# Patient Record
Sex: FEMALE | Race: BLACK OR AFRICAN AMERICAN | Employment: OTHER | URBAN - METROPOLITAN AREA
[De-identification: names, ages, dates, MRNs, and addresses within clinical notes are randomized per-mention and may not be internally consistent; named-entity substitution may affect disease eponyms.]

---

## 2018-05-01 ENCOUNTER — OFFICE VISIT (OUTPATIENT)
Dept: ENT CLINIC | Age: 63
End: 2018-05-01
Payer: MEDICARE

## 2018-05-01 ENCOUNTER — OFFICE VISIT (OUTPATIENT)
Dept: FAMILY MEDICINE CLINIC | Age: 63
End: 2018-05-01
Payer: MEDICARE

## 2018-05-01 VITALS
BODY MASS INDEX: 34.39 KG/M2 | DIASTOLIC BLOOD PRESSURE: 80 MMHG | SYSTOLIC BLOOD PRESSURE: 136 MMHG | TEMPERATURE: 99 F | WEIGHT: 214 LBS | HEIGHT: 66 IN | RESPIRATION RATE: 16 BRPM | HEART RATE: 108 BPM | OXYGEN SATURATION: 96 %

## 2018-05-01 VITALS
HEART RATE: 106 BPM | WEIGHT: 216 LBS | HEIGHT: 66 IN | DIASTOLIC BLOOD PRESSURE: 75 MMHG | SYSTOLIC BLOOD PRESSURE: 124 MMHG | BODY MASS INDEX: 34.72 KG/M2

## 2018-05-01 DIAGNOSIS — J40 SINOBRONCHITIS: Primary | ICD-10-CM

## 2018-05-01 DIAGNOSIS — E04.1 THYROID NODULE: Primary | ICD-10-CM

## 2018-05-01 DIAGNOSIS — J32.9 SINOBRONCHITIS: Primary | ICD-10-CM

## 2018-05-01 PROCEDURE — 4004F PT TOBACCO SCREEN RCVD TLK: CPT | Performed by: OTOLARYNGOLOGY

## 2018-05-01 PROCEDURE — 96372 THER/PROPH/DIAG INJ SC/IM: CPT | Performed by: NURSE PRACTITIONER

## 2018-05-01 PROCEDURE — 99204 OFFICE O/P NEW MOD 45 MIN: CPT | Performed by: OTOLARYNGOLOGY

## 2018-05-01 PROCEDURE — 3017F COLORECTAL CA SCREEN DOC REV: CPT | Performed by: NURSE PRACTITIONER

## 2018-05-01 PROCEDURE — G8427 DOCREV CUR MEDS BY ELIG CLIN: HCPCS | Performed by: NURSE PRACTITIONER

## 2018-05-01 PROCEDURE — 99213 OFFICE O/P EST LOW 20 MIN: CPT | Performed by: NURSE PRACTITIONER

## 2018-05-01 PROCEDURE — 4004F PT TOBACCO SCREEN RCVD TLK: CPT | Performed by: NURSE PRACTITIONER

## 2018-05-01 PROCEDURE — G8417 CALC BMI ABV UP PARAM F/U: HCPCS | Performed by: OTOLARYNGOLOGY

## 2018-05-01 PROCEDURE — G8427 DOCREV CUR MEDS BY ELIG CLIN: HCPCS | Performed by: OTOLARYNGOLOGY

## 2018-05-01 PROCEDURE — 3017F COLORECTAL CA SCREEN DOC REV: CPT | Performed by: OTOLARYNGOLOGY

## 2018-05-01 PROCEDURE — G8417 CALC BMI ABV UP PARAM F/U: HCPCS | Performed by: NURSE PRACTITIONER

## 2018-05-01 RX ORDER — AMOXICILLIN AND CLAVULANATE POTASSIUM 875; 125 MG/1; MG/1
1 TABLET, FILM COATED ORAL 2 TIMES DAILY
Qty: 28 TABLET | Refills: 0 | Status: SHIPPED | OUTPATIENT
Start: 2018-05-01 | End: 2018-05-01 | Stop reason: SDUPTHER

## 2018-05-01 RX ORDER — LEVALBUTEROL INHALATION SOLUTION 0.63 MG/3ML
0.63 SOLUTION RESPIRATORY (INHALATION) ONCE
Status: COMPLETED | OUTPATIENT
Start: 2018-05-01 | End: 2018-05-01

## 2018-05-01 RX ORDER — LEVALBUTEROL INHALATION SOLUTION 1.25 MG/3ML
1.25 SOLUTION RESPIRATORY (INHALATION) ONCE
Status: DISCONTINUED | OUTPATIENT
Start: 2018-05-01 | End: 2018-05-01

## 2018-05-01 RX ORDER — PREDNISONE 20 MG/1
40 TABLET ORAL DAILY
Qty: 14 TABLET | Refills: 0 | Status: SHIPPED | OUTPATIENT
Start: 2018-05-01 | End: 2018-05-08

## 2018-05-01 RX ORDER — AMOXICILLIN AND CLAVULANATE POTASSIUM 875; 125 MG/1; MG/1
1 TABLET, FILM COATED ORAL 2 TIMES DAILY
Qty: 28 TABLET | Refills: 0 | Status: SHIPPED | OUTPATIENT
Start: 2018-05-01 | End: 2018-05-15

## 2018-05-01 RX ADMIN — LEVALBUTEROL INHALATION SOLUTION 0.63 MG: 0.63 SOLUTION RESPIRATORY (INHALATION) at 16:09

## 2018-05-01 ASSESSMENT — ENCOUNTER SYMPTOMS
GASTROINTESTINAL NEGATIVE: 1
ABDOMINAL PAIN: 0
TROUBLE SWALLOWING: 1
EYES NEGATIVE: 1
SORE THROAT: 1
RESPIRATORY NEGATIVE: 1
SHORTNESS OF BREATH: 0
COLOR CHANGE: 0

## 2018-05-03 ENCOUNTER — TELEPHONE (OUTPATIENT)
Dept: ENT CLINIC | Age: 63
End: 2018-05-03

## 2018-05-03 DIAGNOSIS — E04.1 THYROID NODULE: Primary | ICD-10-CM

## 2018-05-21 ENCOUNTER — TELEPHONE (OUTPATIENT)
Dept: ENT CLINIC | Age: 63
End: 2018-05-21

## 2018-05-21 NOTE — TELEPHONE ENCOUNTER
Patient cancelled her FNA due to her  being in ICU. Patient told Jr Marley she would callback to reschedule when she could.     Electronically signed by Toro Amos MA on 5/21/18 at 1:50 PM

## 2018-05-23 NOTE — TELEPHONE ENCOUNTER
How long would you like me to wait before I reach out to the patient if she has not yet rescheduled?

## 2018-06-05 ENCOUNTER — TELEPHONE (OUTPATIENT)
Dept: ENT CLINIC | Age: 63
End: 2018-06-05

## 2018-06-14 ENCOUNTER — OFFICE VISIT (OUTPATIENT)
Dept: INTERNAL MEDICINE | Age: 63
End: 2018-06-14
Payer: MEDICARE

## 2018-06-14 VITALS
HEART RATE: 97 BPM | SYSTOLIC BLOOD PRESSURE: 103 MMHG | HEIGHT: 66 IN | BODY MASS INDEX: 33.73 KG/M2 | TEMPERATURE: 98.5 F | WEIGHT: 209.9 LBS | RESPIRATION RATE: 16 BRPM | DIASTOLIC BLOOD PRESSURE: 67 MMHG

## 2018-06-14 DIAGNOSIS — F32.A DEPRESSION, UNSPECIFIED DEPRESSION TYPE: ICD-10-CM

## 2018-06-14 DIAGNOSIS — E04.1 THYROID NODULE: ICD-10-CM

## 2018-06-14 DIAGNOSIS — K21.9 GASTROESOPHAGEAL REFLUX DISEASE WITHOUT ESOPHAGITIS: ICD-10-CM

## 2018-06-14 DIAGNOSIS — I10 ESSENTIAL HYPERTENSION: Primary | ICD-10-CM

## 2018-06-14 DIAGNOSIS — M47.816 OSTEOARTHRITIS OF LUMBAR SPINE, UNSPECIFIED SPINAL OSTEOARTHRITIS COMPLICATION STATUS: ICD-10-CM

## 2018-06-14 DIAGNOSIS — Z23 NEED FOR PROPHYLACTIC VACCINATION AGAINST STREPTOCOCCUS PNEUMONIAE (PNEUMOCOCCUS): ICD-10-CM

## 2018-06-14 DIAGNOSIS — Z79.4 TYPE 2 DIABETES MELLITUS WITHOUT COMPLICATION, WITH LONG-TERM CURRENT USE OF INSULIN (HCC): ICD-10-CM

## 2018-06-14 DIAGNOSIS — E78.5 HYPERLIPIDEMIA, UNSPECIFIED HYPERLIPIDEMIA TYPE: ICD-10-CM

## 2018-06-14 DIAGNOSIS — E11.9 TYPE 2 DIABETES MELLITUS WITHOUT COMPLICATION, WITH LONG-TERM CURRENT USE OF INSULIN (HCC): ICD-10-CM

## 2018-06-14 DIAGNOSIS — E27.8 ADRENAL INCIDENTALOMA (HCC): ICD-10-CM

## 2018-06-14 DIAGNOSIS — M79.7 FIBROMYALGIA: ICD-10-CM

## 2018-06-14 DIAGNOSIS — J44.9 CHRONIC OBSTRUCTIVE PULMONARY DISEASE, UNSPECIFIED COPD TYPE (HCC): ICD-10-CM

## 2018-06-14 DIAGNOSIS — E55.9 VITAMIN D DEFICIENCY: ICD-10-CM

## 2018-06-14 DIAGNOSIS — Z23 NEED FOR PROPHYLACTIC VACCINATION AGAINST DIPHTHERIA-TETANUS-PERTUSSIS (DTP): ICD-10-CM

## 2018-06-14 DIAGNOSIS — Z23 NEED FOR PROPHYLACTIC VACCINATION AND INOCULATION AGAINST VARICELLA: ICD-10-CM

## 2018-06-14 DIAGNOSIS — E03.9 HYPOTHYROIDISM, UNSPECIFIED TYPE: ICD-10-CM

## 2018-06-14 LAB — GLUCOSE BLD-MCNC: 143 MG/DL

## 2018-06-14 PROCEDURE — G8926 SPIRO NO PERF OR DOC: HCPCS | Performed by: INTERNAL MEDICINE

## 2018-06-14 PROCEDURE — G8427 DOCREV CUR MEDS BY ELIG CLIN: HCPCS | Performed by: INTERNAL MEDICINE

## 2018-06-14 PROCEDURE — 90732 PPSV23 VACC 2 YRS+ SUBQ/IM: CPT

## 2018-06-14 PROCEDURE — 2022F DILAT RTA XM EVC RTNOPTHY: CPT | Performed by: INTERNAL MEDICINE

## 2018-06-14 PROCEDURE — G8417 CALC BMI ABV UP PARAM F/U: HCPCS | Performed by: INTERNAL MEDICINE

## 2018-06-14 PROCEDURE — 3017F COLORECTAL CA SCREEN DOC REV: CPT | Performed by: INTERNAL MEDICINE

## 2018-06-14 PROCEDURE — 3023F SPIROM DOC REV: CPT | Performed by: INTERNAL MEDICINE

## 2018-06-14 PROCEDURE — G0009 ADMIN PNEUMOCOCCAL VACCINE: HCPCS

## 2018-06-14 PROCEDURE — 4004F PT TOBACCO SCREEN RCVD TLK: CPT | Performed by: INTERNAL MEDICINE

## 2018-06-14 PROCEDURE — 82962 GLUCOSE BLOOD TEST: CPT | Performed by: INTERNAL MEDICINE

## 2018-06-14 PROCEDURE — 3044F HG A1C LEVEL LT 7.0%: CPT | Performed by: INTERNAL MEDICINE

## 2018-06-14 PROCEDURE — 99213 OFFICE O/P EST LOW 20 MIN: CPT | Performed by: INTERNAL MEDICINE

## 2018-06-14 PROCEDURE — 6360000002 HC RX W HCPCS

## 2018-06-14 RX ORDER — ESOMEPRAZOLE MAGNESIUM 40 MG/1
CAPSULE, DELAYED RELEASE ORAL
Qty: 30 CAPSULE | Refills: 3 | Status: SHIPPED | OUTPATIENT
Start: 2018-06-14 | End: 2019-02-11 | Stop reason: SDUPTHER

## 2018-06-14 RX ORDER — TRIAMTERENE AND HYDROCHLOROTHIAZIDE 37.5; 25 MG/1; MG/1
TABLET ORAL
Qty: 30 TABLET | Refills: 3 | Status: SHIPPED | OUTPATIENT
Start: 2018-06-14 | End: 2018-09-19 | Stop reason: SDUPTHER

## 2018-06-14 RX ORDER — BUDESONIDE AND FORMOTEROL FUMARATE DIHYDRATE 160; 4.5 UG/1; UG/1
2 AEROSOL RESPIRATORY (INHALATION) 2 TIMES DAILY
Qty: 1 INHALER | Refills: 3 | Status: SHIPPED | OUTPATIENT
Start: 2018-06-14 | End: 2018-10-25 | Stop reason: SDUPTHER

## 2018-06-14 RX ORDER — OYSTER SHELL CALCIUM WITH VITAMIN D 500; 200 MG/1; [IU]/1
TABLET, FILM COATED ORAL
Qty: 30 TABLET | Refills: 3 | Status: SHIPPED | OUTPATIENT
Start: 2018-06-14 | End: 2019-02-11 | Stop reason: SDUPTHER

## 2018-06-14 RX ORDER — GABAPENTIN 400 MG/1
300 CAPSULE ORAL 2 TIMES DAILY
Qty: 60 CAPSULE | Refills: 0 | Status: SHIPPED | OUTPATIENT
Start: 2018-06-14 | End: 2018-09-19 | Stop reason: SDUPTHER

## 2018-06-14 RX ORDER — CITALOPRAM 40 MG/1
40 TABLET ORAL DAILY
Qty: 30 TABLET | Refills: 3 | Status: SHIPPED | OUTPATIENT
Start: 2018-06-14 | End: 2018-10-25 | Stop reason: SDUPTHER

## 2018-06-14 RX ORDER — FLUTICASONE PROPIONATE 50 MCG
1 SPRAY, SUSPENSION (ML) NASAL DAILY
Qty: 1 BOTTLE | Refills: 3 | Status: SHIPPED | OUTPATIENT
Start: 2018-06-14 | End: 2018-10-25 | Stop reason: SDUPTHER

## 2018-06-14 RX ORDER — ATENOLOL 25 MG/1
25 TABLET ORAL DAILY
Qty: 30 TABLET | Refills: 3 | Status: SHIPPED | OUTPATIENT
Start: 2018-06-14 | End: 2019-02-11 | Stop reason: SDUPTHER

## 2018-06-14 RX ORDER — LEVOTHYROXINE SODIUM 75 MCG
75 TABLET ORAL DAILY
Qty: 30 TABLET | Refills: 3 | Status: SHIPPED | OUTPATIENT
Start: 2018-06-14 | End: 2018-09-19 | Stop reason: SDUPTHER

## 2018-06-14 RX ORDER — ALBUTEROL SULFATE 90 UG/1
2 AEROSOL, METERED RESPIRATORY (INHALATION) EVERY 4 HOURS PRN
Qty: 1 INHALER | Refills: 3 | Status: SHIPPED | OUTPATIENT
Start: 2018-06-14 | End: 2018-10-25 | Stop reason: SDUPTHER

## 2018-06-14 RX ORDER — GLUCOSAMINE HCL/CHONDROITIN SU 500-400 MG
CAPSULE ORAL
Qty: 100 STRIP | Refills: 3 | Status: SHIPPED | OUTPATIENT
Start: 2018-06-14 | End: 2018-10-25 | Stop reason: SDUPTHER

## 2018-06-14 RX ORDER — LANCETS 30 GAUGE
EACH MISCELLANEOUS
Qty: 100 EACH | Refills: 3 | Status: SHIPPED | OUTPATIENT
Start: 2018-06-14 | End: 2018-10-25 | Stop reason: SDUPTHER

## 2018-06-14 RX ORDER — AMITRIPTYLINE HYDROCHLORIDE 50 MG/1
TABLET, FILM COATED ORAL
Qty: 30 TABLET | Refills: 3 | Status: SHIPPED | OUTPATIENT
Start: 2018-06-14 | End: 2018-09-19 | Stop reason: SDUPTHER

## 2018-06-14 RX ORDER — BACLOFEN 10 MG/1
TABLET ORAL
Qty: 90 TABLET | Refills: 0 | Status: SHIPPED | OUTPATIENT
Start: 2018-06-14 | End: 2018-09-19 | Stop reason: SDUPTHER

## 2018-06-14 ASSESSMENT — ENCOUNTER SYMPTOMS
NAUSEA: 0
ROS SKIN COMMENTS: DRYNESS
ABDOMINAL PAIN: 0
RHINORRHEA: 1
SHORTNESS OF BREATH: 1
DIARRHEA: 0
COUGH: 1
VOMITING: 0
SORE THROAT: 0
BACK PAIN: 1

## 2018-06-19 ENCOUNTER — TELEPHONE (OUTPATIENT)
Dept: INTERNAL MEDICINE | Age: 63
End: 2018-06-19

## 2018-06-19 PROBLEM — Z79.4 TYPE 2 DIABETES MELLITUS WITHOUT COMPLICATION, WITH LONG-TERM CURRENT USE OF INSULIN (HCC): Status: ACTIVE | Noted: 2018-06-19

## 2018-06-19 PROBLEM — E11.9 TYPE 2 DIABETES MELLITUS WITHOUT COMPLICATION, WITH LONG-TERM CURRENT USE OF INSULIN (HCC): Status: ACTIVE | Noted: 2018-06-19

## 2018-06-25 ENCOUNTER — HOSPITAL ENCOUNTER (OUTPATIENT)
Dept: ULTRASOUND IMAGING | Age: 63
Discharge: HOME | End: 2018-06-27
Payer: MEDICARE

## 2018-06-25 ENCOUNTER — HOSPITAL ENCOUNTER (OUTPATIENT)
Age: 63
Discharge: HOME | End: 2018-06-25
Payer: MEDICARE

## 2018-06-25 DIAGNOSIS — E78.5 HYPERLIPIDEMIA, UNSPECIFIED HYPERLIPIDEMIA TYPE: ICD-10-CM

## 2018-06-25 DIAGNOSIS — E11.9 TYPE 2 DIABETES MELLITUS WITHOUT COMPLICATION, WITH LONG-TERM CURRENT USE OF INSULIN (HCC): ICD-10-CM

## 2018-06-25 DIAGNOSIS — E03.9 HYPOTHYROIDISM, UNSPECIFIED TYPE: ICD-10-CM

## 2018-06-25 DIAGNOSIS — Z79.4 TYPE 2 DIABETES MELLITUS WITHOUT COMPLICATION, WITH LONG-TERM CURRENT USE OF INSULIN (HCC): ICD-10-CM

## 2018-06-25 DIAGNOSIS — E04.1 THYROID NODULE: ICD-10-CM

## 2018-06-25 LAB
CHOLESTEROL, FASTING: 178 MG/DL (ref 0–199)
CREATININE URINE: 218 MG/DL (ref 29–226)
HDLC SERPL-MCNC: 54 MG/DL
LDL CHOLESTEROL CALCULATED: 105 MG/DL (ref 0–99)
MICROALBUMIN UR-MCNC: 29.5 MG/L
MICROALBUMIN/CREAT UR-RTO: 13.5 (ref 0–30)
TRIGLYCERIDE, FASTING: 96 MG/DL (ref 0–149)
TSH SERPL DL<=0.05 MIU/L-ACNC: 14.51 UIU/ML (ref 0.27–4.2)
VLDLC SERPL CALC-MCNC: 19 MG/DL

## 2018-06-25 PROCEDURE — 88173 CYTOPATH EVAL FNA REPORT: CPT

## 2018-06-25 PROCEDURE — 76942 ECHO GUIDE FOR BIOPSY: CPT

## 2018-06-25 PROCEDURE — 88305 TISSUE EXAM BY PATHOLOGIST: CPT

## 2018-06-25 PROCEDURE — 36415 COLL VENOUS BLD VENIPUNCTURE: CPT

## 2018-06-25 PROCEDURE — 82044 UR ALBUMIN SEMIQUANTITATIVE: CPT

## 2018-06-25 PROCEDURE — 80061 LIPID PANEL: CPT

## 2018-06-25 PROCEDURE — 82570 ASSAY OF URINE CREATININE: CPT

## 2018-06-25 PROCEDURE — 10022 US FINE NEEDLE ASPIRATION: CPT

## 2018-06-25 PROCEDURE — 84443 ASSAY THYROID STIM HORMONE: CPT

## 2018-06-26 ENCOUNTER — TELEPHONE (OUTPATIENT)
Dept: INTERNAL MEDICINE | Age: 63
End: 2018-06-26

## 2018-06-27 ENCOUNTER — HOSPITAL ENCOUNTER (OUTPATIENT)
Dept: PHYSICAL THERAPY | Age: 63
Setting detail: THERAPIES SERIES
Discharge: HOME | End: 2018-06-27
Payer: MEDICARE

## 2018-06-27 PROCEDURE — G8982 BODY POS GOAL STATUS: HCPCS

## 2018-06-27 PROCEDURE — 97162 PT EVAL MOD COMPLEX 30 MIN: CPT

## 2018-06-27 PROCEDURE — G8981 BODY POS CURRENT STATUS: HCPCS

## 2018-07-25 ENCOUNTER — TELEPHONE (OUTPATIENT)
Dept: ADMINISTRATIVE | Age: 63
End: 2018-07-25

## 2018-08-01 ENCOUNTER — TELEPHONE (OUTPATIENT)
Dept: ENT CLINIC | Age: 63
End: 2018-08-01

## 2018-08-01 NOTE — TELEPHONE ENCOUNTER
Patient cancelled apt 7/26 to go over FNA Results and No showed to the reschedule on 7/31. Spoke with patient and rescheduled to 8/8 and informed patient it is very important to her health she show up to this apt.

## 2018-08-09 ENCOUNTER — TELEPHONE (OUTPATIENT)
Dept: ENT CLINIC | Age: 63
End: 2018-08-09

## 2018-08-09 ENCOUNTER — OFFICE VISIT (OUTPATIENT)
Dept: ENT CLINIC | Age: 63
End: 2018-08-09
Payer: MEDICARE

## 2018-08-09 VITALS
OXYGEN SATURATION: 90 % | WEIGHT: 200 LBS | HEIGHT: 66 IN | SYSTOLIC BLOOD PRESSURE: 141 MMHG | BODY MASS INDEX: 32.14 KG/M2 | HEART RATE: 79 BPM | DIASTOLIC BLOOD PRESSURE: 89 MMHG

## 2018-08-09 DIAGNOSIS — E04.1 THYROID NODULE: Primary | ICD-10-CM

## 2018-08-09 PROCEDURE — G8427 DOCREV CUR MEDS BY ELIG CLIN: HCPCS | Performed by: OTOLARYNGOLOGY

## 2018-08-09 PROCEDURE — 99213 OFFICE O/P EST LOW 20 MIN: CPT | Performed by: OTOLARYNGOLOGY

## 2018-08-09 PROCEDURE — 4004F PT TOBACCO SCREEN RCVD TLK: CPT | Performed by: OTOLARYNGOLOGY

## 2018-08-09 PROCEDURE — 3017F COLORECTAL CA SCREEN DOC REV: CPT | Performed by: OTOLARYNGOLOGY

## 2018-08-09 PROCEDURE — G8417 CALC BMI ABV UP PARAM F/U: HCPCS | Performed by: OTOLARYNGOLOGY

## 2018-08-09 ASSESSMENT — ENCOUNTER SYMPTOMS
COLOR CHANGE: 0
RESPIRATORY NEGATIVE: 1
SORE THROAT: 1
SHORTNESS OF BREATH: 0
TROUBLE SWALLOWING: 1
ABDOMINAL PAIN: 0
GASTROINTESTINAL NEGATIVE: 1
EYES NEGATIVE: 1

## 2018-08-09 NOTE — PROGRESS NOTES
Subjective:      Patient ID:  Emerita Cruz is a 61 y.o. female. HPI:    Pt returns for review of US/FNA thyroid. There are changes since last visit. Pt having difficulty swallowing since the FNA. Pt is also complaining of increased swelling of the right side since the FNA. Patient's medications, allergies, past medical, surgical, social and family histories were reviewed and updated as appropriate. Review of Systems   Constitutional: Negative. HENT: Positive for sore throat and trouble swallowing. Eyes: Negative. Negative for visual disturbance. Respiratory: Negative. Negative for shortness of breath. Cardiovascular: Negative. Negative for chest pain. Gastrointestinal: Negative. Negative for abdominal pain. Genitourinary: Negative. Musculoskeletal: Positive for neck pain and neck stiffness. Skin: Negative. Negative for color change. Neurological: Negative. Psychiatric/Behavioral: Negative. Negative for behavioral problems and hallucinations. All other systems reviewed and are negative. Objective:   Physical Exam   Constitutional: She is oriented to person, place, and time. She appears well-developed and well-nourished. HENT:   Head: Normocephalic and atraumatic. Right Ear: Hearing, tympanic membrane, external ear and ear canal normal.   Left Ear: Hearing, tympanic membrane, external ear and ear canal normal.   Nose: Nose normal.   Mouth/Throat: Uvula is midline and oropharynx is clear and moist. Mucous membranes are dry. Lower lip has two lumps in the lip on palpation, left mass produces purulence with squeezing. Mild irritation     Pt also producing green mucus   Eyes: Conjunctivae and EOM are normal. Pupils are equal, round, and reactive to light. Neck: Thyroid mass and thyromegaly present. Palpable nodule in the midline of the neck. Elevates with swallowing. Right side of neck has increased swelling and tender to touch. Cardiovascular: Normal rate, regular rhythm and normal heart sounds. Pulmonary/Chest: Effort normal and breath sounds normal.   Abdominal: Soft. Bowel sounds are normal.   Neurological: She is alert and oriented to person, place, and time. Nursing note and vitals reviewed. Pathology:    Specimen Source:  FNA THYROID, ISTHMUS    Clinical Data:  Hypothyroidism; sore throat; trouble swallowing; US  done-nodules found;smoker            ON-SITE RAPID STAIN ASSESSMENT:  Evaluation episode 1 (3 passes): Adequate (KLS 6/25/18)    ADEQUACY STATEMENT:  Specimen is satisfactory for interpretation    DIAGNOSIS  INCONCLUSIVE FOR MALIGNANT CELLS    Follicular lesion of undetermined significance (FLUS)  Cellblock is non-contributory. COMMENT:  Hypercellular specimen with benign-appearing follicular cells, occasional  microfollicles, giant cells and scant colloid present in a background of  mature lymphocytes. Recommend correlation with clinical and radiographic findings. Intradepartmental consultation obtained. Assessment:       Diagnosis Orders   1.  Thyroid nodule  US FINE NEEDLE ASPIRATION              Plan:      I will reorder the FNA with afirma and see if we can come to a conclusion   Follow up in 2 week(s)

## 2018-08-09 NOTE — LETTER
[address] [address]  Phone: [phone]  Fax: Demi Lugo, DO        August 9, 2018    Demi Corok 55      Dear Elroy Diaz:    Our office has rescheduled your appointment to go over your Ultrasound Biopsy results multiple times. You No Showed to the appointments on 7/5/18, 7/31/18, and 8/8/18. You also canceled an appointment scheduled for 7/26/18. Our office policy is to discharge patients after 3 No Shows. Due to the importance of this appointment for your health, I have decided to allow you another chance to reschedule your appointment. Please contact our office as soon as possible to reschedule. Be sure it is a date and time that will best suit your schedule and you will be able to keep. If you have any questions or concerns, please don't hesitate to call.     Sincerely,        Sarah Vieira DO

## 2018-08-10 NOTE — TELEPHONE ENCOUNTER
Letter taken out of mail and shredded due to patient showing up yesterday thinking her appointment was 8/9 vs 8/8

## 2018-09-05 ENCOUNTER — HOSPITAL ENCOUNTER (OUTPATIENT)
Dept: ULTRASOUND IMAGING | Age: 63
Discharge: HOME | End: 2018-09-07
Payer: MEDICARE

## 2018-09-05 DIAGNOSIS — E04.1 THYROID NODULE: ICD-10-CM

## 2018-09-05 PROCEDURE — 10022 US FINE NEEDLE ASPIRATION: CPT

## 2018-09-05 PROCEDURE — 76942 ECHO GUIDE FOR BIOPSY: CPT

## 2018-09-05 PROCEDURE — 88173 CYTOPATH EVAL FNA REPORT: CPT

## 2018-09-05 PROCEDURE — 88305 TISSUE EXAM BY PATHOLOGIST: CPT

## 2018-09-05 NOTE — H&P
UPPER GASTROINTESTINAL ENDOSCOPY      2008    UPPER GASTROINTESTINAL ENDOSCOPY  07/20/2016    egd and colonoscopy       Family History   Problem Relation Age of Onset    Heart Disease Mother 79    Diabetes Mother     Cancer Mother         Breast    Hypertension Father     Cancer Father         Pancreas    Diabetes Father     High Blood Pressure Father     Arthritis Brother     Diabetes Brother     Cancer Maternal Uncle     Cancer Paternal Aunt         breast    High Blood Pressure Paternal Aunt     High Blood Pressure Paternal Uncle     Arthritis Maternal Grandmother     Diabetes Maternal Grandmother     High Blood Pressure Maternal Grandmother     Arthritis Maternal Grandfather     Stroke Maternal Grandfather     High Cholesterol Paternal Grandmother     Kidney Disease Paternal Grandmother     Heart Disease Paternal Grandfather        Social History     Social History    Marital status: Single     Spouse name: N/A    Number of children: N/A    Years of education: N/A     Occupational History    Not on file. Social History Main Topics    Smoking status: Current Every Day Smoker     Packs/day: 1.00     Years: 45.00     Types: Cigarettes     Start date: 7/15/1971    Smokeless tobacco: Never Used      Comment: PAMPHLET PLACED IN CHART    Alcohol use No    Drug use: No    Sexual activity: No     Other Topics Concern    Not on file     Social History Narrative    No narrative on file       ROS: Non-contributory other than as noted above    PHYSICAL EXAM:      Heent: Alert and orientated.     Heart:  Rapid regular rhythm    Lungs:  demonstrate no contraindications to proceed      Abdomen:  normal      DATA:  CBC:   Lab Results   Component Value Date    WBC 7.8 04/04/2017    RBC 4.22 04/04/2017    HGB 12.0 04/04/2017    HCT 38.1 04/04/2017    MCV 90.3 04/04/2017    MCH 28.4 04/04/2017    MCHC 31.5 04/04/2017    RDW 13.7 04/04/2017     04/04/2017    MPV 10.6 04/04/2017     CBC with

## 2018-09-17 ENCOUNTER — HOSPITAL ENCOUNTER (OUTPATIENT)
Dept: PHYSICAL THERAPY | Age: 63
Setting detail: THERAPIES SERIES
Discharge: HOME | End: 2018-09-17
Payer: MEDICARE

## 2018-09-19 ENCOUNTER — OFFICE VISIT (OUTPATIENT)
Dept: INTERNAL MEDICINE | Age: 63
End: 2018-09-19
Payer: MEDICARE

## 2018-09-19 VITALS
TEMPERATURE: 99.1 F | HEIGHT: 66 IN | HEART RATE: 95 BPM | DIASTOLIC BLOOD PRESSURE: 84 MMHG | BODY MASS INDEX: 33.3 KG/M2 | WEIGHT: 207.2 LBS | SYSTOLIC BLOOD PRESSURE: 125 MMHG | RESPIRATION RATE: 18 BRPM

## 2018-09-19 DIAGNOSIS — Z79.4 TYPE 2 DIABETES MELLITUS WITHOUT COMPLICATION, WITH LONG-TERM CURRENT USE OF INSULIN (HCC): ICD-10-CM

## 2018-09-19 DIAGNOSIS — I10 ESSENTIAL HYPERTENSION: ICD-10-CM

## 2018-09-19 DIAGNOSIS — M79.7 FIBROMYALGIA: ICD-10-CM

## 2018-09-19 DIAGNOSIS — E11.9 TYPE 2 DIABETES MELLITUS WITHOUT COMPLICATION, WITH LONG-TERM CURRENT USE OF INSULIN (HCC): ICD-10-CM

## 2018-09-19 DIAGNOSIS — E03.9 HYPOTHYROIDISM, UNSPECIFIED TYPE: ICD-10-CM

## 2018-09-19 DIAGNOSIS — D47.2 SMOLDERING MULTIPLE MYELOMA: ICD-10-CM

## 2018-09-19 DIAGNOSIS — J44.9 CHRONIC OBSTRUCTIVE PULMONARY DISEASE, UNSPECIFIED COPD TYPE (HCC): ICD-10-CM

## 2018-09-19 DIAGNOSIS — M47.816 OSTEOARTHRITIS OF LUMBAR SPINE, UNSPECIFIED SPINAL OSTEOARTHRITIS COMPLICATION STATUS: ICD-10-CM

## 2018-09-19 DIAGNOSIS — E11.9 TYPE 2 DIABETES MELLITUS WITHOUT COMPLICATION, WITH LONG-TERM CURRENT USE OF INSULIN (HCC): Primary | ICD-10-CM

## 2018-09-19 DIAGNOSIS — Z79.4 TYPE 2 DIABETES MELLITUS WITHOUT COMPLICATION, WITH LONG-TERM CURRENT USE OF INSULIN (HCC): Primary | ICD-10-CM

## 2018-09-19 DIAGNOSIS — F32.A DEPRESSION, UNSPECIFIED DEPRESSION TYPE: ICD-10-CM

## 2018-09-19 LAB — HBA1C MFR BLD: 6.4 %

## 2018-09-19 PROCEDURE — 99212 OFFICE O/P EST SF 10 MIN: CPT | Performed by: INTERNAL MEDICINE

## 2018-09-19 PROCEDURE — 99215 OFFICE O/P EST HI 40 MIN: CPT | Performed by: INTERNAL MEDICINE

## 2018-09-19 PROCEDURE — G8417 CALC BMI ABV UP PARAM F/U: HCPCS | Performed by: INTERNAL MEDICINE

## 2018-09-19 PROCEDURE — 2022F DILAT RTA XM EVC RTNOPTHY: CPT | Performed by: INTERNAL MEDICINE

## 2018-09-19 PROCEDURE — 3023F SPIROM DOC REV: CPT | Performed by: INTERNAL MEDICINE

## 2018-09-19 PROCEDURE — 3017F COLORECTAL CA SCREEN DOC REV: CPT | Performed by: INTERNAL MEDICINE

## 2018-09-19 PROCEDURE — 4004F PT TOBACCO SCREEN RCVD TLK: CPT | Performed by: INTERNAL MEDICINE

## 2018-09-19 PROCEDURE — G8926 SPIRO NO PERF OR DOC: HCPCS | Performed by: INTERNAL MEDICINE

## 2018-09-19 PROCEDURE — G8427 DOCREV CUR MEDS BY ELIG CLIN: HCPCS | Performed by: INTERNAL MEDICINE

## 2018-09-19 PROCEDURE — 3044F HG A1C LEVEL LT 7.0%: CPT | Performed by: INTERNAL MEDICINE

## 2018-09-19 PROCEDURE — 83036 HEMOGLOBIN GLYCOSYLATED A1C: CPT | Performed by: INTERNAL MEDICINE

## 2018-09-19 RX ORDER — LEVOFLOXACIN 750 MG/1
750 TABLET ORAL DAILY
Qty: 10 TABLET | Refills: 0 | Status: CANCELLED | OUTPATIENT
Start: 2018-09-19 | End: 2018-09-29

## 2018-09-19 RX ORDER — BACLOFEN 10 MG/1
TABLET ORAL
Qty: 90 TABLET | Refills: 0 | Status: SHIPPED | OUTPATIENT
Start: 2018-09-19 | End: 2019-02-11 | Stop reason: SDUPTHER

## 2018-09-19 RX ORDER — GABAPENTIN 400 MG/1
400 CAPSULE ORAL 2 TIMES DAILY
Qty: 60 CAPSULE | Refills: 0 | Status: SHIPPED | OUTPATIENT
Start: 2018-09-19 | End: 2018-10-25 | Stop reason: SDUPTHER

## 2018-09-19 RX ORDER — PREDNISONE 20 MG/1
20 TABLET ORAL DAILY
Qty: 10 TABLET | Refills: 0 | Status: SHIPPED | OUTPATIENT
Start: 2018-09-19 | End: 2018-09-29

## 2018-09-19 RX ORDER — AMITRIPTYLINE HYDROCHLORIDE 50 MG/1
TABLET, FILM COATED ORAL
Qty: 30 TABLET | Refills: 3 | Status: SHIPPED | OUTPATIENT
Start: 2018-09-19 | End: 2019-02-11 | Stop reason: SDUPTHER

## 2018-09-19 RX ORDER — LEVOFLOXACIN 500 MG/1
750 TABLET, FILM COATED ORAL DAILY
Qty: 10 TABLET | Refills: 0 | Status: SHIPPED | OUTPATIENT
Start: 2018-09-19 | End: 2018-09-19

## 2018-09-19 RX ORDER — TRIAMTERENE AND HYDROCHLOROTHIAZIDE 37.5; 25 MG/1; MG/1
TABLET ORAL
Qty: 30 TABLET | Refills: 3 | Status: SHIPPED | OUTPATIENT
Start: 2018-09-19 | End: 2019-02-11 | Stop reason: SDUPTHER

## 2018-09-19 RX ORDER — AZITHROMYCIN 250 MG/1
TABLET, FILM COATED ORAL
Qty: 1 PACKET | Refills: 0 | Status: SHIPPED | OUTPATIENT
Start: 2018-09-19 | End: 2018-09-23

## 2018-09-19 RX ORDER — LEVOTHYROXINE SODIUM 75 MCG
75 TABLET ORAL DAILY
Qty: 30 TABLET | Refills: 3 | Status: SHIPPED | OUTPATIENT
Start: 2018-09-19 | End: 2018-10-25 | Stop reason: SDUPTHER

## 2018-09-19 RX ORDER — BUDESONIDE AND FORMOTEROL FUMARATE DIHYDRATE 160; 4.5 UG/1; UG/1
2 AEROSOL RESPIRATORY (INHALATION) 2 TIMES DAILY
Qty: 1 INHALER | Refills: 3 | Status: CANCELLED | OUTPATIENT
Start: 2018-09-19

## 2018-09-19 RX ORDER — CITALOPRAM 40 MG/1
40 TABLET ORAL DAILY
Qty: 30 TABLET | Refills: 3 | Status: CANCELLED | OUTPATIENT
Start: 2018-09-19

## 2018-09-19 NOTE — PROGRESS NOTES
[address]  Internal Medicine Residency Program  [facility] Note      SUBJECTIVE:  CC: had concerns including Diabetes Mellitus; Hypertension; Medication Refill; and Breathing Problem (increased sob with movement and coughing up yellow sputum). Sudeep Argueta presented to the [facility] for a routine visit    Patient present to clinic with 3 days of eye irritation, runny nose, chest pain, \"feels like something is pulling\"  SOB, cough with increased production of greenish sputum, no fever. Has increased her use of her inhalers. Pt was unaware they were the same medication(albuterol) and was taking them both. She uses O2 during the night has not increased her use. O2 Sat RA was 95%    Refers she doesn't take all her medications because her insurance doesn't cover everything, will speak with SW to find available options for her. Last TSH measurement on June was 14, prices of medication reviewed with her and will start regime. Will follow TSH in 6-8 weeks. 6.4 A1C today increased from 5.9 on her last visit in which her Metformin was modified, refers she has tingling and numbness in her feet. Does not measure her BG at home, but tries to eat healthy. Even though her appettite is not he best.    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 Bone marrow biopsy showed 15% plasma cell, no karyotype, flow cytometry lambda feature,  IgG lambda monoclonal protein present, kappa/lambda ratio +. IgG 2368, will reevaluate with Bone survey in the future. ENT has been following for thyroid nodule which showed no malignancy on FNA    HTN well controlled on medication. COPD GOLD III with evidence of air trapping, hyperinflation was evident on PFT. (2015)  Only on albuterol inhaler, patient was unaware she was prescribed combined inhaler in the past.    Denies headache, nausea, vomit, chest pressure, abdominal pain, diarrhea, constipation, bloody stool, dysuria, fever.     Review Of Systems:  General: no fevers, chills, weight loss or gain. Ears/Nose/Throat: no hearing loss, tinnitus, vertigo, nosebleed, nasal congestion, + rhinorrhea, no sore throat  Respiratory: + cough, no pleuritic chest pain, dyspnea, and  +wheezing  Cardiovascular: no chest pain, angina, dyspnea on exertion, orthopnea, PND, palpitations, or claudication  Gastrointestinal: no nausea, vomiting, heartburn, diarrhea, constipation, abdominal pain, hematochezia or melena  Genitourinary: no urinary urgency, frequency, dysuria, nocturia, hesitancy, or incontinence  Musculoskeletal: no arthritis, arthralgia, myalgia, weakness, or morning stiffness,no flares. Skin: no abnormal pigmentation, rash, itching, masses, hair or nail changes    Outpatient Prescriptions Marked as Taking for the 9/19/18 encounter (Office Visit) with Jacqueline Nielson MD   Medication Sig Dispense Refill    gabapentin (NEURONTIN) 400 MG capsule Take 1 capsule by mouth 2 times daily for 30 days. . 60 capsule 0    triamterene-hydrochlorothiazide (MAXZIDE-25) 37.5-25 MG per tablet TAKE 1 TABLET BY MOUTH EVERY DAY 30 tablet 3    metFORMIN (GLUCOPHAGE) 1000 MG tablet TAKE 1 TABLET BY MOUTH TWICE DAILY WITH MEALS 60 tablet 3    baclofen (LIORESAL) 10 MG tablet TAKE 1/2 TABLET THREE TIMES DAILY AS NEEDED(PAIN, SPASMS) 90 tablet 0    amitriptyline (ELAVIL) 50 MG tablet TAKE 1 TABLET BY MOUTH EVERY EVENING 30 tablet 3    predniSONE (DELTASONE) 20 MG tablet Take 1 tablet by mouth daily for 10 days 10 tablet 0    SYNTHROID 75 MCG tablet Take 1 tablet by mouth daily Take with water on an empty stomach- wait 30 minutes before eating or taking other meds.  30 tablet 3    azithromycin (ZITHROMAX Z-TOYIN) 250 MG tablet Take 2 tablets (500 mg) on Day 1, and then take 1 tablet (250 mg) on days 2 through 5. 1 packet 0    fluticasone (FLONASE) 50 MCG/ACT nasal spray 1 spray by Nasal route daily 1 Bottle 3    citalopram (CELEXA) 40 MG tablet Take 1 tablet by mouth

## 2018-09-19 NOTE — PROGRESS NOTES
Verbal instructions given to pt by dr Dominique Graham   Pt left prior to scheduling her fu appt and receiving her printed avs   avs and follow-up appointment mailed to patient

## 2018-09-19 NOTE — PROGRESS NOTES
[address]  Internal Medicine Clinic    Attending Physician Statement:  Efrain Gutiérrez M.D., F.A.C.P. I have discussed the case, including pertinent history and exam findings with the resident. I have seen and examined the patient and the key elements of the encounter have been performed by me. I agree with the assessment, plan and orders as documented by the resident. Patient is seen for fu visit today. Last office notes reviewed, relative labs and imaging. Seen ENT then radiology-- lots of confusion and telephone calls-- +confusion of status  Fu thyroid nodule-- sp FNA  . Nodular abnormality measuring   approximately 1.7 cm with peripheral vascularity. DIAGNOSIS  INCONCLUSIVE FOR MALIGNANT CELLS    Follicular lesion of undetermined significance (FLUS)  Cellblock is similar. COMMENT:  The smears are sparsely cellular but show a predominance of Hurthle cells  with colloid and many multinucleated giant cell macrophages    Reassured that NO malignancy thryoid seen    We did want her to take her thyroid med discussed $4 cost    July 2016 Colonoscopy:  No HPylori +adenomas biopsies. Repeat colonoscopy in 3 years. Now off  Nexium. + Carafate. 3 years ago:  Severe airflow obstruction (GOLD III COPD) with a significant   bronchodilator component. Evidence of air-trapping, hyperinflation is   evident. No evidence of restrictive pathology. Diffusing capacity is   severely reduced indicating loss in gas transfer and discrepancy at the   alveolar capillary bed. Copd 3 days --productive cough-- greenish-so abx for  copd exac, +cough, +itchy eyes-- allergies> URI -- runny nose   Steroids,  Not taking controller-- dulera  Only taking albuterol.   Dicussed costs of inhalers, -- expensive  -- stop dual agent inhalders  Consider single agent steroid >gold 3   Or single agent long acting anticholinergic      NOT following up CC-- for MM  bx-- 15% plasma cells  2016  MGUS--

## 2018-09-20 RX ORDER — AMITRIPTYLINE HYDROCHLORIDE 50 MG/1
TABLET, FILM COATED ORAL
Qty: 90 TABLET | Refills: 3 | OUTPATIENT
Start: 2018-09-20

## 2018-09-20 RX ORDER — BACLOFEN 10 MG/1
TABLET ORAL
Qty: 135 TABLET | Refills: 0 | OUTPATIENT
Start: 2018-09-20

## 2018-09-20 RX ORDER — TRIAMTERENE AND HYDROCHLOROTHIAZIDE 37.5; 25 MG/1; MG/1
TABLET ORAL
Qty: 90 TABLET | Refills: 3 | OUTPATIENT
Start: 2018-09-20

## 2018-09-21 ENCOUNTER — HOSPITAL ENCOUNTER (OUTPATIENT)
Dept: PHYSICAL THERAPY | Age: 63
Setting detail: THERAPIES SERIES
Discharge: HOME | End: 2018-09-21
Payer: MEDICARE

## 2018-09-21 NOTE — PROGRESS NOTES
[facility]  Phone: [phone] Fax: [phone]     Physical Therapy  Cancellation/No-show Note  Patient Name:  Lamar Stevens  :  1955   Date:  2018 -  DOS-2018  For today's appointment patient:  [x]  Cancelled  [x]  Rescheduled appointment  []  No-show     Reason given by patient:  []  Patient ill  []  Conflicting appointment  []  No transportation    []  Conflict with work  []  No reason given  [x]  Other:  POOL water too cold 83*   Comments:      Electronically signed by:  Zander Rojas  PTA [id number]

## 2018-09-28 ENCOUNTER — OFFICE VISIT (OUTPATIENT)
Dept: ENT CLINIC | Age: 63
End: 2018-09-28
Payer: MEDICARE

## 2018-09-28 VITALS
BODY MASS INDEX: 33.27 KG/M2 | HEIGHT: 66 IN | SYSTOLIC BLOOD PRESSURE: 138 MMHG | OXYGEN SATURATION: 95 % | WEIGHT: 207 LBS | HEART RATE: 84 BPM | DIASTOLIC BLOOD PRESSURE: 84 MMHG

## 2018-09-28 DIAGNOSIS — E04.1 THYROID NODULE: Primary | ICD-10-CM

## 2018-09-28 PROCEDURE — G8417 CALC BMI ABV UP PARAM F/U: HCPCS | Performed by: OTOLARYNGOLOGY

## 2018-09-28 PROCEDURE — G8427 DOCREV CUR MEDS BY ELIG CLIN: HCPCS | Performed by: OTOLARYNGOLOGY

## 2018-09-28 PROCEDURE — 4004F PT TOBACCO SCREEN RCVD TLK: CPT | Performed by: OTOLARYNGOLOGY

## 2018-09-28 PROCEDURE — 3017F COLORECTAL CA SCREEN DOC REV: CPT | Performed by: OTOLARYNGOLOGY

## 2018-09-28 PROCEDURE — 99213 OFFICE O/P EST LOW 20 MIN: CPT | Performed by: OTOLARYNGOLOGY

## 2018-09-28 ASSESSMENT — ENCOUNTER SYMPTOMS
EYES NEGATIVE: 1
SORE THROAT: 1
ABDOMINAL PAIN: 0
TROUBLE SWALLOWING: 1
SHORTNESS OF BREATH: 0
RESPIRATORY NEGATIVE: 1
GASTROINTESTINAL NEGATIVE: 1
COLOR CHANGE: 0

## 2018-09-28 NOTE — PATIENT INSTRUCTIONS
Thank you for choosing our [facility] or [facility] practice. We are committed to your medical treatment and  care. To prepare you for surgery, the surgery scheduler will be  calling you to confirm a date for both your surgery and follow up  appointment. Please allow at least 72 hours after your office visit to  receive your appointment dates and times. If you need to reschedule or cancel your surgery or follow up  appointment, please call the surgery scheduler at [phone]. INSTRUCTIONS FOR SURGERY    Nothing to eat or drink after midnight the night before surgery unless surgery is at [facility] or otherwise instructed by the hospital.    DO NOT TAKE ANY ASPIRIN PRODUCTS 7 days prior to surgery. Tylenol only. No Advil, Motrin, Aleve, or Ibuprofen    Any illegal drugs in your system (including Marijuana even if legally prescribed) will result in your surgery being cancelled. Please be sure to check with our office or the hospital on time frame for the drugs to be out of your system. Should your insurance change at any time you must contact our office. Failure to do so may result in your surgery being rescheduled. [address] [facility], Jayy Yanna will call you a couple of days prior to the surgery and will give you further instructions, if any questions call them at [phone] extension # [phone]. [facility] will call you a couple of days prior to the surgery and will give you further instructions, if any questions call them [facility], [facility] will call you a couple of days prior to surgery and give you further instructions, if any questions call them at [address] [facility], [address] [facility] will call you a couple days prior to your surgery and give you further

## 2018-09-28 NOTE — PROGRESS NOTES
Subjective:      Patient ID:  Eric Aguilar is a 61 y.o. female. HPI:    Pt returns for review of US/FNA thyroid. There are not changes since last visit. Patient's medications, allergies, past medical, surgical, social and family histories were reviewed and updated as appropriate. Review of Systems   Constitutional: Negative. HENT: Positive for sore throat and trouble swallowing. Eyes: Negative. Negative for visual disturbance. Respiratory: Negative. Negative for shortness of breath. Cardiovascular: Negative. Negative for chest pain. Gastrointestinal: Negative. Negative for abdominal pain. Genitourinary: Negative. Musculoskeletal: Positive for neck pain and neck stiffness. Skin: Negative. Negative for color change. Neurological: Negative. Psychiatric/Behavioral: Negative. Negative for behavioral problems and hallucinations. All other systems reviewed and are negative. Objective:   Physical Exam   Constitutional: She is oriented to person, place, and time. She appears well-developed and well-nourished. HENT:   Head: Normocephalic and atraumatic. Right Ear: Hearing, tympanic membrane, external ear and ear canal normal.   Left Ear: Hearing, tympanic membrane, external ear and ear canal normal.   Nose: Nose normal.   Mouth/Throat: Uvula is midline and oropharynx is clear and moist. Mucous membranes are dry. Pt has palpable nodule on the right side of her neck. Eyes: Pupils are equal, round, and reactive to light. Conjunctivae and EOM are normal.   Neck: Thyroid mass and thyromegaly present. Palpable nodule in the midline of the neck. Elevates with swallowing. Right side of neck has increased swelling and tender to touch. Cardiovascular: Normal rate, regular rhythm and normal heart sounds. Pulmonary/Chest: Effort normal and breath sounds normal.   Abdominal: Soft.  Bowel sounds are normal.   Neurological: She is alert and oriented to person, place, and time. Nursing note and vitals reviewed. Pathology:            Assessment:       Diagnosis Orders   1. Thyroid nodule                Plan:      I recommend:    total Thyroidectomy with nerve integrity monitor. I will keep the patient overnight for observation after surgery  The procedure risks and benefits were discussed with the patient and family including:    -- Injury to the recurrent laryngeal nerve can occur in 5% of cases. A temporary paralysis of the nerve also may occur if the nerve is stretched during surgery. This may happen if a superior approach is used to remove the gland and the nerve is stretched between where it enters the larynx and below the thyroid next to where it may be bound to Berry's ligament. -- Postoperative bleeding may occur around the trachea. If severe airway obstruction may occur. -- Asymmetry of the skin flaps which may cause a cosmetic deformity. If a total thyroidectomy is performed both recurrent laryngeal nerves are at risk. If both are injured, the patient will have a poor airway and placement of a tracheotomy may be necessary. -- There are four small calcium glands, called parathyroids. The location of these glands is variable and they mimic fat and lymph nodes. If these glands are all removed, calcium metabolism will be disturbed and there will be a rapid (over hours) fall in the serum calcium. If left untreated, this will cause cramps, tetany and cardiac arrest.           Pt and family understood and decided to proceed with the surgery.     Follow up in 1 week(s)

## 2018-10-11 ENCOUNTER — TELEPHONE (OUTPATIENT)
Dept: INTERNAL MEDICINE | Age: 63
End: 2018-10-11

## 2018-10-25 ENCOUNTER — OFFICE VISIT (OUTPATIENT)
Dept: INTERNAL MEDICINE | Age: 63
End: 2018-10-25
Payer: MEDICARE

## 2018-10-25 VITALS
DIASTOLIC BLOOD PRESSURE: 70 MMHG | HEIGHT: 66 IN | TEMPERATURE: 98.4 F | RESPIRATION RATE: 20 BRPM | HEART RATE: 90 BPM | OXYGEN SATURATION: 94 % | SYSTOLIC BLOOD PRESSURE: 116 MMHG | BODY MASS INDEX: 32.47 KG/M2 | WEIGHT: 202 LBS

## 2018-10-25 DIAGNOSIS — E11.9 TYPE 2 DIABETES MELLITUS WITHOUT COMPLICATION, WITH LONG-TERM CURRENT USE OF INSULIN (HCC): ICD-10-CM

## 2018-10-25 DIAGNOSIS — J44.9 CHRONIC OBSTRUCTIVE PULMONARY DISEASE, UNSPECIFIED COPD TYPE (HCC): ICD-10-CM

## 2018-10-25 DIAGNOSIS — F32.A DEPRESSION, UNSPECIFIED DEPRESSION TYPE: ICD-10-CM

## 2018-10-25 DIAGNOSIS — D47.2 SMOLDERING MULTIPLE MYELOMA: ICD-10-CM

## 2018-10-25 DIAGNOSIS — Z23 NEED FOR INFLUENZA VACCINATION: Primary | ICD-10-CM

## 2018-10-25 DIAGNOSIS — Z79.4 TYPE 2 DIABETES MELLITUS WITHOUT COMPLICATION, WITH LONG-TERM CURRENT USE OF INSULIN (HCC): ICD-10-CM

## 2018-10-25 DIAGNOSIS — I10 ESSENTIAL HYPERTENSION: ICD-10-CM

## 2018-10-25 DIAGNOSIS — M79.7 FIBROMYALGIA: ICD-10-CM

## 2018-10-25 DIAGNOSIS — E03.9 HYPOTHYROIDISM, UNSPECIFIED TYPE: ICD-10-CM

## 2018-10-25 PROCEDURE — 99214 OFFICE O/P EST MOD 30 MIN: CPT | Performed by: INTERNAL MEDICINE

## 2018-10-25 PROCEDURE — G0008 ADMIN INFLUENZA VIRUS VAC: HCPCS

## 2018-10-25 PROCEDURE — G8427 DOCREV CUR MEDS BY ELIG CLIN: HCPCS | Performed by: INTERNAL MEDICINE

## 2018-10-25 PROCEDURE — G8417 CALC BMI ABV UP PARAM F/U: HCPCS | Performed by: INTERNAL MEDICINE

## 2018-10-25 PROCEDURE — G8482 FLU IMMUNIZE ORDER/ADMIN: HCPCS | Performed by: INTERNAL MEDICINE

## 2018-10-25 PROCEDURE — 90686 IIV4 VACC NO PRSV 0.5 ML IM: CPT

## 2018-10-25 PROCEDURE — 3017F COLORECTAL CA SCREEN DOC REV: CPT | Performed by: INTERNAL MEDICINE

## 2018-10-25 PROCEDURE — 2022F DILAT RTA XM EVC RTNOPTHY: CPT | Performed by: INTERNAL MEDICINE

## 2018-10-25 PROCEDURE — 3023F SPIROM DOC REV: CPT | Performed by: INTERNAL MEDICINE

## 2018-10-25 PROCEDURE — 3044F HG A1C LEVEL LT 7.0%: CPT | Performed by: INTERNAL MEDICINE

## 2018-10-25 PROCEDURE — 6360000002 HC RX W HCPCS

## 2018-10-25 PROCEDURE — 99203 OFFICE O/P NEW LOW 30 MIN: CPT | Performed by: INTERNAL MEDICINE

## 2018-10-25 PROCEDURE — 4004F PT TOBACCO SCREEN RCVD TLK: CPT | Performed by: INTERNAL MEDICINE

## 2018-10-25 PROCEDURE — G8926 SPIRO NO PERF OR DOC: HCPCS | Performed by: INTERNAL MEDICINE

## 2018-10-25 RX ORDER — LANCETS 30 GAUGE
EACH MISCELLANEOUS
Qty: 100 EACH | Refills: 1 | Status: SHIPPED | OUTPATIENT
Start: 2018-10-25 | End: 2019-02-11 | Stop reason: SDUPTHER

## 2018-10-25 RX ORDER — LEVOTHYROXINE SODIUM 75 MCG
75 TABLET ORAL DAILY
Qty: 30 TABLET | Refills: 3 | Status: SHIPPED | OUTPATIENT
Start: 2018-10-25 | End: 2019-02-11 | Stop reason: SDUPTHER

## 2018-10-25 RX ORDER — CITALOPRAM 40 MG/1
40 TABLET ORAL DAILY
Qty: 30 TABLET | Refills: 3 | Status: SHIPPED | OUTPATIENT
Start: 2018-10-25 | End: 2018-12-18 | Stop reason: SDUPTHER

## 2018-10-25 RX ORDER — FLUTICASONE PROPIONATE 50 MCG
1 SPRAY, SUSPENSION (ML) NASAL DAILY
Qty: 1 BOTTLE | Refills: 3 | Status: SHIPPED | OUTPATIENT
Start: 2018-10-25 | End: 2019-02-11 | Stop reason: SDUPTHER

## 2018-10-25 RX ORDER — BUDESONIDE AND FORMOTEROL FUMARATE DIHYDRATE 160; 4.5 UG/1; UG/1
2 AEROSOL RESPIRATORY (INHALATION) 2 TIMES DAILY
Qty: 1 INHALER | Refills: 3 | Status: SHIPPED | OUTPATIENT
Start: 2018-10-25 | End: 2019-02-11 | Stop reason: SDUPTHER

## 2018-10-25 RX ORDER — ALBUTEROL SULFATE 90 UG/1
2 AEROSOL, METERED RESPIRATORY (INHALATION) EVERY 4 HOURS PRN
Qty: 1 INHALER | Refills: 3 | Status: SHIPPED | OUTPATIENT
Start: 2018-10-25 | End: 2019-02-11 | Stop reason: SDUPTHER

## 2018-10-25 RX ORDER — GLUCOSAMINE HCL/CHONDROITIN SU 500-400 MG
CAPSULE ORAL
Qty: 100 STRIP | Refills: 1 | Status: SHIPPED | OUTPATIENT
Start: 2018-10-25 | End: 2019-02-11 | Stop reason: SDUPTHER

## 2018-10-25 RX ORDER — GABAPENTIN 400 MG/1
400 CAPSULE ORAL 2 TIMES DAILY
Qty: 60 CAPSULE | Refills: 3 | Status: SHIPPED | OUTPATIENT
Start: 2018-10-25 | End: 2019-06-11 | Stop reason: SDUPTHER

## 2018-10-25 NOTE — PROGRESS NOTES
Attending Physician Statement  I have discussed the case, including pertinent history and exam findings with the resident. I have seen and examined the patient and the key elements of the encounter have been performed by me. I reviewed medical, surgical, social histories, meds and any pertinent labs. I agree with the assessment, plan and orders as documented by the resident. Patient has extensive history of COPD, wears O2 at night, has smoldering myeloma, MGUS (has M spike IgG 2.3 g without evident lytic lesions, anemia, or hypercalcemia but with 15% marrow plasma cells, indeterminate thyroid nodule (follicular with Hurthle cells, recommended thyroidiectomy per ENT, hypothyroidism, HTH. Patient needs refills on meds and O2 script. Patient going to [place] to visit mother. She needs follow up appointment with oncology. Needs CBC (last checked 4/17) and CMP.

## 2018-10-25 NOTE — PROGRESS NOTES
Vaccine Information Sheet, \"Influenza - Inactivated\"  given to Kayla Lew, or parent/legal guardian of  Kayla Lew and verbalized understanding. Patient responses:    Have you ever had a reaction to a flu vaccine? No  Are you able to eat eggs without adverse effects? Yes  Do you have any current illness? No  Have you ever had Guillian [place] Syndrome? No    Flu vaccine given per order. Please see immunization tab. Patient signed release for medical records  Biopsy results given to patient.       Spoke with Normal regarding oxygen renewal  [facility] states they will fax orders for to sign for oxygen  They will also need todays office notes    Patient requests oxygen concentrator   Oxygen concentrator /Fabiola PINEDA

## 2018-10-26 ENCOUNTER — TELEPHONE (OUTPATIENT)
Dept: INTERNAL MEDICINE | Age: 63
End: 2018-10-26

## 2018-11-02 ENCOUNTER — OFFICE VISIT (OUTPATIENT)
Dept: ONCOLOGY | Age: 63
End: 2018-11-02
Payer: MEDICARE

## 2018-11-02 ENCOUNTER — HOSPITAL ENCOUNTER (OUTPATIENT)
Dept: INFUSION THERAPY | Age: 63
Discharge: HOME | End: 2018-11-02
Payer: MEDICARE

## 2018-11-02 VITALS
SYSTOLIC BLOOD PRESSURE: 132 MMHG | HEIGHT: 66 IN | HEART RATE: 96 BPM | TEMPERATURE: 97.3 F | BODY MASS INDEX: 32.3 KG/M2 | WEIGHT: 201 LBS | DIASTOLIC BLOOD PRESSURE: 72 MMHG | RESPIRATION RATE: 20 BRPM

## 2018-11-02 DIAGNOSIS — D47.2 SMOLDERING MULTIPLE MYELOMA: ICD-10-CM

## 2018-11-02 DIAGNOSIS — D47.2 SMOLDERING MULTIPLE MYELOMA: Primary | ICD-10-CM

## 2018-11-02 LAB
ALBUMIN SERPL-MCNC: 4 G/DL (ref 3.5–5.2)
ALP BLD-CCNC: 75 U/L (ref 35–104)
ALT SERPL-CCNC: 15 U/L (ref 0–32)
ANION GAP SERPL CALCULATED.3IONS-SCNC: 9 MMOL/L (ref 7–16)
AST SERPL-CCNC: 18 U/L (ref 0–31)
BASOPHILS ABSOLUTE: 0.04 E9/L (ref 0–0.2)
BASOPHILS RELATIVE PERCENT: 0.6 % (ref 0–2)
BILIRUB SERPL-MCNC: 0.5 MG/DL (ref 0–1.2)
BUN BLDV-MCNC: 15 MG/DL (ref 8–23)
CALCIUM SERPL-MCNC: 9.7 MG/DL (ref 8.6–10.2)
CHLORIDE BLD-SCNC: 97 MMOL/L (ref 98–107)
CO2: 36 MMOL/L (ref 22–29)
CREAT SERPL-MCNC: 0.7 MG/DL (ref 0.5–1)
EOSINOPHILS ABSOLUTE: 0.24 E9/L (ref 0.05–0.5)
EOSINOPHILS RELATIVE PERCENT: 3.6 % (ref 0–6)
GFR AFRICAN AMERICAN: >60
GFR NON-AFRICAN AMERICAN: >60 ML/MIN/1.73
GLUCOSE BLD-MCNC: 110 MG/DL (ref 74–109)
HCT VFR BLD CALC: 41.3 % (ref 34–48)
HEMOGLOBIN: 12.5 G/DL (ref 11.5–15.5)
IMMATURE GRANULOCYTES #: 0.01 E9/L
IMMATURE GRANULOCYTES %: 0.1 % (ref 0–5)
LYMPHOCYTES ABSOLUTE: 3.71 E9/L (ref 1.5–4)
LYMPHOCYTES RELATIVE PERCENT: 55.2 % (ref 20–42)
MCH RBC QN AUTO: 28.2 PG (ref 26–35)
MCHC RBC AUTO-ENTMCNC: 30.3 % (ref 32–34.5)
MCV RBC AUTO: 93 FL (ref 80–99.9)
MONOCYTES ABSOLUTE: 0.44 E9/L (ref 0.1–0.95)
MONOCYTES RELATIVE PERCENT: 6.5 % (ref 2–12)
NEUTROPHILS ABSOLUTE: 2.28 E9/L (ref 1.8–7.3)
NEUTROPHILS RELATIVE PERCENT: 34 % (ref 43–80)
PDW BLD-RTO: 13.2 FL (ref 11.5–15)
PLATELET # BLD: 345 E9/L (ref 130–450)
PMV BLD AUTO: 10 FL (ref 7–12)
POTASSIUM SERPL-SCNC: 3.8 MMOL/L (ref 3.5–5)
RBC # BLD: 4.44 E12/L (ref 3.5–5.5)
SODIUM BLD-SCNC: 142 MMOL/L (ref 132–146)
TOTAL PROTEIN: 8.5 G/DL (ref 6.4–8.3)
WBC # BLD: 6.7 E9/L (ref 4.5–11.5)

## 2018-11-02 PROCEDURE — G8417 CALC BMI ABV UP PARAM F/U: HCPCS | Performed by: INTERNAL MEDICINE

## 2018-11-02 PROCEDURE — 99213 OFFICE O/P EST LOW 20 MIN: CPT | Performed by: INTERNAL MEDICINE

## 2018-11-02 PROCEDURE — 85025 COMPLETE CBC W/AUTO DIFF WBC: CPT

## 2018-11-02 PROCEDURE — 82784 ASSAY IGA/IGD/IGG/IGM EACH: CPT

## 2018-11-02 PROCEDURE — 84165 PROTEIN E-PHORESIS SERUM: CPT

## 2018-11-02 PROCEDURE — 82232 ASSAY OF BETA-2 PROTEIN: CPT

## 2018-11-02 PROCEDURE — 36415 COLL VENOUS BLD VENIPUNCTURE: CPT

## 2018-11-02 PROCEDURE — 99214 OFFICE O/P EST MOD 30 MIN: CPT | Performed by: INTERNAL MEDICINE

## 2018-11-02 PROCEDURE — 80053 COMPREHEN METABOLIC PANEL: CPT

## 2018-11-02 PROCEDURE — 3017F COLORECTAL CA SCREEN DOC REV: CPT | Performed by: INTERNAL MEDICINE

## 2018-11-02 PROCEDURE — 83883 ASSAY NEPHELOMETRY NOT SPEC: CPT

## 2018-11-02 PROCEDURE — 4004F PT TOBACCO SCREEN RCVD TLK: CPT | Performed by: INTERNAL MEDICINE

## 2018-11-02 PROCEDURE — G8427 DOCREV CUR MEDS BY ELIG CLIN: HCPCS | Performed by: INTERNAL MEDICINE

## 2018-11-02 PROCEDURE — G8482 FLU IMMUNIZE ORDER/ADMIN: HCPCS | Performed by: INTERNAL MEDICINE

## 2018-11-02 PROCEDURE — 86334 IMMUNOFIX E-PHORESIS SERUM: CPT

## 2018-11-02 NOTE — PROGRESS NOTES
diarrhea/constipation. GENITOURINARY: No dysuria, urinary frequency, hematuria. MUSCULOSKELETAL: she has chronic back and joints pain. NEURO: No syncope, presyncope, headache.   Remainder:  ROS NEGATIVE    Past Medical History:      Diagnosis Date    Adrenal incidentaloma ([address]     Anesthesia complication     states possible bronchospasm post procedure, unsure of when    Anxiety     Arthritis     Asthma     Bladder incontinence     Cancer ([address]     Myeloma, follows up at [address] [place] [address]    Chronic back pain     COPD (chronic obstructive pulmonary disease) ([address]     (+) PFT    Depression     Diabetes mellitus ([address]     Encounter for screening colonoscopy 07/2016    Fibromyalgia     GERD (gastroesophageal reflux disease)     Hyperlipidemia     Hypertension     Hypothyroidism     MGUS (monoclonal gammopathy of unknown significance)     Multiple myeloma ([address] 2009    Neuropathy     Obesity     Pelvic pain     Thyroid disease     Hyperthyroidism    Tobacco abuse     Type II or unspecified type diabetes mellitus without mention of complication, not stated as uncontrolled      Patient Active Problem List   Diagnosis    Adrenal incidentaloma ([address]    Diabetes mellitus ([address]    Hyperlipidemia    Hypothyroidism    Fibromyalgia    Obesity    Essential hypertension    Chronic right-sided low back pain with right-sided sciatica    Tobacco abuse    Myofascial pain    Lumbar radiculitis    Smoldering multiple myeloma ([address]    COPD (chronic obstructive pulmonary disease) (HCC)    Type 2 diabetes mellitus without complication, with long-term current use of insulin ([address]        Past Surgical History:      Procedure Laterality Date    COLONOSCOPY  07/20/2016 2008    KNEE SURGERY      left knee, arthroscopic    NERVE BLOCK Bilateral 09/22/2016    lumbar transforaminal nerve block #1    UPPER GASTROINTESTINAL ENDOSCOPY      2008    UPPER GASTROINTESTINAL ENDOSCOPY  07/20/2016    egd and colonoscopy       Family History:  Family History   Problem Relation Age of Onset    Heart Disease Mother 79    Diabetes Mother     Cancer Mother         Breast    Hypertension Father     Cancer Father         Pancreas    Diabetes Father     High Blood Pressure Father     Arthritis Brother     Diabetes Brother     Cancer Maternal Uncle     Cancer Paternal Aunt         breast    High Blood Pressure Paternal Aunt     High Blood Pressure Paternal Uncle     Arthritis Maternal Grandmother     Diabetes Maternal Grandmother     High Blood Pressure Maternal Grandmother     Arthritis Maternal Grandfather     Stroke Maternal Grandfather     High Cholesterol Paternal Grandmother     Kidney Disease Paternal Grandmother     Heart Disease Paternal Grandfather        Medications:  Reviewed and reconciled. Social History:  Social History     Social History    Marital status: Single     Spouse name: N/A    Number of children: N/A    Years of education: N/A     Occupational History    Not on file. Social History Main Topics    Smoking status: Current Every Day Smoker     Packs/day: 1.50     Years: 45.00     Types: Cigarettes     Start date: 7/15/1971    Smokeless tobacco: Never Used      Comment: PAMPHLET PLACED IN CHART    Alcohol use No    Drug use: No    Sexual activity: No     Other Topics Concern    Not on file     Social History Narrative    No narrative on file       Allergies: Allergies   Allergen Reactions    Aceon [Perindopril Erbumine] Anaphylaxis     Tongue swells up    Nsaids Shortness Of Breath and Swelling     tongue       Physical Exam:  /72 (Site: Right Upper Arm, Position: Sitting, Cuff Size: Medium Adult)   Pulse 96   Temp 97.3 °F (36.3 °C) (Temporal)   Resp 20   Ht 5' 6\" (1.676 m)   Wt 201 lb (91.2 kg)   BMI 32.44 kg/m²   GENERAL: Alert, oriented x 3, not in acute distress. HEENT: PERRLA; EOMI. Oropharynx clear. NECK: Supple.  No palpable cervical or

## 2018-11-05 LAB
ALBUMIN SERPL-MCNC: 3.3 G/DL (ref 3.5–4.7)
ALPHA-1-GLOBULIN: 0.2 G/DL (ref 0.2–0.4)
ALPHA-2-GLOBULIN: 0.8 G/FL (ref 0.5–1)
BETA GLOBULIN: 1 G/DL (ref 0.8–1.3)
ELECTROPHORESIS: ABNORMAL
GAMMA GLOBULIN: 2.5 G/DL (ref 0.7–1.6)
IGA: 111 MG/DL (ref 70–400)
IGG: 2459 MG/DL (ref 700–1600)
IGM: 29 MG/DL (ref 40–230)
IMMUNOFIXATION RESULT, SERUM: NORMAL

## 2018-11-06 LAB
BETA-2 MICROGLOBULIN: 2.2 MG/L (ref 0.6–2.4)
KAPPA FREE LIGHT CHAINS QNT: 4.84 MG/DL (ref 0.33–1.94)
KAPPA/LAMBDA FREE LIGHT CHAIN RATIO: 2.37 (ref 0.26–1.65)
LAMBDA FREE LIGHT CHAINS QNT: 2.04 MG/DL (ref 0.57–2.63)

## 2018-11-11 ENCOUNTER — HOSPITAL ENCOUNTER (OUTPATIENT)
Dept: CT IMAGING | Age: 63
Discharge: HOME | End: 2018-11-13
Payer: MEDICARE

## 2018-11-11 ENCOUNTER — HOSPITAL ENCOUNTER (OUTPATIENT)
Age: 63
Discharge: HOME | End: 2018-11-11
Payer: MEDICARE

## 2018-11-11 DIAGNOSIS — R04.2 HEMOPTYSIS: ICD-10-CM

## 2018-11-11 PROCEDURE — 80061 LIPID PANEL: CPT

## 2018-11-11 PROCEDURE — 80053 COMPREHEN METABOLIC PANEL: CPT

## 2018-11-11 PROCEDURE — 2580000003 HC RX 258: Performed by: RADIOLOGY

## 2018-11-11 PROCEDURE — 82248 BILIRUBIN DIRECT: CPT

## 2018-11-11 PROCEDURE — 71270 CT THORAX DX C-/C+: CPT

## 2018-11-11 PROCEDURE — 36415 COLL VENOUS BLD VENIPUNCTURE: CPT

## 2018-11-11 PROCEDURE — 6360000004 HC RX CONTRAST MEDICATION: Performed by: RADIOLOGY

## 2018-11-11 PROCEDURE — 83036 HEMOGLOBIN GLYCOSYLATED A1C: CPT

## 2018-11-11 PROCEDURE — 85025 COMPLETE CBC W/AUTO DIFF WBC: CPT

## 2018-11-11 PROCEDURE — 84443 ASSAY THYROID STIM HORMONE: CPT

## 2018-11-11 RX ORDER — SODIUM CHLORIDE 0.9 % (FLUSH) 0.9 %
10 SYRINGE (ML) INJECTION
Status: COMPLETED | OUTPATIENT
Start: 2018-11-11 | End: 2018-11-11

## 2018-11-11 RX ADMIN — IOPAMIDOL 90 ML: 755 INJECTION, SOLUTION INTRAVENOUS at 10:01

## 2018-11-11 RX ADMIN — Medication 10 ML: at 10:01

## 2018-11-12 DIAGNOSIS — Z79.4 TYPE 2 DIABETES MELLITUS WITHOUT COMPLICATION, WITH LONG-TERM CURRENT USE OF INSULIN (HCC): ICD-10-CM

## 2018-11-12 DIAGNOSIS — E03.9 HYPOTHYROIDISM, UNSPECIFIED TYPE: ICD-10-CM

## 2018-11-12 DIAGNOSIS — D47.2 SMOLDERING MULTIPLE MYELOMA: ICD-10-CM

## 2018-11-12 DIAGNOSIS — E11.9 TYPE 2 DIABETES MELLITUS WITHOUT COMPLICATION, WITH LONG-TERM CURRENT USE OF INSULIN (HCC): ICD-10-CM

## 2018-11-13 LAB
ALBUMIN SERPL-MCNC: 3.6 G/DL (ref 3.5–5.2)
ALP BLD-CCNC: 75 U/L (ref 35–104)
ALT SERPL-CCNC: 14 U/L (ref 0–32)
ANION GAP SERPL CALCULATED.3IONS-SCNC: 14 MMOL/L (ref 7–16)
AST SERPL-CCNC: 20 U/L (ref 0–31)
BASOPHILS ABSOLUTE: 0.04 E9/L (ref 0–0.2)
BASOPHILS RELATIVE PERCENT: 0.6 % (ref 0–2)
BILIRUB SERPL-MCNC: 0.4 MG/DL (ref 0–1.2)
BILIRUBIN DIRECT: <0.2 MG/DL (ref 0–0.3)
BILIRUBIN, INDIRECT: NORMAL MG/DL (ref 0–1)
BUN BLDV-MCNC: 18 MG/DL (ref 8–23)
CALCIUM SERPL-MCNC: 9.6 MG/DL (ref 8.6–10.2)
CHLORIDE BLD-SCNC: 98 MMOL/L (ref 98–107)
CHOLESTEROL, TOTAL: 166 MG/DL (ref 0–199)
CO2: 25 MMOL/L (ref 22–29)
CREAT SERPL-MCNC: 0.8 MG/DL (ref 0.5–1)
EOSINOPHILS ABSOLUTE: 0.24 E9/L (ref 0.05–0.5)
EOSINOPHILS RELATIVE PERCENT: 3.6 % (ref 0–6)
GFR AFRICAN AMERICAN: >60
GFR NON-AFRICAN AMERICAN: >60 ML/MIN/1.73
GLUCOSE BLD-MCNC: 101 MG/DL (ref 74–99)
HBA1C MFR BLD: 5.6 % (ref 4–5.6)
HCT VFR BLD CALC: 40.2 % (ref 34–48)
HDLC SERPL-MCNC: 52 MG/DL
HEMOGLOBIN: 12.4 G/DL (ref 11.5–15.5)
IMMATURE GRANULOCYTES #: 0.01 E9/L
IMMATURE GRANULOCYTES %: 0.1 % (ref 0–5)
LDL CHOLESTEROL CALCULATED: 101 MG/DL (ref 0–99)
LYMPHOCYTES ABSOLUTE: 3.48 E9/L (ref 1.5–4)
LYMPHOCYTES RELATIVE PERCENT: 52.2 % (ref 20–42)
MCH RBC QN AUTO: 28.8 PG (ref 26–35)
MCHC RBC AUTO-ENTMCNC: 30.8 % (ref 32–34.5)
MCV RBC AUTO: 93.3 FL (ref 80–99.9)
MONOCYTES ABSOLUTE: 0.43 E9/L (ref 0.1–0.95)
MONOCYTES RELATIVE PERCENT: 6.4 % (ref 2–12)
NEUTROPHILS ABSOLUTE: 2.47 E9/L (ref 1.8–7.3)
NEUTROPHILS RELATIVE PERCENT: 37.1 % (ref 43–80)
PDW BLD-RTO: 13.2 FL (ref 11.5–15)
PLATELET # BLD: 356 E9/L (ref 130–450)
PMV BLD AUTO: 10.9 FL (ref 7–12)
POTASSIUM SERPL-SCNC: 3.9 MMOL/L (ref 3.5–5)
RBC # BLD: 4.31 E12/L (ref 3.5–5.5)
SODIUM BLD-SCNC: 137 MMOL/L (ref 132–146)
TOTAL PROTEIN: 8.1 G/DL (ref 6.4–8.3)
TRIGL SERPL-MCNC: 64 MG/DL (ref 0–149)
TSH SERPL DL<=0.05 MIU/L-ACNC: 5.7 UIU/ML (ref 0.27–4.2)
VLDLC SERPL CALC-MCNC: 13 MG/DL
WBC # BLD: 6.7 E9/L (ref 4.5–11.5)

## 2018-12-18 DIAGNOSIS — F32.A DEPRESSION, UNSPECIFIED DEPRESSION TYPE: ICD-10-CM

## 2018-12-18 RX ORDER — CITALOPRAM 40 MG/1
40 TABLET ORAL DAILY
Qty: 30 TABLET | Refills: 2 | Status: SHIPPED | OUTPATIENT
Start: 2018-12-18 | End: 2019-04-10 | Stop reason: SDUPTHER

## 2019-01-07 ENCOUNTER — TELEPHONE (OUTPATIENT)
Dept: ENT CLINIC | Age: 64
End: 2019-01-07

## 2019-01-08 ENCOUNTER — TELEPHONE (OUTPATIENT)
Dept: ENT CLINIC | Age: 64
End: 2019-01-08

## 2019-01-10 ENCOUNTER — HOSPITAL ENCOUNTER (OUTPATIENT)
Dept: PREADMISSION TESTING | Age: 64
Discharge: HOME | End: 2019-01-10

## 2019-02-04 ENCOUNTER — TELEPHONE (OUTPATIENT)
Dept: INTERNAL MEDICINE | Age: 64
End: 2019-02-04

## 2019-02-11 ENCOUNTER — OFFICE VISIT (OUTPATIENT)
Dept: INTERNAL MEDICINE | Age: 64
End: 2019-02-11
Payer: MEDICARE

## 2019-02-11 VITALS
TEMPERATURE: 98.6 F | HEART RATE: 107 BPM | DIASTOLIC BLOOD PRESSURE: 68 MMHG | BODY MASS INDEX: 31.39 KG/M2 | SYSTOLIC BLOOD PRESSURE: 125 MMHG | WEIGHT: 195.3 LBS | HEIGHT: 66 IN | RESPIRATION RATE: 18 BRPM

## 2019-02-11 DIAGNOSIS — M79.7 FIBROMYALGIA: ICD-10-CM

## 2019-02-11 DIAGNOSIS — E27.8 ADRENAL INCIDENTALOMA (HCC): ICD-10-CM

## 2019-02-11 DIAGNOSIS — K21.9 GASTROESOPHAGEAL REFLUX DISEASE WITHOUT ESOPHAGITIS: ICD-10-CM

## 2019-02-11 DIAGNOSIS — M47.816 OSTEOARTHRITIS OF LUMBAR SPINE, UNSPECIFIED SPINAL OSTEOARTHRITIS COMPLICATION STATUS: ICD-10-CM

## 2019-02-11 DIAGNOSIS — E03.9 HYPOTHYROIDISM, UNSPECIFIED TYPE: ICD-10-CM

## 2019-02-11 DIAGNOSIS — Z23 NEED FOR PROPHYLACTIC VACCINATION AGAINST DIPHTHERIA-TETANUS-PERTUSSIS (DTP): ICD-10-CM

## 2019-02-11 DIAGNOSIS — Z79.4 TYPE 2 DIABETES MELLITUS WITHOUT COMPLICATION, WITH LONG-TERM CURRENT USE OF INSULIN (HCC): ICD-10-CM

## 2019-02-11 DIAGNOSIS — I10 ESSENTIAL HYPERTENSION: ICD-10-CM

## 2019-02-11 DIAGNOSIS — J44.9 CHRONIC OBSTRUCTIVE PULMONARY DISEASE, UNSPECIFIED COPD TYPE (HCC): ICD-10-CM

## 2019-02-11 DIAGNOSIS — E55.9 VITAMIN D DEFICIENCY: ICD-10-CM

## 2019-02-11 DIAGNOSIS — Z23 NEED FOR PROPHYLACTIC VACCINATION AND INOCULATION AGAINST VARICELLA: ICD-10-CM

## 2019-02-11 DIAGNOSIS — Z12.31 ENCOUNTER FOR SCREENING MAMMOGRAM FOR BREAST CANCER: ICD-10-CM

## 2019-02-11 DIAGNOSIS — E11.9 TYPE 2 DIABETES MELLITUS WITHOUT COMPLICATION, WITH LONG-TERM CURRENT USE OF INSULIN (HCC): ICD-10-CM

## 2019-02-11 DIAGNOSIS — F34.1 DYSTHYMIA: Primary | ICD-10-CM

## 2019-02-11 PROCEDURE — 4004F PT TOBACCO SCREEN RCVD TLK: CPT | Performed by: INTERNAL MEDICINE

## 2019-02-11 PROCEDURE — 2022F DILAT RTA XM EVC RTNOPTHY: CPT | Performed by: INTERNAL MEDICINE

## 2019-02-11 PROCEDURE — 99213 OFFICE O/P EST LOW 20 MIN: CPT | Performed by: INTERNAL MEDICINE

## 2019-02-11 PROCEDURE — G8417 CALC BMI ABV UP PARAM F/U: HCPCS | Performed by: INTERNAL MEDICINE

## 2019-02-11 PROCEDURE — G8427 DOCREV CUR MEDS BY ELIG CLIN: HCPCS | Performed by: INTERNAL MEDICINE

## 2019-02-11 PROCEDURE — 3046F HEMOGLOBIN A1C LEVEL >9.0%: CPT | Performed by: INTERNAL MEDICINE

## 2019-02-11 PROCEDURE — 3017F COLORECTAL CA SCREEN DOC REV: CPT | Performed by: INTERNAL MEDICINE

## 2019-02-11 PROCEDURE — G8482 FLU IMMUNIZE ORDER/ADMIN: HCPCS | Performed by: INTERNAL MEDICINE

## 2019-02-11 PROCEDURE — 3023F SPIROM DOC REV: CPT | Performed by: INTERNAL MEDICINE

## 2019-02-11 PROCEDURE — G8926 SPIRO NO PERF OR DOC: HCPCS | Performed by: INTERNAL MEDICINE

## 2019-02-11 RX ORDER — LANCETS 30 GAUGE
EACH MISCELLANEOUS
Qty: 100 EACH | Refills: 3 | Status: SHIPPED | OUTPATIENT
Start: 2019-02-11 | End: 2022-06-13

## 2019-02-11 RX ORDER — ALBUTEROL SULFATE 90 UG/1
2 AEROSOL, METERED RESPIRATORY (INHALATION) EVERY 4 HOURS PRN
Qty: 1 INHALER | Refills: 3 | Status: SHIPPED | OUTPATIENT
Start: 2019-02-11 | End: 2019-06-11 | Stop reason: SDUPTHER

## 2019-02-11 RX ORDER — BUDESONIDE AND FORMOTEROL FUMARATE DIHYDRATE 160; 4.5 UG/1; UG/1
2 AEROSOL RESPIRATORY (INHALATION) 2 TIMES DAILY
Qty: 1 INHALER | Refills: 3 | Status: SHIPPED | OUTPATIENT
Start: 2019-02-11 | End: 2019-06-11 | Stop reason: SDUPTHER

## 2019-02-11 RX ORDER — ATENOLOL 25 MG/1
25 TABLET ORAL DAILY
Qty: 30 TABLET | Refills: 3 | Status: SHIPPED | OUTPATIENT
Start: 2019-02-11 | End: 2019-02-15 | Stop reason: SDUPTHER

## 2019-02-11 RX ORDER — GLUCOSAMINE HCL/CHONDROITIN SU 500-400 MG
CAPSULE ORAL
Qty: 100 STRIP | Refills: 3 | Status: SHIPPED | OUTPATIENT
Start: 2019-02-11 | End: 2022-06-13

## 2019-02-11 RX ORDER — OYSTER SHELL CALCIUM WITH VITAMIN D 500; 200 MG/1; [IU]/1
TABLET, FILM COATED ORAL
Qty: 30 TABLET | Refills: 3 | Status: SHIPPED | OUTPATIENT
Start: 2019-02-11 | End: 2019-06-11 | Stop reason: SDUPTHER

## 2019-02-11 RX ORDER — TRIAMTERENE AND HYDROCHLOROTHIAZIDE 37.5; 25 MG/1; MG/1
TABLET ORAL
Qty: 30 TABLET | Refills: 3 | Status: SHIPPED | OUTPATIENT
Start: 2019-02-11 | End: 2019-06-11 | Stop reason: SDUPTHER

## 2019-02-11 RX ORDER — FLUTICASONE PROPIONATE 50 MCG
1 SPRAY, SUSPENSION (ML) NASAL DAILY
Qty: 1 BOTTLE | Refills: 3 | Status: SHIPPED | OUTPATIENT
Start: 2019-02-11 | End: 2019-12-06 | Stop reason: SDUPTHER

## 2019-02-11 RX ORDER — AMITRIPTYLINE HYDROCHLORIDE 50 MG/1
TABLET, FILM COATED ORAL
Qty: 30 TABLET | Refills: 3 | Status: SHIPPED | OUTPATIENT
Start: 2019-02-11 | End: 2019-02-15 | Stop reason: SDUPTHER

## 2019-02-11 RX ORDER — ESOMEPRAZOLE MAGNESIUM 40 MG/1
CAPSULE, DELAYED RELEASE ORAL
Qty: 30 CAPSULE | Refills: 3 | Status: SHIPPED | OUTPATIENT
Start: 2019-02-11 | End: 2019-02-15 | Stop reason: SDUPTHER

## 2019-02-11 RX ORDER — BACLOFEN 10 MG/1
TABLET ORAL
Qty: 90 TABLET | Refills: 3 | Status: SHIPPED | OUTPATIENT
Start: 2019-02-11 | End: 2019-06-11 | Stop reason: SDUPTHER

## 2019-02-11 RX ORDER — LEVOTHYROXINE SODIUM 75 MCG
75 TABLET ORAL DAILY
Qty: 30 TABLET | Refills: 3 | Status: SHIPPED | OUTPATIENT
Start: 2019-02-11 | End: 2019-06-11 | Stop reason: SDUPTHER

## 2019-02-11 ASSESSMENT — PATIENT HEALTH QUESTIONNAIRE - PHQ9
SUM OF ALL RESPONSES TO PHQ9 QUESTIONS 1 & 2: 1
1. LITTLE INTEREST OR PLEASURE IN DOING THINGS: 0
SUM OF ALL RESPONSES TO PHQ QUESTIONS 1-9: 1
2. FEELING DOWN, DEPRESSED OR HOPELESS: 1
SUM OF ALL RESPONSES TO PHQ QUESTIONS 1-9: 1

## 2019-02-15 DIAGNOSIS — I10 ESSENTIAL HYPERTENSION: ICD-10-CM

## 2019-02-15 DIAGNOSIS — K21.9 GASTROESOPHAGEAL REFLUX DISEASE WITHOUT ESOPHAGITIS: ICD-10-CM

## 2019-02-15 DIAGNOSIS — M79.7 FIBROMYALGIA: ICD-10-CM

## 2019-02-15 RX ORDER — ESOMEPRAZOLE MAGNESIUM 40 MG/1
CAPSULE, DELAYED RELEASE ORAL
Qty: 90 CAPSULE | Refills: 1 | Status: SHIPPED | OUTPATIENT
Start: 2019-02-15 | End: 2019-08-08 | Stop reason: SDUPTHER

## 2019-02-15 RX ORDER — ATENOLOL 25 MG/1
25 TABLET ORAL DAILY
Qty: 90 TABLET | Refills: 1 | Status: SHIPPED | OUTPATIENT
Start: 2019-02-15 | End: 2019-08-08 | Stop reason: ALTCHOICE

## 2019-02-15 RX ORDER — AMITRIPTYLINE HYDROCHLORIDE 50 MG/1
TABLET, FILM COATED ORAL
Qty: 90 TABLET | Refills: 1 | Status: SHIPPED | OUTPATIENT
Start: 2019-02-15 | End: 2019-08-08 | Stop reason: SDUPTHER

## 2019-02-18 ENCOUNTER — HOSPITAL ENCOUNTER (EMERGENCY)
Age: 64
Discharge: HOME | End: 2019-02-18
Attending: EMERGENCY MEDICINE
Payer: MEDICARE

## 2019-02-18 ENCOUNTER — APPOINTMENT (OUTPATIENT)
Dept: GENERAL RADIOLOGY | Age: 64
End: 2019-02-18
Payer: MEDICARE

## 2019-02-18 VITALS
SYSTOLIC BLOOD PRESSURE: 138 MMHG | BODY MASS INDEX: 31.34 KG/M2 | TEMPERATURE: 99.3 F | HEART RATE: 80 BPM | DIASTOLIC BLOOD PRESSURE: 82 MMHG | OXYGEN SATURATION: 94 % | WEIGHT: 195 LBS | HEIGHT: 66 IN | RESPIRATION RATE: 16 BRPM

## 2019-02-18 DIAGNOSIS — M79.18 MUSCULOSKELETAL PAIN: ICD-10-CM
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
## 2019-02-18 PROCEDURE — 99283 EMERGENCY DEPT VISIT LOW MDM: CPT

## 2019-02-18 PROCEDURE — 73502 X-RAY EXAM HIP UNI 2-3 VIEWS: CPT

## 2019-02-18 PROCEDURE — 73562 X-RAY EXAM OF KNEE 3: CPT

## 2019-02-18 PROCEDURE — 73030 X-RAY EXAM OF SHOULDER: CPT

## 2019-02-18 PROCEDURE — 6370000000 HC RX 637 (ALT 250 FOR IP): Performed by: EMERGENCY MEDICINE

## 2019-02-18 RX ORDER — ACETAMINOPHEN AND CODEINE PHOSPHATE 300; 30 MG/1; MG/1
1 TABLET ORAL EVERY 6 HOURS PRN
Qty: 18 TABLET | Refills: 0 | Status: SHIPPED | OUTPATIENT
Start: 2019-02-18 | End: 2019-02-21

## 2019-02-18 RX ORDER — HYDROCODONE BITARTRATE AND ACETAMINOPHEN 5; 325 MG/1; MG/1
1 TABLET ORAL ONCE
Status: COMPLETED | OUTPATIENT
Start: 2019-02-18 | End: 2019-02-18

## 2019-02-18 RX ADMIN — HYDROCODONE BITARTRATE AND ACETAMINOPHEN 1 TABLET: 5; 325 TABLET ORAL at 14:02

## 2019-02-18 ASSESSMENT — PAIN DESCRIPTION - PAIN TYPE
TYPE: ACUTE PAIN
TYPE: ACUTE PAIN

## 2019-02-18 ASSESSMENT — PAIN DESCRIPTION - LOCATION
LOCATION: HIP;LEG
LOCATION: KNEE;SHOULDER;HIP

## 2019-02-18 ASSESSMENT — PAIN DESCRIPTION - DESCRIPTORS
DESCRIPTORS: ACHING;CONSTANT;SHARP
DESCRIPTORS: ACHING;CONSTANT

## 2019-02-18 ASSESSMENT — PAIN SCALES - GENERAL
PAINLEVEL_OUTOF10: 10
PAINLEVEL_OUTOF10: 8
PAINLEVEL_OUTOF10: 10

## 2019-02-18 ASSESSMENT — PAIN DESCRIPTION - ORIENTATION
ORIENTATION: RIGHT
ORIENTATION: RIGHT

## 2019-02-25 ENCOUNTER — HOSPITAL ENCOUNTER (OUTPATIENT)
Age: 64
Discharge: HOME | End: 2019-02-27
Payer: MEDICARE

## 2019-02-25 ENCOUNTER — OFFICE VISIT (OUTPATIENT)
Dept: OBGYN | Age: 64
End: 2019-02-25
Payer: MEDICARE

## 2019-02-25 VITALS
HEART RATE: 103 BPM | SYSTOLIC BLOOD PRESSURE: 128 MMHG | WEIGHT: 198 LBS | DIASTOLIC BLOOD PRESSURE: 83 MMHG | RESPIRATION RATE: 18 BRPM | BODY MASS INDEX: 31.96 KG/M2 | TEMPERATURE: 97.8 F

## 2019-02-25 DIAGNOSIS — Z12.4 PAP SMEAR FOR CERVICAL CANCER SCREENING: ICD-10-CM

## 2019-02-25 DIAGNOSIS — Z01.419 ENCNTR FOR GYN EXAM (GENERAL) (ROUTINE) W/O ABN FINDINGS: Primary | ICD-10-CM

## 2019-02-25 DIAGNOSIS — N89.8 LEUKORRHEA: ICD-10-CM

## 2019-02-25 LAB
CLUE CELLS: ABNORMAL
SOURCE WET PREP: ABNORMAL
TRICHOMONAS PREP: ABNORMAL
YEAST WET PREP: ABNORMAL

## 2019-02-25 PROCEDURE — 87210 SMEAR WET MOUNT SALINE/INK: CPT

## 2019-02-25 PROCEDURE — 88175 CYTOPATH C/V AUTO FLUID REDO: CPT

## 2019-02-25 PROCEDURE — 99213 OFFICE O/P EST LOW 20 MIN: CPT | Performed by: OBSTETRICS & GYNECOLOGY

## 2019-02-25 PROCEDURE — G8482 FLU IMMUNIZE ORDER/ADMIN: HCPCS | Performed by: OBSTETRICS & GYNECOLOGY

## 2019-02-25 PROCEDURE — 99396 PREV VISIT EST AGE 40-64: CPT | Performed by: OBSTETRICS & GYNECOLOGY

## 2019-02-26 ENCOUNTER — TELEPHONE (OUTPATIENT)
Dept: OBGYN | Age: 64
End: 2019-02-26

## 2019-02-27 DIAGNOSIS — N76.0 BV (BACTERIAL VAGINOSIS): Primary | ICD-10-CM

## 2019-02-27 DIAGNOSIS — B96.89 BV (BACTERIAL VAGINOSIS): Primary | ICD-10-CM

## 2019-02-27 RX ORDER — METRONIDAZOLE 500 MG/1
500 TABLET ORAL 2 TIMES DAILY
Qty: 14 TABLET | Refills: 0 | Status: SHIPPED | OUTPATIENT
Start: 2019-02-27 | End: 2019-03-05

## 2019-03-05 ENCOUNTER — HOSPITAL ENCOUNTER (OUTPATIENT)
Age: 64
Discharge: HOME | End: 2019-03-07
Payer: MEDICARE

## 2019-03-05 ENCOUNTER — OFFICE VISIT (OUTPATIENT)
Dept: ONCOLOGY | Age: 64
End: 2019-03-05
Payer: MEDICARE

## 2019-03-05 ENCOUNTER — HOSPITAL ENCOUNTER (OUTPATIENT)
Dept: GENERAL RADIOLOGY | Age: 64
Discharge: HOME | End: 2019-03-07
Payer: MEDICARE

## 2019-03-05 ENCOUNTER — HOSPITAL ENCOUNTER (OUTPATIENT)
Age: 64
Discharge: HOME | End: 2019-03-05
Payer: MEDICARE

## 2019-03-05 ENCOUNTER — HOSPITAL ENCOUNTER (OUTPATIENT)
Dept: INFUSION THERAPY | Age: 64
Discharge: HOME | End: 2019-03-05
Payer: MEDICARE

## 2019-03-05 VITALS
DIASTOLIC BLOOD PRESSURE: 74 MMHG | HEART RATE: 98 BPM | RESPIRATION RATE: 20 BRPM | TEMPERATURE: 98 F | BODY MASS INDEX: 32.09 KG/M2 | SYSTOLIC BLOOD PRESSURE: 122 MMHG | WEIGHT: 199.7 LBS | HEIGHT: 66 IN

## 2019-03-05 DIAGNOSIS — D47.2 SMOLDERING MULTIPLE MYELOMA: ICD-10-CM

## 2019-03-05 DIAGNOSIS — Z12.31 ENCOUNTER FOR SCREENING MAMMOGRAM FOR BREAST CANCER: ICD-10-CM

## 2019-03-05 LAB
ALBUMIN SERPL-MCNC: 4 G/DL (ref 3.5–5.2)
ALP BLD-CCNC: 81 U/L (ref 35–104)
ALT SERPL-CCNC: 14 U/L (ref 0–32)
ANION GAP SERPL CALCULATED.3IONS-SCNC: 11 MMOL/L (ref 7–16)
ANISOCYTOSIS: ABNORMAL
AST SERPL-CCNC: 17 U/L (ref 0–31)
BASOPHILS ABSOLUTE: 0 E9/L (ref 0–0.2)
BASOPHILS RELATIVE PERCENT: 0.6 % (ref 0–2)
BILIRUB SERPL-MCNC: 0.3 MG/DL (ref 0–1.2)
BUN BLDV-MCNC: 16 MG/DL (ref 8–23)
CALCIUM SERPL-MCNC: 10 MG/DL (ref 8.6–10.2)
CHLORIDE BLD-SCNC: 98 MMOL/L (ref 98–107)
CO2: 32 MMOL/L (ref 22–29)
CREAT SERPL-MCNC: 0.7 MG/DL (ref 0.5–1)
EOSINOPHILS ABSOLUTE: 0.18 E9/L (ref 0.05–0.5)
EOSINOPHILS RELATIVE PERCENT: 2.6 % (ref 0–6)
GFR AFRICAN AMERICAN: >60
GFR NON-AFRICAN AMERICAN: >60 ML/MIN/1.73
GLUCOSE BLD-MCNC: 116 MG/DL (ref 74–99)
HCT VFR BLD CALC: 39.9 % (ref 34–48)
HEMOGLOBIN: 12.3 G/DL (ref 11.5–15.5)
LYMPHOCYTES ABSOLUTE: 3.94 E9/L (ref 1.5–4)
LYMPHOCYTES RELATIVE PERCENT: 57.9 % (ref 20–42)
MCH RBC QN AUTO: 28.6 PG (ref 26–35)
MCHC RBC AUTO-ENTMCNC: 30.8 % (ref 32–34.5)
MCV RBC AUTO: 92.8 FL (ref 80–99.9)
MONOCYTES ABSOLUTE: 0.61 E9/L (ref 0.1–0.95)
MONOCYTES RELATIVE PERCENT: 8.8 % (ref 2–12)
NEUTROPHILS ABSOLUTE: 2.11 E9/L (ref 1.8–7.3)
NEUTROPHILS RELATIVE PERCENT: 30.7 % (ref 43–80)
PDW BLD-RTO: 13.2 FL (ref 11.5–15)
PLATELET # BLD: 317 E9/L (ref 130–450)
PMV BLD AUTO: 10.8 FL (ref 7–12)
POTASSIUM SERPL-SCNC: 4.3 MMOL/L (ref 3.5–5)
RBC # BLD: 4.3 E12/L (ref 3.5–5.5)
SODIUM BLD-SCNC: 141 MMOL/L (ref 132–146)
WBC # BLD: 6.8 E9/L (ref 4.5–11.5)

## 2019-03-05 PROCEDURE — G8482 FLU IMMUNIZE ORDER/ADMIN: HCPCS | Performed by: INTERNAL MEDICINE

## 2019-03-05 PROCEDURE — 84165 PROTEIN E-PHORESIS SERUM: CPT

## 2019-03-05 PROCEDURE — 82784 ASSAY IGA/IGD/IGG/IGM EACH: CPT

## 2019-03-05 PROCEDURE — 82232 ASSAY OF BETA-2 PROTEIN: CPT

## 2019-03-05 PROCEDURE — 83883 ASSAY NEPHELOMETRY NOT SPEC: CPT

## 2019-03-05 PROCEDURE — 86334 IMMUNOFIX E-PHORESIS SERUM: CPT

## 2019-03-05 PROCEDURE — 77075 RADEX OSSEOUS SURVEY COMPL: CPT

## 2019-03-05 PROCEDURE — 99212 OFFICE O/P EST SF 10 MIN: CPT | Performed by: INTERNAL MEDICINE

## 2019-03-05 PROCEDURE — 4004F PT TOBACCO SCREEN RCVD TLK: CPT | Performed by: INTERNAL MEDICINE

## 2019-03-05 PROCEDURE — 99214 OFFICE O/P EST MOD 30 MIN: CPT | Performed by: INTERNAL MEDICINE

## 2019-03-05 PROCEDURE — 77063 BREAST TOMOSYNTHESIS BI: CPT

## 2019-03-05 PROCEDURE — 80053 COMPREHEN METABOLIC PANEL: CPT

## 2019-03-05 PROCEDURE — 36415 COLL VENOUS BLD VENIPUNCTURE: CPT

## 2019-03-05 PROCEDURE — G8427 DOCREV CUR MEDS BY ELIG CLIN: HCPCS | Performed by: INTERNAL MEDICINE

## 2019-03-05 PROCEDURE — G8417 CALC BMI ABV UP PARAM F/U: HCPCS | Performed by: INTERNAL MEDICINE

## 2019-03-05 PROCEDURE — 85025 COMPLETE CBC W/AUTO DIFF WBC: CPT

## 2019-03-05 PROCEDURE — 3017F COLORECTAL CA SCREEN DOC REV: CPT | Performed by: INTERNAL MEDICINE

## 2019-03-06 LAB
ALBUMIN SERPL-MCNC: 3.3 G/DL (ref 3.5–4.7)
ALPHA-1-GLOBULIN: 0.3 G/DL (ref 0.2–0.4)
ALPHA-2-GLOBULIN: 1 G/FL (ref 0.5–1)
BETA GLOBULIN: 1 G/DL (ref 0.8–1.3)
ELECTROPHORESIS: ABNORMAL
GAMMA GLOBULIN: 2.2 G/DL (ref 0.7–1.6)
IGA: 79 MG/DL (ref 70–400)
IGG: 2039 MG/DL (ref 700–1600)
IGM: <25 MG/DL (ref 40–230)
IMMUNOFIXATION RESULT, SERUM: NORMAL
TOTAL PROTEIN: 8 G/DL (ref 6.4–8.3)

## 2019-03-07 LAB — BETA-2 MICROGLOBULIN: 2.1 MG/L (ref 0.6–2.4)

## 2019-03-09 LAB
KAPPA FREE LIGHT CHAINS QNT: 3.63 MG/DL (ref 0.33–1.94)
KAPPA/LAMBDA FREE LIGHT CHAIN RATIO: 2.21 (ref 0.26–1.65)
LAMBDA FREE LIGHT CHAINS QNT: 1.64 MG/DL (ref 0.57–2.63)

## 2019-04-05 ENCOUNTER — HOSPITAL ENCOUNTER (OUTPATIENT)
Dept: INFUSION THERAPY | Age: 64
End: 2019-04-05

## 2019-04-10 DIAGNOSIS — F32.A DEPRESSION, UNSPECIFIED DEPRESSION TYPE: ICD-10-CM

## 2019-04-10 RX ORDER — CITALOPRAM 40 MG/1
40 TABLET ORAL DAILY
Qty: 30 TABLET | Refills: 2 | Status: SHIPPED | OUTPATIENT
Start: 2019-04-10 | End: 2019-06-11 | Stop reason: SDUPTHER

## 2019-06-07 ENCOUNTER — TELEPHONE (OUTPATIENT)
Dept: ADMINISTRATIVE | Age: 64
End: 2019-06-07

## 2019-06-07 NOTE — TELEPHONE ENCOUNTER
Patient called stating that she is ready to schedule her thyroid surgery. She is scheduled next available on 10/18 at 10:30 in the office. If she should be moved up, please call her. If she doesn't need an appointment and just needs to have her surgery scheduled, please call her back. Thank you.

## 2019-06-10 NOTE — TELEPHONE ENCOUNTER
Pt was advised previous when canceling her surgery that she is to have completed clearance before being rescheduled. Please advised pt that once she has clearance to call to be rescheduled.

## 2019-06-11 ENCOUNTER — OFFICE VISIT (OUTPATIENT)
Dept: INTERNAL MEDICINE | Age: 64
End: 2019-06-11
Payer: MEDICARE

## 2019-06-11 ENCOUNTER — OFFICE VISIT (OUTPATIENT)
Dept: OBGYN | Age: 64
End: 2019-06-11
Payer: MEDICARE

## 2019-06-11 ENCOUNTER — HOSPITAL ENCOUNTER (OUTPATIENT)
Age: 64
Discharge: HOME | End: 2019-06-13
Payer: MEDICARE

## 2019-06-11 ENCOUNTER — HOSPITAL ENCOUNTER (OUTPATIENT)
Age: 64
Discharge: HOME | End: 2019-06-11
Payer: MEDICARE

## 2019-06-11 VITALS
BODY MASS INDEX: 30.22 KG/M2 | RESPIRATION RATE: 16 BRPM | DIASTOLIC BLOOD PRESSURE: 78 MMHG | TEMPERATURE: 98.9 F | HEART RATE: 75 BPM | SYSTOLIC BLOOD PRESSURE: 125 MMHG | WEIGHT: 188 LBS | HEIGHT: 66 IN

## 2019-06-11 VITALS
HEIGHT: 66 IN | SYSTOLIC BLOOD PRESSURE: 136 MMHG | BODY MASS INDEX: 30.05 KG/M2 | WEIGHT: 187 LBS | RESPIRATION RATE: 20 BRPM | DIASTOLIC BLOOD PRESSURE: 80 MMHG | TEMPERATURE: 98.1 F | HEART RATE: 70 BPM

## 2019-06-11 DIAGNOSIS — N89.8 VAGINAL DISCHARGE: ICD-10-CM

## 2019-06-11 DIAGNOSIS — E03.9 HYPOTHYROIDISM, UNSPECIFIED TYPE: ICD-10-CM

## 2019-06-11 DIAGNOSIS — E55.9 VITAMIN D DEFICIENCY: ICD-10-CM

## 2019-06-11 DIAGNOSIS — E11.9 TYPE 2 DIABETES MELLITUS WITHOUT COMPLICATION, WITH LONG-TERM CURRENT USE OF INSULIN (HCC): ICD-10-CM

## 2019-06-11 DIAGNOSIS — I10 ESSENTIAL HYPERTENSION: ICD-10-CM

## 2019-06-11 DIAGNOSIS — Z79.4 TYPE 2 DIABETES MELLITUS WITHOUT COMPLICATION, WITH LONG-TERM CURRENT USE OF INSULIN (HCC): ICD-10-CM

## 2019-06-11 DIAGNOSIS — N89.8 VAGINAL DRYNESS: ICD-10-CM

## 2019-06-11 DIAGNOSIS — L72.9 CYST OF BUTTOCKS: Primary | ICD-10-CM

## 2019-06-11 DIAGNOSIS — N89.8 VAGINAL ITCHING: Primary | ICD-10-CM

## 2019-06-11 DIAGNOSIS — F32.A DEPRESSION, UNSPECIFIED DEPRESSION TYPE: ICD-10-CM

## 2019-06-11 DIAGNOSIS — M47.816 OSTEOARTHRITIS OF LUMBAR SPINE, UNSPECIFIED SPINAL OSTEOARTHRITIS COMPLICATION STATUS: ICD-10-CM

## 2019-06-11 DIAGNOSIS — M79.7 FIBROMYALGIA: ICD-10-CM

## 2019-06-11 DIAGNOSIS — J44.9 CHRONIC OBSTRUCTIVE PULMONARY DISEASE, UNSPECIFIED COPD TYPE (HCC): ICD-10-CM

## 2019-06-11 LAB
ALBUMIN SERPL-MCNC: 4 G/DL (ref 3.5–5.2)
ALP BLD-CCNC: 88 U/L (ref 35–104)
ALT SERPL-CCNC: 13 U/L (ref 0–32)
ANION GAP SERPL CALCULATED.3IONS-SCNC: 14 MMOL/L (ref 7–16)
AST SERPL-CCNC: 18 U/L (ref 0–31)
BACTERIA WET PREP: NORMAL
BILIRUB SERPL-MCNC: 0.4 MG/DL (ref 0–1.2)
BUN BLDV-MCNC: 13 MG/DL (ref 8–23)
CALCIUM SERPL-MCNC: 11.1 MG/DL (ref 8.6–10.2)
CHLORIDE BLD-SCNC: 101 MMOL/L (ref 98–107)
CLUE CELLS: NORMAL
CO2: 29 MMOL/L (ref 22–29)
CREAT SERPL-MCNC: 0.7 MG/DL (ref 0.5–1)
EPITHELIAL CELLS WET PREP: NORMAL
GFR AFRICAN AMERICAN: >60
GFR NON-AFRICAN AMERICAN: >60 ML/MIN/1.73
GLUCOSE BLD-MCNC: 104 MG/DL (ref 74–99)
HCT VFR BLD CALC: 40.1 % (ref 34–48)
HEMOGLOBIN: 12.6 G/DL (ref 11.5–15.5)
MCH RBC QN AUTO: 29.5 PG (ref 26–35)
MCHC RBC AUTO-ENTMCNC: 31.4 % (ref 32–34.5)
MCV RBC AUTO: 93.9 FL (ref 80–99.9)
PDW BLD-RTO: 12.8 FL (ref 11.5–15)
PLATELET # BLD: 351 E9/L (ref 130–450)
PMV BLD AUTO: 10.1 FL (ref 7–12)
POTASSIUM SERPL-SCNC: 3.9 MMOL/L (ref 3.5–5)
RBC # BLD: 4.27 E12/L (ref 3.5–5.5)
SODIUM BLD-SCNC: 144 MMOL/L (ref 132–146)
SOURCE WET PREP: NORMAL
TOTAL PROTEIN: 8.4 G/DL (ref 6.4–8.3)
TRICHOMONAS PREP: NORMAL
TSH SERPL DL<=0.05 MIU/L-ACNC: 1.12 UIU/ML (ref 0.27–4.2)
WBC # BLD: 6 E9/L (ref 4.5–11.5)
YEAST WET PREP: NORMAL

## 2019-06-11 PROCEDURE — G8417 CALC BMI ABV UP PARAM F/U: HCPCS | Performed by: INTERNAL MEDICINE

## 2019-06-11 PROCEDURE — 4004F PT TOBACCO SCREEN RCVD TLK: CPT | Performed by: INTERNAL MEDICINE

## 2019-06-11 PROCEDURE — 4004F PT TOBACCO SCREEN RCVD TLK: CPT | Performed by: NURSE PRACTITIONER

## 2019-06-11 PROCEDURE — G8427 DOCREV CUR MEDS BY ELIG CLIN: HCPCS | Performed by: NURSE PRACTITIONER

## 2019-06-11 PROCEDURE — 3023F SPIROM DOC REV: CPT | Performed by: INTERNAL MEDICINE

## 2019-06-11 PROCEDURE — 85027 COMPLETE CBC AUTOMATED: CPT

## 2019-06-11 PROCEDURE — G8417 CALC BMI ABV UP PARAM F/U: HCPCS | Performed by: NURSE PRACTITIONER

## 2019-06-11 PROCEDURE — 99214 OFFICE O/P EST MOD 30 MIN: CPT | Performed by: INTERNAL MEDICINE

## 2019-06-11 PROCEDURE — 99213 OFFICE O/P EST LOW 20 MIN: CPT | Performed by: NURSE PRACTITIONER

## 2019-06-11 PROCEDURE — G8427 DOCREV CUR MEDS BY ELIG CLIN: HCPCS | Performed by: INTERNAL MEDICINE

## 2019-06-11 PROCEDURE — 3046F HEMOGLOBIN A1C LEVEL >9.0%: CPT | Performed by: INTERNAL MEDICINE

## 2019-06-11 PROCEDURE — G8926 SPIRO NO PERF OR DOC: HCPCS | Performed by: INTERNAL MEDICINE

## 2019-06-11 PROCEDURE — 3017F COLORECTAL CA SCREEN DOC REV: CPT | Performed by: INTERNAL MEDICINE

## 2019-06-11 PROCEDURE — 99213 OFFICE O/P EST LOW 20 MIN: CPT | Performed by: INTERNAL MEDICINE

## 2019-06-11 PROCEDURE — 87210 SMEAR WET MOUNT SALINE/INK: CPT

## 2019-06-11 PROCEDURE — 2022F DILAT RTA XM EVC RTNOPTHY: CPT | Performed by: INTERNAL MEDICINE

## 2019-06-11 PROCEDURE — 36415 COLL VENOUS BLD VENIPUNCTURE: CPT

## 2019-06-11 PROCEDURE — 3017F COLORECTAL CA SCREEN DOC REV: CPT | Performed by: NURSE PRACTITIONER

## 2019-06-11 PROCEDURE — 84443 ASSAY THYROID STIM HORMONE: CPT

## 2019-06-11 PROCEDURE — 80053 COMPREHEN METABOLIC PANEL: CPT

## 2019-06-11 RX ORDER — GABAPENTIN 400 MG/1
400 CAPSULE ORAL 2 TIMES DAILY
Qty: 60 CAPSULE | Refills: 3 | Status: SHIPPED | OUTPATIENT
Start: 2019-06-11 | End: 2019-08-08 | Stop reason: SDUPTHER

## 2019-06-11 RX ORDER — BACLOFEN 10 MG/1
TABLET ORAL
Qty: 90 TABLET | Refills: 3 | Status: SHIPPED | OUTPATIENT
Start: 2019-06-11 | End: 2019-10-25 | Stop reason: SDUPTHER

## 2019-06-11 RX ORDER — CITALOPRAM 40 MG/1
40 TABLET ORAL DAILY
Qty: 30 TABLET | Refills: 3 | Status: SHIPPED | OUTPATIENT
Start: 2019-06-11 | End: 2019-08-08 | Stop reason: SDUPTHER

## 2019-06-11 RX ORDER — LEVOTHYROXINE SODIUM 0.07 MG/1
75 TABLET ORAL DAILY
Qty: 30 TABLET | Refills: 3 | Status: SHIPPED | OUTPATIENT
Start: 2019-06-11 | End: 2019-06-11 | Stop reason: SDUPTHER

## 2019-06-11 RX ORDER — OYSTER SHELL CALCIUM WITH VITAMIN D 500; 200 MG/1; [IU]/1
TABLET, FILM COATED ORAL
Qty: 30 TABLET | Refills: 3 | Status: SHIPPED | OUTPATIENT
Start: 2019-06-11 | End: 2019-08-08 | Stop reason: SDUPTHER

## 2019-06-11 RX ORDER — TRIAMTERENE AND HYDROCHLOROTHIAZIDE 37.5; 25 MG/1; MG/1
TABLET ORAL
Qty: 30 TABLET | Refills: 3 | Status: SHIPPED | OUTPATIENT
Start: 2019-06-11 | End: 2019-08-08 | Stop reason: SDUPTHER

## 2019-06-11 RX ORDER — MONTELUKAST SODIUM 10 MG/1
TABLET ORAL
Refills: 0 | COMMUNITY
Start: 2019-05-09 | End: 2019-06-11 | Stop reason: SDUPTHER

## 2019-06-11 RX ORDER — MONTELUKAST SODIUM 10 MG/1
TABLET ORAL
Qty: 30 TABLET | Refills: 3 | Status: SHIPPED | OUTPATIENT
Start: 2019-06-11 | End: 2019-06-11 | Stop reason: SDUPTHER

## 2019-06-11 RX ORDER — ALBUTEROL SULFATE 90 UG/1
2 AEROSOL, METERED RESPIRATORY (INHALATION) EVERY 4 HOURS PRN
Qty: 1 INHALER | Refills: 3 | Status: SHIPPED | OUTPATIENT
Start: 2019-06-11 | End: 2019-08-08 | Stop reason: SDUPTHER

## 2019-06-11 RX ORDER — BUDESONIDE AND FORMOTEROL FUMARATE DIHYDRATE 160; 4.5 UG/1; UG/1
2 AEROSOL RESPIRATORY (INHALATION) 2 TIMES DAILY
Qty: 1 INHALER | Refills: 3 | Status: SHIPPED | OUTPATIENT
Start: 2019-06-11 | End: 2019-08-08 | Stop reason: SDUPTHER

## 2019-06-11 ASSESSMENT — ENCOUNTER SYMPTOMS
EYE DISCHARGE: 0
COUGH: 0
DIARRHEA: 0
EYE REDNESS: 0
GASTROINTESTINAL NEGATIVE: 1
SHORTNESS OF BREATH: 0
EYE PAIN: 0
WHEEZING: 0
APNEA: 0
SORE THROAT: 0
RESPIRATORY NEGATIVE: 1
CHOKING: 0
EYE ITCHING: 0
CHEST TIGHTNESS: 0
NAUSEA: 0
PHOTOPHOBIA: 0
STRIDOR: 0
RECTAL PAIN: 0

## 2019-06-11 NOTE — PROGRESS NOTES
Pelvic exam by adriano abad cnp. One specimen for wet prep obtained and sent to lab. US appointment given to patient. Discharge instructions given by adriano abad cnp.

## 2019-06-11 NOTE — PATIENT INSTRUCTIONS
Return in 4 weeks for test results and follow up. Discharged by Shaylee Arnold cnp. [address] appointment.

## 2019-06-11 NOTE — PROGRESS NOTES
Patient verbalized understanding of office instructions. She will call with questions or concerns. She was given discharge instructions, and scripts for lab work to be done. All questions were fully answered.  Instructed to stop at  for printed AVS

## 2019-06-11 NOTE — PATIENT INSTRUCTIONS
Patient Education        Preventing Falls: Care Instructions  Your Care Instructions    Getting around your home safely can be a challenge if you have injuries or health problems that make it easy for you to fall. Loose rugs and furniture in walkways are among the dangers for many older people who have problems walking or who have poor eyesight. People who have conditions such as arthritis, osteoporosis, or dementia also have to be careful not to fall. You can make your home safer with a few simple measures. Follow-up care is a key part of your treatment and safety. Be sure to make and go to all appointments, and call your doctor if you are having problems. It's also a good idea to know your test results and keep a list of the medicines you take. How can you care for yourself at home? Taking care of yourself  · You may get dizzy if you do not drink enough water. To prevent dehydration, drink plenty of fluids, enough so that your urine is light yellow or clear like water. Choose water and other caffeine-free clear liquids. If you have kidney, heart, or liver disease and have to limit fluids, talk with your doctor before you increase the amount of fluids you drink. · Exercise regularly to improve your strength, muscle tone, and balance. Walk if you can. Swimming may be a good choice if you cannot walk easily. · Have your vision and hearing checked each year or any time you notice a change. If you have trouble seeing and hearing, you might not be able to avoid objects and could lose your balance. · Know the side effects of the medicines you take. Ask your doctor or pharmacist whether the medicines you take can affect your balance. Sleeping pills or sedatives can affect your balance. · Limit the amount of alcohol you drink. Alcohol can impair your balance and other senses. · Ask your doctor whether calluses or corns on your feet need to be removed.  If you wear loose-fitting shoes because of calluses or corns, you can lose your balance and fall. · Talk to your doctor if you have numbness in your feet. Preventing falls at home  · Remove raised doorway thresholds, throw rugs, and clutter. Repair loose carpet or raised areas in the floor. · Move furniture and electrical cords to keep them out of walking paths. · Use nonskid floor wax, and wipe up spills right away, especially on ceramic tile floors. · If you use a walker or cane, put rubber tips on it. If you use crutches, clean the bottoms of them regularly with an abrasive pad, such as steel wool. · Keep your house well lit, especially Maira Hernandes, and outside walkways. Use night-lights in areas such as hallways and bathrooms. Add extra light switches or use remote switches (such as switches that go on or off when you clap your hands) to make it easier to turn lights on if you have to get up during the night. · Install sturdy handrails on stairways. · Move items in your cabinets so that the things you use a lot are on the lower shelves (about waist level). · Keep a cordless phone and a flashlight with new batteries by your bed. If possible, put a phone in each of the main rooms of your house, or carry a cell phone in case you fall and cannot reach a phone. Or, you can wear a device around your neck or wrist. You push a button that sends a signal for help. · Wear low-heeled shoes that fit well and give your feet good support. Use footwear with nonskid soles. Check the heels and soles of your shoes for wear. Repair or replace worn heels or soles. · Do not wear socks without shoes on wood floors. · Walk on the grass when the sidewalks are slippery. If you live in an area that gets snow and ice in the winter, sprinkle salt on slippery steps and sidewalks. Preventing falls in the bath  · Install grab bars and nonskid mats inside and outside your shower or tub and near the toilet and sinks. · Use shower chairs and bath benches.   · Use a hand-held shower head that will allow you to sit while showering. · Get into a tub or shower by putting the weaker leg in first. Get out of a tub or shower with your strong side first.  · Repair loose toilet seats and consider installing a raised toilet seat to make getting on and off the toilet easier. · Keep your bathroom door unlocked while you are in the shower. Where can you learn more? Go to https://SidelinespepicewSmartGrains.Lamppost. org and sign in to your charity: water account. Enter [phone] in the [employer] box to learn more about \"Preventing Falls: Care Instructions. \"     If you do not have an account, please click on the \"Sign Up Now\" link. Current as of: November 7, 2018  Content Version: 12.0  © 8108-9651 Healthwise, Inc[employer]. Care instructions adapted under license by [facility] ([facility]). If you have questions about a medical condition or this instruction, always ask your healthcare professional. [address] any warranty or liability for your use of this information. Please complete all blood tests 1-2 weeks prior to next visit. Regular clinic follow up 2 months, needs medical clearance for surgery. Please bring any form for medical clearance from surgeon's office. Please make appointment at [facility].

## 2019-06-11 NOTE — PROGRESS NOTES
Subjective:      Patient ID: Arleth Davis is a 59 y.o. female. HPI   Pt presented with CO  1. Right buttock pain for few days, painful sensation with sitting position. 2. Weight loss 10 Lbs since Feb with depressive symptoms due to illness in family  3. Difficulty in swallowing with increased thryoid mass, pending surgical evaluation. 4. Fatigue with fear of public exposure    Review of Systems   Constitutional: Positive for fatigue and unexpected weight change. Negative for activity change, appetite change, chills and fever. HENT: Negative for congestion, hearing loss, mouth sores, postnasal drip, sneezing and sore throat. Eyes: Negative for photophobia, pain, discharge, redness and itching. Respiratory: Negative for apnea, cough, choking, chest tightness, shortness of breath, wheezing and stridor. Cardiovascular: Negative for chest pain, palpitations and leg swelling. Gastrointestinal: Negative for diarrhea, nausea and rectal pain. Endocrine: Negative for cold intolerance and heat intolerance. Thryoid mass right worse than left   Genitourinary: Negative. Negative for enuresis, flank pain and frequency. Musculoskeletal: Negative. Negative for gait problem, joint swelling and myalgias. Skin: Negative. Psychiatric/Behavioral: Negative for hallucinations, self-injury, sleep disturbance and suicidal ideas. The patient is not nervous/anxious and is not hyperactive. Reported depressive mood and agoraphobia       Objective:   Physical Exam   Constitutional: She is oriented to person, place, and time. She appears well-developed and well-nourished. Appears to be mildly depressed   HENT:   Head: Normocephalic and atraumatic. Right Ear: External ear normal.   Left Ear: External ear normal.   Bilateral thryoid goiter right larger than left, non tender   Eyes: Pupils are equal, round, and reactive to light. Conjunctivae and EOM are normal. Right eye exhibits no discharge. Neck: Normal range of motion. Neck supple. No JVD present. No tracheal deviation present. Thyromegaly present. Cardiovascular: Normal rate and regular rhythm. Exam reveals no friction rub. No murmur heard. Pulmonary/Chest: Effort normal and breath sounds normal. No stridor. No respiratory distress. She has no wheezes. Abdominal: Soft. Bowel sounds are normal.   Tender deep cystic mass on palpation on innter buttock area on right. Lymphadenopathy:     She has no cervical adenopathy. Neurological: She is alert and oriented to person, place, and time. She displays normal reflexes. She exhibits normal muscle tone. Coordination normal.       Assessment:       Diagnosis Orders   1. Cyst of buttocks  Radha Cantor MD, General Surgery, [place]   2. Chronic obstructive pulmonary disease, unspecified COPD type ([facility])  montelukast (SINGULAIR) 10 MG tablet    budesonide-formoterol (SYMBICORT) 160-4.5 MCG/ACT AERO    albuterol sulfate HFA (PROVENTIL HFA) 108 (90 Base) MCG/ACT inhaler   3. Fibromyalgia  gabapentin (NEURONTIN) 400 MG capsule   4. Type 2 diabetes mellitus without complication, with long-term current use of insulin ([facility])  metFORMIN (GLUCOPHAGE) 1000 MG tablet    CBC    COMPREHENSIVE METABOLIC PANEL   5. Essential hypertension  triamterene-hydrochlorothiazide (MAXZIDE-25) 37.5-25 MG per tablet    COMPREHENSIVE METABOLIC PANEL   6. Depression, unspecified depression type  citalopram (CELEXA) 40 MG tablet   7. Osteoarthritis of lumbar spine, unspecified spinal osteoarthritis complication status  baclofen (LIORESAL) 10 MG tablet   8. Vitamin D deficiency  calcium-vitamin D (CALCIUM 500/D) 500-200 MG-UNIT per tablet   9. Hypothyroidism, unspecified type  levothyroxine (SYNTHROID) 75 MCG tablet    TSH           Plan:      1. Consult surgery for buttock pain. 2. Recommended to make arrangement for appointment with [facility]  3. Renew all medications  4. See above labs.   5. Follow up

## 2019-06-11 NOTE — PROGRESS NOTES
Subjective:      Patient ID: Swetha Singh is a 59 y.o. female. HPI: Patient postmenopausal patient presents to the clinic with vaginal irritation, itching, and a mild discharge. She reached menopause spontaneously at age 36 and has experienced no menopause-related symptoms, including hot flashes. She does not have a personal or family history of cardiovascular disease, breast cancer, or uterine cancer  Last pap smear. negative  2/25/2019    Review of Systems   Constitutional: Positive for fatigue and unexpected weight change. Respiratory: Negative. Cardiovascular: Negative. Gastrointestinal: Negative. Endocrine:        Thyroid mass on right   Genitourinary: Negative. Psychiatric/Behavioral: Negative. Objective:   Physical Exam   Constitutional: She is oriented to person, place, and time. Abdominal: Soft. Bowel sounds are normal.   Genitourinary:   Genitourinary Comments: Pelvic examination is unremarkable except for fragile vaginal mucosa characterized by pallor, decreased elasticity, disappearance of rugae, and petechiae. Vaginal secretions are scant and odorless   Neurological: She is alert and oriented to person, place, and time. Skin: Skin is warm and dry. Nursing note and vitals reviewed. Assessment:       Diagnosis Orders   1. Vaginal itching  WET PREP, GENITAL    US NON OB TRANSVAGINAL    US Pelvis Complete   2. Vaginal discharge  WET PREP, GENITAL    US NON OB TRANSVAGINAL    US Pelvis Complete   3. Vaginal dryness  WET PREP, GENITAL    US NON OB TRANSVAGINAL    US Pelvis Complete           Plan:     Further evaluation and management will be dependent on her clinical presentation and the results of her testing  Wet prep,u/s pending   Will call patient if any screening results come back positive.     Instructed patient to follow up with our office if anything changes  In symptoms  I requested the patient return for a follow-up assessment in 4 weeks unless there is a clinical reason for her to return prior to that time. All questions and concerns addressed. Patient voices understanding of plan of care.           Wilmer Lomax, APRN - CNP

## 2019-06-12 RX ORDER — LEVOTHYROXINE SODIUM 0.07 MG/1
TABLET ORAL
Qty: 90 TABLET | Refills: 0 | Status: SHIPPED | OUTPATIENT
Start: 2019-06-12 | End: 2019-08-08 | Stop reason: SDUPTHER

## 2019-06-12 RX ORDER — MONTELUKAST SODIUM 10 MG/1
TABLET ORAL
Qty: 90 TABLET | Refills: 0 | Status: SHIPPED | OUTPATIENT
Start: 2019-06-12 | End: 2019-08-08 | Stop reason: SDUPTHER

## 2019-07-11 DIAGNOSIS — J44.9 CHRONIC OBSTRUCTIVE PULMONARY DISEASE, UNSPECIFIED COPD TYPE (HCC): ICD-10-CM

## 2019-07-11 RX ORDER — MONTELUKAST SODIUM 10 MG/1
TABLET ORAL
Qty: 90 TABLET | Refills: 3 | OUTPATIENT
Start: 2019-07-11

## 2019-07-16 ENCOUNTER — HOSPITAL ENCOUNTER (OUTPATIENT)
Dept: ULTRASOUND IMAGING | Age: 64
Discharge: HOME | End: 2019-07-18
Payer: MEDICARE

## 2019-07-16 DIAGNOSIS — N89.8 VAGINAL ITCHING: ICD-10-CM

## 2019-07-16 DIAGNOSIS — N89.8 VAGINAL DISCHARGE: ICD-10-CM

## 2019-07-16 DIAGNOSIS — N89.8 VAGINAL DRYNESS: ICD-10-CM

## 2019-07-16 PROCEDURE — 76856 US EXAM PELVIC COMPLETE: CPT

## 2019-07-16 PROCEDURE — 76830 TRANSVAGINAL US NON-OB: CPT

## 2019-07-18 ENCOUNTER — OFFICE VISIT (OUTPATIENT)
Dept: OBGYN | Age: 64
End: 2019-07-18
Payer: MEDICARE

## 2019-07-18 VITALS
DIASTOLIC BLOOD PRESSURE: 84 MMHG | BODY MASS INDEX: 30.51 KG/M2 | HEART RATE: 71 BPM | SYSTOLIC BLOOD PRESSURE: 120 MMHG | WEIGHT: 189 LBS

## 2019-07-18 DIAGNOSIS — Z09 FOLLOW UP: Primary | ICD-10-CM

## 2019-07-18 DIAGNOSIS — Z87.42 HISTORY OF VAGINAL DISCHARGE: ICD-10-CM

## 2019-07-18 PROCEDURE — G8427 DOCREV CUR MEDS BY ELIG CLIN: HCPCS | Performed by: NURSE PRACTITIONER

## 2019-07-18 PROCEDURE — G8417 CALC BMI ABV UP PARAM F/U: HCPCS | Performed by: NURSE PRACTITIONER

## 2019-07-18 PROCEDURE — 4004F PT TOBACCO SCREEN RCVD TLK: CPT | Performed by: NURSE PRACTITIONER

## 2019-07-18 PROCEDURE — 99212 OFFICE O/P EST SF 10 MIN: CPT | Performed by: NURSE PRACTITIONER

## 2019-07-18 PROCEDURE — 3017F COLORECTAL CA SCREEN DOC REV: CPT | Performed by: NURSE PRACTITIONER

## 2019-07-18 ASSESSMENT — ENCOUNTER SYMPTOMS: RESPIRATORY NEGATIVE: 1

## 2019-07-23 ENCOUNTER — OFFICE VISIT (OUTPATIENT)
Dept: SURGERY | Age: 64
End: 2019-07-23
Payer: MEDICARE

## 2019-07-23 ENCOUNTER — OFFICE VISIT (OUTPATIENT)
Dept: INTERNAL MEDICINE | Age: 64
End: 2019-07-23
Payer: MEDICARE

## 2019-07-23 VITALS
HEIGHT: 66 IN | OXYGEN SATURATION: 96 % | SYSTOLIC BLOOD PRESSURE: 142 MMHG | DIASTOLIC BLOOD PRESSURE: 86 MMHG | BODY MASS INDEX: 32.14 KG/M2 | WEIGHT: 200 LBS | HEART RATE: 103 BPM

## 2019-07-23 VITALS
BODY MASS INDEX: 32 KG/M2 | RESPIRATION RATE: 16 BRPM | HEART RATE: 101 BPM | WEIGHT: 199.1 LBS | SYSTOLIC BLOOD PRESSURE: 130 MMHG | TEMPERATURE: 98.8 F | DIASTOLIC BLOOD PRESSURE: 75 MMHG | HEIGHT: 66 IN

## 2019-07-23 DIAGNOSIS — J44.1 COPD EXACERBATION (HCC): Primary | ICD-10-CM

## 2019-07-23 DIAGNOSIS — R19.00 MASS OF PELVIS: Primary | ICD-10-CM

## 2019-07-23 PROCEDURE — G8427 DOCREV CUR MEDS BY ELIG CLIN: HCPCS | Performed by: SURGERY

## 2019-07-23 PROCEDURE — G8926 SPIRO NO PERF OR DOC: HCPCS | Performed by: INTERNAL MEDICINE

## 2019-07-23 PROCEDURE — 3017F COLORECTAL CA SCREEN DOC REV: CPT | Performed by: INTERNAL MEDICINE

## 2019-07-23 PROCEDURE — G8417 CALC BMI ABV UP PARAM F/U: HCPCS | Performed by: INTERNAL MEDICINE

## 2019-07-23 PROCEDURE — G8427 DOCREV CUR MEDS BY ELIG CLIN: HCPCS | Performed by: INTERNAL MEDICINE

## 2019-07-23 PROCEDURE — 3023F SPIROM DOC REV: CPT | Performed by: INTERNAL MEDICINE

## 2019-07-23 PROCEDURE — 94640 AIRWAY INHALATION TREATMENT: CPT | Performed by: INTERNAL MEDICINE

## 2019-07-23 PROCEDURE — 99213 OFFICE O/P EST LOW 20 MIN: CPT | Performed by: INTERNAL MEDICINE

## 2019-07-23 PROCEDURE — 99202 OFFICE O/P NEW SF 15 MIN: CPT | Performed by: SURGERY

## 2019-07-23 PROCEDURE — 99212 OFFICE O/P EST SF 10 MIN: CPT | Performed by: INTERNAL MEDICINE

## 2019-07-23 PROCEDURE — 99203 OFFICE O/P NEW LOW 30 MIN: CPT | Performed by: SURGERY

## 2019-07-23 PROCEDURE — G8417 CALC BMI ABV UP PARAM F/U: HCPCS | Performed by: SURGERY

## 2019-07-23 PROCEDURE — 4004F PT TOBACCO SCREEN RCVD TLK: CPT | Performed by: INTERNAL MEDICINE

## 2019-07-23 RX ORDER — PREDNISONE 20 MG/1
20 TABLET ORAL DAILY
Qty: 5 TABLET | Refills: 0 | Status: SHIPPED | OUTPATIENT
Start: 2019-07-23 | End: 2019-07-23 | Stop reason: DRUGHIGH

## 2019-07-23 RX ORDER — IPRATROPIUM BROMIDE AND ALBUTEROL SULFATE 2.5; .5 MG/3ML; MG/3ML
1 SOLUTION RESPIRATORY (INHALATION) ONCE
Status: COMPLETED | OUTPATIENT
Start: 2019-07-23 | End: 2019-07-23

## 2019-07-23 RX ORDER — PREDNISONE 20 MG/1
40 TABLET ORAL DAILY
Qty: 10 TABLET | Refills: 0 | Status: SHIPPED | OUTPATIENT
Start: 2019-07-23 | End: 2019-07-28

## 2019-07-23 RX ORDER — DOXYCYCLINE HYCLATE 100 MG
100 TABLET ORAL 2 TIMES DAILY WITH MEALS
Qty: 10 TABLET | Refills: 0 | Status: SHIPPED | OUTPATIENT
Start: 2019-07-23 | End: 2019-07-28

## 2019-07-23 RX ADMIN — IPRATROPIUM BROMIDE AND ALBUTEROL SULFATE 1 AMPULE: .5; 3 SOLUTION RESPIRATORY (INHALATION) at 16:14

## 2019-07-23 ASSESSMENT — ENCOUNTER SYMPTOMS
CONSTIPATION: 0
COUGH: 1
WHEEZING: 1
TROUBLE SWALLOWING: 0
ABDOMINAL PAIN: 1
ABDOMINAL PAIN: 0
VOMITING: 0
DIARRHEA: 0
STRIDOR: 0
EYES NEGATIVE: 1
SHORTNESS OF BREATH: 1
VOMITING: 0
SORE THROAT: 0
CHOKING: 0
DIARRHEA: 0
NAUSEA: 0
RESPIRATORY NEGATIVE: 1
CHEST TIGHTNESS: 1
BACK PAIN: 1
EYE DISCHARGE: 0
ALLERGIC/IMMUNOLOGIC NEGATIVE: 1
NAUSEA: 0
VOICE CHANGE: 0
EYE ITCHING: 0

## 2019-07-23 NOTE — PROGRESS NOTES
Ambulated pt in asher on ra  Starting pulse ox 99% dropped to 96% walking in asher   All instructions reviewed with pt by dr Elma Marroquin to stop at the  to schedule her fu appt and  her printed avs
are equal, round, and reactive to light. Conjunctivae are normal.   Cardiovascular: Normal rate, regular rhythm and normal heart sounds. Pulmonary/Chest: Effort normal. No respiratory distress. She has wheezes. She exhibits no tenderness. Abdominal: Soft. Bowel sounds are normal.   Musculoskeletal: Normal range of motion. Neurological: She is alert and oriented to person, place, and time. Skin: Skin is warm. Capillary refill takes less than 2 seconds. Psychiatric: She has a normal mood and affect. ASSESSMENT/PLAN:  Jacob Vázquez was seen today for chest congestion. Diagnoses and all orders for this visit:    COPD exacerbation ([facility]    Other orders  -     doxycycline hyclate (VIBRA-TABS) 100 MG tablet; Take 1 tablet by mouth 2 times daily (with meals) for 5 days  -     Discontinue: predniSONE (DELTASONE) 20 MG tablet; Take 1 tablet by mouth daily for 5 days  -     ipratropium-albuterol (DUONEB) nebulizer solution 1 ampule  -     predniSONE (DELTASONE) 20 MG tablet;  Take 2 tablets by mouth daily for 5 days          - lowest ambulatory oxygen saturation is 95%       RTC: Follow up with PCP on 8/8/2019  I have reviewed my findings and recommendations with Mello Martines and Dr Tatiana Wasserman MD PGY-2  7/23/2019 4:00 PM

## 2019-07-23 NOTE — PATIENT INSTRUCTIONS
Continue all medications as prescribed  Follow up with your appointments  Go to the ER is symptoms do not improve after a few days

## 2019-07-27 ENCOUNTER — HOSPITAL ENCOUNTER (OUTPATIENT)
Dept: MRI IMAGING | Age: 64
Discharge: HOME | End: 2019-07-29
Payer: MEDICARE

## 2019-07-27 DIAGNOSIS — R19.00 MASS OF PELVIS: ICD-10-CM

## 2019-07-27 PROCEDURE — 72197 MRI PELVIS W/O & W/DYE: CPT

## 2019-07-27 PROCEDURE — A9579 GAD-BASE MR CONTRAST NOS,1ML: HCPCS | Performed by: RADIOLOGY

## 2019-07-27 PROCEDURE — 6360000004 HC RX CONTRAST MEDICATION: Performed by: RADIOLOGY

## 2019-07-27 RX ADMIN — GADOTERIDOL 18 ML: 279.3 INJECTION, SOLUTION INTRAVENOUS at 08:45

## 2019-07-31 ENCOUNTER — TELEPHONE (OUTPATIENT)
Dept: SURGERY | Age: 64
End: 2019-07-31

## 2019-07-31 DIAGNOSIS — M54.31 SCIATICA OF RIGHT SIDE: Primary | ICD-10-CM

## 2019-08-08 ENCOUNTER — OFFICE VISIT (OUTPATIENT)
Dept: INTERNAL MEDICINE | Age: 64
End: 2019-08-08
Payer: MEDICARE

## 2019-08-08 ENCOUNTER — TELEPHONE (OUTPATIENT)
Dept: INTERNAL MEDICINE | Age: 64
End: 2019-08-08

## 2019-08-08 VITALS
RESPIRATION RATE: 16 BRPM | BODY MASS INDEX: 30.53 KG/M2 | TEMPERATURE: 98.6 F | WEIGHT: 190 LBS | SYSTOLIC BLOOD PRESSURE: 133 MMHG | HEIGHT: 66 IN | DIASTOLIC BLOOD PRESSURE: 82 MMHG | HEART RATE: 87 BPM

## 2019-08-08 DIAGNOSIS — E11.9 TYPE 2 DIABETES MELLITUS WITHOUT COMPLICATION, WITH LONG-TERM CURRENT USE OF INSULIN (HCC): ICD-10-CM

## 2019-08-08 DIAGNOSIS — I10 ESSENTIAL HYPERTENSION: ICD-10-CM

## 2019-08-08 DIAGNOSIS — E03.9 HYPOTHYROIDISM, UNSPECIFIED TYPE: ICD-10-CM

## 2019-08-08 DIAGNOSIS — M79.7 FIBROMYALGIA: ICD-10-CM

## 2019-08-08 DIAGNOSIS — Z72.0 TOBACCO ABUSE: ICD-10-CM

## 2019-08-08 DIAGNOSIS — Z00.00 HEALTH CARE MAINTENANCE: ICD-10-CM

## 2019-08-08 DIAGNOSIS — J44.9 CHRONIC OBSTRUCTIVE PULMONARY DISEASE, UNSPECIFIED COPD TYPE (HCC): ICD-10-CM

## 2019-08-08 DIAGNOSIS — F32.A DEPRESSION, UNSPECIFIED DEPRESSION TYPE: ICD-10-CM

## 2019-08-08 DIAGNOSIS — K21.9 GASTROESOPHAGEAL REFLUX DISEASE WITHOUT ESOPHAGITIS: ICD-10-CM

## 2019-08-08 DIAGNOSIS — Z79.4 TYPE 2 DIABETES MELLITUS WITHOUT COMPLICATION, WITH LONG-TERM CURRENT USE OF INSULIN (HCC): ICD-10-CM

## 2019-08-08 DIAGNOSIS — Z23 NEED FOR PROPHYLACTIC VACCINATION AGAINST DIPHTHERIA-TETANUS-PERTUSSIS (DTP): ICD-10-CM

## 2019-08-08 DIAGNOSIS — E55.9 VITAMIN D DEFICIENCY: ICD-10-CM

## 2019-08-08 LAB
CHP ED QC CHECK: NORMAL
GLUCOSE BLD-MCNC: 102 MG/DL
HBA1C MFR BLD: 5.4 %

## 2019-08-08 PROCEDURE — 83036 HEMOGLOBIN GLYCOSYLATED A1C: CPT | Performed by: INTERNAL MEDICINE

## 2019-08-08 PROCEDURE — 3044F HG A1C LEVEL LT 7.0%: CPT | Performed by: INTERNAL MEDICINE

## 2019-08-08 PROCEDURE — 2022F DILAT RTA XM EVC RTNOPTHY: CPT | Performed by: INTERNAL MEDICINE

## 2019-08-08 PROCEDURE — 4004F PT TOBACCO SCREEN RCVD TLK: CPT | Performed by: INTERNAL MEDICINE

## 2019-08-08 PROCEDURE — G8417 CALC BMI ABV UP PARAM F/U: HCPCS | Performed by: INTERNAL MEDICINE

## 2019-08-08 PROCEDURE — 82962 GLUCOSE BLOOD TEST: CPT | Performed by: INTERNAL MEDICINE

## 2019-08-08 PROCEDURE — 3017F COLORECTAL CA SCREEN DOC REV: CPT | Performed by: INTERNAL MEDICINE

## 2019-08-08 PROCEDURE — 3023F SPIROM DOC REV: CPT | Performed by: INTERNAL MEDICINE

## 2019-08-08 PROCEDURE — G8926 SPIRO NO PERF OR DOC: HCPCS | Performed by: INTERNAL MEDICINE

## 2019-08-08 PROCEDURE — G8427 DOCREV CUR MEDS BY ELIG CLIN: HCPCS | Performed by: INTERNAL MEDICINE

## 2019-08-08 PROCEDURE — 99212 OFFICE O/P EST SF 10 MIN: CPT | Performed by: INTERNAL MEDICINE

## 2019-08-08 PROCEDURE — 99213 OFFICE O/P EST LOW 20 MIN: CPT | Performed by: INTERNAL MEDICINE

## 2019-08-08 RX ORDER — BUDESONIDE AND FORMOTEROL FUMARATE DIHYDRATE 160; 4.5 UG/1; UG/1
2 AEROSOL RESPIRATORY (INHALATION) 2 TIMES DAILY
Qty: 1 INHALER | Refills: 3 | Status: SHIPPED | OUTPATIENT
Start: 2019-08-08 | End: 2019-10-25 | Stop reason: SDUPTHER

## 2019-08-08 RX ORDER — AMITRIPTYLINE HYDROCHLORIDE 50 MG/1
TABLET, FILM COATED ORAL
Qty: 90 TABLET | Refills: 1 | Status: SHIPPED | OUTPATIENT
Start: 2019-08-08 | End: 2019-10-25 | Stop reason: SDUPTHER

## 2019-08-08 RX ORDER — CITALOPRAM 40 MG/1
40 TABLET ORAL DAILY
Qty: 30 TABLET | Refills: 3 | Status: SHIPPED | OUTPATIENT
Start: 2019-08-08 | End: 2019-10-25 | Stop reason: SDUPTHER

## 2019-08-08 RX ORDER — GABAPENTIN 400 MG/1
400 CAPSULE ORAL 2 TIMES DAILY
Qty: 60 CAPSULE | Refills: 3 | Status: SHIPPED | OUTPATIENT
Start: 2019-08-08 | End: 2019-12-06 | Stop reason: SDUPTHER

## 2019-08-08 RX ORDER — ALBUTEROL SULFATE 90 UG/1
2 AEROSOL, METERED RESPIRATORY (INHALATION) EVERY 4 HOURS PRN
Qty: 1 INHALER | Refills: 3 | Status: SHIPPED | OUTPATIENT
Start: 2019-08-08 | End: 2019-12-06 | Stop reason: SDUPTHER

## 2019-08-08 RX ORDER — LEVOTHYROXINE SODIUM 0.07 MG/1
75 TABLET ORAL DAILY
Qty: 90 TABLET | Refills: 0 | Status: SHIPPED | OUTPATIENT
Start: 2019-08-08 | End: 2019-12-06 | Stop reason: SDUPTHER

## 2019-08-08 RX ORDER — MONTELUKAST SODIUM 10 MG/1
TABLET ORAL
Qty: 90 TABLET | Refills: 0 | Status: SHIPPED | OUTPATIENT
Start: 2019-08-08 | End: 2019-11-12 | Stop reason: SDUPTHER

## 2019-08-08 RX ORDER — TRIAMTERENE AND HYDROCHLOROTHIAZIDE 37.5; 25 MG/1; MG/1
TABLET ORAL
Qty: 30 TABLET | Refills: 3 | Status: SHIPPED | OUTPATIENT
Start: 2019-08-08 | End: 2019-12-06 | Stop reason: SDUPTHER

## 2019-08-08 RX ORDER — NICOTINE 21 MG/24HR
1 PATCH, TRANSDERMAL 24 HOURS TRANSDERMAL DAILY
Qty: 14 PATCH | Refills: 0 | Status: SHIPPED | OUTPATIENT
Start: 2019-08-08 | End: 2020-01-19

## 2019-08-08 RX ORDER — OYSTER SHELL CALCIUM WITH VITAMIN D 500; 200 MG/1; [IU]/1
TABLET, FILM COATED ORAL
Qty: 30 TABLET | Refills: 3 | Status: SHIPPED | OUTPATIENT
Start: 2019-08-08 | End: 2019-12-06 | Stop reason: SDUPTHER

## 2019-08-08 RX ORDER — ESOMEPRAZOLE MAGNESIUM 40 MG/1
CAPSULE, DELAYED RELEASE ORAL
Qty: 90 CAPSULE | Refills: 1 | Status: SHIPPED | OUTPATIENT
Start: 2019-08-08 | End: 2019-12-06 | Stop reason: ALTCHOICE

## 2019-08-08 NOTE — PROGRESS NOTES
unspecified COPD type [facility])  -make appointment w Dr Hector Alan  - budesonide-formoterol [facility]) 160-4.5 MCG/ACT AERO; Inhale 2 puffs into the lungs 2 times daily    - montelukast (SINGULAIR) 10 MG tablet; TAKE 1 TABLET BY MOUTH EVERY NIGHT AT BEDTIME - albuterol sulfate HFA (PROVENTIL HFA) 108 (90 Base) MCG/ACT inhaler; Inhale 2 puffs into the lungs every 4 hours as needed for Wheezing      2. Essential hypertension  - triamterene-hydrochlorothiazide (MAXZIDE-25) 37.5-25 MG per tablet; daily    - aspirin 81 MG tablet; Take 1 tablet by mouth daily      3. Type 2 diabetes mellitus without complication, with long-term current use of insulin (HCC)  - POCT glycosylated hemoglobin (Hb A1C) 5.4%  - POCT Glucose 101mg  - metFORMIN (GLUCOPHAGE) 1000 MG tablet; BID      4. Hypothyroidism, with thyroid nodule  - levothyroxine (SYNTHROID) 75 MCG tablet; Take 1 tablet by mouth Daily    - follow up w Dr Arturo Jackson     5. Gastroesophageal reflux disease without esophagitis  - esomeprazole (NEXIUM) 40 MG delayed release capsule; TAKE 1 CAPSULE BY MOUTH EVERY DAY      6. Tobacco abuse  Tobacco cessation counseling  - nicotine (NICODERM CQ) 14 MG/24HR; Place 1 patch onto the skin daily for 14 days then start  - nicotine (NICODERM CQ) 7 MG/24HR; Place 1 patch onto the skin daily for 14 days     7. Fibromyalgia  - amitriptyline (ELAVIL) 50 MG tablet; TAKE 1 TABLET BY MOUTH EVERY EVENING   - gabapentin (NEURONTIN) 400 MG capsule; Take 1 capsule by mouth 2 times daily    8. Depression, unspecified depression type  - citalopram (CELEXA) 40 MG tablet; Take 1 tablet by mouth daily     9. Vitamin D deficiency  - calcium-vitamin D (CALCIUM 500/D) 500-200 MG-UNIT per tablet;    10. Health care maintenance  - COLONOSCOPY   - ANTIBODY TITER MMR    11. Need for prophylactic vaccination against diphtheria-tetanus-pertussis (DTP)  - Tdap (ADACEL) 5-2-15.5 LF-MCG/0.5 injection;  Inject 0.5 mLs into the muscle once for 1 dose     I have reviewed my findings and recommendations with Bruce Laura and Dr Bharati Hummel MD PGY-2  8/8/2019 2:46 PM

## 2019-08-08 NOTE — PATIENT INSTRUCTIONS
Patient Education        Learning About Benefits From Quitting Smoking  How does quitting smoking make you healthier? If you're thinking about quitting smoking, you may have a few reasons to be smoke-free. Your health may be one of them. · When you quit smoking, you lower your risks for cancer, lung disease, heart attack, stroke, blood vessel disease, and blindness from macular degeneration. · When you're smoke-free, you get sick less often, and you heal faster. You are less likely to get colds, flu, bronchitis, and pneumonia. · As a nonsmoker, you may find that your mood is better and you are less stressed. When and how will you feel healthier? Quitting has real health benefits that start from day 1 of being smoke-free. And the longer you stay smoke-free, the healthier you get and the better you feel. The first hours  · After just 20 minutes, your blood pressure and heart rate go down. That means there's less stress on your heart and blood vessels. · Within 12 hours, the level of carbon monoxide in your blood drops back to normal. That makes room for more oxygen. With more oxygen in your body, you may notice that you have more energy than when you smoked. After 2 weeks  · Your lungs start to work better. · Your risk of heart attack starts to drop. After 1 month  · When your lungs are clear, you cough less and breathe deeper, so it's easier to be active. · Your sense of taste and smell return. That means you can enjoy food more than you have since you started smoking. Over the years  · After 1 year, your risk of heart disease is half what it would be if you kept smoking. · After 5 years, your risk of stroke starts to shrink. Within a few years after that, it's about the same as if you'd never smoked. · After 10 years, your risk of dying from lung cancer is cut by about half. And your risk for many other types of cancer is lower too. How would quitting help others in your life?   When you quit smoking, you improve the health of everyone who now breathes in your smoke. · Their heart, lung, and cancer risks drop, much like yours. · They are sick less. For babies and small children, living smoke-free means they're less likely to have ear infections, pneumonia, and bronchitis. · If you're a woman who is or will be pregnant someday, quitting smoking means a healthier . · Children who are close to you are less likely to become adult smokers. Where can you learn more? Go to https://[employer]. org and sign in to your [employer] account. Enter [id number] in the [employer] box to learn more about \"Learning About Benefits From Quitting Smoking. \"     If you do not have an account, please click on the \"Sign Up Now\" link. Current as of: 2018  Content Version: 12.1  © 6869-4116 [employer]. Care instructions adapted under license by [facility] ([facility]). If you have questions about a medical condition or this instruction, always ask your healthcare professional. [address] any warranty or liability for your use of this information. Thank you for your visit today. What to do:   Tdap today.  MMR titer.  Follow up in 3 mo.  Schedule appt with Pulmonology, Hem/Onc, ENT   Continue all medications as prescribed. Yaz Olmedo MD.    Patient Education        Tdap (Tetanus, Diphtheria, Pertussis) Vaccine: What You Need to Know  Why get vaccinated? Tetanus, diphtheria, and pertussis are very serious diseases. Tdap vaccine can protect us from these diseases. And Tdap vaccine given to pregnant women can protect  babies against pertussis. Tetanus (lockjaw) is rare in the [facility] today. It causes painful muscle tightening and stiffness, usually all over the body. · It can lead to tightening of muscles in the head and neck so you can't open your mouth, swallow, or sometimes even breathe.  Tetanus kills about 1 out of 10

## 2019-09-03 ENCOUNTER — HOSPITAL ENCOUNTER (OUTPATIENT)
Age: 64
Discharge: HOME | End: 2019-09-03
Payer: MEDICARE

## 2019-09-03 PROCEDURE — 86762 RUBELLA ANTIBODY: CPT

## 2019-09-03 PROCEDURE — 36415 COLL VENOUS BLD VENIPUNCTURE: CPT

## 2019-09-03 PROCEDURE — 86765 RUBEOLA ANTIBODY: CPT

## 2019-09-03 PROCEDURE — 86735 MUMPS ANTIBODY: CPT

## 2019-09-05 LAB
MEASLES IMMUNE (IGG): NORMAL
MUMPS AB IGG: NORMAL
RUBELLA ANTIBODY IGG: NORMAL

## 2019-09-20 ENCOUNTER — TELEPHONE (OUTPATIENT)
Dept: INTERNAL MEDICINE | Age: 64
End: 2019-09-20

## 2019-09-20 NOTE — TELEPHONE ENCOUNTER
Patient here for tdap and signed statement for employee medical form  Patients titers completed and medical statement signed per [address], but patient has medicare and unable to give tdap in office. Printed prescription for TDAP given to patient, once injection given patient will return to  paperwork. Patient instructed to bring proof from pharmacy that injection given.     Paperwork placed in Joe Addy folder

## 2019-10-18 ENCOUNTER — OFFICE VISIT (OUTPATIENT)
Dept: ENT CLINIC | Age: 64
End: 2019-10-18
Payer: MEDICARE

## 2019-10-18 ENCOUNTER — TELEPHONE (OUTPATIENT)
Dept: ENT CLINIC | Age: 64
End: 2019-10-18

## 2019-10-18 VITALS
HEIGHT: 66 IN | SYSTOLIC BLOOD PRESSURE: 123 MMHG | DIASTOLIC BLOOD PRESSURE: 82 MMHG | HEART RATE: 93 BPM | BODY MASS INDEX: 31.34 KG/M2 | WEIGHT: 195 LBS

## 2019-10-18 DIAGNOSIS — R59.1 LYMPHADENOPATHY: ICD-10-CM

## 2019-10-18 DIAGNOSIS — E04.1 THYROID NODULE: Primary | ICD-10-CM

## 2019-10-18 PROCEDURE — G8427 DOCREV CUR MEDS BY ELIG CLIN: HCPCS | Performed by: OTOLARYNGOLOGY

## 2019-10-18 PROCEDURE — 3017F COLORECTAL CA SCREEN DOC REV: CPT | Performed by: OTOLARYNGOLOGY

## 2019-10-18 PROCEDURE — 99213 OFFICE O/P EST LOW 20 MIN: CPT | Performed by: OTOLARYNGOLOGY

## 2019-10-18 PROCEDURE — G8417 CALC BMI ABV UP PARAM F/U: HCPCS | Performed by: OTOLARYNGOLOGY

## 2019-10-18 PROCEDURE — 4004F PT TOBACCO SCREEN RCVD TLK: CPT | Performed by: OTOLARYNGOLOGY

## 2019-10-18 PROCEDURE — G8484 FLU IMMUNIZE NO ADMIN: HCPCS | Performed by: OTOLARYNGOLOGY

## 2019-10-18 ASSESSMENT — ENCOUNTER SYMPTOMS
TROUBLE SWALLOWING: 1
COLOR CHANGE: 0
SHORTNESS OF BREATH: 0
SORE THROAT: 1
GASTROINTESTINAL NEGATIVE: 1
EYES NEGATIVE: 1
RESPIRATORY NEGATIVE: 1
ABDOMINAL PAIN: 0

## 2019-10-25 ENCOUNTER — OFFICE VISIT (OUTPATIENT)
Dept: INTERNAL MEDICINE | Age: 64
End: 2019-10-25
Payer: MEDICARE

## 2019-10-25 ENCOUNTER — HOSPITAL ENCOUNTER (OUTPATIENT)
Age: 64
Discharge: HOME | End: 2019-10-25
Payer: MEDICARE

## 2019-10-25 ENCOUNTER — HOSPITAL ENCOUNTER (OUTPATIENT)
Dept: CT IMAGING | Age: 64
Discharge: HOME | End: 2019-10-27
Payer: MEDICARE

## 2019-10-25 VITALS
TEMPERATURE: 98.6 F | HEIGHT: 66 IN | SYSTOLIC BLOOD PRESSURE: 154 MMHG | HEART RATE: 90 BPM | DIASTOLIC BLOOD PRESSURE: 89 MMHG | RESPIRATION RATE: 16 BRPM | WEIGHT: 184.2 LBS | BODY MASS INDEX: 29.6 KG/M2

## 2019-10-25 DIAGNOSIS — Z79.4 TYPE 2 DIABETES MELLITUS WITHOUT COMPLICATION, WITH LONG-TERM CURRENT USE OF INSULIN (HCC): ICD-10-CM

## 2019-10-25 DIAGNOSIS — J44.9 CHRONIC OBSTRUCTIVE PULMONARY DISEASE, UNSPECIFIED COPD TYPE (HCC): ICD-10-CM

## 2019-10-25 DIAGNOSIS — R59.1 LYMPHADENOPATHY: ICD-10-CM

## 2019-10-25 DIAGNOSIS — E11.9 TYPE 2 DIABETES MELLITUS WITHOUT COMPLICATION, WITH LONG-TERM CURRENT USE OF INSULIN (HCC): ICD-10-CM

## 2019-10-25 DIAGNOSIS — E04.1 THYROID NODULE: ICD-10-CM

## 2019-10-25 DIAGNOSIS — Z00.00 HEALTH CARE MAINTENANCE: Primary | ICD-10-CM

## 2019-10-25 DIAGNOSIS — M79.7 FIBROMYALGIA: ICD-10-CM

## 2019-10-25 DIAGNOSIS — F32.A DEPRESSION, UNSPECIFIED DEPRESSION TYPE: ICD-10-CM

## 2019-10-25 DIAGNOSIS — M47.816 OSTEOARTHRITIS OF LUMBAR SPINE, UNSPECIFIED SPINAL OSTEOARTHRITIS COMPLICATION STATUS: ICD-10-CM

## 2019-10-25 LAB
BUN BLDV-MCNC: 12 MG/DL (ref 8–23)
CREAT SERPL-MCNC: 0.8 MG/DL (ref 0.5–1)
GFR AFRICAN AMERICAN: >60
GFR NON-AFRICAN AMERICAN: >60 ML/MIN/1.73

## 2019-10-25 PROCEDURE — G8484 FLU IMMUNIZE NO ADMIN: HCPCS | Performed by: INTERNAL MEDICINE

## 2019-10-25 PROCEDURE — 6360000004 HC RX CONTRAST MEDICATION: Performed by: RADIOLOGY

## 2019-10-25 PROCEDURE — 3017F COLORECTAL CA SCREEN DOC REV: CPT | Performed by: INTERNAL MEDICINE

## 2019-10-25 PROCEDURE — 99212 OFFICE O/P EST SF 10 MIN: CPT | Performed by: INTERNAL MEDICINE

## 2019-10-25 PROCEDURE — 3044F HG A1C LEVEL LT 7.0%: CPT | Performed by: INTERNAL MEDICINE

## 2019-10-25 PROCEDURE — 36415 COLL VENOUS BLD VENIPUNCTURE: CPT

## 2019-10-25 PROCEDURE — 70492 CT SFT TSUE NCK W/O & W/DYE: CPT

## 2019-10-25 PROCEDURE — 84520 ASSAY OF UREA NITROGEN: CPT

## 2019-10-25 PROCEDURE — 82565 ASSAY OF CREATININE: CPT

## 2019-10-25 PROCEDURE — G0438 PPPS, INITIAL VISIT: HCPCS | Performed by: INTERNAL MEDICINE

## 2019-10-25 RX ORDER — AMITRIPTYLINE HYDROCHLORIDE 50 MG/1
TABLET, FILM COATED ORAL
Qty: 90 TABLET | Refills: 1 | Status: SHIPPED | OUTPATIENT
Start: 2019-10-25 | End: 2019-12-06 | Stop reason: SDUPTHER

## 2019-10-25 RX ORDER — BACLOFEN 10 MG/1
TABLET ORAL
Qty: 90 TABLET | Refills: 3 | Status: SHIPPED | OUTPATIENT
Start: 2019-10-25 | End: 2019-12-06 | Stop reason: SDUPTHER

## 2019-10-25 RX ORDER — AZITHROMYCIN 250 MG/1
250 TABLET, FILM COATED ORAL DAILY
Qty: 6 TABLET | Refills: 0 | Status: SHIPPED | OUTPATIENT
Start: 2019-10-25 | End: 2019-10-30

## 2019-10-25 RX ORDER — CITALOPRAM 40 MG/1
40 TABLET ORAL DAILY
Qty: 30 TABLET | Refills: 3 | Status: ON HOLD | OUTPATIENT
Start: 2019-10-25 | End: 2020-01-26 | Stop reason: SDUPTHER

## 2019-10-25 RX ORDER — BUDESONIDE AND FORMOTEROL FUMARATE DIHYDRATE 160; 4.5 UG/1; UG/1
2 AEROSOL RESPIRATORY (INHALATION) 2 TIMES DAILY
Qty: 1 INHALER | Refills: 3 | Status: SHIPPED | OUTPATIENT
Start: 2019-10-25 | End: 2019-12-06 | Stop reason: SDUPTHER

## 2019-10-25 RX ORDER — SODIUM CHLORIDE 0.9 % (FLUSH) 0.9 %
10 SYRINGE (ML) INJECTION PRN
Status: DISCONTINUED | OUTPATIENT
Start: 2019-10-25 | End: 2019-10-28 | Stop reason: HOSPADM

## 2019-10-25 RX ADMIN — IOPAMIDOL 90 ML: 755 INJECTION, SOLUTION INTRAVENOUS at 19:06

## 2019-10-25 ASSESSMENT — PATIENT HEALTH QUESTIONNAIRE - PHQ9
SUM OF ALL RESPONSES TO PHQ QUESTIONS 1-9: 0
SUM OF ALL RESPONSES TO PHQ QUESTIONS 1-9: 0

## 2019-10-25 ASSESSMENT — LIFESTYLE VARIABLES: HOW OFTEN DO YOU HAVE A DRINK CONTAINING ALCOHOL: 0

## 2019-11-12 DIAGNOSIS — J44.9 CHRONIC OBSTRUCTIVE PULMONARY DISEASE, UNSPECIFIED COPD TYPE (HCC): ICD-10-CM

## 2019-11-12 RX ORDER — MONTELUKAST SODIUM 10 MG/1
TABLET ORAL
Qty: 90 TABLET | Refills: 0 | Status: SHIPPED | OUTPATIENT
Start: 2019-11-12 | End: 2019-12-06 | Stop reason: SDUPTHER

## 2019-11-18 ENCOUNTER — TELEPHONE (OUTPATIENT)
Dept: ENT CLINIC | Age: 64
End: 2019-11-18

## 2019-11-18 ENCOUNTER — TELEPHONE (OUTPATIENT)
Dept: INTERNAL MEDICINE | Age: 64
End: 2019-11-18

## 2019-12-02 ENCOUNTER — TELEPHONE (OUTPATIENT)
Dept: ENT CLINIC | Age: 64
End: 2019-12-02

## 2019-12-06 ENCOUNTER — OFFICE VISIT (OUTPATIENT)
Dept: INTERNAL MEDICINE | Age: 64
End: 2019-12-06
Payer: MEDICARE

## 2019-12-06 VITALS
BODY MASS INDEX: 29.89 KG/M2 | SYSTOLIC BLOOD PRESSURE: 136 MMHG | DIASTOLIC BLOOD PRESSURE: 68 MMHG | WEIGHT: 186 LBS | TEMPERATURE: 98.7 F | HEIGHT: 66 IN | HEART RATE: 93 BPM | RESPIRATION RATE: 16 BRPM

## 2019-12-06 DIAGNOSIS — E55.9 VITAMIN D DEFICIENCY: ICD-10-CM

## 2019-12-06 DIAGNOSIS — Z79.4 TYPE 2 DIABETES MELLITUS WITHOUT COMPLICATION, WITH LONG-TERM CURRENT USE OF INSULIN (HCC): ICD-10-CM

## 2019-12-06 DIAGNOSIS — F32.A DEPRESSION, UNSPECIFIED DEPRESSION TYPE: ICD-10-CM

## 2019-12-06 DIAGNOSIS — M47.816 OSTEOARTHRITIS OF LUMBAR SPINE, UNSPECIFIED SPINAL OSTEOARTHRITIS COMPLICATION STATUS: ICD-10-CM

## 2019-12-06 DIAGNOSIS — E11.9 TYPE 2 DIABETES MELLITUS WITHOUT COMPLICATION, WITH LONG-TERM CURRENT USE OF INSULIN (HCC): ICD-10-CM

## 2019-12-06 DIAGNOSIS — M79.7 FIBROMYALGIA: ICD-10-CM

## 2019-12-06 DIAGNOSIS — J44.9 CHRONIC OBSTRUCTIVE PULMONARY DISEASE, UNSPECIFIED COPD TYPE (HCC): ICD-10-CM

## 2019-12-06 DIAGNOSIS — K21.9 GASTROESOPHAGEAL REFLUX DISEASE WITHOUT ESOPHAGITIS: ICD-10-CM

## 2019-12-06 DIAGNOSIS — E03.9 HYPOTHYROIDISM, UNSPECIFIED TYPE: ICD-10-CM

## 2019-12-06 DIAGNOSIS — I10 ESSENTIAL HYPERTENSION: ICD-10-CM

## 2019-12-06 PROCEDURE — 3017F COLORECTAL CA SCREEN DOC REV: CPT | Performed by: INTERNAL MEDICINE

## 2019-12-06 PROCEDURE — 2022F DILAT RTA XM EVC RTNOPTHY: CPT | Performed by: INTERNAL MEDICINE

## 2019-12-06 PROCEDURE — G8427 DOCREV CUR MEDS BY ELIG CLIN: HCPCS | Performed by: INTERNAL MEDICINE

## 2019-12-06 PROCEDURE — G8926 SPIRO NO PERF OR DOC: HCPCS | Performed by: INTERNAL MEDICINE

## 2019-12-06 PROCEDURE — 3023F SPIROM DOC REV: CPT | Performed by: INTERNAL MEDICINE

## 2019-12-06 PROCEDURE — 3044F HG A1C LEVEL LT 7.0%: CPT | Performed by: INTERNAL MEDICINE

## 2019-12-06 PROCEDURE — 99212 OFFICE O/P EST SF 10 MIN: CPT | Performed by: INTERNAL MEDICINE

## 2019-12-06 PROCEDURE — G8417 CALC BMI ABV UP PARAM F/U: HCPCS | Performed by: INTERNAL MEDICINE

## 2019-12-06 PROCEDURE — 99214 OFFICE O/P EST MOD 30 MIN: CPT | Performed by: INTERNAL MEDICINE

## 2019-12-06 PROCEDURE — G8484 FLU IMMUNIZE NO ADMIN: HCPCS | Performed by: INTERNAL MEDICINE

## 2019-12-06 PROCEDURE — 4004F PT TOBACCO SCREEN RCVD TLK: CPT | Performed by: INTERNAL MEDICINE

## 2019-12-06 RX ORDER — MONTELUKAST SODIUM 10 MG/1
TABLET ORAL
Qty: 90 TABLET | Refills: 1 | Status: ON HOLD | OUTPATIENT
Start: 2019-12-06 | End: 2020-01-26 | Stop reason: SDUPTHER

## 2019-12-06 RX ORDER — AMITRIPTYLINE HYDROCHLORIDE 50 MG/1
TABLET, FILM COATED ORAL
Qty: 90 TABLET | Refills: 2 | Status: ON HOLD | OUTPATIENT
Start: 2019-12-06 | End: 2020-01-26 | Stop reason: SDUPTHER

## 2019-12-06 RX ORDER — OYSTER SHELL CALCIUM WITH VITAMIN D 500; 200 MG/1; [IU]/1
TABLET, FILM COATED ORAL
Qty: 30 TABLET | Refills: 2 | Status: SHIPPED | OUTPATIENT
Start: 2019-12-06 | End: 2020-01-31 | Stop reason: SDUPTHER

## 2019-12-06 RX ORDER — CITALOPRAM 40 MG/1
40 TABLET ORAL DAILY
Qty: 30 TABLET | Status: CANCELLED | OUTPATIENT
Start: 2019-12-06

## 2019-12-06 RX ORDER — ALBUTEROL SULFATE 90 UG/1
2 AEROSOL, METERED RESPIRATORY (INHALATION) EVERY 4 HOURS PRN
Qty: 1 INHALER | Refills: 3 | Status: SHIPPED | OUTPATIENT
Start: 2019-12-06 | End: 2020-01-19

## 2019-12-06 RX ORDER — BUDESONIDE AND FORMOTEROL FUMARATE DIHYDRATE 160; 4.5 UG/1; UG/1
2 AEROSOL RESPIRATORY (INHALATION) 2 TIMES DAILY
Qty: 1 INHALER | Refills: 2 | Status: SHIPPED | OUTPATIENT
Start: 2019-12-06 | End: 2020-01-19

## 2019-12-06 RX ORDER — ESOMEPRAZOLE MAGNESIUM 40 MG/1
CAPSULE, DELAYED RELEASE ORAL
Qty: 90 CAPSULE | Refills: 2 | Status: ON HOLD | OUTPATIENT
Start: 2019-12-06 | End: 2020-01-26 | Stop reason: SDUPTHER

## 2019-12-06 RX ORDER — LEVOTHYROXINE SODIUM 0.07 MG/1
75 TABLET ORAL DAILY
Qty: 90 TABLET | Refills: 1 | Status: ON HOLD | OUTPATIENT
Start: 2019-12-06 | End: 2020-01-26 | Stop reason: SDUPTHER

## 2019-12-06 RX ORDER — TRIAMTERENE AND HYDROCHLOROTHIAZIDE 37.5; 25 MG/1; MG/1
TABLET ORAL
Qty: 30 TABLET | Refills: 2 | Status: ON HOLD | OUTPATIENT
Start: 2019-12-06 | End: 2020-01-24 | Stop reason: SDUPTHER

## 2019-12-06 RX ORDER — FLUTICASONE PROPIONATE 50 MCG
1 SPRAY, SUSPENSION (ML) NASAL DAILY
Qty: 1 BOTTLE | Refills: 2 | Status: SHIPPED | OUTPATIENT
Start: 2019-12-06 | End: 2020-01-19

## 2019-12-06 RX ORDER — GABAPENTIN 400 MG/1
400 CAPSULE ORAL 2 TIMES DAILY
Qty: 60 CAPSULE | Refills: 1 | Status: ON HOLD | OUTPATIENT
Start: 2019-12-06 | End: 2020-01-26 | Stop reason: SDUPTHER

## 2019-12-06 RX ORDER — BACLOFEN 10 MG/1
TABLET ORAL
Qty: 90 TABLET | Refills: 2 | Status: SHIPPED | OUTPATIENT
Start: 2019-12-06 | End: 2020-01-31 | Stop reason: SDUPTHER

## 2019-12-19 ENCOUNTER — TELEPHONE (OUTPATIENT)
Dept: ENT CLINIC | Age: 64
End: 2019-12-19

## 2019-12-19 NOTE — TELEPHONE ENCOUNTER
Yee PIZANO said patient keeps calling to be rescheduled. Patient was discharged per dr Perla Lujan due to non compliance. LM for patient advocate to speak to patient.

## 2020-01-03 ENCOUNTER — TELEPHONE (OUTPATIENT)
Dept: ENT CLINIC | Age: 65
End: 2020-01-03

## 2020-01-13 ENCOUNTER — TELEPHONE (OUTPATIENT)
Dept: INTERNAL MEDICINE | Age: 65
End: 2020-01-13

## 2020-01-15 NOTE — TELEPHONE ENCOUNTER
Left another message on voice mail will discuss new referral needed for ENT at appointment 01/31/2020

## 2020-01-19 ENCOUNTER — HOSPITAL ENCOUNTER (INPATIENT)
Age: 65
LOS: 7 days | Discharge: HOME HEALTH | DRG: 871 | End: 2020-01-26
Attending: EMERGENCY MEDICINE | Admitting: INTERNAL MEDICINE
Payer: MEDICARE

## 2020-01-19 ENCOUNTER — APPOINTMENT (OUTPATIENT)
Dept: GENERAL RADIOLOGY | Age: 65
DRG: 871 | End: 2020-01-19
Payer: MEDICARE

## 2020-01-19 ENCOUNTER — APPOINTMENT (OUTPATIENT)
Dept: CT IMAGING | Age: 65
DRG: 871 | End: 2020-01-19
Payer: MEDICARE

## 2020-01-19 ENCOUNTER — APPOINTMENT (OUTPATIENT)
Dept: ULTRASOUND IMAGING | Age: 65
DRG: 871 | End: 2020-01-19
Payer: MEDICARE

## 2020-01-19 PROBLEM — R65.20 SEVERE SEPSIS (HCC): Status: ACTIVE | Noted: 2020-01-19

## 2020-01-19 PROBLEM — A41.9 SEVERE SEPSIS (HCC): Status: ACTIVE | Noted: 2020-01-19

## 2020-01-19 LAB
ADENOVIRUS BY PCR: NOT DETECTED
ALBUMIN SERPL-MCNC: 3.3 G/DL (ref 3.5–5.2)
ALP BLD-CCNC: 58 U/L (ref 35–104)
ALT SERPL-CCNC: 12 U/L (ref 0–32)
ANION GAP SERPL CALCULATED.3IONS-SCNC: 13 MMOL/L (ref 7–16)
ANION GAP SERPL CALCULATED.3IONS-SCNC: 14 MMOL/L (ref 7–16)
ANISOCYTOSIS: ABNORMAL
AST SERPL-CCNC: 22 U/L (ref 0–31)
BACTERIA: ABNORMAL /HPF
BASOPHILS ABSOLUTE: 0 E9/L (ref 0–0.2)
BASOPHILS RELATIVE PERCENT: 0.1 % (ref 0–2)
BILIRUB SERPL-MCNC: 0.6 MG/DL (ref 0–1.2)
BILIRUBIN DIRECT: <0.2 MG/DL (ref 0–0.3)
BILIRUBIN URINE: NEGATIVE
BILIRUBIN, INDIRECT: ABNORMAL MG/DL (ref 0–1)
BLOOD, URINE: ABNORMAL
BORDETELLA PARAPERTUSSIS BY PCR: NOT DETECTED
BORDETELLA PERTUSSIS BY PCR: NOT DETECTED
BUN BLDV-MCNC: 16 MG/DL (ref 8–23)
BUN BLDV-MCNC: 17 MG/DL (ref 8–23)
CALCIUM SERPL-MCNC: 7.9 MG/DL (ref 8.6–10.2)
CALCIUM SERPL-MCNC: 9.3 MG/DL (ref 8.6–10.2)
CHLAMYDOPHILIA PNEUMONIAE BY PCR: NOT DETECTED
CHLORIDE BLD-SCNC: 96 MMOL/L (ref 98–107)
CHLORIDE BLD-SCNC: 97 MMOL/L (ref 98–107)
CHLORIDE URINE RANDOM: <20 MMOL/L
CLARITY: CLEAR
CO2: 23 MMOL/L (ref 22–29)
CO2: 28 MMOL/L (ref 22–29)
COLOR: YELLOW
CORONAVIRUS 229E BY PCR: NOT DETECTED
CORONAVIRUS HKU1 BY PCR: NOT DETECTED
CORONAVIRUS NL63 BY PCR: NOT DETECTED
CORONAVIRUS OC43 BY PCR: NOT DETECTED
CORTISOL TOTAL: 31.45 MCG/DL (ref 2.68–18.4)
CREAT SERPL-MCNC: 1 MG/DL (ref 0.5–1)
CREAT SERPL-MCNC: 1.1 MG/DL (ref 0.5–1)
CREATININE URINE: 70 MG/DL (ref 29–226)
EKG ATRIAL RATE: 106 BPM
EKG P AXIS: 58 DEGREES
EKG P-R INTERVAL: 188 MS
EKG Q-T INTERVAL: 366 MS
EKG QRS DURATION: 94 MS
EKG QTC CALCULATION (BAZETT): 486 MS
EKG R AXIS: 18 DEGREES
EKG T AXIS: 39 DEGREES
EKG VENTRICULAR RATE: 106 BPM
EOSINOPHILS ABSOLUTE: 0 E9/L (ref 0.05–0.5)
EOSINOPHILS RELATIVE PERCENT: 0 % (ref 0–6)
GFR AFRICAN AMERICAN: >60
GFR NON-AFRICAN AMERICAN: 50 ML/MIN/1.73
GFR NON-AFRICAN AMERICAN: >60 ML/MIN/1.73
GFR NON-AFRICAN AMERICAN: >60 ML/MIN/1.73
GLUCOSE BLD-MCNC: 120 MG/DL (ref 74–99)
GLUCOSE BLD-MCNC: 128 MG/DL (ref 74–99)
GLUCOSE BLD-MCNC: 171 MG/DL (ref 74–99)
GLUCOSE URINE: NEGATIVE MG/DL
HCT VFR BLD CALC: 34.5 % (ref 34–48)
HEMOGLOBIN: 10.5 G/DL (ref 11.5–15.5)
HUMAN METAPNEUMOVIRUS BY PCR: NOT DETECTED
HUMAN RHINOVIRUS/ENTEROVIRUS BY PCR: NOT DETECTED
INFLUENZA A BY PCR: NOT DETECTED
INFLUENZA A BY PCR: NOT DETECTED
INFLUENZA B BY PCR: NOT DETECTED
INFLUENZA B BY PCR: NOT DETECTED
KETONES, URINE: NEGATIVE MG/DL
LACTIC ACID: 2.7 MMOL/L (ref 0.5–2.2)
LACTIC ACID: 4.1 MMOL/L (ref 0.5–2.2)
LACTIC ACID: 4.5 MMOL/L (ref 0.5–2.2)
LEUKOCYTE ESTERASE, URINE: NEGATIVE
LIPASE: 6 U/L (ref 13–60)
LYMPHOCYTES ABSOLUTE: 1.24 E9/L (ref 1.5–4)
LYMPHOCYTES RELATIVE PERCENT: 7.8 % (ref 20–42)
MAGNESIUM: 1 MG/DL (ref 1.6–2.6)
MAGNESIUM: 1.5 MG/DL (ref 1.6–2.6)
MCH RBC QN AUTO: 28.7 PG (ref 26–35)
MCHC RBC AUTO-ENTMCNC: 30.4 % (ref 32–34.5)
MCV RBC AUTO: 94.3 FL (ref 80–99.9)
METAMYELOCYTES RELATIVE PERCENT: 4.3 % (ref 0–1)
METER GLUCOSE: 181 MG/DL (ref 74–99)
MONOCYTES ABSOLUTE: 0.93 E9/L (ref 0.1–0.95)
MONOCYTES RELATIVE PERCENT: 6.1 % (ref 2–12)
MYCOPLASMA PNEUMONIAE BY PCR: NOT DETECTED
NEUTROPHILS ABSOLUTE: 13.18 E9/L (ref 1.8–7.3)
NEUTROPHILS RELATIVE PERCENT: 80.9 % (ref 43–80)
NITRITE, URINE: NEGATIVE
PARAINFLUENZA VIRUS 1 BY PCR: NOT DETECTED
PARAINFLUENZA VIRUS 2 BY PCR: NOT DETECTED
PARAINFLUENZA VIRUS 3 BY PCR: NOT DETECTED
PARAINFLUENZA VIRUS 4 BY PCR: NOT DETECTED
PDW BLD-RTO: 12.6 FL (ref 11.5–15)
PERFORMED ON: ABNORMAL
PH UA: 6 (ref 5–9)
PLATELET # BLD: 234 E9/L (ref 130–450)
PMV BLD AUTO: 11.2 FL (ref 7–12)
POC CHLORIDE: 94 MMOL/L (ref 100–108)
POC CREATININE: 1.1 MG/DL (ref 0.5–1)
POC POTASSIUM: 6.5 MMOL/L (ref 3.5–5)
POC SODIUM: 132 MMOL/L (ref 132–146)
POTASSIUM SERPL-SCNC: 3.3 MMOL/L (ref 3.5–5)
POTASSIUM SERPL-SCNC: 3.5 MMOL/L (ref 3.5–5)
POTASSIUM, UR: 36.6 MMOL/L
PRO-BNP: 1782 PG/ML (ref 0–125)
PROCALCITONIN: 23.26 NG/ML (ref 0–0.08)
PROTEIN UA: NEGATIVE MG/DL
RBC # BLD: 3.66 E12/L (ref 3.5–5.5)
RBC UA: ABNORMAL /HPF (ref 0–2)
RESPIRATORY SYNCYTIAL VIRUS BY PCR: NOT DETECTED
SODIUM BLD-SCNC: 133 MMOL/L (ref 132–146)
SODIUM BLD-SCNC: 138 MMOL/L (ref 132–146)
SODIUM URINE: <20 MMOL/L
SPECIFIC GRAVITY UA: <=1.005 (ref 1–1.03)
T4 FREE: 0.85 NG/DL (ref 0.93–1.7)
TOTAL PROTEIN: 7.6 G/DL (ref 6.4–8.3)
TROPONIN: <0.01 NG/ML (ref 0–0.03)
TSH SERPL DL<=0.05 MIU/L-ACNC: 2.55 UIU/ML (ref 0.27–4.2)
UREA NITROGEN, UR: 407 MG/DL (ref 800–1666)
UROBILINOGEN, URINE: 0.2 E.U./DL
VACUOLATED NEUTROPHILS: ABNORMAL
WBC # BLD: 15.5 E9/L (ref 4.5–11.5)
WBC UA: ABNORMAL /HPF (ref 0–5)

## 2020-01-19 PROCEDURE — 51702 INSERT TEMP BLADDER CATH: CPT

## 2020-01-19 PROCEDURE — 6360000002 HC RX W HCPCS: Performed by: INTERNAL MEDICINE

## 2020-01-19 PROCEDURE — 82436 ASSAY OF URINE CHLORIDE: CPT

## 2020-01-19 PROCEDURE — 81001 URINALYSIS AUTO W/SCOPE: CPT

## 2020-01-19 PROCEDURE — 6370000000 HC RX 637 (ALT 250 FOR IP): Performed by: INTERNAL MEDICINE

## 2020-01-19 PROCEDURE — 84132 ASSAY OF SERUM POTASSIUM: CPT

## 2020-01-19 PROCEDURE — 96366 THER/PROPH/DIAG IV INF ADDON: CPT

## 2020-01-19 PROCEDURE — 2700000000 HC OXYGEN THERAPY PER DAY

## 2020-01-19 PROCEDURE — 82565 ASSAY OF CREATININE: CPT

## 2020-01-19 PROCEDURE — 6370000000 HC RX 637 (ALT 250 FOR IP): Performed by: EMERGENCY MEDICINE

## 2020-01-19 PROCEDURE — 93010 ELECTROCARDIOGRAM REPORT: CPT | Performed by: INTERNAL MEDICINE

## 2020-01-19 PROCEDURE — 83690 ASSAY OF LIPASE: CPT

## 2020-01-19 PROCEDURE — 71045 X-RAY EXAM CHEST 1 VIEW: CPT

## 2020-01-19 PROCEDURE — 93005 ELECTROCARDIOGRAM TRACING: CPT | Performed by: EMERGENCY MEDICINE

## 2020-01-19 PROCEDURE — 6360000002 HC RX W HCPCS: Performed by: EMERGENCY MEDICINE

## 2020-01-19 PROCEDURE — C9113 INJ PANTOPRAZOLE SODIUM, VIA: HCPCS | Performed by: INTERNAL MEDICINE

## 2020-01-19 PROCEDURE — 83605 ASSAY OF LACTIC ACID: CPT

## 2020-01-19 PROCEDURE — 84133 ASSAY OF URINE POTASSIUM: CPT

## 2020-01-19 PROCEDURE — 94640 AIRWAY INHALATION TREATMENT: CPT

## 2020-01-19 PROCEDURE — 82533 TOTAL CORTISOL: CPT

## 2020-01-19 PROCEDURE — 87070 CULTURE OTHR SPECIMN AEROBIC: CPT

## 2020-01-19 PROCEDURE — 84300 ASSAY OF URINE SODIUM: CPT

## 2020-01-19 PROCEDURE — 96376 TX/PRO/DX INJ SAME DRUG ADON: CPT

## 2020-01-19 PROCEDURE — 84295 ASSAY OF SERUM SODIUM: CPT

## 2020-01-19 PROCEDURE — 2580000003 HC RX 258: Performed by: EMERGENCY MEDICINE

## 2020-01-19 PROCEDURE — 87088 URINE BACTERIA CULTURE: CPT

## 2020-01-19 PROCEDURE — 36415 COLL VENOUS BLD VENIPUNCTURE: CPT

## 2020-01-19 PROCEDURE — 2580000003 HC RX 258: Performed by: INTERNAL MEDICINE

## 2020-01-19 PROCEDURE — 0100U HC RESPIRPTHGN MULT REV TRANS & AMP PRB TECH 21 TRGT: CPT

## 2020-01-19 PROCEDURE — 82570 ASSAY OF URINE CREATININE: CPT

## 2020-01-19 PROCEDURE — 87186 SC STD MICRODIL/AGAR DIL: CPT

## 2020-01-19 PROCEDURE — 84443 ASSAY THYROID STIM HORMONE: CPT

## 2020-01-19 PROCEDURE — 87040 BLOOD CULTURE FOR BACTERIA: CPT

## 2020-01-19 PROCEDURE — 84540 ASSAY OF URINE/UREA-N: CPT

## 2020-01-19 PROCEDURE — 87502 INFLUENZA DNA AMP PROBE: CPT

## 2020-01-19 PROCEDURE — 84439 ASSAY OF FREE THYROXINE: CPT

## 2020-01-19 PROCEDURE — 6360000004 HC RX CONTRAST MEDICATION: Performed by: RADIOLOGY

## 2020-01-19 PROCEDURE — 80076 HEPATIC FUNCTION PANEL: CPT

## 2020-01-19 PROCEDURE — 82435 ASSAY OF BLOOD CHLORIDE: CPT

## 2020-01-19 PROCEDURE — 84145 PROCALCITONIN (PCT): CPT

## 2020-01-19 PROCEDURE — 96367 TX/PROPH/DG ADDL SEQ IV INF: CPT

## 2020-01-19 PROCEDURE — 82962 GLUCOSE BLOOD TEST: CPT

## 2020-01-19 PROCEDURE — 84484 ASSAY OF TROPONIN QUANT: CPT

## 2020-01-19 PROCEDURE — 94664 DEMO&/EVAL PT USE INHALER: CPT

## 2020-01-19 PROCEDURE — 80048 BASIC METABOLIC PNL TOTAL CA: CPT

## 2020-01-19 PROCEDURE — 36556 INSERT NON-TUNNEL CV CATH: CPT

## 2020-01-19 PROCEDURE — 87077 CULTURE AEROBIC IDENTIFY: CPT

## 2020-01-19 PROCEDURE — 76705 ECHO EXAM OF ABDOMEN: CPT

## 2020-01-19 PROCEDURE — 2000000000 HC ICU R&B

## 2020-01-19 PROCEDURE — 96375 TX/PRO/DX INJ NEW DRUG ADDON: CPT

## 2020-01-19 PROCEDURE — 83880 ASSAY OF NATRIURETIC PEPTIDE: CPT

## 2020-01-19 PROCEDURE — 99291 CRITICAL CARE FIRST HOUR: CPT

## 2020-01-19 PROCEDURE — 83735 ASSAY OF MAGNESIUM: CPT

## 2020-01-19 PROCEDURE — 87206 SMEAR FLUORESCENT/ACID STAI: CPT

## 2020-01-19 PROCEDURE — 85025 COMPLETE CBC W/AUTO DIFF WBC: CPT

## 2020-01-19 PROCEDURE — 02HV33Z INSERTION OF INFUSION DEVICE INTO SUPERIOR VENA CAVA, PERCUTANEOUS APPROACH: ICD-10-PCS | Performed by: INTERNAL MEDICINE

## 2020-01-19 PROCEDURE — 82947 ASSAY GLUCOSE BLOOD QUANT: CPT

## 2020-01-19 PROCEDURE — 74177 CT ABD & PELVIS W/CONTRAST: CPT

## 2020-01-19 PROCEDURE — 96365 THER/PROPH/DIAG IV INF INIT: CPT

## 2020-01-19 RX ORDER — DEXTROSE MONOHYDRATE 50 MG/ML
100 INJECTION, SOLUTION INTRAVENOUS PRN
Status: DISCONTINUED | OUTPATIENT
Start: 2020-01-19 | End: 2020-01-26 | Stop reason: HOSPADM

## 2020-01-19 RX ORDER — ACETAMINOPHEN 325 MG/1
650 TABLET ORAL EVERY 4 HOURS PRN
Status: DISCONTINUED | OUTPATIENT
Start: 2020-01-19 | End: 2020-01-26 | Stop reason: HOSPADM

## 2020-01-19 RX ORDER — FENTANYL CITRATE 50 UG/ML
50 INJECTION, SOLUTION INTRAMUSCULAR; INTRAVENOUS ONCE
Status: COMPLETED | OUTPATIENT
Start: 2020-01-19 | End: 2020-01-19

## 2020-01-19 RX ORDER — LEVOTHYROXINE SODIUM 0.07 MG/1
75 TABLET ORAL DAILY
Status: DISCONTINUED | OUTPATIENT
Start: 2020-01-20 | End: 2020-01-26 | Stop reason: HOSPADM

## 2020-01-19 RX ORDER — 0.9 % SODIUM CHLORIDE 0.9 %
1000 INTRAVENOUS SOLUTION INTRAVENOUS ONCE
Status: COMPLETED | OUTPATIENT
Start: 2020-01-19 | End: 2020-01-19

## 2020-01-19 RX ORDER — IPRATROPIUM BROMIDE AND ALBUTEROL SULFATE 2.5; .5 MG/3ML; MG/3ML
1 SOLUTION RESPIRATORY (INHALATION)
Status: COMPLETED | OUTPATIENT
Start: 2020-01-19 | End: 2020-01-19

## 2020-01-19 RX ORDER — MAGNESIUM SULFATE IN WATER 40 MG/ML
2 INJECTION, SOLUTION INTRAVENOUS ONCE
Status: COMPLETED | OUTPATIENT
Start: 2020-01-19 | End: 2020-01-19

## 2020-01-19 RX ORDER — NICOTINE POLACRILEX 4 MG
15 LOZENGE BUCCAL PRN
Status: DISCONTINUED | OUTPATIENT
Start: 2020-01-19 | End: 2020-01-26 | Stop reason: HOSPADM

## 2020-01-19 RX ORDER — HEPARIN SODIUM 10000 [USP'U]/ML
5000 INJECTION, SOLUTION INTRAVENOUS; SUBCUTANEOUS EVERY 8 HOURS SCHEDULED
Status: DISCONTINUED | OUTPATIENT
Start: 2020-01-19 | End: 2020-01-19

## 2020-01-19 RX ORDER — LEVOFLOXACIN 5 MG/ML
750 INJECTION, SOLUTION INTRAVENOUS EVERY 24 HOURS
Status: DISCONTINUED | OUTPATIENT
Start: 2020-01-19 | End: 2020-01-22

## 2020-01-19 RX ORDER — PANTOPRAZOLE SODIUM 40 MG/10ML
40 INJECTION, POWDER, LYOPHILIZED, FOR SOLUTION INTRAVENOUS DAILY
Status: DISCONTINUED | OUTPATIENT
Start: 2020-01-19 | End: 2020-01-23

## 2020-01-19 RX ORDER — MAGNESIUM SULFATE HEPTAHYDRATE 500 MG/ML
2 INJECTION, SOLUTION INTRAMUSCULAR; INTRAVENOUS ONCE
Status: DISCONTINUED | OUTPATIENT
Start: 2020-01-19 | End: 2020-01-19 | Stop reason: SDUPTHER

## 2020-01-19 RX ORDER — SODIUM CHLORIDE 0.9 % (FLUSH) 0.9 %
10 SYRINGE (ML) INJECTION PRN
Status: DISCONTINUED | OUTPATIENT
Start: 2020-01-19 | End: 2020-01-26 | Stop reason: HOSPADM

## 2020-01-19 RX ORDER — SODIUM CHLORIDE 9 MG/ML
INJECTION, SOLUTION INTRAVENOUS CONTINUOUS
Status: DISCONTINUED | OUTPATIENT
Start: 2020-01-19 | End: 2020-01-21

## 2020-01-19 RX ORDER — SODIUM CHLORIDE 9 MG/ML
10 INJECTION INTRAVENOUS DAILY
Status: DISCONTINUED | OUTPATIENT
Start: 2020-01-19 | End: 2020-01-23

## 2020-01-19 RX ORDER — IPRATROPIUM BROMIDE AND ALBUTEROL SULFATE 2.5; .5 MG/3ML; MG/3ML
1 SOLUTION RESPIRATORY (INHALATION)
Status: DISCONTINUED | OUTPATIENT
Start: 2020-01-19 | End: 2020-01-22

## 2020-01-19 RX ORDER — DEXTROSE MONOHYDRATE 25 G/50ML
12.5 INJECTION, SOLUTION INTRAVENOUS PRN
Status: DISCONTINUED | OUTPATIENT
Start: 2020-01-19 | End: 2020-01-26 | Stop reason: HOSPADM

## 2020-01-19 RX ORDER — ASPIRIN 81 MG/1
81 TABLET, CHEWABLE ORAL DAILY
Status: DISCONTINUED | OUTPATIENT
Start: 2020-01-19 | End: 2020-01-26 | Stop reason: HOSPADM

## 2020-01-19 RX ORDER — ACETAMINOPHEN 325 MG/1
650 TABLET ORAL ONCE
Status: COMPLETED | OUTPATIENT
Start: 2020-01-19 | End: 2020-01-19

## 2020-01-19 RX ORDER — CITALOPRAM 20 MG/1
40 TABLET ORAL DAILY
Status: DISCONTINUED | OUTPATIENT
Start: 2020-01-19 | End: 2020-01-26 | Stop reason: HOSPADM

## 2020-01-19 RX ORDER — SODIUM CHLORIDE 0.9 % (FLUSH) 0.9 %
10 SYRINGE (ML) INJECTION EVERY 12 HOURS SCHEDULED
Status: DISCONTINUED | OUTPATIENT
Start: 2020-01-19 | End: 2020-01-26 | Stop reason: HOSPADM

## 2020-01-19 RX ORDER — ONDANSETRON 2 MG/ML
4 INJECTION INTRAMUSCULAR; INTRAVENOUS EVERY 6 HOURS PRN
Status: DISCONTINUED | OUTPATIENT
Start: 2020-01-19 | End: 2020-01-26 | Stop reason: HOSPADM

## 2020-01-19 RX ADMIN — PANTOPRAZOLE SODIUM 40 MG: 40 INJECTION, POWDER, FOR SOLUTION INTRAVENOUS at 19:50

## 2020-01-19 RX ADMIN — ACETAMINOPHEN 650 MG: 325 TABLET, FILM COATED ORAL at 23:36

## 2020-01-19 RX ADMIN — ENOXAPARIN SODIUM 40 MG: 40 INJECTION SUBCUTANEOUS at 19:50

## 2020-01-19 RX ADMIN — IPRATROPIUM BROMIDE AND ALBUTEROL SULFATE 1 AMPULE: 2.5; .5 SOLUTION RESPIRATORY (INHALATION) at 12:36

## 2020-01-19 RX ADMIN — ACETAMINOPHEN 650 MG: 325 TABLET, FILM COATED ORAL at 12:36

## 2020-01-19 RX ADMIN — Medication 10 ML: at 19:51

## 2020-01-19 RX ADMIN — ASPIRIN 81 MG CHEWABLE TABLET 81 MG: 81 TABLET CHEWABLE at 19:49

## 2020-01-19 RX ADMIN — FENTANYL CITRATE 50 MCG: 50 INJECTION, SOLUTION INTRAMUSCULAR; INTRAVENOUS at 14:38

## 2020-01-19 RX ADMIN — SODIUM CHLORIDE 1000 ML: 9 INJECTION, SOLUTION INTRAVENOUS at 12:35

## 2020-01-19 RX ADMIN — IPRATROPIUM BROMIDE AND ALBUTEROL SULFATE 1 AMPULE: 2.5; .5 SOLUTION RESPIRATORY (INHALATION) at 12:39

## 2020-01-19 RX ADMIN — IOPAMIDOL 110 ML: 755 INJECTION, SOLUTION INTRAVENOUS at 15:09

## 2020-01-19 RX ADMIN — SODIUM CHLORIDE 1000 ML: 9 INJECTION, SOLUTION INTRAVENOUS at 14:04

## 2020-01-19 RX ADMIN — IPRATROPIUM BROMIDE AND ALBUTEROL SULFATE 1 AMPULE: 2.5; .5 SOLUTION RESPIRATORY (INHALATION) at 12:37

## 2020-01-19 RX ADMIN — IPRATROPIUM BROMIDE AND ALBUTEROL SULFATE 1 AMPULE: 2.5; .5 SOLUTION RESPIRATORY (INHALATION) at 23:44

## 2020-01-19 RX ADMIN — LEVOFLOXACIN 750 MG: 5 INJECTION, SOLUTION INTRAVENOUS at 22:25

## 2020-01-19 RX ADMIN — Medication 10 ML: at 20:05

## 2020-01-19 RX ADMIN — SODIUM CHLORIDE: 9 INJECTION, SOLUTION INTRAVENOUS at 17:19

## 2020-01-19 RX ADMIN — FENTANYL CITRATE 50 MCG: 50 INJECTION, SOLUTION INTRAMUSCULAR; INTRAVENOUS at 17:33

## 2020-01-19 RX ADMIN — VANCOMYCIN HYDROCHLORIDE 1750 MG: 1 INJECTION, POWDER, LYOPHILIZED, FOR SOLUTION INTRAVENOUS at 17:33

## 2020-01-19 RX ADMIN — CITALOPRAM 40 MG: 20 TABLET, FILM COATED ORAL at 20:38

## 2020-01-19 RX ADMIN — MAGNESIUM SULFATE HEPTAHYDRATE 2 G: 40 INJECTION, SOLUTION INTRAVENOUS at 14:01

## 2020-01-19 RX ADMIN — SODIUM CHLORIDE 1000 ML: 9 INJECTION, SOLUTION INTRAVENOUS at 16:14

## 2020-01-19 RX ADMIN — PIPERACILLIN AND TAZOBACTAM 4.5 G: 4; .5 INJECTION, POWDER, FOR SOLUTION INTRAVENOUS at 16:14

## 2020-01-19 ASSESSMENT — PAIN DESCRIPTION - LOCATION
LOCATION: ABDOMEN
LOCATION: CHEST

## 2020-01-19 ASSESSMENT — PAIN DESCRIPTION - DESCRIPTORS
DESCRIPTORS: ACHING;CONSTANT
DESCRIPTORS: SHARP
DESCRIPTORS: ACHING;SHARP
DESCRIPTORS: ACHING;SORE;SQUEEZING

## 2020-01-19 ASSESSMENT — PAIN DESCRIPTION - PROGRESSION
CLINICAL_PROGRESSION: GRADUALLY WORSENING
CLINICAL_PROGRESSION: GRADUALLY WORSENING

## 2020-01-19 ASSESSMENT — PAIN DESCRIPTION - FREQUENCY
FREQUENCY: CONTINUOUS

## 2020-01-19 ASSESSMENT — PAIN SCALES - GENERAL
PAINLEVEL_OUTOF10: 5
PAINLEVEL_OUTOF10: 9
PAINLEVEL_OUTOF10: 0
PAINLEVEL_OUTOF10: 7
PAINLEVEL_OUTOF10: 6
PAINLEVEL_OUTOF10: 4
PAINLEVEL_OUTOF10: 10
PAINLEVEL_OUTOF10: 8

## 2020-01-19 ASSESSMENT — PAIN DESCRIPTION - PAIN TYPE
TYPE: ACUTE PAIN

## 2020-01-19 ASSESSMENT — PAIN DESCRIPTION - ORIENTATION
ORIENTATION: RIGHT;UPPER
ORIENTATION: RIGHT;UPPER
ORIENTATION: RIGHT
ORIENTATION: RIGHT

## 2020-01-19 NOTE — ED PROVIDER NOTES
HPI:  1/19/20, Time: 12:34 PM         Ifeanyi Patterson is a 59 y.o. female presenting to the ED for nausea and vomiting. Originally seen at urgent care, and she was sent to the ED after being found to have a potassium of 1.8 and hypotension. Patient states that her symptoms began on Friday. She reports several episodes of nonbloody, nonbilious emesis. She has also had diarrhea. She also states that she has discomfort in her abdomen as well as a headache. She states that she felt hot at home, but she did not take her temperature. Patient is also reporting shortness of breath and productive cough. She has a history of COPD on 2 L of oxygen at baseline. She also states that she has pain under her right breast.    She received 1 L of IV fluids, 2 albuterol treatments, and 4 of Zofran at the urgent care prior to arrival.    Review of Systems:   Pertinent positives and negatives are stated within HPI, all other systems reviewed and are negative.          --------------------------------------------- PAST HISTORY ---------------------------------------------  Past Medical History:  has a past medical history of Adrenal incidentaloma ([facility] Anesthesia complication, Anxiety, Arthritis, Asthma, Bladder incontinence, Cancer ([facility] Chronic back pain, COPD (chronic obstructive pulmonary disease) ([facility] Depression, Diabetes mellitus ([facility] Encounter for screening colonoscopy, Fibromyalgia, GERD (gastroesophageal reflux disease), Hyperlipidemia, Hypertension, Hypothyroidism, MGUS (monoclonal gammopathy of unknown significance), Multiple myeloma ([facility] Neuropathy, Obesity, Pelvic pain, Thyroid disease, Tobacco abuse, and Type II or unspecified type diabetes mellitus without mention of complication, not stated as uncontrolled. Past Surgical History:  has a past surgical history that includes knee surgery; Upper gastrointestinal endoscopy; Colonoscopy (07/20/2016);  Upper gastrointestinal endoscopy (07/20/2016); and Nerve Block (Bilateral, 09/22/2016). Social History:  reports that she has been smoking cigarettes. She started smoking about 48 years ago. She has a 67.50 pack-year smoking history. She has never used smokeless tobacco. She reports that she does not drink alcohol or use drugs. Family History: family history includes Arthritis in her brother, maternal grandfather, and maternal grandmother; Cancer in her father, maternal uncle, mother, and paternal aunt; Diabetes in her brother, father, maternal grandmother, and mother; Heart Disease in her paternal grandfather; Heart Disease (age of onset: 79) in her mother; High Blood Pressure in her father, maternal grandmother, paternal aunt, and paternal uncle; High Cholesterol in her paternal grandmother; Hypertension in her father; Kidney Disease in her paternal grandmother; Stroke in her maternal grandfather. The patients home medications have been reviewed.     Allergies: Aceon [perindopril erbumine] and Nsaids    -------------------------------------------------- RESULTS -------------------------------------------------  All laboratory and radiology results have been personally reviewed by myself   LABS:  Results for orders placed or performed during the hospital encounter of 01/19/20   Rapid influenza A/B antigens   Result Value Ref Range    Influenza A by PCR Not Detected Not Detected    Influenza B by PCR Not Detected Not Detected   Respiratory Panel, Molecular   Result Value Ref Range    Adenovirus by PCR Not Detected Not Detected    Bordetella parapertussis by PCR Not Detected Not Detected    Bordetella pertussis by PCR Not Detected Not Detected    Chlamydophilia pneumoniae by PCR Not Detected Not Detected    Coronavirus 229E by PCR Not Detected Not Detected    Coronavirus HKU1 by PCR Not Detected Not Detected    Coronavirus NL63 by PCR Not Detected Not Detected    Coronavirus OC43 by PCR Not Detected Not Detected    Human Metapneumovirus by PCR Not Detected Not Detected    Human Rhinovirus/Enterovirus by PCR Not Detected Not Detected    Influenza A by PCR Not Detected Not Detected    Influenza B by PCR Not Detected Not Detected    Mycoplasma pneumoniae by PCR Not Detected Not Detected    Parainfluenza Virus 1 by PCR Not Detected Not Detected    Parainfluenza Virus 2 by PCR Not Detected Not Detected    Parainfluenza Virus 3 by PCR Not Detected Not Detected    Parainfluenza Virus 4 by PCR Not Detected Not Detected    Respiratory Syncytial Virus by PCR Not Detected Not Detected   CBC Auto Differential   Result Value Ref Range    WBC 15.5 (H) 4.5 - 11.5 E9/L    RBC 3.66 3.50 - 5.50 E12/L    Hemoglobin 10.5 (L) 11.5 - 15.5 g/dL    Hematocrit 34.5 34.0 - 48.0 %    MCV 94.3 80.0 - 99.9 fL    MCH 28.7 26.0 - 35.0 pg    MCHC 30.4 (L) 32.0 - 34.5 %    RDW 12.6 11.5 - 15.0 fL    Platelets 056 521 - 667 E9/L    MPV 11.2 7.0 - 12.0 fL    Neutrophils % 80.9 (H) 43.0 - 80.0 %    Lymphocytes % 7.8 (L) 20.0 - 42.0 %    Monocytes % 6.1 2.0 - 12.0 %    Eosinophils % 0.0 0.0 - 6.0 %    Basophils % 0.1 0.0 - 2.0 %    Neutrophils Absolute 13.18 (H) 1.80 - 7.30 E9/L    Lymphocytes Absolute 1.24 (L) 1.50 - 4.00 E9/L    Monocytes Absolute 0.93 0.10 - 0.95 E9/L    Eosinophils Absolute 0.00 (L) 0.05 - 0.50 E9/L    Basophils Absolute 0.00 0.00 - 0.20 E9/L    Metamyelocytes Relative 4.3 (H) 0.0 - 1.0 %    Vacuolated Neutrophils 2+     Anisocytosis 1+    Basic metabolic panel   Result Value Ref Range    Sodium 138 132 - 146 mmol/L    Potassium 3.5 3.5 - 5.0 mmol/L    Chloride 97 (L) 98 - 107 mmol/L    CO2 28 22 - 29 mmol/L    Anion Gap 13 7 - 16 mmol/L    Glucose 128 (H) 74 - 99 mg/dL    BUN 16 8 - 23 mg/dL    CREATININE 1.1 (H) 0.5 - 1.0 mg/dL    GFR Non-African American >60 >=60 mL/min/1.73    GFR African American >60     Calcium 9.3 8.6 - 10.2 mg/dL   Magnesium   Result Value Ref Range    Magnesium 1.0 (LL) 1.6 - 2.6 mg/dL   Troponin   Result Value Ref Range    Troponin <0.01 0.00 - 0.03 ng/mL Brain Natriuretic Peptide   Result Value Ref Range    Pro-BNP 1,782 (H) 0 - 125 pg/mL   Hepatic function panel   Result Value Ref Range    Total Protein 7.6 6.4 - 8.3 g/dL    Alb 3.3 (L) 3.5 - 5.2 g/dL    Alkaline Phosphatase 58 35 - 104 U/L    ALT 12 0 - 32 U/L    AST 22 0 - 31 U/L    Total Bilirubin 0.6 0.0 - 1.2 mg/dL    Bilirubin, Direct <0.2 0.0 - 0.3 mg/dL    Bilirubin, Indirect see below 0.0 - 1.0 mg/dL   Lipase   Result Value Ref Range    Lipase 6 (L) 13 - 60 U/L   Lactic Acid, Plasma   Result Value Ref Range    Lactic Acid 4.5 (HH) 0.5 - 2.2 mmol/L   Lactic Acid, Plasma   Result Value Ref Range    Lactic Acid 4.1 (HH) 0.5 - 2.2 mmol/L   Lactic acid, plasma   Result Value Ref Range    Lactic Acid 2.7 (H) 0.5 - 2.2 mmol/L   Procalcitonin   Result Value Ref Range    Procalcitonin 23.26 (H) 0.00 - 0.08 ng/mL   Cortisol   Result Value Ref Range    Cortisol 31.45 (H) 2.68 - 18.40 mcg/dL   URINE ELECTROLYTES   Result Value Ref Range    Sodium, Ur <20 Not Established mmol/L    Potassium, Ur 36.6 Not Established mmol/L    Chloride <20 Not Established mmol/L   Creatinine, urine, random   Result Value Ref Range    Creatinine, Ur 70 29 - 226 mg/dL   UREA NITROGEN, URINE   Result Value Ref Range    Urea Nitrogen, Ur 407 (L) 800 - 1666 mg/dL   Urinalysis with Microscopic   Result Value Ref Range    Color, UA Yellow Straw/Yellow    Clarity, UA Clear Clear    Glucose, Ur Negative Negative mg/dL    Bilirubin Urine Negative Negative    Ketones, Urine Negative Negative mg/dL    Specific Gravity, UA <=1.005 1.005 - 1.030    Blood, Urine TRACE-INTACT Negative    pH, UA 6.0 5.0 - 9.0    Protein, UA Negative Negative mg/dL    Urobilinogen, Urine 0.2 <2.0 E.U./dL    Nitrite, Urine Negative Negative    Leukocyte Esterase, Urine Negative Negative    WBC, UA 0-1 0 - 5 /HPF    RBC, UA 0-1 0 - 2 /HPF    Bacteria, UA RARE (A) /HPF   TSH WITHOUT REFLEX   Result Value Ref Range    TSH 2.550 0.270 - 4.200 uIU/mL   T4, FREE   Result Value

## 2020-01-19 NOTE — ED NOTES
Blood cultures obtained from left AC, per policy. Set (one of two drawn at this time.                Carlene Amin RN  01/19/20 1369

## 2020-01-20 ENCOUNTER — APPOINTMENT (OUTPATIENT)
Dept: GENERAL RADIOLOGY | Age: 65
DRG: 871 | End: 2020-01-20
Payer: MEDICARE

## 2020-01-20 LAB
ANION GAP SERPL CALCULATED.3IONS-SCNC: 11 MMOL/L (ref 7–16)
BASOPHILS ABSOLUTE: 0 E9/L (ref 0–0.2)
BASOPHILS RELATIVE PERCENT: 0.3 % (ref 0–2)
BUN BLDV-MCNC: 17 MG/DL (ref 8–23)
CALCIUM SERPL-MCNC: 8 MG/DL (ref 8.6–10.2)
CHLORIDE BLD-SCNC: 100 MMOL/L (ref 98–107)
CO2: 23 MMOL/L (ref 22–29)
CREAT SERPL-MCNC: 0.9 MG/DL (ref 0.5–1)
EOSINOPHILS ABSOLUTE: 0 E9/L (ref 0.05–0.5)
EOSINOPHILS RELATIVE PERCENT: 0 % (ref 0–6)
GFR AFRICAN AMERICAN: >60
GFR NON-AFRICAN AMERICAN: >60 ML/MIN/1.73
GLUCOSE BLD-MCNC: 126 MG/DL (ref 74–99)
HCT VFR BLD CALC: 28.6 % (ref 34–48)
HCT VFR BLD CALC: 29.1 % (ref 34–48)
HEMOGLOBIN: 8.6 G/DL (ref 11.5–15.5)
HEMOGLOBIN: 9 G/DL (ref 11.5–15.5)
HYPOCHROMIA: ABNORMAL
LACTIC ACID: 2.4 MMOL/L (ref 0.5–2.2)
LACTIC ACID: 2.4 MMOL/L (ref 0.5–2.2)
LACTIC ACID: 2.6 MMOL/L (ref 0.5–2.2)
LYMPHOCYTES ABSOLUTE: 0.94 E9/L (ref 1.5–4)
LYMPHOCYTES RELATIVE PERCENT: 7.8 % (ref 20–42)
MAGNESIUM: 2.6 MG/DL (ref 1.6–2.6)
MCH RBC QN AUTO: 28.2 PG (ref 26–35)
MCHC RBC AUTO-ENTMCNC: 30.1 % (ref 32–34.5)
MCV RBC AUTO: 93.8 FL (ref 80–99.9)
METAMYELOCYTES RELATIVE PERCENT: 0.9 % (ref 0–1)
METER GLUCOSE: 125 MG/DL (ref 74–99)
METER GLUCOSE: 136 MG/DL (ref 74–99)
METER GLUCOSE: 139 MG/DL (ref 74–99)
METER GLUCOSE: 149 MG/DL (ref 74–99)
METER GLUCOSE: 160 MG/DL (ref 74–99)
MONOCYTES ABSOLUTE: 0.47 E9/L (ref 0.1–0.95)
MONOCYTES RELATIVE PERCENT: 4.3 % (ref 2–12)
NEUTROPHILS ABSOLUTE: 10.3 E9/L (ref 1.8–7.3)
NEUTROPHILS RELATIVE PERCENT: 87 % (ref 43–80)
PDW BLD-RTO: 13 FL (ref 11.5–15)
PHOSPHORUS: 2.5 MG/DL (ref 2.5–4.5)
PLATELET # BLD: 197 E9/L (ref 130–450)
PMV BLD AUTO: 10.5 FL (ref 7–12)
POTASSIUM SERPL-SCNC: 3.7 MMOL/L (ref 3.5–5)
RBC # BLD: 3.05 E12/L (ref 3.5–5.5)
SODIUM BLD-SCNC: 134 MMOL/L (ref 132–146)
WBC # BLD: 11.7 E9/L (ref 4.5–11.5)

## 2020-01-20 PROCEDURE — 97530 THERAPEUTIC ACTIVITIES: CPT

## 2020-01-20 PROCEDURE — 6370000000 HC RX 637 (ALT 250 FOR IP): Performed by: INTERNAL MEDICINE

## 2020-01-20 PROCEDURE — 2580000003 HC RX 258: Performed by: INTERNAL MEDICINE

## 2020-01-20 PROCEDURE — 2580000003 HC RX 258: Performed by: EMERGENCY MEDICINE

## 2020-01-20 PROCEDURE — 6360000002 HC RX W HCPCS: Performed by: INTERNAL MEDICINE

## 2020-01-20 PROCEDURE — 94640 AIRWAY INHALATION TREATMENT: CPT

## 2020-01-20 PROCEDURE — 87081 CULTURE SCREEN ONLY: CPT

## 2020-01-20 PROCEDURE — 6360000002 HC RX W HCPCS: Performed by: EMERGENCY MEDICINE

## 2020-01-20 PROCEDURE — 97166 OT EVAL MOD COMPLEX 45 MIN: CPT

## 2020-01-20 PROCEDURE — 99233 SBSQ HOSP IP/OBS HIGH 50: CPT | Performed by: INTERNAL MEDICINE

## 2020-01-20 PROCEDURE — 80048 BASIC METABOLIC PNL TOTAL CA: CPT

## 2020-01-20 PROCEDURE — 71045 X-RAY EXAM CHEST 1 VIEW: CPT

## 2020-01-20 PROCEDURE — 97535 SELF CARE MNGMENT TRAINING: CPT

## 2020-01-20 PROCEDURE — 2700000000 HC OXYGEN THERAPY PER DAY

## 2020-01-20 PROCEDURE — 2000000000 HC ICU R&B

## 2020-01-20 PROCEDURE — 99223 1ST HOSP IP/OBS HIGH 75: CPT | Performed by: INTERNAL MEDICINE

## 2020-01-20 PROCEDURE — 85025 COMPLETE CBC W/AUTO DIFF WBC: CPT

## 2020-01-20 PROCEDURE — 83605 ASSAY OF LACTIC ACID: CPT

## 2020-01-20 PROCEDURE — 82962 GLUCOSE BLOOD TEST: CPT

## 2020-01-20 PROCEDURE — 85018 HEMOGLOBIN: CPT

## 2020-01-20 PROCEDURE — 84100 ASSAY OF PHOSPHORUS: CPT

## 2020-01-20 PROCEDURE — 83735 ASSAY OF MAGNESIUM: CPT

## 2020-01-20 PROCEDURE — 85014 HEMATOCRIT: CPT

## 2020-01-20 PROCEDURE — 97162 PT EVAL MOD COMPLEX 30 MIN: CPT

## 2020-01-20 PROCEDURE — C9113 INJ PANTOPRAZOLE SODIUM, VIA: HCPCS | Performed by: INTERNAL MEDICINE

## 2020-01-20 PROCEDURE — 36415 COLL VENOUS BLD VENIPUNCTURE: CPT

## 2020-01-20 RX ORDER — BUDESONIDE 0.5 MG/2ML
0.5 INHALANT ORAL 2 TIMES DAILY
Status: DISCONTINUED | OUTPATIENT
Start: 2020-01-20 | End: 2020-01-26 | Stop reason: HOSPADM

## 2020-01-20 RX ORDER — MORPHINE SULFATE 2 MG/ML
1 INJECTION, SOLUTION INTRAMUSCULAR; INTRAVENOUS EVERY 6 HOURS PRN
Status: DISCONTINUED | OUTPATIENT
Start: 2020-01-20 | End: 2020-01-21

## 2020-01-20 RX ORDER — HYDROCODONE BITARTRATE AND ACETAMINOPHEN 5; 325 MG/1; MG/1
1 TABLET ORAL EVERY 6 HOURS PRN
Status: DISCONTINUED | OUTPATIENT
Start: 2020-01-20 | End: 2020-01-20

## 2020-01-20 RX ORDER — 0.9 % SODIUM CHLORIDE 0.9 %
500 INTRAVENOUS SOLUTION INTRAVENOUS ONCE
Status: COMPLETED | OUTPATIENT
Start: 2020-01-20 | End: 2020-01-20

## 2020-01-20 RX ORDER — FORMOTEROL FUMARATE 20 UG/2ML
20 SOLUTION RESPIRATORY (INHALATION) EVERY 12 HOURS
Status: DISCONTINUED | OUTPATIENT
Start: 2020-01-20 | End: 2020-01-26 | Stop reason: HOSPADM

## 2020-01-20 RX ORDER — POTASSIUM CHLORIDE 29.8 MG/ML
20 INJECTION INTRAVENOUS
Status: COMPLETED | OUTPATIENT
Start: 2020-01-20 | End: 2020-01-20

## 2020-01-20 RX ORDER — POTASSIUM CHLORIDE 7.45 MG/ML
10 INJECTION INTRAVENOUS ONCE
Status: COMPLETED | OUTPATIENT
Start: 2020-01-20 | End: 2020-01-20

## 2020-01-20 RX ORDER — MAGNESIUM SULFATE IN WATER 40 MG/ML
4 INJECTION, SOLUTION INTRAVENOUS ONCE
Status: COMPLETED | OUTPATIENT
Start: 2020-01-20 | End: 2020-01-20

## 2020-01-20 RX ADMIN — Medication 10 ML: at 08:05

## 2020-01-20 RX ADMIN — SODIUM CHLORIDE: 9 INJECTION, SOLUTION INTRAVENOUS at 20:51

## 2020-01-20 RX ADMIN — IPRATROPIUM BROMIDE AND ALBUTEROL SULFATE 1 AMPULE: 2.5; .5 SOLUTION RESPIRATORY (INHALATION) at 16:53

## 2020-01-20 RX ADMIN — LEVOTHYROXINE SODIUM 75 MCG: 0.07 TABLET ORAL at 05:35

## 2020-01-20 RX ADMIN — FORMOTEROL FUMARATE DIHYDRATE 20 MCG: 20 SOLUTION RESPIRATORY (INHALATION) at 21:39

## 2020-01-20 RX ADMIN — POTASSIUM CHLORIDE 20 MEQ: 29.8 INJECTION, SOLUTION INTRAVENOUS at 03:56

## 2020-01-20 RX ADMIN — HYDROCODONE BITARTRATE AND ACETAMINOPHEN 1 TABLET: 5; 325 TABLET ORAL at 09:16

## 2020-01-20 RX ADMIN — PANTOPRAZOLE SODIUM 40 MG: 40 INJECTION, POWDER, FOR SOLUTION INTRAVENOUS at 08:05

## 2020-01-20 RX ADMIN — SODIUM CHLORIDE 500 ML: 9 INJECTION, SOLUTION INTRAVENOUS at 04:08

## 2020-01-20 RX ADMIN — MAGNESIUM SULFATE HEPTAHYDRATE 4 G: 40 INJECTION, SOLUTION INTRAVENOUS at 03:51

## 2020-01-20 RX ADMIN — CITALOPRAM 40 MG: 20 TABLET, FILM COATED ORAL at 08:04

## 2020-01-20 RX ADMIN — DEXTROSE MONOHYDRATE 1.5 G: 50 INJECTION, SOLUTION INTRAVENOUS at 23:14

## 2020-01-20 RX ADMIN — IPRATROPIUM BROMIDE AND ALBUTEROL SULFATE 1 AMPULE: 2.5; .5 SOLUTION RESPIRATORY (INHALATION) at 13:56

## 2020-01-20 RX ADMIN — DEXTROSE MONOHYDRATE 1.5 G: 50 INJECTION, SOLUTION INTRAVENOUS at 05:09

## 2020-01-20 RX ADMIN — ACETAMINOPHEN 650 MG: 325 TABLET, FILM COATED ORAL at 23:16

## 2020-01-20 RX ADMIN — PIPERACILLIN AND TAZOBACTAM 3.38 G: 3; .375 INJECTION, POWDER, LYOPHILIZED, FOR SOLUTION INTRAVENOUS at 08:06

## 2020-01-20 RX ADMIN — ASPIRIN 81 MG CHEWABLE TABLET 81 MG: 81 TABLET CHEWABLE at 08:04

## 2020-01-20 RX ADMIN — IPRATROPIUM BROMIDE AND ALBUTEROL SULFATE 1 AMPULE: 2.5; .5 SOLUTION RESPIRATORY (INHALATION) at 21:39

## 2020-01-20 RX ADMIN — POTASSIUM CHLORIDE 20 MEQ: 29.8 INJECTION, SOLUTION INTRAVENOUS at 05:05

## 2020-01-20 RX ADMIN — BUDESONIDE 500 MCG: 0.5 SUSPENSION RESPIRATORY (INHALATION) at 21:39

## 2020-01-20 RX ADMIN — MORPHINE SULFATE 1 MG: 2 INJECTION, SOLUTION INTRAMUSCULAR; INTRAVENOUS at 16:06

## 2020-01-20 RX ADMIN — Medication 10 ML: at 20:51

## 2020-01-20 RX ADMIN — SODIUM CHLORIDE: 9 INJECTION, SOLUTION INTRAVENOUS at 04:11

## 2020-01-20 RX ADMIN — PIPERACILLIN AND TAZOBACTAM 3.38 G: 3; .375 INJECTION, POWDER, LYOPHILIZED, FOR SOLUTION INTRAVENOUS at 00:22

## 2020-01-20 RX ADMIN — BUDESONIDE 500 MCG: 0.5 SUSPENSION RESPIRATORY (INHALATION) at 13:55

## 2020-01-20 RX ADMIN — ENOXAPARIN SODIUM 40 MG: 40 INJECTION SUBCUTANEOUS at 08:04

## 2020-01-20 RX ADMIN — LEVOFLOXACIN 750 MG: 5 INJECTION, SOLUTION INTRAVENOUS at 22:18

## 2020-01-20 RX ADMIN — SODIUM CHLORIDE: 9 INJECTION, SOLUTION INTRAVENOUS at 00:27

## 2020-01-20 RX ADMIN — ACETAMINOPHEN 650 MG: 325 TABLET, FILM COATED ORAL at 08:08

## 2020-01-20 RX ADMIN — PIPERACILLIN AND TAZOBACTAM 3.38 G: 3; .375 INJECTION, POWDER, LYOPHILIZED, FOR SOLUTION INTRAVENOUS at 16:30

## 2020-01-20 RX ADMIN — FORMOTEROL FUMARATE DIHYDRATE 20 MCG: 20 SOLUTION RESPIRATORY (INHALATION) at 13:55

## 2020-01-20 RX ADMIN — POTASSIUM CHLORIDE 10 MEQ: 7.46 INJECTION, SOLUTION INTRAVENOUS at 09:55

## 2020-01-20 RX ADMIN — IPRATROPIUM BROMIDE AND ALBUTEROL SULFATE 1 AMPULE: 2.5; .5 SOLUTION RESPIRATORY (INHALATION) at 08:02

## 2020-01-20 ASSESSMENT — PAIN SCALES - GENERAL
PAINLEVEL_OUTOF10: 7
PAINLEVEL_OUTOF10: 0
PAINLEVEL_OUTOF10: 5
PAINLEVEL_OUTOF10: 3
PAINLEVEL_OUTOF10: 10
PAINLEVEL_OUTOF10: 0

## 2020-01-20 ASSESSMENT — PAIN DESCRIPTION - LOCATION: LOCATION: ABDOMEN;CHEST

## 2020-01-20 ASSESSMENT — PAIN DESCRIPTION - DESCRIPTORS: DESCRIPTORS: DISCOMFORT

## 2020-01-20 NOTE — PROGRESS NOTES
Pharmacy Consultation Note  (Antibiotic Dosing and Monitoring)      Pharmacy is following for Vancomycin dosing. Allergies:  Aceon [perindopril erbumine] and Nsaids    59 y.o. female    Ht Readings from Last 1 Encounters:   01/19/20 5' 6\" (1.676 m)     Wt Readings from Last 1 Encounters:   01/19/20 184 lb (83.5 kg)       Recent Labs     01/19/20  1210   WBC 15.5*       Recent Labs     01/19/20  1210 01/19/20  1215   BUN 16  --    CREATININE 1.1* 1.1*       Estimated Creatinine Clearance: 56 mL/min (A) (based on SCr of 1.1 mg/dL (H)). Intake/Output Summary (Last 24 hours) at 1/19/2020 1923  Last data filed at 1/19/2020 1849  Gross per 24 hour   Intake --   Output 500 ml   Net -500 ml       Cultures:  available culture and sensitivity results were reviewed in EPIC      Assessment:  Consulted by Dr. Kimmy Lim to dose/monitor vancomycin  Estimated CrCl = 75 mL/min  Goal trough level = 15-20 mcg/mL    Plan:   Will initiate vancomycin at a dose of 1.g Gm Q12H  Monitor renal function   Clinical pharmacy will follow up and complete full consult note      ROCKY Jackson [facility] [place] 1/19/2020 7:23 PM

## 2020-01-20 NOTE — H&P
Diagnosis Date    Adrenal incidentaloma [facility])     Anesthesia complication     states possible bronchospasm post procedure, unsure of when    Anxiety     Arthritis     Asthma     Bladder incontinence     [facility]     Myeloma, follows up at [address] [place] [address]    Chronic back pain     COPD (chronic obstructive pulmonary disease) ([facility]     (+) PFT    Depression     Diabetes mellitus ([facility]     Encounter for screening colonoscopy 07/2016    Fibromyalgia     GERD (gastroesophageal reflux disease)     Hyperlipidemia     Hypertension     Hypothyroidism     MGUS (monoclonal gammopathy of unknown significance)     Multiple myeloma ([facility] 2009    Neuropathy     Obesity     Pelvic pain     Thyroid disease     Hyperthyroidism    Tobacco abuse     Type II or unspecified type diabetes mellitus without mention of complication, not stated as uncontrolled        Past Surgical History:        Procedure Laterality Date    COLONOSCOPY  07/20/2016 2008    KNEE SURGERY      left knee, arthroscopic    NERVE BLOCK Bilateral 09/22/2016    lumbar transforaminal nerve block #1    UPPER GASTROINTESTINAL ENDOSCOPY      2008    UPPER GASTROINTESTINAL ENDOSCOPY  07/20/2016    egd and colonoscopy       Medications Prior to Admission:    Prior to Admission medications    Medication Sig Start Date End Date Taking?  Authorizing Provider   montelukast (SINGULAIR) 10 MG tablet TAKE 1 TABLET BY MOUTH EVERY NIGHT AT BEDTIME 12/6/19   Tobi [facility], DO   amitriptyline (ELAVIL) 50 MG tablet TAKE 1 TABLET BY MOUTH EVERY EVENING 12/6/19   Tobi [facility], DO   aspirin 81 MG tablet Take 1 tablet by mouth daily 12/6/19   Tobi [facility], DO   baclofen (LIORESAL) 10 MG tablet TAKE 1/2 TABLET THREE TIMES DAILY AS NEEDED(PAIN, SPASMS) 12/6/19   Tobi Huh, DO   calcium-vitamin D (CALCIUM 500/D) 500-200 MG-UNIT per tablet TAKE 1 TABLET BY MOUTH DAILY 12/6/19   Tobi [facility], DO   esomeprazole ([address]) 40 MG delayed release rate, regular rhythm, normal S1 and S2, no murmurs, no gallops, intact distal pulses, no carotid bruits and no JVD  · Abdomen: non-distended, bowel sounds normal, no masses, no organomegaly, no abdominal bruits and tenderness present- RUQ,  without rebound and guarding, Moran sign negative  · Extremities: no cyanosis, no clubbing and no edema  · Musculoskeletal: normal range of motion, no joint swelling, deformity or tenderness  · Neurologic: no cranial nerve deficit, speech normal and no tremor   Labs and Imaging Studies   Basic Labs  Recent Labs     20  1210 20  1215     --    K 3.5  --    CL 97*  --    CO2 28  --    BUN 16  --    CREATININE 1.1* 1.1*   GLUCOSE 128*  --    CALCIUM 9.3  --        Recent Labs     20  1210   WBC 15.5*   RBC 3.66   HGB 10.5*   HCT 34.5   MCV 94.3   MCH 28.7   MCHC 30.4*   RDW 12.6      MPV 11.2       Imaging Studies:     Ct Abdomen Pelvis W Iv Contrast Additional Contrast? None    Addendum Date: 2020    Patchy consolidation in the right lower lobe concerning for pneumonia. Surveillance recommended. The ED physician was notified ALERT:  THIS IS AN ABNORMAL REPORT    Result Date: 2020  Patient MRN:  [id number] : 1955 Age: 59 years Gender: Female Order Date:  2020 2:15 PM EXAM: CT ABDOMEN PELVIS W IV CONTRAST number of images 347 Contrast. Isovue-370, 110 mL intravenously. Technique: Low-dose CT  acquisition technique included one of following options; 1 . Automated exposure control, 2. Adjustment of MA and or KV according to patient's size or 3. Use of iterative reconstruction. INDICATION:  vomiting, abdominal pain vomiting, abdominal pain COMPARISON: None FINDINGS: The lung bases demonstrate patchy consolidation in the right lower lobe concerning for pneumonia. Liver is of normal architecture. Gallbladder is distended without acute inflammation. Consider hepatobiliary scan.  Spleen, pancreas, and the kidneys are normal. Bilateral

## 2020-01-20 NOTE — CONSULTS
[address] NOTE    Patient: Sasha Hsieh  MRN: [id number]  : 1955    Encounter Date: 2020  Encounter Time: 12:20 PM     Date of Admission: .2020 11:47 AM    Consulting Physician:  Primary Care Physician:      Juan David Burroughs MD     [id number]    PROBLEM LIST:  Patient Active Problem List   Diagnosis    [facility] [address] [facility]    Diabetes mellitus ([facility] [facility]    Hyperlipidemia    Hypothyroidism    Fibromyalgia    Obesity    Essential hypertension    Chronic right-sided low back pain with right-sided sciatica    Tobacco abuse    Myofascial pain    Lumbar radiculitis    Smoldering multiple myeloma (HCC)    Chronic obstructive pulmonary disease with (acute) exacerbation (HCC)    Type 2 diabetes mellitus without complication, with long-term current use of insulin (HCC)    Severe sepsis (HCC)    Hypovolemic shock ([facility] [address] [facility]    Community acquired pneumonia    Acute diverticulitis    Hypomagnesemia    Hypokalemia    Lactic acidosis     Reason for Consultation: Pneumonia, Patient Known to [facility]    HPI:   Ms. Zain Martines is a 60 y/o female with past medical noted that presented to [facility] with hypotension and hypokalemia from urgent care facility. According to the patient, she has been having nausea, vomiting and diarrhea for last 3 days  She denies noticing blood in her stools or vomitus. Patient had been having cough and blood-tinged sputum for about 1 week prior to admission. Cultures negative. At this time patient on levaquin and zosyn with single dose of vancomycin previously given. CT scan of the abdomen demonstrated right lower lobe infiltrate along with inflammatory changes of large bowel concerning for diverticulitis.      PAST MEDICAL HISTORY:   Past Medical History:   Diagnosis Date    Adrenal incidentaloma [facility])     Anesthesia complication     states possible bronchospasm post procedure, unsure of when    Anxiety     Arthritis     Asthma     Bladder incontinence     Cancer (HCC)     Myeloma, follows up at [address] [place] [address]    Chronic back pain     COPD (chronic obstructive pulmonary disease) ([facility] [address]     (+) PFT    Depression     Diabetes mellitus ([facility] [address] [facility]     Encounter for screening colonoscopy 07/2016    Fibromyalgia     GERD (gastroesophageal reflux disease)     Hyperlipidemia     Hypertension     Hypothyroidism     MGUS (monoclonal gammopathy of unknown significance)     Multiple myeloma ([facility] [address] 2009    Neuropathy     Obesity     Pelvic pain     Thyroid disease     Hyperthyroidism    Tobacco abuse     Type II or unspecified type diabetes mellitus without mention of complication, not stated as uncontrolled        PAST SURGICAL HISTORY:   Past Surgical History:   Procedure Laterality Date    COLONOSCOPY  07/20/2016 2008    KNEE SURGERY      left knee, arthroscopic    NERVE BLOCK Bilateral 09/22/2016    lumbar transforaminal nerve block #1    UPPER GASTROINTESTINAL ENDOSCOPY      2008    UPPER GASTROINTESTINAL ENDOSCOPY  07/20/2016    egd and colonoscopy       FAMILY HISTORY:   Family History   Problem Relation Age of Onset    Heart Disease Mother 79    Diabetes Mother     Cancer Mother         Breast    Hypertension Father     Cancer Father         Pancreas    Diabetes Father     High Blood Pressure Father     Arthritis Brother     Diabetes Brother     Cancer Maternal Uncle     Cancer Paternal Aunt         breast    High Blood Pressure Paternal Aunt     High Blood Pressure Paternal Uncle     Arthritis Maternal Grandmother     Diabetes Maternal Grandmother     High Blood Pressure Maternal Grandmother     Arthritis Maternal Grandfather     Stroke Maternal Grandfather     High Cholesterol Paternal Grandmother     Kidney Disease Paternal Grandmother     Heart Disease Paternal Grandfather        SOCIAL HISTORY:   Social History     Socioeconomic History    Marital Drug Use No     HOME MEDICATIONS:  Prior to Admission medications    Medication Sig Start Date End Date Taking? Authorizing Provider   montelukast (SINGULAIR) 10 MG tablet TAKE 1 TABLET BY MOUTH EVERY NIGHT AT BEDTIME 12/6/19   Gutierrez Rahman DO   amitriptyline (ELAVIL) 50 MG tablet TAKE 1 TABLET BY MOUTH EVERY EVENING 12/6/19   Gutierrez Rahman DO   aspirin 81 MG tablet Take 1 tablet by mouth daily 12/6/19   Gutierrez Rahman DO   baclofen (LIORESAL) 10 MG tablet TAKE 1/2 TABLET THREE TIMES DAILY AS NEEDED(PAIN, SPASMS) 12/6/19   Gutierrez Rahman DO   calcium-vitamin D (CALCIUM 500/D) 500-200 MG-UNIT per tablet TAKE 1 TABLET BY MOUTH DAILY 12/6/19   Gutierrez Rahman DO   esomeprazole (NEXIUM) 40 MG delayed release capsule TAKE 1 CAPSULE BY MOUTH EVERY DAY 12/6/19   Gutierrez Rahman DO   gabapentin (NEURONTIN) 400 MG capsule Take 1 capsule by mouth 2 times daily for 60 days. 12/6/19 2/4/20  Gutierrez Rahman DO   levothyroxine (SYNTHROID) 75 MCG tablet Take 1 tablet by mouth Daily 12/6/19   Gutierrez Rahman DO   metFORMIN (GLUCOPHAGE) 1000 MG tablet TAKE 1 TABLET BY MOUTH TWICE DAILY WITH MEALS 12/6/19   Gutierrez Rahman DO   triamterene-hydrochlorothiazide (MAXZIDE-25) 37.5-25 MG per tablet TAKE 1 TABLET BY MOUTH EVERY DAY 12/6/19   Gutierrez Rahman DO   citalopram (CELEXA) 40 MG tablet Take 1 tablet by mouth daily 10/25/19   Alyce Keyes MD   blood glucose monitor kit and supplies Test 1 times a day & as needed for symptoms of irregular blood glucose. Accuc[employer]k [employer] 2/18/19   Tami Kuhn MD   blood glucose monitor strips Testing daily 2/11/19   Geraldine Friedman MD   Lancets MISC Testing daily 2/11/19   Geraldine Friedman MD   Misc.  Devices MISC Oxygen concentrator  j44.9 10/26/18   Héctor Lee DO       CURRENT MEDICATIONS:  Current Facility-Administered Medications: vancomycin 1.5 g in dextrose 5% 300 mL IVPB, 1,500 mg, Intravenous, Q18H  morphine (PF) injection 1 mg, 1 mg, Intravenous, Q6H tongue       REVIEW OF SYSTEMS:  General ROS:     - Positive For:     - Negative For: chills, fatigue, fever, malaise, night sweats or sleep disturbance   ENT ROS:      - Positive For:     - Negative For: epistaxis, headaches, sinus pain, sneezing or sore throat   Allergy and Immunology ROS:     - Negative For: nasal congestion, postnasal drip or seasonal allergies   Hematological and Lymphatic ROS:      - Negative For: bleeding problems, bruising, fatigue, night sweats or pallor   Respiratory ROS:      - Positive For:       - Negative For: hemoptysis, pleuritic type chest pains  Cardiovascular ROS:      - Positive For:      - Negative For: chest pain, dyspnea on exertion, irregular heartbeat, loss of consciousness, murmur, orthopnea or palpitations   Gastrointestinal ROS:      - Positive For:     - Negative For: abdominal pain, appetite loss, blood in stools, change in bowel habits, change in stools, constipation, diarrhea, hematemesis, melena, nausea/vomiting or stool incontinence   Genito-Urinary ROS:      - Negative For: change in urinary stream, dysuria, hematuria or incontinence   Musculoskeletal ROS:      - Negative For: joint pain, joint stiffness, joint swelling or muscle pain   Neurological ROS:     - Negative For: behavioral changes, confusion, dizziness, headaches, impaired coordination/balance or memory loss   Dermatological ROS:      - Negative For: hair changes, lumps, mole changes, nail changes or pruritus    PHYSICAL EXAMINATION:     VITAL SIGNS:  BP (!) 91/56   Pulse 82   Temp 97.8 °F (36.6 °C)   Resp 18   Ht 5' 6\" (1.676 m)   Wt 184 lb (83.5 kg)   SpO2 100%   BMI 29.70 kg/m²   Wt Readings from Last 3 Encounters:   01/19/20 184 lb (83.5 kg)   12/06/19 186 lb (84.4 kg)   10/25/19 184 lb 3.2 oz (83.6 kg)     Temp Readings from Last 3 Encounters:   01/20/20 97.8 °F (36.6 °C)   12/06/19 98.7 °F (37.1 °C) (Oral)   10/25/19 98.6 °F (37 °C) (Oral)     TMAX:  BP Readings from Last 3 Encounters: 20 (!) 91/56   19 136/68   10/25/19 (!) 154/89     Pulse Readings from Last 3 Encounters:   20 82   19 93   10/25/19 90       CURRENT PULSE OXIMETRY: SpO2: 100 %  24HR PULSE OXIMETRY RANGE: SpO2  Av.4 %  Min: 89 %  Max: 100 %  CVP:      ________________________________________________________________________    VENTILATOR SETTINGS (if applicable): Additional Respiratory  Assessments  Pulse: 82  Resp: 18  SpO2: 100 %  ETCO2:  Peak Inspiratory Pressure:  End-Inspiratory Plateau Pressure:    ABG:  No results for input(s): PH, PO2, PCO2, HCO3, BE, O2SAT, METHB, O2HB, COHB, O2CON, HHB, THB in the last 72 hours. ________________________________________________________________________    IV ACCESS:    NUTRITION: DIET CLEAR LIQUID;    INTAKE/OUTPUTS:  I/O last 3 completed shifts: In: 2842 [P.O.:120;  I.V.:2722]  Out: 1969 [Urine:1785]    Intake/Output Summary (Last 24 hours) at 2020 1220  Last data filed at 2020 0955  Gross per 24 hour   Intake 2842 ml   Output 2355 ml   Net 487 ml     General Appearance: alert and oriented to person, place and time, well-developed and   well-nourished, in no acute distress   Eyes: pupils equal, round, and reactive to light, extraocular eye movements intact, conjunctivae normal and sclera anicteric   Neck: neck supple and non tender without mass, no thyromegaly, no thyroid nodules and no cervical adenopathy   Pulmonary/Chest: decreased breath sounds, no accessory muscles of inspiration, no focal wheezes  Cardiovascular: normal rate, regular rhythm, normal S1 and S2, no murmurs, rubs, clicks or gallops, distal pulses intact, no carotid bruits, no murmurs, no gallops, no carotid bruits and no JVD   Abdomen: soft, non-tender, non-distended, normal bowel sounds, no masses or organomegaly   Extremities: no cyanosis, no clubbing, no edema  Musculoskeletal: normal range of motion, no joint swelling, deformity or tenderness   Neurologic: CRP 7.0 (H) 10/03/2012     D Dimer: No results found for: DDIMER  Folate and B12:   Lab Results   Component Value Date    [id number] 384 09/19/2014   ,   Lab Results   Component Value Date    FOLATE 9.6 09/19/2014       Lactic Acid:   Lab Results   Component Value Date    LACTA 2.4 (H) 01/20/2020     Ammonia:   Cortisol:  Thyroid Studies:  Lab Results   Component Value Date    TSH 2.550 01/19/2020     XR CHEST PORTABLE   Final Result   Progression of right lower lobe infiltrate               XR CHEST PORTABLE   Final Result      1. Right IJ central venous catheter is present with distal tip at   location of SVC. 2. No pneumothorax. 3. Opacities are present in right lung base related to atelectasis,   pneumonia, or effusion. CT ABDOMEN PELVIS W IV CONTRAST Additional Contrast? None   Final Result   Addendum 1 of 1   Patchy consolidation in the right lower lobe concerning for pneumonia. Surveillance recommended. The ED physician was notified      ALERT:  THIS IS AN ABNORMAL REPORT      Final      US GALLBLADDER RUQ   Final Result   Normal gallbladder ultrasound. XR CHEST PORTABLE    (Results Pending)       ASSESSMENT:  1.) Severe Sepsis with Septic Shock - RLL Pneumonia   2.) RLL Pneumonia    3.) COPD  4.) Diverticulitis   5.) Acute Kidney Injury   6.) Lactic Acidosis   7.) H/O Multiple Myeloma     PLAN:  1.) duoneb, perforomist, zosyn, levaquin, 1 dose vancomycin   2.) supportive care  3.) right IJ TLC  4.) DVT Prophylaxis     - supportive care   - BP still borderline     Thank you Trevin Portillo MD very much for allowing me to see this patient in consultation and follow up. Care reviewed with nursing staff, medical and surgical specialty care, primary care and the patient's family as available. Restraints are ordered when the patient can do harm to him/herself by pulling out devices.     Arabella Bower M.D.

## 2020-01-20 NOTE — PROGRESS NOTES
[address] [facility]   Department of Internal Medicine   Internal Medicine Residency  MICU Progress Note    Patient:  Arnoldo Gist 59 y.o. female   MRN: [id number]       Date of Service: 2020    Allergy: Aceon [perindopril erbumine] and Nsaids    Subjective     Patient was seen and examined this am, awake alert and oriented x3. Still complaining of diffuse abdominal pain and nausea with worsening over the RUQ. Otherwise, no new complaints or acute overnight events. 24 hours:  -currently on 4 L NC with Spo2 in the 90s  -Remains on Zosyn, Levaquin and Vancomycin, cultures pending. -HGB dropped to 8.6, will check FOBT    Objective     TEMPERATURE:  Current - Temp: 97.8 °F (36.6 °C); Max - Temp  Av.2 °F (37.3 °C)  Min: 97.8 °F (36.6 °C)  Max: 101.1 °F (38.4 °C)    RESPIRATIONS RANGE: Resp  Av.9  Min: 18  Max: 34    PULSE RANGE: Pulse  Av.7  Min: 82  Max: 109    BLOOD PRESSURE RANGE:  Systolic (66LFH), MGI:89 , Min:83 , NXE:166   ; Diastolic (86IEK), EDF:61, Min:46, Max:65      PULSE OXIMETRY RANGE: SpO2  Av.3 %  Min: 89 %  Max: 100 %    Physical Exam:  · I & O - 24hr: I/O this shift:  · In: -   · Out: 570 [Urine:570]   · General Appearance: alert, appears stated age and cooperative  · HEENT:  Head: Normocephalic, no lesions, without obvious abnormality. · Neck: no adenopathy, no carotid bruit, no JVD, supple, symmetrical, trachea midline and thyroid not enlarged, symmetric, no tenderness/mass/nodules  · Lung: clear to auscultation bilaterally  · Heart: regular rate and rhythm, S1, S2 normal, no murmur, click, rub or gallop  · Abdomen: diffuse abdominal tendermess with worsening over the RUQ  · Extremities:  extremities normal, atraumatic, no cyanosis or edema  · Musculokeletal: No joint swelling, no muscle tenderness. ROM normal in all joints of extremities.    · Neurologic: Mental status: Alert, oriented, thought content appropriate      Medications     Continuous Infusions:   sodium chloride 100 mL/hr at 01/20/20 0027    dextrose       Scheduled Meds:   vancomycin  1,500 mg Intravenous Q18H    budesonide  0.5 mg Nebulization BID    formoterol  20 mcg Nebulization Q12H    sodium chloride flush  10 mL Intravenous 2 times per day    ipratropium-albuterol  1 ampule Inhalation Q4H WA    enoxaparin  40 mg Subcutaneous Daily    pantoprazole  40 mg Intravenous Daily    And    sodium chloride (PF)  10 mL Intravenous Daily    levothyroxine  75 mcg Oral Daily    citalopram  40 mg Oral Daily    aspirin  81 mg Oral Daily    insulin lispro  0-6 Units Subcutaneous Q4H    nicotine  1 patch Transdermal Daily    levofloxacin  750 mg Intravenous Q24H    piperacillin-tazobactam  3.375 g Intravenous Q8H    sodium chloride   Intravenous Q8H     PRN Meds: morphine, sodium chloride flush, magnesium hydroxide, ondansetron, acetaminophen, glucose, dextrose, glucagon (rDNA), dextrose    Labs and Imaging Studies     CBC:   Recent Labs     01/19/20  1210 01/20/20  0507   WBC 15.5* 11.7*   RBC 3.66 3.05*   HGB 10.5* 8.6*   HCT 34.5 28.6*   MCV 94.3 93.8   MCH 28.7 28.2   MCHC 30.4* 30.1*   RDW 12.6 13.0    197   MPV 11.2 10.5       BMP:    Recent Labs     01/19/20  1210 01/19/20  1215 01/19/20  1230 01/19/20  2043 01/20/20  0825     --   --  133 134   K 3.5  --   --  3.3* 3.7   CL 97*  --   --  96* 100   CO2 28  --   --  23 23   BUN 16  --   --  17 17   CREATININE 1.1* 1.1*  --  1.0 0.9   GLUCOSE 128*  --   --  171* 126*   CALCIUM 9.3  --   --  7.9* 8.0*   PROT  --   --  7.6  --   --    LABALBU  --   --  3.3*  --   --    BILITOT  --   --  0.6  --   --    ALKPHOS  --   --  58  --   --    AST  --   --  22  --   --    ALT  --   --  12  --   --        LIVER PROFILE:   Recent Labs     01/19/20  1230   AST 22   ALT 12   LIPASE 6*   BILIDIR <0.2   BILITOT 0.6   ALKPHOS 58       PT/INR:   No results for input(s): PROTIME, INR in the last 72 hours. APTT:   No results for input(s):  APTT in the Us Gallbladder Ruq    Result Date: 2020  Patient MRN:  [id number] : 1955 Age: 59 years Gender: Female Order Date:  2020 12:30 PM EXAM: US GALLBLADDER RUQ NUMBER OF IMAGES:  30 INDICATION:  RUQ pain RUQ pain COMPARISON: None The common duct is within normal limits. The gallbladder wall appears unremarkable. Calculi are not identified. Sludge is not identified. Normal gallbladder ultrasound. Xr Chest Portable    Result Date: 2020  Patient MRN:  [id number] : 1955 Age: 59 years Gender: Female Order Date:  2020 7:45 AM EXAM: XR CHEST PORTABLE INDICATION:  pneumonia pneumonia COMPARISON: None FINDINGS:  There is significant right lower lobe infiltrate and small right effusion. It has progressed since the prior study. Left lung is clear. Right central line is appropriate. Progression of right lower lobe infiltrate     Xr Chest Portable    Result Date: 2020  Patient MRN:  [id number] : 1955 Age: 59 years Gender: Female Order Date:  2020 8:45 PM EXAM: XR CHEST PORTABLE COMPARISON: 2015 INDICATION:  RIJ TLC placement RIJ TLC placement FINDINGS: Right IJ central venous catheter present with distal tip at location of SVC. No pneumothorax. There are opacities at right lung base. The heart is normal in size. No free air beneath the hemidiaphragms. 1. Right IJ central venous catheter is present with distal tip at location of SVC. 2. No pneumothorax. 3. Opacities are present in right lung base related to atelectasis, pneumonia, or effusion. Resident's Assessment and PLan     Assessment:  1. Shock, likely hypovolemic vs. Septic, likely GI source vs. RLL pneumonia  2. RLL pneumonia, likely 2/2 aspiration in the setting of excessive vomiting  3. Acute on chronic hypoxic respiratory failure, on 2L of oxygen at home  4. Diverticulosis with concerns for diverticulitis per CT A/P findings   5. Lactic acidosis, 2/2 #1  6.  Acute microcytic anemia - Baseline 11-12, likely 2/2 hemodilution. Follow FOBT  7. COPD  8. NIDDM  9. Hypothyroidism with thyroid nodule  10. Smoldering multiple myeloma    Plan:  -Continue Vanc, Zosyn and Levaquin for now. Will dc Levaquin and Vanc once legionella and MRSA negative   -Follow pancultures  -Remains on 4 L with Spo2 in the 90s, wean oxygen as tolerated   -Continue Abx, clear liquid diet and advance as tolerated. Morphine for pain management   -Follow FOBT   -Breathing treatment and BiPAP as needed   -Continue home synthroid      Possible transfer later today if BP improves     Jian Jacobo M.D. Internal Medicine Resident - PGY2    Attending Physician: Dr. Rodolfo Sauceda        I personally saw, examined and provided care for the patient. Radiographs, labs and medication list were reviewed by me independently. I spoke with bedside nursing, therapists and consultants. Critical care services and times documented are independent of procedures and multidisciplinary rounds with Residents. Additionally comprehensive, multidisciplinary rounds were conducted with the MICU team. The case was discussed in detail and plans for care were established. Review of Residents documentation was conducted and revisions were made as appropriate. I agree with the above documented exam, problem list and plan of care.   Lilly North MD   CCT excluding procedures:38'

## 2020-01-20 NOTE — PROGRESS NOTES
Pharmacy Consultation Note  (Antibiotic Dosing and Monitoring)    Initial consult date: 1/20/19  Consulting physician: Dr. Mart Espinoza  Drug(s): Vancomycin  Indication: Pneumonia      Age/  Gender Height Weight IBW Dosing weight  Allergy Information   64 y.o./female 5' 6\" (167.6 cm) 200 lb (90.7 kg)     Ideal body weight: 59.3 kg (130 lb 11.7 oz)  Adjusted ideal body weight: 69 kg (152 lb 0.6 oz)  83.5 kg  Aceon [perindopril erbumine] and Nsaids          Other anti-infectives Start date Stop date   Zosyn 1/19    Levaquin 1/19                Date  Tmax WBC BUN/CR CrCL  (mL/min) Drug/Dose Time   Given Level(s)   (Time) Comments   1/19 - - - -  Vancomycin 1750 mg IV x1 1733     1/20 101.1 11.7 17/0.9 69 Vancomycin 1500 mg IV q18h 0509                                 Intake/Output Summary (Last 24 hours) at 1/20/2020 1650  Last data filed at 1/20/2020 1600  Gross per 24 hour   Intake 4618 ml   Output 2995 ml   Net 1623 ml       Cultures:    Site Date Result                    No results for input(s): BLOODCULT2 in the last 72 hours. Historical Cultures:  No results found for: ORG  No results for input(s): BC in the last 72 hours. Assessment:  · 59 yoF being evaluated for septic shock 2/2 GI source vs RLL pneumonia. Pharmacy consulted to dose/monitor vancomycin.   · Goal trough = 15 - 20 mcg/ml  · sCr 0.9, eCrCl 65 - 70 ml/min    Plan:  · Start vancomycin 1500 mg IV q18h  · Trough at steady state  · Pharmacist will follow and monitor/adjust dosing as necessary    Francisco Roman, PharmD, BCPS 1/20/2020 4:50 PM

## 2020-01-20 NOTE — PROGRESS NOTES
[facility]  Internal Medicine Residency Program  Progress Note - House Team 1    Patient:  Jovanna Fuentes 59 y.o. female MRN: [id number]     Date of Service: 1/20/2020     CC: SOB  Overnight events: None     Subjective     Patient was seen and examined this morning at bedside in no acute distress. BP stable after 4L NS boluses. Kept on maintenance IV NS 100cc/hr overnight. Central line placed RIJ. LA resolved. Persistent RUQ pain on exam this AM. No further diarrhea since admission, no vomiting. No other acute complaints at this time.      Objective     Physical Exam:  · Vitals: BP (!) 97/54   Pulse 85   Temp 97.8 °F (36.6 °C)   Resp 24   Ht 5' 6\" (1.676 m)   Wt 184 lb (83.5 kg)   SpO2 100%   BMI 29.70 kg/m²     · I & O - 24hr: I/O this shift:  · In: -   · Out: 570 [Urine:570]   § General Appearance: alert and oriented to person, place and time, well-developed and well-nourished, in no acute distress  § Skin: warm and dry, no rash or erythema  § Head: normocephalic and atraumatic  § Eyes: pupils equal, round, and reactive to light, extraocular eye movements intact, conjunctivae normal, conjunctivae normal and sclera anicteric  § ENT: hearing grossly normal bilaterally and oropharynx clear and moist with normal mucous membranes  § Neck: tender cervical adenopathy present- R LAD   § Pulmonary/Chest: wheezing present- bilaterally and rales present- bilaterally  § Cardiovascular: normal rate, regular rhythm, normal S1 and S2, no murmurs, no gallops, intact distal pulses, no carotid bruits and no JVD  § Abdomen: non-distended, bowel sounds normal, no masses, no organomegaly, no abdominal bruits and tenderness present- RUQ,  without rebound and guarding, Moran sign negative  § Extremities: no cyanosis, no clubbing and no edema  § Musculoskeletal: normal range of motion, no joint swelling, deformity or tenderness  § Neurologic: no cranial nerve deficit, speech normal and no cultures  · No adrenal insufficiency      Acute Hypoxic Respiratory Failure  · 2/2 PNA  · Breathing treatments: DuoNebs, Performist  · Continue Abx  · Pulmonology on board, recs appreciated     Aspiration PNA  · Continue vancomycin, Zosyn  · If Legionella negative, discontinue Levaquin  · F/u procal, viral panel, Legionella, Strep     COPD  · Not in acute exacerbation  · DuoNebs q4h wa, Performist BID wa     Diverticulitis  · CT abdomen with diverticulitis  · Will get C diff in context of immunosuppression in setting of MM  · Continue Abx  · F/u stool culture, fecal leukocytes     AMADA, resolved  · Likely pre-renal in context of shock  · Cr baseline 0.8  · FEUrea 34%, pre-renal disease  · BMP qd     Lactic Acidosis  · 2/2 sepsis  · Trend LA q6h  · Continue IV NS 100cc/hr, discontinue once LA normalized     Hypothyroidism  · W/ thyroid nodules  · FNA 2018 inconclusive  · Continue Synthroid 75mcg qd     NIDDM2  · Hold home dose metformin  · LDSS  · POC BG checks BID while NPO  · Hypoglycemia protocol in place     Normocytic Hypochromic Anemia  · 2/2 anemia of chronic disease  · CBC qd  · Transfuse if Hb < 7     H/o Multiple Myeloma        PT/OT evaluation: not ordered  DVT prophylaxis/ GI prophylaxis: Heparin/Diet  Disposition: admit to MICU     Meryl Foster MD, PGY-1   Attending physician: Dr. Priscilla Lara

## 2020-01-20 NOTE — PROGRESS NOTES
session 97/54   Heart Rate at rest 83 bpm Heart Rate post session 85 bpm   SPO2 at rest 100% on 4 L SPO2 post session 100% on 4 L     Functional Status Score-Intensive Care Unit (FSS-ICU)   Rolling 5/7   Supine to sit transfer 4/7   Unsupported sitting  4/7   Sit to stand transfers 4/7   Ambulation 1/7   Total  18/35     Patient education  Pt educated on PT role, safety during functional mobility, [employer] approximation/negotiatin    Patient response to education:   Pt verbalized understanding Pt demonstrated skill Pt requires further education in this area   Yes  Yes  Reinforce      Comments:  Discussed pt case at interdisciplinary rounds in AM.  Pt received supine and agreeable to PT evaluation with OT collaboration. Pt cleared for participation by RN prior to session. Vitals monitored during session. Pt limited by BP. BP monitored with all positional changes. After performing supine>sit BP 78/48. Pt performed B hip marching, B LAQs, and B ankle pumps x20 reps each. EOB BP re-assessed 83/54. Pt given [employer] and transfer to bedside chair. BP in chair initially 88/53. Pt reported some dizziness which resolved. Second and thrid BP in chair 87/53 and 97/54. Rn notified of pt up in chair. Pt states she feels better up in chair and is in less back pain. States she wants to try sitting up for a while. Notified to call RN if needing to get up from chair. Pt left in chair with call button in reach, lines attached, and needs met. Pts/ family goals   1. Home     Patient and or family understand(s) diagnosis, prognosis, and plan of care. Yes     PLAN  PT care will be provided in accordance with the objectives noted above. Whenever appropriate, clear delegation orders will be provided for nursing staff. Exercises and functional mobility practice will be used as well as appropriate assistive devices or modalities to obtain goals. Patient and family education will also be administered as needed.     Frequency of treatments: 2-5x/week x 7-10 days.     Time in  1030  Time out  [facility], [address] [place] [address]  [id number]

## 2020-01-20 NOTE — PROGRESS NOTES
rhythm, normal S1 and S2, no murmurs, no gallops, intact distal pulses, no carotid bruits and no JVD  § Abdomen: non-distended, bowel sounds normal, no masses, no organomegaly, no abdominal bruits and tenderness present- RUQ,  without rebound and guarding, Moran sign negative  § Extremities: no cyanosis, no clubbing and no edema  § Musculoskeletal: normal range of motion, no joint swelling, deformity or tenderness  § Neurologic: no cranial nerve deficit, speech normal and no tremor           Last 3 Blood Glucose:   Recent Labs     01/19/20  1210 01/19/20 2043 01/20/20  0825   GLUCOSE 128* 171* 126*        PT/INR:    Lab Results   Component Value Date    PROTIME 11.3 11/28/2016    PROTIME 11.6 10/26/2011    INR 1.0 11/28/2016     PTT:    Lab Results   Component Value Date    APTT 28.8 10/07/2015       Comprehensive Metabolic Profile:   Recent Labs     01/19/20  1210 01/19/20  1215 01/19/20  1230 01/19/20 2043 01/20/20  0825     --   --  133 134   K 3.5  --   --  3.3* 3.7   CL 97*  --   --  96* 100   CO2 28  --   --  23 23   BUN 16  --   --  17 17   CREATININE 1.1* 1.1*  --  1.0 0.9   GLUCOSE 128*  --   --  171* 126*   CALCIUM 9.3  --   --  7.9* 8.0*   PROT  --   --  7.6  --   --    LABALBU  --   --  3.3*  --   --    BILITOT  --   --  0.6  --   --    ALKPHOS  --   --  58  --   --    AST  --   --  22  --   --    ALT  --   --  12  --   --       Magnesium:   Lab Results   Component Value Date    MG 2.6 01/20/2020     Phosphorus:   Lab Results   Component Value Date    PHOS 2.5 01/20/2020     Ionized Calcium:   Lab Results   Component Value Date    CAION 1.31 10/21/2016      Troponin:   Recent Labs     01/19/20  1210   TROPONINI <0.01           ASSESSMENT  And PLAN:        Septic + Hypovolemic Shock  · 2/2 PNA and GI source   · IVF as per IICU team   · On abx   · Complete work up for pneumonia     Acute Hypoxic Respiratory Failure  · 2/2 PNA  · Breathing treatments: DuoNebs  · Continue Abx     Aspiration PNA  · On  vancomycin and Levaquin  · F/u procal, viral panel, Legionella, Strep     COPD  · DuoNebs q4h wa  · Hold on steroids      Diverticulitis  · CT abdomen with diverticulitis  · Will get C diff in context of immunosuppression in setting of MM  · Continue Abx  · F/u stool culture, fecal leukocytes  ·      H/o Multiple Myeloma  Not on treatment     DVt px as per ICU

## 2020-01-20 NOTE — PLAN OF CARE
Problem: Falls - Risk of:  Goal: Will remain free from falls  Description  Will remain free from falls  Outcome: Met This Shift     Problem: Falls - Risk of:  Goal: Absence of physical injury  Description  Absence of physical injury  Outcome: Met This Shift     Problem: Pain:  Goal: Pain level will decrease  Description  Pain level will decrease  Outcome: Met This Shift     Problem: Pain:  Goal: Control of acute pain  Description  Control of acute pain  Outcome: Met This Shift     Problem: Pain:  Goal: Control of chronic pain  Description  Control of chronic pain  Outcome: Met This Shift   Plan of care discussed with patient / family.

## 2020-01-20 NOTE — PROGRESS NOTES
[facility]  Internal Medicine Residency Program  Progress Note - House Team 1    Patient:  Hadley Torres 59 y.o. female MRN: [id number]     Date of Service: 1/20/2020     CC: Nausea, vomiting  Overnight events: none     Subjective     60 yo female is seen this AM. She is breathing on 4L NC and is saturating in the upper 90s. Pt had a temp of 101.1 last night, this morning temp is down to 97.8. R IJ central line in place. There are b/l crackles present. The patient also reports continued RUQ pain and there is tenderness to palpation in the RUQ and epigastric regions. She reports that she has not had a BM since admission. Currently receiving IVF, zosyn, and levaquin. Legionella, stool and blood cultures are pending. CXR this AM shows progression of RLL infiltrate from yesterday as well as a small right pleural effusion. 1/19 - presented to the ED from urgent care where she was found to be hypokalemic 1.8 and hypotensive. Pt reported that she experienced nausea and vomiting and RUQ pain beginning on Friday, with several episodes of nonbloody/nonbilious emesis, had one episode of diarrhea. Initial BP was 83/49, afebrile, WBC of 15.5, LA of 4.5, procal of 23.46. CXR showed large consolidation of the R lung, CT abd showed L hemicolon diverticulosis, diverticulitis of the sigmoid colon, and small bowel ileus, and distended GB. Ultrasound of the RUQ was negative. She received a total of 4L IVF in the ED and was started on vancomycin and zosyn, Mg was also replaced. She was admitted to MICU for shock 2/2 hypovolemia and sepsis.      Objective     Physical Exam:  · Vitals: BP (!) 94/59   Pulse 85   Temp 97.8 °F (36.6 °C)   Resp 24   Ht 5' 6\" (1.676 m)   Wt 184 lb (83.5 kg)   SpO2 100%   BMI 29.70 kg/m²     · I & O - 24hr: I/O this shift:  · In: -   · Out: 570 [Urine:570]   · General Appearance: alert, appears stated age and cooperative  · HEENT:  Head: Normal, normocephalic, Resident's Assessment and Plan     Arnoldo Cruz is a 59 y.o. female    1. Shock - hypovolemic and septic  -2/2 PNA of the RLL and GI losses (episodes of vomiting, episode of diarrhea)  -pt received 4L IV NS in the ED, and is currently receiving continuous IVF at 100cc/hr in the MICU. Has not required pressors. -monitor BP, MAP goal >65   -  2. CAP  -CXR shows progressing RLL consolidation  -procal 23.46  -WBC on arrival was 15.5. WBC is 11.7 this AM  -respiratory panel and rapid influenza negative; pending resp cx, legionella and strep pneumo results  -receiving abx: zosyn and levaquin   -  3. Lactic Acidosis   -4.5 on arrival, 2.4 this AM  -s/p 4L IV NS in the ED. Pt receiving continuous IVF. -trending LA q6h   -  4. Diverticulitis  -CT abdomen shows L hemicolon diverticulosis with sigmoid thickening and diverticulitis. Pt reported an episode of diarrhea prior to admission. Has not had any episodes of diarrhea since admission. -pending c. Diff, stool culture, and fecal leukocytes   -coverage with abx: zosyn  -replacing electrolytes as needed   -  5. AMADA - resolved  -Cr on arrival was 1.1, 0.9 this AM  -FENa of 0.2% suggests prerenal cause 2/2 hypovolemia    6. DM  -pt home meds includes metformin - holding  -low dose sliding scale insulin  -hypoglycemia protocol in place     7. Hypothyroid  -pt has a history of thyroid nodules. Underwent FNA in 2018, which was inconclusive. Was scheduled to undergo thyroidectomy in 2019 which was never completed. -home meds includes synthroid 75 mcg daily - continued  -TSH WNL 2.55; free T4 was low at 0.85    8. Anemia  -Hb on arrival was 10.5, is down to 8.6 this AM  -transfuse if Hb <7  -normocytic anemia - 2/2 anemia of chronic disease vs blood loss  -H&H q8h     9. Hx of Multiple Myeloma  -pt reports that she was diagnosed in 2009 and has never received treatment   -consider OP heme/onc follow up    10.  Adrenal incidentalomas  -b/l nodules, with larger one on the

## 2020-01-20 NOTE — PROGRESS NOTES
Memory: Long term- G  Short term-F+  Initiation/termination: G   Sequencing: F+              Comprehension: G   Problem solving: F   Judgement/safety: F     RASS: 0  CAM-ICU: NT     Functional Assessment:   Initial Eval Status  Date: 1/20/20 Treatment Status  Date: Short Term Goals  Treatment frequency: 1-3x/week on ICU; PRN on stepdown unit  -pt will improve. .. Feeding S; setup  simulation      Indep  Sitting upright in chair for majority of meals; pending cleared diet restriction   Grooming SBA  Seated EOB for oral hygiene and comb hair     Mod I   while seated;  / standing with device prn; G BUE functional use     UB Dressing SBA  Seated in chair to change gown with mod VC for pacing/proper breathing techniques      Mod I  while seated; including clothing retrieval;    G BUE functional use   LB Dressing Min A  Min A sitting EOB with lateral LOB when crossing leg to chanelle sock; Min A dyn standing balance during gown mgmt    Mod I   with AE/device PRN   Bathing UB-  SBA  LB-  Min A  simulation    UB-  Indep  LB-  Mod I with AE/DME prn   Toileting Min A  For dyn balance; BSC in room      Mod I  including clothing mgmt and toileting hygiene using DME/device prn   Bed Mobility  Supine to sit: Min A  Sit to supine: NT  Rolling: NT   Indep   in prep for EOB ADL tasks   Functional/  Bathroom  Transfers Sit to stand: Min A  Stand to sit: Min A  Stand pivot: Min A ww   Mod I  from varying surfaces using device/DME prn with G safety   Functional Mobility Min A  Few steps from EOB>arm chair using ww   Mod I   Household distance using device prn   Balance Sitting:     Static:  SBA    Dynamic: Min/CGA  Standing:     Static:  CGA ww    Dynamic:Min A ww  demo Indep dynamic sitting balance EOB during ADL tasks;  Mod I dynamic standing balance during ADL tasks using device prn   Endurance/  Activity Tolerance F activity tolerance/endurance during light ADL task     Sitting EOB tolerance 10 min    Standing tolerance 1 min; precautions/call light use. Therapist provided skilled monitoring of HR, O2 saturation, blood pressure and patients response during treatment session. Prior to and at the end of session, environmental modifications/line management completed for patients safety and efficiency of treatment session. Eval Complexity:   · Mod Complexity  · History: Expanded review of medical records and additional review of physical, cognitive, or psychosocial history related to current functional performance  · Exam: 3+ performance deficits  · Assistance/Modification: Min/mod assistance or modifications required to perform tasks. May have comorbidities that affect occupational performance. Assessment of current deficits:   Functional mobility [x]  ADLs [x] Strength [x]  Cognition []  Functional transfers  [x] IADLs [x] Safety Awareness [x]  Endurance [x]  Fine Motor Coordination [] Balance [x] Vision/perception [] Sensation []   Gross Motor Coordination [] ROM [] Delirium []                  Motor Control []    Plan of Care:  ADL retraining [x]   Equipment needs [x]   Neuromuscular re-education [x] Energy Conservation Techniques [x]  Functional Transfer training [x] Patient and/or Family Education [x]  Functional Mobility training [x]  Environmental Modifications [x]  Cognitive re-training [x]   Compensatory techniques for ADLs [x]  Splinting Needs []   Positioning to improve overall function [x]   Therapeutic Activity [x]                       Therapeutic Exercise  [x]  Visual/Perceptual: []    Delirium prevention/treatment  [x]   Other:  []    Rehab Potential: Good for established goals    Patient / Family Goal: None stated     Patient and/or family were instructed/educated on diagnosis, prognosis/goals and plan of care. Demonstrated G understanding, further information not needed. [] Malnutrition indicators have been identified and nursing has been notified to ensure a dietitian consult is ordered.       Evaluation time includes thorough review of current medical information, gathering information on past medical & social history & PLOF, completion of standardized testing, informal observation of tasks, consultation with other medical professions/disciplines, assessment of data & development of POC/goals.      Mod evaluation + 30 treatment minutes  Time in: 0919  Time out: [address] [place] [address], [address]

## 2020-01-20 NOTE — CONSULTS
Medical Intensive Care Unit     [facility]  Resident History and Physical    Date and time: 1/19/2020 9:14 PM  Patient's name:  Demi Landaverde Record Number: [id number]  Patient's account/billing number: [de-identified]  Patient's YOB: 1955  Age: 59 y.o. Date of Admission: 1/19/2020 11:47 AM  Length of stay during current admission: 0    Primary Care Physician: Lily Durant MD  ICU Attending Physician:  The patient is a 59 y.o. female w/ PMH of COPD on 2L at baseline, NIDDM2, HTN, hypothyroidism w/ thyroid nodules, HLD, multiple myeloma who presented to ED with c/o nausea, vomiting, diarrhea and RUQ abdominal pain for the past week. Patient denies any recent travel, consuming new/uncooked foods. Vomitus has been non-bilious, non-bloody, stool has been loose, mucoid with 3 or more episodes per day without blood. In addition, patient states that she has had productive cough over the past week with blood-tinged sputum. Also endorses generalized subjective fevers, chills and decreased appetite d/t nausea/vomiting.      On chart review, patient has history of smoldering MM and has been following Hem/onc, no present treatment at this time. Also history of thyroid nodule w/ LAD s/p FNA in 2018 inconclusive for malignancy. Was scheduled for thyroidectomy with ENT in Dec 2019, but this did not occur.      ED Course: In ED, patient was hypotensive 83/49, afebrile. Labs significant for LA 4.5, AMADA Cr 1.1, leukocytosis WBCs 15.5. RUQ ultrasound performed demonstrating no abnormalities. CT abdomen demonstrating RLL infiltrates/consolidation, distended gallbladder, air-fluid levels throughout the large bowel and diverticulosis with inflammatory changes concerning for diverticulitis. Given 4L NS, one dose each vancomycin and Zosyn. Code Status: Full Code    Reason for ICU admission: Septic/Hypovolemic Shock    History of Present Illness: The patient is a 59 y.o. female w/ PMH of COPD on 2L at baseline, NIDDM2, HTN, hypothyroidism w/ thyroid nodules, HLD, multiple myeloma who presented to ED with c/o nausea, vomiting, diarrhea and RUQ abdominal pain for the past week. Patient denies any recent travel, consuming new/uncooked foods. Vomitus has been non-bilious, non-bloody, stool has been loose, mucoid with 3 or more episodes per day without blood. In addition, patient states that she has had productive cough over the past week with blood-tinged sputum. Also endorses generalized subjective fevers, chills and decreased appetite d/t nausea/vomiting.      On chart review, patient has history of smoldering MM and has been following Hem/onc, no present treatment at this time. Also history of thyroid nodule w/ LAD s/p FNA in 2018 inconclusive for malignancy. Was scheduled for thyroidectomy with ENT in Dec 2019, but this did not occur.      ED Course: In ED, patient was hypotensive 83/49, afebrile. Labs significant for LA 4.5, AMADA Cr 1.1, leukocytosis WBCs 15.5. RUQ ultrasound performed demonstrating no abnormalities. CT abdomen demonstrating RLL infiltrates/consolidation, distended gallbladder, air-fluid levels throughout the large bowel and diverticulosis with inflammatory changes concerning for diverticulitis. Given 4L NS, one dose each vancomycin and Zosyn.       CURRENT VENTILATION STATUS:   [] Ventilator  [] BIPAP  [x] Nasal Cannula [] Room Air      IF INTUBATED, ET TUBE MARKING AT LOWER LIP:       cms    SECRETIONS   Amount:  [] Small [x] Moderate  [] Large [] None    Color:     [] White [x] Colored  [x] Bloody    SEDATION:  RAAS Score:  [] Propofol gtt  [] Versed gtt  [] Ativan gtt   [x] No Sedation    PARALYZED:  [x] No    [] Yes    VASOPRESSORS:  [x] No    [] Yes    If yes -   [] Levophed       [] Dopamine     [] Vasopressin       [] Dobutamine  [] Phenylephrine         [] Epinephrine    CENTRAL LINES:     [] No   [x] Yes   (Date of Insertion:1/19   )           If yes - 95 24 100 % -- --   01/19/20 1823 (!) 93/59 98.2 °F (36.8 °C) -- 92 24 100 % -- --   01/19/20 1732 (!) 105/59 98.6 °F (37 °C) -- 94 29 100 % -- --   01/19/20 1717 (!) 98/56 98.6 °F (37 °C) -- 93 27 -- -- --   01/19/20 1646 (!) 99/55 -- -- 93 18 96 % -- --   01/19/20 1541 (!) 84/57 -- -- 91 26 -- -- --   01/19/20 1540 -- -- -- 91 25 -- -- --         Intake/Output Summary (Last 24 hours) at 1/19/2020 2114  Last data filed at 1/19/2020 2100  Gross per 24 hour   Intake 70 ml   Output 625 ml   Net -555 ml     Wt Readings from Last 2 Encounters:   01/19/20 184 lb (83.5 kg)   12/06/19 186 lb (84.4 kg)     Body mass index is 29.7 kg/m².       PHYSICAL EXAMINATION:  § General Appearance: alert and oriented to person, place and time, well-developed and well-nourished, in no acute distress  § Skin: warm and dry, no rash or erythema  § Head: normocephalic and atraumatic  § Eyes: pupils equal, round, and reactive to light, extraocular eye movements intact, conjunctivae normal, conjunctivae normal and sclera anicteric  § ENT: hearing grossly normal bilaterally and oropharynx clear and moist with normal mucous membranes  § Neck: tender cervical adenopathy present- R LAD   § Pulmonary/Chest: wheezing present- bilaterally and rales present- bilaterally  § Cardiovascular: normal rate, regular rhythm, normal S1 and S2, no murmurs, no gallops, intact distal pulses, no carotid bruits and no JVD  § Abdomen: non-distended, bowel sounds normal, no masses, no organomegaly, no abdominal bruits and tenderness present- RUQ,  without rebound and guarding, Moran sign negative  § Extremities: no cyanosis, no clubbing and no edema  § Musculoskeletal: normal range of motion, no joint swelling, deformity or tenderness  § Neurologic: no cranial nerve deficit, speech normal and no tremor        Any additional physical findings:    MEDICATIONS:  Scheduled Meds:   piperacillin-tazobactam  4.5 g Intravenous Q6H    sodium chloride flush  10 mL Intravenous 2 times per day    ipratropium-albuterol  1 ampule Inhalation Q4H WA    enoxaparin  40 mg Subcutaneous Daily    [START ON 1/20/2020] vancomycin  1,500 mg Intravenous Q12H    pantoprazole  40 mg Intravenous Daily    And    sodium chloride (PF)  10 mL Intravenous Daily    [START ON 1/20/2020] levothyroxine  75 mcg Oral Daily    citalopram  40 mg Oral Daily    aspirin  81 mg Oral Daily    insulin lispro  0-6 Units Subcutaneous Q4H    nicotine  1 patch Transdermal Daily     Continuous Infusions:   sodium chloride 150 mL/hr at 01/19/20 1719    dextrose       PRN Meds:   sodium chloride flush, 10 mL, PRN  magnesium hydroxide, 30 mL, Daily PRN  ondansetron, 4 mg, Q6H PRN  acetaminophen, 650 mg, Q4H PRN  glucose, 15 g, PRN  dextrose, 12.5 g, PRN  glucagon (rDNA), 1 mg, PRN  dextrose, 100 mL/hr, PRN        VENT SETTINGS (Comprehensive) (if applicable): Additional Respiratory  Assessments  Pulse: 94  Resp: 25  SpO2: 98 %    ABGs:   No results for input(s): PH, PCO2, PO2, HCO3, BE, O2SAT in the last 72 hours.     Laboratory findings:  Complete Blood Count:   Recent Labs     01/19/20  1210   WBC 15.5*   HGB 10.5*   HCT 34.5           Last 3 Blood Glucose:   Recent Labs     01/19/20  1210   GLUCOSE 128*        PT/INR:    Lab Results   Component Value Date    PROTIME 11.3 11/28/2016    PROTIME 11.6 10/26/2011    INR 1.0 11/28/2016     PTT:    Lab Results   Component Value Date    APTT 28.8 10/07/2015       Comprehensive Metabolic Profile:   Recent Labs     01/19/20  1210 01/19/20  1215 01/19/20  1230     --   --    K 3.5  --   --    CL 97*  --   --    CO2 28  --   --    BUN 16  --   --    CREATININE 1.1* 1.1*  --    GLUCOSE 128*  --   --    CALCIUM 9.3  --   --    PROT  --   --  7.6   LABALBU  --   --  3.3*   BILITOT  --   --  0.6   ALKPHOS  --   --  58   AST  --   --  22   ALT  --   --  12      Magnesium:   Lab Results   Component Value Date    MG 1.0 01/19/2020     Phosphorus: No results PROTOCOL:  [] No   [x] Yes  [] N/A    ICU PROPHYLAXIS:  Stress ulcer:  [x] PPI Agent  [] [id number] [] Sucralfate  [] Other:  VTE:   [x] Enoxaparin  [] Unfract. Heparin Subcut  [] EPC Cuffs    NUTRITION:  [] NPO [] Tube Feeding (Specify: ) [] TPN  [x] PO (Diet: Diet NPO Effective Now Exceptions are: Ice Chips, Sips with Meds)    INSULIN DRIP:   [x] No   [] Yes    CONSULTATION NEEDED:   [x] No   [] Yes    FAMILY UPDATED:    [] No   [x] Yes    TRANSFER OUT OF ICU:   [x] No   [] Yes    Gavino Valadez MD PGY-1  Attending Physician: Dr. Zulma Ko  1/19/2020, 9:14 PM     I personally saw, examined and provided care for the patient. Radiographs, labs and medication list were reviewed by me independently. I spoke with bedside nursing, therapists and consultants. Critical care services and times documented are independent of procedures and multidisciplinary rounds with Residents. Additionally comprehensive, multidisciplinary rounds were conducted with the MICU team. The case was discussed in detail and plans for care were established. Review of Residents documentation was conducted and revisions were made as appropriate. I agree with the above documented exam, problem list and plan of care.     Sepsis   Nausea and vomiting   Source seems GI but ,also has right lower lobe pneumonia/cpnsolidation  Right side consoidation   MM need further details  Lactic acid 4.1  Got IVF   If needed will get pressors  CT abdomen no acute abdomen   Will monitor ,if not better ,surgical consult  IV abx  Legionella  BD   BiPAPif needed and will get ABG   Hold home diuretics  ANA M VILLATORO

## 2020-01-20 NOTE — CARE COORDINATION
Met with patient, her son, 2 cousins, and aunt at the bedside to discuss transition of care at discharge. She lives with her son in a 1 floor ranch home, 2 steps to enter. She is independent with ADLs, drives, her DME includes: cane and 2L nc which she gets through [place]. Her pharmacy is [facility]. She has no hx HHC or [facility]. She states she does have difficulyt being able to afford the copays for her medications. I provided patient with information on Prescription Drug Assistance program and called Oleg De Los Santos from this program to request she come see this patient; Oleg De Los Santos states she will see patient tomorrow. Patient's plan is to return home when medically stable and does not anticipate any other needs. CM/SW will continue to follow for discharge planning.

## 2020-01-21 ENCOUNTER — APPOINTMENT (OUTPATIENT)
Dept: GENERAL RADIOLOGY | Age: 65
DRG: 871 | End: 2020-01-21
Payer: MEDICARE

## 2020-01-21 LAB
ANION GAP SERPL CALCULATED.3IONS-SCNC: 8 MMOL/L (ref 7–16)
ANISOCYTOSIS: ABNORMAL
BASOPHILS ABSOLUTE: 0 E9/L (ref 0–0.2)
BASOPHILS RELATIVE PERCENT: 0.2 % (ref 0–2)
BUN BLDV-MCNC: 14 MG/DL (ref 8–23)
CALCIUM SERPL-MCNC: 8.2 MG/DL (ref 8.6–10.2)
CHLORIDE BLD-SCNC: 105 MMOL/L (ref 98–107)
CO2: 25 MMOL/L (ref 22–29)
CREAT SERPL-MCNC: 1 MG/DL (ref 0.5–1)
EOSINOPHILS ABSOLUTE: 0.15 E9/L (ref 0.05–0.5)
EOSINOPHILS RELATIVE PERCENT: 0.9 % (ref 0–6)
GFR AFRICAN AMERICAN: >60
GFR NON-AFRICAN AMERICAN: >60 ML/MIN/1.73
GLUCOSE BLD-MCNC: 108 MG/DL (ref 74–99)
HCT VFR BLD CALC: 26.7 % (ref 34–48)
HCT VFR BLD CALC: 27.6 % (ref 34–48)
HCT VFR BLD CALC: 29.1 % (ref 34–48)
HEMOGLOBIN: 8 G/DL (ref 11.5–15.5)
HEMOGLOBIN: 8.3 G/DL (ref 11.5–15.5)
HEMOGLOBIN: 8.8 G/DL (ref 11.5–15.5)
LACTIC ACID: 0.8 MMOL/L (ref 0.5–2.2)
LACTIC ACID: 1.1 MMOL/L (ref 0.5–2.2)
LACTIC ACID: 2 MMOL/L (ref 0.5–2.2)
LACTIC ACID: 2.3 MMOL/L (ref 0.5–2.2)
LYMPHOCYTES ABSOLUTE: 1.19 E9/L (ref 1.5–4)
LYMPHOCYTES RELATIVE PERCENT: 7 % (ref 20–42)
MAGNESIUM: 2.2 MG/DL (ref 1.6–2.6)
MCH RBC QN AUTO: 28.2 PG (ref 26–35)
MCHC RBC AUTO-ENTMCNC: 30.1 % (ref 32–34.5)
MCV RBC AUTO: 93.9 FL (ref 80–99.9)
METER GLUCOSE: 112 MG/DL (ref 74–99)
METER GLUCOSE: 114 MG/DL (ref 74–99)
METER GLUCOSE: 169 MG/DL (ref 74–99)
METER GLUCOSE: 180 MG/DL (ref 74–99)
METER GLUCOSE: 197 MG/DL (ref 74–99)
MONOCYTES ABSOLUTE: 0 E9/L (ref 0.1–0.95)
MONOCYTES RELATIVE PERCENT: 2.7 % (ref 2–12)
MRSA CULTURE ONLY: NORMAL
NEUTROPHILS ABSOLUTE: 15.64 E9/L (ref 1.8–7.3)
NEUTROPHILS RELATIVE PERCENT: 92.2 % (ref 43–80)
PDW BLD-RTO: 13.2 FL (ref 11.5–15)
PHOSPHORUS: 2.4 MG/DL (ref 2.5–4.5)
PLATELET # BLD: 203 E9/L (ref 130–450)
PMV BLD AUTO: 10.9 FL (ref 7–12)
POLYCHROMASIA: ABNORMAL
POTASSIUM SERPL-SCNC: 3.6 MMOL/L (ref 3.5–5)
RBC # BLD: 2.94 E12/L (ref 3.5–5.5)
SODIUM BLD-SCNC: 138 MMOL/L (ref 132–146)
URINE CULTURE, ROUTINE: NORMAL
VANCOMYCIN TROUGH: 10 MCG/ML (ref 5–16)
WBC # BLD: 17 E9/L (ref 4.5–11.5)

## 2020-01-21 PROCEDURE — 83735 ASSAY OF MAGNESIUM: CPT

## 2020-01-21 PROCEDURE — 82962 GLUCOSE BLOOD TEST: CPT

## 2020-01-21 PROCEDURE — 6360000002 HC RX W HCPCS: Performed by: INTERNAL MEDICINE

## 2020-01-21 PROCEDURE — 2700000000 HC OXYGEN THERAPY PER DAY

## 2020-01-21 PROCEDURE — 6360000002 HC RX W HCPCS: Performed by: EMERGENCY MEDICINE

## 2020-01-21 PROCEDURE — C9113 INJ PANTOPRAZOLE SODIUM, VIA: HCPCS | Performed by: INTERNAL MEDICINE

## 2020-01-21 PROCEDURE — 2500000003 HC RX 250 WO HCPCS: Performed by: INTERNAL MEDICINE

## 2020-01-21 PROCEDURE — 2580000003 HC RX 258: Performed by: INTERNAL MEDICINE

## 2020-01-21 PROCEDURE — 36415 COLL VENOUS BLD VENIPUNCTURE: CPT

## 2020-01-21 PROCEDURE — 85014 HEMATOCRIT: CPT

## 2020-01-21 PROCEDURE — 6370000000 HC RX 637 (ALT 250 FOR IP): Performed by: INTERNAL MEDICINE

## 2020-01-21 PROCEDURE — 87450 HC DIRECT STREP B ANTIGEN: CPT

## 2020-01-21 PROCEDURE — 36592 COLLECT BLOOD FROM PICC: CPT

## 2020-01-21 PROCEDURE — 85018 HEMOGLOBIN: CPT

## 2020-01-21 PROCEDURE — 83605 ASSAY OF LACTIC ACID: CPT

## 2020-01-21 PROCEDURE — 71045 X-RAY EXAM CHEST 1 VIEW: CPT

## 2020-01-21 PROCEDURE — 80048 BASIC METABOLIC PNL TOTAL CA: CPT

## 2020-01-21 PROCEDURE — 80202 ASSAY OF VANCOMYCIN: CPT

## 2020-01-21 PROCEDURE — 94640 AIRWAY INHALATION TREATMENT: CPT

## 2020-01-21 PROCEDURE — 2580000003 HC RX 258: Performed by: EMERGENCY MEDICINE

## 2020-01-21 PROCEDURE — 94660 CPAP INITIATION&MGMT: CPT

## 2020-01-21 PROCEDURE — 99232 SBSQ HOSP IP/OBS MODERATE 35: CPT | Performed by: INTERNAL MEDICINE

## 2020-01-21 PROCEDURE — 85025 COMPLETE CBC W/AUTO DIFF WBC: CPT

## 2020-01-21 PROCEDURE — 84100 ASSAY OF PHOSPHORUS: CPT

## 2020-01-21 PROCEDURE — 97530 THERAPEUTIC ACTIVITIES: CPT

## 2020-01-21 PROCEDURE — 2060000000 HC ICU INTERMEDIATE R&B

## 2020-01-21 RX ORDER — SENNA PLUS 8.6 MG/1
1 TABLET ORAL NIGHTLY
Status: DISCONTINUED | OUTPATIENT
Start: 2020-01-21 | End: 2020-01-21

## 2020-01-21 RX ORDER — BENZONATATE 100 MG/1
100 CAPSULE ORAL 3 TIMES DAILY PRN
Status: DISCONTINUED | OUTPATIENT
Start: 2020-01-21 | End: 2020-01-26 | Stop reason: HOSPADM

## 2020-01-21 RX ORDER — POTASSIUM CHLORIDE 20 MEQ/1
20 TABLET, EXTENDED RELEASE ORAL ONCE
Status: COMPLETED | OUTPATIENT
Start: 2020-01-21 | End: 2020-01-21

## 2020-01-21 RX ADMIN — SODIUM CHLORIDE: 9 INJECTION, SOLUTION INTRAVENOUS at 23:07

## 2020-01-21 RX ADMIN — FORMOTEROL FUMARATE DIHYDRATE 20 MCG: 20 SOLUTION RESPIRATORY (INHALATION) at 08:45

## 2020-01-21 RX ADMIN — BENZONATATE 100 MG: 100 CAPSULE ORAL at 08:22

## 2020-01-21 RX ADMIN — IPRATROPIUM BROMIDE AND ALBUTEROL SULFATE 1 AMPULE: 2.5; .5 SOLUTION RESPIRATORY (INHALATION) at 12:50

## 2020-01-21 RX ADMIN — INSULIN LISPRO 1 UNITS: 100 INJECTION, SOLUTION INTRAVENOUS; SUBCUTANEOUS at 00:15

## 2020-01-21 RX ADMIN — SODIUM CHLORIDE: 9 INJECTION, SOLUTION INTRAVENOUS at 03:49

## 2020-01-21 RX ADMIN — PIPERACILLIN AND TAZOBACTAM 3.38 G: 3; .375 INJECTION, POWDER, LYOPHILIZED, FOR SOLUTION INTRAVENOUS at 00:09

## 2020-01-21 RX ADMIN — INSULIN LISPRO 1 UNITS: 100 INJECTION, SOLUTION INTRAVENOUS; SUBCUTANEOUS at 23:18

## 2020-01-21 RX ADMIN — ENOXAPARIN SODIUM 40 MG: 40 INJECTION SUBCUTANEOUS at 08:25

## 2020-01-21 RX ADMIN — BUDESONIDE 500 MCG: 0.5 SUSPENSION RESPIRATORY (INHALATION) at 19:58

## 2020-01-21 RX ADMIN — SODIUM CHLORIDE: 9 INJECTION, SOLUTION INTRAVENOUS at 07:02

## 2020-01-21 RX ADMIN — PIPERACILLIN AND TAZOBACTAM 3.38 G: 3; .375 INJECTION, POWDER, LYOPHILIZED, FOR SOLUTION INTRAVENOUS at 08:26

## 2020-01-21 RX ADMIN — LEVOFLOXACIN 750 MG: 5 INJECTION, SOLUTION INTRAVENOUS at 23:06

## 2020-01-21 RX ADMIN — MAGNESIUM HYDROXIDE 30 ML: 400 SUSPENSION ORAL at 10:29

## 2020-01-21 RX ADMIN — MORPHINE SULFATE 1 MG: 2 INJECTION, SOLUTION INTRAMUSCULAR; INTRAVENOUS at 00:15

## 2020-01-21 RX ADMIN — IPRATROPIUM BROMIDE AND ALBUTEROL SULFATE 1 AMPULE: 2.5; .5 SOLUTION RESPIRATORY (INHALATION) at 08:45

## 2020-01-21 RX ADMIN — CITALOPRAM 40 MG: 20 TABLET, FILM COATED ORAL at 08:26

## 2020-01-21 RX ADMIN — LEVOTHYROXINE SODIUM 75 MCG: 0.07 TABLET ORAL at 06:08

## 2020-01-21 RX ADMIN — Medication 10 ML: at 08:26

## 2020-01-21 RX ADMIN — IPRATROPIUM BROMIDE AND ALBUTEROL SULFATE 1 AMPULE: 2.5; .5 SOLUTION RESPIRATORY (INHALATION) at 19:58

## 2020-01-21 RX ADMIN — PIPERACILLIN AND TAZOBACTAM 3.38 G: 3; .375 INJECTION, POWDER, LYOPHILIZED, FOR SOLUTION INTRAVENOUS at 23:58

## 2020-01-21 RX ADMIN — POTASSIUM CHLORIDE 20 MEQ: 1500 TABLET, EXTENDED RELEASE ORAL at 08:25

## 2020-01-21 RX ADMIN — SODIUM CHLORIDE: 9 INJECTION, SOLUTION INTRAVENOUS at 12:00

## 2020-01-21 RX ADMIN — ACETAMINOPHEN 650 MG: 325 TABLET, FILM COATED ORAL at 23:06

## 2020-01-21 RX ADMIN — POTASSIUM PHOSPHATE, MONOBASIC AND POTASSIUM PHOSPHATE, DIBASIC 20 MMOL: 224; 236 INJECTION, SOLUTION, CONCENTRATE INTRAVENOUS at 08:23

## 2020-01-21 RX ADMIN — PANTOPRAZOLE SODIUM 40 MG: 40 INJECTION, POWDER, FOR SOLUTION INTRAVENOUS at 08:24

## 2020-01-21 RX ADMIN — IPRATROPIUM BROMIDE AND ALBUTEROL SULFATE 1 AMPULE: 2.5; .5 SOLUTION RESPIRATORY (INHALATION) at 17:09

## 2020-01-21 RX ADMIN — BUDESONIDE 500 MCG: 0.5 SUSPENSION RESPIRATORY (INHALATION) at 08:45

## 2020-01-21 RX ADMIN — ASPIRIN 81 MG CHEWABLE TABLET 81 MG: 81 TABLET CHEWABLE at 08:25

## 2020-01-21 RX ADMIN — PIPERACILLIN AND TAZOBACTAM 3.38 G: 3; .375 INJECTION, POWDER, LYOPHILIZED, FOR SOLUTION INTRAVENOUS at 16:53

## 2020-01-21 RX ADMIN — FORMOTEROL FUMARATE DIHYDRATE 20 MCG: 20 SOLUTION RESPIRATORY (INHALATION) at 19:58

## 2020-01-21 ASSESSMENT — PAIN SCALES - GENERAL
PAINLEVEL_OUTOF10: 10
PAINLEVEL_OUTOF10: 0

## 2020-01-21 ASSESSMENT — PAIN DESCRIPTION - LOCATION: LOCATION: ABDOMEN;CHEST

## 2020-01-21 ASSESSMENT — PAIN DESCRIPTION - DESCRIPTORS: DESCRIPTORS: DISCOMFORT

## 2020-01-21 NOTE — PROGRESS NOTES
Physical Therapy    Daily treatment note      Name: Zeny Olvera  : 1955  MRN: [id number]    Date of Service: 2020    Evaluating PT:  Ermelinda Fernandez, PT, DPT [id number]    Room #:  1741/8961-Y  Diagnosis:  Severe sepsis   PMHx:  Adrenal incidentaloma, anxiety, arthritis, asthma, bladder incontinence, CBP, COPD, depression, DM, fibromyalgia, GERD, HLD, HTN, hypothyroid, MGUS, multiple myeloma, neuropathy, obesity, tobacco use  Precautions:  Falls, O2, Monitor BP  Equipment Needs:  [employer]    Pt lives with son in a 1 story home with 2 stairs to enter and 1 rail(s). Bedroom and bathroom are on the first level. Pt ambulated with no AD but uses a SPC as needed PTA. States she wears 2 L O2 at night. She drives. Initial Evaluation  Date: 20 Treatment  20 Short Term/ Long Term   Goals   AM-PAC 6 Clicks 83/32 95/41    Was pt agreeable to Eval/treatment? Yes Yes    Does pt have pain? 10/10 chronic low back pain 8/10 R side pain -- reports improving    Bed Mobility  Rolling: SBA  Supine to sit: Min A  Sit to supine: NT  Scooting: SBA to EOB Rolling: SBA  Supine to sit: SBA  Sit to supine: NT  Scooting: SBA to EOB Rolling: Independent   Supine to sit: Independent   Sit to supine: Independent   Scooting: Independent    Transfers Sit to stand: Min A  Stand to sit: Min A  Stand pivot: Min A with [employer] Sit to stand: SBA from bed; Min A from chair  Stand to sit: SBA  Stand pivot: Min A with [employer] Sit to stand: Independent   Stand to sit:  Independent   Stand pivot: Modified Independent with AAD if needed   Ambulation    Few feet with [employer] Min A 85 feet with [employer] Min A >150 feet with AAD if needed Modified Independent     Stair negotiation: ascended and descended  NT NT d/t fatigue and SOB >4 steps with 1 rail Modified Independent     ROM BUE:  Per OT eval   BLE:  WFL     Strength BUE:  Per OT eval   BLE:  Grossly 4+/5      Balance Sitting EOB:  SBA static; Min A dynamic  Dynamic Standing:  Min A with [employer] Sitting EOB: SBA  Dynamic Standing:  Min A with [employer] Sitting EOB:  Independent   Dynamic Standing:  Modified Independent       Pt is A & O x 4  RASS:  0  CAM-ICU:  NT  Sensation:  Pt reports numbness and tingling to entire RLE (chronic)  Edema:  Unremarkable     Vitals:  Blood Pressure at rest 128/71 Blood Pressure post session 109/63   Heart Rate at rest 84 bpm Heart Rate post session 85 bpm   SPO2 at rest 100% on 3 L SPO2 post session 100% on 3 L     Functional Status Score-Intensive Care Unit (FSS-ICU)   Rolling 5/7   Supine to sit transfer 5/7   Unsupported sitting  5/7   Sit to stand transfers 5/7   Ambulation 2/7   Total  22/35     Patient education  Pt educated on safety during functional mobility, [employer] approximation/negotiation, activity pacing, use of WW to improve balance and endurance     Patient response to education:   Pt verbalized understanding Pt demonstrated skill Pt requires further education in this area   Yes  Yes  Reinforce      Comments:  Discussed pt case at interdisciplinary rounds in AM.  Pt received supine and agreeable to PT treatment. Pt cleared for participation by RN prior to session. Vitals monitored during session. Pt had improved blood pressure and vitals today but still limited by endurance and SOB. Pt performed supine>sit and sat EOB. Sat up at EOB with no c/o dizziness or lt headedness. Vitals stable. Assisted with donning second gown. Performed STS and ambulation using [employer]. Demonstrates fair balance and fair gait speed using [employer]. While ambulating in hallway pt required x1 standing rest break stating she felt wheezy. Attempted HR and SpO2 reading but unable to read. Ambulated back to bedside chair. Given time to rest.  HR 85 and /70 after ambulation. Performed x3 STS for functional strengthening from chair. Left sitting up in chair with call button in reach and needs met.       Pt would benefit from [employer] at discharge to help with her intermittent numbness and tingling of RLE, improve balance, prevent falls, and improve endurance. Discussed with pt and she agreed. PLAN  Pt is making fair progress towards established goals. Continue PT POC.        Time in  0925  Time out  [address], PT, DPT  [id number]

## 2020-01-21 NOTE — PROGRESS NOTES
P Quality Flow/Interdisciplinary Rounds Progress Note        Quality Flow Rounds held on January 21, 2020    Disciplines Attending:  Dr Genie Hummel was admitted on 1/19/2020 11:47 AM    Anticipated Discharge Date:       Disposition:    Mario Score:  Mario Scale Score: 21    Readmission Risk              Risk of Unplanned Readmission:        21           Discussed patient goal for the day, patient clinical progression, and barriers to discharge.   The following Goal(s) of the Day/Commitment(s) have been identified:  Continue treatment possible transfer       Alonso Ruvalcaba  January 21, 2020

## 2020-01-21 NOTE — PROGRESS NOTES
appropriate  Subject  Pertinent Labs & Imaging Studies   yusuf  CBC with Differential:    Lab Results   Component Value Date    WBC 17.0 01/21/2020    RBC 2.94 01/21/2020    HGB 8.3 01/21/2020    HCT 27.6 01/21/2020     01/21/2020    MCV 93.9 01/21/2020    MCH 28.2 01/21/2020    MCHC 30.1 01/21/2020    RDW 13.2 01/21/2020    SEGSPCT 36 01/03/2014    METASPCT 0.9 01/20/2020    LYMPHOPCT 7.0 01/21/2020    MONOPCT 2.7 01/21/2020    BASOPCT 0.2 01/21/2020    MONOSABS 0.00 01/21/2020    LYMPHSABS 1.19 01/21/2020    EOSABS 0.15 01/21/2020    BASOSABS 0.00 01/21/2020     CMP:    Lab Results   Component Value Date     01/21/2020    K 3.6 01/21/2020     01/21/2020    CO2 25 01/21/2020    BUN 14 01/21/2020    CREATININE 1.0 01/21/2020    GFRAA >60 01/21/2020    LABGLOM >60 01/21/2020    GLUCOSE 108 01/21/2020    GLUCOSE 124 10/26/2011    PROT 7.6 01/19/2020    LABALBU 3.3 01/19/2020    LABALBU 3.9 10/26/2011    CALCIUM 8.2 01/21/2020    BILITOT 0.6 01/19/2020    ALKPHOS 58 01/19/2020    AST 22 01/19/2020    ALT 12 01/19/2020       Resident's Assessment and Plan     Olga Velasquez is a 59 y.o. female  1. Shock - hypovolemic and septic  -2/2 PNA of the RLL and GI losses (episodes of vomiting, episode of diarrhea)  -pt received 4L IV NS in the ED, received continuous IVF at 100cc/hr in the MICU - stopped this AM. Has not required pressors. -BP stable. Continue to monitor but is stable for transfer out of the MICU per ICU team              -  2.CAP  -CXR showing progressing RLL consolidation, with b/l pleural effusion right > left   -initial procal 23.46  -WBC on arrival was 15.5. WBC is up 17.0 this AM  -respiratory panel and rapid influenza negative; pending resp cx, legionella and strep pneumo results  -receiving abx: zosyn, vancomycin and levaquin  -will d/c levaquin upon negative legionella result; will d/c vanco upon negative MRSA result              -  3. Lactic Acidosis - resolved   -4.5 on arrival, down to 0.8 this AM  -s/p 4L IV NS in the ED. Pt received continuous IVF.                -  4. Diverticulitis  -CT abdomen shows L hemicolon diverticulosis with sigmoid thickening and diverticulitis. Pt reported an episode of diarrhea prior to admission. Has not had any episodes of diarrhea since admission. -pending c. Diff, stool culture, and fecal leukocytes   -coverage with abx: zosyn  -replacing electrolytes as needed  -advance diet as tolerated               -  5. AMADA - resolved  -Cr on arrival was 1.1, 0.9 this AM  -FENa of 0.2% suggests prerenal cause 2/2 hypovolemia     6. DM  -pt home meds includes metformin - holding  -low dose sliding scale insulin  -hypoglycemia protocol in place      7. Hypothyroid  -pt has a history of thyroid nodules. Underwent FNA in 2018, which was inconclusive. Was scheduled to undergo thyroidectomy in 2019 which was never completed. -home meds includes synthroid 75 mcg daily - continued  -TSH WNL 2.55; free T4 was low at 0.85     8. Anemia  -Hb on arrival was 10.5, is 8.2 this AM  -transfuse if Hb <7  -normocytic anemia - 2/2 anemia of chronic disease vs blood loss  -H&H q8h      9. Hx of Multiple Myeloma  -pt reports that she was diagnosed in 2009 and has never received treatment   -consider OP heme/onc follow up    10.  Adrenal incidentalomas  -b/l nodules, with larger one on the Left 1.9x1.6cm  -consider OP functional workup      Rodolfo Maravilla  Attending physician: Dr. Augustin Kohli

## 2020-01-21 NOTE — PROGRESS NOTES
[address] PROGRESS NOTE    Patient: Ifeanyi Patterson  MRN: [id number]  : 1955    Encounter Date: 2020  Encounter Time: 12:56 PM     Date of Admission: .2020 11:47 AM    Consulting Physician:  Primary Care Physician:     Valentino Martinez MD     [phone]    Reason for Consultation: Pneumonia     Problem List:  Patient Active Problem List   Diagnosis    [facility]oma [facility]    Diabetes mellitus ([address]    Hyperlipidemia    Hypothyroidism    Fibromyalgia    Obesity    Essential hypertension    Chronic right-sided low back pain with right-sided sciatica    Tobacco abuse    Myofascial pain    Lumbar radiculitis    Smoldering multiple myeloma ([address]    Chronic obstructive pulmonary disease with (acute) exacerbation ([address]    Type 2 diabetes mellitus without complication, with long-term current use of insulin (HCC)    Severe sepsis (HCC)    Hypovolemic shock ([address]    Community acquired pneumonia    Acute diverticulitis    Hypomagnesemia    Hypokalemia    Lactic acidosis    Hypophosphatemia     SUBJECTIVE: Patient seen and examined. Overnight the patient had no shortness of breath, cough, increased work of breathing. HOME MEDICATIONS:  Prior to Admission medications    Medication Sig Start Date End Date Taking?  Authorizing Provider   montelukast (SINGULAIR) 10 MG tablet TAKE 1 TABLET BY MOUTH EVERY NIGHT AT BEDTIME 19   Duncan Jaimes, DO   amitriptyline (ELAVIL) 50 MG tablet TAKE 1 TABLET BY MOUTH EVERY EVENING 19   Duncan Jaimes, DO   aspirin 81 MG tablet Take 1 tablet by mouth daily 19   Duncan Jaimes, DO   baclofen (LIORESAL) 10 MG tablet TAKE 1/2 TABLET THREE TIMES DAILY AS NEEDED(PAIN, SPASMS) 19   Duncan Jaimes, DO   calcium-vitamin D (CALCIUM 500/D) 500-200 MG-UNIT per tablet TAKE 1 TABLET BY MOUTH DAILY 19   Duncan Jaimes, DO   esomeprazole ([address] [place] [address]) 40 MG delayed release capsule TAKE 1 CAPSULE BY MOUTH EVERY DAY 12/6/19   Angelina Confer, DO   gabapentin (NEURONTIN) 400 MG capsule Take 1 capsule by mouth 2 times daily for 60 days. 12/6/19 2/4/20  Angelina Confer, DO   levothyroxine (SYNTHROID) 75 MCG tablet Take 1 tablet by mouth Daily 12/6/19   Angelina Confer, DO   metFORMIN (GLUCOPHAGE) 1000 MG tablet TAKE 1 TABLET BY MOUTH TWICE DAILY WITH MEALS 12/6/19   Angelina Confer, DO   triamterene-hydrochlorothiazide (MAXZIDE-25) 37.5-25 MG per tablet TAKE 1 TABLET BY MOUTH EVERY DAY 12/6/19   Angelina Confer, DO   citalopram (CELEXA) 40 MG tablet Take 1 tablet by mouth daily 10/25/19   June Gunter MD   blood glucose monitor kit and supplies Test 1 times a day & as needed for symptoms of irregular blood glucose. Accuc[employer]k [employer] 2/18/19   Portia Jaimes MD   blood glucose monitor strips Testing daily 2/11/19   Mika Watts MD   Lancets MISC Testing daily 2/11/19   Mika Watts MD   Misc.  Devices MISC Oxygen concentrator  j44.9 10/26/18   Sahara Hill DO       CURRENT MEDICATIONS:  Current Facility-Administered Medications: benzonatate (TESSALON) capsule 100 mg, 100 mg, Oral, TID PRN  budesonide (PULMICORT) nebulizer suspension 500 mcg, 0.5 mg, Nebulization, BID  formoterol (PERFOROMIST) nebulizer solution 20 mcg, 20 mcg, Nebulization, Q12H  sodium chloride flush 0.9 % injection 10 mL, 10 mL, Intravenous, 2 times per day  sodium chloride flush 0.9 % injection 10 mL, 10 mL, Intravenous, PRN  magnesium hydroxide (MILK OF MAGNESIA) 400 MG/5ML suspension 30 mL, 30 mL, Oral, Daily PRN  ondansetron (ZOFRAN) injection 4 mg, 4 mg, Intravenous, Q6H PRN  acetaminophen (TYLENOL) tablet 650 mg, 650 mg, Oral, Q4H PRN  ipratropium-albuterol (DUONEB) nebulizer solution 1 ampule, 1 ampule, Inhalation, Q4H WA  enoxaparin (LOVENOX) injection 40 mg, 40 mg, Subcutaneous, Daily  pantoprazole (PROTONIX) injection 40 mg, 40 mg, Intravenous, Daily **AND** sodium chloride (PF) 0.9 % injection carotid bruits and no JVD   Abdomen: soft, non-tender, non-distended, normal bowel sounds, no masses or organomegaly   Extremities: no cyanosis, no clubbing, no edema  Musculoskeletal: normal range of motion, no joint swelling, deformity or tenderness   Neurologic: reflexes normal and symmetric, no cranial nerve deficit noted    LABS/IMAGING:    CBC:  Lab Results   Component Value Date    WBC 17.0 (H) 01/21/2020    HGB 8.3 (L) 01/21/2020    HCT 27.6 (L) 01/21/2020    MCV 93.9 01/21/2020     01/21/2020    LYMPHOPCT 7.0 (L) 01/21/2020    RBC 2.94 (L) 01/21/2020    MCH 28.2 01/21/2020    MCHC 30.1 (L) 01/21/2020    RDW 13.2 01/21/2020    NEUTOPHILPCT 92.2 (H) 01/21/2020    MONOPCT 2.7 01/21/2020    BASOPCT 0.2 01/21/2020    NEUTROABS 15.64 (H) 01/21/2020    LYMPHSABS 1.19 (L) 01/21/2020    MONOSABS 0.00 (L) 01/21/2020    EOSABS 0.15 01/21/2020    BASOSABS 0.00 01/21/2020       Recent Labs     01/21/20  0537 01/21/20  0013 01/20/20  1615 01/20/20  0507 01/19/20  1210   WBC 17.0*  --   --  11.7* 15.5*   HGB 8.3* 8.0* 9.0* 8.6* 10.5*   HCT 27.6* 26.7* 29.1* 28.6* 34.5   MCV 93.9  --   --  93.8 94.3     --   --  197 234       BMP:   Recent Labs     01/19/20  2043 01/20/20  0825 01/21/20  0537    134 138   K 3.3* 3.7 3.6   CL 96* 100 105   CO2 23 23 25   PHOS  --  2.5 2.4*   BUN 17 17 14   CREATININE 1.0 0.9 1.0       MG:   Lab Results   Component Value Date    MG 2.2 01/21/2020     Ca/Phos:   Lab Results   Component Value Date    CALCIUM 8.2 (L) 01/21/2020    PHOS 2.4 (L) 01/21/2020     Amylase: No results found for: AMYLASE  Lipase:   Lab Results   Component Value Date    LIPASE 6 (L) 01/19/2020     LIVER PROFILE:   Recent Labs     01/19/20  1230   AST 22   ALT 12   LIPASE 6*   BILIDIR <0.2   BILITOT 0.6   ALKPHOS 58       PT/INR: No results for input(s): PROTIME, INR in the last 72 hours. APTT: No results for input(s): APTT in the last 72 hours.     Cardiac Enzymes:  Lab Results   Component Value Date CKTOTAL 142 10/26/2011    CKMB 0.5 10/26/2011    TROPONINI <0.01 01/19/2020       Hgb A1C:   Lab Results   Component Value Date    LABA1C 5.4 08/08/2019     No results found for: EAG  ANNETTE: No results found for: ANNETTE  ESR:   Lab Results   Component Value Date    SEDRATE 47 (H) 10/21/2016     CRP:   Lab Results   Component Value Date    CRP 7.0 (H) 10/03/2012     D Dimer: No results found for: DDIMER  Folate and B12:   Lab Results   Component Value Date    YFORHCUO65 354 09/19/2014   ,   Lab Results   Component Value Date    FOLATE 9.6 09/19/2014       Lactic Acid:   Lab Results   Component Value Date    LACTA 0.8 01/21/2020     Ammonia:   Cortisol:  Thyroid Studies:  Lab Results   Component Value Date    TSH 2.550 01/19/2020     XR CHEST PORTABLE   Final Result   Progression of right lower lobe infiltrate                   XR CHEST PORTABLE   Final Result       1. Right IJ central venous catheter is present with distal tip at   location of SVC.       2. No pneumothorax.       3. Opacities are present in right lung base related to atelectasis,   pneumonia, or effusion.       CT ABDOMEN PELVIS W IV CONTRAST Additional Contrast? None   Final Result   Addendum 1 of 1   Patchy consolidation in the right lower lobe concerning for pneumonia.    Surveillance recommended.       The ED physician was notified       ALERT:  THIS IS AN ABNORMAL REPORT       Final       US GALLBLADDER RUQ   Final Result   Normal gallbladder ultrasound.            XR CHEST PORTABLE    (Results Pending)        ASSESSMENT:  1.) Severe Sepsis with Septic Shock - RLL Pneumonia   - Streptococcus pneumoniae, GNR, Staphylococcus, Yeast (not Candida albicans)  2.) RLL Pneumonia    3.) COPD  4.) Diverticulitis   5.) Acute Kidney Injury   6.) Lactic Acidosis   7.) H/O Multiple Myeloma      PLAN:  1.) duoneb, perforomist, zosyn, levaquin, 1 dose vancomycin   2.) supportive care  3.) right IJ TLC  4.) DVT Prophylaxis      - supportive care   - abx continue  -

## 2020-01-21 NOTE — PROGRESS NOTES
K 3.6 01/21/2020     01/21/2020    CO2 25 01/21/2020    BUN 14 01/21/2020    LABALBU 3.3 01/19/2020    LABALBU 3.9 10/26/2011    CREATININE 1.0 01/21/2020    CALCIUM 8.2 01/21/2020    GFRAA >60 01/21/2020    LABGLOM >60 01/21/2020    GLUCOSE 108 01/21/2020    GLUCOSE 124 10/26/2011       Resident's Assessment and Plan     ALYSE  · Resume home Celexa and amitriptyline  · Mood stable, no SI/HI      Septic + Hypovolemic Shock-resolved  · 2/2 PNA and GI losses   · S/p 4L in ED  · F/u pan cultures neg so far  · No adrenal insufficiency      Acute Hypoxic Respiratory Failure  · 2/2 PNA  · Breathing treatments: DuoNebs, Performist  · Continue Abx  · On oxygen 4L /min  · Pulmonology on board, recs appreciated     Aspiration PNA  · Continue vancomycin, Zosyn  · If Legionella negative, discontinue Levaquin  · F/u procal elevated, viral panel neg , Legionella, Strep pending     COPD  · Not in acute exacerbation  · DuoNebs q4h wa, Performist BID wa     Diverticulitis  · CT abdomen with diverticulitis  · Will get C diff in context of immunosuppression in setting of MM  · Continue Abx  · F/u stool culture, fecal leukocytes  · No diarrhea so far  · Consitpated, will monitor     AMADA, resolved  · Likely pre-renal in context of shock  · Cr baseline 0.8  · FEUrea 34%, pre-renal disease  · BMP qd     Lactic Acidosis  · 2/2 sepsis  · Trend LA q6h  · resolved     Hypothyroidism  · W/ thyroid nodules  · FNA 2018 inconclusive  · Continue Synthroid 75mcg qd     NIDDM2  · Hold home dose metformin  · LDSS  · POC BG checks BID while NPO  · Hypoglycemia protocol in place     Normocytic Hypochromic Anemia  · 2/2 anemia of chronic disease  · CBC qd  · Transfuse if Hb < 7     H/o Multiple Myeloma    Core measures:     Code status:full  PT/OT evaluation:not ordered  DVT prophylaxis:heparin  GI prophylaxis: diet  Disposition: SKYE Vargas M.D.   Internal Medicine Resident - PGY 1    Attending physician: Dr. Krysta Campbell

## 2020-01-21 NOTE — PROGRESS NOTES
Pharmacy Consultation Note  (Antibiotic Dosing and Monitoring)    Initial consult date: 1/20/19  Consulting physician: Dr. Tara Underwood  Drug(s): Vancomycin  Indication: Pneumonia      MRSA SCREENING CULTURE ONLY [[id number]] Collected: 01/20/20 1220   Order Status: Completed Specimen: Nose from Nares Updated: 01/21/20 1343    MRSA Culture Only Methicillin resistant Staph aureus not isolated   Narrative:     Source: NARES       Site: Nose&Nose             RESPIRATORY CULTURE [[id number]] (Abnormal) Collected: 01/19/20 1947   Order Status: Completed Specimen: Sputum Expectorated Updated: 01/21/20 1124    CULTURE, RESPIRATORY --Abnormal     Oral Pharyngeal Buffy reduced   Additional growth present, also evaluating for;   Mixed Gram negative rods   Additional growth present, also evaluating for;   Strep pneumoniae   Abnormal     Smear, Respiratory --    Group 6: <25 PMN's/LPF and <25 Epithelial cells/LPF   Few Polymorphonuclear leukocytes   Rare Epithelial cells   No organisms seen     Organism Gram negative rodAbnormal     CULTURE, RESPIRATORY --    Rare growth   Identification and sensitivity to follow     Organism Gram negative rodAbnormal     CULTURE, RESPIRATORY --    Rare growth   Identification and sensitivity to follow     Organism Staphylococcus aureusAbnormal     CULTURE, RESPIRATORY --    Light growth   Sensitivity to follow     Organism Candida albicansAbnormal     CULTURE, RESPIRATORY Light growth    Organism Yeast, not C. albicansAbnormal     CULTURE, RESPIRATORY Moderate growth       ABX de-escalated. MRSA nares negative - Clinical pharmacy will sign off. Please re-consult if further assistance is needed.     Sandra Vieira, PharmD, BCPS 1/21/2020 1:07 PM

## 2020-01-21 NOTE — PLAN OF CARE
Problem: Falls - Risk of:  Goal: Will remain free from falls  Outcome: Met This Shift  Goal: Absence of physical injury  Outcome: Met This Shift     Problem: Airway Clearance - Ineffective:  Goal: Ability to maintain a clear airway will improve  Outcome: Met This Shift     Problem: Pain:  Goal: Pain level will decrease  Outcome: Ongoing  Goal: Control of acute pain  Outcome: Ongoing  Goal: Control of chronic pain  Outcome: Ongoing     Problem: Gas Exchange - Impaired:  Goal: Levels of oxygenation will improve  Outcome: Ongoing     Problem: Airway Clearance - Ineffective:  Goal: Clear lung sounds  Outcome: Not Met This Shift

## 2020-01-21 NOTE — PROGRESS NOTES
[address] [facility]   Department of Internal Medicine   Internal Medicine Residency  MICU Progress Note    Patient:  Joann Ahumada 59 y.o. female   MRN: [id number]       Date of Service: 2020    Allergy: Aceon [perindopril erbumine] and Nsaids    Subjective     Patient was seen and examined this am, awake alert and oriented x3. Complaining of worsening cough overnight. Otherwise, no new complaints or acute overnight events. 24 hours:  -currently on 3 L NC with Spo2 in the 90s  -Tolerating carb control diet with no difficulty  -Remains on Zosyn, Levaquin, resp cultures growing multi organisms. -MRSA nares not isolated, will dc Vanc  -HGB stable    Objective     TEMPERATURE:  Current - Temp: 97.8 °F (36.6 °C); Max - Temp  Av.4 °F (36.9 °C)  Min: 97.8 °F (36.6 °C)  Max: 99.3 °F (37.4 °C)    RESPIRATIONS RANGE: Resp  Av.4  Min: 14  Max: 35    PULSE RANGE: Pulse  Av  Min: 81  Max: 100    BLOOD PRESSURE RANGE:  Systolic (58IVD), ZEX:379 , Min:84 , YTI:552   ; Diastolic (27FEH), GKE:68, Min:52, Max:76      PULSE OXIMETRY RANGE: SpO2  Av %  Min: 92 %  Max: 100 %    Physical Exam:  I & O - 24hr: I/O this shift:  In: -   · Out: 325 [Urine:325]   · General Appearance: alert, appears stated age and cooperative  · HEENT:  Head: Normocephalic, no lesions, without obvious abnormality. · Neck: no adenopathy, no carotid bruit, no JVD, supple, symmetrical, trachea midline and thyroid not enlarged, symmetric, no tenderness/mass/nodules  · Lung: clear to auscultation bilaterally  · Heart: regular rate and rhythm, S1, S2 normal, no murmur, click, rub or gallop  · Abdomen: Diffuse tenderness  · Extremities:  extremities normal, atraumatic, no cyanosis or edema  · Musculokeletal: No joint swelling, no muscle tenderness. ROM normal in all joints of extremities.    · Neurologic: Mental status: Alert, oriented, thought content appropriate      Medications     Continuous Infusions:   dextrose Scheduled Meds:   potassium phosphate IVPB  20 mmol Intravenous Once    vancomycin  1,500 mg Intravenous Q18H    budesonide  0.5 mg Nebulization BID    formoterol  20 mcg Nebulization Q12H    sodium chloride flush  10 mL Intravenous 2 times per day    ipratropium-albuterol  1 ampule Inhalation Q4H WA    enoxaparin  40 mg Subcutaneous Daily    pantoprazole  40 mg Intravenous Daily    And    sodium chloride (PF)  10 mL Intravenous Daily    levothyroxine  75 mcg Oral Daily    citalopram  40 mg Oral Daily    aspirin  81 mg Oral Daily    insulin lispro  0-6 Units Subcutaneous Q4H    nicotine  1 patch Transdermal Daily    levofloxacin  750 mg Intravenous Q24H    piperacillin-tazobactam  3.375 g Intravenous Q8H    sodium chloride   Intravenous Q8H     PRN Meds: benzonatate, sodium chloride flush, magnesium hydroxide, ondansetron, acetaminophen, glucose, dextrose, glucagon (rDNA), dextrose    Labs and Imaging Studies     CBC:   Recent Labs     01/19/20  1210 01/20/20  0507 01/20/20  1615 01/21/20  0013 01/21/20  0537   WBC 15.5* 11.7*  --   --  17.0*   RBC 3.66 3.05*  --   --  2.94*   HGB 10.5* 8.6* 9.0* 8.0* 8.3*   HCT 34.5 28.6* 29.1* 26.7* 27.6*   MCV 94.3 93.8  --   --  93.9   MCH 28.7 28.2  --   --  28.2   MCHC 30.4* 30.1*  --   --  30.1*   RDW 12.6 13.0  --   --  13.2    197  --   --  203   MPV 11.2 10.5  --   --  10.9       BMP:    Recent Labs     01/19/20  1230 01/19/20  2043 01/20/20  0825 01/21/20  0537   NA  --  133 134 138   K  --  3.3* 3.7 3.6   CL  --  96* 100 105   CO2  --  23 23 25   BUN  --  17 17 14   CREATININE  --  1.0 0.9 1.0   GLUCOSE  --  171* 126* 108*   CALCIUM  --  7.9* 8.0* 8.2*   PROT 7.6  --   --   --    LABALBU 3.3*  --   --   --    BILITOT 0.6  --   --   --    ALKPHOS 58  --   --   --    AST 22  --   --   --    ALT 12  --   --   --        LIVER PROFILE:   Recent Labs     01/19/20  1230   AST 22   ALT 12   LIPASE 6*   BILIDIR <0.2   BILITOT 0.6   ALKPHOS 58 PT/INR:   No results for input(s): PROTIME, INR in the last 72 hours. APTT:   No results for input(s): APTT in the last 72 hours. Fasting Lipid Panel:    Lab Results   Component Value Date    CHOL 166 2018    TRIG 64 2018    HDL 52 2018       Cardiac Enzymes:    Lab Results   Component Value Date    CKTOTAL 142 10/26/2011    CKTOTAL 153 10/25/2011    CKTOTAL 229 (H) 10/25/2011    CKMB 0.5 10/26/2011    CKMB 0.5 10/25/2011    CKMB 0.9 10/25/2011    TROPONINI <0.01 2020    TROPONINI <0.01 10/07/2015    TROPONINI <0.01 10/07/2015       Notable Cultures:      Blood cultures   Blood Culture, Routine   Date Value Ref Range Status   2020 24 Hours- no growth  Preliminary     Respiratory cultures No results found for: RESPCULTURE No results found for: LABGRAM  Urine   Urine Culture, Routine   Date Value Ref Range Status   2020 Growth not present, incubation continues  Preliminary     Legionella No results found for: LABLEGI  C Diff PCR No results found for: CDIFPCR  Wound culture/abscess: No results for input(s): WNDABS in the last 72 hours. Tip culture:No results for input(s): CXCATHTIP in the last 72 hours. Additional Respiratory  Assessments  Pulse: 81  Resp: 27  SpO2: 100 %     Imaging Studies:    Ct Abdomen Pelvis W Iv Contrast Additional Contrast? None    Addendum Date: 2020    Patchy consolidation in the right lower lobe concerning for pneumonia. Surveillance recommended. The ED physician was notified ALERT:  THIS IS AN ABNORMAL REPORT    Result Date: 2020  Patient MRN:  [id number] : 1955 Age: 59 years Gender: Female Order Date:  2020 2:15 PM EXAM: CT ABDOMEN PELVIS W IV CONTRAST number of images 347 Contrast. Isovue-370, 110 mL intravenously. Technique: Low-dose CT  acquisition technique included one of following options; 1 . Automated exposure control, 2. Adjustment of MA and or KV according to patient's size or 3.  Use of iterative reconstruction. INDICATION:  vomiting, abdominal pain vomiting, abdominal pain COMPARISON: None FINDINGS: The lung bases demonstrate patchy consolidation in the right lower lobe concerning for pneumonia. Liver is of normal architecture. Gallbladder is distended without acute inflammation. Consider hepatobiliary scan. Spleen, pancreas, and the kidneys are normal. Bilateral adrenal nodules are identified with larger one on  the left measuring 1.9 x 1.6 cm. Degenerative changes are identified in the lumbar spine with a grade 1 spondylolisthesis at L4-L5. Pelvis. There is dilated fluid-filled small bowel loops likely ileus. The bladder is distended. There is diverticulosis of colon with a mild thickening of the sigmoid colon. Appendix is normal.     Diverticulosis left hemicolon with a mild thickening of the sigmoid colon concerning for uncomplicated diverticulitis versus spasm. There is small bowel ileus. Distended gallbladder. See above. Us Gallbladder Ruq    Result Date: 2020  Patient MRN:  [id number] : 1955 Age: 59 years Gender: Female Order Date:  2020 12:30 PM EXAM: US GALLBLADDER RUQ NUMBER OF IMAGES:  30 INDICATION:  RUQ pain RUQ pain COMPARISON: None The common duct is within normal limits. The gallbladder wall appears unremarkable. Calculi are not identified. Sludge is not identified. Normal gallbladder ultrasound. Xr Chest Portable    Result Date: 2020  Patient MRN:  [id number] : 1955 Age: 59 years Gender: Female Order Date:  2020 7:45 AM EXAM: XR CHEST PORTABLE INDICATION:  pneumonia pneumonia COMPARISON: None FINDINGS:  There is significant right lower lobe infiltrate and small right effusion. It has progressed since the prior study. Left lung is clear. Right central line is appropriate.      Progression of right lower lobe infiltrate     Xr Chest Portable    Result Date: 2020  Patient MRN:  [id number] : 1955 Age: 59 years Gender: Female Order Date: 2020 8:45 PM EXAM: XR CHEST PORTABLE COMPARISON: 2015 INDICATION:  RIJ TLC placement RIJ TLC placement FINDINGS: Right IJ central venous catheter present with distal tip at location of SVC. No pneumothorax. There are opacities at right lung base. The heart is normal in size. No free air beneath the hemidiaphragms. 1. Right IJ central venous catheter is present with distal tip at location of SVC. 2. No pneumothorax. 3. Opacities are present in right lung base related to atelectasis, pneumonia, or effusion. Ct Abdomen Pelvis W Iv Contrast Additional Contrast? None    Addendum Date: 2020    Patchy consolidation in the right lower lobe concerning for pneumonia. Surveillance recommended. The ED physician was notified ALERT:  THIS IS AN ABNORMAL REPORT    Result Date: 2020  Patient MRN:  [id number] : 1955 Age: 59 years Gender: Female Order Date:  2020 2:15 PM EXAM: CT ABDOMEN PELVIS W IV CONTRAST number of images 347 Contrast. Isovue-370, 110 mL intravenously. Technique: Low-dose CT  acquisition technique included one of following options; 1 . Automated exposure control, 2. Adjustment of MA and or KV according to patient's size or 3. Use of iterative reconstruction. INDICATION:  vomiting, abdominal pain vomiting, abdominal pain COMPARISON: None FINDINGS: The lung bases demonstrate patchy consolidation in the right lower lobe concerning for pneumonia. Liver is of normal architecture. Gallbladder is distended without acute inflammation. Consider hepatobiliary scan. Spleen, pancreas, and the kidneys are normal. Bilateral adrenal nodules are identified with larger one on  the left measuring 1.9 x 1.6 cm. Degenerative changes are identified in the lumbar spine with a grade 1 spondylolisthesis at L4-L5. Pelvis. There is dilated fluid-filled small bowel loops likely ileus. The bladder is distended. There is diverticulosis of colon with a mild thickening of the sigmoid colon.  Appendix is normal.     Diverticulosis left hemicolon with a mild thickening of the sigmoid colon concerning for uncomplicated diverticulitis versus spasm. There is small bowel ileus. Distended gallbladder. See above. Us Gallbladder Ruq    Result Date: 2020  Patient MRN:  [id number] : 1955 Age: 59 years Gender: Female Order Date:  2020 12:30 PM EXAM: US GALLBLADDER RUQ NUMBER OF IMAGES:  30 INDICATION:  RUQ pain RUQ pain COMPARISON: None The common duct is within normal limits. The gallbladder wall appears unremarkable. Calculi are not identified. Sludge is not identified. Normal gallbladder ultrasound. Xr Chest Portable    Result Date: 2020  Patient MRN:  [id number] : 1955 Age: 59 years Gender: Female Order Date:  2020 7:45 AM EXAM: XR CHEST PORTABLE INDICATION:  pneumonia pneumonia COMPARISON: None FINDINGS:  There is significant right lower lobe infiltrate and small right effusion. It has progressed since the prior study. Left lung is clear. Right central line is appropriate. Progression of right lower lobe infiltrate     Xr Chest Portable    Result Date: 2020  Patient MRN:  [id number] : 1955 Age: 59 years Gender: Female Order Date:  2020 8:45 PM EXAM: XR CHEST PORTABLE COMPARISON: 2015 INDICATION:  RIJ TLC placement RIJ TLC placement FINDINGS: Right IJ central venous catheter present with distal tip at location of SVC. No pneumothorax. There are opacities at right lung base. The heart is normal in size. No free air beneath the hemidiaphragms. 1. Right IJ central venous catheter is present with distal tip at location of SVC. 2. No pneumothorax. 3. Opacities are present in right lung base related to atelectasis, pneumonia, or effusion. Resident's Assessment and PLan     Assessment:  1. Shock, likely hypovolemic vs. Septic, likely GI source vs. RLL pneumonia - Resolved  2.  RLL pneumonia, likely 2/2 aspiration in the setting of excessive vomiting  3. Acute on chronic hypoxic respiratory failure, on 2L of oxygen at home - Improving  4. Diverticulosis with concerns for diverticulitis per CT A/P findings   5. Lactic acidosis, 2/2 #1   6. Acute microcytic anemia - Baseline 11-12, likely 2/2 hemodilution. Follow FOBT  7. COPD  8. NIDDM  9. 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 -Will dc Vanc as MRSA nares negative  -Resp cultures growing mult-organism including S. Aureus, Strept, yeast and GNR   -Remains on 3 L with Spo2 in the 90s, wean oxygen as tolerated   -Continue Abx, tolerating carb control diet with no difficulty  -Follow FOBT   -Breathing treatment and BiPAP as needed   -Continue home synthroid    Alyssa Trinidad M.D. Internal Medicine Resident - PGY2    Attending Physician: Dr. Dewayne Hernandez      Addendum: We will follow final respiratory cultures. MRSA cultures was negative. Will stop vancomycin. Continue Levaquin and Zosyn for now awaiting Legionella urine antigen results. Lactic acid slight increase, patient hemodynamically stable. We will continue to monitor lactic acid. Patient will be followed by bolus from the [facility]. I personally saw, examined and provided care for the patient. Radiographs, labs and medication list were reviewed by me independently. I spoke with bedside nursing, therapists and consultants. Critical care services and times documented are independent of procedures and multidisciplinary rounds with Residents. Additionally comprehensive, multidisciplinary rounds were conducted with the MICU team. The case was discussed in detail and plans for care were established. Review of Residents documentation was conducted and revisions were made as appropriate. I agree with the above documented exam, problem list and plan of care.   Kerwin Lyn   CCT excluding procedures:35

## 2020-01-22 ENCOUNTER — APPOINTMENT (OUTPATIENT)
Dept: ULTRASOUND IMAGING | Age: 65
DRG: 871 | End: 2020-01-22
Payer: MEDICARE

## 2020-01-22 ENCOUNTER — APPOINTMENT (OUTPATIENT)
Dept: CT IMAGING | Age: 65
DRG: 871 | End: 2020-01-22
Payer: MEDICARE

## 2020-01-22 ENCOUNTER — APPOINTMENT (OUTPATIENT)
Dept: GENERAL RADIOLOGY | Age: 65
DRG: 871 | End: 2020-01-22
Payer: MEDICARE

## 2020-01-22 LAB
ANION GAP SERPL CALCULATED.3IONS-SCNC: 6 MMOL/L (ref 7–16)
ANISOCYTOSIS: ABNORMAL
BASOPHILS ABSOLUTE: 0 E9/L (ref 0–0.2)
BASOPHILS RELATIVE PERCENT: 0.3 % (ref 0–2)
BUN BLDV-MCNC: 9 MG/DL (ref 8–23)
CALCIUM SERPL-MCNC: 8.9 MG/DL (ref 8.6–10.2)
CHLORIDE BLD-SCNC: 108 MMOL/L (ref 98–107)
CO2: 28 MMOL/L (ref 22–29)
CREAT SERPL-MCNC: 0.8 MG/DL (ref 0.5–1)
EOSINOPHILS ABSOLUTE: 0 E9/L (ref 0.05–0.5)
EOSINOPHILS RELATIVE PERCENT: 1.5 % (ref 0–6)
GFR AFRICAN AMERICAN: >60
GFR NON-AFRICAN AMERICAN: >60 ML/MIN/1.73
GLUCOSE BLD-MCNC: 113 MG/DL (ref 74–99)
HCT VFR BLD CALC: 31.6 % (ref 34–48)
HEMOGLOBIN: 9.6 G/DL (ref 11.5–15.5)
L. PNEUMOPHILA SEROGP 1 UR AG: NORMAL
LACTIC ACID: 1 MMOL/L (ref 0.5–2.2)
LACTIC ACID: 1.5 MMOL/L (ref 0.5–2.2)
LYMPHOCYTES ABSOLUTE: 2.12 E9/L (ref 1.5–4)
LYMPHOCYTES RELATIVE PERCENT: 12.2 % (ref 20–42)
MAGNESIUM: 2 MG/DL (ref 1.6–2.6)
MCH RBC QN AUTO: 28.5 PG (ref 26–35)
MCHC RBC AUTO-ENTMCNC: 30.4 % (ref 32–34.5)
MCV RBC AUTO: 93.8 FL (ref 80–99.9)
METER GLUCOSE: 134 MG/DL (ref 74–99)
METER GLUCOSE: 137 MG/DL (ref 74–99)
METER GLUCOSE: 166 MG/DL (ref 74–99)
METER GLUCOSE: 195 MG/DL (ref 74–99)
MONOCYTES ABSOLUTE: 1.77 E9/L (ref 0.1–0.95)
MONOCYTES RELATIVE PERCENT: 9.6 % (ref 2–12)
MYELOCYTE PERCENT: 2.6 % (ref 0–0)
NEUTROPHILS ABSOLUTE: 13.81 E9/L (ref 1.8–7.3)
NEUTROPHILS RELATIVE PERCENT: 75.7 % (ref 43–80)
PDW BLD-RTO: 13.4 FL (ref 11.5–15)
PHOSPHORUS: 1.7 MG/DL (ref 2.5–4.5)
PLATELET # BLD: 273 E9/L (ref 130–450)
PMV BLD AUTO: 11.2 FL (ref 7–12)
POTASSIUM SERPL-SCNC: 4.1 MMOL/L (ref 3.5–5)
PROCALCITONIN: 11.39 NG/ML (ref 0–0.08)
RBC # BLD: 3.37 E12/L (ref 3.5–5.5)
SODIUM BLD-SCNC: 142 MMOL/L (ref 132–146)
STREP PNEUMONIAE ANTIGEN, URINE: NORMAL
WBC # BLD: 17.7 E9/L (ref 4.5–11.5)

## 2020-01-22 PROCEDURE — 2580000003 HC RX 258: Performed by: INTERNAL MEDICINE

## 2020-01-22 PROCEDURE — 83735 ASSAY OF MAGNESIUM: CPT

## 2020-01-22 PROCEDURE — 99232 SBSQ HOSP IP/OBS MODERATE 35: CPT | Performed by: INTERNAL MEDICINE

## 2020-01-22 PROCEDURE — 93970 EXTREMITY STUDY: CPT

## 2020-01-22 PROCEDURE — 82962 GLUCOSE BLOOD TEST: CPT

## 2020-01-22 PROCEDURE — 84145 PROCALCITONIN (PCT): CPT

## 2020-01-22 PROCEDURE — 2700000000 HC OXYGEN THERAPY PER DAY

## 2020-01-22 PROCEDURE — 94640 AIRWAY INHALATION TREATMENT: CPT

## 2020-01-22 PROCEDURE — 6360000002 HC RX W HCPCS: Performed by: INTERNAL MEDICINE

## 2020-01-22 PROCEDURE — 84100 ASSAY OF PHOSPHORUS: CPT

## 2020-01-22 PROCEDURE — 94660 CPAP INITIATION&MGMT: CPT

## 2020-01-22 PROCEDURE — 2500000003 HC RX 250 WO HCPCS: Performed by: INTERNAL MEDICINE

## 2020-01-22 PROCEDURE — 71045 X-RAY EXAM CHEST 1 VIEW: CPT

## 2020-01-22 PROCEDURE — 85025 COMPLETE CBC W/AUTO DIFF WBC: CPT

## 2020-01-22 PROCEDURE — 71250 CT THORAX DX C-: CPT

## 2020-01-22 PROCEDURE — 6370000000 HC RX 637 (ALT 250 FOR IP): Performed by: INTERNAL MEDICINE

## 2020-01-22 PROCEDURE — 2060000000 HC ICU INTERMEDIATE R&B

## 2020-01-22 PROCEDURE — 83605 ASSAY OF LACTIC ACID: CPT

## 2020-01-22 PROCEDURE — 36415 COLL VENOUS BLD VENIPUNCTURE: CPT

## 2020-01-22 PROCEDURE — C9113 INJ PANTOPRAZOLE SODIUM, VIA: HCPCS | Performed by: INTERNAL MEDICINE

## 2020-01-22 PROCEDURE — 80048 BASIC METABOLIC PNL TOTAL CA: CPT

## 2020-01-22 PROCEDURE — 36592 COLLECT BLOOD FROM PICC: CPT

## 2020-01-22 RX ORDER — FUROSEMIDE 10 MG/ML
20 INJECTION INTRAMUSCULAR; INTRAVENOUS ONCE
Status: COMPLETED | OUTPATIENT
Start: 2020-01-22 | End: 2020-01-22

## 2020-01-22 RX ORDER — IPRATROPIUM BROMIDE AND ALBUTEROL SULFATE 2.5; .5 MG/3ML; MG/3ML
1 SOLUTION RESPIRATORY (INHALATION) ONCE
Status: COMPLETED | OUTPATIENT
Start: 2020-01-22 | End: 2020-01-22

## 2020-01-22 RX ORDER — IPRATROPIUM BROMIDE AND ALBUTEROL SULFATE 2.5; .5 MG/3ML; MG/3ML
1 SOLUTION RESPIRATORY (INHALATION) EVERY 4 HOURS
Status: DISCONTINUED | OUTPATIENT
Start: 2020-01-22 | End: 2020-01-26 | Stop reason: HOSPADM

## 2020-01-22 RX ORDER — DOXYCYCLINE HYCLATE 100 MG/1
100 CAPSULE ORAL EVERY 12 HOURS SCHEDULED
Status: DISCONTINUED | OUTPATIENT
Start: 2020-01-22 | End: 2020-01-26 | Stop reason: HOSPADM

## 2020-01-22 RX ORDER — METHYLPREDNISOLONE SODIUM SUCCINATE 40 MG/ML
40 INJECTION, POWDER, LYOPHILIZED, FOR SOLUTION INTRAMUSCULAR; INTRAVENOUS EVERY 12 HOURS
Status: DISCONTINUED | OUTPATIENT
Start: 2020-01-22 | End: 2020-01-26 | Stop reason: HOSPADM

## 2020-01-22 RX ADMIN — ASPIRIN 81 MG CHEWABLE TABLET 81 MG: 81 TABLET CHEWABLE at 10:32

## 2020-01-22 RX ADMIN — BUDESONIDE 500 MCG: 0.5 SUSPENSION RESPIRATORY (INHALATION) at 09:43

## 2020-01-22 RX ADMIN — BENZONATATE 100 MG: 100 CAPSULE ORAL at 00:18

## 2020-01-22 RX ADMIN — FORMOTEROL FUMARATE DIHYDRATE 20 MCG: 20 SOLUTION RESPIRATORY (INHALATION) at 20:22

## 2020-01-22 RX ADMIN — INSULIN LISPRO 1 UNITS: 100 INJECTION, SOLUTION INTRAVENOUS; SUBCUTANEOUS at 18:19

## 2020-01-22 RX ADMIN — DOXYCYCLINE HYCLATE 100 MG: 100 CAPSULE ORAL at 22:44

## 2020-01-22 RX ADMIN — METHYLPREDNISOLONE SODIUM SUCCINATE 40 MG: 40 INJECTION, POWDER, FOR SOLUTION INTRAMUSCULAR; INTRAVENOUS at 11:40

## 2020-01-22 RX ADMIN — CITALOPRAM 40 MG: 20 TABLET, FILM COATED ORAL at 10:32

## 2020-01-22 RX ADMIN — IPRATROPIUM BROMIDE AND ALBUTEROL SULFATE 1 AMPULE: 2.5; .5 SOLUTION RESPIRATORY (INHALATION) at 20:22

## 2020-01-22 RX ADMIN — FUROSEMIDE 20 MG: 10 INJECTION, SOLUTION INTRAMUSCULAR; INTRAVENOUS at 10:34

## 2020-01-22 RX ADMIN — BUDESONIDE 500 MCG: 0.5 SUSPENSION RESPIRATORY (INHALATION) at 20:22

## 2020-01-22 RX ADMIN — PIPERACILLIN AND TAZOBACTAM 3.38 G: 3; .375 INJECTION, POWDER, LYOPHILIZED, FOR SOLUTION INTRAVENOUS at 10:33

## 2020-01-22 RX ADMIN — FORMOTEROL FUMARATE DIHYDRATE 20 MCG: 20 SOLUTION RESPIRATORY (INHALATION) at 09:42

## 2020-01-22 RX ADMIN — Medication 10 ML: at 10:35

## 2020-01-22 RX ADMIN — ACETAMINOPHEN 650 MG: 325 TABLET, FILM COATED ORAL at 22:44

## 2020-01-22 RX ADMIN — Medication 10 ML: at 10:33

## 2020-01-22 RX ADMIN — IPRATROPIUM BROMIDE AND ALBUTEROL SULFATE 1 AMPULE: 2.5; .5 SOLUTION RESPIRATORY (INHALATION) at 12:54

## 2020-01-22 RX ADMIN — SODIUM CHLORIDE: 9 INJECTION, SOLUTION INTRAVENOUS at 04:00

## 2020-01-22 RX ADMIN — ENOXAPARIN SODIUM 40 MG: 40 INJECTION SUBCUTANEOUS at 10:34

## 2020-01-22 RX ADMIN — Medication 10 ML: at 21:00

## 2020-01-22 RX ADMIN — PANTOPRAZOLE SODIUM 40 MG: 40 INJECTION, POWDER, FOR SOLUTION INTRAVENOUS at 10:36

## 2020-01-22 RX ADMIN — DOXYCYCLINE HYCLATE 100 MG: 100 CAPSULE ORAL at 12:55

## 2020-01-22 RX ADMIN — SODIUM CHLORIDE: 9 INJECTION, SOLUTION INTRAVENOUS at 23:03

## 2020-01-22 RX ADMIN — PIPERACILLIN AND TAZOBACTAM 3.38 G: 3; .375 INJECTION, POWDER, LYOPHILIZED, FOR SOLUTION INTRAVENOUS at 16:26

## 2020-01-22 RX ADMIN — POTASSIUM PHOSPHATE, MONOBASIC AND POTASSIUM PHOSPHATE, DIBASIC 20 MMOL: 224; 236 INJECTION, SOLUTION, CONCENTRATE INTRAVENOUS at 10:33

## 2020-01-22 RX ADMIN — IPRATROPIUM BROMIDE AND ALBUTEROL SULFATE 1 AMPULE: 2.5; .5 SOLUTION RESPIRATORY (INHALATION) at 09:41

## 2020-01-22 RX ADMIN — LEVOTHYROXINE SODIUM 75 MCG: 0.07 TABLET ORAL at 06:56

## 2020-01-22 RX ADMIN — IPRATROPIUM BROMIDE AND ALBUTEROL SULFATE 1 AMPULE: 2.5; .5 SOLUTION RESPIRATORY (INHALATION) at 16:33

## 2020-01-22 ASSESSMENT — PAIN DESCRIPTION - DESCRIPTORS: DESCRIPTORS: DISCOMFORT

## 2020-01-22 ASSESSMENT — PAIN SCALES - GENERAL
PAINLEVEL_OUTOF10: 0
PAINLEVEL_OUTOF10: 6
PAINLEVEL_OUTOF10: 0

## 2020-01-22 ASSESSMENT — PAIN DESCRIPTION - FREQUENCY: FREQUENCY: INTERMITTENT

## 2020-01-22 ASSESSMENT — PAIN DESCRIPTION - ORIENTATION: ORIENTATION: UPPER

## 2020-01-22 ASSESSMENT — PAIN DESCRIPTION - PAIN TYPE: TYPE: ACUTE PAIN

## 2020-01-22 ASSESSMENT — PAIN DESCRIPTION - LOCATION: LOCATION: ABDOMEN

## 2020-01-22 NOTE — PROGRESS NOTES
Nutrition Assessment    Type and Reason for Visit: Initial, Positive Nutrition Screen    Nutrition Recommendations: Recommend and start Glucerna supplement BID (per discussion with pt) d/t decreased po intake of meals. Nutrition Assessment: Pt in bed stating poor appetite x 3 days 2/2 to N/V ; Pt at further nutritional risk d/t increased needs from COPD; Will start nutritional supplementation     Malnutrition Assessment:  · Malnutrition Status: At risk for malnutrition  · Context: Acute illness or injury  · Findings of the 6 clinical characteristics of malnutrition (Minimum of 2 out of 6 clinical characteristics is required to make the diagnosis of moderate or severe Protein Calorie Malnutrition based on AND/ASPEN Guidelines):  1. Energy Intake-Less than or equal to 75% of estimated energy requirement, (x 3 days )    2. Weight Loss-No significant weight loss,    3. Fat Loss-No significant subcutaneous fat loss,    4. Muscle Loss-No significant muscle mass loss,    5. Fluid Accumulation-No significant fluid accumulation,    6.   Strength-Not measured    Nutrition Risk Level: Low    Nutrient Needs:  · Estimated Daily Total Kcal: 3853-2458 (REE 1467 x 1.1 SF)  · Estimated Daily Protein (g): 75-90 (1.3-1.5g/kg IBW)  · Estimated Daily Total Fluid (ml/day): 9322-1718    Nutrition Diagnosis:   · Problem: Inadequate oral intake  · Etiology: related to Insufficient energy/nutrient consumption     Signs and symptoms:  as evidenced by Patient report of, Diet history of poor intake, Intake 50-75%    Objective Information:  · Nutrition-Focused Physical Findings: I&Os WNL , no edema, active BS, rounded/tenderness to abd, A&O x 4, loose stools PTA, N/V PTA, no wasting      · Wound Type: None     · Current Nutrition Therapies:  · Oral Diet Orders: Carb Control 4 Carbs/Meal   · Oral Diet intake: 51-75%(per patient ; limited meals recorded in flowsheets at this time ; poor appetite x 3 days per pt)  · Oral Nutrition

## 2020-01-22 NOTE — PROGRESS NOTES
S1, S2 normal, no murmur, click, rub or gallop  · Abdomen: soft, non-tender; bowel sounds normal; no masses,  no organomegaly and tenderness in the RUQ and epigastric regions  · Extremities:  extremities normal, atraumatic, no cyanosis or edema  · Musculokeletal: No joint swelling, no muscle tenderness. ROM normal in all joints of extremities. · Neurologic: Mental status: Alert, oriented, thought content appropriate  Subject  Pertinent Labs & Imaging Studies   yusuf  CBC with Differential:    Lab Results   Component Value Date    WBC 17.7 01/22/2020    RBC 3.37 01/22/2020    HGB 9.6 01/22/2020    HCT 31.6 01/22/2020     01/22/2020    MCV 93.8 01/22/2020    MCH 28.5 01/22/2020    MCHC 30.4 01/22/2020    RDW 13.4 01/22/2020    SEGSPCT 36 01/03/2014    METASPCT 0.9 01/20/2020    LYMPHOPCT 12.2 01/22/2020    MONOPCT 9.6 01/22/2020    MYELOPCT 2.6 01/22/2020    BASOPCT 0.3 01/22/2020    MONOSABS 1.77 01/22/2020    LYMPHSABS 2.12 01/22/2020    EOSABS 0.00 01/22/2020    BASOSABS 0.00 01/22/2020     CMP:    Lab Results   Component Value Date     01/22/2020    K 4.1 01/22/2020     01/22/2020    CO2 28 01/22/2020    BUN 9 01/22/2020    CREATININE 0.8 01/22/2020    GFRAA >60 01/22/2020    LABGLOM >60 01/22/2020    GLUCOSE 113 01/22/2020    GLUCOSE 124 10/26/2011    PROT 7.6 01/19/2020    LABALBU 3.3 01/19/2020    LABALBU 3.9 10/26/2011    CALCIUM 8.9 01/22/2020    BILITOT 0.6 01/19/2020    ALKPHOS 58 01/19/2020    AST 22 01/19/2020    ALT 12 01/19/2020       Resident's Assessment and Plan     Olga Velasquez is a 59 y.o. female    1. Shock - hypovolemic and septic  -2/2 PNA of the RLL and GI losses (episodes of vomiting, episode of diarrhea)  -pt received 4L IV NS in the ED, received continuous IVF at 100cc/hr in the MICU - stopped 1/21 AM. Has not required pressors. -BP has been stable               -  2. CAP  -CXR showing progressing RLL consolidation, with b/l pleural effusion right > left   -initial procal 23.46  -WBC on arrival was 15.5. WBC is up 17.7 this AM  -respiratory panel and rapid influenza negative  -pending resp cx  - legionella and strep pneumo negative  -d/c levaquin and vancomycin  -continued on zosyn              -  3. Lactic Acidosis - resolved   -4.5 on arrival  -s/p 4L IV NS in the ED. Pt received continuous IVF.                -  4. Diverticulitis  -CT abdomen shows L hemicolon diverticulosis with sigmoid thickening and diverticulitis. Pt reported an episode of diarrhea prior to admission. Has not had any episodes of diarrhea since admission. -pending c. Diff, stool culture, and fecal leukocytes   -coverage with abx: zosyn  -replacing electrolytes as needed  -advance diet as tolerated               -  5. AMADA - resolved  -Cr on arrival was 1.1, 0.8 this AM  -FENa of 0.2% suggests prerenal cause 2/2 hypovolemia     6. DM  -pt home meds includes metformin - holding  -low dose sliding scale insulin  -hypoglycemia protocol in place      7. Hypothyroid  -pt has a history of thyroid nodules. Underwent FNA in 2018, which was inconclusive. Was scheduled to undergo thyroidectomy in 2019 which was never completed. -home meds includes synthroid 75 mcg daily - continued  -TSH WNL 2.55; free T4 was low at 0.85     8. Anemia  -Hb on arrival was 10.5, is 8.2 this AM  -transfuse if Hb <7  -normocytic anemia - 2/2 anemia of chronic disease vs blood loss  -H&H q8h      9. Hx of Multiple Myeloma  -pt reports that she was diagnosed in 2009 and has never received treatment   -consider OP heme/onc follow up     10.  Adrenal incidentalomas  -b/l nodules, with larger one on the Left 1.9x1.6cm  -consider OP functional workup    PT/OT evaluation - completed 1/21 with [facility] of [address] [facility] [address]  Attending physician: Dr. Albert Zarate

## 2020-01-22 NOTE — PROGRESS NOTES
Pati,    esomeprazole (NEXIUM) 40 MG delayed release capsule TAKE 1 CAPSULE BY MOUTH EVERY DAY 12/6/19   Tobi Iglesias DO   gabapentin (NEURONTIN) 400 MG capsule Take 1 capsule by mouth 2 times daily for 60 days. 12/6/19 2/4/20  Tobi Iglesias DO   levothyroxine (SYNTHROID) 75 MCG tablet Take 1 tablet by mouth Daily 12/6/19   Tobi Iglesias DO   metFORMIN (GLUCOPHAGE) 1000 MG tablet TAKE 1 TABLET BY MOUTH TWICE DAILY WITH MEALS 12/6/19   Tobi Iglesias, DO   triamterene-hydrochlorothiazide (MAXZIDE-25) 37.5-25 MG per tablet TAKE 1 TABLET BY MOUTH EVERY DAY 12/6/19   Tobi Iglesias, DO   citalopram (CELEXA) 40 MG tablet Take 1 tablet by mouth daily 10/25/19   Sonia Rubin MD   blood glucose monitor kit and supplies Test 1 times a day & as needed for symptoms of irregular blood glucose. Accuchek Avivia 2/18/19   Sharath Johnson MD   blood glucose monitor strips Testing daily 2/11/19   Isai Mclaughlin MD   Lancets MISC Testing daily 2/11/19   Isai Mclaughlin MD   Misc.  Devices MISC Oxygen concentrator  j44.9 10/26/18   Loco Casas DO       CURRENT MEDICATIONS:  Current Facility-Administered Medications: potassium phosphate 20 mmol in dextrose 5 % 250 mL IVPB, 20 mmol, Intravenous, Once  doxycycline hyclate (VIBRAMYCIN) capsule 100 mg, 100 mg, Oral, 2 times per day  methylPREDNISolone sodium (SOLU-MEDROL) injection 40 mg, 40 mg, Intravenous, Q12H  ipratropium-albuterol (DUONEB) nebulizer solution 1 ampule, 1 ampule, Inhalation, Q4H  benzonatate (TESSALON) capsule 100 mg, 100 mg, Oral, TID PRN  budesonide (PULMICORT) nebulizer suspension 500 mcg, 0.5 mg, Nebulization, BID  formoterol (PERFOROMIST) nebulizer solution 20 mcg, 20 mcg, Nebulization, Q12H  sodium chloride flush 0.9 % injection 10 mL, 10 mL, Intravenous, 2 times per day  sodium chloride flush 0.9 % injection 10 mL, 10 mL, Intravenous, PRN  magnesium hydroxide (MILK OF MAGNESIA) 400 MG/5ML suspension 30 mL, 30 mL, Oral, Daily Temp 97 °F (36.1 °C) (Temporal)   Resp 18   Ht 5' 6\" (1.676 m)   Wt 198 lb 8 oz (90 kg)   SpO2 96%   BMI 32.04 kg/m²   Wt Readings from Last 3 Encounters:   20 198 lb 8 oz (90 kg)   19 186 lb (84.4 kg)   10/25/19 184 lb 3.2 oz (83.6 kg)     Temp Readings from Last 3 Encounters:   20 97 °F (36.1 °C) (Temporal)   19 98.7 °F (37.1 °C) (Oral)   10/25/19 98.6 °F (37 °C) (Oral)     TMAX:  BP Readings from Last 3 Encounters:   20 138/78   19 136/68   10/25/19 (!) 154/89     Pulse Readings from Last 3 Encounters:   20 89   19 93   10/25/19 90       CURRENT PULSE OXIMETRY: SpO2: 96 %  24HR PULSE OXIMETRY RANGE: SpO2  Av.7 %  Min: 96 %  Max: 100 %  CVP:      ________________________________________________________________________    VENTILATOR SETTINGS (if applicable): Additional Respiratory  Assessments  Pulse: 89  Resp: 18  SpO2: 96 %  ETCO2:  Peak Inspiratory Pressure:  End-Inspiratory Plateau Pressure:    ABG:  No results for input(s): PH, PO2, PCO2, HCO3, BE, O2SAT, METHB, O2HB, COHB, O2CON, HHB, THB in the last 72 hours. ________________________________________________________________________    IV ACCESS:    NUTRITION: DIET CARB CONTROL; Carb Control: 4 carb choices (60 gms)/meal  Dietary Nutrition Supplements: Diabetic Oral Supplement    INTAKE/OUTPUTS:  I/O last 3 completed shifts: In:  [P.O.:1790;  I.V.:10; IV Piggyback:250]  Out:  [Urine:3025]    Intake/Output Summary (Last 24 hours) at 2020 1253  Last data filed at 2020 0549  Gross per 24 hour   Intake 1260 ml   Output 2250 ml   Net -990 ml     General Appearance: alert and oriented to person, place and time, well-developed and   well-nourished, in no acute distress   Eyes: pupils equal, round, and reactive to light, extraocular eye movements intact, conjunctivae normal and sclera anicteric   Neck: neck supple and non tender without mass, no thyromegaly, no thyroid 6 (L) 01/19/2020     LIVER PROFILE:   No results for input(s): AST, ALT, LIPASE, BILIDIR, BILITOT, ALKPHOS in the last 72 hours. Invalid input(s): AMYLASE,  ALB    PT/INR: No results for input(s): PROTIME, INR in the last 72 hours. APTT: No results for input(s): APTT in the last 72 hours. Cardiac Enzymes:  Lab Results   Component Value Date    CKTOTAL 142 10/26/2011    CKMB 0.5 10/26/2011    TROPONINI <0.01 01/19/2020       Hgb A1C:   Lab Results   Component Value Date    LABA1C 5.4 08/08/2019     No results found for: EAG  ANNETTE: No results found for: ANNETTE  ESR:   Lab Results   Component Value Date    SEDRATE 47 (H) 10/21/2016     CRP:   Lab Results   Component Value Date    CRP 7.0 (H) 10/03/2012     D Dimer: No results found for: DDIMER  Folate and B12:   Lab Results   Component Value Date    [id number] 338 09/19/2014   ,   Lab Results   Component Value Date    FOLATE 9.6 09/19/2014       Lactic Acid:   Lab Results   Component Value Date    LACTA 1.0 01/22/2020     Ammonia:   Cortisol:  Thyroid Studies:  Lab Results   Component Value Date    TSH 2.550 01/19/2020     XR CHEST PORTABLE   Final Result   Progression of right lower lobe infiltrate                   XR CHEST PORTABLE   Final Result       1. Right IJ central venous catheter is present with distal tip at   location of SVC.       2. No pneumothorax.       3. Opacities are present in right lung base related to atelectasis,   pneumonia, or effusion.       CT ABDOMEN PELVIS W IV CONTRAST Additional Contrast? None   Final Result   Addendum 1 of 1   Patchy consolidation in the right lower lobe concerning for pneumonia.    Surveillance recommended.       The ED physician was notified       ALERT:  THIS IS AN ABNORMAL REPORT       Final       US GALLBLADDER RUQ   Final Result   Normal gallbladder ultrasound.            XR CHEST PORTABLE    (Results Pending)        CXR 1/22/2020: no interval change     ASSESSMENT:  1.) Severe Sepsis with Septic Shock - RLL

## 2020-01-22 NOTE — PROGRESS NOTES
[facility]  [facility]  Progress Note - House Team 1    Patient:  Hadley Torres 59 y.o. female MRN: [id number]     Date of Service: 1/22/2020     CC: SOB  Overnight events: None     Subjective     Patient was seen and examined this morning at bedside in no acute distress. Now at baseline O2 requirements. Saturating > 95% on 2L NC. Very wheezy on exam this morning. Improved, but persistent abdominal pain. Had bowel movement this AM and tolerating PO diet without difficulty. Patient reports decreased sputum production. No other acute complaints at this time.      Objective     Physical Exam:  · Vitals: /78   Pulse 89   Temp 97 °F (36.1 °C) (Temporal)   Resp 18   Ht 5' 6\" (1.676 m)   Wt 198 lb 8 oz (90 kg)   SpO2 96%   BMI 32.04 kg/m²     · I & O - 24hr: I/O this shift:  · In: 350 [IV Piggyback:350]  · Out: 2400 [Urine:2400]   § General Appearance: alert and oriented to person, place and time, well-developed and well-nourished, in no acute distress  § Skin: warm and dry, no rash or erythema  § Head: normocephalic and atraumatic  § Eyes: pupils equal, round, and reactive to light, extraocular eye movements intact, conjunctivae normal, conjunctivae normal and sclera anicteric  § ENT: hearing grossly normal bilaterally and oropharynx clear and moist with normal mucous membranes  § Neck: tender cervical adenopathy present- R LAD   § Pulmonary/Chest: wheezing present- bilaterally and rales present- bilaterally  § Cardiovascular: normal rate, regular rhythm, normal S1 and S2, no murmurs, no gallops, intact distal pulses, no carotid bruits and no JVD  § Abdomen: non-distended, bowel sounds normal, no masses, no organomegaly, no abdominal bruits and tenderness present- RUQ,  without rebound and guarding, Moran sign negative  § Extremities: no cyanosis, no clubbing and no edema  § Musculoskeletal: normal range of motion, no joint swelling, deformity or tenderness  § Neurologic: no cranial nerve deficit, speech normal and no tremor   Subject  Pertinent Labs & Imaging Studies   yusuf  CBC with Differential:    Lab Results   Component Value Date    WBC 17.7 01/22/2020    RBC 3.37 01/22/2020    HGB 9.6 01/22/2020    HCT 31.6 01/22/2020     01/22/2020    MCV 93.8 01/22/2020    MCH 28.5 01/22/2020    MCHC 30.4 01/22/2020    RDW 13.4 01/22/2020    SEGSPCT 36 01/03/2014    METASPCT 0.9 01/20/2020    LYMPHOPCT 12.2 01/22/2020    MONOPCT 9.6 01/22/2020    MYELOPCT 2.6 01/22/2020    BASOPCT 0.3 01/22/2020    MONOSABS 1.77 01/22/2020    LYMPHSABS 2.12 01/22/2020    EOSABS 0.00 01/22/2020    BASOSABS 0.00 01/22/2020     BMP:    Lab Results   Component Value Date     01/22/2020    K 4.1 01/22/2020     01/22/2020    CO2 28 01/22/2020    BUN 9 01/22/2020    LABALBU 3.3 01/19/2020    LABALBU 3.9 10/26/2011    CREATININE 0.8 01/22/2020    CALCIUM 8.9 01/22/2020    GFRAA >60 01/22/2020    LABGLOM >60 01/22/2020    GLUCOSE 113 01/22/2020    GLUCOSE 124 10/26/2011       Resident's Assessment and Plan     ALYSE  · Resume home Celexa and amitriptyline  · Mood stable, no SI/HI      Septic + Hypovolemic Shock  · 2/2 PNA and GI losses   · S/p 4L in ED  · Can get NICOM if hypotensive  · Central line in place if pressors necessary  · F/u pan cultures  · No adrenal insufficiency      Acute Hypoxic Respiratory Failure  · 2/2 PNA  · Breathing treatments: Silver Performist  · Continue Abx  · Start Solumedrol 40mg IV BID   · Lasix 20mg IV x 1, monitor UOP and electrolytes, replace as indicated  · Bronchopulmonary hygiene PEP/flutter valve, guaifenesin   · Pulmonology on board, recs appreciated     Aspiration PNA  · Continue Zosyn  · discontinue Levaquin  · Start doxycycline  · Repeat procalcitonin 11     COPD  · Not in acute exacerbation  · DuoNebs q4h wa, Performist BID wa     Diverticulitis  · CT abdomen with diverticulitis  · Will get C diff in context of immunosuppression in setting of MM  · Continue Abx  · F/u stool culture, fecal leukocytes     AMADA, resolved  · Likely pre-renal in context of shock  · Cr baseline 0.8  · FEUrea 34%, pre-renal disease  · BMP qd     Lactic Acidosis  · 2/2 sepsis  · Trend LA q6h  · Continue IV NS 100cc/hr, discontinue once LA normalized     Hypothyroidism  · W/ thyroid nodules  · FNA 2018 inconclusive  · Continue Synthroid 75mcg qd     NIDDM2  · Hold home dose metformin  · LDSS  · POC BG checks BID while NPO  · Hypoglycemia protocol in place     Normocytic Hypochromic Anemia  · 2/2 anemia of chronic disease  · CBC qd  · Transfuse if Hb < 7     H/o Multiple Myeloma      PT/OT evaluation: not ordered  DVT prophylaxis/ GI prophylaxis: Lovenox/Protonix  Disposition: continue current care    Tsering Jarrell MD, PGY-1  Attending physician: Dr. Matias Bateman

## 2020-01-22 NOTE — PLAN OF CARE
Problem: Falls - Risk of:  Goal: Will remain free from falls  Description  Will remain free from falls  1/22/2020 1207 by Darian Moscoso RN  Outcome: Met This Shift     Problem: Falls - Risk of:  Goal: Absence of physical injury  Description  Absence of physical injury  1/22/2020 1207 by Darian Moscoso RN  Outcome: Met This Shift     Problem: Pain:  Goal: Pain level will decrease  Description  Pain level will decrease  Outcome: Met This Shift     Problem: Pain:  Goal: Control of acute pain  Description  Control of acute pain  Outcome: Met This Shift     Problem: Pain:  Goal: Control of chronic pain  Description  Control of chronic pain  Outcome: Met This Shift     Problem: Airway Clearance - Ineffective:  Goal: Clear lung sounds  Description  Clear lung sounds  Outcome: Met This Shift     Problem: Gas Exchange - Impaired:  Goal: Levels of oxygenation will improve  Description  Levels of oxygenation will improve  Outcome: Met This Shift

## 2020-01-23 LAB
ANION GAP SERPL CALCULATED.3IONS-SCNC: 12 MMOL/L (ref 7–16)
ANISOCYTOSIS: ABNORMAL
BASOPHILS ABSOLUTE: 0 E9/L (ref 0–0.2)
BASOPHILS RELATIVE PERCENT: 0.7 % (ref 0–2)
BUN BLDV-MCNC: 9 MG/DL (ref 8–23)
CALCIUM SERPL-MCNC: 9.3 MG/DL (ref 8.6–10.2)
CHLORIDE BLD-SCNC: 103 MMOL/L (ref 98–107)
CO2: 27 MMOL/L (ref 22–29)
CREAT SERPL-MCNC: 0.7 MG/DL (ref 0.5–1)
EOSINOPHILS ABSOLUTE: 0 E9/L (ref 0.05–0.5)
EOSINOPHILS RELATIVE PERCENT: 0.1 % (ref 0–6)
GFR AFRICAN AMERICAN: >60
GFR NON-AFRICAN AMERICAN: >60 ML/MIN/1.73
GLUCOSE BLD-MCNC: 158 MG/DL (ref 74–99)
HCT VFR BLD CALC: 31.2 % (ref 34–48)
HEMOGLOBIN: 9.6 G/DL (ref 11.5–15.5)
LYMPHOCYTES ABSOLUTE: 1.36 E9/L (ref 1.5–4)
LYMPHOCYTES RELATIVE PERCENT: 9.6 % (ref 20–42)
MAGNESIUM: 1.6 MG/DL (ref 1.6–2.6)
MCH RBC QN AUTO: 28.2 PG (ref 26–35)
MCHC RBC AUTO-ENTMCNC: 30.8 % (ref 32–34.5)
MCV RBC AUTO: 91.5 FL (ref 80–99.9)
METAMYELOCYTES RELATIVE PERCENT: 5.2 % (ref 0–1)
METER GLUCOSE: 176 MG/DL (ref 74–99)
METER GLUCOSE: 176 MG/DL (ref 74–99)
METER GLUCOSE: 177 MG/DL (ref 74–99)
METER GLUCOSE: 209 MG/DL (ref 74–99)
METER GLUCOSE: 99 MG/DL (ref 74–99)
MONOCYTES ABSOLUTE: 0.54 E9/L (ref 0.1–0.95)
MONOCYTES RELATIVE PERCENT: 3.5 % (ref 2–12)
MYELOCYTE PERCENT: 4.3 % (ref 0–0)
NEUTROPHILS ABSOLUTE: 11.29 E9/L (ref 1.8–7.3)
NEUTROPHILS RELATIVE PERCENT: 73 % (ref 43–80)
PDW BLD-RTO: 13.3 FL (ref 11.5–15)
PHOSPHORUS: 2.6 MG/DL (ref 2.5–4.5)
PLASMA CELLS PERCENT: 0.9 % (ref 0–0)
PLATELET # BLD: 321 E9/L (ref 130–450)
PMV BLD AUTO: 10.3 FL (ref 7–12)
POTASSIUM SERPL-SCNC: 3.9 MMOL/L (ref 3.5–5)
PROMYELOCYTES PERCENT: 3.5 % (ref 0–0)
RBC # BLD: 3.41 E12/L (ref 3.5–5.5)
SODIUM BLD-SCNC: 142 MMOL/L (ref 132–146)
WBC # BLD: 13.6 E9/L (ref 4.5–11.5)

## 2020-01-23 PROCEDURE — 2060000000 HC ICU INTERMEDIATE R&B

## 2020-01-23 PROCEDURE — 6370000000 HC RX 637 (ALT 250 FOR IP): Performed by: INTERNAL MEDICINE

## 2020-01-23 PROCEDURE — 94660 CPAP INITIATION&MGMT: CPT

## 2020-01-23 PROCEDURE — C9113 INJ PANTOPRAZOLE SODIUM, VIA: HCPCS | Performed by: INTERNAL MEDICINE

## 2020-01-23 PROCEDURE — 2700000000 HC OXYGEN THERAPY PER DAY

## 2020-01-23 PROCEDURE — 83735 ASSAY OF MAGNESIUM: CPT

## 2020-01-23 PROCEDURE — 85025 COMPLETE CBC W/AUTO DIFF WBC: CPT

## 2020-01-23 PROCEDURE — 6360000002 HC RX W HCPCS: Performed by: INTERNAL MEDICINE

## 2020-01-23 PROCEDURE — 97530 THERAPEUTIC ACTIVITIES: CPT

## 2020-01-23 PROCEDURE — 99232 SBSQ HOSP IP/OBS MODERATE 35: CPT | Performed by: INTERNAL MEDICINE

## 2020-01-23 PROCEDURE — 2580000003 HC RX 258: Performed by: INTERNAL MEDICINE

## 2020-01-23 PROCEDURE — 97535 SELF CARE MNGMENT TRAINING: CPT

## 2020-01-23 PROCEDURE — 84100 ASSAY OF PHOSPHORUS: CPT

## 2020-01-23 PROCEDURE — 82962 GLUCOSE BLOOD TEST: CPT

## 2020-01-23 PROCEDURE — 94640 AIRWAY INHALATION TREATMENT: CPT

## 2020-01-23 PROCEDURE — 97110 THERAPEUTIC EXERCISES: CPT

## 2020-01-23 PROCEDURE — 36415 COLL VENOUS BLD VENIPUNCTURE: CPT

## 2020-01-23 PROCEDURE — 80048 BASIC METABOLIC PNL TOTAL CA: CPT

## 2020-01-23 RX ORDER — TRIAMTERENE AND HYDROCHLOROTHIAZIDE 37.5; 25 MG/1; MG/1
1 TABLET ORAL DAILY
Status: DISCONTINUED | OUTPATIENT
Start: 2020-01-23 | End: 2020-01-23

## 2020-01-23 RX ORDER — AMLODIPINE BESYLATE 5 MG/1
5 TABLET ORAL DAILY
Status: DISCONTINUED | OUTPATIENT
Start: 2020-01-23 | End: 2020-01-26 | Stop reason: HOSPADM

## 2020-01-23 RX ORDER — PANTOPRAZOLE SODIUM 40 MG/1
40 TABLET, DELAYED RELEASE ORAL
Status: DISCONTINUED | OUTPATIENT
Start: 2020-01-24 | End: 2020-01-26 | Stop reason: HOSPADM

## 2020-01-23 RX ORDER — HYDROCHLOROTHIAZIDE 12.5 MG/1
12.5 TABLET ORAL DAILY
Status: DISCONTINUED | OUTPATIENT
Start: 2020-01-23 | End: 2020-01-26 | Stop reason: HOSPADM

## 2020-01-23 RX ADMIN — PANTOPRAZOLE SODIUM 40 MG: 40 INJECTION, POWDER, FOR SOLUTION INTRAVENOUS at 08:28

## 2020-01-23 RX ADMIN — FORMOTEROL FUMARATE DIHYDRATE 20 MCG: 20 SOLUTION RESPIRATORY (INHALATION) at 20:18

## 2020-01-23 RX ADMIN — IPRATROPIUM BROMIDE AND ALBUTEROL SULFATE 1 AMPULE: 2.5; .5 SOLUTION RESPIRATORY (INHALATION) at 13:46

## 2020-01-23 RX ADMIN — BUDESONIDE 500 MCG: 0.5 SUSPENSION RESPIRATORY (INHALATION) at 05:23

## 2020-01-23 RX ADMIN — ACETAMINOPHEN 650 MG: 325 TABLET, FILM COATED ORAL at 08:35

## 2020-01-23 RX ADMIN — PIPERACILLIN AND TAZOBACTAM 3.38 G: 3; .375 INJECTION, POWDER, LYOPHILIZED, FOR SOLUTION INTRAVENOUS at 01:22

## 2020-01-23 RX ADMIN — INSULIN LISPRO 1 UNITS: 100 INJECTION, SOLUTION INTRAVENOUS; SUBCUTANEOUS at 04:46

## 2020-01-23 RX ADMIN — IPRATROPIUM BROMIDE AND ALBUTEROL SULFATE 1 AMPULE: 2.5; .5 SOLUTION RESPIRATORY (INHALATION) at 10:00

## 2020-01-23 RX ADMIN — FORMOTEROL FUMARATE DIHYDRATE 20 MCG: 20 SOLUTION RESPIRATORY (INHALATION) at 05:24

## 2020-01-23 RX ADMIN — PIPERACILLIN AND TAZOBACTAM 3.38 G: 3; .375 INJECTION, POWDER, LYOPHILIZED, FOR SOLUTION INTRAVENOUS at 15:48

## 2020-01-23 RX ADMIN — METHYLPREDNISOLONE SODIUM SUCCINATE 40 MG: 40 INJECTION, POWDER, FOR SOLUTION INTRAMUSCULAR; INTRAVENOUS at 23:44

## 2020-01-23 RX ADMIN — Medication 10 ML: at 08:28

## 2020-01-23 RX ADMIN — AMLODIPINE BESYLATE 5 MG: 5 TABLET ORAL at 10:38

## 2020-01-23 RX ADMIN — INSULIN LISPRO 1 UNITS: 100 INJECTION, SOLUTION INTRAVENOUS; SUBCUTANEOUS at 21:43

## 2020-01-23 RX ADMIN — HYDROCHLOROTHIAZIDE 12.5 MG: 12.5 TABLET ORAL at 10:38

## 2020-01-23 RX ADMIN — INSULIN LISPRO 1 UNITS: 100 INJECTION, SOLUTION INTRAVENOUS; SUBCUTANEOUS at 15:54

## 2020-01-23 RX ADMIN — IPRATROPIUM BROMIDE AND ALBUTEROL SULFATE 1 AMPULE: 2.5; .5 SOLUTION RESPIRATORY (INHALATION) at 05:21

## 2020-01-23 RX ADMIN — CITALOPRAM 40 MG: 20 TABLET, FILM COATED ORAL at 08:23

## 2020-01-23 RX ADMIN — SODIUM CHLORIDE: 9 INJECTION, SOLUTION INTRAVENOUS at 20:13

## 2020-01-23 RX ADMIN — DOXYCYCLINE HYCLATE 100 MG: 100 CAPSULE ORAL at 08:24

## 2020-01-23 RX ADMIN — LEVOTHYROXINE SODIUM 75 MCG: 0.07 TABLET ORAL at 07:14

## 2020-01-23 RX ADMIN — DOXYCYCLINE HYCLATE 100 MG: 100 CAPSULE ORAL at 21:39

## 2020-01-23 RX ADMIN — SODIUM CHLORIDE: 9 INJECTION, SOLUTION INTRAVENOUS at 12:28

## 2020-01-23 RX ADMIN — IPRATROPIUM BROMIDE AND ALBUTEROL SULFATE 1 AMPULE: 2.5; .5 SOLUTION RESPIRATORY (INHALATION) at 20:17

## 2020-01-23 RX ADMIN — INSULIN LISPRO 1 UNITS: 100 INJECTION, SOLUTION INTRAVENOUS; SUBCUTANEOUS at 00:59

## 2020-01-23 RX ADMIN — ASPIRIN 81 MG CHEWABLE TABLET 81 MG: 81 TABLET CHEWABLE at 08:23

## 2020-01-23 RX ADMIN — ENOXAPARIN SODIUM 40 MG: 40 INJECTION SUBCUTANEOUS at 08:23

## 2020-01-23 RX ADMIN — PIPERACILLIN AND TAZOBACTAM 3.38 G: 3; .375 INJECTION, POWDER, LYOPHILIZED, FOR SOLUTION INTRAVENOUS at 23:44

## 2020-01-23 RX ADMIN — PIPERACILLIN AND TAZOBACTAM 3.38 G: 3; .375 INJECTION, POWDER, LYOPHILIZED, FOR SOLUTION INTRAVENOUS at 08:28

## 2020-01-23 RX ADMIN — METHYLPREDNISOLONE SODIUM SUCCINATE 40 MG: 40 INJECTION, POWDER, FOR SOLUTION INTRAMUSCULAR; INTRAVENOUS at 01:22

## 2020-01-23 RX ADMIN — IPRATROPIUM BROMIDE AND ALBUTEROL SULFATE 1 AMPULE: 2.5; .5 SOLUTION RESPIRATORY (INHALATION) at 01:05

## 2020-01-23 RX ADMIN — METHYLPREDNISOLONE SODIUM SUCCINATE 40 MG: 40 INJECTION, POWDER, FOR SOLUTION INTRAMUSCULAR; INTRAVENOUS at 10:38

## 2020-01-23 RX ADMIN — SODIUM CHLORIDE: 9 INJECTION, SOLUTION INTRAVENOUS at 07:14

## 2020-01-23 RX ADMIN — Medication 10 ML: at 08:23

## 2020-01-23 RX ADMIN — INSULIN LISPRO 2 UNITS: 100 INJECTION, SOLUTION INTRAVENOUS; SUBCUTANEOUS at 08:22

## 2020-01-23 RX ADMIN — BUDESONIDE 500 MCG: 0.5 SUSPENSION RESPIRATORY (INHALATION) at 20:19

## 2020-01-23 ASSESSMENT — PAIN DESCRIPTION - FREQUENCY: FREQUENCY: INTERMITTENT

## 2020-01-23 ASSESSMENT — PAIN SCALES - GENERAL
PAINLEVEL_OUTOF10: 3
PAINLEVEL_OUTOF10: 0

## 2020-01-23 ASSESSMENT — PAIN DESCRIPTION - DESCRIPTORS: DESCRIPTORS: ACHING;DISCOMFORT

## 2020-01-23 ASSESSMENT — PAIN DESCRIPTION - ONSET: ONSET: ON-GOING

## 2020-01-23 ASSESSMENT — PAIN DESCRIPTION - PROGRESSION: CLINICAL_PROGRESSION: NOT CHANGED

## 2020-01-23 ASSESSMENT — PAIN DESCRIPTION - PAIN TYPE: TYPE: ACUTE PAIN

## 2020-01-23 ASSESSMENT — PAIN - FUNCTIONAL ASSESSMENT: PAIN_FUNCTIONAL_ASSESSMENT: ACTIVITIES ARE NOT PREVENTED

## 2020-01-23 ASSESSMENT — PAIN DESCRIPTION - ORIENTATION: ORIENTATION: UPPER

## 2020-01-23 ASSESSMENT — PAIN DESCRIPTION - LOCATION: LOCATION: ABDOMEN

## 2020-01-23 NOTE — PROGRESS NOTES
[address] PROGRESS NOTE    Patient: Cassie Remy  MRN: [id number]  : 1955    Encounter Date: 2020  Encounter Time: 1:01 PM     Date of Admission: .2020 11:47 AM    Consulting Physician:  Primary Care Physician:     Alka Batres MD     [phone]    Reason for Consultation: Pneumonia     Problem List:  Patient Active Problem List   Diagnosis    Adrenal incidentaloma ([address]    Diabetes mellitus ([address]    Hyperlipidemia    Hypothyroidism    Fibromyalgia    Obesity    Hypertension, uncontrolled    Chronic right-sided low back pain with right-sided sciatica    Tobacco abuse    Myofascial pain    Lumbar radiculitis    Smoldering multiple myeloma ([address]    Chronic obstructive pulmonary disease ([address]    Type 2 diabetes mellitus without complication, with long-term current use of insulin (HCC)    Severe sepsis (HCC)    Hypovolemic shock ([address]    Community acquired pneumonia    Acute diverticulitis    Hypomagnesemia    Hypokalemia    Lactic acidosis    Hypophosphatemia     SUBJECTIVE: Patient seen and examined. Overnight the patient had no shortness of breath, cough, increased work of breathing.  -patient on zosyn (day 4)and now doxycycline (day 2) for abx coverage     HOME MEDICATIONS:  Prior to Admission medications    Medication Sig Start Date End Date Taking?  Authorizing Provider   montelukast (SINGULAIR) 10 MG tablet TAKE 1 TABLET BY MOUTH EVERY NIGHT AT BEDTIME 19   Margarita Pagan, DO   amitriptyline (ELAVIL) 50 MG tablet TAKE 1 TABLET BY MOUTH EVERY EVENING 19   Margarita Pagan, DO   aspirin 81 MG tablet Take 1 tablet by mouth daily 19   Margarita Pagan, DO   baclofen (LIORESAL) 10 MG tablet TAKE 1/2 TABLET THREE TIMES DAILY AS NEEDED(PAIN, SPASMS) 19   Margarita Pagan, DO   calcium-vitamin D (CALCIUM 500/D) 500-200 MG-UNIT per tablet TAKE 1 TABLET BY MOUTH DAILY 19   Margarita Pagan, DO   esomeprazole (NEXIUM) 40 MG delayed release capsule TAKE 1 CAPSULE BY MOUTH EVERY DAY 12/6/19   Saul Poole, DO   gabapentin (NEURONTIN) 400 MG capsule Take 1 capsule by mouth 2 times daily for 60 days. 12/6/19 2/4/20  Saul Poole, DO   levothyroxine (SYNTHROID) 75 MCG tablet Take 1 tablet by mouth Daily 12/6/19   Saul Poole, DO   metFORMIN (GLUCOPHAGE) 1000 MG tablet TAKE 1 TABLET BY MOUTH TWICE DAILY WITH MEALS 12/6/19   Saul Poole, DO   triamterene-hydrochlorothiazide (MAXZIDE-25) 37.5-25 MG per tablet TAKE 1 TABLET BY MOUTH EVERY DAY 12/6/19   Saul Poole, DO   citalopram (CELEXA) 40 MG tablet Take 1 tablet by mouth daily 10/25/19   Dereje Pace MD   blood glucose monitor kit and supplies Test 1 times a day & as needed for symptoms of irregular blood glucose. Accuchek Avivia 2/18/19   Rhea Chatterjee MD   blood glucose monitor strips Testing daily 2/11/19   Norma Pantoja MD   Lancets MISC Testing daily 2/11/19   Norma Pantoja MD   Misc.  Devices MISC Oxygen concentrator  j44.9 10/26/18   Rosette Pearce DO       CURRENT MEDICATIONS:  Current Facility-Administered Medications: hydrochlorothiazide (HYDRODIURIL) tablet 12.5 mg, 12.5 mg, Oral, Daily  amLODIPine (NORVASC) tablet 5 mg, 5 mg, Oral, Daily  [START ON 1/24/2020] pantoprazole (PROTONIX) tablet 40 mg, 40 mg, Oral, QAM AC  doxycycline hyclate (VIBRAMYCIN) capsule 100 mg, 100 mg, Oral, 2 times per day  methylPREDNISolone sodium (SOLU-MEDROL) injection 40 mg, 40 mg, Intravenous, Q12H  ipratropium-albuterol (DUONEB) nebulizer solution 1 ampule, 1 ampule, Inhalation, Q4H  benzonatate (TESSALON) capsule 100 mg, 100 mg, Oral, TID PRN  budesonide (PULMICORT) nebulizer suspension 500 mcg, 0.5 mg, Nebulization, BID  formoterol (PERFOROMIST) nebulizer solution 20 mcg, 20 mcg, Nebulization, Q12H  sodium chloride flush 0.9 % injection 10 mL, 10 mL, Intravenous, 2 times per day  sodium chloride flush 0.9 % injection 10 mL, 10 mL, 18   Ht 5' 6\" (1.676 m)   Wt 199 lb 3.2 oz (90.4 kg)   SpO2 100%   BMI 32.15 kg/m²   Wt Readings from Last 3 Encounters:   20 199 lb 3.2 oz (90.4 kg)   19 186 lb (84.4 kg)   10/25/19 184 lb 3.2 oz (83.6 kg)     Temp Readings from Last 3 Encounters:   20 97.5 °F (36.4 °C) (Temporal)   19 98.7 °F (37.1 °C) (Oral)   10/25/19 98.6 °F (37 °C) (Oral)     TMAX:  BP Readings from Last 3 Encounters:   20 (!) 188/91   19 136/68   10/25/19 (!) 154/89     Pulse Readings from Last 3 Encounters:   20 92   19 93   10/25/19 90       CURRENT PULSE OXIMETRY: SpO2: 100 %  24HR PULSE OXIMETRY RANGE: SpO2  Av.3 %  Min: 93 %  Max: 100 %  CVP:      ________________________________________________________________________    VENTILATOR SETTINGS (if applicable):         Vent Information  Skin Assessment: Clean, dry, & intact  I Time/ I Time %: 0.9 s  Additional Respiratory  Assessments  Pulse: 92  Resp: 18  SpO2: 100 %  ETCO2:  Peak Inspiratory Pressure:  End-Inspiratory Plateau Pressure:    ABG:  No results for input(s): PH, PO2, PCO2, HCO3, BE, O2SAT, METHB, O2HB, COHB, O2CON, HHB, THB in the last 72 hours. ________________________________________________________________________    IV ACCESS:    NUTRITION: DIET CARB CONTROL; Carb Control: 4 carb choices (60 gms)/meal  Dietary Nutrition Supplements: Diabetic Oral Supplement    INTAKE/OUTPUTS:  I/O last 3 completed shifts:   In: 450 [IV Piggyback:450]  Out: 3100 [Urine:3100]    Intake/Output Summary (Last 24 hours) at 2020 1301  Last data filed at 2020 1043  Gross per 24 hour   Intake 810 ml   Output 4650 ml   Net -3840 ml     General Appearance: alert and oriented to person, place and time, well-developed and   well-nourished, in no acute distress   Eyes: pupils equal, round, and reactive to light, extraocular eye movements intact, conjunctivae normal and sclera anicteric   Neck: neck supple and non tender without pneumonia. 2. Scattered small consolidative opacities in the right upper lobe,   likely infectious/inflammatory as well. 3. Collapse of the right middle lobe. No centrally obstructing mass   lesion seen. 4. Small pericardial effusion. Small bilateral pleural effusions. 5. Mediastinal lymphadenopathy, likely reactive. ASSESSMENT:  1.) Severe Sepsis with Septic Shock - RML Pneumonia   - Streptococcus pneumoniae, GNR, Staphylococcus, Yeast (not Candida albicans)  2.) RML Pneumonia    3.) Mediastinal Lymphadenopathy   4.) COPD  5.) Diverticulitis   6.) Acute Kidney Injury   7.) Lactic Acidosis   8.) H/O Multiple Myeloma   9.) Small Pericardial Effusion      PLAN:  1.) duoneb, perforomist, zosyn (day 4), doxycycline (day 2)  2.) supportive care  3.) right IJ TLC  4.) DVT Prophylaxis      - supportive care   - abx continue  - case reviewed with primary team   - no role for bronchoscopy or thoracentesis at this time  - CT chest repeat in 3 months time  - increase activity, PT/OT     Thank you Kevan Johns MD very much for allowing me to see this patient in consultation and follow up.     Care reviewed with nursing staff, medical and surgical specialty care, primary care and the patient's family as available. Restraints are ordered when the patient can do harm to him/herself by pulling out devices.     Nathan Anand M.D.

## 2020-01-23 NOTE — PROGRESS NOTES
[facility]  [facility] Residency Program  Progress Note - House Team 1    Patient:  Davis Naqvi 59 y.o. female MRN: [id number]     Date of Service: 1/23/2020     CC: SOB  Overnight events: None     Subjective     Patient was seen and examined this morning at bedside in no acute distress. At baseline O2 requirements, saturating well on 2L NC. Continues to have purulent sputum production, however continues to improve. Improved wheezing on exam today. Leukocytosis improved on labs this AM from 17 to 13k. Remains afebrile. Started on doxycycline yesterday for MRSA coverage with no adverse reactions noted. Given Lasix x 1 yesterday as well with 3L in UOP. No other acute complaints at this time.      Objective     Physical Exam:  · Vitals: BP (!) 188/91   Pulse 92   Temp 97.5 °F (36.4 °C) (Temporal)   Resp 18   Ht 5' 6\" (1.676 m)   Wt 199 lb 3.2 oz (90.4 kg)   SpO2 100%   BMI 32.15 kg/m²     · I & O - 24hr: I/O this shift:  · In: 360 [P.O.:360]  · Out: 1300 [Urine:1300]   § General Appearance: alert and oriented to person, place and time, well-developed and well-nourished, in no acute distress  § Skin: warm and dry, no rash or erythema  § Head: normocephalic and atraumatic  § Eyes: pupils equal, round, and reactive to light, extraocular eye movements intact, conjunctivae normal, conjunctivae normal and sclera anicteric  § ENT: hearing grossly normal bilaterally and oropharynx clear and moist with normal mucous membranes  § Neck: tender cervical adenopathy present- R LAD   § Pulmonary/Chest: wheezing present- bilaterally and rales present- bilaterally  § Cardiovascular: normal rate, regular rhythm, normal S1 and S2, no murmurs, no gallops, intact distal pulses, no carotid bruits and no JVD  § Abdomen: non-distended, bowel sounds normal, no masses, no organomegaly, no abdominal bruits and tenderness present- RUQ,  without rebound and guarding, Moran sign negative  § Extremities: no cyanosis, no clubbing and no edema  § Musculoskeletal: normal range of motion, no joint swelling, deformity or tenderness  § Neurologic: no cranial nerve deficit, speech normal and no tremor   Subject  Pertinent Labs & Imaging Studies   yusuf  CBC with Differential:    Lab Results   Component Value Date    WBC 13.6 01/23/2020    RBC 3.41 01/23/2020    HGB 9.6 01/23/2020    HCT 31.2 01/23/2020     01/23/2020    MCV 91.5 01/23/2020    MCH 28.2 01/23/2020    MCHC 30.8 01/23/2020    RDW 13.3 01/23/2020    SEGSPCT 36 01/03/2014    METASPCT 5.2 01/23/2020    LYMPHOPCT 9.6 01/23/2020    PROMYELOPCT 3.5 01/23/2020    MONOPCT 3.5 01/23/2020    MYELOPCT 4.3 01/23/2020    BASOPCT 0.7 01/23/2020    MONOSABS 0.54 01/23/2020    LYMPHSABS 1.36 01/23/2020    EOSABS 0.00 01/23/2020    BASOSABS 0.00 01/23/2020     BMP:    Lab Results   Component Value Date     01/23/2020    K 3.9 01/23/2020     01/23/2020    CO2 27 01/23/2020    BUN 9 01/23/2020    LABALBU 3.3 01/19/2020    LABALBU 3.9 10/26/2011    CREATININE 0.7 01/23/2020    CALCIUM 9.3 01/23/2020    GFRAA >60 01/23/2020    LABGLOM >60 01/23/2020    GLUCOSE 158 01/23/2020    GLUCOSE 124 10/26/2011       Resident's Assessment and Plan     ALYSE  · Resume home Celexa and amitriptyline  · Mood stable, no SI/HI      Septic + Hypovolemic Shock, resolved  · 2/2 PNA and GI losses      Acute Hypoxic Respiratory Failure  · 2/2 PNA  · Breathing treatments: Vanesa Sheppardist  · Continue Abx  · Continue Solumedrol 40mg IV BID   · S/p Lasix 20mg IV x 1, with appropriate UOP of 3L, overall fluid balance -2L  · Bronchopulmonary hygiene PEP/flutter valve, guaifenesin   · Pulmonology on board, recs appreciated     Aspiration PNA  · Continue Zosyn  · discontinue Levaquin, Legionella negative  · Continue doxycycline for MRSA coverage  · Repeat procalcitonin 11     COPD  · Not in acute exacerbation  · DuoNebs q4h wa, Performist BID wa     Diverticulitis  · CT abdomen with diverticulitis  · Will get C diff in context of immunosuppression in setting of MM  · Continue Abx  · F/u stool culture, fecal leukocytes     AMADA, resolved  · Likely pre-renal in context of shock  · Cr baseline 0.8  · FEUrea 34%, pre-renal disease  · BMP qd     Lactic Acidosis, resolved  · 2/2 sepsis     Hypothyroidism  · W/ thyroid nodules  · FNA 2018 inconclusive  · Continue Synthroid 75mcg qd     NIDDM2  · Hold home dose metformin  · LDSS  · POC BG checks BID while NPO  · Hypoglycemia protocol in place     Normocytic Hypochromic Anemia  · 2/2 anemia of chronic disease  · CBC qd  · Transfuse if Hb < 7     H/o Multiple Myeloma        PT/OT evaluation: not ordered  DVT prophylaxis/ GI prophylaxis: Lovenox/Protonix  Disposition: continue current care  Eh Hernandez MD, PGY-1  Attending physician: Dr. Iza Hernández

## 2020-01-23 NOTE — PROGRESS NOTES
Date: 1/22/2020    Time: 10:06 PM    Patient Placed On BIPAP/CPAP/ Non-Invasive Ventilation? Yes    If no must comment. Facial area red/color change? No           If YES are Blister/Lesion present? No   If yes must notify nursing staff  BIPAP/CPAP skin barrier? Yes    Skin barrier type:mepilex       Comments: Patient placed on bipap 12/6 with 2 liters.         Verla Eng

## 2020-01-23 NOTE — PROGRESS NOTES
Indep  Sitting upright in chair for majority of meals; pending cleared diet restriction   Grooming SBA  Seated EOB for oral hygiene and comb hair Set up  Seated upright in bed      Mod I   while seated;  / standing with device prn; G BUE functional use      UB Dressing SBA  Seated in chair to change gown with mod VC for pacing/proper breathing techniques   SBA  To don/doff gown while seated     Mod I  while seated; including clothing retrieval;     G BUE functional use   LB Dressing Min A  Min A sitting EOB with lateral LOB when crossing leg to chanelle sock; Min A dyn standing balance during gown mgmt SBA  To don/doff socks while seated EOB     Mod I   with AE/device PRN   Bathing UB-  SBA  LB-  Min A  simulation SBA  simulated     UB-  Indep  LB-  Mod I with AE/DME prn   Toileting Min A  For dyn balance; BSC in room  Min A  Per eval      Mod I  including clothing mgmt and toileting hygiene using DME/device prn   Bed Mobility  Supine to sit: Min A  Sit to supine: NT  Rolling: NT SBA- supine to sit  Educated pt on technique to increase independence.     Indep   in prep for EOB ADL tasks   Functional/  Bathroom  Transfers Sit to stand: Min A  Stand to sit: Min A  Stand pivot: Min A ww SBA- sit<->stand  Cuing for hand placement     Mod I  from varying surfaces using device/DME prn with G safety   Functional Mobility Min A  Few steps from EOB>arm chair using ww SBA  Short home distance using w/w   Mod I   Household distance using device prn   Balance Sitting:     Static:  SBA    Dynamic: Min/CGA  Standing:     Static:  CGA ww    Dynamic:Min A ww Sitting:     Static:  Independent    Dynamic: SBA  Standing:     Static:  SBA ww  demo Indep dynamic sitting balance EOB during ADL tasks;  Mod I dynamic standing balance during ADL tasks using device prn   Endurance/  Activity Tolerance F activity tolerance/endurance during light ADL task      Sitting EOB tolerance 10 min     Standing tolerance 1 min; limited by dizziness and low

## 2020-01-23 NOTE — PLAN OF CARE
Problem: Falls - Risk of:  Goal: Will remain free from falls  Description  Will remain free from falls  1/23/2020 1855 by Sameer Morrow RN  Outcome: Met This Shift     Problem: Falls - Risk of:  Goal: Absence of physical injury  Description  Absence of physical injury  1/23/2020 1855 by Sameer Morrow RN  Outcome: Met This Shift     Problem: Pain:  Goal: Pain level will decrease  Description  Pain level will decrease  1/23/2020 1855 by Sameer Morrow RN  Outcome: Met This Shift     Problem: Pain:  Goal: Control of acute pain  Description  Control of acute pain  1/23/2020 1855 by Sameer Morrow RN  Outcome: Met This Shift     Problem: Airway Clearance - Ineffective:  Goal: Ability to maintain a clear airway will improve  Description  Ability to maintain a clear airway will improve  1/23/2020 1855 by Smaeer Morrow RN  Outcome: Met This Shift     Problem: Gas Exchange - Impaired:  Goal: Levels of oxygenation will improve  Description  Levels of oxygenation will improve  1/23/2020 1855 by Sameer Morrow RN  Outcome: Met This Shift

## 2020-01-24 LAB
BLOOD CULTURE, ROUTINE: NORMAL
CULTURE, BLOOD 2: NORMAL
METER GLUCOSE: 140 MG/DL (ref 74–99)
METER GLUCOSE: 178 MG/DL (ref 74–99)
METER GLUCOSE: 188 MG/DL (ref 74–99)
METER GLUCOSE: 317 MG/DL (ref 74–99)

## 2020-01-24 PROCEDURE — 6370000000 HC RX 637 (ALT 250 FOR IP): Performed by: INTERNAL MEDICINE

## 2020-01-24 PROCEDURE — 2700000000 HC OXYGEN THERAPY PER DAY

## 2020-01-24 PROCEDURE — 2580000003 HC RX 258: Performed by: INTERNAL MEDICINE

## 2020-01-24 PROCEDURE — 6360000002 HC RX W HCPCS: Performed by: INTERNAL MEDICINE

## 2020-01-24 PROCEDURE — 82962 GLUCOSE BLOOD TEST: CPT

## 2020-01-24 PROCEDURE — 94640 AIRWAY INHALATION TREATMENT: CPT

## 2020-01-24 PROCEDURE — 2060000000 HC ICU INTERMEDIATE R&B

## 2020-01-24 PROCEDURE — 99231 SBSQ HOSP IP/OBS SF/LOW 25: CPT | Performed by: INTERNAL MEDICINE

## 2020-01-24 PROCEDURE — 94660 CPAP INITIATION&MGMT: CPT

## 2020-01-24 RX ORDER — AMOXICILLIN AND CLAVULANATE POTASSIUM 875; 125 MG/1; MG/1
1 TABLET, FILM COATED ORAL 2 TIMES DAILY
Qty: 10 TABLET | Refills: 0 | Status: SHIPPED | OUTPATIENT
Start: 2020-01-24 | End: 2020-01-26 | Stop reason: HOSPADM

## 2020-01-24 RX ORDER — TRIAMTERENE AND HYDROCHLOROTHIAZIDE 37.5; 25 MG/1; MG/1
TABLET ORAL
Qty: 30 TABLET | Refills: 1 | Status: SHIPPED | OUTPATIENT
Start: 2020-01-24 | End: 2020-01-26 | Stop reason: SDUPTHER

## 2020-01-24 RX ORDER — PREDNISONE 20 MG/1
20 TABLET ORAL DAILY
Qty: 2 TABLET | Refills: 0 | Status: SHIPPED | OUTPATIENT
Start: 2020-01-27 | End: 2020-01-26 | Stop reason: SDUPTHER

## 2020-01-24 RX ORDER — PREDNISONE 20 MG/1
40 TABLET ORAL DAILY
Qty: 4 TABLET | Refills: 0 | Status: SHIPPED | OUTPATIENT
Start: 2020-01-25 | End: 2020-01-26 | Stop reason: SDUPTHER

## 2020-01-24 RX ORDER — DOXYCYCLINE HYCLATE 100 MG/1
100 CAPSULE ORAL EVERY 12 HOURS SCHEDULED
Qty: 14 CAPSULE | Refills: 0 | Status: SHIPPED | OUTPATIENT
Start: 2020-01-24 | End: 2020-01-26

## 2020-01-24 RX ORDER — ALBUTEROL SULFATE 90 UG/1
2 AEROSOL, METERED RESPIRATORY (INHALATION) EVERY 4 HOURS PRN
Qty: 1 INHALER | Refills: 3 | Status: SHIPPED | OUTPATIENT
Start: 2020-01-24 | End: 2020-01-26

## 2020-01-24 RX ORDER — BUDESONIDE AND FORMOTEROL FUMARATE DIHYDRATE 160; 4.5 UG/1; UG/1
2 AEROSOL RESPIRATORY (INHALATION) 2 TIMES DAILY
Qty: 1 INHALER | Refills: 2 | Status: SHIPPED | OUTPATIENT
Start: 2020-01-24 | End: 2020-01-26

## 2020-01-24 RX ADMIN — SODIUM CHLORIDE, PRESERVATIVE FREE 10 ML: 5 INJECTION INTRAVENOUS at 11:13

## 2020-01-24 RX ADMIN — HYDROCHLOROTHIAZIDE 12.5 MG: 12.5 TABLET ORAL at 08:54

## 2020-01-24 RX ADMIN — ENOXAPARIN SODIUM 40 MG: 40 INJECTION SUBCUTANEOUS at 08:54

## 2020-01-24 RX ADMIN — BUDESONIDE 500 MCG: 0.5 SUSPENSION RESPIRATORY (INHALATION) at 06:00

## 2020-01-24 RX ADMIN — INSULIN LISPRO 4 UNITS: 100 INJECTION, SOLUTION INTRAVENOUS; SUBCUTANEOUS at 11:13

## 2020-01-24 RX ADMIN — DOXYCYCLINE HYCLATE 100 MG: 100 CAPSULE ORAL at 08:54

## 2020-01-24 RX ADMIN — IPRATROPIUM BROMIDE AND ALBUTEROL SULFATE 1 AMPULE: 2.5; .5 SOLUTION RESPIRATORY (INHALATION) at 01:35

## 2020-01-24 RX ADMIN — IPRATROPIUM BROMIDE AND ALBUTEROL SULFATE 1 AMPULE: 2.5; .5 SOLUTION RESPIRATORY (INHALATION) at 06:00

## 2020-01-24 RX ADMIN — PANTOPRAZOLE SODIUM 40 MG: 40 TABLET, DELAYED RELEASE ORAL at 06:41

## 2020-01-24 RX ADMIN — INSULIN LISPRO 1 UNITS: 100 INJECTION, SOLUTION INTRAVENOUS; SUBCUTANEOUS at 06:41

## 2020-01-24 RX ADMIN — IPRATROPIUM BROMIDE AND ALBUTEROL SULFATE 1 AMPULE: 2.5; .5 SOLUTION RESPIRATORY (INHALATION) at 17:20

## 2020-01-24 RX ADMIN — SODIUM CHLORIDE: 9 INJECTION, SOLUTION INTRAVENOUS at 21:33

## 2020-01-24 RX ADMIN — BUDESONIDE 500 MCG: 0.5 SUSPENSION RESPIRATORY (INHALATION) at 20:20

## 2020-01-24 RX ADMIN — ACETAMINOPHEN 650 MG: 325 TABLET, FILM COATED ORAL at 08:54

## 2020-01-24 RX ADMIN — IPRATROPIUM BROMIDE AND ALBUTEROL SULFATE 1 AMPULE: 2.5; .5 SOLUTION RESPIRATORY (INHALATION) at 20:20

## 2020-01-24 RX ADMIN — PIPERACILLIN AND TAZOBACTAM 3.38 G: 3; .375 INJECTION, POWDER, LYOPHILIZED, FOR SOLUTION INTRAVENOUS at 16:37

## 2020-01-24 RX ADMIN — METHYLPREDNISOLONE SODIUM SUCCINATE 40 MG: 40 INJECTION, POWDER, FOR SOLUTION INTRAMUSCULAR; INTRAVENOUS at 11:13

## 2020-01-24 RX ADMIN — AMLODIPINE BESYLATE 5 MG: 5 TABLET ORAL at 08:54

## 2020-01-24 RX ADMIN — ASPIRIN 81 MG CHEWABLE TABLET 81 MG: 81 TABLET CHEWABLE at 08:54

## 2020-01-24 RX ADMIN — INSULIN LISPRO 1 UNITS: 100 INJECTION, SOLUTION INTRAVENOUS; SUBCUTANEOUS at 20:51

## 2020-01-24 RX ADMIN — Medication 10 ML: at 20:51

## 2020-01-24 RX ADMIN — FORMOTEROL FUMARATE DIHYDRATE 20 MCG: 20 SOLUTION RESPIRATORY (INHALATION) at 20:20

## 2020-01-24 RX ADMIN — INSULIN LISPRO 1 UNITS: 100 INJECTION, SOLUTION INTRAVENOUS; SUBCUTANEOUS at 16:46

## 2020-01-24 RX ADMIN — Medication 10 ML: at 08:54

## 2020-01-24 RX ADMIN — IPRATROPIUM BROMIDE AND ALBUTEROL SULFATE 1 AMPULE: 2.5; .5 SOLUTION RESPIRATORY (INHALATION) at 13:02

## 2020-01-24 RX ADMIN — PIPERACILLIN AND TAZOBACTAM 3.38 G: 3; .375 INJECTION, POWDER, LYOPHILIZED, FOR SOLUTION INTRAVENOUS at 23:43

## 2020-01-24 RX ADMIN — PIPERACILLIN AND TAZOBACTAM 3.38 G: 3; .375 INJECTION, POWDER, LYOPHILIZED, FOR SOLUTION INTRAVENOUS at 08:53

## 2020-01-24 RX ADMIN — LEVOTHYROXINE SODIUM 75 MCG: 0.07 TABLET ORAL at 06:41

## 2020-01-24 RX ADMIN — ACETAMINOPHEN 650 MG: 325 TABLET, FILM COATED ORAL at 17:15

## 2020-01-24 RX ADMIN — SODIUM CHLORIDE: 9 INJECTION, SOLUTION INTRAVENOUS at 12:10

## 2020-01-24 RX ADMIN — SODIUM CHLORIDE: 9 INJECTION, SOLUTION INTRAVENOUS at 04:31

## 2020-01-24 RX ADMIN — METHYLPREDNISOLONE SODIUM SUCCINATE 40 MG: 40 INJECTION, POWDER, FOR SOLUTION INTRAMUSCULAR; INTRAVENOUS at 23:43

## 2020-01-24 RX ADMIN — CITALOPRAM 40 MG: 20 TABLET, FILM COATED ORAL at 08:54

## 2020-01-24 RX ADMIN — BENZONATATE 100 MG: 100 CAPSULE ORAL at 09:05

## 2020-01-24 RX ADMIN — DOXYCYCLINE HYCLATE 100 MG: 100 CAPSULE ORAL at 20:51

## 2020-01-24 RX ADMIN — FORMOTEROL FUMARATE DIHYDRATE 20 MCG: 20 SOLUTION RESPIRATORY (INHALATION) at 06:02

## 2020-01-24 ASSESSMENT — PAIN DESCRIPTION - PAIN TYPE: TYPE: CHRONIC PAIN

## 2020-01-24 ASSESSMENT — PAIN DESCRIPTION - ONSET: ONSET: ON-GOING

## 2020-01-24 ASSESSMENT — PAIN DESCRIPTION - ORIENTATION: ORIENTATION: RIGHT;LEFT

## 2020-01-24 ASSESSMENT — PAIN - FUNCTIONAL ASSESSMENT: PAIN_FUNCTIONAL_ASSESSMENT: PREVENTS OR INTERFERES SOME ACTIVE ACTIVITIES AND ADLS

## 2020-01-24 ASSESSMENT — PAIN DESCRIPTION - FREQUENCY: FREQUENCY: CONTINUOUS

## 2020-01-24 ASSESSMENT — PAIN SCALES - GENERAL
PAINLEVEL_OUTOF10: 6
PAINLEVEL_OUTOF10: 0
PAINLEVEL_OUTOF10: 10
PAINLEVEL_OUTOF10: 0
PAINLEVEL_OUTOF10: 10

## 2020-01-24 ASSESSMENT — PAIN DESCRIPTION - PROGRESSION: CLINICAL_PROGRESSION: NOT CHANGED

## 2020-01-24 ASSESSMENT — PAIN DESCRIPTION - LOCATION: LOCATION: LEG

## 2020-01-24 ASSESSMENT — PAIN DESCRIPTION - DESCRIPTORS: DESCRIPTORS: THROBBING

## 2020-01-24 NOTE — CARE COORDINATION
Met with pt to discuss prescriptions and HHC. Pt decline list and choiced [facility], referral made to [facility], pt accepted. Spoke with pt who stated she is having problems with copays, called Drug Assistance program.  According to [employer], pt had agreed to work with them in the past for assistance but not followed through, even after several calls to pt, pt never turned in application or proof of income. Pt noncompliant with assistance program. Per pt she is not sure she signed up for medicare part D program, called [address] and per pharmacist pt has coverage, one medication is $29.20, the rest are $2-$5 co-pays. Will remind pt to follow up with Drug assistance program. Received call from Lena Chaparro (Drug assistance) she will follow up with pt after discharge to verify medications to see what she can do to help. Purnima PADILLA

## 2020-01-24 NOTE — PROGRESS NOTES
CURRENT MEDICATIONS:  Current Facility-Administered Medications: hydrochlorothiazide (HYDRODIURIL) tablet 12.5 mg, 12.5 mg, Oral, Daily  amLODIPine (NORVASC) tablet 5 mg, 5 mg, Oral, Daily  pantoprazole (PROTONIX) tablet 40 mg, 40 mg, Oral, QAM AC  insulin lispro (HUMALOG) injection vial 0-6 Units, 0-6 Units, Subcutaneous, 4x Daily AC & HS  doxycycline hyclate (VIBRAMYCIN) capsule 100 mg, 100 mg, Oral, 2 times per day  methylPREDNISolone sodium (SOLU-MEDROL) injection 40 mg, 40 mg, Intravenous, Q12H  ipratropium-albuterol (DUONEB) nebulizer solution 1 ampule, 1 ampule, Inhalation, Q4H  benzonatate (TESSALON) capsule 100 mg, 100 mg, Oral, TID PRN  budesonide (PULMICORT) nebulizer suspension 500 mcg, 0.5 mg, Nebulization, BID  formoterol (PERFOROMIST) nebulizer solution 20 mcg, 20 mcg, Nebulization, Q12H  sodium chloride flush 0.9 % injection 10 mL, 10 mL, Intravenous, 2 times per day  sodium chloride flush 0.9 % injection 10 mL, 10 mL, Intravenous, PRN  magnesium hydroxide (MILK OF MAGNESIA) 400 MG/5ML suspension 30 mL, 30 mL, Oral, Daily PRN  ondansetron (ZOFRAN) injection 4 mg, 4 mg, Intravenous, Q6H PRN  acetaminophen (TYLENOL) tablet 650 mg, 650 mg, Oral, Q4H PRN  enoxaparin (LOVENOX) injection 40 mg, 40 mg, Subcutaneous, Daily  levothyroxine (SYNTHROID) tablet 75 mcg, 75 mcg, Oral, Daily  citalopram (CELEXA) tablet 40 mg, 40 mg, Oral, Daily  aspirin chewable tablet 81 mg, 81 mg, Oral, Daily  glucose (GLUTOSE) 40 % oral gel 15 g, 15 g, Oral, PRN  dextrose 50 % IV solution, 12.5 g, Intravenous, PRN  glucagon (rDNA) injection 1 mg, 1 mg, Intramuscular, PRN  dextrose 5 % solution, 100 mL/hr, Intravenous, PRN  nicotine (NICODERM CQ) 7 MG/24HR 1 patch, 1 patch, Transdermal, Daily  piperacillin-tazobactam (ZOSYN) 3.375 g in dextrose 5 % 100 mL IVPB extended infusion (mini-bag), 3.375 g, Intravenous, Q8H  0.9 % sodium chloride infusion admixture, , Intravenous, Q8H    ALLERGIES:  Allergies   Allergen Reactions                    XR CHEST PORTABLE   Final Result       1. Right IJ central venous catheter is present with distal tip at   location of SVC.       2. No pneumothorax.       3. Opacities are present in right lung base related to atelectasis,   pneumonia, or effusion.       CT ABDOMEN PELVIS W IV CONTRAST Additional Contrast? None   Final Result   Addendum 1 of 1   Patchy consolidation in the right lower lobe concerning for pneumonia. Surveillance recommended.       The ED physician was notified       ALERT:  THIS IS AN ABNORMAL REPORT       Final       US GALLBLADDER RUQ   Final Result   Normal gallbladder ultrasound.            XR CHEST PORTABLE    (Results Pending)        CXR 1/22/2020: no interval change     CT Chest 1/22/2020:  1. Large pulmonary consolidation in the right lower lobe, likely   representing pneumonia. 2. Scattered small consolidative opacities in the right upper lobe,   likely infectious/inflammatory as well. 3. Collapse of the right middle lobe. No centrally obstructing mass   lesion seen. 4. Small pericardial effusion. Small bilateral pleural effusions. 5. Mediastinal lymphadenopathy, likely reactive.        ASSESSMENT:  1.) Severe Sepsis with Septic Shock - RML Pneumonia   - Streptococcus pneumoniae, GNR, Staphylococcus, Yeast (not Candida albicans)  2.) RML Pneumonia    3.) Mediastinal Lymphadenopathy   4.) COPD  5.) Diverticulitis   6.) Acute Kidney Injury   7.) Lactic Acidosis   8.) H/O Multiple Myeloma   9.) Small Pericardial Effusion      PLAN:  1.) duoneb, perforomist, zosyn (day 5), doxycycline (day 3)  2.) supportive care  3.) right IJ TLC  4.) DVT Prophylaxis      - supportive care   - abx continue  - case reviewed with primary team   - no role for bronchoscopy or thoracentesis at this time  - CT chest repeat in 3 months time  - increase activity, PT/OT  - patient remains to have mild cough and intermittent bronchospasms   - IV steroids BID to continue      Thank you Alicia Malik Obrien MD very much for allowing me to see this patient in consultation and follow up.     Care reviewed with nursing staff, medical and surgical specialty care, primary care and the patient's family as available. Restraints are ordered when the patient can do harm to him/herself by pulling out devices.     Rosita Mcdaniel M.D.

## 2020-01-25 LAB
ANION GAP SERPL CALCULATED.3IONS-SCNC: 21 MMOL/L (ref 7–16)
BASOPHILS ABSOLUTE: 0 E9/L (ref 0–0.2)
BASOPHILS RELATIVE PERCENT: 0.3 % (ref 0–2)
BUN BLDV-MCNC: 16 MG/DL (ref 8–23)
CALCIUM SERPL-MCNC: 9.2 MG/DL (ref 8.6–10.2)
CHLORIDE BLD-SCNC: 97 MMOL/L (ref 98–107)
CO2: 25 MMOL/L (ref 22–29)
CREAT SERPL-MCNC: 0.7 MG/DL (ref 0.5–1)
CULTURE, RESPIRATORY: ABNORMAL
EOSINOPHILS ABSOLUTE: 0 E9/L (ref 0.05–0.5)
EOSINOPHILS RELATIVE PERCENT: 0.1 % (ref 0–6)
GFR AFRICAN AMERICAN: >60
GFR NON-AFRICAN AMERICAN: >60 ML/MIN/1.73
GLUCOSE BLD-MCNC: 163 MG/DL (ref 74–99)
HCT VFR BLD CALC: 32.8 % (ref 34–48)
HEMOGLOBIN: 10 G/DL (ref 11.5–15.5)
LYMPHOCYTES ABSOLUTE: 3.03 E9/L (ref 1.5–4)
LYMPHOCYTES RELATIVE PERCENT: 17.4 % (ref 20–42)
MAGNESIUM: 1.6 MG/DL (ref 1.6–2.6)
MCH RBC QN AUTO: 28.2 PG (ref 26–35)
MCHC RBC AUTO-ENTMCNC: 30.5 % (ref 32–34.5)
MCV RBC AUTO: 92.4 FL (ref 80–99.9)
METAMYELOCYTES RELATIVE PERCENT: 5.2 % (ref 0–1)
METER GLUCOSE: 140 MG/DL (ref 74–99)
METER GLUCOSE: 145 MG/DL (ref 74–99)
METER GLUCOSE: 316 MG/DL (ref 74–99)
METER GLUCOSE: 320 MG/DL (ref 74–99)
MONOCYTES ABSOLUTE: 2.14 E9/L (ref 0.1–0.95)
MONOCYTES RELATIVE PERCENT: 12.2 % (ref 2–12)
NEUTROPHILS ABSOLUTE: 12.46 E9/L (ref 1.8–7.3)
NEUTROPHILS RELATIVE PERCENT: 65.2 % (ref 43–80)
ORGANISM: ABNORMAL
PDW BLD-RTO: 13.2 FL (ref 11.5–15)
PHOSPHORUS: 4.4 MG/DL (ref 2.5–4.5)
PLATELET # BLD: 353 E9/L (ref 130–450)
PMV BLD AUTO: 10.8 FL (ref 7–12)
POLYCHROMASIA: ABNORMAL
POTASSIUM SERPL-SCNC: 4.9 MMOL/L (ref 3.5–5)
RBC # BLD: 3.55 E12/L (ref 3.5–5.5)
SMEAR, RESPIRATORY: ABNORMAL
SODIUM BLD-SCNC: 143 MMOL/L (ref 132–146)
VACUOLATED NEUTROPHILS: ABNORMAL
WBC # BLD: 17.8 E9/L (ref 4.5–11.5)

## 2020-01-25 PROCEDURE — 6370000000 HC RX 637 (ALT 250 FOR IP): Performed by: INTERNAL MEDICINE

## 2020-01-25 PROCEDURE — 6360000002 HC RX W HCPCS: Performed by: INTERNAL MEDICINE

## 2020-01-25 PROCEDURE — 2700000000 HC OXYGEN THERAPY PER DAY

## 2020-01-25 PROCEDURE — 94760 N-INVAS EAR/PLS OXIMETRY 1: CPT

## 2020-01-25 PROCEDURE — 99231 SBSQ HOSP IP/OBS SF/LOW 25: CPT | Performed by: INTERNAL MEDICINE

## 2020-01-25 PROCEDURE — 36415 COLL VENOUS BLD VENIPUNCTURE: CPT

## 2020-01-25 PROCEDURE — 84100 ASSAY OF PHOSPHORUS: CPT

## 2020-01-25 PROCEDURE — 83735 ASSAY OF MAGNESIUM: CPT

## 2020-01-25 PROCEDURE — 2580000003 HC RX 258: Performed by: INTERNAL MEDICINE

## 2020-01-25 PROCEDURE — 94660 CPAP INITIATION&MGMT: CPT

## 2020-01-25 PROCEDURE — 82962 GLUCOSE BLOOD TEST: CPT

## 2020-01-25 PROCEDURE — 80048 BASIC METABOLIC PNL TOTAL CA: CPT

## 2020-01-25 PROCEDURE — 94640 AIRWAY INHALATION TREATMENT: CPT

## 2020-01-25 PROCEDURE — 85025 COMPLETE CBC W/AUTO DIFF WBC: CPT

## 2020-01-25 PROCEDURE — 2060000000 HC ICU INTERMEDIATE R&B

## 2020-01-25 RX ORDER — MAGNESIUM SULFATE 1 G/100ML
1 INJECTION INTRAVENOUS ONCE
Status: COMPLETED | OUTPATIENT
Start: 2020-01-25 | End: 2020-01-25

## 2020-01-25 RX ADMIN — PIPERACILLIN AND TAZOBACTAM 3.38 G: 3; .375 INJECTION, POWDER, LYOPHILIZED, FOR SOLUTION INTRAVENOUS at 18:12

## 2020-01-25 RX ADMIN — AMLODIPINE BESYLATE 5 MG: 5 TABLET ORAL at 09:08

## 2020-01-25 RX ADMIN — IPRATROPIUM BROMIDE AND ALBUTEROL SULFATE 1 AMPULE: 2.5; .5 SOLUTION RESPIRATORY (INHALATION) at 13:08

## 2020-01-25 RX ADMIN — DOXYCYCLINE HYCLATE 100 MG: 100 CAPSULE ORAL at 20:34

## 2020-01-25 RX ADMIN — IPRATROPIUM BROMIDE AND ALBUTEROL SULFATE 1 AMPULE: 2.5; .5 SOLUTION RESPIRATORY (INHALATION) at 19:37

## 2020-01-25 RX ADMIN — Medication 10 ML: at 20:34

## 2020-01-25 RX ADMIN — NYSTATIN 500000 UNITS: 100000 SUSPENSION ORAL at 09:08

## 2020-01-25 RX ADMIN — SODIUM CHLORIDE: 9 INJECTION, SOLUTION INTRAVENOUS at 22:24

## 2020-01-25 RX ADMIN — ACETAMINOPHEN 650 MG: 325 TABLET, FILM COATED ORAL at 20:36

## 2020-01-25 RX ADMIN — METHYLPREDNISOLONE SODIUM SUCCINATE 40 MG: 40 INJECTION, POWDER, FOR SOLUTION INTRAMUSCULAR; INTRAVENOUS at 23:41

## 2020-01-25 RX ADMIN — INSULIN LISPRO 1 UNITS: 100 INJECTION, SOLUTION INTRAVENOUS; SUBCUTANEOUS at 06:34

## 2020-01-25 RX ADMIN — PIPERACILLIN AND TAZOBACTAM 3.38 G: 3; .375 INJECTION, POWDER, LYOPHILIZED, FOR SOLUTION INTRAVENOUS at 09:09

## 2020-01-25 RX ADMIN — ACETAMINOPHEN 650 MG: 325 TABLET, FILM COATED ORAL at 00:27

## 2020-01-25 RX ADMIN — IPRATROPIUM BROMIDE AND ALBUTEROL SULFATE 1 AMPULE: 2.5; .5 SOLUTION RESPIRATORY (INHALATION) at 09:56

## 2020-01-25 RX ADMIN — DOXYCYCLINE HYCLATE 100 MG: 100 CAPSULE ORAL at 09:09

## 2020-01-25 RX ADMIN — BUDESONIDE 500 MCG: 0.5 SUSPENSION RESPIRATORY (INHALATION) at 09:56

## 2020-01-25 RX ADMIN — BENZONATATE 100 MG: 100 CAPSULE ORAL at 09:08

## 2020-01-25 RX ADMIN — IPRATROPIUM BROMIDE AND ALBUTEROL SULFATE 1 AMPULE: 2.5; .5 SOLUTION RESPIRATORY (INHALATION) at 06:11

## 2020-01-25 RX ADMIN — IPRATROPIUM BROMIDE AND ALBUTEROL SULFATE 1 AMPULE: 2.5; .5 SOLUTION RESPIRATORY (INHALATION) at 17:04

## 2020-01-25 RX ADMIN — CITALOPRAM 40 MG: 20 TABLET, FILM COATED ORAL at 09:08

## 2020-01-25 RX ADMIN — LEVOTHYROXINE SODIUM 75 MCG: 0.07 TABLET ORAL at 06:32

## 2020-01-25 RX ADMIN — INSULIN LISPRO 4 UNITS: 100 INJECTION, SOLUTION INTRAVENOUS; SUBCUTANEOUS at 20:40

## 2020-01-25 RX ADMIN — PANTOPRAZOLE SODIUM 40 MG: 40 TABLET, DELAYED RELEASE ORAL at 06:32

## 2020-01-25 RX ADMIN — SODIUM CHLORIDE: 9 INJECTION, SOLUTION INTRAVENOUS at 03:57

## 2020-01-25 RX ADMIN — INSULIN LISPRO 4 UNITS: 100 INJECTION, SOLUTION INTRAVENOUS; SUBCUTANEOUS at 18:17

## 2020-01-25 RX ADMIN — NYSTATIN 500000 UNITS: 100000 SUSPENSION ORAL at 13:44

## 2020-01-25 RX ADMIN — HYDROCHLOROTHIAZIDE 12.5 MG: 12.5 TABLET ORAL at 09:08

## 2020-01-25 RX ADMIN — NYSTATIN 500000 UNITS: 100000 SUSPENSION ORAL at 20:34

## 2020-01-25 RX ADMIN — FORMOTEROL FUMARATE DIHYDRATE 20 MCG: 20 SOLUTION RESPIRATORY (INHALATION) at 19:37

## 2020-01-25 RX ADMIN — BUDESONIDE 500 MCG: 0.5 SUSPENSION RESPIRATORY (INHALATION) at 19:38

## 2020-01-25 RX ADMIN — MAGNESIUM SULFATE 1 G: 1 INJECTION INTRAVENOUS at 13:44

## 2020-01-25 RX ADMIN — ACETAMINOPHEN 650 MG: 325 TABLET, FILM COATED ORAL at 14:29

## 2020-01-25 RX ADMIN — PIPERACILLIN AND TAZOBACTAM 3.38 G: 3; .375 INJECTION, POWDER, LYOPHILIZED, FOR SOLUTION INTRAVENOUS at 23:41

## 2020-01-25 RX ADMIN — ASPIRIN 81 MG CHEWABLE TABLET 81 MG: 81 TABLET CHEWABLE at 09:09

## 2020-01-25 RX ADMIN — IPRATROPIUM BROMIDE AND ALBUTEROL SULFATE 1 AMPULE: 2.5; .5 SOLUTION RESPIRATORY (INHALATION) at 00:59

## 2020-01-25 RX ADMIN — METHYLPREDNISOLONE SODIUM SUCCINATE 40 MG: 40 INJECTION, POWDER, FOR SOLUTION INTRAMUSCULAR; INTRAVENOUS at 11:58

## 2020-01-25 RX ADMIN — FORMOTEROL FUMARATE DIHYDRATE 20 MCG: 20 SOLUTION RESPIRATORY (INHALATION) at 09:56

## 2020-01-25 ASSESSMENT — PAIN SCALES - GENERAL
PAINLEVEL_OUTOF10: 4
PAINLEVEL_OUTOF10: 7
PAINLEVEL_OUTOF10: 0
PAINLEVEL_OUTOF10: 4
PAINLEVEL_OUTOF10: 0

## 2020-01-25 NOTE — PLAN OF CARE
Problem: Falls - Risk of:  Goal: Will remain free from falls  Description  Will remain free from falls  Outcome: Met This Shift  Goal: Absence of physical injury  Description  Absence of physical injury  Outcome: Met This Shift     Problem: Pain:  Goal: Control of acute pain  Description  Control of acute pain  Outcome: Met This Shift     Problem: Gas Exchange - Impaired:  Goal: Levels of oxygenation will improve  Description  Levels of oxygenation will improve  Outcome: Met This Shift

## 2020-01-25 NOTE — PROGRESS NOTES
Patient was seen on 1/24/2020 for follow up of septic and hypovolemic shock, sepsis due to pneumonia, acute diverticulitis.     I have discussed the case, including pertinent history and exam findings with the medical resident and the team. I reviewed patient's overnight and today's issues, patient's medications, vital signs, pertinent lab results and imaging studies. I have seen and examined the patient and the key elements of the encounter have been performed by me.     Vital signs have been reviewed. Patient is currently on 2 L supplemental O2 (baseline home O2) via nasal cannula, pulse O2 saturation above 92%. The patient has been afebrile for the last 24 hours. Patient states that her breathing is better, but she feels tired. somewhat better today. Her cough and wheezing have improved significantly. She is tolerating regular diet well. Abdominal pain, nausea and vomiting have improved. Patient is alert, awake oriented x3. She seems to be in no acute distress at this point. Oral cavity mucosa is moist. Few rhonchi heard bilaterally on lung auscultation, more on the right side, no wheezing. Cardiac exam reveals regular S1-S2, no murmur. Abdomen is soft, nondistended, bowel sounds are positive in all quadrants, no epigastric tenderness. No lower extremity edema. No focal neurologic deficit on exam.     Pertinent lab results and imaging studies have been reviewed. CT of the chest done on 1/22/2020 showed large consolidation in right lower lobe and scattered areas of consolidation in right upper lobe along with small bilateral pleural effusions. .     Assessment/Plan:     1.  Shock, septic and hypovolemic: Improved. Patient's underlying pneumonia as well as hypovolemia secondary to persistent diarrhea prior to admission contributed to her shock. Hypovolemia improved with IV fluids. Patient not requiring IV fluids anymore. Blood cultures and urine cultures have been negative so far.  Respiratory culture showing

## 2020-01-26 VITALS
OXYGEN SATURATION: 97 % | BODY MASS INDEX: 29.99 KG/M2 | HEIGHT: 66 IN | SYSTOLIC BLOOD PRESSURE: 138 MMHG | DIASTOLIC BLOOD PRESSURE: 74 MMHG | WEIGHT: 186.6 LBS | HEART RATE: 73 BPM | TEMPERATURE: 97.8 F | RESPIRATION RATE: 18 BRPM

## 2020-01-26 LAB
ANION GAP SERPL CALCULATED.3IONS-SCNC: 7 MMOL/L (ref 7–16)
ANISOCYTOSIS: ABNORMAL
BASOPHILS ABSOLUTE: 0 E9/L (ref 0–0.2)
BASOPHILS RELATIVE PERCENT: 0.3 % (ref 0–2)
BUN BLDV-MCNC: 18 MG/DL (ref 8–23)
CALCIUM SERPL-MCNC: 9.6 MG/DL (ref 8.6–10.2)
CHLORIDE BLD-SCNC: 100 MMOL/L (ref 98–107)
CO2: 37 MMOL/L (ref 22–29)
CREAT SERPL-MCNC: 0.8 MG/DL (ref 0.5–1)
EOSINOPHILS ABSOLUTE: 0 E9/L (ref 0.05–0.5)
EOSINOPHILS RELATIVE PERCENT: 0 % (ref 0–6)
GFR AFRICAN AMERICAN: >60
GFR NON-AFRICAN AMERICAN: >60 ML/MIN/1.73
GLUCOSE BLD-MCNC: 174 MG/DL (ref 74–99)
HCT VFR BLD CALC: 33.1 % (ref 34–48)
HEMOGLOBIN: 9.9 G/DL (ref 11.5–15.5)
HYPOCHROMIA: ABNORMAL
LYMPHOCYTES ABSOLUTE: 3.21 E9/L (ref 1.5–4)
LYMPHOCYTES RELATIVE PERCENT: 17.4 % (ref 20–42)
MAGNESIUM: 1.9 MG/DL (ref 1.6–2.6)
MCH RBC QN AUTO: 27.8 PG (ref 26–35)
MCHC RBC AUTO-ENTMCNC: 29.9 % (ref 32–34.5)
MCV RBC AUTO: 93 FL (ref 80–99.9)
METAMYELOCYTES RELATIVE PERCENT: 0.9 % (ref 0–1)
METER GLUCOSE: 173 MG/DL (ref 74–99)
MONOCYTES ABSOLUTE: 0.94 E9/L (ref 0.1–0.95)
MONOCYTES RELATIVE PERCENT: 5.2 % (ref 2–12)
MYELOCYTE PERCENT: 0.9 % (ref 0–0)
NEUTROPHILS ABSOLUTE: 14.74 E9/L (ref 1.8–7.3)
NEUTROPHILS RELATIVE PERCENT: 75.7 % (ref 43–80)
PDW BLD-RTO: 13.2 FL (ref 11.5–15)
PHOSPHORUS: 3.7 MG/DL (ref 2.5–4.5)
PLATELET # BLD: 457 E9/L (ref 130–450)
PMV BLD AUTO: 10 FL (ref 7–12)
POLYCHROMASIA: ABNORMAL
POTASSIUM SERPL-SCNC: 5.1 MMOL/L (ref 3.5–5)
RBC # BLD: 3.56 E12/L (ref 3.5–5.5)
SODIUM BLD-SCNC: 144 MMOL/L (ref 132–146)
WBC # BLD: 18.9 E9/L (ref 4.5–11.5)

## 2020-01-26 PROCEDURE — 99231 SBSQ HOSP IP/OBS SF/LOW 25: CPT | Performed by: INTERNAL MEDICINE

## 2020-01-26 PROCEDURE — 2580000003 HC RX 258: Performed by: INTERNAL MEDICINE

## 2020-01-26 PROCEDURE — 84100 ASSAY OF PHOSPHORUS: CPT

## 2020-01-26 PROCEDURE — 94640 AIRWAY INHALATION TREATMENT: CPT

## 2020-01-26 PROCEDURE — 6370000000 HC RX 637 (ALT 250 FOR IP): Performed by: INTERNAL MEDICINE

## 2020-01-26 PROCEDURE — 83735 ASSAY OF MAGNESIUM: CPT

## 2020-01-26 PROCEDURE — 80048 BASIC METABOLIC PNL TOTAL CA: CPT

## 2020-01-26 PROCEDURE — 2700000000 HC OXYGEN THERAPY PER DAY

## 2020-01-26 PROCEDURE — 6360000002 HC RX W HCPCS: Performed by: INTERNAL MEDICINE

## 2020-01-26 PROCEDURE — 94660 CPAP INITIATION&MGMT: CPT

## 2020-01-26 PROCEDURE — 85025 COMPLETE CBC W/AUTO DIFF WBC: CPT

## 2020-01-26 PROCEDURE — 36415 COLL VENOUS BLD VENIPUNCTURE: CPT

## 2020-01-26 PROCEDURE — 82962 GLUCOSE BLOOD TEST: CPT

## 2020-01-26 RX ORDER — BUDESONIDE AND FORMOTEROL FUMARATE DIHYDRATE 160; 4.5 UG/1; UG/1
2 AEROSOL RESPIRATORY (INHALATION) 2 TIMES DAILY
Qty: 1 INHALER | Refills: 2 | Status: SHIPPED | OUTPATIENT
Start: 2020-01-26 | End: 2020-02-07 | Stop reason: SDUPTHER

## 2020-01-26 RX ORDER — GABAPENTIN 400 MG/1
400 CAPSULE ORAL 2 TIMES DAILY
Qty: 60 CAPSULE | Refills: 1 | Status: SHIPPED | OUTPATIENT
Start: 2020-01-26 | End: 2020-01-31 | Stop reason: SDUPTHER

## 2020-01-26 RX ORDER — DOXYCYCLINE HYCLATE 100 MG/1
100 CAPSULE ORAL EVERY 12 HOURS SCHEDULED
Qty: 10 CAPSULE | Refills: 0 | Status: SHIPPED | OUTPATIENT
Start: 2020-01-26 | End: 2020-01-31

## 2020-01-26 RX ORDER — LIDOCAINE 50 MG/G
1 PATCH TOPICAL DAILY
Qty: 10 PATCH | Refills: 0 | Status: SHIPPED | OUTPATIENT
Start: 2020-01-26 | End: 2020-02-05

## 2020-01-26 RX ORDER — PREDNISONE 20 MG/1
40 TABLET ORAL DAILY
Qty: 6 TABLET | Refills: 0 | Status: SHIPPED | OUTPATIENT
Start: 2020-01-27 | End: 2020-01-26 | Stop reason: SDUPTHER

## 2020-01-26 RX ORDER — ALBUTEROL SULFATE 90 UG/1
2 AEROSOL, METERED RESPIRATORY (INHALATION) EVERY 4 HOURS PRN
Qty: 1 INHALER | Refills: 1 | Status: SHIPPED | OUTPATIENT
Start: 2020-01-26 | End: 2020-01-27 | Stop reason: SDUPTHER

## 2020-01-26 RX ORDER — CITALOPRAM 40 MG/1
40 TABLET ORAL DAILY
Qty: 30 TABLET | Refills: 1 | Status: SHIPPED | OUTPATIENT
Start: 2020-01-26 | End: 2020-01-31 | Stop reason: SDUPTHER

## 2020-01-26 RX ORDER — ESOMEPRAZOLE MAGNESIUM 40 MG/1
CAPSULE, DELAYED RELEASE ORAL
Qty: 15 CAPSULE | Refills: 1 | Status: SHIPPED | OUTPATIENT
Start: 2020-01-26 | End: 2020-01-31 | Stop reason: SDUPTHER

## 2020-01-26 RX ORDER — NICOTINE 21 MG/24HR
1 PATCH, TRANSDERMAL 24 HOURS TRANSDERMAL EVERY 24 HOURS
Qty: 30 PATCH | Refills: 1 | Status: SHIPPED | OUTPATIENT
Start: 2020-01-26 | End: 2020-06-26 | Stop reason: ALTCHOICE

## 2020-01-26 RX ORDER — TRIAMTERENE AND HYDROCHLOROTHIAZIDE 37.5; 25 MG/1; MG/1
TABLET ORAL
Qty: 30 TABLET | Refills: 1 | Status: SHIPPED | OUTPATIENT
Start: 2020-01-26 | End: 2020-01-31 | Stop reason: SDUPTHER

## 2020-01-26 RX ORDER — BENZONATATE 100 MG/1
100 CAPSULE ORAL 3 TIMES DAILY PRN
Qty: 30 CAPSULE | Refills: 0 | Status: SHIPPED | OUTPATIENT
Start: 2020-01-26 | End: 2020-01-27 | Stop reason: SDUPTHER

## 2020-01-26 RX ORDER — LEVOTHYROXINE SODIUM 0.07 MG/1
75 TABLET ORAL DAILY
Qty: 30 TABLET | Refills: 1 | Status: SHIPPED | OUTPATIENT
Start: 2020-01-26 | End: 2020-01-27

## 2020-01-26 RX ORDER — AMITRIPTYLINE HYDROCHLORIDE 50 MG/1
TABLET, FILM COATED ORAL
Qty: 30 TABLET | Refills: 1 | Status: SHIPPED | OUTPATIENT
Start: 2020-01-26 | End: 2020-01-31 | Stop reason: SDUPTHER

## 2020-01-26 RX ORDER — PREDNISONE 20 MG/1
40 TABLET ORAL DAILY
Qty: 6 TABLET | Refills: 0 | Status: SHIPPED | OUTPATIENT
Start: 2020-01-27 | End: 2020-01-30

## 2020-01-26 RX ORDER — BENZONATATE 100 MG/1
100 CAPSULE ORAL 3 TIMES DAILY PRN
Qty: 30 CAPSULE | Refills: 0 | Status: SHIPPED | OUTPATIENT
Start: 2020-01-26 | End: 2020-01-26

## 2020-01-26 RX ORDER — PREDNISONE 20 MG/1
20 TABLET ORAL DAILY
Qty: 3 TABLET | Refills: 0 | Status: SHIPPED | OUTPATIENT
Start: 2020-01-30 | End: 2020-02-02

## 2020-01-26 RX ORDER — DOXYCYCLINE HYCLATE 100 MG/1
100 CAPSULE ORAL EVERY 12 HOURS SCHEDULED
Qty: 10 CAPSULE | Refills: 0 | Status: SHIPPED | OUTPATIENT
Start: 2020-01-26 | End: 2020-01-26

## 2020-01-26 RX ORDER — PREDNISONE 20 MG/1
20 TABLET ORAL DAILY
Qty: 4 TABLET | Refills: 0 | Status: SHIPPED | OUTPATIENT
Start: 2020-01-30 | End: 2020-01-26 | Stop reason: SDUPTHER

## 2020-01-26 RX ORDER — MONTELUKAST SODIUM 10 MG/1
TABLET ORAL
Qty: 90 TABLET | Refills: 1 | Status: SHIPPED | OUTPATIENT
Start: 2020-01-26 | End: 2020-09-14 | Stop reason: SDUPTHER

## 2020-01-26 RX ORDER — PREDNISONE 1 MG/1
10 TABLET ORAL DAILY
Qty: 5 TABLET | Refills: 0 | Status: SHIPPED | OUTPATIENT
Start: 2020-01-26 | End: 2020-01-26 | Stop reason: HOSPADM

## 2020-01-26 RX ORDER — PREDNISONE 20 MG/1
20 TABLET ORAL DAILY
Qty: 3 TABLET | Refills: 0 | Status: SHIPPED | OUTPATIENT
Start: 2020-01-30 | End: 2020-01-26 | Stop reason: SDUPTHER

## 2020-01-26 RX ADMIN — AMLODIPINE BESYLATE 5 MG: 5 TABLET ORAL at 10:49

## 2020-01-26 RX ADMIN — FORMOTEROL FUMARATE DIHYDRATE 20 MCG: 20 SOLUTION RESPIRATORY (INHALATION) at 10:56

## 2020-01-26 RX ADMIN — IPRATROPIUM BROMIDE AND ALBUTEROL SULFATE 1 AMPULE: 2.5; .5 SOLUTION RESPIRATORY (INHALATION) at 10:57

## 2020-01-26 RX ADMIN — BENZONATATE 100 MG: 100 CAPSULE ORAL at 10:49

## 2020-01-26 RX ADMIN — ASPIRIN 81 MG CHEWABLE TABLET 81 MG: 81 TABLET CHEWABLE at 10:49

## 2020-01-26 RX ADMIN — PIPERACILLIN AND TAZOBACTAM 3.38 G: 3; .375 INJECTION, POWDER, LYOPHILIZED, FOR SOLUTION INTRAVENOUS at 08:00

## 2020-01-26 RX ADMIN — LEVOTHYROXINE SODIUM 75 MCG: 0.07 TABLET ORAL at 06:48

## 2020-01-26 RX ADMIN — CITALOPRAM 40 MG: 20 TABLET, FILM COATED ORAL at 10:49

## 2020-01-26 RX ADMIN — HYDROCHLOROTHIAZIDE 12.5 MG: 12.5 TABLET ORAL at 10:49

## 2020-01-26 RX ADMIN — PANTOPRAZOLE SODIUM 40 MG: 40 TABLET, DELAYED RELEASE ORAL at 06:48

## 2020-01-26 RX ADMIN — DOXYCYCLINE HYCLATE 100 MG: 100 CAPSULE ORAL at 10:48

## 2020-01-26 RX ADMIN — INSULIN LISPRO 2 UNITS: 100 INJECTION, SOLUTION INTRAVENOUS; SUBCUTANEOUS at 06:35

## 2020-01-26 RX ADMIN — BUDESONIDE 500 MCG: 0.5 SUSPENSION RESPIRATORY (INHALATION) at 10:56

## 2020-01-26 RX ADMIN — IPRATROPIUM BROMIDE AND ALBUTEROL SULFATE 1 AMPULE: 2.5; .5 SOLUTION RESPIRATORY (INHALATION) at 06:42

## 2020-01-26 RX ADMIN — SODIUM CHLORIDE: 9 INJECTION, SOLUTION INTRAVENOUS at 04:04

## 2020-01-26 ASSESSMENT — PAIN SCALES - GENERAL: PAINLEVEL_OUTOF10: 0

## 2020-01-26 NOTE — PROGRESS NOTES
[address]   Internal Medicine Residency / [address]    Attending Physician Statement  I have discussed the case, including pertinent history and exam findings with the resident and the team.  I have seen and examined the patient and the key elements of the encounter have been performed by me. I agree with the assessment, plan and orders as documented by the resident. Chest infection impproving  H&L clearing  Hx of smoldering MM IgG  VS stable  On O2  Plan; Discharge today on Doxycycline  Remainder of medical problems as per resident note.       June Born  Internal Medicine Residency Faculty

## 2020-01-26 NOTE — DISCHARGE SUMMARY
[address]  Discharge Summary    PCP: Mary Tom MD    Admit Date:1/19/2020  Discharge Date: 1/26/2020    Admission Diagnosis:   1. ALYSE  2. Septic Shock   3. Hypovolemic Shock  4. Acute Hypoxic Respiratory Failure  5. HCAP  6. COPD  7. Diverticulitis  8. AMADA  9. Lactic Acidosis  10. Hypothyroidism  11. Thyroid Nodules  12. NIDDM2  13. 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 ALYSE  16. Acute Hypoxic Respiratory Failure  17. HCAP due to MRSA  18. COPD  23. Hypothyroidism  20. Thyroid Nodules  21. NIDDM2  22. Normocytic Hypochromic Anemia of Chronic Disease  23.  Smoldering Multiple Myeloma    Hospital Course:     Significant findings (history and exam, laboratory, radiological, pathology, other tests):   § General Appearance: alert and oriented to person, place and time, well-developed and well-nourished, in no acute distress  § Skin: warm and dry, no rash or erythema  § Head: normocephalic and atraumatic  § Eyes: pupils equal, round, and reactive to light, extraocular eye movements intact, conjunctivae normal, conjunctivae normal and sclera anicteric  § ENT: hearing grossly normal bilaterally and oropharynx clear and moist with normal mucous membranes  § Neck: tender cervical adenopathy present- R LAD   § Pulmonary/Chest: wheezing present- bilaterally and rales present- bilaterally  § Cardiovascular: normal rate, regular rhythm, normal S1 and S2, no murmurs, no gallops, intact distal pulses, no carotid bruits and no JVD  § Abdomen: non-distended, bowel sounds normal, no masses, no organomegaly, no abdominal bruits and tenderness present- RUQ,  without rebound and guarding, Moran sign negative  § Extremities: no cyanosis, no clubbing and no edema  § Musculoskeletal: normal range of motion, no joint swelling, deformity or tenderness  § Neurologic: no cranial nerve deficit, speech normal and no tremor     Pending test results: None    Consults:  1. Pulmonology    Procedures:  1. Central Line Insertion    Condition at discharge: Stable    Disposition: home w/ [facility]        CLINIC FOLLOW UP: Patient voiced interest in pursuing options for varicose veins as outpatient. Patient will also need to be re-evaluated by ENT for possible thyroidectomy as she was supposed to have procedure in December 2019, but this never occurred. Discharge Medications:  Current Discharge Medication List      START taking these medications    Details   ! ! predniSONE (DELTASONE) 20 MG tablet Take 2 tablets by mouth daily for 3 days  Qty: 6 tablet, Refills: 0      !! predniSONE (DELTASONE) 20 MG tablet Take 1 tablet by mouth daily for 4 days  Qty: 4 tablet, Refills: 0      doxycycline hyclate (VIBRAMYCIN) 100 MG capsule Take 1 capsule by mouth every 12 hours for 5 days  Qty: 10 capsule, Refills: 0       !! - Potential duplicate medications found. Please discuss with provider.       CONTINUE these medications which have CHANGED    Details   metFORMIN (GLUCOPHAGE) 1000 MG tablet TAKE 1 TABLET BY MOUTH TWICE DAILY WITH MEALS  Qty: 60 tablet, Refills: 1    Associated Diagnoses: Type 2 diabetes mellitus without complication, with long-term current use of insulin ([facility])      triamterene-hydrochlorothiazide (MAXZIDE-25) 37.5-25 MG per tablet TAKE 1 TABLET BY MOUTH EVERY DAY  Qty: 30 tablet, Refills: 1    Associated Diagnoses: Essential hypertension      budesonide-formoterol (SYMBICORT) 160-4.5 MCG/ACT AERO Inhale 2 puffs into the lungs 2 times daily  Qty: 1 Inhaler, Refills: 2    Associated Diagnoses: Chronic obstructive pulmonary disease, unspecified COPD type ([facility])      albuterol sulfate HFA (PROVENTIL HFA) 108 (90 Base) MCG/ACT inhaler Inhale 2 puffs into the lungs every 4 hours as needed for Wheezing  Qty: 1 Inhaler, Refills: 3    Associated Diagnoses: Chronic obstructive pulmonary disease, unspecified COPD type ([address]

## 2020-01-26 NOTE — CARE COORDINATION
Message left at [facility] re: orders and plan for discharge today. Awaiting return call      10:34  Received return call from [place] at [facility]. Orders faxed to [phone]. They will call pt with date and time of visit.

## 2020-01-27 ENCOUNTER — CARE COORDINATION (OUTPATIENT)
Dept: CASE MANAGEMENT | Age: 65
End: 2020-01-27

## 2020-01-27 PROCEDURE — 1111F DSCHRG MED/CURRENT MED MERGE: CPT | Performed by: INTERNAL MEDICINE

## 2020-01-27 RX ORDER — ALBUTEROL SULFATE 90 UG/1
2 AEROSOL, METERED RESPIRATORY (INHALATION) EVERY 4 HOURS PRN
Qty: 1 INHALER | Refills: 2 | Status: SHIPPED | OUTPATIENT
Start: 2020-01-27 | End: 2020-06-26 | Stop reason: ALTCHOICE

## 2020-01-27 RX ORDER — LEVOTHYROXINE SODIUM 0.07 MG/1
TABLET ORAL
Qty: 90 TABLET | Refills: 0 | Status: SHIPPED | OUTPATIENT
Start: 2020-01-27 | End: 2020-09-10

## 2020-01-27 NOTE — TELEPHONE ENCOUNTER
[facility] transitions Patient released and was to have Tessalon pearls. No script was given to the patient.   This needed to go to [facility] [phone]

## 2020-01-27 NOTE — CARE COORDINATION
[facility] Transitions Initial Follow Up Call    Call within 2 business days of discharge: Yes    Patient: Kaur Chi Patient : 1955   MRN: [id number]  Reason for Admission: Severe sepsis  Discharge Date: 20 RARS: Readmission Risk Score: 24      Last Discharge [facility]       Complaint Diagnosis Description Type Department Provider    20 Emesis; Chest Pain Severe sepsis ([facility] . .. ED to Hosp-Admission (Discharged) (ADMITTED) SEY[facility] 7WE [address] Pham MD; Oralia De Souza. .. Spoke with: Kaur Chi, patient    Facility: [facility]    Non-face-to-face services provided:  Scheduled appointment with PCP-Verfiied with patient appt with Yair Kumar. Pk. Patient's son to transport patient to appt. Obtained and reviewed discharge summary and/or continuity of care documents  Communication with home health agencies or other community services the patient is currently using-Per Ravi Israel at [facility], start of care scheduled for 20. Notified Ravi Israel, many med issues. and phone number for this CTN provided. Care Transitions 24 Hour Call    Do you have any ongoing symptoms?:  Yes  Do you have a copy of your discharge instructions?:  Yes  Do you have all of your prescriptions and are they filled?:  No  Have you been contacted by a [address]?:  No  Have you scheduled your follow up appointment?:  Yes  How are you going to get to your appointment?:  Car - family or friend to transport  Were you discharged with any Home Care or Post Acute Services:  Yes  Post Acute Services:  Home Health (Comment: [facility])  Do you feel like you have everything you need to keep you well at home?:  Yes  Care Transitions Interventions         Medication Assistance Program:  Completed                      Spoke with patient for initial care transition call post hospital discharge. Med review completed; [id number] entered. Patient missing multiple medications.   Patient states she cannot afford all of her medications. Per EMR, patient sent up with PAP again. Previously, patient did not return all needed information for PAP. This CTN instructed patient on importance of finishing process for PAP. Patient verbalized understanding. This CTN spoke to [facility] [place] [facility] regarding missing medication. Per [place], Lidoderm and Nicoderm patches as well as Calcium and Vitamin D must be purchased otc. Patient notified. Copay for albuterol inhaler is $38.28. Per patient, she is unable to afford. Symbicort filled on 1/24/20 at another pharmacy per [place]. Per patient, her family did not  rx from any pharmacy while she was hospitalized. Per [place] Nexium filled on 1/10/20 for $15.20. Per patient, no Nexium noted in home. Patient read bottles to this CTN. Patient was taking Prednisone incorrectly. Patient instructed to take 2 tablets daily x 3 days then 1 tablet daily x 3 days. Patient verbalized understanding. Patient instructed to take Doxyclycline as directed until completely finished and not to stop unless directed by physician. Patient verbalized understanding. Encouragement provided to patient regarding smoking cessation. Patient reports continues to smoke, not near oxygen or while in use. Patient reports she is not using Nicoderm patches. Patient instructed to refrain from smoking while utilizing nicoderm patch. Patient verbalized understanding. Patient instructed on oxygen safety, avoiding open flames. Patient instructed to rinse mouth after using inhalers. Patient verbalized understanding. Patient presented to the emergency department on 1/19/20 for nausea/vomiting/diarrhea, productive cough, abdominal discomfort and headache. Patient denies n/v and reports bm are more formed; last bm today 1/27/20. Patient c/o nonproductive cough. Per patient, she does not have a paper rx for Tessalon.   This CTN contacted [facility] regarding need for rx to be

## 2020-01-28 RX ORDER — BENZONATATE 100 MG/1
100 CAPSULE ORAL 3 TIMES DAILY PRN
Qty: 30 CAPSULE | Refills: 1 | Status: SHIPPED | OUTPATIENT
Start: 2020-01-28 | End: 2020-02-04

## 2020-01-30 ENCOUNTER — CARE COORDINATION (OUTPATIENT)
Dept: CASE MANAGEMENT | Age: 65
End: 2020-01-30

## 2020-01-30 NOTE — CARE COORDINATION
Noted in EMR, Tessalon escribed to [facility]. Patient notified by this CTN. Patient to call [facility] regarding copay.
PM Mary Tom MD [facility]   7/21/2020  1:30 PM BRYAN Mederos - CNP [facility]       [place]ten Sargent, [place]

## 2020-01-31 ENCOUNTER — OFFICE VISIT (OUTPATIENT)
Dept: INTERNAL MEDICINE | Age: 65
End: 2020-01-31
Payer: MEDICARE

## 2020-01-31 ENCOUNTER — TELEPHONE (OUTPATIENT)
Dept: ONCOLOGY | Age: 65
End: 2020-01-31

## 2020-01-31 VITALS
WEIGHT: 176 LBS | BODY MASS INDEX: 28.28 KG/M2 | SYSTOLIC BLOOD PRESSURE: 120 MMHG | TEMPERATURE: 99 F | RESPIRATION RATE: 18 BRPM | HEART RATE: 90 BPM | DIASTOLIC BLOOD PRESSURE: 60 MMHG | HEIGHT: 66 IN

## 2020-01-31 LAB — HBA1C MFR BLD: 5.8 %

## 2020-01-31 PROCEDURE — 1111F DSCHRG MED/CURRENT MED MERGE: CPT | Performed by: INTERNAL MEDICINE

## 2020-01-31 PROCEDURE — 83036 HEMOGLOBIN GLYCOSYLATED A1C: CPT | Performed by: INTERNAL MEDICINE

## 2020-01-31 PROCEDURE — 99215 OFFICE O/P EST HI 40 MIN: CPT | Performed by: INTERNAL MEDICINE

## 2020-01-31 RX ORDER — DOXYCYCLINE HYCLATE 100 MG
100 TABLET ORAL 2 TIMES DAILY
Qty: 10 TABLET | Refills: 0 | Status: SHIPPED | OUTPATIENT
Start: 2020-01-31 | End: 2020-02-05

## 2020-01-31 RX ORDER — OYSTER SHELL CALCIUM WITH VITAMIN D 500; 200 MG/1; [IU]/1
TABLET, FILM COATED ORAL
Qty: 30 TABLET | Refills: 2 | Status: SHIPPED | OUTPATIENT
Start: 2020-01-31 | End: 2020-09-14 | Stop reason: SDUPTHER

## 2020-01-31 RX ORDER — AMITRIPTYLINE HYDROCHLORIDE 50 MG/1
TABLET, FILM COATED ORAL
Qty: 30 TABLET | Refills: 2 | Status: SHIPPED | OUTPATIENT
Start: 2020-01-31 | End: 2020-09-14 | Stop reason: SDUPTHER

## 2020-01-31 RX ORDER — BACLOFEN 10 MG/1
TABLET ORAL
Qty: 90 TABLET | Refills: 2 | Status: SHIPPED
Start: 2020-01-31 | End: 2020-10-27 | Stop reason: SDUPTHER

## 2020-01-31 RX ORDER — LEVOFLOXACIN 500 MG/1
500 TABLET, FILM COATED ORAL DAILY
Qty: 5 TABLET | Refills: 0 | Status: SHIPPED | OUTPATIENT
Start: 2020-01-31 | End: 2020-02-05

## 2020-01-31 RX ORDER — ESOMEPRAZOLE MAGNESIUM 40 MG/1
CAPSULE, DELAYED RELEASE ORAL
Qty: 90 CAPSULE | Refills: 0 | Status: SHIPPED
Start: 2020-01-31 | End: 2020-06-26 | Stop reason: ALTCHOICE

## 2020-01-31 RX ORDER — TRIAMTERENE AND HYDROCHLOROTHIAZIDE 37.5; 25 MG/1; MG/1
TABLET ORAL
Qty: 30 TABLET | Refills: 2 | Status: SHIPPED
Start: 2020-01-31 | End: 2020-09-09 | Stop reason: SDUPTHER

## 2020-01-31 RX ORDER — CITALOPRAM 40 MG/1
40 TABLET ORAL DAILY
Qty: 30 TABLET | Refills: 2 | Status: SHIPPED
Start: 2020-01-31 | End: 2020-09-09 | Stop reason: SDUPTHER

## 2020-01-31 RX ORDER — GABAPENTIN 400 MG/1
400 CAPSULE ORAL 2 TIMES DAILY
Qty: 60 CAPSULE | Refills: 2 | Status: SHIPPED
Start: 2020-01-31 | End: 2020-03-05

## 2020-01-31 RX ORDER — PREDNISONE 20 MG/1
20 TABLET ORAL 2 TIMES DAILY
Qty: 10 TABLET | Refills: 0 | Status: SHIPPED | OUTPATIENT
Start: 2020-01-31 | End: 2020-02-05

## 2020-01-31 NOTE — PROGRESS NOTES
Post-Discharge Transitional Care Management Services or Hospital Follow Up      Madina Kearns   YOB: 1955    Date of Office Visit:  1/31/2020  Date of Hospital Admission: 1/19/20  Date of Hospital Discharge: 1/26/20  Readmission Risk Score(high >=14%.  Medium >=10%):Readmission Risk Score: 24      Care management risk score Rising risk (score 2-5) and Complex Care (Scores >=6): 10     Non face to face  following discharge, date last encounter closed (first attempt may have been earlier): 1/27/2020 12:44 PM 1/27/2020 12:44 PM    Call initiated 2 business days of discharge: Yes     Patient Active Problem List   Diagnosis    Adrenal incidentaloma ([address]    Diabetes mellitus ([address]    Hyperlipidemia    Hypothyroidism    Fibromyalgia    Obesity    Hypertension, uncontrolled    Chronic right-sided low back pain with right-sided sciatica    Tobacco abuse    Myofascial pain    Lumbar radiculitis    Smoldering multiple myeloma ([address]    Chronic obstructive pulmonary disease ([address]    Type 2 diabetes mellitus without complication, with long-term current use of insulin (HCC)    Severe sepsis (HCC)    Hypovolemic shock ([address]    Community acquired pneumonia    Acute diverticulitis    Hypomagnesemia    Hypokalemia    Lactic acidosis    Hypophosphatemia       Allergies   Allergen Reactions    Aceon [Perindopril Erbumine] Anaphylaxis     Tongue swells up    Nsaids Shortness Of Breath and Swelling     tongue       Medications listed as ordered at the time of discharge from [facility] Medication Instructions NHQ:[id number]    Printed on:02/02/20 9392   Medication Information                      albuterol sulfate HFA (PROVENTIL HFA) 108 (90 Base) MCG/ACT inhaler  Inhale 2 puffs into the lungs every 4 hours as needed for Wheezing             amitriptyline (ELAVIL) 50 MG tablet  TAKE 1 TABLET BY MOUTH EVERY EVENING             aspirin 81 MG tablet  Take 1 tablet by mouth daily             baclofen (LIORESAL) 10 MG tablet  TAKE 1/2 TABLET THREE TIMES DAILY AS NEEDED(PAIN, SPASMS)             benzonatate (TESSALON) 100 MG capsule  Take 1 capsule by mouth 3 times daily as needed for Cough             blood glucose monitor kit and supplies  Test 1 times a day & as needed for symptoms of irregular blood glucose. Accuchek Avivia             blood glucose monitor strips  Testing daily             budesonide-formoterol (SYMBICORT) 160-4.5 MCG/ACT AERO  Inhale 2 puffs into the lungs 2 times daily             calcium-vitamin D (CALCIUM 500/D) 500-200 MG-UNIT per tablet  TAKE 1 TABLET BY MOUTH DAILY             citalopram (CELEXA) 40 MG tablet  Take 1 tablet by mouth daily             doxycycline hyclate (VIBRA-TABS) 100 MG tablet  Take 1 tablet by mouth 2 times daily for 5 days             esomeprazole (NEXIUM) 40 MG delayed release capsule  TAKE 1 CAPSULE BY MOUTH EVERY DAY             gabapentin (NEURONTIN) 400 MG capsule  Take 1 capsule by mouth 2 times daily for 90 days. Lancets MISC  Testing daily             levofloxacin (LEVAQUIN) 500 MG tablet  Take 1 tablet by mouth daily for 5 days             levothyroxine (SYNTHROID) 75 MCG tablet  TAKE 1 TABLET BY MOUTH EVERY DAY             lidocaine (LIDODERM) 5 %  Place 1 patch onto the skin daily for 10 days 12 hours on, 12 hours off.             metFORMIN (GLUCOPHAGE) 1000 MG tablet  TAKE 1 TABLET BY MOUTH TWICE DAILY WITH MEALS             Misc.  Devices MISC  Oxygen concentrator  j44.9             montelukast (SINGULAIR) 10 MG tablet  TAKE 1 TABLET BY MOUTH EVERY NIGHT AT BEDTIME             nicotine (NICODERM CQ) 21 MG/24HR  Place 1 patch onto the skin every 24 hours For 6 weeks then change to the 14 mcg patch             predniSONE (DELTASONE) 20 MG tablet  Take 1 tablet by mouth daily for 3 days             predniSONE (DELTASONE) 20 MG tablet  Take 1 tablet by mouth 2 times daily for 5 days triamterene-hydrochlorothiazide (MAXZIDE-25) 37.5-25 MG per tablet  TAKE 1 TABLET BY MOUTH EVERY DAY                   Medications marked \"taking\" at this time  Outpatient Medications Marked as Taking for the 1/31/20 encounter (Office Visit) with Belem Oliver MD   Medication Sig Dispense Refill    aspirin 81 MG tablet Take 1 tablet by mouth daily 90 tablet 0    gabapentin (NEURONTIN) 400 MG capsule Take 1 capsule by mouth 2 times daily for 90 days.  60 capsule 2    citalopram (CELEXA) 40 MG tablet Take 1 tablet by mouth daily 30 tablet 2    amitriptyline (ELAVIL) 50 MG tablet TAKE 1 TABLET BY MOUTH EVERY EVENING 30 tablet 2    metFORMIN (GLUCOPHAGE) 1000 MG tablet TAKE 1 TABLET BY MOUTH TWICE DAILY WITH MEALS 60 tablet 2    triamterene-hydrochlorothiazide (MAXZIDE-25) 37.5-25 MG per tablet TAKE 1 TABLET BY MOUTH EVERY DAY 30 tablet 2    esomeprazole (NEXIUM) 40 MG delayed release capsule TAKE 1 CAPSULE BY MOUTH EVERY DAY 90 capsule 0    calcium-vitamin D (CALCIUM 500/D) 500-200 MG-UNIT per tablet TAKE 1 TABLET BY MOUTH DAILY 30 tablet 2    baclofen (LIORESAL) 10 MG tablet TAKE 1/2 TABLET THREE TIMES DAILY AS NEEDED(PAIN, SPASMS) 90 tablet 2    doxycycline hyclate (VIBRA-TABS) 100 MG tablet Take 1 tablet by mouth 2 times daily for 5 days 10 tablet 0    levofloxacin (LEVAQUIN) 500 MG tablet Take 1 tablet by mouth daily for 5 days 5 tablet 0    predniSONE (DELTASONE) 20 MG tablet Take 1 tablet by mouth 2 times daily for 5 days 10 tablet 0    levothyroxine (SYNTHROID) 75 MCG tablet TAKE 1 TABLET BY MOUTH EVERY DAY 90 tablet 0    albuterol sulfate HFA (PROVENTIL HFA) 108 (90 Base) MCG/ACT inhaler Inhale 2 puffs into the lungs every 4 hours as needed for Wheezing 1 Inhaler 2    nicotine (NICODERM CQ) 21 MG/24HR Place 1 patch onto the skin every 24 hours For 6 weeks then change to the 14 mcg patch 30 patch 1    budesonide-formoterol (SYMBICORT) 160-4.5 MCG/ACT AERO Inhale 2 puffs into the lungs 2 mellitus without complication, with long-term current use of insulin (HCC)  - metFORMIN (GLUCOPHAGE) 1000 MG tablet;   - POCT glycosylated hemoglobin   - LIPID PANEL;    6. Gastroesophageal reflux disease without esophagitis  - esomeprazole (NEXIUM) 40 MG qd    7. Vitamin D deficiency  - calcium-vitamin D (CALCIUM 500/D) 500-200 MG-UNITqd    8. Osteoarthritis of lumbar spine, unspecified spinal osteoarthritis complication status  - baclofen (LIORESAL) 10 MG tablet; fro muscle spasms. 9. Chronic obstructive pulmonary disease with acute lower respiratory infection (HCC)  - VT DISCHARGE MEDS RECONCILED W/ CURRENT OUTPATIENT MED LIST  - doxycycline hyclate (VIBRA-TABS) 100 MG tablet; Take 1 tablet by mouth 2 times daily for 5 days    - levofloxacin (LEVAQUIN) 500 MG tablet; Take 1 tablet by mouth daily for 5 days - predniSONE (DELTASONE) 20 MG tablet; Take 1 tablet by mouth 2 times daily for 5 days     10. Smoldering multiple myeloma ([facility]  Needs to make new appointment with Hematology. Dr Ted Neves  - Elma Counts include [facility] beds at the [facility]; Future    11. Thyroid nodule  - Yanci Byrne, DO, Ear, Nose, Throat, [place]        Medical Decision Making: moderate complexity   RTC 1 mo  Cased discussed with Ms Neil Patricio.   Electronically signed by Lopez Low MD on 2/2/2020 at 5:27 PM

## 2020-01-31 NOTE — TELEPHONE ENCOUNTER
Patient called and left message to be rescheduled with Dr. Sharma Hidden. Tried to call patient at number given and message comes on saying number dialed has been changed, disconnected, or is no longer in service. Will try to look for alternate phone number to call patient to reschedule with Dr. Sharma Hidden. Patient last seen 03/05/19.

## 2020-01-31 NOTE — PATIENT INSTRUCTIONS
Patient Education        Preventing Falls: Care Instructions  Your Care Instructions    Getting around your home safely can be a challenge if you have injuries or health problems that make it easy for you to fall. Loose rugs and furniture in walkways are among the dangers for many older people who have problems walking or who have poor eyesight. People who have conditions such as arthritis, osteoporosis, or dementia also have to be careful not to fall. You can make your home safer with a few simple measures. Follow-up care is a key part of your treatment and safety. Be sure to make and go to all appointments, and call your doctor if you are having problems. It's also a good idea to know your test results and keep a list of the medicines you take. How can you care for yourself at home? Taking care of yourself  · You may get dizzy if you do not drink enough water. To prevent dehydration, drink plenty of fluids, enough so that your urine is light yellow or clear like water. Choose water and other caffeine-free clear liquids. If you have kidney, heart, or liver disease and have to limit fluids, talk with your doctor before you increase the amount of fluids you drink. · Exercise regularly to improve your strength, muscle tone, and balance. Walk if you can. Swimming may be a good choice if you cannot walk easily. · Have your vision and hearing checked each year or any time you notice a change. If you have trouble seeing and hearing, you might not be able to avoid objects and could lose your balance. · Know the side effects of the medicines you take. Ask your doctor or pharmacist whether the medicines you take can affect your balance. Sleeping pills or sedatives can affect your balance. · Limit the amount of alcohol you drink. Alcohol can impair your balance and other senses. · Ask your doctor whether calluses or corns on your feet need to be removed.  If you wear loose-fitting shoes because of calluses or corns, you can lose your balance and fall. · Talk to your doctor if you have numbness in your feet. Preventing falls at home  · Remove raised doorway thresholds, throw rugs, and clutter. Repair loose carpet or raised areas in the floor. · Move furniture and electrical cords to keep them out of walking paths. · Use nonskid floor wax, and wipe up spills right away, especially on ceramic tile floors. · If you use a walker or cane, put rubber tips on it. If you use crutches, clean the bottoms of them regularly with an abrasive pad, such as steel wool. · Keep your house well lit, especially [place], and outside walkways. Use night-lights in areas such as hallways and bathrooms. Add extra light switches or use remote switches (such as switches that go on or off when you clap your hands) to make it easier to turn lights on if you have to get up during the night. · Install sturdy handrails on stairways. · Move items in your cabinets so that the things you use a lot are on the lower shelves (about waist level). · Keep a cordless phone and a flashlight with new batteries by your bed. If possible, put a phone in each of the main rooms of your house, or carry a cell phone in case you fall and cannot reach a phone. Or, you can wear a device around your neck or wrist. You push a button that sends a signal for help. · Wear low-heeled shoes that fit well and give your feet good support. Use footwear with nonskid soles. Check the heels and soles of your shoes for wear. Repair or replace worn heels or soles. · Do not wear socks without shoes on wood floors. · Walk on the grass when the sidewalks are slippery. If you live in an area that gets snow and ice in the winter, sprinkle salt on slippery steps and sidewalks. Preventing falls in the bath  · Install grab bars and nonskid mats inside and outside your shower or tub and near the toilet and sinks. · Use shower chairs and bath benches.   · Use a hand-held shower head

## 2020-02-02 ASSESSMENT — ENCOUNTER SYMPTOMS
ALLERGIC/IMMUNOLOGIC NEGATIVE: 1
EYES NEGATIVE: 1
SHORTNESS OF BREATH: 1
WHEEZING: 1
COUGH: 1
GASTROINTESTINAL NEGATIVE: 1

## 2020-02-04 ENCOUNTER — CARE COORDINATION (OUTPATIENT)
Dept: CASE MANAGEMENT | Age: 65
End: 2020-02-04

## 2020-02-07 ENCOUNTER — CARE COORDINATION (OUTPATIENT)
Dept: CASE MANAGEMENT | Age: 65
End: 2020-02-07

## 2020-02-07 ENCOUNTER — TELEPHONE (OUTPATIENT)
Dept: INTERNAL MEDICINE | Age: 65
End: 2020-02-07

## 2020-02-07 RX ORDER — BUDESONIDE AND FORMOTEROL FUMARATE DIHYDRATE 160; 4.5 UG/1; UG/1
2 AEROSOL RESPIRATORY (INHALATION) 2 TIMES DAILY
Qty: 1 INHALER | Refills: 2 | Status: SHIPPED
Start: 2020-02-07 | End: 2021-04-21 | Stop reason: SDUPTHER

## 2020-02-07 NOTE — CARE COORDINATION
[facility] Transitions Follow Up Call    2020    Patient: Jovanna Fuentes  Patient : 1955   MRN: [id number]  Reason for Admission: Severe Sepsis  Discharge Date: 20 RARS: Readmission Risk Score: 24         Spoke with: Jovanna Fuentes (Patient)    Care Transitions Subsequent and Final Call    Subsequent and Final Calls  Do you have any ongoing symptoms?:  Yes  Onset of Patient-reported symptoms: In the past 7 days  Patient-reported symptoms:  Cough, Other  Have your medications changed?:  Yes  Patient Reports:  Pt states she did not  Tessalon Perles for cough d/t no money  Do you have any questions related to your medications?:  No  Do you currently have any active services?:  Yes  Are you currently active with any services?:  Home Health  Do you have any needs or concerns that I can assist you with?:  Yes  Patient-reported Needs or Concerns:  CTn advised if chest pain persist or worsens she needs to call 9--1 and go to ED  Identified Barriers: Other, Stress, Financial, Lack of Motivation  Care Transitions Interventions         Medication Assistance Program:  Completed                 Other Interventions:          Spoke with patient today 20 for TCM/hospital discharge follow up sub call. Patient complains of having chest pain that radiates across \"both shoulder blades\" that comes and goes. States chest pain started two days ago but admits being under a lot of stress. Rates pain 8/10 in severity on pain scale. Denies any arm, neck or back pain. Denies any shortness of breath but admits she wears home oxygen at 2 lpm \"most of the time\". Denies any nausea, vomiting, chills or fever. CTN advised if chest pain persist or worsens she is to call --1 and go to nearest ED which she verbalizes understanding. States she continues having a productive cough but admits never picking up [employer] d/t not having money.  States she is taking antibiotic as directed that will be

## 2020-02-08 ENCOUNTER — CARE COORDINATION (OUTPATIENT)
Dept: CASE MANAGEMENT | Age: 65
End: 2020-02-08

## 2020-02-10 ENCOUNTER — HOSPITAL ENCOUNTER (OUTPATIENT)
Age: 65
Discharge: HOME | End: 2020-02-10
Payer: MEDICARE

## 2020-02-10 ENCOUNTER — OFFICE VISIT (OUTPATIENT)
Dept: INTERNAL MEDICINE | Age: 65
End: 2020-02-10
Payer: MEDICARE

## 2020-02-10 VITALS
TEMPERATURE: 98.6 F | OXYGEN SATURATION: 95 % | BODY MASS INDEX: 28.28 KG/M2 | HEART RATE: 86 BPM | SYSTOLIC BLOOD PRESSURE: 130 MMHG | DIASTOLIC BLOOD PRESSURE: 70 MMHG | WEIGHT: 176 LBS | RESPIRATION RATE: 20 BRPM | HEIGHT: 66 IN

## 2020-02-10 LAB
ALBUMIN SERPL-MCNC: 3.5 G/DL (ref 3.5–5.2)
ALP BLD-CCNC: 80 U/L (ref 35–104)
ALT SERPL-CCNC: 14 U/L (ref 0–32)
ANION GAP SERPL CALCULATED.3IONS-SCNC: 13 MMOL/L (ref 7–16)
AST SERPL-CCNC: 15 U/L (ref 0–31)
BASOPHILS ABSOLUTE: 0.03 E9/L (ref 0–0.2)
BASOPHILS RELATIVE PERCENT: 0.5 % (ref 0–2)
BILIRUB SERPL-MCNC: 0.4 MG/DL (ref 0–1.2)
BUN BLDV-MCNC: 10 MG/DL (ref 8–23)
CALCIUM SERPL-MCNC: 9.6 MG/DL (ref 8.6–10.2)
CHLORIDE BLD-SCNC: 100 MMOL/L (ref 98–107)
CHOLESTEROL, TOTAL: 171 MG/DL (ref 0–199)
CO2: 30 MMOL/L (ref 22–29)
CREAT SERPL-MCNC: 0.6 MG/DL (ref 0.5–1)
EOSINOPHILS ABSOLUTE: 0.23 E9/L (ref 0.05–0.5)
EOSINOPHILS RELATIVE PERCENT: 3.5 % (ref 0–6)
GFR AFRICAN AMERICAN: >60
GFR NON-AFRICAN AMERICAN: >60 ML/MIN/1.73
GLUCOSE BLD-MCNC: 128 MG/DL (ref 74–99)
HCT VFR BLD CALC: 36.9 % (ref 34–48)
HDLC SERPL-MCNC: 57 MG/DL
HEMOGLOBIN: 11 G/DL (ref 11.5–15.5)
IMMATURE GRANULOCYTES #: 0.01 E9/L
IMMATURE GRANULOCYTES %: 0.2 % (ref 0–5)
INFLUENZA A ANTIBODY: NEGATIVE
INFLUENZA B ANTIBODY: NEGATIVE
LDL CHOLESTEROL CALCULATED: 97 MG/DL (ref 0–99)
LYMPHOCYTES ABSOLUTE: 2.82 E9/L (ref 1.5–4)
LYMPHOCYTES RELATIVE PERCENT: 42.9 % (ref 20–42)
MCH RBC QN AUTO: 28.7 PG (ref 26–35)
MCHC RBC AUTO-ENTMCNC: 29.8 % (ref 32–34.5)
MCV RBC AUTO: 96.3 FL (ref 80–99.9)
MONOCYTES ABSOLUTE: 0.58 E9/L (ref 0.1–0.95)
MONOCYTES RELATIVE PERCENT: 8.8 % (ref 2–12)
NEUTROPHILS ABSOLUTE: 2.91 E9/L (ref 1.8–7.3)
NEUTROPHILS RELATIVE PERCENT: 44.1 % (ref 43–80)
PDW BLD-RTO: 14.7 FL (ref 11.5–15)
PLATELET # BLD: 417 E9/L (ref 130–450)
PMV BLD AUTO: 9.9 FL (ref 7–12)
POTASSIUM SERPL-SCNC: 4 MMOL/L (ref 3.5–5)
RBC # BLD: 3.83 E12/L (ref 3.5–5.5)
SODIUM BLD-SCNC: 143 MMOL/L (ref 132–146)
TOTAL PROTEIN: 7.3 G/DL (ref 6.4–8.3)
TRIGL SERPL-MCNC: 83 MG/DL (ref 0–149)
VLDLC SERPL CALC-MCNC: 17 MG/DL
WBC # BLD: 6.6 E9/L (ref 4.5–11.5)

## 2020-02-10 PROCEDURE — 94640 AIRWAY INHALATION TREATMENT: CPT | Performed by: INTERNAL MEDICINE

## 2020-02-10 PROCEDURE — 36415 COLL VENOUS BLD VENIPUNCTURE: CPT

## 2020-02-10 PROCEDURE — 87804 INFLUENZA ASSAY W/OPTIC: CPT | Performed by: INTERNAL MEDICINE

## 2020-02-10 PROCEDURE — 1111F DSCHRG MED/CURRENT MED MERGE: CPT | Performed by: INTERNAL MEDICINE

## 2020-02-10 PROCEDURE — 4004F PT TOBACCO SCREEN RCVD TLK: CPT | Performed by: INTERNAL MEDICINE

## 2020-02-10 PROCEDURE — 3023F SPIROM DOC REV: CPT | Performed by: INTERNAL MEDICINE

## 2020-02-10 PROCEDURE — 3017F COLORECTAL CA SCREEN DOC REV: CPT | Performed by: INTERNAL MEDICINE

## 2020-02-10 PROCEDURE — 80053 COMPREHEN METABOLIC PANEL: CPT

## 2020-02-10 PROCEDURE — 80061 LIPID PANEL: CPT

## 2020-02-10 PROCEDURE — G8926 SPIRO NO PERF OR DOC: HCPCS | Performed by: INTERNAL MEDICINE

## 2020-02-10 PROCEDURE — 99212 OFFICE O/P EST SF 10 MIN: CPT | Performed by: INTERNAL MEDICINE

## 2020-02-10 PROCEDURE — G8417 CALC BMI ABV UP PARAM F/U: HCPCS | Performed by: INTERNAL MEDICINE

## 2020-02-10 PROCEDURE — G8427 DOCREV CUR MEDS BY ELIG CLIN: HCPCS | Performed by: INTERNAL MEDICINE

## 2020-02-10 PROCEDURE — 99213 OFFICE O/P EST LOW 20 MIN: CPT | Performed by: INTERNAL MEDICINE

## 2020-02-10 PROCEDURE — 85025 COMPLETE CBC W/AUTO DIFF WBC: CPT

## 2020-02-10 PROCEDURE — G8482 FLU IMMUNIZE ORDER/ADMIN: HCPCS | Performed by: INTERNAL MEDICINE

## 2020-02-10 RX ORDER — IPRATROPIUM BROMIDE AND ALBUTEROL SULFATE 2.5; .5 MG/3ML; MG/3ML
1 SOLUTION RESPIRATORY (INHALATION) ONCE
Status: COMPLETED | OUTPATIENT
Start: 2020-02-10 | End: 2020-02-10

## 2020-02-10 RX ORDER — ALBUTEROL SULFATE 90 UG/1
2 AEROSOL, METERED RESPIRATORY (INHALATION) 4 TIMES DAILY PRN
Qty: 3 INHALER | Refills: 1 | Status: SHIPPED
Start: 2020-02-10 | End: 2021-04-21 | Stop reason: SDUPTHER

## 2020-02-10 RX ORDER — IPRATROPIUM BROMIDE AND ALBUTEROL SULFATE 2.5; .5 MG/3ML; MG/3ML
1 SOLUTION RESPIRATORY (INHALATION) EVERY 4 HOURS
Qty: 360 ML | Refills: 1 | Status: SHIPPED
Start: 2020-02-10 | End: 2021-04-21 | Stop reason: SDUPTHER

## 2020-02-10 RX ADMIN — IPRATROPIUM BROMIDE AND ALBUTEROL SULFATE 1 AMPULE: 2.5; .5 SOLUTION RESPIRATORY (INHALATION) at 15:39

## 2020-02-10 ASSESSMENT — ENCOUNTER SYMPTOMS
SINUS PAIN: 0
WHEEZING: 1
SINUS PRESSURE: 0
VOMITING: 0
CONSTIPATION: 0
SHORTNESS OF BREATH: 1
DIARRHEA: 0
COUGH: 1
BACK PAIN: 0
NAUSEA: 0

## 2020-02-10 NOTE — PROGRESS NOTES
[facility]    Attending Physician Statement  I have discussed the case, including pertinent history and exam findings with the resident. I have seen and examined the patient and the key elements of the encounter have been performed by me. I agree with the assessment, plan and orders as documented by the resident. I have reviewed all pertinent PMHx, PSHx, FamHx, SocialHx, medications, and allergies and updated history as appropriate. Patient here for routine follow up of medical problems. COPD, with recent exacerbation secondary to MRSA pneumonia  -Since last visit she reports decreased fever and chills as well as decreased productive cough. She still has wheezing bilaterally and some dyspnea on exertion. She has been taking Spiriva, does not have a nebulizer.  -At this point she is completed 10 days of doxycycline after hospital discharge and 5 days of Levaquin.   -Start nebulizer with duo nebs every 6 hours as needed   -Continue prednisone taper as previously prescribed  - We discussed the importance of follow-up with her pulmonologist and our office call to confirm she has a scheduled appointment    Remainder of medical problems as per resident note.   Lillian Nieves D.O.  2/10/2020 2:09 PM

## 2020-02-10 NOTE — PROGRESS NOTES
Pulse ox  94 %  Pulse 88  prior to duoneb treatment   Aerosol with douneb treatment given patient tolerated well    Pulse ox after treatment 96%  Pulse  90    Rapid flu A negative B negative    Patient requests prescription for nebulizer be sent to [address] office called to schedule appointment [phone], had to leave message for personal to call    [place] [facility] called to schedule appointment [phone], left message to schedule patient as soon as possible and to notify me of date. Prescription for nebulizer faxed to [facility] [place] per patient request [phone]  Discharge instructions given. Patient verbalizes understanding.

## 2020-02-10 NOTE — PROGRESS NOTES
[facility]  InternalMedicine Residency Program  ACC Note      SUBJECTIVE:  CC: had concerns including Chest Congestion (productive cough with cloudy white sputum x 1 week); Wheezing; Neck Pain (right side); and Chills. HPI:Angelita Mondragon presented to the [address] [facility] for a routine visit. PMHx of  has a past medical history of Adrenal incidentaloma ([address] Anesthesia complication, Anxiety, Arthritis, Asthma, Bladder incontinence, Cancer ([address] Chronic back pain, COPD (chronic obstructive pulmonary disease) ([address] Depression, Diabetes mellitus ([address] Encounter for screening colonoscopy, Fibromyalgia, GERD (gastroesophageal reflux disease), Hyperlipidemia, Hypertension, Hypothyroidism, MGUS (monoclonal gammopathy of unknown significance), Multiple myeloma ([address] Neuropathy, Obesity, Pelvic pain, Thyroid disease, Tobacco abuse, and Type II or unspecified type diabetes mellitus without mention of complication, not stated as uncontrolled. Patient present with CC of wheezing, coughing, pain in the right neck to shoulder, pain in marina. Rib cage. Symptoms began with coughing a week ago and then the pain around her sides. Producing cloudy sputum. Reports pink sputum x2 since yesterday. Some congestion. Denies sneezing. Denies fever. Reports chills, headache, body ache and wheezing. no sick contacts. Lives at house along with her son. Reduced appetite since last week. She has fibromyalgia, so she not sure if her aches are due to that or this sickness. Took tylenol - helped some. She is on prednisone from her last hospitalization; will finish this week. Current smoker 1/2 pack; 50+ pack year history. No alcohol. No recreational drugs. Review of Systems   Constitutional: Positive for appetite change and chills. Negative for activity change and fever. HENT: Negative for sinus pressure, sinus pain and sneezing. Respiratory: Positive for cough, shortness of breath and wheezing. 160-4.5 MCG/ACT AERO Inhale 2 puffs into the lungs 2 times daily (Patient not taking: Reported on 2/10/2020) 1 Inhaler 2    nicotine (NICODERM CQ) 21 MG/24HR Place 1 patch onto the skin every 24 hours For 6 weeks then change to the 14 mcg patch (Patient not taking: Reported on 2/10/2020) 30 patch 1    blood glucose monitor kit and supplies Test 1 times a day & as needed for symptoms of irregular blood glucose. Accuchek Avivia 1 kit 0    blood glucose monitor strips Testing daily 100 strip 3    Lancets MISC Testing daily 100 each 3    Misc. Devices MISC Oxygen concentrator  j44.9 1 Device 0     No current facility-administered medications on file prior to visit. OBJECTIVE:    VS: /70 (Site: Right Upper Arm, Position: Sitting, Cuff Size: Large Adult)   Pulse 86   Temp 98.6 °F (37 °C)   Resp 20   Ht 5' 6\" (1.676 m)   Wt 176 lb (79.8 kg)   SpO2 95%   BMI 28.41 kg/m²   Physical Exam  Constitutional:       Appearance: Normal appearance. She is normal weight. HENT:      Head: Normocephalic and atraumatic. Mouth/Throat:      Mouth: Mucous membranes are moist.   Eyes:      Extraocular Movements: Extraocular movements intact. Pupils: Pupils are equal, round, and reactive to light. Neck:      Musculoskeletal: Normal range of motion. No neck rigidity or muscular tenderness. Cardiovascular:      Rate and Rhythm: Normal rate and regular rhythm. Heart sounds: No murmur. Pulmonary:      Effort: Pulmonary effort is normal.      Breath sounds: Wheezing present. Comments: Diffuse marina. wheezes  Abdominal:      General: Bowel sounds are normal.      Palpations: Abdomen is soft. There is no mass. Tenderness: There is no abdominal tenderness. Musculoskeletal: Normal range of motion. Right lower leg: No edema. Left lower leg: No edema. Neurological:      General: No focal deficit present. Mental Status: She is alert and oriented to person, place, and time. Cranial Nerves: No cranial nerve deficit. Psychiatric:         Mood and Affect: Mood normal.         Behavior: Behavior normal.         Thought Content: Thought content normal.         ASSESSMENT/PLAN:    I have reviewed all pertient PMHx, PSHx, FamHx, Social Hx, medications, and allergies andupdated history as appropriate. Angelita was seen today for chest congestion, wheezing, neck pain and chills. Diagnoses and all orders for this visit:    Viral bronchitis  -     albuterol sulfate  (90 Base) MCG/ACT inhaler; Inhale 2 puffs into the lungs 4 times daily as needed for Wheezing  -     ipratropium-albuterol (DUONEB) 0.5-2.5 (3) MG/3ML SOLN nebulizer solution; Inhale 3 mLs into the lungs every 4 hours  -     DME Order for Nebulizer as OP    COPD with acute exacerbation (HCC)  -     albuterol sulfate  (90 Base) MCG/ACT inhaler; Inhale 2 puffs into the lungs 4 times daily as needed for Wheezing  -     ipratropium-albuterol (DUONEB) 0.5-2.5 (3) MG/3ML SOLN nebulizer solution; Inhale 3 mLs into the lungs every 4 hours  -     DME Order for Nebulizer as OP    Has recently been on prednisone, doxycycline and levoquin. She is still wheezing. Viral bronchitis and AECOPD. Hold steroids and ABx for now. Dc on albuterol, duoneb. To follow to pulmonologist Dr. Phill Dixon group. RTC:     I have reviewed my findings andrecommendations with Matthew Ko and attending physician.     Pramod Freeman DO PGY1  2/10/2020 3:05 PM

## 2020-02-12 ENCOUNTER — TELEPHONE (OUTPATIENT)
Dept: INTERNAL MEDICINE | Age: 65
End: 2020-02-12

## 2020-02-12 ENCOUNTER — CARE COORDINATION (OUTPATIENT)
Dept: CASE MANAGEMENT | Age: 65
End: 2020-02-12

## 2020-02-12 NOTE — TELEPHONE ENCOUNTER
Vianey Gaviria HC LM at 12:00. She wanted to know if the patient spoke to doctor about portable O2? Also she would like an order for a  to go into the home and see patient. Patient is requesting things like Meals on Wheels.   Aleida Chavez can be reached at [phone]

## 2020-02-13 NOTE — TELEPHONE ENCOUNTER
Jake Winters at [facility] [place], [facility].. Patient is having trouble affording inhalers.  is coming into home to make an assessment.     Sadia Martínez DO

## 2020-02-14 ENCOUNTER — CARE COORDINATION (OUTPATIENT)
Dept: CASE MANAGEMENT | Age: 65
End: 2020-02-14

## 2020-02-17 ENCOUNTER — TELEPHONE (OUTPATIENT)
Dept: INTERNAL MEDICINE | Age: 65
End: 2020-02-17

## 2020-02-17 NOTE — CARE COORDINATION
Per Lizeth Velasco at [address] [place] [address] will available for  by 1100. Copay is $10.50. Per Tammi, patient picked up Duoneb last week. Per Galina Packer at [facility] was unable to read rx faxed to them for nebulizer. Order was faxed to [phone] requiring signature on 2/11/20. Eusebia Solorzano has not received return fax and is unable to process rx for nebulizer until paperwork is received. This CTN left message on nurse's line at Internal Medicine. Call back number provided. Per Galina Packer, unable to fill rx for Duoneb. Patient updated on above and Meals on Wheels. Patient appreciative.

## 2020-02-17 NOTE — TELEPHONE ENCOUNTER
Yas Transition Care Nurse LM at 10:49. She stated that William Brito from [facility] cannot read the script for nebulizer. They need a new script.   Jesus Gallagher can be reached at [phone]

## 2020-02-21 ENCOUNTER — CARE COORDINATION (OUTPATIENT)
Dept: CASE MANAGEMENT | Age: 65
End: 2020-02-21

## 2020-02-21 NOTE — CARE COORDINATION
[address] [facility] Transitions Follow Up Call    2020    Patient: Rm Hernández  Patient : 1955   MRN: [id number]  Reason for Admission: Severe sepsis  Discharge Date: 20 RARS: Readmission Risk Score: 24         Spoke with: Rm Hernández, patient    Care Transitions Subsequent and Final Call    Subsequent and Final Calls  Do you have any ongoing symptoms?:  Yes  Onset of Patient-reported symptoms:  Other  Patient-reported symptoms:  Pain, Cough  Have your medications changed?:  No  Do you have any questions related to your medications?:  No  Do you currently have any active services?:  Yes  Are you currently active with any services?:  Home Health  Do you have any needs or concerns that I can assist you with?:  Yes  Patient-reported Needs or Concerns:  Patient states she needs assistance with Meals on Wheels. Patient states she has not spoken to SW from [employer] as of yet. Identified Barriers:  None  Care Transitions Interventions   Meals on Wheels:  Completed  DME Assistance:  Completed                           Other Interventions:          Spoke with patient for follow up care transition call. Patient denies any medications questions or changes. Patient reports she did not  Tessalon from pharmacy. Patient instructed to do so before medication is reshelved again. Patient verbalized understanding. Patient denies nausea, vomiting, diarrhea. Patient reports coughing a \"little,\" clear sputum per patient. Patient reports smoking cessation continues. Patient reports she is short of breath \"now and then. \"  Patient states she wears her oxygen continuously at home; however, she does not have any portable tanks and was told she does not qualify for them. Patient aware this CTN to follow up with [facility] regarding portable tanks. Patient states she is also tired frequently. Patient c/o sciatica pain after therapy. Patient reports [facility] and OT continue in home therapy. Patient states she continues to follow a low carb diet. Patient reports fasting blood sugar today 2/21/20 153 mg/dl. Patient reports her appetite \"comes and goes. \"  Patient is agreeable to diabetic education. PCP to be notified to make referral.    Patient reports nebulizer was delivered today. Per Arthur Wallace at [facility], will deliver portable tanks on 2/24/20; patient had declined previously. Updated patient on delivery of portable tanks, confirmed no issue on 2/24/20 for delivery, and requested patient contact Arthur Wallace at [phone], option 1. Patient states 2/24/20 is okay and she will contact Arthur Wallace. Patient denies any further needs, questions, or concerns at this time. Patient is agreeable to future follow up calls.     Follow Up  Future Appointments   Date Time Provider Jennifer Arzate   3/5/2020  1:00 PM Yisel Armenta MD [facility]   3/6/2020  2:30 PM SEYZ [address] [facility]  N [facility]   3/6/2020  3:00 PM Sabas Morales MD MED ONC [facility]   7/21/2020  1:30 PM Lizzeth Crowley APRN -  [facility] MIRIAM Brand

## 2020-02-21 NOTE — CARE COORDINATION
Per Josefina Magaña, nebulizer being delivered today 2/21/20 per patient request.  Patient reported to [facility] she was not available for delivery on 2/20/20.

## 2020-02-25 ENCOUNTER — CARE COORDINATION (OUTPATIENT)
Dept: CASE MANAGEMENT | Age: 65
End: 2020-02-25

## 2020-02-27 ENCOUNTER — CARE COORDINATION (OUTPATIENT)
Dept: CASE MANAGEMENT | Age: 65
End: 2020-02-27

## 2020-02-27 NOTE — CARE COORDINATION
education noted. Will contact PCP again regarding diabetic education. Patient notified she was contacted regarding Meals on Wheels, to listen to her voicemail. Patient verbalized understanding. Patient denies any further needs, questions, or concerns at this time. Patient is agreeable to future follow up calls.     Follow Up  Future Appointments   Date Time Provider Jennifer Arzate   3/5/2020  1:00 PM Filipe Jara MD [facility]   3/6/2020  2:30 PM SEYZ [address] [facility]  N [facility]   3/6/2020  3:00 PM Sharmila Espinal MD MED ONC [facility]   7/21/2020  1:30 PM Parul Mathur, APRN -  [facility] MIRIAM Brand

## 2020-03-04 ENCOUNTER — CARE COORDINATION (OUTPATIENT)
Dept: CASE MANAGEMENT | Age: 65
End: 2020-03-04

## 2020-03-04 NOTE — CARE COORDINATION
agreeable to future follow up calls. This CTN reviewed and discussed care coordination services. Patient is receptive and agreeable to future care coordination.       Follow Up  Future Appointments   Date Time Provider Jennifer Carito   3/5/2020  1:00 PM Paty Zarate MD [facility]   3/6/2020  2:30 PM SEYZ [address] [facility]  N [facility]   3/6/2020  3:00 PM Bishop Christiansen MD MED ONC [facility]   7/21/2020  1:30 PM Dave Negron APRN -  [facility] MIRIAM Brand

## 2020-03-05 ENCOUNTER — TELEPHONE (OUTPATIENT)
Dept: VASCULAR SURGERY | Age: 65
End: 2020-03-05

## 2020-03-05 ENCOUNTER — CARE COORDINATION (OUTPATIENT)
Dept: CARE COORDINATION | Age: 65
End: 2020-03-05

## 2020-03-05 ENCOUNTER — OFFICE VISIT (OUTPATIENT)
Dept: INTERNAL MEDICINE | Age: 65
End: 2020-03-05
Payer: MEDICARE

## 2020-03-05 VITALS
BODY MASS INDEX: 29.73 KG/M2 | TEMPERATURE: 97.6 F | WEIGHT: 185 LBS | HEIGHT: 66 IN | DIASTOLIC BLOOD PRESSURE: 71 MMHG | SYSTOLIC BLOOD PRESSURE: 119 MMHG | HEART RATE: 87 BPM | RESPIRATION RATE: 16 BRPM

## 2020-03-05 PROCEDURE — 99213 OFFICE O/P EST LOW 20 MIN: CPT | Performed by: INTERNAL MEDICINE

## 2020-03-05 PROCEDURE — G8427 DOCREV CUR MEDS BY ELIG CLIN: HCPCS | Performed by: INTERNAL MEDICINE

## 2020-03-05 PROCEDURE — 3023F SPIROM DOC REV: CPT | Performed by: INTERNAL MEDICINE

## 2020-03-05 PROCEDURE — G8417 CALC BMI ABV UP PARAM F/U: HCPCS | Performed by: INTERNAL MEDICINE

## 2020-03-05 PROCEDURE — G8482 FLU IMMUNIZE ORDER/ADMIN: HCPCS | Performed by: INTERNAL MEDICINE

## 2020-03-05 PROCEDURE — 3017F COLORECTAL CA SCREEN DOC REV: CPT | Performed by: INTERNAL MEDICINE

## 2020-03-05 PROCEDURE — 4004F PT TOBACCO SCREEN RCVD TLK: CPT | Performed by: INTERNAL MEDICINE

## 2020-03-05 PROCEDURE — G8926 SPIRO NO PERF OR DOC: HCPCS | Performed by: INTERNAL MEDICINE

## 2020-03-05 RX ORDER — GABAPENTIN 400 MG/1
800 CAPSULE ORAL 2 TIMES DAILY
Qty: 120 CAPSULE | Refills: 2 | Status: SHIPPED
Start: 2020-03-05 | End: 2020-06-26 | Stop reason: ALTCHOICE

## 2020-03-05 RX ORDER — PETROLATUM 42 G/100G
OINTMENT TOPICAL
Qty: 1 TUBE | Refills: 1 | Status: SHIPPED
Start: 2020-03-05 | End: 2021-04-21 | Stop reason: SDUPTHER

## 2020-03-05 SDOH — ECONOMIC STABILITY: FOOD INSECURITY: WITHIN THE PAST 12 MONTHS, THE FOOD YOU BOUGHT JUST DIDN'T LAST AND YOU DIDN'T HAVE MONEY TO GET MORE.: SOMETIMES TRUE

## 2020-03-05 SDOH — ECONOMIC STABILITY: INCOME INSECURITY: HOW HARD IS IT FOR YOU TO PAY FOR THE VERY BASICS LIKE FOOD, HOUSING, MEDICAL CARE, AND HEATING?: VERY HARD

## 2020-03-05 SDOH — ECONOMIC STABILITY: FOOD INSECURITY: WITHIN THE PAST 12 MONTHS, YOU WORRIED THAT YOUR FOOD WOULD RUN OUT BEFORE YOU GOT MONEY TO BUY MORE.: OFTEN TRUE

## 2020-03-05 NOTE — PROGRESS NOTES
Sig Dispense Refill    mineral oil-hydrophilic petrolatum (HYDROPHOR) ointment Apply topically as needed. 1 Tube 1    Ketoconazole-Hydrocortisone 2 & 1 % KIT Apply 1 Tube topically 2 times daily 1 kit 0    gabapentin (NEURONTIN) 400 MG capsule Take 2 capsules by mouth 2 times daily for 90 days. 120 capsule 2    albuterol sulfate  (90 Base) MCG/ACT inhaler Inhale 2 puffs into the lungs 4 times daily as needed for Wheezing 3 Inhaler 1    ipratropium-albuterol (DUONEB) 0.5-2.5 (3) MG/3ML SOLN nebulizer solution Inhale 3 mLs into the lungs every 4 hours 360 mL 1    budesonide-formoterol (SYMBICORT) 160-4.5 MCG/ACT AERO Inhale 2 puffs into the lungs 2 times daily 1 Inhaler 2    aspirin 81 MG tablet Take 1 tablet by mouth daily 90 tablet 0    citalopram (CELEXA) 40 MG tablet Take 1 tablet by mouth daily 30 tablet 2    amitriptyline (ELAVIL) 50 MG tablet TAKE 1 TABLET BY MOUTH EVERY EVENING 30 tablet 2    metFORMIN (GLUCOPHAGE) 1000 MG tablet TAKE 1 TABLET BY MOUTH TWICE DAILY WITH MEALS 60 tablet 2    triamterene-hydrochlorothiazide (MAXZIDE-25) 37.5-25 MG per tablet TAKE 1 TABLET BY MOUTH EVERY DAY 30 tablet 2    esomeprazole (NEXIUM) 40 MG delayed release capsule TAKE 1 CAPSULE BY MOUTH EVERY DAY 90 capsule 0    calcium-vitamin D (CALCIUM 500/D) 500-200 MG-UNIT per tablet TAKE 1 TABLET BY MOUTH DAILY 30 tablet 2    baclofen (LIORESAL) 10 MG tablet TAKE 1/2 TABLET THREE TIMES DAILY AS NEEDED(PAIN, SPASMS) 90 tablet 2    levothyroxine (SYNTHROID) 75 MCG tablet TAKE 1 TABLET BY MOUTH EVERY DAY 90 tablet 0    albuterol sulfate HFA (PROVENTIL HFA) 108 (90 Base) MCG/ACT inhaler Inhale 2 puffs into the lungs every 4 hours as needed for Wheezing 1 Inhaler 2    montelukast (SINGULAIR) 10 MG tablet TAKE 1 TABLET BY MOUTH EVERY NIGHT AT BEDTIME 90 tablet 1    blood glucose monitor kit and supplies Test 1 times a day & as needed for symptoms of irregular blood glucose.   Accuchek Avivia 1 kit 0    blood glucose monitor strips Testing daily 100 strip 3    Lancets MISC Testing daily 100 each 3    Misc. Devices MISC Oxygen concentrator  j44.9 1 Device 0       I have reviewed all pertinent PMHx, PSHx, FamHx, SocialHx, medications, and allergies and updated history as appropriate. OBJECTIVE:    VS: /71 (Site: Left Upper Arm, Position: Sitting, Cuff Size: Medium Adult)   Pulse 87   Temp 97.6 °F (36.4 °C) (Oral)   Resp 16   Ht 5' 6\" (1.676 m)   Wt 185 lb (83.9 kg)   BMI 29.86 kg/m²   General appearance: Alert, Awake, Oriented times 3, no distress  Lungs: Lungs clear to auscultation bilaterally. No rhonchi, crackles or wheezes  Heart: S1 S2  Regular rate and rhythm. No rub, murmur or gallop  Abdomen: Abdomen soft , non-tender. non-distended BS normal. No masses, organomegaly, no guarding rebound or rigidity. Extremities: No edema, Peripheral pulses palpable 2/4    ASSESSMENT/PLAN:  1. Chronic obstructive pulmonary disease, unspecified COPD type ([address]  Continue with inhalers/nebulizer  Follow up with Pulmonology    2. Smoldering multiple myeloma ([facility]  F/u with Dr Melida Hardy 3/5/20    3. Varicose veins of right lower extremity with inflammation  - Aracely Vee MD, Vascular Surgery, [place]    4. Lumbar radiculitis  neurontin 800 mg BID    5. Vitamin D deficiency  - Vitamin D 25 Hydroxy; Future    6. Tinea cruris  - Ketoconazole-Hydrocortisone 2 & 1 % KIT; Apply 1 Tube topically 2 times daily  Dispense: 1 kit; Refill: 0    7. Dry skin  - mineral oil-hydrophilic petrolatum    RTC in 2 mo.   I have reviewed my findings and recommendations with Davis Naqvi and Dr Jhon Verdugo MD PGY-2  3/5/2020 4:11 PM

## 2020-03-05 NOTE — PROGRESS NOTES
Pt screened positive for SDOH related to financial strain and or food insecurity and declined further contact for assessment/resources. Discharge instructions reviewed with patient per Dr. Alisia Keith.  Pt directed to  to schedule follow up appointment and  AVS.

## 2020-03-05 NOTE — PATIENT INSTRUCTIONS
Thank you for your visit today. What to do:   Follow up in 2 mo   Follow up with vascular surgery   Take gabapentin 800 mg twice a day   Use ketoconazole cream twice a day, keep area clean and dry. Steven Mendoza MD.    Patient Education        Ringworm: Care Instructions  Your Care Instructions  Ringworm is a fungus infection of the skin. It is not caused by a worm. Ringworm causes a round, scaly rash that may crack and itch. The rash can spread over a wide area. One type of fungus that causes ringworm is often found in locker rooms and swimming pools. It grows well in warm, moist areas of the skin, such as in skin folds. You can get ringworm by sharing towels, clothing, and sports equipment. You can also get it by touching someone who has ringworm. Ringworm is treated with cream that kills the fungus. If the rash is widespread, you may need pills to get rid of it. Ringworm often comes back after treatment. If the rash becomes infected with bacteria, you may need antibiotics. Follow-up care is a key part of your treatment and safety. Be sure to make and go to all appointments, and call your doctor if you are having problems. It's also a good idea to know your test results and keep a list of the medicines you take. How can you care for yourself at home? · Take your medicines exactly as prescribed. Call your doctor if you have any problems with your medicine. · Wash the rash with soap and water, remove flaky skin, and dry thoroughly. · Try an over-the-counter cream with clotrimazole or miconazole in it. Brand names include Lotrimin, Micatin, and Tinactin. Terbinafine cream (Lamisil) is also available without a prescription. Spread the cream beyond the edge or border of the rash. Follow the directions on the package. Do not stop using the medicine just because your skin clears up. You will probably need to continue treatment for 2 to 4 weeks. · To keep from getting another infection:  ?  Do not go

## 2020-03-06 ENCOUNTER — HOSPITAL ENCOUNTER (OUTPATIENT)
Dept: INFUSION THERAPY | Age: 65
Discharge: HOME | End: 2020-03-06
Payer: MEDICARE

## 2020-03-06 ENCOUNTER — HOSPITAL ENCOUNTER (OUTPATIENT)
Age: 65
Discharge: HOME | End: 2020-03-08
Payer: MEDICARE

## 2020-03-06 ENCOUNTER — HOSPITAL ENCOUNTER (OUTPATIENT)
Dept: GENERAL RADIOLOGY | Age: 65
Discharge: HOME | End: 2020-03-08
Payer: MEDICARE

## 2020-03-06 ENCOUNTER — OFFICE VISIT (OUTPATIENT)
Dept: ONCOLOGY | Age: 65
End: 2020-03-06
Payer: MEDICARE

## 2020-03-06 ENCOUNTER — CARE COORDINATION (OUTPATIENT)
Dept: CASE MANAGEMENT | Age: 65
End: 2020-03-06

## 2020-03-06 VITALS
HEIGHT: 66 IN | HEART RATE: 87 BPM | DIASTOLIC BLOOD PRESSURE: 63 MMHG | TEMPERATURE: 97.7 F | SYSTOLIC BLOOD PRESSURE: 136 MMHG | BODY MASS INDEX: 30.46 KG/M2 | OXYGEN SATURATION: 96 % | WEIGHT: 189.5 LBS

## 2020-03-06 DIAGNOSIS — D47.2 SMOLDERING MULTIPLE MYELOMA: ICD-10-CM

## 2020-03-06 LAB — LACTATE DEHYDROGENASE: 174 U/L (ref 135–214)

## 2020-03-06 PROCEDURE — 77075 RADEX OSSEOUS SURVEY COMPL: CPT

## 2020-03-06 PROCEDURE — 83883 ASSAY NEPHELOMETRY NOT SPEC: CPT

## 2020-03-06 PROCEDURE — 82232 ASSAY OF BETA-2 PROTEIN: CPT

## 2020-03-06 PROCEDURE — G8482 FLU IMMUNIZE ORDER/ADMIN: HCPCS | Performed by: INTERNAL MEDICINE

## 2020-03-06 PROCEDURE — 1036F TOBACCO NON-USER: CPT | Performed by: INTERNAL MEDICINE

## 2020-03-06 PROCEDURE — 3017F COLORECTAL CA SCREEN DOC REV: CPT | Performed by: INTERNAL MEDICINE

## 2020-03-06 PROCEDURE — G8417 CALC BMI ABV UP PARAM F/U: HCPCS | Performed by: INTERNAL MEDICINE

## 2020-03-06 PROCEDURE — 99213 OFFICE O/P EST LOW 20 MIN: CPT

## 2020-03-06 PROCEDURE — 84165 PROTEIN E-PHORESIS SERUM: CPT

## 2020-03-06 PROCEDURE — 86334 IMMUNOFIX E-PHORESIS SERUM: CPT

## 2020-03-06 PROCEDURE — 99214 OFFICE O/P EST MOD 30 MIN: CPT | Performed by: INTERNAL MEDICINE

## 2020-03-06 PROCEDURE — 82784 ASSAY IGA/IGD/IGG/IGM EACH: CPT

## 2020-03-06 PROCEDURE — G8427 DOCREV CUR MEDS BY ELIG CLIN: HCPCS | Performed by: INTERNAL MEDICINE

## 2020-03-06 PROCEDURE — 83615 LACTATE (LD) (LDH) ENZYME: CPT

## 2020-03-06 NOTE — CARE COORDINATION
Attempted to contact patient as f/u call from . Left VM with number and name for Jessica.       Alex Taylor, [address] [facility] Coordination Transition

## 2020-03-06 NOTE — PROGRESS NOTES
[address] MED ONCOLOGY  [address] [place] [id number]  Dept: [phone]  Attending Progress Note      Reason for The Referral:  Smoldering Multiple Myeloma. Referring Physician:  Sandra Pennington MD    PCP:  Gregg Walters MD    History of Present Illness: The patient is a 70-year-old lady with a PMH significant for HTN, hyperlipidemia, DM, COPD, OA, depression, and fibromyalgia, who was diagnosed with IgG lambda MGUS in 2008, used to follow up with Dr. Aba Badillo at [facility], last f/up with him was in 2012. Her most recent SPEP with immunofixation ha revealed monoclonal IgG lambda, M-spike 0.7 gm/dl  from 9/9/2016. The patient has been having pain in the low back radiating to the right lower extremity, she had an MRI of the L-spine done on 8/24/2016, revealed disc bulging L4-5, L5-S1, with mild narrowing of the neural foramina. She had a bone marrow Biopsy and aspirate done by IR on 11/28/2016. Bone marrow, left iliac, aspirate and core biopsy  Suboptimal specimen showing 15% plasma cells by immunohistochemistry,  consistent with plasma cell neoplasm, see comment. Comment:    The aspirate smear and clot section specimens are hemodilute  and aspicular.  Plasmacytosis is seen by immunohistochemistry for   on the core biopsy specimen only.  Flow cytometric analysis performed by  [facility] confirmed a lambda-restricted plasma cell neoplasm. See separate report for complete details ([id number]). Intradepartmental consultation is obtained. The patient was last seen in the office in March 2019, was lost for follow-up after that. She has chronic joints pain, and low back pain radiating to the right lower extremity. No new bony pain. She was recently admitted to the hospital with MRSA pneumonia. She will need follow-up with pulmonary. Review of Systems;  CONSTITUTIONAL: No fever, chills. Good appetite feels tired.    ENMT: Eyes: No diplopia; Nose: No epistaxis. Mouth: No sore throat. RESPIRATORY: No hemoptysis, chronic shortness of breath, cough. CARDIOVASCULAR: No chest pain, palpitations. GASTROINTESTINAL: No nausea/vomiting, abdominal pain, diarrhea/constipation. GENITOURINARY: No dysuria, urinary frequency, hematuria. MUSCULOSKELETAL: she has chronic back and joints pain. NEURO: No syncope, presyncope, headache.   Remainder:  ROS NEGATIVE    Past Medical History:      Diagnosis Date    Adrenal incidentaloma ([address]     Anesthesia complication     states possible bronchospasm post procedure, unsure of when    Anxiety     Arthritis     Asthma     Bladder incontinence     Cancer ([address]     Myeloma, follows up at [address] [place] [address]    Chronic back pain     COPD (chronic obstructive pulmonary disease) ([address]     (+) PFT    Depression     Diabetes mellitus ([address]     Encounter for screening colonoscopy 07/2016    Fibromyalgia     GERD (gastroesophageal reflux disease)     Hyperlipidemia     Hypertension     Hypothyroidism     MGUS (monoclonal gammopathy of unknown significance)     Multiple myeloma ([address] 2009    Neuropathy     Obesity     Pelvic pain     Thyroid disease     Hyperthyroidism    Tobacco abuse     Type II or unspecified type diabetes mellitus without mention of complication, not stated as uncontrolled      Patient Active Problem List   Diagnosis    Adrenal incidentaloma ([address]    Diabetes mellitus ([address]    Hyperlipidemia    Hypothyroidism    Fibromyalgia    Obesity    Hypertension, uncontrolled    Chronic right-sided low back pain with right-sided sciatica    Tobacco abuse    Myofascial pain    Lumbar radiculitis    Smoldering multiple myeloma (HCC)    Chronic obstructive pulmonary disease (HCC)    Type 2 diabetes mellitus without complication, with long-term current use of insulin (HCC)    Severe sepsis (HCC)    Hypovolemic shock ([address]    Community acquired pneumonia    Acute diverticulitis Never Used    Tobacco comment: PAMPHLET PLACED IN CHART  smokes 1 pack per day   Substance and Sexual Activity    Alcohol use: No     Alcohol/week: 0.0 standard drinks    Drug use: No    Sexual activity: Never   Lifestyle    Physical activity:     Days per week: Not on file     Minutes per session: Not on file    Stress: Not on file   Relationships    Social connections:     Talks on phone: Not on file     Gets together: Not on file     Attends Latter day service: Not on file     Active member of club or organization: Not on file     Attends meetings of clubs or organizations: Not on file     Relationship status: Not on file    Intimate partner violence:     Fear of current or ex partner: Not on file     Emotionally abused: Not on file     Physically abused: Not on file     Forced sexual activity: Not on file   Other Topics Concern    Not on file   Social History Narrative    Not on file       Allergies: Allergies   Allergen Reactions    Aceon [Perindopril Erbumine] Anaphylaxis     Tongue swells up    Nsaids Shortness Of Breath and Swelling     tongue       Physical Exam:  /63   Pulse 87   Temp 97.7 °F (36.5 °C) (Temporal)   Ht 5' 6\" (1.676 m)   Wt 189 lb 8 oz (86 kg)   SpO2 96%   BMI 30.59 kg/m²   GENERAL: Alert, oriented x 3, not in acute distress. HEENT: PERRLA; EOMI. Oropharynx clear. NECK: Supple. No palpable cervical or supraclavicular lymphadenopathy. LUNGS: Good air entry bilaterally. No wheezing, crackles or rhonchi. CARDIOVASCULAR: Regular rate. No murmurs, rubs or gallops. ABDOMEN: Soft. Non-tender, non-distended. Positive bowel sounds. EXTREMITIES: Mild lower leg edema. Bone Marrow biopsy and aspirate:  Bone marrow, left iliac, aspirate and core biopsy (Parts A and B):  Suboptimal specimen showing 15% plasma cells by immunohistochemistry,  consistent with plasma cell neoplasm, see comment.   Comment:    The aspirate smear and clot section specimens are hemodilute  and inflammation. She does not have a renal failure or hypercalcemia. A myeloma blood work was ordered today to evaluate if she has progression to multiple myeloma. We will also have a skeletal survey done. Chronic low back pain, referral was placed to pain management. RTC in 3 months. Thank you for allowing us to participate in the care of Mrs Juan Chowdary.     Barney Griggs MD   HEMATOLOGY/MEDICAL ONCOLOGY  [address] [place] [address] MED ONCOLOGY  [facility] [address] [facility] [id number]  Dept: [phone]

## 2020-03-09 LAB
ALBUMIN SERPL-MCNC: 3.2 G/DL (ref 3.5–4.7)
ALPHA-1-GLOBULIN: 0.2 G/DL (ref 0.2–0.4)
ALPHA-2-GLOBULIN: 0.8 G/FL (ref 0.5–1)
BETA GLOBULIN: 1 G/DL (ref 0.8–1.3)
ELECTROPHORESIS: ABNORMAL
GAMMA GLOBULIN: 2.2 G/DL (ref 0.7–1.6)
IGA: 105 MG/DL (ref 70–400)
IGG: 2100 MG/DL (ref 700–1600)
IGM: 27 MG/DL (ref 40–230)
IMMUNOFIXATION RESULT, SERUM: NORMAL
KAPPA FREE LIGHT CHAINS QNT: 62.9 MG/L (ref 3.3–19.4)
KAPPA/LAMBDA FREE LIGHT CHAIN RATIO: 2.89 (ref 0.26–1.65)
LAMBDA FREE LIGHT CHAINS QNT: 21.8 MG/L (ref 5.71–26.3)
TOTAL PROTEIN: 7.5 G/DL (ref 6.4–8.3)

## 2020-03-10 LAB — BETA-2 MICROGLOBULIN: 2.5 MG/L (ref 0.6–2.4)

## 2020-03-11 ENCOUNTER — CARE COORDINATION (OUTPATIENT)
Dept: CARE COORDINATION | Age: 65
End: 2020-03-11

## 2020-03-11 ENCOUNTER — CARE COORDINATION (OUTPATIENT)
Dept: CASE MANAGEMENT | Age: 65
End: 2020-03-11

## 2020-03-11 NOTE — CARE COORDINATION
[address] [facility] Transitions Follow Up Call    3/11/2020    Patient: Yarely Jain  Patient : 1955   MRN: [id number]  Reason for Admission: Severe sepsis  Discharge Date: 20 RARS: Readmission Risk Score: 24         Spoke with: No one    First attempt to reach the patient for final Care Transition call post hospital discharge. Message left with CTN's contact information requesting return phone call. Updated patient regarding vm requesting diabetic education as well as instructing patient to return call to  regarding MOW.       Follow Up  Future Appointments   Date Time Provider Department Center   2020  9:15 AM Carlo Pepe MD [facility]/MED [facility]   2020  2:30 PM Gladis Skaggs MD MED ONC [facility]   2020  2:30 PM SEYZ [address] [facility]  [address]   2020  1:30 PM Jyoti Huston APRN - CNP [facility]   2020  3:45 PM Meryle Meres, MD [facility] Pulm Martita Brand RN

## 2020-03-11 NOTE — CARE COORDINATION
F/u call to inquire about MOW, LVM.   Vaibhav Meyers MSW, [facility]   [phone]

## 2020-03-11 NOTE — CARE COORDINATION
This CTN lm on voicemail (nurse's line) at PCP's office regarding referral to diabetic education. Patient is agreeable. Contact information provided on vm.

## 2020-03-12 ENCOUNTER — TELEPHONE (OUTPATIENT)
Dept: INTERNAL MEDICINE | Age: 65
End: 2020-03-12

## 2020-03-13 ENCOUNTER — CARE COORDINATION (OUTPATIENT)
Dept: CASE MANAGEMENT | Age: 65
End: 2020-03-13

## 2020-03-13 NOTE — CARE COORDINATION
[facility] Transitions Follow Up Call    3/13/2020    Patient: Sang Espinal  Patient : 1955   MRN: [id number]  Reason for Admission: Severe sepsis  Discharge Date: 20 RARS: Readmission Risk Score: 24         Spoke with: Sang Espinal, patient    Second attempt to reach the patient for final Care Transition call post hospital discharge. Patient reports she will call this CTN at a more convenient time.     Follow Up  Future Appointments   Date Time Provider Department Center   2020  9:15 AM Rashaad Damon MD [facility]   2020  6:00  [place] [facility] CL[facility]OM [address] [facility] [facility]   2020  6:00 PM [facility] ED CLASSROOM 01 [facility] [facility]   2020  6:00  [place] [facility] CLASSROOM 01 [facility] [address] [facility]   2020  2:30 PM Irma Breen MD [facility]   2020  2:30 PM SEYZ [address]   2020  1:30 PM Lawson Eye, APRN - CNP [facility]   2020  3:45 PM Thomas Schwartz MD [facility] Brand RN

## 2020-03-16 ENCOUNTER — CARE COORDINATION (OUTPATIENT)
Dept: CASE MANAGEMENT | Age: 65
End: 2020-03-16

## 2020-03-16 NOTE — CARE COORDINATION
[facility] Transitions Follow Up Call    3/16/2020    Patient: Case Mayo  Patient : 1955   MRN: [id number]  Reason for Admission: severe sepsis  Discharge Date: 20 RARS: Readmission Risk Score: 24         Spoke with: Case Mayo, patient    Care Transitions Subsequent and Final Call    Subsequent and Final Calls  Do you have any ongoing symptoms?:  No  Do you have any questions related to your medications?:  No  Do you have any needs or concerns that I can assist you with?:  No  Identified Barriers:  None  Care Transitions Interventions     Other Services:  Completed (Comment: referral to care coordination)    Meals on Wheels:  Completed     Diabetes Education:  Completed      Other Interventions:            -Spoke with patient Angelita  for final care transition call.   -Patient is doing well, states she is receiving meals on wheels.  -Patient was unaware of Diabetic classes being set up for her(noted in epic for end of 2020). CTN phoned Diabetic Education and spoke with Jose Ramon Fortune who states she will phone patient. CTN provided Jose Ramon Fortune with patient's phone number. CTN phoned patient back to inform Diabetic Education will be contacting her by phone.  -Patient remains receptive to care coordination service provided through the physician practice. CTN  will  make a referral to Eamon Gutierrez, Manager of Care Coordination, at this time.   -Patient denies any further  needs, questions, or concerns at this time and is agreeable to CTN signing off.     Follow Up  Future Appointments   Date Time Provider Department Center   2020  9:15 AM Licha Jules MD [facility]   2020  6:00  [facility] [place] [facility] CLASSROOM 01 [facility] [facility]   2020  6:00  [facility] CLASSROOM 01 [facility] [facility]   2020  6:00 PM [facility] ED CLASSROOM 01 [address] [facility]   2020  2:30 PM Lenka Toro MD MED ONC [facility]   2020  2:30 PM SEYZ [address] LAB [facility] Med Onc [facility]   7/21/2020  1:30 PM BRYAN Douglas - K[facility]   9/28/2020  3:45 PM [facility]

## 2020-04-08 ENCOUNTER — VIRTUAL VISIT (OUTPATIENT)
Dept: PAIN MANAGEMENT | Age: 65
End: 2020-04-08
Payer: MEDICARE

## 2020-04-08 PROBLEM — M47.812 CERVICAL SPONDYLOSIS: Status: ACTIVE | Noted: 2020-04-08

## 2020-04-08 PROBLEM — M25.551 PAIN IN RIGHT HIP: Status: ACTIVE | Noted: 2020-04-08

## 2020-04-08 PROBLEM — M47.812 CERVICAL FACET JOINT SYNDROME: Status: ACTIVE | Noted: 2020-04-08

## 2020-04-08 PROBLEM — M54.16 LUMBAR RADICULOPATHY: Status: ACTIVE | Noted: 2020-04-08

## 2020-04-08 PROBLEM — M51.9 LUMBAR DISC DISORDER: Status: ACTIVE | Noted: 2020-04-08

## 2020-04-08 PROBLEM — M47.816 LUMBAR FACET ARTHROPATHY: Status: ACTIVE | Noted: 2020-04-08

## 2020-04-08 PROCEDURE — 4040F PNEUMOC VAC/ADMIN/RCVD: CPT | Performed by: PAIN MEDICINE

## 2020-04-08 PROCEDURE — 1090F PRES/ABSN URINE INCON ASSESS: CPT | Performed by: PAIN MEDICINE

## 2020-04-08 PROCEDURE — 1123F ACP DISCUSS/DSCN MKR DOCD: CPT | Performed by: PAIN MEDICINE

## 2020-04-08 PROCEDURE — 99204 OFFICE O/P NEW MOD 45 MIN: CPT | Performed by: PAIN MEDICINE

## 2020-04-08 PROCEDURE — G8427 DOCREV CUR MEDS BY ELIG CLIN: HCPCS | Performed by: PAIN MEDICINE

## 2020-04-08 PROCEDURE — 3017F COLORECTAL CA SCREEN DOC REV: CPT | Performed by: PAIN MEDICINE

## 2020-04-08 PROCEDURE — G8400 PT W/DXA NO RESULTS DOC: HCPCS | Performed by: PAIN MEDICINE

## 2020-04-08 NOTE — PROGRESS NOTES
Divine Hennessy was read the following message We want to confirm that, for purposes of billing, this is a virtual visit with your provider for which we will submit a claim for reimbursement with your insurance company. You will be responsible for any copays, coinsurance amounts or other amounts not covered by your insurance company. If you do not accept this, unfortunately we will not be able to schedule a virtual visit with the provider. Do you accept? Angelita responded Yes .

## 2020-04-08 NOTE — PROGRESS NOTES
[address]        [address], [facility]      [phone]      Telehealth Consult Note      Patient:  MICHELLE Spears 1955    Date of Service:  20    Consent:  Telehealth visit due to [place] 19 pandemic  The patient and/or health care decision maker is aware that he/she may receive a bill for this tele-health service Doxy Me, depending on his insurance coverage, and has provided verbal consent to proceed: Yes  I have considered the risks of abuse, dependence, addiction and diversion. My patient is aware that they will need a follow-up visit (in-person or virtually) at the appropriate time indicated for continued medications. Further, my patient is aware that when this acute crisis has lifted, they will be expected to return for an in-person visit and all elements of standard local and hospital guidelines in order to continue this medication. Patient location: Home  Physician location:Home. Requesting Physician:  Ana Tripathi MD    Reason for Tele health Consult:      Patient presents with complaints of neck and low back pain that started a long time and has been progressively getting worse. HISTORY OF PRESENT ILLNESS:      Pain does radiate to right lower extremity down to her toes. She  has numbness, tingling of hands and feet and does not have bladder or bowel dysfunction. Imaging:   Lumbar spine MRI 2016  1. Circumferential disc bulging is present at L4-5 causing mild   narrowing of the L4-5 neural foramina and mild bilateral lateral   recess stenosis.       2. Facet joint arthropathy and disc bulging is present at L5-S1   causing mild narrowing of the right L5-S1 neural foramen     Previous treatments: Physical Therapy, Epidural Steroid Injection and medications. .    Patient had seen Melanie Gaona in the past     Past Medical History: Reviewed    Past Surgical History: Reviewed     Home Medications: Reviewed    Allergies: Reviewed     Social History: Reviewed     REVIEW OF SYSTEMS:     Patient specifically denies fever/chills, chest pain, shortness of breath, new bowel or bladder complaints. All other review of systems was negative. PHYSICAL EXAMINATION:      General:       A & O x3    HEENT:    Head:normocephalic and atraumatic  Sclera: icterus absent,     Lungs:    Breathing:Breathing Pattern: regular, no distress    Cervical spine:    Inspection:normal  Range of motion:abnormal moderately flexion, extension rotation bilateral and is  painful. Lumbar spine:    Range of motion:abnormal moderately Lateral bending, flexion, extension rotation bilateral and is  painful.     Musculoskeletal:    SLR:?? positive right, negative left, sitting     Extremities:    Tremors:None bilaterally upper and lower  Range of motion:Generally normal shoulders, pain with internal rotation of hip positive right   Intact:Yes    Neurological:     Motor:          No apparent weakness per patient       [address] [place] [address]    Dermatology:    Skin:no unusual rashes and no skin lesions    Impression:  Chronic neck and low back pain   Lumbar spine MRI 2016 L4-5 disc bulge with lateral stenosis and facet arthropathy at L5-S1  Patient had seen Tracy Joe in the past and had interventional procedures with her but it didn't last  Plan:  Telehealth evaluation due to COVID 19 pandemic   Cervical spondylosis with lumbar radiculopathy/facet arthropathy  Discussed treatment options with the patient including medications management and interventional procedures  Will schedule patient for cervical spine Xray and right hip Xray   Will consider interventional procedures when COVID 19 pandemic is over  Continue with OTC pain medications   Continue with Gabapentin 400 mg   Urine screen deferred  OARRS report reviewed 04/2020   Patient encouraged to stay active and to lose weight  Treatment plan discussed with the patient including medications and procedure

## 2020-04-14 ENCOUNTER — TELEPHONE (OUTPATIENT)
Dept: INTERNAL MEDICINE | Age: 65
End: 2020-04-14

## 2020-04-14 NOTE — TELEPHONE ENCOUNTER
KIM contacted patient in f/u from a positive [facility] screen during internal medicine encounter dated 01/31/2020 in which patient states she struggles with both financial and food insecurities. During the phone call, patient mobile phone not receiving consistent service and patient conversation continued to cut out. Patient states that she does receive disability and melas on wheels which was arraigned through a \"nurse\". Patient did state she has SW contact information and will attempt to return call when mobile phone receives better service.

## 2020-05-20 ENCOUNTER — TELEPHONE (OUTPATIENT)
Dept: ADMINISTRATIVE | Age: 65
End: 2020-05-20

## 2020-05-20 ENCOUNTER — NURSE TRIAGE (OUTPATIENT)
Dept: OTHER | Facility: CLINIC | Age: 65
End: 2020-05-20

## 2020-05-22 ENCOUNTER — PREP FOR PROCEDURE (OUTPATIENT)
Dept: PAIN MANAGEMENT | Age: 65
End: 2020-05-22

## 2020-05-22 ENCOUNTER — OFFICE VISIT (OUTPATIENT)
Dept: PAIN MANAGEMENT | Age: 65
End: 2020-05-22
Payer: MEDICARE

## 2020-05-22 VITALS
WEIGHT: 190 LBS | HEIGHT: 66 IN | RESPIRATION RATE: 16 BRPM | BODY MASS INDEX: 30.53 KG/M2 | DIASTOLIC BLOOD PRESSURE: 78 MMHG | HEART RATE: 89 BPM | TEMPERATURE: 98.3 F | SYSTOLIC BLOOD PRESSURE: 128 MMHG | OXYGEN SATURATION: 97 %

## 2020-05-22 PROCEDURE — 99214 OFFICE O/P EST MOD 30 MIN: CPT | Performed by: PAIN MEDICINE

## 2020-05-22 PROCEDURE — 99214 OFFICE O/P EST MOD 30 MIN: CPT

## 2020-05-22 NOTE — PROGRESS NOTES
[address] [facility], [address] [place] [address] [place]  [phone]    Follow up Note      Pamelaa Babs     Date of Visit:  5/22/2020    CC:  Patient presents for follow up   Chief Complaint   Patient presents with    Follow-up    Back Pain     low back     Leg Pain     right leg pain       HPI:  Increased low back pain radiating down to the right lower extremity. Appropriate analgesia with current medications regimen: Not applicable. Change in quality of symptoms:no. Medication side effects:not applicable . Recent diagnostic testing:none. Recent interventional procedures:none. .    Imaging:   Lumbar spine MRI 2016  1. Circumferential disc bulging is present at L4-5 causing mild   narrowing of the L4-5 neural foramina and mild bilateral lateral   recess stenosis.       2. Facet joint arthropathy and disc bulging is present at L5-S1   causing mild narrowing of the right L5-S1 neural foramen      Previous treatments: Physical Therapy, Epidural Steroid Injection and medications. .    Patient had seen Jennifer Dean in the past       Potential Aberrant Drug-Related Behavior:    None    Urine Drug Screening:  None    OARRS report:  05/2020 consistent     Past Medical History:   Diagnosis Date    Adrenal incidentaloma ([address]     Anesthesia complication     states possible bronchospasm post procedure, unsure of when    Anxiety     Arthritis     Asthma     Bladder incontinence     Cancer ([address]     Myeloma, follows up at [address] [place] [address]    Chronic back pain     COPD (chronic obstructive pulmonary disease) ([address]     (+) PFT    Depression     Diabetes mellitus ([address]     Encounter for screening colonoscopy 07/2016    Fibromyalgia     GERD (gastroesophageal reflux disease)     Hyperlipidemia     Hypertension     Hypothyroidism     MGUS (monoclonal gammopathy of unknown significance)     Multiple myeloma ([address] 2009    Neuropathy     Obesity     Pelvic pain     MOUTH EVERY DAY 1/31/20  Yes Mirtha Holliday MD   esomeprazole (NEXIUM) 40 MG delayed release capsule TAKE 1 CAPSULE BY MOUTH EVERY DAY 1/31/20  Yes Mirtha Holliday MD   calcium-vitamin D (CALCIUM 500/D) 500-200 MG-UNIT per tablet TAKE 1 TABLET BY MOUTH DAILY 1/31/20  Yes Mirtha Holliday MD   baclofen (LIORESAL) 10 MG tablet TAKE 1/2 TABLET THREE TIMES DAILY AS NEEDED(PAIN, SPASMS) 1/31/20  Yes Mirtha Holliday MD   levothyroxine (SYNTHROID) 75 MCG tablet TAKE 1 TABLET BY MOUTH EVERY DAY 1/27/20  Yes Dustin Krueger,    albuterol sulfate HFA (PROVENTIL HFA) 108 (90 Base) MCG/ACT inhaler Inhale 2 puffs into the lungs every 4 hours as needed for Wheezing 1/27/20 1/26/21 Yes Dustin Krueger DO   montelukast (SINGULAIR) 10 MG tablet TAKE 1 TABLET BY MOUTH EVERY NIGHT AT BEDTIME 1/26/20  Yes Lizbeth Tong MD   blood glucose monitor kit and supplies Test 1 times a day & as needed for symptoms of irregular blood glucose. Accuchek Avivia 2/18/19  Yes Carlos Luther MD   blood glucose monitor strips Testing daily 2/11/19  Yes Mirtha Holliday MD   Lancets MISC Testing daily 2/11/19  Yes Mirtha Holliday MD   Misc.  Devices MISC Oxygen concentrator  j44.9 10/26/18  Yes Hair Lopez,    nicotine (NICODERM CQ) 21 MG/24HR Place 1 patch onto the skin every 24 hours For 6 weeks then change to the 14 mcg patch  Patient not taking: Reported on 5/22/2020 1/26/20   Aaliyah Chowdary MD     Allergies   Allergen Reactions   [place] Dust [Perindopril Erbumine] Anaphylaxis     Tongue swells up    Nsaids Shortness Of Breath and Swelling     tongue     Social History     Socioeconomic History    Marital status: Single     Spouse name: Not on file    Number of children: Not on file    Years of education: Not on file    Highest education level: Not on file   Occupational History    Not on file   Social Needs    Financial resource strain: Very hard    Food insecurity     Worry: Often true Right Deltoid5/5     Left Deltoid5/5                  Right Quadriceps5/5          Left Quadriceps5/5           Right Gastrocnemius5/5    Left Gastrocnemius5/5  Right Ant Tibialis5/5  Left Ant Tibialis5/5  Reflexes:    Right Brachioradialis reflex2+  Left Brachioradialis reflex2+  Right Biceps reflex2+  Left Biceps reflex2+  Right Triceps reflex2+  Left Triceps reflex2+  Right Quadriceps reflex2+  Left Quadriceps reflex2+  Right Achilles reflex2+  Left Achilles reflex2+  Gait:normal    Dermatology:    Skin:no unusual rashes and no skin lesions    Impression:    Chronic neck and low back pain   Lumbar spine MRI 2016 L4-5 disc bulge with lateral stenosis and facet arthropathy at L5-S1  Patient had seen Beverley Albarran in the past and had interventional procedures with her but it didn't last  Plan:  Initial evaluation follow up on her neck and low back pain. Patient will reschedule cervical spine Xray and right hip Xray. Patient is asking for opioids for her increased low back pain. Discussed long term side effects of opioids and advised patient against using opioids. Discussed interventional procedures LESI(patient had it before with good outcome). Patient agrees. Continue with OTC pain medications   Continue with Gabapentin 400 mg QID  OARRS report reviewed 05/2020   Patient encouraged to stay active and to lose weight. Treatment plan discussed with the patient including medications and procedure side effects. We discussed with the patient that combining opioids, benzodiazepines, alcohol, illicit drugs or sleep aids increases the risk of respiratory depression including death. We discussed that these medications may cause drowsiness, sedation or dizziness and have counseled the patient not to drive or operate machinery. We have discussed that these medications will be prescribed only by one provider.  We have discussed with the patient about age related risk factors and have thoroughly discussed the

## 2020-06-05 ENCOUNTER — OFFICE VISIT (OUTPATIENT)
Dept: ONCOLOGY | Age: 65
End: 2020-06-05
Payer: MEDICARE

## 2020-06-05 ENCOUNTER — HOSPITAL ENCOUNTER (OUTPATIENT)
Dept: INFUSION THERAPY | Age: 65
Discharge: HOME | End: 2020-06-05
Payer: MEDICARE

## 2020-06-05 VITALS
SYSTOLIC BLOOD PRESSURE: 144 MMHG | OXYGEN SATURATION: 93 % | DIASTOLIC BLOOD PRESSURE: 73 MMHG | TEMPERATURE: 98.9 F | HEART RATE: 91 BPM

## 2020-06-05 DIAGNOSIS — C90.00 MULTIPLE MYELOMA, REMISSION STATUS UNSPECIFIED (HCC): ICD-10-CM

## 2020-06-05 LAB
ALBUMIN SERPL-MCNC: 4.1 G/DL (ref 3.5–5.2)
ALP BLD-CCNC: 76 U/L (ref 35–104)
ALT SERPL-CCNC: 8 U/L (ref 0–32)
ANION GAP SERPL CALCULATED.3IONS-SCNC: 11 MMOL/L (ref 7–16)
AST SERPL-CCNC: 18 U/L (ref 0–31)
BASOPHILS ABSOLUTE: 0.05 E9/L (ref 0–0.2)
BASOPHILS RELATIVE PERCENT: 0.7 % (ref 0–2)
BILIRUB SERPL-MCNC: 0.4 MG/DL (ref 0–1.2)
BUN BLDV-MCNC: 14 MG/DL (ref 8–23)
CALCIUM SERPL-MCNC: 9.2 MG/DL (ref 8.6–10.2)
CHLORIDE BLD-SCNC: 99 MMOL/L (ref 98–107)
CO2: 28 MMOL/L (ref 22–29)
CREAT SERPL-MCNC: 0.8 MG/DL (ref 0.5–1)
EOSINOPHILS ABSOLUTE: 0.33 E9/L (ref 0.05–0.5)
EOSINOPHILS RELATIVE PERCENT: 4.7 % (ref 0–6)
GFR AFRICAN AMERICAN: >60
GFR NON-AFRICAN AMERICAN: >60 ML/MIN/1.73
GLUCOSE BLD-MCNC: 74 MG/DL (ref 74–99)
HCT VFR BLD CALC: 36 % (ref 34–48)
HEMOGLOBIN: 10.7 G/DL (ref 11.5–15.5)
IMMATURE GRANULOCYTES #: 0.03 E9/L
IMMATURE GRANULOCYTES %: 0.4 % (ref 0–5)
LYMPHOCYTES ABSOLUTE: 3.88 E9/L (ref 1.5–4)
LYMPHOCYTES RELATIVE PERCENT: 54.8 % (ref 20–42)
MCH RBC QN AUTO: 27.2 PG (ref 26–35)
MCHC RBC AUTO-ENTMCNC: 29.7 % (ref 32–34.5)
MCV RBC AUTO: 91.6 FL (ref 80–99.9)
MONOCYTES ABSOLUTE: 0.46 E9/L (ref 0.1–0.95)
MONOCYTES RELATIVE PERCENT: 6.5 % (ref 2–12)
NEUTROPHILS ABSOLUTE: 2.33 E9/L (ref 1.8–7.3)
NEUTROPHILS RELATIVE PERCENT: 32.9 % (ref 43–80)
PDW BLD-RTO: 13.3 FL (ref 11.5–15)
PLATELET # BLD: 346 E9/L (ref 130–450)
PMV BLD AUTO: 10.1 FL (ref 7–12)
POTASSIUM SERPL-SCNC: 4 MMOL/L (ref 3.5–5)
RBC # BLD: 3.93 E12/L (ref 3.5–5.5)
SODIUM BLD-SCNC: 138 MMOL/L (ref 132–146)
WBC # BLD: 7.1 E9/L (ref 4.5–11.5)

## 2020-06-05 PROCEDURE — 36415 COLL VENOUS BLD VENIPUNCTURE: CPT

## 2020-06-05 PROCEDURE — 84165 PROTEIN E-PHORESIS SERUM: CPT

## 2020-06-05 PROCEDURE — 86334 IMMUNOFIX E-PHORESIS SERUM: CPT

## 2020-06-05 PROCEDURE — 85025 COMPLETE CBC W/AUTO DIFF WBC: CPT

## 2020-06-05 PROCEDURE — 99213 OFFICE O/P EST LOW 20 MIN: CPT

## 2020-06-05 PROCEDURE — 99212 OFFICE O/P EST SF 10 MIN: CPT | Performed by: INTERNAL MEDICINE

## 2020-06-05 PROCEDURE — 80053 COMPREHEN METABOLIC PANEL: CPT

## 2020-06-05 NOTE — PROGRESS NOTES
Diagnosis    Smoldering multiple myeloma    Interim history    Pt c/o back pain.     PE    HEAD NC AT  CHEST Clear BS BL  HEART S1S2 no m/r/g  ABD Soft ND NT  EXT no edema  NEURO A+Ox3    A/P    This is a 73 y/o lady with smoldering multiple myeloma    CBC  CMP  SPEP/DARYA    MRI spine    F/u in 3 in months

## 2020-06-08 LAB
ALBUMIN SERPL-MCNC: 3.4 G/DL (ref 3.5–4.7)
ALPHA-1-GLOBULIN: 0.2 G/DL (ref 0.2–0.4)
ALPHA-2-GLOBULIN: 0.9 G/FL (ref 0.5–1)
BETA GLOBULIN: 1.1 G/DL (ref 0.8–1.3)
ELECTROPHORESIS: ABNORMAL
GAMMA GLOBULIN: 3.1 G/DL (ref 0.7–1.6)
IMMUNOFIXATION RESULT, SERUM: NORMAL
TOTAL PROTEIN: 9.1 G/DL (ref 6.4–8.3)

## 2020-06-14 ENCOUNTER — HOSPITAL ENCOUNTER (OUTPATIENT)
Dept: CT IMAGING | Age: 65
Discharge: HOME | End: 2020-06-16
Payer: MEDICARE

## 2020-06-14 ENCOUNTER — HOSPITAL ENCOUNTER (OUTPATIENT)
Dept: MRI IMAGING | Age: 65
Discharge: HOME | End: 2020-06-16
Payer: MEDICARE

## 2020-06-14 PROCEDURE — 72131 CT LUMBAR SPINE W/O DYE: CPT

## 2020-06-14 PROCEDURE — 72156 MRI NECK SPINE W/O & W/DYE: CPT

## 2020-06-14 PROCEDURE — 6360000004 HC RX CONTRAST MEDICATION: Performed by: RADIOLOGY

## 2020-06-14 PROCEDURE — 72128 CT CHEST SPINE W/O DYE: CPT

## 2020-06-14 PROCEDURE — A9579 GAD-BASE MR CONTRAST NOS,1ML: HCPCS | Performed by: RADIOLOGY

## 2020-06-14 RX ADMIN — GADOTERIDOL 18 ML: 279.3 INJECTION, SOLUTION INTRAVENOUS at 11:12

## 2020-06-16 ENCOUNTER — TELEPHONE (OUTPATIENT)
Dept: ONCOLOGY | Age: 65
End: 2020-06-16

## 2020-06-23 ENCOUNTER — OFFICE VISIT (OUTPATIENT)
Dept: PAIN MANAGEMENT | Age: 65
End: 2020-06-23
Payer: MEDICARE

## 2020-06-23 VITALS
WEIGHT: 200 LBS | DIASTOLIC BLOOD PRESSURE: 80 MMHG | TEMPERATURE: 98.5 F | BODY MASS INDEX: 32.14 KG/M2 | HEIGHT: 66 IN | SYSTOLIC BLOOD PRESSURE: 142 MMHG | RESPIRATION RATE: 16 BRPM | HEART RATE: 76 BPM

## 2020-06-23 PROCEDURE — 99214 OFFICE O/P EST MOD 30 MIN: CPT | Performed by: PAIN MEDICINE

## 2020-06-23 PROCEDURE — 99214 OFFICE O/P EST MOD 30 MIN: CPT

## 2020-06-23 NOTE — PROGRESS NOTES
[address] [facility], [address] [place] [address] [place]  [phone]    Follow up Note      Lili Raymundo     Date of Visit:  6/23/2020    CC:  Patient presents for follow up   Chief Complaint   Patient presents with    Follow-up    Hip Pain     right hip pian    Back Pain    Finger Pain     left thumb       HPI:  Follow up on her neck and low back pain. Appropriate analgesia with current medications regimen: Not applicable. Change in quality of symptoms:no. Medication side effects:not applicable . Recent diagnostic testing:Cervical MRI and lumbar spine CT   Recent interventional procedures:none. .  Imaging:   Cervical spine MRI 2020  Central spinal canal stenosis is present at C3-4. Posterior disc and   osteophyte complex with mass effect on the cord.       Foraminal stenosis is present bilaterally at C3-4, C4-5 and C5-6. Lumbar spine MRI 2016  1. Circumferential disc bulging is present at L4-5 causing mild   narrowing of the L4-5 neural foramina and mild bilateral lateral   recess stenosis.       2. Facet joint arthropathy and disc bulging is present at L5-S1   causing mild narrowing of the right L5-S1 neural foramen      Lumbar spine CT 2020  No evidence of myeloma.       Degenerative changes in the thoracic spine resulting in mild to   moderate canal and mild to moderate bilateral foraminal stenosis at   T9-T10. There       Grade 1 degenerative anterolisthesis of L4 on L5 and moderate   degenerative changes lower lumbar spine resulting in moderate canal   stenosis at L4-L5 and moderate to severe bilateral foraminal stenosis   L4-L5 and L5-S1 as described.       Stable bilateral adrenal nodules. Previous treatments: Physical Therapy, Epidural Steroid Injection and medications. .    Patient had seen Devang Javier in the past       Potential Aberrant Drug-Related Behavior:    None    Urine Drug Screening:  None    OARRS report:  06/2020 consistent     Past Medical History:   Diagnosis Date    Adrenal incidentaloma [facility])     Anesthesia complication     states possible bronchospasm post procedure, unsure of when    Anxiety     Arthritis     Asthma     Bladder incontinence     Cancer ([facility]     Myeloma, follows up at [address] [place] [address]    Chronic back pain     COPD (chronic obstructive pulmonary disease) ([facility]     (+) PFT    Depression     Diabetes mellitus ([facility]     Encounter for screening colonoscopy 07/2016    Fibromyalgia     GERD (gastroesophageal reflux disease)     Hyperlipidemia     Hypertension     Hypothyroidism     MGUS (monoclonal gammopathy of unknown significance)     Multiple myeloma ([facility] 2009    Neuropathy     Obesity     Pelvic pain     Thyroid disease     Hyperthyroidism    Tobacco abuse     Type II or unspecified type diabetes mellitus without mention of complication, not stated as uncontrolled      Past Surgical History:   Procedure Laterality Date    COLONOSCOPY  07/20/2016 2008    KNEE SURGERY      left knee, arthroscopic    NERVE BLOCK Bilateral 09/22/2016    lumbar transforaminal nerve block #1    UPPER GASTROINTESTINAL ENDOSCOPY      2008    UPPER GASTROINTESTINAL ENDOSCOPY  07/20/2016    egd and colonoscopy     Prior to Admission medications    Medication Sig Start Date End Date Taking? Authorizing Provider   mineral oil-hydrophilic petrolatum (HYDROPHOR) ointment Apply topically as needed.  3/5/20  Yes Paco Guevara MD   Ketoconazole-Hydrocortisone 2 & 1 % KIT Apply 1 Tube topically 2 times daily 3/5/20  Yes Paco Guevara MD   albuterol sulfate  (90 Base) MCG/ACT inhaler Inhale 2 puffs into the lungs 4 times daily as needed for Wheezing 2/10/20  Yes Jesús Lam, DO   ipratropium-albuterol (DUONEB) 0.5-2.5 (3) MG/3ML SOLN nebulizer solution Inhale 3 mLs into the lungs every 4 hours 2/10/20  Yes Jesús Lam, DO   budesonide-formoterol (SYMBICORT) 160-4.5 MCG/ACT is  painful. Musculoskeletal:    Trigger points in Paraveteral:absent bilaterally  SI joint tenderness:negative right, negative left              ZORAIDA test:not done right, not done  left  Piriformis tenderness:negative right, negative left  Trochanteric bursa tenderness:negative right, negative left  SLR:negative right, negative left, sitting     Extremities:    Tremors:None bilaterally upper and lower  Range of motion: pain with internal rotation of hips positive right   Intact:Yes  Edema:Normal    Neurological:    Sensory:normal to light touch bilateral upper and lower extremities  Motor:   Right Grip5/5              Left Grip5/5               Right Bicep5/5           Left Bicep5/5              Right Triceps5/5       Left Triceps5/5          Right Deltoid5/5     Left Deltoid5/5                  Right Quadriceps5/5          Left Quadriceps5/5           Right Gastrocnemius5/5    Left Gastrocnemius5/5  Right Ant Tibialis5/5  Left Ant Tibialis5/5  Reflexes:    Right Brachioradialis reflex2+  Left Brachioradialis reflex2+  Right Biceps reflex2+  Left Biceps reflex2+  Right Triceps reflex2+  Left Triceps reflex2+  Right Quadriceps reflex2+  Left Quadriceps reflex2+  Right Achilles reflex2+  Left Achilles reflex2+  Gait:normal    Dermatology:    Skin:no unusual rashes and no skin lesions    Impression:    Chronic neck and low back pain   Lumbar spine MRI 2016 L4-5 disc bulge with lateral stenosis and facet arthropathy at L5-S1  Patient had seen Sadia De León in the past and had interventional procedures with her but it didn't last  Plan:  Follow up on her neck and low back pain. Results of cervical and lumbar spine CT were discussed with the patient. Patient encouraged to get right hip Xray. Patient is scheduled for LESI, she would also benefit from lumbar facet MBB. Will refer patient to [address] [place] [address] for evaluation of her neck pain. Continue with OTC pain medications. Continue with Gabapentin 400 mg QID.   OARRS report

## 2020-06-26 RX ORDER — GABAPENTIN 800 MG/1
800 TABLET ORAL 2 TIMES DAILY
COMMUNITY
End: 2020-09-14 | Stop reason: DRUGHIGH

## 2020-06-30 ENCOUNTER — HOSPITAL ENCOUNTER (OUTPATIENT)
Age: 65
Discharge: HOME | End: 2020-07-02
Payer: MEDICARE

## 2020-06-30 ENCOUNTER — OFFICE VISIT (OUTPATIENT)
Dept: VASCULAR SURGERY | Age: 65
End: 2020-06-30
Payer: MEDICARE

## 2020-06-30 VITALS
SYSTOLIC BLOOD PRESSURE: 140 MMHG | RESPIRATION RATE: 16 BRPM | WEIGHT: 200 LBS | DIASTOLIC BLOOD PRESSURE: 80 MMHG | HEIGHT: 66 IN | BODY MASS INDEX: 32.14 KG/M2 | HEART RATE: 78 BPM

## 2020-06-30 PROCEDURE — U0003 INFECTIOUS AGENT DETECTION BY NUCLEIC ACID (DNA OR RNA); SEVERE ACUTE RESPIRATORY SYNDROME CORONAVIRUS 2 (SARS-COV-2) (CORONAVIRUS DISEASE [COVID-19]), AMPLIFIED PROBE TECHNIQUE, MAKING USE OF HIGH THROUGHPUT TECHNOLOGIES AS DESCRIBED BY CMS-2020-01-R: HCPCS

## 2020-06-30 PROCEDURE — 99202 OFFICE O/P NEW SF 15 MIN: CPT | Performed by: PHYSICIAN ASSISTANT

## 2020-07-01 ENCOUNTER — ANESTHESIA EVENT (OUTPATIENT)
Dept: OPERATING ROOM | Age: 65
End: 2020-07-01
Payer: MEDICARE

## 2020-07-02 LAB
SARS-COV-2: NOT DETECTED
SOURCE: NORMAL

## 2020-07-02 ASSESSMENT — LIFESTYLE VARIABLES: SMOKING_STATUS: 0

## 2020-07-02 NOTE — ANESTHESIA PRE PROCEDURE
Department of Anesthesiology  Preprocedure Note       Name:  Kim Gentile   Age:  72 y.o.  :  1955                                          MRN:  [id number]         Date:  2020      Surgeon: Wesley No):  Leland Ghotra MD    Procedure: Procedure(s):  LUMBAR EPIDURAL STEROID INJECTION L4-5    Medications prior to admission:   Prior to Admission medications    Medication Sig Start Date End Date Taking? Authorizing Provider   gabapentin (NEURONTIN) 800 MG tablet Take 800 mg by mouth 2 times daily. Yes Historical Provider, MD   OXYGEN Inhale into the lungs Uses 2 liters   Yes Historical Provider, MD   mineral oil-hydrophilic petrolatum (HYDROPHOR) ointment Apply topically as needed.  3/5/20   Lena Foley MD   Ketoconazole-Hydrocortisone 2 & 1 % KIT Apply 1 Tube topically 2 times daily 3/5/20   Lena Foley MD   albuterol sulfate  (90 Base) MCG/ACT inhaler Inhale 2 puffs into the lungs 4 times daily as needed for Wheezing 2/10/20   Jesús Sanchez, DO   ipratropium-albuterol (DUONEB) 0.5-2.5 (3) MG/3ML SOLN nebulizer solution Inhale 3 mLs into the lungs every 4 hours 2/10/20   Jesús Sibley,    budesonide-formoterol (SYMBICORT) 160-4.5 MCG/ACT AERO Inhale 2 puffs into the lungs 2 times daily 20   Britney Henry DO   aspirin 81 MG tablet Take 1 tablet by mouth daily 20   Lena Foley MD   citalopram (CELEXA) 40 MG tablet Take 1 tablet by mouth daily 20   Lena Foley MD   amitriptyline (ELAVIL) 50 MG tablet TAKE 1 TABLET BY MOUTH EVERY EVENING 20   Lena Foley MD   metFORMIN (GLUCOPHAGE) 1000 MG tablet TAKE 1 TABLET BY MOUTH TWICE DAILY WITH MEALS 20   Lena Foley MD   triamterene-hydrochlorothiazide (MAXZIDE-25) 37.5-25 MG per tablet TAKE 1 TABLET BY MOUTH EVERY DAY 20   Lena Foley MD   calcium-vitamin D (CALCIUM 500/D) 500-200 MG-UNIT per tablet TAKE 1 TABLET BY MOUTH DAILY 1/31/20   Jami Simpson MD   baclofen (LIORESAL) 10 MG tablet TAKE 1/2 TABLET THREE TIMES DAILY AS NEEDED(PAIN, SPASMS) 1/31/20   Jami Simpson MD   levothyroxine (SYNTHROID) 75 MCG tablet TAKE 1 TABLET BY MOUTH EVERY DAY 1/27/20   Dustin Krueger DO   montelukast (SINGULAIR) 10 MG tablet TAKE 1 TABLET BY MOUTH EVERY NIGHT AT BEDTIME 1/26/20   Aaliyah Clara Michelle Sky MD   blood glucose monitor kit and supplies Test 1 times a day & as needed for symptoms of irregular blood glucose. Accuc[employer]k [employer] 2/18/19   Jeanne Bustillos MD   blood glucose monitor strips Testing daily 2/11/19   Jami Simpson MD   Lancets MISC Testing daily 2/11/19   Jami Simpson MD   Misc. Devices MISC Oxygen concentrator  j44.9 10/26/18   Dev Hassan DO       Current medications:    No current facility-administered medications for this encounter. Current Outpatient Medications   Medication Sig Dispense Refill    gabapentin (NEURONTIN) 800 MG tablet Take 800 mg by mouth 2 times daily.  OXYGEN Inhale into the lungs Uses 2 liters      mineral oil-hydrophilic petrolatum (HYDROPHOR) ointment Apply topically as needed.  1 Tube 1    Ketoconazole-Hydrocortisone 2 & 1 % KIT Apply 1 Tube topically 2 times daily 1 kit 0    albuterol sulfate  (90 Base) MCG/ACT inhaler Inhale 2 puffs into the lungs 4 times daily as needed for Wheezing 3 Inhaler 1    ipratropium-albuterol (DUONEB) 0.5-2.5 (3) MG/3ML SOLN nebulizer solution Inhale 3 mLs into the lungs every 4 hours 360 mL 1    budesonide-formoterol (SYMBICORT) 160-4.5 MCG/ACT AERO Inhale 2 puffs into the lungs 2 times daily 1 Inhaler 2    aspirin 81 MG tablet Take 1 tablet by mouth daily 90 tablet 0    citalopram (CELEXA) 40 MG tablet Take 1 tablet by mouth daily 30 tablet 2    amitriptyline (ELAVIL) 50 MG tablet TAKE 1 TABLET BY MOUTH EVERY EVENING 30 tablet 2    metFORMIN (GLUCOPHAGE) 1000 MG tablet TAKE 1 TABLET BY MOUTH TWICE DAILY WITH MEALS 60 tablet 2    triamterene-hydrochlorothiazide (MAXZIDE-25) 37.5-25 MG per tablet TAKE 1 TABLET BY MOUTH EVERY DAY 30 tablet 2    calcium-vitamin D (CALCIUM 500/D) 500-200 MG-UNIT per tablet TAKE 1 TABLET BY MOUTH DAILY 30 tablet 2    baclofen (LIORESAL) 10 MG tablet TAKE 1/2 TABLET THREE TIMES DAILY AS NEEDED(PAIN, SPASMS) 90 tablet 2    levothyroxine (SYNTHROID) 75 MCG tablet TAKE 1 TABLET BY MOUTH EVERY DAY 90 tablet 0    montelukast (SINGULAIR) 10 MG tablet TAKE 1 TABLET BY MOUTH EVERY NIGHT AT BEDTIME 90 tablet 1    blood glucose monitor kit and supplies Test 1 times a day & as needed for symptoms of irregular blood glucose. Accuchek Avivia 1 kit 0    blood glucose monitor strips Testing daily 100 strip 3    Lancets MISC Testing daily 100 each 3    Misc. Devices MISC Oxygen concentrator  j44.9 1 Device 0       Allergies:     Allergies   Allergen Reactions    Aceon [Perindopril Erbumine] Anaphylaxis     Tongue swells up    Nsaids Shortness Of Breath and Swelling     tongue       Problem List:    Patient Active Problem List   Diagnosis Code    Adrenal incidentaloma ([facility] E27.8    Diabetes mellitus ([facility] E11.9    Hyperlipidemia E78.5    Hypothyroidism E03.9    Fibromyalgia M79.7    Obesity E66.9    Hypertension, uncontrolled I10    Chronic right-sided low back pain with right-sided sciatica M54.41, G89.29    Tobacco abuse Z72.0    Myofascial pain M79.18    Lumbar radiculitis M54.16    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    Chronic obstructive pulmonary disease (HCC) J44.9    Type 2 diabetes mellitus without complication, with long-term current use of insulin ([facility]) E11.9, Z79.4    Severe sepsis ([facility]) A41.9, R65.20    Hypovolemic shock ([facility] [facility] R57.1    Community acquired pneumonia J18.9    Acute diverticulitis K57.92    Hypomagnesemia E83.42    Hypokalemia E87.6    Lactic acidosis E87.2    Hypophosphatemia E83.39    Cervical facet joint syndrome M47.812    Cervical spondylosis H34.290    Lumbar facet arthropathy M47.816    Lumbar radiculopathy M54.16    Pain in right hip M25.551    Lumbar disc disorder M51.9       Past Medical History:        Diagnosis Date    Adrenal incidentaloma ([address]     Anesthesia complication     states possible bronchospasm post procedure, unsure of when    Anxiety     Arthritis     Asthma     Bladder incontinence     Cancer [facility]) Dx 2009    multiple Myeloma, in remission currently     Chronic back pain     COPD (chronic obstructive pulmonary disease) ([address]     on O2 2 liters  (uses with activity and at night to sleep)    Depression     Diabetes mellitus ([address]     Encounter for screening colonoscopy 2016    Fibromyalgia     GERD (gastroesophageal reflux disease)     Hyperlipidemia     Hypertension     Hypothyroidism     MGUS (monoclonal gammopathy of unknown significance)     Neuropathy     Obesity     Pelvic pain     Pneumonia 2020    Prolonged emergence from general anesthesia     Sleep apnea     doesnt use her cpap    Thyroid disease     Hyperthyroidism  also has nodules    Tobacco abuse     Type II or unspecified type diabetes mellitus without mention of complication, not stated as uncontrolled        Past Surgical History:        Procedure Laterality Date    COLONOSCOPY  2016    KNEE SURGERY      left knee, arthroscopic    NERVE BLOCK Bilateral 2016    lumbar transforaminal nerve block #1    UPPER GASTROINTESTINAL ENDOSCOPY          UPPER GASTROINTESTINAL ENDOSCOPY  2016    egd and colonoscopy       Social History:    Social History     Tobacco Use    Smoking status: Former Smoker     Years: 50.00     Types: Cigarettes     Start date: 7/15/1971     Last attempt to quit: 2/10/2020     Years since quittin.3    Smokeless tobacco: Never Used   Substance Use Topics    Alcohol use: No     Alcohol/week: 0.0 standard drinks                                Counseling given: Not ROM: full  Mouth opening: > = 3 FB Dental:    (+) upper dentures, lower dentures and edentulous      Pulmonary: breath sounds clear to auscultation  (+) pneumonia:  COPD:  sleep apnea:  asthma:     (-) not a current smoker (ex 50 yr smoker)                          ROS comment: O2 prn and h.s.   Cardiovascular:    (+) hypertension:, hyperlipidemia      ECG reviewed  Rhythm: regular  Rate: normal                 ROS comment: EKG=Sinus tachycardia 106,  Nonspecific T wave abnormality  Abnormal ECG  No previous ECGs available  Confirmed by Soheila Lang ([id number]) on 1/19/2020 1:59:35 PM     Neuro/Psych:   (+) neuromuscular disease:, psychiatric history:             ROS comment: Multiple myeloma GI/Hepatic/Renal:   (+) GERD:,           Endo/Other:    (+) DiabetesType II DM, , hyperthyroidism::., .                 Abdominal:   (+) obese,         Vascular:                                      Anesthesia Plan      MAC     ASA 4     (O2 dependent   Covid (-))  Induction: intravenous. Plan discussed with CRNA.                   Ke Harris MD   7/2/2020

## 2020-07-06 ENCOUNTER — HOSPITAL ENCOUNTER (OUTPATIENT)
Dept: OPERATING ROOM | Age: 65
Setting detail: OUTPATIENT SURGERY
Discharge: HOME | End: 2020-07-06
Attending: PAIN MEDICINE
Payer: MEDICARE

## 2020-07-06 ENCOUNTER — HOSPITAL ENCOUNTER (OUTPATIENT)
Age: 65
Setting detail: OUTPATIENT SURGERY
Discharge: HOME | End: 2020-07-06
Attending: PAIN MEDICINE | Admitting: PAIN MEDICINE
Payer: MEDICARE

## 2020-07-06 ENCOUNTER — ANESTHESIA (OUTPATIENT)
Dept: OPERATING ROOM | Age: 65
End: 2020-07-06
Payer: MEDICARE

## 2020-07-06 VITALS
WEIGHT: 210 LBS | OXYGEN SATURATION: 96 % | DIASTOLIC BLOOD PRESSURE: 89 MMHG | RESPIRATION RATE: 16 BRPM | HEART RATE: 81 BPM | SYSTOLIC BLOOD PRESSURE: 167 MMHG | BODY MASS INDEX: 33.75 KG/M2 | HEIGHT: 66 IN | TEMPERATURE: 98.7 F

## 2020-07-06 LAB — METER GLUCOSE: 88 MG/DL (ref 74–99)

## 2020-07-06 PROCEDURE — 2709999900 HC NON-CHARGEABLE SUPPLY: Performed by: PAIN MEDICINE

## 2020-07-06 PROCEDURE — 7100000011 HC PHASE II RECOVERY - ADDTL 15 MIN: Performed by: PAIN MEDICINE

## 2020-07-06 PROCEDURE — 2500000003 HC RX 250 WO HCPCS: Performed by: PAIN MEDICINE

## 2020-07-06 PROCEDURE — 6360000004 HC RX CONTRAST MEDICATION: Performed by: PAIN MEDICINE

## 2020-07-06 PROCEDURE — 2580000003 HC RX 258: Performed by: ANESTHESIOLOGY

## 2020-07-06 PROCEDURE — 62323 NJX INTERLAMINAR LMBR/SAC: CPT | Performed by: PAIN MEDICINE

## 2020-07-06 PROCEDURE — 6360000002 HC RX W HCPCS: Performed by: NURSE ANESTHETIST, CERTIFIED REGISTERED

## 2020-07-06 PROCEDURE — 6360000002 HC RX W HCPCS: Performed by: PAIN MEDICINE

## 2020-07-06 PROCEDURE — 3600000005 HC SURGERY LEVEL 5 BASE: Performed by: PAIN MEDICINE

## 2020-07-06 PROCEDURE — 82962 GLUCOSE BLOOD TEST: CPT

## 2020-07-06 PROCEDURE — 82962 GLUCOSE BLOOD TEST: CPT | Performed by: PAIN MEDICINE

## 2020-07-06 PROCEDURE — 7100000010 HC PHASE II RECOVERY - FIRST 15 MIN: Performed by: PAIN MEDICINE

## 2020-07-06 PROCEDURE — 3209999900 FLUORO FOR SURGICAL PROCEDURES

## 2020-07-06 PROCEDURE — 3700000000 HC ANESTHESIA ATTENDED CARE: Performed by: PAIN MEDICINE

## 2020-07-06 RX ORDER — MIDAZOLAM HYDROCHLORIDE 1 MG/ML
INJECTION INTRAMUSCULAR; INTRAVENOUS PRN
Status: DISCONTINUED | OUTPATIENT
Start: 2020-07-06 | End: 2020-07-06 | Stop reason: SDUPTHER

## 2020-07-06 RX ORDER — SODIUM CHLORIDE, SODIUM LACTATE, POTASSIUM CHLORIDE, CALCIUM CHLORIDE 600; 310; 30; 20 MG/100ML; MG/100ML; MG/100ML; MG/100ML
INJECTION, SOLUTION INTRAVENOUS CONTINUOUS
Status: DISCONTINUED | OUTPATIENT
Start: 2020-07-06 | End: 2020-07-06 | Stop reason: HOSPADM

## 2020-07-06 RX ORDER — LIDOCAINE HYDROCHLORIDE 5 MG/ML
INJECTION, SOLUTION INFILTRATION; INTRAVENOUS PRN
Status: DISCONTINUED | OUTPATIENT
Start: 2020-07-06 | End: 2020-07-06 | Stop reason: ALTCHOICE

## 2020-07-06 RX ORDER — FENTANYL CITRATE 50 UG/ML
INJECTION, SOLUTION INTRAMUSCULAR; INTRAVENOUS PRN
Status: DISCONTINUED | OUTPATIENT
Start: 2020-07-06 | End: 2020-07-06 | Stop reason: SDUPTHER

## 2020-07-06 RX ADMIN — FENTANYL CITRATE 50 MCG: 50 INJECTION, SOLUTION INTRAMUSCULAR; INTRAVENOUS at 13:19

## 2020-07-06 RX ADMIN — MIDAZOLAM 1 MG: 1 INJECTION INTRAMUSCULAR; INTRAVENOUS at 13:19

## 2020-07-06 RX ADMIN — SODIUM CHLORIDE, POTASSIUM CHLORIDE, SODIUM LACTATE AND CALCIUM CHLORIDE: 600; 310; 30; 20 INJECTION, SOLUTION INTRAVENOUS at 12:43

## 2020-07-06 ASSESSMENT — PULMONARY FUNCTION TESTS
PIF_VALUE: 0
PIF_VALUE: 1
PIF_VALUE: 0

## 2020-07-06 ASSESSMENT — PAIN DESCRIPTION - DESCRIPTORS: DESCRIPTORS: ACHING

## 2020-07-06 ASSESSMENT — PAIN SCALES - GENERAL
PAINLEVEL_OUTOF10: 0

## 2020-07-06 ASSESSMENT — PAIN - FUNCTIONAL ASSESSMENT: PAIN_FUNCTIONAL_ASSESSMENT: PREVENTS OR INTERFERES SOME ACTIVE ACTIVITIES AND ADLS

## 2020-07-06 NOTE — PROGRESS NOTES
Discharge instructions given, communicates understanding. VSS. Bandaid to back dry. Stood with RN assist, tolerated well. Discharged home with family.

## 2020-07-06 NOTE — ANESTHESIA POSTPROCEDURE EVALUATION
Department of Anesthesiology  Postprocedure Note    Patient: Adam Gardner  MRN: [id number]  [place]: 1955  Date of evaluation: 7/6/2020  Time:  2:10 PM     Procedure Summary     Date:  07/06/20 Room / Location:  [address] [place] [address] 04 / [address] [facility]    Anesthesia Start:  1569 Anesthesia Stop:  5831    Procedure:  LUMBAR EPIDURAL STEROID INJECTION L4-5 (N/A ) Diagnosis:  (LUMBAR RADICULOPATHY)    Surgeon:  Jose Abdi MD Responsible Provider:  Lorrie Glass MD    Anesthesia Type:  MAC ASA Status:  4          Anesthesia Type: MAC    Vandana Phase I: Vandana Score: 10    Vandana Phase II: Vandana Score: 10    Last vitals: Reviewed and per EMR flowsheets.        Anesthesia Post Evaluation    Patient location during evaluation: PACU  Patient participation: complete - patient participated  Level of consciousness: awake  Pain score: 0  Airway patency: patent  Nausea & Vomiting: no nausea  Complications: no  Cardiovascular status: blood pressure returned to baseline  Respiratory status: acceptable  Hydration status: euvolemic

## 2020-07-06 NOTE — OP NOTE
lateral fluoroscopic imaging is performed to verify that the epidural needle is properly placed. Negative aspiration of blood and CSF was confirmed. 0.5 ml of omnipaque 240 was used for confirmation of even epidural spread under both live and AP fluoroscopy. After negative aspiration, a solution of 0.5 % Lidocaine 3 ml and 40 mg DepoMedrol was easily injected. The needle was gently removed intact . The patient back was cleaned and a Band-Aid was placed over the needle insertion point. Disposition the patient tolerated the procedure well and there were no complications . Vital signs remained stable throughout the procedure. The patient was escorted to the recovery area where they remained until discharge and written discharge instructions for the procedure were given. Plan: Allegra Carballo will return to our pain management center as scheduled.      Tereza Rahman MD

## 2020-07-06 NOTE — H&P
[address]  [address] [facility], [address] [place] [address] [place]  [phone]    Procedure History & Physical      Tad Messing     HPI:    Patient  is here for low back and leg pain for LESI L4-5  Labs/imaging studies reviewed   All question and concerns addressed including R/B/A associated with the procedure    Past Medical History:   Diagnosis Date    [facility]al incidentaloma [facility])     Anesthesia complication     states possible bronchospasm post procedure, unsure of when    Anxiety     Arthritis     Asthma     Bladder incontinence     Cancer [facility]) Dx 2009    multiple Myeloma, in remission currently     Chronic back pain     COPD (chronic obstructive pulmonary disease) ([facility] [facility]     on O2 2 liters  (uses with activity and at night to sleep)    Depression     Diabetes mellitus ([facility] [facility]     Encounter for screening colonoscopy 07/2016    Fibromyalgia     GERD (gastroesophageal reflux disease)     Hyperlipidemia     Hypertension     Hypothyroidism     MGUS (monoclonal gammopathy of unknown significance)     Neuropathy     Obesity     Pelvic pain     Pneumonia 02/2020    Prolonged emergence from general anesthesia     Sleep apnea     doesnt use her cpap    Thyroid disease     Hyperthyroidism  also has nodules    Tobacco abuse     Type II or unspecified type diabetes mellitus without mention of complication, not stated as uncontrolled        Past Surgical History:   Procedure Laterality Date    COLONOSCOPY  07/20/2016 2008    KNEE SURGERY      left knee, arthroscopic    NERVE BLOCK Bilateral 09/22/2016    lumbar transforaminal nerve block #1    UPPER GASTROINTESTINAL ENDOSCOPY      2008    UPPER GASTROINTESTINAL ENDOSCOPY  07/20/2016    egd and colonoscopy       Prior to Admission medications    Medication Sig Start Date End Date Taking? Authorizing Provider   gabapentin (NEURONTIN) 800 MG tablet Take 800 mg by mouth 2 times daily.    Yes Historical Provider, MD OXYGEN Inhale into the lungs Uses 2 liters   Yes Historical Provider, MD   mineral oil-hydrophilic petrolatum (HYDROPHOR) ointment Apply topically as needed. 3/5/20   Eligio Oliveira MD   Ketoconazole-Hydrocortisone 2 & 1 % KIT Apply 1 Tube topically 2 times daily 3/5/20   Eligio Oliveira MD   albuterol sulfate  (90 Base) MCG/ACT inhaler Inhale 2 puffs into the lungs 4 times daily as needed for Wheezing 2/10/20   Jesús Sanchez, DO   ipratropium-albuterol (DUONEB) 0.5-2.5 (3) MG/3ML SOLN nebulizer solution Inhale 3 mLs into the lungs every 4 hours 2/10/20   Jesús Alan, DO   budesonide-formoterol (SYMBICORT) 160-4.5 MCG/ACT AERO Inhale 2 puffs into the lungs 2 times daily 2/7/20   Viet Jang DO   aspirin 81 MG tablet Take 1 tablet by mouth daily 1/31/20   Eligio Oliveira MD   citalopram (CELEXA) 40 MG tablet Take 1 tablet by mouth daily 1/31/20   Eligio Oliveira MD   amitriptyline (ELAVIL) 50 MG tablet TAKE 1 TABLET BY MOUTH EVERY EVENING 1/31/20   Eligio Oliveira MD   metFORMIN (GLUCOPHAGE) 1000 MG tablet TAKE 1 TABLET BY MOUTH TWICE DAILY WITH MEALS 1/31/20   Eligio Oliveira MD   triamterene-hydrochlorothiazide (MAXZIDE-25) 37.5-25 MG per tablet TAKE 1 TABLET BY MOUTH EVERY DAY 1/31/20   Eligio Oliveira MD   calcium-vitamin D (CALCIUM 500/D) 500-200 MG-UNIT per tablet TAKE 1 TABLET BY MOUTH DAILY 1/31/20   Eligio Oliveira MD   baclofen (LIORESAL) 10 MG tablet TAKE 1/2 TABLET THREE TIMES DAILY AS NEEDED(PAIN, SPASMS) 1/31/20   Eligio Oliveira MD   levothyroxine (SYNTHROID) 75 MCG tablet TAKE 1 TABLET BY MOUTH EVERY DAY 1/27/20   Dustin Krueger DO   montelukast (SINGULAIR) 10 MG tablet TAKE 1 TABLET BY MOUTH EVERY NIGHT AT BEDTIME 1/26/20   Aaliyah Medrano MD   blood glucose monitor kit and supplies Test 1 times a day & as needed for symptoms of irregular blood glucose.   Accuchek Avivia 2/18/19   Barron Castaneda MD blood glucose monitor strips Testing daily 19   Vinay Iniguez MD   Lancets MISC Testing daily 19   Vinay Iniguez MD   Misc.  Devices MISC Oxygen concentrator  j44.9 10/26/18   Abby Avila DO       Allergies   Allergen Reactions    Aceon [Perindopril Erbumine] Anaphylaxis     Tongue swells up    Nsaids Shortness Of Breath and Swelling     tongue       Social History     Socioeconomic History    Marital status: Single     Spouse name: Not on file    Number of children: Not on file    Years of education: Not on file    Highest education level: Not on file   Occupational History    Not on file   Social Needs    Financial resource strain: Very hard    Food insecurity     Worry: Often true     Inability: Sometimes true   [employer] needs     Medical: Not on file     Non-medical: Not on file   Tobacco Use    Smoking status: Former Smoker     Years: 50.00     Types: Cigarettes     Start date: 7/15/1971     Last attempt to quit: 2/10/2020     Years since quittin.4    Smokeless tobacco: Never Used   Substance and Sexual Activity    Alcohol use: No     Alcohol/week: 0.0 standard drinks    Drug use: No    Sexual activity: Never   Lifestyle    Physical activity     Days per week: Not on file     Minutes per session: Not on file    Stress: Not on file   Relationships    Social connections     Talks on phone: Not on file     Gets together: Not on file     Attends Hindu service: Not on file     Active member of club or organization: Not on file     Attends meetings of clubs or organizations: Not on file     Relationship status: Not on file    Intimate partner violence     Fear of current or ex partner: Not on file     Emotionally abused: Not on file     Physically abused: Not on file     Forced sexual activity: Not on file   Other Topics Concern    Not on file   Social History Narrative    Not on file       Family History   Problem Relation Age of Onset    Heart Disease Mother 79    Diabetes Mother     Cancer Mother         Breast    Hypertension Father     Cancer Father         Pancreas    Diabetes Father     High Blood Pressure Father     Arthritis Brother     Diabetes Brother     Cancer Maternal Uncle     Cancer Paternal Aunt         breast    High Blood Pressure Paternal Aunt     High Blood Pressure Paternal Uncle     Arthritis Maternal Grandmother     Diabetes Maternal Grandmother     High Blood Pressure Maternal Grandmother     Arthritis Maternal Grandfather     Stroke Maternal Grandfather     High Cholesterol Paternal Grandmother     Kidney Disease Paternal Grandmother     Heart Disease Paternal Grandfather          REVIEW OF SYSTEMS:    CONSTITUTIONAL:  negative for  fevers, chills, sweats and fatigue    RESPIRATORY:  negative for  dry cough, cough with sputum, dyspnea, wheezing and chest pain    CARDIOVASCULAR:  negative for chest pain, dyspnea, palpitations, syncope    GASTROINTESTINAL:  negative for nausea, vomiting, change in bowel habits, diarrhea, constipation and abdominal pain    MUSCULOSKELETAL: negative for muscle weakness    SKIN: negative for itching or rashes. BEHAVIOR/PSYCH:  negative for poor appetite, increased appetite, decreased sleep and poor concentration    All other systems negative      PHYSICAL EXAM:    VITALS:  BP (!) 157/84   Pulse 86   Temp 98.7 °F (37.1 °C) (Temporal)   Resp 20   Ht 5' 6\" (1.676 m)   Wt 210 lb (95.3 kg)   SpO2 95%   BMI 33.89 kg/m²     CONSTITUTIONAL:  awake, alert, cooperative, no apparent distress, and appears stated age    EYES: PERRLA, EOMI    LUNGS:  No increased work of breathing, no audible wheezing    CARDIOVASCULAR:  regular rate and rhythm    ABDOMEN:  Soft non tender non distended     EXTREMITIES: no signs of clubbing or cyanosis. MUSCULOSKELETAL: negative for flaccid muscle tone or spastic movements.     SKIN: gross examination reveals no signs of rashes, or diaphoresis. NEURO: Cranial nerves II-XII grossly intact. No signs of agitated mood.        Assessment/Plan:    Low back and leg pain for LESI L4-5

## 2020-07-17 ENCOUNTER — PREP FOR PROCEDURE (OUTPATIENT)
Dept: PAIN MANAGEMENT | Age: 65
End: 2020-07-17

## 2020-07-17 ENCOUNTER — OFFICE VISIT (OUTPATIENT)
Dept: PAIN MANAGEMENT | Age: 65
End: 2020-07-17
Payer: MEDICARE

## 2020-07-17 VITALS
BODY MASS INDEX: 32.14 KG/M2 | HEART RATE: 98 BPM | RESPIRATION RATE: 16 BRPM | TEMPERATURE: 97.4 F | DIASTOLIC BLOOD PRESSURE: 82 MMHG | OXYGEN SATURATION: 97 % | SYSTOLIC BLOOD PRESSURE: 138 MMHG | HEIGHT: 66 IN | WEIGHT: 200 LBS

## 2020-07-17 PROCEDURE — 99214 OFFICE O/P EST MOD 30 MIN: CPT | Performed by: PAIN MEDICINE

## 2020-07-17 PROCEDURE — 99213 OFFICE O/P EST LOW 20 MIN: CPT | Performed by: PAIN MEDICINE

## 2020-07-17 NOTE — PROGRESS NOTES
[address] [facility], [address] [place] [address] [place]  [phone]    Follow up Note      Trevin Desai     Date of Visit:  7/17/2020    CC:  Patient presents for follow up   Chief Complaint   Patient presents with    Follow Up After Procedure      Fluoroscopic guided therapeutic lumbar epidural steroid injection at the L4-5 level (# 1). HPI:  Follow up on her neck and low back pain. Leg pain had improved pain, low back pain is same. Appropriate analgesia with current medications regimen: Not applicable. Change in quality of symptoms:no. Medication side effects:not applicable . Recent diagnostic testing:none  Recent interventional procedures:LESI L4-5 good outcome in her radicular symptoms. Imaging:   Cervical spine MRI 2020  Central spinal canal stenosis is present at C3-4. Posterior disc and   osteophyte complex with mass effect on the cord.       Foraminal stenosis is present bilaterally at C3-4, C4-5 and C5-6. Lumbar spine MRI 2016  1. Circumferential disc bulging is present at L4-5 causing mild   narrowing of the L4-5 neural foramina and mild bilateral lateral   recess stenosis.       2. Facet joint arthropathy and disc bulging is present at L5-S1   causing mild narrowing of the right L5-S1 neural foramen      Lumbar spine CT 2020  No evidence of myeloma.       Degenerative changes in the thoracic spine resulting in mild to   moderate canal and mild to moderate bilateral foraminal stenosis at   T9-T10. There       Grade 1 degenerative anterolisthesis of L4 on L5 and moderate   degenerative changes lower lumbar spine resulting in moderate canal   stenosis at L4-L5 and moderate to severe bilateral foraminal stenosis   L4-L5 and L5-S1 as described.       Stable bilateral adrenal nodules. Previous treatments: Physical Therapy, Epidural Steroid Injection and medications. .    Patient had seen [address] [facility] in the past       Potential Aberrant Drug-Related Behavior:    None    Urine Drug Screening:  None    OARRS report:  07/2020 consistent     Past Medical History:   Diagnosis Date    Adrenal incidentaloma [facility])     Anesthesia complication     states possible bronchospasm post procedure, unsure of when    Anxiety     Arthritis     Asthma     Bladder incontinence     Cancer [facility]) Dx 2009    multiple Myeloma, in remission currently     Chronic back pain     COPD (chronic obstructive pulmonary disease) ([facility]     on O2 2 liters  (uses with activity and at night to sleep)    Depression     Diabetes mellitus ([facility]     Encounter for screening colonoscopy 07/2016    Fibromyalgia     GERD (gastroesophageal reflux disease)     Hyperlipidemia     Hypertension     Hypothyroidism     MGUS (monoclonal gammopathy of unknown significance)     Neuropathy     Obesity     Pelvic pain     Pneumonia 02/2020    Prolonged emergence from general anesthesia     Sleep apnea     doesnt use her cpap    Thyroid disease     Hyperthyroidism  also has nodules    Tobacco abuse     Type II or unspecified type diabetes mellitus without mention of complication, not stated as uncontrolled      Past Surgical History:   Procedure Laterality Date    COLONOSCOPY  07/20/2016 2008    KNEE SURGERY      left knee, arthroscopic    NERVE BLOCK Bilateral 09/22/2016    lumbar transforaminal nerve block #1    NERVE BLOCK  07/06/2020    lumbar epidural steroid injectio L4-5    PAIN MANAGEMENT PROCEDURE N/A 7/6/2020    LUMBAR EPIDURAL STEROID INJECTION L4-5 performed by Kenton Rudolph MD at [address]      2008    UPPER GASTROINTESTINAL ENDOSCOPY  07/20/2016    egd and colonoscopy     Prior to Admission medications    Medication Sig Start Date End Date Taking? Authorizing Provider   gabapentin (NEURONTIN) 800 MG tablet Take 800 mg by mouth 2 times daily.    Yes Historical Provider, MD   OXYGEN Inhale into the lungs Uses 2 liters Yes Historical Provider, MD   mineral oil-hydrophilic petrolatum (HYDROPHOR) ointment Apply topically as needed. 3/5/20  Yes Agusto Cloud MD   Ketoconazole-Hydrocortisone 2 & 1 % KIT Apply 1 Tube topically 2 times daily 3/5/20  Yes Agusto Cloud MD   albuterol sulfate  (90 Base) MCG/ACT inhaler Inhale 2 puffs into the lungs 4 times daily as needed for Wheezing 2/10/20  Yes Jesús Sterling DO   ipratropium-albuterol (DUONEB) 0.5-2.5 (3) MG/3ML SOLN nebulizer solution Inhale 3 mLs into the lungs every 4 hours 2/10/20  Yes Jesús Sterling DO   budesonide-formoterol (SYMBICORT) 160-4.5 MCG/ACT AERO Inhale 2 puffs into the lungs 2 times daily 2/7/20  Yes Beni Koroma DO   aspirin 81 MG tablet Take 1 tablet by mouth daily 1/31/20  Yes Agusto Cloud MD   citalopram (CELEXA) 40 MG tablet Take 1 tablet by mouth daily 1/31/20  Yes Agusto Cloud MD   amitriptyline (ELAVIL) 50 MG tablet TAKE 1 TABLET BY MOUTH EVERY EVENING 1/31/20  Yes Agusto Cloud MD   metFORMIN (GLUCOPHAGE) 1000 MG tablet TAKE 1 TABLET BY MOUTH TWICE DAILY WITH MEALS 1/31/20  Yes Agusto Cloud MD   triamterene-hydrochlorothiazide (MAXZIDE-25) 37.5-25 MG per tablet TAKE 1 TABLET BY MOUTH EVERY DAY 1/31/20  Yes Agusto Cloud MD   calcium-vitamin D (CALCIUM 500/D) 500-200 MG-UNIT per tablet TAKE 1 TABLET BY MOUTH DAILY 1/31/20  Yes Agusto Cloud MD   baclofen (LIORESAL) 10 MG tablet TAKE 1/2 TABLET THREE TIMES DAILY AS NEEDED(PAIN, SPASMS) 1/31/20  Yes Agusto Cloud MD   levothyroxine (SYNTHROID) 75 MCG tablet TAKE 1 TABLET BY MOUTH EVERY DAY 1/27/20  Yes Dustin Krueger DO   montelukast (SINGULAIR) 10 MG tablet TAKE 1 TABLET BY MOUTH EVERY NIGHT AT BEDTIME 1/26/20  Yes Daphnie Mendoza MD   blood glucose monitor kit and supplies Test 1 times a day & as needed for symptoms of irregular blood glucose.   Accuchek Avivia 2/18/19  Yes Gayathri Blanca MD Disease Mother 79    Diabetes Mother     Cancer Mother         Breast    Hypertension Father     Cancer Father         Pancreas    Diabetes Father     High Blood Pressure Father     Arthritis Brother     Diabetes Brother     Cancer Maternal Uncle     Cancer Paternal Aunt         breast    High Blood Pressure Paternal Aunt     High Blood Pressure Paternal Uncle     Arthritis Maternal Grandmother     Diabetes Maternal Grandmother     High Blood Pressure Maternal Grandmother     Arthritis Maternal Grandfather     Stroke Maternal Grandfather     High Cholesterol Paternal Grandmother     Kidney Disease Paternal Grandmother     Heart Disease Paternal Grandfather      REVIEW OF SYSTEMS:     Angelita denies fever/chills, chest pain, shortness of breath, new bowel or bladder complaints. All other review of systems was negative. PHYSICAL EXAMINATION:      /82   Pulse 98   Temp 97.4 °F (36.3 °C) (Infrared)   Resp 16   Ht 5' 6\" (1.676 m)   Wt 200 lb (90.7 kg)   SpO2 97%   BMI 32.28 kg/m²     General:      General appearance:   pleasant and well-hydrated. , in moderate discomfort and A & O x3  Build:Overweight    HEENT:    Head:normocephalic and atraumatic  Sclera: icterus absent,    Lungs:    Breathing:Breathing Pattern: regular, no distress    Abdomen:    Shape:non-distended and normal  Tenderness:none    Cervical spine:    Inspection:normal  Palpation:tenderness paravertebral muscles, facet loading, left, right and positive. Range of motion:abnormal moderately flexion, extension rotation bilateral and is  painful. Lumbar spine:    Spine inspection:normal   CVA tenderness:No   Palpation:tenderness paravertebral muscles, facet loading, left, right, positive and tenderness. Range of motion:abnormal moderately Lateral bending, flexion, extension rotation bilateral and is  painful.     Musculoskeletal:    Trigger points in [place]avet[place]:absent bilaterally  SI joint tenderness:negative right, weight. Treatment plan discussed with the patient including medications and procedure side effects. Time spend with the patient 25 minutes. We discussed with the patient that combining opioids, benzodiazepines, alcohol, illicit drugs or sleep aids increases the risk of respiratory depression including death. We discussed that these medications may cause drowsiness, sedation or dizziness and have counseled the patient not to drive or operate machinery. We have discussed that these medications will be prescribed only by one provider. We have discussed with the patient about age related risk factors and have thoroughly discussed the importance of taking these medications as prescribed. The patient verbalizes understanding. soham Urena M.D. On the basis of positive falls risk screening, assessment and plan is as follows: home safety tips provided.

## 2020-07-21 ENCOUNTER — HOSPITAL ENCOUNTER (OUTPATIENT)
Dept: GENERAL RADIOLOGY | Age: 65
Discharge: HOME | End: 2020-07-23
Payer: MEDICARE

## 2020-07-21 ENCOUNTER — HOSPITAL ENCOUNTER (OUTPATIENT)
Age: 65
Discharge: HOME | End: 2020-07-23
Payer: MEDICARE

## 2020-07-21 PROCEDURE — 73502 X-RAY EXAM HIP UNI 2-3 VIEWS: CPT

## 2020-07-21 PROCEDURE — 72040 X-RAY EXAM NECK SPINE 2-3 VW: CPT

## 2020-07-22 ENCOUNTER — INITIAL CONSULT (OUTPATIENT)
Dept: NEUROSURGERY | Age: 65
End: 2020-07-22
Payer: MEDICARE

## 2020-07-22 VITALS
SYSTOLIC BLOOD PRESSURE: 144 MMHG | TEMPERATURE: 97.9 F | HEART RATE: 89 BPM | WEIGHT: 200 LBS | BODY MASS INDEX: 32.14 KG/M2 | HEIGHT: 66 IN | DIASTOLIC BLOOD PRESSURE: 85 MMHG

## 2020-07-22 PROCEDURE — 99203 OFFICE O/P NEW LOW 30 MIN: CPT | Performed by: NEUROLOGICAL SURGERY

## 2020-07-22 ASSESSMENT — ENCOUNTER SYMPTOMS
VISUAL CHANGE: 0
PHOTOPHOBIA: 0
ALLERGIC/IMMUNOLOGIC NEGATIVE: 1
TROUBLE SWALLOWING: 0
SHORTNESS OF BREATH: 1
CONSTIPATION: 1

## 2020-07-22 NOTE — PROGRESS NOTES
Subjective:      Patient ID: Ian Pagan is a 72 y.o. female. Neck Pain    This is a chronic problem. The current episode started more than 1 year ago. The problem occurs constantly. The problem has been gradually worsening. The pain is associated with nothing. The pain is present in the left side, midline and right side. The quality of the pain is described as aching and stabbing. The pain is at a severity of 9/10. The pain is moderate. The symptoms are aggravated by position. The pain is worse during the night. Stiffness is present in the morning. Associated symptoms include numbness, tingling and weakness. Pertinent negatives include no chest pain, fever, headaches, leg pain, pain with swallowing, paresis, photophobia, syncope, trouble swallowing, visual change or weight loss. She has tried ice, NSAIDs, oral narcotics and muscle relaxants for the symptoms. The treatment provided mild relief. Review of Systems   Constitutional: Negative. Negative for fever and weight loss. HENT: Negative for trouble swallowing. Eyes: Positive for visual disturbance. Negative for photophobia. Respiratory: Positive for shortness of breath. Cardiovascular: Negative for chest pain and syncope. Gastrointestinal: Positive for constipation. Endocrine: Negative. Genitourinary: Negative. Musculoskeletal: Positive for neck pain. Skin: Negative. Allergic/Immunologic: Negative. Neurological: Positive for tingling, weakness and numbness. Negative for headaches. Hematological: Bruises/bleeds easily. Psychiatric/Behavioral: Negative. Objective:   Physical Exam  Vitals signs reviewed. Constitutional:       General: She is not in acute distress. Appearance: Normal appearance. She is normal weight. She is not ill-appearing, toxic-appearing or diaphoretic. HENT:      Head: Normocephalic and atraumatic.       Nose: Nose normal.   Eyes:      General: No visual field deficit or scleral icterus. Right eye: No discharge. Left eye: No discharge. Extraocular Movements: Extraocular movements intact. Conjunctiva/sclera: Conjunctivae normal.      Pupils: Pupils are equal, round, and reactive to light. Pulmonary:      Effort: Pulmonary effort is normal. No respiratory distress. Abdominal:      General: Abdomen is flat. There is no distension. Musculoskeletal:         General: No swelling, tenderness, deformity or signs of injury. Right lower leg: No edema. Left lower leg: No edema. Skin:     General: Skin is warm and dry. Capillary Refill: Capillary refill takes less than 2 seconds. Coloration: Skin is not jaundiced or pale. Findings: No bruising, erythema, lesion or rash. Neurological:      General: No focal deficit present. Mental Status: She is alert and oriented to person, place, and time. Mental status is at baseline. GCS: GCS eye subscore is 4. GCS verbal subscore is 5. GCS motor subscore is 6. Cranial Nerves: Cranial nerves are intact. No cranial nerve deficit, dysarthria or facial asymmetry. Sensory: Sensation is intact. No sensory deficit. Motor: Motor function is intact. No weakness, tremor, atrophy, abnormal muscle tone, seizure activity or pronator drift. Coordination: Coordination is intact. Romberg sign negative. Coordination normal. Finger-Nose-Finger Test and Heel to [facility] Test normal. Rapid alternating movements normal.      Gait: Gait normal.      Deep Tendon Reflexes: Reflexes normal. Babinski sign absent on the right side. Babinski sign absent on the left side. Reflex Scores:       Tricep reflexes are 2+ on the right side and 2+ on the left side. Bicep reflexes are 2+ on the right side and 2+ on the left side. Brachioradialis reflexes are 2+ on the right side and 2+ on the left side. Patellar reflexes are 2+ on the right side and 2+ on the left side.        Achilles reflexes are 2+ on the right side and 2+ on the left side. Psychiatric:         Mood and Affect: Mood normal.         Behavior: Behavior normal.         Thought Content: Thought content normal.         Judgment: Judgment normal.         Assessment:      72year old lady who presents with neck pain and bilateral arm pain. Her MRI shows stenosis at C3-C4, C4-C5 and C5-C6.       Plan:      I am recomending epidurals and PT and if she fails this, she will need a C3-C4, C4-C5 and C5-C6 anterior cervical diskectomy and fusion        Seun Gold MD

## 2020-07-29 ENCOUNTER — HOSPITAL ENCOUNTER (OUTPATIENT)
Age: 65
Discharge: HOME | End: 2020-07-31
Payer: MEDICARE

## 2020-07-29 ENCOUNTER — OFFICE VISIT (OUTPATIENT)
Dept: OBGYN | Age: 65
End: 2020-07-29
Payer: MEDICARE

## 2020-07-29 VITALS
DIASTOLIC BLOOD PRESSURE: 84 MMHG | HEART RATE: 88 BPM | TEMPERATURE: 98 F | SYSTOLIC BLOOD PRESSURE: 128 MMHG | BODY MASS INDEX: 34.06 KG/M2 | WEIGHT: 211 LBS

## 2020-07-29 PROCEDURE — 88175 CYTOPATH C/V AUTO FLUID REDO: CPT

## 2020-07-29 PROCEDURE — G0101 CA SCREEN;PELVIC/BREAST EXAM: HCPCS | Performed by: OBSTETRICS & GYNECOLOGY

## 2020-07-29 PROCEDURE — 99213 OFFICE O/P EST LOW 20 MIN: CPT | Performed by: OBSTETRICS & GYNECOLOGY

## 2020-07-29 PROCEDURE — G0123 SCREEN CERV/VAG THIN LAYER: HCPCS

## 2020-07-29 NOTE — PROGRESS NOTES
Mother     Cancer Mother         Breast    Hypertension Father     Cancer Father         Pancreas    Diabetes Father     High Blood Pressure Father     Arthritis Brother     Diabetes Brother     Cancer Maternal Uncle     Cancer Paternal Aunt         breast    High Blood Pressure Paternal Aunt     High Blood Pressure Paternal Uncle     Arthritis Maternal Grandmother     Diabetes Maternal Grandmother     High Blood Pressure Maternal Grandmother     Arthritis Maternal Grandfather     Stroke Maternal Grandfather     High Cholesterol Paternal Grandmother     Kidney Disease Paternal Grandmother     Heart Disease Paternal Grandfather           Current Outpatient Medications:     gabapentin (NEURONTIN) 800 MG tablet, Take 800 mg by mouth 2 times daily. , Disp: , Rfl:     mineral oil-hydrophilic petrolatum (HYDROPHOR) ointment, Apply topically as needed. , Disp: 1 Tube, Rfl: 1    Ketoconazole-Hydrocortisone 2 & 1 % KIT, Apply 1 Tube topically 2 times daily, Disp: 1 kit, Rfl: 0    albuterol sulfate  (90 Base) MCG/ACT inhaler, Inhale 2 puffs into the lungs 4 times daily as needed for Wheezing, Disp: 3 Inhaler, Rfl: 1    ipratropium-albuterol (DUONEB) 0.5-2.5 (3) MG/3ML SOLN nebulizer solution, Inhale 3 mLs into the lungs every 4 hours, Disp: 360 mL, Rfl: 1    budesonide-formoterol (SYMBICORT) 160-4.5 MCG/ACT AERO, Inhale 2 puffs into the lungs 2 times daily, Disp: 1 Inhaler, Rfl: 2    aspirin 81 MG tablet, Take 1 tablet by mouth daily, Disp: 90 tablet, Rfl: 0    citalopram (CELEXA) 40 MG tablet, Take 1 tablet by mouth daily, Disp: 30 tablet, Rfl: 2    amitriptyline (ELAVIL) 50 MG tablet, TAKE 1 TABLET BY MOUTH EVERY EVENING, Disp: 30 tablet, Rfl: 2    metFORMIN (GLUCOPHAGE) 1000 MG tablet, TAKE 1 TABLET BY MOUTH TWICE DAILY WITH MEALS, Disp: 60 tablet, Rfl: 2    triamterene-hydrochlorothiazide (MAXZIDE-25) 37.5-25 MG per tablet, TAKE 1 TABLET BY MOUTH EVERY DAY, Disp: 30 tablet, Rfl: 2  

## 2020-08-05 ENCOUNTER — HOSPITAL ENCOUNTER (OUTPATIENT)
Age: 65
Discharge: HOME | End: 2020-08-07
Payer: MEDICARE

## 2020-08-05 ENCOUNTER — ANESTHESIA EVENT (OUTPATIENT)
Dept: OPERATING ROOM | Age: 65
End: 2020-08-05
Payer: MEDICARE

## 2020-08-05 PROCEDURE — U0003 INFECTIOUS AGENT DETECTION BY NUCLEIC ACID (DNA OR RNA); SEVERE ACUTE RESPIRATORY SYNDROME CORONAVIRUS 2 (SARS-COV-2) (CORONAVIRUS DISEASE [COVID-19]), AMPLIFIED PROBE TECHNIQUE, MAKING USE OF HIGH THROUGHPUT TECHNOLOGIES AS DESCRIBED BY CMS-2020-01-R: HCPCS

## 2020-08-05 ASSESSMENT — LIFESTYLE VARIABLES: SMOKING_STATUS: 0

## 2020-08-05 NOTE — ANESTHESIA PRE PROCEDURE
Department of Anesthesiology  Preprocedure Note       Name:  Katelyn Ventura   Age:  72 y.o.  :  1955                                          MRN:  [id number]         Date:  2020      Surgeon: Rafael Burnett):  Loli Burrows MD    Procedure: BILATERAL L3 L4 MEDIAL BRANCH L5 DORSSAL RAMUS NERVE BLOCK (CPT 27020) SEDATION (Bilateral )     Medications prior to admission:   Prior to Admission medications    Medication Sig Start Date End Date Taking? Authorizing Provider   gabapentin (NEURONTIN) 800 MG tablet Take 800 mg by mouth 2 times daily. Historical Provider, MD   OXYGEN Inhale into the lungs Uses 2 liters at night    Historical Provider, MD   mineral oil-hydrophilic petrolatum (HYDROPHOR) ointment Apply topically as needed.  3/5/20   Joie Parikh MD   Ketoconazole-Hydrocortisone 2 & 1 % KIT Apply 1 Tube topically 2 times daily 3/5/20   Joie Parikh MD   albuterol sulfate  (90 Base) MCG/ACT inhaler Inhale 2 puffs into the lungs 4 times daily as needed for Wheezing 2/10/20   Jesús Sanchez, DO   ipratropium-albuterol (DUONEB) 0.5-2.5 (3) MG/3ML SOLN nebulizer solution Inhale 3 mLs into the lungs every 4 hours 2/10/20   Jesús Dupont, DO   budesonide-formoterol (SYMBICORT) 160-4.5 MCG/ACT AERO Inhale 2 puffs into the lungs 2 times daily 20   Kaveh Carcamo DO   aspirin 81 MG tablet Take 1 tablet by mouth daily 20   Joie Parikh MD   citalopram (CELEXA) 40 MG tablet Take 1 tablet by mouth daily 20   Joie Parikh MD   amitriptyline (ELAVIL) 50 MG tablet TAKE 1 TABLET BY MOUTH EVERY EVENING 20   Joie Parikh MD   metFORMIN (GLUCOPHAGE) 1000 MG tablet TAKE 1 TABLET BY MOUTH TWICE DAILY WITH MEALS 20   Joie Parikh MD   triamterene-hydrochlorothiazide (MAXZIDE-25) 37.5-25 MG per tablet TAKE 1 TABLET BY MOUTH EVERY DAY 20   Joie Parikh MD   calcium-vitamin D (CALCIUM 500/D) 500-200 MG-UNIT per tablet TAKE 1 TABLET BY MOUTH DAILY 1/31/20   Damon Bridges MD   baclofen (LIORESAL) 10 MG tablet TAKE 1/2 TABLET THREE TIMES DAILY AS NEEDED(PAIN, SPASMS) 1/31/20   Damon Bridges MD   levothyroxine (SYNTHROID) 75 MCG tablet TAKE 1 TABLET BY MOUTH EVERY DAY 1/27/20   Dustin Krueger DO   montelukast (SINGULAIR) 10 MG tablet TAKE 1 TABLET BY MOUTH EVERY NIGHT AT BEDTIME 1/26/20   Aaliyah Thy Kenny Dakin, MD   blood glucose monitor kit and supplies Test 1 times a day & as needed for symptoms of irregular blood glucose. Accuchek Avivia 2/18/19   Arturo Haney MD   blood glucose monitor strips Testing daily 2/11/19   Damon Bridges MD   Lancets MISC Testing daily 2/11/19   Damon Bridges MD   Misc. Devices MISC Oxygen concentrator  j44.9 10/26/18   Alberta Meeks DO       Current medications:    Current Outpatient Medications   Medication Sig Dispense Refill    gabapentin (NEURONTIN) 800 MG tablet Take 800 mg by mouth 2 times daily.  OXYGEN Inhale into the lungs Uses 2 liters at night      mineral oil-hydrophilic petrolatum (HYDROPHOR) ointment Apply topically as needed.  1 Tube 1    Ketoconazole-Hydrocortisone 2 & 1 % KIT Apply 1 Tube topically 2 times daily 1 kit 0    albuterol sulfate  (90 Base) MCG/ACT inhaler Inhale 2 puffs into the lungs 4 times daily as needed for Wheezing 3 Inhaler 1    ipratropium-albuterol (DUONEB) 0.5-2.5 (3) MG/3ML SOLN nebulizer solution Inhale 3 mLs into the lungs every 4 hours 360 mL 1    budesonide-formoterol (SYMBICORT) 160-4.5 MCG/ACT AERO Inhale 2 puffs into the lungs 2 times daily 1 Inhaler 2    aspirin 81 MG tablet Take 1 tablet by mouth daily 90 tablet 0    citalopram (CELEXA) 40 MG tablet Take 1 tablet by mouth daily 30 tablet 2    amitriptyline (ELAVIL) 50 MG tablet TAKE 1 TABLET BY MOUTH EVERY EVENING 30 tablet 2    metFORMIN (GLUCOPHAGE) 1000 MG tablet TAKE 1 TABLET BY MOUTH TWICE DAILY WITH MEALS 60 tablet 2    triamterene-hydrochlorothiazide (MAXZIDE-25) 37.5-25 MG per tablet TAKE 1 TABLET BY MOUTH EVERY DAY 30 tablet 2    calcium-vitamin D (CALCIUM 500/D) 500-200 MG-UNIT per tablet TAKE 1 TABLET BY MOUTH DAILY 30 tablet 2    baclofen (LIORESAL) 10 MG tablet TAKE 1/2 TABLET THREE TIMES DAILY AS NEEDED(PAIN, SPASMS) 90 tablet 2    levothyroxine (SYNTHROID) 75 MCG tablet TAKE 1 TABLET BY MOUTH EVERY DAY 90 tablet 0    montelukast (SINGULAIR) 10 MG tablet TAKE 1 TABLET BY MOUTH EVERY NIGHT AT BEDTIME 90 tablet 1    blood glucose monitor kit and supplies Test 1 times a day & as needed for symptoms of irregular blood glucose. Accuchek Avivia 1 kit 0    blood glucose monitor strips Testing daily 100 strip 3    Lancets MISC Testing daily 100 each 3    Misc. Devices MISC Oxygen concentrator  j44.9 1 Device 0     No current facility-administered medications for this visit. Allergies:     Allergies   Allergen Reactions    Aceon [Perindopril Erbumine] Anaphylaxis     Tongue swells up    Nsaids Shortness Of Breath and Swelling     tongue       Problem List:    Patient Active Problem List   Diagnosis Code    Adrenal incidentaloma ([facility] E27.8    Diabetes mellitus ([facility] E11.9    Hyperlipidemia E78.5    Hypothyroidism E03.9    Fibromyalgia M79.7    Obesity E66.9    Hypertension, uncontrolled I10    Chronic right-sided low back pain with right-sided sciatica M54.41, G89.29    Tobacco abuse Z72.0    Myofascial pain M79.18    Lumbar radiculitis M54.16    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    Chronic obstructive pulmonary disease (HCC) J44.9    Type 2 diabetes mellitus without complication, with long-term current use of insulin ([facility]) E11.9, Z79.4    Severe sepsis ([facility]) A41.9, R65.20    Hypovolemic shock ([facility] R57.1    Community acquired pneumonia J18.9    Acute diverticulitis K57.92    Hypomagnesemia E83.42    Hypokalemia E87.6    Lactic acidosis E87.2    Hypophosphatemia E83.39    Cervical facet Start date: 7/15/1971     Last attempt to quit: 2/10/2020     Years since quittin.4    Smokeless tobacco: Never Used   Substance Use Topics    Alcohol use: No     Alcohol/week: 0.0 standard drinks                                Counseling given: Not Answered      Vital Signs (Current): There were no vitals filed for this visit. BP Readings from Last 3 Encounters:   20 128/84   20 (!) 144/85   20 138/82       NPO Status:                                                                                 BMI:   Wt Readings from Last 3 Encounters:   20 211 lb (95.7 kg)   20 200 lb (90.7 kg)   20 200 lb (90.7 kg)     There is no height or weight on file to calculate BMI.    CBC:   Lab Results   Component Value Date    WBC 7.1 2020    RBC 3.93 2020    HGB 10.7 2020    HCT 36.0 2020    MCV 91.6 2020    RDW 13.3 2020     2020       CMP:   Lab Results   Component Value Date     2020    K 4.0 2020    CL 99 2020    CO2 28 2020    BUN 14 2020    CREATININE 0.8 2020    GFRAA >60 2020    LABGLOM >60 2020    GLUCOSE 74 2020    GLUCOSE 124 10/26/2011    PROT 9.1 2020    CALCIUM 9.2 2020    BILITOT 0.4 2020    ALKPHOS 76 2020    AST 18 2020    ALT 8 2020       POC Tests: No results for input(s): POCGLU, POCNA, POCK, POCCL, POCBUN, POCHEMO, POCHCT in the last 72 hours.     Coags:   Lab Results   Component Value Date    PROTIME 11.3 2016    PROTIME 11.6 10/26/2011    INR 1.0 2016    APTT 28.8 10/07/2015       HCG (If Applicable): No results found for: PREGTESTUR, PREGSERUM, HCG, HCGQUANT     ABGs:   Lab Results   Component Value Date    E4WJIZVD 91.4 10/25/2011        Type & Screen (If Applicable):  No results found for: LABABO, LABRH    Drug/Infectious Status (If Applicable):  No results found for:

## 2020-08-07 LAB
SARS-COV-2: NOT DETECTED
SOURCE: NORMAL

## 2020-08-10 ENCOUNTER — HOSPITAL ENCOUNTER (OUTPATIENT)
Age: 65
Setting detail: OUTPATIENT SURGERY
Discharge: HOME | End: 2020-08-10
Attending: PAIN MEDICINE | Admitting: PAIN MEDICINE
Payer: MEDICARE

## 2020-08-10 ENCOUNTER — HOSPITAL ENCOUNTER (OUTPATIENT)
Dept: OPERATING ROOM | Age: 65
Setting detail: OUTPATIENT SURGERY
Discharge: HOME | End: 2020-08-10
Attending: PAIN MEDICINE
Payer: MEDICARE

## 2020-08-10 ENCOUNTER — ANESTHESIA (OUTPATIENT)
Dept: OPERATING ROOM | Age: 65
End: 2020-08-10
Payer: MEDICARE

## 2020-08-10 VITALS
SYSTOLIC BLOOD PRESSURE: 180 MMHG | TEMPERATURE: 98 F | WEIGHT: 200 LBS | HEIGHT: 66 IN | RESPIRATION RATE: 14 BRPM | BODY MASS INDEX: 32.14 KG/M2 | HEART RATE: 80 BPM | OXYGEN SATURATION: 98 % | DIASTOLIC BLOOD PRESSURE: 100 MMHG

## 2020-08-10 VITALS
RESPIRATION RATE: 8 BRPM | DIASTOLIC BLOOD PRESSURE: 86 MMHG | TEMPERATURE: 98.6 F | OXYGEN SATURATION: 96 % | SYSTOLIC BLOOD PRESSURE: 139 MMHG

## 2020-08-10 LAB — METER GLUCOSE: 111 MG/DL (ref 74–99)

## 2020-08-10 PROCEDURE — 2500000003 HC RX 250 WO HCPCS: Performed by: PAIN MEDICINE

## 2020-08-10 PROCEDURE — 3700000000 HC ANESTHESIA ATTENDED CARE: Performed by: PAIN MEDICINE

## 2020-08-10 PROCEDURE — 2709999900 HC NON-CHARGEABLE SUPPLY: Performed by: PAIN MEDICINE

## 2020-08-10 PROCEDURE — 3600000005 HC SURGERY LEVEL 5 BASE: Performed by: PAIN MEDICINE

## 2020-08-10 PROCEDURE — 64493 INJ PARAVERT F JNT L/S 1 LEV: CPT | Performed by: PAIN MEDICINE

## 2020-08-10 PROCEDURE — 6360000002 HC RX W HCPCS: Performed by: PAIN MEDICINE

## 2020-08-10 PROCEDURE — 82962 GLUCOSE BLOOD TEST: CPT

## 2020-08-10 PROCEDURE — 6360000002 HC RX W HCPCS: Performed by: NURSE ANESTHETIST, CERTIFIED REGISTERED

## 2020-08-10 PROCEDURE — 2580000003 HC RX 258: Performed by: ANESTHESIOLOGY

## 2020-08-10 PROCEDURE — 82962 GLUCOSE BLOOD TEST: CPT | Performed by: PAIN MEDICINE

## 2020-08-10 PROCEDURE — 64494 INJ PARAVERT F JNT L/S 2 LEV: CPT | Performed by: PAIN MEDICINE

## 2020-08-10 PROCEDURE — 7100000011 HC PHASE II RECOVERY - ADDTL 15 MIN: Performed by: PAIN MEDICINE

## 2020-08-10 PROCEDURE — 7100000010 HC PHASE II RECOVERY - FIRST 15 MIN: Performed by: PAIN MEDICINE

## 2020-08-10 PROCEDURE — 3209999900 FLUORO FOR SURGICAL PROCEDURES

## 2020-08-10 RX ORDER — FENTANYL CITRATE 50 UG/ML
INJECTION, SOLUTION INTRAMUSCULAR; INTRAVENOUS PRN
Status: DISCONTINUED | OUTPATIENT
Start: 2020-08-10 | End: 2020-08-10 | Stop reason: SDUPTHER

## 2020-08-10 RX ORDER — MIDAZOLAM HYDROCHLORIDE 1 MG/ML
INJECTION INTRAMUSCULAR; INTRAVENOUS PRN
Status: DISCONTINUED | OUTPATIENT
Start: 2020-08-10 | End: 2020-08-10 | Stop reason: SDUPTHER

## 2020-08-10 RX ORDER — SODIUM CHLORIDE, SODIUM LACTATE, POTASSIUM CHLORIDE, CALCIUM CHLORIDE 600; 310; 30; 20 MG/100ML; MG/100ML; MG/100ML; MG/100ML
INJECTION, SOLUTION INTRAVENOUS CONTINUOUS
Status: DISCONTINUED | OUTPATIENT
Start: 2020-08-10 | End: 2020-08-10 | Stop reason: HOSPADM

## 2020-08-10 RX ORDER — LIDOCAINE HYDROCHLORIDE 5 MG/ML
INJECTION, SOLUTION INFILTRATION; INTRAVENOUS PRN
Status: DISCONTINUED | OUTPATIENT
Start: 2020-08-10 | End: 2020-08-10 | Stop reason: ALTCHOICE

## 2020-08-10 RX ADMIN — FENTANYL CITRATE 50 MCG: 50 INJECTION, SOLUTION INTRAMUSCULAR; INTRAVENOUS at 13:42

## 2020-08-10 RX ADMIN — SODIUM CHLORIDE, POTASSIUM CHLORIDE, SODIUM LACTATE AND CALCIUM CHLORIDE: 600; 310; 30; 20 INJECTION, SOLUTION INTRAVENOUS at 12:41

## 2020-08-10 RX ADMIN — MIDAZOLAM 2 MG: 1 INJECTION INTRAMUSCULAR; INTRAVENOUS at 13:39

## 2020-08-10 RX ADMIN — FENTANYL CITRATE 50 MCG: 50 INJECTION, SOLUTION INTRAMUSCULAR; INTRAVENOUS at 13:40

## 2020-08-10 ASSESSMENT — PAIN DESCRIPTION - DESCRIPTORS: DESCRIPTORS: ACHING

## 2020-08-10 ASSESSMENT — PAIN SCALES - GENERAL
PAINLEVEL_OUTOF10: 0

## 2020-08-10 ASSESSMENT — PULMONARY FUNCTION TESTS
PIF_VALUE: 0

## 2020-08-10 ASSESSMENT — PAIN - FUNCTIONAL ASSESSMENT: PAIN_FUNCTIONAL_ASSESSMENT: 0-10

## 2020-08-10 NOTE — H&P
[address]  [address] [facility], [address] [place] [address] [place]  [phone]    Procedure History & Physical      Moe Dim     HPI:    Patient  is here for axial low back pain for Bilateral L3,4 MB and L5 DR block  Labs/imaging studies reviewed   All question and concerns addressed including R/B/A associated with the procedure    Past Medical History:   Diagnosis Date    Adrenal incidentaloma [facility])     Anesthesia complication     states possible bronchospasm post procedure, unsure of when    Anxiety     Arthritis     Asthma     Bladder incontinence     Cancer [facility]) Dx 2009    multiple Myeloma, in remission currently     Chronic back pain     COPD (chronic obstructive pulmonary disease) ([address]     on O2 2 liters  (uses with activity and at night to sleep)    Depression     Diabetes mellitus ([address]     PO meds only    Encounter for screening colonoscopy 07/2016    Fibromyalgia     GERD (gastroesophageal reflux disease)     Hyperlipidemia     Hypertension     Hypothyroidism     MGUS (monoclonal gammopathy of unknown significance)     Neuropathy     Obesity     Pelvic pain     Pneumonia 02/2020    Prolonged emergence from general anesthesia     Sleep apnea     doesnt use her cpap    Thyroid disease     Hyperthyroidism  also has nodules    Tobacco abuse     Type II or unspecified type diabetes mellitus without mention of complication, not stated as uncontrolled        Past Surgical History:   Procedure Laterality Date    COLONOSCOPY  07/20/2016 2008    KNEE SURGERY      left knee, arthroscopic    NERVE BLOCK Bilateral 09/22/2016    lumbar transforaminal nerve block #1    NERVE BLOCK  07/06/2020    lumbar epidural steroid injectio L4-5    PAIN MANAGEMENT PROCEDURE N/A 7/6/2020    LUMBAR EPIDURAL STEROID INJECTION L4-5 performed by Leesa Hunter MD at [address]      2008    UPPER GASTROINTESTINAL ENDOSCOPY 07/20/2016    egd and colonoscopy       Prior to Admission medications    Medication Sig Start Date End Date Taking? Authorizing Provider   gabapentin (NEURONTIN) 800 MG tablet Take 800 mg by mouth 2 times daily. Yes Historical Provider, MD   albuterol sulfate  (90 Base) MCG/ACT inhaler Inhale 2 puffs into the lungs 4 times daily as needed for Wheezing 2/10/20  Yes Jesús De Los Santos,    ipratropium-albuterol (DUONEB) 0.5-2.5 (3) MG/3ML SOLN nebulizer solution Inhale 3 mLs into the lungs every 4 hours 2/10/20  Yes Jesús De Los Santos DO   budesonide-formoterol (SYMBICORT) 160-4.5 MCG/ACT AERO Inhale 2 puffs into the lungs 2 times daily 2/7/20  Yes Saturnino Croft,    aspirin 81 MG tablet Take 1 tablet by mouth daily 1/31/20  Yes Andrew Del Real MD   citalopram (CELEXA) 40 MG tablet Take 1 tablet by mouth daily 1/31/20  Yes Andrew Del Real MD   amitriptyline (ELAVIL) 50 MG tablet TAKE 1 TABLET BY MOUTH EVERY EVENING 1/31/20  Yes Andrew Del Real MD   metFORMIN (GLUCOPHAGE) 1000 MG tablet TAKE 1 TABLET BY MOUTH TWICE DAILY WITH MEALS 1/31/20  Yes Andrew Del Real MD   triamterene-hydrochlorothiazide (MAXZIDE-25) 37.5-25 MG per tablet TAKE 1 TABLET BY MOUTH EVERY DAY 1/31/20  Yes Andrew Del Real MD   calcium-vitamin D (CALCIUM 500/D) 500-200 MG-UNIT per tablet TAKE 1 TABLET BY MOUTH DAILY 1/31/20  Yes Andrew Del Real MD   baclofen (LIORESAL) 10 MG tablet TAKE 1/2 TABLET THREE TIMES DAILY AS NEEDED(PAIN, SPASMS) 1/31/20  Yes Andrew Del Real MD   levothyroxine (SYNTHROID) 75 MCG tablet TAKE 1 TABLET BY MOUTH EVERY DAY 1/27/20  Yes Dustin Krueger,    montelukast (SINGULAIR) 10 MG tablet TAKE 1 TABLET BY MOUTH EVERY NIGHT AT BEDTIME 1/26/20  Yes Aaliyah Hernandes MD   OXYGEN Inhale into the lungs Uses 2 liters at night    Historical Provider, MD   mineral oil-hydrophilic petrolatum (HYDROPHOR) ointment Apply topically as needed.  3/5/20   Hitesh Shoemaker MD Pk   Ketoconazole-Hydrocortisone 2 & 1 % KIT Apply 1 Tube topically 2 times daily 3/5/20   Ariana Farley MD   blood glucose monitor kit and supplies Test 1 times a day & as needed for symptoms of irregular blood glucose. [employer] [address]   Florecita Rowan MD   blood glucose monitor strips Testing daily 19   Ariana Farley MD   Lancets MISC Testing daily 19   Ariana Farley MD   Misc.  Devices MISC Oxygen concentrator  j44.9 10/26/18   Navin Jain DO       Allergies   Allergen Reactions    Aceon [Perindopril Erbumine] Anaphylaxis     Tongue swells up    Nsaids Shortness Of Breath and Swelling     tongue       Social History     Socioeconomic History    Marital status: Single     Spouse name: Not on file    Number of children: Not on file    Years of education: Not on file    Highest education level: Not on file   Occupational History    Not on file   Social Needs    Financial resource strain: Very hard    Food insecurity     Worry: Often true     Inability: Sometimes true   [employer] needs     Medical: Not on file     Non-medical: Not on file   Tobacco Use    Smoking status: Former Smoker     Years: 50.00     Types: Cigarettes     Start date: 7/15/1971     Last attempt to quit: 2/10/2020     Years since quittin.4    Smokeless tobacco: Never Used   Substance and Sexual Activity    Alcohol use: No     Alcohol/week: 0.0 standard drinks    Drug use: No    Sexual activity: Never   Lifestyle    Physical activity     Days per week: Not on file     Minutes per session: Not on file    Stress: Not on file   Relationships    Social connections     Talks on phone: Not on file     Gets together: Not on file     Attends Baptism service: Not on file     Active member of club or organization: Not on file     Attends meetings of clubs or organizations: Not on file     Relationship status: Not on file    Intimate partner violence     Fear of current or ex partner: Not on file     Emotionally abused: Not on file     Physically abused: Not on file     Forced sexual activity: Not on file   Other Topics Concern    Not on file   Social History Narrative    Not on file       Family History   Problem Relation Age of Onset    Heart Disease Mother 79    Diabetes Mother     Cancer Mother         Breast    Hypertension Father     Cancer Father         Pancreas    Diabetes Father     High Blood Pressure Father     Arthritis Brother     Diabetes Brother     Cancer Maternal Uncle     Cancer Paternal Aunt         breast    High Blood Pressure Paternal Aunt     High Blood Pressure Paternal Uncle     Arthritis Maternal Grandmother     Diabetes Maternal Grandmother     High Blood Pressure Maternal Grandmother     Arthritis Maternal Grandfather     Stroke Maternal Grandfather     High Cholesterol Paternal Grandmother     Kidney Disease Paternal Grandmother     Heart Disease Paternal Grandfather          REVIEW OF SYSTEMS:    CONSTITUTIONAL:  negative for  fevers, chills, sweats and fatigue    RESPIRATORY:  negative for  dry cough, cough with sputum, dyspnea, wheezing and chest pain    CARDIOVASCULAR:  negative for chest pain, dyspnea, palpitations, syncope    GASTROINTESTINAL:  negative for nausea, vomiting, change in bowel habits, diarrhea, constipation and abdominal pain    MUSCULOSKELETAL: negative for muscle weakness    SKIN: negative for itching or rashes.     BEHAVIOR/PSYCH:  negative for poor appetite, increased appetite, decreased sleep and poor concentration    All other systems negative      PHYSICAL EXAM:    VITALS:  BP (!) 156/83   Pulse 83   Temp 98 °F (36.7 °C) (Skin)   Resp 16   Ht 5' 6\" (1.676 m)   Wt 200 lb (90.7 kg)   SpO2 95%   BMI 32.28 kg/m²     CONSTITUTIONAL:  awake, alert, cooperative, no apparent distress, and appears stated age    EYES: PERRLA, EOMI    LUNGS:  No increased work of breathing, no audible wheezing    CARDIOVASCULAR:  regular rate and rhythm    ABDOMEN:  Soft non tender non distended     EXTREMITIES: no signs of clubbing or cyanosis. MUSCULOSKELETAL: negative for flaccid muscle tone or spastic movements. SKIN: gross examination reveals no signs of rashes, or diaphoresis. NEURO: Cranial nerves II-XII grossly intact. No signs of agitated mood. Assessment/Plan:    Axial low back pain for bilateral L3,4 MB and L5 DR block.

## 2020-08-10 NOTE — ANESTHESIA POSTPROCEDURE EVALUATION
Department of Anesthesiology  Postprocedure Note    Patient: Sarina Eaton  MRN: [id number]  [place]: 1955  Date of evaluation: 8/10/2020  Time:  2:29 PM     Procedure Summary     Date:  08/10/20 Room / Location:  [address] [place] [address] 04 / 4199 [facility]    Anesthesia Start:  6070 Anesthesia Stop:  [place]    Procedure:  BILATERAL L3 L4 MEDIAL BRANCH L5 DORSSAL RAMUS NERVE BLOCK (CPT 16857) SEDATION (Bilateral ) Diagnosis:  (LUMBAR SPONDYLISIS)    Surgeon:  Elton Uribe MD Responsible Provider:  Cielo Yarbrough MD    Anesthesia Type:  MAC ASA Status:  4          Anesthesia Type: MAC    Vandana Phase I: Vandana Score: 10    Vandana Phase II: Vandana Score: 10    Last vitals: Reviewed and per EMR flowsheets.        Anesthesia Post Evaluation    Patient location during evaluation: PACU  Patient participation: complete - patient participated  Level of consciousness: awake  Pain score: 0  Airway patency: patent  Nausea & Vomiting: no nausea  Complications: no  Cardiovascular status: blood pressure returned to baseline  Respiratory status: acceptable  Hydration status: euvolemic

## 2020-08-11 ENCOUNTER — TELEPHONE (OUTPATIENT)
Dept: PAIN MANAGEMENT | Age: 65
End: 2020-08-11

## 2020-08-11 RX ORDER — IBUPROFEN 600 MG/1
600 TABLET ORAL 3 TIMES DAILY PRN
Qty: 90 TABLET | Refills: 0 | Status: ON HOLD
Start: 2020-08-11 | End: 2020-11-09 | Stop reason: HOSPADM

## 2020-08-11 NOTE — TELEPHONE ENCOUNTER
8-11-20-Angelita called in stating she is having pain after her procedure yesterday, call to her, left voice mail message for her to call back.     Sharon Cates RN  Pain Management

## 2020-08-12 ENCOUNTER — TELEPHONE (OUTPATIENT)
Dept: PAIN MANAGEMENT | Age: 65
End: 2020-08-12

## 2020-08-12 NOTE — TELEPHONE ENCOUNTER
8-11-20-received a second call from [place], message sent to Dr Shari Avelar. Dr Shari Avelar called [place], questions answered, he will call in a prescription for Motrin. She shows an understanding.     Godfrey Perea RN

## 2020-08-21 ENCOUNTER — HOSPITAL ENCOUNTER (OUTPATIENT)
Age: 65
Discharge: HOME | End: 2020-08-23
Payer: MEDICARE

## 2020-08-21 ENCOUNTER — OFFICE VISIT (OUTPATIENT)
Dept: PAIN MANAGEMENT | Age: 65
End: 2020-08-21
Payer: MEDICARE

## 2020-08-21 ENCOUNTER — PREP FOR PROCEDURE (OUTPATIENT)
Dept: PAIN MANAGEMENT | Age: 65
End: 2020-08-21

## 2020-08-21 VITALS
RESPIRATION RATE: 16 BRPM | DIASTOLIC BLOOD PRESSURE: 70 MMHG | TEMPERATURE: 96.6 F | HEART RATE: 96 BPM | SYSTOLIC BLOOD PRESSURE: 124 MMHG

## 2020-08-21 LAB — SPECIFIC GRAVITY UA: 1.02 (ref 1–1.03)

## 2020-08-21 PROCEDURE — G0480 DRUG TEST DEF 1-7 CLASSES: HCPCS

## 2020-08-21 PROCEDURE — 99213 OFFICE O/P EST LOW 20 MIN: CPT

## 2020-08-21 PROCEDURE — 99214 OFFICE O/P EST MOD 30 MIN: CPT | Performed by: PAIN MEDICINE

## 2020-08-21 PROCEDURE — 80307 DRUG TEST PRSMV CHEM ANLYZR: CPT

## 2020-08-21 PROCEDURE — 80361 OPIATES 1 OR MORE: CPT

## 2020-08-21 RX ORDER — ACETAMINOPHEN AND CODEINE PHOSPHATE 300; 30 MG/1; MG/1
1 TABLET ORAL 2 TIMES DAILY PRN
Qty: 45 TABLET | Refills: 0 | Status: SHIPPED
Start: 2020-08-21 | End: 2020-09-18 | Stop reason: SDUPTHER

## 2020-08-21 NOTE — PROGRESS NOTES
[address] [facility], [address] [place] [address] [place]  [phone]    Follow up Note      Linda Branch     Date of Visit:  8/21/2020    CC:  Patient presents for follow up   Chief Complaint   Patient presents with    Follow-up    Lower Back Pain    Neck Pain       HPI:  Follow up on her neck and low back pain. Appropriate analgesia with current medications regimen: Not applicable. Change in quality of symptoms:no. Medication side effects:not applicable . Recent diagnostic testing:right hip and cervical spine Xray  Recent interventional procedures:bilateral L3,4 MB and L5 DR block with 50% improvement in her pain for 4 days. Imaging:   Cervical spine Xray 2020:  Findings consistent with degenerative disc disease and    degenerative facets disease      Cervical spine MRI 2020  Central spinal canal stenosis is present at C3-4. Posterior disc and   osteophyte complex with mass effect on the cord.       Foraminal stenosis is present bilaterally at C3-4, C4-5 and C5-6. Lumbar spine MRI 2016  1. Circumferential disc bulging is present at L4-5 causing mild   narrowing of the L4-5 neural foramina and mild bilateral lateral   recess stenosis.       2. Facet joint arthropathy and disc bulging is present at L5-S1   causing mild narrowing of the right L5-S1 neural foramen      Lumbar spine CT 2020  No evidence of myeloma.       Degenerative changes in the thoracic spine resulting in mild to   moderate canal and mild to moderate bilateral foraminal stenosis at   T9-T10. There       Grade 1 degenerative anterolisthesis of L4 on L5 and moderate   degenerative changes lower lumbar spine resulting in moderate canal   stenosis at L4-L5 and moderate to severe bilateral foraminal stenosis   L4-L5 and L5-S1 as described.       Stable bilateral adrenal nodules.      Right hip Xray 2020  Moderately severe degenerative changes of the right hip    with severe superolateral joint space loss. Previous treatments: Physical Therapy, Epidural Steroid Injection and medications. .    Patient had seen Delroy Coronel in the past       Potential Aberrant Drug-Related Behavior:    None    Urine Drug Screening:  None    OARRS report:  08/2020 consistent     Past Medical History:   Diagnosis Date    Adrenal incidentaloma [facility])     Anesthesia complication     states possible bronchospasm post procedure, unsure of when    Anxiety     Arthritis     Asthma     Bladder incontinence     Cancer [facility]) Dx 2009    multiple Myeloma, in remission currently     Chronic back pain     COPD (chronic obstructive pulmonary disease) ([facility]     on O2 2 liters  (uses with activity and at night to sleep)    Depression     Diabetes mellitus ([address]     PO meds only    Encounter for screening colonoscopy 07/2016    Fibromyalgia     GERD (gastroesophageal reflux disease)     Hyperlipidemia     Hypertension     Hypothyroidism     MGUS (monoclonal gammopathy of unknown significance)     Neuropathy     Obesity     Pelvic pain     Pneumonia 02/2020    Prolonged emergence from general anesthesia     Sleep apnea     doesnt use her cpap    Thyroid disease     Hyperthyroidism  also has nodules    Tobacco abuse     Type II or unspecified type diabetes mellitus without mention of complication, not stated as uncontrolled      Past Surgical History:   Procedure Laterality Date    ANESTHESIA NERVE BLOCK Bilateral 8/10/2020    BILATERAL L3 L4 MEDIAL BRANCH L5 DORSSAL RAMUS NERVE BLOCK (CPT 62015) SEDATION performed by Jose Abdi MD at [place] [id number]  07/20/2016 2008    KNEE SURGERY      left knee, arthroscopic    NERVE BLOCK Bilateral 09/22/2016    lumbar transforaminal nerve block #1    NERVE BLOCK  07/06/2020    lumbar epidural steroid injectio L4-5    NERVE BLOCK Bilateral 08/10/2020    L3, L4, L5     PAIN MANAGEMENT PROCEDURE N/A 7/6/2020    LUMBAR EPIDURAL STEROID INJECTION L4-5 performed by Russell Ryan MD at [address] [facility]      2008    UPPER GASTROINTESTINAL ENDOSCOPY  07/20/2016    egd and colonoscopy     Prior to Admission medications    Medication Sig Start Date End Date Taking? Authorizing Provider   ibuprofen (ADVIL;MOTRIN) 600 MG tablet Take 1 tablet by mouth 3 times daily as needed for Pain (with food) 8/11/20 9/10/20 Yes Russell Ryan MD   gabapentin (NEURONTIN) 800 MG tablet Take 800 mg by mouth 2 times daily. Yes Historical Provider, MD   OXYGEN Inhale into the lungs Uses 2 liters at night   Yes Historical Provider, MD   mineral oil-hydrophilic petrolatum (HYDROPHOR) ointment Apply topically as needed.  3/5/20  Yes Andrew Del Real MD   albuterol sulfate  (90 Base) MCG/ACT inhaler Inhale 2 puffs into the lungs 4 times daily as needed for Wheezing 2/10/20  Yes Jesús De Los Santos DO   ipratropium-albuterol (DUONEB) 0.5-2.5 (3) MG/3ML SOLN nebulizer solution Inhale 3 mLs into the lungs every 4 hours 2/10/20  Yes Jesús De Los Santos DO   budesonide-formoterol (SYMBICORT) 160-4.5 MCG/ACT AERO Inhale 2 puffs into the lungs 2 times daily 2/7/20  Yes Saturnino Croft,    aspirin 81 MG tablet Take 1 tablet by mouth daily 1/31/20  Yes Andrew Del Real MD   citalopram (CELEXA) 40 MG tablet Take 1 tablet by mouth daily 1/31/20  Yes Andrew Del Real MD   amitriptyline (ELAVIL) 50 MG tablet TAKE 1 TABLET BY MOUTH EVERY EVENING 1/31/20  Yes Andrew Del Real MD   metFORMIN (GLUCOPHAGE) 1000 MG tablet TAKE 1 TABLET BY MOUTH TWICE DAILY WITH MEALS 1/31/20  Yes Andrew Del Real MD   triamterene-hydrochlorothiazide (MAXZIDE-25) 37.5-25 MG per tablet TAKE 1 TABLET BY MOUTH EVERY DAY 1/31/20  Yes Andrew Del Real MD   calcium-vitamin D (CALCIUM 500/D) 500-200 MG-UNIT per tablet TAKE 1 TABLET BY MOUTH DAILY 1/31/20  Yes Andrew Del Real MD   baclofen (LIORESAL) 10 MG tablet TAKE 1/2 TABLET THREE TIMES DAILY AS NEEDED(PAIN, SPASMS) 20  Yes Ariana Farley MD   levothyroxine (SYNTHROID) 75 MCG tablet TAKE 1 TABLET BY MOUTH EVERY DAY 20  Yes Dustin Krueger DO   montelukast (SINGULAIR) 10 MG tablet TAKE 1 TABLET BY MOUTH EVERY NIGHT AT BEDTIME 20  Yes Suraj Vee MD   blood glucose monitor kit and supplies Test 1 times a day & as needed for symptoms of irregular blood glucose. [employer] [address]  Yes Florecita Rowan MD   blood glucose monitor strips Testing daily 19  Yes Ariana Farley MD   Lancets MISC Testing daily 19  Yes Ariana Farley MD   Misc.  Devices MISC Oxygen concentrator  j44.9 10/26/18  Yes Hair Lopez DO     Allergies   Allergen Reactions    Aceon [Perindopril Erbumine] Anaphylaxis     Tongue swells up    Nsaids Shortness Of Breath and Swelling     tongue     Social History     Socioeconomic History    Marital status: Single     Spouse name: Not on file    Number of children: Not on file    Years of education: Not on file    Highest education level: Not on file   Occupational History    Not on file   Social Needs    Financial resource strain: Very hard    Food insecurity     Worry: Often true     Inability: Sometimes true   [employer] needs     Medical: Not on file     Non-medical: Not on file   Tobacco Use    Smoking status: Former Smoker     Years: 50.00     Types: Cigarettes     Start date: 7/15/1971     Last attempt to quit: 2/10/2020     Years since quittin.5    Smokeless tobacco: Never Used   Substance and Sexual Activity    Alcohol use: No     Alcohol/week: 0.0 standard drinks    Drug use: No    Sexual activity: Never   Lifestyle    Physical activity     Days per week: Not on file     Minutes per session: Not on file    Stress: Not on file   Relationships    Social connections     Talks on phone: Not on file     Gets together: Not on file     Attends Orthodoxy service: Not on file     Active member of club or organization: Not on file     Attends meetings of clubs or organizations: Not on file     Relationship status: Not on file    Intimate partner violence     Fear of current or ex partner: Not on file     Emotionally abused: Not on file     Physically abused: Not on file     Forced sexual activity: Not on file   Other Topics Concern    Not on file   Social History Narrative    Not on file     Family History   Problem Relation Age of Onset    Heart Disease Mother 79    Diabetes Mother     Cancer Mother         Breast    Hypertension Father     Cancer Father         Pancreas    Diabetes Father     High Blood Pressure Father     Arthritis Brother     Diabetes Brother     Cancer Maternal Uncle     Cancer Paternal Aunt         breast    High Blood Pressure Paternal Aunt     High Blood Pressure Paternal Uncle     Arthritis Maternal Grandmother     Diabetes Maternal Grandmother     High Blood Pressure Maternal Grandmother     Arthritis Maternal Grandfather     Stroke Maternal Grandfather     High Cholesterol Paternal Grandmother     Kidney Disease Paternal Grandmother     Heart Disease Paternal Grandfather      REVIEW OF SYSTEMS:     Angelita denies fever/chills, chest pain, shortness of breath, new bowel or bladder complaints. All other review of systems was negative. PHYSICAL EXAMINATION:      /70   Pulse 96   Temp 96.6 °F (35.9 °C)   Resp 16     General:      General appearance:   pleasant and well-hydrated. , in mild  moderate discomfort and A & O x3  Build:Overweight    HEENT:    Head:normocephalic and atraumatic  Sclera: icterus absent,    Lungs:    Breathing:Breathing Pattern: regular, no distress    Abdomen:    Shape:non-distended and normal  Tenderness:none    Cervical spine:    Inspection:normal  Palpation:tenderness paravertebral muscles, facet loading, left, right and positive.   Range of motion:abnormal moderately flexion, extension rotation bilateral and is painful. Lumbar spine:    Spine inspection:normal   CVA tenderness:No   Palpation:tenderness paravertebral muscles, facet loading, left, right, positive and tenderness. Range of motion:abnormal moderately Lateral bending, flexion, extension rotation bilateral and is  painful. Musculoskeletal:    Trigger points in Paraveteral:absent bilaterally  SI joint tenderness:negative right, negative left              ZORAIDA test:not done right, not done  left  Piriformis tenderness:negative right, negative left  Trochanteric bursa tenderness:negative right, negative left  SLR:negative right, negative left, sitting     Extremities:    Tremors:None bilaterally upper and lower  Range of motion: pain with internal rotation of hips positive right   Intact:Yes  Edema:Normal    Neurological:    Sensory:normal to light touch bilateral upper and lower extremities  Motor:   Right Grip5/5              Left Grip5/5               Right Bicep5/5           Left Bicep5/5              Right Triceps5/5       Left Triceps5/5          Right Deltoid5/5     Left Deltoid5/5                  Right Quadriceps5/5          Left Quadriceps5/5           Right Gastrocnemius5/5    Left Gastrocnemius5/5  Right Ant Tibialis5/5  Left Ant Tibialis5/5  Reflexes:    Right Brachioradialis reflex2+  Left Brachioradialis reflex2+  Right Biceps reflex2+  Left Biceps reflex2+  Right Triceps reflex2+  Left Triceps reflex2+  Right Quadriceps reflex2+  Left Quadriceps reflex2+  Right Achilles reflex2+  Left Achilles reflex2+  Gait:normal    Dermatology:    Skin:no unusual rashes and no skin lesions    Impression:    Chronic neck and low back pain   Lumbar spine MRI 2016 L4-5 disc bulge with lateral stenosis and facet arthropathy at L5-S1  Patient had seen Morris Castillo in the past and had interventional procedures with her but it didn't last  Plan:  Follow up on her neck and low back pain. Results of cervical and right hip Xray were discussed with the patient.   Patient is s/p bilateral L3,4 MB and L5 DR block with 50% improvement in her pain for 4 days. Will consider repeating when her pain worsens. Will schedule patient for right hip injection. Patient had seen Beata Mehta for her neck pain and he recommended surgery if conservative measures fails(cervical epidurals). Reviewed his notes. Will start patient on Tylenol #3 QD/BID PRN. Continue with Gabapentin 400 mg QID. OARRS report reviewed 08/2020. Urine screen today. Patient encouraged to stay active and to lose weight. Treatment plan discussed with the patient including medications and procedure side effects. We discussed with the patient that combining opioids, benzodiazepines, alcohol, illicit drugs or sleep aids increases the risk of respiratory depression including death. We discussed that these medications may cause drowsiness, sedation or dizziness and have counseled the patient not to drive or operate machinery. We have discussed that these medications will be prescribed only by one provider. We have discussed with the patient about age related risk factors and have thoroughly discussed the importance of taking these medications as prescribed. The patient verbalizes understanding. ccreferring sylvester Crook M.D. On the basis of positive falls risk screening, assessment and plan is as follows: home safety tips provided.

## 2020-08-26 LAB
6AM URINE: <10 NG/ML
CODEINE, URINE: <20 NG/ML
HYDROCODONE, URINE: <20 NG/ML
HYDROMORPHONE, URINE: <20 NG/ML
Lab: NORMAL
MORPHINE URINE: <20 NG/ML
NORHYDROCODONE, URINE: <20 NG/ML
NOROXYCODONE, URINE: <20 NG/ML
NOROXYMORPHONE, URINE: <20 NG/ML
OXYCODONE, URINE CONFIRMATION: <20 NG/ML
OXYMORPHONE, URINE: <20 NG/ML
REPORT: NORMAL
THIS TEST SENT TO: NORMAL

## 2020-08-27 ENCOUNTER — HOSPITAL ENCOUNTER (OUTPATIENT)
Age: 65
Discharge: HOME | End: 2020-08-27
Payer: MEDICARE

## 2020-08-27 LAB
7-AMINOCLONAZEPAM, URINE: <5 NG/ML
ALPHA-HYDROXYALPRAZOLAM, URINE: <5 NG/ML
ALPHA-HYDROXYMIDAZOLAM, URINE: <20 NG/ML
ALPRAZOLAM, URINE: <5 NG/ML
CHLORDIAZEPOXIDE, URINE: <20 NG/ML
CLONAZEPAM, URINE: <5 NG/ML
DIAZEPAM, URINE: <20 NG/ML
LORAZEPAM, URINE: <20 NG/ML
MIDAZOLAM, URINE: <20 NG/ML
NORDIAZEPAM, URINE: <20 NG/ML
OXAZEPAM, URINE: <20 NG/ML
T4 TOTAL: 4.8 MCG/DL (ref 4.5–11.7)
TEMAZEPAM, URINE: <20 NG/ML
TSH SERPL DL<=0.05 MIU/L-ACNC: 8.15 UIU/ML (ref 0.27–4.2)

## 2020-08-27 PROCEDURE — 84436 ASSAY OF TOTAL THYROXINE: CPT

## 2020-08-27 PROCEDURE — 36415 COLL VENOUS BLD VENIPUNCTURE: CPT

## 2020-08-27 PROCEDURE — 84482 T3 REVERSE: CPT

## 2020-08-27 PROCEDURE — 84443 ASSAY THYROID STIM HORMONE: CPT

## 2020-09-01 LAB — T3 REVERSE: 8.3 NG/DL (ref 9–27)

## 2020-09-04 ENCOUNTER — HOSPITAL ENCOUNTER (OUTPATIENT)
Dept: INFUSION THERAPY | Age: 65
Discharge: HOME | End: 2020-09-04
Payer: MEDICARE

## 2020-09-04 ENCOUNTER — OFFICE VISIT (OUTPATIENT)
Dept: ONCOLOGY | Age: 65
End: 2020-09-04
Payer: MEDICARE

## 2020-09-04 VITALS
WEIGHT: 216 LBS | OXYGEN SATURATION: 92 % | TEMPERATURE: 98.2 F | HEART RATE: 93 BPM | SYSTOLIC BLOOD PRESSURE: 123 MMHG | BODY MASS INDEX: 34.72 KG/M2 | DIASTOLIC BLOOD PRESSURE: 72 MMHG | HEIGHT: 66 IN

## 2020-09-04 DIAGNOSIS — C90.00 MULTIPLE MYELOMA, REMISSION STATUS UNSPECIFIED (HCC): ICD-10-CM

## 2020-09-04 LAB
ALBUMIN SERPL-MCNC: 3.9 G/DL (ref 3.5–5.2)
ALP BLD-CCNC: 85 U/L (ref 35–104)
ALT SERPL-CCNC: 12 U/L (ref 0–32)
ANION GAP SERPL CALCULATED.3IONS-SCNC: 10 MMOL/L (ref 7–16)
AST SERPL-CCNC: 18 U/L (ref 0–31)
BASOPHILS ABSOLUTE: 0.03 E9/L (ref 0–0.2)
BASOPHILS RELATIVE PERCENT: 0.5 % (ref 0–2)
BILIRUB SERPL-MCNC: 0.2 MG/DL (ref 0–1.2)
BUN BLDV-MCNC: 12 MG/DL (ref 8–23)
CALCIUM SERPL-MCNC: 9.6 MG/DL (ref 8.6–10.2)
CHLORIDE BLD-SCNC: 96 MMOL/L (ref 98–107)
CO2: 32 MMOL/L (ref 22–29)
CREAT SERPL-MCNC: 0.7 MG/DL (ref 0.5–1)
EOSINOPHILS ABSOLUTE: 0.27 E9/L (ref 0.05–0.5)
EOSINOPHILS RELATIVE PERCENT: 4.2 % (ref 0–6)
GFR AFRICAN AMERICAN: >60
GFR NON-AFRICAN AMERICAN: >60 ML/MIN/1.73
GLUCOSE BLD-MCNC: 91 MG/DL (ref 74–99)
HCT VFR BLD CALC: 36.4 % (ref 34–48)
HEMOGLOBIN: 11.1 G/DL (ref 11.5–15.5)
IMMATURE GRANULOCYTES #: 0.01 E9/L
IMMATURE GRANULOCYTES %: 0.2 % (ref 0–5)
LACTATE DEHYDROGENASE: 193 U/L (ref 135–214)
LYMPHOCYTES ABSOLUTE: 3.12 E9/L (ref 1.5–4)
LYMPHOCYTES RELATIVE PERCENT: 48.1 % (ref 20–42)
MCH RBC QN AUTO: 28.7 PG (ref 26–35)
MCHC RBC AUTO-ENTMCNC: 30.5 % (ref 32–34.5)
MCV RBC AUTO: 94.1 FL (ref 80–99.9)
MONOCYTES ABSOLUTE: 0.47 E9/L (ref 0.1–0.95)
MONOCYTES RELATIVE PERCENT: 7.2 % (ref 2–12)
NEUTROPHILS ABSOLUTE: 2.59 E9/L (ref 1.8–7.3)
NEUTROPHILS RELATIVE PERCENT: 39.8 % (ref 43–80)
PDW BLD-RTO: 13.2 FL (ref 11.5–15)
PLATELET # BLD: 357 E9/L (ref 130–450)
PMV BLD AUTO: 9.6 FL (ref 7–12)
POTASSIUM SERPL-SCNC: 4.1 MMOL/L (ref 3.5–5)
RBC # BLD: 3.87 E12/L (ref 3.5–5.5)
SODIUM BLD-SCNC: 138 MMOL/L (ref 132–146)
WBC # BLD: 6.5 E9/L (ref 4.5–11.5)

## 2020-09-04 PROCEDURE — 84165 PROTEIN E-PHORESIS SERUM: CPT

## 2020-09-04 PROCEDURE — 86334 IMMUNOFIX E-PHORESIS SERUM: CPT

## 2020-09-04 PROCEDURE — 83883 ASSAY NEPHELOMETRY NOT SPEC: CPT

## 2020-09-04 PROCEDURE — 82784 ASSAY IGA/IGD/IGG/IGM EACH: CPT

## 2020-09-04 PROCEDURE — 80053 COMPREHEN METABOLIC PANEL: CPT

## 2020-09-04 PROCEDURE — 99214 OFFICE O/P EST MOD 30 MIN: CPT | Performed by: INTERNAL MEDICINE

## 2020-09-04 PROCEDURE — 82232 ASSAY OF BETA-2 PROTEIN: CPT

## 2020-09-04 PROCEDURE — 83615 LACTATE (LD) (LDH) ENZYME: CPT

## 2020-09-04 PROCEDURE — 99213 OFFICE O/P EST LOW 20 MIN: CPT

## 2020-09-04 PROCEDURE — 85025 COMPLETE CBC W/AUTO DIFF WBC: CPT

## 2020-09-04 NOTE — PROGRESS NOTES
[address] MED ONCOLOGY  [address] [place] [id number]  Dept: [phone]  Attending Progress Note      Reason for The Referral:  Smoldering Multiple Myeloma. Referring Physician:  Julio Merritt MD    PCP:  Maribell Wang MD    History of Present Illness: The patient is a 80-year-old lady with a PMH significant for HTN, hyperlipidemia, DM, COPD, OA, depression, and fibromyalgia, who was diagnosed with IgG lambda MGUS in 2008, used to follow up with Dr. Jose Angel Godwin at [facility], last f/up with him was in 2012. Her most recent SPEP with immunofixation ha revealed monoclonal IgG lambda, M-spike 0.7 gm/dl  from 9/9/2016. The patient has been having pain in the low back radiating to the right lower extremity, she had an MRI of the L-spine done on 8/24/2016, revealed disc bulging L4-5, L5-S1, with mild narrowing of the neural foramina. She had a bone marrow Biopsy and aspirate done by IR on 11/28/2016. Bone marrow, left iliac, aspirate and core biopsy  Suboptimal specimen showing 15% plasma cells by immunohistochemistry,  consistent with plasma cell neoplasm, see comment. Comment:    The aspirate smear and clot section specimens are hemodilute  and aspicular.  Plasmacytosis is seen by immunohistochemistry for   on the core biopsy specimen only.  Flow cytometric analysis performed by  [facility] confirmed a lambda-restricted plasma cell neoplasm. See separate report for complete details ([id number]). Intradepartmental consultation is obtained. The patient returns for a follow-up visit, she has chronic joint pain, she has neck pain, was seen by Dr. Virgilio Montoya, he recommended epidurals and physical therapy. She had noticed hyperpigmentation of the skin. Review of Systems;  CONSTITUTIONAL: No fever, chills. Good appetite feels tired. ENMT: Eyes: No diplopia; Nose: No epistaxis. Mouth: No sore throat.    RESPIRATORY: No hemoptysis, chronic shortness of breath, cough. CARDIOVASCULAR: No chest pain, palpitations. GASTROINTESTINAL: No nausea/vomiting, abdominal pain, diarrhea/constipation. GENITOURINARY: No dysuria, urinary frequency, hematuria. MUSCULOSKELETAL: she has chronic back and joints pain. NEURO: No syncope, presyncope, headache.   Remainder:  ROS NEGATIVE    Past Medical History:      Diagnosis Date    Adrenal incidentaloma ([address]     Anesthesia complication     states possible bronchospasm post procedure, unsure of when    Anxiety     Arthritis     Asthma     Bladder incontinence     Cancer [facility]) Dx 2009    multiple Myeloma, in remission currently     Chronic back pain     COPD (chronic obstructive pulmonary disease) ([address]     on O2 2 liters  (uses with activity and at night to sleep)    Depression     Diabetes mellitus ([address]     PO meds only    Encounter for screening colonoscopy 07/2016    Fibromyalgia     GERD (gastroesophageal reflux disease)     Hyperlipidemia     Hypertension     Hypothyroidism     MGUS (monoclonal gammopathy of unknown significance)     Neuropathy     Obesity     Pelvic pain     Pneumonia 02/2020    Prolonged emergence from general anesthesia     Sleep apnea     doesnt use her cpap    Thyroid disease     Hyperthyroidism  also has nodules    Tobacco abuse     Type II or unspecified type diabetes mellitus without mention of complication, not stated as uncontrolled      Patient Active Problem List   Diagnosis    Adrenal incidentaloma ([address]    Diabetes mellitus ([address]    Hyperlipidemia    Hypothyroidism    Fibromyalgia    Obesity    Hypertension, uncontrolled    Chronic right-sided low back pain with right-sided sciatica    Tobacco abuse    Myofascial pain    Lumbar radiculitis    Smoldering multiple myeloma ([address]    Chronic obstructive pulmonary disease (HCC)    Type 2 diabetes mellitus without complication, with long-term current use of insulin (HCC)    Severe sepsis ([address]    Hypovolemic [facility]    Community acquired pneumonia    Acute diverticulitis    Hypomagnesemia    Hypokalemia    Lactic acidosis    Hypophosphatemia    Cervical facet joint syndrome    Cervical spondylosis    Lumbar facet arthropathy    Lumbar radiculopathy    Pain in right hip    Lumbar disc disorder    Lumbar spondylosis        Past Surgical History:      Procedure Laterality Date    ANESTHESIA NERVE BLOCK Bilateral 8/10/2020    BILATERAL L3 L4 MEDIAL BRANCH L5 DORSSAL RAMUS NERVE BLOCK (CPT 49074) SEDATION performed by Gricelda Bosch MD at [place] [address]  07/20/2016 2008    KNEE SURGERY      left knee, arthroscopic    NERVE BLOCK Bilateral 09/22/2016    lumbar transforaminal nerve block #1    NERVE BLOCK  07/06/2020    lumbar epidural steroid injectio L4-5    NERVE BLOCK Bilateral 08/10/2020    L3, L4, L5     PAIN MANAGEMENT PROCEDURE N/A 7/6/2020    LUMBAR EPIDURAL STEROID INJECTION L4-5 performed by Gricelda Bosch MD at [facility]      2008    UPPER GASTROINTESTINAL ENDOSCOPY  07/20/2016    egd and colonoscopy       Family History:  Family History   Problem Relation Age of Onset    Heart Disease Mother 79    Diabetes Mother     Cancer Mother         Breast    Hypertension Father     Cancer Father         Pancreas    Diabetes Father     High Blood Pressure Father     Arthritis Brother     Diabetes Brother     Cancer Maternal Uncle     Cancer Paternal Aunt         breast    High Blood Pressure Paternal Aunt     High Blood Pressure Paternal Uncle     Arthritis Maternal Grandmother     Diabetes Maternal Grandmother     High Blood Pressure Maternal Grandmother     Arthritis Maternal Grandfather     Stroke Maternal Grandfather     High Cholesterol Paternal Grandmother     Kidney Disease Paternal Grandmother     Heart Disease Paternal Grandfather        Medications:  Reviewed and reconciled.     Social History:  Social History     Socioeconomic History    Marital status: Single     Spouse name: Not on file    Number of children: Not on file    Years of education: Not on file    Highest education level: Not on file   Occupational History    Not on file   Social Needs    Financial resource strain: Very hard    Food insecurity     Worry: Often true     Inability: Sometimes true   [employer] needs     Medical: Not on file     Non-medical: Not on file   Tobacco Use    Smoking status: Former Smoker     Years: 50.00     Types: Cigarettes     Start date: 7/15/1971     Last attempt to quit: 2/10/2020     Years since quittin.5    Smokeless tobacco: Never Used   Substance and Sexual Activity    Alcohol use: No     Alcohol/week: 0.0 standard drinks    Drug use: No    Sexual activity: Never   Lifestyle    Physical activity     Days per week: Not on file     Minutes per session: Not on file    Stress: Not on file   Relationships    Social connections     Talks on phone: Not on file     Gets together: Not on file     Attends [employer] service: Not on file     Active member of club or organization: Not on file     Attends meetings of clubs or organizations: Not on file     Relationship status: Not on file    Intimate partner violence     Fear of current or ex partner: Not on file     Emotionally abused: Not on file     Physically abused: Not on file     Forced sexual activity: Not on file   Other Topics Concern    Not on file   Social History Narrative    Not on file       Allergies: Allergies   Allergen Reactions    Aceon [Perindopril Erbumine] Anaphylaxis     Tongue swells up    Nsaids Shortness Of Breath and Swelling     tongue       Physical Exam:  /72   Pulse 93   Temp 98.2 °F (36.8 °C)   Ht 5' 6\" (1.676 m)   Wt 216 lb (98 kg)   SpO2 92%   BMI 34.86 kg/m²   GENERAL: Alert, oriented x 3, not in acute distress. HEENT: PERRLA; EOMI. Oropharynx clear. NECK: Supple.  No palpable cervical or supraclavicular lymphadenopathy. LUNGS: Good air entry bilaterally. No wheezing, crackles or rhonchi. CARDIOVASCULAR: Regular rate. No murmurs, rubs or gallops. ABDOMEN: Soft. Non-tender, non-distended. Positive bowel sounds. SKIN: Hyper pigmentation of the skin of the face, several moles. EXTREMITIES: Mild lower leg edema. Bone Marrow biopsy and aspirate:  Bone marrow, left iliac, aspirate and core biopsy (Parts A and B):  Suboptimal specimen showing 15% plasma cells by immunohistochemistry,  consistent with plasma cell neoplasm, see comment. Comment:    The aspirate smear and clot section specimens are hemodilute  and aspicular.  Plasmacytosis is seen by immunohistochemistry for   on the core biopsy specimen only.  Flow cytometric analysis performed by  [facility] confirmed a lambda-restricted plasma cell neoplasm. See separate report for complete details ([id number]). Intradepartmental consultation is obtained. Impression/Plan:      The patient is a 66-year-old lady with a PMH significant for HTN, hyperlipidemia, DM, COPD, OA, depression, and fibromyalgia, who was diagnosed with IgG lambda MGUS in 2008, used to follow up with Dr. Jaime Maier at [facility], last f/up with him was in 2012. She did not have a bone marrow biopsy and aspirate done when she was diagnosed with MGUS. Her most recent SPEP with immunofixation ha revealed monoclonal IgG lambda, M-spike 0.7 gm/dl  from 9/9/2016, stable compared with the previous M-spike level. The patient does not have anemia, renal failure, hypercalcemia or lytic lesions on the lumbar spine MRI.  IgG had increased sine 2014, skeletal survey was ordered, and is negative for lytic lesions, she had a BM bx and aspirate done by IR on 11/28/2016, Clot and aspirate suboptimal, biopsy showing 15% plasma cells, Flow cytometry positive for a  lambda restricted plasma cell neoplasm, Cytogenetics revealing a normal female karyotype, MM FISH negative. The patient has 15% plasma cells in the bone marrow, no evidence of end organ damage, she has a smoldering multiple myeloma,risk of progression to MM, at a rate of 10 percent per year for the first five years, 3 percent per year for the next five years, and 1 to 2 percent per year for the following 10 years. The patient returns for a follow-up visit, she continues to follow with pain management. Blood work from 6/5/2020 was negative for progression to multiple myeloma, M spike was 1.2G/DL, overall stable, IgG was 2100, kappa 62.9, ratio is 2.89. Repeat labs were ordered today. Await results. She will be due for a repeat skeletal survey in March 2021. The spine MRI was negative for lytic lesions. Skin hyperpigmentation and moles, referral was placed to dermatology. RTC in 3 months. Thank you for allowing us to participate in the care of Mrs Rachelle Nieves.     Reinier Freitas MD   HEMATOLOGY/MEDICAL ONCOLOGY  [address] [place] [address] MED ONCOLOGY  [facility] [address]  [facility] [id number]  Dept: [phone]

## 2020-09-07 LAB
KAPPA FREE LIGHT CHAINS QNT: 66.13 MG/L (ref 3.3–19.4)
KAPPA/LAMBDA FREE LIGHT CHAIN RATIO: 2.37 (ref 0.26–1.65)
LAMBDA FREE LIGHT CHAINS QNT: 27.93 MG/L (ref 5.71–26.3)

## 2020-09-09 LAB
ALBUMIN SERPL-MCNC: 3.5 G/DL (ref 3.5–4.7)
ALPHA-1-GLOBULIN: 0.2 G/DL (ref 0.2–0.4)
ALPHA-2-GLOBULIN: 0.9 G/FL (ref 0.5–1)
BETA GLOBULIN: 1 G/DL (ref 0.8–1.3)
BETA-2 MICROGLOBULIN: 2 MG/L (ref 0.6–2.4)
ELECTROPHORESIS: ABNORMAL
GAMMA GLOBULIN: 2.7 G/DL (ref 0.7–1.6)
IGA: 120 MG/DL (ref 70–400)
IGG: 3100 MG/DL (ref 700–1600)
IGM: 32 MG/DL (ref 40–230)
IMMUNOFIXATION RESULT, SERUM: NORMAL
TOTAL PROTEIN: 8.3 G/DL (ref 6.4–8.3)

## 2020-09-09 RX ORDER — ESOMEPRAZOLE MAGNESIUM 40 MG/1
CAPSULE, DELAYED RELEASE ORAL
Qty: 90 CAPSULE | Refills: 0 | Status: SHIPPED
Start: 2020-09-09 | End: 2020-09-16

## 2020-09-09 RX ORDER — CITALOPRAM 40 MG/1
40 TABLET ORAL DAILY
Qty: 90 TABLET | Refills: 0 | Status: SHIPPED
Start: 2020-09-09 | End: 2020-12-01

## 2020-09-09 RX ORDER — TRIAMTERENE AND HYDROCHLOROTHIAZIDE 37.5; 25 MG/1; MG/1
TABLET ORAL
Qty: 90 TABLET | Refills: 0 | Status: SHIPPED
Start: 2020-09-09 | End: 2020-10-27 | Stop reason: SDUPTHER

## 2020-09-09 NOTE — PROGRESS NOTES
tinnitus, vertigo, nosebleed, nasal congestion, rhinorrhea, sore throat+, right neck mass  Respiratory: no cough, pleuritic chest pain, dyspnea, or wheezing  Cardiovascular: no chest pain, angina, dyspnea on exertion, orthopnea, PND, palpitations, or claudication  Gastrointestinal: no nausea, vomiting, heartburn, diarrhea, constipation, abdominal pain, hematochezia or melena  Genitourinary: no urinary urgency, frequency, dysuria, nocturia, hesitancy, or incontinence  Musculoskeletal: no arthritis, arthralgia, myalgia, weakness, or morning stiffness  Skin: no abnormal pigmentation, rash, itching, masses, hair or nail changes    No outpatient medications have been marked as taking for the 9/14/20 encounter (Appointment) with Kevin Braga MD.       I have reviewed all pertinent PMHx, PSHx, FamHx, SocialHx, medications, and allergies and updated history as appropriate. OBJECTIVE:    VS: There were no vitals taken for this visit. General appearance: Alert, Awake, Oriented times 3, no distress  ENT: no erythema noted, no palpable LN, right lateral >left neck mass noted non tender, no bruit noted  Lungs: Lungs clear to auscultation bilaterally. No rhonchi, crackles or wheezes  Heart: S1 S2  Regular rate and rhythm. No rub, murmur or gallop  Abdomen: Abdomen soft but obese, non-tender. non-distended BS normal. No masses, organomegaly, no guarding rebound or rigidity. Extremities: No edema, Peripheral pulses palpable 2/4    ASSESSMENT/PLAN:  There are no diagnoses linked to this encounter. Angelita was seen today for medication refill, diabetes, hypertension and referral - general.    Diagnoses and all orders for this visit:    Screening mammogram, encounter for  -     [facility] CAD SCREENING; Future    Vitamin D deficiency  -     calcium-vitamin D (CALCIUM 500/D) 500-200 MG-UNIT per tablet; TAKE 1 TABLET BY MOUTH DAILY  -     Vitamin D 25 Hydroxy;  Future    Fibromyalgia  -     amitriptyline (ELAVIL) 50 MG tablet; TAKE 1 TABLET BY MOUTH EVERY EVENING    Chronic obstructive pulmonary disease, unspecified COPD type ([facility]  -     montelukast (SINGULAIR) 10 MG tablet; TAKE 1 TABLET BY MOUTH EVERY NIGHT AT BEDTIME    Screening for osteoporosis  -     DEXA BONE DENSITY AXIAL SKELETON; Future    Encounter for screening colonoscopy       -last colonoscopy in 2016 showed tubular adenoma recommended repeat in 3 years  -     Larry Davidson MD, General Surgery, [facility]    Current mild episode of major depressive disorder, unspecified whether recurrent [facility])  -     [place] [facility] KIM Valdez, Counseling Services, [place] [facility] ([facility]) Clinics ONLY    Osteoarthritis of lumbar spine, unspecified spinal osteoarthritis complication status  -     [facility] - Physical Therapy, [facility]    Positive depression screening  -     Positive Screen for Clinical Depression with a Documented Follow-up Plan     Other orders  -     gabapentin (NEURONTIN) 400 MG capsule; Take 1 capsule by mouth 4 times daily for 30 days.      RTC in 7 week for lab and depression screening    I have reviewed my findings and recommendations with Giovanni Madera and Nigel Grady MD PGY-3  9/9/2020 12:17 AM

## 2020-09-11 ENCOUNTER — TELEPHONE (OUTPATIENT)
Dept: INTERNAL MEDICINE | Age: 65
End: 2020-09-11

## 2020-09-11 NOTE — TELEPHONE ENCOUNTER
Called pt and advised her that her levothyroxine dose has been changed to 88mcgs daily  And also reminded on her appt on Monday 09/14/2020

## 2020-09-14 ENCOUNTER — OFFICE VISIT (OUTPATIENT)
Dept: INTERNAL MEDICINE | Age: 65
End: 2020-09-14
Payer: MEDICARE

## 2020-09-14 ENCOUNTER — TELEPHONE (OUTPATIENT)
Dept: FAMILY MEDICINE CLINIC | Age: 65
End: 2020-09-14

## 2020-09-14 VITALS
RESPIRATION RATE: 16 BRPM | HEIGHT: 66 IN | DIASTOLIC BLOOD PRESSURE: 89 MMHG | SYSTOLIC BLOOD PRESSURE: 128 MMHG | WEIGHT: 216 LBS | TEMPERATURE: 98.4 F | BODY MASS INDEX: 34.72 KG/M2 | HEART RATE: 95 BPM

## 2020-09-14 PROCEDURE — G8431 POS CLIN DEPRES SCRN F/U DOC: HCPCS | Performed by: INTERNAL MEDICINE

## 2020-09-14 PROCEDURE — G0444 DEPRESSION SCREEN ANNUAL: HCPCS | Performed by: INTERNAL MEDICINE

## 2020-09-14 PROCEDURE — 99213 OFFICE O/P EST LOW 20 MIN: CPT | Performed by: INTERNAL MEDICINE

## 2020-09-14 PROCEDURE — 99203 OFFICE O/P NEW LOW 30 MIN: CPT | Performed by: INTERNAL MEDICINE

## 2020-09-14 RX ORDER — AMITRIPTYLINE HYDROCHLORIDE 50 MG/1
TABLET, FILM COATED ORAL
Qty: 30 TABLET | Refills: 2 | Status: SHIPPED
Start: 2020-09-14 | End: 2021-04-02 | Stop reason: SDUPTHER

## 2020-09-14 RX ORDER — GABAPENTIN 400 MG/1
400 CAPSULE ORAL 4 TIMES DAILY
Qty: 120 CAPSULE | Refills: 0 | Status: SHIPPED | OUTPATIENT
Start: 2020-09-14 | End: 2020-10-27 | Stop reason: SDUPTHER

## 2020-09-14 RX ORDER — MONTELUKAST SODIUM 10 MG/1
TABLET ORAL
Qty: 90 TABLET | Refills: 1 | Status: SHIPPED
Start: 2020-09-14 | End: 2020-10-27 | Stop reason: SDUPTHER

## 2020-09-14 RX ORDER — IBUPROFEN 200 MG
CAPSULE ORAL
Qty: 30 TABLET | Refills: 2 | Status: SHIPPED
Start: 2020-09-14 | End: 2020-10-27 | Stop reason: SDUPTHER

## 2020-09-14 SDOH — ECONOMIC STABILITY: TRANSPORTATION INSECURITY
IN THE PAST 12 MONTHS, HAS THE LACK OF TRANSPORTATION KEPT YOU FROM MEDICAL APPOINTMENTS OR FROM GETTING MEDICATIONS?: NO

## 2020-09-14 SDOH — ECONOMIC STABILITY: TRANSPORTATION INSECURITY
IN THE PAST 12 MONTHS, HAS LACK OF TRANSPORTATION KEPT YOU FROM MEETINGS, WORK, OR FROM GETTING THINGS NEEDED FOR DAILY LIVING?: NO

## 2020-09-14 ASSESSMENT — PATIENT HEALTH QUESTIONNAIRE - PHQ9
SUM OF ALL RESPONSES TO PHQ QUESTIONS 1-9: 13
5. POOR APPETITE OR OVEREATING: 1
8. MOVING OR SPEAKING SO SLOWLY THAT OTHER PEOPLE COULD HAVE NOTICED. OR THE OPPOSITE, BEING SO FIGETY OR RESTLESS THAT YOU HAVE BEEN MOVING AROUND A LOT MORE THAN USUAL: 0
SUM OF ALL RESPONSES TO PHQ9 QUESTIONS 1 & 2: 4
4. FEELING TIRED OR HAVING LITTLE ENERGY: 2
1. LITTLE INTEREST OR PLEASURE IN DOING THINGS: 2
3. TROUBLE FALLING OR STAYING ASLEEP: 1
2. FEELING DOWN, DEPRESSED OR HOPELESS: 2
7. TROUBLE CONCENTRATING ON THINGS, SUCH AS READING THE NEWSPAPER OR WATCHING TELEVISION: 3
10. IF YOU CHECKED OFF ANY PROBLEMS, HOW DIFFICULT HAVE THESE PROBLEMS MADE IT FOR YOU TO DO YOUR WORK, TAKE CARE OF THINGS AT HOME, OR GET ALONG WITH OTHER PEOPLE: 1
6. FEELING BAD ABOUT YOURSELF - OR THAT YOU ARE A FAILURE OR HAVE LET YOURSELF OR YOUR FAMILY DOWN: 2
9. THOUGHTS THAT YOU WOULD BE BETTER OFF DEAD, OR OF HURTING YOURSELF: 0

## 2020-09-14 NOTE — PROGRESS NOTES
[facility]  Internal Medicine [facility]     Attending Physician Statement  I have discussed the case, including pertinent history and exam findings with the resident. I have seen and examined the patient and the key elements of the encounter have been performed by me. I agree with the assessment, plan and orders as documented by the resident. I have reviewed all pertinent PMHx, PSHx, FamHx, SocialHx, medications, and allergies and updated history as appropriate. Patient here for routine follow up of medical problems. Last seen in clinic Feb 2020.  1. Lumbar radiculopathy with sciatica and cervical stenosis. Gabapentin for sciatica. Following with pain management and neurosurgery. For PT. 2. Hypothyroidism, synthroid recently increase. Follow up TSH. For US thyroid to evaluate possible thyroid nodule (ordered by ENT). 3. Essential HTN, controlled  4. COPD, following with Pulm  5. Elevated PHQ 9 - LISW follow up (last seen 4/14/20)  6. Vit D deficiency, repeat levels  7. Smoldering MM, following with HemOnc  8. Breast cancer screening  9. Colon cancer screening - multiple tubular adenoma in 2016, advised to repeat in 3 years. 10. Osteoporosis screening    Remainder of medical problems per resident's note. Mayra Frost MD  9/14/2020 11:23 AM

## 2020-09-14 NOTE — PROGRESS NOTES
Discharge instructions reviewed with patient per Rocio Rosas. Follow up appt scheduled and AVS given to patient. Pt screened positive for SDOH related to financial strain and or food insecurity and declined further contact for assessment/resources.     Mammogram , dexa ordered  Referral for PT and colonoscopy

## 2020-09-14 NOTE — PATIENT INSTRUCTIONS
Will check TSH ,T4 in 6 week, do it prior to neck visit  Will refer to DEXA, colonoscopy,mammogram  Vitamin d in next visit  Bring ultrasound neck from ENT  Follow up in 7 week for virtual visit

## 2020-09-15 ENCOUNTER — TELEPHONE (OUTPATIENT)
Dept: INTERNAL MEDICINE | Age: 65
End: 2020-09-15

## 2020-09-16 ENCOUNTER — HOSPITAL ENCOUNTER (OUTPATIENT)
Age: 65
Discharge: HOME | End: 2020-09-18
Payer: MEDICARE

## 2020-09-16 ENCOUNTER — TELEPHONE (OUTPATIENT)
Dept: SURGERY | Age: 65
End: 2020-09-16

## 2020-09-16 PROCEDURE — U0003 INFECTIOUS AGENT DETECTION BY NUCLEIC ACID (DNA OR RNA); SEVERE ACUTE RESPIRATORY SYNDROME CORONAVIRUS 2 (SARS-COV-2) (CORONAVIRUS DISEASE [COVID-19]), AMPLIFIED PROBE TECHNIQUE, MAKING USE OF HIGH THROUGHPUT TECHNOLOGIES AS DESCRIBED BY CMS-2020-01-R: HCPCS

## 2020-09-16 NOTE — TELEPHONE ENCOUNTER
First attempt, Left message to schedule pt with first avoailable at any location for a colonoscopy consult.   Electronically signed by Colette Valente on 9/16/20 at 10:18 AM EDT

## 2020-09-18 ENCOUNTER — HOSPITAL ENCOUNTER (OUTPATIENT)
Dept: PHYSICAL THERAPY | Age: 65
Setting detail: THERAPIES SERIES
Discharge: HOME | End: 2020-09-18
Payer: MEDICARE

## 2020-09-18 ENCOUNTER — OFFICE VISIT (OUTPATIENT)
Dept: PAIN MANAGEMENT | Age: 65
End: 2020-09-18
Payer: MEDICARE

## 2020-09-18 ENCOUNTER — HOSPITAL ENCOUNTER (OUTPATIENT)
Age: 65
Discharge: HOME | End: 2020-09-20
Payer: MEDICARE

## 2020-09-18 ENCOUNTER — TELEPHONE (OUTPATIENT)
Dept: SURGERY | Age: 65
End: 2020-09-18

## 2020-09-18 VITALS
TEMPERATURE: 97.8 F | RESPIRATION RATE: 16 BRPM | HEART RATE: 93 BPM | HEIGHT: 66 IN | BODY MASS INDEX: 32.14 KG/M2 | OXYGEN SATURATION: 94 % | SYSTOLIC BLOOD PRESSURE: 150 MMHG | DIASTOLIC BLOOD PRESSURE: 80 MMHG | WEIGHT: 200 LBS

## 2020-09-18 LAB
SARS-COV-2: NOT DETECTED
SOURCE: NORMAL
T4 FREE: 0.89 NG/DL (ref 0.93–1.7)
TSH SERPL DL<=0.05 MIU/L-ACNC: 14.1 UIU/ML (ref 0.27–4.2)

## 2020-09-18 PROCEDURE — 99213 OFFICE O/P EST LOW 20 MIN: CPT | Performed by: PAIN MEDICINE

## 2020-09-18 PROCEDURE — 84443 ASSAY THYROID STIM HORMONE: CPT

## 2020-09-18 PROCEDURE — 97162 PT EVAL MOD COMPLEX 30 MIN: CPT

## 2020-09-18 PROCEDURE — 36415 COLL VENOUS BLD VENIPUNCTURE: CPT

## 2020-09-18 PROCEDURE — 84439 ASSAY OF FREE THYROXINE: CPT

## 2020-09-18 RX ORDER — ACETAMINOPHEN AND CODEINE PHOSPHATE 300; 30 MG/1; MG/1
1 TABLET ORAL 2 TIMES DAILY PRN
Qty: 60 TABLET | Refills: 0 | Status: SHIPPED | OUTPATIENT
Start: 2020-09-18 | End: 2020-10-18

## 2020-09-18 NOTE — PROGRESS NOTES
[facility]                Phone: [phone]   Fax: [phone]    Physical Therapy Initial Evaluation  Date:  2020    Patient Name:  Adam Gardner    :  1955  MRN: [id number]    Referring Physician:  Stephy Patton MD  Insurance Information:  BCBS Medicare     Evaluation date:  2020  Diagnosis:  OA of lumbar spine; chronic LBP  Cert Dates:  383 - 2020  ICD-10 Codes:  M54.9, R29.3  Evaluating Physical Therapist:  Duncan Adair, PT, DPT      [facility] [place] [address] Lumbar Spine Assessment    Work:  Mechanical stresses: Pt has not worked since . Pt worked as a unit clerk in the hospital.  VAS Score (0-10): 10/10; goes down to 7/10    HISTORY  Present symptoms: sharp pain across LB and down R LE to her calf  Present since: 3-5 months      [] improving  [] unchanging  [] worsening  Commenced as a result of: lifting groceries   [] no apparent reason  Symptoms at onset: [x] back  [x] thigh [x] leg   Constant symptoms: [] back  [x] thigh [x] leg   Intermittent symptoms: [x] back  [] thigh [] leg     Worse: [x] bending  [] sitting/rising [x] standing  [x] walking  [] lying  [] am  [x] as the day progresses [] pm  [] when still  [] on the move  [] other:      Better: [] bending  [x] sitting/ [] standing  [] walking  [x] lying  [x] am  [] as the day progresses [] pm  [] when still  [x] on the move  [] other:      Disturbed sleep: yes    Sleeping posture: side lying R/L      Previous episodes: 11+  Year of first episode:   Previous treatments: Pt stated she has had 2 or 3 lumbar spine injections, most recent one was in October. Pt is scheduled for R hip injection 2020. SPECIFIC QUESTIONS  cough/sneeze/strain/+ve/-ve  Bladder: normal  Gait: Pt ambulated throughout PT clinic without AD independently. Pt demonstrated slow gait speed, antalgic gait pattern, and flexed posture.   Medications: NSAIDS  Imagin2020 lumbar spine CT  No evidence of myeloma.         Degenerative changes in the thoracic spine resulting in mild to    moderate canal and mild to moderate bilateral foraminal stenosis at    T9-T10. There         Grade 1 degenerative anterolisthesis of L4 on L5 and moderate    degenerative changes lower lumbar spine resulting in moderate canal    stenosis at L4-L5 and moderate to severe bilateral foraminal stenosis    L4-L5 and L5-S1 as described.         Stable bilateral adrenal nodules. Recent or major surgery: no   Night pain: no  Accidents: no     Unexplained weight loss: no  Other: NA      EXAMINATION    POSTURE  Sitting: fair-poor  Standing: fair   Lordosis: reduced      Lateral shift: nil  Relevant: NA  Correction of posture: no effect  Other observations: NA    NEUROLOGICAL  Motor deficit: B hips 3/5. R knee 3/5, ankle 4/5. L knee and ankle 4/5. Sensory deficit: Pt reported intermittent numbness/tingling in B feet. Per pt, she is diabetic. Reflexes: NT  Dural signs: NT    MOVEMENT LOSS   Chang Mod Min Nil Pain   Flexion    x No effect   Extension x    Increases    Side gliding R   x  Increases    Side gliding L    x No effect       TEST MOVEMENTS  (describe effects on present pain; During - produces, abolishes, increases, decreases, no effect, centralizing, peripheralizing;  After - better, worse, no better, no worse, no effect, centralized, peripheralized)      Symptoms during testing Symptoms after testing Increased ROM Decreased ROM No effect   Pretest symptoms in standing         FIS         Rep FIS         EIS         Rep EIS         Pretest symptoms in lying 5/10 LB, tingling in R LE        CARMEN         Rep CARMEN         EIL         Rep EIL ANNA 1x10 Increases  No worse      If required pretest symptoms         SGIS - R         Rep SGIS - R         SGIS - L         Rep SGIS - L           STATIC TESTS    Sitting slouched - NT    Sitting erect - NT    Standing slouched - NT  Standing erect - NT    Lying prone in

## 2020-09-18 NOTE — PROGRESS NOTES
[address] [facility], [address] [place] [address]  [phone]    Follow up Note      Taylor Donohue     Date of Visit:  9/18/2020    CC:  Patient presents for follow up   Chief Complaint   Patient presents with    Lower Back Pain       HPI:  Follow up on her neck/low back and right hip pain. Appropriate analgesia with current medications regimen:fair   Change in quality of symptoms:no. Medication side effects:none  Recent diagnostic testing:none  Recent interventional procedures:none    Imaging:   Cervical spine Xray 2020:  Findings consistent with degenerative disc disease and    degenerative facets disease      Cervical spine MRI 2020  Central spinal canal stenosis is present at C3-4. Posterior disc and   osteophyte complex with mass effect on the cord.       Foraminal stenosis is present bilaterally at C3-4, C4-5 and C5-6. Lumbar spine MRI 2016  1. Circumferential disc bulging is present at L4-5 causing mild   narrowing of the L4-5 neural foramina and mild bilateral lateral   recess stenosis.       2. Facet joint arthropathy and disc bulging is present at L5-S1   causing mild narrowing of the right L5-S1 neural foramen      Lumbar spine CT 2020  No evidence of myeloma.       Degenerative changes in the thoracic spine resulting in mild to   moderate canal and mild to moderate bilateral foraminal stenosis at   T9-T10. There       Grade 1 degenerative anterolisthesis of L4 on L5 and moderate   degenerative changes lower lumbar spine resulting in moderate canal   stenosis at L4-L5 and moderate to severe bilateral foraminal stenosis   L4-L5 and L5-S1 as described.       Stable bilateral adrenal nodules. Right hip Xray 2020  Moderately severe degenerative changes of the right hip    with severe superolateral joint space loss. Previous treatments: Physical Therapy, Epidural Steroid Injection and medications. .    Patient had seen Robina Perera in the past Potential Aberrant Drug-Related Behavior:    None    Urine Drug Screenin2020 showed no narcotics which is consistent.     OARRS report:  2020 consistent     Past Medical History:   Diagnosis Date    Adrenal incidentaloma [facility])     Anesthesia complication     states possible bronchospasm post procedure, unsure of when    Anxiety     Arthritis     Asthma     Bladder incontinence     Cancer [facility]) Dx 2009    multiple Myeloma, in remission currently     Chronic back pain     COPD (chronic obstructive pulmonary disease) ([address]     on O2 2 liters  (uses with activity and at night to sleep)    Depression     Diabetes mellitus ([address]     PO meds only    Encounter for screening colonoscopy 2016    Fibromyalgia     GERD (gastroesophageal reflux disease)     Hyperlipidemia     Hypertension     Hypothyroidism     MGUS (monoclonal gammopathy of unknown significance)     Neuropathy     Obesity     Pelvic pain     Pneumonia 2020    Prolonged emergence from general anesthesia     Sleep apnea     doesnt use her cpap    Thyroid disease     Hyperthyroidism  also has nodules    Tobacco abuse     Type II or unspecified type diabetes mellitus without mention of complication, not stated as uncontrolled      Past Surgical History:   Procedure Laterality Date    ANESTHESIA NERVE BLOCK Bilateral 8/10/2020    BILATERAL L3 L4 MEDIAL BRANCH L5 DORSSAL RAMUS NERVE BLOCK (CPT 63271) SEDATION performed by Leland Ghotra MD at [place] [address]  2016    KNEE SURGERY      left knee, arthroscopic    NERVE BLOCK Bilateral 2016    lumbar transforaminal nerve block #1    NERVE BLOCK  2020    lumbar epidural steroid injectio L4-5    NERVE BLOCK Bilateral 08/10/2020    L3, L4, L5     PAIN MANAGEMENT PROCEDURE N/A 2020    LUMBAR EPIDURAL STEROID INJECTION L4-5 performed by Leland Ghotra MD at [address] [place] [address]          UPPER GASTROINTESTINAL ENDOSCOPY  07/20/2016    egd and colonoscopy     Prior to Admission medications    Medication Sig Start Date End Date Taking? Authorizing Provider   calcium-vitamin D (CALCIUM 500/D) 500-200 MG-UNIT per tablet TAKE 1 TABLET BY MOUTH DAILY 9/14/20  Yes Troy Harvey MD   amitriptyline (ELAVIL) 50 MG tablet TAKE 1 TABLET BY MOUTH EVERY EVENING 9/14/20  Yes Troy Harvey MD   montelukast (SINGULAIR) 10 MG tablet TAKE 1 TABLET BY MOUTH EVERY NIGHT AT BEDTIME 9/14/20  Yes Troy Harvey MD   gabapentin (NEURONTIN) 400 MG capsule Take 1 capsule by mouth 4 times daily for 30 days. 9/14/20 10/14/20 Yes Troy Harvey MD   levothyroxine (SYNTHROID) 88 MCG tablet Take 1 tablet by mouth Daily 9/10/20  Yes Elaine Hdez, DO   metFORMIN (GLUCOPHAGE) 1000 MG tablet TAKE 1 TABLET BY MOUTH TWICE DAILY WITH MEALS 9/10/20  Yes Elaine Hdez, DO   citalopram (CELEXA) 40 MG tablet Take 1 tablet by mouth daily 9/9/20  Yes Elaine Hdez, DO   triamterene-hydroCHLOROthiazide (MAXZIDE-25) 37.5-25 MG per tablet TAKE 1 TABLET BY MOUTH EVERY DAY 9/9/20  Yes Dustin Krueger, DO   acetaminophen-codeine (TYLENOL/CODEINE #3) 300-30 MG per tablet Take 1 tablet by mouth 2 times daily as needed for Pain for up to 30 days. 8/21/20 9/20/20 Yes Reji Bowen MD   OXYGEN Inhale into the lungs Uses 2 liters at night   Yes Historical Provider, MD   mineral oil-hydrophilic petrolatum (HYDROPHOR) ointment Apply topically as needed.  3/5/20  Yes So Webb MD   albuterol sulfate  (90 Base) MCG/ACT inhaler Inhale 2 puffs into the lungs 4 times daily as needed for Wheezing 2/10/20  Yes Jesús Hinojosa Ear, DO   ipratropium-albuterol (DUONEB) 0.5-2.5 (3) MG/3ML SOLN nebulizer solution Inhale 3 mLs into the lungs every 4 hours 2/10/20  Yes Jesús Gonzalez, DO   budesonide-formoterol (SYMBICORT) 160-4.5 MCG/ACT AERO Inhale 2 puffs into the lungs 2 times daily 2/7/20  Yes Carey Najera Pati,    aspirin 81 MG tablet Take 1 tablet by mouth daily 20  Yes Licha Gilbert MD   baclofen (LIORESAL) 10 MG tablet TAKE 1/2 TABLET THREE TIMES DAILY AS NEEDED(PAIN, SPASMS) 20  Yes Licha Gilbert MD   blood glucose monitor kit and supplies Test 1 times a day & as needed for symptoms of irregular blood glucose. Accuc[address]k [address]  Yes Annette Aden MD   blood glucose monitor strips Testing daily 19  Yes Licha Gilbert MD   Lancets MISC Testing daily 19  Yes Licha Gilbert MD   Misc.  Devices MISC Oxygen concentrator  j44.9 10/26/18  Yes Hair Lopez DO   ibuprofen (ADVIL;MOTRIN) 600 MG tablet Take 1 tablet by mouth 3 times daily as needed for Pain (with food) 20  Sandor Charles MD     Allergies   Allergen Reactions   David Peppers [Perindopril Erbumine] Anaphylaxis     Tongue swells up    Nsaids Shortness Of Breath and Swelling     tongue     Social History     Socioeconomic History    Marital status: Single     Spouse name: Not on file    Number of children: Not on file    Years of education: Not on file    Highest education level: Not on file   Occupational History    Not on file   Social Needs    Financial resource strain: Very hard    Food insecurity     Worry: Often true     Inability: Sometimes true    Transportation needs     Medical: No     Non-medical: No   Tobacco Use    Smoking status: Former Smoker     Years: 50.00     Types: Cigarettes     Start date: 7/15/1971     Last attempt to quit: 2/10/2020     Years since quittin.6    Smokeless tobacco: Never Used   Substance and Sexual Activity    Alcohol use: No     Alcohol/week: 0.0 standard drinks    Drug use: No    Sexual activity: Never   Lifestyle    Physical activity     Days per week: Not on file     Minutes per session: Not on file    Stress: Not on file   Relationships    Social connections     Talks on phone: Not on file     Gets together: Not on file Attends Rastafarian service: Not on file     Active member of club or organization: Not on file     Attends meetings of clubs or organizations: Not on file     Relationship status: Not on file    Intimate partner violence     Fear of current or ex partner: Not on file     Emotionally abused: Not on file     Physically abused: Not on file     Forced sexual activity: Not on file   Other Topics Concern    Not on file   Social History Narrative    Not on file     Family History   Problem Relation Age of Onset    Heart Disease Mother 79    Diabetes Mother     Cancer Mother         Breast    Hypertension Father     Cancer Father         Pancreas    Diabetes Father     High Blood Pressure Father     Arthritis Brother     Diabetes Brother     Cancer Maternal Uncle     Cancer Paternal Aunt         breast    High Blood Pressure Paternal Aunt     High Blood Pressure Paternal Uncle     Arthritis Maternal Grandmother     Diabetes Maternal Grandmother     High Blood Pressure Maternal Grandmother     Arthritis Maternal Grandfather     Stroke Maternal Grandfather     High Cholesterol Paternal Grandmother     Kidney Disease Paternal Grandmother     Heart Disease Paternal Grandfather      REVIEW OF SYSTEMS:     Angelita denies fever/chills, chest pain, shortness of breath, new bowel or bladder complaints. All other review of systems was negative. PHYSICAL EXAMINATION:      BP (!) 150/80   Pulse 93   Temp 97.8 °F (36.6 °C) (Infrared)   Resp 16   Ht 5' 6\" (1.676 m)   Wt 200 lb (90.7 kg)   SpO2 94%   BMI 32.28 kg/m²     General:      General appearance:   pleasant and well-hydrated.    , in mild  moderate discomfort and A & O x3  Build:Overweight    HEENT:    Head:normocephalic and atraumatic  Sclera: icterus absent,    Lungs:    Breathing:Breathing Pattern: regular, no distress    Abdomen:    Shape:non-distended and normal  Tenderness:none    Lumbar spine:    Spine inspection:normal   CVA tenderness:No Palpation:tenderness paravertebral muscles, facet loading, left, right, positive and tenderness. Range of motion:abnormal moderately Lateral bending, flexion, extension rotation bilateral and is  painful. Musculoskeletal:    Trigger points in Paraveteral:absent bilaterally  SI joint tenderness:negative right, negative left  SLR:negative right, negative left, sitting     Extremities:    Tremors:None bilaterally upper and lower  Range of motion: pain with internal rotation of hips positive right   Intact:Yes  Edema:Normal    Neurological:    Sensory:normal to light touch bilateral upper and lower extremities  Motor:                 Right Quadriceps5/5          Left Quadriceps5/5           Right Gastrocnemius5/5    Left Gastrocnemius5/5  Right Ant Tibialis5/5  Left Ant Tibialis5/5  Reflexes:    Right Quadriceps reflex2+  Left Quadriceps reflex2+  Right Achilles reflex2+  Left Achilles reflex2+  Gait:normal    Dermatology:    Skin:no unusual rashes and no skin lesions    Impression:    Chronic neck and low back pain   Lumbar spine MRI 2016 L4-5 disc bulge with lateral stenosis and facet arthropathy at L5-S1  Patient had seen Rebekah Hunter in the past and had interventional procedures with her but it didn't last  Patient had good outcome with diagnostic bilateral lumbar facet MBB x 1. Will repeat when her pain worsens. Patient had seen Beata Mehta for her neck pain and he recommended surgery if conservative measures fails(cervical epidurals). Plan:  Follow up on her neck/low back and right hip pain. Patient is scheduled for right hip injection. Will consider referral to  if her pain is not improving. Continue with Tylenol #3 BID PRN. Continue with Gabapentin 400 mg QID. OARRS report reviewed 09/2020. Reviewed last office visit urine screen. Patient encouraged to stay active and to lose weight. Treatment plan discussed with the patient including medications and procedure side effects.     We discussed with the patient that combining opioids, benzodiazepines, alcohol, illicit drugs or sleep aids increases the risk of respiratory depression including death. We discussed that these medications may cause drowsiness, sedation or dizziness and have counseled the patient not to drive or operate machinery. We have discussed that these medications will be prescribed only by one provider. We have discussed with the patient about age related risk factors and have thoroughly discussed the importance of taking these medications as prescribed. The patient verbalizes understanding. ccreferring sylvester Urena M.D. On the basis of positive falls risk screening, assessment and plan is as follows: home safety tips provided.

## 2020-09-18 NOTE — TELEPHONE ENCOUNTER
Second attempt, Left message to schedule pt with first available at any location for a colonoscopy consult.   Electronically signed by Kathy Russell on 9/18/20 at 11:05 AM EDT

## 2020-09-18 NOTE — PROGRESS NOTES
Do you currently have any of the following:    Fever: No  Headache:  No  Cough: No  Shortness of breath: No  Exposed to anyone with these symptoms: Mima                                                                                                                Robert Camara presents to the [address] [place] [address] on 9/18/2020. Angelita is complaining of pain in her lower back. . The pain is constant. The pain is described as aching, throbbing, stabbing and sharp. Pain is rated on her best day at a 8, on her worst day at a 10, and on average at a 9 on the VAS scale. She took her last dose of Tylenol with codeine this AM. Angelita does not have issues with constipation. Any procedures since your last visit: No,      She is  on NSAIDS and  is  on anticoagulation medications to include ASA and is managed by Anne Marie Newsome MD  .     Pacemaker or defibrilator: No Physician managing device is NA.       BP (!) 150/80   Pulse 93   Temp 97.8 °F (36.6 °C) (Infrared)   Resp 16   Ht 5' 6\" (1.676 m)   Wt 200 lb (90.7 kg)   SpO2 94%   BMI 32.28 kg/m²      No LMP recorded.  Patient is postmenopausal.

## 2020-09-18 NOTE — PROGRESS NOTES
[address] [facility]                Phone: [phone]   Fax: [phone]    Physical Therapy Daily Treatment Note  Date:  2020    Patient Name:  Ian Pagan    :  1955  MRN: [id number]    Referring Physician:  Ren Kidd MD  Insurance Information:  [facility] Medicare      Evaluation date:  2020  Diagnosis:  OA of lumbar spine; chronic LBP  Cert Dates:  3/25/4808 - 2020  ICD-10 Codes:  M54.9, R29.3  Evaluating Physical Therapist:  Susannah Bloes PT, DPT        Visit:       [facility] RE-ASSESSMENT FORM      Check of Management Strategies:     Compliance / Commitment  [] Excellent   [] Good   [] Fair   [] Poor    Posture Correction: []Yes   [] No    Performing Exercises:  []Yes   [] No    Frequency: [] Appropriate [] Not appropriate                        Symptom Response during  exercises:  [] Increase   [] Decrease   [] No  effect     Technique: [] Good  [] Needs correcting    Comments:        Symptomatic Presentation:    Pain Location: [] Centralised     [] Same    [] Peripheralized               Description:      Frequency: []Better    []Same   []Worse    Severity: /10         Functional Status:  % improvement since initial assessment:  %    Exercises:     Exercise  During After  Comments    ANNA              Press-ups                ELADIO              Ambulation                                                          Mechanical Presentation:    Sitting Posture: []Good   []Fair  []Poor                  Standing Posture:  []Good   []Fair  []Poor      Deformity: [] Yes    [] No   [] Not applicable                  Neurological Testing:  [] Better    [] Same   [] Worse    [] NA     Movement Loss: [] Better    [] Same   [] Worse      Repeated Movements:   [] Better    [] Same   [] Worse          SUMMARY: [] Better    [] Same   [] Worse                    Classification Confirmed   []  Yes     [] No    Comments:      Revised Classification (if

## 2020-09-21 ENCOUNTER — TELEPHONE (OUTPATIENT)
Dept: SURGERY | Age: 65
End: 2020-09-21

## 2020-09-21 ENCOUNTER — TELEPHONE (OUTPATIENT)
Dept: FAMILY MEDICINE CLINIC | Age: 65
End: 2020-09-21

## 2020-09-21 NOTE — TELEPHONE ENCOUNTER
SW again called patient to initiate scheduling for counseling per workque. Patient answered and said she was driving. Requested SW call her back in 20 minutes. SW called patient in twenty minutes but no answer.  LVM again

## 2020-09-21 NOTE — TELEPHONE ENCOUNTER
Patient was referred by Dr. Ang Lan for colonoscopy consult. Patient was scheduled on 10/15/20 in [place] office @ 12:45 pm with Dr. Serina Tavera. Patient was instructed to bring a photo ID, insurance card (if applicable), and list of any current medications. Patient verbalized understanding of appointment instructions.           Electronically signed by Ayana Recinos on 9/21/20 at 10:29 AM EDT

## 2020-09-22 ENCOUNTER — TELEPHONE (OUTPATIENT)
Dept: INTERNAL MEDICINE | Age: 65
End: 2020-09-22

## 2020-09-25 ENCOUNTER — HOSPITAL ENCOUNTER (OUTPATIENT)
Dept: PHYSICAL THERAPY | Age: 65
Setting detail: THERAPIES SERIES
Discharge: HOME | End: 2020-09-25
Payer: MEDICARE

## 2020-09-25 ENCOUNTER — TELEPHONE (OUTPATIENT)
Dept: FAMILY MEDICINE CLINIC | Age: 65
End: 2020-09-25

## 2020-09-25 PROCEDURE — 97530 THERAPEUTIC ACTIVITIES: CPT

## 2020-09-25 PROCEDURE — 97110 THERAPEUTIC EXERCISES: CPT

## 2020-09-25 NOTE — PROGRESS NOTES
[address] [facility]                Phone: [phone]   Fax: [phone]    Physical Therapy Daily Treatment Note  Date:  2020    Patient Name:  Raulito Abarca    :  1955  MRN: [id number]    Referring Physician:  Isabel Vasquez MD  Insurance Information:  [employer] Medicare      Evaluation date:  2020  Diagnosis:  OA of lumbar spine; chronic LBP  Cert Dates:  2262 - 2020  ICD-10 Codes:  M54.9, R29.3  Evaluating Physical Therapist:  Anneliese Miles, PT, DPT        Visit: - (6 visits approved thru 2020)      [address] RE-ASSESSMENT FORM      Check of Management Strategies:     Compliance / Commitment  [] Excellent   [] Good   [x] Fair   [] Poor    Posture Correction: [x]Yes   [] No    Performing Exercises:  [x]Yes   [] No    Frequency: [] Appropriate [x] Not appropriate - 3x/day                        Symptom Response during  exercises:  [] Increase   [] Decrease   [] No  effect     Technique: [] Good  [x] Needs correcting    Comments:  When asked to perform ANNA, pt was lifting up her entire core off the table (question pt's compliance with HEP).           Symptomatic Presentation:    Pain Location: [] Centralised     [x] Same    [] Peripheralized               Description:  LB tightness and soreness, pain down to R knee    Frequency: []Better    [x]Same   []Worse    Severity: 8/10         Functional Status:  % improvement since initial assessment:  %    Exercises:     Exercise  During After  Comments    ANNA 2x10              Press-ups (4 pillows under chest) 1x10    With exhale 4x10    R LE figure 4 1x10       P    P       NW    W             ELADIO              Ambulation                                                          Mechanical Presentation:    Sitting Posture: []Good   [x]Fair  []Poor                  Standing Posture:  []Good   [x]Fair  []Poor      Deformity: [] Yes    [] No   [x] Not applicable                  Neurological Testing:  [] Better    [] Same   [] Worse    [x] NA     Movement Loss: [] Better    [x] Same   [] Worse      Repeated Movements:   [x] Better    [] Same   [] Worse          SUMMARY: [x] Better    [] Same   [] Worse                    Classification Confirmed   []  Yes     [] No    Comments:  Pt given multiple verbal and tactile cues for proper form with press-ups and to increase lumbar spine ROM. Pt reported production of R LE pain down to her knee and groin pain with press-ups initially. Pt did report pain dissipated quickly upon stopping exercises. Tried 1 set with R LE in figure 4 position and pt reported increased pain in R LE that took longer to go away. Following additional sets of press-ups with LE's straight, pt reported slight decrease in LBP to 7/10 and stated she only had intermittent cramping in R groin area. Pt educated on importance of compliance and consistency with HEP; pt verbalized understanding. Revised Classification (if appropriate):    [] Derangement   [] Dysfunction   [] Posture           [] OTHER (subgroup)    Management Today:   [x] Posture      [x] HEP instruction     [x] Exercise  9/18/2020 - posture/use of lumbar roll, avoid flexion, ANNA 3x10 every 3 hours (exercise instruction sheet administered)   9/25/2020 - press-ups 3x10 every 3 hours (exercise handout and instruction sheet administered)       Plan for next session:  Reassess pt's response to HEP and progress as indicated.         Barriers to Recovery:  Chronic LBP, R LE radicular symptoms (constant per pt)        CPT codes 9/25/2020 Units  Minutes   Low Complexity PT evaluation  34638     Moderate Complexity PT evaluation  02658     High Complexity PT evaluation 29358     PT Re-evaluation  77029     Gait training 41249     Manual therapy  05027     Therapeutic activities  38168 1    Therapeutic exercises  00821 2    Neuromuscular reeducation  07408 Time In:  1100  Time Out:  [facility] [place] [address] [facility]   [id number]

## 2020-09-25 NOTE — TELEPHONE ENCOUNTER
KIM again called patient to initiate scheduling for counseling. Patient is coming to [facility] on Tuesday for PT, scheduled counseling for immediately following around 12:00PM. Told patient to present to station G.

## 2020-09-28 ENCOUNTER — OFFICE VISIT (OUTPATIENT)
Dept: PULMONOLOGY | Age: 65
End: 2020-09-28
Payer: MEDICARE

## 2020-09-28 VITALS
BODY MASS INDEX: 32.14 KG/M2 | OXYGEN SATURATION: 95 % | WEIGHT: 200 LBS | DIASTOLIC BLOOD PRESSURE: 80 MMHG | SYSTOLIC BLOOD PRESSURE: 123 MMHG | TEMPERATURE: 98.4 F | HEIGHT: 66 IN | HEART RATE: 96 BPM | RESPIRATION RATE: 16 BRPM

## 2020-09-28 LAB
EXPIRATORY TIME: 7.53 SEC
FEF 25-75% %PRED-PRE: 48 L/SEC
FEF 25-75% PRED: 5.06 L/SEC
FEF 25-75%-PRE: 2.46 L/SEC
FEV1 %PRED-PRE: 60 %
FEV1 PRED: 2.15 L
FEV1/FVC %PRED-PRE: 87 %
FEV1/FVC PRED: 79 %
FEV1/FVC: 69 %
FEV1: 1.31 L
FVC %PRED-PRE: 68 %
FVC PRED: 2.75 L
FVC: 1.89 L
PEF %PRED-PRE: 66 L/SEC
PEF PRED: 5.54 L/SEC
PEF-PRE: 3.69 L/SEC

## 2020-09-28 PROCEDURE — 99214 OFFICE O/P EST MOD 30 MIN: CPT | Performed by: INTERNAL MEDICINE

## 2020-09-28 PROCEDURE — 94010 BREATHING CAPACITY TEST: CPT | Performed by: INTERNAL MEDICINE

## 2020-09-28 PROCEDURE — 99213 OFFICE O/P EST LOW 20 MIN: CPT | Performed by: INTERNAL MEDICINE

## 2020-09-28 ASSESSMENT — PULMONARY FUNCTION TESTS
FVC_PREDICTED: 2.75
FEV1_PREDICTED: 2.15
FEV1_PERCENT_PREDICTED_PRE: 60
FVC: 1.89
FEV1/FVC_PREDICTED: 79
FEV1/FVC_PERCENT_PREDICTED_PRE: 87
FEV1/FVC: 69
FVC_PERCENT_PREDICTED_PRE: 68
FEV1: 1.31

## 2020-09-28 NOTE — PROGRESS NOTES
Pulmonary [facility]                             Pulmonary Consult/Progress Note :  CC follow up SOB     History of Present Illness: The patient is a 59 y.o. female w/ PMH of COPD on 2L at baseline,   Also history , multiple myeloma was in ICU in  with abdominal pain and she has also SOB and COPD exacerbation     She smoke over 50 years about 2 pack a day  and give  her about 80 PPY smoking history       She use Albuterol and Symbicort and they helps but not much and she has ROBLES and she can walk about 200 feet and then she get shortness of breath    She has no cough and no phlegm and has some tickling in her throat                REVIEW OF SYSTEMS:    · Constitutional: +fever, +chills, +decreased appetite  · HEENT: No blurred vision, no ear problems, no sore throat, no rhinorrhea. · Respiratory: +cough, +sputum production, no pleuritic chest pain, no shortness of breath  · Cardiology: No angina, no dyspnea on exertion, no paroxysmal nocturnal dyspnea, no orthopnea, no palpitation, no leg swelling.    · Gastroenterology: No dysphagia, no reflux;+abdominal pain, +nausea, +vomiting; no constipation or +diarrhea, No hematochezia   · Genitourinary: No dysuria, no frequency, hesitancy; no hematuria  · Musculoskeletal: no joint pain, no myalgia, no change in range of movement  · Neurology: no focal weakness in extremities, no slurred speech, no double vision, no tingling or numbness sensation  · Endocrinology: no temperature intolerance, no polyphagia, polydipsia or polyuria  · Hematology: no increased bleeding, no bruising, no lymphadenopathy  · Skin: no skin changes noticed by patient  · Psychology: no depressed mood, no suicidal ideation    OBJECTIVE:     VITAL SIGNS:  /80   Pulse 96   Temp 98.4 °F (36.9 °C)   Resp 16   Ht 5' 6\" (1.676 m)   Wt 200 lb (90.7 kg)   SpO2 95%   BMI 32.28 kg/m²   Tmax over 24 hours:  Temp (24hrs), Av.4 °F (36.9 °C), Min:98.4 she is known sleep apnea    Sheila Caceres MD,FCCP  Pulmonary&Critical Care Medicine   Director of [address] [place] [address] Director of [address] [place] [address]    Stephanie Ricketts

## 2020-09-29 ENCOUNTER — OFFICE VISIT (OUTPATIENT)
Dept: INTERNAL MEDICINE | Age: 65
End: 2020-09-29
Payer: MEDICARE

## 2020-09-29 ENCOUNTER — HOSPITAL ENCOUNTER (OUTPATIENT)
Dept: PHYSICAL THERAPY | Age: 65
Setting detail: THERAPIES SERIES
Discharge: HOME | End: 2020-09-29
Payer: MEDICARE

## 2020-09-29 PROCEDURE — 90791 PSYCH DIAGNOSTIC EVALUATION: CPT | Performed by: SOCIAL WORKER

## 2020-09-29 PROCEDURE — 97530 THERAPEUTIC ACTIVITIES: CPT

## 2020-09-29 PROCEDURE — 97110 THERAPEUTIC EXERCISES: CPT

## 2020-09-29 ASSESSMENT — PATIENT HEALTH QUESTIONNAIRE - PHQ9
10. IF YOU CHECKED OFF ANY PROBLEMS, HOW DIFFICULT HAVE THESE PROBLEMS MADE IT FOR YOU TO DO YOUR WORK, TAKE CARE OF THINGS AT HOME, OR GET ALONG WITH OTHER PEOPLE: 2
SUM OF ALL RESPONSES TO PHQ QUESTIONS 1-9: 23
SUM OF ALL RESPONSES TO PHQ QUESTIONS 1-9: 23
3. TROUBLE FALLING OR STAYING ASLEEP: 2
7. TROUBLE CONCENTRATING ON THINGS, SUCH AS READING THE NEWSPAPER OR WATCHING TELEVISION: 3
1. LITTLE INTEREST OR PLEASURE IN DOING THINGS: 2
9. THOUGHTS THAT YOU WOULD BE BETTER OFF DEAD, OR OF HURTING YOURSELF: 1
2. FEELING DOWN, DEPRESSED OR HOPELESS: 3
8. MOVING OR SPEAKING SO SLOWLY THAT OTHER PEOPLE COULD HAVE NOTICED. OR THE OPPOSITE, BEING SO FIGETY OR RESTLESS THAT YOU HAVE BEEN MOVING AROUND A LOT MORE THAN USUAL: 3
6. FEELING BAD ABOUT YOURSELF - OR THAT YOU ARE A FAILURE OR HAVE LET YOURSELF OR YOUR FAMILY DOWN: 3
SUM OF ALL RESPONSES TO PHQ9 QUESTIONS 1 & 2: 5
5. POOR APPETITE OR OVEREATING: 3
4. FEELING TIRED OR HAVING LITTLE ENERGY: 3

## 2020-09-29 ASSESSMENT — COLUMBIA-SUICIDE SEVERITY RATING SCALE - C-SSRS
2. HAVE YOU ACTUALLY HAD ANY THOUGHTS OF KILLING YOURSELF?: NO
6. HAVE YOU EVER DONE ANYTHING, STARTED TO DO ANYTHING, OR PREPARED TO DO ANYTHING TO END YOUR LIFE?: NO
1. WITHIN THE PAST MONTH, HAVE YOU WISHED YOU WERE DEAD OR WISHED YOU COULD GO TO SLEEP AND NOT WAKE UP?: NO

## 2020-09-29 ASSESSMENT — ANXIETY QUESTIONNAIRES
2. NOT BEING ABLE TO STOP OR CONTROL WORRYING: 3-NEARLY EVERY DAY
4. TROUBLE RELAXING: 3-NEARLY EVERY DAY
GAD7 TOTAL SCORE: 16
5. BEING SO RESTLESS THAT IT IS HARD TO SIT STILL: 1-SEVERAL DAYS
6. BECOMING EASILY ANNOYED OR IRRITABLE: 1-SEVERAL DAYS
7. FEELING AFRAID AS IF SOMETHING AWFUL MIGHT HAPPEN: 3-NEARLY EVERY DAY
1. FEELING NERVOUS, ANXIOUS, OR ON EDGE: 2-OVER HALF THE DAYS
3. WORRYING TOO MUCH ABOUT DIFFERENT THINGS: 3-NEARLY EVERY DAY

## 2020-09-29 NOTE — PROGRESS NOTES
[address] [facility]                Phone: [phone]   Fax: [phone]    Physical Therapy Daily Treatment Note  Date:  2020    Patient Name:  Kim Gentile    :  1955  MRN: [id number]    Referring Physician:  Srini Bae MD  Insurance Information:  [facility] Medicare      Evaluation date:  2020  Diagnosis:  OA of lumbar spine; chronic LBP  Cert Dates:   - 2020  ICD-10 Codes:  M54.9, R29.3  Evaluating Physical Therapist:  Lebron Blackmon PT, DPT        Visit: 3/6- (6 visits approved thru 2020)      [address] RE-ASSESSMENT FORM      Check of Management Strategies:     Compliance / Commitment  [] Excellent   [] Good   [] Fair   [x] Poor    Posture Correction: [x]Yes - pt stated she has been working on posture but noted poor posture when sitting before and during session this morning. [] No    Performing Exercises:  [x]Yes   [] No    Frequency: [] Appropriate [x] Not appropriate - 2x/day                        Symptom Response during  exercises:  [] Increase   [] Decrease   [] No  effect     Technique: [] Good  [x] Needs correcting    Comments:  Pt only able to complete partial ROM with press-ups.           Symptomatic Presentation:    Pain Location: [] Centralised     [x] Same    [] Peripheralized               Description:  LB tightness and soreness, pain into R groin area and down to R knee    Frequency: []Better    [x]Same   []Worse    Severity: 9/10         Functional Status:  % improvement since initial assessment:  %    Exercises:     Exercise  During After  Comments   NT ANNA 2x10              Press-ups (2 pillows under chest) with exhale 2x10                   ELADIO              Ambulation                                                          Mechanical Presentation:    Sitting Posture: []Good   [x]Fair  []Poor                  Standing Posture:  []Good   [x]Fair  []Poor      Deformity: [] Yes    [] No   [x] Not applicable                  Neurological Testing:  [] Better    [] Same   [] Worse    [x] NA     Movement Loss: [] Better    [x] Same   [] Worse      Repeated Movements:   [x] Better    [] Same   [] Worse          SUMMARY: [x] Better    [] Same   [] Worse                    Classification Confirmed   []  Yes     [] No    Comments:  Pt given multiple verbal cues for exhale and to increase lumbar spine extension with press-ups. Spent majority of session educating pt on mechanical LBP and importance of compliance and consistency with HEP. Pt educated extensively on importance of completing HEP every 2-3 hours. PT answered all pt's questions as able. Pt's progress thus far in PT has been limited due to pt's non-compliance with HEP. By end of session, pt reported pain was 6/10 on R side of LB only. Pt instructed to continue with current HEP. Revised Classification (if appropriate):    [] Derangement   [] Dysfunction   [] Posture           [] OTHER (subgroup)    Management Today:   [x] Posture      [x] HEP instruction     [x] Exercise  9/18/2020 - posture/use of lumbar roll, avoid flexion, ANNA 3x10 every 3 hours (exercise instruction sheet administered)   9/25/2020 - press-ups 3x10 every 3 hours (exercise handout and instruction sheet administered)       Plan for next session:  Reassess pt's response to HEP and progress as indicated.         Barriers to Recovery:  Chronic LBP, R LE radicular symptoms (constant per pt), poor compliance with HEP        CPT codes 9/29/2020 Units  Minutes   Low Complexity PT evaluation  11625     Moderate Complexity PT evaluation  26183     High Complexity PT evaluation 04688     PT Re-evaluation  75925     Gait training 56829     Manual therapy  77553     Therapeutic activities  53373 1    Therapeutic exercises  84765 2    Neuromuscular reeducation  87222 Time In:  1100  Time Out:  [place]cia , [place], [place]   [id number]

## 2020-09-29 NOTE — PROGRESS NOTES
ADULT BEHAVIORAL HEALTH ASSESSMENT  [facility]     Visit Date: 9/29/2020   Time of appointment: 12:00PM  Time spent with Patient: 40 minutes. This is patient's first appointment. Reason for Consult:  Depression     Referring Provider/PCP:    Harmony Miller MD      Pt provided informed consent for the behavioral health program. Discussed with patient model of service to include the limits of confidentiality (i.e. abuse reporting, suicide intervention, etc.) and short-term intervention focused approach. PRESENTING PROBLEM AND HISTORY  Homer Jeff is a 72 y.o. female who presents for new evaluation and treatment of  depression. She has the following symptoms: depressed mood. , low self-esteem, tearfulness, decreased appetite. Onset of symptoms was approximately several years ago. States first noticed in 2009 when stopped working. 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 Symptoms have been gradually worsening since that time. She denies current suicidal and homicidal ideation. Family history significant for sister, aunt, and son have schizophrenia per patient. .  Risk factors: positive family history in  aunt, mother, sister(s) and son and previous episode of depression. Previous treatment includes Celexa. She complains of the following medication side effects: none. MENTAL STATUS EXAM  Mood was sad with congruent affect. Suicidal ideation was reported, passive SI no plans/intent (C-SSRS complet)   Homicidal ideation was denied. Hygiene was good . Dress was appropriate. Behavior was Within Normal Limits with Yes observation or self-reportof difficulties ambulating. Attitude was Cooperative and Friendly. Eye-contact was good. Speech: rate - WNL, rhythm -  WNL, volume - WNL  Verbalizations were goal directed and coherent.   Thought processes were intact and goal-oriented without evidence of delusions, hallucinations, obsessions, or joyce; without significant cognitive distortions. Associations were characterized by intact and tangential cognitive processes. Pt was oriented to person, place, time, and general circumstances;  recent:  good. Insight and judgment were estimated to be good, AEB, a good  understanding of cyclical maladaptive patterns, and the ability to use insight to inform behavior change. CURRENT MEDICATIONS    Current Outpatient Medications:     acetaminophen-codeine (TYLENOL/CODEINE #3) 300-30 MG per tablet, Take 1 tablet by mouth 2 times daily as needed for Pain for up to 30 days. , Disp: 60 tablet, Rfl: 0    calcium-vitamin D (CALCIUM 500/D) 500-200 MG-UNIT per tablet, TAKE 1 TABLET BY MOUTH DAILY, Disp: 30 tablet, Rfl: 2    amitriptyline (ELAVIL) 50 MG tablet, TAKE 1 TABLET BY MOUTH EVERY EVENING, Disp: 30 tablet, Rfl: 2    montelukast (SINGULAIR) 10 MG tablet, TAKE 1 TABLET BY MOUTH EVERY NIGHT AT BEDTIME, Disp: 90 tablet, Rfl: 1    gabapentin (NEURONTIN) 400 MG capsule, Take 1 capsule by mouth 4 times daily for 30 days. , Disp: 120 capsule, Rfl: 0    levothyroxine (SYNTHROID) 88 MCG tablet, Take 1 tablet by mouth Daily, Disp: 90 tablet, Rfl: 0    metFORMIN (GLUCOPHAGE) 1000 MG tablet, TAKE 1 TABLET BY MOUTH TWICE DAILY WITH MEALS, Disp: 60 tablet, Rfl: 0    citalopram (CELEXA) 40 MG tablet, Take 1 tablet by mouth daily, Disp: 90 tablet, Rfl: 0    triamterene-hydroCHLOROthiazide (MAXZIDE-25) 37.5-25 MG per tablet, TAKE 1 TABLET BY MOUTH EVERY DAY, Disp: 90 tablet, Rfl: 0    ibuprofen (ADVIL;MOTRIN) 600 MG tablet, Take 1 tablet by mouth 3 times daily as needed for Pain (with food), Disp: 90 tablet, Rfl: 0    OXYGEN, Inhale into the lungs Uses 2 liters at night, Disp: , Rfl:     mineral oil-hydrophilic petrolatum (HYDROPHOR) ointment, Apply topically as needed. , Disp: 1 Tube, Rfl: 1    albuterol sulfate  (90 Base) MCG/ACT inhaler, Inhale 2 puffs into the lungs 4 times daily as needed from [address] USE/HISTORY  TOBACCO:  She reports that she quit smoking about 7 months ago. Her smoking use included cigarettes. She started smoking about 49 years ago. She quit after 50.00 years of use. She has never used smokeless tobacco.  ALCOHOL:  She reports no history of alcohol use. OTHER SUBSTANCES: She reports no history of drug use. ASSESSMENT  Modesto Edwards presented to the appointment today for evaluation and treatment of symptoms of depression. She is currently deemed no risk to herself or others and meets criteria for outpatient tx. She will benefit from a medication evaluation to assess if depresion medications could be altered in order to assist with improving symptoms. She will also benefit from brief and solution-focused consultation to address cognitive and behavioral interventions for depressive symptoms. Angelita was in agreement with recommendations. PHQ Scores 9/29/2020 9/14/2020 10/25/2019 2/11/2019 2/8/2018 10/18/2016 9/3/2015   PHQ2 Score 5 4 0 1 2 2 1   PHQ9 Score 23 13 0 1 2 2 1     Interpretation of Total Score Depression Severity: 1-4 = Minimal depression, 5-9 = Mild depression, 10-14 = Moderate depression, 15-19 = Moderately severe depression, 20-27 = Severe depression    ALYSE 7 SCORE 9/29/2020   ALYSE-7 Total Score 16     Interpretation of ALYSE-7 score: 5-9 = mild anxiety, 10-14 = moderate anxiety, 15+ = severe anxiety. Recommend referral to behavioral health for scores 10 or greater. DIAGNOSIS  Angelita was seen today for depression.     Diagnoses and all orders for this visit:    Current mild episode of major depressive disorder, unspecified whether recurrent ([facility]      INTERVENTION  Discussed prevalence of  depression for general population, Discussed and set plan for behavioral activation, Discussed potential treatments for  depression, Discussed and problem-solved barriers in adhering to behavioral change plan, Motivational Interviewing to determine importance and readiness for change, Established rapport and [place]-setting to identify pt's primary goals for [facility] visit / overall health. Assisted patient in further identifying goals of counseling. PLAN  Patient reports a desire to work towards improving boundaries with children and increased self-worth. -Patient will f/u with [facility] in two weeks       INTERACTIVE COMPLEXITY  Is interactive complexity present?   No  Reason:  N/A  Additional Supporting Information:  N/A       Electronically signed by KIM Woods on 9/29/20

## 2020-10-02 ENCOUNTER — HOSPITAL ENCOUNTER (OUTPATIENT)
Dept: PHYSICAL THERAPY | Age: 65
Setting detail: THERAPIES SERIES
Discharge: HOME | End: 2020-10-02
Payer: MEDICARE

## 2020-10-02 PROCEDURE — 97110 THERAPEUTIC EXERCISES: CPT

## 2020-10-02 PROCEDURE — 97530 THERAPEUTIC ACTIVITIES: CPT

## 2020-10-02 NOTE — PROGRESS NOTES
[address]                Phone: [phone]   Fax: [phone]    Physical Therapy Daily Treatment Note  Date:  10/2/2020    Patient Name:  Uziel Ely    :  1955  MRN: [id number]    Referring Physician:  Neville Soria MD  Insurance Information:  [facility] Medicare      Evaluation date:  2020  Diagnosis:  OA of lumbar spine; chronic LBP  Cert Dates:   - 2020  ICD-10 Codes:  M54.9, R29.3  Evaluating Physical Therapist:  Boni Yu PT, DPT        Visit: - (6 visits approved thru 2020)      [address] RE-ASSESSMENT FORM      Check of Management Strategies:     Compliance / Commitment  [] Excellent   [] Good   [] Fair   [x] Poor    Posture Correction: [x]Yes - pt stated she has been working on posture but noted poor posture when sitting before and during session this morning as well as when standing and walking. [] No    Performing Exercises:  [x]Yes   [] No    Frequency: [] Appropriate [x] Not appropriate - 3x/day                        Symptom Response during  exercises:  [] Increase   [] Decrease   [] No  effect     Technique: [] Good  [x] Needs correcting    Comments:  Pt only able to complete partial ROM with press-ups. Pt stated she was feeling \"pretty good\" this morning. Pt stated her pain was better yesterday and today. Pt did report a fall at home yesterday, landing on her R side, but denied any injuries.             Symptomatic Presentation:    Pain Location: [x] Centralised     [] Same    [] Peripheralized               Description:  R LBP    Frequency: [x]Better    []Same   []Worse    Severity: 3/10         Functional Status:  % improvement since initial assessment:  %    Exercises:     Exercise  During After  Comments   NT ANNA 2x10              Press-ups (2 pillows under chest) with exhale 5x10                  Prone PA mobilizations 2x10               ELADIO              Ambulation               Posture correction and fall recovery - please see comments below. Mechanical Presentation:    Sitting Posture: []Good   [x]Fair  []Poor                  Standing Posture:  []Good   [x]Fair  []Poor      Deformity: [] Yes    [] No   [x] Not applicable                  Neurological Testing:  [] Better    [] Same   [] Worse    [x] NA     Movement Loss: [] Better    [x] Same   [] Worse      Repeated Movements:   [x] Better    [] Same   [] Worse          SUMMARY: [x] Better    [] Same   [] Worse                    Classification Confirmed   [x]  Yes     [] No    Comments:  Reviewed upright posture when sitting, standing, and walking. Also used mirror for visual cue for pt and pt educated on using mirror at home. Pt also educated in fall recovery at home. PT recommended pt look into Life Alert system (or something similar) for use at home should another fall occur. Pt reported slight increase in LBP with press-ups and PA mobilizations but by end of session, pt reported pain had decreased back down to 3/10 in R LB. Pt instructed to continue with current HEP and to increase frequency of exercises at home. Revised Classification (if appropriate):    [x] Derangement   [] Dysfunction   [] Posture           [] OTHER (subgroup)    Management Today:   [x] Posture      [x] HEP instruction     [x] Exercise  9/18/2020 - posture/use of lumbar roll, avoid flexion, ANNA 3x10 every 3 hours (exercise instruction sheet administered)   9/25/2020 - press-ups 3x10 every 3 hours (exercise handout and instruction sheet administered)       Plan for next session:  Reassess pt's response to HEP and progress as indicated.         Barriers to Recovery:  Chronic LBP, R LE radicular symptoms (constant per pt), poor compliance with HEP        CPT codes 10/2/2020 Units  Minutes   Low Complexity PT evaluation  91219     Moderate Complexity PT evaluation  26561     High Complexity PT evaluation 54836     PT Re-evaluation  75653     Gait training

## 2020-10-07 ENCOUNTER — HOSPITAL ENCOUNTER (OUTPATIENT)
Dept: PHYSICAL THERAPY | Age: 65
Setting detail: THERAPIES SERIES
Discharge: HOME | End: 2020-10-07
Payer: MEDICARE

## 2020-10-07 PROCEDURE — 97110 THERAPEUTIC EXERCISES: CPT

## 2020-10-07 PROCEDURE — 97140 MANUAL THERAPY 1/> REGIONS: CPT

## 2020-10-07 PROCEDURE — 97530 THERAPEUTIC ACTIVITIES: CPT

## 2020-10-07 NOTE — PROGRESS NOTES
comments below. Mechanical Presentation:    Sitting Posture: []Good   [x]Fair  []Poor                  Standing Posture:  []Good   [x]Fair  []Poor      Deformity: [] Yes    [] No   [x] Not applicable                  Neurological Testing:  [] Better    [] Same   [] Worse    [x] NA     Movement Loss: [] Better    [x] Same   [] Worse      Repeated Movements:   [x] Better    [] Same   [] Worse          SUMMARY: [x] Better    [] Same   [] Worse                    Classification Confirmed   [x]  Yes     [] No    Comments:  Again reviewed importance of upright posture when sitting and standing/walking. Also again extensively educated pt on importance of compliance and consistency with HEP as well as achieving end-range of motion with press-ups. Pt initially only able to tolerate gentle pressure with PA mobilizations this morning but by end of session, was better able to tolerate increased pressure. By end of session, pt also reported abolishment of LBP and R groin pain with PA mobilizations. Reiterated to pt multiple times during session the importance of end-range with press-ups and also on increasing frequency of HEP at home; pt verbalized understanding. By end of session, pt reported 2/10 pain in R groin with sitting/standing. Revised Classification (if appropriate):    [x] Derangement   [] Dysfunction   [] Posture           [] OTHER (subgroup)    Management Today:   [x] Posture      [x] HEP instruction     [x] Exercise  9/18/2020 - posture/use of lumbar roll, avoid flexion, ANNA 3x10 every 3 hours (exercise instruction sheet administered)   9/25/2020 - press-ups 3x10 every 3 hours (exercise handout and instruction sheet administered)       Plan for next session:  Reassess pt's response to HEP and progress as indicated.         Barriers to Recovery:  Chronic LBP, R LE radicular symptoms (constant per pt), poor compliance with HEP        CPT codes 10/7/2020 Units  Minutes   Low Complexity PT evaluation 44811     Moderate Complexity PT evaluation  88763     High Complexity PT evaluation [id number]     PT Re-evaluation  [id number]     Gait training [id number]     Manual therapy  52907 1    Therapeutic activities  75400 1    Therapeutic exercises  18347 2    Neuromuscular reeducation  57736                                                                                                                                                                       Time In:  0900  Time Out:  [address], PT, DPT   [id number]

## 2020-10-14 ENCOUNTER — TELEPHONE (OUTPATIENT)
Dept: INTERNAL MEDICINE | Age: 65
End: 2020-10-14

## 2020-10-14 NOTE — TELEPHONE ENCOUNTER
SW called patient to change appointment to virtual counseling for tomorrow. Patient initially thought she had missed her appointment and apologized but explained she did not. Patient preferred in office visit. Scheduled for 10/27 at 1:00PM. Patient to call if needed in the interim.

## 2020-10-15 ENCOUNTER — PREP FOR PROCEDURE (OUTPATIENT)
Dept: SURGERY | Age: 65
End: 2020-10-15

## 2020-10-15 ENCOUNTER — TELEPHONE (OUTPATIENT)
Dept: PAIN MANAGEMENT | Age: 65
End: 2020-10-15

## 2020-10-15 ENCOUNTER — OFFICE VISIT (OUTPATIENT)
Dept: SURGERY | Age: 65
End: 2020-10-15
Payer: MEDICARE

## 2020-10-15 VITALS
HEIGHT: 66 IN | OXYGEN SATURATION: 95 % | SYSTOLIC BLOOD PRESSURE: 136 MMHG | WEIGHT: 220 LBS | TEMPERATURE: 96.2 F | HEART RATE: 90 BPM | DIASTOLIC BLOOD PRESSURE: 80 MMHG | BODY MASS INDEX: 35.36 KG/M2 | RESPIRATION RATE: 16 BRPM

## 2020-10-15 PROBLEM — A41.9 SEVERE SEPSIS (HCC): Status: RESOLVED | Noted: 2020-01-19 | Resolved: 2020-10-15

## 2020-10-15 PROBLEM — K57.30 DIVERTICULOSIS OF COLON WITHOUT DIVERTICULITIS: Status: ACTIVE | Noted: 2020-10-15

## 2020-10-15 PROBLEM — R65.20 SEVERE SEPSIS (HCC): Status: RESOLVED | Noted: 2020-01-19 | Resolved: 2020-10-15

## 2020-10-15 PROCEDURE — 99201 HC NEW PT, E/M LEVEL 1: CPT | Performed by: SURGERY

## 2020-10-15 PROCEDURE — 99204 OFFICE O/P NEW MOD 45 MIN: CPT | Performed by: SURGERY

## 2020-10-15 RX ORDER — SODIUM CHLORIDE 0.9 % (FLUSH) 0.9 %
10 SYRINGE (ML) INJECTION EVERY 12 HOURS SCHEDULED
Status: CANCELLED | OUTPATIENT
Start: 2020-10-15

## 2020-10-15 RX ORDER — BISACODYL 5 MG
TABLET, DELAYED RELEASE (ENTERIC COATED) ORAL
Qty: 8 TABLET | Refills: 0 | Status: ON HOLD
Start: 2020-10-15 | End: 2020-11-09 | Stop reason: HOSPADM

## 2020-10-15 RX ORDER — SODIUM CHLORIDE 9 MG/ML
INJECTION, SOLUTION INTRAVENOUS CONTINUOUS
Status: CANCELLED | OUTPATIENT
Start: 2020-10-15

## 2020-10-15 RX ORDER — SODIUM CHLORIDE 0.9 % (FLUSH) 0.9 %
10 SYRINGE (ML) INJECTION PRN
Status: CANCELLED | OUTPATIENT
Start: 2020-10-15

## 2020-10-15 ASSESSMENT — ENCOUNTER SYMPTOMS
COUGH: 0
SHORTNESS OF BREATH: 0
WHEEZING: 0
EYE REDNESS: 0
SORE THROAT: 1
BACK PAIN: 1
CONSTIPATION: 1
EYE PAIN: 0
BLOOD IN STOOL: 0
SINUS PAIN: 0
RECTAL PAIN: 1
DIARRHEA: 1
ABDOMINAL PAIN: 1
EYE DISCHARGE: 0
ANAL BLEEDING: 1
ABDOMINAL DISTENTION: 1

## 2020-10-15 NOTE — Clinical Note
See my office note from today on your patient. Thanks!!     Electronically signed by Jair Johnson MD on 10/15/2020 at 2:30 PM

## 2020-10-15 NOTE — PROGRESS NOTES
Scheduled pt for EGD/Colonoscopy on 11/9/20 at 7:30 AM. Pt needs to arrive at [address] [place] [address] [place] at 6:30 AM. Confirmed in office. Sent instruction sheet.   Electronically signed by Merissa Vieira on 10/15/20 at 1:42 PM EDT

## 2020-10-15 NOTE — H&P (VIEW-ONLY)
History and Physical    Patient's Name/Date of Birth: Gabbie Portillo /1955, (72 y.o.), female      Date: October 15, 2020     Chief Complaint:   Chief Complaint   Patient presents with   [employer] Consultation     pt is here for a repeat colonoscopy consult. pt states that she believes she had a colonoscopy around 8 years ago. pt states that she has been having some abdominal pain. denies any family history of colon cancer       HPI:   Patient was seen in the office today for the above complaints. She had her first colonoscopy approximately 2008 at [facility] by Dr. Quique Colby. There is no report available but per the patient some polyps were removed but again no pathology is available to review. Then on 7/20/2016 she had a colonoscopy by Dr. Mustapha Escobedo at [facility] with removal of 3 polyps in the descending and sigmoid colon there were all tubular adenomas. She cannot remember when she was told to have another follow-up colonoscopy. She did well until proxy 2 3 months ago when she began having alternating diarrhea and constipation. Constipation is characterized by having bowel movements every 2 to 3 days and the stool being hard. Also the stool is \"difficult to come out\" and causes her rectal pain when she defecates. She has had rectal bleeding in the past about 8 years ago and then a few months ago she had one episode of bright red blood when she wiped. She denied have any history of blood in the commode or blood mixed with her stool. Diarrhea is characterized by 3 loose stools per day when she has the diarrhea. She also have severe crampy lower abdominal pain rated 10 out of 10 that is only relieved after having the diarrhea. She also has abdominal bloating with this. She denied any family history of colon polyps or colon cancer although her father did die of pancreatic cancer. Patient complains of frequent heartburn, indigestion, and epigastric abdominal pain.   She rates abdominal pain maximum 9 out of 10. She denied any nausea or vomiting. This is been going on for many years. She had an EGD in approximately 2008 at [facility] and she is not sure what that showed. She another EGD by Dr. Mallorie Mercer on 7/20/2016 that showed GERD and gastric ulcers. Biopsies stomach were negative for Helicobacter pylori. She takes Tums and acid as needed with some relief of her pain. She denied take any other medications for her heartburn indigestion.       Past Medical History:   Diagnosis Date    Adrenal incidentaloma ([address]     Anxiety     Arthritis     Asthma     Bladder incontinence     Cancer [facility]) Dx 2009    multiple Myeloma, in remission currently     Chronic back pain     COPD (chronic obstructive pulmonary disease) (HCC)     on O2 2 liters  (uses with activity and at night to sleep)    Depression     Fibromyalgia     GERD (gastroesophageal reflux disease)     Hyperlipidemia     Hypertension     Hypothyroidism     MGUS (monoclonal gammopathy of unknown significance)     Neuropathy     Pneumonia 02/2020    Sleep apnea     doesnt use her cpap    Tobacco abuse     Type II or unspecified type diabetes mellitus without mention of complication, not stated as uncontrolled          Past Surgical History:   Procedure Laterality Date    ANESTHESIA NERVE BLOCK Bilateral 8/10/2020    BILATERAL L3 L4 MEDIAL BRANCH L5 DORSSAL RAMUS NERVE BLOCK (CPT 19033) SEDATION performed by Karsten Menon MD at [facility] [id number]  07/20/2016    removed 3 polyps (tubular adenomas), diverticulosis, Dr. Coretta Liz COLONOSCOPY  2008    approximately 2008, no report available, per patient some polyps removed, Dr Rupali Conway, [address] [place], left knee, arthroscopic    NERVE BLOCK Bilateral 09/22/2016    lumbar transforaminal nerve block #1    NERVE BLOCK  07/06/2020    lumbar epidural steroid injectio L4-5    NERVE BLOCK Bilateral 08/10/2020    L3, L4, L5     OTHER SURGICAL HISTORY  12/13/2016    Excision cyst right face    PAIN MANAGEMENT PROCEDURE N/A 7/6/2020    LUMBAR EPIDURAL STEROID INJECTION L4-5 performed by Grady Muñoz MD at [address]  2008 2008    UPPER GASTROINTESTINAL ENDOSCOPY  07/20/2016    GERD and gastric ulcers, Bx H pylori neg, Dr. Suzan Barrett  2008    approximately 2008, no report, patient does not know the findings, Dr Jailyn Walden, [facility]       Current Outpatient Medications   Medication Sig Dispense Refill    metFORMIN (GLUCOPHAGE) 1000 MG tablet TAKE 1 TABLET BY MOUTH TWICE DAILY WITH MEALS 60 tablet 0    acetaminophen-codeine (TYLENOL/CODEINE #3) 300-30 MG per tablet Take 1 tablet by mouth 2 times daily as needed for Pain for up to 30 days. 60 tablet 0    calcium-vitamin D (CALCIUM 500/D) 500-200 MG-UNIT per tablet TAKE 1 TABLET BY MOUTH DAILY 30 tablet 2    amitriptyline (ELAVIL) 50 MG tablet TAKE 1 TABLET BY MOUTH EVERY EVENING 30 tablet 2    montelukast (SINGULAIR) 10 MG tablet TAKE 1 TABLET BY MOUTH EVERY NIGHT AT BEDTIME 90 tablet 1    levothyroxine (SYNTHROID) 88 MCG tablet Take 1 tablet by mouth Daily 90 tablet 0    citalopram (CELEXA) 40 MG tablet Take 1 tablet by mouth daily 90 tablet 0    triamterene-hydroCHLOROthiazide (MAXZIDE-25) 37.5-25 MG per tablet TAKE 1 TABLET BY MOUTH EVERY DAY 90 tablet 0    OXYGEN Inhale into the lungs Uses 2 liters at night      mineral oil-hydrophilic petrolatum (HYDROPHOR) ointment Apply topically as needed.  1 Tube 1    albuterol sulfate  (90 Base) MCG/ACT inhaler Inhale 2 puffs into the lungs 4 times daily as needed for Wheezing 3 Inhaler 1    ipratropium-albuterol (DUONEB) 0.5-2.5 (3) MG/3ML SOLN nebulizer solution Inhale 3 mLs into the lungs every 4 hours 360 mL 1    budesonide-formoterol (SYMBICORT) 160-4.5 MCG/ACT AERO Inhale 2 puffs into the lungs 2 times daily 1 Inhaler 2  aspirin 81 MG tablet Take 1 tablet by mouth daily 90 tablet 0    baclofen (LIORESAL) 10 MG tablet TAKE 1/2 TABLET THREE TIMES DAILY AS NEEDED(PAIN, SPASMS) 90 tablet 2    blood glucose monitor kit and supplies Test 1 times a day & as needed for symptoms of irregular blood glucose. Accuchek Avivia 1 kit 0    blood glucose monitor strips Testing daily 100 strip 3    Lancets MISC Testing daily 100 each 3    Misc. Devices MISC Oxygen concentrator  j44.9 1 Device 0    gabapentin (NEURONTIN) 400 MG capsule Take 1 capsule by mouth 4 times daily for 30 days. 120 capsule 0    ibuprofen (ADVIL;MOTRIN) 600 MG tablet Take 1 tablet by mouth 3 times daily as needed for Pain (with food) 90 tablet 0     No current facility-administered medications for this visit.         Allergies   Allergen Reactions    Aceon [Perindopril Erbumine] Anaphylaxis     Tongue swells up    Nsaids Shortness Of Breath and Swelling     tongue       Family History   Problem Relation Age of Onset    Heart Disease Mother 79    Diabetes Mother     Cancer Mother         Breast    Hypertension Father     Cancer Father         Pancreas    Diabetes Father     High Blood Pressure Father     Arthritis Brother     Diabetes Brother     Cancer Maternal Uncle     Cancer Paternal Aunt         breast    High Blood Pressure Paternal Aunt     High Blood Pressure Paternal Uncle     Arthritis Maternal Grandmother     Diabetes Maternal Grandmother     High Blood Pressure Maternal Grandmother     Arthritis Maternal Grandfather     Stroke Maternal Grandfather     High Cholesterol Paternal Grandmother     Kidney Disease Paternal Grandmother     Heart Disease Paternal Grandfather        Social History     Socioeconomic History    Marital status: Single     Spouse name: Not on file    Number of children: Not on file    Years of education: Not on file    Highest education level: Not on file   Occupational History    Not on file   Social Needs  Financial resource strain: Very hard    Food insecurity     Worry: Often true     Inability: Sometimes true    Transportation needs     Medical: No     Non-medical: No   Tobacco Use    Smoking status: Former Smoker     Years: 50.00     Types: Cigarettes     Start date: 7/15/1971     Last attempt to quit: 2/10/2020     Years since quittin.6    Smokeless tobacco: Never Used   Substance and Sexual Activity    Alcohol use: No     Alcohol/week: 0.0 standard drinks    Drug use: No    Sexual activity: Never   Lifestyle    Physical activity     Days per week: Not on file     Minutes per session: Not on file    Stress: Not on file   Relationships    Social connections     Talks on phone: Not on file     Gets together: Not on file     Attends [employer] service: Not on file     Active member of club or organization: Not on file     Attends meetings of clubs or organizations: Not on file     Relationship status: Not on file    Intimate partner violence     Fear of current or ex partner: Not on file     Emotionally abused: Not on file     Physically abused: Not on file     Forced sexual activity: Not on file   Other Topics Concern    Not on file   Social History Narrative    Not on file       Review of Systems   Constitutional: Negative for chills, fever and unexpected weight change. HENT: Positive for sore throat (voice raspy). Negative for congestion, hearing loss, nosebleeds and sinus pain. Eyes: Negative for pain, discharge and redness. Dry eyes   Respiratory: Negative for cough, shortness of breath and wheezing. Cardiovascular: Negative for chest pain, palpitations and leg swelling. Gastrointestinal: Positive for abdominal distention, abdominal pain, anal bleeding, constipation, diarrhea and rectal pain. Negative for blood in stool. Endocrine: Negative for cold intolerance and heat intolerance. Hot flashes   Genitourinary: Negative for dysuria, frequency, hematuria and urgency. Musculoskeletal: Positive for back pain and neck pain. Multiple nerve blocks. Takes Tylenol #3 for pain. Skin: Negative for rash. Allergic/Immunologic: Negative for environmental allergies. Neurological: Positive for weakness (Right sided weakness but no history of stroke). Negative for dizziness, tremors, seizures and headaches. Hematological: Bruises/bleeds easily (Bruise easily but not on blood thinners). Psychiatric/Behavioral: Positive for dysphoric mood (On anti-depressant). The patient is nervous/anxious. Physical Exam:  Vitals:    10/15/20 1301   BP: 136/80   Site: Left Upper Arm   Position: Sitting   Cuff Size: Large Adult   Pulse: 90   Resp: 16   Temp: 96.2 °F (35.7 °C)   TempSrc: Infrared   SpO2: 95%   Weight: 220 lb (99.8 kg)   Height: 5' 6\" (1.676 m)       Body mass index is 35.51 kg/m². Physical Exam  Constitutional:       General: She is not in acute distress. Appearance: She is well-developed. She is not diaphoretic. HENT:      Head: Normocephalic and atraumatic. Eyes:      General:         Right eye: No discharge. Left eye: No discharge. Neck:      Musculoskeletal: Normal range of motion. Cardiovascular:      Rate and Rhythm: Normal rate and regular rhythm. Heart sounds: Normal heart sounds. No murmur. No friction rub. No gallop. Pulmonary:      Effort: Pulmonary effort is normal. No respiratory distress. Breath sounds: Normal breath sounds. No wheezing or rales. Abdominal:      General: Bowel sounds are normal. There is no distension. Palpations: Abdomen is soft. There is no mass. Tenderness: There is abdominal tenderness (9/10 RUQ & LUQ, 8/10 LLQ). There is no guarding or rebound. Hernia: There is no hernia in the ventral area, left inguinal area or right inguinal area. Comments: Infraumbilical surgical scar from laparoscopy noted   Genitourinary:     Rectum: Guaiac stool: no stool available for hemoccult testing. No mass, tenderness, anal fissure, external hemorrhoid or internal hemorrhoid. Normal anal tone. Musculoskeletal: Normal range of motion. Skin:     General: Skin is warm and dry. Coloration: Skin is not pale. Findings: No erythema or rash. Neurological:      Mental Status: She is alert and oriented to person, place, and time. Psychiatric:         Behavior: Behavior normal.         Judgment: Judgment normal.       Assessment/Plan:  1. Rectal bleeding, alternating diarrhea and constipation, rectal pain, and lower abdominal pain, history of adenomatous colon polyps, history of diverticulosis -  I recommended diagnostic colonoscopy with possible biopsy or polypectomy and explained the risk, benefits, expected outcome, and alternatives to the procedure. Risks included but are not limited to bleeding, infection, respiratory distress, hypotension, and perforation of the colon. The patient understands and is in agreement. 2. Heartburn and indigestion with history of gastric ulcers - I recommended esophagogastroduodenoscopy with possible biopsy and explained the risk, benefits, expected outcome, and alternatives to the procedure. Risks included but are not limited to bleeding, infection, respiratory distress, hypotension, and perforation of the esophagus, stomach, or duodenum. Patient understands and is in agreement. 3. Type II, non-insulin-dependent, diabetes mellitus without complication  4. Essential hypertension  5. Hyperlipidemia  6. Hypothyroidism  7. COPD  8. History of multiple myeloma - patient is being observed with no specific treatment  9. Incidental bilateral small adrenal adenomas - being observed by PCP  10. Chronic back and neck pain on long-term opioids  11. Chronic right hip pain  12. Fibromyalgia  13. Tobacco abuse  14.  Obesity    Electronically signed by Prisca Houser MD on 10/15/20 at 1:16 PM EDT

## 2020-10-15 NOTE — PROGRESS NOTES
History and Physical    Patient's Name/Date of Birth: Vinayak Dubose /1955, (72 y.o.), female      Date: October 15, 2020     Chief Complaint:   Chief Complaint   Patient presents with   Jonathan Whyte Consultation     pt is here for a repeat colonoscopy consult. pt states that she believes she had a colonoscopy around 8 years ago. pt states that she has been having some abdominal pain. denies any family history of colon cancer       HPI:   Patient was seen in the office today for the above complaints. She had her first colonoscopy approximately 2008 at [facility] by Dr. Fidel Mullins. There is no report available but per the patient some polyps were removed but again no pathology is available to review. Then on 7/20/2016 she had a colonoscopy by Dr. Daniel Johnson at [facility] with removal of 3 polyps in the descending and sigmoid colon there were all tubular adenomas. She cannot remember when she was told to have another follow-up colonoscopy. She did well until proxy 2 3 months ago when she began having alternating diarrhea and constipation. Constipation is characterized by having bowel movements every 2 to 3 days and the stool being hard. Also the stool is \"difficult to come out\" and causes her rectal pain when she defecates. She has had rectal bleeding in the past about 8 years ago and then a few months ago she had one episode of bright red blood when she wiped. She denied have any history of blood in the commode or blood mixed with her stool. Diarrhea is characterized by 3 loose stools per day when she has the diarrhea. She also have severe crampy lower abdominal pain rated 10 out of 10 that is only relieved after having the diarrhea. She also has abdominal bloating with this. She denied any family history of colon polyps or colon cancer although her father did die of pancreatic cancer. Patient complains of frequent heartburn, indigestion, and epigastric abdominal pain.   She rates abdominal pain maximum 9 out of 10. She denied any nausea or vomiting. This is been going on for many years. She had an EGD in approximately 2008 at [facility] and she is not sure what that showed. She another EGD by Dr. Pierce Hicks on 7/20/2016 that showed GERD and gastric ulcers. Biopsies stomach were negative for Helicobacter pylori. She takes Tums and acid as needed with some relief of her pain. She denied take any other medications for her heartburn indigestion.       Past Medical History:   Diagnosis Date    Adrenal incidentaloma ([address]     Anxiety     Arthritis     Asthma     Bladder incontinence     Cancer [facility]) Dx 2009    multiple Myeloma, in remission currently     Chronic back pain     COPD (chronic obstructive pulmonary disease) (HCC)     on O2 2 liters  (uses with activity and at night to sleep)    Depression     Fibromyalgia     GERD (gastroesophageal reflux disease)     Hyperlipidemia     Hypertension     Hypothyroidism     MGUS (monoclonal gammopathy of unknown significance)     Neuropathy     Pneumonia 02/2020    Sleep apnea     doesnt use her cpap    Tobacco abuse     Type II or unspecified type diabetes mellitus without mention of complication, not stated as uncontrolled          Past Surgical History:   Procedure Laterality Date    ANESTHESIA NERVE BLOCK Bilateral 8/10/2020    BILATERAL L3 L4 MEDIAL BRANCH L5 DORSSAL RAMUS NERVE BLOCK (CPT 21762) SEDATION performed by Stephanie Russell MD at [facility]  07/20/2016    removed 3 polyps (tubular adenomas), diverticulosis, Dr. Baird Ill COLONOSCOPY  2008    approximately 2008, no report available, per patient some polyps removed, Dr Angelita Marroquin, [address], left knee, arthroscopic    NERVE BLOCK Bilateral 09/22/2016    lumbar transforaminal nerve block #1    NERVE BLOCK  07/06/2020    lumbar epidural steroid injectio L4-5    NERVE BLOCK Bilateral 08/10/2020    L3, L4, L5     OTHER SURGICAL HISTORY  12/13/2016    Excision cyst right face    PAIN MANAGEMENT PROCEDURE N/A 7/6/2020    LUMBAR EPIDURAL STEROID INJECTION L4-5 performed by Tigist Pablo MD at [address]  2008 2008    UPPER GASTROINTESTINAL ENDOSCOPY  07/20/2016    GERD and gastric ulcers, Bx H pylori neg, Dr. Rosalina Maher  2008    approximately 2008, no report, patient does not know the findings, Dr [facility]       Current Outpatient Medications   Medication Sig Dispense Refill    metFORMIN (GLUCOPHAGE) 1000 MG tablet TAKE 1 TABLET BY MOUTH TWICE DAILY WITH MEALS 60 tablet 0    acetaminophen-codeine (TYLENOL/CODEINE #3) 300-30 MG per tablet Take 1 tablet by mouth 2 times daily as needed for Pain for up to 30 days. 60 tablet 0    calcium-vitamin D (CALCIUM 500/D) 500-200 MG-UNIT per tablet TAKE 1 TABLET BY MOUTH DAILY 30 tablet 2    amitriptyline (ELAVIL) 50 MG tablet TAKE 1 TABLET BY MOUTH EVERY EVENING 30 tablet 2    montelukast (SINGULAIR) 10 MG tablet TAKE 1 TABLET BY MOUTH EVERY NIGHT AT BEDTIME 90 tablet 1    levothyroxine (SYNTHROID) 88 MCG tablet Take 1 tablet by mouth Daily 90 tablet 0    citalopram (CELEXA) 40 MG tablet Take 1 tablet by mouth daily 90 tablet 0    triamterene-hydroCHLOROthiazide (MAXZIDE-25) 37.5-25 MG per tablet TAKE 1 TABLET BY MOUTH EVERY DAY 90 tablet 0    OXYGEN Inhale into the lungs Uses 2 liters at night      mineral oil-hydrophilic petrolatum (HYDROPHOR) ointment Apply topically as needed.  1 Tube 1    albuterol sulfate  (90 Base) MCG/ACT inhaler Inhale 2 puffs into the lungs 4 times daily as needed for Wheezing 3 Inhaler 1    ipratropium-albuterol (DUONEB) 0.5-2.5 (3) MG/3ML SOLN nebulizer solution Inhale 3 mLs into the lungs every 4 hours 360 mL 1    budesonide-formoterol (SYMBICORT) 160-4.5 MCG/ACT AERO Inhale 2 puffs into the lungs 2 times daily 1 Inhaler 2  aspirin 81 MG tablet Take 1 tablet by mouth daily 90 tablet 0    baclofen (LIORESAL) 10 MG tablet TAKE 1/2 TABLET THREE TIMES DAILY AS NEEDED(PAIN, SPASMS) 90 tablet 2    blood glucose monitor kit and supplies Test 1 times a day & as needed for symptoms of irregular blood glucose. Accuchek Avivia 1 kit 0    blood glucose monitor strips Testing daily 100 strip 3    Lancets MISC Testing daily 100 each 3    Misc. Devices MISC Oxygen concentrator  j44.9 1 Device 0    gabapentin (NEURONTIN) 400 MG capsule Take 1 capsule by mouth 4 times daily for 30 days. 120 capsule 0    ibuprofen (ADVIL;MOTRIN) 600 MG tablet Take 1 tablet by mouth 3 times daily as needed for Pain (with food) 90 tablet 0     No current facility-administered medications for this visit.         Allergies   Allergen Reactions    Aceon [Perindopril Erbumine] Anaphylaxis     Tongue swells up    Nsaids Shortness Of Breath and Swelling     tongue       Family History   Problem Relation Age of Onset    Heart Disease Mother 79    Diabetes Mother     Cancer Mother         Breast    Hypertension Father     Cancer Father         Pancreas    Diabetes Father     High Blood Pressure Father     Arthritis Brother     Diabetes Brother     Cancer Maternal Uncle     Cancer Paternal Aunt         breast    High Blood Pressure Paternal Aunt     High Blood Pressure Paternal Uncle     Arthritis Maternal Grandmother     Diabetes Maternal Grandmother     High Blood Pressure Maternal Grandmother     Arthritis Maternal Grandfather     Stroke Maternal Grandfather     High Cholesterol Paternal Grandmother     Kidney Disease Paternal Grandmother     Heart Disease Paternal Grandfather        Social History     Socioeconomic History    Marital status: Single     Spouse name: Not on file    Number of children: Not on file    Years of education: Not on file    Highest education level: Not on file   Occupational History    Not on file   Social Needs  Financial resource strain: Very hard    Food insecurity     Worry: Often true     Inability: Sometimes true    Transportation needs     Medical: No     Non-medical: No   Tobacco Use    Smoking status: Former Smoker     Years: 50.00     Types: Cigarettes     Start date: 7/15/1971     Last attempt to quit: 2/10/2020     Years since quittin.6    Smokeless tobacco: Never Used   Substance and Sexual Activity    Alcohol use: No     Alcohol/week: 0.0 standard drinks    Drug use: No    Sexual activity: Never   Lifestyle    Physical activity     Days per week: Not on file     Minutes per session: Not on file    Stress: Not on file   Relationships    Social connections     Talks on phone: Not on file     Gets together: Not on file     Attends [employer] service: Not on file     Active member of club or organization: Not on file     Attends meetings of clubs or organizations: Not on file     Relationship status: Not on file    Intimate partner violence     Fear of current or ex partner: Not on file     Emotionally abused: Not on file     Physically abused: Not on file     Forced sexual activity: Not on file   Other Topics Concern    Not on file   Social History Narrative    Not on file       Review of Systems   Constitutional: Negative for chills, fever and unexpected weight change. HENT: Positive for sore throat (voice raspy). Negative for congestion, hearing loss, nosebleeds and sinus pain. Eyes: Negative for pain, discharge and redness. Dry eyes   Respiratory: Negative for cough, shortness of breath and wheezing. Cardiovascular: Negative for chest pain, palpitations and leg swelling. Gastrointestinal: Positive for abdominal distention, abdominal pain, anal bleeding, constipation, diarrhea and rectal pain. Negative for blood in stool. Endocrine: Negative for cold intolerance and heat intolerance. Hot flashes   Genitourinary: Negative for dysuria, frequency, hematuria and urgency. Musculoskeletal: Positive for back pain and neck pain. Multiple nerve blocks. Takes Tylenol #3 for pain. Skin: Negative for rash. Allergic/Immunologic: Negative for environmental allergies. Neurological: Positive for weakness (Right sided weakness but no history of stroke). Negative for dizziness, tremors, seizures and headaches. Hematological: Bruises/bleeds easily (Bruise easily but not on blood thinners). Psychiatric/Behavioral: Positive for dysphoric mood (On anti-depressant). The patient is nervous/anxious. Physical Exam:  Vitals:    10/15/20 1301   BP: 136/80   Site: Left Upper Arm   Position: Sitting   Cuff Size: Large Adult   Pulse: 90   Resp: 16   Temp: 96.2 °F (35.7 °C)   TempSrc: Infrared   SpO2: 95%   Weight: 220 lb (99.8 kg)   Height: 5' 6\" (1.676 m)       Body mass index is 35.51 kg/m². Physical Exam  Constitutional:       General: She is not in acute distress. Appearance: She is well-developed. She is not diaphoretic. HENT:      Head: Normocephalic and atraumatic. Eyes:      General:         Right eye: No discharge. Left eye: No discharge. Neck:      Musculoskeletal: Normal range of motion. Cardiovascular:      Rate and Rhythm: Normal rate and regular rhythm. Heart sounds: Normal heart sounds. No murmur. No friction rub. No gallop. Pulmonary:      Effort: Pulmonary effort is normal. No respiratory distress. Breath sounds: Normal breath sounds. No wheezing or rales. Abdominal:      General: Bowel sounds are normal. There is no distension. Palpations: Abdomen is soft. There is no mass. Tenderness: There is abdominal tenderness (9/10 RUQ & LUQ, 8/10 LLQ). There is no guarding or rebound. Hernia: There is no hernia in the ventral area, left inguinal area or right inguinal area. Comments: Infraumbilical surgical scar from laparoscopy noted   Genitourinary:     Rectum: Guaiac stool: no stool available for hemoccult testing. No mass, tenderness, anal fissure, external hemorrhoid or internal hemorrhoid. Normal anal tone. Musculoskeletal: Normal range of motion. Skin:     General: Skin is warm and dry. Coloration: Skin is not pale. Findings: No erythema or rash. Neurological:      Mental Status: She is alert and oriented to person, place, and time. Psychiatric:         Behavior: Behavior normal.         Judgment: Judgment normal.       Assessment/Plan:  1. Rectal bleeding, alternating diarrhea and constipation, rectal pain, and lower abdominal pain, history of adenomatous colon polyps, history of diverticulosis -  I recommended diagnostic colonoscopy with possible biopsy or polypectomy and explained the risk, benefits, expected outcome, and alternatives to the procedure. Risks included but are not limited to bleeding, infection, respiratory distress, hypotension, and perforation of the colon. The patient understands and is in agreement. 2. Heartburn and indigestion with history of gastric ulcers - I recommended esophagogastroduodenoscopy with possible biopsy and explained the risk, benefits, expected outcome, and alternatives to the procedure. Risks included but are not limited to bleeding, infection, respiratory distress, hypotension, and perforation of the esophagus, stomach, or duodenum. Patient understands and is in agreement. 3. Type II, non-insulin-dependent, diabetes mellitus without complication  4. Essential hypertension  5. Hyperlipidemia  6. Hypothyroidism  7. COPD  8. History of multiple myeloma - patient is being observed with no specific treatment  9. Incidental bilateral small adrenal adenomas - being observed by PCP  10. Chronic back and neck pain on long-term opioids  11. Chronic right hip pain  12. Fibromyalgia  13. Tobacco abuse  14.  Obesity    Electronically signed by Todd Anand MD on 10/15/20 at 1:16 PM EDT

## 2020-10-15 NOTE — H&P
History and Physical    Patient's Name/Date of Birth: Oxnaa Bloom /1955, (72 y.o.), female      Date: October 15, 2020     Chief Complaint:   Chief Complaint   Patient presents with   Hayley Gonzalez Consultation     pt is here for a repeat colonoscopy consult. pt states that she believes she had a colonoscopy around 8 years ago. pt states that she has been having some abdominal pain. denies any family history of colon cancer       HPI:   Patient was seen in the office today for the above complaints. She had her first colonoscopy approximately 2008 at [facility] by Dr. Rayna Cintron. There is no report available but per the patient some polyps were removed but again no pathology is available to review. Then on 7/20/2016 she had a colonoscopy by Dr. Larey Collet at [facility] with removal of 3 polyps in the descending and sigmoid colon there were all tubular adenomas. She cannot remember when she was told to have another follow-up colonoscopy. She did well until proxy 2 3 months ago when she began having alternating diarrhea and constipation. Constipation is characterized by having bowel movements every 2 to 3 days and the stool being hard. Also the stool is \"difficult to come out\" and causes her rectal pain when she defecates. She has had rectal bleeding in the past about 8 years ago and then a few months ago she had one episode of bright red blood when she wiped. She denied have any history of blood in the commode or blood mixed with her stool. Diarrhea is characterized by 3 loose stools per day when she has the diarrhea. She also have severe crampy lower abdominal pain rated 10 out of 10 that is only relieved after having the diarrhea. She also has abdominal bloating with this. She denied any family history of colon polyps or colon cancer although her father did die of pancreatic cancer. Patient complains of frequent heartburn, indigestion, and epigastric abdominal pain.   She rates abdominal pain maximum 9 out of 10. She denied any nausea or vomiting. This is been going on for many years. She had an EGD in approximately 2008 at [facility] and she is not sure what that showed. She another EGD by Dr. Hardy Chen on 7/20/2016 that showed GERD and gastric ulcers. Biopsies stomach were negative for Helicobacter pylori. She takes Tums and acid as needed with some relief of her pain. She denied take any other medications for her heartburn indigestion.       Past Medical History:   Diagnosis Date    Adrenal incidentaloma ([address]     Anxiety     Arthritis     Asthma     Bladder incontinence     Cancer [facility]) Dx 2009    multiple Myeloma, in remission currently     Chronic back pain     COPD (chronic obstructive pulmonary disease) (HCC)     on O2 2 liters  (uses with activity and at night to sleep)    Depression     Fibromyalgia     GERD (gastroesophageal reflux disease)     Hyperlipidemia     Hypertension     Hypothyroidism     MGUS (monoclonal gammopathy of unknown significance)     Neuropathy     Pneumonia 02/2020    Sleep apnea     doesnt use her cpap    Tobacco abuse     Type II or unspecified type diabetes mellitus without mention of complication, not stated as uncontrolled          Past Surgical History:   Procedure Laterality Date    ANESTHESIA NERVE BLOCK Bilateral 8/10/2020    BILATERAL L3 L4 MEDIAL BRANCH L5 DORSSAL RAMUS NERVE BLOCK (CPT 37630) SEDATION performed by Swathi Stephen MD at [facility] [address]  07/20/2016    removed 3 polyps (tubular adenomas), diverticulosis, Dr. Law Puls COLONOSCOPY  2008    approximately 2008, no report available, per patient some polyps removed, Dr Juancho Costa, [address], left knee, arthroscopic    NERVE BLOCK Bilateral 09/22/2016    lumbar transforaminal nerve block #1    NERVE BLOCK  07/06/2020    lumbar epidural steroid injectio L4-5    NERVE BLOCK Bilateral 08/10/2020    L3, L4, L5     OTHER SURGICAL HISTORY  12/13/2016    Excision cyst right face    PAIN MANAGEMENT PROCEDURE N/A 7/6/2020    LUMBAR EPIDURAL STEROID INJECTION L4-5 performed by Juju Fiore MD [address] [facility]  2008 2008    UPPER GASTROINTESTINAL ENDOSCOPY  07/20/2016    GERD and gastric ulcers, Bx H pylori neg, Dr. Melissa Madera  2008    approximately 2008, no report, patient does not know the findings, Dr Yomi Walden, [facility]       Current Outpatient Medications   Medication Sig Dispense Refill    metFORMIN (GLUCOPHAGE) 1000 MG tablet TAKE 1 TABLET BY MOUTH TWICE DAILY WITH MEALS 60 tablet 0    acetaminophen-codeine (TYLENOL/CODEINE #3) 300-30 MG per tablet Take 1 tablet by mouth 2 times daily as needed for Pain for up to 30 days. 60 tablet 0    calcium-vitamin D (CALCIUM 500/D) 500-200 MG-UNIT per tablet TAKE 1 TABLET BY MOUTH DAILY 30 tablet 2    amitriptyline (ELAVIL) 50 MG tablet TAKE 1 TABLET BY MOUTH EVERY EVENING 30 tablet 2    montelukast (SINGULAIR) 10 MG tablet TAKE 1 TABLET BY MOUTH EVERY NIGHT AT BEDTIME 90 tablet 1    levothyroxine (SYNTHROID) 88 MCG tablet Take 1 tablet by mouth Daily 90 tablet 0    citalopram (CELEXA) 40 MG tablet Take 1 tablet by mouth daily 90 tablet 0    triamterene-hydroCHLOROthiazide (MAXZIDE-25) 37.5-25 MG per tablet TAKE 1 TABLET BY MOUTH EVERY DAY 90 tablet 0    OXYGEN Inhale into the lungs Uses 2 liters at night      mineral oil-hydrophilic petrolatum (HYDROPHOR) ointment Apply topically as needed.  1 Tube 1    albuterol sulfate  (90 Base) MCG/ACT inhaler Inhale 2 puffs into the lungs 4 times daily as needed for Wheezing 3 Inhaler 1    ipratropium-albuterol (DUONEB) 0.5-2.5 (3) MG/3ML SOLN nebulizer solution Inhale 3 mLs into the lungs every 4 hours 360 mL 1    budesonide-formoterol (SYMBICORT) 160-4.5 MCG/ACT AERO Inhale 2 puffs into the lungs 2 times daily 1 Inhaler 2  aspirin 81 MG tablet Take 1 tablet by mouth daily 90 tablet 0    baclofen (LIORESAL) 10 MG tablet TAKE 1/2 TABLET THREE TIMES DAILY AS NEEDED(PAIN, SPASMS) 90 tablet 2    blood glucose monitor kit and supplies Test 1 times a day & as needed for symptoms of irregular blood glucose. Accuchek Avivia 1 kit 0    blood glucose monitor strips Testing daily 100 strip 3    Lancets MISC Testing daily 100 each 3    Misc. Devices MISC Oxygen concentrator  j44.9 1 Device 0    gabapentin (NEURONTIN) 400 MG capsule Take 1 capsule by mouth 4 times daily for 30 days. 120 capsule 0    ibuprofen (ADVIL;MOTRIN) 600 MG tablet Take 1 tablet by mouth 3 times daily as needed for Pain (with food) 90 tablet 0     No current facility-administered medications for this visit.         Allergies   Allergen Reactions    Aceon [Perindopril Erbumine] Anaphylaxis     Tongue swells up    Nsaids Shortness Of Breath and Swelling     tongue       Family History   Problem Relation Age of Onset    Heart Disease Mother 79    Diabetes Mother     Cancer Mother         Breast    Hypertension Father     Cancer Father         Pancreas    Diabetes Father     High Blood Pressure Father     Arthritis Brother     Diabetes Brother     Cancer Maternal Uncle     Cancer Paternal Aunt         breast    High Blood Pressure Paternal Aunt     High Blood Pressure Paternal Uncle     Arthritis Maternal Grandmother     Diabetes Maternal Grandmother     High Blood Pressure Maternal Grandmother     Arthritis Maternal Grandfather     Stroke Maternal Grandfather     High Cholesterol Paternal Grandmother     Kidney Disease Paternal Grandmother     Heart Disease Paternal Grandfather        Social History     Socioeconomic History    Marital status: Single     Spouse name: Not on file    Number of children: Not on file    Years of education: Not on file    Highest education level: Not on file   Occupational History    Not on file   Social Needs Musculoskeletal: Positive for back pain and neck pain. Multiple nerve blocks. Takes Tylenol #3 for pain. Skin: Negative for rash. Allergic/Immunologic: Negative for environmental allergies. Neurological: Positive for weakness (Right sided weakness but no history of stroke). Negative for dizziness, tremors, seizures and headaches. Hematological: Bruises/bleeds easily (Bruise easily but not on blood thinners). Psychiatric/Behavioral: Positive for dysphoric mood (On anti-depressant). The patient is nervous/anxious. Physical Exam:  Vitals:    10/15/20 1301   BP: 136/80   Site: Left Upper Arm   Position: Sitting   Cuff Size: Large Adult   Pulse: 90   Resp: 16   Temp: 96.2 °F (35.7 °C)   TempSrc: Infrared   SpO2: 95%   Weight: 220 lb (99.8 kg)   Height: 5' 6\" (1.676 m)       Body mass index is 35.51 kg/m². Physical Exam  Constitutional:       General: She is not in acute distress. Appearance: She is well-developed. She is not diaphoretic. HENT:      Head: Normocephalic and atraumatic. Eyes:      General:         Right eye: No discharge. Left eye: No discharge. Neck:      Musculoskeletal: Normal range of motion. Cardiovascular:      Rate and Rhythm: Normal rate and regular rhythm. Heart sounds: Normal heart sounds. No murmur. No friction rub. No gallop. Pulmonary:      Effort: Pulmonary effort is normal. No respiratory distress. Breath sounds: Normal breath sounds. No wheezing or rales. Abdominal:      General: Bowel sounds are normal. There is no distension. Palpations: Abdomen is soft. There is no mass. Tenderness: There is abdominal tenderness (9/10 RUQ & LUQ, 8/10 LLQ). There is no guarding or rebound. Hernia: There is no hernia in the ventral area, left inguinal area or right inguinal area. Comments: Infraumbilical surgical scar from laparoscopy noted   Genitourinary:     Rectum: Guaiac stool: no stool available for hemoccult testing. No mass, tenderness, anal fissure, external hemorrhoid or internal hemorrhoid. Normal anal tone. Musculoskeletal: Normal range of motion. Skin:     General: Skin is warm and dry. Coloration: Skin is not pale. Findings: No erythema or rash. Neurological:      Mental Status: She is alert and oriented to person, place, and time. Psychiatric:         Behavior: Behavior normal.         Judgment: Judgment normal.       Assessment/Plan:  1. Rectal bleeding, alternating diarrhea and constipation, rectal pain, and lower abdominal pain, history of adenomatous colon polyps, history of diverticulosis -  I recommended diagnostic colonoscopy with possible biopsy or polypectomy and explained the risk, benefits, expected outcome, and alternatives to the procedure. Risks included but are not limited to bleeding, infection, respiratory distress, hypotension, and perforation of the colon. The patient understands and is in agreement. 2. Heartburn and indigestion with history of gastric ulcers - I recommended esophagogastroduodenoscopy with possible biopsy and explained the risk, benefits, expected outcome, and alternatives to the procedure. Risks included but are not limited to bleeding, infection, respiratory distress, hypotension, and perforation of the esophagus, stomach, or duodenum. Patient understands and is in agreement. 3. Type II, non-insulin-dependent, diabetes mellitus without complication  4. Essential hypertension  5. Hyperlipidemia  6. Hypothyroidism  7. COPD  8. History of multiple myeloma - patient is being observed with no specific treatment  9. Incidental bilateral small adrenal adenomas - being observed by PCP  10. Chronic back and neck pain on long-term opioids  11. Chronic right hip pain  12. Fibromyalgia  13. Tobacco abuse  14.  Obesity    Electronically signed by Prisca Houser MD on 10/15/20 at 1:16 PM EDT

## 2020-10-15 NOTE — PATIENT INSTRUCTIONS
Dr. Dali Meek recommended upper GI endoscopy and colonoscopy with possible biopsy or polypectomy and he explained the risk, benefits, expected outcome, and alternatives to the procedure. Risks included but are not limited to bleeding, infection, respiratory distress, hypotension, and perforation of the esophagus, stomach, small intestine, or colon. You understood and were in agreement. You will need to have someone bring you to the hospital and take you home because you will not be able to drive or work the rest of that day. Also, you need to have someone stay with you the rest of the day to make sure you do not develop any complications. [facility] General Surgery  MAGNESIUM CITRATE/DULCOLAX TABLETS  COLON PREP FOR COLONOSCOPY OR COLON SURGERY    It is very important that you follow all of the instructions listed on this sheet carefully (they may be slightly different than the directions on the product that you purchase at the pharmacy) to ensure that your colon is adequately cleaned out or your risk of complications could be increased. 2 Days or More Before Endoscopy:   Obtain three 10-ounce bottle of Magnesium Citrate and 1 bottle of Dulcolax tablets from the pharmacy.  Do not eat corn, tomatoes, peas or watermelon 5 days before procedure.  If you are on INSULIN or OTHER DIABETIC MEDICATIONS, then check with your primary care physician as to how to adjust your medication while on clear liquid diet and when nothing by mouth. 1 Day Before the Endoscopy:   No solid food - only clear liquids (soup, jello, or juice that you can see through with no solid food) for breakfast, lunch and supper. DO NOT drink or eat anything that is red as it will turn the inside of the colon red and look like blood.  Have at least 8 oz or more of clear liquids for breakfast (7 am to 8 am) and lunch (11:30 am to 12:30 pm).    12 Noon Drink a 10 oz bottle of Magnesium Citrate and 4 Dulcolax tablets followed immediately by at least 8 oz of clear liquids.  1:00 pm Drink at least 8 oz of clear liquids.  2:00 pm Take 4 Dulcolax tablets and drink at least 8 oz of clear liquids.  3:00 pm Drink at least 8 oz of clear liquids.  4:00 pm Drink a 10 oz bottle of Magnesium Citrate followed immediately by at least 8 oz of clear liquids.  5:00 pm Drink at least 8 oz of clear liquids.  Can continue to take liquids until 12 midnight then nothing to eat or drink except as instructed below    Day of Endoscopy:   4 hours prior to scheduled time for colonoscopy, drink a 10 oz bottle of Magnesium Citrate followed immediately by at least 8 oz of clear liquids. Then nothing to drink after that.  If any blood pressure medications or heart medications are due in the morning, you should take them with a sip of water. Patient Information and Instructions for  Upper GI Endoscopy or Esophagogastroduodenoscopy [EGD])         Definition Upper GI Endoscopy or Esophagogastroduodenoscopy [EGD])  This is a test that uses a fiberoptic scope to examine the esophagus (throat), stomach, and upper part of the small intestines. Upper GI endoscopy may be recommended if you have:   Abdominal pain   Severe heartburn   Persistent nausea and vomiting   Difficulty swallowing   Blood in stool or vomit   Abnormal x-ray or other examinations of the gastrointestinal tract     Conditions that can be diagnosed with upper GI endoscopy include:   Ulcers   Tumors   Polyps   Abnormal narrowing   Inflammation     Possible Complications   Complications are rare, but no procedure is completely free of risk.  If you are planning to have upper GI endoscopy, your doctor will review a list of possible complications, which may include:   Bleeding   Damage to the esophagus, stomach, or intestine   Infection   Respiratory depression (reduced breathing rate and/or depth)   Reaction to sedatives or anesthesia causing your blood pressure to drop    Some factors that may increase the risk of complications include:   Age: 61 or older   Pregnancy   Obesity   Smoking , alcoholism , or drug use   Malnutrition   Recent illness   Diabetes   Heart or lung problems   Bleeding disorders   Use of certain medicines     Be sure to discuss these risks with your doctor before the test.     What to Expect   Prior to test   Leading up to the test:   Arrange for a ride home after the test. Also, arrange for help at home. The night before, eat a light meal.  Do not eat or drink anything after midnight the night before the test.   Talk to your doctor about your medicines. You may be asked to stop taking some medicines up to one week before the procedure, like:   Anti-inflammatory drugs (e.g., aspirin )   Blood thinners, like clopidogrel (Plavix) or warfarin (Coumadin)     Description of the Test   You will be asked to lie on your left side. You will have monitors tracking your breathing, heart rate, and blood oxygen levels. You will be given supplemental oxygen to breathe through your nose. A mouthpiece will be positioned to help keep your mouth open. Your throat may be sprayed with a numbing medicine. You will be given a sedative through an IV to help you relax during the test.  During the test, a small suction tube will be used to clear saliva and fluids from your mouth. The endoscope will be lubricated and placed in your mouth. You will be asked to try to swallow it. Then, it will be carefully and slowly advanced down your throat. It will be passed through your esophagus and into your stomach and intestine. While the endoscope is being advanced, your doctor will view the images on the screen. Air will be passed through the endoscope into your digestive tract. This will be done to smooth the normal folds in the tissues, allowing your doctor to view the tissue more easily. Tiny tools may be passed through the endoscope in order to take biopsies or do other tests.      After Test After the test, you will be observed for an hour. Then, you will be allowed to go home. When you return home after the test, do the following to help ensure a smooth recovery:   Rest when you get home. Ask your doctor if you can resume your normal diet. In most cases, you will be able to. Sedatives can slow your reaction time. Do not drive or use machinery for the rest of the day. Avoid alcohol for the rest of the day. Be sure to follow your doctor's instructions . How Long Will It Take? Usually about 10-15 minutes     Will It Hurt? Most people do not feel anything during the test and will not remember the test.  After the test, your throat may be sore and you may feel bloated. Results   This test gives your doctor information about the health of your digestive system. The results can help to explain your symptoms. You and your doctor will talk about the results and your treatment plan. Call Your Doctor   After the test, call your doctor if any of the following occurs:   Signs of infection, including fever and chills   Severe abdominal pain   Hard, swollen abdomen   Difficulty swallowing or breathing   Any change or increase in your original symptoms   Bloody or black tarry colored stools   Nausea and/or vomiting   Cough, shortness of breath, or chest pain   Bleeding     In case of emergency, call 911. Patient Information and Instructions for Colonoscopy         Definition of Colonoscopy   A colonoscopy is the visual exam of the rectum and colon (large intestine). The exam is done with a tool called a colonoscope. The colonoscope is a flexible tube with a tiny camera on the end. This instrument allows the doctor to view the inside of your rectum and colon. Sigmoidoscopy is a shorter scope that views only the last one third of the colon. Reasons for Colonoscopy   It is used to examine, diagnose, and treat problems in your large intestine.  The procedure is most often done for the following reasons: To determine the cause of abdominal pain, rectal bleeding, or a change in bowel habits   To detect and treat colon cancer or colon polyps   To obtain tissue samples for testing   To stop intestinal bleeding   Monitor response to treatment if you have inflammatory bowel disease     Possible Complications   Complications are rare, but no procedure is completely free of risk. If you are planning to have a colonoscopy, your doctor will review a list of possible complications, which may include:   Bleeding   Reaction to the sedation causing drop in your blood pressure or problems breathing  Perforation or puncture of the bowel     Factors that may increase the risk of complications include:   Pre-existing heart or kidney condition   Treatment with certain medicines, including aspirin and other drugs with anticoagulant or blood-thinning properties   Prior abdominal surgery or radiation treatments   Active colitis , diverticulitis , or other acute bowel disease   Previous treatment with radiation therapy     Be sure to discuss these risks with your doctor before the procedure. What to Expect   Prior to Procedure   Your doctor will likely do the following:   Physical exam   Health history   Review of medicines   Test your stool for hidden blood (called \"occult blood\")     Your colon must be completely clean before the procedure. Any stool left in the intestine will block the view. This preparation may start several days before the procedure. Follow your doctor's instructions. Leading up to your procedure:   Talk to your doctor about your medicines.  You may be asked to stop taking some medicines up to one week before the procedure, like:   Anti-inflammatory drugs (e.g., aspirin )   Blood thinners like clopidogrel (Plavix) or warfarin (Coumadin)   Iron supplements or vitamins containing iron   The day or days before your procedure, go on a clear liquid diet (clear broth, clear juice, clear jello) with no red coloring  Do not eat or drink anything after midnight. Wear comfortable clothing. If you have diabetes, ask your doctor if you need to adjust your diabetes medicine on the day prior to your procedure and the day of your procedure. Arrange for a ride home after the procedure. Anesthesia   You will receive intravenous sedation medicine for the procedure so you will not feel anything during the procedure. Description of the Procedure   You will lie on your left side with knees bent and drawn up toward your chest. The colonoscope will be slowly inserted through the rectum and into the bowel. The colonoscope will inject air into the colon. A small attached video camera will allow the doctor to view the colon's lining on a screen. The doctor will continue guiding the tool through the bowel and assess the lining. A tissue sample or polyps may be removed during the procedure. How Long Will It Take? Usually it takes about 30 to 45 minutes     Will It Hurt? Most people do not feel anything during the procedure and will not remember the procedure. After the procedure, gas pains and cramping are common. These pains should go away with the passing of gas. Post-procedure Care   If any tissue was removed: It will be sent to a lab to be examined. It may take 1-2 weeks for results. The doctor will usually give an initial report after the scope is removed. Other tests may be recommended. A small amount of bleeding may occur during the first few days after the procedure. When you return home after the procedure, be sure to follow your doctor's instructions, which may include:   Resume medicines as instructed by your doctor. Resume normal diet, unless directed otherwise by your doctor. The sedative will make you drowsy. Avoid driving, operating machinery, or making important decisions for the rest of the day. Rest for the remainder of the day.      After arriving home, contact your doctor if any of the following occurs:   Bleeding from your rectum, notify your doctor if you pass a teaspoonful of blood or more. Black, tarry stools   Severe abdominal pain   Hard, swollen abdomen   Signs of infection, including fever or chills   Inability to pass gas or stool   Coughing, shortness of breath, chest pain, severe nausea or vomiting     In case of an emergency, CALL 911 .

## 2020-10-20 ENCOUNTER — TELEPHONE (OUTPATIENT)
Dept: SURGERY | Age: 65
End: 2020-10-20

## 2020-10-21 ENCOUNTER — HOSPITAL ENCOUNTER (OUTPATIENT)
Age: 65
Discharge: HOME | End: 2020-10-21
Payer: MEDICARE

## 2020-10-21 LAB
T4 FREE: 0.95 NG/DL (ref 0.93–1.7)
TSH SERPL DL<=0.05 MIU/L-ACNC: 6.66 UIU/ML (ref 0.27–4.2)
VITAMIN D 25-HYDROXY: 17 NG/ML (ref 30–100)

## 2020-10-21 PROCEDURE — 82306 VITAMIN D 25 HYDROXY: CPT

## 2020-10-21 PROCEDURE — 84443 ASSAY THYROID STIM HORMONE: CPT

## 2020-10-21 PROCEDURE — 84439 ASSAY OF FREE THYROXINE: CPT

## 2020-10-21 PROCEDURE — 36415 COLL VENOUS BLD VENIPUNCTURE: CPT

## 2020-10-27 ENCOUNTER — TELEPHONE (OUTPATIENT)
Dept: INTERNAL MEDICINE | Age: 65
End: 2020-10-27

## 2020-10-27 ENCOUNTER — OFFICE VISIT (OUTPATIENT)
Dept: PRIMARY CARE CLINIC | Age: 65
End: 2020-10-27
Payer: MEDICARE

## 2020-10-27 ENCOUNTER — VIRTUAL VISIT (OUTPATIENT)
Dept: INTERNAL MEDICINE | Age: 65
End: 2020-10-27
Payer: MEDICARE

## 2020-10-27 VITALS
WEIGHT: 228 LBS | DIASTOLIC BLOOD PRESSURE: 88 MMHG | HEIGHT: 66 IN | BODY MASS INDEX: 36.64 KG/M2 | SYSTOLIC BLOOD PRESSURE: 149 MMHG | OXYGEN SATURATION: 96 % | HEART RATE: 88 BPM

## 2020-10-27 PROCEDURE — 99443 PR PHYS/QHP TELEPHONE EVALUATION 21-30 MIN: CPT | Performed by: INTERNAL MEDICINE

## 2020-10-27 PROCEDURE — 99213 OFFICE O/P EST LOW 20 MIN: CPT | Performed by: NURSE PRACTITIONER

## 2020-10-27 RX ORDER — GABAPENTIN 400 MG/1
400 CAPSULE ORAL 4 TIMES DAILY
Qty: 120 CAPSULE | Refills: 0 | Status: SHIPPED
Start: 2020-10-27 | End: 2021-04-21 | Stop reason: SDUPTHER

## 2020-10-27 RX ORDER — IBUPROFEN 200 MG
CAPSULE ORAL
Qty: 30 TABLET | Refills: 2 | Status: SHIPPED
Start: 2020-10-27 | End: 2021-02-25 | Stop reason: SDUPTHER

## 2020-10-27 RX ORDER — POLYMYXIN B SULFATE AND TRIMETHOPRIM 1; 10000 MG/ML; [USP'U]/ML
1 SOLUTION OPHTHALMIC EVERY 4 HOURS
Qty: 1 BOTTLE | Refills: 0 | Status: SHIPPED | OUTPATIENT
Start: 2020-10-27 | End: 2020-11-06

## 2020-10-27 RX ORDER — TRIAMTERENE AND HYDROCHLOROTHIAZIDE 37.5; 25 MG/1; MG/1
TABLET ORAL
Qty: 90 TABLET | Refills: 0 | Status: SHIPPED
Start: 2020-10-27 | End: 2021-03-29 | Stop reason: SDUPTHER

## 2020-10-27 RX ORDER — AMOXICILLIN AND CLAVULANATE POTASSIUM 875; 125 MG/1; MG/1
1 TABLET, FILM COATED ORAL 2 TIMES DAILY
Qty: 20 TABLET | Refills: 0 | Status: SHIPPED | OUTPATIENT
Start: 2020-10-27 | End: 2020-11-06

## 2020-10-27 RX ORDER — LEVOTHYROXINE SODIUM 0.1 MG/1
100 TABLET ORAL DAILY
Qty: 90 TABLET | Refills: 0 | Status: SHIPPED
Start: 2020-10-27 | End: 2021-02-25 | Stop reason: SDUPTHER

## 2020-10-27 RX ORDER — MONTELUKAST SODIUM 10 MG/1
TABLET ORAL
Qty: 90 TABLET | Refills: 1 | Status: SHIPPED
Start: 2020-10-27 | End: 2021-04-21 | Stop reason: SDUPTHER

## 2020-10-27 RX ORDER — BACLOFEN 10 MG/1
TABLET ORAL
Qty: 90 TABLET | Refills: 2 | Status: SHIPPED | OUTPATIENT
Start: 2020-10-27 | End: 2021-08-09 | Stop reason: SDUPTHER

## 2020-10-27 ASSESSMENT — ENCOUNTER SYMPTOMS
SHORTNESS OF BREATH: 0
FACIAL SWELLING: 1
EYE REDNESS: 0
DIARRHEA: 0
NAUSEA: 0
EYE DISCHARGE: 0
COUGH: 0
VOMITING: 0
EYE ITCHING: 0
EYE PAIN: 1
WHEEZING: 0
PHOTOPHOBIA: 0
CONSTIPATION: 0

## 2020-10-27 NOTE — PROGRESS NOTES
Special Virtual Visit done per Dr. Kaylyn Guerrero   Patients questions were addressed and answered Printed AVS along with script for lab work was mailed to pt .

## 2020-10-27 NOTE — PROGRESS NOTES
Pao Ascencio is a 72 y.o. female evaluated via telephone on 10/27/2020. Consent:  She and/or health care decision maker is aware that that she may receive a bill for this telephone service, depending on her insurance coverage, and has provided verbal consent to proceed: Yes      Documentation:  I communicated with the patient and/or health care decision maker about DM, Hypothyroidism, MM, HTN, COPD. Details of this discussion including any medical advice provided:     Refers 1 week ago started having congestion and 1 day of tederness on inner corner, watery secretion, went to flu clinic and was evaluated today, was given mupirocin ointment and tmp-polymixin eye drops. Counseled if worsening of swelling in eye, pain, or visual disturbance to visit ER or urgent care right away. Smoldering MM- stable per Hem/onc records last seen in June 2020, M spike 1.2 G/dL, IgG 2100, kappa 62.9, ratio 2.89, will get skeletal survey on 2021. MRI negative for lytic lesions. Will f/u in December    Chronic back pain and fibromyalgia. Refers continues to have muscle spasms. And ran ot of her baclofen. Stable on Elavil, Neurontin, and Celexa. DMT2- metformin 1000 mg BID A1c Last A1c in Jan 2020. Will check on necxt office visit. In the meantime continue medications as prescribed, no hypoglycemic symptoms. Pt not on statin, will discuss the need in next visit since high risk and ASCVD score is 21.7%    COPD- recently started on Trelegy. Trelegy 1 puff qd, , Abuterol prn, CT scan chest yearly    HTN- 149/88. Had not taken her medication, counseling on home measurements to have more accurate readings. Will continue with Maxizide 37.5-25 daily. Will f/u in clinic. Hypothyroidism- on synthroid 88 mcg TSH 6,660. Will increase her synthroid to 100 mcg daily and repeat TSH in 6-8 weeks. Diverticulosis, and hx of tubular adenoma in 2016.  GS evaluated and scheduled for colonoscopy in Nov 9, with possible bx and polypectomy. GERD- GS recommends EGD, pending. Will follow up w results. Incidental bilateral adrenal adenoma. Non smoker (quit 6 mo ago), no alcohol. I affirm this is a Patient Initiated Episode with a Patient who has not had a related appointment within my department in the past 7 days or scheduled within the next 24 hours. Patient identification was verified at the start of the visit: Yes    Total Time: minutes: 21-30 minutes    Note: not billable if this call serves to triage the patient into an appointment for the relevant concern      Armani Virgen   Discussed w Dr Galen Serrato . RTC in 2 mo.

## 2020-10-27 NOTE — PROGRESS NOTES
TeleMedicine Video Visit    This visit was performed as a virtual audio telehealth technology platform. Patient identification was verified at the start of the visit, including the patient's telephone number and physical location. I discussed with the patient the nature of our telehealth visits, that:     1. Due to the nature of an audio modality, the only components of a physical exam that could be done are the elements supported by direct observation. 2. I would evaluate the patient and recommend diagnostics and treatments based on my assessment. 3. If it was felt that the patient should be evaluated in clinic or an emergency room setting, then they would be directed there. 4. Our sessions are not being recorded and that personal health information is protected. 5. Our team would provide follow up care in person if/when the patient needs it. Patient agree to proceed with telemedicine consultation. Pt presented in Televisit today, controlled DM, smoldering MM, hypertension. Also has mild neuropathy and has been doing well. Chronic back pain with MM, has been seen oncology and it was reported and pending skeletal surgey and no recent lytic lesion noted in MRI screening. Continue screening lung CT scan, went to flu clinic today. TSH is still elevated to 6.6 and need adjustment for T4. Colonoscopy needs repeated for polyps.     Eli Raya MD.

## 2020-10-27 NOTE — TELEPHONE ENCOUNTER
Patient rescheduled counseling appointment due to not feeling well. Rescheduled for next Tuesday 11/3 following PT appointment.

## 2020-10-27 NOTE — PATIENT INSTRUCTIONS
Thank you for your visit today. What to do:   Increase Synthroid to 100 mcg daily   Continue the rest of the medications as prescribed.  Have TSH and A1c done before next appointment.  Follow up in 2 mo.      Huseyin Sanchez MD.

## 2020-10-27 NOTE — PROGRESS NOTES
Chief Complaint   Patient presents with    Eye Problem     swelling and pain, right eye       HPI:  Patient presents today for  Complaints of right eye pain and swelling. Reports that this has been going on for the past 3-4 days. . Reports that her eye is tender around her eye. Her eye is also watering. Denies any vision changes/photophobia. Denies injury or trauma to her eye. She does not wear glasses or contacts. She tells me that she has associated nasal congestion and headache. She is also concerned because she has a crack to her lips on the right side of her mouth. She has been using A and D ointment without relief of symptoms. Prior to Visit Medications    Medication Sig Taking? Authorizing Provider   metFORMIN (GLUCOPHAGE) 1000 MG tablet TAKE 1 TABLET BY MOUTH TWICE DAILY WITH MEALS Yes Latha Alcantara MD   calcium-vitamin D (CALCIUM 500/D) 500-200 MG-UNIT per tablet TAKE 1 TABLET BY MOUTH DAILY Yes David Vasquez MD   amitriptyline (ELAVIL) 50 MG tablet TAKE 1 TABLET BY MOUTH EVERY EVENING Yes David Vasquez MD   montelukast (SINGULAIR) 10 MG tablet TAKE 1 TABLET BY MOUTH EVERY NIGHT AT BEDTIME Yes David Vasquez MD   gabapentin (NEURONTIN) 400 MG capsule Take 1 capsule by mouth 4 times daily for 30 days. Yes David Vasquez MD   levothyroxine (SYNTHROID) 88 MCG tablet Take 1 tablet by mouth Daily Yes Dustin Krueger DO   citalopram (CELEXA) 40 MG tablet Take 1 tablet by mouth daily Yes Dustin Krueger DO   triamterene-hydroCHLOROthiazide (MAXZIDE-25) 37.5-25 MG per tablet TAKE 1 TABLET BY MOUTH EVERY DAY Yes Nikunj Polanco, DO   OXYGEN Inhale into the lungs Uses 2 liters at night Yes Historical Provider, MD   mineral oil-hydrophilic petrolatum (HYDROPHOR) ointment Apply topically as needed.  Yes Virgilio Banks MD   albuterol sulfate  (90 Base) MCG/ACT inhaler Inhale 2 puffs into the lungs 4 times daily as needed for Wheezing Yes [address], DO Negative for headaches. VS:  BP (!) 149/88   Pulse 88   Ht 5' 6\" (1.676 m)   Wt 228 lb (103.4 kg)   SpO2 96%   BMI 36.80 kg/m²     Patient's medical, social, and family history reviewed      Physical Exam  Physical Exam  Constitutional:       Appearance: She is well-developed. HENT:      Head: Normocephalic. Eyes:      General:         Right eye: Hordeolum present. Conjunctiva/sclera:      Right eye: Right conjunctiva is not injected. No chemosis, exudate or hemorrhage. Pupils: Pupils are equal, round, and reactive to light. Comments: hordeolum to right eye   Neck:      Musculoskeletal: Normal range of motion and neck supple. Thyroid: No thyromegaly. Cardiovascular:      Rate and Rhythm: Normal rate and regular rhythm. Pulmonary:      Effort: Pulmonary effort is normal.      Breath sounds: Normal breath sounds. Abdominal:      General: Bowel sounds are normal.      Palpations: Abdomen is soft. Musculoskeletal: Normal range of motion. Lymphadenopathy:      Cervical: No cervical adenopathy. Skin:     General: Skin is warm and dry. Neurological:      Mental Status: She is alert and oriented to person, place, and time. Psychiatric:         Behavior: Behavior normal.           Assessment/Plan:    1. Acute non-recurrent maxillary sinusitis    - amoxicillin-clavulanate (AUGMENTIN) 875-125 MG per tablet; Take 1 tablet by mouth 2 times daily for 10 days  Dispense: 20 tablet; Refill: 0    2. Hordeolum externum of right upper eyelid    - trimethoprim-polymyxin b (POLYTRIM) 50814-2.1 UNIT/ML-% ophthalmic solution; Place 1 drop into the right eye every 4 hours for 10 days  Dispense: 1 Bottle; Refill: 0    3. Angular cheilitis    - mupirocin (BACTROBAN) 2 % ointment; Apply 3 times daily. Dispense: 1 g; Refill: 0      Return if symptoms worsen or fail to improve.       BRYAN Hunter - CNP

## 2020-10-29 RX ORDER — ERGOCALCIFEROL 1.25 MG/1
50000 CAPSULE ORAL WEEKLY
Qty: 12 CAPSULE | Refills: 0 | Status: SHIPPED
Start: 2020-10-29 | End: 2021-04-21 | Stop reason: ALTCHOICE

## 2020-10-29 NOTE — PROGRESS NOTES
Vit D level 17. Start weekly 09112 units dosing, rx sent to pharmacy.     Electronically signed by Tammie Chen DO on 10/29/2020 at 2:35 PM

## 2020-11-03 ENCOUNTER — HOSPITAL ENCOUNTER (OUTPATIENT)
Dept: PHYSICAL THERAPY | Age: 65
Setting detail: THERAPIES SERIES
Discharge: HOME | End: 2020-11-03
Payer: MEDICARE

## 2020-11-03 PROBLEM — M47.816 LUMBAR FACET ARTHROPATHY: Status: RESOLVED | Noted: 2020-04-08 | Resolved: 2020-11-03

## 2020-11-03 NOTE — PROGRESS NOTES
[address]                Phone: [phone]  Fax: [phone]    Physical Therapy  Cancellation/No-show Note  Patient Name:  Sid Pelletier  :  1955   Date:  11/3/2020    For today's appointment patient:  []  Cancelled  [x]  Rescheduled appointment  []  No-show     Reason given by patient:  []  Patient ill  []  Conflicting appointment  []  No transportation    []  Conflict with work  [x]  No reason given  []  Other:     Comments:  Pt rescheduled for tomorrow 2020 at 0800.     Electronically signed by:  Ethan Frazier, PT, DPT

## 2020-11-05 ENCOUNTER — TELEPHONE (OUTPATIENT)
Dept: INTERNAL MEDICINE | Age: 65
End: 2020-11-05

## 2020-11-05 DIAGNOSIS — U07.1 COVID-19: ICD-10-CM

## 2020-11-05 NOTE — PROGRESS NOTES
[address] PRE-ADMISSION TESTING ENDOSCOPY INSTRUCTIONS- [facility]-phone number:[phone]    ENDOSCOPY INSTRUCTIONS:   [x] Bowel prep instructions reviewed. [x] Nothing by mouth after midnight, including gum, candy, mints, or water. Please follow your surgeons instructions if you are required to complete a bowel prep. Colonoscopy- no solid food-only clear liquids the day prior). [x] You may brush your teeth, gargle, but do NOT swallow water. [x] Do not wear makeup, lotions, powders, deodorant. Nail polish as directed by the nurse. [x] Arrange transportation with a responsible adult  to and from the hospital. If you do not have a responsible adult  to transport you, you will need to make arrangements with a medical transportation company (i.e. [employer]. A Uber/taxi/bus is not appropriate unless you are accompanied by a responsible adult ). Arrange for someone to be with you for the remainder of the day and for 24 hours after your procedure due to having had anesthesia. Who will be your  for transportation?____Earnestine, Aunt______________   Who will be staying with you for 24 hrs after your procedure?______Aunt____________    PARKING INSTRUCTIONS:   [x] Arrival Time:___06[address]_____________________  · [] Parking lot  \"I\" OR 1 is located on [facility] (the corner of [facility]). To enter, press the button and the gate will lift. A free token will be provided to exit the lot. One car per patient is allowed to park in this lot. All other cars are to park on [address] [place] [address] Street either in the parking garage or the handicap lot. [x] To reach the [facility] lobby from [address] [place] [address], upon entering the hospital, take elevator B to the 3rd floor. EDUCATION INSTRUCTIONS:  [x] Bring a complete list of your medications, please write the last time you took the medicine, give this list to the nurse.   [x] Take the following medications the morning of surgery with 1-2 ounces of water:   [x] Stop herbal supplements and vitamins 5 days before your surgery. [x] DO NOT take any diabetic medicine the morning of surgery. Follow instructions for insulin the day before surgery. [] If you are diabetic and your blood sugar is low or you feel symptomatic, you may drink 1-2 ounces of apple juice or take a glucose tablet. The morning of your procedure, you may call the pre-op area if you have concerns about your blood sugar [phone]. [x] Use your inhalers the morning of surgery. Bring your emergency inhaler with you day of surgery. [x] Follow physician instructions regarding any blood thinners you may be taking. WHAT TO EXPECT:  [x] The day of your procedure you will be greeted and checked in by the [employer].  In addition, you will be registered in the [facility] by a Patient Access Representative. Please bring your photo ID and insurance card. A nurse will greet you in accordance to the time you are needed in the pre-op area to prepare you for surgery. Please do not be discouraged if you are not greeted in the order you arrive as there are many variables that are involved in patient preparation. Your patience is greatly appreciated as you wait for your nurse. Please bring in items such as: books, magazines, newspapers, electronics, or any other items  to occupy your time in the waiting area. []  Delays may occur. Staff will make a sincere effort to keep you informed of delays. If any delays occur with your procedure, we apologize ahead of time for your inconvenience as we recognize the value of your time.

## 2020-11-05 NOTE — TELEPHONE ENCOUNTER
SW called patient after she DNS for counseling. She reports getting confused with all ofher appointments. Reports upcoming procedure 11/9 and requested reschedule. Offered virtual for convenience and patient agreed. Scheduled for Thursday 11/12 at 10:00AM for doxy session.

## 2020-11-07 LAB
SARS-COV-2: NOT DETECTED
SOURCE: NORMAL

## 2020-11-09 ENCOUNTER — ANESTHESIA (OUTPATIENT)
Dept: ENDOSCOPY | Age: 65
End: 2020-11-09
Payer: MEDICARE

## 2020-11-09 ENCOUNTER — HOSPITAL ENCOUNTER (OUTPATIENT)
Age: 65
Setting detail: OUTPATIENT SURGERY
Discharge: HOME | End: 2020-11-09
Attending: SURGERY | Admitting: SURGERY
Payer: MEDICARE

## 2020-11-09 ENCOUNTER — ANESTHESIA EVENT (OUTPATIENT)
Dept: ENDOSCOPY | Age: 65
End: 2020-11-09
Payer: MEDICARE

## 2020-11-09 VITALS
SYSTOLIC BLOOD PRESSURE: 150 MMHG | HEIGHT: 66 IN | TEMPERATURE: 97.9 F | DIASTOLIC BLOOD PRESSURE: 72 MMHG | HEART RATE: 80 BPM | BODY MASS INDEX: 32.14 KG/M2 | OXYGEN SATURATION: 98 % | RESPIRATION RATE: 17 BRPM | WEIGHT: 200 LBS

## 2020-11-09 VITALS
DIASTOLIC BLOOD PRESSURE: 89 MMHG | OXYGEN SATURATION: 100 % | RESPIRATION RATE: 19 BRPM | SYSTOLIC BLOOD PRESSURE: 156 MMHG

## 2020-11-09 LAB
CHP ED QC CHECK: NORMAL
GLUCOSE BLD-MCNC: 142 MG/DL
METER GLUCOSE: 142 MG/DL (ref 74–99)

## 2020-11-09 PROCEDURE — 7100000010 HC PHASE II RECOVERY - FIRST 15 MIN: Performed by: SURGERY

## 2020-11-09 PROCEDURE — 45384 COLONOSCOPY W/LESION REMOVAL: CPT | Performed by: SURGERY

## 2020-11-09 PROCEDURE — 3700000001 HC ADD 15 MINUTES (ANESTHESIA): Performed by: SURGERY

## 2020-11-09 PROCEDURE — 7100000011 HC PHASE II RECOVERY - ADDTL 15 MIN: Performed by: SURGERY

## 2020-11-09 PROCEDURE — 3609012400 HC EGD TRANSORAL BIOPSY SINGLE/MULTIPLE: Performed by: SURGERY

## 2020-11-09 PROCEDURE — 2500000003 HC RX 250 WO HCPCS

## 2020-11-09 PROCEDURE — 82962 GLUCOSE BLOOD TEST: CPT

## 2020-11-09 PROCEDURE — 2580000003 HC RX 258: Performed by: SURGERY

## 2020-11-09 PROCEDURE — 88305 TISSUE EXAM BY PATHOLOGIST: CPT

## 2020-11-09 PROCEDURE — 3609010600 HC COLONOSCOPY POLYPECTOMY SNARE/COLD BIOPSY: Performed by: SURGERY

## 2020-11-09 PROCEDURE — 2709999900 HC NON-CHARGEABLE SUPPLY: Performed by: SURGERY

## 2020-11-09 PROCEDURE — 3700000000 HC ANESTHESIA ATTENDED CARE: Performed by: SURGERY

## 2020-11-09 PROCEDURE — 3609009900 HC COLONOSCOPY W/CONTROL BLEEDING ANY METHOD: Performed by: SURGERY

## 2020-11-09 PROCEDURE — 88342 IMHCHEM/IMCYTCHM 1ST ANTB: CPT

## 2020-11-09 PROCEDURE — 6360000002 HC RX W HCPCS

## 2020-11-09 PROCEDURE — 43239 EGD BIOPSY SINGLE/MULTIPLE: CPT | Performed by: SURGERY

## 2020-11-09 RX ORDER — SODIUM CHLORIDE 0.9 % (FLUSH) 0.9 %
10 SYRINGE (ML) INJECTION PRN
Status: DISCONTINUED | OUTPATIENT
Start: 2020-11-09 | End: 2020-11-09 | Stop reason: HOSPADM

## 2020-11-09 RX ORDER — PROPOFOL 10 MG/ML
INJECTION, EMULSION INTRAVENOUS PRN
Status: DISCONTINUED | OUTPATIENT
Start: 2020-11-09 | End: 2020-11-09 | Stop reason: SDUPTHER

## 2020-11-09 RX ORDER — SODIUM CHLORIDE 0.9 % (FLUSH) 0.9 %
10 SYRINGE (ML) INJECTION EVERY 12 HOURS SCHEDULED
Status: DISCONTINUED | OUTPATIENT
Start: 2020-11-09 | End: 2020-11-09 | Stop reason: HOSPADM

## 2020-11-09 RX ORDER — CALCIUM POLYCARBOPHIL 625 MG
625 TABLET ORAL 2 TIMES DAILY
Qty: 60 TABLET | Refills: 6 | Status: SHIPPED
Start: 2020-11-09 | End: 2021-04-21 | Stop reason: SDUPTHER

## 2020-11-09 RX ORDER — OMEPRAZOLE 20 MG/1
20 CAPSULE, DELAYED RELEASE ORAL 2 TIMES DAILY
Qty: 60 CAPSULE | Refills: 2 | Status: SHIPPED | OUTPATIENT
Start: 2020-11-09

## 2020-11-09 RX ORDER — SODIUM CHLORIDE 9 MG/ML
INJECTION, SOLUTION INTRAVENOUS CONTINUOUS
Status: DISCONTINUED | OUTPATIENT
Start: 2020-11-09 | End: 2020-11-09 | Stop reason: HOSPADM

## 2020-11-09 RX ORDER — GLYCOPYRROLATE 1 MG/5 ML
SYRINGE (ML) INTRAVENOUS PRN
Status: DISCONTINUED | OUTPATIENT
Start: 2020-11-09 | End: 2020-11-09 | Stop reason: SDUPTHER

## 2020-11-09 RX ORDER — ALUMINA, MAGNESIA, AND SIMETHICONE 2400; 2400; 240 MG/30ML; MG/30ML; MG/30ML
15 SUSPENSION ORAL 4 TIMES DAILY
Qty: 1000 ML | Refills: 3 | Status: SHIPPED | OUTPATIENT
Start: 2020-11-09 | End: 2021-04-02

## 2020-11-09 RX ORDER — DOCUSATE SODIUM 100 MG/1
100 CAPSULE, LIQUID FILLED ORAL 2 TIMES DAILY
Qty: 60 CAPSULE | Refills: 6 | Status: SHIPPED
Start: 2020-11-09 | End: 2021-04-21 | Stop reason: SDUPTHER

## 2020-11-09 RX ADMIN — SODIUM CHLORIDE: 9 INJECTION, SOLUTION INTRAVENOUS at 08:18

## 2020-11-09 RX ADMIN — PROPOFOL 50 MG: 10 INJECTION, EMULSION INTRAVENOUS at 08:30

## 2020-11-09 RX ADMIN — PROPOFOL 50 MG: 10 INJECTION, EMULSION INTRAVENOUS at 08:29

## 2020-11-09 RX ADMIN — PROPOFOL 120 MG: 10 INJECTION, EMULSION INTRAVENOUS at 08:19

## 2020-11-09 RX ADMIN — PROPOFOL 50 MG: 10 INJECTION, EMULSION INTRAVENOUS at 08:27

## 2020-11-09 RX ADMIN — GLUCAGON HYDROCHLORIDE 1 MG: 1 INJECTION, POWDER, FOR SOLUTION INTRAMUSCULAR; INTRAVENOUS; SUBCUTANEOUS at 08:31

## 2020-11-09 RX ADMIN — PROPOFOL 30 MG: 10 INJECTION, EMULSION INTRAVENOUS at 08:42

## 2020-11-09 RX ADMIN — PROPOFOL 50 MG: 10 INJECTION, EMULSION INTRAVENOUS at 08:33

## 2020-11-09 RX ADMIN — PROPOFOL 50 MG: 10 INJECTION, EMULSION INTRAVENOUS at 08:21

## 2020-11-09 RX ADMIN — SODIUM CHLORIDE: 9 INJECTION, SOLUTION INTRAVENOUS at 07:27

## 2020-11-09 RX ADMIN — PROPOFOL 30 MG: 10 INJECTION, EMULSION INTRAVENOUS at 08:46

## 2020-11-09 RX ADMIN — Medication 0.2 MG: at 08:19

## 2020-11-09 RX ADMIN — PROPOFOL 50 MG: 10 INJECTION, EMULSION INTRAVENOUS at 08:37

## 2020-11-09 RX ADMIN — PROPOFOL 50 MG: 10 INJECTION, EMULSION INTRAVENOUS at 08:24

## 2020-11-09 ASSESSMENT — COPD QUESTIONNAIRES: CAT_SEVERITY: MODERATE

## 2020-11-09 ASSESSMENT — PAIN - FUNCTIONAL ASSESSMENT: PAIN_FUNCTIONAL_ASSESSMENT: 0-10

## 2020-11-09 ASSESSMENT — PAIN SCALES - GENERAL
PAINLEVEL_OUTOF10: 0

## 2020-11-09 NOTE — OP NOTE
PROCEDURE NOTE    DATE OF PROCEDURE: 11/9/2020     SURGEON: Hortencia Chapman M.D.    ASSISTANT: None    PREOPERATIVE DIAGNOSIS: Heartburn, indigestion, and history of gastric ulcers    POSTOPERATIVE DIAGNOSIS: Same with severe gastritis with superficial ulcerations    OPERATION: Upper GI endoscopy with antral gastric biopsy for histology to rule out Helicobacter pylori     ANESTHESIA: Local monitored anesthesia. ESTIMATED BLOOD LOSS: Less than 50 ml    COMPLICATIONS: None. SPECIMENS:  Was Obtained: Gastric biopsy rule out Helicobacter pylori    HISTORY: The patient is a 72y.o. year old female with history of above preop diagnosis. I recommended esophagogastroduodenoscopy with possible biopsy and I explained the risk, benefits, expected outcome, and alternatives to the procedure. Risks included but are not limited to bleeding, infection, respiratory distress, hypotension, and perforation of the esophagus, stomach, or duodenum. Patient understands and is in agreement. PROCEDURE: The patient was given IV conscious sedation per anesthesia. The patient was given supplemental oxygen by nasal cannula. The gastroscope was inserted orally and advanced under direct vision through the esophagus, through the stomach, through the pylorus, and into the descending duodenum. Findings:  Duodenum:     Descending: normal    Bulb: normal    Stomach:    Antrum: abnormal: Severe gastritis with superficial ulcerations, with antral gastric biopsy for histology to rule out Helicobacter pylori     Body: abnormal: Moderate to severe gastritis    Fundus: abnormal: Mild to moderate gastritis with no hiatal hernia    Esophagus: normal    Larynx: normal    The scope was removed and the patient tolerated the procedure well. IMPRESSION/PLAN:   1. Heartburn, indigestion, and history of gastric ulcers - due to gastritis with superficial ulcerations, see below.   2. Gastritis with superficial ulcerations - Omeprazole 20 mg by

## 2020-11-09 NOTE — OP NOTE
PROCEDURE NOTE    DATE OF PROCEDURE: 11/9/2020    SURGEON: Dafne Griffith M.D.    ASSISTANT: None    PREOPERATIVE DIAGNOSIS: Diagnostic colonoscopy for rectal bleeding, alternating diarrhea and constipation, lower abdominal pain, history of adenomatous colon polyps    POSTOPERATIVE DIAGNOSIS: Same with small (2 mm diameter) sessile polyp distal ascending colon and right and left-sided diverticulosis without diverticulitis    OPERATION: Total colonoscopy with biopsy removal and cauterization of ascending colon polyp    ANESTHESIA: Local monitored anesthesia. ESTIMATED BLOOD LOSS: less than 50     COMPLICATIONS: None. SPECIMENS:  Was Obtained: Biopsy of distal ascending colon polyp    HISTORY: The patient is a 72y.o. year old female with history of above preop diagnosis. I recommended colonoscopy with possible biopsy or polypectomy and I explained the risk, benefits, expected outcome, and alternatives to the procedure. Risks included but are not limited to bleeding, infection, respiratory distress, hypotension, and perforation of the colon. The patient understands and is in agreement. PROCEDURE: The patient was given IV conscious sedation per anesthesia. The patient was given supplemental oxygen by nasal cannula. The colonoscope was inserted per rectum and advanced under direct vision to the cecum with difficulty due to spasms left colon and elongated tortuous left colon. Patient given glucagon 1 mg IV and was changed position with pressure on the abdomen to pass the scope to the cecum. .  The prep was fair so exam was adequate.     FINDINGS:  Cecum/Ascending colon: abnormal: 1 small uncomplicated diverticula, small (2 mm diameter) sessile polyp distal ascending colon removed with biopsy and cauterized    Transverse colon: normal    Descending/Sigmoid colon: abnormal: Multiple small to large diverticula without diverticulitis special in the sigmoid colon    Rectum/Anus: examined in normal and retroflexed positions and was normal    The colon was decompressed and the scope was removed. The withdraw time was approximately 11 minutes. The patient tolerated the procedure well. ASSESSMENT/PLAN:   1. Rectal bleeding - there were no abnormality seen on colonoscopy to account for this. Most likely secondary to intermittent irritation of the anal area, proctitis. Recommend treat symptomatically with anal cream.  2. Abdominal pain and alternating diarrhea and constipation - this is most likely due to irritable bowel syndrome (see below)  3. History of adenomatous colon polyps with removal of recurrent polyp - polyp was removed with biopsy and cauterized. We will check pathology and contact patient with results and further recommendations for follow-up colonoscopy. 4. Uncomplicated Diverticulosis - maintain regular bowel habits and avoid constipation, hard stools, and excessive straining with stools  5. Irritable bowel syndrome - will start on bowel routine with high-fiber diet, fiber supplement twice a day, and stool softener twice a day.     Electronically signed by Prisca Houser MD on 11/9/20 at 9:04 AM EST

## 2020-11-09 NOTE — ANESTHESIA POSTPROCEDURE EVALUATION
Department of Anesthesiology  Postprocedure Note    Patient: Vinayak Dubose  MRN: [id number]  YOB: 1955  Date of evaluation: 11/9/2020  Time:  9:15 AM     Procedure Summary     Date:  11/09/20 Room / Location:  [address] [place], [place] [address] / [facility]    Anesthesia Start:  4165 Anesthesia Stop:  5692    Procedures:       EGD BIOPSY (N/A )      COLONOSCOPY CONTROL HEMORRHAGE (N/A )      COLONOSCOPY POLYPECTOMY SNARE/COLD BIOPSY Diagnosis:  (HX OF COLON POLYPS, CHRONIC ABDOMINAL PAIN)    Surgeon:  Sabrina Aguilar MD Responsible Provider:  Raudel Chaparro MD    Anesthesia Type:  MAC ASA Status:  3          Anesthesia Type: MAC    Vandana Phase I: Vandana Score: 10    Vandana Phase II: Vandana Score: 10    Last vitals: Reviewed and per EMR flowsheets.        Anesthesia Post Evaluation    Patient location during evaluation: bedside  Patient participation: complete - patient participated  Level of consciousness: awake and alert  Airway patency: patent  Nausea & Vomiting: no nausea and no vomiting  Complications: no  Cardiovascular status: blood pressure returned to baseline and hemodynamically stable  Respiratory status: acceptable and spontaneous ventilation  Hydration status: euvolemic

## 2020-11-09 NOTE — ANESTHESIA PRE PROCEDURE
Department of Anesthesiology  Preprocedure Note       Name:  Sid Pelletier   Age:  72 y.o.  :  1955                                          MRN:  [id number]         Date:  2020      Surgeon: Ponce Hanson):  Nancy Lacy MD    Procedure: Procedure(s):  EGD BIOPSY  COLONOSCOPY DIAGNOSTIC    Medications prior to admission:   Prior to Admission medications    Medication Sig Start Date End Date Taking? Authorizing Provider   vitamin D (ERGOCALCIFEROL) 1.25 MG (71189 UT) CAPS capsule Take 1 capsule by mouth once a week 10/29/20  Yes Magalie Gonzalez DO   baclofen (LIORESAL) 10 MG tablet TAKE 1/2 TABLET THREE TIMES DAILY AS NEEDED(PAIN, SPASMS) 10/27/20  Yes Fili Mcgrath MD   gabapentin (NEURONTIN) 400 MG capsule Take 1 capsule by mouth 4 times daily for 30 days.  10/27/20 11/26/20 Yes Fili Mcgrath MD   levothyroxine (SYNTHROID) 100 MCG tablet Take 1 tablet by mouth Daily 10/27/20 1/25/21 Yes Fili Mcgrath MD   calcium-vitamin D (CALCIUM 500/D) 500-200 MG-UNIT per tablet TAKE 1 TABLET BY MOUTH DAILY 10/27/20  Yes Fili Mcgrath MD   montelukast (SINGULAIR) 10 MG tablet TAKE 1 TABLET BY MOUTH EVERY NIGHT AT BEDTIME 10/27/20  Yes Fili Mcgrath MD   triamterene-hydroCHLOROthiazide (MAXZIDE-25) 37.5-25 MG per tablet TAKE 1 TABLET BY MOUTH EVERY DAY 10/27/20  Yes Fili Mcgrath MD   bisacodyl (DULCOLAX) 5 MG EC tablet Take 4 tablets orally twice the day before colonoscopy as directed 10/15/20  Yes Nancy Lacy MD   metFORMIN (GLUCOPHAGE) 1000 MG tablet TAKE 1 TABLET BY MOUTH TWICE DAILY WITH MEALS 10/13/20  Yes Ankit Guzmán MD   amitriptyline (ELAVIL) 50 MG tablet TAKE 1 TABLET BY MOUTH EVERY EVENING 20  Yes Dany Ramesh MD   citalopram (CELEXA) 40 MG tablet Take 1 tablet by mouth daily 20  Yes Magalie Gonzalez DO   ibuprofen (ADVIL;MOTRIN) 600 MG tablet Take 1 tablet by mouth 3 times daily as needed for Pain (with food) 20 Yes Marine Gallo MD   OXYGEN Inhale into the lungs Uses 2 liters at night   Yes Historical Provider, MD   mineral oil-hydrophilic petrolatum (HYDROPHOR) ointment Apply topically as needed. 3/5/20  Yes Warden Gloria MD   albuterol sulfate  (90 Base) MCG/ACT inhaler Inhale 2 puffs into the lungs 4 times daily as needed for Wheezing 2/10/20  Yes Jesús Carranza, DO   ipratropium-albuterol (DUONEB) 0.5-2.5 (3) MG/3ML SOLN nebulizer solution Inhale 3 mLs into the lungs every 4 hours  Patient taking differently: Inhale 1 vial into the lungs 2 times daily as needed  2/10/20  Yes Jesús Carranza, DO   budesonide-formoterol (SYMBICORT) 160-4.5 MCG/ACT AERO Inhale 2 puffs into the lungs 2 times daily 2/7/20  Yes Manuel Odell DO   aspirin 81 MG tablet Take 1 tablet by mouth daily 1/31/20  Yes Warden Gloria MD   blood glucose monitor kit and supplies Test 1 times a day & as needed for symptoms of irregular blood glucose. Accuchek Avivia 2/18/19  Yes Angelique Elmore MD   blood glucose monitor strips Testing daily 2/11/19  Yes Warden Gloria MD   Lancets MISC Testing daily 2/11/19  Yes Warden Gloria MD   Misc. Devices MISC Oxygen concentrator  j44.9 10/26/18  Yes Bindu Mac DO       Current medications:    Current Facility-Administered Medications   Medication Dose Route Frequency Provider Last Rate Last Dose    0.9 % sodium chloride infusion   Intravenous Continuous Christine Kelley MD 75 mL/hr at 11/09/20 0727      sodium chloride flush 0.9 % injection 10 mL  10 mL Intravenous 2 times per day Christine Kelley MD        sodium chloride flush 0.9 % injection 10 mL  10 mL Intravenous PRN Christine Kelley MD           Allergies:     Allergies   Allergen Reactions    Aceon [Perindopril Erbumine] Anaphylaxis     Tongue swells up    Nsaids Shortness Of Breath and Swelling     tongue       Problem List:    Patient Active Problem List   Diagnosis Code    Adrenal incidentaloma ([facility] [address] E27.8    Hyperlipidemia E78.5    Hypothyroidism E03.9    Fibromyalgia M79.7    Obesity E66.9    Hypertension, uncontrolled I10    Chronic right-sided low back pain with right-sided sciatica M54.41, G89.29    Tobacco abuse Z72.0    Myofascial pain M79.18    Lumbar radiculitis M54.16    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) J44.9    Type 2 diabetes mellitus without complication, with long-term current use of insulin ([facility]) E11.9, Z79.4    Cervical facet joint syndrome M47.812    Cervical spondylosis M47.812    Lumbar radiculopathy M54.16    Pain in right hip M25.551    Lumbar disc disorder M51.9    Lumbar spondylosis M47.816    Diverticulosis of colon without diverticulitis K57.30       Past Medical History:        Diagnosis Date    Adrenal incidentaloma ([facility] [address]     Anxiety     Arthritis     Asthma     Bladder incontinence     Cancer [facility]) Dx 2009    multiple Myeloma, in remission currently     Chronic back pain     COPD (chronic obstructive pulmonary disease) ([facility])     on O2 2 liters  (uses with activity and at night to sleep)    Depression     Fibromyalgia     GERD (gastroesophageal reflux disease)     Hyperlipidemia     Hypertension     Hypothyroidism     MGUS (monoclonal gammopathy of unknown significance)     Neuropathy     Pneumonia 02/2020    Sleep apnea     doesnt use her cpap    Tobacco abuse     Type II or unspecified type diabetes mellitus without mention of complication, not stated as uncontrolled        Past Surgical History:        Procedure Laterality Date    ANESTHESIA NERVE BLOCK Bilateral 8/10/2020    BILATERAL L3 L4 MEDIAL BRANCH L5 DORSSAL RAMUS NERVE BLOCK (CPT [id number]) SEDATION performed by Latoya Mandujano MD at [address] [place] [address] COLONOSCOPY  07/20/2016    removed 3 polyps (tubular adenomas), diverticulosis, Dr. Fatoumata Perez COLONOSCOPY  2008    approximately 2008, no report available, 10/27/20 228 lb (103.4 kg)   10/15/20 220 lb (99.8 kg)     Body mass index is 32.28 kg/m². CBC:   Lab Results   Component Value Date    WBC 6.5 09/04/2020    RBC 3.87 09/04/2020    HGB 11.1 09/04/2020    HCT 36.4 09/04/2020    MCV 94.1 09/04/2020    RDW 13.2 09/04/2020     09/04/2020       CMP:   Lab Results   Component Value Date     09/04/2020    K 4.1 09/04/2020    CL 96 09/04/2020    CO2 32 09/04/2020    BUN 12 09/04/2020    CREATININE 0.7 09/04/2020    GFRAA >60 09/04/2020    LABGLOM >60 09/04/2020    GLUCOSE 142 11/09/2020    GLUCOSE 124 10/26/2011    PROT 8.3 09/04/2020    CALCIUM 9.6 09/04/2020    BILITOT 0.2 09/04/2020    ALKPHOS 85 09/04/2020    AST 18 09/04/2020    ALT 12 09/04/2020       POC Tests: No results for input(s): POCGLU, POCNA, POCK, POCCL, POCBUN, POCHEMO, POCHCT in the last 72 hours. Coags:   Lab Results   Component Value Date    PROTIME 11.3 11/28/2016    PROTIME 11.6 10/26/2011    INR 1.0 11/28/2016    APTT 28.8 10/07/2015       HCG (If Applicable): No results found for: PREGTESTUR, PREGSERUM, HCG, HCGQUANT     ABGs:   Lab Results   Component Value Date    A2VXGUNP 91.4 10/25/2011        Type & Screen (If Applicable):  No results found for: LABABO, LABRH    Drug/Infectious Status (If Applicable):  No results found for: HIV, HEPCAB    COVID-19 Screening (If Applicable):   Lab Results   Component Value Date    COVID19 Not Detected 11/05/2020         Anesthesia Evaluation  Patient summary reviewed and Nursing notes reviewed no history of anesthetic complications:   Airway: Mallampati: II        Dental:    (+) upper dentures and lower dentures      Pulmonary: breath sounds clear to auscultation  (+) COPD: moderate,  sleep apnea: on noncompliant,  asthma:                           ROS comment: Quit smoking 6 months ago    On [place] oxygen at hs.   Noncompliant with CPAP    Inhaler prn   Cardiovascular:  Exercise tolerance: poor (<4 METS),   (+) hypertension:,         Rhythm: regular  Rate: normal                    Neuro/Psych:   (+) psychiatric history:            GI/Hepatic/Renal:   (+) GERD: poorly controlled,          ROS comment: Evaluation for reflux and recheck of polyps. Endo/Other:    (+) DiabetesType II DM, well controlled, , hypothyroidism::., malignancy/cancer. ROS comment: Multiple myeloma no medication/chemo. Watching numbers Abdominal:           Vascular:                                        Anesthesia Plan      MAC     ASA 3       Induction: intravenous. Anesthetic plan and risks discussed with patient. Plan discussed with CRNA. Duarte Menendez MD   11/9/2020      DOS STAFF ADDENDUM:    Patient seen and chart reviewed. Physical exam and history updated as indicated. NPO status confirmed. Anesthesia options and plan discussed including risks benefits with patient/legal guardian and family as available. Concerns and questions addressed. Consent verbalized to proceed.   Anesthesia plan, options and intraoperative/postoperative concerns discussed with care team.    Duarte Menendez MD  11/9/2020  7:32 AM

## 2020-11-09 NOTE — INTERVAL H&P NOTE
Update History & Physical    The patient's History and Physical of October 15, 2020 was reviewed with the patient and I examined the patient. There was no change since I saw her but she has not had any further rectal bleeding since I saw her either. The surgical site was confirmed by the patient and me. Plan: The risks, benefits, expected outcome, and alternative to the recommended procedure have been discussed with the patient. Patient understands and wants to proceed with the procedure.      Electronically signed by Altagracia Young MD on 11/9/2020 at 7:18 AM

## 2020-11-12 ENCOUNTER — VIRTUAL VISIT (OUTPATIENT)
Dept: INTERNAL MEDICINE | Age: 65
End: 2020-11-12
Payer: MEDICARE

## 2020-11-12 PROCEDURE — 98968 PH1 ASSMT&MGMT NQHP 21-30: CPT | Performed by: SOCIAL WORKER

## 2020-11-12 NOTE — PROGRESS NOTES
Jennifer Stephenson is a 72 y.o. female evaluated via telephone on 2020. Consent:  She and/or health care decision maker is aware that that she may receive a bill for this telephone service, depending on her insurance coverage, and has provided verbal consent to proceed: Yes    ADULT BEHAVIORAL HEALTH FOLLOW UP  Elif Hu     Visit Date: 2020   Time of appointment:  10:00AM   Time spent with Patient:30 minutes. This is patient's second appointment. Reason for Consult:  Anxiety and Stress     Referring Provider/PCP:    Pilo Lamb MD      Pt provided informed consent for the behavioral health program. Discussed with patient model of service to include the limits of confidentiality (i.e. abuse reporting, suicide intervention, etc.) and short-term intervention focused approach. Pt indicated understanding. Otoniel Castaneda is a 72 y.o. female who presents for follow up of depression and stress. Reports continuing issues with her relationship with her sons as causing issues with mood instability. Patient reports she struggled with previous therapeutic recommendations as she is fearful of her children's responses. Previous Recommendations:   Boundaries with children     MENTAL STATUS EXAM  Mood was sad with congruent affect. Hygiene was good . Dress was appropriate. Behavior was Within Normal Limits with Yes observation or self-reportof difficulties ambulating. Attitude was Cooperative and Friendly. Eye-contact was good. Speech: rate - WNL, rhythm -  WNL, volume - WNL  Verbalizations were goal directed and coherent. Thought processes were intact and goal-oriented without evidence of delusions, hallucinations, obsessions, or joyce; without significant cognitive distortions. Associations were characterized by intact and tangential cognitive processes. Pt was oriented to person, place, time, and general circumstances;  recent:  good.   Insight and judgment were estimated to be good, AEB, a good  understanding of cyclical maladaptive patterns, and the ability to use insight to inform behavior change.      ASSESSMENT  Gabbie Portillo presented to the appointment today for evaluation and treatment of symptoms of depression and stress. She is currently deemed no risk to herself or others. Patient reports overall mood has remained the same. She does report recent stressors with children causing her to feel upset at times and endorses that she struggles to set boundaries with them. She was very engaged in session today and appeared well-motivated during session. Patient reports continuing to struggle     [facility] Scores 9/29/2020 9/14/2020 10/25/2019 2/11/2019 2/8/2018 10/18/2016 9/3/2015   PHQ2 Score 5 4 0 1 2 2 1   PHQ9 Score 23 13 0 1 2 2 1     Interpretation of Total Score Depression Severity: 1-4 = Minimal depression, 5-9 = Mild depression, 10-14 = Moderate depression, 15-19 = Moderately severe depression, 20-27 = Severe depression         ALYSE 7 SCORE 9/29/2020   ALYSE-7 Total Score 16     Interpretation of ALYSE-7 score: 5-9 = mild anxiety, 10-14 = moderate anxiety, 15+ = severe anxiety. Recommend referral to behavioral health for scores 10 or greater. DIAGNOSIS  Angelita was seen today for stress and depression. Diagnoses and all orders for this visit:    Current mild episode of major depressive disorder, unspecified whether recurrent ([facility] [address]      INTERVENTION  CBT utilzied to assist Angelita with better identification of thoughts, emotions, and behaviors that result in increased mood instability. Reviewed healthy vs. Unhealthy communication patterns and further explored implementation of boundary setting with her children. Modeled use of \"I\" statements for effective communication and problem solving. PLAN  -use of communication skills discussed   -f/u in one month       INTERACTIVE COMPLEXITY  Is interactive complexity present?   No  Reason:

## 2020-11-13 ENCOUNTER — HOSPITAL ENCOUNTER (OUTPATIENT)
Dept: PHYSICAL THERAPY | Age: 65
Setting detail: THERAPIES SERIES
Discharge: HOME | End: 2020-11-13
Payer: MEDICARE

## 2020-11-13 NOTE — DISCHARGE SUMMARY
[facility]   Phone: [phone] Fax: [phone]      Outpatient Physical Therapy  Non compliance/Attendance Issue          Date:  11/13/2020    REFERRING PHYSICIAN:  Мария Ott MD  DIAGNOSIS:  OA of lumbar spine; chronic LBP  PHYSICAL THERAPIST:  Randie Dubin, PT, DPT     Total visits to date:  5  Cancels/No Shows to date:  1 cancellation/2 no shows      Dear Dr. Jackson Counts,      This is to inform you that, as per Altaf Pham, your patient Monalisa Panda is, as of todays date, being discharged from Physical Therapy secondary to the following reason(s):    Pt was last seen in this clinic on 10/7/2020. Pt did not show and cancelled her last 3 scheduled visits. Pt has not contacted this office to reschedule as of today's date. If you have any questions, please feel free to call at [phone].       Thank you,    Randie Dubin, JAROD, DPT    [facility] [address]   [phone]

## 2020-11-23 DIAGNOSIS — G47.33 OBSTRUCTIVE SLEEP APNEA (ADULT) (PEDIATRIC): Primary | ICD-10-CM

## 2020-11-29 NOTE — Clinical Note
Print and mail to patient. Thanks!!     Electronically signed by Ravi Max MD on 11/30/20 at 5:04 PM EST

## 2020-11-29 NOTE — LETTER
[address]  [address], [place], [address]  [phone] (Fax)  November 30, 2020     Monalisa Panda  [id number] [facility] [address]      Dear Monalias Panda:    I was notified that you have not reviewed the MyChart Note that I sent you as follows:    From   Jay Coleman MD  To   [address] [place]   11/16/2020 12:31 PM    I reviewed the pathology results from your endoscopic procedure on 11 / 9 / 2020.  The colon polyp that was removed was a tubular adenoma (non-cancerous but predisposes to more polyps or developing cancer).  I would recommend repeat colonoscopy in 5 years. We will contact you then to return for scheduling for the procedure but you may want to make note of this as well in case we cannot get ahold of you.       I also reviewed biopsies of your stomach which showed inflammation but no sign of infection so continue the instructions I gave you after your endoscopy.  Need to call my office and schedule follow-up appointment for approximately 6 weeks after endoscopy procedures. If you have any questions or concerns, please don't hesitate to call.       Sincerely,     Electronically signed by Jay Coleman MD on 11/16/20 at 12:29 PM EST     CC: Ruy Kidd MD     If you have any questions or concerns, please don't hesitate to call.     Sincerely,    Electronically signed by Jay Coleman MD on 11/30/20 at 5:04 PM EST

## 2020-12-01 RX ORDER — CITALOPRAM 40 MG/1
40 TABLET ORAL DAILY
Qty: 90 TABLET | Refills: 0 | Status: SHIPPED
Start: 2020-12-01 | End: 2021-04-02 | Stop reason: SDUPTHER

## 2020-12-04 ENCOUNTER — TELEPHONE (OUTPATIENT)
Dept: INTERNAL MEDICINE | Age: 65
End: 2020-12-04

## 2020-12-04 ENCOUNTER — OFFICE VISIT (OUTPATIENT)
Dept: ONCOLOGY | Age: 65
End: 2020-12-04
Payer: MEDICARE

## 2020-12-04 ENCOUNTER — HOSPITAL ENCOUNTER (OUTPATIENT)
Dept: INFUSION THERAPY | Age: 65
Discharge: HOME | End: 2020-12-04
Payer: MEDICARE

## 2020-12-04 ENCOUNTER — VIRTUAL VISIT (OUTPATIENT)
Dept: INTERNAL MEDICINE | Age: 65
End: 2020-12-04
Payer: MEDICARE

## 2020-12-04 VITALS
DIASTOLIC BLOOD PRESSURE: 79 MMHG | WEIGHT: 225 LBS | TEMPERATURE: 96.4 F | BODY MASS INDEX: 36.32 KG/M2 | HEART RATE: 92 BPM | OXYGEN SATURATION: 94 % | SYSTOLIC BLOOD PRESSURE: 131 MMHG

## 2020-12-04 DIAGNOSIS — D47.2 SMOLDERING MULTIPLE MYELOMA: ICD-10-CM

## 2020-12-04 LAB
ANION GAP SERPL CALCULATED.3IONS-SCNC: 10 MMOL/L (ref 7–16)
BASOPHILS ABSOLUTE: 0.05 E9/L (ref 0–0.2)
BASOPHILS RELATIVE PERCENT: 0.8 % (ref 0–2)
BUN BLDV-MCNC: 18 MG/DL (ref 8–23)
CALCIUM SERPL-MCNC: 9.5 MG/DL (ref 8.6–10.2)
CHLORIDE BLD-SCNC: 101 MMOL/L (ref 98–107)
CO2: 28 MMOL/L (ref 22–29)
CREAT SERPL-MCNC: 0.8 MG/DL (ref 0.5–1)
EOSINOPHILS ABSOLUTE: 0.34 E9/L (ref 0.05–0.5)
EOSINOPHILS RELATIVE PERCENT: 5.4 % (ref 0–6)
GFR AFRICAN AMERICAN: >60
GFR NON-AFRICAN AMERICAN: >60 ML/MIN/1.73
GLUCOSE BLD-MCNC: 90 MG/DL (ref 74–99)
HCT VFR BLD CALC: 38.1 % (ref 34–48)
HEMOGLOBIN: 11.5 G/DL (ref 11.5–15.5)
IMMATURE GRANULOCYTES #: 0.01 E9/L
IMMATURE GRANULOCYTES %: 0.2 % (ref 0–5)
LYMPHOCYTES ABSOLUTE: 3.31 E9/L (ref 1.5–4)
LYMPHOCYTES RELATIVE PERCENT: 52.3 % (ref 20–42)
MCH RBC QN AUTO: 28.1 PG (ref 26–35)
MCHC RBC AUTO-ENTMCNC: 30.2 % (ref 32–34.5)
MCV RBC AUTO: 93.2 FL (ref 80–99.9)
MONOCYTES ABSOLUTE: 0.44 E9/L (ref 0.1–0.95)
MONOCYTES RELATIVE PERCENT: 7 % (ref 2–12)
NEUTROPHILS ABSOLUTE: 2.18 E9/L (ref 1.8–7.3)
NEUTROPHILS RELATIVE PERCENT: 34.3 % (ref 43–80)
PDW BLD-RTO: 12.5 FL (ref 11.5–15)
PLATELET # BLD: 350 E9/L (ref 130–450)
PMV BLD AUTO: 10.1 FL (ref 7–12)
POTASSIUM SERPL-SCNC: 4.3 MMOL/L (ref 3.5–5)
RBC # BLD: 4.09 E12/L (ref 3.5–5.5)
SODIUM BLD-SCNC: 139 MMOL/L (ref 132–146)
WBC # BLD: 6.3 E9/L (ref 4.5–11.5)

## 2020-12-04 PROCEDURE — 85025 COMPLETE CBC W/AUTO DIFF WBC: CPT

## 2020-12-04 PROCEDURE — 90832 PSYTX W PT 30 MINUTES: CPT | Performed by: SOCIAL WORKER

## 2020-12-04 PROCEDURE — 86334 IMMUNOFIX E-PHORESIS SERUM: CPT

## 2020-12-04 PROCEDURE — 36415 COLL VENOUS BLD VENIPUNCTURE: CPT

## 2020-12-04 PROCEDURE — 82784 ASSAY IGA/IGD/IGG/IGM EACH: CPT

## 2020-12-04 PROCEDURE — 99213 OFFICE O/P EST LOW 20 MIN: CPT

## 2020-12-04 PROCEDURE — 83883 ASSAY NEPHELOMETRY NOT SPEC: CPT

## 2020-12-04 PROCEDURE — 82232 ASSAY OF BETA-2 PROTEIN: CPT

## 2020-12-04 PROCEDURE — 84165 PROTEIN E-PHORESIS SERUM: CPT

## 2020-12-04 PROCEDURE — 99214 OFFICE O/P EST MOD 30 MIN: CPT | Performed by: INTERNAL MEDICINE

## 2020-12-04 PROCEDURE — 80048 BASIC METABOLIC PNL TOTAL CA: CPT

## 2020-12-04 NOTE — PROGRESS NOTES
[address] MED ONCOLOGY  [facility] [address]  Dept: [phone]  Attending Progress Note      Reason for The Referral:  Smoldering Multiple Myeloma. Referring Physician:  Janneth Bhakta MD    PCP:  Mariana Snider MD    History of Present Illness: The patient is a 20-year-old lady with a PMH significant for HTN, hyperlipidemia, DM, COPD, OA, depression, and fibromyalgia, who was diagnosed with IgG lambda MGUS in 2008, used to follow up with Dr. Javad Lobo at [facility], last f/up with him was in 2012. Her most recent SPEP with immunofixation ha revealed monoclonal IgG lambda, M-spike 0.7 gm/dl  from 9/9/2016. The patient has been having pain in the low back radiating to the right lower extremity, she had an MRI of the L-spine done on 8/24/2016, revealed disc bulging L4-5, L5-S1, with mild narrowing of the neural foramina. She had a bone marrow Biopsy and aspirate done by IR on 11/28/2016. Bone marrow, left iliac, aspirate and core biopsy  Suboptimal specimen showing 15% plasma cells by immunohistochemistry,  consistent with plasma cell neoplasm, see comment. Comment:    The aspirate smear and clot section specimens are hemodilute  and aspicular.  Plasmacytosis is seen by immunohistochemistry for   on the core biopsy specimen only.  Flow cytometric analysis performed by  [facility] confirmed a lambda-restricted plasma cell neoplasm. See separate report for complete details ([id number]). Intradepartmental consultation is obtained. The patient returns for a follow-up visit, she has chronic joint pain, back and neck pain, was seen by Dr. Philip Huston, he recommended epidurals and physical therapy. She is not able to afford the epidurals. She was seen by dermatology, she has moles. She is feeling tired. Review of Systems;  CONSTITUTIONAL: No fever, chills. Good appetite feels tired. ENMT: Eyes: No diplopia; Nose: No epistaxis. Mouth: No sore throat. RESPIRATORY: No hemoptysis, chronic shortness of breath, cough. CARDIOVASCULAR: No chest pain, palpitations. GASTROINTESTINAL: No nausea/vomiting, abdominal pain, diarrhea/constipation. GENITOURINARY: No dysuria, urinary frequency, hematuria. MUSCULOSKELETAL: she has chronic back and joints pain. NEURO: No syncope, presyncope, headache.   Remainder:  ROS NEGATIVE    Past Medical History:      Diagnosis Date    Adrenal incidentaloma ([facility] [address]     Anxiety     Arthritis     Asthma     Bladder incontinence     Cancer [facility]) Dx 2009    multiple Myeloma, in remission currently     Chronic back pain     COPD (chronic obstructive pulmonary disease) (HCC)     on O2 2 liters  (uses with activity and at night to sleep)    Depression     Fibromyalgia     GERD (gastroesophageal reflux disease)     Hyperlipidemia     Hypertension     Hypothyroidism     MGUS (monoclonal gammopathy of unknown significance)     Neuropathy     Pneumonia 02/2020    Sleep apnea     doesnt use her cpap    Tobacco abuse     Type II or unspecified type diabetes mellitus without mention of complication, not stated as uncontrolled      Patient Active Problem List   Diagnosis    Adrenal incidentaloma ([facility] [address]    Hyperlipidemia    Hypothyroidism    Fibromyalgia    Obesity    Hypertension, uncontrolled    Chronic right-sided low back pain with right-sided sciatica    Tobacco abuse    Myofascial pain    Lumbar radiculitis    Smoldering multiple myeloma (HCC)    Chronic obstructive pulmonary disease (HCC)    Type 2 diabetes mellitus without complication, with long-term current use of insulin (HCC)    Cervical facet joint syndrome    Cervical spondylosis    Lumbar radiculopathy    Pain in right hip    Lumbar disc disorder    Lumbar spondylosis    Diverticulosis of colon without diverticulitis    Rectal bleeding    Alternating constipation and diarrhea    History of colon polyps    Heartburn    Indigestion    Gastritis        Past Surgical History:      Procedure Laterality Date    ANESTHESIA NERVE BLOCK Bilateral 8/10/2020    BILATERAL L3 L4 MEDIAL BRANCH L5 DORSSAL RAMUS NERVE BLOCK (CPT 05533) SEDATION performed by Melida Souza MD at [place] [address]  07/20/2016    removed 3 polyps (tubular adenomas), diverticulosis, Dr. Tosin Cardoso COLONOSCOPY  2008    approximately 2008, no report available, per patient some polyps removed, Dr Sary Luna, [facility]    COLONOSCOPY N/A 11/9/2020    Small sessile polyp distal ascending colon removed with bx/cauterized (path Tubular Adenoma), right and left diverticulosis without diverticulitis, Dr. Edmund Malin, [facility] [address]    COLONOSCOPY  11/9/2020    Small sessile polyp distal ascending colon removed with bx/cauterized (path Tubular Adenoma), right and left diverticulosis without diverticulitis, Dr. Edmund Malin, [address], left knee, arthroscopic    NERVE BLOCK Bilateral 09/22/2016    lumbar transforaminal nerve block #1    NERVE BLOCK  07/06/2020    lumbar epidural steroid injectio L4-5    NERVE BLOCK Bilateral 08/10/2020    L3, L4, L5     OTHER SURGICAL HISTORY  12/13/2016    Excision cyst right face    PAIN MANAGEMENT PROCEDURE N/A 7/6/2020    LUMBAR EPIDURAL STEROID INJECTION L4-5 performed by Melida Souza MD at [address] [place] [address]  2008 2008    UPPER GASTROINTESTINAL ENDOSCOPY  07/20/2016    GERD and gastric ulcers, Bx H pylori neg, Dr. Beto Lay  2008    approximately 2008, no report, patient does not know the findings, Dr Sary Luna, [facility] [address]. N/A 11/9/2020    Severe gastritis with superficial ulcerations with bx neg H pylori, Dr. Edmund Malin, [facility] [address]       Family History:  Family History   Problem Relation Age of Onset    Heart Disease Mother 79    Diabetes Mother     Cancer Mother         Breast    Hypertension Father     Cancer Father         Pancreas    Diabetes Father     High Blood Pressure Father     Arthritis Brother     Diabetes Brother     Cancer Maternal Uncle     Cancer Paternal Aunt         breast    High Blood Pressure Paternal Aunt     High Blood Pressure Paternal Uncle     Arthritis Maternal Grandmother     Diabetes Maternal Grandmother     High Blood Pressure Maternal Grandmother     Arthritis Maternal Grandfather     Stroke Maternal Grandfather     High Cholesterol Paternal Grandmother     Kidney Disease Paternal Grandmother     Heart Disease Paternal Grandfather        Medications:  Reviewed and reconciled.     Social History:  Social History     Socioeconomic History    Marital status: Single     Spouse name: Not on file    Number of children: Not on file    Years of education: Not on file    Highest education level: Not on file   Occupational History    Not on file   Social Needs    Financial resource strain: Very hard    Food insecurity     Worry: Often true     Inability: Sometimes true    Transportation needs     Medical: No     Non-medical: No   Tobacco Use    Smoking status: Former Smoker     Years: 50.00     Types: Cigarettes     Start date: 7/15/1971     Last attempt to quit: 2/10/2020     Years since quittin.8    Smokeless tobacco: Never Used   Substance and Sexual Activity    Alcohol use: No     Alcohol/week: 0.0 standard drinks    Drug use: No    Sexual activity: Never   Lifestyle    Physical activity     Days per week: Not on file     Minutes per session: Not on file    Stress: Not on file   Relationships    Social connections     Talks on phone: Not on file     Gets together: Not on file     Attends Confucianist service: Not on file     Active member of club or organization: Not on file     Attends meetings of clubs or organizations: Not on file     Relationship status: Not on file    Intimate partner violence     Fear of current or ex partner: Not on file     Emotionally abused: Not on file     Physically abused: Not on file     Forced sexual activity: Not on file   Other Topics Concern    Not on file   Social History Narrative    Not on file       Allergies: Allergies   Allergen Reactions    Aceon [Perindopril Erbumine] Anaphylaxis     Tongue swells up    Nsaids Shortness Of Breath and Swelling     tongue       Physical Exam:  /79 (Site: Right Upper Arm, Position: Sitting, Cuff Size: Medium Adult)   Pulse 92   Temp 96.4 °F (35.8 °C) (Temporal)   Wt 225 lb (102.1 kg)   SpO2 94%   BMI 36.32 kg/m²   GENERAL: Alert, oriented x 3, not in acute distress. HEENT: PERRLA; EOMI. Oropharynx clear. NECK: Supple. No palpable cervical or supraclavicular lymphadenopathy. LUNGS: Good air entry bilaterally. No wheezing, crackles or rhonchi. CARDIOVASCULAR: Regular rate. No murmurs, rubs or gallops. ABDOMEN: Soft. Non-tender, non-distended. Positive bowel sounds. EXTREMITIES: No lower leg edema. Bone Marrow biopsy and aspirate:  Bone marrow, left iliac, aspirate and core biopsy (Parts A and B):  Suboptimal specimen showing 15% plasma cells by immunohistochemistry,  consistent with plasma cell neoplasm, see comment. Comment:    The aspirate smear and clot section specimens are hemodilute  and aspicular.  Plasmacytosis is seen by immunohistochemistry for   on the core biopsy specimen only.  Flow cytometric analysis performed by  [facility] confirmed a lambda-restricted plasma cell neoplasm. See separate report for complete details ([id number]). Intradepartmental consultation is obtained. Impression/Plan:      The patient is a 70-year-old lady with a PMH significant for HTN, hyperlipidemia, DM, COPD, OA, depression, and fibromyalgia, who was diagnosed with IgG lambda MGUS in 2008, used to follow up with Dr. Anant Swain at [facility], last f/up with him was in 2012.  She did not have a bone marrow biopsy and aspirate done when
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
## 2020-12-08 LAB
KAPPA FREE LIGHT CHAINS QNT: 70.53 MG/L (ref 3.3–19.4)
KAPPA/LAMBDA FREE LIGHT CHAIN RATIO: 2.81 (ref 0.26–1.65)
LAMBDA FREE LIGHT CHAINS QNT: 25.07 MG/L (ref 5.71–26.3)

## 2020-12-09 LAB
ALBUMIN SERPL-MCNC: 3.4 G/DL (ref 3.5–4.7)
ALPHA-1-GLOBULIN: 0.2 G/DL (ref 0.2–0.4)
ALPHA-2-GLOBULIN: 0.9 G/FL (ref 0.5–1)
BETA GLOBULIN: 1.2 G/DL (ref 0.8–1.3)
BETA-2 MICROGLOBULIN: 2.2 MG/L (ref 0.6–2.4)
ELECTROPHORESIS: ABNORMAL
GAMMA GLOBULIN: 2.6 G/DL (ref 0.7–1.6)
IGA: 111 MG/DL (ref 70–400)
IGG: 2945 MG/DL (ref 700–1600)
IGM: 31 MG/DL (ref 40–230)
IMMUNOFIXATION RESULT, SERUM: NORMAL
TOTAL PROTEIN: 8.3 G/DL (ref 6.4–8.3)

## 2020-12-10 ENCOUNTER — TELEPHONE (OUTPATIENT)
Dept: PULMONOLOGY | Age: 65
End: 2020-12-10

## 2020-12-22 ENCOUNTER — TELEPHONE (OUTPATIENT)
Dept: INTERNAL MEDICINE | Age: 65
End: 2020-12-22

## 2020-12-22 NOTE — TELEPHONE ENCOUNTER
Phoned in states was tested in Nov. Around 6th at the [facility] for Covid 19 needed it done for Sleep Study ordered.  When she went for Sleep Study recently they refused her cause she was positive for Covid Pt states no one never notified her she was positive and she was exposed to others in her family, when asked no one came down with Covid as far as she knows , wants to know if they can order her something , would  Like  to call her back ASAP

## 2020-12-22 NOTE — TELEPHONE ENCOUNTER
Contact sleep lab to see why it was cancelled, I dont see any + COVID test. Discussed with patient.     Electronically signed by [place] Other, DO on 12/22/2020 at 3:44 PM

## 2021-01-01 NOTE — TELEPHONE ENCOUNTER
DM education referral placed.   Electronically signed by Margarita Pagan DO on 3/13/2020 at 10:38 AM
Passed

## 2021-01-27 ENCOUNTER — TELEPHONE (OUTPATIENT)
Dept: CASE MANAGEMENT | Age: 66
End: 2021-01-27

## 2021-01-27 NOTE — TELEPHONE ENCOUNTER
Patient returned my call and confirmed her CT lung screening at [facility] on 1/28/2021 at 1:00 pm.  I reminded the patient to arrive at 12:30 pm, enter through the main entrance, and register. Patient confirmed.                   Electronically signed by Isaac Maria on 1/27/21 at 8:56 AM EST

## 2021-01-27 NOTE — TELEPHONE ENCOUNTER
I called the patient and left a message reminding her of the CT lung screening at [facility] on 1/28/2020 at 1:00 pm with an 12:30 pm arrival.  If unable to keep this appt call the office at [phone] to get rescheduled.             Electronically signed by Amy Luevano on 1/27/21 at 8:47 AM EST

## 2021-02-08 ENCOUNTER — VIRTUAL VISIT (OUTPATIENT)
Dept: PULMONOLOGY | Age: 66
End: 2021-02-08
Payer: MEDICARE

## 2021-02-08 DIAGNOSIS — R06.02 SOB (SHORTNESS OF BREATH): Primary | ICD-10-CM

## 2021-02-08 NOTE — PROGRESS NOTES
Pulmonary [facility]                             Pulmonary Consult/Progress Note :  CC follow up SOB     History of Present Illness: The patient is a 59 y.o. female w/ PMH of COPD on 2L at baseline,   Also history , multiple myeloma was in ICU in Jan with abdominal pain and she has also SOB and COPD exacerbation     She smoke over 50 years about 2 pack a day  and give  her about 80 PPY smoking history       She use Albuterol and Symbicort and they helps but not much and she has ROBLES and she can walk about 200 feet and then she get shortness of breath    She has no cough and no phlegm and has some tickling in her throat          Today Visit  She said  Her breathing great and SOB a;lmost resolved  No chest pain       REVIEW OF SYSTEMS:    · Constitutional: stable   · HEENT: No blurred vision, no ear problems, no sore throat, no rhinorrhea. · Respiratory:mild SOB , no pleuritic chest pain, no shortness of breath  · Cardiology: No angina, no dyspnea on exertion, no paroxysmal nocturnal dyspnea, no orthopnea, no palpitation, no leg swelling.    · Gastroenterology: No dysphagia, no reflux  · Genitourinary: No dysuria, no frequency, hesitancy; no hematuria  · Musculoskeletal: no joint pain, no myalgia, no change in range of movement  · Neurology: no focal weakness in extremities, no slurred speech, no double vision, no tingling or numbness sensation  · Endocrinology: no temperature intolerance, no polyphagia, polydipsia or polyuria  · Hematology: no increased bleeding, no bruising, no lymphadenopathy  · Skin: no skin changes noticed by patient  · Psychology: no depressed mood, no suicidal ideation    OBJECTIVE:       PHYSICAL EXAMINATION:  This is phone visit   Laboratory findings:        ASSESSMENT  And PLAN:        COPD   · 2/2 PNA  · Breathing treatments: DuoNebs  · Continue Abx     Aspiration PNA        COPD  · Moderate COPD   · Continue albuterol as needed · Seem  Trelegy 1 puff daily helping a lot   · Will need CT scan ,she did not do last visit   · Quit smoking 6 months ago   ·       Multiple myloma  Follow with oncology ,Dr Aram Aleman    EDWARD  Will send for sleep for just titration since she is known sleep apnea,but she did not go for insurance     Sheila Caceres MD,[facility]  Pulmonary&Critical Care Medicine   Director of [address] [place] [address] Director of [facility]    Janeen Sommer is a 72 y.o. female evaluated via telephone on 2/8/2021. Consent:  She and/or health care decision maker is aware that that she may receive a bill for this telephone service, depending on her insurance coverage, and has provided verbal consent to proceed: Yes      Documentation:  I communicated with the patient and/or health care decision maker about breathing condition. Details of this discussion including any medical advice provided. I affirm this is a Patient Initiated Episode with a Patient who has not had a related appointment within my department in the past 7 days or scheduled within the next 24 hours.     Patient identification was verified at the start of the visit: Yes    Total Time: minutes: 5-10 minutes    Note: not billable if this call serves to triage the patient into an appointment for the relevant concern      Shawanda Constantino

## 2021-02-09 ENCOUNTER — TELEPHONE (OUTPATIENT)
Dept: PULMONOLOGY | Age: 66
End: 2021-02-09

## 2021-02-09 NOTE — TELEPHONE ENCOUNTER
Mailed a letter to patient informing her that her CT Lung Screening is scheduled for 2-22-21 at 12:30 pm at the [facility]. She must arrive by 12:00 pm. There is no prep for this test

## 2021-02-11 ENCOUNTER — OFFICE VISIT (OUTPATIENT)
Dept: OBGYN | Age: 66
End: 2021-02-11
Payer: MEDICARE

## 2021-02-11 VITALS
HEART RATE: 97 BPM | DIASTOLIC BLOOD PRESSURE: 85 MMHG | BODY MASS INDEX: 35.03 KG/M2 | SYSTOLIC BLOOD PRESSURE: 155 MMHG | TEMPERATURE: 98 F | HEIGHT: 66 IN | WEIGHT: 218 LBS

## 2021-02-11 DIAGNOSIS — R30.0 DYSURIA: ICD-10-CM

## 2021-02-11 DIAGNOSIS — N95.0 PMB (POSTMENOPAUSAL BLEEDING): Primary | ICD-10-CM

## 2021-02-11 PROCEDURE — 99213 OFFICE O/P EST LOW 20 MIN: CPT | Performed by: OBSTETRICS & GYNECOLOGY

## 2021-02-11 NOTE — PROGRESS NOTES
Here today for PMPB. Bled Saturday for 2 days. No menses for years. Pap is current and they have been negative for some years. Bleeding was fair amount of heavy blood. Lasted until Sunday. No menses for a lot of years. No bleeding since Sunday,. States she did have some bleeding the month before but not as heavy    Pelvic: Vulva:Normal   Vagina:Completely clear   Cx:Normal without lesions   Ut:Mid, feel top normal today, a bit of bladder tenderness   Alejandro:No masses, noted     This bleeding was after urination. IMP: PMB    PLAN: Urine culture today, Pelvic sonogram complete and see in 3 weeks to review.

## 2021-02-13 LAB — URINE CULTURE, ROUTINE: NORMAL

## 2021-02-19 ENCOUNTER — TELEPHONE (OUTPATIENT)
Dept: CASE MANAGEMENT | Age: 66
End: 2021-02-19

## 2021-02-19 NOTE — TELEPHONE ENCOUNTER
I called the patient and she confirmed her CT lung screening at [facility] on 2/22/2021 at 12:30 pm.  I reminded the patient to arrive at 12:00 pm. I shared with patient to park in ER parking lot, enter through door B. Patient confirmed.               Electronically signed by Tracie Garay on 2/19/21 at 10:05 AM EST

## 2021-02-22 ENCOUNTER — HOSPITAL ENCOUNTER (OUTPATIENT)
Dept: ULTRASOUND IMAGING | Age: 66
Discharge: HOME | End: 2021-02-24
Payer: MEDICARE

## 2021-02-22 ENCOUNTER — HOSPITAL ENCOUNTER (OUTPATIENT)
Dept: CT IMAGING | Age: 66
Discharge: HOME | End: 2021-02-24
Payer: MEDICARE

## 2021-02-22 DIAGNOSIS — Z72.0 TOBACCO ABUSE: ICD-10-CM

## 2021-02-22 DIAGNOSIS — N95.0 PMB (POSTMENOPAUSAL BLEEDING): ICD-10-CM

## 2021-02-22 DIAGNOSIS — Z87.891 PERSONAL HISTORY OF NICOTINE DEPENDENCE: ICD-10-CM

## 2021-02-22 PROCEDURE — 71271 CT THORAX LUNG CANCER SCR C-: CPT

## 2021-02-22 PROCEDURE — 76856 US EXAM PELVIC COMPLETE: CPT

## 2021-02-22 PROCEDURE — 76830 TRANSVAGINAL US NON-OB: CPT

## 2021-02-24 ENCOUNTER — TELEPHONE (OUTPATIENT)
Dept: CASE MANAGEMENT | Age: 66
End: 2021-02-24

## 2021-02-24 NOTE — TELEPHONE ENCOUNTER
No call, encounter opened to process CT Lung Screening. CT Lung Screen: 2/22/2021    Impression   1. There is no suspicious pulmonary mass or nodule. 2. Complete interval clearing of the multifocal bilateral pneumonia and small   bilateral pleural effusions. LUNG RADS:   Per ACR Lung-RADS Version 1.1   Category 1, Negative (No nodules and definitely benign nodules). Management: Continue annual lung screening with LDCT in 12 months. RECOMMENDATIONS:   If you would like to register your patient with the [facility] Nodule/Lung   Cancer Screening Program, please contact the Nurse Navigator at   [phone].         Pack years:     Social History     Tobacco Use  Smoking Status:    Start Date: 07/15/1971   Quit Date: 02/10/2020   Types: Cigarettes   Packs/Day: 1.00   Years: 48   Pack Years: 48   Smokeless Tobacco: Never used         Results letter sent to patient via my chart or mailed.      [facility]

## 2021-02-25 DIAGNOSIS — E03.9 HYPOTHYROIDISM, UNSPECIFIED TYPE: ICD-10-CM

## 2021-02-25 DIAGNOSIS — E55.9 VITAMIN D DEFICIENCY: ICD-10-CM

## 2021-02-25 RX ORDER — IBUPROFEN 200 MG
CAPSULE ORAL
Qty: 60 TABLET | Refills: 2 | Status: SHIPPED
Start: 2021-02-25 | End: 2021-07-27

## 2021-02-25 RX ORDER — LEVOTHYROXINE SODIUM 0.1 MG/1
100 TABLET ORAL DAILY
Qty: 60 TABLET | Refills: 0 | Status: SHIPPED
Start: 2021-02-25 | End: 2021-04-21

## 2021-02-25 NOTE — TELEPHONE ENCOUNTER
Pt last seen 10//27/20. She canceled her 1/6/21 appt. Called and spoke with pt via phone. Pt reminded of need to be seen every 6 months for refills and also need to have lab work done that was ordered 10/27/20. Notified one of the labs was TSH and refill was requested for thyroid medication. Appt scheduled with Dr. Brayden Bosch in her next clinic rotation. Pt will need. 2 month supply. Pt instructed to have lab work done prior to her April appt.

## 2021-03-02 ENCOUNTER — OFFICE VISIT (OUTPATIENT)
Dept: OBGYN | Age: 66
End: 2021-03-02
Payer: MEDICARE

## 2021-03-02 VITALS — HEART RATE: 108 BPM | SYSTOLIC BLOOD PRESSURE: 138 MMHG | DIASTOLIC BLOOD PRESSURE: 81 MMHG

## 2021-03-02 DIAGNOSIS — N95.0 PMB (POSTMENOPAUSAL BLEEDING): Primary | ICD-10-CM

## 2021-03-02 PROCEDURE — 99212 OFFICE O/P EST SF 10 MIN: CPT | Performed by: OBSTETRICS & GYNECOLOGY

## 2021-03-02 NOTE — PROGRESS NOTES
Here today for her sonogram results. Her EMC was 10.3 MM which is thickened. Has not had any further bleeding. Needs evaluation of her endometrium and I discussed both an endometrial biopsy as well as a D&C and Hysteroscopy. Pros and cons of both discussed. After discussion she would prefer to proceed with D&C and Hysteroscopy. Will have her see Dr. Low Koo to schedule and gave her booklets on the surgical procedure.

## 2021-03-02 NOTE — PROGRESS NOTES
Pt here for ultrasound results. Pt seen and results were reviewed with pt by Dr. Foster Collazo. Discharge instructions have been discussed with the patient by Dr. Foster Collazo and she voiced understanding. Patient advised to call our office with any questions or concerns.

## 2021-03-07 DIAGNOSIS — C90.00 MULTIPLE MYELOMA, REMISSION STATUS UNSPECIFIED (HCC): Primary | ICD-10-CM

## 2021-03-09 ENCOUNTER — OFFICE VISIT (OUTPATIENT)
Dept: ONCOLOGY | Age: 66
End: 2021-03-09
Payer: MEDICARE

## 2021-03-09 ENCOUNTER — HOSPITAL ENCOUNTER (OUTPATIENT)
Dept: INFUSION THERAPY | Age: 66
Discharge: HOME | End: 2021-03-09
Payer: MEDICARE

## 2021-03-09 VITALS
HEART RATE: 102 BPM | OXYGEN SATURATION: 94 % | BODY MASS INDEX: 35.55 KG/M2 | TEMPERATURE: 97.5 F | HEIGHT: 66 IN | DIASTOLIC BLOOD PRESSURE: 77 MMHG | WEIGHT: 221.2 LBS | SYSTOLIC BLOOD PRESSURE: 133 MMHG

## 2021-03-09 DIAGNOSIS — M75.102 TEAR OF LEFT ROTATOR CUFF, UNSPECIFIED TEAR EXTENT, UNSPECIFIED WHETHER TRAUMATIC: Primary | ICD-10-CM

## 2021-03-09 DIAGNOSIS — C90.00 MULTIPLE MYELOMA, REMISSION STATUS UNSPECIFIED (HCC): ICD-10-CM

## 2021-03-09 LAB
ALBUMIN SERPL-MCNC: 3.9 G/DL (ref 3.5–5.2)
ALP BLD-CCNC: 90 U/L (ref 35–104)
ALT SERPL-CCNC: 11 U/L (ref 0–32)
ANION GAP SERPL CALCULATED.3IONS-SCNC: 12 MMOL/L (ref 7–16)
AST SERPL-CCNC: 18 U/L (ref 0–31)
BASOPHILS ABSOLUTE: 0.04 E9/L (ref 0–0.2)
BASOPHILS RELATIVE PERCENT: 0.6 % (ref 0–2)
BILIRUB SERPL-MCNC: 0.4 MG/DL (ref 0–1.2)
BUN BLDV-MCNC: 13 MG/DL (ref 8–23)
CALCIUM SERPL-MCNC: 9.6 MG/DL (ref 8.6–10.2)
CHLORIDE BLD-SCNC: 99 MMOL/L (ref 98–107)
CO2: 29 MMOL/L (ref 22–29)
CREAT SERPL-MCNC: 0.7 MG/DL (ref 0.5–1)
EOSINOPHILS ABSOLUTE: 0.3 E9/L (ref 0.05–0.5)
EOSINOPHILS RELATIVE PERCENT: 4.2 % (ref 0–6)
GFR AFRICAN AMERICAN: >60
GFR NON-AFRICAN AMERICAN: >60 ML/MIN/1.73
GLUCOSE BLD-MCNC: 126 MG/DL (ref 74–99)
HCT VFR BLD CALC: 40.6 % (ref 34–48)
HEMOGLOBIN: 12.4 G/DL (ref 11.5–15.5)
IMMATURE GRANULOCYTES #: 0.01 E9/L
IMMATURE GRANULOCYTES %: 0.1 % (ref 0–5)
LACTATE DEHYDROGENASE: 195 U/L (ref 135–214)
LYMPHOCYTES ABSOLUTE: 3.7 E9/L (ref 1.5–4)
LYMPHOCYTES RELATIVE PERCENT: 51.7 % (ref 20–42)
MCH RBC QN AUTO: 27.9 PG (ref 26–35)
MCHC RBC AUTO-ENTMCNC: 30.5 % (ref 32–34.5)
MCV RBC AUTO: 91.2 FL (ref 80–99.9)
MONOCYTES ABSOLUTE: 0.5 E9/L (ref 0.1–0.95)
MONOCYTES RELATIVE PERCENT: 7 % (ref 2–12)
NEUTROPHILS ABSOLUTE: 2.61 E9/L (ref 1.8–7.3)
NEUTROPHILS RELATIVE PERCENT: 36.4 % (ref 43–80)
PDW BLD-RTO: 13.2 FL (ref 11.5–15)
PLATELET # BLD: 416 E9/L (ref 130–450)
PMV BLD AUTO: 10.3 FL (ref 7–12)
POTASSIUM SERPL-SCNC: 4 MMOL/L (ref 3.5–5)
RBC # BLD: 4.45 E12/L (ref 3.5–5.5)
SODIUM BLD-SCNC: 140 MMOL/L (ref 132–146)
WBC # BLD: 7.2 E9/L (ref 4.5–11.5)

## 2021-03-09 PROCEDURE — 85025 COMPLETE CBC W/AUTO DIFF WBC: CPT

## 2021-03-09 PROCEDURE — 83615 LACTATE (LD) (LDH) ENZYME: CPT

## 2021-03-09 PROCEDURE — 82232 ASSAY OF BETA-2 PROTEIN: CPT

## 2021-03-09 PROCEDURE — 86334 IMMUNOFIX E-PHORESIS SERUM: CPT

## 2021-03-09 PROCEDURE — 82784 ASSAY IGA/IGD/IGG/IGM EACH: CPT

## 2021-03-09 PROCEDURE — 99213 OFFICE O/P EST LOW 20 MIN: CPT | Performed by: INTERNAL MEDICINE

## 2021-03-09 PROCEDURE — 99214 OFFICE O/P EST MOD 30 MIN: CPT | Performed by: INTERNAL MEDICINE

## 2021-03-09 PROCEDURE — 36415 COLL VENOUS BLD VENIPUNCTURE: CPT

## 2021-03-09 PROCEDURE — 80053 COMPREHEN METABOLIC PANEL: CPT

## 2021-03-09 PROCEDURE — 84165 PROTEIN E-PHORESIS SERUM: CPT

## 2021-03-09 PROCEDURE — 83883 ASSAY NEPHELOMETRY NOT SPEC: CPT

## 2021-03-09 NOTE — PROGRESS NOTES
[address] [facility]  [address] [place] [id number]  Dept: [phone]  Attending Progress Note      Reason for The Referral:  Smoldering Multiple Myeloma. Referring Physician:  Mona Moreau MD    PCP:  Gaby Pineda MD    History of Present Illness: The patient is a 22-year-old lady with a PMH significant for HTN, hyperlipidemia, DM, COPD, OA, depression, and fibromyalgia, who was diagnosed with IgG lambda MGUS in 2008, used to follow up with Dr. Nabil Ordaz at [facility], last f/up with him was in 2012. Her most recent SPEP with immunofixation ha revealed monoclonal IgG lambda, M-spike 0.7 gm/dl  from 9/9/2016. The patient has been having pain in the low back radiating to the right lower extremity, she had an MRI of the L-spine done on 8/24/2016, revealed disc bulging L4-5, L5-S1, with mild narrowing of the neural foramina. She had a bone marrow Biopsy and aspirate done by IR on 11/28/2016. Bone marrow, left iliac, aspirate and core biopsy  Suboptimal specimen showing 15% plasma cells by immunohistochemistry,  consistent with plasma cell neoplasm, see comment. Comment:    The aspirate smear and clot section specimens are hemodilute  and aspicular.  Plasmacytosis is seen by immunohistochemistry for   on the core biopsy specimen only.  Flow cytometric analysis performed by  [facility] confirmed a lambda-restricted plasma cell neoplasm. See separate report for complete details ([id number]). Intradepartmental consultation is obtained. The patient returns for a follow-up visit, she has chronic joint pain, back and neck pain, was seen by Dr. Carlyle Paige, he recommended epidurals and physical therapy. She is not able to afford the epidurals. Patient has left shoulder pain, limited range of motion of the left upper extremity, she also that she has a rotator cuff tear, she was supposed to have a surgery done at [facility].   The patient Lumbar spondylosis    Diverticulosis of colon without diverticulitis    Rectal bleeding    Alternating constipation and diarrhea    History of colon polyps    Heartburn    Indigestion    Gastritis        Past Surgical History:      Procedure Laterality Date    ANESTHESIA NERVE BLOCK Bilateral 8/10/2020    BILATERAL L3 L4 MEDIAL BRANCH L5 DORSSAL RAMUS NERVE BLOCK (CPT 18829) SEDATION performed by Kimani Moore MD at [place] [address]  07/20/2016    removed 3 polyps (tubular adenomas), diverticulosis, Dr. Craig Saldana COLONOSCOPY  2008    approximately 2008, no report available, per patient some polyps removed, Dr Tamera Mills, [facility]    COLONOSCOPY N/A 11/9/2020    Small sessile polyp distal ascending colon removed with bx/cauterized (path Tubular Adenoma), right and left diverticulosis without diverticulitis, Dr. Lynda Taylor, [address] COLONOSCOPY  11/9/2020    Small sessile polyp distal ascending colon removed with bx/cauterized (path Tubular Adenoma), right and left diverticulosis without diverticulitis, Dr. Lynda Taylor, [address] [place], left knee, arthroscopic    NERVE BLOCK Bilateral 09/22/2016    lumbar transforaminal nerve block #1    NERVE BLOCK  07/06/2020    lumbar epidural steroid injectio L4-5    NERVE BLOCK Bilateral 08/10/2020    L3, L4, L5     OTHER SURGICAL HISTORY  12/13/2016    Excision cyst right face    PAIN MANAGEMENT PROCEDURE N/A 7/6/2020    LUMBAR EPIDURAL STEROID INJECTION L4-5 performed by Kimani Moore MD at [address]  2008 2008    UPPER GASTROINTESTINAL ENDOSCOPY  07/20/2016    GERD and gastric ulcers, Bx H pylori neg, Dr. Panchal Shan  2008    approximately 2008, no report, patient does not know the findings, Dr Tamera Mills, [facility]. N/A 11/9/2020    Severe gastritis with superficial ulcerations with bx neg H clarisa, Dr. Lanier Cushing, [facility]       Family History:  Family History   Problem Relation Age of Onset    Heart Disease Mother 79    Diabetes Mother     Cancer Mother         Breast    Hypertension Father     Cancer Father         Pancreas    Diabetes Father     High Blood Pressure Father     Arthritis Brother     Diabetes Brother     Cancer Maternal Uncle     Cancer Paternal Aunt         breast    High Blood Pressure Paternal Aunt     High Blood Pressure Paternal Uncle     Arthritis Maternal Grandmother     Diabetes Maternal Grandmother     High Blood Pressure Maternal Grandmother     Arthritis Maternal Grandfather     Stroke Maternal Grandfather     High Cholesterol Paternal Grandmother     Kidney Disease Paternal Grandmother     Heart Disease Paternal Grandfather        Medications:  Reviewed and reconciled.     Social History:  Social History     Socioeconomic History    Marital status: Single     Spouse name: Not on file    Number of children: Not on file    Years of education: Not on file    Highest education level: Not on file   Occupational History    Not on file   Social Needs    Financial resource strain: Very hard    Food insecurity     Worry: Often true     Inability: Sometimes true    Transportation needs     Medical: No     Non-medical: No   Tobacco Use    Smoking status: Former Smoker     Years: 50.00     Types: Cigarettes     Start date: 7/15/1971     Quit date: 2/10/2020     Years since quittin.0    Smokeless tobacco: Never Used   Substance and Sexual Activity    Alcohol use: No     Alcohol/week: 0.0 standard drinks    Drug use: No    Sexual activity: Never   Lifestyle    Physical activity     Days per week: Not on file     Minutes per session: Not on file    Stress: Not on file   Relationships    Social connections     Talks on phone: Not on file     Gets together: Not on file     Attends Yarsani service: Not on file     Active member of club or organization: Not on file     Attends meetings of clubs or organizations: Not on file     Relationship status: Not on file    Intimate partner violence     Fear of current or ex partner: Not on file     Emotionally abused: Not on file     Physically abused: Not on file     Forced sexual activity: Not on file   Other Topics Concern    Not on file   Social History Narrative    Not on file       Allergies: Allergies   Allergen Reactions    Aceon [Perindopril Erbumine] Anaphylaxis     Tongue swells up    Nsaids Shortness Of Breath and Swelling     tongue       Physical Exam:  /77   Pulse 102   Temp 97.5 °F (36.4 °C)   Ht 5' 6\" (1.676 m)   Wt 221 lb 3.2 oz (100.3 kg)   SpO2 94%   BMI 35.70 kg/m²   GENERAL: Alert, oriented x 3, not in acute distress. HEENT: PERRLA; EOMI. Oropharynx clear. NECK: Supple. No palpable cervical or supraclavicular lymphadenopathy. LUNGS: Good air entry bilaterally. No wheezing, crackles or rhonchi. CARDIOVASCULAR: Regular rate. No murmurs, rubs or gallops. ABDOMEN: Soft. Non-tender, non-distended. Positive bowel sounds. EXTREMITIES: No lower leg edema. Bone Marrow biopsy and aspirate:  Bone marrow, left iliac, aspirate and core biopsy (Parts A and B):  Suboptimal specimen showing 15% plasma cells by immunohistochemistry,  consistent with plasma cell neoplasm, see comment. Comment:    The aspirate smear and clot section specimens are hemodilute  and aspicular.  Plasmacytosis is seen by immunohistochemistry for   on the core biopsy specimen only.  Flow cytometric analysis performed by  [facility] confirmed a lambda-restricted plasma cell neoplasm. See separate report for complete details ([id number]). Intradepartmental consultation is obtained.        Impression/Plan:      The patient is a 59-year-old lady with a PMH significant for HTN, hyperlipidemia, DM, COPD, OA, depression, and fibromyalgia, who was diagnosed with IgG lambda MGUS in 2008, used to follow up with  [facility] MED ONCOLOGY  [address] [facility] [id number]  Dept: [phone]

## 2021-03-10 DIAGNOSIS — Z79.4 TYPE 2 DIABETES MELLITUS WITHOUT COMPLICATION, WITH LONG-TERM CURRENT USE OF INSULIN (HCC): ICD-10-CM

## 2021-03-10 DIAGNOSIS — E11.9 TYPE 2 DIABETES MELLITUS WITHOUT COMPLICATION, WITH LONG-TERM CURRENT USE OF INSULIN (HCC): ICD-10-CM

## 2021-03-10 LAB
ALBUMIN SERPL-MCNC: 3.4 G/DL (ref 3.5–4.7)
ALPHA-1-GLOBULIN: 0.2 G/DL (ref 0.2–0.4)
ALPHA-2-GLOBULIN: 1 G/FL (ref 0.5–1)
BETA GLOBULIN: 1.2 G/DL (ref 0.8–1.3)
ELECTROPHORESIS: ABNORMAL
GAMMA GLOBULIN: 2.8 G/DL (ref 0.7–1.6)
IGA: 110 MG/DL (ref 70–400)
IGG: 2959 MG/DL (ref 700–1600)
IGM: 32 MG/DL (ref 40–230)
IMMUNOFIXATION RESULT, SERUM: NORMAL
TOTAL PROTEIN: 8.8 G/DL (ref 6.4–8.3)

## 2021-03-11 LAB
BETA-2 MICROGLOBULIN: 2.3 MG/L (ref 0.6–2.4)
KAPPA FREE LIGHT CHAINS QNT: 65.74 MG/L (ref 3.3–19.4)
KAPPA/LAMBDA FREE LIGHT CHAIN RATIO: 2.49 (ref 0.26–1.65)
LAMBDA FREE LIGHT CHAINS QNT: 26.35 MG/L (ref 5.71–26.3)

## 2021-03-11 NOTE — TELEPHONE ENCOUNTER
Last Appointment:  12/4/2020  Future Appointments   Date Time Provider Jennifer Arzate   3/31/2021  1:45 PM Kimmie Israel MD [facility]   4/21/2021  1:30 PM Brook Rosario MD [facility]   6/1/2021 11:15 AM [facility] FAST TRACK 2 [facility] Med On[facility] [place]   6/8/2021  2:00 PM Rebecca Haines MD MED ONC [facility]   6/8/2021  2:00 PM [facility]C FAST TRACK 2 [facility] Med Onc Nelia Carbone

## 2021-03-22 DIAGNOSIS — M25.512 LEFT SHOULDER PAIN, UNSPECIFIED CHRONICITY: Primary | ICD-10-CM

## 2021-03-23 ENCOUNTER — OFFICE VISIT (OUTPATIENT)
Dept: ORTHOPEDIC SURGERY | Age: 66
End: 2021-03-23
Payer: MEDICARE

## 2021-03-23 VITALS — TEMPERATURE: 98 F | BODY MASS INDEX: 35.36 KG/M2 | WEIGHT: 220 LBS | HEIGHT: 66 IN

## 2021-03-23 DIAGNOSIS — M75.122 COMPLETE TEAR OF LEFT ROTATOR CUFF, UNSPECIFIED WHETHER TRAUMATIC: Primary | ICD-10-CM

## 2021-03-23 PROCEDURE — 99203 OFFICE O/P NEW LOW 30 MIN: CPT | Performed by: ORTHOPAEDIC SURGERY

## 2021-03-23 RX ORDER — DIAPER,BRIEF,INFANT-TODD,DISP
EACH MISCELLANEOUS
COMMUNITY
End: 2022-06-13

## 2021-03-23 RX ORDER — LEVOTHYROXINE SODIUM 0.07 MG/1
TABLET ORAL
COMMUNITY
Start: 2021-01-08 | End: 2021-04-02

## 2021-03-23 RX ORDER — CALCIUM CARBONATE/VITAMIN D3 500MG-5MCG
TABLET ORAL
COMMUNITY
Start: 2021-01-30 | End: 2021-04-21 | Stop reason: SDUPTHER

## 2021-03-23 RX ORDER — ESOMEPRAZOLE MAGNESIUM 40 MG/1
CAPSULE, DELAYED RELEASE ORAL
COMMUNITY
End: 2021-04-21

## 2021-03-23 NOTE — PROGRESS NOTES
Chief Complaint   Patient presents with    Shoulder Pain     left torn rotator cuff. started getting worse recently never treated the tear        Nathan Kamara is a 72y.o. year old   female who is seen today  for evaluation of left shoulder pain. She reports the pain has been ongoing for the past 3 months worse. She does recall a specific injury which started the pain. She had a patient fall onto her approximatly 30 years ago. She reports the pain is worse with activity, better with rest.  The patient does have mechanical symptoms. Shedoes have night pain. She denies a feeling of instability. The prior treatments have been none. The patient   has not responded to the treatment. The patient is right hand dominant. The patient is not working. The patients occupation is retired.        Chief Complaint   Patient presents with    Shoulder Pain     left torn rotator cuff. started getting worse recently never treated the tear     Past Medical History:   Diagnosis Date    [facility] incidentaloma ([facility]     Anxiety     Arthritis     Asthma     Bladder incontinence     Cancer [facility]) Dx 2009    multiple Myeloma, in remission currently     Chronic back pain     COPD (chronic obstructive pulmonary disease) ([facility]     on O2 2 liters  (uses with activity and at night to sleep)    Depression     Fibromyalgia     GERD (gastroesophageal reflux disease)     Hyperlipidemia     Hypertension     Hypothyroidism     MGUS (monoclonal gammopathy of unknown significance)     Neuropathy     Pneumonia 02/2020    Sleep apnea     doesnt use her cpap    Tobacco abuse     Type II or unspecified type diabetes mellitus without mention of complication, not stated as uncontrolled      Past Surgical History:   Procedure Laterality Date    ANESTHESIA NERVE BLOCK Bilateral 8/10/2020    BILATERAL L3 L4 MEDIAL BRANCH L5 DORSSAL RAMUS NERVE BLOCK (CPT [id number]) SEDATION performed by Rojas Singh MD at [place] [address] per week: Not on file     Minutes per session: Not on file    Stress: Not on file   Relationships    Social connections     Talks on phone: Not on file     Gets together: Not on file     Attends Confucianist service: Not on file     Active member of club or organization: Not on file     Attends meetings of clubs or organizations: Not on file     Relationship status: Not on file    Intimate partner violence     Fear of current or ex partner: Not on file     Emotionally abused: Not on file     Physically abused: Not on file     Forced sexual activity: Not on file   Other Topics Concern    Not on file   Social History Narrative    Not on file     Family History   Problem Relation Age of Onset    Heart Disease Mother 79    Diabetes Mother     Cancer Mother         Breast    Hypertension Father     Cancer Father         Pancreas    Diabetes Father     High Blood Pressure Father     Arthritis Brother     Diabetes Brother     Cancer Maternal Uncle     Cancer Paternal Aunt         breast    High Blood Pressure Paternal Aunt     High Blood Pressure Paternal Uncle     Arthritis Maternal Grandmother     Diabetes Maternal Grandmother     High Blood Pressure Maternal Grandmother     Arthritis Maternal Grandfather     Stroke Maternal Grandfather     High Cholesterol Paternal Grandmother     Kidney Disease Paternal Grandmother     Heart Disease Paternal Grandfather        REVIEW OF SYSTEMS:     General/Constitution:  (-)weight loss, (-)fever, (-)chills, (-)weakness. Skin: (-) rash,(-) psoriasis,(-) eczema, (-)skin cancer. Musculoskeletal: (-) fractures,  (-) dislocations,(-) collagen vascular disease, (-) fibromyalgia, (-) multiple sclerosis, (-) muscular dystrophy, (-) RSD,(-) joint pain (-)swelling, (-) joint pain,swelling. Neurologic: (-) epilepsy, (-)seizures,(-) brain tumor,(-) TIA, (-)stroke, (-)headaches, (-)Parkinson disease,(-) memory loss, (-) LOC.   Cardiovascular: (-) Chest pain, (-) swelling in legs/feet, (-) SOB, (-) cramping in legs/feet with walking. Respiratory: (-) SOB, (-) Coughing, (-) night sweats. GI: (-) nausea, (-) vomiting, (-) diarrhea, (-) blood in stool, (-) gastric ulcer. Psychiatric: (-) Depression, (-) Anxiety, (-) bipolar disease, (-) Alzheimer's Disease  Allergic/Immunologic: (-) allergies latex, (-) allergies metal, (-) skin sensitivity. Hematlogic: (-) anemia, (-) blood transfusion, (-) DVT/PE, (-) Clotting disorders      Subjective:    Constitution:  Temp 98 °F (36.7 °C)   Ht 5' 6\" (1.676 m)   Wt 220 lb (99.8 kg)   BMI 35.51 kg/m²     Psycihatric:  The patient is alert and oriented x 3, appears to be stated age and in no distress. Respiratory:  Respiratory effort is not labored. Patient is not gasping. Palpation of the chest reveals no tactile fremitus. Skin:  Upon inspection: the skin appears warm, dry and intact. There is not a previous scar over the affected area. There is not any cellulitis, lymphedema or cutaneous lesions noted in the lower extremities. Upon palpation there is no induration noted. Neurologic:  Motor exam of the upper extremities show: The reflexes in biceps/triceps/brachioradialis are equal and symmetric. Sensory exam C5-T1 are normal bilaterally. Cardiovascular: The vascular exam is normal and is well perfused to distal extremities. There are 2+ radial pulses bilaterally, and motor and sensation is intact to median, ulnar, and radial, musclocutaneus, and axillary nerve distribution and grossly symmetric bilaterally. There is cap refill noted less than two seconds in all digits. There is not edema of the bilateral upper extremities. There is not varicosities noted in the distal extremities. Lymph:  Upon palpation,  there is no lymphadenopathy noted in bilateral upper extremities. Musculoskeletal:  Gait: normal; examination of the nails and digits reveal no cyanosis or clubbing.       Cervical Exam:  On physical exam, Nelson Willson is well-developed, well-nourished, oriented to person, place and time. her gait is normal.  On evaluation of hercervical spine, She has full range of motion of the cervical spine without pain. There is no cervical tenderness to palpation. Shoulder Exam:  On evaluation of her bilaterally upper extremities, her left shoulder has no deformity. There is tenderness upon palpation of the greater tuberosity and lateral shoulder. There is not evidence of scapular dyskinesis. There is not muscle atrophy in shoulder girdle. The range of motion for the Right Shoulder is 140/35/t12 and for the Left shoulder is 110/30/bl. Right shoulder Motor strength is 5/5 in the supraspinatus, 5/5 internal rotation and 5/5 in external rotation, and Left shoulder motor strength 4+/5 in supraspinatus, 5/5 in internal rotation, 5-/5 in external rotation. Right shoulder:  negative Impingement , negative Kline ,negative  Speeds,negative  Apprehension ,negative Christianson Load Shift, negative Pearl manuver, negative Cross arm test.     Left shoulder:  positive Impingement , positive Kline ,negative  Speeds,negative  Apprehension ,negative Christianson Load Shift, negative Pearl manuver, negative Cross arm test.     XRAY:    No fracture or dislocation of the shoulder. Acromioclavicular joint is   intact.  Mild osteophytosis along inferior margin of glenohumeral joint. MRI:  n/a    Radiographic findings reviewed with patient    Impression:   Encounter Diagnosis   Name Primary?  Complete tear of left rotator cuff, unspecified whether traumatic Yes       Plan: Natural history and expected course discussed. Questions answered. Educational material distributed. Reduction in offending activity. Gentle ROM exercises  MRI.

## 2021-03-29 DIAGNOSIS — I10 ESSENTIAL HYPERTENSION: ICD-10-CM

## 2021-03-29 RX ORDER — TRIAMTERENE AND HYDROCHLOROTHIAZIDE 37.5; 25 MG/1; MG/1
TABLET ORAL
Qty: 90 TABLET | Refills: 0 | Status: SHIPPED
Start: 2021-03-29 | End: 2021-04-21 | Stop reason: SDUPTHER

## 2021-03-31 ENCOUNTER — OFFICE VISIT (OUTPATIENT)
Dept: OBGYN | Age: 66
End: 2021-03-31
Payer: MEDICARE

## 2021-03-31 VITALS
DIASTOLIC BLOOD PRESSURE: 90 MMHG | SYSTOLIC BLOOD PRESSURE: 170 MMHG | HEIGHT: 66 IN | TEMPERATURE: 98 F | WEIGHT: 221 LBS | BODY MASS INDEX: 35.52 KG/M2 | HEART RATE: 96 BPM

## 2021-03-31 DIAGNOSIS — N95.0 PMB (POSTMENOPAUSAL BLEEDING): Primary | ICD-10-CM

## 2021-03-31 DIAGNOSIS — R93.89 ENDOMETRIAL THICKENING ON ULTRASOUND: ICD-10-CM

## 2021-03-31 PROCEDURE — 99212 OFFICE O/P EST SF 10 MIN: CPT | Performed by: OBSTETRICS & GYNECOLOGY

## 2021-03-31 PROCEDURE — 99213 OFFICE O/P EST LOW 20 MIN: CPT | Performed by: OBSTETRICS & GYNECOLOGY

## 2021-03-31 NOTE — PROGRESS NOTES
Yang Benitez     Patient presents for discussion regarding [facility]. Patient has had postmenopausal bleeding on and off for the past couple months. Pelvic ultrasound showed a 10mm uterine lining. Patient declines EMB in the office.      Past Medical History:   Diagnosis Date    Adrenal incidentaloma ([address]     Anxiety     Arthritis     Asthma     Bladder incontinence     Cancer [facility]) Dx 2009    multiple Myeloma, in remission currently     Chronic back pain     COPD (chronic obstructive pulmonary disease) ([facility])     on O2 2 liters  (uses with activity and at night to sleep)    Depression     Fibromyalgia     GERD (gastroesophageal reflux disease)     Hyperlipidemia     Hypertension     Hypothyroidism     MGUS (monoclonal gammopathy of unknown significance)     Neuropathy     Pneumonia 02/2020    Sleep apnea     doesnt use her cpap    Tobacco abuse     Type II or unspecified type diabetes mellitus without mention of complication, not stated as uncontrolled         Past Surgical History:   Procedure Laterality Date    ANESTHESIA NERVE BLOCK Bilateral 8/10/2020    BILATERAL L3 L4 MEDIAL BRANCH L5 DORSSAL RAMUS NERVE BLOCK (CPT 91969) SEDATION performed by Caroline Jones MD at [facility]  07/20/2016    removed 3 polyps (tubular adenomas), diverticulosis, Dr. Patricia Siddiqi COLONOSCOPY  2008    approximately 2008, no report available, per patient some polyps removed, Dr Avel Angelucci, [facility]    COLONOSCOPY N/A 11/9/2020    Small sessile polyp distal ascending colon removed with bx/cauterized (path Tubular Adenoma), right and left diverticulosis without diverticulitis, Dr. Luanne Segundo, [address] COLONOSCOPY  11/9/2020    Small sessile polyp distal ascending colon removed with bx/cauterized (path Tubular Adenoma), right and left diverticulosis without diverticulitis, Dr. Luanne Segundo, [address] [place], left knee, arthroscopic    NERVE BLOCK Bilateral 09/22/2016 lumbar transforaminal nerve block #1    NERVE BLOCK  07/06/2020    lumbar epidural steroid injectio L4-5    NERVE BLOCK Bilateral 08/10/2020    L3, L4, L5     OTHER SURGICAL HISTORY  12/13/2016    Excision cyst right face    PAIN MANAGEMENT PROCEDURE N/A 7/6/2020    LUMBAR EPIDURAL STEROID INJECTION L4-5 performed by Corrina Mcmullen MD at [address]  2008 2008    UPPER GASTROINTESTINAL ENDOSCOPY  07/20/2016    GERD and gastric ulcers, Bx H pylori neg, Dr. Randolph Amor  2008    approximately 2008, no report, patient does not know the findings, Dr Travis Drummond, [facility]. N/A 11/9/2020    Severe gastritis with superficial ulcerations with bx neg H pylori, Dr. Haroldine Carrel, [facility]        Family History   Problem Relation Age of Onset    Heart Disease Mother 79    Diabetes Mother     Cancer Mother         Breast    Hypertension Father     Cancer Father         Pancreas    Diabetes Father     High Blood Pressure Father     Arthritis Brother     Diabetes Brother     Cancer Maternal Uncle     Cancer Paternal Aunt         breast    High Blood Pressure Paternal Aunt     High Blood Pressure Paternal Uncle     Arthritis Maternal Grandmother     Diabetes Maternal Grandmother     High Blood Pressure Maternal Grandmother     Arthritis Maternal Grandfather     Stroke Maternal Grandfather     High Cholesterol Paternal Grandmother     Kidney Disease Paternal Grandmother     Heart Disease Paternal Grandfather         Social History     Tobacco History     Smoking Status  Former Smoker Smoking Start Date  7/15/1971 Quit date  2/10/2020 Smoking Frequency  For 50 years    Smoking Tobacco Type  Cigarettes    Smokeless Tobacco Use  Never Used          Alcohol History     Alcohol Use Status  No          Drug Use     Drug Use Status  No          Sexual Activity     Sexually Active  Never Current Outpatient Medications:     OYSCO 500 + D 500-200 MG-UNIT per tablet, TAKE 1 TABLET BY MOUTH DAILY, Disp: , Rfl:     esomeprazole (NEXIUM) 40 MG delayed release capsule, esomeprazole magnesium 40 mg capsule,delayed release, Disp: , Rfl:     metFORMIN (GLUCOPHAGE) 1000 MG tablet, TAKE 1 TABLET BY MOUTH TWICE DAILY WITH MEALS, Disp: 60 tablet, Rfl: 1    levothyroxine (SYNTHROID) 100 MCG tablet, Take 1 tablet by mouth Daily, Disp: 60 tablet, Rfl: 0    citalopram (CELEXA) 40 MG tablet, TAKE 1 TABLET BY MOUTH DAILY, Disp: 90 tablet, Rfl: 0    omeprazole (PRILOSEC) 20 MG delayed release capsule, Take 1 capsule by mouth 2 times daily As directed, Disp: 60 capsule, Rfl: 2    vitamin D (ERGOCALCIFEROL) 1.25 MG (95835 UT) CAPS capsule, Take 1 capsule by mouth once a week, Disp: 12 capsule, Rfl: 0    baclofen (LIORESAL) 10 MG tablet, TAKE 1/2 TABLET THREE TIMES DAILY AS NEEDED(PAIN, SPASMS), Disp: 90 tablet, Rfl: 2    gabapentin (NEURONTIN) 400 MG capsule, Take 1 capsule by mouth 4 times daily for 30 days. , Disp: 120 capsule, Rfl: 0    montelukast (SINGULAIR) 10 MG tablet, TAKE 1 TABLET BY MOUTH EVERY NIGHT AT BEDTIME, Disp: 90 tablet, Rfl: 1    amitriptyline (ELAVIL) 50 MG tablet, TAKE 1 TABLET BY MOUTH EVERY EVENING, Disp: 30 tablet, Rfl: 2    albuterol sulfate  (90 Base) MCG/ACT inhaler, Inhale 2 puffs into the lungs 4 times daily as needed for Wheezing, Disp: 3 Inhaler, Rfl: 1    ipratropium-albuterol (DUONEB) 0.5-2.5 (3) MG/3ML SOLN nebulizer solution, Inhale 3 mLs into the lungs every 4 hours (Patient taking differently: Inhale 1 vial into the lungs 2 times daily as needed ), Disp: 360 mL, Rfl: 1    budesonide-formoterol (SYMBICORT) 160-4.5 MCG/ACT AERO, Inhale 2 puffs into the lungs 2 times daily, Disp: 1 Inhaler, Rfl: 2    aspirin 81 MG tablet, Take 1 tablet by mouth daily, Disp: 90 tablet, Rfl: 0    triamterene-hydroCHLOROthiazide (MAXZIDE-25) 37.5-25 MG per tablet, TAKE 1 TABLET BY MOUTH EVERY DAY (Patient not taking: Reported on 3/31/2021), Disp: 90 tablet, Rfl: 0    hydrocortisone 1 % cream, hydrocortisone 1 % topical cream, Disp: , Rfl:     influenza quadrivalent split vaccine (FLUARIX QUADRIVALENT) 0.5 ML injection, Fluarix Quad [id number] (PF) 60 mcg (15 mcg x 4)/0.5 mL IM syringe, Disp: , Rfl:     levothyroxine (SYNTHROID) 75 MCG tablet, TAKE 1 TABLET BY MOUTH EVERY DAY, Disp: , Rfl:     calcium-vitamin D (CALCIUM 500/D) 500-200 MG-UNIT per tablet, TAKE 1 TABLET BY MOUTH DAILY, Disp: 60 tablet, Rfl: 2    aluminum & magnesium hydroxide-simethicone (MYLANTA) 400-400-40 MG/5ML SUSP, Take 15 mLs by mouth 4 times daily, Disp: 1000 mL, Rfl: 3    Calcium Polycarbophil (FIBER) 625 MG TABS, Take 1 tablet by mouth 2 times daily, Disp: 60 tablet, Rfl: 6    docusate sodium (COLACE) 100 MG capsule, Take 1 capsule by mouth 2 times daily, Disp: 60 capsule, Rfl: 6    OXYGEN, Inhale into the lungs Uses 2 liters at night, Disp: , Rfl:     mineral oil-hydrophilic petrolatum (HYDROPHOR) ointment, Apply topically as needed. , Disp: 1 Tube, Rfl: 1    blood glucose monitor kit and supplies, Test 1 times a day & as needed for symptoms of irregular blood glucose. Accuchek Avivia, Disp: 1 kit, Rfl: 0    blood glucose monitor strips, Testing daily, Disp: 100 strip, Rfl: 3    Lancets MISC, Testing daily, Disp: 100 each, Rfl: 3    Misc. Devices MISC, Oxygen concentrator  j44.9, Disp: 1 Device, Rfl: 0     Allergies   Allergen Reactions    Aceon [Perindopril Erbumine] Anaphylaxis     Tongue swells up    Nsaids Shortness Of Breath and Swelling     tongue        Vitals:    03/31/21 1347   BP: (!) 170/90   Pulse: 96   Temp: 98 °F (36.7 °C)        Physical Exam:  General: pleasant, alert     Breasts: deferred     Pelvic exam: deferred     Angelita was seen today for other and other.     Diagnoses and all orders for this visit:    PMB (postmenopausal bleeding)    Endometrial thickening on ultrasound        Plan

## 2021-03-31 NOTE — PROGRESS NOTES
Patient alert and pleasant with no new concerns   Here today for consult [facility]   Discharge instructions have been discussed with the patient. Patient advised to call our office with any questions or concerns. Voiced understanding.

## 2021-04-01 ENCOUNTER — TELEPHONE (OUTPATIENT)
Dept: INTERNAL MEDICINE | Age: 66
End: 2021-04-01

## 2021-04-01 NOTE — TELEPHONE ENCOUNTER
Patient left a voicemail message requesting a refill on her bp med. Her Maxzide was ordered on 3/25/21. Patient stated she did pick it up. She complains of oral swelling and oral blisters. She was given an appt for tomorrow 4/2/21.

## 2021-04-02 ENCOUNTER — OFFICE VISIT (OUTPATIENT)
Dept: INTERNAL MEDICINE | Age: 66
End: 2021-04-02
Payer: MEDICARE

## 2021-04-02 VITALS
OXYGEN SATURATION: 100 % | HEIGHT: 66 IN | WEIGHT: 220.4 LBS | TEMPERATURE: 96 F | SYSTOLIC BLOOD PRESSURE: 122 MMHG | BODY MASS INDEX: 35.42 KG/M2 | DIASTOLIC BLOOD PRESSURE: 83 MMHG | HEART RATE: 95 BPM | RESPIRATION RATE: 18 BRPM

## 2021-04-02 DIAGNOSIS — Z79.4 TYPE 2 DIABETES MELLITUS WITHOUT COMPLICATION, WITH LONG-TERM CURRENT USE OF INSULIN (HCC): ICD-10-CM

## 2021-04-02 DIAGNOSIS — K13.0 ANGULAR CHEILITIS: Primary | ICD-10-CM

## 2021-04-02 DIAGNOSIS — M79.7 FIBROMYALGIA: ICD-10-CM

## 2021-04-02 DIAGNOSIS — F32.A DEPRESSION, UNSPECIFIED DEPRESSION TYPE: ICD-10-CM

## 2021-04-02 DIAGNOSIS — E11.9 TYPE 2 DIABETES MELLITUS WITHOUT COMPLICATION, WITH LONG-TERM CURRENT USE OF INSULIN (HCC): ICD-10-CM

## 2021-04-02 PROCEDURE — 99213 OFFICE O/P EST LOW 20 MIN: CPT | Performed by: STUDENT IN AN ORGANIZED HEALTH CARE EDUCATION/TRAINING PROGRAM

## 2021-04-02 PROCEDURE — 99212 OFFICE O/P EST SF 10 MIN: CPT | Performed by: STUDENT IN AN ORGANIZED HEALTH CARE EDUCATION/TRAINING PROGRAM

## 2021-04-02 RX ORDER — CITALOPRAM 40 MG/1
40 TABLET ORAL DAILY
Qty: 90 TABLET | Refills: 0 | Status: SHIPPED
Start: 2021-04-02 | End: 2021-06-16

## 2021-04-02 RX ORDER — AMITRIPTYLINE HYDROCHLORIDE 50 MG/1
TABLET, FILM COATED ORAL
Qty: 30 TABLET | Refills: 2 | Status: SHIPPED | OUTPATIENT
Start: 2021-04-02 | End: 2021-07-27 | Stop reason: SDUPTHER

## 2021-04-02 ASSESSMENT — PATIENT HEALTH QUESTIONNAIRE - PHQ9
SUM OF ALL RESPONSES TO PHQ QUESTIONS 1-9: 0
SUM OF ALL RESPONSES TO PHQ QUESTIONS 1-9: 0

## 2021-04-02 ASSESSMENT — ENCOUNTER SYMPTOMS
WHEEZING: 0
NAUSEA: 0
TROUBLE SWALLOWING: 0
COUGH: 0
VOICE CHANGE: 0
ABDOMINAL DISTENTION: 0
SORE THROAT: 0
VOMITING: 0

## 2021-04-02 NOTE — PROGRESS NOTES
[facility]  [facility]    Attending Physician Statement  I have discussed the case, including pertinent history and exam findings with the resident physician. I have seen and examined the patient and the key elements of the encounter have been performed by me. I agree with the assessment, plan and orders as documented by the resident. I have reviewed all pertinent PMHx, PSHx, FamHx, SocialHx, medications, and allergies and updated history as appropriate. Patient here for oral blisters. Started 4 days ago with numbness on the R angle of the moutg which progressed to shallow whitish ulcer, no blisters noted. No oral pain, problem with swallowing, no preceding viral prodrome. She has a shallow whitish ulcer on the R angle of the mouth, no blisters, no ulcers on her tongue, palate, throat. (+) cervical LN on the R. May aphthous ulcers from viral infection - supportive treatment at this time, keep area moisturized, will also check zinc.    Remainder of medical problems as per resident note. Grisel Espinoza MD  4/2/2021 9:27 AM

## 2021-04-02 NOTE — PROGRESS NOTES
systems reviewed and are negative. Current Outpatient Medications on File Prior to Visit   Medication Sig Dispense Refill    triamterene-hydroCHLOROthiazide (MAXZIDE-25) 37.5-25 MG per tablet TAKE 1 TABLET BY MOUTH EVERY DAY 90 tablet 0    OYSCO 500 + D 500-200 MG-UNIT per tablet TAKE 1 TABLET BY MOUTH DAILY      esomeprazole (NEXIUM) 40 MG delayed release capsule esomeprazole magnesium 40 mg capsule,delayed release      hydrocortisone 1 % cream hydrocortisone 1 % topical cream      metFORMIN (GLUCOPHAGE) 1000 MG tablet TAKE 1 TABLET BY MOUTH TWICE DAILY WITH MEALS 60 tablet 1    levothyroxine (SYNTHROID) 100 MCG tablet Take 1 tablet by mouth Daily 60 tablet 0    calcium-vitamin D (CALCIUM 500/D) 500-200 MG-UNIT per tablet TAKE 1 TABLET BY MOUTH DAILY 60 tablet 2    omeprazole (PRILOSEC) 20 MG delayed release capsule Take 1 capsule by mouth 2 times daily As directed 60 capsule 2    Calcium Polycarbophil (FIBER) 625 MG TABS Take 1 tablet by mouth 2 times daily 60 tablet 6    docusate sodium (COLACE) 100 MG capsule Take 1 capsule by mouth 2 times daily 60 capsule 6    vitamin D (ERGOCALCIFEROL) 1.25 MG (16061 UT) CAPS capsule Take 1 capsule by mouth once a week 12 capsule 0    baclofen (LIORESAL) 10 MG tablet TAKE 1/2 TABLET THREE TIMES DAILY AS NEEDED(PAIN, SPASMS) 90 tablet 2    gabapentin (NEURONTIN) 400 MG capsule Take 1 capsule by mouth 4 times daily for 30 days. 120 capsule 0    montelukast (SINGULAIR) 10 MG tablet TAKE 1 TABLET BY MOUTH EVERY NIGHT AT BEDTIME 90 tablet 1    OXYGEN Inhale into the lungs Uses 2 liters at night      mineral oil-hydrophilic petrolatum (HYDROPHOR) ointment Apply topically as needed.  1 Tube 1    albuterol sulfate  (90 Base) MCG/ACT inhaler Inhale 2 puffs into the lungs 4 times daily as needed for Wheezing 3 Inhaler 1    ipratropium-albuterol (DUONEB) 0.5-2.5 (3) MG/3ML SOLN nebulizer solution Inhale 3 mLs into the lungs every 4 hours (Patient taking differently: Inhale 1 vial into the lungs 2 times daily as needed ) 360 mL 1    budesonide-formoterol (SYMBICORT) 160-4.5 MCG/ACT AERO Inhale 2 puffs into the lungs 2 times daily 1 Inhaler 2    aspirin 81 MG tablet Take 1 tablet by mouth daily 90 tablet 0    blood glucose monitor strips Testing daily 100 strip 3    Lancets MISC Testing daily 100 each 3    influenza quadrivalent split vaccine (FLUARIX QUADRIVALENT) 0.5 ML injection Fluarix Quad 4515-9355 (PF) 60 mcg (15 mcg x 4)/0.5 mL IM syringe      blood glucose monitor kit and supplies Test 1 times a day & as needed for symptoms of irregular blood glucose. Accuchek Avivia 1 kit 0    Misc. Devices MISC Oxygen concentrator  j44.9 1 Device 0     No current facility-administered medications on file prior to visit. OBJECTIVE:    VS: /83   Pulse 95   Temp 96 °F (35.6 °C) (Temporal)   Resp 18   Ht 5' 6\" (1.676 m)   Wt 220 lb 6.4 oz (100 kg)   SpO2 100% Comment: on room air  BMI 35.57 kg/m²   Physical Exam  HENT:      Head: Normocephalic and atraumatic. Right Ear: External ear normal.      Left Ear: External ear normal.      Nose: Nose normal.      Mouth/Throat:      Mouth: Mucous membranes are dry. Pharynx: Posterior oropharyngeal erythema present. Comments: Nickel sized circumferential lesion in the back right corner of mouth; it is not open, it is not vesicular, it is pale appearing, and tender to palpation; it is not bleeding and has clear defined borders    Angular chelitis present    Eyes:      Conjunctiva/sclera: Conjunctivae normal.   Cardiovascular:      Rate and Rhythm: Normal rate and regular rhythm. Pulses: Normal pulses. Heart sounds: Normal heart sounds. Pulmonary:      Effort: Pulmonary effort is normal.      Breath sounds: Normal breath sounds. Abdominal:      General: Abdomen is flat. Musculoskeletal: Normal range of motion. Lymphadenopathy:      Cervical: Cervical adenopathy present.

## 2021-04-02 NOTE — PROGRESS NOTES
Patient verbalized understanding of office instructions. She will call with questions or concerns. She was given discharge instructions, and scripts for lab work to be done . All questions were fully answered. Printed AVS was yaquelin to pt.

## 2021-04-05 ENCOUNTER — TELEPHONE (OUTPATIENT)
Dept: OBGYN | Age: 66
End: 2021-04-05

## 2021-04-05 NOTE — TELEPHONE ENCOUNTER
Surgery scheduled 5/11/21 @ 8:30 AM  [address]  Pre-op appt made  Patient made aware of surgery date, time and place  Patient voiced understanding

## 2021-04-07 NOTE — PROGRESS NOTES
Letter received from [facility] that patient is eligible to receive 1 home-delivered meal per day (Mon - Fri) through [address] for 1 modified diet/day placed today. Marquis Maura Meyers MD  Internal Medicine  4/7/2021 12:49 PM       Addendum (1:16pm)  [facility] already has order placed 4/2/21. Will cancel today's order.

## 2021-04-13 ENCOUNTER — HOSPITAL ENCOUNTER (OUTPATIENT)
Age: 66
Discharge: HOME | End: 2021-04-13
Payer: MEDICARE

## 2021-04-13 DIAGNOSIS — Z79.4 TYPE 2 DIABETES MELLITUS WITHOUT COMPLICATION, WITH LONG-TERM CURRENT USE OF INSULIN (HCC): ICD-10-CM

## 2021-04-13 DIAGNOSIS — E03.9 HYPOTHYROIDISM, UNSPECIFIED TYPE: ICD-10-CM

## 2021-04-13 DIAGNOSIS — E11.9 TYPE 2 DIABETES MELLITUS WITHOUT COMPLICATION, WITH LONG-TERM CURRENT USE OF INSULIN (HCC): ICD-10-CM

## 2021-04-13 DIAGNOSIS — K13.0 ANGULAR CHEILITIS: ICD-10-CM

## 2021-04-13 LAB
ANION GAP SERPL CALCULATED.3IONS-SCNC: 11 MMOL/L (ref 7–16)
BASOPHILS ABSOLUTE: 0.03 E9/L (ref 0–0.2)
BASOPHILS RELATIVE PERCENT: 0.6 % (ref 0–2)
BUN BLDV-MCNC: 15 MG/DL (ref 8–23)
CALCIUM SERPL-MCNC: 9.4 MG/DL (ref 8.6–10.2)
CHLORIDE BLD-SCNC: 94 MMOL/L (ref 98–107)
CO2: 28 MMOL/L (ref 22–29)
CREAT SERPL-MCNC: 0.9 MG/DL (ref 0.5–1)
EOSINOPHILS ABSOLUTE: 0.54 E9/L (ref 0.05–0.5)
EOSINOPHILS RELATIVE PERCENT: 10.2 % (ref 0–6)
GFR AFRICAN AMERICAN: >60
GFR NON-AFRICAN AMERICAN: >60 ML/MIN/1.73
GLUCOSE BLD-MCNC: 105 MG/DL (ref 74–99)
HBA1C MFR BLD: 5.6 % (ref 4–5.6)
HCT VFR BLD CALC: 37.7 % (ref 34–48)
HEMOGLOBIN: 11.8 G/DL (ref 11.5–15.5)
IMMATURE GRANULOCYTES #: 0.01 E9/L
IMMATURE GRANULOCYTES %: 0.2 % (ref 0–5)
LYMPHOCYTES ABSOLUTE: 2.6 E9/L (ref 1.5–4)
LYMPHOCYTES RELATIVE PERCENT: 49.1 % (ref 20–42)
MCH RBC QN AUTO: 28.6 PG (ref 26–35)
MCHC RBC AUTO-ENTMCNC: 31.3 % (ref 32–34.5)
MCV RBC AUTO: 91.3 FL (ref 80–99.9)
MONOCYTES ABSOLUTE: 0.63 E9/L (ref 0.1–0.95)
MONOCYTES RELATIVE PERCENT: 11.9 % (ref 2–12)
NEUTROPHILS ABSOLUTE: 1.49 E9/L (ref 1.8–7.3)
NEUTROPHILS RELATIVE PERCENT: 28 % (ref 43–80)
PDW BLD-RTO: 13.3 FL (ref 11.5–15)
PLATELET # BLD: 377 E9/L (ref 130–450)
PMV BLD AUTO: 10 FL (ref 7–12)
POTASSIUM SERPL-SCNC: 4.1 MMOL/L (ref 3.5–5)
RBC # BLD: 4.13 E12/L (ref 3.5–5.5)
SODIUM BLD-SCNC: 133 MMOL/L (ref 132–146)
TSH SERPL DL<=0.05 MIU/L-ACNC: 5.03 UIU/ML (ref 0.27–4.2)
WBC # BLD: 5.3 E9/L (ref 4.5–11.5)

## 2021-04-13 PROCEDURE — 80048 BASIC METABOLIC PNL TOTAL CA: CPT

## 2021-04-13 PROCEDURE — 84443 ASSAY THYROID STIM HORMONE: CPT

## 2021-04-13 PROCEDURE — 83036 HEMOGLOBIN GLYCOSYLATED A1C: CPT

## 2021-04-13 PROCEDURE — 36415 COLL VENOUS BLD VENIPUNCTURE: CPT

## 2021-04-13 PROCEDURE — 84630 ASSAY OF ZINC: CPT

## 2021-04-13 PROCEDURE — 85025 COMPLETE CBC W/AUTO DIFF WBC: CPT

## 2021-04-15 ENCOUNTER — HOSPITAL ENCOUNTER (OUTPATIENT)
Dept: MRI IMAGING | Age: 66
Discharge: HOME | End: 2021-04-17
Payer: MEDICARE

## 2021-04-15 DIAGNOSIS — M75.122 COMPLETE TEAR OF LEFT ROTATOR CUFF, UNSPECIFIED WHETHER TRAUMATIC: ICD-10-CM

## 2021-04-16 LAB — ZINC: 99.9 UG/DL (ref 60–120)

## 2021-04-21 ENCOUNTER — OFFICE VISIT (OUTPATIENT)
Dept: INTERNAL MEDICINE | Age: 66
End: 2021-04-21
Payer: MEDICARE

## 2021-04-21 VITALS
DIASTOLIC BLOOD PRESSURE: 80 MMHG | SYSTOLIC BLOOD PRESSURE: 128 MMHG | OXYGEN SATURATION: 96 % | TEMPERATURE: 98.7 F | HEART RATE: 107 BPM | HEIGHT: 66 IN | BODY MASS INDEX: 36.32 KG/M2 | RESPIRATION RATE: 18 BRPM | WEIGHT: 226 LBS

## 2021-04-21 DIAGNOSIS — I10 ESSENTIAL HYPERTENSION: ICD-10-CM

## 2021-04-21 DIAGNOSIS — J44.9 CHRONIC OBSTRUCTIVE PULMONARY DISEASE, UNSPECIFIED COPD TYPE (HCC): ICD-10-CM

## 2021-04-21 DIAGNOSIS — J44.1 COPD WITH ACUTE EXACERBATION (HCC): ICD-10-CM

## 2021-04-21 DIAGNOSIS — Z79.4 TYPE 2 DIABETES MELLITUS WITHOUT COMPLICATION, WITH LONG-TERM CURRENT USE OF INSULIN (HCC): ICD-10-CM

## 2021-04-21 DIAGNOSIS — E11.9 TYPE 2 DIABETES MELLITUS WITHOUT COMPLICATION, WITH LONG-TERM CURRENT USE OF INSULIN (HCC): ICD-10-CM

## 2021-04-21 DIAGNOSIS — L85.3 DRY SKIN: ICD-10-CM

## 2021-04-21 DIAGNOSIS — K13.0 ANGULAR CHEILITIS: ICD-10-CM

## 2021-04-21 DIAGNOSIS — E03.9 HYPOTHYROIDISM, UNSPECIFIED TYPE: ICD-10-CM

## 2021-04-21 DIAGNOSIS — R19.8 ALTERNATING CONSTIPATION AND DIARRHEA: ICD-10-CM

## 2021-04-21 PROCEDURE — 99213 OFFICE O/P EST LOW 20 MIN: CPT | Performed by: INTERNAL MEDICINE

## 2021-04-21 PROCEDURE — 99214 OFFICE O/P EST MOD 30 MIN: CPT | Performed by: INTERNAL MEDICINE

## 2021-04-21 RX ORDER — CALCIUM POLYCARBOPHIL 625 MG
625 TABLET ORAL 2 TIMES DAILY
Qty: 60 TABLET | Refills: 1 | Status: SHIPPED | OUTPATIENT
Start: 2021-04-21 | End: 2021-09-17 | Stop reason: SDUPTHER

## 2021-04-21 RX ORDER — DOCUSATE SODIUM 100 MG/1
100 CAPSULE, LIQUID FILLED ORAL 2 TIMES DAILY
Qty: 60 CAPSULE | Refills: 1 | Status: SHIPPED | OUTPATIENT
Start: 2021-04-21 | End: 2021-09-17 | Stop reason: SDUPTHER

## 2021-04-21 RX ORDER — GABAPENTIN 400 MG/1
400 CAPSULE ORAL 4 TIMES DAILY
Qty: 120 CAPSULE | Refills: 1 | Status: SHIPPED | OUTPATIENT
Start: 2021-04-21 | End: 2021-08-17 | Stop reason: SDUPTHER

## 2021-04-21 RX ORDER — MONTELUKAST SODIUM 10 MG/1
TABLET ORAL
Qty: 90 TABLET | Refills: 1 | Status: SHIPPED | OUTPATIENT
Start: 2021-04-21 | End: 2021-09-17 | Stop reason: SDUPTHER

## 2021-04-21 RX ORDER — IPRATROPIUM BROMIDE AND ALBUTEROL SULFATE 2.5; .5 MG/3ML; MG/3ML
1 SOLUTION RESPIRATORY (INHALATION) EVERY 6 HOURS PRN
Qty: 360 ML | Refills: 1 | Status: SHIPPED | OUTPATIENT
Start: 2021-04-21 | End: 2021-09-17 | Stop reason: SDUPTHER

## 2021-04-21 RX ORDER — ALBUTEROL SULFATE 90 UG/1
2 AEROSOL, METERED RESPIRATORY (INHALATION) 4 TIMES DAILY PRN
Qty: 1 INHALER | Refills: 1 | Status: SHIPPED | OUTPATIENT
Start: 2021-04-21 | End: 2021-09-17 | Stop reason: SDUPTHER

## 2021-04-21 RX ORDER — PETROLATUM 42 G/100G
OINTMENT TOPICAL
Qty: 1 TUBE | Refills: 1 | Status: SHIPPED | OUTPATIENT
Start: 2021-04-21 | End: 2021-12-15 | Stop reason: SDUPTHER

## 2021-04-21 RX ORDER — BUDESONIDE AND FORMOTEROL FUMARATE DIHYDRATE 160; 4.5 UG/1; UG/1
2 AEROSOL RESPIRATORY (INHALATION) 2 TIMES DAILY
Qty: 1 INHALER | Refills: 1 | Status: SHIPPED | OUTPATIENT
Start: 2021-04-21 | End: 2021-09-17 | Stop reason: SDUPTHER

## 2021-04-21 RX ORDER — LEVOTHYROXINE SODIUM 112 UG/1
112 TABLET ORAL DAILY
Qty: 90 TABLET | Refills: 0 | Status: SHIPPED | OUTPATIENT
Start: 2021-04-21 | End: 2021-07-28 | Stop reason: SDUPTHER

## 2021-04-21 RX ORDER — TRIAMTERENE AND HYDROCHLOROTHIAZIDE 37.5; 25 MG/1; MG/1
1 TABLET ORAL DAILY
Qty: 90 TABLET | Refills: 1 | Status: SHIPPED | OUTPATIENT
Start: 2021-04-21 | End: 2021-09-17 | Stop reason: SDUPTHER

## 2021-04-21 NOTE — PROGRESS NOTES
[facility]  [facility]    Attending Physician Statement  I have discussed the case, including pertinent history and exam findings with the resident physician. I have seen and examined the patient and the key elements of the encounter have been performed by me. I agree with the assessment, plan and orders as documented by the resident. I have reviewed all pertinent PMHx, PSHx, FamHx, SocialHx, medications, and allergies and updated history as appropriate. Patient here for routine follow up of medical problems. 1. Cheilitis - some improvement noted, will start low dose topical steroid. However persistence of R sided pain and tenderness - will do soft tissue ultrasound. 2. PMB - for D&C. Low risk for cardiac events during low risk procedure. Stop ASA 7 days prior. 3. HTN - controlled  4. COPD - stable, continue singulair and inhalers  5. DM, non-insulin requiring, controlled  6. Hypothyroidism - increase synthroid and re-assess  7. Smoldering MM, stable and following with HemOnc  8.  Rotator cuff tear - following with Ortho, MRI pending      Preoperative Risk assessment using 2014 ACC/AHA guidelines     1) Emergent procedure No  2) Active Cardiac Condition No (ACS,recent AMI,decompensated HF with NYHA IV, Arrhythmia, MI <3 weeks, severe valve disease, poorly controlled HTN)  3)Risk procedure  [x] Low Risk procedure(0-1% of adverse cardiac event) endoscopy, superficial skin, Breast, or cataract ect)   []Intermediate Risk procedure(1-5% of adverse cardiac event ( carotid endarterectomy, intraperitoneal/intrathoracic surgery, orthopedic surgery,head and neck surgery,prostate surgery)  [] High Risk procedure(>5% of adverse cardiac event (vascular or aortic repair surgery)  4)RCRI (low risk :0-1= <1% of cardiac event)   [] Cardiovascular disease   [] Stroke   [] Heart failure   [] DM requiring insulin    [] Cr > 2.0  5) >4 MET's w/o symptoms Yes  [x]Climbing 1-2 flight or stairs  [x]Walking a block at a brisk pace  []House chores  []Recreational activites: golf, bowling,dancing,double tennis. Delmon Green 6) will further testing impact decision? No    Bleeding risk by procedure     According to the 2014 ACC/AHA preoperative risk assessment Alma Balderas is a low risk for cardiac complications during low risk procedure and may continue as planned. Leartis Money should continue all of their chronic medication unless otherwise directed. Stop ASA 7 days prior. Remainder of medical problems as per resident note. Roselyn Hamilton MD  4/21/2021 2:26 PM

## 2021-04-21 NOTE — PROGRESS NOTES
[facility] Internal Medicine      SUBJECTIVE:  Shelly Roberson (:  1955) is a 77 y.o. female here for evaluation of the following chief complaint(s):  Pre-op Exam (surg ), Diabetes, Hypertension, Medication Refill, and Results    Seen in office on  for oral swelling and blisters possible cheilitis, labs were ordered and given benzocaine for pain, labs were drawn showing mild hyponatremia. Cheilitis has imporved but still has some discomfort, is able to eat, has adenopathy on right neck mostly submandibular area. No redness. No sick contacts prior to episode. Hypothyroidism, elevated TSH 5.030, on synthroid 100  Mcg daily. Has some hair loss, and decreased energy. HTN- maxzide 37.5/25 mg qd, BP well controlled. COPD- Singulair, Albuterol PRN and Symbicort BID (has not been taking properly) only occasional use, not daily. Explained proper use of inhalers. DMT2 A1c 5.6% on Metformin 1000 mg qd, tries to eat  Healthy, but sometimes does not work. GERD- on Prilosec daily, if stopped has usually heartburn, ok to take PRN    Smoldering MM, M spike is 1.1G/DL, overall stable, IgG stable, IgA kappa 65.74, lambda 26.35, ratio is 2.49. He does not have anemia, no hypercalcemia or kidney disease. There is no evidence of progression to multiple myeloma    Vitamin D deficiency- on replacement, completed weekly dosing     Endometrial thickening with post menopausal bleeding- pending [facility] by GYN, Low risk of cardiac event during procedure. Will hold ASA 7 days prior    Complete Left rotator cuff tear- seen by ortho, ordered MRI shoulder (pending) and gentle ROM exercises. NSAIDs PRN, denies any allergy when taken. Fibromyalgia, celexa (not sure if taking) and amitriptyline     HCM- DEXA and mammogram pending     Review of Systems   Constitutional: Negative. HENT: Positive for drooling and mouth sores. Eyes: Negative.     Respiratory: Positive for shortness of breath. Negative for cough, chest tightness and wheezing. Cardiovascular: Negative. Gastrointestinal: Negative. Endocrine: Negative. Genitourinary: Negative. Musculoskeletal: Positive for arthralgias and myalgias. Skin: Negative. Allergic/Immunologic: Negative. Neurological: Negative. Hematological: Positive for adenopathy. Psychiatric/Behavioral: Negative. Current Outpatient Medications on File Prior to Visit   Medication Sig Dispense Refill    citalopram (CELEXA) 40 MG tablet Take 1 tablet by mouth daily 90 tablet 0    amitriptyline (ELAVIL) 50 MG tablet TAKE 1 TABLET BY MOUTH EVERY EVENING 30 tablet 2    hydrocortisone 1 % cream As needed      calcium-vitamin D (CALCIUM 500/D) 500-200 MG-UNIT per tablet TAKE 1 TABLET BY MOUTH DAILY 60 tablet 2    baclofen (LIORESAL) 10 MG tablet TAKE 1/2 TABLET THREE TIMES DAILY AS NEEDED(PAIN, SPASMS) 90 tablet 2    OXYGEN Inhale into the lungs Uses 2 liters at night      aspirin 81 MG tablet Take 1 tablet by mouth daily 90 tablet 0    blood glucose monitor kit and supplies Test 1 times a day & as needed for symptoms of irregular blood glucose. Accuchek Avivia 1 kit 0    blood glucose monitor strips Testing daily 100 strip 3    Lancets MISC Testing daily 100 each 3    BENZOCAINE, TOPICAL, 2 % OINT Apply as needed over the angle of the lips 1 Tube 1    Misc. Devices MISC Modified diet. For diabetic. 1 Device 0    omeprazole (PRILOSEC) 20 MG delayed release capsule Take 1 capsule by mouth 2 times daily As directed (Patient not taking: Reported on 4/21/2021) 60 capsule 2    Misc. Devices MISC Oxygen concentrator  j44.9 1 Device 0     No current facility-administered medications on file prior to visit.         OBJECTIVE:    VS:   Vitals:    04/21/21 1346   BP: 128/80   Site: Left Upper Arm   Position: Sitting   Cuff Size: Medium Adult   Pulse: 107   Resp: 18   Temp: 98.7 °F (37.1 °C)   TempSrc: Oral   SpO2: 96%   Weight: 226 lb (102.5 kg)   Height: 5' 6\" (1.676 m)     Physical Exam  Constitutional:       Appearance: Normal appearance. She is obese. HENT:      Head: Normocephalic and atraumatic. Right Ear: External ear normal.      Left Ear: External ear normal.      Nose: Nose normal.      Mouth/Throat:      Mouth: Mucous membranes are moist.      Comments: Angular cheilitis, improved from prior visit. Cardiovascular:      Rate and Rhythm: Normal rate and regular rhythm. Pulses: Normal pulses. Heart sounds: Normal heart sounds. Pulmonary:      Effort: Pulmonary effort is normal.      Breath sounds: Normal breath sounds. Musculoskeletal: Normal range of motion. Skin:     General: Skin is warm and dry. Neurological:      Mental Status: She is alert and oriented to person, place, and time. ASSESSMENT/PLAN:  1. Angular cheilitis  -     betamethasone valerate (VALISONE) 0.1 % cream; Apply topically 2 times daily. For 2 weeks,   2. Type 2 diabetes mellitus without complication, with long-term current use of insulin   -     metFORMIN (GLUCOPHAGE) 1000 MG tablet; Take 1 tablet by mouth daily   -     gabapentin (NEURONTIN) 400 MG capsule; Take 1 capsule by mouth 4 times daily. 3. Hypothyroidism, unspecified type  -     levothyroxine (SYNTHROID) 112 MCG tablet; Take 1 tablet by mouth Daily,   -     TSH; in 3 mo  - Neck adenopathy  -     US HEAD NECK SOFT TISSUE THYROID; Future  4. Essential hypertension  -     triamterene-hydroCHLOROthiazide (MAXZIDE-25) 37.5-25 MG per tablet; Take 1 tablet by mouth daily TAKE 1 TABLET BY MOUTH EVERY DAY,  5. Chronic obstructive pulmonary disease, unspecified COPD type   -     montelukast (SINGULAIR) 10 MG tablet; qd  -     albuterol sulfate  (90 Base) MCG/ACT inhaler;  Inhale 2 puffs into the lungs 4 times daily as needed for Wheezing,   -     ipratropium-albuterol (DUONEB) 0.5-2.5 (3) MG/3ML SOLN nebulizer solution;q6h PRN  -     budesonide-formoterol (SYMBICORT) 160-4.5 MCG/ACT AERO; Inhale 2 puffs into the lungs 2 times daily,   6. Dry skin  -     mineral oil-hydrophilic petrolatum (HYDROPHOR) ointment; Apply topically as needed. ,   7. Alternating constipation and diarrhea  -     Calcium Polycarbophil (FIBER) 625 MG TABS; Take 1 tablet by mouth 2 times daily,   -     docusate sodium (COLACE) 100 MG capsule; Take 1 capsule by mouth 2 times daily,      RTC:  Return in about 3 months (around 7/21/2021) for routine visit, review labs. I have reviewed my findings and recommendations with Pippa Aggarwal and Dr. Tevin Min.     Nuris Lundberg MD   4/22/2021 1:00 AM

## 2021-04-21 NOTE — PROGRESS NOTES
Discharge instructions reviewed with patient per Dr. Maribell Love.  AVS & lab script also given to patient per

## 2021-04-22 ASSESSMENT — ENCOUNTER SYMPTOMS
SHORTNESS OF BREATH: 1
CHEST TIGHTNESS: 0
COUGH: 0
WHEEZING: 0
ALLERGIC/IMMUNOLOGIC NEGATIVE: 1
EYES NEGATIVE: 1
GASTROINTESTINAL NEGATIVE: 1

## 2021-04-28 ENCOUNTER — TELEPHONE (OUTPATIENT)
Dept: INTERNAL MEDICINE | Age: 66
End: 2021-04-28

## 2021-04-28 DIAGNOSIS — E78.5 HYPERLIPIDEMIA, UNSPECIFIED HYPERLIPIDEMIA TYPE: Primary | ICD-10-CM

## 2021-04-28 DIAGNOSIS — E11.9 TYPE 2 DIABETES MELLITUS WITHOUT COMPLICATION, WITH LONG-TERM CURRENT USE OF INSULIN (HCC): ICD-10-CM

## 2021-04-28 DIAGNOSIS — Z79.4 TYPE 2 DIABETES MELLITUS WITHOUT COMPLICATION, WITH LONG-TERM CURRENT USE OF INSULIN (HCC): ICD-10-CM

## 2021-04-28 RX ORDER — ROSUVASTATIN CALCIUM 20 MG/1
20 TABLET, COATED ORAL DAILY
Qty: 90 TABLET | Refills: 1 | Status: SHIPPED
Start: 2021-04-28 | End: 2021-09-17 | Stop reason: SDUPTHER

## 2021-04-28 NOTE — TELEPHONE ENCOUNTER
Notification from insurance that patient is not on statin therapy despite diagnosis of Diabetes. Was previously on statin therapy. Per med rec, appears last on Crestor (Rosuvastatin) 20 mg in 2018. Please review recommendations at this time for potential need to re-consider statin therapy vs. Alternative management.

## 2021-05-05 ENCOUNTER — OFFICE VISIT (OUTPATIENT)
Dept: OBGYN | Age: 66
End: 2021-05-05
Payer: MEDICARE

## 2021-05-05 ENCOUNTER — NURSE ONLY (OUTPATIENT)
Dept: PRIMARY CARE CLINIC | Age: 66
End: 2021-05-05

## 2021-05-05 ENCOUNTER — HOSPITAL ENCOUNTER (OUTPATIENT)
Age: 66
End: 2021-05-05
Payer: MEDICARE

## 2021-05-05 ENCOUNTER — HOSPITAL ENCOUNTER (OUTPATIENT)
Dept: ULTRASOUND IMAGING | Age: 66
Discharge: HOME | End: 2021-05-07
Payer: MEDICARE

## 2021-05-05 ENCOUNTER — HOSPITAL ENCOUNTER (OUTPATIENT)
Age: 66
Discharge: HOME | End: 2021-05-05
Payer: MEDICARE

## 2021-05-05 ENCOUNTER — HOSPITAL ENCOUNTER (OUTPATIENT)
Age: 66
Discharge: HOME | End: 2021-05-07
Payer: MEDICARE

## 2021-05-05 VITALS
SYSTOLIC BLOOD PRESSURE: 144 MMHG | BODY MASS INDEX: 36.48 KG/M2 | HEART RATE: 94 BPM | DIASTOLIC BLOOD PRESSURE: 79 MMHG | WEIGHT: 226 LBS

## 2021-05-05 DIAGNOSIS — E78.5 HYPERLIPIDEMIA, UNSPECIFIED HYPERLIPIDEMIA TYPE: ICD-10-CM

## 2021-05-05 DIAGNOSIS — N95.0 PMB (POSTMENOPAUSAL BLEEDING): Primary | ICD-10-CM

## 2021-05-05 DIAGNOSIS — E03.9 HYPOTHYROIDISM, UNSPECIFIED TYPE: ICD-10-CM

## 2021-05-05 DIAGNOSIS — Z79.4 TYPE 2 DIABETES MELLITUS WITHOUT COMPLICATION, WITH LONG-TERM CURRENT USE OF INSULIN (HCC): ICD-10-CM

## 2021-05-05 DIAGNOSIS — R93.89 ENDOMETRIAL THICKENING ON ULTRASOUND: ICD-10-CM

## 2021-05-05 DIAGNOSIS — Z01.818 PREOP TESTING: ICD-10-CM

## 2021-05-05 DIAGNOSIS — E11.9 TYPE 2 DIABETES MELLITUS WITHOUT COMPLICATION, WITH LONG-TERM CURRENT USE OF INSULIN (HCC): ICD-10-CM

## 2021-05-05 LAB
CHOLESTEROL, TOTAL: 126 MG/DL (ref 0–199)
HDLC SERPL-MCNC: 60 MG/DL
LDL CHOLESTEROL CALCULATED: 54 MG/DL (ref 0–99)
TRIGL SERPL-MCNC: 60 MG/DL (ref 0–149)
TSH SERPL DL<=0.05 MIU/L-ACNC: 1.4 UIU/ML (ref 0.27–4.2)
VLDLC SERPL CALC-MCNC: 12 MG/DL

## 2021-05-05 PROCEDURE — 84443 ASSAY THYROID STIM HORMONE: CPT

## 2021-05-05 PROCEDURE — 36415 COLL VENOUS BLD VENIPUNCTURE: CPT

## 2021-05-05 PROCEDURE — 76536 US EXAM OF HEAD AND NECK: CPT

## 2021-05-05 PROCEDURE — U0003 INFECTIOUS AGENT DETECTION BY NUCLEIC ACID (DNA OR RNA); SEVERE ACUTE RESPIRATORY SYNDROME CORONAVIRUS 2 (SARS-COV-2) (CORONAVIRUS DISEASE [COVID-19]), AMPLIFIED PROBE TECHNIQUE, MAKING USE OF HIGH THROUGHPUT TECHNOLOGIES AS DESCRIBED BY CMS-2020-01-R: HCPCS

## 2021-05-05 PROCEDURE — 99213 OFFICE O/P EST LOW 20 MIN: CPT | Performed by: OBSTETRICS & GYNECOLOGY

## 2021-05-05 PROCEDURE — U0005 INFEC AGEN DETEC AMPLI PROBE: HCPCS

## 2021-05-05 PROCEDURE — 80061 LIPID PANEL: CPT

## 2021-05-05 NOTE — H&P
N/A 11/9/2020    Small sessile polyp distal ascending colon removed with bx/cauterized (path Tubular Adenoma), right and left diverticulosis without diverticulitis, Dr. Kayley Singh, [address] COLONOSCOPY  11/9/2020    Small sessile polyp distal ascending colon removed with bx/cauterized (path Tubular Adenoma), right and left diverticulosis without diverticulitis, Dr. Kayley Singh, [address] [place], left knee, arthroscopic    NERVE BLOCK Bilateral 09/22/2016    lumbar transforaminal nerve block #1    NERVE BLOCK  07/06/2020    lumbar epidural steroid injectio L4-5    NERVE BLOCK Bilateral 08/10/2020    L3, L4, L5     OTHER SURGICAL HISTORY  12/13/2016    Excision cyst right face    PAIN MANAGEMENT PROCEDURE N/A 7/6/2020    LUMBAR EPIDURAL STEROID INJECTION L4-5 performed by Dotty Ruelas MD at [address]  2008 2008    UPPER GASTROINTESTINAL ENDOSCOPY  07/20/2016    GERD and gastric ulcers, Bx H pylori neg, Dr. Sonny Omer  2008    approximately 2008, no report, patient does not know the findings, Dr Loli Reyes, [facility]. N/A 11/9/2020    Severe gastritis with superficial ulcerations with bx neg H pylori, Dr. Kayley Singh, [facility]        Medications Prior to Admission     Prior to Admission medications    Medication Sig Start Date End Date Taking?  Authorizing Provider   rosuvastatin (CRESTOR) 20 MG tablet Take 1 tablet by mouth daily 4/28/21  Yes Segundo Cheema MD   levothyroxine (SYNTHROID) 112 MCG tablet Take 1 tablet by mouth Daily 4/21/21  Yes Rosalee Nissen, MD   triamterene-hydroCHLOROthiazide (MMGXKJG-29) 37.5-25 MG per tablet Take 1 tablet by mouth daily TAKE 1 TABLET BY MOUTH EVERY DAY 4/21/21  Yes Rosalee Nissen, MD   metFORMIN (GLUCOPHAGE) 1000 MG tablet Take 1 tablet by mouth daily (with breakfast) 4/21/21  Yes Rosalee Nissen, MD   Calcium Polycarbophil lungs Uses 2 liters at night   Yes Historical Provider, MD   aspirin 81 MG tablet Take 1 tablet by mouth daily 1/31/20  Yes Con Lang MD   blood glucose monitor kit and supplies Test 1 times a day & as needed for symptoms of irregular blood glucose. Accuchek Avivia 2/18/19  Yes Geovany Vegas MD   blood glucose monitor strips Testing daily 2/11/19  Yes Con Lang MD   Lancets MISC Testing daily 2/11/19  Yes Con Lang MD   Misc.  Devices MISC Oxygen concentrator  j44.9 10/26/18  Yes Gayatri Chavez DO        Allergies   Aceon [perindopril erbumine] and Nsaids    Social History     Social History     Tobacco History     Smoking Status  Former Smoker Smoking Start Date  7/15/1971 Quit date  2/10/2020 Smoking Frequency  For 50 years    Smoking Tobacco Type  Cigarettes    Smokeless Tobacco Use  Never Used    Tobacco Comment  quit x 1 yr. ago          Alcohol History     Alcohol Use Status  No          Drug Use     Drug Use Status  No          Sexual Activity     Sexually Active  Never                Family History     Family History   Problem Relation Age of Onset    Heart Disease Mother 79    Diabetes Mother     Cancer Mother         Breast    Hypertension Father     Cancer Father         Pancreas    Diabetes Father     High Blood Pressure Father     Arthritis Brother     Diabetes Brother     Cancer Maternal Uncle     Cancer Paternal Aunt         breast    High Blood Pressure Paternal Aunt     High Blood Pressure Paternal Uncle     Arthritis Maternal Grandmother     Diabetes Maternal Grandmother     High Blood Pressure Maternal Grandmother     Arthritis Maternal Grandfather     Stroke Maternal Grandfather     High Cholesterol Paternal Grandmother     Kidney Disease Paternal Grandmother     Heart Disease Paternal Grandfather        Review of Systems   Review of Systems    Physical Exam   BP (!) 144/79   Pulse 94   Wt 226 lb (102.5 kg)   BMI 36.48 kg/m² Physical Exam  General: alert, pleasant  GYN: deferred     Labs    Pelvic ultrasound     FINDINGS:   Measurements:   Uterus:  Uterus measures 4.6 x 3.4 cm. Endometrial stripe:  Measures 10.3 mm   Right Ovary:  Measures 2.2 x 1.4 cm   Left Ovary:  Measures 2.1 x 2.0 cm   Ultrasound Findings:   Uterus: Uterus demonstrates normal myometrial echotexture. Endometrial stripe: Endometrial stripe is thickened for this age group. Right Ovary: Right ovary is within normal limits.  There is normal vascular   flow   Left Ovary:  Left ovary is within normal limits. There is normal vascular flow   Free Fluid: No evidence of free fluid.       Impression   Endometrium is thickened for this age group measuring 10.3 mm.  Direct   visualization and biopsy could be of value. Imaging/Diagnostics   No results found. Assessment    77year old female with PMB and thickened uterine lining     Plan   1.  IVF, NPO  2. CBC, T&S  3. Consent form reviewed and signed     Consultations Ordered:  None    Electronically signed by Buffy Majano MD on 5/5/21 at 10:31 AM EDT

## 2021-05-06 LAB
SARS-COV-2: NOT DETECTED
SOURCE: NORMAL

## 2021-05-10 NOTE — PROGRESS NOTES
Pulmonary history reviewed with Dr. Padmini Ogden. No chest x-ray needed pre op.   CT lung on file from 02/22/2021

## 2021-05-11 ENCOUNTER — ANESTHESIA (OUTPATIENT)
Dept: OPERATING ROOM | Age: 66
End: 2021-05-11
Payer: MEDICARE

## 2021-05-11 ENCOUNTER — ANESTHESIA EVENT (OUTPATIENT)
Dept: OPERATING ROOM | Age: 66
End: 2021-05-11
Payer: MEDICARE

## 2021-05-11 ENCOUNTER — HOSPITAL ENCOUNTER (OUTPATIENT)
Age: 66
Setting detail: OUTPATIENT SURGERY
Discharge: HOME | End: 2021-05-11
Attending: OBSTETRICS & GYNECOLOGY | Admitting: OBSTETRICS & GYNECOLOGY
Payer: MEDICARE

## 2021-05-11 VITALS
SYSTOLIC BLOOD PRESSURE: 172 MMHG | HEART RATE: 86 BPM | WEIGHT: 226 LBS | TEMPERATURE: 97 F | OXYGEN SATURATION: 100 % | BODY MASS INDEX: 36.32 KG/M2 | RESPIRATION RATE: 16 BRPM | HEIGHT: 66 IN | DIASTOLIC BLOOD PRESSURE: 95 MMHG

## 2021-05-11 VITALS — DIASTOLIC BLOOD PRESSURE: 105 MMHG | OXYGEN SATURATION: 96 % | SYSTOLIC BLOOD PRESSURE: 170 MMHG

## 2021-05-11 DIAGNOSIS — Z01.818 PREOP TESTING: Primary | ICD-10-CM

## 2021-05-11 LAB
ABO/RH: NORMAL
ANION GAP SERPL CALCULATED.3IONS-SCNC: 8 MMOL/L (ref 7–16)
ANTIBODY SCREEN: NORMAL
BUN BLDV-MCNC: 14 MG/DL (ref 6–23)
CALCIUM SERPL-MCNC: 9.4 MG/DL (ref 8.6–10.2)
CHLORIDE BLD-SCNC: 99 MMOL/L (ref 98–107)
CO2: 32 MMOL/L (ref 22–29)
CREAT SERPL-MCNC: 0.7 MG/DL (ref 0.5–1)
EKG ATRIAL RATE: 76 BPM
EKG P AXIS: 52 DEGREES
EKG P-R INTERVAL: 184 MS
EKG Q-T INTERVAL: 414 MS
EKG QRS DURATION: 96 MS
EKG QTC CALCULATION (BAZETT): 465 MS
EKG R AXIS: -2 DEGREES
EKG T AXIS: 30 DEGREES
EKG VENTRICULAR RATE: 76 BPM
GFR AFRICAN AMERICAN: >60
GFR NON-AFRICAN AMERICAN: >60 ML/MIN/1.73
GLUCOSE BLD-MCNC: 118 MG/DL (ref 74–99)
HCT VFR BLD CALC: 34.9 % (ref 34–48)
HEMOGLOBIN: 10.6 G/DL (ref 11.5–15.5)
MCH RBC QN AUTO: 28.2 PG (ref 26–35)
MCHC RBC AUTO-ENTMCNC: 30.4 % (ref 32–34.5)
MCV RBC AUTO: 92.8 FL (ref 80–99.9)
PDW BLD-RTO: 13 FL (ref 11.5–15)
PLATELET # BLD: 352 E9/L (ref 130–450)
PMV BLD AUTO: 10.1 FL (ref 7–12)
POTASSIUM SERPL-SCNC: 3.7 MMOL/L (ref 3.5–5)
RBC # BLD: 3.76 E12/L (ref 3.5–5.5)
SODIUM BLD-SCNC: 139 MMOL/L (ref 132–146)
WBC # BLD: 6.6 E9/L (ref 4.5–11.5)

## 2021-05-11 PROCEDURE — 3700000000 HC ANESTHESIA ATTENDED CARE: Performed by: OBSTETRICS & GYNECOLOGY

## 2021-05-11 PROCEDURE — 85027 COMPLETE CBC AUTOMATED: CPT

## 2021-05-11 PROCEDURE — 86850 RBC ANTIBODY SCREEN: CPT

## 2021-05-11 PROCEDURE — 86901 BLOOD TYPING SEROLOGIC RH(D): CPT

## 2021-05-11 PROCEDURE — 7100000001 HC PACU RECOVERY - ADDTL 15 MIN: Performed by: OBSTETRICS & GYNECOLOGY

## 2021-05-11 PROCEDURE — 80048 BASIC METABOLIC PNL TOTAL CA: CPT

## 2021-05-11 PROCEDURE — 2500000003 HC RX 250 WO HCPCS: Performed by: ANESTHESIOLOGY

## 2021-05-11 PROCEDURE — 58558 HYSTEROSCOPY BIOPSY: CPT | Performed by: OBSTETRICS & GYNECOLOGY

## 2021-05-11 PROCEDURE — 88305 TISSUE EXAM BY PATHOLOGIST: CPT

## 2021-05-11 PROCEDURE — 6360000002 HC RX W HCPCS: Performed by: ANESTHESIOLOGY

## 2021-05-11 PROCEDURE — 3600000014 HC SURGERY LEVEL 4 ADDTL 15MIN: Performed by: OBSTETRICS & GYNECOLOGY

## 2021-05-11 PROCEDURE — 2500000003 HC RX 250 WO HCPCS: Performed by: NURSE ANESTHETIST, CERTIFIED REGISTERED

## 2021-05-11 PROCEDURE — 7100000011 HC PHASE II RECOVERY - ADDTL 15 MIN: Performed by: OBSTETRICS & GYNECOLOGY

## 2021-05-11 PROCEDURE — 7100000000 HC PACU RECOVERY - FIRST 15 MIN: Performed by: OBSTETRICS & GYNECOLOGY

## 2021-05-11 PROCEDURE — 2709999900 HC NON-CHARGEABLE SUPPLY: Performed by: OBSTETRICS & GYNECOLOGY

## 2021-05-11 PROCEDURE — 3700000001 HC ADD 15 MINUTES (ANESTHESIA): Performed by: OBSTETRICS & GYNECOLOGY

## 2021-05-11 PROCEDURE — 2580000003 HC RX 258: Performed by: NURSE ANESTHETIST, CERTIFIED REGISTERED

## 2021-05-11 PROCEDURE — 86900 BLOOD TYPING SEROLOGIC ABO: CPT

## 2021-05-11 PROCEDURE — 7100000010 HC PHASE II RECOVERY - FIRST 15 MIN: Performed by: OBSTETRICS & GYNECOLOGY

## 2021-05-11 PROCEDURE — 36415 COLL VENOUS BLD VENIPUNCTURE: CPT

## 2021-05-11 PROCEDURE — 6360000002 HC RX W HCPCS: Performed by: NURSE ANESTHETIST, CERTIFIED REGISTERED

## 2021-05-11 PROCEDURE — 93005 ELECTROCARDIOGRAM TRACING: CPT

## 2021-05-11 PROCEDURE — 2720000010 HC SURG SUPPLY STERILE: Performed by: OBSTETRICS & GYNECOLOGY

## 2021-05-11 PROCEDURE — 3600000004 HC SURGERY LEVEL 4 BASE: Performed by: OBSTETRICS & GYNECOLOGY

## 2021-05-11 RX ORDER — HYDROCODONE BITARTRATE AND ACETAMINOPHEN 5; 325 MG/1; MG/1
1 TABLET ORAL
Status: DISCONTINUED | OUTPATIENT
Start: 2021-05-11 | End: 2021-05-11 | Stop reason: HOSPADM

## 2021-05-11 RX ORDER — FENTANYL CITRATE 50 UG/ML
INJECTION, SOLUTION INTRAMUSCULAR; INTRAVENOUS PRN
Status: DISCONTINUED | OUTPATIENT
Start: 2021-05-11 | End: 2021-05-11 | Stop reason: SDUPTHER

## 2021-05-11 RX ORDER — MIDAZOLAM HYDROCHLORIDE 1 MG/ML
INJECTION INTRAMUSCULAR; INTRAVENOUS PRN
Status: DISCONTINUED | OUTPATIENT
Start: 2021-05-11 | End: 2021-05-11 | Stop reason: SDUPTHER

## 2021-05-11 RX ORDER — OXYCODONE HYDROCHLORIDE 5 MG/1
5 TABLET ORAL EVERY 4 HOURS PRN
Status: DISCONTINUED | OUTPATIENT
Start: 2021-05-11 | End: 2021-05-11 | Stop reason: HOSPADM

## 2021-05-11 RX ORDER — ACETAMINOPHEN 325 MG/1
650 TABLET ORAL EVERY 4 HOURS PRN
Status: DISCONTINUED | OUTPATIENT
Start: 2021-05-11 | End: 2021-05-11 | Stop reason: HOSPADM

## 2021-05-11 RX ORDER — SODIUM CHLORIDE 0.9 % (FLUSH) 0.9 %
5-40 SYRINGE (ML) INJECTION PRN
Status: DISCONTINUED | OUTPATIENT
Start: 2021-05-11 | End: 2021-05-11 | Stop reason: HOSPADM

## 2021-05-11 RX ORDER — GLYCOPYRROLATE 1 MG/5 ML
SYRINGE (ML) INTRAVENOUS PRN
Status: DISCONTINUED | OUTPATIENT
Start: 2021-05-11 | End: 2021-05-11 | Stop reason: SDUPTHER

## 2021-05-11 RX ORDER — DIPHENHYDRAMINE HYDROCHLORIDE 50 MG/ML
12.5 INJECTION INTRAMUSCULAR; INTRAVENOUS
Status: DISCONTINUED | OUTPATIENT
Start: 2021-05-11 | End: 2021-05-11 | Stop reason: HOSPADM

## 2021-05-11 RX ORDER — FENTANYL CITRATE 50 UG/ML
25 INJECTION, SOLUTION INTRAMUSCULAR; INTRAVENOUS EVERY 5 MIN PRN
Status: DISCONTINUED | OUTPATIENT
Start: 2021-05-11 | End: 2021-05-11 | Stop reason: HOSPADM

## 2021-05-11 RX ORDER — PROCHLORPERAZINE EDISYLATE 5 MG/ML
5 INJECTION INTRAMUSCULAR; INTRAVENOUS
Status: DISCONTINUED | OUTPATIENT
Start: 2021-05-11 | End: 2021-05-11 | Stop reason: HOSPADM

## 2021-05-11 RX ORDER — PROPOFOL 10 MG/ML
INJECTION, EMULSION INTRAVENOUS PRN
Status: DISCONTINUED | OUTPATIENT
Start: 2021-05-11 | End: 2021-05-11 | Stop reason: SDUPTHER

## 2021-05-11 RX ORDER — LIDOCAINE HYDROCHLORIDE 20 MG/ML
INJECTION, SOLUTION EPIDURAL; INFILTRATION; INTRACAUDAL; PERINEURAL PRN
Status: DISCONTINUED | OUTPATIENT
Start: 2021-05-11 | End: 2021-05-11 | Stop reason: SDUPTHER

## 2021-05-11 RX ORDER — SODIUM CHLORIDE 9 MG/ML
25 INJECTION, SOLUTION INTRAVENOUS PRN
Status: DISCONTINUED | OUTPATIENT
Start: 2021-05-11 | End: 2021-05-11 | Stop reason: HOSPADM

## 2021-05-11 RX ORDER — DEXAMETHASONE SODIUM PHOSPHATE 4 MG/ML
INJECTION, SOLUTION INTRA-ARTICULAR; INTRALESIONAL; INTRAMUSCULAR; INTRAVENOUS; SOFT TISSUE PRN
Status: DISCONTINUED | OUTPATIENT
Start: 2021-05-11 | End: 2021-05-11 | Stop reason: SDUPTHER

## 2021-05-11 RX ORDER — ONDANSETRON 2 MG/ML
4 INJECTION INTRAMUSCULAR; INTRAVENOUS EVERY 6 HOURS PRN
Status: DISCONTINUED | OUTPATIENT
Start: 2021-05-11 | End: 2021-05-11 | Stop reason: HOSPADM

## 2021-05-11 RX ORDER — SODIUM CHLORIDE 0.9 % (FLUSH) 0.9 %
5-40 SYRINGE (ML) INJECTION EVERY 12 HOURS SCHEDULED
Status: DISCONTINUED | OUTPATIENT
Start: 2021-05-11 | End: 2021-05-11 | Stop reason: HOSPADM

## 2021-05-11 RX ORDER — FLUCONAZOLE 150 MG/1
150 TABLET ORAL DAILY
Qty: 1 TABLET | Refills: 0 | Status: SHIPPED | OUTPATIENT
Start: 2021-05-11 | End: 2021-05-12

## 2021-05-11 RX ORDER — OXYCODONE HYDROCHLORIDE 5 MG/1
10 TABLET ORAL EVERY 4 HOURS PRN
Status: DISCONTINUED | OUTPATIENT
Start: 2021-05-11 | End: 2021-05-11 | Stop reason: HOSPADM

## 2021-05-11 RX ORDER — PROMETHAZINE HYDROCHLORIDE 25 MG/1
12.5 TABLET ORAL EVERY 6 HOURS PRN
Status: DISCONTINUED | OUTPATIENT
Start: 2021-05-11 | End: 2021-05-11 | Stop reason: HOSPADM

## 2021-05-11 RX ORDER — ONDANSETRON 2 MG/ML
INJECTION INTRAMUSCULAR; INTRAVENOUS PRN
Status: DISCONTINUED | OUTPATIENT
Start: 2021-05-11 | End: 2021-05-11 | Stop reason: SDUPTHER

## 2021-05-11 RX ORDER — METOCLOPRAMIDE HYDROCHLORIDE 5 MG/ML
10 INJECTION INTRAMUSCULAR; INTRAVENOUS ONCE
Status: COMPLETED | OUTPATIENT
Start: 2021-05-11 | End: 2021-05-11

## 2021-05-11 RX ORDER — SODIUM CHLORIDE 9 MG/ML
INJECTION, SOLUTION INTRAVENOUS CONTINUOUS PRN
Status: DISCONTINUED | OUTPATIENT
Start: 2021-05-11 | End: 2021-05-11 | Stop reason: SDUPTHER

## 2021-05-11 RX ORDER — MEPERIDINE HYDROCHLORIDE 25 MG/ML
12.5 INJECTION INTRAMUSCULAR; INTRAVENOUS; SUBCUTANEOUS EVERY 5 MIN PRN
Status: DISCONTINUED | OUTPATIENT
Start: 2021-05-11 | End: 2021-05-11 | Stop reason: HOSPADM

## 2021-05-11 RX ADMIN — ONDANSETRON 4 MG: 2 INJECTION INTRAMUSCULAR; INTRAVENOUS at 08:41

## 2021-05-11 RX ADMIN — LIDOCAINE HYDROCHLORIDE 100 MG: 20 INJECTION, SOLUTION EPIDURAL; INFILTRATION; INTRACAUDAL; PERINEURAL at 08:43

## 2021-05-11 RX ADMIN — FENTANYL CITRATE 25 MCG: 50 INJECTION, SOLUTION INTRAMUSCULAR; INTRAVENOUS at 08:41

## 2021-05-11 RX ADMIN — FENTANYL CITRATE 25 MCG: 50 INJECTION, SOLUTION INTRAMUSCULAR; INTRAVENOUS at 08:53

## 2021-05-11 RX ADMIN — Medication 0.2 MG: at 08:41

## 2021-05-11 RX ADMIN — SODIUM CHLORIDE: 9 INJECTION, SOLUTION INTRAVENOUS at 08:33

## 2021-05-11 RX ADMIN — PROPOFOL 130 MG: 10 INJECTION, EMULSION INTRAVENOUS at 08:43

## 2021-05-11 RX ADMIN — MIDAZOLAM 2 MG: 1 INJECTION INTRAMUSCULAR; INTRAVENOUS at 08:33

## 2021-05-11 RX ADMIN — METOCLOPRAMIDE 10 MG: 5 INJECTION, SOLUTION INTRAMUSCULAR; INTRAVENOUS at 08:06

## 2021-05-11 RX ADMIN — DEXAMETHASONE SODIUM PHOSPHATE 10 MG: 4 INJECTION, SOLUTION INTRAMUSCULAR; INTRAVENOUS at 08:46

## 2021-05-11 RX ADMIN — FAMOTIDINE 20 MG: 10 INJECTION, SOLUTION INTRAVENOUS at 08:07

## 2021-05-11 ASSESSMENT — PULMONARY FUNCTION TESTS
PIF_VALUE: 1
PIF_VALUE: 1
PIF_VALUE: 9
PIF_VALUE: 21
PIF_VALUE: 3
PIF_VALUE: 1
PIF_VALUE: 20
PIF_VALUE: 2
PIF_VALUE: 3
PIF_VALUE: 22
PIF_VALUE: 21
PIF_VALUE: 21
PIF_VALUE: 22
PIF_VALUE: 22
PIF_VALUE: 1
PIF_VALUE: 22
PIF_VALUE: 21
PIF_VALUE: 21
PIF_VALUE: 2
PIF_VALUE: 21
PIF_VALUE: 19

## 2021-05-11 ASSESSMENT — PAIN SCALES - GENERAL
PAINLEVEL_OUTOF10: 0
PAINLEVEL_OUTOF10: 0

## 2021-05-11 ASSESSMENT — ENCOUNTER SYMPTOMS
SHORTNESS OF BREATH: 1
DYSPNEA ACTIVITY LEVEL: AFTER AMBULATING 1 FLIGHT OF STAIRS

## 2021-05-11 ASSESSMENT — PAIN - FUNCTIONAL ASSESSMENT: PAIN_FUNCTIONAL_ASSESSMENT: 0-10

## 2021-05-11 ASSESSMENT — COPD QUESTIONNAIRES: CAT_SEVERITY: MODERATE

## 2021-05-11 ASSESSMENT — LIFESTYLE VARIABLES: SMOKING_STATUS: 0

## 2021-05-11 NOTE — ANESTHESIA PRE PROCEDURE
Department of Anesthesiology  Preprocedure Note       Name:  Texie Leyden   Age:  77 y.o.  :  1955                                          MRN:  [id number]         Date:  2021      Surgeon: Rico Machuca):  Norberto Oro MD    Procedure: Procedure(s):  DILATATION AND CURETTAGE HYSTEROSCOPY POSSIBLE REMOVAL OF MASS    Medications prior to admission:   Prior to Admission medications    Medication Sig Start Date End Date Taking? Authorizing Provider   rosuvastatin (CRESTOR) 20 MG tablet Take 1 tablet by mouth daily 21  Yes Regino Holcomb MD   levothyroxine (SYNTHROID) 112 MCG tablet Take 1 tablet by mouth Daily 21  Yes Missy Sanders MD   triamterene-hydroCHLOROthiazide (QRUXKLZ-56) 37.5-25 MG per tablet Take 1 tablet by mouth daily TAKE 1 TABLET BY MOUTH EVERY DAY 21  Yes Missy Sanders MD   metFORMIN (GLUCOPHAGE) 1000 MG tablet Take 1 tablet by mouth daily (with breakfast) 21  Yes Missy Sanders MD   Calcium Polycarbophil (FIBER) 625 MG TABS Take 1 tablet by mouth 2 times daily 21  Yes Missy Sanders MD   docusate sodium (COLACE) 100 MG capsule Take 1 capsule by mouth 2 times daily 21  Yes Missy Sanders MD   gabapentin (NEURONTIN) 400 MG capsule Take 1 capsule by mouth 4 times daily for 30 days.  21 Yes Missy Sanders MD   montelukast (SINGULAIR) 10 MG tablet TAKE 1 TABLET BY MOUTH EVERY NIGHT AT BEDTIME 21  Yes Missy Sanders MD   albuterol sulfate  (90 Base) MCG/ACT inhaler Inhale 2 puffs into the lungs 4 times daily as needed for Wheezing 21  Yes Missy Sanders MD   ipratropium-albuterol (DUONEB) 0.5-2.5 (3) MG/3ML SOLN nebulizer solution Inhale 3 mLs into the lungs every 6 hours as needed for Shortness of Breath 21  Yes Missy Sanders MD   budesonide-formoterol [facility]) 160-4.5 MCG/ACT AERO Inhale 2 puffs into the lungs 2 times daily 21  Yes Missy Sanders, MD   mineral oil-hydrophilic petrolatum (HYDROPHOR) ointment Apply topically as needed. 4/21/21  Yes Archie Mishra MD   citalopram (CELEXA) 40 MG tablet Take 1 tablet by mouth daily 4/2/21  Yes Yessenia Sleet, DO   amitriptyline (ELAVIL) 50 MG tablet TAKE 1 TABLET BY MOUTH EVERY EVENING 4/2/21  Yes Ysesenia Sleet, DO   BENZOCAINE, TOPICAL, 2 % OINT Apply as needed over the angle of the lips 4/2/21  Yes Yessenia Sleet, DO   hydrocortisone 1 % cream As needed   Yes Historical Provider, MD   calcium-vitamin D (CALCIUM 500/D) 500-200 MG-UNIT per tablet TAKE 1 TABLET BY MOUTH DAILY 2/25/21  Yes Le Lopez DO   omeprazole (PRILOSEC) 20 MG delayed release capsule Take 1 capsule by mouth 2 times daily As directed 11/9/20  Yes Dane aPcheco MD   baclofen (LIORESAL) 10 MG tablet TAKE 1/2 TABLET THREE TIMES DAILY AS NEEDED(PAIN, SPASMS) 10/27/20  Yes Archie Mishra MD   OXYGEN Inhale into the lungs Uses 2 liters at night   Yes Historical Provider, MD   aspirin 81 MG tablet Take 1 tablet by mouth daily 1/31/20  Yes Archie Mishra MD   Misdev. Devices MISC Modified diet. For diabetic. 4/2/21   Yessenia Pauline, DO   blood glucose monitor kit and supplies Test 1 times a day & as needed for symptoms of irregular blood glucose. Accuchek Avivia 2/18/19   Vicente Aguirre MD   blood glucose monitor strips Testing daily 2/11/19   Archie Mishra MD   Lancets MISC Testing daily 2/11/19   MD Arian Oh. Devices MISC Oxygen concentrator  j44.9 10/26/18   Margarito Rivera DO       Current medications:    No current facility-administered medications for this encounter. 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    Chronic obstructive pulmonary disease ([facility]) J44.9    Type 2 diabetes mellitus without complication, with long-term current use of insulin ([facility]) E11.9, Z79.4    Cervical facet joint syndrome M47.812    Cervical spondylosis M47.812    Lumbar radiculopathy M54.16    Pain in right hip M25.551    Lumbar disc disorder M51.9    Lumbar spondylosis M47.816    Diverticulosis of colon without diverticulitis K57.30    Rectal bleeding K62.5    Alternating constipation and diarrhea R19.8    History of colon polyps Z86.010    Heartburn R12    Indigestion K30    Gastritis K29.70       Past Medical History:        Diagnosis Date    Ad[facility]al incidentaloma ([facility] [address]     Anxiety     Arthritis     Asthma     Bladder incontinence     Cancer [facility]) Dx 2009    multiple Myeloma, in remission currently     Chronic back pain     COPD (chronic obstructive pulmonary disease) ([facility])     on O2 2 liters  (uses with activity and at night to sleep)    Depression     Fibromyalgia     GERD (gastroesophageal reflux disease)     Hyperlipidemia     Hypertension     Hypothyroidism     MGUS (monoclonal gammopathy of unknown significance)     Neuropathy     Pneumonia 02/2020    Prolonged emergence from general anesthesia     Sleep apnea     doesnt use her cpap    Type II or unspecified type diabetes mellitus without mention of complication, not stated as uncontrolled     Vaginal bleeding        Past Surgical History:        Procedure Laterality Date    ANESTHESIA NERVE BLOCK Bilateral 8/10/2020    BILATERAL L3 L4 MEDIAL BRANCH L5 DORSSAL RAMUS NERVE BLOCK (CPT [id number]) SEDATION performed by Annie Stevens MD at [facility] yr. ago      Vital Signs (Current):   Vitals:    05/05/21 1603   Weight: 226 lb (102.5 kg)   Height: 5' 6\" (1.676 m)                                              BP Readings from Last 3 Encounters:   05/05/21 (!) 144/79   04/21/21 128/80   04/02/21 122/83       NPO Status:                                                                                 BMI:   Wt Readings from Last 3 Encounters:   05/05/21 226 lb (102.5 kg)   05/05/21 226 lb (102.5 kg)   04/21/21 226 lb (102.5 kg)     Body mass index is 36.48 kg/m². CBC:   Lab Results   Component Value Date    WBC 5.3 04/13/2021    RBC 4.13 04/13/2021    HGB 11.8 04/13/2021    HCT 37.7 04/13/2021    MCV 91.3 04/13/2021    RDW 13.3 04/13/2021     04/13/2021       CMP:   Lab Results   Component Value Date     04/13/2021    K 4.1 04/13/2021    CL 94 04/13/2021    CO2 28 04/13/2021    BUN 15 04/13/2021    CREATININE 0.9 04/13/2021    GFRAA >60 04/13/2021    LABGLOM >60 04/13/2021    GLUCOSE 105 04/13/2021    GLUCOSE 124 10/26/2011    PROT 8.8 03/09/2021    CALCIUM 9.4 04/13/2021    BILITOT 0.4 03/09/2021    ALKPHOS 90 03/09/2021    AST 18 03/09/2021    ALT 11 03/09/2021       POC Tests: No results for input(s): POCGLU, POCNA, POCK, POCCL, POCBUN, POCHEMO, POCHCT in the last 72 hours.     Coags:   Lab Results   Component Value Date    PROTIME 11.3 11/28/2016    PROTIME 11.6 10/26/2011    INR 1.0 11/28/2016    APTT 28.8 10/07/2015       HCG (If Applicable): No results found for: PREGTESTUR, PREGSERUM, HCG, HCGQUANT     ABGs:   Lab Results   Component Value Date    N8EYXYSS 91.4 10/25/2011        Type & Screen (If Applicable):  No results found for: LABABO, LABRH    Drug/Infectious Status (If Applicable):  No results found for: HIV, HEPCAB    COVID-19 Screening (If Applicable):   Lab Results   Component Value Date    COVID19 Not Detected 05/05/2021           Anesthesia Evaluation  Patient summary reviewed and Nursing notes reviewed history of anesthetic complications (Prolonged emergence): Airway: Mallampati: III  TM distance: >3 FB   Neck ROM: full  Mouth opening: > = 3 FB Dental:    (+) edentulous      Pulmonary:   (+) pneumonia: resolved,  COPD (O2 Qhs and w/ activity): moderate,  shortness of breath: chronic and no interval change,  sleep apnea: on noncompliant,  decreased breath sounds,  asthma:     (-) not a current smoker (Former)                          PE comment: Prolonged expiratory phase Cardiovascular:  Exercise tolerance: good (>4 METS),   (+) hypertension: moderate, ROBLES: after ambulating 1 flight of stairs and no interval change, murmur, hyperlipidemia      ECG reviewed  Rhythm: regular  Rate: normal           Beta Blocker:  Not on Beta Blocker      ROS comment: Sinus tachycardia  Nonspecific T wave abnormality  Abnormal ECG  No previous ECGs available    Pt. Denies anginal symptoms, worsening exertional dyspnea, increased inhaler or oxygen requirements, or any recent changes in functional capacity and is at baseline. Neuro/Psych:   (+) neuromuscular disease (Neuropathy):, psychiatric history:depression/anxiety             GI/Hepatic/Renal:   (+) GERD:, morbid obesity         ROS comment: Colon polyps    Rectal bleeding    Diverticular disease    Bladder incontinence. Endo/Other:    (+) DiabetesType II DM, , hypothyroidism: arthritis: OA., malignancy/cancer (Multiple myeloma). Pt had no PAT visit        ROS comment: OA, DJD, DDD, cervical spondylosis, cervical facet disease, lumbar radiculopathy, fibromyalgia, and chronic pain    MGUS Abdominal:   (+) obese,         Vascular: negative vascular ROS. Anesthesia Plan      general     ASA 3     (PONV prophylaxis)  Induction: intravenous. MIPS: Postoperative opioids intended and Prophylactic antiemetics administered. Anesthetic plan and risks discussed with patient. Plan discussed with CRNA.                   Radha Lucio DO   5/11/2021

## 2021-05-11 NOTE — ANESTHESIA POSTPROCEDURE EVALUATION
Department of Anesthesiology  Postprocedure Note    Patient: Lisandra Pineda  MRN: [id number]  YOB: 1955  Date of evaluation: 5/11/2021  Time:  9:56 AM     Procedure Summary     Date: 05/11/21 Room / Location: [facility] 01 / [address] [facility]    Anesthesia Start: 3484 Anesthesia Stop: 1712    Procedure: DILATATION AND CURETTAGE HYSTEROSCOPY POSSIBLE REMOVAL OF MASS (N/A Vagina ) Diagnosis: (POSTMENOPAUSAL BLEEDING THICKENED ENDOMETRIUM)    Surgeons: Sheila Holloway MD Responsible Provider: Sania Perdomo DO    Anesthesia Type: general ASA Status: 3          Anesthesia Type: general    Vandana Phase I: Vandana Score: 10    Vandana Phase II:      Last vitals: Reviewed and per EMR flowsheets.        Anesthesia Post Evaluation    Patient location during evaluation: bedside  Patient participation: complete - patient participated  Level of consciousness: awake  Pain score: 2  Airway patency: patent  Nausea & Vomiting: no vomiting and no nausea  Complications: no  Cardiovascular status: hemodynamically stable  Respiratory status: acceptable  Hydration status: stable

## 2021-05-11 NOTE — PROGRESS NOTES
Patient discharged home with family, prescriptions sent to patients pharmacy, they verbalize understanding of discharge instructions and have no further questions at this time.

## 2021-05-25 ENCOUNTER — OFFICE VISIT (OUTPATIENT)
Dept: OBGYN | Age: 66
End: 2021-05-25
Payer: MEDICARE

## 2021-05-25 VITALS
HEART RATE: 114 BPM | SYSTOLIC BLOOD PRESSURE: 159 MMHG | BODY MASS INDEX: 35.19 KG/M2 | DIASTOLIC BLOOD PRESSURE: 71 MMHG | WEIGHT: 218 LBS

## 2021-05-25 DIAGNOSIS — Z09 POSTOP CHECK: Primary | ICD-10-CM

## 2021-05-25 DIAGNOSIS — N95.0 PMB (POSTMENOPAUSAL BLEEDING): ICD-10-CM

## 2021-05-25 DIAGNOSIS — N84.0 UTERINE POLYP: ICD-10-CM

## 2021-05-25 PROCEDURE — 99024 POSTOP FOLLOW-UP VISIT: CPT | Performed by: OBSTETRICS & GYNECOLOGY

## 2021-05-25 PROCEDURE — 99212 OFFICE O/P EST SF 10 MIN: CPT | Performed by: OBSTETRICS & GYNECOLOGY

## 2021-05-25 NOTE — PROGRESS NOTES
Luz Loznao     Patient presents for postop appointment. Patient is s/p [facility] with polypectomy 5/11/21. Benign uterine polyp on pathology. Patient is doing well. Denies bleeding/ pain. Patient still with a rash in her groin. She was given mycolog cream at the time of her surgery. Patient states the itching is improved but her rash has continued.      Past Medical History:   Diagnosis Date    Adrenal incidentaloma ([address]     Anxiety     Arthritis     Asthma     Bladder incontinence     Cancer [facility]) Dx 2009    multiple Myeloma, in remission currently     Chronic back pain     COPD (chronic obstructive pulmonary disease) ([facility])     on O2 2 liters  (uses with activity and at night to sleep)    Depression     Fibromyalgia     GERD (gastroesophageal reflux disease)     Hyperlipidemia     Hypertension     Hypothyroidism     MGUS (monoclonal gammopathy of unknown significance)     Neuropathy     Pneumonia 02/2020    Prolonged emergence from general anesthesia     Sleep apnea     doesnt use her cpap    Type II or unspecified type diabetes mellitus without mention of complication, not stated as uncontrolled     Vaginal bleeding         Past Surgical History:   Procedure Laterality Date    ANESTHESIA NERVE BLOCK Bilateral 8/10/2020    BILATERAL L3 L4 MEDIAL BRANCH L5 DORSSAL RAMUS NERVE BLOCK (CPT 59525) SEDATION performed by Denice Goss MD at [place] [address]  07/20/2016    removed 3 polyps (tubular adenomas), diverticulosis, Dr. Yanna Moreno COLONOSCOPY  2008    approximately 2008, no report available, per patient some polyps removed, Dr Kourtney Villegas, [facility]    COLONOSCOPY N/A 11/9/2020    Small sessile polyp distal ascending colon removed with bx/cauterized (path Tubular Adenoma), right and left diverticulosis without diverticulitis, Dr. Colette Adams, [facility]    COLONOSCOPY  11/9/2020    Small sessile polyp distal ascending colon removed with bx/cauterized (path Tubular Adenoma), right and left diverticulosis without diverticulitis, Dr. Luz Justin, [facility] OF UTERUS N/A 5/11/2021    DILATATION AND CURETTAGE HYSTEROSCOPY POSSIBLE REMOVAL OF MASS performed by Becca Darby MD at [address], left knee, arthroscopic    NERVE BLOCK Bilateral 09/22/2016    lumbar transforaminal nerve block #1    NERVE BLOCK  07/06/2020    lumbar epidural steroid injectio L4-5    NERVE BLOCK Bilateral 08/10/2020    L3, L4, L5     OTHER SURGICAL HISTORY  12/13/2016    Excision cyst right face    PAIN MANAGEMENT PROCEDURE N/A 7/6/2020    LUMBAR EPIDURAL STEROID INJECTION L4-5 performed by Morgan Foote MD at [address]  2008 2008    UPPER GASTROINTESTINAL ENDOSCOPY  07/20/2016    GERD and gastric ulcers, Bx H pylori neg, Dr. Fawn Meyer  2008    approximately 2008, no report, patient does not know the findings, Dr Kurtis Tavarez, [facility] [address] N/A 11/9/2020    Severe gastritis with superficial ulcerations with bx neg H pylori, Dr. Luz Justin, [facility]        Family History   Problem Relation Age of Onset    Heart Disease Mother 79    Diabetes Mother     Cancer Mother         Breast    Hypertension Father     Cancer Father         Pancreas    Diabetes Father     High Blood Pressure Father     Arthritis Brother     Diabetes Brother     Cancer Maternal Uncle     Cancer Paternal Aunt         breast    High Blood Pressure Paternal Aunt     High Blood Pressure Paternal Uncle     Arthritis Maternal Grandmother     Diabetes Maternal Grandmother     High Blood Pressure Maternal Grandmother     Arthritis Maternal Grandfather     Stroke Maternal Grandfather     High Cholesterol Paternal Grandmother     Kidney Disease Paternal Grandmother     Heart Disease Paternal Grandfather         Social History     Tobacco History     Smoking Status  Former Smoker Smoking Start Date  7/15/1971 Quit date  2/10/2020 Smoking Frequency  For 50 years    Smoking Tobacco Type  Cigarettes    Smokeless Tobacco Use  Never Used    Tobacco Comment  quit x 1 yr. ago          Alcohol History     Alcohol Use Status  No          Drug Use     Drug Use Status  No          Sexual Activity     Sexually Active  Not Asked                  Current Outpatient Medications:     nystatin-triamcinolone (MYCOLOG II) 785951-1.1 UNIT/GM-% cream, Apply topically 2 times daily. , Disp: 30 g, Rfl: 1    rosuvastatin (CRESTOR) 20 MG tablet, Take 1 tablet by mouth daily, Disp: 90 tablet, Rfl: 1    levothyroxine (SYNTHROID) 112 MCG tablet, Take 1 tablet by mouth Daily, Disp: 90 tablet, Rfl: 0    triamterene-hydroCHLOROthiazide (MAXZIDE-25) 37.5-25 MG per tablet, Take 1 tablet by mouth daily TAKE 1 TABLET BY MOUTH EVERY DAY, Disp: 90 tablet, Rfl: 1    metFORMIN (GLUCOPHAGE) 1000 MG tablet, Take 1 tablet by mouth daily (with breakfast), Disp: 90 tablet, Rfl: 1    Calcium Polycarbophil (FIBER) 625 MG TABS, Take 1 tablet by mouth 2 times daily, Disp: 60 tablet, Rfl: 1    docusate sodium (COLACE) 100 MG capsule, Take 1 capsule by mouth 2 times daily, Disp: 60 capsule, Rfl: 1    montelukast (SINGULAIR) 10 MG tablet, TAKE 1 TABLET BY MOUTH EVERY NIGHT AT BEDTIME, Disp: 90 tablet, Rfl: 1    albuterol sulfate  (90 Base) MCG/ACT inhaler, Inhale 2 puffs into the lungs 4 times daily as needed for Wheezing, Disp: 1 Inhaler, Rfl: 1    ipratropium-albuterol (DUONEB) 0.5-2.5 (3) MG/3ML SOLN nebulizer solution, Inhale 3 mLs into the lungs every 6 hours as needed for Shortness of Breath, Disp: 360 mL, Rfl: 1    budesonide-formoterol (SYMBICORT) 160-4.5 MCG/ACT AERO, Inhale 2 puffs into the lungs 2 times daily, Disp: 1 Inhaler, Rfl: 1    mineral oil-hydrophilic petrolatum (HYDROPHOR) ointment, Apply topically as needed. , Disp: 1 Tube, Rfl: 1    citalopram (CELEXA) 40 MG tablet, Take 1 tablet by mouth daily, Disp: 90 tablet, Rfl: 0    amitriptyline (ELAVIL) 50 MG tablet, TAKE 1 TABLET BY MOUTH EVERY EVENING, Disp: 30 tablet, Rfl: 2    BENZOCAINE, TOPICAL, 2 % OINT, Apply as needed over the angle of the lips, Disp: 1 Tube, Rfl: 1    Misc. Devices MISC, Modified diet. For diabetic., Disp: 1 Device, Rfl: 0    hydrocortisone 1 % cream, As needed, Disp: , Rfl:     calcium-vitamin D (CALCIUM 500/D) 500-200 MG-UNIT per tablet, TAKE 1 TABLET BY MOUTH DAILY, Disp: 60 tablet, Rfl: 2    omeprazole (PRILOSEC) 20 MG delayed release capsule, Take 1 capsule by mouth 2 times daily As directed, Disp: 60 capsule, Rfl: 2    baclofen (LIORESAL) 10 MG tablet, TAKE 1/2 TABLET THREE TIMES DAILY AS NEEDED(PAIN, SPASMS), Disp: 90 tablet, Rfl: 2    OXYGEN, Inhale into the lungs Uses 2 liters at night, Disp: , Rfl:     aspirin 81 MG tablet, Take 1 tablet by mouth daily, Disp: 90 tablet, Rfl: 0    blood glucose monitor kit and supplies, Test 1 times a day & as needed for symptoms of irregular blood glucose. Accuchek Avivia, Disp: 1 kit, Rfl: 0    blood glucose monitor strips, Testing daily, Disp: 100 strip, Rfl: 3    Lancets MISC, Testing daily, Disp: 100 each, Rfl: 3    Misc. Devices MISC, Oxygen concentrator  j44.9, Disp: 1 Device, Rfl: 0    gabapentin (NEURONTIN) 400 MG capsule, Take 1 capsule by mouth 4 times daily for 30 days. , Disp: 120 capsule, Rfl: 1     Allergies   Allergen Reactions    Aceon [Perindopril Erbumine] Anaphylaxis     Tongue swells up    Nsaids Shortness Of Breath and Swelling     tongue        Vitals:    05/25/21 1419   BP: (!) 159/71   Pulse: 114        Physical Exam:  General: pleasant, alert     Breasts: deferred     Pelvic exam: yeast noted in right intertriginous fold     Angelita was seen today for post-op check. Diagnoses and all orders for this visit:    Postop check    PMB (postmenopausal bleeding)    Uterine polyp    Advised patient to continue mycolog cream. Keep area dry.  Can use gold bond powder to the area. Avoid tight underwear/ pants. Return for Annual, or as needed.      Claudia Queen MD

## 2021-05-25 NOTE — PROGRESS NOTES
Patient alert and pleasant with no new concerns  Here today for post-op [facility]   Discharge instructions have been discussed with the patient. Patient advised to call our office with any questions or concerns. Voiced understanding.

## 2021-05-31 DIAGNOSIS — C90.00 MULTIPLE MYELOMA, REMISSION STATUS UNSPECIFIED (HCC): Primary | ICD-10-CM

## 2021-06-08 ENCOUNTER — OFFICE VISIT (OUTPATIENT)
Dept: ONCOLOGY | Age: 66
End: 2021-06-08
Payer: MEDICARE

## 2021-06-08 ENCOUNTER — HOSPITAL ENCOUNTER (OUTPATIENT)
Dept: INFUSION THERAPY | Age: 66
Discharge: HOME | End: 2021-06-08
Payer: MEDICARE

## 2021-06-08 VITALS
OXYGEN SATURATION: 93 % | SYSTOLIC BLOOD PRESSURE: 158 MMHG | HEIGHT: 66 IN | TEMPERATURE: 97.3 F | WEIGHT: 221 LBS | DIASTOLIC BLOOD PRESSURE: 84 MMHG | BODY MASS INDEX: 35.52 KG/M2 | HEART RATE: 94 BPM

## 2021-06-08 DIAGNOSIS — C90.00 MULTIPLE MYELOMA, REMISSION STATUS UNSPECIFIED (HCC): ICD-10-CM

## 2021-06-08 DIAGNOSIS — C90.00 MULTIPLE MYELOMA, REMISSION STATUS UNSPECIFIED (HCC): Primary | ICD-10-CM

## 2021-06-08 LAB
ALBUMIN SERPL-MCNC: 4.1 G/DL (ref 3.5–5.2)
ALP BLD-CCNC: 101 U/L (ref 35–104)
ALT SERPL-CCNC: 18 U/L (ref 0–32)
ANION GAP SERPL CALCULATED.3IONS-SCNC: 9 MMOL/L (ref 7–16)
AST SERPL-CCNC: 20 U/L (ref 0–31)
BASOPHILS ABSOLUTE: 0.05 E9/L (ref 0–0.2)
BASOPHILS RELATIVE PERCENT: 0.8 % (ref 0–2)
BILIRUB SERPL-MCNC: 0.3 MG/DL (ref 0–1.2)
BUN BLDV-MCNC: 14 MG/DL (ref 6–23)
CALCIUM SERPL-MCNC: 9.7 MG/DL (ref 8.6–10.2)
CHLORIDE BLD-SCNC: 101 MMOL/L (ref 98–107)
CO2: 29 MMOL/L (ref 22–29)
CREAT SERPL-MCNC: 0.9 MG/DL (ref 0.5–1)
EOSINOPHILS ABSOLUTE: 0.23 E9/L (ref 0.05–0.5)
EOSINOPHILS RELATIVE PERCENT: 3.6 % (ref 0–6)
GFR AFRICAN AMERICAN: >60
GFR NON-AFRICAN AMERICAN: >60 ML/MIN/1.73
GLUCOSE BLD-MCNC: 124 MG/DL (ref 74–99)
HCT VFR BLD CALC: 38.3 % (ref 34–48)
HEMOGLOBIN: 11.8 G/DL (ref 11.5–15.5)
IMMATURE GRANULOCYTES #: 0.02 E9/L
IMMATURE GRANULOCYTES %: 0.3 % (ref 0–5)
LACTATE DEHYDROGENASE: 318 U/L (ref 135–214)
LYMPHOCYTES ABSOLUTE: 3.41 E9/L (ref 1.5–4)
LYMPHOCYTES RELATIVE PERCENT: 53.3 % (ref 20–42)
MCH RBC QN AUTO: 28 PG (ref 26–35)
MCHC RBC AUTO-ENTMCNC: 30.8 % (ref 32–34.5)
MCV RBC AUTO: 90.8 FL (ref 80–99.9)
MONOCYTES ABSOLUTE: 0.45 E9/L (ref 0.1–0.95)
MONOCYTES RELATIVE PERCENT: 7 % (ref 2–12)
NEUTROPHILS ABSOLUTE: 2.24 E9/L (ref 1.8–7.3)
NEUTROPHILS RELATIVE PERCENT: 35 % (ref 43–80)
PDW BLD-RTO: 12.7 FL (ref 11.5–15)
PLATELET # BLD: 348 E9/L (ref 130–450)
PMV BLD AUTO: 10.1 FL (ref 7–12)
POTASSIUM SERPL-SCNC: 3.7 MMOL/L (ref 3.5–5)
RBC # BLD: 4.22 E12/L (ref 3.5–5.5)
SODIUM BLD-SCNC: 139 MMOL/L (ref 132–146)
TOTAL PROTEIN: 8.4 G/DL (ref 6.4–8.3)
WBC # BLD: 6.4 E9/L (ref 4.5–11.5)

## 2021-06-08 PROCEDURE — 99212 OFFICE O/P EST SF 10 MIN: CPT | Performed by: INTERNAL MEDICINE

## 2021-06-08 PROCEDURE — 99214 OFFICE O/P EST MOD 30 MIN: CPT | Performed by: INTERNAL MEDICINE

## 2021-06-08 PROCEDURE — 36415 COLL VENOUS BLD VENIPUNCTURE: CPT

## 2021-06-08 PROCEDURE — 80053 COMPREHEN METABOLIC PANEL: CPT

## 2021-06-08 PROCEDURE — 85025 COMPLETE CBC W/AUTO DIFF WBC: CPT

## 2021-06-08 PROCEDURE — 83883 ASSAY NEPHELOMETRY NOT SPEC: CPT

## 2021-06-08 PROCEDURE — 82232 ASSAY OF BETA-2 PROTEIN: CPT

## 2021-06-08 PROCEDURE — 86334 IMMUNOFIX E-PHORESIS SERUM: CPT

## 2021-06-08 PROCEDURE — 84165 PROTEIN E-PHORESIS SERUM: CPT

## 2021-06-08 PROCEDURE — 83615 LACTATE (LD) (LDH) ENZYME: CPT

## 2021-06-08 PROCEDURE — 82784 ASSAY IGA/IGD/IGG/IGM EACH: CPT

## 2021-06-08 NOTE — PROGRESS NOTES
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 Referring Physician:  Tex Bamberger, MD    PCP:  Sanedep Webb MD    History of Present Illness: The patient is a 68-year-old lady with a PMH significant for HTN, hyperlipidemia, DM, COPD, OA, depression, and fibromyalgia, who was diagnosed with IgG lambda MGUS in 2008, used to follow up with Dr. Bianka Morris at [facility], last f/up with him was in 2012. Her most recent SPEP with immunofixation ha revealed monoclonal IgG lambda, M-spike 0.7 gm/dl  from 9/9/2016. The patient has been having pain in the low back radiating to the right lower extremity, she had an MRI of the L-spine done on 8/24/2016, revealed disc bulging L4-5, L5-S1, with mild narrowing of the neural foramina. She had a bone marrow Biopsy and aspirate done by IR on 11/28/2016. Bone marrow, left iliac, aspirate and core biopsy  Suboptimal specimen showing 15% plasma cells by immunohistochemistry,  consistent with plasma cell neoplasm, see comment. Comment:    The aspirate smear and clot section specimens are hemodilute  and aspicular.  Plasmacytosis is seen by immunohistochemistry for   on the core biopsy specimen only.  Flow cytometric analysis performed by  [facility] confirmed a lambda-restricted plasma cell neoplasm. See separate report for complete details ([id number]). Intradepartmental consultation is obtained. The patient returns for a follow-up visit, she has chronic joint pain, back and neck pain, was seen by Dr. Zhane Hedrick, he recommended epidurals and physical therapy. She is not able to afford the epidurals. The patient is doing well overall, she has knee pain. She did not have the skeletal survey done yet. She had D&C with polypectomy done on 5/11/2021, pathology had revealed benign uterine polyp. patient does not know the findings, Dr Henry Gaines, [address]. N/A 2020    Severe gastritis with superficial ulcerations with bx neg H pylori, Dr. Trish Altman, [facility]       Family History:  Family History   Problem Relation Age of Onset    Heart Disease Mother 79    Diabetes Mother     Cancer Mother         Breast    Hypertension Father     Cancer Father         Pancreas    Diabetes Father     High Blood Pressure Father     Arthritis Brother     Diabetes Brother     Cancer Maternal Uncle     Cancer Paternal Aunt         breast    High Blood Pressure Paternal Aunt     High Blood Pressure Paternal Uncle     Arthritis Maternal Grandmother     Diabetes Maternal Grandmother     High Blood Pressure Maternal Grandmother     Arthritis Maternal Grandfather     Stroke Maternal Grandfather     High Cholesterol Paternal Grandmother     Kidney Disease Paternal Grandmother     Heart Disease Paternal Grandfather        Medications:  Reviewed and reconciled.     Social History:  Social History     Socioeconomic History    Marital status: Single     Spouse name: Not on file    Number of children: Not on file    Years of education: Not on file    Highest education level: Not on file   Occupational History    Not on file   Tobacco Use    Smoking status: Former Smoker     Years: 50.00     Types: Cigarettes     Start date: 7/15/1971     Quit date: 2/10/2020     Years since quittin.3    Smokeless tobacco: Never Used    Tobacco comment: quit x 1 yr. ago   Vaping Use    Vaping Use: Never used   Substance and Sexual Activity    Alcohol use: No     Alcohol/week: 0.0 standard drinks    Drug use: No    Sexual activity: Not on file   Other Topics Concern    Not on file   Social History Narrative    Not on file     Social Determinants of Health     Financial Resource Strain:     Difficulty of Paying Living Expenses:    Food Insecurity:     Worried About Running Out of Food in the Last Year:    [address] in the Last Year:    Transportation Needs: No Transportation Needs    Lack of Transportation (Medical): No    Lack of Transportation (Non-Medical): No   Physical Activity:     Days of Exercise per Week:     Minutes of Exercise per Session:    Stress:     Feeling of Stress :    Social Connections:     Frequency of Communication with Friends and Family:     Frequency of Social Gatherings with Friends and Family:     Attends Taoist Services:     Active Member of Clubs or Organizations:     Attends Club or Organization Meetings:     Marital Status:    Intimate Partner Violence:     Fear of Current or Ex-Partner:     Emotionally Abused:     Physically Abused:     Sexually Abused: Allergies: Allergies   Allergen Reactions    Aceon [Perindopril Erbumine] Anaphylaxis     Tongue swells up    Nsaids Shortness Of Breath and Swelling     tongue       Physical Exam:  BP (!) 158/84 (Site: Right Upper Arm, Position: Sitting, Cuff Size: Medium Adult)   Pulse 94   Temp 97.3 °F (36.3 °C) (Temporal)   Ht 5' 6\" (1.676 m)   Wt 221 lb (100.2 kg)   SpO2 93%   BMI 35.67 kg/m²   GENERAL: Alert, oriented x 3, not in acute distress. HEENT: PERRLA; EOMI. Oropharynx clear. NECK: Supple. No palpable cervical or supraclavicular lymphadenopathy. LUNGS: Good air entry bilaterally. No wheezing, crackles or rhonchi. CARDIOVASCULAR: Regular rate. No murmurs, rubs or gallops. ABDOMEN: Soft. Non-tender, non-distended. Positive bowel sounds. EXTREMITIES: No lower leg edema. Bone Marrow biopsy and aspirate:  Bone marrow, left iliac, aspirate and core biopsy (Parts A and B):  Suboptimal specimen showing 15% plasma cells by immunohistochemistry,  consistent with plasma cell neoplasm, see comment.   Comment:    The aspirate smear and clot section specimens are hemodilute  and aspicular.  Plasmacytosis is seen by immunohistochemistry for   on the core biopsy specimen only.  Flow cytometric analysis performed by  [facility] confirmed a lambda-restricted plasma cell neoplasm. See separate report for complete details ([id number]). Intradepartmental consultation is obtained. Impression/Plan:      The patient is a 78-year-old lady with a PMH significant for HTN, hyperlipidemia, DM, COPD, OA, depression, and fibromyalgia, who was diagnosed with IgG lambda MGUS in 2008, used to follow up with Dr. Alyse Claros at [facility], last f/up with him was in 2012. She did not have a bone marrow biopsy and aspirate done when she was diagnosed with MGUS. Her most recent SPEP with immunofixation ha revealed monoclonal IgG lambda, M-spike 0.7 gm/dl  from 9/9/2016, stable compared with the previous M-spike level. The patient does not have anemia, renal failure, hypercalcemia or lytic lesions on the lumbar spine MRI. IgG had increased sine 2014, skeletal survey was ordered, and is negative for lytic lesions, she had a BM bx and aspirate done by IR on 11/28/2016, Clot and aspirate suboptimal, biopsy showing 15% plasma cells, Flow cytometry positive for a  lambda restricted plasma cell neoplasm, Cytogenetics revealing a normal female karyotype, MM FISH negative. The patient has 15% plasma cells in the bone marrow, no evidence of end organ damage, she has a smoldering multiple myeloma,risk of progression to MM, at a rate of 10 percent per year for the first five years, 3 percent per year for the next five years, and 1 to 2 percent per year for the following 10 years. The patient returns for a follow-up visit. She is doing well clinically at this time. M spike is 1G/DL, overall stable, IgG is trending down, serum light chain assay is pending, from 3/9/2021: kappa 65.74, lambda 26.35, ratio is 2.49. She does not have anemia, no hypercalcemia or kidney disease. There is no evidence of progression to multiple myeloma.   She did not have the skeletal survey done yet, it will be scheduled. Continue surveillance. The spine MRI is negative for lytic lesions. Continue follow-up with dermatology. RTC in 3 months. Thank you for allowing us to participate in the care of Mrs Norm Starks.     Worthy Dakin, MD   HEMATOLOGY/MEDICAL ONCOLOGY  [address] [place] [address] ONCOLOGY  [facility] [address] [facility] [address]  Dept: [phone]

## 2021-06-09 LAB
ALBUMIN SERPL-MCNC: 3.6 G/DL (ref 3.5–4.7)
ALPHA-1-GLOBULIN: 0.2 G/DL (ref 0.2–0.4)
ALPHA-2-GLOBULIN: 1 G/FL (ref 0.5–1)
BETA GLOBULIN: 1.1 G/DL (ref 0.8–1.3)
ELECTROPHORESIS: ABNORMAL
GAMMA GLOBULIN: 2.4 G/DL (ref 0.7–1.6)
IGA: 99 MG/DL (ref 70–400)
IGG: 2443 MG/DL (ref 700–1600)
IGM: 25 MG/DL (ref 40–230)
IMMUNOFIXATION RESULT, SERUM: NORMAL
TOTAL PROTEIN: 8.3 G/DL (ref 6.4–8.3)

## 2021-06-10 LAB — BETA-2 MICROGLOBULIN: 3 MG/L (ref 0.6–2.4)

## 2021-06-11 ENCOUNTER — TELEPHONE (OUTPATIENT)
Dept: INTERNAL MEDICINE | Age: 66
End: 2021-06-11

## 2021-06-11 LAB
KAPPA FREE LIGHT CHAINS QNT: 49.81 MG/L (ref 3.3–19.4)
KAPPA/LAMBDA FREE LIGHT CHAIN RATIO: 2.21 (ref 0.26–1.65)
LAMBDA FREE LIGHT CHAINS QNT: 22.56 MG/L (ref 5.71–26.3)

## 2021-06-11 NOTE — TELEPHONE ENCOUNTER
----- Message from Alexis Todd RN sent at 4/21/2021  2:36 PM EDT -----  Please schedule transfer appt with new Resident for July.

## 2021-06-16 DIAGNOSIS — F32.A DEPRESSION, UNSPECIFIED DEPRESSION TYPE: ICD-10-CM

## 2021-06-16 RX ORDER — CITALOPRAM 40 MG/1
40 TABLET ORAL DAILY
Qty: 90 TABLET | Refills: 0 | Status: SHIPPED
Start: 2021-06-16 | End: 2021-09-03

## 2021-06-18 ENCOUNTER — TELEPHONE (OUTPATIENT)
Dept: INTERNAL MEDICINE | Age: 66
End: 2021-06-18

## 2021-06-18 ENCOUNTER — CLINICAL DOCUMENTATION (OUTPATIENT)
Dept: INTERNAL MEDICINE | Age: 66
End: 2021-06-18

## 2021-06-18 NOTE — TELEPHONE ENCOUNTER
Phoned patient to notify her that her paperwork is completed and is ready to be picked up. Patient stated that she will pick it up from the .

## 2021-06-18 NOTE — PROGRESS NOTES
Employee Medical Statement for  form done today. Chronic conditions are stable as of last visit. Tdap received September 2019  MMR IgG showing immune status September 2019. No risk factor for TB. Carlene Conti MD  Internal Medicine  6/18/2021 11:09 AM

## 2021-07-27 ENCOUNTER — OFFICE VISIT (OUTPATIENT)
Dept: INTERNAL MEDICINE | Age: 66
End: 2021-07-27
Payer: MEDICARE

## 2021-07-27 VITALS
BODY MASS INDEX: 36.32 KG/M2 | RESPIRATION RATE: 16 BRPM | HEART RATE: 103 BPM | TEMPERATURE: 98 F | SYSTOLIC BLOOD PRESSURE: 125 MMHG | WEIGHT: 226 LBS | HEIGHT: 66 IN | DIASTOLIC BLOOD PRESSURE: 92 MMHG

## 2021-07-27 DIAGNOSIS — E03.9 HYPOTHYROIDISM, UNSPECIFIED TYPE: ICD-10-CM

## 2021-07-27 DIAGNOSIS — Z13.820 SCREENING FOR OSTEOPOROSIS: Primary | ICD-10-CM

## 2021-07-27 DIAGNOSIS — R13.10 DYSPHAGIA, UNSPECIFIED TYPE: ICD-10-CM

## 2021-07-27 DIAGNOSIS — E55.9 VITAMIN D DEFICIENCY: ICD-10-CM

## 2021-07-27 DIAGNOSIS — M81.0 AGE-RELATED OSTEOPOROSIS WITHOUT CURRENT PATHOLOGICAL FRACTURE: ICD-10-CM

## 2021-07-27 DIAGNOSIS — M79.7 FIBROMYALGIA: ICD-10-CM

## 2021-07-27 PROCEDURE — 99212 OFFICE O/P EST SF 10 MIN: CPT | Performed by: CHIROPRACTOR

## 2021-07-27 PROCEDURE — 99214 OFFICE O/P EST MOD 30 MIN: CPT | Performed by: CHIROPRACTOR

## 2021-07-27 RX ORDER — IBUPROFEN 200 MG
CAPSULE ORAL
Qty: 60 TABLET | Refills: 2 | Status: SHIPPED
Start: 2021-07-27 | End: 2021-09-17 | Stop reason: SDUPTHER

## 2021-07-27 RX ORDER — AMITRIPTYLINE HYDROCHLORIDE 50 MG/1
TABLET, FILM COATED ORAL
Qty: 30 TABLET | Refills: 2 | Status: SHIPPED
Start: 2021-07-27 | End: 2021-09-17 | Stop reason: SDUPTHER

## 2021-07-27 RX ORDER — LEVOTHYROXINE SODIUM 112 UG/1
112 TABLET ORAL DAILY
Qty: 90 TABLET | Refills: 0 | Status: CANCELLED | OUTPATIENT
Start: 2021-07-27

## 2021-07-27 SDOH — ECONOMIC STABILITY: FOOD INSECURITY: WITHIN THE PAST 12 MONTHS, THE FOOD YOU BOUGHT JUST DIDN'T LAST AND YOU DIDN'T HAVE MONEY TO GET MORE.: SOMETIMES TRUE

## 2021-07-27 SDOH — ECONOMIC STABILITY: FOOD INSECURITY: WITHIN THE PAST 12 MONTHS, YOU WORRIED THAT YOUR FOOD WOULD RUN OUT BEFORE YOU GOT MONEY TO BUY MORE.: SOMETIMES TRUE

## 2021-07-27 SDOH — ECONOMIC STABILITY: FOOD INSECURITY: WITHIN THE PAST 12 MONTHS, THE FOOD YOU BOUGHT JUST DIDN'T LAST AND YOU DIDN'T HAVE MONEY TO GET MORE.: NEVER TRUE

## 2021-07-27 SDOH — ECONOMIC STABILITY: FOOD INSECURITY: WITHIN THE PAST 12 MONTHS, YOU WORRIED THAT YOUR FOOD WOULD RUN OUT BEFORE YOU GOT MONEY TO BUY MORE.: NEVER TRUE

## 2021-07-27 ASSESSMENT — ENCOUNTER SYMPTOMS
WHEEZING: 1
TROUBLE SWALLOWING: 1
EYES NEGATIVE: 1
SHORTNESS OF BREATH: 1
ALLERGIC/IMMUNOLOGIC NEGATIVE: 1
BACK PAIN: 1
GASTROINTESTINAL NEGATIVE: 1

## 2021-07-27 ASSESSMENT — SOCIAL DETERMINANTS OF HEALTH (SDOH): HOW HARD IS IT FOR YOU TO PAY FOR THE VERY BASICS LIKE FOOD, HOUSING, MEDICAL CARE, AND HEATING?: VERY HARD

## 2021-07-27 NOTE — TELEPHONE ENCOUNTER
Last Appointment:  7/27/2021  Future Appointments   Date Time Provider Jennifer Carito   9/7/2021  1:45 PM FROILAN MED ONC FAST TRACK 2 YZ Med Onc [facility]   9/7/2021  2:00 PM Soraya Garduno MD MED ONC [facility]

## 2021-07-27 NOTE — PROGRESS NOTES
[facility] Internal Medicine      SUBJECTIVE:  Paramjit Reyes (:  1955) is a 77 y.o. female here for evaluation of the following chief complaint(s): Other (per pt lumps in groin area, have enlarged) and Dysphagia (on going for about 9 months)      HPI:   Patient has been complaining of difficulty swallowing which has been getting worse over the last 2 months. Patient states it happens with both liquid and solid. Patient denies any unintentional weight loss, fatigue, rectal bleeding, diarrhea, nausea or vomiting, fever. 1. Hypothyroidism, unspecified type    2. Fibromyalgia  - amitriptyline (ELAVIL) 50 MG tablet; TAKE 1 TABLET BY MOUTH EVERY EVENING  Dispense: 30 tablet; Refill: 2    3. Vitamin D deficiency    - calcium-vitamin D (CALCIUM 500/D) 500-200 MG-UNIT per tablet; TAKE 1 TABLET BY MOUTH DAILY  Dispense: 60 tablet; Refill: 2  - Vitamin D 25 Hydroxy; Future    4. Screening for osteoporosis  - DEXA BONE DENSITY AXIAL SKELETON; Future    5. Dysphagia, unspecified type    - FL MODIFIED BARIUM SWALLOW W VIDEO; Future    6. Age-related osteoporosis without current pathological fracture   - DEXA BONE DENSITY AXIAL SKELETON; Future       Review of Systems   Constitutional: Negative. HENT: Positive for trouble swallowing. Eyes: Negative. Respiratory: Positive for shortness of breath and wheezing. Cardiovascular: Negative. Gastrointestinal: Negative. Endocrine: Negative. Genitourinary: Negative. Musculoskeletal: Positive for back pain, neck pain and neck stiffness. Skin: Negative. Allergic/Immunologic: Negative. Neurological: Negative. Hematological: Negative. Psychiatric/Behavioral: Negative.           PMHx:  has a past medical history of Adrenal incidentaloma ([facility] [address] Anxiety, Arthritis, Asthma, Bladder incontinence, Cancer ([facility] [address] Chronic back pain, COPD (chronic obstructive pulmonary disease) ([facility] [address] Depression, Fibromyalgia, GERD (gastroesophageal reflux disease), Hyperlipidemia, Hypertension, Hypothyroidism, MGUS (monoclonal gammopathy of unknown significance), Neuropathy, Pneumonia, Prolonged emergence from general anesthesia, Sleep apnea, Type II or unspecified type diabetes mellitus without mention of complication, not stated as uncontrolled, and Vaginal bleeding. Allergies   Allergen Reactions    Aceon [Perindopril Erbumine] Anaphylaxis     Tongue swells up    Nsaids Shortness Of Breath and Swelling     tongue        PSHx:  has a past surgical history that includes knee surgery (1980); Upper gastrointestinal endoscopy (2008); Colonoscopy (07/20/2016); Upper gastrointestinal endoscopy (07/20/2016); Nerve Block (Bilateral, 09/22/2016); Nerve Block (07/06/2020); Pain management procedure (N/A, 7/6/2020); Nerve Block (Bilateral, 08/10/2020); Anesthesia Nerve Block (Bilateral, 8/10/2020); other surgical history (12/13/2016); Colonoscopy (2008); Upper gastrointestinal endoscopy (2008); Upper gastrointestinal endoscopy (N/A, 11/9/2020); Colonoscopy (N/A, 11/9/2020); Colonoscopy (11/9/2020); and Dilation and curettage of uterus (N/A, 5/11/2021).          Social Hx:   Social History     Tobacco History     Smoking Status  Former Smoker Smoking Start Date  7/15/1971 Quit date  2/10/2020 Smoking Frequency  For 50 years    Smoking Tobacco Type  Cigarettes    Smokeless Tobacco Use  Never Used    Tobacco Comment  quit x 1 yr. ago          Alcohol History     Alcohol Use Status  No          Drug Use     Drug Use Status  No          Sexual Activity     Sexually Active  Not Asked                 Fam Hx:   Family History   Problem Relation Age of Onset    Heart Disease Mother 79    Diabetes Mother     Cancer Mother         Breast    Hypertension Father     Cancer Father         Pancreas    Diabetes Father     High Blood Pressure Father     Arthritis Brother     Diabetes Brother     Cancer Maternal Uncle     Cancer Paternal Aunt         breast    High Blood Pressure Paternal Aunt     High Blood Pressure Paternal Uncle     Arthritis Maternal Grandmother     Diabetes Maternal Grandmother     High Blood Pressure Maternal Grandmother     Arthritis Maternal Grandfather     Stroke Maternal Grandfather     High Cholesterol Paternal Grandmother     Kidney Disease Paternal Grandmother     Heart Disease Paternal Grandfather             Current Outpatient Medications on File Prior to Visit   Medication Sig Dispense Refill    Turmeric (QC TUMERIC COMPLEX PO) Take by mouth      citalopram (CELEXA) 40 MG tablet TAKE 1 TABLET BY MOUTH DAILY 90 tablet 0    nystatin-triamcinolone (MYCOLOG II) 848752-8.1 UNIT/GM-% cream Apply topically 2 times daily. 30 g 1    rosuvastatin (CRESTOR) 20 MG tablet Take 1 tablet by mouth daily 90 tablet 1    levothyroxine (SYNTHROID) 112 MCG tablet Take 1 tablet by mouth Daily 90 tablet 0    triamterene-hydroCHLOROthiazide (MAXZIDE-25) 37.5-25 MG per tablet Take 1 tablet by mouth daily TAKE 1 TABLET BY MOUTH EVERY DAY 90 tablet 1    metFORMIN (GLUCOPHAGE) 1000 MG tablet Take 1 tablet by mouth daily (with breakfast) 90 tablet 1    Calcium Polycarbophil (FIBER) 625 MG TABS Take 1 tablet by mouth 2 times daily 60 tablet 1    docusate sodium (COLACE) 100 MG capsule Take 1 capsule by mouth 2 times daily 60 capsule 1    gabapentin (NEURONTIN) 400 MG capsule Take 1 capsule by mouth 4 times daily for 30 days.  120 capsule 1    montelukast (SINGULAIR) 10 MG tablet TAKE 1 TABLET BY MOUTH EVERY NIGHT AT BEDTIME 90 tablet 1    albuterol sulfate  (90 Base) MCG/ACT inhaler Inhale 2 puffs into the lungs 4 times daily as needed for Wheezing 1 Inhaler 1    ipratropium-albuterol (DUONEB) 0.5-2.5 (3) MG/3ML SOLN nebulizer solution Inhale 3 mLs into the lungs every 6 hours as needed for Shortness of Breath 360 mL 1    budesonide-formoterol (SYMBICORT) 160-4.5 MCG/ACT AERO Inhale 2 puffs into the lungs 2 times daily 1 Inhaler 1    mineral oil-hydrophilic petrolatum (HYDROPHOR) ointment Apply topically as needed. 1 Tube 1    BENZOCAINE, TOPICAL, 2 % OINT Apply as needed over the angle of the lips 1 Tube 1    baclofen (LIORESAL) 10 MG tablet TAKE 1/2 TABLET THREE TIMES DAILY AS NEEDED(PAIN, SPASMS) 90 tablet 2    OXYGEN Inhale into the lungs Uses 2 liters at night      aspirin 81 MG tablet Take 1 tablet by mouth daily 90 tablet 0    Misc. Devices MISC Modified diet. For diabetic. 1 Device 0    hydrocortisone 1 % cream As needed      omeprazole (PRILOSEC) 20 MG delayed release capsule Take 1 capsule by mouth 2 times daily As directed 60 capsule 2    blood glucose monitor kit and supplies Test 1 times a day & as needed for symptoms of irregular blood glucose. Accuchek Avivia 1 kit 0    blood glucose monitor strips Testing daily 100 strip 3    Lancets MISC Testing daily 100 each 3    Misc. Devices MISC Oxygen concentrator  j44.9 1 Device 0     No current facility-administered medications on file prior to visit. OBJECTIVE:    VS:   Vitals:    07/27/21 1413 07/27/21 1418   BP: (!) 141/88 (!) 125/92   Pulse: 98 103   Resp: 16    Temp: 98 °F (36.7 °C)    TempSrc: Oral    Weight: 226 lb (102.5 kg)    Height: 5' 6\" (1.676 m)      Physical Exam  Constitutional:       Appearance: Normal appearance. She is obese. HENT:      Head: Normocephalic and atraumatic. Nose: Nose normal.      Mouth/Throat:      Mouth: Mucous membranes are moist.   Eyes:      Conjunctiva/sclera: Conjunctivae normal.   Cardiovascular:      Rate and Rhythm: Normal rate and regular rhythm. Pulses: Normal pulses. Heart sounds: Normal heart sounds. Pulmonary:      Effort: Pulmonary effort is normal.      Breath sounds: Normal breath sounds. Abdominal:      General: Bowel sounds are normal.   Musculoskeletal:      Cervical back: Normal range of motion. Skin:     General: Skin is warm.    Neurological:      General: No

## 2021-07-27 NOTE — PROGRESS NOTES
Pt screened positive for SDOH related to financial strain and or food insecurity and declined further contact for assessment/resources. Discharge instructions reviewed with patient. Follow up appt scheduled and AVS given to patient.

## 2021-07-28 RX ORDER — LEVOTHYROXINE SODIUM 112 UG/1
112 TABLET ORAL DAILY
Qty: 90 TABLET | Refills: 0 | Status: SHIPPED
Start: 2021-07-28 | End: 2021-09-17 | Stop reason: SDUPTHER

## 2021-08-09 ENCOUNTER — HOSPITAL ENCOUNTER (OUTPATIENT)
Dept: GENERAL RADIOLOGY | Age: 66
Discharge: HOME | End: 2021-08-11
Payer: MEDICARE

## 2021-08-09 DIAGNOSIS — R13.10 DYSPHAGIA, UNSPECIFIED TYPE: ICD-10-CM

## 2021-08-09 DIAGNOSIS — M47.816 OSTEOARTHRITIS OF LUMBAR SPINE, UNSPECIFIED SPINAL OSTEOARTHRITIS COMPLICATION STATUS: ICD-10-CM

## 2021-08-09 PROCEDURE — 74230 X-RAY XM SWLNG FUNCJ C+: CPT

## 2021-08-09 PROCEDURE — 92611 MOTION FLUOROSCOPY/SWALLOW: CPT

## 2021-08-09 PROCEDURE — 2500000003 HC RX 250 WO HCPCS: Performed by: RADIOLOGY

## 2021-08-09 PROCEDURE — 92526 ORAL FUNCTION THERAPY: CPT

## 2021-08-09 RX ORDER — BACLOFEN 10 MG/1
TABLET ORAL
Qty: 90 TABLET | Refills: 0 | Status: SHIPPED
Start: 2021-08-09 | End: 2021-12-15 | Stop reason: SDUPTHER

## 2021-08-09 RX ADMIN — BARIUM SULFATE 15 ML: 400 PASTE ORAL at 14:46

## 2021-08-09 RX ADMIN — BARIUM SULFATE 15 ML: 400 SUSPENSION ORAL at 14:47

## 2021-08-09 RX ADMIN — BARIUM SULFATE 15 ML: 0.81 POWDER, FOR SUSPENSION ORAL at 14:47

## 2021-08-09 NOTE — PROGRESS NOTES
SPEECH/LANGUAGE PATHOLOGY  VIDEOFLUOROSCOPIC STUDY OF SWALLOWING (MBS)   and PLAN OF CARE    PATIENT NAME:  Polo Meek  (female)     MRN:  [id number]    :  1955  (77 y.o.)  STATUS:  Outpatient    TODAY'S DATE:  2021  REFERRING PROVIDER:   Dr. Juan Griffin: FL modified barium swallow with video  Date of order:  21   REASON FOR REFERRAL: dysphagia   EVALUATING THERAPIST: Linda Rivera, SLP      RESULTS:      DYSPHAGIA DIAGNOSIS:  normal swallow function     DIET RECOMMENDATIONS:  Regular consistency solids with  thin liquids    FEEDING RECOMMENDATIONS:    Assistance level:  No assistance needed     Compensatory strategies recommended: No strategies are recommended at this time     Discussed recommendations with nursing and/or faxed report to referring provider: Yes    SPEECH THERAPY  PLAN OF CARE   The dysphagia POC is established based on physician order and dysphagia diagnosis    Dysphagia therapy is not recommended       Conditions Requiring Skilled Therapeutic Intervention for dysphagia:    not applicable    SPECIFIC DYSPHAGIA INTERVENTIONS TO INCLUDE:     Not applicable    Specific instructions for next treatment:  not applicable   Treatment Goals:    Short Term Goals:  Not applicable no therapy warranted     Long Term Goals:   Not applicable no therapy warranted      Patient/family Goal:    not applicable                    ADMITTING DIAGNOSIS: Dysphagia, unspecified type [R13.10]     VISIT DIAGNOSIS:   Visit Diagnoses       Codes    Dysphagia, unspecified type     R13.10              PATIENT REPORT/COMPLAINT: food sticking in her throat, occasional choking    PRIOR LEVEL OF SWALLOW FUNCTION:    Past History of Dysphagia?:  none reported    Home diet: Regular consistency solids with  thin liquids    PROCEDURE:  Consistencies Administered During the Evaluation   Liquids: thin liquid and nectar thick liquid   Solids:  pureed foods and solid foods      Method of Intake: cup, straw, spoon  Self fed, Fed by clinician      Position:   Standing, Lateral plane    INSTRUMENTAL ASSESSMENT:    ORAL PREPARATION PHASE:    Within functional limits    ORAL PHASE:   Within functional limits    PHARYNGEAL PHASE:     ONSET TIME       Onset time of the pharyngeal swallow was adequate       PHARYNGEAL RESIDUALS        Vallecula/Pharyngeal Wall           No significant residuals were noted in the vallecula      Pyriform Sinuses      No significant residuals were noted in the pyriform sinuses     LARYNGEAL PENETRATION   Laryngeal penetration was not present during this evaluation    ASPIRATION  Aspiration was not present during this evaluation    PENETRATION-ASPIRATION SCALE (PAS):  THIN 1 = Material does not enter the airway  MILDLY THICK 1 = Material does not enter the airway  MODERATELY THICK item not administered  PUREE 1 = Material does not enter the airway  HARD SOLID 1 = Material does not enter the airway       COMPENSATORY STRATEGIES    Compensatory strategies were not attempted      STRUCTURAL/FUNCTIONAL ANOMALIES   No structural/functional anomalies were noted    CERVICAL ESOPHAGEAL STAGE :     The cervical esophagus appeared adequate          ___________    Cognition:   Within functional limits for this exam    Oral Peripheral Examination   Adequate lingual/labial strength     Parameters of Speech Production  Respiration:  Adequate for speech production  Quality:   Within functional limits  Intensity: Within functional limits    Pain: No pain reported. EDUCATION:   The Speech Language Pathologist (SLP) completed education regarding results of evaluation and that intervention is not warranted at this time. Learner: Patient  Education: Reviewed results and recommendations of this evaluation  Evaluation of Education:  Hanny Jiménez understanding    This plan may be re-evaluated and revised as warranted.         Evaluation Time includes thorough review of current medical information, gathering information on past medical history/social history and prior level of function, completion of standardized testing/informal observation of tasks, assessment of data and education on plan of care and goals. [x]The admitting diagnosis and active problem list, have been reviewed prior to initiation of this evaluation.     CPT Code: 32651  dysphagia study    ACTIVE PROBLEM LIST:   Patient Active Problem List   Diagnosis    Adrenal incidentaloma ([facility] [address]    Hyperlipidemia    Hypothyroidism    Fibromyalgia    Obesity    Hypertension, uncontrolled    Chronic right-sided low back pain with right-sided sciatica    Tobacco abuse    Myofascial pain    Lumbar radiculitis    Smoldering multiple myeloma (HCC)    Chronic obstructive pulmonary disease (HCC)    Type 2 diabetes mellitus without complication, with long-term current use of insulin (HCC)    Cervical facet joint syndrome    Cervical spondylosis    Lumbar radiculopathy    Pain in right hip    Lumbar disc disorder    Lumbar spondylosis    Diverticulosis of colon without diverticulitis    Rectal bleeding    Alternating constipation and diarrhea    History of colon polyps    Heartburn    Indigestion    Gastritis

## 2021-08-17 DIAGNOSIS — Z79.4 TYPE 2 DIABETES MELLITUS WITHOUT COMPLICATION, WITH LONG-TERM CURRENT USE OF INSULIN (HCC): ICD-10-CM

## 2021-08-17 DIAGNOSIS — E11.42 DM TYPE 2 WITH DIABETIC PERIPHERAL NEUROPATHY (HCC): Primary | ICD-10-CM

## 2021-08-17 DIAGNOSIS — E11.9 TYPE 2 DIABETES MELLITUS WITHOUT COMPLICATION, WITH LONG-TERM CURRENT USE OF INSULIN (HCC): ICD-10-CM

## 2021-08-17 RX ORDER — GABAPENTIN 400 MG/1
400 CAPSULE ORAL 4 TIMES DAILY
Qty: 120 CAPSULE | Refills: 2 | Status: SHIPPED
Start: 2021-08-17 | End: 2021-12-15 | Stop reason: SDUPTHER

## 2021-09-03 DIAGNOSIS — F32.A DEPRESSION, UNSPECIFIED DEPRESSION TYPE: ICD-10-CM

## 2021-09-03 RX ORDER — CITALOPRAM 40 MG/1
40 TABLET ORAL DAILY
Qty: 90 TABLET | Refills: 0 | Status: SHIPPED
Start: 2021-09-03 | End: 2021-12-15 | Stop reason: SDUPTHER

## 2021-09-06 DIAGNOSIS — C90.00 MULTIPLE MYELOMA, REMISSION STATUS UNSPECIFIED (HCC): Primary | ICD-10-CM

## 2021-09-17 ENCOUNTER — OFFICE VISIT (OUTPATIENT)
Dept: INTERNAL MEDICINE | Age: 66
End: 2021-09-17
Payer: MEDICARE

## 2021-09-17 VITALS
WEIGHT: 228.7 LBS | BODY MASS INDEX: 36.76 KG/M2 | HEIGHT: 66 IN | SYSTOLIC BLOOD PRESSURE: 123 MMHG | RESPIRATION RATE: 16 BRPM | HEART RATE: 85 BPM | DIASTOLIC BLOOD PRESSURE: 80 MMHG | TEMPERATURE: 97.4 F

## 2021-09-17 DIAGNOSIS — R19.8 ALTERNATING CONSTIPATION AND DIARRHEA: ICD-10-CM

## 2021-09-17 DIAGNOSIS — M54.41 CHRONIC RIGHT-SIDED LOW BACK PAIN WITH RIGHT-SIDED SCIATICA: ICD-10-CM

## 2021-09-17 DIAGNOSIS — Z79.4 TYPE 2 DIABETES MELLITUS WITHOUT COMPLICATION, WITH LONG-TERM CURRENT USE OF INSULIN (HCC): ICD-10-CM

## 2021-09-17 DIAGNOSIS — J44.9 CHRONIC OBSTRUCTIVE PULMONARY DISEASE, UNSPECIFIED COPD TYPE (HCC): ICD-10-CM

## 2021-09-17 DIAGNOSIS — E55.9 VITAMIN D DEFICIENCY: ICD-10-CM

## 2021-09-17 DIAGNOSIS — E11.9 TYPE 2 DIABETES MELLITUS WITHOUT COMPLICATION, WITH LONG-TERM CURRENT USE OF INSULIN (HCC): ICD-10-CM

## 2021-09-17 DIAGNOSIS — M16.0 PRIMARY OSTEOARTHRITIS OF BOTH HIPS: Primary | ICD-10-CM

## 2021-09-17 DIAGNOSIS — M79.7 FIBROMYALGIA: ICD-10-CM

## 2021-09-17 DIAGNOSIS — G89.29 CHRONIC RIGHT-SIDED LOW BACK PAIN WITH RIGHT-SIDED SCIATICA: ICD-10-CM

## 2021-09-17 DIAGNOSIS — K14.8 HEMORRHAGE OF TONGUE: ICD-10-CM

## 2021-09-17 DIAGNOSIS — I10 ESSENTIAL HYPERTENSION: ICD-10-CM

## 2021-09-17 DIAGNOSIS — E03.9 HYPOTHYROIDISM, UNSPECIFIED TYPE: ICD-10-CM

## 2021-09-17 PROCEDURE — 99212 OFFICE O/P EST SF 10 MIN: CPT | Performed by: INTERNAL MEDICINE

## 2021-09-17 PROCEDURE — 99214 OFFICE O/P EST MOD 30 MIN: CPT | Performed by: INTERNAL MEDICINE

## 2021-09-17 RX ORDER — ACETAMINOPHEN 500 MG
1000 TABLET ORAL 3 TIMES DAILY
COMMUNITY
End: 2021-12-15

## 2021-09-17 RX ORDER — DOCUSATE SODIUM 100 MG/1
100 CAPSULE, LIQUID FILLED ORAL 2 TIMES DAILY
Qty: 60 CAPSULE | Refills: 1 | Status: SHIPPED
Start: 2021-09-17 | End: 2021-12-15 | Stop reason: SDUPTHER

## 2021-09-17 RX ORDER — IBUPROFEN 200 MG
CAPSULE ORAL
Qty: 180 TABLET | Refills: 1 | Status: SHIPPED
Start: 2021-09-17 | End: 2021-12-15 | Stop reason: SDUPTHER

## 2021-09-17 RX ORDER — CELECOXIB 100 MG/1
100 CAPSULE ORAL DAILY
Qty: 60 CAPSULE | Refills: 0 | Status: SHIPPED
Start: 2021-09-17 | End: 2021-12-15 | Stop reason: SDUPTHER

## 2021-09-17 RX ORDER — BUDESONIDE AND FORMOTEROL FUMARATE DIHYDRATE 160; 4.5 UG/1; UG/1
2 AEROSOL RESPIRATORY (INHALATION) 2 TIMES DAILY
Qty: 1 EACH | Refills: 1 | Status: SHIPPED
Start: 2021-09-17 | End: 2021-12-15 | Stop reason: SDUPTHER

## 2021-09-17 RX ORDER — AMITRIPTYLINE HYDROCHLORIDE 50 MG/1
TABLET, FILM COATED ORAL
Qty: 90 TABLET | Refills: 1 | Status: SHIPPED
Start: 2021-09-17 | End: 2021-12-15 | Stop reason: SDUPTHER

## 2021-09-17 RX ORDER — LEVOTHYROXINE SODIUM 112 UG/1
112 TABLET ORAL DAILY
Qty: 90 TABLET | Refills: 0 | Status: SHIPPED
Start: 2021-09-17 | End: 2021-12-15 | Stop reason: SDUPTHER

## 2021-09-17 RX ORDER — ALBUTEROL SULFATE 90 UG/1
2 AEROSOL, METERED RESPIRATORY (INHALATION) 4 TIMES DAILY PRN
Qty: 1 EACH | Refills: 1 | Status: SHIPPED
Start: 2021-09-17 | End: 2021-12-15 | Stop reason: SDUPTHER

## 2021-09-17 RX ORDER — MONTELUKAST SODIUM 10 MG/1
TABLET ORAL
Qty: 90 TABLET | Refills: 1 | Status: SHIPPED
Start: 2021-09-17 | End: 2021-12-15 | Stop reason: SDUPTHER

## 2021-09-17 RX ORDER — IPRATROPIUM BROMIDE AND ALBUTEROL SULFATE 2.5; .5 MG/3ML; MG/3ML
1 SOLUTION RESPIRATORY (INHALATION) EVERY 6 HOURS PRN
Qty: 360 ML | Refills: 1 | Status: SHIPPED
Start: 2021-09-17 | End: 2021-12-15 | Stop reason: SDUPTHER

## 2021-09-17 RX ORDER — ROSUVASTATIN CALCIUM 20 MG/1
20 TABLET, COATED ORAL DAILY
Qty: 90 TABLET | Refills: 1 | Status: SHIPPED
Start: 2021-09-17 | End: 2021-12-15 | Stop reason: SDUPTHER

## 2021-09-17 RX ORDER — CALCIUM POLYCARBOPHIL 625 MG
625 TABLET ORAL 2 TIMES DAILY
Qty: 180 TABLET | Refills: 1 | Status: SHIPPED
Start: 2021-09-17 | End: 2021-12-15 | Stop reason: SDUPTHER

## 2021-09-17 RX ORDER — TRIAMTERENE AND HYDROCHLOROTHIAZIDE 37.5; 25 MG/1; MG/1
1 TABLET ORAL DAILY
Qty: 90 TABLET | Refills: 1 | Status: SHIPPED
Start: 2021-09-17 | End: 2021-12-15 | Stop reason: SDUPTHER

## 2021-09-17 ASSESSMENT — ENCOUNTER SYMPTOMS
RHINORRHEA: 0
VOMITING: 0
CONSTIPATION: 0
ABDOMINAL PAIN: 0
COUGH: 0
COLOR CHANGE: 0
RESPIRATORY NEGATIVE: 1
SHORTNESS OF BREATH: 0
BACK PAIN: 1
DIARRHEA: 0

## 2021-09-17 NOTE — PROGRESS NOTES
[facility]  Internal Medicine Residency Program  ACC Note      SUBJECTIVE:  CC: had concerns including Mouth Lesions (TONGUE LESION HAD BLEEDING TWICE MOST RECENTLY 2 NIGHTS AGO) and Leg Pain (RT WORSE THAN THE LEFT). HPI:Angelita Slater (:  1955) is a 77 y.o. female here for a routine evaluation of the following: Mouth Lesions (TONGUE LESION HAD BLEEDING TWICE MOST RECENTLY 2 NIGHTS AGO) and Leg Pain (RT WORSE THAN THE LEFT)      Patient presents to the clinic with complaint of an episode of tongue bleeding which she noticed yesterday after she woke up in the morning. Patient states that she woke up with dried blood on her lips and the feeling of something in her throat. She coughed up to blood clots which she states wasvabout a quarter of a shot glass. Pt states that she knew it was a tongue bleed because she had bleeding from the same spot in  when she was brushing her tongue. She uses a medium bristle brush which has not caused any incident in the past. During the first incident, she only rinsed her mouth with water intermittently for about 20 mins until the bleeding stopped. She states that she notice her lip swelled up the night prior which improved on its own without treatment. She denies bleeding form gums or underneath dentures. She states that she recently had a D&C after vaginal bleeding. Biopsy results was negative for malignancy. She also complains of chronic back pain and left leg pain. Patient states the pain has not improved with over-the-counter medications and wished to be referred to Ortho or rheumatologist.She also complains of difficulty swallowing. Swallow study has been negative in the past. She has a hx of MM but not currently on medication. She follows with Dr Deedee Chester and has an appt on next 21.  She denies SOB, chocking sensation, chest pressure, palpitation, bleeding from other sites, pain in gums, ______________________________________________________________________________      PMH according to chart:      Diagnosis Date    Adrenal incidentaloma ([facility] [address]     Anxiety     Arthritis     Asthma     Bladder incontinence     Cancer [facility]) Dx 2009    multiple Myeloma, in remission currently     Chronic back pain     COPD (chronic obstructive pulmonary disease) (HCC)     on O2 2 liters  (uses with activity and at night to sleep)    Depression     Fibromyalgia     GERD (gastroesophageal reflux disease)     Hyperlipidemia     Hypertension     Hypothyroidism     MGUS (monoclonal gammopathy of unknown significance)     Neuropathy     Pneumonia 02/2020    Prolonged emergence from general anesthesia     Sleep apnea     doesnt use her cpap    Type II or unspecified type diabetes mellitus without mention of complication, not stated as uncontrolled     Vaginal bleeding        Review of Systems   Constitutional: Negative for chills, fatigue, fever and unexpected weight change. HENT: Negative. Negative for congestion and rhinorrhea. Respiratory: Negative. Negative for cough and shortness of breath. Cardiovascular: Negative. Negative for chest pain and palpitations. Gastrointestinal: Negative for abdominal pain, constipation, diarrhea and vomiting. Genitourinary: Negative for dysuria and frequency. Musculoskeletal: Positive for arthralgias, back pain and myalgias. Skin: Negative for color change, pallor and wound. Neurological: Negative for syncope and light-headedness. Psychiatric/Behavioral: Negative for dysphoric mood. The patient is not nervous/anxious. All other systems reviewed and are negative.       Outpatient Medications Marked as Taking for the 9/17/21 encounter (Office Visit) with Jenny Cho MD   Medication Sig Dispense Refill    acetaminophen (TYLENOL) 500 MG tablet Take 1,000 mg by mouth 3 times daily      levothyroxine (SYNTHROID) 112 MCG tablet Take 1 tablet by mouth Daily 90 tablet 0    amitriptyline (ELAVIL) 50 MG tablet TAKE 1 TABLET BY MOUTH EVERY EVENING 90 tablet 1    calcium-vitamin D (CALCIUM 500/D) 500-200 MG-UNIT per tablet TAKE 1 TABLET BY MOUTH DAILY 180 tablet 1    rosuvastatin (CRESTOR) 20 MG tablet Take 1 tablet by mouth daily 90 tablet 1    triamterene-hydroCHLOROthiazide (MAXZIDE-25) 37.5-25 MG per tablet Take 1 tablet by mouth daily TAKE 1 TABLET BY MOUTH EVERY DAY 90 tablet 1    metFORMIN (GLUCOPHAGE) 1000 MG tablet Take 1 tablet by mouth daily (with breakfast) 90 tablet 1    Calcium Polycarbophil (FIBER) 625 MG TABS Take 1 tablet by mouth 2 times daily 180 tablet 1    docusate sodium (COLACE) 100 MG capsule Take 1 capsule by mouth 2 times daily 60 capsule 1    montelukast (SINGULAIR) 10 MG tablet TAKE 1 TABLET BY MOUTH EVERY NIGHT AT BEDTIME 90 tablet 1    albuterol sulfate  (90 Base) MCG/ACT inhaler Inhale 2 puffs into the lungs 4 times daily as needed for Wheezing 1 each 1    ipratropium-albuterol (DUONEB) 0.5-2.5 (3) MG/3ML SOLN nebulizer solution Inhale 3 mLs into the lungs every 6 hours as needed for Shortness of Breath 360 mL 1    budesonide-formoterol (SYMBICORT) 160-4.5 MCG/ACT AERO Inhale 2 puffs into the lungs 2 times daily 1 each 1    celecoxib (CELEBREX) 100 MG capsule Take 1 capsule by mouth daily 60 capsule 0    citalopram (CELEXA) 40 MG tablet TAKE 1 TABLET BY MOUTH DAILY 90 tablet 0    gabapentin (NEURONTIN) 400 MG capsule Take 1 capsule by mouth 4 times daily for 90 days. 120 capsule 2    baclofen (LIORESAL) 10 MG tablet TAKE 1/2 TABLET THREE TIMES DAILY AS NEEDED(PAIN, SPASMS) 90 tablet 0    Turmeric (QC TUMERIC COMPLEX PO) Take by mouth      mineral oil-hydrophilic petrolatum (HYDROPHOR) ointment Apply topically as needed. 1 Tube 1    Misc. Devices MISC Modified diet. For diabetic.  1 Device 0    omeprazole (PRILOSEC) 20 MG delayed release capsule Take 1 capsule by mouth 2 times daily As directed 60 capsule 2    OXYGEN Inhale into the lungs Uses 2 liters at night      aspirin 81 MG tablet Take 1 tablet by mouth daily 90 tablet 0    blood glucose monitor kit and supplies Test 1 times a day & as needed for symptoms of irregular blood glucose. Accuchek Avivia 1 kit 0    blood glucose monitor strips Testing daily 100 strip 3    Lancets MISC Testing daily 100 each 3    Misc. Devices MISC Oxygen concentrator  j44.9 1 Device 0       Social History     Tobacco Use    Smoking status: Former Smoker     Years: 50.00     Types: Cigarettes     Start date: 7/15/1971     Quit date: 2/10/2020     Years since quittin.6    Smokeless tobacco: Never Used    Tobacco comment: quit x 1 yr. ago   Vaping Use    Vaping Use: Never used   Substance Use Topics    Alcohol use: No     Alcohol/week: 0.0 standard drinks    Drug use: No       I have reviewed all pertinent PMHx, PSHx, FamHx, SocialHx, medications, and allergies and updated history as appropriate. OBJECTIVE:    VS: /80   Pulse 85   Temp 97.4 °F (36.3 °C) (Oral)   Resp 16   Ht 5' 6\" (1.676 m)   Wt 228 lb 11.2 oz (103.7 kg)   BMI 36.91 kg/m²     Physical Exam  Constitutional:       General: She is not in acute distress. Appearance: She is well-developed. She is obese. HENT:      Head: Normocephalic and atraumatic. Mouth/Throat:      Mouth: Mucous membranes are dry. Dentition: Has dentures. Tongue: Lesions present. Tongue does not deviate from midline. Pharynx: Uvula midline. Eyes:      General: No scleral icterus. Conjunctiva/sclera: Conjunctivae normal.   Neck:      Trachea: No tracheal deviation. Cardiovascular:      Rate and Rhythm: Normal rate. Pulses: Normal pulses. Heart sounds: Normal heart sounds. No murmur heard. Pulmonary:      Effort: Pulmonary effort is normal. No respiratory distress. Breath sounds: Normal breath sounds.    Abdominal:      General: Bowel sounds are normal. There is no distension. Palpations: Abdomen is soft. Tenderness: There is no abdominal tenderness. Musculoskeletal:      Cervical back: Normal range of motion. Lumbar back: Tenderness present. Positive right straight leg raise test and positive left straight leg raise test.   Skin:     General: Skin is warm and dry. Neurological:      Mental Status: She is alert and oriented to person, place, and time. Psychiatric:         Behavior: Behavior normal.         Thought Content: Thought content normal.         Judgment: Judgment normal.         ASSESSMENT/PLAN:    1. Hemorrhage of tongue  - Pt has dentures in upper and lower mouth  - Recent tongue bleed x2 episodes, one while brushing teeth other while sleeping  -We plan to refer her to Dr. Anthony Toscano, an Otolaryngology [facility]). Pt agrees with plan to have a specialist access the cause of tongue bleed. 2. Hypothyroidism, unspecified type  - Takes levothyroxine (SYNTHROID) 112 MCG tablet;     3. Fibromyalgia - stable   - Takes amitriptyline (ELAVIL) 50 MG tabletll:    4. Vitamin D deficiency  - calcium-vitamin D (CALCIUM 500/D) 500-200 MG-UNIT per tablet    5. Essential hypertension - controlled  - triamterene-hydroCHLOROthiazide (MAXZIDE-25) 37.5-25 MG per tablet;     6. Type 2 diabetes mellitus without complication, with long-term current use of insulin ([facility])  - A1C 5.6% 4/13/21  - Takes metFORMIN (GLUCOPHAGE) 1000 MG tablet;     7. Alternating constipation and diarrhea  - Takes Calcium Polycarbophil (FIBER) 625 MG TABS;  - takes docusate sodium (COLACE) 100 MG capsule as needed     8. Chronic obstructive pulmonary disease, unspecified COPD type - stable on resp meds  - montelukast (SINGULAIR) 10 MG tablet; TAKE 1 TABLET BY MOUTH EVERY NIGHT AT BEDTIME  Dispense: 90 tablet; Refill: 1  - albuterol sulfate  (90 Base) MCG/ACT inhaler; Inhale 2 puffs into the lungs 4 times daily as needed for Wheezing  Dispense: 1 each;  Refill: 1  - ipratropium-albuterol (DUONEB) 0.5-2.5 (3) MG/3ML SOLN nebulizer solution; Inhale 3 mLs into the lungs every 6 hours as needed for Shortness of Breath  Dispense: 360 mL; Refill: 1  - budesonide-formoterol (SYMBICORT) 160-4.5 MCG/ACT AERO; Inhale 2 puffs into the lungs 2 times daily  Dispense: 1 each; Refill: 1    9. Primary osteoarthritis of both hips and left knee  - Positive SLR test  - Significant difficulty in walking w/o pain  - Pt advice to take celecoxib (CELEBREX) 100 MG capsule; to see if symptoms improve  - Pt has been referred to [address] [place] [address], Orthopaedics, [address] [place] [address] for further investigation and management         I have reviewed my findings and recommendations with Masoud Razo and Dr Kareem Rawls    Electronically signed by Lamar Decker MD, PGY- 1 on 9/18/2021.     Internal Medicine Resident

## 2021-09-17 NOTE — PROGRESS NOTES
[facility]  Internal Medicine Residency Clinic    Attending Physician Statement  I have discussed the case, including pertinent history and exam findings with the resident physician. I have seen and examined the patient and the key elements of the encounter have been performed by me. I agree with the assessment, plan and orders as documented by the resident. I have reviewed all pertinent PMHx, PSHx, FamHx, SocialHx, medications, and allergies and updated history as appropriate. Tongue lesion -- perhaps small angioma. 2 bleeding episodes that self -resolved. Consider ENT evaluation as she is planning to go back for reassessment of goiter as swallow study normal.    Dysphagia to liquids/solids with complaint of undigested food regurgitation -- barium swallow completed and no      Thyromegaly with nodule - previously was recommended thyroidectomy by ENT 2019     HTN -- controlled with current meds    MGUS -- follows with oncology with serial labs at baseline     COPD with chronic hypoxia on nc 2L nocturnally -- continue current inhalers as symptoms controlled.     Hypothyroidism -- continue synthroid     DM 2, controlled -- continue metformin       Remainder of medical problems as per resident note.     Lubna Gomes DO  9/17/2021 10:29 AM

## 2021-09-18 PROBLEM — K14.8 HEMORRHAGE OF TONGUE: Status: ACTIVE | Noted: 2021-09-18

## 2021-09-20 ENCOUNTER — TELEPHONE (OUTPATIENT)
Dept: INTERNAL MEDICINE | Age: 66
End: 2021-09-20

## 2021-09-20 NOTE — TELEPHONE ENCOUNTER
Faxed referral to [facility] on 9-20-21. States a  at the office will contact patient with appt date and time.

## 2021-09-21 ENCOUNTER — HOSPITAL ENCOUNTER (OUTPATIENT)
Age: 66
Discharge: HOME | End: 2021-09-23
Payer: MEDICARE

## 2021-09-21 ENCOUNTER — OFFICE VISIT (OUTPATIENT)
Dept: ONCOLOGY | Age: 66
End: 2021-09-21
Payer: MEDICARE

## 2021-09-21 ENCOUNTER — HOSPITAL ENCOUNTER (OUTPATIENT)
Dept: INFUSION THERAPY | Age: 66
Discharge: HOME | End: 2021-09-21
Payer: MEDICARE

## 2021-09-21 ENCOUNTER — HOSPITAL ENCOUNTER (OUTPATIENT)
Dept: GENERAL RADIOLOGY | Age: 66
Discharge: HOME | End: 2021-09-23
Payer: MEDICARE

## 2021-09-21 VITALS
HEIGHT: 66 IN | OXYGEN SATURATION: 94 % | DIASTOLIC BLOOD PRESSURE: 74 MMHG | HEART RATE: 88 BPM | RESPIRATION RATE: 16 BRPM | BODY MASS INDEX: 36.64 KG/M2 | SYSTOLIC BLOOD PRESSURE: 147 MMHG | WEIGHT: 228 LBS

## 2021-09-21 DIAGNOSIS — C90.00 MULTIPLE MYELOMA, REMISSION STATUS UNSPECIFIED (HCC): ICD-10-CM

## 2021-09-21 DIAGNOSIS — C90.00 MULTIPLE MYELOMA, REMISSION STATUS UNSPECIFIED (HCC): Primary | ICD-10-CM

## 2021-09-21 LAB
ALBUMIN SERPL-MCNC: 4.4 G/DL (ref 3.5–5.2)
ALP BLD-CCNC: 106 U/L (ref 35–104)
ALT SERPL-CCNC: 26 U/L (ref 0–32)
ANION GAP SERPL CALCULATED.3IONS-SCNC: 10 MMOL/L (ref 7–16)
AST SERPL-CCNC: 26 U/L (ref 0–31)
BASOPHILS ABSOLUTE: 0.04 E9/L (ref 0–0.2)
BASOPHILS RELATIVE PERCENT: 0.6 % (ref 0–2)
BILIRUB SERPL-MCNC: 0.3 MG/DL (ref 0–1.2)
BUN BLDV-MCNC: 13 MG/DL (ref 6–23)
CALCIUM SERPL-MCNC: 9.6 MG/DL (ref 8.6–10.2)
CHLORIDE BLD-SCNC: 96 MMOL/L (ref 98–107)
CO2: 31 MMOL/L (ref 22–29)
CREAT SERPL-MCNC: 0.8 MG/DL (ref 0.5–1)
EOSINOPHILS ABSOLUTE: 0.26 E9/L (ref 0.05–0.5)
EOSINOPHILS RELATIVE PERCENT: 3.9 % (ref 0–6)
GFR AFRICAN AMERICAN: >60
GFR NON-AFRICAN AMERICAN: >60 ML/MIN/1.73
GLUCOSE BLD-MCNC: 99 MG/DL (ref 74–99)
HCT VFR BLD CALC: 37.9 % (ref 34–48)
HEMOGLOBIN: 11.6 G/DL (ref 11.5–15.5)
IMMATURE GRANULOCYTES #: 0.02 E9/L
IMMATURE GRANULOCYTES %: 0.3 % (ref 0–5)
LACTATE DEHYDROGENASE: 217 U/L (ref 135–214)
LYMPHOCYTES ABSOLUTE: 3.2 E9/L (ref 1.5–4)
LYMPHOCYTES RELATIVE PERCENT: 48 % (ref 20–42)
MCH RBC QN AUTO: 28.1 PG (ref 26–35)
MCHC RBC AUTO-ENTMCNC: 30.6 % (ref 32–34.5)
MCV RBC AUTO: 91.8 FL (ref 80–99.9)
MONOCYTES ABSOLUTE: 0.58 E9/L (ref 0.1–0.95)
MONOCYTES RELATIVE PERCENT: 8.7 % (ref 2–12)
NEUTROPHILS ABSOLUTE: 2.56 E9/L (ref 1.8–7.3)
NEUTROPHILS RELATIVE PERCENT: 38.5 % (ref 43–80)
PDW BLD-RTO: 13.6 FL (ref 11.5–15)
PLATELET # BLD: 407 E9/L (ref 130–450)
PMV BLD AUTO: 10.1 FL (ref 7–12)
POTASSIUM SERPL-SCNC: 3.8 MMOL/L (ref 3.5–5)
RBC # BLD: 4.13 E12/L (ref 3.5–5.5)
SODIUM BLD-SCNC: 137 MMOL/L (ref 132–146)
WBC # BLD: 6.7 E9/L (ref 4.5–11.5)

## 2021-09-21 PROCEDURE — 36415 COLL VENOUS BLD VENIPUNCTURE: CPT

## 2021-09-21 PROCEDURE — 77075 RADEX OSSEOUS SURVEY COMPL: CPT

## 2021-09-21 PROCEDURE — 83615 LACTATE (LD) (LDH) ENZYME: CPT

## 2021-09-21 PROCEDURE — 82784 ASSAY IGA/IGD/IGG/IGM EACH: CPT

## 2021-09-21 PROCEDURE — 80053 COMPREHEN METABOLIC PANEL: CPT

## 2021-09-21 PROCEDURE — 83883 ASSAY NEPHELOMETRY NOT SPEC: CPT

## 2021-09-21 PROCEDURE — 84165 PROTEIN E-PHORESIS SERUM: CPT

## 2021-09-21 PROCEDURE — 99212 OFFICE O/P EST SF 10 MIN: CPT | Performed by: INTERNAL MEDICINE

## 2021-09-21 PROCEDURE — 82232 ASSAY OF BETA-2 PROTEIN: CPT

## 2021-09-21 PROCEDURE — 85025 COMPLETE CBC W/AUTO DIFF WBC: CPT

## 2021-09-21 PROCEDURE — 99214 OFFICE O/P EST MOD 30 MIN: CPT | Performed by: INTERNAL MEDICINE

## 2021-09-21 PROCEDURE — 86334 IMMUNOFIX E-PHORESIS SERUM: CPT

## 2021-09-21 NOTE — PROGRESS NOTES
[address] MED ONCOLOGY  [address] [place] [id number]  Dept: [phone]  Attending Progress Note      Reason for The Referral:  Smoldering Multiple Myeloma. Referring Physician:  Janneth Bhakta MD    PCP:  Kathryn Gonzalez MD    History of Present Illness: The patient is a 60-year-old lady with a PMH significant for HTN, hyperlipidemia, DM, COPD, OA, depression, and fibromyalgia, who was diagnosed with IgG lambda MGUS in 2008, used to follow up with [facility]. Javad Lobo [facility], last f/up with him was in 2012. Her most recent SPEP with immunofixation ha revealed monoclonal IgG lambda, M-spike 0.7 gm/dl  from 9/9/2016. The patient has been having pain in the low back radiating to the right lower extremity, she had an MRI of the L-spine done on 8/24/2016, revealed disc bulging L4-5, L5-S1, with mild narrowing of the neural foramina. She had a bone marrow Biopsy and aspirate done by IR on 11/28/2016. Bone marrow, left iliac, aspirate and core biopsy  Suboptimal specimen showing 15% plasma cells by immunohistochemistry,  consistent with plasma cell neoplasm, see comment. Comment:    The aspirate smear and clot section specimens are hemodilute  and aspicular.  Plasmacytosis is seen by immunohistochemistry for   on the core biopsy specimen only.  Flow cytometric analysis performed by  [facility] confirmed a lambda-restricted plasma cell neoplasm. See separate report for complete details ([id number]). Intradepartmental consultation is obtained. The patient returns for a follow-up visit, she has chronic joint pain, back and neck pain, was seen by Dr. Philip Huston, he recommended epidurals and physical therapy. She is not able to afford the epidurals. The patient is doing well overall, she has knee and muscle pain. She did not have the skeletal survey done yet. She had epistaxis recently. Review of Systems;  CONSTITUTIONAL: No fever, chills.   Good appetite, feels tired. ENMT: Eyes: No diplopia; Nose: Positive for epistaxis. Mouth: No sore throat. RESPIRATORY: No hemoptysis, chronic shortness of breath, cough. CARDIOVASCULAR: No chest pain, palpitations. GASTROINTESTINAL: No nausea/vomiting, abdominal pain, diarrhea/constipation. GENITOURINARY: No dysuria, urinary frequency, hematuria. MUSCULOSKELETAL: she has chronic back and joints pain. NEURO: No syncope, presyncope, headache.   Remainder:  ROS NEGATIVE    Past Medical History:      Diagnosis Date    Adrenal incidentaloma ([address]     Anxiety     Arthritis     Asthma     Bladder incontinence     Cancer [facility]) Dx 2009    multiple Myeloma, in remission currently     Chronic back pain     COPD (chronic obstructive pulmonary disease) (HCC)     on O2 2 liters  (uses with activity and at night to sleep)    Depression     Fibromyalgia     GERD (gastroesophageal reflux disease)     Hyperlipidemia     Hypertension     Hypothyroidism     MGUS (monoclonal gammopathy of unknown significance)     Neuropathy     Pneumonia 02/2020    Prolonged emergence from general anesthesia     Sleep apnea     doesnt use her cpap    Type II or unspecified type diabetes mellitus without mention of complication, not stated as uncontrolled     Vaginal bleeding      Patient Active Problem List   Diagnosis    Adrenal incidentaloma ([address]    Hyperlipidemia    Hypothyroidism    Fibromyalgia    Obesity    Hypertension, uncontrolled    Chronic right-sided low back pain with right-sided sciatica    Tobacco abuse    Myofascial pain    Lumbar radiculitis    Smoldering multiple myeloma (HCC)    Chronic obstructive pulmonary disease (HCC)    Type 2 diabetes mellitus without complication, with long-term current use of insulin (HCC)    Cervical facet joint syndrome    Cervical spondylosis    Lumbar radiculopathy    Pain in right hip    Lumbar disc disorder    Lumbar spondylosis    Diverticulosis of colon without diverticulitis    Rectal bleeding    Alternating constipation and diarrhea    History of colon polyps    Heartburn    Indigestion    Gastritis    Hemorrhage of tongue        Past Surgical History:      Procedure Laterality Date    ANESTHESIA NERVE BLOCK Bilateral 8/10/2020    BILATERAL L3 L4 MEDIAL BRANCH L5 DORSSAL RAMUS NERVE BLOCK (CPT 56772) SEDATION performed by Manish Ramos MD at [place] [address]  07/20/2016    removed 3 polyps (tubular adenomas), diverticulosis, Dr. Edin Evangelista COLONOSCOPY  2008    approximately 2008, no report available, per patient some polyps removed, Dr Maggie Gurrola, [facility]    COLONOSCOPY N/A 11/9/2020    Small sessile polyp distal ascending colon removed with bx/cauterized (path Tubular Adenoma), right and left diverticulosis without diverticulitis, Dr. Stevo Chu, [facility]    COLONOSCOPY  11/9/2020    Small sessile polyp distal ascending colon removed with bx/cauterized (path Tubular Adenoma), right and left diverticulosis without diverticulitis, Dr. Stevo Chu, [facility] N/A 5/11/2021    DILATATION AND CURETTAGE HYSTEROSCOPY POSSIBLE REMOVAL OF MASS performed by Latha Luke MD at [address] [place] [address] Rd, left knee, arthroscopic    NERVE BLOCK Bilateral 09/22/2016    lumbar transforaminal nerve block #1    NERVE BLOCK  07/06/2020    lumbar epidural steroid injectio L4-5    NERVE BLOCK Bilateral 08/10/2020    L3, L4, L5     OTHER SURGICAL HISTORY  12/13/2016    Excision cyst right face    PAIN MANAGEMENT PROCEDURE N/A 7/6/2020    LUMBAR EPIDURAL STEROID INJECTION L4-5 performed by Manish Ramos MD at [address]  2008 2008    UPPER GASTROINTESTINAL ENDOSCOPY  07/20/2016    GERD and gastric ulcers, Bx H pylori neg, Dr. Kaylin Jane  2008    approximately 2008, no report, patient does not know the findings,  Seth Vila, [facility]    UPPER GASTROINTESTINAL ENDOSCOPY N/A 2020    Severe gastritis with superficial ulcerations with bx neg H pylori, Dr. Kyra Mcmahon, [facility]       Family History:  Family History   Problem Relation Age of Onset    Heart Disease Mother 79    Diabetes Mother     Cancer Mother         Breast    Hypertension Father     Cancer Father         Pancreas    Diabetes Father     High Blood Pressure Father     Arthritis Brother     Diabetes Brother     Cancer Maternal Uncle     Cancer Paternal Aunt         breast    High Blood Pressure Paternal Aunt     High Blood Pressure Paternal Uncle     Arthritis Maternal Grandmother     Diabetes Maternal Grandmother     High Blood Pressure Maternal Grandmother     Arthritis Maternal Grandfather     Stroke Maternal Grandfather     High Cholesterol Paternal Grandmother     Kidney Disease Paternal Grandmother     Heart Disease Paternal Grandfather        Medications:  Reviewed and reconciled.     Social History:  Social History     Socioeconomic History    Marital status: Single     Spouse name: Not on file    Number of children: Not on file    Years of education: Not on file    Highest education level: Not on file   Occupational History    Not on file   Tobacco Use    Smoking status: Former Smoker     Years: 50.00     Types: Cigarettes     Start date: 7/15/1971     Quit date: 2/10/2020     Years since quittin.6    Smokeless tobacco: Never Used    Tobacco comment: quit x 1 yr. ago   Vaping Use    Vaping Use: Never used   Substance and Sexual Activity    Alcohol use: No     Alcohol/week: 0.0 standard drinks    Drug use: No    Sexual activity: Not on file   Other Topics Concern    Not on file   Social History Narrative    Not on file     Social Determinants of Health     Financial Resource Strain: High Risk    Difficulty of Paying Living Expenses: Very hard   Food Insecurity: Food Insecurity Present    Worried About Running Out of lambda-restricted plasma cell neoplasm. See separate report for complete details ([id number]). Intradepartmental consultation is obtained. Impression/Plan:      The patient is a 49-year-old lady with a PMH significant for HTN, hyperlipidemia, DM, COPD, OA, depression, and fibromyalgia, who was diagnosed with IgG lambda MGUS in 2008, used to follow up with Dr. Harsha Horton at [facility], last f/up with him was in 2012. She did not have a bone marrow biopsy and aspirate done when she was diagnosed with MGUS. Her most recent SPEP with immunofixation ha revealed monoclonal IgG lambda, M-spike 0.7 gm/dl  from 9/9/2016, stable compared with the previous M-spike level. The patient does not have anemia, renal failure, hypercalcemia or lytic lesions on the lumbar spine MRI. IgG had increased sine 2014, skeletal survey was ordered, and is negative for lytic lesions, she had a BM bx and aspirate done by IR on 11/28/2016, Clot and aspirate suboptimal, biopsy showing 15% plasma cells, Flow cytometry positive for a  lambda restricted plasma cell neoplasm, Cytogenetics revealing a normal female karyotype, MM FISH negative. The patient has 15% plasma cells in the bone marrow, no evidence of end organ damage, she has a smoldering multiple myeloma,risk of progression to MM, at a rate of 10 percent per year for the first five years, 3 percent per year for the next five years, and 1 to 2 percent per year for the following 10 years. The patient returns for a follow-up visit. She is doing well clinically at this time. SPEP with immunofixation had revealed IgG lambda monoclonal protein, and spike is 1G/DL, IgA 108, IgG 2/6/2008, had increased slightly, IgM 29, serum light chain assay is pending, from 3/9/2021: kappa 49.81 lambda 22.56, ratio is 2. 21. She does not have anemia, no hypercalcemia or kidney disease.   The risk of progression to myeloma was discussed with the patient, there is no evidence of disease progression at this time. She did not have the skeletal survey done yet, was reordered. Continue surveillance. The spine MRI is negative for lytic lesions. RTC in 3 months. Thank you for allowing us to participate in the care of Mrs Quinton Maxwell.     Esa Mcclain MD   HEMATOLOGY/MEDICAL ONCOLOGY  [address] [place] [address] ONCOLOGY  [facility] [address] [facility] [id number]  Dept: [phone]

## 2021-09-22 LAB
ALBUMIN SERPL-MCNC: 3.4 G/DL (ref 3.5–4.7)
ALPHA-1-GLOBULIN: 0.2 G/DL (ref 0.2–0.4)
ALPHA-2-GLOBULIN: 0.9 G/FL (ref 0.5–1)
BETA GLOBULIN: 1.1 G/DL (ref 0.8–1.3)
ELECTROPHORESIS: ABNORMAL
GAMMA GLOBULIN: 2.3 G/DL (ref 0.7–1.6)
IGA: 108 MG/DL (ref 70–400)
IGG: 2608 MG/DL (ref 700–1600)
IGM: 29 MG/DL (ref 40–230)
IMMUNOFIXATION RESULT, SERUM: NORMAL
TOTAL PROTEIN: 9 G/DL (ref 6.4–8.3)

## 2021-09-23 LAB
BETA-2 MICROGLOBULIN: 2.4 MG/L (ref 0.6–2.4)
KAPPA FREE LIGHT CHAINS QNT: 56.5 MG/L (ref 3.3–19.4)
KAPPA/LAMBDA FREE LIGHT CHAIN RATIO: 2.35 (ref 0.26–1.65)
LAMBDA FREE LIGHT CHAINS QNT: 24.04 MG/L (ref 5.71–26.3)

## 2021-09-29 ENCOUNTER — NURSE TRIAGE (OUTPATIENT)
Dept: OTHER | Facility: CLINIC | Age: 66
End: 2021-09-29

## 2021-09-29 ENCOUNTER — TELEPHONE (OUTPATIENT)
Dept: INTERNAL MEDICINE | Age: 66
End: 2021-09-29

## 2021-09-29 RX ORDER — BROMPHENIRAMINE MALEATE, PSEUDOEPHEDRINE HYDROCHLORIDE, AND DEXTROMETHORPHAN HYDROBROMIDE 2; 30; 10 MG/5ML; MG/5ML; MG/5ML
5 SYRUP ORAL 4 TIMES DAILY PRN
Qty: 120 ML | Refills: 0 | Status: SHIPPED
Start: 2021-09-29 | End: 2021-12-15

## 2021-09-29 NOTE — TELEPHONE ENCOUNTER
Reason for Disposition   [1] HIGH RISK patient (e.g., age > 59 years, diabetes, heart or lung disease, weak immune system) AND [2] new or worsening symptoms    Answer Assessment - Initial Assessment Questions  1. COVID-19 DIAGNOSIS: \"Who made your Coronavirus (COVID-19) diagnosis? \" \"Was it confirmed by a positive lab test?\" If not diagnosed by a HCP, ask \"Are there lots of cases (community spread) where you live? \" (See public health department website, if unsure)      COVID positive test on Monday. 2. COVID-19 EXPOSURE: \"Was there any known exposure to COVID before the symptoms began? \" CDC Definition of close contact: within 6 feet (2 meters) for a total of 15 minutes or more over a 24-hour period. Relative is positive    3. ONSET: \"When did the COVID-19 symptoms start? \"       9/27/2021    4. WORST SYMPTOM: \"What is your worst symptom? \" (e.g., cough, fever, shortness of breath, muscle aches)      Sore throat (strep negative yesterday), decreased appetite, HA, Hoarseness, shortness of breath, no cough      5. COUGH: \"Do you have a cough? \" If Yes, ask: \"How bad is the cough? \"        No    6. FEVER: \"Do you have a fever? \" If Yes, ask: \"What is your temperature, how was it measured, and when did it start? \"      \"I feel feverish\"    7. RESPIRATORY STATUS: \"Describe your breathing? \" (e.g., shortness of breath, wheezing, unable to speak)       Normal SOB today    8. BETTER-SAME-WORSE: Rajesh Gut you getting better, staying the same or getting worse compared to yesterday? \"  If getting worse, ask, \"In what way? \"      A little better    9. HIGH RISK DISEASE: \"Do you have any chronic medical problems? \" (e.g., asthma, heart or lung disease, weak immune system, obesity, etc.)      Immuno-comprimised, COPD, former smoker    10. PREGNANCY: \"Is there any chance you are pregnant? \" \"When was your last menstrual period? \"        N/a    11. OTHER SYMPTOMS: \"Do you have any other symptoms? \"  (e.g., chills, fatigue, headache, loss of smell or taste, muscle pain, sore throat; new loss of smell or taste especially support the diagnosis of COVID-19)        See travel screen    Protocols used: CORONAVIRUS (COVID-19) DIAGNOSED OR SUSPECTED-ADULT-[place]    Received call from Eulalia at [facility] with Red Flag Complaint. Brief description of triage: High risk pt diagnosed with COVID yesterday wanting \"IV COVID medicine\". See travel screen for symptoms. Normal SOB, no chest pain. Sx since 9/27. Pt reports that she was \"spitting out blood clots last week\". Similar episode \"several months ago. Triage indicates for patient to have situation discussed with PCP and nurse callback within 1 hr. Pt agrees with this plan. This RN gave SBAR to [address] at [address] [place] [address] who stated that she would contact pt. Care advice provided, patient verbalizes understanding; denies any other questions or concerns; instructed to call back for any new or worsening symptoms. Attention Provider: Thank you for allowing me to participate in the care of your patient. The patient was connected to triage in response to information provided to the ECC/PSC. Please do not respond through this encounter as the response is not directed to a shared pool.

## 2021-09-29 NOTE — TELEPHONE ENCOUNTER
Called patient. Symptoms of cough and soar throat. Start Bromfed prn. Tested positive for COVID-19 at [facility] med urgent care on [address] on 0/9/27/21. Age >71, COPD on 2L nc, BMI 39 -- she qualifies for covid ab infusion. Referral written and order faxed. Advised her to check pulse-ox and monitor O2 levels, seek ER if < 90% on 2L nasal cannula (her baseline).     Electronically signed by Amie Guaman DO on 9/29/2021 at 4:20 PM

## 2021-09-30 ENCOUNTER — HOSPITAL ENCOUNTER (OUTPATIENT)
Dept: INFUSION THERAPY | Age: 66
Setting detail: INFUSION SERIES
Discharge: HOME | End: 2021-09-30
Payer: MEDICARE

## 2021-09-30 VITALS
HEART RATE: 82 BPM | SYSTOLIC BLOOD PRESSURE: 170 MMHG | TEMPERATURE: 97.9 F | OXYGEN SATURATION: 100 % | DIASTOLIC BLOOD PRESSURE: 97 MMHG | RESPIRATION RATE: 18 BRPM

## 2021-09-30 DIAGNOSIS — U07.1 COVID-19: ICD-10-CM

## 2021-09-30 DIAGNOSIS — U07.1 COVID-19: Primary | ICD-10-CM

## 2021-09-30 PROCEDURE — 6360000002 HC RX W HCPCS: Performed by: INTERNAL MEDICINE

## 2021-09-30 PROCEDURE — 2500000003 HC RX 250 WO HCPCS: Performed by: INTERNAL MEDICINE

## 2021-09-30 PROCEDURE — 2580000003 HC RX 258: Performed by: INTERNAL MEDICINE

## 2021-09-30 PROCEDURE — 96365 THER/PROPH/DIAG IV INF INIT: CPT

## 2021-09-30 RX ORDER — SODIUM CHLORIDE 0.9 % (FLUSH) 0.9 %
5-40 SYRINGE (ML) INJECTION PRN
Status: CANCELLED | OUTPATIENT
Start: 2021-09-30

## 2021-09-30 RX ORDER — METHYLPREDNISOLONE SODIUM SUCCINATE 125 MG/2ML
125 INJECTION, POWDER, LYOPHILIZED, FOR SOLUTION INTRAMUSCULAR; INTRAVENOUS ONCE
Status: CANCELLED | OUTPATIENT
Start: 2021-09-30 | End: 2021-09-30

## 2021-09-30 RX ORDER — SODIUM CHLORIDE 0.9 % (FLUSH) 0.9 %
5-40 SYRINGE (ML) INJECTION PRN
Status: DISCONTINUED | OUTPATIENT
Start: 2021-09-30 | End: 2021-10-01 | Stop reason: HOSPADM

## 2021-09-30 RX ORDER — DIPHENHYDRAMINE HYDROCHLORIDE 50 MG/ML
50 INJECTION INTRAMUSCULAR; INTRAVENOUS ONCE
Status: CANCELLED | OUTPATIENT
Start: 2021-09-30 | End: 2021-09-30

## 2021-09-30 RX ORDER — SODIUM CHLORIDE 9 MG/ML
INJECTION, SOLUTION INTRAVENOUS CONTINUOUS
Status: CANCELLED | OUTPATIENT
Start: 2021-09-30

## 2021-09-30 RX ORDER — HEPARIN SODIUM (PORCINE) LOCK FLUSH IV SOLN 100 UNIT/ML 100 UNIT/ML
500 SOLUTION INTRAVENOUS PRN
Status: CANCELLED | OUTPATIENT
Start: 2021-09-30

## 2021-09-30 RX ORDER — SODIUM CHLORIDE 9 MG/ML
25 INJECTION, SOLUTION INTRAVENOUS PRN
Status: CANCELLED | OUTPATIENT
Start: 2021-09-30

## 2021-09-30 RX ORDER — SODIUM CHLORIDE 9 MG/ML
INJECTION, SOLUTION INTRAVENOUS CONTINUOUS
Status: DISCONTINUED | OUTPATIENT
Start: 2021-09-30 | End: 2021-10-01 | Stop reason: HOSPADM

## 2021-09-30 RX ORDER — EPINEPHRINE 1 MG/ML
0.3 INJECTION, SOLUTION, CONCENTRATE INTRAVENOUS PRN
Status: CANCELLED | OUTPATIENT
Start: 2021-09-30

## 2021-09-30 RX ADMIN — SODIUM CHLORIDE: 9 INJECTION, SOLUTION INTRAVENOUS at 16:03

## 2021-09-30 RX ADMIN — IMDEVIMAB: 300 INJECTION, SOLUTION, CONCENTRATE INTRAVENOUS at 15:25

## 2021-09-30 NOTE — PROGRESS NOTES
If you have any questions after today's visit, please feel free to send us a message through [employer] ([email or web address] or call the clinic at [phone].    We are committed to providing our patients with their test results in a timely manner. If you have access to [employer], you will receive your results within 5 to 7 days. If your results are normal, a member of our office may call you or you may receive a letter in the mail within 10 to 15 days of your testing. If your results have something additional to discuss, a member of our office will contact you by phone. If at any time you have questions related your results, please feel free to call our office at [phone]     Pt tolerated infusion without problems. Flush infused and pt waited 1 hour. Discharge instructions given and pt discharged with stable vitals.

## 2021-10-07 ENCOUNTER — TELEPHONE (OUTPATIENT)
Dept: INTERNAL MEDICINE | Age: 66
End: 2021-10-07

## 2021-10-07 ENCOUNTER — TELEPHONE (OUTPATIENT)
Dept: ORTHOPEDIC SURGERY | Age: 66
End: 2021-10-07

## 2021-10-07 DIAGNOSIS — M16.0 PRIMARY OSTEOARTHRITIS OF BOTH HIPS: Primary | ICD-10-CM

## 2021-10-07 NOTE — TELEPHONE ENCOUNTER
Called patient for COVID-19 follow up. She feels well overall, residual symptoms include loss of taste/smell. Diagnosed + on 09/27/21, may exit quarantine today.     Electronically signed by Virgil Bhakta DO on 10/7/2021 at 1:05 PM

## 2021-10-25 ENCOUNTER — TELEPHONE (OUTPATIENT)
Dept: INTERNAL MEDICINE | Age: 66
End: 2021-10-25

## 2021-10-25 NOTE — TELEPHONE ENCOUNTER
Patient called and said she needs a new referral to Dr. Wilmer Centeno.  She was a no show due to having COVID and she said that are asking for a new referral.

## 2021-10-25 NOTE — TELEPHONE ENCOUNTER
Called patient regarding overdue Dexa Bone Density and Vit. D level. She is c/o sores in her mouth. She said she uses inhalers and hasn't rinsed her mouth the last few times. Asking for a prescription.

## 2021-11-02 ENCOUNTER — NURSE TRIAGE (OUTPATIENT)
Dept: OTHER | Facility: CLINIC | Age: 66
End: 2021-11-02

## 2021-11-02 NOTE — TELEPHONE ENCOUNTER
exertion    8. BETTER-SAME-WORSE: Yessenia Nickerson you getting better, staying the same or getting worse compared to yesterday? \"  If getting worse, ask, \"In what way? \"      Same    9. HIGH RISK DISEASE: \"Do you have any chronic medical problems? \" (e.g., asthma, heart or lung disease, weak immune system, obesity, etc.)      COPD   Obesity  10. PREGNANCY: \"Is there any chance you are pregnant? \" \"When was your last menstrual period? \"        N/a age    6. OTHER SYMPTOMS: \"Do you have any other symptoms? \"  (e.g., chills, fatigue, headache, loss of smell or taste, muscle pain, sore throat; new loss of smell or taste especially support the diagnosis of COVID-19)        Travel screen    Protocols used: CORONAVIRUS (COVID-19) DIAGNOSED OR SUSPECTED-ADULT-AH  see above

## 2021-12-15 ENCOUNTER — OFFICE VISIT (OUTPATIENT)
Dept: INTERNAL MEDICINE | Age: 66
End: 2021-12-15
Payer: MEDICARE

## 2021-12-15 VITALS
HEART RATE: 84 BPM | TEMPERATURE: 98.3 F | SYSTOLIC BLOOD PRESSURE: 129 MMHG | DIASTOLIC BLOOD PRESSURE: 67 MMHG | HEIGHT: 66 IN | RESPIRATION RATE: 20 BRPM | BODY MASS INDEX: 36.66 KG/M2 | WEIGHT: 228.1 LBS

## 2021-12-15 DIAGNOSIS — Z79.4 TYPE 2 DIABETES MELLITUS WITHOUT COMPLICATION, WITH LONG-TERM CURRENT USE OF INSULIN (HCC): ICD-10-CM

## 2021-12-15 DIAGNOSIS — E55.9 VITAMIN D DEFICIENCY: ICD-10-CM

## 2021-12-15 DIAGNOSIS — E11.9 TYPE 2 DIABETES MELLITUS WITHOUT COMPLICATION, WITH LONG-TERM CURRENT USE OF INSULIN (HCC): ICD-10-CM

## 2021-12-15 DIAGNOSIS — M16.0 PRIMARY OSTEOARTHRITIS OF BOTH HIPS: ICD-10-CM

## 2021-12-15 DIAGNOSIS — Z71.89 ADVANCE CARE PLANNING: Primary | ICD-10-CM

## 2021-12-15 DIAGNOSIS — E03.9 HYPOTHYROIDISM, UNSPECIFIED TYPE: ICD-10-CM

## 2021-12-15 DIAGNOSIS — I10 ESSENTIAL HYPERTENSION: ICD-10-CM

## 2021-12-15 DIAGNOSIS — L85.3 DRY SKIN: ICD-10-CM

## 2021-12-15 DIAGNOSIS — R19.8 ALTERNATING CONSTIPATION AND DIARRHEA: ICD-10-CM

## 2021-12-15 DIAGNOSIS — E11.42 DM TYPE 2 WITH DIABETIC PERIPHERAL NEUROPATHY (HCC): ICD-10-CM

## 2021-12-15 DIAGNOSIS — B35.1 ONYCHOMYCOSIS: ICD-10-CM

## 2021-12-15 DIAGNOSIS — J44.9 CHRONIC OBSTRUCTIVE PULMONARY DISEASE, UNSPECIFIED COPD TYPE (HCC): ICD-10-CM

## 2021-12-15 DIAGNOSIS — F32.A DEPRESSION, UNSPECIFIED DEPRESSION TYPE: ICD-10-CM

## 2021-12-15 DIAGNOSIS — M79.7 FIBROMYALGIA: ICD-10-CM

## 2021-12-15 DIAGNOSIS — M47.816 OSTEOARTHRITIS OF LUMBAR SPINE, UNSPECIFIED SPINAL OSTEOARTHRITIS COMPLICATION STATUS: ICD-10-CM

## 2021-12-15 PROCEDURE — 99212 OFFICE O/P EST SF 10 MIN: CPT | Performed by: CHIROPRACTOR

## 2021-12-15 PROCEDURE — 90686 IIV4 VACC NO PRSV 0.5 ML IM: CPT

## 2021-12-15 PROCEDURE — G0008 ADMIN INFLUENZA VIRUS VAC: HCPCS

## 2021-12-15 PROCEDURE — 6360000002 HC RX W HCPCS

## 2021-12-15 PROCEDURE — 99213 OFFICE O/P EST LOW 20 MIN: CPT | Performed by: CHIROPRACTOR

## 2021-12-15 RX ORDER — ROSUVASTATIN CALCIUM 20 MG/1
20 TABLET, COATED ORAL DAILY
Qty: 90 TABLET | Refills: 1 | Status: SHIPPED
Start: 2021-12-15 | End: 2022-06-01 | Stop reason: SDUPTHER

## 2021-12-15 RX ORDER — LEVOTHYROXINE SODIUM 112 UG/1
112 TABLET ORAL DAILY
Qty: 90 TABLET | Refills: 1 | Status: SHIPPED
Start: 2021-12-15 | End: 2022-06-01 | Stop reason: SDUPTHER

## 2021-12-15 RX ORDER — IPRATROPIUM BROMIDE AND ALBUTEROL SULFATE 2.5; .5 MG/3ML; MG/3ML
1 SOLUTION RESPIRATORY (INHALATION) EVERY 6 HOURS PRN
Qty: 360 ML | Refills: 1 | Status: SHIPPED
Start: 2021-12-15 | End: 2022-06-13

## 2021-12-15 RX ORDER — DOCUSATE SODIUM 100 MG/1
100 CAPSULE, LIQUID FILLED ORAL 2 TIMES DAILY
Qty: 180 CAPSULE | Refills: 1 | Status: SHIPPED
Start: 2021-12-15 | End: 2022-06-01 | Stop reason: SDUPTHER

## 2021-12-15 RX ORDER — IBUPROFEN 200 MG
CAPSULE ORAL
Qty: 180 TABLET | Refills: 1 | Status: SHIPPED
Start: 2021-12-15 | End: 2022-06-01 | Stop reason: SDUPTHER

## 2021-12-15 RX ORDER — ALBUTEROL SULFATE 90 UG/1
3 AEROSOL, METERED RESPIRATORY (INHALATION) 4 TIMES DAILY PRN
Qty: 3 EACH | Refills: 1 | Status: SHIPPED
Start: 2021-12-15 | End: 2022-06-01 | Stop reason: SDUPTHER

## 2021-12-15 RX ORDER — BACLOFEN 10 MG/1
TABLET ORAL
Qty: 90 TABLET | Refills: 1 | Status: SHIPPED | OUTPATIENT
Start: 2021-12-15

## 2021-12-15 RX ORDER — GABAPENTIN 400 MG/1
400 CAPSULE ORAL 4 TIMES DAILY
Qty: 240 CAPSULE | Refills: 2 | Status: SHIPPED
Start: 2021-12-15 | End: 2022-03-31

## 2021-12-15 RX ORDER — TRIAMTERENE AND HYDROCHLOROTHIAZIDE 37.5; 25 MG/1; MG/1
1 TABLET ORAL DAILY
Qty: 90 TABLET | Refills: 1 | Status: SHIPPED
Start: 2021-12-15 | End: 2022-06-01 | Stop reason: SDUPTHER

## 2021-12-15 RX ORDER — MONTELUKAST SODIUM 10 MG/1
TABLET ORAL
Qty: 90 TABLET | Refills: 1 | Status: SHIPPED
Start: 2021-12-15 | End: 2022-06-01 | Stop reason: SDUPTHER

## 2021-12-15 RX ORDER — CALCIUM POLYCARBOPHIL 625 MG
625 TABLET ORAL 2 TIMES DAILY
Qty: 180 TABLET | Refills: 3 | Status: SHIPPED | OUTPATIENT
Start: 2021-12-15

## 2021-12-15 RX ORDER — PETROLATUM 42 G/100G
OINTMENT TOPICAL
Qty: 3 EACH | Refills: 2 | Status: SHIPPED
Start: 2021-12-15 | End: 2022-06-01 | Stop reason: SDUPTHER

## 2021-12-15 RX ORDER — CELECOXIB 100 MG/1
100 CAPSULE ORAL DAILY
Qty: 90 CAPSULE | Refills: 1 | Status: SHIPPED
Start: 2021-12-15 | End: 2022-06-01 | Stop reason: SDUPTHER

## 2021-12-15 RX ORDER — AMITRIPTYLINE HYDROCHLORIDE 50 MG/1
TABLET, FILM COATED ORAL
Qty: 90 TABLET | Refills: 1 | Status: SHIPPED
Start: 2021-12-15 | End: 2022-06-01 | Stop reason: SDUPTHER

## 2021-12-15 RX ORDER — BUDESONIDE AND FORMOTEROL FUMARATE DIHYDRATE 160; 4.5 UG/1; UG/1
2 AEROSOL RESPIRATORY (INHALATION) 2 TIMES DAILY
Qty: 1 EACH | Refills: 3 | Status: SHIPPED
Start: 2021-12-15 | End: 2022-06-13

## 2021-12-15 RX ORDER — CITALOPRAM 40 MG/1
40 TABLET ORAL DAILY
Qty: 90 TABLET | Refills: 1 | Status: SHIPPED
Start: 2021-12-15 | End: 2022-06-01 | Stop reason: SDUPTHER

## 2021-12-15 ASSESSMENT — PATIENT HEALTH QUESTIONNAIRE - PHQ9
SUM OF ALL RESPONSES TO PHQ QUESTIONS 1-9: 0
2. FEELING DOWN, DEPRESSED OR HOPELESS: 0
1. LITTLE INTEREST OR PLEASURE IN DOING THINGS: 0
SUM OF ALL RESPONSES TO PHQ QUESTIONS 1-9: 0
SUM OF ALL RESPONSES TO PHQ QUESTIONS 1-9: 0
SUM OF ALL RESPONSES TO PHQ9 QUESTIONS 1 & 2: 0

## 2021-12-15 ASSESSMENT — LIFESTYLE VARIABLES: HOW OFTEN DO YOU HAVE A DRINK CONTAINING ALCOHOL: 0

## 2021-12-15 NOTE — PATIENT INSTRUCTIONS
Dear Poll[place] Tena,        Thank you for coming to your appointment today. I hope we have addressed all of your needs. Please make sure to do the following:  - Continue your medications as listed. - Get labs drawn before our next follow up. We will call you with the results   - Referrals have been made to physical therapist :  If you do not hear from the office in 1 week, please call the number listed. - We will see each other again in 6 months        Have a great day!         Sincerely,  Suzette Chand MD  12/15/2021  4:02 PM

## 2021-12-15 NOTE — PROGRESS NOTES
Lab scripts and all instructions given to patient by Isaac sanchezt to be scheduled and pt will be contacted

## 2021-12-15 NOTE — PROGRESS NOTES
Medicare Annual Wellness Visit  Name: Zeinab Stout Date: 12/15/2021   MRN: [id number] Sex: Female   Age: 77 y.o. Ethnicity: Non- / Leyla Shayan   : 1955 Race: Lisset Abreu / Alfred Klinefelter is here for Dispop    Screenings for behavioral, psychosocial and functional/safety risks, and cognitive dysfunction are all negative except as indicated below. These results, as well as other patient data from the [address] form, are documented in Flowsheets linked to this Encounter. Allergies   Allergen Reactions    Aceon [Perindopril Erbumine] Anaphylaxis     Tongue swells up    Nsaids Shortness Of Breath and Swelling     tongue         Prior to Visit Medications    Medication Sig Taking?  Authorizing Provider   levothyroxine (SYNTHROID) 112 MCG tablet Take 1 tablet by mouth Daily Yes Nav Turcios MD   amitriptyline (ELAVIL) 50 MG tablet TAKE 1 TABLET BY MOUTH EVERY EVENING Yes Nav Turcios MD   calcium-cholecalciferol (CALCIUM 500/D) 500-200 MG-UNIT per tablet TAKE 1 TABLET BY MOUTH DAILY Yes Nav Turcios MD   rosuvastatin (CRESTOR) 20 MG tablet Take 1 tablet by mouth daily Yes Nav Turcios MD   triamterene-hydroCHLOROthiazide (MAXZIDE-25) 37.5-25 MG per tablet Take 1 tablet by mouth daily TAKE 1 TABLET BY MOUTH EVERY DAY Yes Nav Turcios MD   metFORMIN (GLUCOPHAGE) 1000 MG tablet Take 1 tablet by mouth daily (with breakfast) Yes Nav Turcios MD   Calcium Polycarbophil (FIBER) 625 MG TABS Take 1 tablet by mouth 2 times daily Yes Nav Turcios MD   docusate sodium (COLACE) 100 MG capsule Take 1 capsule by mouth 2 times daily Yes Nav Turcios MD   montelukast (SINGULAIR) 10 MG tablet TAKE 1 TABLET BY MOUTH EVERY NIGHT AT BEDTIME Yes Nav Turcios MD   albuterol sulfate  (90 Base) MCG/ACT inhaler Inhale 3 puffs into the lungs 4 times daily as needed for Wheezing Yes Nav Turcios MD   ipratropium-albuterol (DUONEB) 0.5-2.5 (3) MG/3ML SOLN nebulizer solution Inhale 3 mLs into the lungs every 6 hours as needed for Shortness of Breath Yes Mejia Mccain MD   budesonide-formoterol (SYMBICORT) 160-4.5 MCG/ACT AERO Inhale 2 puffs into the lungs 2 times daily Yes Mejia Mccain MD   celecoxib (CELEBREX) 100 MG capsule Take 1 capsule by mouth daily Yes Mejia Mccain MD   citalopram (CELEXA) 40 MG tablet Take 1 tablet by mouth daily Yes Mejia Mccain MD   gabapentin (NEURONTIN) 400 MG capsule Take 1 capsule by mouth 4 times daily for 90 days. Yes Mejia Mccain MD   baclofen (LIORESAL) 10 MG tablet TAKE 1/2 TABLET THREE TIMES DAILY AS NEEDED(PAIN, SPASMS) Yes Mejia Mccain MD   nystatin-triamcinolone (MYCOLOG II) 857452-8.1 UNIT/GM-% cream Apply topically 2 times daily. Yes Mejia Mccain MD   mineral oil-hydrophilic petrolatum (HYDROPHOR) ointment Apply topically as needed. Yes Mejia Mccain MD   Turmeric (QC TUMERIC COMPLEX PO) Take by mouth Yes Historical Provider, MD   hydrocortisone 1 % cream As needed Yes Historical Provider, MD   omeprazole (PRILOSEC) 20 MG delayed release capsule Take 1 capsule by mouth 2 times daily As directed Yes Elenita Emmanuel MD   OXYGEN Inhale into the lungs Uses 2 liters at night Yes Historical Provider, MD   aspirin 81 MG tablet Take 1 tablet by mouth daily Yes Jose David Kumar MD   blood glucose monitor kit and supplies Test 1 times a day & as needed for symptoms of irregular blood glucose. Vera Inocencia Yes Babak Huntley MD   blood glucose monitor strips Testing daily Yes Jose David Kumar MD   Lancets MISC Testing daily Yes Jose David Kumar MD   Misc. Devices MISC Oxygen concentrator  j44.9 Yes Terese Denney DO   Misc. Devices MISC Modified diet. For diabetic.   Geanie Mohs, DO         Past Medical History:   Diagnosis Date    Adrenal incidentaloma ([address]     Anxiety     Arthritis     Asthma     Bladder incontinence     Cancer [facility]) Dx 2009    multiple Myeloma, in remission currently     Chronic back pain  COPD (chronic obstructive pulmonary disease) (HCC)     on O2 2 liters  (uses with activity and at night to sleep)    Depression     Fibromyalgia     GERD (gastroesophageal reflux disease)     Hyperlipidemia     Hypertension     Hypothyroidism     MGUS (monoclonal gammopathy of unknown significance)     Neuropathy     Pneumonia 02/2020    Prolonged emergence from general anesthesia     Sleep apnea     doesnt use her cpap    Type II or unspecified type diabetes mellitus without mention of complication, not stated as uncontrolled     Vaginal bleeding        Past Surgical History:   Procedure Laterality Date    ANESTHESIA NERVE BLOCK Bilateral 8/10/2020    BILATERAL L3 L4 MEDIAL BRANCH L5 DORSSAL RAMUS NERVE BLOCK (CPT 63429) SEDATION performed by Sivan Kennedy MD at [place] [address]  07/20/2016    removed 3 polyps (tubular adenomas), diverticulosis, Dr. Claudeen Doheny COLONOSCOPY  2008    approximately 2008, no report available, per patient some polyps removed, Dr Oswald Jefferson, [facility]    COLONOSCOPY N/A 11/9/2020    Small sessile polyp distal ascending colon removed with bx/cauterized (path Tubular Adenoma), right and left diverticulosis without diverticulitis, Dr. Daniel Mckeon, [facility]    COLONOSCOPY  11/9/2020    Small sessile polyp distal ascending colon removed with bx/cauterized (path Tubular Adenoma), right and left diverticulosis without diverticulitis, Dr. Daniel Mckeon, [facility] N/A 5/11/2021    DILATATION AND CURETTAGE HYSTEROSCOPY POSSIBLE REMOVAL OF MASS performed by Alex Plummer MD at [address] [place] [address] Rd, left knee, arthroscopic    NERVE BLOCK Bilateral 09/22/2016    lumbar transforaminal nerve block #1    NERVE BLOCK  07/06/2020    lumbar epidural steroid injectio L4-5    NERVE BLOCK Bilateral 08/10/2020    L3, L4, L5     OTHER SURGICAL HISTORY  12/13/2016    Excision cyst right face    PAIN MANAGEMENT PROCEDURE N/A mass index is 36.82 kg/m². Based upon direct observation of the patient, evaluation of cognition reveals recent and remote memory intact. General Appearance: alert and oriented to person, place and time, well-developed and well-nourished, in no acute distress  Skin: warm and dry, no rash or erythema  Head: normocephalic and atraumatic  Eyes: pupils equal, round, and reactive to light, extraocular eye movements intact, conjunctivae normal  ENT: tympanic membrane, external ear and ear canal normal bilaterally, oropharynx clear and moist with normal mucous membranes  Neck: neck supple and non tender without mass, no thyromegaly or thyroid nodules, no cervical lymphadenopathy   Pulmonary/Chest: clear to auscultation bilaterally- no wheezes, rales or rhonchi, normal air movement, no respiratory distress  Cardiovascular: normal rate, normal S1 and S2, no gallops, intact distal pulses and no carotid bruits  Abdomen: soft, non-tender, non-distended, normal bowel sounds, no masses or organomegaly  Extremities: no cyanosis and no clubbing  Musculoskeletal: normal range of motion, no joint swelling, deformity or tenderness  Neurologic: gait and coordination normal and speech normal    Patient's complete Health Risk Assessment and screening values have been reviewed and are found in Flowsheets. The following problems were reviewed today and where indicated follow up appointments were made and/or referrals ordered. Positive Risk Factor Screenings with Interventions:            General Health and ACP:  General  In general, how would you say your health is?: Fair  In the past 7 days, have you experienced any of the following?  New or Increased Pain, New or Increased Fatigue, Loneliness, Social Isolation, Stress or Anger?: (!) New or Increased Fatigue, New or Increased Pain  Do you get the social and emotional support that you need?: Yes  Do you have a Living Will?: (!) No  Advance Directives     Power of [employer] Will ACP-Advance Directive ACP-Power of     Not on File Filed on 04/15/12 Filed Not on File        Health Habits/Nutrition:  Health Habits/Nutrition  Do you exercise for at least 20 minutes 2-3 times per week?: Yes  Have you lost any weight without trying in the past 3 months?: No  Do you eat only one meal per day?: No  Have you seen the dentist within the past year?: N/A - wear dentures  Body mass index: (!) 36.81  ·     Hearing/Vision:  No exam data present  Hearing/Vision  Do you or your family notice any trouble with your hearing that hasn't been managed with hearing aids?: (!) Yes  Do you have difficulty driving, watching TV, or doing any of your daily activities because of your eyesight?: No  Have you had an eye exam within the past year?: Yes    Safety:  Safety  Do you have working smoke detectors?: Yes  Have all throw rugs been removed or fastened?: (!) No  Do you have non-slip mats or surfaces in all bathtubs/showers?: Yes  Do all of your stairways have a railing or banister?: Yes  Are your doorways, halls and stairs free of clutter?: Yes  Do you always fasten your seatbelt when you are in a car?: Yes     Personalized Preventive Plan   Current Health Maintenance Status  Immunization History   Administered Date(s) Administered    COVID-19, Joe Peter, PF, 30mcg/0.3mL 03/20/2021, 04/10/2021    Influenza 10/28/2011, 01/08/2013, 10/23/2013    Influenza Vaccine, unspecified formulation 10/23/2013, 10/18/2016, 02/08/2018    Influenza Virus Vaccine 10/13/2010, 10/06/2014, 01/27/2016    Influenza, Quadv, 6 mo and older, IM (Fluzone, Flulaval) 02/08/2018    Influenza, Quadv, IM, PF (6 mo and older Fluzone, Flulaval, Fluarix, and 3 yrs and older Afluria) 10/25/2018, 09/23/2019, 12/15/2021    Influenza, Triv, 3 Years and older, IM (Afluria (5 yrs and older) 10/18/2016    Pneumococcal Polysaccharide ([id number]) 01/27/2016, 06/14/2018    Tdap (Boostrix, Adacel) 09/27/2019        Health Maintenance   Topic Date Due    Shingles Vaccine (1 of 2) Never done    DEXA (modify frequency per FRAX score)  Never done    Diabetic retinal exam  03/13/2015    Diabetic foot exam  06/14/2019    Diabetic microalbuminuria test  06/25/2019    Pneumococcal 65+ yrs at Risk Vaccine (1 of 2 - PCV13) 03/27/2020    Annual Wellness Visit (AWV)  10/25/2020    Breast cancer screen  03/05/2021    COVID-19 Vaccine (3 - Pfizer risk 4-dose series) 05/08/2021    A1C test (Diabetic or Prediabetic)  04/13/2022    Lipid screen  05/05/2022    TSH testing  05/05/2022    Potassium monitoring  09/21/2022    Creatinine monitoring  09/21/2022    Colon cancer screen colonoscopy  11/09/2025    DTaP/Tdap/Td vaccine (2 - Td or Tdap) 09/27/2029    Flu vaccine  Completed    Hepatitis C screen  Completed    Hepatitis A vaccine  Aged Out    Hib vaccine  Aged Out    Meningococcal (ACWY) vaccine  Aged Out     Recommendations for [employer] Due: see orders and patient instructions/AVS.  . Recommended screening schedule for the next 5-10 years is provided to the patient in written form: see Patient Elmo Hall was seen today for medicare awv. Diagnoses and all orders for this visit:    Advance care [address] [facility] Referral to [facility] Clinical Specialist    Hypothyroidism, unspecified type  -     levothyroxine (SYNTHROID) 112 MCG tablet; Take 1 tablet by mouth Daily  -     TSH; Future  -     T4, FREE; Future    Fibromyalgia  -     amitriptyline (ELAVIL) 50 MG tablet; TAKE 1 TABLET BY MOUTH EVERY EVENING    Vitamin D deficiency  -     calcium-cholecalciferol (CALCIUM 500/D) 500-200 MG-UNIT per tablet; TAKE 1 TABLET BY MOUTH DAILY  -     Vitamin D 25 Hydroxy; Future    Essential hypertension  -     triamterene-hydroCHLOROthiazide (MAXZIDE-25) 37.5-25 MG per tablet; Take 1 tablet by mouth daily TAKE 1 TABLET BY MOUTH EVERY DAY  -     BASIC METABOLIC PANEL; Future  -     CBC WITH AUTO DIFFERENTIAL;  Future    Type 2 diabetes mellitus without complication, with long-term current use of insulin ([facility])  -     metFORMIN (GLUCOPHAGE) 1000 MG tablet; Take 1 tablet by mouth daily (with breakfast)  -     BASIC METABOLIC PANEL; Future  -     LIPID PANEL; Future  -     HEMOGLOBIN A1C; Future  -     CBC WITH AUTO DIFFERENTIAL; Future    Alternating constipation and diarrhea  -     Calcium Polycarbophil (FIBER) 625 MG TABS; Take 1 tablet by mouth 2 times daily  -     docusate sodium (COLACE) 100 MG capsule; Take 1 capsule by mouth 2 times daily    Chronic obstructive pulmonary disease, unspecified COPD type ([facility])  -     montelukast (SINGULAIR) 10 MG tablet; TAKE 1 TABLET BY MOUTH EVERY NIGHT AT BEDTIME  -     albuterol sulfate  (90 Base) MCG/ACT inhaler; Inhale 3 puffs into the lungs 4 times daily as needed for Wheezing  -     ipratropium-albuterol (DUONEB) 0.5-2.5 (3) MG/3ML SOLN nebulizer solution; Inhale 3 mLs into the lungs every 6 hours as needed for Shortness of Breath  -     budesonide-formoterol (SYMBICORT) 160-4.5 MCG/ACT AERO; Inhale 2 puffs into the lungs 2 times daily    Primary osteoarthritis of both hips  -     celecoxib (CELEBREX) 100 MG capsule; Take 1 capsule by mouth daily    Depression, unspecified depression type  -     citalopram (CELEXA) 40 MG tablet; Take 1 tablet by mouth daily    DM type 2 with diabetic peripheral neuropathy ([facility])  -     gabapentin (NEURONTIN) 400 MG capsule; Take 1 capsule by mouth 4 times daily for 90 days. Osteoarthritis of lumbar spine, unspecified spinal osteoarthritis complication status  -     baclofen (LIORESAL) 10 MG tablet; TAKE 1/2 TABLET THREE TIMES DAILY AS NEEDED(PAIN, SPASMS)  -     [facility] [place] [address]    Dry skin  -     mineral oil-hydrophilic petrolatum (HYDROPHOR) ointment; Apply topically as needed. Onychomycosis  -     Cancel: KOH PREP;  Future  -     Jennifer Agudelo D.O, Dermatology, [place] (DANIEL)    Other orders  -     rosuvastatin (CRESTOR) 20 MG tablet; Take 1 tablet by mouth daily  -     nystatin-triamcinolone (MYCOLOG II) 662749-6.1 UNIT/GM-% cream; Apply topically 2 times daily.  -     INFLUENZA, QUADV, 3 YRS AND OLDER, IM PF, PREFILL SYR OR SDV, 0.5ML (AFLURIA QUADV, PF)             HTN -- controlled with current meds    COPD with chronic hypoxia on nc 2L nocturnally -- continue current inhalers as symptoms controlled.     Hypothyroidism -- continue synthroid    DM 2, controlled -- continue metformin    Hx of multiple myeloma   Following up with Dr. Erum Denny (IgG lambda monoclonal protein is present)    Fibromyalgia    Vitamin D deficiency

## 2021-12-15 NOTE — PROGRESS NOTES
[facility]  Internal Medicine Residency    Internal Medicine     Attending Physician Statement  I have discussed the case, including pertinent history and exam findings with the resident and the team.  I have seen and examined the patient and the key elements of the encounter have been performed by me. I agree with the assessment, plan and orders as documented by the resident. Past, family, and social history were reviewed. Physical Exam  Vitals reviewed. Cardiovascular:      Rate and Rhythm: Normal rate and regular rhythm. Pulmonary:      Effort: Pulmonary effort is normal.   Musculoskeletal:         General: Tenderness present.         Back:       Comments: Pain reproducible         SI Left subluxation  HTN  COPD    Stretching   Med management    Caron Gurrola MD MD

## 2021-12-16 ENCOUNTER — CLINICAL DOCUMENTATION (OUTPATIENT)
Dept: SPIRITUAL SERVICES | Age: 66
End: 2021-12-16

## 2021-12-16 NOTE — ACP (ADVANCE CARE PLANNING)
Advance Care Planning   Ambulatory ACP Specialist Patient Outreach    Date:  12/16/2021  ACP Specialist:  Charmaine Hilliard    Outreach call to patient in follow-up to ACP Specialist referral from: Janne Closs, MD    [x] PCP  [] Provider   [] Ambulatory Care Management [] Other for Reason:    [x] Advance Directive Assistance  [] Code Status Discussion  [] Complete Portable DNR Order  [] Discuss Goals of Care  [] Complete POST/MOST  [] Early ACP Decision-Making  [] Other    Date Referral Received: 12/15/2021    Today's Outreach:  [x] First   [] Second  [] Third                               Third outreach made by []  phone  [] email []   [facility]     Intervention:  [] Spoke with Patient  [x] Left VM requesting return call      Outcome: Left voice message and mailing documents. I will continue to follow up. Next Step:   [] ACP scheduled conversation  [] Outreach again in one week               [x] Email / Mail ACP Info Sheets  [x] Email / Mail Advance Directive            [] Close Referral. Routing closure to referring provider/staff and to ACP Specialist .      Thank you for this referral.

## 2021-12-22 ENCOUNTER — TELEPHONE (OUTPATIENT)
Dept: INTERNAL MEDICINE | Age: 66
End: 2021-12-22

## 2021-12-22 NOTE — TELEPHONE ENCOUNTER
Notified patient of Dermatology appointment on 1-4-22 at 1:50pm. Patient advised, verbalized understanding.

## 2022-01-18 NOTE — ACP (ADVANCE CARE PLANNING)
Advance Care Planning   Ambulatory ACP Specialist Patient Outreach    Date:  12/16/2021  ACP Specialist:  Gissell Douglas    Outreach call to patient in follow-up to ACP Specialist referral from: Arvilla Lefort, MD    [x] PCP  [] Provider   [] Ambulatory Care Management [] Other for Reason:    [x] Advance Directive Assistance  [] Code Status Discussion  [] Complete Portable DNR Order  [] Discuss Goals of Care  [] Complete POST/MOST  [] Early ACP Decision-Making  [] Other    Date Referral Received: 12/16/2021    Today's Outreach:  [] First   [] Second  [x] Third                               Third outreach made by []  phone  [] email []   [employer]     Intervention:  [x] Spoke with Patient  [] Left VM requesting return call      Outcome: Appointment for 1/26 @ 1:30 tp complete living will/POA. Rescheduled for 2/8 @1:00. Next Step:   [x] ACP scheduled conversation  [] Outreach again in one week               [] Email / Mail ACP Info Sheets  [] Email / Mail Advance Directive            [] Close Referral. Routing closure to referring provider/staff and to ACP Specialist .      Thank you for this referral.

## 2022-02-08 NOTE — ACP (ADVANCE CARE PLANNING)
Advance Care Planning   Advance Care Planning Note  Ambulatory Spiritual Care Services    Date:  12/16/2021    Received request from [facility] Provider. Consultation conversation participants:   Patient who understands ACP conversation     Goals of ACP Conversation:  Discuss advance care planning documents    Health Care Decision Makers:      Primary Decision Maker: Dickson Gosselin - [phone]    Secondary Decision Maker: Amy Odell - Child - [phone]    Supplemental (Other) Decision Maker: Theodora Mcgee Child - [phone]     Summary:  Completed [address] (Patient Wishes)  Currently on file:   None    Assessment: Cassi Wen is here completing [facility] [address] [facility]. decision makers and has support of her agent. Interventions:  Provided education on documents for clarity and greater understanding  Assisted in the completion of documents according to patient's wishes at this time  Encouraged ongoing ACP conversation with future decision makers and loved ones    Care Preferences Communicated:     Hospitalization:  If the patient's health worsens and it becomes clear that the chance of recovery is unlikely,        Ventilation:   If the patient, in their present state of health, suddenly became very ill and unable to breathe on their own,     the patient would desire the use of a ventilator (breathing machine). If their health worsens and it becomes clear that the change of recovery is unlikely,     the patient would desire the use of a ventilator (breathing machine). Resuscitation:  In the event the patient's heart stopped as a result of an underlying serious health condition, the patient communicates a preference for      resuscitative attempts (CPR).     Outcomes:  Returned original document(s) to patient, as well as copies for distribution to appointed agents  Copy of advance directive given to staff to scan into medical record. Routed ACP note to attending provider or other IDT member.     Patient / Healthcare Decision Maker Instructions:  Review completed ACP document(s) and update, if needed, with changes health or future preferences    Electronically signed by [facility] on 2/8/2022 at 1:48 PM.

## 2022-03-25 DIAGNOSIS — F32.A DEPRESSION, UNSPECIFIED DEPRESSION TYPE: ICD-10-CM

## 2022-03-28 RX ORDER — BROMPHENIRAMINE MALEATE, PSEUDOEPHEDRINE HYDROCHLORIDE, AND DEXTROMETHORPHAN HYDROBROMIDE 2; 30; 10 MG/5ML; MG/5ML; MG/5ML
SYRUP ORAL
Qty: 120 ML | Refills: 0 | OUTPATIENT
Start: 2022-03-28

## 2022-03-28 RX ORDER — CITALOPRAM 40 MG/1
40 TABLET ORAL DAILY
Qty: 90 TABLET | Refills: 1 | OUTPATIENT
Start: 2022-03-28

## 2022-03-31 ENCOUNTER — OFFICE VISIT (OUTPATIENT)
Dept: INTERNAL MEDICINE | Age: 67
End: 2022-03-31
Payer: MEDICARE

## 2022-03-31 VITALS
RESPIRATION RATE: 18 BRPM | OXYGEN SATURATION: 98 % | HEIGHT: 66 IN | SYSTOLIC BLOOD PRESSURE: 135 MMHG | WEIGHT: 225 LBS | HEART RATE: 107 BPM | TEMPERATURE: 97.2 F | DIASTOLIC BLOOD PRESSURE: 75 MMHG | BODY MASS INDEX: 36.16 KG/M2

## 2022-03-31 DIAGNOSIS — M54.41 CHRONIC RIGHT-SIDED LOW BACK PAIN WITH RIGHT-SIDED SCIATICA: ICD-10-CM

## 2022-03-31 DIAGNOSIS — F17.200 SMOKING: Primary | ICD-10-CM

## 2022-03-31 DIAGNOSIS — F32.0 CURRENT MILD EPISODE OF MAJOR DEPRESSIVE DISORDER, UNSPECIFIED WHETHER RECURRENT (HCC): ICD-10-CM

## 2022-03-31 DIAGNOSIS — J44.9 CHRONIC OBSTRUCTIVE PULMONARY DISEASE, UNSPECIFIED COPD TYPE (HCC): ICD-10-CM

## 2022-03-31 DIAGNOSIS — G89.29 CHRONIC RIGHT-SIDED LOW BACK PAIN WITH RIGHT-SIDED SCIATICA: ICD-10-CM

## 2022-03-31 DIAGNOSIS — C90.00 MULTIPLE MYELOMA, REMISSION STATUS UNSPECIFIED (HCC): ICD-10-CM

## 2022-03-31 DIAGNOSIS — E27.8 ADRENAL INCIDENTALOMA (HCC): ICD-10-CM

## 2022-03-31 DIAGNOSIS — Z87.891 PERSONAL HISTORY OF TOBACCO USE: ICD-10-CM

## 2022-03-31 DIAGNOSIS — E11.42 DM TYPE 2 WITH DIABETIC PERIPHERAL NEUROPATHY (HCC): ICD-10-CM

## 2022-03-31 DIAGNOSIS — E66.01 SEVERE OBESITY (BMI 35.0-39.9) WITH COMORBIDITY (HCC): ICD-10-CM

## 2022-03-31 LAB — HBA1C MFR BLD: 6.1 %

## 2022-03-31 PROCEDURE — G0296 VISIT TO DETERM LDCT ELIG: HCPCS | Performed by: CHIROPRACTOR

## 2022-03-31 PROCEDURE — 3044F HG A1C LEVEL LT 7.0%: CPT | Performed by: CHIROPRACTOR

## 2022-03-31 PROCEDURE — 99213 OFFICE O/P EST LOW 20 MIN: CPT | Performed by: CHIROPRACTOR

## 2022-03-31 PROCEDURE — 83036 HEMOGLOBIN GLYCOSYLATED A1C: CPT | Performed by: CHIROPRACTOR

## 2022-03-31 RX ORDER — GABAPENTIN 400 MG/1
800 CAPSULE ORAL 3 TIMES DAILY
Qty: 240 CAPSULE | Refills: 1 | Status: SHIPPED
Start: 2022-03-31 | End: 2022-06-01 | Stop reason: SDUPTHER

## 2022-03-31 ASSESSMENT — ENCOUNTER SYMPTOMS
COUGH: 1
WHEEZING: 0
BLOOD IN STOOL: 0
EYE PAIN: 0
SHORTNESS OF BREATH: 1
DIARRHEA: 0
VOMITING: 0
SINUS PAIN: 0
NAUSEA: 0
CHEST TIGHTNESS: 0
ABDOMINAL PAIN: 0
CONSTIPATION: 0

## 2022-03-31 NOTE — PROGRESS NOTES
Patient verbalized understanding of office instructions. She will call with questions or concerns. She was given discharge instructions, and all questions were fully answered. Printed AVS was given to pt.        Called Pharmacy to see why she didn't get Symbicort Inhaler  ordered in December and they said  pt never picked it up and they will have it ready for her today

## 2022-03-31 NOTE — PATIENT INSTRUCTIONS
Dear Shaq Bond,        Thank you for coming to your appointment today. I hope we have addressed all of your needs. Please make sure to do the following:  - Continue your medications as listed. - Get labs drawn before our next follow up. We will call you with the results   - Referrals have been made to eye doctor and foot doctor: If you do not hear from the office in 1 week, please call the number listed. Have a great day! Sincerely,  Tiffanie Patrick MD  3/31/2022  2:32 PM               What is lung cancer screening? Lung cancer screening is a way in which doctors check the lungs for early signs of cancer in people who have no symptoms of lung cancer. A low-dose CT scan uses much less radiation than a normal CT scan and shows a more detailed image of the lungs than a standard X-ray. The goal of lung cancer screening is to find cancer early, before it has a chance to grow, spread, or cause problems. One large study found that smokers who were screened with low-dose CT scans were less likely to die of lung cancer than those who were screened with standard X-ray. Below is a summary of the things you need to know regarding screening for lung cancer with low-dose computed tomography (LDCT). This is a screening program that involves routine annual screening with LDCT studies of the lung. The LDCTs are done using low-dose radiation that is not thought to increase your cancer risk. If you have other serious medical conditions (other cancers, congestive heart failure) that limit your life expectancy to less than 10 years, you should not undergo lung cancer screening with LDCT. The chance is 20%-60% that the LDCT result will show abnormalities. This would require additional testing which could include repeat imaging or even invasive procedures. Most (about 95%) of \"abnormal\" LDCT results are false in the sense that no lung cancer is ultimately found.   Additionally, some (about 10%) of the cancers found would not affect your life expectancy, even if undetected and untreated. If you are still smoking, the single most important thing that you can do to reduce your risk of dying of lung cancer is to quit. For this screening to be covered by Medicare and most other insurers, strict criteria must be met. If you do not meet these criteria, but still wish to undergo LDCT testing, you will be required to sign a waiver indicating your willingness to pay for the scan.

## 2022-03-31 NOTE — PROGRESS NOTES
[facility]  InternalMedicine Residency Program  ACC Note      SUBJECTIVE:  CC: had no chief complaint listed for this encounter. HPI:Angelita Bull presented to the [facility] for a routine visit. Patient denies headache, blurry vision, fever, chills, recent change in weight, chest pain, palpitations, cough, nausea, vomiting, diarrhea, constipation, abd pain, dysuria, hematuria, tingling, numbness, leg swelling. Review of Systems   Constitutional: Negative for appetite change, fatigue, fever and unexpected weight change. HENT: Negative for hearing loss and sinus pain. Eyes: Negative for pain and visual disturbance. Respiratory: Positive for cough and shortness of breath. Negative for chest tightness and wheezing. Cardiovascular: Negative for chest pain, palpitations and leg swelling. Gastrointestinal: Negative for abdominal pain, blood in stool, constipation, diarrhea, nausea and vomiting. Genitourinary: Negative for dysuria, flank pain, frequency and hematuria. Skin: Negative for rash. Allergic/Immunologic: Negative for environmental allergies and food allergies. Neurological: Negative for dizziness, weakness, numbness and headaches. Hands and feet pain        Current Outpatient Medications on File Prior to Visit   Medication Sig Dispense Refill    Misc. Devices MISC Patient to have 1 modified dietary meal daily.  1 each 0    levothyroxine (SYNTHROID) 112 MCG tablet Take 1 tablet by mouth Daily 90 tablet 1    amitriptyline (ELAVIL) 50 MG tablet TAKE 1 TABLET BY MOUTH EVERY EVENING 90 tablet 1    calcium-cholecalciferol (CALCIUM 500/D) 500-200 MG-UNIT per tablet TAKE 1 TABLET BY MOUTH DAILY 180 tablet 1    rosuvastatin (CRESTOR) 20 MG tablet Take 1 tablet by mouth daily 90 tablet 1    triamterene-hydroCHLOROthiazide (MAXZIDE-25) 37.5-25 MG per tablet Take 1 tablet by mouth daily TAKE 1 TABLET BY MOUTH EVERY DAY 90 tablet 1    metFORMIN (GLUCOPHAGE) 1000 MG tablet Take 1 tablet by mouth daily (with breakfast) 90 tablet 3    Calcium Polycarbophil (FIBER) 625 MG TABS Take 1 tablet by mouth 2 times daily 180 tablet 3    docusate sodium (COLACE) 100 MG capsule Take 1 capsule by mouth 2 times daily 180 capsule 1    montelukast (SINGULAIR) 10 MG tablet TAKE 1 TABLET BY MOUTH EVERY NIGHT AT BEDTIME 90 tablet 1    albuterol sulfate  (90 Base) MCG/ACT inhaler Inhale 3 puffs into the lungs 4 times daily as needed for Wheezing 3 each 1    ipratropium-albuterol (DUONEB) 0.5-2.5 (3) MG/3ML SOLN nebulizer solution Inhale 3 mLs into the lungs every 6 hours as needed for Shortness of Breath 360 mL 1    budesonide-formoterol (SYMBICORT) 160-4.5 MCG/ACT AERO Inhale 2 puffs into the lungs 2 times daily 1 each 3    celecoxib (CELEBREX) 100 MG capsule Take 1 capsule by mouth daily 90 capsule 1    citalopram (CELEXA) 40 MG tablet Take 1 tablet by mouth daily 90 tablet 1    gabapentin (NEURONTIN) 400 MG capsule Take 1 capsule by mouth 4 times daily for 90 days. 240 capsule 2    baclofen (LIORESAL) 10 MG tablet TAKE 1/2 TABLET THREE TIMES DAILY AS NEEDED(PAIN, SPASMS) 90 tablet 1    nystatin-triamcinolone (MYCOLOG II) 653967-8.1 UNIT/GM-% cream Apply topically 2 times daily. 30 g 1    mineral oil-hydrophilic petrolatum (HYDROPHOR) ointment Apply topically as needed. 3 each 2    Turmeric (QC TUMERIC COMPLEX PO) Take by mouth      hydrocortisone 1 % cream As needed      omeprazole (PRILOSEC) 20 MG delayed release capsule Take 1 capsule by mouth 2 times daily As directed 60 capsule 2    OXYGEN Inhale into the lungs Uses 2 liters at night      aspirin 81 MG tablet Take 1 tablet by mouth daily 90 tablet 0    blood glucose monitor kit and supplies Test 1 times a day & as needed for symptoms of irregular blood glucose. Accuchek Avivia 1 kit 0    blood glucose monitor strips Testing daily 100 strip 3    Lancets MISC Testing daily 100 each 3    Misc.  Devices MISC Oxygen concentrator  j44.9 1 Device 0     No current facility-administered medications on file prior to visit. OBJECTIVE:    VS: /75 (Site: Left Upper Arm, Position: Sitting)   Pulse 107   Temp 97.2 °F (36.2 °C) (Temporal)   Resp 18   Ht 5' 6\" (1.676 m)   Wt 225 lb (102.1 kg)   SpO2 98%   BMI 36.32 kg/m²   Physical Exam  Constitutional:       General: She is not in acute distress. HENT:      Head: Normocephalic. Right Ear: External ear normal.      Left Ear: External ear normal.      Nose: No congestion or rhinorrhea. Mouth/Throat:      Mouth: Mucous membranes are moist.   Eyes:      General:         Right eye: No discharge. Left eye: No discharge. Conjunctiva/sclera: Conjunctivae normal.   Cardiovascular:      Rate and Rhythm: Normal rate and regular rhythm. Heart sounds: No murmur heard. Pulmonary:      Effort: Pulmonary effort is normal. No respiratory distress. Breath sounds: Normal breath sounds. No wheezing, rhonchi or rales. Abdominal:      General: Bowel sounds are normal. There is no distension. Palpations: Abdomen is soft. There is no mass. Tenderness: There is no abdominal tenderness. There is no guarding. Musculoskeletal:      Cervical back: Neck supple. No tenderness. Right lower leg: No edema. Left lower leg: No edema. Lymphadenopathy:      Cervical: No cervical adenopathy. Skin:     General: Skin is warm. Coloration: Skin is not pale. Findings: No erythema or rash. Neurological:      General: No focal deficit present. Mental Status: She is alert and oriented to person, place, and time. Psychiatric:         Mood and Affect: Mood normal.         Behavior: Behavior normal.         ASSESSMENT/PLAN:    HTN -- controlled   Currently on Maxzide    COPD with chronic hypoxia on nc 2L nocturnally, stable  Currently on  albuterol  continue current inhalers as symptoms controlled.   Not been taking Symbicort, Anell Maze? ??    Hypothyroidism   On Synthroid 112 mcg daily    DM 2, controlled   Hb A1C: 6.1   On Metformin, 1000 mg daily  Last ophthalmology visit    Hx of multiple myeloma   Following up with Dr. Manny Cardenas (IgG lambda monoclonal protein is present)  Last bone scan on 09/21: No identifiable lytic lesions to suggest recurrent disease. Fibromyalgia  On amitriptyline   Tolerating the medications    Hx of neuropathy   On gabapentin    Vitamin D deficiency  On supplementation    I have reviewed my findings and recommendations with Deep Marley and Dr. Reggie Martinez MD, MD PGY-1   3/31/2022 1:42 PM      Low Dose CT (LDCT) Lung Screening criteria met:     Age 50-77(Medicare) or 50-80 ([facility])   Pack year smoking >20   Still smoking or less than 15 year since quit   No sign or symptoms of lung cancer   > 11 months since last LDCT     Risks and benefits of lung cancer screening with LDCT scans discussed:    Significance of positive screen - False-positive LDCT results often occur. 95% of all positive results do not lead to a diagnosis of cancer. Usually further imaging can resolve most false-positive results; however, some patients may require invasive procedures. Over diagnosis risk - 10% to 12% of screen-detected lung cancer cases are over diagnosed--that is, the cancer would not have been detected in the patient's lifetime without the screening. Need for follow up screens annually to continue lung cancer screening effectiveness     Risks associated with radiation from annual LDCT- Radiation exposure is about the same as for a mammogram, which is about 1/3 of the annual background radiation exposure from everyday life. Starting screening at age 54 is not likely to increase cancer risk from radiation exposure.     Patients with comorbidities resulting in life expectancy of < 10 years, or that would preclude treatment of an abnormality identified on CT, should not be screened due to lack of benefit.     To obtain maximal benefit from this screening, smoking cessation and long-term abstinence from smoking is critical

## 2022-03-31 NOTE — PROGRESS NOTES
[facility]  [facility]idency [facility]    Attending Physician Statement  I have discussed the case, including pertinent history and exam findings with the resident physician. I agree with the assessment, plan and orders as documented by the resident. I have reviewed all pertinent PMHx, PSHx, FamHx, SocialHx, medications, and allergies and updated history as appropriate. Patient presents for routine follow up of medical problems. Pt presented for regular follow up. CO bilateral and feet pain, on gabapentin 400 mg day, amitriptyline. HO DM with neuropathy and metformin for DM. Exam showed decreased sensation of monofilament in today's examination. Recommended trial of 600 mg dosing and reevaluate next visit. COPD, some communication issues with pharmacy, has been using rescue inhalers only. Examination showed no wheezing, last lung CT was unremarkable, pending repeat low dose Ct. BP has been controlled with current regimen. Total time spent 25 minutes in this visit. Remainder of medical problems as per resident note.     Yony Yu MD  3/31/2022 2:26 PM

## 2022-04-13 ENCOUNTER — TELEPHONE (OUTPATIENT)
Dept: CASE MANAGEMENT | Age: 67
End: 2022-04-13

## 2022-04-13 NOTE — TELEPHONE ENCOUNTER
I called the patient and she confirmed her CT lung screening at [facility] on 4/14/2022 at 1:00 pm.  I reminded the patient to arrive at 12:30 pm, enter through the main entrance, and register. Patient confirmed.         Kushal Paez

## 2022-04-14 ENCOUNTER — HOSPITAL ENCOUNTER (OUTPATIENT)
Dept: CT IMAGING | Age: 67
Discharge: HOME | End: 2022-04-16
Payer: MEDICARE

## 2022-04-14 DIAGNOSIS — Z87.891 PERSONAL HISTORY OF TOBACCO USE: ICD-10-CM

## 2022-04-14 PROCEDURE — 71271 CT THORAX LUNG CANCER SCR C-: CPT

## 2022-04-15 ENCOUNTER — TELEPHONE (OUTPATIENT)
Dept: CASE MANAGEMENT | Age: 67
End: 2022-04-15

## 2022-04-15 NOTE — TELEPHONE ENCOUNTER
No call, encounter opened to process CT Lung Screening. CT Lung Screen: 4/14/2022      Impression   New 8 mm nodule in left lower lobe.  Differential also include atelectasis.       LUNG RADS:   Per ACR Lung-RADS Version 1.1       Lung rads 4A.  Follow-up low-dose CT of the chest in 3 months.       RECOMMENDATIONS:   If you would like to register your patient with the [facility], please contact the Nurse Navigator at   [phone]. Pack years:     Social History     Tobacco Use  Smoking Status: Former Smoker    Start Date: 07/15/1971   Quit Date: 02/10/2020   Types: Cigarettes   Packs/Day:    Years: 48   Pack Years:    Smokeless Tobacco: Never Used         Results letter sent to patient via my chart or mailed.      [address]

## 2022-04-17 ENCOUNTER — HOSPITAL ENCOUNTER (EMERGENCY)
Age: 67
Discharge: HOME | End: 2022-04-17
Payer: MEDICARE

## 2022-04-17 VITALS
RESPIRATION RATE: 18 BRPM | WEIGHT: 200 LBS | DIASTOLIC BLOOD PRESSURE: 93 MMHG | HEART RATE: 70 BPM | TEMPERATURE: 98.4 F | OXYGEN SATURATION: 93 % | BODY MASS INDEX: 32.28 KG/M2 | SYSTOLIC BLOOD PRESSURE: 166 MMHG

## 2022-04-17 DIAGNOSIS — R03.0 ELEVATED BLOOD PRESSURE READING: ICD-10-CM

## 2022-04-17 DIAGNOSIS — K14.8 TONGUE LESION: Primary | ICD-10-CM

## 2022-04-17 LAB
ANION GAP SERPL CALCULATED.3IONS-SCNC: 6 MMOL/L (ref 7–16)
APTT: 28.3 SEC (ref 24.5–35.1)
BASOPHILS ABSOLUTE: 0.04 E9/L (ref 0–0.2)
BASOPHILS RELATIVE PERCENT: 0.8 % (ref 0–2)
BUN BLDV-MCNC: 11 MG/DL (ref 6–23)
CALCIUM SERPL-MCNC: 9.1 MG/DL (ref 8.6–10.2)
CHLORIDE BLD-SCNC: 102 MMOL/L (ref 98–107)
CO2: 31 MMOL/L (ref 22–29)
CREAT SERPL-MCNC: 0.7 MG/DL (ref 0.5–1)
EOSINOPHILS ABSOLUTE: 0.28 E9/L (ref 0.05–0.5)
EOSINOPHILS RELATIVE PERCENT: 5.9 % (ref 0–6)
GFR AFRICAN AMERICAN: >60
GFR NON-AFRICAN AMERICAN: >60 ML/MIN/1.73
GLUCOSE BLD-MCNC: 109 MG/DL (ref 74–99)
HCT VFR BLD CALC: 38 % (ref 34–48)
HEMOGLOBIN: 11.6 G/DL (ref 11.5–15.5)
IMMATURE GRANULOCYTES #: 0 E9/L
IMMATURE GRANULOCYTES %: 0 % (ref 0–5)
INR BLD: 1
LYMPHOCYTES ABSOLUTE: 2.58 E9/L (ref 1.5–4)
LYMPHOCYTES RELATIVE PERCENT: 54 % (ref 20–42)
MCH RBC QN AUTO: 27.9 PG (ref 26–35)
MCHC RBC AUTO-ENTMCNC: 30.5 % (ref 32–34.5)
MCV RBC AUTO: 91.3 FL (ref 80–99.9)
MONOCYTES ABSOLUTE: 0.46 E9/L (ref 0.1–0.95)
MONOCYTES RELATIVE PERCENT: 9.6 % (ref 2–12)
NEUTROPHILS ABSOLUTE: 1.42 E9/L (ref 1.8–7.3)
NEUTROPHILS RELATIVE PERCENT: 29.7 % (ref 43–80)
PDW BLD-RTO: 12.9 FL (ref 11.5–15)
PLATELET # BLD: 295 E9/L (ref 130–450)
PMV BLD AUTO: 9.9 FL (ref 7–12)
POTASSIUM SERPL-SCNC: 3.8 MMOL/L (ref 3.5–5)
PROTHROMBIN TIME: 11.1 SEC (ref 9.3–12.4)
RBC # BLD: 4.16 E12/L (ref 3.5–5.5)
SODIUM BLD-SCNC: 139 MMOL/L (ref 132–146)
WBC # BLD: 4.8 E9/L (ref 4.5–11.5)

## 2022-04-17 PROCEDURE — 6370000000 HC RX 637 (ALT 250 FOR IP): Performed by: NURSE PRACTITIONER

## 2022-04-17 PROCEDURE — 85025 COMPLETE CBC W/AUTO DIFF WBC: CPT

## 2022-04-17 PROCEDURE — 85730 THROMBOPLASTIN TIME PARTIAL: CPT

## 2022-04-17 PROCEDURE — 85610 PROTHROMBIN TIME: CPT

## 2022-04-17 PROCEDURE — 80048 BASIC METABOLIC PNL TOTAL CA: CPT

## 2022-04-17 PROCEDURE — 99285 EMERGENCY DEPT VISIT HI MDM: CPT

## 2022-04-17 PROCEDURE — 2500000003 HC RX 250 WO HCPCS: Performed by: NURSE PRACTITIONER

## 2022-04-17 RX ORDER — TRANEXAMIC ACID 100 MG/ML
1000 INJECTION, SOLUTION INTRAVENOUS ONCE
Status: COMPLETED | OUTPATIENT
Start: 2022-04-17 | End: 2022-04-17

## 2022-04-17 RX ORDER — IBUPROFEN 800 MG/1
800 TABLET ORAL ONCE
Status: COMPLETED | OUTPATIENT
Start: 2022-04-17 | End: 2022-04-17

## 2022-04-17 RX ADMIN — TRANEXAMIC ACID 1000 MG: 1 INJECTION, SOLUTION INTRAVENOUS at 11:26

## 2022-04-17 RX ADMIN — IBUPROFEN 800 MG: 800 TABLET, FILM COATED ORAL at 12:25

## 2022-04-17 ASSESSMENT — PAIN SCALES - GENERAL
PAINLEVEL_OUTOF10: 10
PAINLEVEL_OUTOF10: 10

## 2022-04-17 ASSESSMENT — PAIN DESCRIPTION - LOCATION: LOCATION: OTHER (COMMENT)

## 2022-04-17 NOTE — ED PROVIDER NOTES
[address]  Department of Emergency Medicine   ED  Encounter Note  Admit Date/RoomTime: 2022 10:37 AM  ED Room:   NAME: Austyn Barrientos  : 1955  MRN: [id number]     Chief Complaint:  Other (intermittent bleeding since thursday; states she has a hole in tongue; no recent dental work, denies injury; 81 mg ASA daily)    HISTORY OF PRESENT ILLNESS        Austyn Barrientos is a 79 y.o. female who presents to the ED for evaluation of remittent bleeding from a \"hole\" in her tongue for the last 4 days. Patient denies any associated traumatic incident. She has had no recent dental work or procedure. She does admit to grinding her teeth at night and wearing dentures. She takes aspirin 81 mg daily and ibuprofen 800mg for fibromyalgia but denies any anticoagulant use. She has had no recent fever, chills, syncope, dizziness, URI symptoms, gum pain or swelling, neck ache, swelling of the tongue, drooling, change in phonation, trismus, facial pain or swelling. She denies any seizure activity currently or in the past. She has not taken any over-the-counter intervention for this. And commonly controls the bleeding with cold ice water. She noticed this Thursday at 1215 while eating an orange. She went to urgent care for an evaluation and during the wait in the waiting room, the bleeding stopped therefore she left without evaluation. The bleeding started again today so she came for evaluation. This is the same as a previous episode last year where she was evaluated by her family doctor and told that there was no intervention for it and it was self-limiting. Her symptoms are mild in severity and intermittent in nature.     **Informed Consent**    The patient has given verbal consent to have photos taken of tongue and electronically inserted into their ED Provider Note as part of their permanent medical record for purposes of illustration, documentation, treatment management and/or medical review. All Images taken on 4/17/22 of patient name: Daniella Coreas were taken by a [facility] approved registered mobile device via [employer] mobile application and transmitted then stored on a secured [employer] located within [facility]. ROS   Pertinent positives and negatives are stated within HPI, all other systems reviewed and are negative. Past Medical History:  has a past medical history of Adrenal incidentaloma ([address] Anxiety, Arthritis, Asthma, Bladder incontinence, Cancer ([address] Chronic back pain, COPD (chronic obstructive pulmonary disease) ([facility] [address] Depression, Fibromyalgia, GERD (gastroesophageal reflux disease), Hyperlipidemia, Hypertension, Hypothyroidism, MGUS (monoclonal gammopathy of unknown significance), Neuropathy, Pneumonia, Prolonged emergence from general anesthesia, Sleep apnea, Type II or unspecified type diabetes mellitus without mention of complication, not stated as uncontrolled, and Vaginal bleeding. Surgical History:  has a past surgical history that includes knee surgery (1980); Upper gastrointestinal endoscopy (2008); Colonoscopy (07/20/2016); Upper gastrointestinal endoscopy (07/20/2016); Nerve Block (Bilateral, 09/22/2016); Nerve Block (07/06/2020); Pain management procedure (N/A, 7/6/2020); Nerve Block (Bilateral, 08/10/2020); Anesthesia Nerve Block (Bilateral, 8/10/2020); other surgical history (12/13/2016); Colonoscopy (2008); Upper gastrointestinal endoscopy (2008); Upper gastrointestinal endoscopy (N/A, 11/9/2020); Colonoscopy (N/A, 11/9/2020); Colonoscopy (11/9/2020); and Dilation and curettage of uterus (N/A, 5/11/2021). Social History:  reports that she quit smoking about 2 years ago. Her smoking use included cigarettes. She started smoking about 50 years ago. She quit after 50.00 years of use.  She has never used smokeless tobacco. She reports that she does not drink alcohol and does not use drugs. Family History: family history includes Arthritis in her brother, maternal grandfather, and maternal grandmother; Cancer in her father, maternal uncle, mother, and paternal aunt; Diabetes in her brother, father, maternal grandmother, and mother; Heart Disease in her paternal grandfather; Heart Disease (age of onset: 79) in her mother; High Blood Pressure in her father, maternal grandmother, paternal aunt, and paternal uncle; High Cholesterol in her paternal grandmother; Hypertension in her father; Kidney Disease in her paternal grandmother; Stroke in her maternal grandfather. Allergies: Aceon [perindopril erbumine] and Nsaids    PHYSICAL EXAM   Oxygen Saturation Interpretation: Normal on room air analysis. ED Triage Vitals   BP Temp Temp src Pulse Resp SpO2 Height Weight   04/17/22 1035 04/17/22 1043 -- 04/17/22 1028 04/17/22 1035 04/17/22 1028 -- 04/17/22 1035   (!) 164/91 98.4 °F (36.9 °C)  92 17 94 %  200 lb (90.7 kg)       Physical Exam  Constitutional/General: Alert and oriented x3, well appearing, non toxic  HEENT:  NC/NT. PERRLA. External canals clear bilaterally with normal TM. Nares normal with no epistaxis. Patient has no angioedema or facial rash. There is dried blood to the lips that was cleaned off with a towel. The tongue is midline without swelling and has dried blood on the dorsal aspect as well as dried blood to the hard palate. Patient given water and all dried blood easily cleaned to which there is a 2 mm papular lesion to the mid tongue that is oozing bright red blood as depicted below. There are no dentures in place. The uvula is midline without any bleeding or abnormality in the posterior oropharynx. There are no pooled secretions. Normal phonation. No trismus. Floor the mouth soft. Neck: Supple, full ROM. No midline vertebral tenderness or crepitus. No anterior cervical or submandibular lymphadenopathy. Trachea midline.   No mass to the thyroid. Respiratory: Lung sounds clear to auscultation bilaterally. No wheezes, rhonchi or stridor. Not in respiratory distress. CV:  Regular rate. Regular rhythm. No murmurs or rubs. 2+ distal pulses. GI:  Abdomen soft, non-tender, non-distended. +BS. No rebound, guarding, or rigidity. No pulsatile masses. Musculoskeletal: Moves all extremities x 4. Warm and well perfused. Capillary refill <3 seconds  Integument: Skin warm and dry. No rashes. Neurologic: Alert and oriented with no focal deficits, symmetric strength 5/5 in the upper and lower extremities bilaterally. Psychiatric: Normal affect.         Lab / Imaging Results   (All laboratory and radiology results have been personally reviewed by myself)  Labs:  Results for orders placed or performed during the hospital encounter of 04/17/22   APTT   Result Value Ref Range    aPTT 28.3 24.5 - 35.1 sec   Protime-INR   Result Value Ref Range    Protime 11.1 9.3 - 12.4 sec    INR 1.0    CBC with Auto Differential   Result Value Ref Range    WBC 4.8 4.5 - 11.5 E9/L    RBC 4.16 3.50 - 5.50 E12/L    Hemoglobin 11.6 11.5 - 15.5 g/dL    Hematocrit 38.0 34.0 - 48.0 %    MCV 91.3 80.0 - 99.9 fL    MCH 27.9 26.0 - 35.0 pg    MCHC 30.5 (L) 32.0 - 34.5 %    RDW 12.9 11.5 - 15.0 fL    Platelets 095 124 - 432 E9/L    MPV 9.9 7.0 - 12.0 fL    Neutrophils % 29.7 (L) 43.0 - 80.0 %    Immature Granulocytes % 0.0 0.0 - 5.0 %    Lymphocytes % 54.0 (H) 20.0 - 42.0 %    Monocytes % 9.6 2.0 - 12.0 %    Eosinophils % 5.9 0.0 - 6.0 %    Basophils % 0.8 0.0 - 2.0 %    Neutrophils Absolute 1.42 (L) 1.80 - 7.30 E9/L    Immature Granulocytes # 0.00 E9/L    Lymphocytes Absolute 2.58 1.50 - 4.00 E9/L    Monocytes Absolute 0.46 0.10 - 0.95 E9/L    Eosinophils Absolute 0.28 0.05 - 0.50 E9/L    Basophils Absolute 0.04 0.00 - 0.20 R8/Q   Basic Metabolic Panel   Result Value Ref Range    Sodium 139 132 - 146 mmol/L    Potassium 3.8 3.5 - 5.0 mmol/L    Chloride 102 98 - 107 mmol/L    CO2 31 (H) 22 - 29 mmol/L    Anion Gap 6 (L) 7 - 16 mmol/L    Glucose 109 (H) 74 - 99 mg/dL    BUN 11 6 - 23 mg/dL    CREATININE 0.7 0.5 - 1.0 mg/dL    GFR Non-African American >60 >=60 mL/min/1.73    GFR African American >60     Calcium 9.1 8.6 - 10.2 mg/dL     Imaging: All Radiology results interpreted by Radiologist unless otherwise noted. No orders to display       ED Course / Medical Decision Making     Medications   tranexamic acid (CYKLOKAPRON) injection 1,000 mg (1,000 mg Topical Given 4/17/22 1126)   ibuprofen (ADVIL;MOTRIN) tablet 800 mg (800 mg Oral Given 4/17/22 1225)        Re-examination:  4/17/22       Time: 1210  Patients condition has improved and is currently asymptomatic. No bleeding from tongue lesion at this time. Discussed results and outpatient management plan. Patient requesting her dose of ibuprofen that she is due for for her fibromyalgia prior to departure. As she took a drink of water, the clot came off and bleeding restarted. Again, easily stopped with direct but then reached with gauze pad without TXA. Consult(s):   Time: 1110 Case discussed with Dr. Cristobal Asif and plan is for patient to suck on TXA soaked gauze pad rather than cautery with silver nitrate. Referral to ENT for outpatient management. Procedure(s):   none    MDM:   Patient appears to have a small tongue lesion which may be the development of the pyogenic granuloma as it bruises and bleeds consistent like one. It is able to be stopped with direct pressure both with TXA as well as without it. She is not on anticoagulants. She has stable H&H, normal platelet count and normotensive without tachycardia. He has no dizziness or syncope and her airway is patent. Shared decision making for outpatient management with direct pressure as needed for recurrent episodes which she was provided with 2 x 2's, outpatient follow-up with ENT which she is encouraged to call tomorrow to arrange and primary care for a blood pressure recheck.  She was educated to increase the head of the bed at night for airway protection. She is aware signs and symptoms indicative of reevaluation in the emergency department setting. She departed in stable condition in the care of her family. Plan of Care/Counseling:  BRYAN Herrera CNP reviewed today's visit with the patient and family in addition to providing specific details for the plan of care and counseling regarding the diagnosis and prognosis. Questions are answered at this time and are agreeable with the plan. ASSESSMENT     1. Tongue lesion    2. Elevated blood pressure reading      PLAN   Discharged home. Patient condition is stable    New Medications     New Prescriptions    No medications on file     Electronically signed by BRYAN Herrera CNP   DD: 4/17/22  **This report was transcribed using voice recognition software. Every effort was made to ensure accuracy; however, inadvertent computerized transcription errors may be present.   END OF ED PROVIDER NOTE       BRYAN Herrera CNP  04/17/22 6922

## 2022-04-18 ENCOUNTER — TELEPHONE (OUTPATIENT)
Dept: ADMINISTRATIVE | Age: 67
End: 2022-04-18

## 2022-04-18 NOTE — TELEPHONE ENCOUNTER
Patient was discharged from our practice 11/26/2019. MA will speak with Lyndsey and Dr. Micki Randolph tomorrow.      Electronically signed by Maren Cruz on 4/18/22 at 9:08 AM EDT

## 2022-04-19 NOTE — TELEPHONE ENCOUNTER
LORENA medrano for patient letting her know that she is discharged from the practice and will need to call [facility] to make an appt.      Electronically signed by Bettye Barnes MA on 4/19/22 at 3:01 PM EDT

## 2022-04-19 NOTE — TELEPHONE ENCOUNTER
Patient returning call to f/u on ED f/u. Advised patient office was reviewing and will reach out to her.

## 2022-04-29 DIAGNOSIS — M81.0 AGE-RELATED OSTEOPOROSIS WITHOUT CURRENT PATHOLOGICAL FRACTURE: ICD-10-CM

## 2022-04-29 DIAGNOSIS — M85.80 OSTEOPENIA, UNSPECIFIED LOCATION: Primary | ICD-10-CM

## 2022-05-06 ENCOUNTER — HOSPITAL ENCOUNTER (OUTPATIENT)
Dept: GENERAL RADIOLOGY | Age: 67
Discharge: HOME | End: 2022-05-08
Payer: MEDICARE

## 2022-05-06 DIAGNOSIS — M81.0 AGE-RELATED OSTEOPOROSIS WITHOUT CURRENT PATHOLOGICAL FRACTURE: ICD-10-CM

## 2022-05-06 DIAGNOSIS — M85.80 OSTEOPENIA, UNSPECIFIED LOCATION: ICD-10-CM

## 2022-05-06 PROCEDURE — 77080 DXA BONE DENSITY AXIAL: CPT

## 2022-05-09 ENCOUNTER — TELEPHONE (OUTPATIENT)
Dept: INTERNAL MEDICINE | Age: 67
End: 2022-05-09

## 2022-05-09 NOTE — LETTER
[facility] Internal Medicine  [address] [facility]  Phone: [phone]  Fax: [phone],         May 9, 2022     Patient: Jamin Fisher   YOB: 1955   Date of Visit: 05/06/2022       To Whom It May Concern: It is my medical opinion that Giovanni Chanel had a medical procedure on May 6 th 2022, and is able to return to work. If you have any questions or concerns, please don't hesitate to call.     Sincerely,        Dalila Amaya DO

## 2022-05-10 ENCOUNTER — TELEPHONE (OUTPATIENT)
Dept: INTERNAL MEDICINE | Age: 67
End: 2022-05-10

## 2022-05-10 NOTE — TELEPHONE ENCOUNTER
Called patient to notify Dexa scan is  within normal range per Dr. Raymundo Casey. No answer and left voicemail to call back.

## 2022-05-10 NOTE — RESULT ENCOUNTER NOTE
Please let patient know that her DEXA scan was within normal range.  Thank you     Electronically signed by [id number] RICARDA Pham DO on 5/10/2022 at 8:29 AM

## 2022-05-10 NOTE — TELEPHONE ENCOUNTER
----- Message from Yossi Taylor DO sent at 5/10/2022  8:29 AM EDT -----  Please let patient know that her DEXA scan was within normal range.  Thank you     Electronically signed by Yossi Gomez DO on 5/10/2022 at 8:29 AM

## 2022-06-01 ENCOUNTER — OFFICE VISIT (OUTPATIENT)
Dept: INTERNAL MEDICINE | Age: 67
End: 2022-06-01
Payer: MEDICARE

## 2022-06-01 ENCOUNTER — TELEPHONE (OUTPATIENT)
Dept: INTERNAL MEDICINE | Age: 67
End: 2022-06-01

## 2022-06-01 VITALS
HEIGHT: 66 IN | WEIGHT: 216 LBS | BODY MASS INDEX: 34.72 KG/M2 | HEART RATE: 88 BPM | DIASTOLIC BLOOD PRESSURE: 76 MMHG | RESPIRATION RATE: 18 BRPM | TEMPERATURE: 97.2 F | OXYGEN SATURATION: 98 % | SYSTOLIC BLOOD PRESSURE: 127 MMHG

## 2022-06-01 DIAGNOSIS — E55.9 VITAMIN D DEFICIENCY: ICD-10-CM

## 2022-06-01 DIAGNOSIS — E03.9 HYPOTHYROIDISM, UNSPECIFIED TYPE: ICD-10-CM

## 2022-06-01 DIAGNOSIS — M79.7 FIBROMYALGIA: ICD-10-CM

## 2022-06-01 DIAGNOSIS — L85.3 DRY SKIN: ICD-10-CM

## 2022-06-01 DIAGNOSIS — I10 ESSENTIAL HYPERTENSION: ICD-10-CM

## 2022-06-01 DIAGNOSIS — E11.42 DM TYPE 2 WITH DIABETIC PERIPHERAL NEUROPATHY (HCC): ICD-10-CM

## 2022-06-01 DIAGNOSIS — R19.8 ALTERNATING CONSTIPATION AND DIARRHEA: ICD-10-CM

## 2022-06-01 DIAGNOSIS — F32.A DEPRESSION, UNSPECIFIED DEPRESSION TYPE: ICD-10-CM

## 2022-06-01 DIAGNOSIS — M16.0 PRIMARY OSTEOARTHRITIS OF BOTH HIPS: ICD-10-CM

## 2022-06-01 DIAGNOSIS — J44.9 CHRONIC OBSTRUCTIVE PULMONARY DISEASE, UNSPECIFIED COPD TYPE (HCC): ICD-10-CM

## 2022-06-01 PROCEDURE — 99212 OFFICE O/P EST SF 10 MIN: CPT | Performed by: STUDENT IN AN ORGANIZED HEALTH CARE EDUCATION/TRAINING PROGRAM

## 2022-06-01 PROCEDURE — 1123F ACP DISCUSS/DSCN MKR DOCD: CPT | Performed by: STUDENT IN AN ORGANIZED HEALTH CARE EDUCATION/TRAINING PROGRAM

## 2022-06-01 PROCEDURE — 3044F HG A1C LEVEL LT 7.0%: CPT | Performed by: STUDENT IN AN ORGANIZED HEALTH CARE EDUCATION/TRAINING PROGRAM

## 2022-06-01 PROCEDURE — 99214 OFFICE O/P EST MOD 30 MIN: CPT | Performed by: STUDENT IN AN ORGANIZED HEALTH CARE EDUCATION/TRAINING PROGRAM

## 2022-06-01 RX ORDER — CITALOPRAM 40 MG/1
40 TABLET ORAL DAILY
Qty: 90 TABLET | Refills: 0 | Status: SHIPPED
Start: 2022-06-01 | End: 2022-09-06 | Stop reason: SDUPTHER

## 2022-06-01 RX ORDER — PREGABALIN 25 MG/1
25 CAPSULE ORAL 3 TIMES DAILY
Qty: 90 CAPSULE | Refills: 0 | Status: SHIPPED
Start: 2022-06-01 | End: 2022-06-01

## 2022-06-01 RX ORDER — LEVOTHYROXINE SODIUM 112 UG/1
112 TABLET ORAL DAILY
Qty: 90 TABLET | Refills: 0 | Status: SHIPPED
Start: 2022-06-01 | End: 2022-09-06 | Stop reason: SDUPTHER

## 2022-06-01 RX ORDER — ROSUVASTATIN CALCIUM 20 MG/1
20 TABLET, COATED ORAL DAILY
Qty: 90 TABLET | Refills: 1 | Status: SHIPPED | OUTPATIENT
Start: 2022-06-01

## 2022-06-01 RX ORDER — PREGABALIN 25 MG/1
75 CAPSULE ORAL 3 TIMES DAILY
Qty: 270 CAPSULE | Refills: 0 | Status: SHIPPED
Start: 2022-06-01 | End: 2022-09-13 | Stop reason: SDUPTHER

## 2022-06-01 RX ORDER — ALBUTEROL SULFATE 90 UG/1
3 AEROSOL, METERED RESPIRATORY (INHALATION) 4 TIMES DAILY PRN
Qty: 3 EACH | Refills: 1 | Status: SHIPPED | OUTPATIENT
Start: 2022-06-01

## 2022-06-01 RX ORDER — CELECOXIB 100 MG/1
100 CAPSULE ORAL DAILY
Qty: 90 CAPSULE | Refills: 0 | Status: SHIPPED
Start: 2022-06-01 | End: 2022-09-06 | Stop reason: SDUPTHER

## 2022-06-01 RX ORDER — AMITRIPTYLINE HYDROCHLORIDE 50 MG/1
TABLET, FILM COATED ORAL
Qty: 90 TABLET | Refills: 0 | Status: SHIPPED
Start: 2022-06-01 | End: 2022-09-06 | Stop reason: SDUPTHER

## 2022-06-01 RX ORDER — IBUPROFEN 800 MG/1
800 TABLET ORAL
Qty: 90 TABLET | Refills: 0 | Status: SHIPPED
Start: 2022-06-01 | End: 2022-07-12

## 2022-06-01 RX ORDER — PETROLATUM 42 G/100G
OINTMENT TOPICAL
Qty: 3 EACH | Refills: 2 | Status: SHIPPED | OUTPATIENT
Start: 2022-06-01

## 2022-06-01 RX ORDER — MONTELUKAST SODIUM 10 MG/1
TABLET ORAL
Qty: 90 TABLET | Refills: 0 | Status: SHIPPED
Start: 2022-06-01 | End: 2022-09-06 | Stop reason: SDUPTHER

## 2022-06-01 RX ORDER — PANTOPRAZOLE SODIUM 40 MG/1
40 TABLET, DELAYED RELEASE ORAL
Qty: 30 TABLET | Refills: 5 | Status: SHIPPED
Start: 2022-06-01 | End: 2022-09-13 | Stop reason: SDUPTHER

## 2022-06-01 RX ORDER — IBUPROFEN 200 MG
CAPSULE ORAL
Qty: 180 TABLET | Refills: 1 | Status: SHIPPED | OUTPATIENT
Start: 2022-06-01

## 2022-06-01 RX ORDER — TRIAMTERENE AND HYDROCHLOROTHIAZIDE 37.5; 25 MG/1; MG/1
1 TABLET ORAL DAILY
Qty: 90 TABLET | Refills: 0 | Status: SHIPPED
Start: 2022-06-01 | End: 2022-09-06 | Stop reason: SDUPTHER

## 2022-06-01 RX ORDER — GABAPENTIN 400 MG/1
800 CAPSULE ORAL 3 TIMES DAILY
Qty: 240 CAPSULE | Refills: 1 | Status: SHIPPED
Start: 2022-06-01 | End: 2022-06-01 | Stop reason: ALTCHOICE

## 2022-06-01 RX ORDER — DOCUSATE SODIUM 100 MG/1
100 CAPSULE, LIQUID FILLED ORAL 2 TIMES DAILY
Qty: 180 CAPSULE | Refills: 1 | Status: SHIPPED
Start: 2022-06-01 | End: 2022-09-06 | Stop reason: SDUPTHER

## 2022-06-01 ASSESSMENT — PATIENT HEALTH QUESTIONNAIRE - PHQ9
SUM OF ALL RESPONSES TO PHQ9 QUESTIONS 1 & 2: 0
SUM OF ALL RESPONSES TO PHQ QUESTIONS 1-9: 2
6. FEELING BAD ABOUT YOURSELF - OR THAT YOU ARE A FAILURE OR HAVE LET YOURSELF OR YOUR FAMILY DOWN: 0
9. THOUGHTS THAT YOU WOULD BE BETTER OFF DEAD, OR OF HURTING YOURSELF: 0
4. FEELING TIRED OR HAVING LITTLE ENERGY: 0
3. TROUBLE FALLING OR STAYING ASLEEP: 0
7. TROUBLE CONCENTRATING ON THINGS, SUCH AS READING THE NEWSPAPER OR WATCHING TELEVISION: 2
SUM OF ALL RESPONSES TO PHQ QUESTIONS 1-9: 2
5. POOR APPETITE OR OVEREATING: 0
10. IF YOU CHECKED OFF ANY PROBLEMS, HOW DIFFICULT HAVE THESE PROBLEMS MADE IT FOR YOU TO DO YOUR WORK, TAKE CARE OF THINGS AT HOME, OR GET ALONG WITH OTHER PEOPLE: 0
8. MOVING OR SPEAKING SO SLOWLY THAT OTHER PEOPLE COULD HAVE NOTICED. OR THE OPPOSITE, BEING SO FIGETY OR RESTLESS THAT YOU HAVE BEEN MOVING AROUND A LOT MORE THAN USUAL: 0
SUM OF ALL RESPONSES TO PHQ QUESTIONS 1-9: 2
SUM OF ALL RESPONSES TO PHQ QUESTIONS 1-9: 2
1. LITTLE INTEREST OR PLEASURE IN DOING THINGS: 0
2. FEELING DOWN, DEPRESSED OR HOPELESS: 0

## 2022-06-01 ASSESSMENT — ENCOUNTER SYMPTOMS
BACK PAIN: 1
EYES NEGATIVE: 1
RESPIRATORY NEGATIVE: 1
GASTROINTESTINAL NEGATIVE: 1
ALLERGIC/IMMUNOLOGIC NEGATIVE: 1

## 2022-06-01 NOTE — TELEPHONE ENCOUNTER
Nurse triage call. Left foot numbness and burning radiates up left leg. Also her 2 nd toe on her right foot is black. Patient denies trauma or injury. Denies any pain except body aches from her Fibromyalgia. PAtient given a same day appt today. She stated her son will bring her to her appt today.

## 2022-06-01 NOTE — PATIENT INSTRUCTIONS
Dear Monalisa Panda,        Thank you for coming to your appointment today. I hope we have addressed all of your needs. Please make sure to do the following:  - Continue your medications as listed. - Get labs drawn before our next follow up (B1, Folate, B12 levels)  - Referrals have been made to Sports Medicine for possible Steroid Injection and Podiatry for foot care: If you do not hear from the office in 1 week, please call the number listed. - Please get XR of Hip done  - We will see each other again in 1 month    Call for a sooner appointment if you develop any acute concerns    Have a great day!         Sincerely,  Destiny Mendoza M.D PGY-1  6/1/2022  3:38 PM

## 2022-06-01 NOTE — LETTER
[facility] Internal Medicine  [address] [facility]  [place] [address]  Phone: [phone]  Fax: [address], DO      June 1, 2022     Patient: Paramjit Reyes   YOB: 1955   Date of Visit: 6/1/2022       To Whom It May Concern: It is my medical opinion that Art Muster may return to full duty immediately with no restrictions. If you have any questions or concerns, please don't hesitate to call.     Sincerely,        Oliverio Reece DO

## 2022-06-01 NOTE — PROGRESS NOTES
[facility]  [facility]    Attending Physician Statement  I have discussed the case, including pertinent history and exam findings with the resident physician. I have seen and examined the patient and the key elements of the encounter have been performed by me. I agree with the assessment, plan and orders as documented by the resident. I have reviewed all pertinent PMHx, PSHx, FamHx, SocialHx, medications, and allergies and updated history as appropriate. Same day visit for multiple areas of pain including right hip, lumbar spine and feet. Right hip OA    - significant pain with hip PROM and decreased internal ROM    - referral to sports med for consideration CSI  - X-rays to be completed -- these were ordered by orthopedic surgery in 11/2021    Chronic lower extremity neuropathy -- multifactorial from multi-level disc herniations L4 to S1 diabetes, possible vitamin def  - reports worsening L>R leg paresthesias -- presently on gabapentin with minimal relief. Switch to Lyica  - continue NSAID and start PPI for GERD  - check thiamine B12  - consider MRI lumbar spine once hip is addressed    Remainder of medical problems as per resident note.     Frances Higuera DO  6/1/2022 2:59 PM

## 2022-06-01 NOTE — PROGRESS NOTES
[facility] Internal Medicine      SUBJECTIVE:  Lakia Staples (:  1955) is a 79 y.o. female here for evaluation of the following chief complaint(s): Other (left foot numbness and burning)    Acute Concern: Left foot numbness and burning radiates up left leg. Patient denies trauma or injury. Denies any pain except body aches from her Fibromyalgia. Distribution of numbness is in soles, lateral feet and lateral aspect of legs bilaterally. - numbness off/on starting roughly 3 months ago L>R but bilateral.   - Admits to standing for prolonged periods of time for work. Vitamin B12, Folate, Thiamine levels ordered  Transitioned to Lyrica from Gabapentin  Has a previous order for Hip XR. Patient educated on getting this XR done. Patient states compliance with this plan     Concerns for \"Black toe\" of 2nd digit on RLE  - present for past few months, before it was really sensitive, couldn't touch it or anything. sensitivity fading but now also turning black. Toenail thick as well  - Assessed on Physical Exam: toes appear of normal color. Mild darkening of skin around toebed in 2nd toe. Capillary refill <2sec  - Podiatry consulted for toenail care as well as for Annual Diabetic Foot Exam    Hx of LBP and Hip pain  - Started on Ibuprofen 800mg and Protonix to combat adverse effects  - Referral to Sports Med for considerations of corticosteroid injection      DM 2, controlled with neuropathy  Hb A1C: 6.1   On Metformin, 1000 mg daily  Last ophthalmology visit  Checks feet every few days  Checks BG every few months   - Educated on importance of checking BG at least daily     Fibromyalgia  On amitriptyline   Tolerating the medications       Review of Systems   Constitutional: Negative. HENT: Negative. Eyes: Negative. Respiratory: Negative. Cardiovascular: Negative. Gastrointestinal: Negative. Endocrine: Negative. Genitourinary: Negative. Musculoskeletal: Positive for back pain. Skin: Negative. Allergic/Immunologic: Negative. Neurological: Negative. Hematological: Negative. Psychiatric/Behavioral: Negative. Current Outpatient Medications on File Prior to Visit   Medication Sig Dispense Refill    metFORMIN (GLUCOPHAGE) 1000 MG tablet Take 1 tablet by mouth daily (with breakfast) 90 tablet 3    Calcium Polycarbophil (FIBER) 625 MG TABS Take 1 tablet by mouth 2 times daily 180 tablet 3    baclofen (LIORESAL) 10 MG tablet TAKE 1/2 TABLET THREE TIMES DAILY AS NEEDED(PAIN, SPASMS) 90 tablet 1    omeprazole (PRILOSEC) 20 MG delayed release capsule Take 1 capsule by mouth 2 times daily As directed 60 capsule 2    OXYGEN Inhale into the lungs Uses 2 liters at night      aspirin 81 MG tablet Take 1 tablet by mouth daily 90 tablet 0    Misc. Devices MISC Oxygen concentrator  j44.9 1 Device 0    Misc. Devices MISC Patient to have 1 modified dietary meal daily. (Patient not taking: Reported on 6/1/2022) 1 each 0    ipratropium-albuterol (DUONEB) 0.5-2.5 (3) MG/3ML SOLN nebulizer solution Inhale 3 mLs into the lungs every 6 hours as needed for Shortness of Breath (Patient not taking: Reported on 6/1/2022) 360 mL 1    budesonide-formoterol (SYMBICORT) 160-4.5 MCG/ACT AERO Inhale 2 puffs into the lungs 2 times daily (Patient not taking: Reported on 6/1/2022) 1 each 3    nystatin-triamcinolone (MYCOLOG II) 572878-1.1 UNIT/GM-% cream Apply topically 2 times daily. (Patient not taking: Reported on 6/1/2022) 30 g 1    Turmeric (QC TUMERIC COMPLEX PO) Take by mouth (Patient not taking: Reported on 6/1/2022)      hydrocortisone 1 % cream As needed (Patient not taking: Reported on 3/31/2022)      blood glucose monitor kit and supplies Test 1 times a day & as needed for symptoms of irregular blood glucose.   Ivon Arnold (Patient not taking: Reported on 6/1/2022) 1 kit 0    blood glucose monitor strips Testing daily (Patient not taking: Reported on 6/1/2022) 100 strip 3    Lancets MISC Testing daily (Patient not taking: Reported on 6/1/2022) 100 each 3     No current facility-administered medications on file prior to visit. OBJECTIVE:    VS:   Vitals:    06/01/22 1414   BP: 127/76   Site: Right Upper Arm   Position: Sitting   Cuff Size: Large Adult   Pulse: 88   Resp: 18   Temp: 97.2 °F (36.2 °C)   TempSrc: Temporal   SpO2: 98%   Weight: 216 lb (98 kg)   Height: 5' 6\" (1.676 m)     Physical Exam  Constitutional:       Appearance: Normal appearance. HENT:      Head: Normocephalic and atraumatic. Nose: Nose normal.   Eyes:      Extraocular Movements: Extraocular movements intact. Conjunctiva/sclera: Conjunctivae normal.      Pupils: Pupils are equal, round, and reactive to light. Cardiovascular:      Rate and Rhythm: Normal rate and regular rhythm. Pulses: Normal pulses. Heart sounds: Normal heart sounds. Pulmonary:      Effort: Pulmonary effort is normal.      Breath sounds: Normal breath sounds. Abdominal:      General: Bowel sounds are normal.      Palpations: Abdomen is soft. Musculoskeletal:      Cervical back: Normal range of motion and neck supple. Comments: Pain in R. Hip region with Straight Leg Test   Skin:     General: Skin is warm and dry. Capillary Refill: Capillary refill takes less than 2 seconds. Neurological:      General: No focal deficit present. Mental Status: She is alert and oriented to person, place, and time. ASSESSMENT/PLAN:  1. DM type 2 with diabetic peripheral neuropathy (HCC)  -     rosuvastatin (CRESTOR) 20 MG tablet; Take 1 tablet by mouth daily, Disp-90 tablet, R-1Normal  -     [place] - Carol Pierce DPM, Podiatry, [place]  -     VITAMIN B1; Future  -     Vitamin B12 & Folate; Future  -     pregabalin (LYRICA) 25 MG capsule; Take 3 capsules by mouth 3 times daily for 30 days. , Disp-270 capsule, R-0Normal  2.  Hypothyroidism, unspecified type  -     levothyroxine (SYNTHROID) 112 MCG tablet; Take 1 tablet by mouth Daily, Disp-90 tablet, R-0Normal  3. Fibromyalgia  -     amitriptyline (ELAVIL) 50 MG tablet; TAKE 1 TABLET BY MOUTH EVERY EVENING, Disp-90 tablet, R-0Normal  4. Vitamin D deficiency  -     calcium-cholecalciferol (CALCIUM 500/D) 500-200 MG-UNIT per tablet; TAKE 1 TABLET BY MOUTH DAILY, Disp-180 tablet, R-1Normal  5. Essential hypertension  -     triamterene-hydroCHLOROthiazide (MAXZIDE-25) 37.5-25 MG per tablet; Take 1 tablet by mouth daily TAKE 1 TABLET BY MOUTH EVERY DAY, Disp-90 tablet, R-0Normal  6. Chronic obstructive pulmonary disease, unspecified COPD type (HCC)  -     montelukast (SINGULAIR) 10 MG tablet; TAKE 1 TABLET BY MOUTH EVERY NIGHT AT BEDTIME, Disp-90 tablet, R-0**Patient requests 90 days supply**Normal  -     albuterol sulfate  (90 Base) MCG/ACT inhaler; Inhale 3 puffs into the lungs 4 times daily as needed for Wheezing, Disp-3 each, R-1Normal  7. Alternating constipation and diarrhea  -     docusate sodium (COLACE) 100 mg capsule; Take 1 capsule by mouth 2 times daily, Disp-180 capsule, R-1Normal  8. Dry skin  -     mineral oil-hydrophilic petrolatum (HYDROPHOR) ointment; Apply topically as needed. , Disp-3 each, R-2, Normal  -     Aracely Casey DPM, Podiatry, [place]  9. Depression, unspecified depression type  -     citalopram (CELEXA) 40 MG tablet; Take 1 tablet by mouth daily, Disp-90 tablet, R-0Normal  10. Primary osteoarthritis of both hips  -     celecoxib (CELEBREX) 100 MG capsule; Take 1 capsule by mouth daily, Disp-90 capsule, R-0Normal  -     Aracely Johnson MD, Sports Medicine, [place]  -     ibuprofen (ADVIL;MOTRIN) 800 MG tablet; Take 1 tablet by mouth 3 times daily (with meals), Disp-90 tablet, R-0Normal  -     pantoprazole (PROTONIX) 40 MG tablet;  Take 1 tablet by mouth every morning (before breakfast), Disp-30 tablet, R-5Normal       RTC:  Return in about 4 weeks (around 6/29/2022). RTC in 4 weeks to f/u with PCP and management of chronic concerns  Patient started on Ibuprofen and Protonix reassess how patient is tolerating  Patient transitioned from gabapentin -> Lyrica. Reassess how patient tolerates      I have reviewed my findings and recommendations with Dneise Verdugo and Dr. Promise Hennessy.     Boris Khanna MD   6/1/2022 5:54 PM

## 2022-06-13 ENCOUNTER — OFFICE VISIT (OUTPATIENT)
Dept: PODIATRY | Age: 67
End: 2022-06-13
Payer: MEDICARE

## 2022-06-13 VITALS — WEIGHT: 200 LBS | BODY MASS INDEX: 32.14 KG/M2 | HEIGHT: 66 IN

## 2022-06-13 DIAGNOSIS — G60.8 HEREDITARY SENSORY NEUROPATHY: ICD-10-CM

## 2022-06-13 DIAGNOSIS — B35.1 TINEA UNGUIUM: ICD-10-CM

## 2022-06-13 DIAGNOSIS — E11.9 TYPE 2 DIABETES MELLITUS WITHOUT COMPLICATION, UNSPECIFIED WHETHER LONG TERM INSULIN USE (HCC): Primary | ICD-10-CM

## 2022-06-13 PROCEDURE — 3044F HG A1C LEVEL LT 7.0%: CPT | Performed by: PODIATRIST

## 2022-06-13 PROCEDURE — 99203 OFFICE O/P NEW LOW 30 MIN: CPT | Performed by: PODIATRIST

## 2022-06-13 PROCEDURE — 11721 DEBRIDE NAIL 6 OR MORE: CPT | Performed by: PODIATRIST

## 2022-06-13 PROCEDURE — 1123F ACP DISCUSS/DSCN MKR DOCD: CPT | Performed by: PODIATRIST

## 2022-06-13 RX ORDER — AMMONIUM LACTATE 12 G/100G
LOTION TOPICAL
Qty: 400 G | Refills: 2 | Status: SHIPPED | OUTPATIENT
Start: 2022-06-13

## 2022-06-13 NOTE — PROGRESS NOTES
Clarita Nesbitt is here today for a diabetic foot exam and nail care. her PCP is Tory Gerard MD last OV was 06/01/2022.

## 2022-06-13 NOTE — PROGRESS NOTES
Monalisa Panda is here today for a diabetic foot exam and nail care. her PCP is Corinne Cooler, MD last OV was 2022. Monalisa Panda : 1955 Sex: female  Age: 79 y.o. Patient was referred by Corinne Cooler, MD    CC:   Diabetic foot exam and painful elongated toenails 1-5 right and left    HPI:   This pleasant 59-year-old female diabetic patient referred me today foot and ankle exam.  History of diabetes. Does also have a history of aspirin 81 mg daily. No open skin lesions or abrasions. No recent foot and ankle exam from a podiatrist.  Here today once again for diabetic foot exam and painful elongated toenails 1-5 right and left. No additional pedal complaints at this time.     ROS:  Const: Denies constitutional symptoms  Musculo: Denies symptoms other than stated above  Skin: Denies symptoms other than stated above      Current Outpatient Medications:     ammonium lactate (LAC-HYDRIN) 12 % lotion, Apply topically twice daily, Disp: 400 g, Rfl: 2    levothyroxine (SYNTHROID) 112 MCG tablet, Take 1 tablet by mouth Daily, Disp: 90 tablet, Rfl: 0    amitriptyline (ELAVIL) 50 MG tablet, TAKE 1 TABLET BY MOUTH EVERY EVENING, Disp: 90 tablet, Rfl: 0    calcium-cholecalciferol (CALCIUM 500/D) 500-200 MG-UNIT per tablet, TAKE 1 TABLET BY MOUTH DAILY, Disp: 180 tablet, Rfl: 1    rosuvastatin (CRESTOR) 20 MG tablet, Take 1 tablet by mouth daily, Disp: 90 tablet, Rfl: 1    triamterene-hydroCHLOROthiazide (MAXZIDE-25) 37.5-25 MG per tablet, Take 1 tablet by mouth daily TAKE 1 TABLET BY MOUTH EVERY DAY, Disp: 90 tablet, Rfl: 0    montelukast (SINGULAIR) 10 MG tablet, TAKE 1 TABLET BY MOUTH EVERY NIGHT AT BEDTIME, Disp: 90 tablet, Rfl: 0    docusate sodium (COLACE) 100 mg capsule, Take 1 capsule by mouth 2 times daily, Disp: 180 capsule, Rfl: 1    albuterol sulfate  (90 Base) MCG/ACT inhaler, Inhale 3 puffs into the lungs 4 times daily as needed for Wheezing, Disp: 3 each, Rfl: 1   mineral oil-hydrophilic petrolatum (HYDROPHOR) ointment, Apply topically as needed. , Disp: 3 each, Rfl: 2    citalopram (CELEXA) 40 MG tablet, Take 1 tablet by mouth daily, Disp: 90 tablet, Rfl: 0    celecoxib (CELEBREX) 100 MG capsule, Take 1 capsule by mouth daily, Disp: 90 capsule, Rfl: 0    ibuprofen (ADVIL;MOTRIN) 800 MG tablet, Take 1 tablet by mouth 3 times daily (with meals), Disp: 90 tablet, Rfl: 0    pantoprazole (PROTONIX) 40 MG tablet, Take 1 tablet by mouth every morning (before breakfast), Disp: 30 tablet, Rfl: 5    pregabalin (LYRICA) 25 MG capsule, Take 3 capsules by mouth 3 times daily for 30 days. , Disp: 270 capsule, Rfl: 0    metFORMIN (GLUCOPHAGE) 1000 MG tablet, Take 1 tablet by mouth daily (with breakfast), Disp: 90 tablet, Rfl: 3    Calcium Polycarbophil (FIBER) 625 MG TABS, Take 1 tablet by mouth 2 times daily, Disp: 180 tablet, Rfl: 3    baclofen (LIORESAL) 10 MG tablet, TAKE 1/2 TABLET THREE TIMES DAILY AS NEEDED(PAIN, SPASMS), Disp: 90 tablet, Rfl: 1    omeprazole (PRILOSEC) 20 MG delayed release capsule, Take 1 capsule by mouth 2 times daily As directed, Disp: 60 capsule, Rfl: 2    OXYGEN, Inhale into the lungs Uses 2 liters at night, Disp: , Rfl:     aspirin 81 MG tablet, Take 1 tablet by mouth daily, Disp: 90 tablet, Rfl: 0    Misc.  Devices MISC, Oxygen concentrator  j44.9, Disp: 1 Device, Rfl: 0  Allergies   Allergen Reactions    Aceon [Perindopril Erbumine] Anaphylaxis     Tongue swells up    Nsaids Shortness Of Breath and Swelling     tongue       Past Medical History:   Diagnosis Date    [facility] incidentaloma ([facility] [address]     Anxiety     Arthritis     Asthma     Bladder incontinence     Cancer [facility]) Dx 2009    multiple Myeloma, in remission currently     Chronic back pain     COPD (chronic obstructive pulmonary disease) (HCC)     on O2 2 liters  (uses with activity and at night to sleep)    Depression     Fibromyalgia     GERD (gastroesophageal reflux disease)     Hyperlipidemia     Hypertension     Hypothyroidism     MGUS (monoclonal gammopathy of unknown significance)     Neuropathy     Pneumonia 02/2020    Prolonged emergence from general anesthesia     Sleep apnea     doesnt use her cpap    Type II or unspecified type diabetes mellitus without mention of complication, not stated as uncontrolled     Vaginal bleeding        There were no vitals filed for this visit. Work History/Social History: Foot and ankle history:     Focused Lower Extremity Physical Exam:      Neurovascular examination:    Dorsalis Pedis palpable bilateral.  Posterior tibialis palpable bilateral.    Capillary Refill Time:  Immediate return  Hair growth:  Symmetrical and bilateral   Skin:  Not atrophic  Edema: Mild edema bilateral feet. Mild edema bilateral ankles. Neurologic:  Light touch diminished bilateral.  Warm to coolness bilateral distal toes  Decreased epicritic sensation     Musculoskeletal/ Orthopedic examination:    Equinis: present bilateral  Dorsiflexion, plantarflexion, inversion, eversion bilateral 5 out of 5 muscle strength  Wiggling toes  Negative Homans    Dermatology examination:    Toenails 1 through 5 bilateral thickened, elongated, dystrophic, mycotic with subungual debris. Web spaces 1 through 4 bilateral clean dry and intact. No significant hyperkeratotic tissue. Dry skin plantar heels bilateral.      Assessment and Plan:  Jodee Raymundo was seen today for diabetes, callouses and nail problem. Diagnoses and all orders for this visit:    Type 2 diabetes mellitus without complication, unspecified whether long term insulin use (HCC)    Tinea unguium    Hereditary sensory neuropathy    Other orders  -     ammonium lactate (LAC-HYDRIN) 12 % lotion; Apply topically twice daily        New referral today diabetic foot and ankle exam  Hemoglobin A1c from 3/31/2022.  6.1.   Manual debridement with standard technique toenails 1 through 5 right and left in thickness and length. Patient tolerated procedure well. Ammonium lactate 12% twice daily both feet. Follow-up 2 months        Return in about 2 months (around 8/13/2022). Seen By:  Daquan Ramirez DPM      Document was created using voice recognition software. Note was reviewed however may contain grammatical errors.

## 2022-06-21 ENCOUNTER — OFFICE VISIT (OUTPATIENT)
Dept: INTERNAL MEDICINE | Age: 67
End: 2022-06-21
Payer: MEDICARE

## 2022-06-21 VITALS
TEMPERATURE: 99.8 F | WEIGHT: 218.8 LBS | OXYGEN SATURATION: 93 % | RESPIRATION RATE: 16 BRPM | BODY MASS INDEX: 35.17 KG/M2 | HEART RATE: 99 BPM | DIASTOLIC BLOOD PRESSURE: 75 MMHG | SYSTOLIC BLOOD PRESSURE: 142 MMHG | HEIGHT: 66 IN

## 2022-06-21 DIAGNOSIS — K14.8 TONGUE LESION: Primary | ICD-10-CM

## 2022-06-21 DIAGNOSIS — R09.81 SINUS CONGESTION: ICD-10-CM

## 2022-06-21 PROCEDURE — 99214 OFFICE O/P EST MOD 30 MIN: CPT

## 2022-06-21 PROCEDURE — 1123F ACP DISCUSS/DSCN MKR DOCD: CPT

## 2022-06-21 PROCEDURE — 99213 OFFICE O/P EST LOW 20 MIN: CPT

## 2022-06-21 RX ORDER — CETIRIZINE HYDROCHLORIDE 10 MG/1
10 TABLET ORAL DAILY
Qty: 30 TABLET | Refills: 0 | Status: SHIPPED
Start: 2022-06-21 | End: 2022-08-09

## 2022-06-21 ASSESSMENT — ENCOUNTER SYMPTOMS
ABDOMINAL PAIN: 0
NAUSEA: 0
CONSTIPATION: 0
PHOTOPHOBIA: 0
DIARRHEA: 0
SINUS PRESSURE: 1
CHEST TIGHTNESS: 0
COUGH: 0
VOMITING: 0
SHORTNESS OF BREATH: 0

## 2022-06-21 NOTE — PATIENT INSTRUCTIONS
Dear Gabbie Portillo,        Thank you for coming to your appointment today. I hope we have addressed all of your needs. Please make sure to do the following:  - Continue your medications as listed. - Get labs drawn before our next follow up. - Referrals have been made to ENT:  If you do not hear from the office in 1 week, please call the number listed. - We will see each other again in 3 months    Call for a sooner appointment if you develop new or worsening symptoms. If headache and sinus congestion does not improve in 1 week please call for follow up. Have a great day!         Sincerely,  Heike Loredo M.D  6/21/2022  3:40 PM

## 2022-06-21 NOTE — PROGRESS NOTES
All instructions given to patient by Juan Rosales   Referral reviewed   Instructed to stop at the  to schedule her fu appt and  her printed avs

## 2022-06-21 NOTE — PROGRESS NOTES
[facility]    Attending Physician Statement  I have discussed the case, including pertinent history and exam findings with the resident physician. I agree with the assessment, plan and orders as documented by the resident. I have reviewed all pertinent PMHx, PSHx, FamHx, SocialHx, medications, and allergies and updated history as appropriate. Patient here for routine follow up of medical problems. Tongue Bleeding 2/2 new tongue lesion: second episode of tongue bleeding in last 9 momths; occurs when she eats food; normally \"thin and runny blood\"; patient will switch to a softer food diet but will start bleeding again after eating solid food; picture in the chart; patient referred to ENT for biopsy and evaluation; discussed with patient that she needs to see ENT for evaluation     Headaches: patient having intermittent headaches; symptomatic treatment; if patient develops any new blurry vision, thunderclap headaches, weakness, or sensory changes then will need to have imaging of brain; if lesion of tongue is cancer then will need to get imaging of brain to confirm that there is not any metastasis     Remainder of medical problems as per resident note.     [address], DO  6/21/2022 3:33 PM    Encounter time including independent chart review, discussion with patient, interpreting test results and/or external communications: 30'

## 2022-06-23 ENCOUNTER — HOSPITAL ENCOUNTER (OUTPATIENT)
Age: 67
Discharge: HOME | End: 2022-06-23
Payer: MEDICARE

## 2022-06-23 DIAGNOSIS — I10 ESSENTIAL HYPERTENSION: ICD-10-CM

## 2022-06-23 DIAGNOSIS — Z79.4 TYPE 2 DIABETES MELLITUS WITHOUT COMPLICATION, WITH LONG-TERM CURRENT USE OF INSULIN (HCC): ICD-10-CM

## 2022-06-23 DIAGNOSIS — E03.9 HYPOTHYROIDISM, UNSPECIFIED TYPE: ICD-10-CM

## 2022-06-23 DIAGNOSIS — E55.9 VITAMIN D DEFICIENCY: ICD-10-CM

## 2022-06-23 DIAGNOSIS — E11.42 DM TYPE 2 WITH DIABETIC PERIPHERAL NEUROPATHY (HCC): ICD-10-CM

## 2022-06-23 DIAGNOSIS — E11.9 TYPE 2 DIABETES MELLITUS WITHOUT COMPLICATION, WITH LONG-TERM CURRENT USE OF INSULIN (HCC): ICD-10-CM

## 2022-06-23 LAB
ANION GAP SERPL CALCULATED.3IONS-SCNC: 15 MMOL/L (ref 7–16)
BASOPHILS ABSOLUTE: 0.07 E9/L (ref 0–0.2)
BASOPHILS RELATIVE PERCENT: 1.7 % (ref 0–2)
BUN BLDV-MCNC: 15 MG/DL (ref 6–23)
CALCIUM SERPL-MCNC: 8.9 MG/DL (ref 8.6–10.2)
CHLORIDE BLD-SCNC: 101 MMOL/L (ref 98–107)
CHOLESTEROL, TOTAL: 181 MG/DL (ref 0–199)
CO2: 25 MMOL/L (ref 22–29)
CREAT SERPL-MCNC: 0.9 MG/DL (ref 0.5–1)
EOSINOPHILS ABSOLUTE: 0.28 E9/L (ref 0.05–0.5)
EOSINOPHILS RELATIVE PERCENT: 7 % (ref 0–6)
FOLATE: 12.4 NG/ML (ref 4.8–24.2)
GFR AFRICAN AMERICAN: >60
GFR NON-AFRICAN AMERICAN: >60 ML/MIN/1.73
GLUCOSE BLD-MCNC: 137 MG/DL (ref 74–99)
HBA1C MFR BLD: 6 % (ref 4–5.6)
HCT VFR BLD CALC: 37.9 % (ref 34–48)
HDLC SERPL-MCNC: 61 MG/DL
HEMOGLOBIN: 11.3 G/DL (ref 11.5–15.5)
LDL CHOLESTEROL CALCULATED: 100 MG/DL (ref 0–99)
LYMPHOCYTES ABSOLUTE: 2.04 E9/L (ref 1.5–4)
LYMPHOCYTES RELATIVE PERCENT: 51.3 % (ref 20–42)
MCH RBC QN AUTO: 27.8 PG (ref 26–35)
MCHC RBC AUTO-ENTMCNC: 29.8 % (ref 32–34.5)
MCV RBC AUTO: 93.1 FL (ref 80–99.9)
MONOCYTES ABSOLUTE: 0.48 E9/L (ref 0.1–0.95)
MONOCYTES RELATIVE PERCENT: 12.2 % (ref 2–12)
NEUTROPHILS ABSOLUTE: 1.12 E9/L (ref 1.8–7.3)
NEUTROPHILS RELATIVE PERCENT: 27.8 % (ref 43–80)
PDW BLD-RTO: 13.4 FL (ref 11.5–15)
PLATELET # BLD: 342 E9/L (ref 130–450)
PMV BLD AUTO: 10.2 FL (ref 7–12)
POTASSIUM SERPL-SCNC: 3.6 MMOL/L (ref 3.5–5)
RBC # BLD: 4.07 E12/L (ref 3.5–5.5)
SODIUM BLD-SCNC: 141 MMOL/L (ref 132–146)
T4 FREE: 1.04 NG/DL (ref 0.93–1.7)
TRIGL SERPL-MCNC: 101 MG/DL (ref 0–149)
TSH SERPL DL<=0.05 MIU/L-ACNC: 5.31 UIU/ML (ref 0.27–4.2)
VITAMIN B-12: 1545 PG/ML (ref 211–946)
VITAMIN D 25-HYDROXY: 28 NG/ML (ref 30–100)
VLDLC SERPL CALC-MCNC: 20 MG/DL
WBC # BLD: 4 E9/L (ref 4.5–11.5)

## 2022-06-23 PROCEDURE — 82746 ASSAY OF FOLIC ACID SERUM: CPT

## 2022-06-23 PROCEDURE — 83036 HEMOGLOBIN GLYCOSYLATED A1C: CPT

## 2022-06-23 PROCEDURE — 82306 VITAMIN D 25 HYDROXY: CPT

## 2022-06-23 PROCEDURE — 85025 COMPLETE CBC W/AUTO DIFF WBC: CPT

## 2022-06-23 PROCEDURE — 82607 VITAMIN B-12: CPT

## 2022-06-23 PROCEDURE — 36415 COLL VENOUS BLD VENIPUNCTURE: CPT

## 2022-06-23 PROCEDURE — 80048 BASIC METABOLIC PNL TOTAL CA: CPT

## 2022-06-23 PROCEDURE — 80061 LIPID PANEL: CPT

## 2022-06-23 PROCEDURE — 84425 ASSAY OF VITAMIN B-1: CPT

## 2022-06-23 PROCEDURE — 84439 ASSAY OF FREE THYROXINE: CPT

## 2022-06-23 PROCEDURE — 84443 ASSAY THYROID STIM HORMONE: CPT

## 2022-06-29 LAB — VITAMIN B1 WHOLE BLOOD: 85 NMOL/L (ref 70–180)

## 2022-07-01 ENCOUNTER — HOSPITAL ENCOUNTER (OUTPATIENT)
Dept: ULTRASOUND IMAGING | Age: 67
Discharge: HOME | End: 2022-07-03
Payer: MEDICARE

## 2022-07-01 DIAGNOSIS — E04.2 NONTOXIC MULTINODULAR GOITER: ICD-10-CM

## 2022-07-01 PROCEDURE — 76536 US EXAM OF HEAD AND NECK: CPT

## 2022-07-05 ENCOUNTER — HOSPITAL ENCOUNTER (OUTPATIENT)
Dept: INFUSION THERAPY | Age: 67
Discharge: HOME | End: 2022-07-05
Payer: MEDICARE

## 2022-07-05 ENCOUNTER — OFFICE VISIT (OUTPATIENT)
Dept: ONCOLOGY | Age: 67
End: 2022-07-05
Payer: MEDICARE

## 2022-07-05 VITALS
WEIGHT: 222.2 LBS | DIASTOLIC BLOOD PRESSURE: 74 MMHG | TEMPERATURE: 97.9 F | HEART RATE: 84 BPM | SYSTOLIC BLOOD PRESSURE: 150 MMHG | BODY MASS INDEX: 35.71 KG/M2 | OXYGEN SATURATION: 98 % | HEIGHT: 66 IN

## 2022-07-05 DIAGNOSIS — C90.00 MULTIPLE MYELOMA, REMISSION STATUS UNSPECIFIED (HCC): ICD-10-CM

## 2022-07-05 DIAGNOSIS — C90.00 MULTIPLE MYELOMA, REMISSION STATUS UNSPECIFIED (HCC): Primary | ICD-10-CM

## 2022-07-05 LAB
BASOPHILS ABSOLUTE: 0.05 E9/L (ref 0–0.2)
BASOPHILS RELATIVE PERCENT: 0.9 % (ref 0–2)
EOSINOPHILS ABSOLUTE: 0.34 E9/L (ref 0.05–0.5)
EOSINOPHILS RELATIVE PERCENT: 5.8 % (ref 0–6)
FERRITIN: 38 NG/ML
HCT VFR BLD CALC: 37 % (ref 34–48)
HEMOGLOBIN: 11.2 G/DL (ref 11.5–15.5)
IMMATURE GRANULOCYTES #: 0.03 E9/L
IMMATURE GRANULOCYTES %: 0.5 % (ref 0–5)
IRON SATURATION: 27 % (ref 15–50)
IRON: 92 MCG/DL (ref 37–145)
LYMPHOCYTES ABSOLUTE: 2.91 E9/L (ref 1.5–4)
LYMPHOCYTES RELATIVE PERCENT: 49.6 % (ref 20–42)
MCH RBC QN AUTO: 28.1 PG (ref 26–35)
MCHC RBC AUTO-ENTMCNC: 30.3 % (ref 32–34.5)
MCV RBC AUTO: 92.7 FL (ref 80–99.9)
MONOCYTES ABSOLUTE: 0.47 E9/L (ref 0.1–0.95)
MONOCYTES RELATIVE PERCENT: 8 % (ref 2–12)
NEUTROPHILS ABSOLUTE: 2.07 E9/L (ref 1.8–7.3)
NEUTROPHILS RELATIVE PERCENT: 35.2 % (ref 43–80)
PDW BLD-RTO: 13.4 FL (ref 11.5–15)
PLATELET # BLD: 350 E9/L (ref 130–450)
PMV BLD AUTO: 10.4 FL (ref 7–12)
RBC # BLD: 3.99 E12/L (ref 3.5–5.5)
TOTAL IRON BINDING CAPACITY: 336 MCG/DL (ref 250–450)
WBC # BLD: 5.9 E9/L (ref 4.5–11.5)

## 2022-07-05 PROCEDURE — 99212 OFFICE O/P EST SF 10 MIN: CPT

## 2022-07-05 PROCEDURE — 36415 COLL VENOUS BLD VENIPUNCTURE: CPT

## 2022-07-05 PROCEDURE — 1123F ACP DISCUSS/DSCN MKR DOCD: CPT | Performed by: INTERNAL MEDICINE

## 2022-07-05 PROCEDURE — 99214 OFFICE O/P EST MOD 30 MIN: CPT | Performed by: INTERNAL MEDICINE

## 2022-07-05 NOTE — PROGRESS NOTES
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 Review of Systems;  CONSTITUTIONAL: No fever, chills. Good appetite, feels tired. ENMT: Eyes: No diplopia; Nose: Positive for epistaxis. Mouth: No sore throat. RESPIRATORY: No hemoptysis, chronic shortness of breath, cough. CARDIOVASCULAR: No chest pain, palpitations. GASTROINTESTINAL: No nausea/vomiting, abdominal pain, diarrhea/constipation. GENITOURINARY: No dysuria, urinary frequency, hematuria. MUSCULOSKELETAL: she has chronic back and joints pain. NEURO: No syncope, presyncope, headache.   Remainder:  ROS NEGATIVE    Past Medical History:      Diagnosis Date    Adrenal incidentaloma ([address]     Anxiety     Arthritis     Asthma     Bladder incontinence     Cancer [facility]) Dx 2009    multiple Myeloma, in remission currently     Chronic back pain     COPD (chronic obstructive pulmonary disease) (HCC)     on O2 2 liters  (uses with activity and at night to sleep)    Depression     Fibromyalgia     GERD (gastroesophageal reflux disease)     Hyperlipidemia     Hypertension     Hypothyroidism     MGUS (monoclonal gammopathy of unknown significance)     Neuropathy     Pneumonia 02/2020    Prolonged emergence from general anesthesia     Sleep apnea     doesnt use her cpap    Type II or unspecified type diabetes mellitus without mention of complication, not stated as uncontrolled     Vaginal bleeding      Patient Active Problem List   Diagnosis    Adrenal incidentaloma ([address]    Hyperlipidemia    Hypothyroidism    Fibromyalgia    Obesity    Hypertension, uncontrolled    Chronic right-sided low back pain with right-sided sciatica    Tobacco abuse    Myofascial pain    Lumbar radiculitis    Smoldering multiple myeloma (HCC)    Chronic obstructive pulmonary disease (HCC)    Type 2 diabetes mellitus without complication, with long-term current use of insulin (HCC)    Cervical facet joint syndrome    Cervical spondylosis    Lumbar radiculopathy  Pain in right hip    Lumbar disc disorder    Lumbar spondylosis    Diverticulosis of colon without diverticulitis    Rectal bleeding    Alternating constipation and diarrhea    History of colon polyps    Heartburn    Indigestion    Gastritis    Hemorrhage of tongue    COVID-19    Current mild episode of major depressive disorder, unspecified whether recurrent ([facility] [address]    DM type 2 with diabetic peripheral neuropathy [facility])        Past Surgical History:      Procedure Laterality Date    ANESTHESIA NERVE BLOCK Bilateral 8/10/2020    BILATERAL L3 L4 MEDIAL BRANCH L5 DORSSAL RAMUS NERVE BLOCK (CPT 93680) SEDATION performed by En Guerra MD at [place] [address]  07/20/2016    removed 3 polyps (tubular adenomas), diverticulosis, Dr. Reina Sales COLONOSCOPY  2008    approximately 2008, no report available, per patient some polyps removed, Dr Janie Way, [facility]    COLONOSCOPY N/A 11/9/2020    Small sessile polyp distal ascending colon removed with bx/cauterized (path Tubular Adenoma), right and left diverticulosis without diverticulitis, Dr. Cecilia Mahan, [facility]    COLONOSCOPY  11/9/2020    Small sessile polyp distal ascending colon removed with bx/cauterized (path Tubular Adenoma), right and left diverticulosis without diverticulitis, Dr. Cecilia Mahan, [facility] N/A 5/11/2021    DILATATION AND CURETTAGE HYSTEROSCOPY POSSIBLE REMOVAL OF MASS performed by Rupa Linares MD at [address] [place] [address] Rd, left knee, arthroscopic    NERVE BLOCK Bilateral 09/22/2016    lumbar transforaminal nerve block #1    NERVE BLOCK  07/06/2020    lumbar epidural steroid injectio L4-5    NERVE BLOCK Bilateral 08/10/2020    L3, L4, L5     OTHER SURGICAL HISTORY  12/13/2016    Excision cyst right face    PAIN MANAGEMENT PROCEDURE N/A 7/6/2020    LUMBAR EPIDURAL STEROID INJECTION L4-5 performed by En Guerra MD at [facility] ENDOSCOPY  2008    UPPER GASTROINTESTINAL ENDOSCOPY  2016    GERD and gastric ulcers, Bx H pylori neg, Dr. Alysia Ivey      approximately 2008, no report, patient does not know the findings,  [facility], [address] N/A 2020    Severe gastritis with superficial ulcerations with bx neg H pylori, Dr. Vargas Koo, [facility]       Family History:  Family History   Problem Relation Age of Onset    Heart Disease Mother 79    Diabetes Mother     Cancer Mother         Breast    Hypertension Father     Cancer Father         Pancreas    Diabetes Father     High Blood Pressure Father     Arthritis Brother     Diabetes Brother     Cancer Maternal Uncle     Cancer Paternal Aunt         breast    High Blood Pressure Paternal Aunt     High Blood Pressure Paternal Uncle     Arthritis Maternal Grandmother     Diabetes Maternal Grandmother     High Blood Pressure Maternal Grandmother     Arthritis Maternal Grandfather     Stroke Maternal Grandfather     High Cholesterol Paternal Grandmother     Kidney Disease Paternal Grandmother     Heart Disease Paternal Grandfather        Medications:  Reviewed and reconciled.     Social History:  Social History     Socioeconomic History    Marital status: Single     Spouse name: Not on file    Number of children: Not on file    Years of education: Not on file    Highest education level: Not on file   Occupational History    Not on file   Tobacco Use    Smoking status: Former Smoker     Packs/day: 2.00     Years: 50.00     Pack years: 100.00     Types: Cigarettes     Start date: 7/15/1971     Quit date: 2/10/2020     Years since quittin.4    Smokeless tobacco: Never Used   Vaping Use    Vaping Use: Never used   Substance and Sexual Activity    Alcohol use: No     Alcohol/week: 0.0 standard drinks    Drug use: No    Sexual activity: Not on file   Other Topics Concern    Not on file   Social History Narrative    Not on file     Social Determinants of Health     Financial Resource Strain: High Risk    Difficulty of Paying Living Expenses: Very hard   Food Insecurity: Food Insecurity Present    Worried About Running Out of Food in the Last Year: Sometimes true    Machelle of Food in the Last Year: Sometimes true   Transportation Needs:     Lack of Transportation (Medical): Not on file    Lack of Transportation (Non-Medical): Not on file   Physical Activity:     Days of Exercise per Week: Not on file    Minutes of Exercise per Session: Not on file   Stress:     Feeling of Stress : Not on file   Social Connections:     Frequency of Communication with Friends and Family: Not on file    Frequency of Social Gatherings with Friends and Family: Not on file    Attends Anabaptist Services: Not on file    Active Member of [address] [place] [address] [employer] or Organizations: Not on file    Attends Club or Organization Meetings: Not on file    Marital Status: Not on file   Intimate Partner Violence:     Fear of Current or Ex-Partner: Not on file    Emotionally Abused: Not on file    Physically Abused: Not on file    Sexually Abused: Not on file   Housing Stability:     Unable to Pay for Housing in the Last Year: Not on file    Number of [place] in the Last Year: Not on file    Unstable Housing in the Last Year: Not on file       Allergies: Allergies   Allergen Reactions    Aceon [Perindopril Erbumine] Anaphylaxis     Tongue swells up    Nsaids Shortness Of Breath and Swelling     tongue       Physical Exam:  BP (!) 150/74   Pulse 84   Temp 97.9 °F (36.6 °C)   Ht 5' 6\" (1.676 m)   Wt 222 lb 3.2 oz (100.8 kg)   SpO2 98%   BMI 35.86 kg/m²   GENERAL: Alert, oriented x 3, not in acute distress. HEENT: PERRLA; EOMI. a dark flat lesion on her tongue. NECK: Supple. No palpable cervical or supraclavicular lymphadenopathy. LUNGS: Good air entry bilaterally. No wheezing, crackles or rhonchi. CARDIOVASCULAR: Regular rate. No murmurs, rubs or gallops. ABDOMEN: Soft. Non-tender, non-distended. Positive bowel sounds. EXTREMITIES: No lower leg edema. Bone Marrow biopsy and aspirate:  Bone marrow, left iliac, aspirate and core biopsy (Parts A and B):  Suboptimal specimen showing 15% plasma cells by immunohistochemistry,  consistent with plasma cell neoplasm, see comment. Comment:    The aspirate smear and clot section specimens are hemodilute  and aspicular.  Plasmacytosis is seen by immunohistochemistry for   on the core biopsy specimen only.  Flow cytometric analysis performed by  [facility] confirmed a lambda-restricted plasma cell neoplasm. See separate report for complete details ([id number]). Intradepartmental consultation is obtained. Impression/Plan:      The patient is a 79-year-old lady with a PMH significant for HTN, hyperlipidemia, DM, COPD, OA, depression, and fibromyalgia, who was diagnosed with IgG lambda MGUS in 2008, used to follow up with Dr. Akhil El at [facility], last f/up with him was in 2012. She did not have a bone marrow biopsy and aspirate done when she was diagnosed with MGUS. Her most recent SPEP with immunofixation ha revealed monoclonal IgG lambda, M-spike 0.7 gm/dl  from 9/9/2016, stable compared with the previous M-spike level. The patient does not have anemia, renal failure, hypercalcemia or lytic lesions on the lumbar spine MRI. IgG had increased sine 2014, skeletal survey was ordered, and is negative for lytic lesions, she had a BM bx and aspirate done by IR on 11/28/2016, Clot and aspirate suboptimal, biopsy showing 15% plasma cells, Flow cytometry positive for a  lambda restricted plasma cell neoplasm, Cytogenetics revealing a normal female karyotype, MM FISH negative.    The patient has 15% plasma cells in the bone marrow, no evidence of end organ damage, she has a smoldering multiple myeloma,risk of progression to MM, at a rate of 10 percent per year for the first five years, 3 percent per year for the next five years, and 1 to 2 percent per year for the following 10 years. The patient returns for a follow-up visit. She has not been seen since September 2021. Last SPEP with immunofixation had revealed IgG lambda monoclonal protein, and spike is 1G/DL, IgA 108, IgG 2/6/2008, had increased slightly, IgM 29, serum light chain assay is pending, from 3/9/2021: kappa 49.81 lambda 22.56, ratio is 2. 21. She does not have anemia, no hypercalcemia or kidney disease. The risk of progression to myeloma was discussed with the patient, repeat myeloma blood work was ordered to be done today to evaluate if she has evidence of disease progression. Skeletal survey was done in September 2021, was negative for lytic lesions. Bleeding from the tongue, the patient was seen by Dr. Celena Alvarez, requested from the staff to obtain records from her office, I do not think this is related to the plasma cell dyscrasia. RTC in 1 month. Thank you for allowing us to participate in the care of Mrs Dannie Juan.     Ada Patel MD   HEMATOLOGY/MEDICAL [facility]  [address] MED ONCOLOGY  [address] [place] [address]  [address] [facility] [address]  Dept: [address]: [phone]

## 2022-07-07 LAB — BETA-2 MICROGLOBULIN: 2.1 MG/L (ref 0.6–2.4)

## 2022-07-08 LAB
ALBUMIN SERPL-MCNC: 2.7 G/DL (ref 3.5–4.7)
ALPHA-1-GLOBULIN: 0.2 G/DL (ref 0.2–0.4)
ALPHA-2-GLOBULIN: 1 G/DL (ref 0.5–1)
BETA GLOBULIN: 1.3 G/DL (ref 0.8–1.3)
ELECTROPHORESIS: ABNORMAL
GAMMA GLOBULIN: 2.9 G/DL (ref 0.7–1.6)
IGA: 108 MG/DL (ref 70–400)
IGG: 2509 MG/DL (ref 700–1600)
IGM: 32 MG/DL (ref 40–230)
IMMUNOFIXATION RESULT, SERUM: NORMAL
KAPPA FREE LIGHT CHAINS QNT: 57.72 MG/L (ref 3.3–19.4)
KAPPA/LAMBDA FREE LIGHT CHAIN RATIO: 2.02 (ref 0.26–1.65)
LAMBDA FREE LIGHT CHAINS QNT: 28.58 MG/L (ref 5.71–26.3)
TOTAL PROTEIN: 8.2 G/DL (ref 6.4–8.3)

## 2022-07-12 DIAGNOSIS — M16.0 PRIMARY OSTEOARTHRITIS OF BOTH HIPS: ICD-10-CM

## 2022-07-12 RX ORDER — IBUPROFEN 800 MG/1
TABLET ORAL
Qty: 90 TABLET | Refills: 0 | Status: SHIPPED
Start: 2022-07-12 | End: 2022-09-06 | Stop reason: SDUPTHER

## 2022-07-29 DIAGNOSIS — C90.00 MULTIPLE MYELOMA, REMISSION STATUS UNSPECIFIED (HCC): Primary | ICD-10-CM

## 2022-08-01 PROBLEM — M25.569 KNEE PAIN: Status: ACTIVE | Noted: 2022-08-01

## 2022-08-02 ENCOUNTER — TELEPHONE (OUTPATIENT)
Dept: INTERNAL MEDICINE | Age: 67
End: 2022-08-02

## 2022-08-02 NOTE — TELEPHONE ENCOUNTER
Spoke with patient, who sounded quite exhausted. States she forgot this appointment and wanted to come in as soon as possible.  Rescheduled for Thursday 8/4/22 at 2:30 pm.  Makeda Maxwell LPN'

## 2022-08-09 ENCOUNTER — TELEPHONE (OUTPATIENT)
Dept: INTERNAL MEDICINE | Age: 67
End: 2022-08-09

## 2022-08-09 DIAGNOSIS — E11.42 DM TYPE 2 WITH DIABETIC PERIPHERAL NEUROPATHY (HCC): ICD-10-CM

## 2022-08-09 DIAGNOSIS — E11.9 TYPE 2 DIABETES MELLITUS WITHOUT COMPLICATION, WITH LONG-TERM CURRENT USE OF INSULIN (HCC): ICD-10-CM

## 2022-08-09 DIAGNOSIS — Z79.4 TYPE 2 DIABETES MELLITUS WITHOUT COMPLICATION, WITH LONG-TERM CURRENT USE OF INSULIN (HCC): ICD-10-CM

## 2022-08-09 DIAGNOSIS — R09.81 SINUS CONGESTION: ICD-10-CM

## 2022-08-09 RX ORDER — CETIRIZINE HYDROCHLORIDE 10 MG/1
10 TABLET ORAL DAILY
Qty: 30 TABLET | Refills: 0 | Status: SHIPPED
Start: 2022-08-09 | End: 2022-10-20

## 2022-08-16 RX ORDER — PREGABALIN 25 MG/1
75 CAPSULE ORAL 3 TIMES DAILY
Qty: 270 CAPSULE | OUTPATIENT
Start: 2022-08-16 | End: 2022-09-15

## 2022-09-06 ENCOUNTER — OFFICE VISIT (OUTPATIENT)
Dept: INTERNAL MEDICINE | Age: 67
End: 2022-09-06
Payer: MEDICARE

## 2022-09-06 VITALS
WEIGHT: 226 LBS | BODY MASS INDEX: 36.32 KG/M2 | TEMPERATURE: 96.8 F | HEART RATE: 88 BPM | OXYGEN SATURATION: 95 % | SYSTOLIC BLOOD PRESSURE: 139 MMHG | RESPIRATION RATE: 20 BRPM | HEIGHT: 66 IN | DIASTOLIC BLOOD PRESSURE: 78 MMHG

## 2022-09-06 DIAGNOSIS — M16.0 PRIMARY OSTEOARTHRITIS OF BOTH HIPS: ICD-10-CM

## 2022-09-06 DIAGNOSIS — Z79.4 TYPE 2 DIABETES MELLITUS WITHOUT COMPLICATION, WITH LONG-TERM CURRENT USE OF INSULIN (HCC): ICD-10-CM

## 2022-09-06 DIAGNOSIS — M54.2 NECK PAIN: ICD-10-CM

## 2022-09-06 DIAGNOSIS — M79.7 FIBROMYALGIA: Primary | ICD-10-CM

## 2022-09-06 DIAGNOSIS — F32.A DEPRESSION, UNSPECIFIED DEPRESSION TYPE: ICD-10-CM

## 2022-09-06 DIAGNOSIS — E04.1 THYROID NODULE: ICD-10-CM

## 2022-09-06 DIAGNOSIS — R91.1 LUNG NODULE: ICD-10-CM

## 2022-09-06 DIAGNOSIS — J44.9 CHRONIC OBSTRUCTIVE PULMONARY DISEASE, UNSPECIFIED COPD TYPE (HCC): ICD-10-CM

## 2022-09-06 DIAGNOSIS — R19.8 ALTERNATING CONSTIPATION AND DIARRHEA: ICD-10-CM

## 2022-09-06 DIAGNOSIS — E03.9 HYPOTHYROIDISM, UNSPECIFIED TYPE: ICD-10-CM

## 2022-09-06 DIAGNOSIS — E11.9 TYPE 2 DIABETES MELLITUS WITHOUT COMPLICATION, WITH LONG-TERM CURRENT USE OF INSULIN (HCC): ICD-10-CM

## 2022-09-06 DIAGNOSIS — I10 ESSENTIAL HYPERTENSION: ICD-10-CM

## 2022-09-06 DIAGNOSIS — M25.572 ACUTE LEFT ANKLE PAIN: ICD-10-CM

## 2022-09-06 PROCEDURE — 3044F HG A1C LEVEL LT 7.0%: CPT | Performed by: CHIROPRACTOR

## 2022-09-06 PROCEDURE — G0439 PPPS, SUBSEQ VISIT: HCPCS | Performed by: CHIROPRACTOR

## 2022-09-06 PROCEDURE — 1123F ACP DISCUSS/DSCN MKR DOCD: CPT | Performed by: CHIROPRACTOR

## 2022-09-06 PROCEDURE — 99212 OFFICE O/P EST SF 10 MIN: CPT | Performed by: CHIROPRACTOR

## 2022-09-06 RX ORDER — DOCUSATE SODIUM 100 MG/1
100 CAPSULE, LIQUID FILLED ORAL 2 TIMES DAILY
Qty: 180 CAPSULE | Refills: 1 | Status: SHIPPED | OUTPATIENT
Start: 2022-09-06

## 2022-09-06 RX ORDER — IBUPROFEN 800 MG/1
TABLET ORAL
Qty: 90 TABLET | Refills: 1 | Status: SHIPPED
Start: 2022-09-06 | End: 2022-09-12 | Stop reason: ALTCHOICE

## 2022-09-06 RX ORDER — CELECOXIB 100 MG/1
100 CAPSULE ORAL DAILY
Qty: 90 CAPSULE | Refills: 1 | Status: SHIPPED | OUTPATIENT
Start: 2022-09-06

## 2022-09-06 RX ORDER — CITALOPRAM 40 MG/1
40 TABLET ORAL DAILY
Qty: 90 TABLET | Refills: 1 | Status: SHIPPED | OUTPATIENT
Start: 2022-09-06

## 2022-09-06 RX ORDER — MONTELUKAST SODIUM 10 MG/1
TABLET ORAL
Qty: 90 TABLET | Refills: 1 | Status: SHIPPED | OUTPATIENT
Start: 2022-09-06

## 2022-09-06 RX ORDER — AMITRIPTYLINE HYDROCHLORIDE 50 MG/1
TABLET, FILM COATED ORAL
Qty: 90 TABLET | Refills: 1 | Status: SHIPPED | OUTPATIENT
Start: 2022-09-06

## 2022-09-06 RX ORDER — LEVOTHYROXINE SODIUM 112 UG/1
112 TABLET ORAL DAILY
Qty: 90 TABLET | Refills: 1 | Status: SHIPPED | OUTPATIENT
Start: 2022-09-06

## 2022-09-06 RX ORDER — TRIAMTERENE AND HYDROCHLOROTHIAZIDE 37.5; 25 MG/1; MG/1
1 TABLET ORAL DAILY
Qty: 90 TABLET | Refills: 1 | Status: SHIPPED | OUTPATIENT
Start: 2022-09-06

## 2022-09-06 SDOH — ECONOMIC STABILITY: HOUSING INSECURITY
IN THE LAST 12 MONTHS, WAS THERE A TIME WHEN YOU DID NOT HAVE A STEADY PLACE TO SLEEP OR SLEPT IN A SHELTER (INCLUDING NOW)?: NO

## 2022-09-06 SDOH — ECONOMIC STABILITY: HOUSING INSECURITY: IN THE LAST 12 MONTHS, HOW MANY PLACES HAVE YOU LIVED?: 1

## 2022-09-06 SDOH — ECONOMIC STABILITY: FOOD INSECURITY: WITHIN THE PAST 12 MONTHS, THE FOOD YOU BOUGHT JUST DIDN'T LAST AND YOU DIDN'T HAVE MONEY TO GET MORE.: NEVER TRUE

## 2022-09-06 SDOH — ECONOMIC STABILITY: FOOD INSECURITY: WITHIN THE PAST 12 MONTHS, YOU WORRIED THAT YOUR FOOD WOULD RUN OUT BEFORE YOU GOT MONEY TO BUY MORE.: NEVER TRUE

## 2022-09-06 SDOH — ECONOMIC STABILITY: INCOME INSECURITY: IN THE LAST 12 MONTHS, WAS THERE A TIME WHEN YOU WERE NOT ABLE TO PAY THE MORTGAGE OR RENT ON TIME?: NO

## 2022-09-06 ASSESSMENT — PATIENT HEALTH QUESTIONNAIRE - PHQ9
1. LITTLE INTEREST OR PLEASURE IN DOING THINGS: 0
SUM OF ALL RESPONSES TO PHQ QUESTIONS 1-9: 0
SUM OF ALL RESPONSES TO PHQ9 QUESTIONS 1 & 2: 0
SUM OF ALL RESPONSES TO PHQ QUESTIONS 1-9: 0
2. FEELING DOWN, DEPRESSED OR HOPELESS: 0

## 2022-09-06 ASSESSMENT — SOCIAL DETERMINANTS OF HEALTH (SDOH): HOW HARD IS IT FOR YOU TO PAY FOR THE VERY BASICS LIKE FOOD, HOUSING, MEDICAL CARE, AND HEATING?: SOMEWHAT HARD

## 2022-09-06 ASSESSMENT — LIFESTYLE VARIABLES: HOW OFTEN DO YOU HAVE A DRINK CONTAINING ALCOHOL: NEVER

## 2022-09-06 NOTE — PROGRESS NOTES
[facility]  [facility]    Attending Physician Statement  I have discussed the case, including pertinent history and exam findings with the resident physician. I agree with the assessment, plan and orders as documented by the resident. I have reviewed all pertinent PMHx, PSHx, FamHx, SocialHx, medications, and allergies and updated history as appropriate. Patient presents for routine follow up of medical problems. AWV  Report of neck pain, chronic in nature for 4 months, no neuro deficit. Also CO left foot pain, recent, 2-3 days ago along with ankle swelling, no difference in circumference, pain in inversion of ankle. HO MGUS, has been seen oncology, last servey was normal.  Need XR of ankle recommended along with topical Volteran gel. Kavon need PT consultation for neck pain. Hypothyroidism and on T4 supplement. Thyroid nodule, stable in size and previous FNB was inconclusive 4 years ago and due to the size, may need to repeat US guided biopsy. Lung nodule 8 mm 4 months ago and need to repeat CT in couple of month. Pending dental appointment. No exercise reported. No depression and   No fall risk reported. Remainder of medical problems as per resident note.     Roula Ramirez MD  9/6/2022 4:04 PM

## 2022-09-09 ENCOUNTER — HOSPITAL ENCOUNTER (OUTPATIENT)
Dept: GENERAL RADIOLOGY | Age: 67
Discharge: HOME | End: 2022-09-11
Payer: MEDICARE

## 2022-09-09 ENCOUNTER — HOSPITAL ENCOUNTER (OUTPATIENT)
Age: 67
Discharge: HOME | End: 2022-09-09
Payer: MEDICARE

## 2022-09-09 ENCOUNTER — HOSPITAL ENCOUNTER (OUTPATIENT)
Age: 67
Discharge: HOME | End: 2022-09-11
Payer: MEDICARE

## 2022-09-09 ENCOUNTER — TELEPHONE (OUTPATIENT)
Dept: INTERNAL MEDICINE | Age: 67
End: 2022-09-09

## 2022-09-09 DIAGNOSIS — E11.9 TYPE 2 DIABETES MELLITUS WITHOUT COMPLICATION, WITH LONG-TERM CURRENT USE OF INSULIN (HCC): ICD-10-CM

## 2022-09-09 DIAGNOSIS — R52 PAIN: ICD-10-CM

## 2022-09-09 DIAGNOSIS — Z79.4 TYPE 2 DIABETES MELLITUS WITHOUT COMPLICATION, WITH LONG-TERM CURRENT USE OF INSULIN (HCC): ICD-10-CM

## 2022-09-09 LAB
CREATININE URINE: 123 MG/DL (ref 29–226)
MICROALBUMIN UR-MCNC: 58.6 MG/L
MICROALBUMIN/CREAT UR-RTO: 47.6 (ref 0–30)

## 2022-09-09 PROCEDURE — 82570 ASSAY OF URINE CREATININE: CPT

## 2022-09-09 PROCEDURE — 82044 UR ALBUMIN SEMIQUANTITATIVE: CPT

## 2022-09-09 PROCEDURE — 73600 X-RAY EXAM OF ANKLE: CPT

## 2022-09-09 NOTE — TELEPHONE ENCOUNTER
Pharmacy requesting clarification , they want to make sure the patient is to have celebrex, diclofenac gel, ibuprofen all at the same time? Please advise.

## 2022-09-12 DIAGNOSIS — E11.42 DM TYPE 2 WITH DIABETIC PERIPHERAL NEUROPATHY (HCC): ICD-10-CM

## 2022-09-12 DIAGNOSIS — R09.81 SINUS CONGESTION: ICD-10-CM

## 2022-09-12 RX ORDER — PREGABALIN 25 MG/1
75 CAPSULE ORAL 3 TIMES DAILY
Qty: 270 CAPSULE | OUTPATIENT
Start: 2022-09-12 | End: 2022-10-12

## 2022-09-12 RX ORDER — CETIRIZINE HYDROCHLORIDE 10 MG/1
10 TABLET ORAL DAILY
Qty: 30 TABLET | Refills: 0 | OUTPATIENT
Start: 2022-09-12 | End: 2022-10-12

## 2022-09-13 ENCOUNTER — OFFICE VISIT (OUTPATIENT)
Dept: INTERNAL MEDICINE | Age: 67
End: 2022-09-13
Payer: MEDICARE

## 2022-09-13 VITALS
HEIGHT: 66 IN | SYSTOLIC BLOOD PRESSURE: 133 MMHG | WEIGHT: 228 LBS | DIASTOLIC BLOOD PRESSURE: 81 MMHG | BODY MASS INDEX: 36.64 KG/M2 | RESPIRATION RATE: 18 BRPM | HEART RATE: 90 BPM | OXYGEN SATURATION: 99 % | TEMPERATURE: 97.1 F

## 2022-09-13 DIAGNOSIS — Z23 NEED FOR PROPHYLACTIC VACCINATION AND INOCULATION AGAINST VARICELLA: Primary | ICD-10-CM

## 2022-09-13 DIAGNOSIS — M16.0 PRIMARY OSTEOARTHRITIS OF BOTH HIPS: ICD-10-CM

## 2022-09-13 DIAGNOSIS — E11.42 DM TYPE 2 WITH DIABETIC PERIPHERAL NEUROPATHY (HCC): ICD-10-CM

## 2022-09-13 PROCEDURE — 3044F HG A1C LEVEL LT 7.0%: CPT | Performed by: INTERNAL MEDICINE

## 2022-09-13 PROCEDURE — 1123F ACP DISCUSS/DSCN MKR DOCD: CPT | Performed by: INTERNAL MEDICINE

## 2022-09-13 PROCEDURE — 99213 OFFICE O/P EST LOW 20 MIN: CPT | Performed by: INTERNAL MEDICINE

## 2022-09-13 PROCEDURE — 99212 OFFICE O/P EST SF 10 MIN: CPT | Performed by: INTERNAL MEDICINE

## 2022-09-13 RX ORDER — PANTOPRAZOLE SODIUM 40 MG/1
40 TABLET, DELAYED RELEASE ORAL
Qty: 30 TABLET | Refills: 5 | Status: SHIPPED | OUTPATIENT
Start: 2022-09-13

## 2022-09-13 RX ORDER — CELECOXIB 100 MG/1
100 CAPSULE ORAL DAILY
Qty: 90 CAPSULE | Refills: 1 | Status: CANCELLED | OUTPATIENT
Start: 2022-09-13

## 2022-09-13 RX ORDER — DULAGLUTIDE 0.75 MG/.5ML
0.75 INJECTION, SOLUTION SUBCUTANEOUS WEEKLY
Qty: 5 ADJUSTABLE DOSE PRE-FILLED PEN SYRINGE | Refills: 3 | Status: SHIPPED | OUTPATIENT
Start: 2022-09-13

## 2022-09-13 RX ORDER — BUDESONIDE AND FORMOTEROL FUMARATE DIHYDRATE 160; 4.5 UG/1; UG/1
AEROSOL RESPIRATORY (INHALATION)
COMMUNITY
Start: 2022-08-09

## 2022-09-13 RX ORDER — PREGABALIN 75 MG/1
75 CAPSULE ORAL 3 TIMES DAILY
Qty: 90 CAPSULE | Refills: 2 | Status: SHIPPED | OUTPATIENT
Start: 2022-09-13 | End: 2022-12-12

## 2022-09-13 ASSESSMENT — ENCOUNTER SYMPTOMS
ALLERGIC/IMMUNOLOGIC NEGATIVE: 1
EYES NEGATIVE: 1
CHEST TIGHTNESS: 0
ABDOMINAL DISTENTION: 0
SHORTNESS OF BREATH: 0
COUGH: 0
WHEEZING: 0
ABDOMINAL PAIN: 0
BACK PAIN: 0
COLOR CHANGE: 0
DIARRHEA: 0

## 2022-09-13 NOTE — TELEPHONE ENCOUNTER
Spoke to linda, made them aware Ibuprofen is discontinued. Per [facility] patient has not picked up the ibuprofen or celebrex yet.

## 2022-09-13 NOTE — PROGRESS NOTES
[facility]  InternalMedicine Residency Program  ACC Note      SUBJECTIVE:  CC: had concerns including Follow-up (Requesting test results). HPI:Angelita Mantilla presented to the [facility] for one week follow up for Lt ankle/foot pain    Acute left ankle pain pseudogout vs primary OA  Started 2 days ago, associated with mild swelling  Today, pain reproduced on inversion and eversion, No swelling or discoloration of the left calf  Left ankle x-ray, reviewed, no acute pathology, no calcinosis  Continue NSAID oral, physical support with ankle bracelet    Review of Systems   Constitutional:  Negative for appetite change, fatigue and unexpected weight change. HENT: Negative. Eyes: Negative. Respiratory:  Negative for cough, chest tightness, shortness of breath and wheezing. Cardiovascular:  Negative for chest pain, palpitations and leg swelling. Gastrointestinal:  Negative for abdominal distention, abdominal pain and diarrhea. Endocrine: Negative. Genitourinary: Negative. Musculoskeletal:  Negative for arthralgias, back pain, joint swelling, myalgias and neck stiffness. Skin:  Negative for color change and pallor. Allergic/Immunologic: Negative. Neurological:  Positive for numbness. Negative for dizziness, tremors, syncope, facial asymmetry, weakness and headaches. Lt hand and Lt Foot fingers and toes numbness and tinglings   Hematological: Negative. Psychiatric/Behavioral: Negative. Outpatient Medications Marked as Taking for the 9/13/22 encounter (Office Visit) with Yaw Najera MD   Medication Sig Dispense Refill    zoster recombinant adjuvanted vaccine [facility]) 50 MCG/0.5ML SUSR injection 50 MCG IM then repeat 2-6 months.  0.5 mL 1    SYMBICORT 160-4.5 MCG/ACT AERO INHALE 2 PUFFS INTO THE LUNGS TWICE DAILY      pantoprazole (PROTONIX) 40 MG tablet Take 1 tablet by mouth every morning (before breakfast) 30 tablet 5    Dulaglutide (TRULICITY) 3.36 MG/0.5ML SOPN Inject 0.75 mg into the skin once a week 5 Adjustable Dose Pre-filled Pen Syringe 3    pregabalin (LYRICA) 75 MG capsule Take 1 capsule by mouth 3 times daily for 90 days. 90 capsule 2    Elastic Bandages & Supports (ANKLE BRACE ADJUST-TO-FIT) MISC Soft left ankle brace  Size to fit  Dx: Ankle sprain 1 each 0    amitriptyline (ELAVIL) 50 MG tablet TAKE 1 TABLET BY MOUTH EVERY EVENING 90 tablet 1    celecoxib (CELEBREX) 100 MG capsule Take 1 capsule by mouth daily 90 capsule 1    citalopram (CELEXA) 40 MG tablet Take 1 tablet by mouth daily 90 tablet 1    docusate sodium (COLACE) 100 MG capsule Take 1 capsule by mouth 2 times daily 180 capsule 1    levothyroxine (SYNTHROID) 112 MCG tablet Take 1 tablet by mouth Daily 90 tablet 1    metFORMIN (GLUCOPHAGE) 1000 MG tablet Take 1 tablet by mouth daily (with breakfast) 90 tablet 1    triamterene-hydroCHLOROthiazide (MAXZIDE-25) 37.5-25 MG per tablet Take 1 tablet by mouth daily TAKE 1 TABLET BY MOUTH EVERY DAY 90 tablet 1    montelukast (SINGULAIR) 10 MG tablet TAKE 1 TABLET BY MOUTH EVERY NIGHT AT BEDTIME 90 tablet 1    ammonium lactate (LAC-HYDRIN) 12 % lotion Apply topically twice daily 400 g 2    calcium-cholecalciferol (CALCIUM 500/D) 500-200 MG-UNIT per tablet TAKE 1 TABLET BY MOUTH DAILY 180 tablet 1    albuterol sulfate  (90 Base) MCG/ACT inhaler Inhale 3 puffs into the lungs 4 times daily as needed for Wheezing 3 each 1    mineral oil-hydrophilic petrolatum (HYDROPHOR) ointment Apply topically as needed. 3 each 2    Calcium Polycarbophil (FIBER) 625 MG TABS Take 1 tablet by mouth 2 times daily 180 tablet 3    baclofen (LIORESAL) 10 MG tablet TAKE 1/2 TABLET THREE TIMES DAILY AS NEEDED(PAIN, SPASMS) 90 tablet 1    OXYGEN Inhale into the lungs Uses 2 liters at night      aspirin 81 MG tablet Take 1 tablet by mouth daily 90 tablet 0    Misc.  Devices MISC Oxygen concentrator  j44.9 1 Device 0       I have reviewed all pertinent PMHx, PSHx, ankle pain pseudogout vs primary OA  DM type 2 with diabetic peripheral neuropathy (HCC)    Plan  Continue NSAID oral, physical support with ankle bracelet  Reassess as needed  Restart lyrica  Start trulicity, dc metformin, dc life mod for DM given A1C 6, patient prefer trulicity  Shingrx  Med refilled    RTC: 3 months with PCP    I have reviewed my findings and recommendations with Katherine Clark and Dr Aurora Ruiz MD PGY-3  9/13/2022 4:17 PM

## 2022-09-13 NOTE — PROGRESS NOTES
Verbal discharge instructions given to the patient per Dr. Gardner Points. Patient declines flu shot today.

## 2022-09-16 ENCOUNTER — TELEPHONE (OUTPATIENT)
Dept: ULTRASOUND IMAGING | Age: 67
End: 2022-09-16

## 2022-09-16 NOTE — TELEPHONE ENCOUNTER
9/16/22 10:40 AM SPOKE TO DHARA AT THE OFFICE THEY WILL SEND PATIENT A LETTER TO CALL US TO GET SCHEDULED

## 2022-10-17 ENCOUNTER — PROCEDURE VISIT (OUTPATIENT)
Dept: PODIATRY | Age: 67
End: 2022-10-17
Payer: MEDICARE

## 2022-10-17 DIAGNOSIS — B35.1 TINEA UNGUIUM: Primary | ICD-10-CM

## 2022-10-17 DIAGNOSIS — E11.9 TYPE 2 DIABETES MELLITUS WITHOUT COMPLICATION, UNSPECIFIED WHETHER LONG TERM INSULIN USE (HCC): ICD-10-CM

## 2022-10-17 DIAGNOSIS — G60.8 HEREDITARY SENSORY NEUROPATHY: ICD-10-CM

## 2022-10-17 DIAGNOSIS — L84 CORNS AND CALLOSITIES: ICD-10-CM

## 2022-10-17 PROCEDURE — 11056 PARNG/CUTG B9 HYPRKR LES 2-4: CPT | Performed by: PODIATRIST

## 2022-10-17 PROCEDURE — 11721 DEBRIDE NAIL 6 OR MORE: CPT | Performed by: PODIATRIST

## 2022-10-17 RX ORDER — AMMONIUM LACTATE 12 G/100G
LOTION TOPICAL
Qty: 400 G | Refills: 2 | Status: ON HOLD
Start: 2022-10-17 | End: 2022-11-13 | Stop reason: HOSPADM

## 2022-10-17 NOTE — PROGRESS NOTES
Nciole Jaramillo is here today for a diabetic foot exam nail care. C/o neuropathy bilat feet. her PCP is Jaqui Toro MD last OV was 09/13/2022. CC:   Diabetic foot exam and painful elongated toenails 1-5 right and left    HPI:   Follow-up diabetic foot ankle exam.  Elongated toenails 1 through 5 right and left. Does have ammonium lactate 12% lotion which has been helping. Does have history of Lyrica. No calf pain no additional pedal complaints. ROS:  Const: Denies constitutional symptoms  Musculo: Denies symptoms other than stated above  Skin: Denies symptoms other than stated above      Current Outpatient Medications:     ammonium lactate (LAC-HYDRIN) 12 % lotion, Apply topically twice daily, Disp: 400 g, Rfl: 2    cetirizine (ZYRTEC) 10 MG tablet, TAKE 1 TABLET BY MOUTH DAILY, Disp: 30 tablet, Rfl: 2    zoster recombinant adjuvanted vaccine (SHINGRIX) 50 MCG/0.5ML SUSR injection, 50 MCG IM then repeat 2-6 months., Disp: 0.5 mL, Rfl: 1    SYMBICORT 160-4.5 MCG/ACT AERO, INHALE 2 PUFFS INTO THE LUNGS TWICE DAILY, Disp: , Rfl:     pantoprazole (PROTONIX) 40 MG tablet, Take 1 tablet by mouth every morning (before breakfast), Disp: 30 tablet, Rfl: 5    Dulaglutide (TRULICITY) 4.03 XX/0.0BZ SOPN, Inject 0.75 mg into the skin once a week, Disp: 5 Adjustable Dose Pre-filled Pen Syringe, Rfl: 3    pregabalin (LYRICA) 75 MG capsule, Take 1 capsule by mouth 3 times daily for 90 days. , Disp: 90 capsule, Rfl: 2    Elastic Bandages & Supports (ANKLE BRACE ADJUST-TO-FIT) MISC, Soft left ankle brace Size to fit Dx:   Ankle sprain, Disp: 1 each, Rfl: 0    amitriptyline (ELAVIL) 50 MG tablet, TAKE 1 TABLET BY MOUTH EVERY EVENING, Disp: 90 tablet, Rfl: 1    celecoxib (CELEBREX) 100 MG capsule, Take 1 capsule by mouth daily, Disp: 90 capsule, Rfl: 1    citalopram (CELEXA) 40 MG tablet, Take 1 tablet by mouth daily, Disp: 90 tablet, Rfl: 1    docusate sodium (COLACE) 100 MG capsule, Take 1 capsule by mouth 2 times daily, Disp: 180 capsule, Rfl: 1    levothyroxine (SYNTHROID) 112 MCG tablet, Take 1 tablet by mouth Daily, Disp: 90 tablet, Rfl: 1    triamterene-hydroCHLOROthiazide (MAXZIDE-25) 37.5-25 MG per tablet, Take 1 tablet by mouth daily TAKE 1 TABLET BY MOUTH EVERY DAY, Disp: 90 tablet, Rfl: 1    montelukast (SINGULAIR) 10 MG tablet, TAKE 1 TABLET BY MOUTH EVERY NIGHT AT BEDTIME, Disp: 90 tablet, Rfl: 1    diclofenac sodium (VOLTAREN) 1 % GEL, Apply 2 g topically 4 times daily (Patient not taking: Reported on 9/13/2022), Disp: 2 g, Rfl: 0    ammonium lactate (LAC-HYDRIN) 12 % lotion, Apply topically twice daily, Disp: 400 g, Rfl: 2    calcium-cholecalciferol (CALCIUM 500/D) 500-200 MG-UNIT per tablet, TAKE 1 TABLET BY MOUTH DAILY, Disp: 180 tablet, Rfl: 1    rosuvastatin (CRESTOR) 20 MG tablet, Take 1 tablet by mouth daily (Patient not taking: No sig reported), Disp: 90 tablet, Rfl: 1    albuterol sulfate  (90 Base) MCG/ACT inhaler, Inhale 3 puffs into the lungs 4 times daily as needed for Wheezing, Disp: 3 each, Rfl: 1    mineral oil-hydrophilic petrolatum (HYDROPHOR) ointment, Apply topically as needed. , Disp: 3 each, Rfl: 2    Calcium Polycarbophil (FIBER) 625 MG TABS, Take 1 tablet by mouth 2 times daily, Disp: 180 tablet, Rfl: 3    baclofen (LIORESAL) 10 MG tablet, TAKE 1/2 TABLET THREE TIMES DAILY AS NEEDED(PAIN, SPASMS), Disp: 90 tablet, Rfl: 1    omeprazole (PRILOSEC) 20 MG delayed release capsule, Take 1 capsule by mouth 2 times daily As directed (Patient not taking: No sig reported), Disp: 60 capsule, Rfl: 2    OXYGEN, Inhale into the lungs Uses 2 liters at night, Disp: , Rfl:     aspirin 81 MG tablet, Take 1 tablet by mouth daily, Disp: 90 tablet, Rfl: 0    Misc.  Devices MISC, Oxygen concentrator  j44.9, Disp: 1 Device, Rfl: 0  Allergies   Allergen Reactions    Aceon [Perindopril Erbumine] Anaphylaxis     Tongue swells up    Nsaids Shortness Of Breath and Swelling     tongue       Past Medical History: Diagnosis Date    Adrenal incidentaloma ([facility] [address]     Anxiety     Arthritis     Asthma     Bladder incontinence     Cancer [facility]) Dx 2009    multiple Myeloma, in remission currently     Chronic back pain     COPD (chronic obstructive pulmonary disease) (HCC)     on O2 2 liters  (uses with activity and at night to sleep)    Depression     Fibromyalgia     GERD (gastroesophageal reflux disease)     Hyperlipidemia     Hypertension     Hypothyroidism     MGUS (monoclonal gammopathy of unknown significance)     Neuropathy     Pneumonia 02/2020    Prolonged emergence from general anesthesia     Sleep apnea     doesnt use her cpap    Type II or unspecified type diabetes mellitus without mention of complication, not stated as uncontrolled     Vaginal bleeding        There were no vitals filed for this visit. Work History/Social History: Foot and ankle history:     Focused Lower Extremity Physical Exam:      Neurovascular examination:    Dorsalis Pedis palpable bilateral.  Posterior tibialis palpable bilateral.    Capillary Refill Time:  Immediate return  Hair growth:  Symmetrical and bilateral   Skin:  Not atrophic  Edema: Mild edema bilateral feet. Mild edema bilateral ankles. Neurologic:  Light touch diminished bilateral.  Warm to coolness bilateral distal toes  Decreased epicritic sensation     Musculoskeletal/ Orthopedic examination:    Equinis: present bilateral  Dorsiflexion, plantarflexion, inversion, eversion bilateral 5 out of 5 muscle strength  Wiggling toes  Negative Homans  No pain foot or ankle. Dermatology examination:    Toenails 1 through 5 bilateral thickened, elongated, dystrophic, mycotic with subungual debris. Web spaces 1 through 4 bilateral clean dry and intact. Hyperkeratotic tissue fifth metatarsal bilateral.  No open skin lesions or abrasions. Assessment and Plan:  Vianey Reynolds was seen today for diabetes, callouses and nail problem.     Diagnoses and all orders for this visit:    Jose Lambert unguium    Type 2 diabetes mellitus without complication, unspecified whether long term insulin use (HCC)    Hereditary sensory neuropathy    Other orders  -     ammonium lactate (LAC-HYDRIN) 12 % lotion; Apply topically twice daily    Follow-up diabetic foot ankle exam  Hemoglobin A1c from 3/31/2022.  6.1. Manual debridement with standard technique toenails 1 through 5 right and left in thickness and length. Patient tolerated procedure well. Paring hyperkeratotic tissue fifth metatarsal bilateral.  No open wounds. Follow-up 2 months. No follow-ups on file. Seen By:  Elisha Man DPM      Document was created using voice recognition software. Note was reviewed however may contain grammatical errors.

## 2022-10-20 DIAGNOSIS — R09.81 SINUS CONGESTION: ICD-10-CM

## 2022-10-20 RX ORDER — CETIRIZINE HYDROCHLORIDE 10 MG/1
10 TABLET ORAL DAILY
Qty: 30 TABLET | Refills: 2 | Status: SHIPPED | OUTPATIENT
Start: 2022-10-20 | End: 2022-11-19

## 2022-11-03 ENCOUNTER — TELEPHONE (OUTPATIENT)
Dept: GENERAL RADIOLOGY | Age: 67
End: 2022-11-03

## 2022-11-03 NOTE — TELEPHONE ENCOUNTER
11/3 HAVE LEFT SEVERAL MESSAGES WITH NO RETURN CALL 9/16 DHARA AT OFFICE STATED SHE SENT A LETTER FOR PT TO CALL NO RETURN CALL FILED PAPERWORK IN CX FILE

## 2022-11-05 ENCOUNTER — APPOINTMENT (OUTPATIENT)
Dept: GENERAL RADIOLOGY | Age: 67
DRG: 190 | End: 2022-11-05
Payer: MEDICARE

## 2022-11-05 ENCOUNTER — HOSPITAL ENCOUNTER (INPATIENT)
Age: 67
LOS: 8 days | Discharge: HOME | DRG: 190 | End: 2022-11-13
Attending: EMERGENCY MEDICINE | Admitting: INTERNAL MEDICINE
Payer: MEDICARE

## 2022-11-05 DIAGNOSIS — J44.9 CHRONIC OBSTRUCTIVE PULMONARY DISEASE, UNSPECIFIED COPD TYPE (HCC): ICD-10-CM

## 2022-11-05 DIAGNOSIS — J96.01 ACUTE RESPIRATORY FAILURE WITH HYPOXIA (HCC): ICD-10-CM

## 2022-11-05 DIAGNOSIS — J44.1 COPD EXACERBATION (HCC): Primary | ICD-10-CM

## 2022-11-05 LAB
ADENOVIRUS BY PCR: NOT DETECTED
ALBUMIN SERPL-MCNC: 3.7 G/DL (ref 3.5–5.2)
ALP BLD-CCNC: 84 U/L (ref 35–104)
ALT SERPL-CCNC: 18 U/L (ref 0–32)
ANION GAP SERPL CALCULATED.3IONS-SCNC: 7 MMOL/L (ref 7–16)
AST SERPL-CCNC: 21 U/L (ref 0–31)
BASOPHILS ABSOLUTE: 0.04 E9/L (ref 0–0.2)
BASOPHILS RELATIVE PERCENT: 0.3 % (ref 0–2)
BILIRUB SERPL-MCNC: 0.3 MG/DL (ref 0–1.2)
BORDETELLA PARAPERTUSSIS BY PCR: NOT DETECTED
BORDETELLA PERTUSSIS BY PCR: NOT DETECTED
BUN BLDV-MCNC: 11 MG/DL (ref 6–23)
CALCIUM SERPL-MCNC: 9 MG/DL (ref 8.6–10.2)
CHLAMYDOPHILIA PNEUMONIAE BY PCR: NOT DETECTED
CHLORIDE BLD-SCNC: 100 MMOL/L (ref 98–107)
CO2: 29 MMOL/L (ref 22–29)
CORONAVIRUS 229E BY PCR: NOT DETECTED
CORONAVIRUS HKU1 BY PCR: NOT DETECTED
CORONAVIRUS NL63 BY PCR: NOT DETECTED
CORONAVIRUS OC43 BY PCR: NOT DETECTED
CREAT SERPL-MCNC: 0.7 MG/DL (ref 0.5–1)
EOSINOPHILS ABSOLUTE: 0.33 E9/L (ref 0.05–0.5)
EOSINOPHILS RELATIVE PERCENT: 2.8 % (ref 0–6)
GFR SERPL CREATININE-BSD FRML MDRD: >60 ML/MIN/1.73
GLUCOSE BLD-MCNC: 96 MG/DL (ref 74–99)
HCT VFR BLD CALC: 35.6 % (ref 34–48)
HEMOGLOBIN: 11.1 G/DL (ref 11.5–15.5)
HUMAN METAPNEUMOVIRUS BY PCR: NOT DETECTED
HUMAN RHINOVIRUS/ENTEROVIRUS BY PCR: NOT DETECTED
IMMATURE GRANULOCYTES #: 0.03 E9/L
IMMATURE GRANULOCYTES %: 0.3 % (ref 0–5)
INFLUENZA A BY PCR: NOT DETECTED
INFLUENZA B BY PCR: NOT DETECTED
LYMPHOCYTES ABSOLUTE: 2.71 E9/L (ref 1.5–4)
LYMPHOCYTES RELATIVE PERCENT: 23.1 % (ref 20–42)
MCH RBC QN AUTO: 29.3 PG (ref 26–35)
MCHC RBC AUTO-ENTMCNC: 31.2 % (ref 32–34.5)
MCV RBC AUTO: 93.9 FL (ref 80–99.9)
MONOCYTES ABSOLUTE: 0.73 E9/L (ref 0.1–0.95)
MONOCYTES RELATIVE PERCENT: 6.2 % (ref 2–12)
MYCOPLASMA PNEUMONIAE BY PCR: NOT DETECTED
NEUTROPHILS ABSOLUTE: 7.88 E9/L (ref 1.8–7.3)
NEUTROPHILS RELATIVE PERCENT: 67.3 % (ref 43–80)
PARAINFLUENZA VIRUS 1 BY PCR: NOT DETECTED
PARAINFLUENZA VIRUS 2 BY PCR: NOT DETECTED
PARAINFLUENZA VIRUS 3 BY PCR: NOT DETECTED
PARAINFLUENZA VIRUS 4 BY PCR: NOT DETECTED
PDW BLD-RTO: 12.8 FL (ref 11.5–15)
PLATELET # BLD: 279 E9/L (ref 130–450)
PMV BLD AUTO: 10.2 FL (ref 7–12)
POTASSIUM SERPL-SCNC: 3.5 MMOL/L (ref 3.5–5)
RBC # BLD: 3.79 E12/L (ref 3.5–5.5)
RESPIRATORY SYNCYTIAL VIRUS BY PCR: NOT DETECTED
SARS-COV-2, PCR: NOT DETECTED
SODIUM BLD-SCNC: 136 MMOL/L (ref 132–146)
TOTAL PROTEIN: 8 G/DL (ref 6.4–8.3)
TROPONIN, HIGH SENSITIVITY: 10 NG/L (ref 0–9)
TROPONIN, HIGH SENSITIVITY: 9 NG/L (ref 0–9)
WBC # BLD: 11.7 E9/L (ref 4.5–11.5)

## 2022-11-05 PROCEDURE — 80053 COMPREHEN METABOLIC PANEL: CPT

## 2022-11-05 PROCEDURE — 6360000002 HC RX W HCPCS: Performed by: EMERGENCY MEDICINE

## 2022-11-05 PROCEDURE — 99285 EMERGENCY DEPT VISIT HI MDM: CPT

## 2022-11-05 PROCEDURE — 2140000000 HC CCU INTERMEDIATE R&B

## 2022-11-05 PROCEDURE — 84484 ASSAY OF TROPONIN QUANT: CPT

## 2022-11-05 PROCEDURE — 0202U NFCT DS 22 TRGT SARS-COV-2: CPT

## 2022-11-05 PROCEDURE — 85025 COMPLETE CBC W/AUTO DIFF WBC: CPT

## 2022-11-05 PROCEDURE — 94640 AIRWAY INHALATION TREATMENT: CPT

## 2022-11-05 PROCEDURE — 71045 X-RAY EXAM CHEST 1 VIEW: CPT

## 2022-11-05 PROCEDURE — 6370000000 HC RX 637 (ALT 250 FOR IP): Performed by: EMERGENCY MEDICINE

## 2022-11-05 PROCEDURE — 93005 ELECTROCARDIOGRAM TRACING: CPT | Performed by: PHYSICIAN ASSISTANT

## 2022-11-05 PROCEDURE — 96374 THER/PROPH/DIAG INJ IV PUSH: CPT

## 2022-11-05 PROCEDURE — 36415 COLL VENOUS BLD VENIPUNCTURE: CPT

## 2022-11-05 PROCEDURE — 94664 DEMO&/EVAL PT USE INHALER: CPT

## 2022-11-05 RX ORDER — BENZONATATE 100 MG/1
100 CAPSULE ORAL 3 TIMES DAILY PRN
Status: DISCONTINUED | OUTPATIENT
Start: 2022-11-05 | End: 2022-11-13 | Stop reason: HOSPADM

## 2022-11-05 RX ORDER — AZITHROMYCIN 250 MG/1
500 TABLET, FILM COATED ORAL DAILY
Status: COMPLETED | OUTPATIENT
Start: 2022-11-06 | End: 2022-11-06

## 2022-11-05 RX ORDER — MONTELUKAST SODIUM 10 MG/1
10 TABLET ORAL NIGHTLY
Status: DISCONTINUED | OUTPATIENT
Start: 2022-11-05 | End: 2022-11-13 | Stop reason: HOSPADM

## 2022-11-05 RX ORDER — BACLOFEN 10 MG/1
5 TABLET ORAL 3 TIMES DAILY
Status: DISCONTINUED | OUTPATIENT
Start: 2022-11-05 | End: 2022-11-13 | Stop reason: HOSPADM

## 2022-11-05 RX ORDER — ACETAMINOPHEN 650 MG/1
650 SUPPOSITORY RECTAL EVERY 6 HOURS PRN
Status: DISCONTINUED | OUTPATIENT
Start: 2022-11-05 | End: 2022-11-13 | Stop reason: HOSPADM

## 2022-11-05 RX ORDER — ACETAMINOPHEN 325 MG/1
650 TABLET ORAL EVERY 6 HOURS PRN
Status: DISCONTINUED | OUTPATIENT
Start: 2022-11-05 | End: 2022-11-13 | Stop reason: HOSPADM

## 2022-11-05 RX ORDER — CETIRIZINE HYDROCHLORIDE 5 MG/1
5 TABLET ORAL DAILY
Status: DISCONTINUED | OUTPATIENT
Start: 2022-11-06 | End: 2022-11-13 | Stop reason: HOSPADM

## 2022-11-05 RX ORDER — SODIUM CHLORIDE 0.9 % (FLUSH) 0.9 %
5-40 SYRINGE (ML) INJECTION EVERY 12 HOURS SCHEDULED
Status: DISCONTINUED | OUTPATIENT
Start: 2022-11-05 | End: 2022-11-13 | Stop reason: HOSPADM

## 2022-11-05 RX ORDER — IPRATROPIUM BROMIDE AND ALBUTEROL SULFATE 2.5; .5 MG/3ML; MG/3ML
3 SOLUTION RESPIRATORY (INHALATION) ONCE
Status: COMPLETED | OUTPATIENT
Start: 2022-11-05 | End: 2022-11-05

## 2022-11-05 RX ORDER — POLYETHYLENE GLYCOL 3350 17 G/17G
17 POWDER, FOR SOLUTION ORAL DAILY PRN
Status: DISCONTINUED | OUTPATIENT
Start: 2022-11-05 | End: 2022-11-13 | Stop reason: HOSPADM

## 2022-11-05 RX ORDER — PANTOPRAZOLE SODIUM 40 MG/1
40 TABLET, DELAYED RELEASE ORAL
Status: DISCONTINUED | OUTPATIENT
Start: 2022-11-06 | End: 2022-11-13 | Stop reason: HOSPADM

## 2022-11-05 RX ORDER — CELECOXIB 100 MG/1
100 CAPSULE ORAL DAILY
Status: DISCONTINUED | OUTPATIENT
Start: 2022-11-06 | End: 2022-11-13 | Stop reason: HOSPADM

## 2022-11-05 RX ORDER — METHYLPREDNISOLONE SODIUM SUCCINATE 125 MG/2ML
125 INJECTION, POWDER, LYOPHILIZED, FOR SOLUTION INTRAMUSCULAR; INTRAVENOUS ONCE
Status: COMPLETED | OUTPATIENT
Start: 2022-11-05 | End: 2022-11-05

## 2022-11-05 RX ORDER — CITALOPRAM 20 MG/1
40 TABLET ORAL DAILY
Status: DISCONTINUED | OUTPATIENT
Start: 2022-11-06 | End: 2022-11-13 | Stop reason: HOSPADM

## 2022-11-05 RX ORDER — ASPIRIN 81 MG/1
81 TABLET, CHEWABLE ORAL DAILY
Status: DISCONTINUED | OUTPATIENT
Start: 2022-11-06 | End: 2022-11-13 | Stop reason: HOSPADM

## 2022-11-05 RX ORDER — ONDANSETRON 2 MG/ML
4 INJECTION INTRAMUSCULAR; INTRAVENOUS EVERY 6 HOURS PRN
Status: DISCONTINUED | OUTPATIENT
Start: 2022-11-05 | End: 2022-11-13 | Stop reason: HOSPADM

## 2022-11-05 RX ORDER — LEVOTHYROXINE SODIUM 112 UG/1
112 TABLET ORAL DAILY
Status: DISCONTINUED | OUTPATIENT
Start: 2022-11-06 | End: 2022-11-13 | Stop reason: HOSPADM

## 2022-11-05 RX ORDER — SODIUM CHLORIDE 9 MG/ML
INJECTION, SOLUTION INTRAVENOUS PRN
Status: DISCONTINUED | OUTPATIENT
Start: 2022-11-05 | End: 2022-11-13 | Stop reason: HOSPADM

## 2022-11-05 RX ORDER — IPRATROPIUM BROMIDE AND ALBUTEROL SULFATE 2.5; .5 MG/3ML; MG/3ML
1 SOLUTION RESPIRATORY (INHALATION)
Status: DISCONTINUED | OUTPATIENT
Start: 2022-11-06 | End: 2022-11-08

## 2022-11-05 RX ORDER — BUDESONIDE 0.5 MG/2ML
0.5 INHALANT ORAL 2 TIMES DAILY
Status: DISCONTINUED | OUTPATIENT
Start: 2022-11-05 | End: 2022-11-13 | Stop reason: HOSPADM

## 2022-11-05 RX ORDER — METHYLPREDNISOLONE SODIUM SUCCINATE 40 MG/ML
40 INJECTION, POWDER, LYOPHILIZED, FOR SOLUTION INTRAMUSCULAR; INTRAVENOUS DAILY
Status: DISCONTINUED | OUTPATIENT
Start: 2022-11-06 | End: 2022-11-07

## 2022-11-05 RX ORDER — SODIUM CHLORIDE 0.9 % (FLUSH) 0.9 %
5-40 SYRINGE (ML) INJECTION PRN
Status: DISCONTINUED | OUTPATIENT
Start: 2022-11-05 | End: 2022-11-13 | Stop reason: HOSPADM

## 2022-11-05 RX ORDER — ONDANSETRON 4 MG/1
4 TABLET, ORALLY DISINTEGRATING ORAL EVERY 8 HOURS PRN
Status: DISCONTINUED | OUTPATIENT
Start: 2022-11-05 | End: 2022-11-13 | Stop reason: HOSPADM

## 2022-11-05 RX ORDER — AMMONIUM LACTATE 12 G/100G
LOTION TOPICAL PRN
Status: DISCONTINUED | OUTPATIENT
Start: 2022-11-05 | End: 2022-11-13 | Stop reason: HOSPADM

## 2022-11-05 RX ORDER — ENOXAPARIN SODIUM 100 MG/ML
30 INJECTION SUBCUTANEOUS 2 TIMES DAILY
Status: DISCONTINUED | OUTPATIENT
Start: 2022-11-05 | End: 2022-11-13 | Stop reason: HOSPADM

## 2022-11-05 RX ORDER — AZITHROMYCIN 250 MG/1
250 TABLET, FILM COATED ORAL DAILY
Status: DISCONTINUED | OUTPATIENT
Start: 2022-11-07 | End: 2022-11-06

## 2022-11-05 RX ORDER — ROSUVASTATIN CALCIUM 20 MG/1
20 TABLET, COATED ORAL DAILY
Status: DISCONTINUED | OUTPATIENT
Start: 2022-11-06 | End: 2022-11-13 | Stop reason: HOSPADM

## 2022-11-05 RX ORDER — PETROLATUM 42 G/100G
OINTMENT TOPICAL PRN
Status: DISCONTINUED | OUTPATIENT
Start: 2022-11-05 | End: 2022-11-13 | Stop reason: HOSPADM

## 2022-11-05 RX ORDER — ARFORMOTEROL TARTRATE 15 UG/2ML
15 SOLUTION RESPIRATORY (INHALATION) 2 TIMES DAILY
Status: DISCONTINUED | OUTPATIENT
Start: 2022-11-05 | End: 2022-11-13 | Stop reason: HOSPADM

## 2022-11-05 RX ORDER — AMITRIPTYLINE HYDROCHLORIDE 25 MG/1
50 TABLET, FILM COATED ORAL NIGHTLY
Status: DISCONTINUED | OUTPATIENT
Start: 2022-11-05 | End: 2022-11-13 | Stop reason: HOSPADM

## 2022-11-05 RX ORDER — GUAIFENESIN 400 MG/1
400 TABLET ORAL 3 TIMES DAILY
Status: DISCONTINUED | OUTPATIENT
Start: 2022-11-05 | End: 2022-11-10

## 2022-11-05 RX ORDER — TRIAMTERENE AND HYDROCHLOROTHIAZIDE 37.5; 25 MG/1; MG/1
1 TABLET ORAL DAILY
Status: DISCONTINUED | OUTPATIENT
Start: 2022-11-06 | End: 2022-11-13 | Stop reason: HOSPADM

## 2022-11-05 RX ADMIN — IPRATROPIUM BROMIDE AND ALBUTEROL SULFATE 3 AMPULE: 2.5; .5 SOLUTION RESPIRATORY (INHALATION) at 17:52

## 2022-11-05 RX ADMIN — METHYLPREDNISOLONE SODIUM SUCCINATE 125 MG: 125 INJECTION, POWDER, FOR SOLUTION INTRAMUSCULAR; INTRAVENOUS at 18:11

## 2022-11-05 ASSESSMENT — ENCOUNTER SYMPTOMS
NAUSEA: 0
RHINORRHEA: 1
EYE REDNESS: 0
WHEEZING: 1
SHORTNESS OF BREATH: 1
ABDOMINAL PAIN: 0
VOMITING: 0

## 2022-11-05 NOTE — ED NOTES
Department of Emergency Medicine  FIRST PROVIDER TRIAGE NOTE             Independent MLP           11/5/22  2:26 PM EDT    Date of Encounter: 11/5/22   MRN: [id number]      HPI: Tony Sher is a 79 y.o. female who presents to the ED for Shortness of Breath (With chest congestion, body aches and a headache. Started yesterday morning. )     Patient is a 71-year-old that is presenting with congestion, chest pain that started yesterday. Patient was seen at urgent care and had an EKG and was told to come in for further evaluation    ROS: Negative for abd pain, back pain, fever, diarrhea, or urinary complaints. PE: Gen Appearance/Constitutional: alert  HEENT: NC/NT. PERRLA,  Airway patent. Neck: supple     Initial Plan of Care: All treatment areas with department are currently occupied. Plan to order/Initiate the following while awaiting opening in ED: labs, EKG, and imaging studies.   Initiate Treatment-Testing, Proceed toTreatment Area When Bed Available for ED Attending/MLP to Continue Care    Electronically signed by Marek Ross PA-C   DD: 11/5/22       Marek Ross PA-C  11/05/22 4647

## 2022-11-05 NOTE — ED PROVIDER NOTES
Chief complaint: Shortness of breath, cough      HPI:  11/5/22, Time: 7:52 PM EDT    HPI             Micki Whitehead is a 79 y.o. female presenting to the ED for shortness of breath, cough and wheezing. The history is obtained from the patient as well the patient's medical record. Patient is presenting emergency department chief complaint of shortness of breath, cough and wheezing. Patient reports that she began 2 days ago with shortness of breath and wheezing. This moderate in severity. Worse with activity and exertion. Patient states that she does have diffuse aching chest pain. Worse with coughing. Cough is nonproductive in nature. Denies any fevers, chills, nausea, vomiting or abdominal pain. The patient states that she did take an over-the-counter cold medication which seemed to improve her symptoms. She was evaluated at urgent care did have x-ray, EKG and negative COVID and flu test.    ROS:   Review of Systems   Constitutional:  Negative for chills and fatigue. HENT:  Positive for congestion and rhinorrhea. Eyes:  Negative for redness. Respiratory:  Positive for shortness of breath and wheezing. Cardiovascular:  Positive for chest pain. Gastrointestinal:  Negative for abdominal pain, nausea and vomiting. Genitourinary:  Negative for dysuria. Musculoskeletal:  Negative for arthralgias. Skin:  Negative for rash. Neurological:  Negative for light-headedness. Psychiatric/Behavioral:  Negative for confusion.     All other systems reviewed and are negative.    --------------------------------------------- PAST HISTORY ---------------------------------------------  Past Medical History:  has a past medical history of Adrenal incidentaloma ([facility] [address] Anxiety, Arthritis, Asthma, Bladder incontinence, Cancer ([facility] [address] Chronic back pain, [facility] Depression, Fibromyalgia, GERD (gastroesophageal reflux disease), Hyperlipidemia, Hypertension, Hypothyroidism, MGUS (monoclonal gammopathy of unknown significance), Neuropathy, Pneumonia, Prolonged emergence from general anesthesia, Sleep apnea, Type II or unspecified type diabetes mellitus without mention of complication, not stated as uncontrolled, and Vaginal bleeding. Past Surgical History:  has a past surgical history that includes knee surgery (1980); Upper gastrointestinal endoscopy (2008); Colonoscopy (07/20/2016); Upper gastrointestinal endoscopy (07/20/2016); Nerve Block (Bilateral, 09/22/2016); Nerve Block (07/06/2020); Pain management procedure (N/A, 7/6/2020); Nerve Block (Bilateral, 08/10/2020); Anesthesia Nerve Block (Bilateral, 8/10/2020); other surgical history (12/13/2016); Colonoscopy (2008); Upper gastrointestinal endoscopy (2008); Upper gastrointestinal endoscopy (N/A, 11/9/2020); Colonoscopy (N/A, 11/9/2020); Colonoscopy (11/9/2020); and Dilation and curettage of uterus (N/A, 5/11/2021). Social History:  reports that she quit smoking about 2 years ago. Her smoking use included cigarettes. She started smoking about 51 years ago. She has a 100.00 pack-year smoking history. She has never used smokeless tobacco. She reports that she does not drink alcohol and does not use drugs. Family History: family history includes Arthritis in her brother, maternal grandfather, and maternal grandmother; Cancer in her father, maternal uncle, mother, and paternal aunt; Diabetes in her brother, father, maternal grandmother, and mother; Heart Disease in her paternal grandfather; Heart Disease (age of onset: 79) in her mother; High Blood Pressure in her father, maternal grandmother, paternal aunt, and paternal uncle; High Cholesterol in her paternal grandmother; Hypertension in her father; Kidney Disease in her paternal grandmother; Stroke in her maternal grandfather. The patients home medications have been reviewed.     Allergies: Aceon [perindopril erbumine] and Nsaids    ---------------------------------------------------PHYSICAL EXAM--------------------------------------        Constitutional/General: Alert and oriented x3, well appearing, non toxic in NAD  Head: Normocephalic and atraumatic  Ears: Tympanic membranes unremarkable bilaterally  Mouth: Oropharynx clear, handling secretions, no trismus  Neck: Supple, full ROM,  Pulmonary: Moderately coarse and diminished breath sounds, expiratory wheezing present, no rales or rhonchi  Cardiovascular:  Regular rate. Regular rhythm. No murmurs  Chest: no chest wall tenderness  Abdomen: Soft. Non tender. Non distended. No rebound, guarding, or rigidity. No pulsatile masses appreciated. Musculoskeletal: Moves all extremities x 4. Warm and well perfused, no clubbing, cyanosis, or edema. Capillary refill <3 seconds  Skin: warm and dry. No rashes. Neurologic: GCS 15, no gross focal neurologic deficits  Psych: Normal Affect    -------------------------------------------------- RESULTS -------------------------------------------------  I have personally reviewed all laboratory and imaging results for this patient. Results are listed below.      LABS:  Results for orders placed or performed during the hospital encounter of 11/05/22   CBC with Auto Differential   Result Value Ref Range    WBC 11.7 (H) 4.5 - 11.5 E9/L    RBC 3.79 3.50 - 5.50 E12/L    Hemoglobin 11.1 (L) 11.5 - 15.5 g/dL    Hematocrit 35.6 34.0 - 48.0 %    MCV 93.9 80.0 - 99.9 fL    MCH 29.3 26.0 - 35.0 pg    MCHC 31.2 (L) 32.0 - 34.5 %    RDW 12.8 11.5 - 15.0 fL    Platelets 424 794 - 143 E9/L    MPV 10.2 7.0 - 12.0 fL    Neutrophils % 67.3 43.0 - 80.0 %    Immature Granulocytes % 0.3 0.0 - 5.0 %    Lymphocytes % 23.1 20.0 - 42.0 %    Monocytes % 6.2 2.0 - 12.0 %    Eosinophils % 2.8 0.0 - 6.0 %    Basophils % 0.3 0.0 - 2.0 %    Neutrophils Absolute 7.88 (H) 1.80 - 7.30 E9/L    Immature Granulocytes # 0.03 E9/L    Lymphocytes Absolute 2.71 1.50 - 4.00 E9/L Monocytes Absolute 0.73 0.10 - 0.95 E9/L    Eosinophils Absolute 0.33 0.05 - 0.50 E9/L    Basophils Absolute 0.04 0.00 - 0.20 E9/L   Comprehensive Metabolic Panel   Result Value Ref Range    Sodium 136 132 - 146 mmol/L    Potassium 3.5 3.5 - 5.0 mmol/L    Chloride 100 98 - 107 mmol/L    CO2 29 22 - 29 mmol/L    Anion Gap 7 7 - 16 mmol/L    Glucose 96 74 - 99 mg/dL    BUN 11 6 - 23 mg/dL    Creatinine 0.7 0.5 - 1.0 mg/dL    Est, Glom Filt Rate >60 >=60 mL/min/1.73    Calcium 9.0 8.6 - 10.2 mg/dL    Total Protein 8.0 6.4 - 8.3 g/dL    Albumin 3.7 3.5 - 5.2 g/dL    Total Bilirubin 0.3 0.0 - 1.2 mg/dL    Alkaline Phosphatase 84 35 - 104 U/L    ALT 18 0 - 32 U/L    AST 21 0 - 31 U/L   Troponin   Result Value Ref Range    Troponin, High Sensitivity 10 (H) 0 - 9 ng/L   Troponin   Result Value Ref Range    Troponin, High Sensitivity 9 0 - 9 ng/L   EKG 12 Lead   Result Value Ref Range    Ventricular Rate 96 BPM    Atrial Rate 96 BPM    P-R Interval 172 ms    QRS Duration 98 ms    Q-T Interval 378 ms    QTc Calculation (Bazett) 477 ms    P Axis 31 degrees    R Axis -32 degrees    T Axis 32 degrees       RADIOLOGY:  Interpreted by Radiologist.  XR CHEST PORTABLE   Final Result   No acute process. EKG:  This EKG is signed and interpreted by me. Normal sinus rhythm rate of 96, no ST segment elevation or depression, VT interval 172 MS, QRS 98 MS,  ms  Interpreted by me      ------------------------- NURSING NOTES AND VITALS REVIEWED ---------------------------   The nursing notes within the ED encounter and vital signs as below have been reviewed by myself. BP (!) 165/75   Pulse 97   Temp 98.5 °F (36.9 °C)   Resp 20   Wt 228 lb (103.4 kg)   SpO2 95%   BMI 36.80 kg/m²   Oxygen Saturation Interpretation: Abnormal    The patients available past medical records and past encounters were reviewed.         ------------------------------ ED COURSE/MEDICAL DECISION MAKING----------------------  Medications ipratropium-albuterol (DUONEB) nebulizer solution 3 ampule (3 ampules Inhalation Given 11/5/22 1752)   methylPREDNISolone sodium (SOLU-MEDROL) injection 125 mg (125 mg IntraVENous Given 11/5/22 1811)             Medical Decision Making:   I, Dr. Rosas Patiño am the primary physician of record. Bear Potter is a 79 y.o. female who presents to the ED for cough, shortness of breath and wheezing. Patient had a blood pressure of 165/75, temp of 98.5, heart rate of 97, respiratory rate of 20, pulse ox 95%. Patient sitting in the bed with moderately diminished breath sounds, expiratory wheezing present. Differential diagnosis includes but is not limited to OPD exacerbation, pneumonia, pneumothorax, COVID-19 the patient does have a past medical history of   has a past medical history of Adrenal incidentaloma ([facility] [address] Anxiety, Arthritis, Asthma, Bladder incontinence, Cancer ([facility] [address] Chronic back pain, COPD (chronic obstructive pulmonary disease) ([facility] [address] Depression, Fibromyalgia, GERD (gastroesophageal reflux disease), Hyperlipidemia, Hypertension, Hypothyroidism, MGUS (monoclonal gammopathy of unknown significance), Neuropathy, Pneumonia, Prolonged emergence from general anesthesia, Sleep apnea, Type II or unspecified type diabetes mellitus without mention of complication, not stated as uncontrolled, and Vaginal bleeding. . The patient was given DuoNeb and Solu-Medrol. Labs and imaging obtained, reviewed. Patient treated symptomatically. Results discussed with patient. CBC, CMP fairly unremarkable, high-sensitivity troponin unremarkable. The patient was attempted to be ambulated she had become hypoxic 85%. Patient will be admitted for further treatment and evaluation          Re-Evaluations/Consultations:           Patient is in the bed no acute distress. Results of today were discussed. The patient will be admitted.   ED Course as of 11/06/22 1514   Sat Nov 05, 2022 2137 Spoke with Dr. Herberth Winchester he will accept the patient for admission   [MT]      ED Course User Index  [MT] Heaven Posadas DO               This patient's ED course included: History, physical examination, reevaluation prior to disposition, labs, imaging, telemetry monitoring, EKG, IV medications      This patient has remained hemodynamically stable during their ED course. Counseling: The emergency provider has spoken with the patient and discussed todays results, in addition to providing specific details for the plan of care and counseling regarding the diagnosis and prognosis. Questions are answered at this time and they are agreeable with the plan.       --------------------------------- IMPRESSION AND DISPOSITION ---------------------------------    IMPRESSION  1. COPD exacerbation ([address]    2. Acute respiratory failure with hypoxia (HCC)        DISPOSITION  Disposition: Admit to telemetry  Patient condition is stable        NOTE: This report was transcribed using voice recognition software.  Every effort was made to ensure accuracy; however, inadvertent computerized transcription errors may be present         Heaven Posadas DO  11/06/22 1514

## 2022-11-05 NOTE — ED NOTES
Pt was placed on 2L NC due to her O2 being 85% on RA. Per pt she wears 2L of NC when she is at home.  Pt was placed on 2L NC sating at 96%      Dru Christian  11/05/22 1951

## 2022-11-05 NOTE — PROCEDURES
Patient is refusing chest x-ray at this time. She states she got an x-ray earlier today and does not understand why she would need another one. She has the disk with the imaging on it in her purse.

## 2022-11-05 NOTE — ED NOTES
pts ambulating pulse ox on 2L NC dropped to 85%, Dr Cresencio Velasquez notified      Wing Khoi  11/05/22 1952

## 2022-11-06 LAB
ANION GAP SERPL CALCULATED.3IONS-SCNC: 12 MMOL/L (ref 7–16)
B.E.: 2.1 MMOL/L (ref -3–3)
BASOPHILS ABSOLUTE: 0.01 E9/L (ref 0–0.2)
BASOPHILS RELATIVE PERCENT: 0.1 % (ref 0–2)
BUN BLDV-MCNC: 13 MG/DL (ref 6–23)
C-REACTIVE PROTEIN: 5.9 MG/DL (ref 0–0.4)
CALCIUM SERPL-MCNC: 9.5 MG/DL (ref 8.6–10.2)
CHLORIDE BLD-SCNC: 104 MMOL/L (ref 98–107)
CO2: 24 MMOL/L (ref 22–29)
COHB: 1.2 % (ref 0–1.5)
CREAT SERPL-MCNC: 0.6 MG/DL (ref 0.5–1)
CRITICAL: ABNORMAL
DATE ANALYZED: ABNORMAL
DATE OF COLLECTION: ABNORMAL
EKG ATRIAL RATE: 96 BPM
EKG P AXIS: 31 DEGREES
EKG P-R INTERVAL: 172 MS
EKG Q-T INTERVAL: 378 MS
EKG QRS DURATION: 98 MS
EKG QTC CALCULATION (BAZETT): 477 MS
EKG R AXIS: -32 DEGREES
EKG T AXIS: 32 DEGREES
EKG VENTRICULAR RATE: 96 BPM
EOSINOPHILS ABSOLUTE: 0 E9/L (ref 0.05–0.5)
EOSINOPHILS RELATIVE PERCENT: 0 % (ref 0–6)
GFR SERPL CREATININE-BSD FRML MDRD: >60 ML/MIN/1.73
GLUCOSE BLD-MCNC: 193 MG/DL (ref 74–99)
HCO3: 25.4 MMOL/L (ref 22–26)
HCT VFR BLD CALC: 36 % (ref 34–48)
HEMOGLOBIN: 11.3 G/DL (ref 11.5–15.5)
HHB: 1 % (ref 0–5)
IMMATURE GRANULOCYTES #: 0.03 E9/L
IMMATURE GRANULOCYTES %: 0.4 % (ref 0–5)
LAB: ABNORMAL
LYMPHOCYTES ABSOLUTE: 1.07 E9/L (ref 1.5–4)
LYMPHOCYTES RELATIVE PERCENT: 12.5 % (ref 20–42)
Lab: ABNORMAL
MCH RBC QN AUTO: 29.4 PG (ref 26–35)
MCHC RBC AUTO-ENTMCNC: 31.4 % (ref 32–34.5)
MCV RBC AUTO: 93.8 FL (ref 80–99.9)
METER GLUCOSE: 196 MG/DL (ref 74–99)
METER GLUCOSE: 307 MG/DL (ref 74–99)
METHB: 0.4 % (ref 0–1.5)
MODE: ABNORMAL
MONOCYTES ABSOLUTE: 0.14 E9/L (ref 0.1–0.95)
MONOCYTES RELATIVE PERCENT: 1.6 % (ref 2–12)
NEUTROPHILS ABSOLUTE: 7.3 E9/L (ref 1.8–7.3)
NEUTROPHILS RELATIVE PERCENT: 85.4 % (ref 43–80)
O2 CONTENT: 17.2 ML/DL
O2 SATURATION: 99 % (ref 92–98.5)
O2HB: 97.4 % (ref 94–97)
OPERATOR ID: 914
PATIENT TEMP: 37 C
PCO2: 35.4 MMHG (ref 35–45)
PDW BLD-RTO: 13 FL (ref 11.5–15)
PH BLOOD GAS: 7.47 (ref 7.35–7.45)
PLATELET # BLD: 294 E9/L (ref 130–450)
PMV BLD AUTO: 10 FL (ref 7–12)
PO2: 135 MMHG (ref 75–100)
POTASSIUM SERPL-SCNC: 3.8 MMOL/L (ref 3.5–5)
PROCALCITONIN: 0.2 NG/ML (ref 0–0.08)
RBC # BLD: 3.84 E12/L (ref 3.5–5.5)
SODIUM BLD-SCNC: 140 MMOL/L (ref 132–146)
SOURCE, BLOOD GAS: ABNORMAL
THB: 12.4 G/DL (ref 11.5–16.5)
TIME ANALYZED: 629
WBC # BLD: 8.6 E9/L (ref 4.5–11.5)

## 2022-11-06 PROCEDURE — 2580000003 HC RX 258: Performed by: STUDENT IN AN ORGANIZED HEALTH CARE EDUCATION/TRAINING PROGRAM

## 2022-11-06 PROCEDURE — 6370000000 HC RX 637 (ALT 250 FOR IP): Performed by: STUDENT IN AN ORGANIZED HEALTH CARE EDUCATION/TRAINING PROGRAM

## 2022-11-06 PROCEDURE — 2140000000 HC CCU INTERMEDIATE R&B

## 2022-11-06 PROCEDURE — 6370000000 HC RX 637 (ALT 250 FOR IP)

## 2022-11-06 PROCEDURE — 82805 BLOOD GASES W/O2 SATURATION: CPT

## 2022-11-06 PROCEDURE — 6360000002 HC RX W HCPCS: Performed by: STUDENT IN AN ORGANIZED HEALTH CARE EDUCATION/TRAINING PROGRAM

## 2022-11-06 PROCEDURE — 87040 BLOOD CULTURE FOR BACTERIA: CPT

## 2022-11-06 PROCEDURE — 94640 AIRWAY INHALATION TREATMENT: CPT

## 2022-11-06 PROCEDURE — 93010 ELECTROCARDIOGRAM REPORT: CPT | Performed by: INTERNAL MEDICINE

## 2022-11-06 PROCEDURE — 80048 BASIC METABOLIC PNL TOTAL CA: CPT

## 2022-11-06 PROCEDURE — 2700000000 HC OXYGEN THERAPY PER DAY

## 2022-11-06 PROCEDURE — 82962 GLUCOSE BLOOD TEST: CPT

## 2022-11-06 PROCEDURE — 86738 MYCOPLASMA ANTIBODY: CPT

## 2022-11-06 PROCEDURE — 86140 C-REACTIVE PROTEIN: CPT

## 2022-11-06 PROCEDURE — 85025 COMPLETE CBC W/AUTO DIFF WBC: CPT

## 2022-11-06 PROCEDURE — 36415 COLL VENOUS BLD VENIPUNCTURE: CPT

## 2022-11-06 PROCEDURE — 94660 CPAP INITIATION&MGMT: CPT

## 2022-11-06 PROCEDURE — 99222 1ST HOSP IP/OBS MODERATE 55: CPT | Performed by: INTERNAL MEDICINE

## 2022-11-06 PROCEDURE — 84145 PROCALCITONIN (PCT): CPT

## 2022-11-06 RX ORDER — DEXTROSE MONOHYDRATE 100 MG/ML
INJECTION, SOLUTION INTRAVENOUS CONTINUOUS PRN
Status: DISCONTINUED | OUTPATIENT
Start: 2022-11-06 | End: 2022-11-13 | Stop reason: HOSPADM

## 2022-11-06 RX ORDER — INSULIN LISPRO 100 [IU]/ML
0-4 INJECTION, SOLUTION INTRAVENOUS; SUBCUTANEOUS
Status: DISCONTINUED | OUTPATIENT
Start: 2022-11-06 | End: 2022-11-09

## 2022-11-06 RX ORDER — INSULIN LISPRO 100 [IU]/ML
0-4 INJECTION, SOLUTION INTRAVENOUS; SUBCUTANEOUS NIGHTLY
Status: DISCONTINUED | OUTPATIENT
Start: 2022-11-06 | End: 2022-11-09

## 2022-11-06 RX ORDER — SODIUM CHLORIDE FOR INHALATION 3 %
4 VIAL, NEBULIZER (ML) INHALATION PRN
Status: DISCONTINUED | OUTPATIENT
Start: 2022-11-06 | End: 2022-11-13 | Stop reason: HOSPADM

## 2022-11-06 RX ORDER — DOXYCYCLINE HYCLATE 100 MG/1
100 CAPSULE ORAL EVERY 12 HOURS SCHEDULED
Status: DISPENSED | OUTPATIENT
Start: 2022-11-06 | End: 2022-11-11

## 2022-11-06 RX ADMIN — AZITHROMYCIN MONOHYDRATE 500 MG: 250 TABLET ORAL at 09:39

## 2022-11-06 RX ADMIN — PREGABALIN 75 MG: 25 CAPSULE ORAL at 21:42

## 2022-11-06 RX ADMIN — IPRATROPIUM BROMIDE AND ALBUTEROL SULFATE 1 AMPULE: .5; 2.5 SOLUTION RESPIRATORY (INHALATION) at 20:02

## 2022-11-06 RX ADMIN — CITALOPRAM HYDROBROMIDE 40 MG: 20 TABLET ORAL at 09:39

## 2022-11-06 RX ADMIN — PANTOPRAZOLE SODIUM 40 MG: 40 TABLET, DELAYED RELEASE ORAL at 09:39

## 2022-11-06 RX ADMIN — IPRATROPIUM BROMIDE AND ALBUTEROL SULFATE 1 AMPULE: .5; 2.5 SOLUTION RESPIRATORY (INHALATION) at 16:57

## 2022-11-06 RX ADMIN — CALCIUM CARBONATE-VITAMIN D TAB 500 MG-200 UNIT 1 TABLET: 500-200 TAB at 09:41

## 2022-11-06 RX ADMIN — Medication 10 ML: at 09:49

## 2022-11-06 RX ADMIN — ARFORMOTEROL TARTRATE 15 MCG: 15 SOLUTION RESPIRATORY (INHALATION) at 08:43

## 2022-11-06 RX ADMIN — BACLOFEN 5 MG: 10 TABLET ORAL at 01:49

## 2022-11-06 RX ADMIN — BUDESONIDE 500 MCG: 0.5 SUSPENSION RESPIRATORY (INHALATION) at 20:02

## 2022-11-06 RX ADMIN — ENOXAPARIN SODIUM 30 MG: 100 INJECTION SUBCUTANEOUS at 21:41

## 2022-11-06 RX ADMIN — ACETAMINOPHEN 650 MG: 325 TABLET, FILM COATED ORAL at 21:51

## 2022-11-06 RX ADMIN — DOXYCYCLINE HYCLATE 100 MG: 100 CAPSULE ORAL at 21:41

## 2022-11-06 RX ADMIN — Medication 10 ML: at 21:43

## 2022-11-06 RX ADMIN — BENZONATATE 100 MG: 100 CAPSULE ORAL at 01:46

## 2022-11-06 RX ADMIN — GUAIFENESIN 400 MG: 400 TABLET ORAL at 01:46

## 2022-11-06 RX ADMIN — BUDESONIDE 500 MCG: 0.5 SUSPENSION RESPIRATORY (INHALATION) at 08:43

## 2022-11-06 RX ADMIN — BACLOFEN 5 MG: 10 TABLET ORAL at 13:15

## 2022-11-06 RX ADMIN — PREGABALIN 75 MG: 25 CAPSULE ORAL at 13:15

## 2022-11-06 RX ADMIN — LEVOTHYROXINE SODIUM 112 MCG: 0.11 TABLET ORAL at 09:41

## 2022-11-06 RX ADMIN — GUAIFENESIN 400 MG: 400 TABLET ORAL at 09:39

## 2022-11-06 RX ADMIN — PREGABALIN 75 MG: 25 CAPSULE ORAL at 09:47

## 2022-11-06 RX ADMIN — CELECOXIB 100 MG: 100 CAPSULE ORAL at 09:40

## 2022-11-06 RX ADMIN — AMITRIPTYLINE HYDROCHLORIDE 50 MG: 25 TABLET, FILM COATED ORAL at 01:46

## 2022-11-06 RX ADMIN — ENOXAPARIN SODIUM 30 MG: 100 INJECTION SUBCUTANEOUS at 09:39

## 2022-11-06 RX ADMIN — ASPIRIN 81 MG CHEWABLE TABLET 81 MG: 81 TABLET CHEWABLE at 09:39

## 2022-11-06 RX ADMIN — MONTELUKAST 10 MG: 10 TABLET, FILM COATED ORAL at 21:41

## 2022-11-06 RX ADMIN — BACLOFEN 5 MG: 10 TABLET ORAL at 09:39

## 2022-11-06 RX ADMIN — MONTELUKAST 10 MG: 10 TABLET, FILM COATED ORAL at 01:46

## 2022-11-06 RX ADMIN — ROSUVASTATIN 20 MG: 20 TABLET, FILM COATED ORAL at 09:46

## 2022-11-06 RX ADMIN — PREGABALIN 75 MG: 25 CAPSULE ORAL at 01:46

## 2022-11-06 RX ADMIN — TRIAMTERENE AND HYDROCHLOROTHIAZIDE 1 TABLET: 37.5; 25 TABLET ORAL at 09:40

## 2022-11-06 RX ADMIN — METHYLPREDNISOLONE SODIUM SUCCINATE 40 MG: 40 INJECTION, POWDER, FOR SOLUTION INTRAMUSCULAR; INTRAVENOUS at 09:39

## 2022-11-06 RX ADMIN — IPRATROPIUM BROMIDE AND ALBUTEROL SULFATE 1 AMPULE: .5; 2.5 SOLUTION RESPIRATORY (INHALATION) at 11:25

## 2022-11-06 RX ADMIN — ACETAMINOPHEN 650 MG: 325 TABLET, FILM COATED ORAL at 01:45

## 2022-11-06 RX ADMIN — BENZONATATE 100 MG: 100 CAPSULE ORAL at 09:47

## 2022-11-06 RX ADMIN — ARFORMOTEROL TARTRATE 15 MCG: 15 SOLUTION RESPIRATORY (INHALATION) at 20:02

## 2022-11-06 RX ADMIN — BACLOFEN 5 MG: 10 TABLET ORAL at 21:41

## 2022-11-06 RX ADMIN — INSULIN LISPRO 3 UNITS: 100 INJECTION, SOLUTION INTRAVENOUS; SUBCUTANEOUS at 17:25

## 2022-11-06 RX ADMIN — GUAIFENESIN 400 MG: 400 TABLET ORAL at 21:41

## 2022-11-06 RX ADMIN — GUAIFENESIN 400 MG: 400 TABLET ORAL at 16:25

## 2022-11-06 RX ADMIN — IPRATROPIUM BROMIDE AND ALBUTEROL SULFATE 1 AMPULE: .5; 2.5 SOLUTION RESPIRATORY (INHALATION) at 08:43

## 2022-11-06 RX ADMIN — AMITRIPTYLINE HYDROCHLORIDE 50 MG: 25 TABLET, FILM COATED ORAL at 21:41

## 2022-11-06 NOTE — PROGRESS NOTES
-- DO NOT REPLY / DO NOT REPLY ALL --  -- Message is from the [facility]--    Transfer to RN      Chief Complaint:  Muscle Sp[asms all over body        Provider Name:  Dr. Silvio MENA Practice Site Name:  [facility]    Alternative Phone Number:  [phone]     [facility]  Internal Medicine Residency Program  Progress Note - House Team     Patient:  Ambrosio Wilson 79 y.o. female MRN: [id number]     Date of Service: 11/6/2022     CC: SOB, cough, and pleuritic chest pain   Overnight events: GEE Jane     Patient was seen and examined this morning at bedside in no acute distress. She reports that her symptoms have improved however she still complains of shortness of breath, productive cough, and pleuritic chest pain. She describes the sputum of the cough as greenish in color. She denies any other complaints. Upon further inquiry regarding the patient's use of inhalers, patient is unsure how often she has been using her inhalers. Objective     Physical Exam:  Vitals: BP (!) 158/78   Pulse 94   Temp 98.5 °F (36.9 °C)   Resp 21   Wt 228 lb (103.4 kg)   SpO2 96%   BMI 36.80 kg/m²     I & O - 24hr: No intake/output data recorded. General Appearance: alert, appears stated age, and cooperative  HEENT:  Head: Normocephalic, no lesions, without obvious abnormality. Neck: no adenopathy, no carotid bruit, supple, symmetrical, trachea midline, and thyroid not enlarged, symmetric, no tenderness/mass/nodules  Lung: diminished breath sounds bilaterally and wheezes bilaterally  Heart: regular rate and rhythm, S1, S2 normal, no murmur, click, rub or gallop  Abdomen: soft, non-tender; bowel sounds normal; no masses,  no organomegaly  Extremities:  extremities normal, atraumatic, no cyanosis or edema  Musculokeletal: No joint swelling, no muscle tenderness. ROM normal in all joints of extremities.    Neurologic: Mental status: Alert, oriented, thought content appropriate  Subject  Pertinent Labs & Imaging Studies   yusuf  CBC:   Lab Results   Component Value Date/Time    WBC 8.6 11/06/2022 06:02 AM    RBC 3.84 11/06/2022 06:02 AM    HGB 11.3 11/06/2022 06:02 AM    HCT 36.0 11/06/2022 06:02 AM    MCV 93.8 11/06/2022 06:02 AM    MCH 29.4 11/06/2022 06:02 AM    MCHC 31.4 11/06/2022 06:02 AM    RDW 13.0 11/06/2022 06:02 AM     11/06/2022 06:02 AM    MPV 10.0 11/06/2022 06:02 AM     CMP:    Lab Results   Component Value Date/Time     11/06/2022 06:02 AM    K 3.8 11/06/2022 06:02 AM     11/06/2022 06:02 AM    CO2 24 11/06/2022 06:02 AM    BUN 13 11/06/2022 06:02 AM    CREATININE 0.6 11/06/2022 06:02 AM    GFRAA >60 06/23/2022 02:12 PM    LABGLOM >60 11/06/2022 06:02 AM    GLUCOSE 193 11/06/2022 06:02 AM    GLUCOSE 124 10/26/2011 04:50 AM    PROT 8.0 11/05/2022 02:29 PM    LABALBU 3.7 11/05/2022 02:29 PM    LABALBU 3.9 10/26/2011 04:50 AM    CALCIUM 9.5 11/06/2022 06:02 AM    BILITOT 0.3 11/05/2022 02:29 PM    ALKPHOS 84 11/05/2022 02:29 PM    AST 21 11/05/2022 02:29 PM    ALT 18 11/05/2022 02:29 PM       XR CHEST PORTABLE   Final Result   No acute process. Resident's Assessment and Plan     Assessment and Plan:    Acute respiratory insufficiency likely due to COPD exacerbation 2/2 medication noncompliance, rule out infection  Patient remains on 2 L nasal cannula, same oxygen she uses at home  Respiratory viral panel unremarkable  Pro-Ronnie level 0.2  Leukocytosis has resolved  Patient has reported not smoking for at least 2 to 3 years  Plan  Follow blood cultures  On Tessalon and Mucinex for cough. Start patient on doxycycline  Continue Solu-Medrol, wean off if patient's wheezing begins to improve  Continue breathing treatments,Pulmicort, Brovana, DuoNeb  Continue to monitor respiratory status  BiPAP overnight  Follow respiratory culture, Gram stain, mycoplasma, Legionella, strep pneumo antigen    Hypertension, on Maxzide at home  Plan  Continue to monitor blood pressure  Continue Maxzide      History of asthma COPD overlap syndrome.   On 2 L nasal cannula at home      Hyperlipidemia, on Crestor at home  Plan  Continue Crestor    Type 2 diabetes mellitus, on Trulicity at home  Plan  Continue to monitor blood sugar levels qac/hc  Patient is currently on steroid therapy, which may increase blood sugar levels  Patient is not currently on any insulin regimen and Trulicity is held  Monitor carefully    History of chronic back pain 2/2 fibromyalgia  Continue home meds which include celecoxib, Lyrica, baclofen    History of depression    History of hypothyroidism  Plan  Continue Synthroid 112 mcg daily    History of sleep apnea  Plan  BiPAP at night       PT/OT evaluation: ordered  DVT prophylaxis/ GI prophylaxis: Lovenox / PPI  Disposition: continue current care     Preethi Rubio MD, PGY-1  Attending physician: Dr. Coreas Points

## 2022-11-06 NOTE — ED NOTES
Pt up to bathroom in wheelchair with RN; pt back in bed and placed on monitor and provided drink      Mu Rodrigues RN  11/06/22 3236

## 2022-11-06 NOTE — PROGRESS NOTES
[facility]  Internal Medicine Residency / [address]    Attending Physician Statement  I have discussed the case, including pertinent history and exam findings with the resident and the team.  I have seen and examined the patient and the key elements of the encounter have been performed by me. I agree with the assessment, plan and orders as documented by the resident. Case Discussed During AM Rounds   Admitted overnight with COPD exacerbation refractory to initial management and oxygen desaturation with ambulation    Wheezing on exam; CP- likely pleuritic in nature with coughing    Respiratory viral panel unremarkable   CXR without acute infiltrate   Remains on 2 L NC and oxygen saturation remains stable    States continued productive cough- no worsening dyspnea    No prior admissions for COPD exacerbation     Acute Hypoxic Respiratory Failure Secondary to likely COPD Exacerbation    Supplemental oxygen    IV steroids   Aerosols   Empiric Atb use    Strep PNA and Legionella antigens and mycoplasma to be sent   Procalcitonin is 0.2   Blood and respiratory cultures needed    Change Atb from Azithromycin to Doxycyline for continued management     Type II DM   Monitor glucose due to steroid use   Insulin sliding scale     HTN   Continue management     Hypothyroidism    Continue management    Disposition- Admit for further management     Remainder of medical problems as per resident note.       Leanne Cortez MD, [address]   Internal Medicine Residency Faculty

## 2022-11-06 NOTE — H&P
[facility]  Internal Medicine Residency Program  History and Physical    Patient:  Kareem Malcolm 79 y.o. female   MRN: [id number]       Date of Service: 11/6/2022          Chief complaint: had concerns including Shortness of Breath (With chest congestion, body aches and a headache. Started yesterday morning. ). History of Present Illness   The patient is a 79 y.o. female , with history of COPD-asthma overlap syndrome, hypertension, hyperlipidemia, hypothyroidism, morbid obesity, EDWARD, depression, chronic back pain from fibromyalgia, presented with productive cough and shortness of breath for 2 days. History has been obtained from patient. She started developing productive cough and shortness of breath from 2 days ago. Patient mention she was walking around by herself at home with her baseline 2 L nasal cannula oxygen. She started developing shortness of breath, with minimal exertion. Patient did not require any up titration of oxygen. Shortness of breath was getting worse and with movement but taking rest was making her feel better. She also complained of productive cough, with clear-yellow sputum. Did not notice any blood in the sputum. Patient also complained of pleuritic chest pain. She denied any fever, chill, nausea, vomiting, anginal chest pain, palpitation, bilateral leg swelling, abdominal pain or any other symptoms. Patient had 3 episode of diarrhea on last Thursday which resolved without any intervention and since then patient did not had any bowel movement. Patient is compliant with her medication. Patient denied any COPD exacerbation that required hospitalization or outpatient steroid in last 1 year. ED Course: In ED patient remained hemodynamically stable with blood pressure 142/79, heart rate 86, respiratory rate 18, saturating 93% with 2 L nasal cannula.   Initial lab work was unremarkable for any electrolyte abnormalities, no elevated troponin, LFT normal, CBC showed white count 11.7, chest x-ray was negative for acute focal process. ED Meds: Patient was given IV Solu-Medrol in the ED. ED Fluids: Patient was given no fluid.         Past Medical History:      Diagnosis Date    Adrenal incidentaloma ([address]     Anxiety     Arthritis     Asthma     Bladder incontinence     Cancer [facility]) Dx 2009    multiple Myeloma, in remission currently     Chronic back pain     COPD (chronic obstructive pulmonary disease) (HCC)     on O2 2 liters  (uses with activity and at night to sleep)    Depression     Fibromyalgia     GERD (gastroesophageal reflux disease)     Hyperlipidemia     Hypertension     Hypothyroidism     MGUS (monoclonal gammopathy of unknown significance)     Neuropathy     Pneumonia 02/2020    Prolonged emergence from general anesthesia     Sleep apnea     doesnt use her cpap    Type II or unspecified type diabetes mellitus without mention of complication, not stated as uncontrolled     Vaginal bleeding        Past Surgical History:        Procedure Laterality Date    ANESTHESIA NERVE BLOCK Bilateral 8/10/2020    BILATERAL L3 L4 MEDIAL BRANCH L5 DORSSAL RAMUS NERVE BLOCK (CPT 23019) SEDATION performed by Chito Menjivar MD at [address] [place] [address]  07/20/2016    removed 3 polyps (tubular adenomas), diverticulosis, Dr. Tony Shea  2008    approximately 2008, no report available, per patient some polyps removed, Dr Bard Hand, [facility]    COLONOSCOPY N/A 11/9/2020    Small sessile polyp distal ascending colon removed with bx/cauterized (path Tubular Adenoma), right and left diverticulosis without diverticulitis, Dr. Lance Gallo, [facility]    COLONOSCOPY  11/9/2020    Small sessile polyp distal ascending colon removed with bx/cauterized (path Tubular Adenoma), right and left diverticulosis without diverticulitis, Dr. Lance Gallo, [address] OF UTERUS N/A 5/11/2021    DILATATION AND CURETTAGE HYSTEROSCOPY POSSIBLE REMOVAL OF MASS performed by Kari Marino MD at [address], left knee, arthroscopic    NERVE BLOCK Bilateral 09/22/2016    lumbar transforaminal nerve block #1    NERVE BLOCK  07/06/2020    lumbar epidural steroid injectio L4-5    NERVE BLOCK Bilateral 08/10/2020    L3, L4, L5     OTHER SURGICAL HISTORY  12/13/2016    Excision cyst right face    PAIN MANAGEMENT PROCEDURE N/A 7/6/2020    LUMBAR EPIDURAL STEROID INJECTION L4-5 performed by Milla Lopez MD at [address]  2008 2008    UPPER GASTROINTESTINAL ENDOSCOPY  07/20/2016    GERD and gastric ulcers, Bx H pylori neg, Dr. Cunha Records  2008    approximately 2008, no report, patient does not know the findings, Dr Rachel Granados, [address] [facility] [address] N/A 11/9/2020    Severe gastritis with superficial ulcerations with bx neg H pylori, Dr. Basia Pyle, [facility]       Medications Prior to Admission:    Prior to Admission medications    Medication Sig Start Date End Date Taking? Authorizing Provider   cetirizine (ZYRTEC) 10 MG tablet TAKE 1 TABLET BY MOUTH DAILY 10/20/22 11/19/22  Enrique Alcazar MD   ammonium lactate (LAC-HYDRIN) 12 % lotion Apply topically twice daily 10/17/22   Chelsey Knight DPM   zoster recombinant adjuvanted vaccine [facility]) 50 MCG/0.5ML SUSR injection 50 MCG IM then repeat 2-6 months. 9/13/22 9/13/23  Pauly Mejía MD   SYMBICORT 160-4.5 MCG/ACT AERO INHALE 2 PUFFS INTO THE LUNGS TWICE DAILY 8/9/22   Historical Provider, MD   pantoprazole (PROTONIX) 40 MG tablet Take 1 tablet by mouth every morning (before breakfast) 9/13/22   Pauly Mejía MD   Dulaglutide (TRULICITY) 1.78 WH/9.9WW SOPN Inject 0.75 mg into the skin once a week 9/13/22   Pauly Mejía MD   pregabalin (LYRICA) 75 MG capsule Take 1 capsule by mouth 3 times daily for 90 days.  9/13/22 12/12/22  Pauly Mejía MD   Elastic Bandages & Supports (ANKLE BRACE ADJUST-TO-FIT) MISC Soft left ankle brace  Size to fit  Dx: Ankle sprain 9/13/22   Jeannie Christie MD   amitriptyline (ELAVIL) 50 MG tablet TAKE 1 TABLET BY MOUTH EVERY EVENING 9/6/22   Martina Ahuja MD   celecoxib (CELEBREX) 100 MG capsule Take 1 capsule by mouth daily 9/6/22   Martina Ahuja MD   citalopram (CELEXA) 40 MG tablet Take 1 tablet by mouth daily 9/6/22   Martina Ahuja MD   docusate sodium (COLACE) 100 MG capsule Take 1 capsule by mouth 2 times daily 9/6/22   Martina Ahuja MD   levothyroxine (SYNTHROID) 112 MCG tablet Take 1 tablet by mouth Daily 9/6/22   Martina Ahuja MD   triamterene-hydroCHLOROthiazide (MAXZIDE-25) 37.5-25 MG per tablet Take 1 tablet by mouth daily TAKE 1 TABLET BY MOUTH EVERY DAY 9/6/22   Warren Dorsey MD   montelukast (SINGULAIR) 10 MG tablet TAKE 1 TABLET BY MOUTH EVERY NIGHT AT BEDTIME 9/6/22   Martina Ahuja MD   diclofenac sodium (VOLTAREN) 1 % GEL Apply 2 g topically 4 times daily  Patient not taking: Reported on 9/13/2022 9/6/22   Martina Ahuja MD   ammonium lactate (LAC-HYDRIN) 12 % lotion Apply topically twice daily 6/13/22   Efrain Avila DPM   calcium-cholecalciferol (CALCIUM 500/D) 500-200 MG-UNIT per tablet TAKE 1 TABLET BY MOUTH DAILY 6/1/22   Prem Monsivais MD   rosuvastatin (CRESTOR) 20 MG tablet Take 1 tablet by mouth daily  Patient not taking: No sig reported 6/1/22   Prem Monsivais MD   albuterol sulfate  (90 Base) MCG/ACT inhaler Inhale 3 puffs into the lungs 4 times daily as needed for Wheezing 6/1/22   Prem Monsivais MD   mineral oil-hydrophilic petrolatum (HYDROPHOR) ointment Apply topically as needed.  6/1/22   Prem Monsivais MD   Calcium Polycarbophil (FIBER) 625 MG TABS Take 1 tablet by mouth 2 times daily 12/15/21   Martina Ahuja MD   baclofen (LIORESAL) 10 MG tablet TAKE 1/2 TABLET THREE TIMES DAILY AS NEEDED(PAIN, SPASMS) 12/15/21   Martina Ahuja MD   omeprazole (PRILOSEC) 20 MG delayed release capsule Take 1 capsule by mouth 2 times daily As directed  Patient not taking: No sig reported 11/9/20   Mac Dang MD   OXYGEN Inhale into the lungs Uses 2 liters at night    Historical Provider, MD   aspirin 81 MG tablet Take 1 tablet by mouth daily 1/31/20   Pina Mccray MD   Misc. Devices MISC Oxygen concentrator  j44.9 10/26/18   Tamanna Conroy DO       Allergies:  Aceon [perindopril erbumine] and Nsaids    Social History:   TOBACCO:   reports that she quit smoking about 2 years ago. Her smoking use included cigarettes. She started smoking about 51 years ago. She has a 100.00 pack-year smoking history. She has never used smokeless tobacco.  ETOH:   reports no history of alcohol use. 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 Family History:       Problem Relation Age of Onset    Heart Disease Mother 79    Diabetes Mother     Cancer Mother         Breast    Hypertension Father     Cancer Father         Pancreas    Diabetes Father     High Blood Pressure Father     Arthritis Brother     Diabetes Brother     Cancer Maternal Uncle     Cancer Paternal Aunt         breast    High Blood Pressure Paternal Aunt     High Blood Pressure Paternal Uncle     Arthritis Maternal Grandmother     Diabetes Maternal Grandmother     High Blood Pressure Maternal Grandmother     Arthritis Maternal Grandfather     Stroke Maternal Grandfather     High Cholesterol Paternal Grandmother     Kidney Disease Paternal Grandmother     Heart Disease Paternal Grandfather            REVIEW OF SYSTEMS:    Constitutional: No fever, no chills, no change in weight; good appetite  HEENT: No blurred vision, no ear problems, no sore throat, no rhinorrhea. Respiratory: + cough, + sputum production, + pleuritic chest pain, + shortness of breath  Cardiology: No angina, no dyspnea on exertion, no paroxysmal nocturnal dyspnea, no orthopnea, no palpitation, no leg swelling. Gastroenterology: No dysphagia, no reflux; no abdominal pain, no nausea or vomiting; no constipation or diarrhea.  No hematochezia   Genitourinary: No dysuria, no frequency, hesitancy; no hematuria  Musculoskeletal: no joint pain, no myalgia, no change in range of movement  Neurology: no focal weakness in extremities, no slurred speech, no double vision, no tingling or numbness sensation  Endocrinology: no temperature intolerance, no polyphagia, polydipsia or polyuria  Hematology: no increased bleeding, no bruising, no lymphadenopathy  Skin: no skin changes noticed by patient  Psychology: no depressed mood, no suicidal ideation      Physical Exam   Vitals: BP (!) 142/79   Pulse 86   Temp 98.5 °F (36.9 °C)   Resp 18   Wt 228 lb (103.4 kg)   SpO2 93%   BMI 36.80 kg/m²   Net IO Since Admission: No IO data has been entered for this period [11/06/22 0101]      Physical Exam  Constitutional:       General: She is not in acute distress. Appearance: She is well-developed. HENT:      Head: Normocephalic and atraumatic. Eyes:      General: No scleral icterus. Conjunctiva/sclera: Conjunctivae normal.   Neck:      Trachea: No tracheal deviation. Cardiovascular:      Rate and Rhythm: Normal rate. Heart sounds: No murmur heard. Pulmonary:      Effort: Pulmonary effort is normal. No respiratory distress. Breath sounds: Wheezing present. No rales. Chest:      Chest wall: Tenderness present. Abdominal:      General: Bowel sounds are normal. There is no distension. Palpations: Abdomen is soft. Tenderness: There is no abdominal tenderness. Musculoskeletal:         General: Tenderness present. Normal range of motion. Cervical back: Normal range of motion. Comments: Tenderness in the back. Skin:     General: Skin is warm and dry. Neurological:      Mental Status: She is alert and oriented to person, place, and time. Psychiatric:         Behavior: Behavior normal.         Thought Content:  Thought content normal.         Judgment: Judgment normal.            Labs and Imaging Studies   CBC: Recent Labs     11/05/22  1429   WBC 11.7*   HGB 11.1*   HCT 35.6   MCV 93.9          BMP:    Recent Labs     11/05/22  1429      K 3.5      CO2 29   BUN 11   CREATININE 0.7   GLUCOSE 96       LIVER PROFILE:   Recent Labs     11/05/22  1429   AST 21   ALT 18   BILITOT 0.3   ALKPHOS 84         Imaging Studies:     XR CHEST PORTABLE    Result Date: 11/5/2022  EXAMINATION: ONE XRAY VIEW OF THE CHEST 11/5/2022 8:24 pm COMPARISON: None. HISTORY: ORDERING SYSTEM PROVIDED HISTORY: cough TECHNOLOGIST PROVIDED HISTORY: Reason for exam:->cough What reading provider will be dictating this exam?->CRC FINDINGS: The lungs are without acute focal process. There is no effusion or pneumothorax. The cardiomediastinal silhouette is without acute process. The osseous structures are without acute process. No acute process. Resident's Assessment and Plan     Assessment and Plan:    The patient is a 79 y.o. female , with history of COPD-asthma overlap syndrome, hypertension, hyperlipidemia, hypothyroidism, morbid obesity, EDWARD, depression, chronic back pain from fibromyalgia, presented with productive cough and shortness of breath for 2 days. She is currently being  managed for       Assessment       Acute hypoxic/hypercapnic respiratory failure 2/2 COPD exacerbation  History of asthma COPD overlap syndrome. On 2 L nasal cannula at home  Hypertension, on Maxide at home. Hyperlipidemia, on Crestor at home. Type 2 diabetes mellitus, on Trulicity at home. Chronic back pain 2/2 fibromyalgia  Depression  Hypothyroidism  Sleep apnea  Leukocytosis 2/2 COPD exacerbation. Plan:    Continue BiPAP overnight. Continue monitoring respiratory status on 2L nasal cannula  Continue antibiotic with azithromycin considering anti-inflammatory effect as well for 5 days  Continue breathing treatment with Pulmicort, Brovana, DuoNeb. On Tessalon and Mucinex for cough.   Follow respiratory culture, strep pneumonia and Legionella antigen. Follow procalcitonin and CRP  Continue IV steroid, start weaning if wheezing starts improving. Resumed home Maxide for hypertension  Resume Crestor for hyperlipidemia  Continue Synthroid 112 MCG daily. Continue celecoxib, Lyrica, baclofen for back pain. PT/OT evaluation: Ordered  DVT prophylaxis/ GI prophylaxis: Lovenox/PPI  Disposition: admit to house team 1      Mary Marion MD, PGY-2  Attending physician: Dr. Coreas Points.

## 2022-11-07 LAB
METER GLUCOSE: 108 MG/DL (ref 74–99)
METER GLUCOSE: 238 MG/DL (ref 74–99)
METER GLUCOSE: 273 MG/DL (ref 74–99)
METER GLUCOSE: 99 MG/DL (ref 74–99)

## 2022-11-07 PROCEDURE — 6370000000 HC RX 637 (ALT 250 FOR IP): Performed by: STUDENT IN AN ORGANIZED HEALTH CARE EDUCATION/TRAINING PROGRAM

## 2022-11-07 PROCEDURE — 99233 SBSQ HOSP IP/OBS HIGH 50: CPT | Performed by: INTERNAL MEDICINE

## 2022-11-07 PROCEDURE — 94660 CPAP INITIATION&MGMT: CPT

## 2022-11-07 PROCEDURE — 94640 AIRWAY INHALATION TREATMENT: CPT

## 2022-11-07 PROCEDURE — 2140000000 HC CCU INTERMEDIATE R&B

## 2022-11-07 PROCEDURE — 6360000002 HC RX W HCPCS: Performed by: STUDENT IN AN ORGANIZED HEALTH CARE EDUCATION/TRAINING PROGRAM

## 2022-11-07 PROCEDURE — 82962 GLUCOSE BLOOD TEST: CPT

## 2022-11-07 PROCEDURE — 6370000000 HC RX 637 (ALT 250 FOR IP)

## 2022-11-07 PROCEDURE — 2700000000 HC OXYGEN THERAPY PER DAY

## 2022-11-07 PROCEDURE — 2580000003 HC RX 258: Performed by: STUDENT IN AN ORGANIZED HEALTH CARE EDUCATION/TRAINING PROGRAM

## 2022-11-07 PROCEDURE — 2580000003 HC RX 258: Performed by: INTERNAL MEDICINE

## 2022-11-07 PROCEDURE — 6360000002 HC RX W HCPCS: Performed by: INTERNAL MEDICINE

## 2022-11-07 PROCEDURE — 6370000000 HC RX 637 (ALT 250 FOR IP): Performed by: INTERNAL MEDICINE

## 2022-11-07 RX ORDER — SODIUM CHLORIDE 9 MG/ML
INJECTION, SOLUTION INTRAVENOUS CONTINUOUS
Status: ACTIVE | OUTPATIENT
Start: 2022-11-07 | End: 2022-11-07

## 2022-11-07 RX ORDER — 0.9 % SODIUM CHLORIDE 0.9 %
250 INTRAVENOUS SOLUTION INTRAVENOUS ONCE
Status: CANCELLED | OUTPATIENT
Start: 2022-11-07 | End: 2022-11-07

## 2022-11-07 RX ORDER — METHYLPREDNISOLONE SODIUM SUCCINATE 40 MG/ML
40 INJECTION, POWDER, LYOPHILIZED, FOR SOLUTION INTRAMUSCULAR; INTRAVENOUS EVERY 12 HOURS
Status: DISCONTINUED | OUTPATIENT
Start: 2022-11-07 | End: 2022-11-11

## 2022-11-07 RX ORDER — HYDRALAZINE HYDROCHLORIDE 20 MG/ML
10 INJECTION INTRAMUSCULAR; INTRAVENOUS EVERY 6 HOURS PRN
Status: DISCONTINUED | OUTPATIENT
Start: 2022-11-07 | End: 2022-11-10

## 2022-11-07 RX ADMIN — PANTOPRAZOLE SODIUM 40 MG: 40 TABLET, DELAYED RELEASE ORAL at 06:21

## 2022-11-07 RX ADMIN — BUDESONIDE 500 MCG: 0.5 SUSPENSION RESPIRATORY (INHALATION) at 10:41

## 2022-11-07 RX ADMIN — METHYLPREDNISOLONE SODIUM SUCCINATE 40 MG: 40 INJECTION, POWDER, FOR SOLUTION INTRAMUSCULAR; INTRAVENOUS at 09:12

## 2022-11-07 RX ADMIN — TRIAMTERENE AND HYDROCHLOROTHIAZIDE 1 TABLET: 37.5; 25 TABLET ORAL at 09:10

## 2022-11-07 RX ADMIN — CELECOXIB 100 MG: 100 CAPSULE ORAL at 09:11

## 2022-11-07 RX ADMIN — AMITRIPTYLINE HYDROCHLORIDE 50 MG: 25 TABLET, FILM COATED ORAL at 21:46

## 2022-11-07 RX ADMIN — BENZONATATE 100 MG: 100 CAPSULE ORAL at 09:10

## 2022-11-07 RX ADMIN — ARFORMOTEROL TARTRATE 15 MCG: 15 SOLUTION RESPIRATORY (INHALATION) at 19:13

## 2022-11-07 RX ADMIN — LEVOTHYROXINE SODIUM 112 MCG: 0.11 TABLET ORAL at 06:21

## 2022-11-07 RX ADMIN — GUAIFENESIN 400 MG: 400 TABLET ORAL at 09:35

## 2022-11-07 RX ADMIN — ARFORMOTEROL TARTRATE 15 MCG: 15 SOLUTION RESPIRATORY (INHALATION) at 10:41

## 2022-11-07 RX ADMIN — BACLOFEN 5 MG: 10 TABLET ORAL at 14:43

## 2022-11-07 RX ADMIN — PREGABALIN 75 MG: 25 CAPSULE ORAL at 14:42

## 2022-11-07 RX ADMIN — DOXYCYCLINE HYCLATE 100 MG: 100 CAPSULE ORAL at 09:09

## 2022-11-07 RX ADMIN — GUAIFENESIN 400 MG: 400 TABLET ORAL at 21:46

## 2022-11-07 RX ADMIN — PREGABALIN 75 MG: 25 CAPSULE ORAL at 21:46

## 2022-11-07 RX ADMIN — BUDESONIDE 500 MCG: 0.5 SUSPENSION RESPIRATORY (INHALATION) at 19:13

## 2022-11-07 RX ADMIN — ROSUVASTATIN 20 MG: 20 TABLET, FILM COATED ORAL at 09:07

## 2022-11-07 RX ADMIN — IPRATROPIUM BROMIDE AND ALBUTEROL SULFATE 1 AMPULE: .5; 2.5 SOLUTION RESPIRATORY (INHALATION) at 15:47

## 2022-11-07 RX ADMIN — GUAIFENESIN 400 MG: 400 TABLET ORAL at 16:23

## 2022-11-07 RX ADMIN — NYSTATIN 500000 UNITS: 100000 SUSPENSION ORAL at 12:38

## 2022-11-07 RX ADMIN — IPRATROPIUM BROMIDE AND ALBUTEROL SULFATE 1 AMPULE: .5; 2.5 SOLUTION RESPIRATORY (INHALATION) at 19:13

## 2022-11-07 RX ADMIN — IPRATROPIUM BROMIDE AND ALBUTEROL SULFATE 1 AMPULE: .5; 2.5 SOLUTION RESPIRATORY (INHALATION) at 10:42

## 2022-11-07 RX ADMIN — SODIUM CHLORIDE: 9 INJECTION, SOLUTION INTRAVENOUS at 12:29

## 2022-11-07 RX ADMIN — Medication 10 ML: at 09:10

## 2022-11-07 RX ADMIN — NYSTATIN 500000 UNITS: 100000 SUSPENSION ORAL at 21:46

## 2022-11-07 RX ADMIN — ENOXAPARIN SODIUM 30 MG: 100 INJECTION SUBCUTANEOUS at 09:07

## 2022-11-07 RX ADMIN — NYSTATIN 500000 UNITS: 100000 SUSPENSION ORAL at 16:23

## 2022-11-07 RX ADMIN — IPRATROPIUM BROMIDE AND ALBUTEROL SULFATE 1 AMPULE: .5; 2.5 SOLUTION RESPIRATORY (INHALATION) at 13:47

## 2022-11-07 RX ADMIN — INSULIN LISPRO 2 UNITS: 100 INJECTION, SOLUTION INTRAVENOUS; SUBCUTANEOUS at 18:14

## 2022-11-07 RX ADMIN — PREGABALIN 75 MG: 25 CAPSULE ORAL at 09:09

## 2022-11-07 RX ADMIN — ENOXAPARIN SODIUM 30 MG: 100 INJECTION SUBCUTANEOUS at 21:47

## 2022-11-07 RX ADMIN — MONTELUKAST 10 MG: 10 TABLET, FILM COATED ORAL at 21:46

## 2022-11-07 RX ADMIN — CALCIUM CARBONATE-VITAMIN D TAB 500 MG-200 UNIT 1 TABLET: 500-200 TAB at 09:07

## 2022-11-07 RX ADMIN — BACLOFEN 5 MG: 10 TABLET ORAL at 21:46

## 2022-11-07 RX ADMIN — HYDRALAZINE HYDROCHLORIDE 10 MG: 20 INJECTION INTRAMUSCULAR; INTRAVENOUS at 18:13

## 2022-11-07 RX ADMIN — ACETAMINOPHEN 650 MG: 325 TABLET, FILM COATED ORAL at 16:29

## 2022-11-07 RX ADMIN — CITALOPRAM HYDROBROMIDE 40 MG: 20 TABLET ORAL at 09:09

## 2022-11-07 RX ADMIN — DOXYCYCLINE HYCLATE 100 MG: 100 CAPSULE ORAL at 21:46

## 2022-11-07 RX ADMIN — BACLOFEN 5 MG: 10 TABLET ORAL at 09:08

## 2022-11-07 RX ADMIN — METHYLPREDNISOLONE SODIUM SUCCINATE 40 MG: 40 INJECTION, POWDER, FOR SOLUTION INTRAMUSCULAR; INTRAVENOUS at 21:47

## 2022-11-07 RX ADMIN — ASPIRIN 81 MG CHEWABLE TABLET 81 MG: 81 TABLET CHEWABLE at 09:08

## 2022-11-07 ASSESSMENT — ENCOUNTER SYMPTOMS
SHORTNESS OF BREATH: 1
WHEEZING: 1

## 2022-11-07 ASSESSMENT — PAIN SCALES - GENERAL
PAINLEVEL_OUTOF10: 0
PAINLEVEL_OUTOF10: 5
PAINLEVEL_OUTOF10: 9

## 2022-11-07 NOTE — PROGRESS NOTES
Patient's BP is 176/68, and complained with a headache, Dr. Derek Atwood notified vis [address] [place] [address].

## 2022-11-07 NOTE — PROGRESS NOTES
Progress  Note  Chief Complaint   Patient presents with    Shortness of Breath     With chest congestion, body aches and a headache. Started yesterday morning.       Historical Issues:  Current Facility-Administered Medications   Medication Dose Route Frequency Provider Last Rate Last Admin    methylPREDNISolone sodium (SOLU-MEDROL) injection 40 mg  40 mg IntraVENous Q12H Elizabeth Montoya MD        nystatin (MYCOSTATIN) 719132 UNIT/ML suspension 500,000 Units  5 mL Oral 4x Daily Elizabeth Montoya MD        0.9 % sodium chloride infusion   IntraVENous Continuous Elizabeth Montoya MD        glucose chewable tablet 16 g  4 tablet Oral PRN Avril Luong MD        dextrose bolus 10% 125 mL  125 mL IntraVENous PRN Avril Luong MD        Or    dextrose bolus 10% 250 mL  250 mL IntraVENous PRN Avril Luong MD        glucagon (rDNA) injection 1 mg  1 mg SubCUTAneous PRN Avril Luong MD        dextrose 10 % infusion   IntraVENous Continuous PRN Avril Luong MD        sodium chloride (Inhalant) 3 % nebulizer solution 4 mL  4 mL Nebulization PRN Ynes Torres MD        doxycycline hyclate (VIBRAMYCIN) capsule 100 mg  100 mg Oral 2 times per day [facility] Cecy Cheung MD   100 mg at 11/07/22 0909    insulin lispro (HUMALOG) injection vial 0-4 Units  0-4 Units SubCUTAneous TID WC Ynes Torres MD   3 Units at 11/06/22 1725    insulin lispro (HUMALOG) injection vial 0-4 Units  0-4 Units SubCUTAneous Nightly Ynes Torres MD        amitriptyline (ELAVIL) tablet 50 mg  50 mg Oral Nightly Avril Luong MD   50 mg at 11/06/22 2141    ammonium lactate (LAC-HYDRIN) 12 % lotion   Topical PRN Avril Luong MD        aspirin chewable tablet 81 mg  81 mg Oral Daily Avril Luong MD   81 mg at 11/07/22 0908    baclofen (LIORESAL) tablet 5 mg  5 mg Oral TID Avril White MD   5 mg at 11/07/22 0908    oyster shell calcium w/D 500-200 MG-UNIT tablet 1 tablet  1 tablet Oral Daily Avril White MD   1 tablet at 11/07/22 1893    celecoxib (CELEBREX) capsule 100 mg  100 mg Oral Daily Avril Luong MD   100 mg at 11/07/22 0911    citalopram (CELEXA) tablet 40 mg  40 mg Oral Daily Avril Redding MD   40 mg at 11/07/22 0909    [Held by provider] cetirizine (ZYRTEC) tablet 5 mg  5 mg Oral Daily Avril Luong MD        levothyroxine (SYNTHROID) tablet 112 mcg  112 mcg Oral Daily Avril Redding MD   112 mcg at 11/07/22 0580    mineral oil-hydrophilic petrolatum (HYDROPHOR) ointment   Topical PRN Avril Luong MD        montelukast (SINGULAIR) tablet 10 mg  10 mg Oral Nightly Avril Luong MD   10 mg at 11/06/22 2141    pantoprazole (PROTONIX) tablet 40 mg  40 mg Oral QAM AC Avril Luong MD   40 mg at 11/07/22 0621    pregabalin (LYRICA) capsule 75 mg  75 mg Oral TID Ana Paula León MD   75 mg at 11/07/22 0909    rosuvastatin (CRESTOR) tablet 20 mg  20 mg Oral Daily Avril Luong MD   20 mg at 11/07/22 0907    triamterene-hydroCHLOROthiazide (MAXZIDE-25) 37.5-25 MG per tablet 1 tablet  1 tablet Oral Daily Ana Paula León MD   1 tablet at 11/07/22 0910    sodium chloride flush 0.9 % injection 5-40 mL  5-40 mL IntraVENous 2 times per day Ana Paula León MD   10 mL at 11/07/22 0910    sodium chloride flush 0.9 % injection 5-40 mL  5-40 mL IntraVENous PRN Avril Luong MD        0.9 % sodium chloride infusion   IntraVENous PRN Avril Luong MD        enoxaparin Sodium (LOVENOX) injection 30 mg  30 mg SubCUTAneous BID Avril Luong MD   30 mg at 11/07/22 0907    ondansetron (ZOFRAN-ODT) disintegrating tablet 4 mg  4 mg Oral Q8H PRN Avril Luong MD        Or    ondansetron (ZOFRAN) injection 4 mg  4 mg IntraVENous Q6H PRN Avril Luong MD        polyethylene glycol (GLYCOLAX) packet 17 g  17 g Oral Daily PRN Avril Luong MD        acetaminophen (TYLENOL) tablet 650 mg  650 mg Oral Q6H PRN Avril Redding MD   650 mg at 11/06/22 2151    Or    acetaminophen (TYLENOL) suppository 650 mg  650 mg Rectal Q6H PRN Avril Vernon MD Cherise        budesonide (PULMICORT) nebulizer suspension 500 mcg  0.5 mg Nebulization BID Avril Luong MD   500 mcg at 11/07/22 1041    Arformoterol Tartrate (BROVANA) nebulizer solution 15 mcg  15 mcg Nebulization BID Avril Luong MD   15 mcg at 11/07/22 1041    ipratropium-albuterol (DUONEB) nebulizer solution 1 ampule  1 ampule Inhalation Q4H WA Avril Luong MD   1 ampule at 11/07/22 1042    benzonatate (TESSALON) capsule 100 mg  100 mg Oral TID PRN Avril Crenshaw MD   100 mg at 11/07/22 0910    guaiFENesin tablet 400 mg  400 mg Oral TID Valentino Thomas MD   400 mg at 11/07/22 0935     Recent Complaints:  Review of Systems   Constitutional:  Negative for fatigue and fever. HENT:  Positive for congestion. Respiratory:  Positive for shortness of breath and wheezing. Cardiovascular: Negative. Vitals:    11/07/22 0801   BP: (!) 151/69   Pulse: 98   Resp: 18   Temp: 97.4 °F (36.3 °C)   SpO2: 99%     Physical Exam  Cardiovascular:      Rate and Rhythm: Regular rhythm. Tachycardia present. Pulmonary:      Breath sounds: Wheezing present. Comments: Accessory muscle use  Audible wheezing without stethoscope  Air hunger  Neurological:      General: No focal deficit present. Mental Status: She is alert and oriented to person, place, and time. Recent Labs     11/05/22  1429 11/06/22  0602    140   K 3.5 3.8    104   CO2 29 24   BUN 11 13   CREATININE 0.7 0.6   GLUCOSE 96 193*   CALCIUM 9.0 9.5     Recent Labs     11/05/22  1429 11/06/22  0602   WBC 11.7* 8.6   RBC 3.79 3.84   HGB 11.1* 11.3*   HCT 35.6 36.0   MCV 93.9 93.8   MCH 29.3 29.4   MCHC 31.2* 31.4*   RDW 12.8 13.0    294   MPV 10.2 10.0           Principal Problem:    COPD exacerbation (HCC)  Resolved Problems:    * No resolved hospital problems.  *      Plan:  IV fluids  Increase steroids  Continue aerosol treatments    Electronically signed by Cinthya Thacker MD on 11/7/2022 at 12:19 PM

## 2022-11-07 NOTE — CARE COORDINATION
Patient presented to ED with c/o SOB, cough, and wheezing. On 2 L of oxygen pulse ox 99%. IV Solumedrol ordered every 12 hrs. Met with patient at bedside to discuss transition of care. Patient lives with her son in a ranch home. She has oxygen that she uses at home at 2 l. From [facility]. She also has a nebulizer. She states    She does not have a portable tank when she is discharged to home. This CM spoke with Amanda from [facility] and they will deliver a tank to patient's room tomorrow. Also Patient has not used her C pap in years , per Selina Carrillo if she needs a new one a new prescription will need faxed with order with pressures to  [phone]. Note left for physician . Patient plans at this time to discharge home with no other needs and her son will transport when medically cleared. CM/SW will continue to follow.

## 2022-11-07 NOTE — PROGRESS NOTES
[facility]  Internal Medicine Residency Program  Progress Note - House Team     Patient:  Tamie Toro 79 y.o. female MRN: [id number]     Date of Service: 11/7/2022     CC: SOB, cough, and pleuritic chest pain   Overnight events: NAEO      Subjective     Patient seen and examined at bedside this AM. Still short of breath. Denies fever, chills, headache, chest pain, cough, palpitations. Objective     Physical Exam:  Vitals: BP (!) 150/78   Pulse 80   Temp 97.3 °F (36.3 °C) (Oral)   Resp 20   Ht 5' 6\" (1.676 m)   Wt 227 lb 3.2 oz (103.1 kg)   SpO2 96%   BMI 36.67 kg/m²     I & O - 24hr: I/O this shift: In: 425 [P.O.:420; I.V.:5]  Out: -    General Appearance: alert, appears stated age, and cooperative  HEENT:  Head: Normocephalic, no lesions, without obvious abnormality. Neck: no adenopathy, no carotid bruit, supple, symmetrical, trachea midline, and thyroid not enlarged, symmetric, no tenderness/mass/nodules  Lung: diminished breath sounds bilaterally and wheezes bilaterally  Heart: regular rate and rhythm, S1, S2 normal, no murmur, click, rub or gallop  Abdomen: soft, non-tender; bowel sounds normal; no masses,  no organomegaly  Extremities:  extremities normal, atraumatic, no cyanosis or edema  Musculokeletal: No joint swelling, no muscle tenderness. ROM normal in all joints of extremities.    Neurologic: Mental status: Alert, oriented, thought content appropriate  Subject  Pertinent Labs & Imaging Studies   yusuf  CBC:   Lab Results   Component Value Date/Time    WBC 8.6 11/06/2022 06:02 AM    RBC 3.84 11/06/2022 06:02 AM    HGB 11.3 11/06/2022 06:02 AM    HCT 36.0 11/06/2022 06:02 AM    MCV 93.8 11/06/2022 06:02 AM    MCH 29.4 11/06/2022 06:02 AM    MCHC 31.4 11/06/2022 06:02 AM    RDW 13.0 11/06/2022 06:02 AM     11/06/2022 06:02 AM    MPV 10.0 11/06/2022 06:02 AM     CMP:    Lab Results   Component Value Date/Time     11/06/2022 06:02 AM    K 3.8 11/06/2022 06:02 AM     11/06/2022 06:02 AM    CO2 24 11/06/2022 06:02 AM    BUN 13 11/06/2022 06:02 AM    CREATININE 0.6 11/06/2022 06:02 AM    GFRAA >60 06/23/2022 02:12 PM    LABGLOM >60 11/06/2022 06:02 AM    GLUCOSE 193 11/06/2022 06:02 AM    GLUCOSE 124 10/26/2011 04:50 AM    PROT 8.0 11/05/2022 02:29 PM    LABALBU 3.7 11/05/2022 02:29 PM    LABALBU 3.9 10/26/2011 04:50 AM    CALCIUM 9.5 11/06/2022 06:02 AM    BILITOT 0.3 11/05/2022 02:29 PM    ALKPHOS 84 11/05/2022 02:29 PM    AST 21 11/05/2022 02:29 PM    ALT 18 11/05/2022 02:29 PM       XR CHEST PORTABLE   Final Result   No acute process. Resident's Assessment and Plan     Assessment:  Acute hypoxic respiratory failure 2/2 COPD exacerbation due to medication noncompliance, rule out infection  History of hypertension, on Maxzide  History of asthma COPD overlap syndrome on 2 L nasal cannula at home  History of hyperlipidemia  History of C5RO, on Trulicity at home  History of chronic back pain 2/2 fibromyalgia, on pregabalin, amitriptyline, celecoxib, baclofen  History of thyroidism, on Synthroid  History of depression, on Celexa    Plan:  Increase Solu-Medrol to 40 mg twice daily  Continue breathing treatment  Continue doxycycline  Continue Mucinex and Tessalon   Incentive spirometry  Continue supplemental oxygen as needed and BiPAP overnight  On LDSSI. Monitor glucose with steroid use.   Follow cultures  Started IV hydration       PT/OT evaluation: ordered  DVT prophylaxis/ GI prophylaxis: Lovenox / PPI  Disposition: transfer to [facility] when bed ready    Winnie Allison MD, PGY-3  Attending physician: Dr. Zoe Mccray

## 2022-11-08 ENCOUNTER — APPOINTMENT (OUTPATIENT)
Dept: GENERAL RADIOLOGY | Age: 67
DRG: 190 | End: 2022-11-08
Payer: MEDICARE

## 2022-11-08 LAB
ANION GAP SERPL CALCULATED.3IONS-SCNC: 14 MMOL/L (ref 7–16)
BASOPHILS ABSOLUTE: 0.01 E9/L (ref 0–0.2)
BASOPHILS RELATIVE PERCENT: 0.1 % (ref 0–2)
BUN BLDV-MCNC: 17 MG/DL (ref 6–23)
CALCIUM SERPL-MCNC: 9.8 MG/DL (ref 8.6–10.2)
CHLORIDE BLD-SCNC: 96 MMOL/L (ref 98–107)
CO2: 24 MMOL/L (ref 22–29)
CREAT SERPL-MCNC: 0.7 MG/DL (ref 0.5–1)
EOSINOPHILS ABSOLUTE: 0 E9/L (ref 0.05–0.5)
EOSINOPHILS RELATIVE PERCENT: 0 % (ref 0–6)
GFR SERPL CREATININE-BSD FRML MDRD: >60 ML/MIN/1.73
GLUCOSE BLD-MCNC: 344 MG/DL (ref 74–99)
HCT VFR BLD CALC: 36.9 % (ref 34–48)
HEMOGLOBIN: 11.3 G/DL (ref 11.5–15.5)
IMMATURE GRANULOCYTES #: 0.08 E9/L
IMMATURE GRANULOCYTES %: 1 % (ref 0–5)
LYMPHOCYTES ABSOLUTE: 0.84 E9/L (ref 1.5–4)
LYMPHOCYTES RELATIVE PERCENT: 10.7 % (ref 20–42)
MCH RBC QN AUTO: 29.3 PG (ref 26–35)
MCHC RBC AUTO-ENTMCNC: 30.6 % (ref 32–34.5)
MCV RBC AUTO: 95.6 FL (ref 80–99.9)
METER GLUCOSE: 176 MG/DL (ref 74–99)
METER GLUCOSE: 181 MG/DL (ref 74–99)
METER GLUCOSE: 197 MG/DL (ref 74–99)
METER GLUCOSE: 206 MG/DL (ref 74–99)
METER GLUCOSE: 256 MG/DL (ref 74–99)
MONOCYTES ABSOLUTE: 0.12 E9/L (ref 0.1–0.95)
MONOCYTES RELATIVE PERCENT: 1.5 % (ref 2–12)
NEUTROPHILS ABSOLUTE: 6.81 E9/L (ref 1.8–7.3)
NEUTROPHILS RELATIVE PERCENT: 86.7 % (ref 43–80)
PDW BLD-RTO: 13.2 FL (ref 11.5–15)
PLATELET # BLD: 381 E9/L (ref 130–450)
PMV BLD AUTO: 11.3 FL (ref 7–12)
POTASSIUM SERPL-SCNC: 4.2 MMOL/L (ref 3.5–5)
PROCALCITONIN: 0.13 NG/ML (ref 0–0.08)
RBC # BLD: 3.86 E12/L (ref 3.5–5.5)
SODIUM BLD-SCNC: 134 MMOL/L (ref 132–146)
WBC # BLD: 7.9 E9/L (ref 4.5–11.5)

## 2022-11-08 PROCEDURE — 6370000000 HC RX 637 (ALT 250 FOR IP)

## 2022-11-08 PROCEDURE — 6360000002 HC RX W HCPCS: Performed by: INTERNAL MEDICINE

## 2022-11-08 PROCEDURE — 87070 CULTURE OTHR SPECIMN AEROBIC: CPT

## 2022-11-08 PROCEDURE — 6370000000 HC RX 637 (ALT 250 FOR IP): Performed by: STUDENT IN AN ORGANIZED HEALTH CARE EDUCATION/TRAINING PROGRAM

## 2022-11-08 PROCEDURE — 94640 AIRWAY INHALATION TREATMENT: CPT

## 2022-11-08 PROCEDURE — 84145 PROCALCITONIN (PCT): CPT

## 2022-11-08 PROCEDURE — 2140000000 HC CCU INTERMEDIATE R&B

## 2022-11-08 PROCEDURE — 71045 X-RAY EXAM CHEST 1 VIEW: CPT

## 2022-11-08 PROCEDURE — 6370000000 HC RX 637 (ALT 250 FOR IP): Performed by: INTERNAL MEDICINE

## 2022-11-08 PROCEDURE — 6360000002 HC RX W HCPCS: Performed by: STUDENT IN AN ORGANIZED HEALTH CARE EDUCATION/TRAINING PROGRAM

## 2022-11-08 PROCEDURE — 2700000000 HC OXYGEN THERAPY PER DAY

## 2022-11-08 PROCEDURE — 94660 CPAP INITIATION&MGMT: CPT

## 2022-11-08 PROCEDURE — 87206 SMEAR FLUORESCENT/ACID STAI: CPT

## 2022-11-08 PROCEDURE — 82962 GLUCOSE BLOOD TEST: CPT

## 2022-11-08 PROCEDURE — 36415 COLL VENOUS BLD VENIPUNCTURE: CPT

## 2022-11-08 PROCEDURE — 85025 COMPLETE CBC W/AUTO DIFF WBC: CPT

## 2022-11-08 PROCEDURE — 2580000003 HC RX 258: Performed by: STUDENT IN AN ORGANIZED HEALTH CARE EDUCATION/TRAINING PROGRAM

## 2022-11-08 PROCEDURE — 80048 BASIC METABOLIC PNL TOTAL CA: CPT

## 2022-11-08 RX ORDER — MINERAL OIL AND WHITE PETROLATUM 150; 830 MG/G; MG/G
OINTMENT OPHTHALMIC PRN
Status: DISCONTINUED | OUTPATIENT
Start: 2022-11-08 | End: 2022-11-09 | Stop reason: ALTCHOICE

## 2022-11-08 RX ORDER — PRAMIPEXOLE DIHYDROCHLORIDE 0.25 MG/1
0.5 TABLET ORAL NIGHTLY
Status: DISCONTINUED | OUTPATIENT
Start: 2022-11-08 | End: 2022-11-13 | Stop reason: HOSPADM

## 2022-11-08 RX ADMIN — DOXYCYCLINE HYCLATE 100 MG: 100 CAPSULE ORAL at 09:06

## 2022-11-08 RX ADMIN — NYSTATIN 500000 UNITS: 100000 SUSPENSION ORAL at 09:05

## 2022-11-08 RX ADMIN — BACLOFEN 5 MG: 10 TABLET ORAL at 09:07

## 2022-11-08 RX ADMIN — DOXYCYCLINE HYCLATE 100 MG: 100 CAPSULE ORAL at 21:32

## 2022-11-08 RX ADMIN — CELECOXIB 100 MG: 100 CAPSULE ORAL at 09:07

## 2022-11-08 RX ADMIN — BUDESONIDE 500 MCG: 0.5 SUSPENSION RESPIRATORY (INHALATION) at 08:16

## 2022-11-08 RX ADMIN — PREGABALIN 75 MG: 25 CAPSULE ORAL at 13:50

## 2022-11-08 RX ADMIN — PREGABALIN 75 MG: 25 CAPSULE ORAL at 21:32

## 2022-11-08 RX ADMIN — Medication 10 ML: at 22:25

## 2022-11-08 RX ADMIN — CALCIUM CARBONATE-VITAMIN D TAB 500 MG-200 UNIT 1 TABLET: 500-200 TAB at 09:06

## 2022-11-08 RX ADMIN — NYSTATIN 500000 UNITS: 100000 SUSPENSION ORAL at 21:33

## 2022-11-08 RX ADMIN — ROSUVASTATIN 20 MG: 20 TABLET, FILM COATED ORAL at 09:07

## 2022-11-08 RX ADMIN — ENOXAPARIN SODIUM 30 MG: 100 INJECTION SUBCUTANEOUS at 21:33

## 2022-11-08 RX ADMIN — POLYETHYLENE GLYCOL 3350 17 G: 17 POWDER, FOR SOLUTION ORAL at 11:38

## 2022-11-08 RX ADMIN — INSULIN LISPRO 2 UNITS: 100 INJECTION, SOLUTION INTRAVENOUS; SUBCUTANEOUS at 17:33

## 2022-11-08 RX ADMIN — BACLOFEN 5 MG: 10 TABLET ORAL at 21:33

## 2022-11-08 RX ADMIN — IPRATROPIUM BROMIDE AND ALBUTEROL SULFATE 1 AMPULE: .5; 2.5 SOLUTION RESPIRATORY (INHALATION) at 11:45

## 2022-11-08 RX ADMIN — ARFORMOTEROL TARTRATE 15 MCG: 15 SOLUTION RESPIRATORY (INHALATION) at 20:16

## 2022-11-08 RX ADMIN — BACLOFEN 5 MG: 10 TABLET ORAL at 13:50

## 2022-11-08 RX ADMIN — GUAIFENESIN 400 MG: 400 TABLET ORAL at 22:25

## 2022-11-08 RX ADMIN — GUAIFENESIN 400 MG: 400 TABLET ORAL at 09:07

## 2022-11-08 RX ADMIN — ENOXAPARIN SODIUM 30 MG: 100 INJECTION SUBCUTANEOUS at 09:06

## 2022-11-08 RX ADMIN — PANTOPRAZOLE SODIUM 40 MG: 40 TABLET, DELAYED RELEASE ORAL at 07:37

## 2022-11-08 RX ADMIN — AMITRIPTYLINE HYDROCHLORIDE 50 MG: 25 TABLET, FILM COATED ORAL at 21:32

## 2022-11-08 RX ADMIN — NYSTATIN 500000 UNITS: 100000 SUSPENSION ORAL at 13:11

## 2022-11-08 RX ADMIN — IPRATROPIUM BROMIDE AND ALBUTEROL SULFATE 1 AMPULE: .5; 2.5 SOLUTION RESPIRATORY (INHALATION) at 08:16

## 2022-11-08 RX ADMIN — TRIAMTERENE AND HYDROCHLOROTHIAZIDE 1 TABLET: 37.5; 25 TABLET ORAL at 09:07

## 2022-11-08 RX ADMIN — CITALOPRAM HYDROBROMIDE 40 MG: 20 TABLET ORAL at 09:06

## 2022-11-08 RX ADMIN — METHYLPREDNISOLONE SODIUM SUCCINATE 40 MG: 40 INJECTION, POWDER, FOR SOLUTION INTRAMUSCULAR; INTRAVENOUS at 21:33

## 2022-11-08 RX ADMIN — NYSTATIN 500000 UNITS: 100000 SUSPENSION ORAL at 17:33

## 2022-11-08 RX ADMIN — ARFORMOTEROL TARTRATE 15 MCG: 15 SOLUTION RESPIRATORY (INHALATION) at 08:16

## 2022-11-08 RX ADMIN — BUDESONIDE 500 MCG: 0.5 SUSPENSION RESPIRATORY (INHALATION) at 20:16

## 2022-11-08 RX ADMIN — PRAMIPEXOLE DIHYDROCHLORIDE 0.5 MG: 0.25 TABLET ORAL at 21:32

## 2022-11-08 RX ADMIN — PREGABALIN 75 MG: 25 CAPSULE ORAL at 09:06

## 2022-11-08 RX ADMIN — GUAIFENESIN 400 MG: 400 TABLET ORAL at 13:50

## 2022-11-08 RX ADMIN — LEVOTHYROXINE SODIUM 112 MCG: 0.11 TABLET ORAL at 07:37

## 2022-11-08 RX ADMIN — MONTELUKAST 10 MG: 10 TABLET, FILM COATED ORAL at 21:33

## 2022-11-08 RX ADMIN — METHYLPREDNISOLONE SODIUM SUCCINATE 40 MG: 40 INJECTION, POWDER, FOR SOLUTION INTRAMUSCULAR; INTRAVENOUS at 09:08

## 2022-11-08 RX ADMIN — ASPIRIN 81 MG CHEWABLE TABLET 81 MG: 81 TABLET CHEWABLE at 09:07

## 2022-11-08 NOTE — CONSULTS
Pulmonary Consultation    Admit Date: 11/5/2022  Requesting Physician: Cristal Altman MD    CC:  shortness of breath     HPI:  79 YOF, known to Dr. Valentina Flores, [facility], however has not been to office since before 2018 and records were unable to be obtained. States she has asthma/copd overlap was on inhalers but now just takes albuterol as needed. She has EDWARD and non-compliant no longer has machine. She does wer oxygen at 2L continuous. She has been coughing-tan green mucous, wheezing, and progressive shortness of breath last few weeks. Denies fever or sick contacts . Vaccinated for covid x 2. Denies nausea and vomiting . Respiratory panel is negative, procal in normal. Cxr with no acute process   Previous abg with hypercapnia not this admission. She is walking around room with mild dyspnea on exertion. On 2L. Family at bedside.  Coughing and wheezing but feeling a little better     PMH:    Past Medical History:   Diagnosis Date    Adrenal incidentaloma ([address]     Anxiety     Arthritis     Asthma     Bladder incontinence     Cancer ([address] Dx 2009    multiple Myeloma, in remission currently     Chronic back pain     COPD (chronic obstructive pulmonary disease) ([address]     on O2 2 liters  (uses with activity and at night to sleep)    Depression     Fibromyalgia     GERD (gastroesophageal reflux disease)     Hyperlipidemia     Hypertension     Hypothyroidism     MGUS (monoclonal gammopathy of unknown significance)     Neuropathy     Pneumonia 02/2020    Prolonged emergence from general anesthesia     Sleep apnea     doesnt use her cpap    Type II or unspecified type diabetes mellitus without mention of complication, not stated as uncontrolled     Vaginal bleeding      PSH:    Past Surgical History:   Procedure Laterality Date    ANESTHESIA NERVE BLOCK Bilateral 8/10/2020    BILATERAL L3 L4 MEDIAL BRANCH L5 DORSSAL RAMUS NERVE BLOCK (CPT 37360) SEDATION performed by Jessica Lamb MD at [address] [facility] 07/20/2016    removed 3 polyps (tubular adenomas), diverticulosis, Dr. Tony Shea  2008    approximately 2008, no report available, per patient some polyps removed,  [facility], [facility]    COLONOSCOPY N/A 11/9/2020    Small sessile polyp distal ascending colon removed with bx/cauterized (path Tubular Adenoma), right and left diverticulosis without diverticulitis, Dr. Lance Gallo, [facility]    COLONOSCOPY  11/9/2020    Small sessile polyp distal ascending colon removed with bx/cauterized (path Tubular Adenoma), right and left diverticulosis without diverticulitis, Dr. Lance Gallo, [address] N/A 5/11/2021    DILATATION AND CURETTAGE HYSTEROSCOPY POSSIBLE REMOVAL OF MASS performed by Aj Mi MD at [address], left knee, arthroscopic    NERVE BLOCK Bilateral 09/22/2016    lumbar transforaminal nerve block #1    NERVE BLOCK  07/06/2020    lumbar epidural steroid injectio L4-5    NERVE BLOCK Bilateral 08/10/2020    L3, L4, L5     OTHER SURGICAL HISTORY  12/13/2016    Excision cyst right face    PAIN MANAGEMENT PROCEDURE N/A 7/6/2020    LUMBAR EPIDURAL STEROID INJECTION L4-5 performed by Chito Menjivar MD at [address]  2008 2008    UPPER GASTROINTESTINAL ENDOSCOPY  07/20/2016    GERD and gastric ulcers, Bx H pylori neg, Dr. Thang Rivero  2008    approximately 2008, no report, patient does not know the findings,  [facility], [address] N/A 11/9/2020    Severe gastritis with superficial ulcerations with bx neg H pylori, Dr. Lance Gallo, [facility]          Respiratory ROS: + cough, wz, tan mucous , ROBLES Otherwise, a complete review of systems is undertaken and is negative.     Social History:  Social History     Socioeconomic History    Marital status: Single     Spouse name: Not on file    Number of children: Not on file    Years of education: Not on file    Highest education level: Not on file   Occupational History    Not on file   Tobacco Use    Smoking status: Former     Packs/day: 2.00     Years: 50.00     Pack years: 100.00     Types: Cigarettes     Start date: 7/15/1971     Quit date: 2/10/2020     Years since quittin.7    Smokeless tobacco: Never   Vaping Use    Vaping Use: Never used   Substance and Sexual Activity    Alcohol use: No     Alcohol/week: 0.0 standard drinks    Drug use: No    Sexual activity: Not on file   Other Topics Concern    Not on file   Social History Narrative    Not on file     Social Determinants of Health     Financial Resource Strain: Medium Risk    Difficulty of Paying Living Expenses: Somewhat hard   Food Insecurity: No Food Insecurity    Worried About Running Out of Food in the Last Year: Never true    Ran Out of Food in the Last Year: Never true   Transportation Needs: No Transportation Needs    Lack of Transportation (Medical): No    Lack of Transportation (Non-Medical):  No   Physical Activity: Inactive    Days of Exercise per Week: 0 days    Minutes of Exercise per Session: 0 min   Stress: Not on file   Social Connections: Not on file   Intimate Partner Violence: Not on file   Housing Stability: Low Risk     Unable to Pay for Housing in the Last Year: No    Number of Places Lived in the Last Year: 1    Unstable Housing in the Last Year: No       Family History:  Family History   Problem Relation Age of Onset    Heart Disease Mother 79    Diabetes Mother     Cancer Mother         Breast    Hypertension Father     Cancer Father         Pancreas    Diabetes Father     High Blood Pressure Father     Arthritis Brother     Diabetes Brother     Cancer Maternal Uncle     Cancer Paternal Aunt         breast    High Blood Pressure Paternal Aunt     High Blood Pressure Paternal Uncle     Arthritis Maternal Grandmother     Diabetes Maternal Grandmother     High Blood Pressure Maternal Grandmother     Arthritis Maternal Grandfather     Stroke Maternal Grandfather     High Cholesterol Paternal Grandmother     Kidney Disease Paternal Grandmother     Heart Disease Paternal Grandfather        Medications:     dextrose      sodium chloride        pramipexole  0.5 mg Oral Nightly    methylPREDNISolone  40 mg IntraVENous Q12H    nystatin  5 mL Oral 4x Daily    doxycycline hyclate  100 mg Oral 2 times per day    insulin lispro  0-4 Units SubCUTAneous TID WC    insulin lispro  0-4 Units SubCUTAneous Nightly    amitriptyline  50 mg Oral Nightly    aspirin  81 mg Oral Daily    baclofen  5 mg Oral TID    oyster shell calcium w/D  1 tablet Oral Daily    celecoxib  100 mg Oral Daily    citalopram  40 mg Oral Daily    [Held by provider] cetirizine  5 mg Oral Daily    levothyroxine  112 mcg Oral Daily    montelukast  10 mg Oral Nightly    pantoprazole  40 mg Oral QAM AC    pregabalin  75 mg Oral TID    rosuvastatin  20 mg Oral Daily    triamterene-hydroCHLOROthiazide  1 tablet Oral Daily    sodium chloride flush  5-40 mL IntraVENous 2 times per day    enoxaparin  30 mg SubCUTAneous BID    budesonide  0.5 mg Nebulization BID    Arformoterol Tartrate  15 mcg Nebulization BID    ipratropium-albuterol  1 ampule Inhalation Q4H WA    guaiFENesin  400 mg Oral TID         Vitals:    VITALS:  BP (!) 143/73   Pulse 76   Temp 98.1 °F (36.7 °C) (Oral)   Resp 20   Ht 5' 6\" (1.676 m)   Wt 227 lb 3.2 oz (103.1 kg)   SpO2 100%   BMI 36.67 kg/m²   24HR INTAKE/OUTPUT:    Intake/Output Summary (Last 24 hours) at 2022 1308  Last data filed at 2022 1905  Gross per 24 hour   Intake 893 ml   Output --   Net 893 ml     CURRENT PULSE OXIMETRY:  SpO2: 100 %  24HR PULSE OXIMETRY RANGE:  SpO2  Av.2 %  Min: 96 %  Max: 100 %      EXAM:  General: No distress. Eyes: PERRL. No sclera icterus. No conjunctival injection. ENT: No discharge. Pharynx clear. Neck: Trachea midline. Normal thyroid. Resp: No accessory muscle use.  Diminished with exp wheezing and some rhonchi   CV: Regular rate. Regular rhythm. No mumur or rub. ABD: Non-tender. Non-distended. No masses. No organmegaly. Normal bowel sounds. Skin: Warm and dry. No nodule on exposed extremities. No rash on exposed extremities. Lymph: No cervical LAD. No supraclavicular LAD. Ext: No joint deformity. No clubbing. No cyanosis. No edema  Neuro: Awake. Follows commands ox3, SIMMONS     Lab Results:  CBC:   Recent Labs     11/05/22  1429 11/06/22  0602   WBC 11.7* 8.6   HGB 11.1* 11.3*   HCT 35.6 36.0   MCV 93.9 93.8    294     BMP:   Recent Labs     11/05/22  1429 11/06/22  0602    140   K 3.5 3.8    104   CO2 29 24   BUN 11 13   CREATININE 0.7 0.6     LFT:   Recent Labs     11/05/22  1429   ALKPHOS 84   ALT 18   AST 21   PROT 8.0   BILITOT 0.3   LABALBU 3.7     PT/INR: No results for input(s): PROTIME, INR in the last 72 hours.     Cultures:   Latest Reference Range & Units 11/5/22 21:09   Human Rhinovirus/Enterovirus by PCR Not Detected  Not Detected   Influenza A by PCR Not Detected  Not Detected   Influenza B by PCR Not Detected  Not Detected   Adenovirus by PCR Not Detected  Not Detected   Coronavirus 229E by PCR Not Detected  Not Detected   Coronavirus HKU1 by PCR Not Detected  Not Detected   Coronavirus NL63 by PCR Not Detected  Not Detected   Coronavirus OC43 by PCR Not Detected  Not Detected   Human Metapneumovirus by PCR Not Detected  Not Detected   Parainfluenza Virus 1 by PCR Not Detected  Not Detected   Parainfluenza Virus 2 by PCR Not Detected  Not Detected   Parainfluenza Virus 3 by PCR Not Detected  Not Detected   Parainfluenza Virus 4 by PCR Not Detected  Not Detected   Respiratory Syncytial Virus by PCR Not Detected  Not Detected   Bordetella parapertussis by PCR Not Detected  Not Detected   Chlamydophilia pneumoniae by PCR Not Detected  Not Detected   Mycoplasma pneumoniae by PCR Not Detected  Not Detected   SARS-CoV-2, PCR Not Detected  Not Detected   Bordetella pertussis by PCR Not Detected  Not Detected       ABG:   Recent Labs     11/06/22  0629   PH 7.474*   PO2 135.0*   PCO2 35.4   HCO3 25.4   BE 2.1   O2SAT 99.0*       Films:  XR CHEST PORTABLE    Result Date: 11/5/2022  EXAMINATION: ONE XRAY VIEW OF THE CHEST 11/5/2022 8:24 pm COMPARISON: None. HISTORY: ORDERING SYSTEM PROVIDED HISTORY: cough TECHNOLOGIST PROVIDED HISTORY: Reason for exam:->cough What reading provider will be dictating this exam?->CRC FINDINGS: The lungs are without acute focal process. There is no effusion or pneumothorax. The cardiomediastinal silhouette is without acute process. The osseous structures are without acute process. No acute process. Assessment:  Acute exacerbation COPD  Hypoxia  EDWARD-non compliant      Plan:  Maintain POX >90%, currently on 2L which is her baseline  On brovana and budesonide  Stop duonebs, thick mucous  Add mucinex and flutter  Ok for albuterol  Solumedrol 40 q12H  On singuair   On doxy  Check a procal  Last cxr 11/5 no acute process, repeat today   Send a sputum cx if able   IS for pulmonary hygiene  Will need outpatient PFT and PSG in order to obtain new pap  Will benefit from outpatient pulm rehab and regular follow up   Supportive care       Thank you for allowing us to see this patient in consultation. Please contact us with any questions.       Electronically signed by BRYAN Mishra on 11/8/2022 at 1:08 PM

## 2022-11-08 NOTE — PROGRESS NOTES
[facility]  Internal Medicine Residency Program  Progress Note - House Team 1    Patient:  Baron Hale 79 y.o. female MRN: [id number]     Date of Service: 11/8/2022     CC: cough and SOB  Overnight events: Patient c/o headache with SBP of 176, resolved with hydralazine. Given 2 units LDSS for glucose 273. Subjective     Patient was resting in her bed this morning. She was still experiencing dyspnea, accessory muscle use, and productive cough. She was complaining of eye discharge and leg cramps as well. Patient states that she does not typically use her inhalers unless she is having an exacerbation due to the side effect of leg cramps. Objective     Physical Exam:  Vitals: BP (!) 143/73   Pulse 76   Temp 98.1 °F (36.7 °C) (Oral)   Resp 20   Ht 5' 6\" (1.676 m)   Wt 227 lb 3.2 oz (103.1 kg)   SpO2 100%   BMI 36.67 kg/m²     I & O - 24hr: No intake/output data recorded. General Appearance: alert, appears stated age, and cooperative  HEENT:  Head: Normocephalic, no lesions, without obvious abnormality. Neck: supple, symmetrical, trachea midline and thyroid not enlarged, symmetric, no tenderness/mass/nodules  Lung: rhonchi bilaterally and wheezes bilaterally  Heart: regular rate and rhythm, S1, S2 normal, no murmur, click, rub or gallop  Abdomen: soft, non-tender; bowel sounds normal; no masses,  no organomegaly  Extremities:  extremities normal, atraumatic, no cyanosis or edema  Musculokeletal: No joint swelling, no muscle tenderness. ROM normal in all joints of extremities.    Neurologic: Mental status: Alert, oriented, thought content appropriate  Subject  Pertinent Labs & Imaging Studies   yusuf  CBC:   Lab Results   Component Value Date/Time    WBC 8.6 11/06/2022 06:02 AM    RBC 3.84 11/06/2022 06:02 AM    HGB 11.3 11/06/2022 06:02 AM    HCT 36.0 11/06/2022 06:02 AM    MCV 93.8 11/06/2022 06:02 AM    MCH 29.4 11/06/2022 06:02 AM    MCHC 31.4 11/06/2022 06:02 AM    RDW 13.0 11/06/2022 06:02 AM     11/06/2022 06:02 AM    MPV 10.0 11/06/2022 06:02 AM     BMP:    Lab Results   Component Value Date/Time     11/06/2022 06:02 AM    K 3.8 11/06/2022 06:02 AM     11/06/2022 06:02 AM    CO2 24 11/06/2022 06:02 AM    BUN 13 11/06/2022 06:02 AM    LABALBU 3.7 11/05/2022 02:29 PM    LABALBU 3.9 10/26/2011 04:50 AM    CREATININE 0.6 11/06/2022 06:02 AM    CALCIUM 9.5 11/06/2022 06:02 AM    GFRAA >60 06/23/2022 02:12 PM    LABGLOM >60 11/06/2022 06:02 AM    GLUCOSE 193 11/06/2022 06:02 AM    GLUCOSE 124 10/26/2011 04:50 AM       Student's Assessment and Plan     1. COPD exacerbation likely secondary to medication noncompliance   -infectious cause not likely - respiratory viral panel negative, Pro-Ronnie level 0.2, leukocytosis resolved   -Follow blood cultures and respiratory culture, Gram stain, mycoplasma, Legionella, strep pneumo antigen   -Continue doxycycline, solu-medrol 40 mg BID, breathing treatments, BiPAP overnight, tessalon and mucinex as needed, 2L O2 NC  2. Hx HTN   -Continue home Maxzide and monitor BP   -Hydralazine as needed if SBP >160  3. Hx HLD   -Continue Crestor  4. T2DM   -Monitor blood glucose levels   -Steroid therapy may alter glucose control   -On trulicity at home  5. Chronic back pain, fibromyalgia   -On celecoxib, baclofen, pregabalin, amitriptyline  6. Hx Depression   -On celexa  7. Hx hypothyroidism   -On Synthroid  8. Hx Sleep apnea   -Continue BiPAP at night    PT/OT evaluation: ordered  DVT prophylaxis/ GI prophylaxis: lovenox / protonix  Disposition: Continue current care    Lorna Sky OMS-III  Attending physician: Dr. Zoe Mccray

## 2022-11-08 NOTE — CARE COORDINATION
Care Coordination:  Continues of IV steroids. 2L o2 at baseline . [facility] delivered portable to room. Has nebulizer at home. Discharge plan is home with son . Patient reports she is ambulating in room. Per pulmonary  : Will need outpatient PFT and PSG in order to obtain new pap . Will benefit from outpatient pulm rehab and regular follow up  at discharge. Plan is home with son to transport when competes course of IV steroids and medically cleared.

## 2022-11-08 NOTE — PROGRESS NOTES
[facility]  Internal Medicine Residency Program  Progress Note - House Team     Patient:  Kareem Malcolm 79 y.o. female MRN: [id number]     Date of Service: 11/8/2022     CC: cough, SOB, wheezing  Overnight events: Blood pressure was elevated (176/68), as needed hydralazine was given. Glucose was elevated--> On LDSS    Subjective     Patient was seen and examined this morning at bedside in no acute distress. Overnight no acute changes. Endorsing decreased sputum production, but still having SOB. Admits to eye drainage--> lubrifresh eye drops ordered. Also endorsing leg cramping/restlessness--> ordered pramipexole. Consult placed to pulmonology. Repeat chest xray ordered. Objective     Physical Exam:  Vitals: BP (!) 143/73   Pulse 76   Temp 98.1 °F (36.7 °C) (Oral)   Resp 20   Ht 5' 6\" (1.676 m)   Wt 227 lb 3.2 oz (103.1 kg)   SpO2 100%   BMI 36.67 kg/m²     I & O - 24hr: No intake/output data recorded. General Appearance: alert, appears stated age, mild distress, and using accessory muscle use   HEENT:  Head: Normocephalic, no lesions, without obvious abnormality. Neck: no adenopathy and no JVD  Lung:  ronchi and wheezes bilaterally   Heart: regular rate and rhythm, S1, S2 normal, no murmur, click, rub or gallop  Abdomen: soft, non-tender; bowel sounds normal; no masses,  no organomegaly  Extremities:  extremities normal, atraumatic, no cyanosis or edema  Musculokeletal: No joint swelling, no muscle tenderness. ROM normal in all joints of extremities.    Neurologic: Mental status: Alert, oriented, thought content appropriate    Subjective  Pertinent Labs & Imaging Studies     CBC with Differential:    Lab Results   Component Value Date/Time    WBC 8.6 11/06/2022 06:02 AM    RBC 3.84 11/06/2022 06:02 AM    HGB 11.3 11/06/2022 06:02 AM    HCT 36.0 11/06/2022 06:02 AM     11/06/2022 06:02 AM    MCV 93.8 11/06/2022 06:02 AM    MCH 29.4 11/06/2022 06:02 AM    MCHC 31.4 11/06/2022 06:02 AM    RDW 13.0 11/06/2022 06:02 AM    SEGSPCT 36 01/03/2014 09:54 AM    METASPCT 0.9 01/26/2020 07:11 AM    LYMPHOPCT 12.5 11/06/2022 06:02 AM    PROMYELOPCT 3.5 01/23/2020 07:25 AM    MONOPCT 1.6 11/06/2022 06:02 AM    MYELOPCT 0.9 01/26/2020 07:11 AM    BASOPCT 0.1 11/06/2022 06:02 AM    MONOSABS 0.14 11/06/2022 06:02 AM    LYMPHSABS 1.07 11/06/2022 06:02 AM    EOSABS 0.00 11/06/2022 06:02 AM    BASOSABS 0.01 11/06/2022 06:02 AM     BMP:    Lab Results   Component Value Date/Time     11/06/2022 06:02 AM    K 3.8 11/06/2022 06:02 AM     11/06/2022 06:02 AM    CO2 24 11/06/2022 06:02 AM    BUN 13 11/06/2022 06:02 AM    LABALBU 3.7 11/05/2022 02:29 PM    LABALBU 3.9 10/26/2011 04:50 AM    CREATININE 0.6 11/06/2022 06:02 AM    CALCIUM 9.5 11/06/2022 06:02 AM    GFRAA >60 06/23/2022 02:12 PM    LABGLOM >60 11/06/2022 06:02 AM    GLUCOSE 193 11/06/2022 06:02 AM    GLUCOSE 124 10/26/2011 04:50 AM       XR CHEST PORTABLE   Final Result   No acute process.               Resident's Assessment and Plan     Assessment and Plan:    Acute respiratory insufficiency secondary to COPD exacerbation versus medication noncompliance, rule out infection  Blood cultures for 24 hours no growth  Respiratory panel negative  Pro-Ronnie level 0.2  Leukocytosis resolved  PLAN  Respiratory cultures pending as well as gram stain  Pending strep pneumonia and Legionella antigen  Pulmonology consulted--> follow recs  Repeat chest x-ray ordered  Continue breathing treatments and Tessalon and guaifenesin for cough  Continue Solu-Medrol 40 mg twice daily  Continue doxycycline with last dose on 11/10/2022  Continue to monitor glucose levels with steroid use    History of hypertension-continue home medication of Maxide    History of asthma and COPD overlap syndrome-on 2 L nasal cannula at home    History of hyperlipidemia-continue home medication of Crestor     History of type 2 diabetes mellitus-on Trulicity at

## 2022-11-08 NOTE — PROGRESS NOTES
Date: 11/7/2022    Time: 11:02 PM    Patient Placed On BIPAP/CPAP/ Non-Invasive Ventilation? Yes    If no must comment. Facial area red/color change? No           If YES are Blister/Lesion present? No   If yes must notify nursing staff  BIPAP/CPAP skin barrier?   Yes    Skin barrier type:mepilexlite       Comments:        Huber Berrios RCP

## 2022-11-09 PROBLEM — J96.21 ACUTE AND CHRONIC RESPIRATORY FAILURE WITH HYPOXIA (HCC): Status: ACTIVE | Noted: 2022-11-09

## 2022-11-09 LAB
ANION GAP SERPL CALCULATED.3IONS-SCNC: 11 MMOL/L (ref 7–16)
BASOPHILS ABSOLUTE: 0.01 E9/L (ref 0–0.2)
BASOPHILS RELATIVE PERCENT: 0.2 % (ref 0–2)
BUN BLDV-MCNC: 17 MG/DL (ref 6–23)
CALCIUM SERPL-MCNC: 9.7 MG/DL (ref 8.6–10.2)
CHLORIDE BLD-SCNC: 99 MMOL/L (ref 98–107)
CO2: 30 MMOL/L (ref 22–29)
CREAT SERPL-MCNC: 0.7 MG/DL (ref 0.5–1)
EOSINOPHILS ABSOLUTE: 0 E9/L (ref 0.05–0.5)
EOSINOPHILS RELATIVE PERCENT: 0 % (ref 0–6)
GFR SERPL CREATININE-BSD FRML MDRD: >60 ML/MIN/1.73
GLUCOSE BLD-MCNC: 199 MG/DL (ref 74–99)
HCT VFR BLD CALC: 35.9 % (ref 34–48)
HEMOGLOBIN: 10.8 G/DL (ref 11.5–15.5)
IMMATURE GRANULOCYTES #: 0.04 E9/L
IMMATURE GRANULOCYTES %: 0.6 % (ref 0–5)
LYMPHOCYTES ABSOLUTE: 1.27 E9/L (ref 1.5–4)
LYMPHOCYTES RELATIVE PERCENT: 19.2 % (ref 20–42)
MAGNESIUM: 2 MG/DL (ref 1.6–2.6)
MCH RBC QN AUTO: 28.3 PG (ref 26–35)
MCHC RBC AUTO-ENTMCNC: 30.1 % (ref 32–34.5)
MCV RBC AUTO: 94.2 FL (ref 80–99.9)
METER GLUCOSE: 178 MG/DL (ref 74–99)
METER GLUCOSE: 214 MG/DL (ref 74–99)
METER GLUCOSE: 269 MG/DL (ref 74–99)
MONOCYTES ABSOLUTE: 0.2 E9/L (ref 0.1–0.95)
MONOCYTES RELATIVE PERCENT: 3 % (ref 2–12)
NEUTROPHILS ABSOLUTE: 5.1 E9/L (ref 1.8–7.3)
NEUTROPHILS RELATIVE PERCENT: 77 % (ref 43–80)
PDW BLD-RTO: 12.9 FL (ref 11.5–15)
PLATELET # BLD: 389 E9/L (ref 130–450)
PMV BLD AUTO: 10.6 FL (ref 7–12)
POTASSIUM SERPL-SCNC: 4.6 MMOL/L (ref 3.5–5)
RBC # BLD: 3.81 E12/L (ref 3.5–5.5)
SODIUM BLD-SCNC: 140 MMOL/L (ref 132–146)
WBC # BLD: 6.6 E9/L (ref 4.5–11.5)

## 2022-11-09 PROCEDURE — 2140000000 HC CCU INTERMEDIATE R&B

## 2022-11-09 PROCEDURE — 82962 GLUCOSE BLOOD TEST: CPT

## 2022-11-09 PROCEDURE — 6370000000 HC RX 637 (ALT 250 FOR IP)

## 2022-11-09 PROCEDURE — 94660 CPAP INITIATION&MGMT: CPT

## 2022-11-09 PROCEDURE — 6370000000 HC RX 637 (ALT 250 FOR IP): Performed by: INTERNAL MEDICINE

## 2022-11-09 PROCEDURE — 99233 SBSQ HOSP IP/OBS HIGH 50: CPT | Performed by: INTERNAL MEDICINE

## 2022-11-09 PROCEDURE — 2700000000 HC OXYGEN THERAPY PER DAY

## 2022-11-09 PROCEDURE — 36415 COLL VENOUS BLD VENIPUNCTURE: CPT

## 2022-11-09 PROCEDURE — 85025 COMPLETE CBC W/AUTO DIFF WBC: CPT

## 2022-11-09 PROCEDURE — 6360000002 HC RX W HCPCS: Performed by: STUDENT IN AN ORGANIZED HEALTH CARE EDUCATION/TRAINING PROGRAM

## 2022-11-09 PROCEDURE — 80048 BASIC METABOLIC PNL TOTAL CA: CPT

## 2022-11-09 PROCEDURE — 83735 ASSAY OF MAGNESIUM: CPT

## 2022-11-09 PROCEDURE — 6370000000 HC RX 637 (ALT 250 FOR IP): Performed by: STUDENT IN AN ORGANIZED HEALTH CARE EDUCATION/TRAINING PROGRAM

## 2022-11-09 PROCEDURE — 6360000002 HC RX W HCPCS: Performed by: INTERNAL MEDICINE

## 2022-11-09 PROCEDURE — 94640 AIRWAY INHALATION TREATMENT: CPT

## 2022-11-09 PROCEDURE — 2580000003 HC RX 258: Performed by: STUDENT IN AN ORGANIZED HEALTH CARE EDUCATION/TRAINING PROGRAM

## 2022-11-09 RX ORDER — INSULIN LISPRO 100 [IU]/ML
0-4 INJECTION, SOLUTION INTRAVENOUS; SUBCUTANEOUS NIGHTLY
Status: DISCONTINUED | OUTPATIENT
Start: 2022-11-09 | End: 2022-11-11

## 2022-11-09 RX ORDER — INSULIN LISPRO 100 [IU]/ML
0-8 INJECTION, SOLUTION INTRAVENOUS; SUBCUTANEOUS
Status: DISCONTINUED | OUTPATIENT
Start: 2022-11-09 | End: 2022-11-11

## 2022-11-09 RX ORDER — POLYVINYL ALCOHOL 14 MG/ML
1 SOLUTION/ DROPS OPHTHALMIC PRN
Status: DISCONTINUED | OUTPATIENT
Start: 2022-11-09 | End: 2022-11-12 | Stop reason: SDUPTHER

## 2022-11-09 RX ADMIN — DOXYCYCLINE HYCLATE 100 MG: 100 CAPSULE ORAL at 08:26

## 2022-11-09 RX ADMIN — AMITRIPTYLINE HYDROCHLORIDE 50 MG: 25 TABLET, FILM COATED ORAL at 21:13

## 2022-11-09 RX ADMIN — INSULIN LISPRO 2 UNITS: 100 INJECTION, SOLUTION INTRAVENOUS; SUBCUTANEOUS at 17:07

## 2022-11-09 RX ADMIN — GUAIFENESIN 400 MG: 400 TABLET ORAL at 08:25

## 2022-11-09 RX ADMIN — LEVOTHYROXINE SODIUM 112 MCG: 0.11 TABLET ORAL at 06:46

## 2022-11-09 RX ADMIN — PREGABALIN 75 MG: 25 CAPSULE ORAL at 08:26

## 2022-11-09 RX ADMIN — Medication 10 ML: at 08:27

## 2022-11-09 RX ADMIN — CITALOPRAM HYDROBROMIDE 40 MG: 20 TABLET ORAL at 08:25

## 2022-11-09 RX ADMIN — ASPIRIN 81 MG CHEWABLE TABLET 81 MG: 81 TABLET CHEWABLE at 08:26

## 2022-11-09 RX ADMIN — MONTELUKAST 10 MG: 10 TABLET, FILM COATED ORAL at 21:14

## 2022-11-09 RX ADMIN — BACLOFEN 5 MG: 10 TABLET ORAL at 21:14

## 2022-11-09 RX ADMIN — PRAMIPEXOLE DIHYDROCHLORIDE 0.5 MG: 0.25 TABLET ORAL at 21:14

## 2022-11-09 RX ADMIN — ROSUVASTATIN 20 MG: 20 TABLET, FILM COATED ORAL at 08:27

## 2022-11-09 RX ADMIN — DOXYCYCLINE HYCLATE 100 MG: 100 CAPSULE ORAL at 21:14

## 2022-11-09 RX ADMIN — ACETAMINOPHEN 650 MG: 325 TABLET, FILM COATED ORAL at 08:45

## 2022-11-09 RX ADMIN — POLYETHYLENE GLYCOL 3350 17 G: 17 POWDER, FOR SOLUTION ORAL at 08:45

## 2022-11-09 RX ADMIN — METHYLPREDNISOLONE SODIUM SUCCINATE 40 MG: 40 INJECTION, POWDER, FOR SOLUTION INTRAMUSCULAR; INTRAVENOUS at 21:14

## 2022-11-09 RX ADMIN — NYSTATIN 500000 UNITS: 100000 SUSPENSION ORAL at 17:05

## 2022-11-09 RX ADMIN — NYSTATIN 500000 UNITS: 100000 SUSPENSION ORAL at 12:05

## 2022-11-09 RX ADMIN — ARFORMOTEROL TARTRATE 15 MCG: 15 SOLUTION RESPIRATORY (INHALATION) at 21:06

## 2022-11-09 RX ADMIN — BACLOFEN 5 MG: 10 TABLET ORAL at 14:37

## 2022-11-09 RX ADMIN — GUAIFENESIN 400 MG: 400 TABLET ORAL at 17:04

## 2022-11-09 RX ADMIN — CALCIUM CARBONATE-VITAMIN D TAB 500 MG-200 UNIT 1 TABLET: 500-200 TAB at 08:31

## 2022-11-09 RX ADMIN — CELECOXIB 100 MG: 100 CAPSULE ORAL at 08:26

## 2022-11-09 RX ADMIN — PREGABALIN 75 MG: 25 CAPSULE ORAL at 21:13

## 2022-11-09 RX ADMIN — ENOXAPARIN SODIUM 30 MG: 100 INJECTION SUBCUTANEOUS at 21:13

## 2022-11-09 RX ADMIN — NYSTATIN 500000 UNITS: 100000 SUSPENSION ORAL at 08:27

## 2022-11-09 RX ADMIN — BUDESONIDE 500 MCG: 0.5 SUSPENSION RESPIRATORY (INHALATION) at 21:06

## 2022-11-09 RX ADMIN — TRIAMTERENE AND HYDROCHLOROTHIAZIDE 1 TABLET: 37.5; 25 TABLET ORAL at 08:33

## 2022-11-09 RX ADMIN — GUAIFENESIN 400 MG: 400 TABLET ORAL at 21:13

## 2022-11-09 RX ADMIN — METHYLPREDNISOLONE SODIUM SUCCINATE 40 MG: 40 INJECTION, POWDER, FOR SOLUTION INTRAMUSCULAR; INTRAVENOUS at 08:27

## 2022-11-09 RX ADMIN — ENOXAPARIN SODIUM 30 MG: 100 INJECTION SUBCUTANEOUS at 08:27

## 2022-11-09 RX ADMIN — BENZONATATE 100 MG: 100 CAPSULE ORAL at 21:13

## 2022-11-09 RX ADMIN — BACLOFEN 5 MG: 10 TABLET ORAL at 08:26

## 2022-11-09 RX ADMIN — NYSTATIN 500000 UNITS: 100000 SUSPENSION ORAL at 21:13

## 2022-11-09 RX ADMIN — PREGABALIN 75 MG: 25 CAPSULE ORAL at 14:37

## 2022-11-09 RX ADMIN — PANTOPRAZOLE SODIUM 40 MG: 40 TABLET, DELAYED RELEASE ORAL at 06:46

## 2022-11-09 ASSESSMENT — PAIN SCALES - GENERAL: PAINLEVEL_OUTOF10: 8

## 2022-11-09 ASSESSMENT — PAIN DESCRIPTION - DESCRIPTORS: DESCRIPTORS: ACHING;CRUSHING;DISCOMFORT

## 2022-11-09 ASSESSMENT — ENCOUNTER SYMPTOMS
CHEST TIGHTNESS: 1
WHEEZING: 1
SHORTNESS OF BREATH: 1
COUGH: 1

## 2022-11-09 ASSESSMENT — PAIN DESCRIPTION - LOCATION: LOCATION: HEAD

## 2022-11-09 NOTE — PLAN OF CARE
Problem: Chronic Conditions and Co-morbidities  Goal: Patient's chronic conditions and co-morbidity symptoms are monitored and maintained or improved  Outcome: Progressing     Problem: Discharge Planning  Goal: Discharge to home or other facility with appropriate resources  Outcome: Progressing     Problem: ABCDS Injury Assessment  Goal: Absence of physical injury  Outcome: Progressing     Problem: Safety - Adult  Goal: Free from fall injury  Outcome: Progressing     Problem: Pain  Goal: Verbalizes/displays adequate comfort level or baseline comfort level  Outcome: Progressing

## 2022-11-09 NOTE — PROGRESS NOTES
Pulmonary Progress Note    Admit Date: 2022                            PCP: Vanita Mcdonald MD  Principal Problem:    COPD exacerbation ([facility]  Active Problems:    Acute and chronic respiratory failure with hypoxia ([facility]    DM type 2 with diabetic peripheral neuropathy ([facility]  Resolved Problems:    * No resolved hospital problems. *      Subjective:  Sitting in bedside chair on 3L.  Feeling better today    Medications:   dextrose      sodium chloride          pramipexole  0.5 mg Oral Nightly    methylPREDNISolone  40 mg IntraVENous Q12H    nystatin  5 mL Oral 4x Daily    doxycycline hyclate  100 mg Oral 2 times per day    insulin lispro  0-4 Units SubCUTAneous TID WC    insulin lispro  0-4 Units SubCUTAneous Nightly    amitriptyline  50 mg Oral Nightly    aspirin  81 mg Oral Daily    baclofen  5 mg Oral TID    oyster shell calcium w/D  1 tablet Oral Daily    celecoxib  100 mg Oral Daily    citalopram  40 mg Oral Daily    [Held by provider] cetirizine  5 mg Oral Daily    levothyroxine  112 mcg Oral Daily    montelukast  10 mg Oral Nightly    pantoprazole  40 mg Oral QAM AC    pregabalin  75 mg Oral TID    rosuvastatin  20 mg Oral Daily    [Held by provider] triamterene-hydroCHLOROthiazide  1 tablet Oral Daily    sodium chloride flush  5-40 mL IntraVENous 2 times per day    enoxaparin  30 mg SubCUTAneous BID    budesonide  0.5 mg Nebulization BID    Arformoterol Tartrate  15 mcg Nebulization BID    guaiFENesin  400 mg Oral TID       Vitals:  VITALS:  BP (!) 162/94   Pulse 72   Temp 99.3 °F (37.4 °C) (Oral)   Resp 18   Ht 5' 6\" (1.676 m)   Wt 231 lb 4.8 oz (104.9 kg)   SpO2 94%   BMI 37.33 kg/m²   24HR INTAKE/OUTPUT:    Intake/Output Summary (Last 24 hours) at 2022 1337  Last data filed at 2022 1801  Gross per 24 hour   Intake 600 ml   Output --   Net 600 ml     CURRENT PULSE OXIMETRY:  SpO2: 94 %  24HR PULSE OXIMETRY RANGE:  SpO2  Av.3 %  Min: 94 %  Max: 99 %  CVP:    VENT SETTINGS: Additional Respiratory Assessments  Heart Rate: 72  Resp: 18  SpO2: 94 %      EXAM:  General: No distress. Eyes: PERRL. No sclera icterus. No conjunctival injection. ENT: No discharge. Pharynx clear. Neck: Trachea midline. Normal thyroid. Resp: No accessory muscle use. Diminished with exp wheezing and some rhonchi   CV: Regular rate. Regular rhythm. No mumur or rub. ABD: Non-tender. Non-distended. No masses. No organmegaly. Normal bowel sounds. Skin: Warm and dry. No nodule on exposed extremities. No rash on exposed extremities. Lymph: No cervical LAD. No supraclavicular LAD. Ext: No joint deformity. No clubbing. No cyanosis. No edema  Neuro: Awake. Follows commands ox3, SIMMONS    I/O: I/O last 3 completed shifts: In: 1739 [P.O.:720; I.V.:653]  Out: -   No intake/output data recorded. Results:  CBC:   Recent Labs     11/08/22 1436 11/09/22 0457   WBC 7.9 6.6   HGB 11.3* 10.8*   HCT 36.9 35.9   MCV 95.6 94.2    389     BMP:   Recent Labs     11/08/22  1436 11/09/22 0457    140   K 4.2 4.6   CL 96* 99   CO2 24 30*   BUN 17 17   CREATININE 0.7 0.7     LFT: No results for input(s): ALKPHOS, ALT, AST, PROT, BILITOT, BILIDIR, LABALBU in the last 72 hours. PT/INR: No results for input(s): PROTIME, INR in the last 72 hours.   Procalcitonin:   Recent Labs     11/08/22  1436   PROCAL 0.13*     Cultures:  Recent Labs     11/08/22 1749   CULTRESP Oral Pharyngeal Buffy reduced      11/8/22 1749    CULTURE, RESPIRATORY Oral Pharyngeal Buffy reduced P    Smear, Respiratory Abundant Polymorphonuclear leukocytes   Epithelial cells not seen   No organisms seen      Cultures:    Latest Reference Range & Units 11/5/22 21:09   Human Rhinovirus/Enterovirus by PCR Not Detected  Not Detected   Influenza A by PCR Not Detected  Not Detected   Influenza B by PCR Not Detected  Not Detected   Adenovirus by PCR Not Detected  Not Detected   Coronavirus 229E by PCR Not Detected  Not Detected   Coronavirus HKU1 by PCR Not Detected  Not Detected   Coronavirus NL63 by PCR Not Detected  Not Detected   Coronavirus OC43 by PCR Not Detected  Not Detected   Human Metapneumovirus by PCR Not Detected  Not Detected   Parainfluenza Virus 1 by PCR Not Detected  Not Detected   Parainfluenza Virus 2 by PCR Not Detected  Not Detected   Parainfluenza Virus 3 by PCR Not Detected  Not Detected   Parainfluenza Virus 4 by PCR Not Detected  Not Detected   Respiratory Syncytial Virus by PCR Not Detected  Not Detected   Bordetella parapertussis by PCR Not Detected  Not Detected   Chlamydophilia pneumoniae by PCR Not Detected  Not Detected   Mycoplasma pneumoniae by PCR Not Detected  Not Detected   SARS-CoV-2, PCR Not Detected  Not Detected   Bordetella pertussis by PCR Not Detected  Not Detected             ABG:   No results for input(s): PH, PO2, PCO2, HCO3, BE, O2SAT in the last 72 hours. Films:  XR CHEST PORTABLE    Result Date: 11/5/2022  EXAMINATION: ONE XRAY VIEW OF THE CHEST 11/5/2022 8:24 pm COMPARISON: None. HISTORY: ORDERING SYSTEM PROVIDED HISTORY: cough TECHNOLOGIST PROVIDED HISTORY: Reason for exam:->cough What reading provider will be dictating this exam?->CRC FINDINGS: The lungs are without acute focal process. There is no effusion or pneumothorax. The cardiomediastinal silhouette is without acute process. The osseous structures are without acute process. No acute process. 11/8:  Poor inspiratory effort is seen. The cardiomediastinal silhouette is enlarged. The lungs are without acute focal process. There is no effusion or   pneumothorax. The osseous structures are without acute process. Assessment:  Acute exacerbation COPD  Hypoxia  EDWARD-non compliant        Plan:  Maintain POX >90%, currently on 3L , 2L is her baseline  On brovana and budesonide, not on maintenance at home has not been seen on years.  Will benefit from daily inhaler at DC   Stop duonebs, thick mucous  Added mucinex and flutter  Ok for albuterol  Solumedrol 40 q12H ok for one more day and plan to oral taper tomorrow   On singuair   On doxy   Procal 11/6 .20>11/8 .13  Last cxr 11/5 no acute process, repeated 11/8 and unchanged    sputum cx 1/8 no organism seen   IS for pulmonary hygiene  Will need outpatient PFT and PSG in order to obtain new pap  Will benefit from outpatient pulm rehab and regular follow up   Supportive care         Electronically signed by BRYAN Atwood on 11/9/2022 at 1:37 PM    Seen and examined, agree with above. Acute exacerbation of copd is better and chronic hypoxic respiratory failure is at baseline. Continue nebs and continue steroids.

## 2022-11-09 NOTE — PROGRESS NOTES
[facility]  Internal Medicine Residency Program  Progress Note - House Team     Patient:  Ambrosio Wilson 79 y.o. female MRN: [id number]     Date of Service: 11/9/2022     CC: cough, SOB, wheezing  Overnight events: no acute changes    Subjective     Patient was seen and examined this morning at bedside in no acute distress. Overnight no acute changes. Endorsing decreased sputum production,  still having SOB and fatigue due to lack of sleep. Per pulmonology --> continue IV steroids and check sputum cxs and continue doxy     Repeat chest xray--> no changes     ROS + SOB and fatigue    Objective     Physical Exam:  Vitals: BP (!) 162/94   Pulse 72   Temp 99.3 °F (37.4 °C) (Oral)   Resp 18   Ht 5' 6\" (1.676 m)   Wt 231 lb 4.8 oz (104.9 kg)   SpO2 94%   BMI 37.33 kg/m²     I & O - 24hr: No intake/output data recorded. General Appearance: alert, appears stated age, mild distress, and fatigue   HEENT:  Head: Normocephalic, no lesions, without obvious abnormality  Neck: no adenopathy and no JVD  Lung:  ronchi and wheezes bilaterally   Heart: regular rate and rhythm, S1, S2 normal, no murmur, click, rub or gallop  Abdomen: soft, non-tender; bowel sounds normal; no masses,  no organomegaly  Extremities:  extremities normal, atraumatic, no cyanosis or edema  Musculokeletal: No joint swelling, no muscle tenderness. ROM normal in all joints of extremities.    Neurologic: Mental status: Alert, oriented, thought content appropriate    Subjective  Pertinent Labs & Imaging Studies     CBC with Differential:    Lab Results   Component Value Date/Time    WBC 6.6 11/09/2022 04:57 AM    RBC 3.81 11/09/2022 04:57 AM    HGB 10.8 11/09/2022 04:57 AM    HCT 35.9 11/09/2022 04:57 AM     11/09/2022 04:57 AM    MCV 94.2 11/09/2022 04:57 AM    MCH 28.3 11/09/2022 04:57 AM    MCHC 30.1 11/09/2022 04:57 AM    RDW 12.9 11/09/2022 04:57 AM    SEGSPCT 36 01/03/2014 09:54 AM    METASPCT 0.9 01/26/2020 07:11 AM    LYMPHOPCT 19.2 11/09/2022 04:57 AM    PROMYELOPCT 3.5 01/23/2020 07:25 AM    MONOPCT 3.0 11/09/2022 04:57 AM    MYELOPCT 0.9 01/26/2020 07:11 AM    BASOPCT 0.2 11/09/2022 04:57 AM    MONOSABS 0.20 11/09/2022 04:57 AM    LYMPHSABS 1.27 11/09/2022 04:57 AM    EOSABS 0.00 11/09/2022 04:57 AM    BASOSABS 0.01 11/09/2022 04:57 AM     BMP:    Lab Results   Component Value Date/Time     11/09/2022 04:57 AM    K 4.6 11/09/2022 04:57 AM    CL 99 11/09/2022 04:57 AM    CO2 30 11/09/2022 04:57 AM    BUN 17 11/09/2022 04:57 AM    LABALBU 3.7 11/05/2022 02:29 PM    LABALBU 3.9 10/26/2011 04:50 AM    CREATININE 0.7 11/09/2022 04:57 AM    CALCIUM 9.7 11/09/2022 04:57 AM    GFRAA >60 06/23/2022 02:12 PM    LABGLOM >60 11/09/2022 04:57 AM    GLUCOSE 199 11/09/2022 04:57 AM    GLUCOSE 124 10/26/2011 04:50 AM       XR CHEST PORTABLE   Final Result   Cardiomegaly demonstrates no change. XR CHEST PORTABLE   Final Result   No acute process.               Resident's Assessment and Plan     Assessment and Plan:    Acute respiratory insufficiency secondary to COPD exacerbation versus medication noncompliance, rule out infection  Blood cultures for 24 hours no growth  Respiratory panel negative  Pro-Ronnie level 0.2  Leukocytosis resolved  PLAN  Respiratory cultures--> no organisms seen  Pending strep pneumonia and Legionella antigen  Pulmonology consulted--> continue current care , outpatient pulm rehab in order to obtain new pap and PFT and PSG  Repeat chest x-ray ---> no changes seen  Continue breathing treatments and Tessalon and guaifenesin for cough  Continue Solu-Medrol 40 mg twice daily  Continue doxycycline with last dose on 11/10/2022  Continue to monitor glucose levels with steroid use    History of hypertension-home medication of Maxzide-- HOLDING for 1 day    History of asthma and COPD overlap syndrome-on 2 L nasal cannula at home    History of hyperlipidemia-continue home medication of Crestor     History of type 2 diabetes mellitus-on Trulicity at home  Continue low-dose sliding scale and 0 to 4 units nightly  Hypoglycemia protocol in place    History of chronic back pain secondary to fibromyalgia-continue home medication of Celexa 40 mg daily ,  Celebrex 100 mg daily, Lyrica 75 mg 3 times daily, and baclofen     History of depression-continue home medication of Celexa 40 mg daily    History of hypothyroidism-continue home medication of Synthroid 112 mg daily    History of sleep apnea-continue BiPAP at night      PT/OT evaluation: ordered  DVT prophylaxis/ GI prophylaxis: Lovenox / Protonix  Disposition: continue current care    Gerard Barth DO, PGY-1  Attending physician: Dr. Adrian Ayala

## 2022-11-09 NOTE — PLAN OF CARE
Problem: Chronic Conditions and Co-morbidities  Goal: Patient's chronic conditions and co-morbidity symptoms are monitored and maintained or improved  11/8/2022 2314 by Danna Omalley RN  Outcome: Progressing  11/8/2022 2314 by Danna Omalley RN  Outcome: Progressing     Problem: Discharge Planning  Goal: Discharge to home or other facility with appropriate resources  11/8/2022 2314 by Danna Omalley RN  Outcome: Progressing  11/8/2022 2314 by Danna Omalley RN  Outcome: Progressing     Problem: ABCDS Injury Assessment  Goal: Absence of physical injury  11/8/2022 2314 by Danna Omalley RN  Outcome: Progressing  11/8/2022 2314 by Danna Omalley RN  Outcome: Progressing     Problem: Safety - Adult  Goal: Free from fall injury  11/8/2022 2314 by Danna Omalley RN  Outcome: Progressing  11/8/2022 2314 by Danna Omalley RN  Outcome: Progressing     Problem: Pain  Goal: Verbalizes/displays adequate comfort level or baseline comfort level  11/8/2022 2314 by Danna Omalley RN  Outcome: Progressing  11/8/2022 2314 by Danna Omalley RN  Outcome: Progressing

## 2022-11-09 NOTE — PROGRESS NOTES
Progress  Note  Chief Complaint   Patient presents with    Shortness of Breath     With chest congestion, body aches and a headache. Started yesterday morning.       Historical Issues:  Current Facility-Administered Medications   Medication Dose Route Frequency Provider Last Rate Last Admin    polyvinyl alcohol (LIQUIFILM TEARS) 1.4 % ophthalmic solution 1 drop  1 drop Both Eyes PRN Roman Omi, DO        pramipexole (MIRAPEX) tablet 0.5 mg  0.5 mg Oral Nightly Roman Omi, DO   0.5 mg at 11/08/22 2132    methylPREDNISolone sodium (SOLU-MEDROL) injection 40 mg  40 mg IntraVENous Q12H Scooter Levi MD   40 mg at 11/09/22 0827    nystatin (MYCOSTATIN) 786182 UNIT/ML suspension 500,000 Units  5 mL Oral 4x Daily Scooter Levi MD   500,000 Units at 11/09/22 0827    hydrALAZINE (APRESOLINE) injection 10 mg  10 mg IntraVENous Q6H PRN Landon Mello MD   10 mg at 11/07/22 1813    glucose chewable tablet 16 g  4 tablet Oral PRN Avril Luong MD        dextrose bolus 10% 125 mL  125 mL IntraVENous PRN Avril Luong MD        Or    dextrose bolus 10% 250 mL  250 mL IntraVENous PRN Avril Luong MD        glucagon (rDNA) injection 1 mg  1 mg SubCUTAneous PRN Avril Luong MD        dextrose 10 % infusion   IntraVENous Continuous PRN Avril Luong MD        sodium chloride (Inhalant) 3 % nebulizer solution 4 mL  4 mL Nebulization PRN Jae Covington MD        doxycycline hyclate (VIBRAMYCIN) capsule 100 mg  100 mg Oral 2 times per day [facility] Sara Carcamo MD   100 mg at 11/09/22 0826    insulin lispro (HUMALOG) injection vial 0-4 Units  0-4 Units SubCUTAneous TID  Jae Covington MD   2 Units at 11/08/22 1733    insulin lispro (HUMALOG) injection vial 0-4 Units  0-4 Units SubCUTAneous Nightly Jae Covington MD        amitriptyline (ELAVIL) tablet 50 mg  50 mg Oral Nightly Avril Luong MD   50 mg at 11/08/22 2132    ammonium lactate (LAC-HYDRIN) 12 % lotion   Topical PRN Ana Paula León MD aspirin chewable tablet 81 mg  81 mg Oral Daily Avril Luong MD   81 mg at 11/09/22 0826    baclofen (LIORESAL) tablet 5 mg  5 mg Oral TID Avril Luong MD   5 mg at 11/09/22 0826    oyster shell calcium w/D 500-200 MG-UNIT tablet 1 tablet  1 tablet Oral Daily Avril Gresham MD   1 tablet at 11/09/22 0831    celecoxib (CELEBREX) capsule 100 mg  100 mg Oral Daily Avril Luong MD   100 mg at 11/09/22 0826    citalopram (CELEXA) tablet 40 mg  40 mg Oral Daily Avril Luong MD   40 mg at 11/09/22 0825    [Held by provider] cetirizine (ZYRTEC) tablet 5 mg  5 mg Oral Daily Avril Luong MD        levothyroxine (SYNTHROID) tablet 112 mcg  112 mcg Oral Daily Avril Luong MD   112 mcg at 11/09/22 0646    mineral oil-hydrophilic petrolatum (HYDROPHOR) ointment   Topical PRN Avril Luong MD        montelukast (SINGULAIR) tablet 10 mg  10 mg Oral Nightly Avril Luong MD   10 mg at 11/08/22 2133    pantoprazole (PROTONIX) tablet 40 mg  40 mg Oral QAM AC Avril Luong MD   40 mg at 11/09/22 0646    pregabalin (LYRICA) capsule 75 mg  75 mg Oral TID Avril Luong MD   75 mg at 11/09/22 0826    rosuvastatin (CRESTOR) tablet 20 mg  20 mg Oral Daily Avril Luong MD   20 mg at 11/09/22 0827    triamterene-hydroCHLOROthiazide (MAXZIDE-25) 37.5-25 MG per tablet 1 tablet  1 tablet Oral Daily Nel Hawkins MD   1 tablet at 11/09/22 0833    sodium chloride flush 0.9 % injection 5-40 mL  5-40 mL IntraVENous 2 times per day Nel Hawkins MD   10 mL at 11/09/22 0827    sodium chloride flush 0.9 % injection 5-40 mL  5-40 mL IntraVENous PRN Avril Luong MD        0.9 % sodium chloride infusion   IntraVENous PRN Avril Luong MD        enoxaparin Sodium (LOVENOX) injection 30 mg  30 mg SubCUTAneous BID Avril Luong MD   30 mg at 11/09/22 0827    ondansetron (ZOFRAN-ODT) disintegrating tablet 4 mg  4 mg Oral Q8H PRN Avril Luong MD        Or    ondansetron (ZOFRAN) injection 4 mg  4 mg IntraVENous Q6H PRN Avril Knowles MD        polyethylene glycol (GLYCOLAX) packet 17 g  17 g Oral Daily PRN Avril Luong MD   17 g at 11/09/22 0845    acetaminophen (TYLENOL) tablet 650 mg  650 mg Oral Q6H PRN Avril Luong MD   650 mg at 11/09/22 0845    Or    acetaminophen (TYLENOL) suppository 650 mg  650 mg Rectal Q6H PRN Avril Luong MD        budesonide (PULMICORT) nebulizer suspension 500 mcg  0.5 mg Nebulization BID Avril Luong MD   500 mcg at 11/08/22 2016    Arformoterol Tartrate (BROVANA) nebulizer solution 15 mcg  15 mcg Nebulization BID Avril Luong MD   15 mcg at 11/08/22 2016    benzonatate (TESSALON) capsule 100 mg  100 mg Oral TID PRN Avril Knowles MD   100 mg at 11/07/22 0910    guaiFENesin tablet 400 mg  400 mg Oral TID Alise Atkinson MD   400 mg at 11/09/22 0825     Recent Complaints:  Review of Systems   Constitutional:  Positive for fatigue. Negative for fever. HENT:  Positive for congestion. Respiratory:  Positive for cough, chest tightness, shortness of breath and wheezing. Cardiovascular:  Negative for chest pain, palpitations and leg swelling. Neurological: Negative. Vitals:    11/09/22 0833   BP: (!) 162/94   Pulse: 72   Resp:    Temp: 99.3 °F (37.4 °C)   SpO2: 94%     Physical Exam  Constitutional:       Appearance: Normal appearance. Cardiovascular:      Rate and Rhythm: Normal rate. Pulmonary:      Effort: Respiratory distress present. Breath sounds: Wheezing present. Musculoskeletal:      Right lower leg: No edema. Left lower leg: No edema. Neurological:      Mental Status: She is alert.        Recent Labs     11/08/22  1436 11/09/22  0457    140   K 4.2 4.6   CL 96* 99   CO2 24 30*   BUN 17 17   CREATININE 0.7 0.7   GLUCOSE 344* 199*   CALCIUM 9.8 9.7     Recent Labs     11/08/22  1436 11/09/22  0457   WBC 7.9 6.6   RBC 3.86 3.81   HGB 11.3* 10.8*   HCT 36.9 35.9   MCV 95.6 94.2   MCH 29.3 28.3   MCHC 30.6* 30.1*   RDW 13.2 12.9    389   MPV 11.3 10.6       Principal Problem:    COPD exacerbation (HCC)  Active Problems:    Acute and chronic respiratory failure with hypoxia (HCC)    DM type 2 with diabetic peripheral neuropathy ([facility] [address]  Resolved Problems:    * No resolved hospital problems.  *          Plan:  Pulmonary consultation obtained  Improved with flutter valve and hydration  Continue steroids  Hyperglycemic trend noted and will be managed  Start to increase activity    Electronically signed by Geena Urbina MD on 11/9/2022 at 9:43 AM

## 2022-11-10 LAB
ANION GAP SERPL CALCULATED.3IONS-SCNC: 4 MMOL/L (ref 7–16)
B.E.: 3.3 MMOL/L (ref -3–3)
BASOPHILS ABSOLUTE: 0.02 E9/L (ref 0–0.2)
BASOPHILS RELATIVE PERCENT: 0.3 % (ref 0–2)
BUN BLDV-MCNC: 24 MG/DL (ref 6–23)
CALCIUM SERPL-MCNC: 9.8 MG/DL (ref 8.6–10.2)
CHLORIDE BLD-SCNC: 97 MMOL/L (ref 98–107)
CO2: 36 MMOL/L (ref 22–29)
COHB: 0.6 % (ref 0–1.5)
CREAT SERPL-MCNC: 0.8 MG/DL (ref 0.5–1)
CRITICAL: ABNORMAL
CULTURE, RESPIRATORY: NORMAL
DATE ANALYZED: ABNORMAL
DATE OF COLLECTION: ABNORMAL
EOSINOPHILS ABSOLUTE: 0 E9/L (ref 0.05–0.5)
EOSINOPHILS RELATIVE PERCENT: 0 % (ref 0–6)
GFR SERPL CREATININE-BSD FRML MDRD: >60 ML/MIN/1.73
GLUCOSE BLD-MCNC: 251 MG/DL (ref 74–99)
HCO3: 30.4 MMOL/L (ref 22–26)
HCT VFR BLD CALC: 38 % (ref 34–48)
HEMOGLOBIN: 11.6 G/DL (ref 11.5–15.5)
HHB: 3.4 % (ref 0–5)
IMMATURE GRANULOCYTES #: 0.05 E9/L
IMMATURE GRANULOCYTES %: 0.7 % (ref 0–5)
LAB: ABNORMAL
LYMPHOCYTES ABSOLUTE: 1.41 E9/L (ref 1.5–4)
LYMPHOCYTES RELATIVE PERCENT: 20.7 % (ref 20–42)
Lab: ABNORMAL
MCH RBC QN AUTO: 28.5 PG (ref 26–35)
MCHC RBC AUTO-ENTMCNC: 30.5 % (ref 32–34.5)
MCV RBC AUTO: 93.4 FL (ref 80–99.9)
METER GLUCOSE: 173 MG/DL (ref 74–99)
METER GLUCOSE: 244 MG/DL (ref 74–99)
METER GLUCOSE: 268 MG/DL (ref 74–99)
METER GLUCOSE: 339 MG/DL (ref 74–99)
METHB: 0.4 % (ref 0–1.5)
MODE: ABNORMAL
MONOCYTES ABSOLUTE: 0.19 E9/L (ref 0.1–0.95)
MONOCYTES RELATIVE PERCENT: 2.8 % (ref 2–12)
MYCOPLASMA PNEUMONIAE IGM: NORMAL
NEUTROPHILS ABSOLUTE: 5.13 E9/L (ref 1.8–7.3)
NEUTROPHILS RELATIVE PERCENT: 75.5 % (ref 43–80)
O2 SATURATION: 96.6 % (ref 92–98.5)
O2HB: 95.6 % (ref 94–97)
OPERATOR ID: 8218
PATIENT TEMP: 37 C
PCO2: 57.2 MMHG (ref 35–45)
PDW BLD-RTO: 12.5 FL (ref 11.5–15)
PH BLOOD GAS: 7.34 (ref 7.35–7.45)
PLATELET # BLD: 413 E9/L (ref 130–450)
PMV BLD AUTO: 10 FL (ref 7–12)
PO2: 91.5 MMHG (ref 75–100)
POTASSIUM SERPL-SCNC: 4.9 MMOL/L (ref 3.5–5)
RBC # BLD: 4.07 E12/L (ref 3.5–5.5)
SMEAR, RESPIRATORY: NORMAL
SODIUM BLD-SCNC: 137 MMOL/L (ref 132–146)
SOURCE, BLOOD GAS: ABNORMAL
THB: 13.1 G/DL (ref 11.5–16.5)
TIME ANALYZED: 956
WBC # BLD: 6.8 E9/L (ref 4.5–11.5)

## 2022-11-10 PROCEDURE — 97535 SELF CARE MNGMENT TRAINING: CPT

## 2022-11-10 PROCEDURE — S5553 INSULIN LONG ACTING 5 U: HCPCS | Performed by: INTERNAL MEDICINE

## 2022-11-10 PROCEDURE — 82805 BLOOD GASES W/O2 SATURATION: CPT

## 2022-11-10 PROCEDURE — 6360000002 HC RX W HCPCS: Performed by: INTERNAL MEDICINE

## 2022-11-10 PROCEDURE — 97161 PT EVAL LOW COMPLEX 20 MIN: CPT

## 2022-11-10 PROCEDURE — 6370000000 HC RX 637 (ALT 250 FOR IP): Performed by: STUDENT IN AN ORGANIZED HEALTH CARE EDUCATION/TRAINING PROGRAM

## 2022-11-10 PROCEDURE — 80048 BASIC METABOLIC PNL TOTAL CA: CPT

## 2022-11-10 PROCEDURE — 85025 COMPLETE CBC W/AUTO DIFF WBC: CPT

## 2022-11-10 PROCEDURE — 94640 AIRWAY INHALATION TREATMENT: CPT

## 2022-11-10 PROCEDURE — 6370000000 HC RX 637 (ALT 250 FOR IP)

## 2022-11-10 PROCEDURE — 82962 GLUCOSE BLOOD TEST: CPT

## 2022-11-10 PROCEDURE — 2140000000 HC CCU INTERMEDIATE R&B

## 2022-11-10 PROCEDURE — 6360000002 HC RX W HCPCS: Performed by: STUDENT IN AN ORGANIZED HEALTH CARE EDUCATION/TRAINING PROGRAM

## 2022-11-10 PROCEDURE — 97530 THERAPEUTIC ACTIVITIES: CPT

## 2022-11-10 PROCEDURE — 97165 OT EVAL LOW COMPLEX 30 MIN: CPT

## 2022-11-10 PROCEDURE — 6370000000 HC RX 637 (ALT 250 FOR IP): Performed by: INTERNAL MEDICINE

## 2022-11-10 PROCEDURE — 2580000003 HC RX 258: Performed by: STUDENT IN AN ORGANIZED HEALTH CARE EDUCATION/TRAINING PROGRAM

## 2022-11-10 PROCEDURE — 94660 CPAP INITIATION&MGMT: CPT

## 2022-11-10 PROCEDURE — 99233 SBSQ HOSP IP/OBS HIGH 50: CPT | Performed by: INTERNAL MEDICINE

## 2022-11-10 PROCEDURE — 2700000000 HC OXYGEN THERAPY PER DAY

## 2022-11-10 PROCEDURE — 36415 COLL VENOUS BLD VENIPUNCTURE: CPT

## 2022-11-10 RX ORDER — LABETALOL HYDROCHLORIDE 5 MG/ML
10 INJECTION, SOLUTION INTRAVENOUS EVERY 6 HOURS PRN
Status: DISCONTINUED | OUTPATIENT
Start: 2022-11-10 | End: 2022-11-13 | Stop reason: HOSPADM

## 2022-11-10 RX ORDER — HYDRALAZINE HYDROCHLORIDE 20 MG/ML
10 INJECTION INTRAMUSCULAR; INTRAVENOUS EVERY 6 HOURS PRN
Status: DISCONTINUED | OUTPATIENT
Start: 2022-11-10 | End: 2022-11-13 | Stop reason: HOSPADM

## 2022-11-10 RX ORDER — LOSARTAN POTASSIUM 25 MG/1
25 TABLET ORAL DAILY
Status: CANCELLED | OUTPATIENT
Start: 2022-11-10

## 2022-11-10 RX ORDER — GUAIFENESIN 400 MG/1
400 TABLET ORAL 2 TIMES DAILY PRN
Status: DISCONTINUED | OUTPATIENT
Start: 2022-11-10 | End: 2022-11-13 | Stop reason: HOSPADM

## 2022-11-10 RX ORDER — INSULIN GLARGINE-YFGN 100 [IU]/ML
3 INJECTION, SOLUTION SUBCUTANEOUS NIGHTLY
Status: DISCONTINUED | OUTPATIENT
Start: 2022-11-10 | End: 2022-11-11

## 2022-11-10 RX ORDER — VALSARTAN 40 MG/1
40 TABLET ORAL DAILY
Status: DISCONTINUED | OUTPATIENT
Start: 2022-11-10 | End: 2022-11-10

## 2022-11-10 RX ORDER — LOSARTAN POTASSIUM 25 MG/1
25 TABLET ORAL DAILY
Status: DISCONTINUED | OUTPATIENT
Start: 2022-11-10 | End: 2022-11-11

## 2022-11-10 RX ADMIN — CITALOPRAM HYDROBROMIDE 40 MG: 20 TABLET ORAL at 09:07

## 2022-11-10 RX ADMIN — ENOXAPARIN SODIUM 30 MG: 100 INJECTION SUBCUTANEOUS at 09:07

## 2022-11-10 RX ADMIN — NYSTATIN 500000 UNITS: 100000 SUSPENSION ORAL at 16:24

## 2022-11-10 RX ADMIN — INSULIN LISPRO 6 UNITS: 100 INJECTION, SOLUTION INTRAVENOUS; SUBCUTANEOUS at 16:24

## 2022-11-10 RX ADMIN — DOXYCYCLINE HYCLATE 100 MG: 100 CAPSULE ORAL at 09:06

## 2022-11-10 RX ADMIN — NYSTATIN 500000 UNITS: 100000 SUSPENSION ORAL at 09:07

## 2022-11-10 RX ADMIN — DOXYCYCLINE HYCLATE 100 MG: 100 CAPSULE ORAL at 20:47

## 2022-11-10 RX ADMIN — MONTELUKAST 10 MG: 10 TABLET, FILM COATED ORAL at 20:47

## 2022-11-10 RX ADMIN — BACLOFEN 5 MG: 10 TABLET ORAL at 09:05

## 2022-11-10 RX ADMIN — BENZONATATE 100 MG: 100 CAPSULE ORAL at 09:21

## 2022-11-10 RX ADMIN — BACLOFEN 5 MG: 10 TABLET ORAL at 13:37

## 2022-11-10 RX ADMIN — ASPIRIN 81 MG CHEWABLE TABLET 81 MG: 81 TABLET CHEWABLE at 09:06

## 2022-11-10 RX ADMIN — INSULIN GLARGINE-YFGN 3 UNITS: 100 INJECTION, SOLUTION SUBCUTANEOUS at 18:05

## 2022-11-10 RX ADMIN — BACLOFEN 5 MG: 10 TABLET ORAL at 20:47

## 2022-11-10 RX ADMIN — AMITRIPTYLINE HYDROCHLORIDE 50 MG: 25 TABLET, FILM COATED ORAL at 20:47

## 2022-11-10 RX ADMIN — LEVOTHYROXINE SODIUM 112 MCG: 0.11 TABLET ORAL at 05:46

## 2022-11-10 RX ADMIN — ARFORMOTEROL TARTRATE 15 MCG: 15 SOLUTION RESPIRATORY (INHALATION) at 20:17

## 2022-11-10 RX ADMIN — CALCIUM CARBONATE-VITAMIN D TAB 500 MG-200 UNIT 1 TABLET: 500-200 TAB at 09:07

## 2022-11-10 RX ADMIN — NYSTATIN 500000 UNITS: 100000 SUSPENSION ORAL at 20:47

## 2022-11-10 RX ADMIN — ROSUVASTATIN 20 MG: 20 TABLET, FILM COATED ORAL at 09:07

## 2022-11-10 RX ADMIN — METHYLPREDNISOLONE SODIUM SUCCINATE 40 MG: 40 INJECTION, POWDER, FOR SOLUTION INTRAMUSCULAR; INTRAVENOUS at 20:47

## 2022-11-10 RX ADMIN — PREGABALIN 75 MG: 25 CAPSULE ORAL at 20:47

## 2022-11-10 RX ADMIN — PANTOPRAZOLE SODIUM 40 MG: 40 TABLET, DELAYED RELEASE ORAL at 05:46

## 2022-11-10 RX ADMIN — BUDESONIDE 500 MCG: 0.5 SUSPENSION RESPIRATORY (INHALATION) at 08:49

## 2022-11-10 RX ADMIN — ARFORMOTEROL TARTRATE 15 MCG: 15 SOLUTION RESPIRATORY (INHALATION) at 08:48

## 2022-11-10 RX ADMIN — PREGABALIN 75 MG: 25 CAPSULE ORAL at 13:36

## 2022-11-10 RX ADMIN — Medication 10 ML: at 09:21

## 2022-11-10 RX ADMIN — CELECOXIB 100 MG: 100 CAPSULE ORAL at 09:04

## 2022-11-10 RX ADMIN — METHYLPREDNISOLONE SODIUM SUCCINATE 40 MG: 40 INJECTION, POWDER, FOR SOLUTION INTRAMUSCULAR; INTRAVENOUS at 09:07

## 2022-11-10 RX ADMIN — PRAMIPEXOLE DIHYDROCHLORIDE 0.5 MG: 0.25 TABLET ORAL at 20:47

## 2022-11-10 RX ADMIN — INSULIN LISPRO 4 UNITS: 100 INJECTION, SOLUTION INTRAVENOUS; SUBCUTANEOUS at 12:05

## 2022-11-10 RX ADMIN — BUDESONIDE 500 MCG: 0.5 SUSPENSION RESPIRATORY (INHALATION) at 20:18

## 2022-11-10 RX ADMIN — ACETAMINOPHEN 650 MG: 325 TABLET, FILM COATED ORAL at 13:43

## 2022-11-10 RX ADMIN — ENOXAPARIN SODIUM 30 MG: 100 INJECTION SUBCUTANEOUS at 20:47

## 2022-11-10 RX ADMIN — BENZONATATE 100 MG: 100 CAPSULE ORAL at 20:47

## 2022-11-10 RX ADMIN — PREGABALIN 75 MG: 25 CAPSULE ORAL at 09:04

## 2022-11-10 RX ADMIN — GUAIFENESIN 400 MG: 400 TABLET ORAL at 09:07

## 2022-11-10 RX ADMIN — Medication 10 ML: at 16:32

## 2022-11-10 RX ADMIN — LOSARTAN POTASSIUM 25 MG: 25 TABLET, FILM COATED ORAL at 12:05

## 2022-11-10 RX ADMIN — NYSTATIN 500000 UNITS: 100000 SUSPENSION ORAL at 13:37

## 2022-11-10 ASSESSMENT — ENCOUNTER SYMPTOMS
PHOTOPHOBIA: 0
GASTROINTESTINAL NEGATIVE: 1
TROUBLE SWALLOWING: 0
NAUSEA: 0
SORE THROAT: 0
VOMITING: 0
CHEST TIGHTNESS: 0
COLOR CHANGE: 0
ABDOMINAL DISTENTION: 0
SHORTNESS OF BREATH: 1
CHOKING: 0
COUGH: 1

## 2022-11-10 ASSESSMENT — PAIN DESCRIPTION - ORIENTATION
ORIENTATION: MID

## 2022-11-10 ASSESSMENT — PAIN DESCRIPTION - LOCATION
LOCATION: CHEST;HEAD
LOCATION: CHEST;HEAD
LOCATION: CHEST

## 2022-11-10 ASSESSMENT — PAIN DESCRIPTION - DESCRIPTORS
DESCRIPTORS: ACHING

## 2022-11-10 ASSESSMENT — PAIN SCALES - GENERAL
PAINLEVEL_OUTOF10: 0
PAINLEVEL_OUTOF10: 8
PAINLEVEL_OUTOF10: 0

## 2022-11-10 NOTE — PROGRESS NOTES
[facility]  Internal Medicine Residency Program  Progress Note - House Team 1    Patient:  La Nena Duff 79 y.o. female MRN: [id number]     Date of Service: 11/10/2022     CC: cough, SOB  Overnight events: No acute overnight events. Have stopped duonebs, started mucinex and flutter valve with albuterol. Subjective     Patient was sitting comfortably in her chair eating breakfast this morning. She states she overall is starting to feel better but is still experiencing a productive cough and shortness of breath. She states that she is using her CPAP at night and 3L O2 NC during the day. Per pulmonology, anticipate transition to oral steroids 11/11. Doxycycline will be complete 11/10. Patient would benefit from outpatient PSG to obtain a new CPAP machine. Review of Systems   Constitutional:  Negative for appetite change, chills, diaphoresis and fatigue. HENT:  Negative for congestion, sore throat and trouble swallowing. Eyes:  Negative for photophobia and visual disturbance. Respiratory:  Positive for cough and shortness of breath. Negative for choking and chest tightness. Cardiovascular:  Negative for chest pain, palpitations and leg swelling. Gastrointestinal:  Negative for abdominal distention, nausea and vomiting. Genitourinary:  Negative for frequency and urgency. Musculoskeletal:  Negative for arthralgias and myalgias. Skin:  Negative for color change and pallor. Neurological:  Negative for dizziness, speech difficulty and headaches. Objective     Physical Exam:  Vitals: BP (!) 166/87   Pulse 71   Temp 97.5 °F (36.4 °C) (Oral)   Resp 18   Ht 5' 6\" (1.676 m)   Wt 229 lb 11.2 oz (104.2 kg)   SpO2 96%   BMI 37.07 kg/m²     I & O - 24hr: No intake/output data recorded. General Appearance: alert, appears stated age, cooperative, and no distress  HEENT:  Head: Normocephalic, no lesions, without obvious abnormality.   Neck: supple, symmetrical, trachea midline and thyroid not enlarged, symmetric, no tenderness/mass/nodules  Lung: wheezes bilaterally and improvement since yesterday  Heart: regular rate and rhythm, S1, S2 normal, no murmur, click, rub or gallop  Abdomen: soft, non-tender; bowel sounds normal; no masses,  no organomegaly  Extremities:  extremities normal, atraumatic, no cyanosis or edema  Musculokeletal: No joint swelling, no muscle tenderness. ROM normal in all joints of extremities. Neurologic: Mental status: Alert, oriented, thought content appropriate  Subject  Pertinent Labs & Imaging Studies   yusuf  CBC:   Lab Results   Component Value Date/Time    WBC 6.8 11/10/2022 05:03 AM    RBC 4.07 11/10/2022 05:03 AM    HGB 11.6 11/10/2022 05:03 AM    HCT 38.0 11/10/2022 05:03 AM    MCV 93.4 11/10/2022 05:03 AM    MCH 28.5 11/10/2022 05:03 AM    MCHC 30.5 11/10/2022 05:03 AM    RDW 12.5 11/10/2022 05:03 AM     11/10/2022 05:03 AM    MPV 10.0 11/10/2022 05:03 AM     BMP:    Lab Results   Component Value Date/Time     11/10/2022 05:03 AM    K 4.9 11/10/2022 05:03 AM    CL 97 11/10/2022 05:03 AM    CO2 36 11/10/2022 05:03 AM    BUN 24 11/10/2022 05:03 AM    LABALBU 3.7 11/05/2022 02:29 PM    LABALBU 3.9 10/26/2011 04:50 AM    CREATININE 0.8 11/10/2022 05:03 AM    CALCIUM 9.8 11/10/2022 05:03 AM    GFRAA >60 06/23/2022 02:12 PM    LABGLOM >60 11/10/2022 05:03 AM    GLUCOSE 251 11/10/2022 05:03 AM    GLUCOSE 124 10/26/2011 04:50 AM     ABG:    Lab Results   Component Value Date/Time    PH 7.343 11/10/2022 09:56 AM    PH 7.444 10/25/2011 09:00 PM    PCO2 57.2 11/10/2022 09:56 AM    PO2 91.5 11/10/2022 09:56 AM    HCO3 30.4 11/10/2022 09:56 AM    BE 3.3 11/10/2022 09:56 AM    O2SAT 96.6 11/10/2022 09:56 AM       Student's Assessment and Plan     1. Acute respiratory insufficiency secondary to COPD exacerbation   -Blood cultures, respiratory panel both negative, ProCal 0.2, leukocytosis resolved   -Pulmonology recommending continue current care, transition to oral steroids 11/11, doxycycline complete 11/10, outpatient pulmonary rehab to obtain a new CPAP   -Continue breathing treatments   -Monitor glucose control   2. Hx HTN   -Stop Maxzide and switch to ARB for BP control  3. Leg cramps   -Started Pramipexole, patient noted that it did not seem to improve her leg cramps, consider discontinuing    4. Hx HLD   -Continue home Crestor  5. Hx T2DM   -Patient switched to MDSS overnight   -Consider addition of premeal or long acting agent  6. Hx Chronic back pain   -Continue home Celexa, Celebrex, Lyrica, and Baclofen  7. Hx Depression   -Continue home Celexa  8. Hx Hypothyroidism   -Continue home Synthroid  9. Hx Sleep apnea   -Continue BiPAP at night inpatient   -Will need outpatient workup to obtain home CPAP    PT/OT evaluation: 20/24  DVT prophylaxis/ GI prophylaxis: Lovenox / Protonix  Disposition: continue current care    Carie García OMS-III  Attending physician: Dr. Manasa Johnson

## 2022-11-10 NOTE — PROGRESS NOTES
Progress Note  Date:11/10/2022       VWQB:[id number]  Patient Name:Angelita Beth     YOB: 1955     Age:67 y.o. Subjective    Subjective:  Symptoms:  Improved. She reports shortness of breath and cough. Diet:  Adequate intake. Activity level: Impaired due to weakness. Pain:  She reports no pain. Review of Systems   Constitutional:  Positive for activity change. Negative for fever. Respiratory:  Positive for cough and shortness of breath. Cardiovascular: Negative. Gastrointestinal: Negative. Genitourinary: Negative. Objective         Vitals Last 24 Hours:  TEMPERATURE:  Temp  Av °F (36.7 °C)  Min: 97.5 °F (36.4 °C)  Max: 98.3 °F (36.8 °C)  RESPIRATIONS RANGE: Resp  Av.2  Min: 18  Max: 19  PULSE OXIMETRY RANGE: SpO2  Av.7 %  Min: 96 %  Max: 100 %  PULSE RANGE: Pulse  Av.8  Min: 64  Max: 77  BLOOD PRESSURE RANGE: Systolic (72JAP), IHB:739 , Min:145 , DJE:369   ; Diastolic (95WOU), OCS:16, Min:73, Max:90    I/O (24Hr): Intake/Output Summary (Last 24 hours) at 11/10/2022 1128  Last data filed at 2022 1529  Gross per 24 hour   Intake 360 ml   Output --   Net 360 ml     Objective:  General Appearance:  General patient appearance: Improving. Vital signs: (most recent): Blood pressure (!) 166/87, pulse 71, temperature 97.5 °F (36.4 °C), temperature source Oral, resp. rate 18, height 5' 6\" (1.676 m), weight 229 lb 11.2 oz (104.2 kg), SpO2 96 %. No fever. Lungs:  She is not in respiratory distress. There are wheezes. Heart: Normal rate. Regular rhythm. Skin:  Warm.     Labs/Imaging/Diagnostics    Labs:  CBC:  Recent Labs     22  1436 22  0457 11/10/22  0503   WBC 7.9 6.6 6.8   RBC 3.86 3.81 4.07   HGB 11.3* 10.8* 11.6   HCT 36.9 35.9 38.0   MCV 95.6 94.2 93.4   RDW 13.2 12.9 12.5    389 413     CHEMISTRIES:  Recent Labs     22  1436 22  0453 22  0457 11/10/22  0503     --  140 137   K 4.2 --  4.6 4.9   CL 96*  --  99 97*   CO2 24  --  30* 36*   BUN 17  --  17 24*   CREATININE 0.7  --  0.7 0.8   GLUCOSE 344*  --  199* 251*   MG  --  2.0  --   --      PT/INR:No results for input(s): PROTIME, INR in the last 72 hours. APTT:No results for input(s): APTT in the last 72 hours. LIVER PROFILE:No results for input(s): AST, ALT, BILIDIR, BILITOT, ALKPHOS in the last 72 hours. Imaging Last 24 Hours:  XR CHEST PORTABLE    Result Date: 11/8/2022  EXAMINATION: ONE XRAY VIEW OF THE CHEST 11/8/2022 3:02 pm COMPARISON: 11/05/2022 HISTORY: ORDERING SYSTEM PROVIDED HISTORY: COPD exacerbation TECHNOLOGIST PROVIDED HISTORY: Reason for exam:->COPD exacerbation What reading provider will be dictating this exam?->CRC FINDINGS: Poor inspiratory effort is seen. The cardiomediastinal silhouette is enlarged. The lungs are without acute focal process. There is no effusion or pneumothorax. The osseous structures are without acute process. Cardiomegaly demonstrates no change. Assessment//Plan           Hospital Problems             Last Modified POA    * (Principal) COPD exacerbation ([address] 11/9/2022 Yes    Acute and chronic respiratory failure with hypoxia ([address] 11/9/2022 Yes    DM type 2 with diabetic peripheral neuropathy ([address] 11/9/2022 Yes     Assessment:   (Principal Problem:    COPD exacerbation ([address]  Active Problems:    Acute and chronic respiratory failure with hypoxia (HCC)    DM type 2 with diabetic peripheral neuropathy ([address]  Resolved Problems:    * No resolved hospital problems. *    ). Plan:   Discharge home. Out of bed and up to chair and encourage ambulation. Start/continue incentive spirometry. Consults: pulmonology. Regular diet. (Conversion from IV to oral steroids  Pulmonology working on home BiPAP).      Electronically signed by Nanda Burr MD on 11/10/22 at 11:28 AM EST

## 2022-11-10 NOTE — PROGRESS NOTES
Occupational Therapy  OCCUPATIONAL THERAPY INITIAL EVALUATION    [facility] [id number] [address]      Date:11/10/2022                                                Patient Name: Mahad Clarke  MRN: [id number]  : 1955  Room: [address] [place] [address]    Evaluating OT: Arsen Thomas OTR/L #[id number]     Referring Provider: Marianne Contreras DO  Specific Provider Orders/Date: OT eval and treat 22    Diagnosis: COPD exacerbation ([address] [J44.1]  Acute respiratory failure with hypoxia ([address] [J96.01]   Pt admitted to hospital with SOB, fatigue and weakness     Pertinent Medical History:  has a past medical history of Adrenal incidentaloma ([address] Anxiety, Arthritis, Asthma, Bladder incontinence, Cancer ([address] Chronic back pain, COPD (chronic obstructive pulmonary disease) ([address] Depression, Fibromyalgia, GERD (gastroesophageal reflux disease), Hyperlipidemia, Hypertension, Hypothyroidism, MGUS (monoclonal gammopathy of unknown significance), Neuropathy, Pneumonia, Prolonged emergence from general anesthesia, Sleep apnea, Type II or unspecified type diabetes mellitus without mention of complication, not stated as uncontrolled, and Vaginal bleeding.        Precautions:  Fall Risk, O2    Assessment of current deficits    [x] Functional mobility  [x]ADLs  [x] Strength               []Cognition    [x] Functional transfers   [x] IADLs         [x] Safety Awareness   [x]Endurance    [] Fine Coordination              [x] Balance      [] Vision/perception   []Sensation     []Gross Motor Coordination  [] ROM  [] Delirium                   [] Motor Control     OT PLAN OF CARE   OT POC based on physician orders, patient diagnosis and results of clinical assessment    Frequency/Duration 1-3 days/wk for 2 weeks PRN   Specific OT Treatment Interventions to include:   * Instruction/training on adapted ADL techniques and AE recommendations to increase functional independence within precautions       * Training on energy conservation strategies, correct breathing pattern and techniques to improve independence/tolerance for self-care routine  * Functional transfer/mobility training/DME recommendations for increased independence, safety, and fall prevention  * Patient/Family education to increase follow through with safety techniques and functional independence  * Recommendation of environmental modifications for increased safety with functional transfers/mobility and ADLs  * Therapeutic exercise to improve motor endurance, ROM, and functional strength for ADLs/functional transfers  * Therapeutic activities to facilitate/challenge dynamic balance, stand tolerance for increased safety and independence with ADLs    Recommended Adaptive Equipment:  TBD     Home Living: Pt lives with son in a 1 story home with 2 DEJAN and 1 hand rail    Bathroom setup: tub/shower combo    Equipment owned: spc    Prior Level of Function: Independent  with ADLs , Independent  with IADLs; ambulated with SPC prn   Driving: yes   Occupation: na    Pain Level: Pt denies pain this session    Cognition: A&O: 4/4; Follows 2 step directions   Memory:  good   Sequencing:  good   Problem solving:  good   Judgement/safety:  fair     Functional Assessment:  AM-PAC Daily Activity Raw Score: 20/24   Initial Eval Status  Date: 11/10/22 Treatment Status  Date: STGs = LTGs  Time frame: 10-14 days   Feeding Independent      Grooming Stand by Assist   Modified [place]    UB Dressing Independent       LB Dressing Stand by Assist   Modified [place]    Bathing Stand by Assist  Modified [place]    Toileting Stand by Assist   Modified [place]    Bed Mobility  Supine to sit: Supervision   Sit to supine: NT  Supine to sit: Modified [place]   Sit to supine: Modified [place]    Functional Transfers Stand by Assist   Modified [place]    Functional Mobility Stand by Assist     Ambulated in room without AD Modified [place]    Balance Sitting:     Static:  indep    Dynamic:indep  Standing: SBA     Activity Tolerance F  G   Visual/  Perceptual wfl                      Hand Dominance right   Strength ROM Additional Info:    RUE   4/5 wfl good  and wfl FMC/dexterity noted during ADL tasks     LUE 4/5 wfl good  and wfl FMC/dexterity noted during ADL tasks     Hearing: wfl  Sensation:wfl  Tone: wfl  Edema:none noted     Comments: Upon arrival patient supine in bed and agreeable to OT session. Therapist educated pt on role of OT. At end of session, patient seated in chair with call light and phone within reach, all lines and tubes intact. Overall patient demonstrated decreased independence and safety during completion of ADL/functional transfer/mobility tasks. Pt would benefit from continued skilled OT to increase safety and independence with completion of ADL/IADL tasks for functional independence and quality of life. Treatment: OT treatment provided this date includes: Facilitation of bed mobility, unsupported sitting balance (impacting ADLs; addressing posture, weight shifting, dynamic reaching), functional transfers (various surfaces: bed, toilet, chair), standing tolerance tasks (addressing posture, balance and activity tolerance while incorporating light functional reaching; impacting ADLs and functional activity) and functional ambulation tasks without AD (including to/ from bathroom and in preparation for item retrieval tasks; cuing on posture and safety) - skilled cuing on hand placement, posture, body mechanics, energy conservation techniques and safety. Therapist facilitated self-care retraining: UB/LB self-care tasks (robe, socks), simulated toileting task and standing grooming tasks at sink while educating pt on modified techniques, posture, safety and energy conservation techniques. Skilled monitoring of HR, O2 sats and pts response to treatment.       Rehab Potential: Good  for established goals     Patient / Family Goal: return home      Patient and/or family were instructed on functional diagnosis, prognosis/goals and OT plan of care. Demonstrated fair+ understanding. Eval Complexity: Low    Time In: 755  Time Out: 820  Total Treatment Time: 10 minutes    Min Units   OT Eval Low 97165  x  1   OT Eval Medium 31672      OT Eval High 39865      OT Re-Eval [id number]       Therapeutic Ex 75163       Therapeutic Activities 89167  2     ADL/Self Care 53126  8  1   Orthotic Management 05831       Manual [id number]     Neuro Re-Ed [id number]       Non-Billable Time          Evaluation Time additionally includes thorough review of current medical information, gathering information on past medical history/social history and prior level of function, interpretation of standardized testing/informal observation of tasks, assessment of data and development of plan of care and goals.           Terra Morrissey OTR/L #[id number]

## 2022-11-10 NOTE — PROGRESS NOTES
Physical Therapy  Physical Therapy Initial Evaluation    Name: Kedar Nash  : 1955  MRN: [id number]      Date of Service: 11/10/2022    Evaluating PT:  Kayleigh Cagle, PT [id number]    Referring provider/PT Order:  PT Eval and Treat   22 1030  PT eval and treat       Kaycee Ibarra DO     Room #:  4474/7639-S  Diagnosis:  COPD exacerbation ([address] [J44.1]  Acute respiratory failure with hypoxia ([address] [J96.01]  PMHx/PSHx:     has a past medical history of Adrenal incidentaloma ([address] Anxiety, Arthritis, Asthma, Bladder incontinence, Cancer ([address] Chronic back pain, COPD (chronic obstructive pulmonary disease) ([address] Depression, Fibromyalgia, GERD (gastroesophageal reflux disease), Hyperlipidemia, Hypertension, Hypothyroidism, MGUS (monoclonal gammopathy of unknown significance), Neuropathy, Pneumonia, Prolonged emergence from general anesthesia, Sleep apnea, Type II or unspecified type diabetes mellitus without mention of complication, not stated as uncontrolled, and Vaginal bleeding. has a past surgical history that includes knee surgery (); Upper gastrointestinal endoscopy (); Colonoscopy (2016); Upper gastrointestinal endoscopy (2016); Nerve Block (Bilateral, 2016); Nerve Block (2020); Pain management procedure (N/A, 2020); Nerve Block (Bilateral, 08/10/2020); Anesthesia Nerve Block (Bilateral, 8/10/2020); other surgical history (2016); Colonoscopy (); Upper gastrointestinal endoscopy (); Upper gastrointestinal endoscopy (N/A, 2020); Colonoscopy (N/A, 2020); Colonoscopy (2020); and Dilation and curettage of uterus (N/A, 2021). Procedure/Surgery:  none  Precautions:  Falls, O2 , FWB (full weight bearing) ,   Equipment Needs: Patient has needed equipment ,    SUBJECTIVE:    Patient lives with son  in a ranch home  with 2 steps to enter with 1Rail  Bed is on 1 floor and bath is on 1 floor.   Patient ambulated independently and with cane  PTA. Equipment owned: Cane and O2,      OBJECTIVE:   Initial Evaluation  Date: 11/10/22 Treatment Short Term/ Long Term   Goals   AM-PAC 6 Clicks 57/22     Was pt agreeable to Eval/treatment? yes     Does pt have pain? none     Bed Mobility  Rolling: Ind  Supine to sit: Ind   Sit to supine: Ind   Scooting: Ind   Rolling: Ind  Supine to sit: Ind  Sit to supine: Ind  Scooting: Ind   Transfers Sit to stand: SBA   Stand to sit: SBA  Stand pivot: SBA   Sit to stand: Mod Ind    Stand to sit: Ind   Stand pivot: Ind    Ambulation    40 feet with no device SBAno LOB. 150+ feet with Ind    Stair negotiation: ascended and descended  NT  2 steps with Min      Strength/ROM:     RLE grossly 4+/5  LLE grossly 4+/5  RLE AROM WFL  LLE AROM WFL    Balance:   Static Sitting: Ind  Dynamic Sitting: Ind  Static Standing: SBA   Dynamic Standing: SBA       Patient is Alert & Oriented x person, place, time, and situation and follows directions   Sensation:  Pt denies numbness and tingling to extremities  Edema:  none  Therapeutic Exercises:  Functional activity as stated above. Patient education  Pt educated on role of Physical Therapy, risks of immobility, safety and plan of care, importance of positional changes for oxygen exchange,  importance of mobility while in hospital , purse lip breathing, and O2 line management and safety  and use of call light for safety.      Patient response to education:   Pt verbalized understanding Pt demonstrated skill Pt requires further education in this area   yes yes Reminders     ASSESSMENT:    Conditions Requiring Skilled Therapeutic Intervention:    [x]Decreased strength     [x]Decreased ROM  [x]Decreased functional mobility  [x]Decreased balance   [x]Decreased endurance   [x]Decreased posture  []Decreased sensation  []Decreased coordination   []Decreased vision  [x]Decreased safety awareness   []Increased pain       Comments:    RN cleared patient for participation in therapy session. Patient was seen this date for PT evaluation. . Patient was agreeable to intervention. Results of the functional assessment are noted above. Upon entering the room patient was found supine in bed. Ind for bed mobility. . Sat EOB x 5 minutes to increase dynamic sitting balance and activity tolerance. STS and transfers completed cues for safety. Gait completed without device SBA cues for O2 line management. No LOB noted  At end of session, patient in chair with  call light and phone within reach,  all lines and tubes intact, nursing notified. This patient can benefit from the continuation of skilled PT  to maximize functional level and return to PLOF. Treatment:  Patient completed and was instructed in the following treatment:      Bed mobility training - pt given verbal and tactile cues to facilitate proper sequencing and safety during rolling and supine>sit as well as provided with physical assistance to complete task    STS and transfer training - educated on hand/foot placement, safety, and sequencing during STS and pivot transfers. Gait training - verbal cues for approximation/negotiation, upright posture, and safety during 90 and 180 degree turns during gait   Education in PLB, pacing and use of call light for safety  Vitals and symptoms were closely monitored throughout session. Pt's/ family goals      1. Home with son when able. Prognosis is good  for reaching above PT goals.     Patient and or family understand(s) diagnosis, prognosis, and plan of care.  yes,     PHYSICAL THERAPY PLAN OF CARE:    PT POC is established based on physician order and patient diagnosis     Diagnosis:  COPD exacerbation ([address] [J44.1]  Acute respiratory failure with hypoxia ([address] [J96.01]  Specific instructions for next treatment:  Transfer to bedside chair, Increase ambulation distance, and BLE therapeutic exercise  Current Treatment Recommendations:     [x] Strengthening to improve independence with functional mobility   [x] ROM to improve independence with functional mobility   [x] Balance Training to improve static/dynamic balance and to reduce fall risk  [x] Endurance Training to improve activity tolerance during functional mobility   [x] Transfer Training to improve safety and independence with all functional transfers   [x] Gait Training to improve gait mechanics, endurance and asses need for appropriate assistive device  [x] Stair Training in preparation for safe discharge home and/or into the community when appropriate  [] Positioning to prevent skin breakdown and contractures  [x] Safety and Education Training   [] Patient/Caregiver Education   [] HEP  [] Gait Team to be added to POC  [] Other     PT long term treatment goals are located in above grid    Frequency of treatments: 2-5x/week x 1-2 weeks. Time in  0755  Time out  0820    Total Treatment Time  10 minutes     Evaluation Time includes thorough review of current medical information, gathering information on past medical history/social history and prior level of function, completion of standardized testing/informal observation of tasks, assessment of data and education on plan of care and goals.     CPT codes:  [x] Low Complexity PT evaluation 39472  [] Moderate Complexity PT evaluation 37447  [] High Complexity PT evaluation 71356  [] PT Re-evaluation 29667  [] Gait training 69196 - minutes  [] Manual therapy 78743  minutes  [x] Therapeutic activities 52834 -10 minutes  [] Therapeutic exercises 32720 - minutes  [] Neuromuscular reeducation [address] [place] minutes     Kayleigh Cagle, [id number] [facility] [place]

## 2022-11-10 NOTE — PROGRESS NOTES
Pulmonary Progress Note    Admit Date: 11/5/2022                            PCP: Bradley Navarro MD  Principal Problem:    COPD exacerbation ([facility]  Active Problems:    Acute and chronic respiratory failure with hypoxia ([facility]    DM type 2 with diabetic peripheral neuropathy ([facility]  Resolved Problems:    * No resolved hospital problems.  *      Subjective:  Patient seen sitting in chair on 2 L nasal cannula, weaned from 3 L yesterday  Patient notes to feeling better but still with frequent, nonproductive productive cough, wheezing and shortness of breath  Patient unable to take a deep breath in without going into a coughing fit      Medications:   dextrose      sodium chloride          acetaZOLAMIDE (DIAMOX) IVPB  500 mg IntraVENous Once    insulin lispro  0-8 Units SubCUTAneous TID WC    insulin lispro  0-4 Units SubCUTAneous Nightly    pramipexole  0.5 mg Oral Nightly    methylPREDNISolone  40 mg IntraVENous Q12H    nystatin  5 mL Oral 4x Daily    doxycycline hyclate  100 mg Oral 2 times per day    amitriptyline  50 mg Oral Nightly    aspirin  81 mg Oral Daily    baclofen  5 mg Oral TID    oyster shell calcium w/D  1 tablet Oral Daily    celecoxib  100 mg Oral Daily    citalopram  40 mg Oral Daily    [Held by provider] cetirizine  5 mg Oral Daily    levothyroxine  112 mcg Oral Daily    montelukast  10 mg Oral Nightly    pantoprazole  40 mg Oral QAM AC    pregabalin  75 mg Oral TID    rosuvastatin  20 mg Oral Daily    [Held by provider] triamterene-hydroCHLOROthiazide  1 tablet Oral Daily    sodium chloride flush  5-40 mL IntraVENous 2 times per day    enoxaparin  30 mg SubCUTAneous BID    budesonide  0.5 mg Nebulization BID    Arformoterol Tartrate  15 mcg Nebulization BID    guaiFENesin  400 mg Oral TID       Vitals:  VITALS:  BP (!) 166/87   Pulse 71   Temp 97.5 °F (36.4 °C) (Oral)   Resp 18   Ht 5' 6\" (1.676 m)   Wt 229 lb 11.2 oz (104.2 kg)   SpO2 96%   BMI 37.07 kg/m²   24HR INTAKE/OUTPUT: Intake/Output Summary (Last 24 hours) at 11/10/2022 0933  Last data filed at 2022 1529  Gross per 24 hour   Intake 360 ml   Output --   Net 360 ml     CURRENT PULSE OXIMETRY:  SpO2: 96 %  24HR PULSE OXIMETRY RANGE:  SpO2  Av.7 %  Min: 96 %  Max: 100 %  CVP:    VENT SETTINGS:      Additional Respiratory Assessments  Heart Rate: 71  Resp: 18  SpO2: 96 %      EXAM:  General: No distress. 2L  Eyes: No sclera icterus. No conjunctival injection. ENT: No discharge. Pharynx clear. Neck: Trachea midline. Normal thyroid. Resp: No accessory muscle use. Posterior expiratory wheezing, posteriorly  CV: Regular rate. Regular rhythm. No mumur or rub. ABD: Non-tender. Non-distended. Normal bowel sounds. Skin: Warm and dry. No nodule on exposed extremities. No rash on exposed extremities. Ext: No joint deformity. No clubbing. No cyanosis. No edema  Neuro: Awake. Follows commands ox3, SIMMONS    I/O: I/O last 3 completed shifts: In: 360 [P.O.:360]  Out: -   No intake/output data recorded.      Results:  CBC:   Recent Labs     22  1436 22  0457 11/10/22  0503   WBC 7.9 6.6 6.8   HGB 11.3* 10.8* 11.6   HCT 36.9 35.9 38.0   MCV 95.6 94.2 93.4    389 413     BMP:   Recent Labs     22  1436 22  0457 11/10/22  0503    140 137   K 4.2 4.6 4.9   CL 96* 99 97*   CO2 24 30* 36*   BUN 17 17 24*   CREATININE 0.7 0.7 0.8       Procalcitonin:   Recent Labs     22  1436   PROCAL 0.13*     Cultures:  Recent Labs     22   CULTRESP Oral Pharyngeal Buffy present      22    CULTURE, RESPIRATORY Oral Pharyngeal Buffy reduced P    Smear, Respiratory Abundant Polymorphonuclear leukocytes   Epithelial cells not seen   No organisms seen      Cultures:    Latest Reference Range & Units 22 21:09   Human Rhinovirus/Enterovirus by PCR Not Detected  Not Detected   Influenza A by PCR Not Detected  Not Detected   Influenza B by PCR Not Detected  Not Detected   Adenovirus by PCR Not Detected  Not Detected   Coronavirus 229E by PCR Not Detected  Not Detected   Coronavirus HKU1 by PCR Not Detected  Not Detected   Coronavirus NL63 by PCR Not Detected  Not Detected   Coronavirus OC43 by PCR Not Detected  Not Detected   Human Metapneumovirus by PCR Not Detected  Not Detected   Parainfluenza Virus 1 by PCR Not Detected  Not Detected   Parainfluenza Virus 2 by PCR Not Detected  Not Detected   Parainfluenza Virus 3 by PCR Not Detected  Not Detected   Parainfluenza Virus 4 by PCR Not Detected  Not Detected   Respiratory Syncytial Virus by PCR Not Detected  Not Detected   Bordetella parapertussis by PCR Not Detected  Not Detected   Chlamydophilia pneumoniae by PCR Not Detected  Not Detected   Mycoplasma pneumoniae by PCR Not Detected  Not Detected   SARS-CoV-2, PCR Not Detected  Not Detected   Bordetella pertussis by PCR Not Detected  Not Detected             ABG:   No results for input(s): PH, PO2, PCO2, HCO3, BE, O2SAT in the last 72 hours. Films:  XR CHEST PORTABLE 11/5/2022 No acute process. 11/8: Chest x-ray clear    Assessment:  79 YOF retired  from [employer], vaccinated for [employer], 51-py ex-smoker known to Dr. Chencho Perdomo, with history of HLD, HTN, GERD however has not been to office since before 2018 with asthma/COPD on 2 L of oxygen at home overlap was on inhalers but now just takes albuterol as needed. She has EDWARD and non-compliant,     11/5 who presented to the ED on increased shortness of breath  Placed on 2 L saturations 85% on room air  WBC 11.7, troponin 10> 9  Respiratory panel negative  Chest x-ray negative  11/6: 2 L , Pro-Ronnie 0.20, ABG 7.47/35.4/135/20 5.4 on 2 L  11/8: Pulmonary consulted for shortness of breath  Chest x-ray negative, Repeat procalcitonin 0.13  Respiratory culture negative  11/9: on 3L  11/10: 3L  Says she is coughing up green secretions and has some chest discomfort. ABGs with respiratory acidosis.   Patient placed on BiPAP    Acute exacerbation COPD 2020 FEV1 2.15 L 60% predicted FVC 2.75 L 68% predicted  Acute hypercapnic respiratory failure  Chronic hypoxic respiratory failure on 2 L -at baseline  History of lung nodule 4/14/22 on screening CT new 8 mm nodule in left lower lobe  Bronchitis doxycycline culture negative  EDWARD-non compliant  Left restless leg syndrome  IgG lambda monoclonal paraprotein/MGUS with elevated IgG with negative bone marrow biopsy 11/20/2016  Diabetes with neuropathy on pregabalin  Chronic back pain with canal stenosis L4-5, central canal stenosis L3-4 due to fibromyalgia amitriptyline Celebrex baclofen  Osteoporosis  Hypothyroid with history of thyroid goiter and nodule with negative thyroid biopsy  DVT prophylaxis with Lovenox  History of pneumonia 2020     Plan:  Maintain POX >90%, currently on 3L , 2L is her baseline  Continue brovana and budesonide. Not on maintenance at home, has not seen in office since 2018. Will benefit from daily inhaler at [place]. PFT as outpatient  Duonebs stopped due to thick mucous. Continue Mucinex and flutter valve with albuterol  Continue Solumedrol 40 q12H for now as patient still with expiratory wheezing, cough and shortness of breath. Hope to transition to p.o. 11/11  On singuair   Continue doxycycline, will complete 11/10. PCT 0.20 (11/6) > 0.13 (11/8). Sputum cx 11/8 negative. Continue IS for pulmonary hygiene  PSG as outpatient to obtain new CPAP machine  Will benefit from outpatient pulm rehab and regular follow up   Supportive care     Electronically signed by BRYAN Orourke CNP on 11/10/2022 at 9:33 AM    I had a face-to-face encounter with the patient at the bedside. I agree with the nurse practitioner's note and assessment and plan as detailed above. Necessary editing and changes made to the note by myself. Patient placed back on BiPAP. Discussed with nursing. Elevated blood sugars due to steroids  Still wheezing.   Encouraged her to wear the machine all night and when she naps.  She has a prescription to have a repeat sleep study but has not gone for it yet

## 2022-11-10 NOTE — PROGRESS NOTES
[address] [place] [address],[address]  Internal Medicine Residency Program  Progress Note - House Team 1    Patient:  Paco Giles 79 y.o. female MRN: [id number]     Date of Service: 11/10/2022     CC: had concerns including Shortness of Breath (With chest congestion, body aches and a headache. Started yesterday morning. ). Overnight events: No significant events overnight. Subjective     The patient was seen by the bedside in the AM.  The patient was sitting comfortably in the chair. She mentioned that she feels better, but still has cough and shortness of breath. She is on 3 L of nasal cannula today. She also mentioned that she has chest pain which has not changed since she was admitted. ROS: Negative except above. Objective     Physical Exam:  Vitals: BP (!) 166/87   Pulse 71   Temp 97.5 °F (36.4 °C) (Oral)   Resp 18   Ht 5' 6\" (1.676 m)   Wt 229 lb 11.2 oz (104.2 kg)   SpO2 96%   BMI 37.07 kg/m²     I & O - 24hr: I/O this shift:  In: 120 [P.O.:120]  Out: -      Constitutional: Alert, oriented x 3. Follows commands. In no apparent distress. Head: Normocephalic and atraumatic. Eyes: Conjunctiva normal, (-) scleral icterus. Mucus membranes moist.  Mouth: Mucus membranes moist. Oropharynx clear. No deviation of the tongue or uvula. Neck: (-)  swelling,   Respiratory: Rhonchorous breath sounds, and wheezes bilaterally. Cardiovascular: RRR. (-)  murmurs, (-) gallops,  (-) rubs. S1 and S2 were normal.   GI:  Abdomen soft, non-tender, non-distended. (+) BS. (-) guarding, (-) rigidity. Extremities: Warm and well perfused. (-) clubbing, (-) cyanosis. (-) peripheral edema. Neurologic:  No focal neurological deficits. No speech slurring or tremor.     Subjective  Pertinent Labs & Imaging Studies     CBC with Differential:    Lab Results   Component Value Date/Time    WBC 6.8 11/10/2022 05:03 AM    RBC 4.07 11/10/2022 05:03 AM    HGB 11.6 11/10/2022 05:03 AM    HCT 38.0 11/10/2022 05:03 AM  11/10/2022 05:03 AM    MCV 93.4 11/10/2022 05:03 AM    MCH 28.5 11/10/2022 05:03 AM    MCHC 30.5 11/10/2022 05:03 AM    RDW 12.5 11/10/2022 05:03 AM    SEGSPCT 36 01/03/2014 09:54 AM    METASPCT 0.9 01/26/2020 07:11 AM    LYMPHOPCT 20.7 11/10/2022 05:03 AM    PROMYELOPCT 3.5 01/23/2020 07:25 AM    MONOPCT 2.8 11/10/2022 05:03 AM    MYELOPCT 0.9 01/26/2020 07:11 AM    BASOPCT 0.3 11/10/2022 05:03 AM    MONOSABS 0.19 11/10/2022 05:03 AM    LYMPHSABS 1.41 11/10/2022 05:03 AM    EOSABS 0.00 11/10/2022 05:03 AM    BASOSABS 0.02 11/10/2022 05:03 AM     Calcium:    Lab Results   Component Value Date/Time    CALCIUM 9.8 11/10/2022 05:03 AM     Ionized Calcium:  No results found for: IONCA  Magnesium:    Lab Results   Component Value Date/Time    MG 2.0 11/09/2022 04:53 AM     Phosphorus:    Lab Results   Component Value Date/Time    PHOS 3.7 01/26/2020 07:11 AM       Resident's Assessment and Plan       Acute respiratory insufficiency secondary to COPD exacerbation  Respiratory cultures 11/8- negative  Pulmonology on board appreciate recommendations. Patient today on 3 L of nasal cannula, 2 L baseline at home. Continue breathing treatments, stopped DuoNeb yesterday due to dry secretions. Continue Mucinex and flutter wall with albuterol. Continue Solu-Medrol 40 every 12 hours, may transition to p.o. 11/11  Continue doxycycline completed 11/10, Pro-Ronnie 0.2,  Continue incentive spirometry for pulmonary hygiene. History of hypertension  Start losartan 25 mg. Monitor blood pressure. History of asthma and COPD overlap syndrome  Patient on 2 L nasal cannula at home. Patient oxygen requirement is 3 L today. History of Hyperlipidemia  Continue Crestor 20 mg daily.     History of type 2 diabetes mellitus  Continue LDSS  Manage hypoglycemia per protocol    Hx of chronic back pain 2/2 fibromyalgia  Continue Celexa 40 mg  Celebrex 100 mg daily  Lyrica 75 mg 3 times a day  Baclofen    History of depression  Celexa 40 MG daily    History of hypothyroidism  Synthyroid 112 mg    Hx of EDWARD  Continue BiPAP at night    PT/OT evaluation: ordered  DVT prophylaxis/ GI prophylaxis: Lovenox / protonix  Disposition:Continue current care    Francis Gonzales MD, PGY-1  Attending physician: Dr. Hari Schultz

## 2022-11-11 LAB
ADENOVIRUS BY PCR: NOT DETECTED
ANION GAP SERPL CALCULATED.3IONS-SCNC: 6 MMOL/L (ref 7–16)
BASOPHILS ABSOLUTE: 0.03 E9/L (ref 0–0.2)
BASOPHILS RELATIVE PERCENT: 0.4 % (ref 0–2)
BLOOD CULTURE, ROUTINE: NORMAL
BORDETELLA PARAPERTUSSIS BY PCR: NOT DETECTED
BORDETELLA PERTUSSIS BY PCR: NOT DETECTED
BUN BLDV-MCNC: 30 MG/DL (ref 6–23)
CALCIUM SERPL-MCNC: 9.5 MG/DL (ref 8.6–10.2)
CHLAMYDOPHILIA PNEUMONIAE BY PCR: NOT DETECTED
CHLORIDE BLD-SCNC: 98 MMOL/L (ref 98–107)
CO2: 32 MMOL/L (ref 22–29)
CORONAVIRUS 229E BY PCR: NOT DETECTED
CORONAVIRUS HKU1 BY PCR: NOT DETECTED
CORONAVIRUS NL63 BY PCR: NOT DETECTED
CORONAVIRUS OC43 BY PCR: NOT DETECTED
CREAT SERPL-MCNC: 0.7 MG/DL (ref 0.5–1)
CULTURE, BLOOD 2: NORMAL
EOSINOPHILS ABSOLUTE: 0 E9/L (ref 0.05–0.5)
EOSINOPHILS RELATIVE PERCENT: 0 % (ref 0–6)
GFR SERPL CREATININE-BSD FRML MDRD: >60 ML/MIN/1.73
GLUCOSE BLD-MCNC: 310 MG/DL (ref 74–99)
HCT VFR BLD CALC: 41.5 % (ref 34–48)
HEMOGLOBIN: 12.8 G/DL (ref 11.5–15.5)
HUMAN METAPNEUMOVIRUS BY PCR: NOT DETECTED
HUMAN RHINOVIRUS/ENTEROVIRUS BY PCR: NOT DETECTED
IMMATURE GRANULOCYTES #: 0.07 E9/L
IMMATURE GRANULOCYTES %: 0.9 % (ref 0–5)
INFLUENZA A BY PCR: NOT DETECTED
INFLUENZA B BY PCR: NOT DETECTED
LYMPHOCYTES ABSOLUTE: 1.27 E9/L (ref 1.5–4)
LYMPHOCYTES RELATIVE PERCENT: 16.2 % (ref 20–42)
MCH RBC QN AUTO: 29.4 PG (ref 26–35)
MCHC RBC AUTO-ENTMCNC: 30.8 % (ref 32–34.5)
MCV RBC AUTO: 95.4 FL (ref 80–99.9)
METER GLUCOSE: 229 MG/DL (ref 74–99)
METER GLUCOSE: 247 MG/DL (ref 74–99)
METER GLUCOSE: 309 MG/DL (ref 74–99)
METER GLUCOSE: 434 MG/DL (ref 74–99)
MONOCYTES ABSOLUTE: 0.31 E9/L (ref 0.1–0.95)
MONOCYTES RELATIVE PERCENT: 3.9 % (ref 2–12)
MYCOPLASMA PNEUMONIAE BY PCR: NOT DETECTED
NEUTROPHILS ABSOLUTE: 6.17 E9/L (ref 1.8–7.3)
NEUTROPHILS RELATIVE PERCENT: 78.6 % (ref 43–80)
PARAINFLUENZA VIRUS 1 BY PCR: NOT DETECTED
PARAINFLUENZA VIRUS 2 BY PCR: NOT DETECTED
PARAINFLUENZA VIRUS 3 BY PCR: NOT DETECTED
PARAINFLUENZA VIRUS 4 BY PCR: NOT DETECTED
PDW BLD-RTO: 12.5 FL (ref 11.5–15)
PLATELET # BLD: 214 E9/L (ref 130–450)
PMV BLD AUTO: 11.1 FL (ref 7–12)
POTASSIUM SERPL-SCNC: 4.8 MMOL/L (ref 3.5–5)
RBC # BLD: 4.35 E12/L (ref 3.5–5.5)
RESPIRATORY SYNCYTIAL VIRUS BY PCR: NOT DETECTED
SARS-COV-2, PCR: NOT DETECTED
SODIUM BLD-SCNC: 136 MMOL/L (ref 132–146)
WBC # BLD: 7.9 E9/L (ref 4.5–11.5)

## 2022-11-11 PROCEDURE — 6370000000 HC RX 637 (ALT 250 FOR IP)

## 2022-11-11 PROCEDURE — 6370000000 HC RX 637 (ALT 250 FOR IP): Performed by: STUDENT IN AN ORGANIZED HEALTH CARE EDUCATION/TRAINING PROGRAM

## 2022-11-11 PROCEDURE — 0202U NFCT DS 22 TRGT SARS-COV-2: CPT

## 2022-11-11 PROCEDURE — 80048 BASIC METABOLIC PNL TOTAL CA: CPT

## 2022-11-11 PROCEDURE — 85025 COMPLETE CBC W/AUTO DIFF WBC: CPT

## 2022-11-11 PROCEDURE — 99231 SBSQ HOSP IP/OBS SF/LOW 25: CPT | Performed by: INTERNAL MEDICINE

## 2022-11-11 PROCEDURE — 2700000000 HC OXYGEN THERAPY PER DAY

## 2022-11-11 PROCEDURE — 94660 CPAP INITIATION&MGMT: CPT

## 2022-11-11 PROCEDURE — 82962 GLUCOSE BLOOD TEST: CPT

## 2022-11-11 PROCEDURE — 94640 AIRWAY INHALATION TREATMENT: CPT

## 2022-11-11 PROCEDURE — 2580000003 HC RX 258: Performed by: STUDENT IN AN ORGANIZED HEALTH CARE EDUCATION/TRAINING PROGRAM

## 2022-11-11 PROCEDURE — S5553 INSULIN LONG ACTING 5 U: HCPCS | Performed by: INTERNAL MEDICINE

## 2022-11-11 PROCEDURE — 36415 COLL VENOUS BLD VENIPUNCTURE: CPT

## 2022-11-11 PROCEDURE — 6360000002 HC RX W HCPCS: Performed by: STUDENT IN AN ORGANIZED HEALTH CARE EDUCATION/TRAINING PROGRAM

## 2022-11-11 PROCEDURE — 6360000002 HC RX W HCPCS

## 2022-11-11 PROCEDURE — 2140000000 HC CCU INTERMEDIATE R&B

## 2022-11-11 PROCEDURE — 6370000000 HC RX 637 (ALT 250 FOR IP): Performed by: INTERNAL MEDICINE

## 2022-11-11 RX ORDER — LOSARTAN POTASSIUM 50 MG/1
50 TABLET ORAL DAILY
Status: DISCONTINUED | OUTPATIENT
Start: 2022-11-11 | End: 2022-11-13 | Stop reason: HOSPADM

## 2022-11-11 RX ORDER — METHYLPREDNISOLONE SODIUM SUCCINATE 40 MG/ML
40 INJECTION, POWDER, LYOPHILIZED, FOR SOLUTION INTRAMUSCULAR; INTRAVENOUS EVERY 8 HOURS
Status: DISCONTINUED | OUTPATIENT
Start: 2022-11-11 | End: 2022-11-12

## 2022-11-11 RX ORDER — INSULIN LISPRO 100 [IU]/ML
0-4 INJECTION, SOLUTION INTRAVENOUS; SUBCUTANEOUS NIGHTLY
Status: DISCONTINUED | OUTPATIENT
Start: 2022-11-11 | End: 2022-11-12

## 2022-11-11 RX ORDER — INSULIN GLARGINE-YFGN 100 [IU]/ML
5 INJECTION, SOLUTION SUBCUTANEOUS NIGHTLY
Status: DISCONTINUED | OUTPATIENT
Start: 2022-11-11 | End: 2022-11-12

## 2022-11-11 RX ORDER — INSULIN LISPRO 100 [IU]/ML
2 INJECTION, SOLUTION INTRAVENOUS; SUBCUTANEOUS
Status: DISCONTINUED | OUTPATIENT
Start: 2022-11-11 | End: 2022-11-12

## 2022-11-11 RX ORDER — INSULIN LISPRO 100 [IU]/ML
0-4 INJECTION, SOLUTION INTRAVENOUS; SUBCUTANEOUS NIGHTLY
Status: DISCONTINUED | OUTPATIENT
Start: 2022-11-11 | End: 2022-11-11 | Stop reason: SDUPTHER

## 2022-11-11 RX ORDER — INSULIN LISPRO 100 [IU]/ML
0-16 INJECTION, SOLUTION INTRAVENOUS; SUBCUTANEOUS
Status: DISCONTINUED | OUTPATIENT
Start: 2022-11-12 | End: 2022-11-12

## 2022-11-11 RX ORDER — INSULIN LISPRO 100 [IU]/ML
8 INJECTION, SOLUTION INTRAVENOUS; SUBCUTANEOUS ONCE
Status: DISCONTINUED | OUTPATIENT
Start: 2022-11-11 | End: 2022-11-11

## 2022-11-11 RX ORDER — INSULIN LISPRO 100 [IU]/ML
0-8 INJECTION, SOLUTION INTRAVENOUS; SUBCUTANEOUS
Status: DISCONTINUED | OUTPATIENT
Start: 2022-11-12 | End: 2022-11-11 | Stop reason: SDUPTHER

## 2022-11-11 RX ADMIN — BENZONATATE 100 MG: 100 CAPSULE ORAL at 10:29

## 2022-11-11 RX ADMIN — BUDESONIDE 500 MCG: 0.5 SUSPENSION RESPIRATORY (INHALATION) at 08:40

## 2022-11-11 RX ADMIN — CALCIUM CARBONATE-VITAMIN D TAB 500 MG-200 UNIT 1 TABLET: 500-200 TAB at 10:30

## 2022-11-11 RX ADMIN — PRAMIPEXOLE DIHYDROCHLORIDE 0.5 MG: 0.25 TABLET ORAL at 21:02

## 2022-11-11 RX ADMIN — ARFORMOTEROL TARTRATE 15 MCG: 15 SOLUTION RESPIRATORY (INHALATION) at 08:40

## 2022-11-11 RX ADMIN — PREGABALIN 75 MG: 25 CAPSULE ORAL at 10:28

## 2022-11-11 RX ADMIN — INSULIN LISPRO 4 UNITS: 100 INJECTION, SOLUTION INTRAVENOUS; SUBCUTANEOUS at 21:00

## 2022-11-11 RX ADMIN — BACLOFEN 5 MG: 10 TABLET ORAL at 10:28

## 2022-11-11 RX ADMIN — ACETAMINOPHEN 650 MG: 325 TABLET, FILM COATED ORAL at 18:12

## 2022-11-11 RX ADMIN — ENOXAPARIN SODIUM 30 MG: 100 INJECTION SUBCUTANEOUS at 21:02

## 2022-11-11 RX ADMIN — NYSTATIN 500000 UNITS: 100000 SUSPENSION ORAL at 10:27

## 2022-11-11 RX ADMIN — LEVOTHYROXINE SODIUM 112 MCG: 0.11 TABLET ORAL at 06:59

## 2022-11-11 RX ADMIN — INSULIN LISPRO 2 UNITS: 100 INJECTION, SOLUTION INTRAVENOUS; SUBCUTANEOUS at 18:12

## 2022-11-11 RX ADMIN — NYSTATIN 500000 UNITS: 100000 SUSPENSION ORAL at 18:12

## 2022-11-11 RX ADMIN — Medication 10 ML: at 10:31

## 2022-11-11 RX ADMIN — BUDESONIDE 500 MCG: 0.5 SUSPENSION RESPIRATORY (INHALATION) at 21:06

## 2022-11-11 RX ADMIN — METHYLPREDNISOLONE SODIUM SUCCINATE 40 MG: 40 INJECTION, POWDER, FOR SOLUTION INTRAMUSCULAR; INTRAVENOUS at 10:30

## 2022-11-11 RX ADMIN — INSULIN LISPRO 2 UNITS: 100 INJECTION, SOLUTION INTRAVENOUS; SUBCUTANEOUS at 08:53

## 2022-11-11 RX ADMIN — ARFORMOTEROL TARTRATE 15 MCG: 15 SOLUTION RESPIRATORY (INHALATION) at 21:05

## 2022-11-11 RX ADMIN — NYSTATIN 500000 UNITS: 100000 SUSPENSION ORAL at 21:04

## 2022-11-11 RX ADMIN — PREGABALIN 75 MG: 25 CAPSULE ORAL at 13:41

## 2022-11-11 RX ADMIN — NYSTATIN 500000 UNITS: 100000 SUSPENSION ORAL at 12:57

## 2022-11-11 RX ADMIN — BACLOFEN 5 MG: 10 TABLET ORAL at 13:41

## 2022-11-11 RX ADMIN — INSULIN GLARGINE-YFGN 5 UNITS: 100 INJECTION, SOLUTION SUBCUTANEOUS at 21:06

## 2022-11-11 RX ADMIN — INSULIN LISPRO 6 UNITS: 100 INJECTION, SOLUTION INTRAVENOUS; SUBCUTANEOUS at 12:32

## 2022-11-11 RX ADMIN — LOSARTAN POTASSIUM 50 MG: 50 TABLET, FILM COATED ORAL at 10:36

## 2022-11-11 RX ADMIN — ASPIRIN 81 MG CHEWABLE TABLET 81 MG: 81 TABLET CHEWABLE at 10:29

## 2022-11-11 RX ADMIN — INSULIN LISPRO 2 UNITS: 100 INJECTION, SOLUTION INTRAVENOUS; SUBCUTANEOUS at 12:34

## 2022-11-11 RX ADMIN — BACLOFEN 5 MG: 10 TABLET ORAL at 21:04

## 2022-11-11 RX ADMIN — METHYLPREDNISOLONE SODIUM SUCCINATE 40 MG: 40 INJECTION, POWDER, FOR SOLUTION INTRAMUSCULAR; INTRAVENOUS at 18:11

## 2022-11-11 RX ADMIN — ROSUVASTATIN 20 MG: 20 TABLET, FILM COATED ORAL at 10:29

## 2022-11-11 RX ADMIN — CELECOXIB 100 MG: 100 CAPSULE ORAL at 10:27

## 2022-11-11 RX ADMIN — ENOXAPARIN SODIUM 30 MG: 100 INJECTION SUBCUTANEOUS at 10:28

## 2022-11-11 RX ADMIN — CITALOPRAM HYDROBROMIDE 40 MG: 20 TABLET ORAL at 10:28

## 2022-11-11 RX ADMIN — PREGABALIN 75 MG: 25 CAPSULE ORAL at 21:03

## 2022-11-11 RX ADMIN — INSULIN LISPRO 2 UNITS: 100 INJECTION, SOLUTION INTRAVENOUS; SUBCUTANEOUS at 18:15

## 2022-11-11 RX ADMIN — AMITRIPTYLINE HYDROCHLORIDE 50 MG: 25 TABLET, FILM COATED ORAL at 21:02

## 2022-11-11 RX ADMIN — PANTOPRAZOLE SODIUM 40 MG: 40 TABLET, DELAYED RELEASE ORAL at 06:59

## 2022-11-11 RX ADMIN — MONTELUKAST 10 MG: 10 TABLET, FILM COATED ORAL at 21:04

## 2022-11-11 ASSESSMENT — PAIN SCALES - GENERAL
PAINLEVEL_OUTOF10: 0
PAINLEVEL_OUTOF10: 5
PAINLEVEL_OUTOF10: 0
PAINLEVEL_OUTOF10: 0
PAINLEVEL_OUTOF10: 8
PAINLEVEL_OUTOF10: 0

## 2022-11-11 ASSESSMENT — PAIN DESCRIPTION - DESCRIPTORS: DESCRIPTORS: ACHING;POUNDING

## 2022-11-11 ASSESSMENT — PAIN DESCRIPTION - LOCATION: LOCATION: HEAD;SHOULDER

## 2022-11-11 ASSESSMENT — PAIN DESCRIPTION - ORIENTATION: ORIENTATION: LEFT

## 2022-11-11 NOTE — PROGRESS NOTES
[facility]  Internal Medicine Residency Program  Progress Note - House Team 1    Patient:  Meagan Cabrera 79 y.o. female MRN: [id number]     Date of Service: 11/11/2022     CC: cough, SOB  Overnight events: Thiazide was stopped and started losartan, glucose overnight 339 --> 6U insulin --> 244,  discontinued guaifenesin     Subjective     Patient was resting comfortably in her best this morning in no acute distress. She states that her cough and sputum production are improving. Her cough is worse when she takes a deep breath and has to talk a lot. She said her sputum is still yellow/green. She states she has chest pain that is due to her chronically coughing. She experiences SOB when she gets up and ambulates with PT/OT. She is still experiencing leg cramps, especially during her breathing treatments. She is also experiencing dry eyes and would like eye drops. Objective     Physical Exam:  Vitals: BP (!) 152/70   Pulse 59   Temp 97.9 °F (36.6 °C) (Oral)   Resp 18   Ht 5' 6\" (1.676 m)   Wt 229 lb 4.8 oz (104 kg)   SpO2 96%   BMI 37.01 kg/m²     I & O - 24hr: No intake/output data recorded. General Appearance: alert, appears stated age, cooperative, and no distress  HEENT:  Head: Normocephalic, no lesions, without obvious abnormality. Neck: supple, symmetrical, trachea midline and thyroid not enlarged, symmetric, no tenderness/mass/nodules  Lung: wheezes bilaterally  Heart: regular rate and rhythm, S1, S2 normal, no murmur, click, rub or gallop  Abdomen: soft, non-tender; bowel sounds normal; no masses,  no organomegaly  Extremities:  extremities normal, atraumatic, no cyanosis or edema, Homans sign is negative, no sign of DVT, and mild discomfort upon palpation of LE BL  Musculokeletal: No joint swelling, no muscle tenderness. ROM normal in all joints of extremities.    Neurologic: Mental status: Alert, oriented, thought content appropriate  Subject  Pertinent Labs & Imaging Studies   yusuf  CBC:   Lab Results   Component Value Date/Time    WBC 7.9 11/11/2022 05:13 AM    RBC 4.35 11/11/2022 05:13 AM    HGB 12.8 11/11/2022 05:13 AM    HCT 41.5 11/11/2022 05:13 AM    MCV 95.4 11/11/2022 05:13 AM    MCH 29.4 11/11/2022 05:13 AM    MCHC 30.8 11/11/2022 05:13 AM    RDW 12.5 11/11/2022 05:13 AM     11/11/2022 05:13 AM    MPV 11.1 11/11/2022 05:13 AM     BMP:    Lab Results   Component Value Date/Time     11/11/2022 05:13 AM    K 4.8 11/11/2022 05:13 AM    CL 98 11/11/2022 05:13 AM    CO2 32 11/11/2022 05:13 AM    BUN 30 11/11/2022 05:13 AM    LABALBU 3.7 11/05/2022 02:29 PM    LABALBU 3.9 10/26/2011 04:50 AM    CREATININE 0.7 11/11/2022 05:13 AM    CALCIUM 9.5 11/11/2022 05:13 AM    GFRAA >60 06/23/2022 02:12 PM    LABGLOM >60 11/11/2022 05:13 AM    GLUCOSE 310 11/11/2022 05:13 AM    GLUCOSE 124 10/26/2011 04:50 AM     ABG:    Lab Results   Component Value Date/Time    PH 7.343 11/10/2022 09:56 AM    PH 7.444 10/25/2011 09:00 PM    PCO2 57.2 11/10/2022 09:56 AM    PO2 91.5 11/10/2022 09:56 AM    HCO3 30.4 11/10/2022 09:56 AM    BE 3.3 11/10/2022 09:56 AM    O2SAT 96.6 11/10/2022 09:56 AM       Student's Assessment and Plan     1. Acute respiratory insufficiency secondary to COPD exacerbation              -Blood cultures, respiratory panel both negative, ProCal 0.2, leukocytosis resolved              -Pulmonology recommending repeat viral panel, increase Solumedrol 40mg to q8h, doxycycline complete 11/10, continue Mucinex and flutter valve with albuterol, outpatient pulmonary rehab to obtain a new CPAP              -Continue breathing treatments              -Monitor glucose control   2. Hx HTN              -Stop Maxzide and switch to losartan for BP control  3. Leg cramps              -Started Pramipexole, patient noted that it did not seem to improve her leg cramps, consider discontinuing on discharge  4. Hx HLD              -Continue home Crestor  5. Hx T2DM              -Continue MDSS               -Consider addition of premeal or long acting agent  6. Hx Chronic back pain              -Continue home Celexa, Celebrex, Lyrica, and Baclofen  7. Hx Depression              -Continue home Celexa  8. Hx Hypothyroidism              -Continue home Synthroid  9. Hx Sleep apnea              -Continue BiPAP at night inpatient              -Will need outpatient workup to obtain home CPAP    PT/OT evaluation: 20/24 [facility]  DVT prophylaxis/ GI prophylaxis: lovenox / protonix  Disposition: plan to discharge home    ISADORA Quispe-III  Attending physician: Dr. Shadia Sandy

## 2022-11-11 NOTE — PROGRESS NOTES
[facility]  Internal Medicine Residency / [address]    Attending Physician Statement  I have discussed the case, including pertinent history and exam findings with the resident and the team.  I have seen and examined the patient and the key elements of the encounter have been performed by me. I agree with the assessment, plan and orders as documented by the resident. COPD exacerbation improving but still symptomatic  IV steroids increased to tid  Smoker at home,son  Review meds   VS stable , pulse Ox 100%  Plan: Discharge planning    Remainder of medical problems as per resident note.       Emerson Johnston MD [facility]  Internal Medicine Residency Faculty

## 2022-11-11 NOTE — PROGRESS NOTES
Date: 11/10/2022    Time: 10:50 PM    Patient Placed On BIPAP/CPAP/ Non-Invasive Ventilation? Yes    If no must comment. Facial area red/color change? No     BIPAP/CPAP skin barrier?   Yes    Skin barrier type:mepilesaadia Costa

## 2022-11-11 NOTE — PROGRESS NOTES
Pulmonary Progress Note    Admit Date: 11/5/2022                            PCP: Lorena Bess MD  Principal Problem:    COPD exacerbation ([facility]  Active Problems:    Acute and chronic respiratory failure with hypoxia ([facility]    DM type 2 with diabetic peripheral neuropathy ([facility]  Resolved Problems:    * No resolved hospital problems.  *      Subjective:  Patient seen resting in bed on 3L   She wore Bipap all night  Still with complaints of coughing and wheezing - no improvement since yesterday       Medications:   dextrose      sodium chloride          losartan  25 mg Oral Daily    insulin glargine  3 Units SubCUTAneous Nightly    insulin lispro  0-8 Units SubCUTAneous TID WC    insulin lispro  0-4 Units SubCUTAneous Nightly    pramipexole  0.5 mg Oral Nightly    methylPREDNISolone  40 mg IntraVENous Q12H    nystatin  5 mL Oral 4x Daily    doxycycline hyclate  100 mg Oral 2 times per day    amitriptyline  50 mg Oral Nightly    aspirin  81 mg Oral Daily    baclofen  5 mg Oral TID    oyster shell calcium w/D  1 tablet Oral Daily    celecoxib  100 mg Oral Daily    citalopram  40 mg Oral Daily    [Held by provider] cetirizine  5 mg Oral Daily    levothyroxine  112 mcg Oral Daily    montelukast  10 mg Oral Nightly    pantoprazole  40 mg Oral QAM AC    pregabalin  75 mg Oral TID    rosuvastatin  20 mg Oral Daily    [Held by provider] triamterene-hydroCHLOROthiazide  1 tablet Oral Daily    sodium chloride flush  5-40 mL IntraVENous 2 times per day    enoxaparin  30 mg SubCUTAneous BID    budesonide  0.5 mg Nebulization BID    Arformoterol Tartrate  15 mcg Nebulization BID       Vitals:  VITALS:  BP (!) 152/76   Pulse 61   Temp 98.2 °F (36.8 °C) (Oral)   Resp 18   Ht 5' 6\" (1.676 m)   Wt 229 lb 4.8 oz (104 kg)   SpO2 100%   BMI 37.01 kg/m²   24HR INTAKE/OUTPUT:    Intake/Output Summary (Last 24 hours) at 11/11/2022 0816  Last data filed at 11/10/2022 1442  Gross per 24 hour   Intake 480 ml   Output --   Net 480 ml CURRENT PULSE OXIMETRY:  SpO2: 100 %  24HR PULSE OXIMETRY RANGE:  SpO2  Av.8 %  Min: 96 %  Max: 100 %  CVP:    VENT SETTINGS:      Additional Respiratory Assessments  Heart Rate: 61  Resp: 18  SpO2: 100 %      EXAM:  General: No distress. 3L  Eyes: No sclera icterus. No conjunctival injection. ENT: No discharge. Pharynx clear. Neck: Trachea midline. Normal thyroid. Resp: No accessory muscle use. Posterior expiratory wheezing, posteriorly  CV: Regular rate. Regular rhythm. No mumur or rub. ABD: Non-tender. Non-distended. Normal bowel sounds. Skin: Warm and dry. No nodule on exposed extremities. No rash on exposed extremities. Ext: No joint deformity. No clubbing. No cyanosis. No edema  Neuro: Awake. Follows commands ox3, SIMMONS    I/O: I/O last 3 completed shifts: In: 480 [P.O.:480]  Out: -   No intake/output data recorded.      Results:  CBC:   Recent Labs     11/09/22  0457 11/10/22  0503 22  0513   WBC 6.6 6.8 7.9   HGB 10.8* 11.6 12.8   HCT 35.9 38.0 41.5   MCV 94.2 93.4 95.4    413 214     BMP:   Recent Labs     11/09/22  0457 11/10/22  0503 22  0513    137 136   K 4.6 4.9 4.8   CL 99 97* 98   CO2 30* 36* 32*   BUN 17 24* 30*   CREATININE 0.7 0.8 0.7       Procalcitonin:   Recent Labs     22  1436   PROCAL 0.13*     Cultures:  Recent Labs     229   CULTRESP Oral Pharyngeal Buffy present      22    CULTURE, RESPIRATORY Oral Pharyngeal Buffy reduced P    Smear, Respiratory Abundant Polymorphonuclear leukocytes   Epithelial cells not seen   No organisms seen      Cultures:    Latest Reference Range & Units 22 21:09   Human Rhinovirus/Enterovirus by PCR Not Detected  Not Detected   Influenza A by PCR Not Detected  Not Detected   Influenza B by PCR Not Detected  Not Detected   Adenovirus by PCR Not Detected  Not Detected   Coronavirus 229E by PCR Not Detected  Not Detected   Coronavirus HKU1 by PCR Not Detected  Not Detected   Coronavirus NL63 by PCR Not Detected  Not Detected   Coronavirus OC43 by PCR Not Detected  Not Detected   Human Metapneumovirus by PCR Not Detected  Not Detected   Parainfluenza Virus 1 by PCR Not Detected  Not Detected   Parainfluenza Virus 2 by PCR Not Detected  Not Detected   Parainfluenza Virus 3 by PCR Not Detected  Not Detected   Parainfluenza Virus 4 by PCR Not Detected  Not Detected   Respiratory Syncytial Virus by PCR Not Detected  Not Detected   Bordetella parapertussis by PCR Not Detected  Not Detected   Chlamydophilia pneumoniae by PCR Not Detected  Not Detected   Mycoplasma pneumoniae by PCR Not Detected  Not Detected   SARS-CoV-2, PCR Not Detected  Not Detected   Bordetella pertussis by PCR Not Detected  Not Detected             ABG:   Recent Labs     11/10/22  0956   PH 7.343*   PO2 91.5   PCO2 57.2*   HCO3 30.4*   BE 3.3*   O2SAT 96.6       Films:  XR CHEST PORTABLE 11/5/2022 No acute process. 11/8: Chest x-ray clear    Assessment:  79 YOF retired  from [employer], vaccinated for [employer], 51-py ex-smoker known to Dr. Juan Arevalo, with history of HLD, HTN, GERD however has not been to office since before 2018 with asthma/COPD on 2 L of oxygen at home overlap was on inhalers but now just takes albuterol as needed. She has EDWARD and non-compliant,     11/5 who presented to the ED on increased shortness of breath  Placed on 2 L saturations 85% on room air  WBC 11.7, troponin 10> 9  Respiratory panel negative  Chest x-ray negative  11/6: 2 L , Pro-Ronnie 0.20, ABG 7.47/35.4/135/20 5.4 on 2 L  11/8: Pulmonary consulted for shortness of breath  Chest x-ray negative, Repeat procalcitonin 0.13  Respiratory culture negative  11/9: on 3L  11/10: 3L  Says she is coughing up green secretions and has some chest discomfort. ABGs with respiratory acidosis. Patient placed on BiPAP  11/11: 3L, Bipap all night.  Respiratory panel sent     Acute exacerbation COPD 2020 FEV1 2.15 L 60% predicted FVC 2.75 L 68% predicted  Acute hypercapnic respiratory failure  Chronic hypoxic respiratory failure on 3 L   History of lung nodule 4/14/22 on screening CT new 8 mm nodule in left lower lobe  Bronchitis doxycycline culture negative  EDWARD-non compliant  Left restless leg syndrome  IgG lambda monoclonal paraprotein/MGUS with elevated IgG with negative bone marrow biopsy 11/20/2016  Diabetes with neuropathy on pregabalin  Chronic back pain with canal stenosis L4-5, central canal stenosis L3-4 due to fibromyalgia amitriptyline Celebrex baclofen  Osteoporosis  Hypothyroid with history of thyroid goiter and nodule with negative thyroid biopsy  DVT prophylaxis with Lovenox  History of pneumonia 2020     Plan:  Recheck viral panel as patient has very little improvement in symptoms - still with frequent, harsh cough and expiratory wheezing bilaterally   Maintain POX >90%, currently on 3L , 2L is her baseline. Ambulatory pulse ox prior to discharge   Continue brovana and budesonide. Not on maintenance at home, has not seen in office since 2018. Will benefit from daily inhaler at [place]. PFT as outpatient  Duonebs stopped due to thick mucous. Continue Mucinex and flutter valve with albuterol  Increase Solumedrol 40 to q8H as she has little improvement in symptoms - still with wheezing bilaterally, cough and shortness of breath   On singuair   S/p doxycycline 11/10. PCT 0.20 (11/6) > 0.13 (11/8). Sputum cx 11/8 negative. Continue IS for pulmonary hygiene  PSG as outpatient to obtain new CPAP machine. She does have order for repeat sleep study but has not been completed yet   Will benefit from outpatient pulm rehab and regular follow up   Supportive care     Electronically signed by BRYAN Regan CNP on 11/11/2022 at 8:16 AM  I had a face-to-face encounter with the patient at the bedside. I agree with the nurse practitioner's note and assessment and plan as detailed above. Necessary editing and changes made to the note by myself.   Wants to go home tomorrow. Will decrease steroids was wheezing earlier but seems to have improved. She is wearing her PAP machine here in the hospital all-night . She is willing to go for another sleep study and try a new mask.   She is on the baseline O2 requirements

## 2022-11-11 NOTE — PROGRESS NOTES
[facility]  Internal Medicine Residency Program  Progress Note - House Team     Patient:  Lisa Castañeda 79 y.o. female MRN: [id number]     Date of Service: 11/11/2022     CC: cough, SOB, wheezing  Overnight events: BP elevated, increased losartan to 50 mg    Subjective     Patient was seen and examined this morning at bedside in no acute distress. Overnight no acute changes. Breathing has improved as well as fatigue. Endorsing eye drainage-> eye drops ordered. Pulmonary recommends increasing solumedrol 40 from q12hrs to q8hrs    Objective     Physical Exam:  Vitals: BP (!) 152/70   Pulse 59   Temp 97.9 °F (36.6 °C) (Oral)   Resp 18   Ht 5' 6\" (1.676 m)   Wt 229 lb 4.8 oz (104 kg)   SpO2 96%   BMI 37.01 kg/m²     I & O - 24hr: No intake/output data recorded. General Appearance: alert, appears stated age, mild distress, and fatigue   HEENT:  Head: Normocephalic, no lesions, without obvious abnormality  Neck: no adenopathy and no JVD  Lung:  ronchi and wheezes bilaterally   Heart: regular rate and rhythm, S1, S2 normal, no murmur, click, rub or gallop  Abdomen: soft, non-tender; bowel sounds normal; no masses,  no organomegaly  Extremities:  extremities normal, atraumatic, no cyanosis or edema  Musculokeletal: No joint swelling, no muscle tenderness. ROM normal in all joints of extremities.    Neurologic: Mental status: Alert, oriented, thought content appropriate    Subjective  Pertinent Labs & Imaging Studies     CBC with Differential:    Lab Results   Component Value Date/Time    WBC 7.9 11/11/2022 05:13 AM    RBC 4.35 11/11/2022 05:13 AM    HGB 12.8 11/11/2022 05:13 AM    HCT 41.5 11/11/2022 05:13 AM     11/11/2022 05:13 AM    MCV 95.4 11/11/2022 05:13 AM    MCH 29.4 11/11/2022 05:13 AM    MCHC 30.8 11/11/2022 05:13 AM    RDW 12.5 11/11/2022 05:13 AM    SEGSPCT 36 01/03/2014 09:54 AM    METASPCT 0.9 01/26/2020 07:11 AM    LYMPHOPCT 16.2 11/11/2022 05:13 AM PROMYELOPCT 3.5 01/23/2020 07:25 AM    MONOPCT 3.9 11/11/2022 05:13 AM    MYELOPCT 0.9 01/26/2020 07:11 AM    BASOPCT 0.4 11/11/2022 05:13 AM    MONOSABS 0.31 11/11/2022 05:13 AM    LYMPHSABS 1.27 11/11/2022 05:13 AM    EOSABS 0.00 11/11/2022 05:13 AM    BASOSABS 0.03 11/11/2022 05:13 AM     BMP:    Lab Results   Component Value Date/Time     11/11/2022 05:13 AM    K 4.8 11/11/2022 05:13 AM    CL 98 11/11/2022 05:13 AM    CO2 32 11/11/2022 05:13 AM    BUN 30 11/11/2022 05:13 AM    LABALBU 3.7 11/05/2022 02:29 PM    LABALBU 3.9 10/26/2011 04:50 AM    CREATININE 0.7 11/11/2022 05:13 AM    CALCIUM 9.5 11/11/2022 05:13 AM    GFRAA >60 06/23/2022 02:12 PM    LABGLOM >60 11/11/2022 05:13 AM    GLUCOSE 310 11/11/2022 05:13 AM    GLUCOSE 124 10/26/2011 04:50 AM       XR CHEST PORTABLE   Final Result   Cardiomegaly demonstrates no change. XR CHEST PORTABLE   Final Result   No acute process.               Resident's Assessment and Plan     Assessment and Plan:    Acute respiratory insufficiency secondary to COPD exacerbation versus medication noncompliance, rule out infection  Blood cultures for 24 hours no growth  Respiratory panel negative  Pro-Ronnie level 0.2  Leukocytosis resolved  PLAN  Respiratory cultures--> no organisms seen  Pending strep pneumonia and Legionella antigen  Pulmonology consulted--> continue current care , outpatient pulm rehab in order to obtain new pap and PFT and PSG  Repeat chest x-ray ---> no changes seen  Continue breathing treatments and Tessalon and guaifenesin for cough  Continue Solu-Medrol IV 40 mg twice daily--> consider switching to oral  Doxycycline with last dose on 11/10/2022  Continue to monitor glucose levels with steroid use    History of hypertension-home medication of Maxzide-- HOLDING for 1 day    History of asthma and COPD overlap syndrome-on 2 L nasal cannula at home    History of hyperlipidemia-continue home medication of Crestor     History of type 2 diabetes mellitus-on Trulicity at home  Continue medium-dose sliding scale and 0 to 4 units nightly   Hypoglycemia protocol in place    History of chronic back pain secondary to fibromyalgia-continue home medication of Celexa 40 mg daily ,  Celebrex 100 mg daily, Lyrica 75 mg 3 times daily, and baclofen     History of depression-continue home medication of Celexa 40 mg daily    History of hypothyroidism-continue home medication of Synthroid 112 mg daily    History of sleep apnea-continue BiPAP at night- f/u outpt for new CPAP machine   Hx of RLS--> started on pramipexole       PT/OT evaluation: ordered [facility] 20/24  DVT prophylaxis/ GI prophylaxis: Lovenox / Protonix  Disposition: continue current care    Ben Grover DO, PGY-1  Attending physician: Dr. Meron Chambers

## 2022-11-11 NOTE — PROGRESS NOTES
Comprehensive Nutrition Assessment    Type and Reason for Visit:  Initial (LOS)    Nutrition Recommendations/Plan:   No nutritional supplementation recommended at this time. Malnutrition Assessment:  Malnutrition Status:  No malnutrition (11/11/22 1001)    Context:  Acute Illness     Findings of the 6 clinical characteristics of malnutrition:  Energy Intake:  No significant decrease in energy intake  Weight Loss:  No significant weight loss     Body Fat Loss:  No significant body fat loss     Muscle Mass Loss:  No significant muscle mass loss    Fluid Accumulation:  No significant fluid accumulation     Strength:  Not Performed    Nutrition Assessment:    Patients po intake seems to be adequate, averaging % of meals served ; adm w/ SOB ; noted acute respiratory insufficiency 2/2 COPD exacerbation ; COVID-19 rule out ; noted lung nodule ; hx of DM ; no ONS recommended at this time    Nutrition Related Findings:    +I&Os (+2.6 L), no edema, active BS, rounded abd, bipap, obesity ; Wound Type: None       Current Nutrition Intake & Therapies:    Average Meal Intake: %     ADULT DIET; Regular    Anthropometric Measures:  Height: 5' 6\" (167.6 cm)  Ideal Body Weight (IBW): 130 lbs (59 kg)    Admission Body Weight: 227 lb (103 kg) (11/6, actual)  Current Body Weight: 229 lb (103.9 kg) (11/11, bedscale), 176.2 % IBW.  Weight Source: Bed Scale  Current BMI (kg/m2): 37  Usual Body Weight: 222 lb (100.7 kg) (7/5/22, actual)  % Weight Change (Calculated): 3.2                    BMI Categories: Obese Class 2 (BMI 35.0 -39.9)    Estimated Daily Nutrient Needs:  Energy Requirements Based On: Formula  Weight Used for Energy Requirements: Current  Energy (kcal/day): 4949-1459 (REE 1593 x 1.1 SF)  Weight Used for Protein Requirements: Ideal  Protein (g/day): 80-90 (1.3-1.5g/kg IBW)  Method Used for Fluid Requirements: 1 ml/kcal  Fluid (ml/day): 6400-3298    Nutrition Diagnosis:   No nutrition diagnosis at this time Nutrition Interventions:   Food and/or Nutrient Delivery: Continue Current Diet  Nutrition Education/Counseling: Education not indicated  Coordination of Nutrition Care: Continue to monitor while inpatient       Goals:  Previous Goal Met: Progressing toward Goal(s)  Goals: PO intake 75% or greater, by next RD assessment       Nutrition Monitoring and Evaluation:   Behavioral-Environmental Outcomes: None Identified  Food/Nutrient Intake Outcomes: Food and Nutrient Intake  Physical Signs/Symptoms Outcomes: Chewing or Swallowing, Biochemical Data, GI Status, Fluid Status or Edema, Hemodynamic Status, Meal Time Behavior, Nutrition Focused Physical Findings, Skin, Weight    Discharge Planning:     Too soon to determine     Maren Pantoja RD, LD  Contact: [phone]

## 2022-11-12 PROBLEM — I10 PRIMARY HYPERTENSION: Status: ACTIVE | Noted: 2022-11-12

## 2022-11-12 PROBLEM — E11.65 TYPE 2 DIABETES MELLITUS WITH HYPERGLYCEMIA, WITHOUT LONG-TERM CURRENT USE OF INSULIN (HCC): Status: ACTIVE | Noted: 2022-11-12

## 2022-11-12 LAB
ANION GAP SERPL CALCULATED.3IONS-SCNC: 10 MMOL/L (ref 7–16)
BASOPHILS ABSOLUTE: 0.02 E9/L (ref 0–0.2)
BASOPHILS RELATIVE PERCENT: 0.2 % (ref 0–2)
BUN BLDV-MCNC: 29 MG/DL (ref 6–23)
CALCIUM SERPL-MCNC: 9.8 MG/DL (ref 8.6–10.2)
CHLORIDE BLD-SCNC: 97 MMOL/L (ref 98–107)
CO2: 29 MMOL/L (ref 22–29)
CREAT SERPL-MCNC: 0.8 MG/DL (ref 0.5–1)
EOSINOPHILS ABSOLUTE: 0 E9/L (ref 0.05–0.5)
EOSINOPHILS RELATIVE PERCENT: 0 % (ref 0–6)
GFR SERPL CREATININE-BSD FRML MDRD: >60 ML/MIN/1.73
GLUCOSE BLD-MCNC: 177 MG/DL (ref 74–99)
HCT VFR BLD CALC: 38.3 % (ref 34–48)
HEMOGLOBIN: 11.7 G/DL (ref 11.5–15.5)
IMMATURE GRANULOCYTES #: 0.18 E9/L
IMMATURE GRANULOCYTES %: 1.7 % (ref 0–5)
LYMPHOCYTES ABSOLUTE: 1.64 E9/L (ref 1.5–4)
LYMPHOCYTES RELATIVE PERCENT: 15.3 % (ref 20–42)
MCH RBC QN AUTO: 28.3 PG (ref 26–35)
MCHC RBC AUTO-ENTMCNC: 30.5 % (ref 32–34.5)
MCV RBC AUTO: 92.7 FL (ref 80–99.9)
METER GLUCOSE: 195 MG/DL (ref 74–99)
METER GLUCOSE: 272 MG/DL (ref 74–99)
METER GLUCOSE: 322 MG/DL (ref 74–99)
METER GLUCOSE: 338 MG/DL (ref 74–99)
MONOCYTES ABSOLUTE: 0.43 E9/L (ref 0.1–0.95)
MONOCYTES RELATIVE PERCENT: 4 % (ref 2–12)
NEUTROPHILS ABSOLUTE: 8.42 E9/L (ref 1.8–7.3)
NEUTROPHILS RELATIVE PERCENT: 78.8 % (ref 43–80)
PDW BLD-RTO: 12.5 FL (ref 11.5–15)
PLATELET # BLD: 431 E9/L (ref 130–450)
PMV BLD AUTO: 10 FL (ref 7–12)
POTASSIUM SERPL-SCNC: 4.5 MMOL/L (ref 3.5–5)
RBC # BLD: 4.13 E12/L (ref 3.5–5.5)
SODIUM BLD-SCNC: 136 MMOL/L (ref 132–146)
WBC # BLD: 10.7 E9/L (ref 4.5–11.5)

## 2022-11-12 PROCEDURE — 2700000000 HC OXYGEN THERAPY PER DAY

## 2022-11-12 PROCEDURE — 94640 AIRWAY INHALATION TREATMENT: CPT

## 2022-11-12 PROCEDURE — 6370000000 HC RX 637 (ALT 250 FOR IP): Performed by: STUDENT IN AN ORGANIZED HEALTH CARE EDUCATION/TRAINING PROGRAM

## 2022-11-12 PROCEDURE — 6370000000 HC RX 637 (ALT 250 FOR IP): Performed by: INTERNAL MEDICINE

## 2022-11-12 PROCEDURE — 2580000003 HC RX 258: Performed by: STUDENT IN AN ORGANIZED HEALTH CARE EDUCATION/TRAINING PROGRAM

## 2022-11-12 PROCEDURE — 6370000000 HC RX 637 (ALT 250 FOR IP)

## 2022-11-12 PROCEDURE — 2140000000 HC CCU INTERMEDIATE R&B

## 2022-11-12 PROCEDURE — 80048 BASIC METABOLIC PNL TOTAL CA: CPT

## 2022-11-12 PROCEDURE — 6360000002 HC RX W HCPCS: Performed by: STUDENT IN AN ORGANIZED HEALTH CARE EDUCATION/TRAINING PROGRAM

## 2022-11-12 PROCEDURE — 85025 COMPLETE CBC W/AUTO DIFF WBC: CPT

## 2022-11-12 PROCEDURE — 94660 CPAP INITIATION&MGMT: CPT

## 2022-11-12 PROCEDURE — 99232 SBSQ HOSP IP/OBS MODERATE 35: CPT | Performed by: INTERNAL MEDICINE

## 2022-11-12 PROCEDURE — 82962 GLUCOSE BLOOD TEST: CPT

## 2022-11-12 PROCEDURE — 36415 COLL VENOUS BLD VENIPUNCTURE: CPT

## 2022-11-12 PROCEDURE — 6360000002 HC RX W HCPCS

## 2022-11-12 PROCEDURE — S5553 INSULIN LONG ACTING 5 U: HCPCS | Performed by: INTERNAL MEDICINE

## 2022-11-12 RX ORDER — PREDNISONE 20 MG/1
40 TABLET ORAL 2 TIMES DAILY
Status: DISCONTINUED | OUTPATIENT
Start: 2022-11-12 | End: 2022-11-13

## 2022-11-12 RX ORDER — METHYLPREDNISOLONE SODIUM SUCCINATE 40 MG/ML
40 INJECTION, POWDER, LYOPHILIZED, FOR SOLUTION INTRAMUSCULAR; INTRAVENOUS EVERY 12 HOURS
Status: DISCONTINUED | OUTPATIENT
Start: 2022-11-12 | End: 2022-11-12

## 2022-11-12 RX ORDER — POLYVINYL ALCOHOL 14 MG/ML
1 SOLUTION/ DROPS OPHTHALMIC PRN
Status: DISCONTINUED | OUTPATIENT
Start: 2022-11-12 | End: 2022-11-13 | Stop reason: HOSPADM

## 2022-11-12 RX ORDER — INSULIN LISPRO 100 [IU]/ML
0-4 INJECTION, SOLUTION INTRAVENOUS; SUBCUTANEOUS
Status: DISCONTINUED | OUTPATIENT
Start: 2022-11-12 | End: 2022-11-13

## 2022-11-12 RX ORDER — ALBUTEROL SULFATE 2.5 MG/3ML
2.5 SOLUTION RESPIRATORY (INHALATION) EVERY 4 HOURS PRN
Status: DISCONTINUED | OUTPATIENT
Start: 2022-11-12 | End: 2022-11-13 | Stop reason: HOSPADM

## 2022-11-12 RX ORDER — INSULIN GLARGINE-YFGN 100 [IU]/ML
10 INJECTION, SOLUTION SUBCUTANEOUS NIGHTLY
Status: DISCONTINUED | OUTPATIENT
Start: 2022-11-12 | End: 2022-11-13 | Stop reason: HOSPADM

## 2022-11-12 RX ORDER — INSULIN LISPRO 100 [IU]/ML
0-4 INJECTION, SOLUTION INTRAVENOUS; SUBCUTANEOUS NIGHTLY
Status: DISCONTINUED | OUTPATIENT
Start: 2022-11-12 | End: 2022-11-13

## 2022-11-12 RX ORDER — INSULIN GLARGINE-YFGN 100 [IU]/ML
15 INJECTION, SOLUTION SUBCUTANEOUS NIGHTLY
Status: DISCONTINUED | OUTPATIENT
Start: 2022-11-12 | End: 2022-11-12

## 2022-11-12 RX ORDER — INSULIN LISPRO 100 [IU]/ML
5 INJECTION, SOLUTION INTRAVENOUS; SUBCUTANEOUS
Status: DISCONTINUED | OUTPATIENT
Start: 2022-11-12 | End: 2022-11-13 | Stop reason: HOSPADM

## 2022-11-12 RX ADMIN — NYSTATIN 500000 UNITS: 100000 SUSPENSION ORAL at 21:48

## 2022-11-12 RX ADMIN — BACLOFEN 5 MG: 10 TABLET ORAL at 13:54

## 2022-11-12 RX ADMIN — PREGABALIN 75 MG: 25 CAPSULE ORAL at 21:42

## 2022-11-12 RX ADMIN — INSULIN LISPRO 3 UNITS: 100 INJECTION, SOLUTION INTRAVENOUS; SUBCUTANEOUS at 12:09

## 2022-11-12 RX ADMIN — Medication 10 ML: at 08:39

## 2022-11-12 RX ADMIN — MONTELUKAST 10 MG: 10 TABLET, FILM COATED ORAL at 21:43

## 2022-11-12 RX ADMIN — BUDESONIDE 500 MCG: 0.5 SUSPENSION RESPIRATORY (INHALATION) at 08:33

## 2022-11-12 RX ADMIN — ACETAMINOPHEN 650 MG: 325 TABLET, FILM COATED ORAL at 12:11

## 2022-11-12 RX ADMIN — ARFORMOTEROL TARTRATE 15 MCG: 15 SOLUTION RESPIRATORY (INHALATION) at 20:24

## 2022-11-12 RX ADMIN — PREGABALIN 75 MG: 25 CAPSULE ORAL at 13:54

## 2022-11-12 RX ADMIN — INSULIN LISPRO 5 UNITS: 100 INJECTION, SOLUTION INTRAVENOUS; SUBCUTANEOUS at 08:35

## 2022-11-12 RX ADMIN — CELECOXIB 100 MG: 100 CAPSULE ORAL at 08:37

## 2022-11-12 RX ADMIN — INSULIN LISPRO 3 UNITS: 100 INJECTION, SOLUTION INTRAVENOUS; SUBCUTANEOUS at 17:29

## 2022-11-12 RX ADMIN — BUDESONIDE 500 MCG: 0.5 SUSPENSION RESPIRATORY (INHALATION) at 20:24

## 2022-11-12 RX ADMIN — NYSTATIN 500000 UNITS: 100000 SUSPENSION ORAL at 08:37

## 2022-11-12 RX ADMIN — INSULIN LISPRO 5 UNITS: 100 INJECTION, SOLUTION INTRAVENOUS; SUBCUTANEOUS at 12:09

## 2022-11-12 RX ADMIN — NYSTATIN 500000 UNITS: 100000 SUSPENSION ORAL at 12:12

## 2022-11-12 RX ADMIN — METHYLPREDNISOLONE SODIUM SUCCINATE 40 MG: 40 INJECTION, POWDER, FOR SOLUTION INTRAMUSCULAR; INTRAVENOUS at 00:22

## 2022-11-12 RX ADMIN — ASPIRIN 81 MG CHEWABLE TABLET 81 MG: 81 TABLET CHEWABLE at 08:36

## 2022-11-12 RX ADMIN — PRAMIPEXOLE DIHYDROCHLORIDE 0.5 MG: 0.25 TABLET ORAL at 21:47

## 2022-11-12 RX ADMIN — ENOXAPARIN SODIUM 30 MG: 100 INJECTION SUBCUTANEOUS at 08:36

## 2022-11-12 RX ADMIN — INSULIN GLARGINE-YFGN 10 UNITS: 100 INJECTION, SOLUTION SUBCUTANEOUS at 21:50

## 2022-11-12 RX ADMIN — BACLOFEN 5 MG: 10 TABLET ORAL at 08:37

## 2022-11-12 RX ADMIN — PANTOPRAZOLE SODIUM 40 MG: 40 TABLET, DELAYED RELEASE ORAL at 05:28

## 2022-11-12 RX ADMIN — NYSTATIN 500000 UNITS: 100000 SUSPENSION ORAL at 17:28

## 2022-11-12 RX ADMIN — ARFORMOTEROL TARTRATE 15 MCG: 15 SOLUTION RESPIRATORY (INHALATION) at 08:33

## 2022-11-12 RX ADMIN — PREGABALIN 75 MG: 25 CAPSULE ORAL at 08:37

## 2022-11-12 RX ADMIN — LOSARTAN POTASSIUM 50 MG: 50 TABLET, FILM COATED ORAL at 08:37

## 2022-11-12 RX ADMIN — ROSUVASTATIN 20 MG: 20 TABLET, FILM COATED ORAL at 08:37

## 2022-11-12 RX ADMIN — ENOXAPARIN SODIUM 30 MG: 100 INJECTION SUBCUTANEOUS at 21:49

## 2022-11-12 RX ADMIN — CALCIUM CARBONATE-VITAMIN D TAB 500 MG-200 UNIT 1 TABLET: 500-200 TAB at 08:37

## 2022-11-12 RX ADMIN — LEVOTHYROXINE SODIUM 112 MCG: 0.11 TABLET ORAL at 05:28

## 2022-11-12 RX ADMIN — INSULIN HUMAN 10 UNITS: 100 INJECTION, SOLUTION PARENTERAL at 01:04

## 2022-11-12 RX ADMIN — INSULIN LISPRO 5 UNITS: 100 INJECTION, SOLUTION INTRAVENOUS; SUBCUTANEOUS at 17:28

## 2022-11-12 RX ADMIN — CITALOPRAM HYDROBROMIDE 40 MG: 20 TABLET ORAL at 08:37

## 2022-11-12 RX ADMIN — BACLOFEN 5 MG: 10 TABLET ORAL at 21:43

## 2022-11-12 RX ADMIN — PREDNISONE 40 MG: 20 TABLET ORAL at 21:43

## 2022-11-12 RX ADMIN — AMITRIPTYLINE HYDROCHLORIDE 50 MG: 25 TABLET, FILM COATED ORAL at 21:43

## 2022-11-12 RX ADMIN — Medication 10 ML: at 21:50

## 2022-11-12 RX ADMIN — METHYLPREDNISOLONE SODIUM SUCCINATE 40 MG: 40 INJECTION, POWDER, FOR SOLUTION INTRAMUSCULAR; INTRAVENOUS at 08:39

## 2022-11-12 ASSESSMENT — PAIN DESCRIPTION - DESCRIPTORS: DESCRIPTORS: ACHING;THROBBING;STABBING;SORE

## 2022-11-12 ASSESSMENT — PAIN SCALES - GENERAL
PAINLEVEL_OUTOF10: 0
PAINLEVEL_OUTOF10: 8

## 2022-11-12 ASSESSMENT — PAIN DESCRIPTION - LOCATION: LOCATION: SHOULDER

## 2022-11-12 ASSESSMENT — PAIN - FUNCTIONAL ASSESSMENT: PAIN_FUNCTIONAL_ASSESSMENT: ACTIVITIES ARE NOT PREVENTED

## 2022-11-12 ASSESSMENT — PAIN DESCRIPTION - ORIENTATION: ORIENTATION: LEFT

## 2022-11-12 NOTE — PROGRESS NOTES
Pulmonary Progress Note    Admit Date: 11/5/2022                            PCP: Gaby Ann MD  Principal Problem:    COPD exacerbation ([facility]  Active Problems:    Acute and chronic respiratory failure with hypoxia ([facility]    Primary hypertension    Type 2 diabetes mellitus with hyperglycemia, without long-term current use of insulin ([facility]    DM type 2 with diabetic peripheral neuropathy ([facility]  Resolved Problems:    * No resolved hospital problems.  *      Subjective:  Patient seen resting in bed on 2L   She wore Bipap all night  Still with complaints of coughing but felling better       Medications:   dextrose      sodium chloride          insulin lispro  0-4 Units SubCUTAneous TID WC    insulin lispro  0-4 Units SubCUTAneous Nightly    insulin lispro  5 Units SubCUTAneous TID WC    insulin glargine  10 Units SubCUTAneous Nightly    predniSONE  40 mg Oral BID    losartan  50 mg Oral Daily    pramipexole  0.5 mg Oral Nightly    nystatin  5 mL Oral 4x Daily    amitriptyline  50 mg Oral Nightly    aspirin  81 mg Oral Daily    baclofen  5 mg Oral TID    oyster shell calcium w/D  1 tablet Oral Daily    celecoxib  100 mg Oral Daily    citalopram  40 mg Oral Daily    [Held by provider] cetirizine  5 mg Oral Daily    levothyroxine  112 mcg Oral Daily    montelukast  10 mg Oral Nightly    pantoprazole  40 mg Oral QAM AC    pregabalin  75 mg Oral TID    rosuvastatin  20 mg Oral Daily    [Held by provider] triamterene-hydroCHLOROthiazide  1 tablet Oral Daily    sodium chloride flush  5-40 mL IntraVENous 2 times per day    enoxaparin  30 mg SubCUTAneous BID    budesonide  0.5 mg Nebulization BID    Arformoterol Tartrate  15 mcg Nebulization BID       Vitals:  VITALS:  BP (!) 166/79   Pulse 67   Temp 98.4 °F (36.9 °C) (Oral)   Resp 15   Ht 5' 6\" (1.676 m)   Wt 228 lb 4.8 oz (103.6 kg)   SpO2 100%   BMI 36.85 kg/m²   24HR INTAKE/OUTPUT:    Intake/Output Summary (Last 24 hours) at 11/12/2022 4604  Last data filed at 2022 0839  Gross per 24 hour   Intake 10 ml   Output 0 ml   Net 10 ml     CURRENT PULSE OXIMETRY:  SpO2: 100 %  24HR PULSE OXIMETRY RANGE:  SpO2  Av.8 %  Min: 97 %  Max: 100 %  CVP:    VENT SETTINGS:      Additional Respiratory Assessments  Heart Rate: 67  Resp: 15  SpO2: 100 %      EXAM:  General: No distress. 3L  Eyes: No sclera icterus. No conjunctival injection. ENT: No discharge. Pharynx clear. Neck: Trachea midline. Normal thyroid. Resp: No accessory muscle use. Decreased no wz   CV: Regular rate. Regular rhythm. No mumur or rub. ABD: Non-tender. Non-distended. Normal bowel sounds. Skin: Warm and dry. No nodule on exposed extremities. No rash on exposed extremities. Ext: No joint deformity. No clubbing. No cyanosis. No edema  Neuro: Awake. Follows commands ox3, SIMMONS    I/O: I/O last 3 completed shifts: In: 660 [P.O.:660]  Out: 0   I/O this shift:  In: 10 [I.V.:10]  Out: -      Results:  CBC:   Recent Labs     11/10/22  0503 22  0513 22  0631   WBC 6.8 7.9 10.7   HGB 11.6 12.8 11.7   HCT 38.0 41.5 38.3   MCV 93.4 95.4 92.7    214 431     BMP:   Recent Labs     11/10/22  0503 22  0513 22  0631    136 136   K 4.9 4.8 4.5   CL 97* 98 97*   CO2 36* 32* 29   BUN 24* 30* 29*   CREATININE 0.8 0.7 0.8       Procalcitonin:   No results for input(s): PROCAL in the last 72 hours. Cultures:  No results for input(s): CULTRESP in the last 72 hours.      22 1749    CULTURE, RESPIRATORY Oral Pharyngeal Buffy reduced P    Smear, Respiratory Abundant Polymorphonuclear leukocytes   Epithelial cells not seen   No organisms seen      Cultures:    Latest Reference Range & Units 22 21:09   Human Rhinovirus/Enterovirus by PCR Not Detected  Not Detected   Influenza A by PCR Not Detected  Not Detected   Influenza B by PCR Not Detected  Not Detected   Adenovirus by PCR Not Detected  Not Detected   Coronavirus 229E by PCR Not Detected  Not Detected   Coronavirus HKU1 by PCR Not Detected  Not Detected   Coronavirus NL63 by PCR Not Detected  Not Detected   Coronavirus OC43 by PCR Not Detected  Not Detected   Human Metapneumovirus by PCR Not Detected  Not Detected   Parainfluenza Virus 1 by PCR Not Detected  Not Detected   Parainfluenza Virus 2 by PCR Not Detected  Not Detected   Parainfluenza Virus 3 by PCR Not Detected  Not Detected   Parainfluenza Virus 4 by PCR Not Detected  Not Detected   Respiratory Syncytial Virus by PCR Not Detected  Not Detected   Bordetella parapertussis by PCR Not Detected  Not Detected   Chlamydophilia pneumoniae by PCR Not Detected  Not Detected   Mycoplasma pneumoniae by PCR Not Detected  Not Detected   SARS-CoV-2, PCR Not Detected  Not Detected   Bordetella pertussis by PCR Not Detected  Not Detected             ABG:   Recent Labs     11/10/22  0956   PH 7.343*   PO2 91.5   PCO2 57.2*   HCO3 30.4*   BE 3.3*   O2SAT 96.6       Films:  XR CHEST PORTABLE 11/5/2022 No acute process. 11/8: Chest x-ray clear    Assessment:  79 YOF retired  from [employer], vaccinated for [employer], 51-py ex-smoker known to Dr. Shital Mullins, with history of HLD, HTN, GERD however has not been to office since before 2018 with asthma/COPD on 2 L of oxygen at home overlap was on inhalers but now just takes albuterol as needed. She has EDWARD and non-compliant,     11/5 who presented to the ED on increased shortness of breath  Placed on 2 L saturations 85% on room air  WBC 11.7, troponin 10> 9  Respiratory panel negative  Chest x-ray negative  11/6: 2 L , Pro-Ronnie 0.20, ABG 7.47/35.4/135/20 5.4 on 2 L  11/8: Pulmonary consulted for shortness of breath  Chest x-ray negative, Repeat procalcitonin 0.13  Respiratory culture negative  11/9: on 3L  11/10: 3L  Says she is coughing up green secretions and has some chest discomfort. ABGs with respiratory acidosis. Patient placed on BiPAP  11/11: 3L, Bipap all night.  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 Ambulatory pulse ox prior to discharge   Continue brovana and budesonide. Not on maintenance at home, has not seen in office since 2018. Will benefit from daily inhaler at CT. PFT as outpatient  Duonebs stopped due to thick mucous. Continue Mucinex and flutter valve with albuterol  Solumedrol q8H no iv today ok to change to oral taper 40 BID today then 40 x 3 days, 30x3 days 20x3 days 10x3 days   On singuair   S/p doxycycline 11/10. PCT 0.20 (11/6) > 0.13 (11/8). Sputum cx 11/8 negative. Continue IS for pulmonary hygiene  PSG as outpatient to obtain new CPAP machine.  She does have order for repeat sleep study but has not been completed yet   Will benefit from outpatient pulm rehab and regular follow up   Supportive care     Electronically signed by BRYAN Soria on 11/12/2022 at 2:17 PM

## 2022-11-12 NOTE — PROGRESS NOTES
[facility]  [facility] Residency Program  Progress Note - House Team     Patient:  Shady Carranza 79 y.o. female MRN: [id number]     Date of Service: 11/12/2022     CC: cough, SOB, wheezing  Overnight events: BS elevated 433--> 5 units lantus and increased to HDSS--> repeat of 403, was given 10 units regular insulin and improved to 195. Subjective     Patient was seen and examined this morning at bedside in no acute distress. Overnight no acute changes. Breathing has improved, but still not at baseline per patient. Admits to increased work of breathing on ambulation still. Reached out to pulmonary, changed solumedrol 40 mg IV back to q12hrs. Switched nightly Lantus to 10 units with LDSS and 5 units premeals. Objective     Physical Exam:  Vitals: BP (!) 166/79   Pulse 64 Comment: 64  Temp 98.4 °F (36.9 °C) (Oral)   Resp 15   Ht 5' 6\" (1.676 m)   Wt 228 lb 4.8 oz (103.6 kg)   SpO2 100%   BMI 36.85 kg/m²     I & O - 24hr: I/O this shift:  In: 10 [I.V.:10]  Out: -    General Appearance: alert, appears stated age, mild distress, and fatigue   HEENT:  Head: Normocephalic, no lesions, without obvious abnormality  Neck: no adenopathy and no JVD  Lung:  ronchi and wheezes bilaterally   Heart: regular rate and rhythm, S1, S2 normal, no murmur, click, rub or gallop  Abdomen: soft, non-tender; bowel sounds normal; no masses,  no organomegaly  Extremities:  extremities normal, atraumatic, no cyanosis or edema  Musculokeletal: No joint swelling, no muscle tenderness. ROM normal in all joints of extremities.    Neurologic: Mental status: Alert, oriented, thought content appropriate    Subjective  Pertinent Labs & Imaging Studies     CBC with Differential:    Lab Results   Component Value Date/Time    WBC 10.7 11/12/2022 06:31 AM    RBC 4.13 11/12/2022 06:31 AM    HGB 11.7 11/12/2022 06:31 AM    HCT 38.3 11/12/2022 06:31 AM     11/12/2022 06:31 AM    MCV 92.7 11/12/2022 06:31 AM    MCH 28.3 11/12/2022 06:31 AM    MCHC 30.5 11/12/2022 06:31 AM    RDW 12.5 11/12/2022 06:31 AM    SEGSPCT 36 01/03/2014 09:54 AM    METASPCT 0.9 01/26/2020 07:11 AM    LYMPHOPCT 15.3 11/12/2022 06:31 AM    PROMYELOPCT 3.5 01/23/2020 07:25 AM    MONOPCT 4.0 11/12/2022 06:31 AM    MYELOPCT 0.9 01/26/2020 07:11 AM    BASOPCT 0.2 11/12/2022 06:31 AM    MONOSABS 0.43 11/12/2022 06:31 AM    LYMPHSABS 1.64 11/12/2022 06:31 AM    EOSABS 0.00 11/12/2022 06:31 AM    BASOSABS 0.02 11/12/2022 06:31 AM     BMP:    Lab Results   Component Value Date/Time     11/12/2022 06:31 AM    K 4.5 11/12/2022 06:31 AM    CL 97 11/12/2022 06:31 AM    CO2 29 11/12/2022 06:31 AM    BUN 29 11/12/2022 06:31 AM    LABALBU 3.7 11/05/2022 02:29 PM    LABALBU 3.9 10/26/2011 04:50 AM    CREATININE 0.8 11/12/2022 06:31 AM    CALCIUM 9.8 11/12/2022 06:31 AM    GFRAA >60 06/23/2022 02:12 PM    LABGLOM >60 11/12/2022 06:31 AM    GLUCOSE 177 11/12/2022 06:31 AM    GLUCOSE 124 10/26/2011 04:50 AM       XR CHEST PORTABLE   Final Result   Cardiomegaly demonstrates no change. XR CHEST PORTABLE   Final Result   No acute process.               Resident's Assessment and Plan     Assessment and Plan:    Acute respiratory insufficiency secondary to COPD exacerbation versus medication noncompliance, rule out infection  Blood cultures for 24 hours no growth  Respiratory panel negative x2   Pro-Ronnie level 0.2  Leukocytosis resolved  PLAN  Respiratory cultures--> no organisms seen  Pending strep pneumonia and Legionella antigen  Pulmonology consulted--> IV steroids q12hrs BID , outpatient pulm rehab in order to obtain new pap and PFT and PSG  Repeat chest x-ray ---> no changes seen  Continue Tessalon and guaifenesin for cough  Continue Brovana Pulmicort and singular   Albuterol nebulizer q4hrs PRN started on 11/12  Continue Solu-Medrol IV 40 mg twice daily--> consider switching to oral?  Doxycycline with last dose on 11/10/2022  Continue to monitor glucose levels with steroid use    History of hypertension-home medication of Maxzide-- HELD for 1 day, was stopped and started on losartan, continue losartan of 50 mg daily. Monitor BP with PRNS in place    History of asthma and COPD overlap syndrome-baseline on 2 L nasal cannula at home    History of hyperlipidemia-continue home medication of Crestor     History of type 2 diabetes mellitus-on Trulicity at home  Continue 10 units nightly and 5 units pre meals with LDSS  Hypoglycemia protocol in place    History of chronic back pain secondary to fibromyalgia-continue home medication of Celexa 40 mg daily ,  Celebrex 100 mg daily, Lyrica 75 mg 3 times daily, and baclofen     History of depression-continue home medication of Celexa 40 mg daily    History of hypothyroidism-continue home medication of Synthroid 112 mg daily    History of sleep apnea-continue BiPAP at night- f/u outpt for new CPAP machine     ?  RLS--> started on pramipexole in patient       PT/OT evaluation: ordered [facility] 20/24  DVT prophylaxis/ GI prophylaxis: Lovenox / Protonix  Disposition: continue current care    Mickey Ho DO, PGY-1  Attending physician: Dr. Gabi Mendoza

## 2022-11-13 VITALS
SYSTOLIC BLOOD PRESSURE: 127 MMHG | BODY MASS INDEX: 36.69 KG/M2 | RESPIRATION RATE: 17 BRPM | OXYGEN SATURATION: 99 % | HEART RATE: 66 BPM | WEIGHT: 228.3 LBS | DIASTOLIC BLOOD PRESSURE: 69 MMHG | HEIGHT: 66 IN | TEMPERATURE: 98.4 F

## 2022-11-13 LAB
ANION GAP SERPL CALCULATED.3IONS-SCNC: 9 MMOL/L (ref 7–16)
BASOPHILS ABSOLUTE: 0.01 E9/L (ref 0–0.2)
BASOPHILS RELATIVE PERCENT: 0.1 % (ref 0–2)
BUN BLDV-MCNC: 32 MG/DL (ref 6–23)
CALCIUM SERPL-MCNC: 9.4 MG/DL (ref 8.6–10.2)
CHLORIDE BLD-SCNC: 100 MMOL/L (ref 98–107)
CO2: 29 MMOL/L (ref 22–29)
CREAT SERPL-MCNC: 0.8 MG/DL (ref 0.5–1)
EOSINOPHILS ABSOLUTE: 0 E9/L (ref 0.05–0.5)
EOSINOPHILS RELATIVE PERCENT: 0 % (ref 0–6)
GFR SERPL CREATININE-BSD FRML MDRD: >60 ML/MIN/1.73
GLUCOSE BLD-MCNC: 279 MG/DL (ref 74–99)
HCT VFR BLD CALC: 37.6 % (ref 34–48)
HEMOGLOBIN: 11.5 G/DL (ref 11.5–15.5)
IMMATURE GRANULOCYTES #: 0.16 E9/L
IMMATURE GRANULOCYTES %: 1.6 % (ref 0–5)
LYMPHOCYTES ABSOLUTE: 1.54 E9/L (ref 1.5–4)
LYMPHOCYTES RELATIVE PERCENT: 15.1 % (ref 20–42)
MCH RBC QN AUTO: 28.2 PG (ref 26–35)
MCHC RBC AUTO-ENTMCNC: 30.6 % (ref 32–34.5)
MCV RBC AUTO: 92.2 FL (ref 80–99.9)
METER GLUCOSE: 247 MG/DL (ref 74–99)
METER GLUCOSE: 329 MG/DL (ref 74–99)
METER GLUCOSE: 403 MG/DL (ref 74–99)
MONOCYTES ABSOLUTE: 0.52 E9/L (ref 0.1–0.95)
MONOCYTES RELATIVE PERCENT: 5.1 % (ref 2–12)
NEUTROPHILS ABSOLUTE: 7.95 E9/L (ref 1.8–7.3)
NEUTROPHILS RELATIVE PERCENT: 78.1 % (ref 43–80)
PDW BLD-RTO: 12.7 FL (ref 11.5–15)
PLATELET # BLD: 434 E9/L (ref 130–450)
PMV BLD AUTO: 9.7 FL (ref 7–12)
POTASSIUM SERPL-SCNC: 4.8 MMOL/L (ref 3.5–5)
RBC # BLD: 4.08 E12/L (ref 3.5–5.5)
SODIUM BLD-SCNC: 138 MMOL/L (ref 132–146)
WBC # BLD: 10.2 E9/L (ref 4.5–11.5)

## 2022-11-13 PROCEDURE — 2700000000 HC OXYGEN THERAPY PER DAY

## 2022-11-13 PROCEDURE — 36415 COLL VENOUS BLD VENIPUNCTURE: CPT

## 2022-11-13 PROCEDURE — 94660 CPAP INITIATION&MGMT: CPT

## 2022-11-13 PROCEDURE — 80048 BASIC METABOLIC PNL TOTAL CA: CPT

## 2022-11-13 PROCEDURE — 2580000003 HC RX 258: Performed by: STUDENT IN AN ORGANIZED HEALTH CARE EDUCATION/TRAINING PROGRAM

## 2022-11-13 PROCEDURE — 6370000000 HC RX 637 (ALT 250 FOR IP): Performed by: STUDENT IN AN ORGANIZED HEALTH CARE EDUCATION/TRAINING PROGRAM

## 2022-11-13 PROCEDURE — 82962 GLUCOSE BLOOD TEST: CPT

## 2022-11-13 PROCEDURE — 94640 AIRWAY INHALATION TREATMENT: CPT

## 2022-11-13 PROCEDURE — 6370000000 HC RX 637 (ALT 250 FOR IP): Performed by: INTERNAL MEDICINE

## 2022-11-13 PROCEDURE — 99231 SBSQ HOSP IP/OBS SF/LOW 25: CPT | Performed by: INTERNAL MEDICINE

## 2022-11-13 PROCEDURE — 6360000002 HC RX W HCPCS: Performed by: STUDENT IN AN ORGANIZED HEALTH CARE EDUCATION/TRAINING PROGRAM

## 2022-11-13 PROCEDURE — 85025 COMPLETE CBC W/AUTO DIFF WBC: CPT

## 2022-11-13 RX ORDER — PREDNISONE 20 MG/1
40 TABLET ORAL
Status: DISCONTINUED | OUTPATIENT
Start: 2022-11-13 | End: 2022-11-13 | Stop reason: HOSPADM

## 2022-11-13 RX ORDER — BUDESONIDE AND FORMOTEROL FUMARATE DIHYDRATE 160; 4.5 UG/1; UG/1
AEROSOL RESPIRATORY (INHALATION)
Qty: 10.2 G | Refills: 3 | Status: SHIPPED | OUTPATIENT
Start: 2022-11-13

## 2022-11-13 RX ORDER — PREDNISONE 10 MG/1
10 TABLET ORAL
Qty: 3 TABLET | Refills: 0 | Status: SHIPPED | OUTPATIENT
Start: 2022-11-22 | End: 2022-11-25

## 2022-11-13 RX ORDER — PREDNISONE 20 MG/1
40 TABLET ORAL
Qty: 4 TABLET | Refills: 0 | Status: SHIPPED | OUTPATIENT
Start: 2022-11-14 | End: 2022-11-16

## 2022-11-13 RX ORDER — PREDNISONE 10 MG/1
30 TABLET ORAL
Qty: 9 TABLET | Refills: 0 | Status: SHIPPED | OUTPATIENT
Start: 2022-11-16 | End: 2022-11-19

## 2022-11-13 RX ORDER — PREDNISONE 1 MG/1
10 TABLET ORAL
Status: DISCONTINUED | OUTPATIENT
Start: 2022-11-22 | End: 2022-11-13 | Stop reason: HOSPADM

## 2022-11-13 RX ORDER — MONTELUKAST SODIUM 10 MG/1
TABLET ORAL
Qty: 90 TABLET | Refills: 1 | Status: SHIPPED | OUTPATIENT
Start: 2022-11-13

## 2022-11-13 RX ORDER — LOSARTAN POTASSIUM 50 MG/1
50 TABLET ORAL DAILY
Qty: 30 TABLET | Refills: 3 | Status: SHIPPED | OUTPATIENT
Start: 2022-11-14

## 2022-11-13 RX ORDER — BENZONATATE 100 MG/1
100 CAPSULE ORAL 3 TIMES DAILY PRN
Qty: 30 CAPSULE | Refills: 1 | Status: SHIPPED | OUTPATIENT
Start: 2022-11-13 | End: 2022-12-03

## 2022-11-13 RX ORDER — ALBUTEROL SULFATE 90 UG/1
3 AEROSOL, METERED RESPIRATORY (INHALATION) 4 TIMES DAILY PRN
Qty: 3 EACH | Refills: 1 | Status: SHIPPED | OUTPATIENT
Start: 2022-11-13

## 2022-11-13 RX ORDER — PREDNISONE 20 MG/1
20 TABLET ORAL
Qty: 3 TABLET | Refills: 0 | Status: SHIPPED | OUTPATIENT
Start: 2022-11-19 | End: 2022-11-22

## 2022-11-13 RX ORDER — INSULIN LISPRO 100 [IU]/ML
0-4 INJECTION, SOLUTION INTRAVENOUS; SUBCUTANEOUS NIGHTLY
Status: DISCONTINUED | OUTPATIENT
Start: 2022-11-13 | End: 2022-11-13 | Stop reason: HOSPADM

## 2022-11-13 RX ORDER — TIOTROPIUM BROMIDE 18 UG/1
18 CAPSULE ORAL; RESPIRATORY (INHALATION) DAILY
Qty: 30 CAPSULE | Refills: 5 | Status: SHIPPED | OUTPATIENT
Start: 2022-11-13

## 2022-11-13 RX ORDER — GUAIFENESIN 400 MG/1
400 TABLET ORAL 2 TIMES DAILY PRN
Qty: 56 TABLET | Refills: 0 | Status: SHIPPED | OUTPATIENT
Start: 2022-11-13

## 2022-11-13 RX ORDER — PREDNISONE 20 MG/1
20 TABLET ORAL
Status: DISCONTINUED | OUTPATIENT
Start: 2022-11-19 | End: 2022-11-13 | Stop reason: HOSPADM

## 2022-11-13 RX ORDER — INSULIN LISPRO 100 [IU]/ML
0-8 INJECTION, SOLUTION INTRAVENOUS; SUBCUTANEOUS
Status: DISCONTINUED | OUTPATIENT
Start: 2022-11-13 | End: 2022-11-13 | Stop reason: HOSPADM

## 2022-11-13 RX ADMIN — LEVOTHYROXINE SODIUM 112 MCG: 0.11 TABLET ORAL at 06:25

## 2022-11-13 RX ADMIN — ARFORMOTEROL TARTRATE 15 MCG: 15 SOLUTION RESPIRATORY (INHALATION) at 08:31

## 2022-11-13 RX ADMIN — ASPIRIN 81 MG CHEWABLE TABLET 81 MG: 81 TABLET CHEWABLE at 08:56

## 2022-11-13 RX ADMIN — CALCIUM CARBONATE-VITAMIN D TAB 500 MG-200 UNIT 1 TABLET: 500-200 TAB at 08:55

## 2022-11-13 RX ADMIN — PREGABALIN 75 MG: 25 CAPSULE ORAL at 08:57

## 2022-11-13 RX ADMIN — PREGABALIN 75 MG: 25 CAPSULE ORAL at 13:29

## 2022-11-13 RX ADMIN — PANTOPRAZOLE SODIUM 40 MG: 40 TABLET, DELAYED RELEASE ORAL at 06:25

## 2022-11-13 RX ADMIN — ROSUVASTATIN 20 MG: 20 TABLET, FILM COATED ORAL at 08:56

## 2022-11-13 RX ADMIN — INSULIN LISPRO 5 UNITS: 100 INJECTION, SOLUTION INTRAVENOUS; SUBCUTANEOUS at 11:44

## 2022-11-13 RX ADMIN — INSULIN LISPRO 6 UNITS: 100 INJECTION, SOLUTION INTRAVENOUS; SUBCUTANEOUS at 11:43

## 2022-11-13 RX ADMIN — ENOXAPARIN SODIUM 30 MG: 100 INJECTION SUBCUTANEOUS at 08:55

## 2022-11-13 RX ADMIN — Medication 10 ML: at 08:58

## 2022-11-13 RX ADMIN — CITALOPRAM HYDROBROMIDE 40 MG: 20 TABLET ORAL at 08:56

## 2022-11-13 RX ADMIN — INSULIN LISPRO 3 UNITS: 100 INJECTION, SOLUTION INTRAVENOUS; SUBCUTANEOUS at 08:57

## 2022-11-13 RX ADMIN — NYSTATIN 500000 UNITS: 100000 SUSPENSION ORAL at 11:45

## 2022-11-13 RX ADMIN — CELECOXIB 100 MG: 100 CAPSULE ORAL at 08:56

## 2022-11-13 RX ADMIN — NYSTATIN 500000 UNITS: 100000 SUSPENSION ORAL at 08:55

## 2022-11-13 RX ADMIN — BUDESONIDE 500 MCG: 0.5 SUSPENSION RESPIRATORY (INHALATION) at 08:30

## 2022-11-13 RX ADMIN — PREDNISONE 40 MG: 20 TABLET ORAL at 11:45

## 2022-11-13 RX ADMIN — BACLOFEN 5 MG: 10 TABLET ORAL at 13:29

## 2022-11-13 RX ADMIN — INSULIN LISPRO 5 UNITS: 100 INJECTION, SOLUTION INTRAVENOUS; SUBCUTANEOUS at 08:57

## 2022-11-13 RX ADMIN — BACLOFEN 5 MG: 10 TABLET ORAL at 08:56

## 2022-11-13 RX ADMIN — LOSARTAN POTASSIUM 50 MG: 50 TABLET, FILM COATED ORAL at 08:57

## 2022-11-13 ASSESSMENT — PAIN SCALES - GENERAL: PAINLEVEL_OUTOF10: 0

## 2022-11-13 NOTE — DISCHARGE SUMMARY
[address]  Discharge Summary    PCP: Martina Ahuja MD    Admit Date:11/5/2022  Discharge Date: 11/13/2022    Chief Complaint   Patient presents with    Shortness of Breath     With chest congestion, body aches and a headache. Started yesterday morning. Admission Diagnosis:   Acute hypoxic/hypercapnic respiratory failure secondary to COPD exacerbation  History of asthma COPD overlap syndrome  History of hypertension  History of hyperlipidemia  Type 2 diabetes mellitus  Chronic back pain secondary fibromyalgia  History of depression  History of hypothyroidism  History of sleep apnea  Leukocytosis secondary to COPD exacerbation    Discharge Diagnosis:  Acute hypoxic/hypercapnic respiratory failure secondary to COPD exacerbation - stable  History of asthma COPD overlap syndrome - chronic  History of hypertension - stable  History of hyperlipidemia - stable  Type 2 diabetes mellitus - stable  Chronic back pain secondary fibromyalgia - chronic  History of depression - stable  History of hypothyroidism - stable  History of sleep apnea - stable  Leukocytosis secondary to COPD exacerbation - stable    Hospital Course:     Patient is a 60-year-old female with past medical history of COPD asthma overlap syndrome, hypertension, hyperlipidemia, hypothyroidism, obesity, EDWARD, depression, anxiety, chronic back pain from fibromyalgia, GERD, and MGUS who presented to the ED for chief complaint of productive cough and shortness of breath. Patient reported a baseline of 2 L nasal cannula at home. In ED, lab work was relatively unremarkable except for leukocytosis of 11.7. Chest x-ray was negative for acute focal process. Patient was started on IV Solu-Medrol in the ED for concerns of COPD exacerbation and admitted for increased work of breathing with accessory muscle use and continuation of IV steroids. Pulmonary was consulted.   Respiratory viral panel was negative, sputum cultures were negative, and blood cultures were negative as well. Patient was on IV Solu-Medrol for 8 days and then transitioned to an oral prednisone tapering dosing, which is to be continued upon discharge. Patient was also started on scheduled breathing treatments and completed a course of doxycycline inpatient. Patient was also started on Spiriva via inhaler per pulmonary recommendations as well as Symbicort which was a home medication that patient had stopped taking prior to admission. Patient is to continue using albuterol inhaler as needed as well as Mucinex and Tessalon for the cough as needed. Patient is to follow-up with pulmonology outpatient for pulmonary rehab, to obtain a new CPAP machine and have repeat PFTs completed as well as a repeat sleep study. On outpatient follow up, continue to monitor blood pressure as patient's home medication of Maxide was discontinued for concerns of diuresing patient and was started on losartan 50 mg daily. Patient was also started on pramipexole for questionable restless leg syndrome during inpatient admission, but discontinued on discharge. Follow-up on potential diagnosis of RLS on outpatient visit. Also monitor patients blood glucose at follow up appointment due to continual steroid use. Ambulatory pulse ox was obtained prior to discharge. Patient and nurse were advised that it is okay to increase her current 2L NC oxygen requirements at home if needed. Called the internal medicine clinic on day of discharge and left voicemail for them to call patient and schedule appointment early next week. Patient made aware that the clinic should be calling her to schedule follow-up. On day of discharge :   Subjective:    On day of discharge patient was examined at bedside no acute distress. Remained hemodynamically stable. Work of breathing has improved significantly since day of admission.   Patient still endorsing wheezing on examination as well as mild shortness of breath. Patient was informed of medication adjustments as well as oral prednisone tapering dosage which is to be discontinued on discharge. Patient is agreeable to plan with no further questions at this time. Denies chest pain, dizziness, lightheadedness, N/V, or abdominal pain. Significant findings (history and exam, laboratory, radiological, pathology, other tests):   General Appearance: alert, appears stated age, cooperative, and no distress  HEENT:  Head: Normocephalic, no lesions, without obvious abnormality. Neck: no adenopathy and no JVD  Lung:  Ronchi and wheezing present bilaterally   Heart: regular rate and rhythm, S1, S2 normal, no murmur, click, rub or gallop  Abdomen: soft, non-tender; bowel sounds normal; no masses,  no organomegaly  Extremities:  extremities normal, atraumatic, no cyanosis or edema  Musculokeletal: No joint swelling, no muscle tenderness. ROM normal in all joints of extremities.    Neurologic: Mental status: Alert, oriented, thought content appropriate      Pending test results: None    Consults:  Pulmonology: [facility], [facility]    Procedures:  None    Condition at discharge: Stable    Disposition: home    Time taken for discharge : < 30 mins [] >30 mins [x]    Discharge Medications:     Medication List        START taking these medications      benzonatate 100 MG capsule  Commonly known as: TESSALON  Take 1 capsule by mouth 3 times daily as needed for Cough     guaiFENesin 400 MG tablet  Take 1 tablet by mouth 2 times daily as needed for Cough     losartan 50 MG tablet  Commonly known as: COZAAR  Take 1 tablet by mouth daily  Start taking on: November 14, 2022     * predniSONE 20 MG tablet  Commonly known as: DELTASONE  Take 2 tablets by mouth Daily with lunch for 2 doses  Start taking on: November 14, 2022     * predniSONE 10 MG tablet  Commonly known as: DELTASONE  Take 3 tablets by mouth Daily with lunch for 3 doses  Start taking on: November 16, 2022     * predniSONE 20 MG tablet  Commonly known as: DELTASONE  Take 1 tablet by mouth Daily with lunch for 3 doses  Start taking on: November 19, 2022     * predniSONE 10 MG tablet  Commonly known as: DELTASONE  Take 1 tablet by mouth Daily with lunch for 3 doses  Start taking on: November 22, 2022     rosuvastatin 20 MG tablet  Commonly known as: CRESTOR  Take 1 tablet by mouth daily     Spiriva HandiHaler 18 MCG inhalation capsule  Generic drug: tiotropium  Inhale 1 capsule into the lungs daily           * This list has 4 medication(s) that are the same as other medications prescribed for you. Read the directions carefully, and ask your doctor or other care provider to review them with you. CHANGE how you take these medications      ammonium lactate 12 % lotion  Commonly known as: LAC-HYDRIN  Apply topically twice daily  What changed: Another medication with the same name was removed. Continue taking this medication, and follow the directions you see here. CONTINUE taking these medications      albuterol sulfate  (90 Base) MCG/ACT inhaler  Commonly known as: PROVENTIL;VENTOLIN;PROAIR  Inhale 3 puffs into the lungs 4 times daily as needed for Wheezing     amitriptyline 50 MG tablet  Commonly known as: ELAVIL  TAKE 1 TABLET BY MOUTH EVERY EVENING     Ankle Brace Adjust-to-Fit Misc  Soft left ankle brace  Size to fit  Dx:   Ankle sprain     aspirin 81 MG tablet  Take 1 tablet by mouth daily     baclofen 10 MG tablet  Commonly known as: LIORESAL  TAKE 1/2 TABLET THREE TIMES DAILY AS NEEDED(PAIN, SPASMS)     Calcium 500/D 500-200 MG-UNIT per tablet  Generic drug: calcium-cholecalciferol  TAKE 1 TABLET BY MOUTH DAILY     celecoxib 100 MG capsule  Commonly known as: CELEBREX  Take 1 capsule by mouth daily     cetirizine 10 MG tablet  Commonly known as: ZYRTEC  TAKE 1 TABLET BY MOUTH DAILY     citalopram 40 MG tablet  Commonly known as: CELEXA  Take 1 tablet by mouth daily     docusate sodium 100 MG capsule  Commonly known as: Colace  Take 1 capsule by mouth 2 times daily     Fiber 625 MG Tabs  Take 1 tablet by mouth 2 times daily     levothyroxine 112 MCG tablet  Commonly known as: SYNTHROID  Take 1 tablet by mouth Daily     mineral oil-hydrophilic petrolatum ointment  Apply topically as needed. Misc. Devices Misc  Oxygen concentrator  j44.9     montelukast 10 MG tablet  Commonly known as: SINGULAIR  TAKE 1 TABLET BY MOUTH EVERY NIGHT AT BEDTIME     OXYGEN     pantoprazole 40 MG tablet  Commonly known as: PROTONIX  Take 1 tablet by mouth every morning (before breakfast)     pregabalin 75 MG capsule  Commonly known as: Lyrica  Take 1 capsule by mouth 3 times daily for 90 days. Symbicort 160-4.5 MCG/ACT Aero  Generic drug: budesonide-formoterol  INHALE 2 PUFFS INTO THE LUNGS TWICE DAILY     Trulicity 8.70 ZI/4.2VJ Sopn  Generic drug: Dulaglutide  Inject 0.75 mg into the skin once a week            STOP taking these medications      diclofenac sodium 1 % Gel  Commonly known as: VOLTAREN     omeprazole 20 MG delayed release capsule  Commonly known as: PriLOSEC     triamterene-hydroCHLOROthiazide 37.5-25 MG per tablet  Commonly known as: MAXZIDE-25     zoster recombinant adjuvanted vaccine 50 MCG/0.5ML Susr injection  Commonly known as:  Shingrix               Where to Get Your Medications        These medications were sent to [address] [place] [address],Third Floor [facility] [phone] [address] [place] [address]      Phone: [phone]   albuterol sulfate  (90 Base) MCG/ACT inhaler  benzonatate 100 MG capsule  guaiFENesin 400 MG tablet  losartan 50 MG tablet  montelukast 10 MG tablet  predniSONE 10 MG tablet  predniSONE 10 MG tablet  predniSONE 20 MG tablet  predniSONE 20 MG tablet  Spiriva HandiHaler 18 MCG inhalation capsule  Symbicort 160-4.5 MCG/ACT Aero         Patient Instructions:     Internal medicine    Please follow up with the internal medicine clinic at [facility] within 12 days of discharge. You will receive a phone call within 7 days from discharge. Please keep all other follow up appointments:  Call to  follow up with Dr. Mendoza Braun at [place] [id number]     [phone]    Changes in healthcare   Please take all medications as indicated above  Diet: regular diet   Activity: activity as tolerated  Additional labs, testing or imaging needed after discharge   None  New medication prescribed after this hospitalization are   Losartan 50 mg BID  Prednisone tapering dose  Spiriva inhaler  Symbicort Aero   Guaifenesin and tessalon as needed for coughing  Please refer to your Med list for details   Please contact us if you have any concerns, wish to change or make an appointment:  Internal medicine clinic   Phone: [phone]  Fax: [phone]  [address]  Or please call the nurse line at [phone]. Should you have further questions in regards to this visit, you can review your clinical note and after visit summary document on your [employer] account. Other questions can be directed to our nurse line at [phone]. Other than any new prescriptions given to you today, the list of home medications on this After Visit Summary are based on information provided to us from you and your healthcare providers. This information, including the list, dose, and frequency of medications is only as accurate as the information you provided. If you have any questions or concerns about your home medications, please contact your Primary Care Physician for further clarification.       Author Beatriz DO  PGY 1   7:46 AM 11/13/2022

## 2022-11-13 NOTE — PROGRESS NOTES
Pulse ox was 88% on room air at rest.  Ambulated patient on room air. Oxygen saturation was 87% on room air while ambulating. Oxygen applied. Recovery pulse ox was 92% on 3 liters of oxygen while ambulating.

## 2022-11-13 NOTE — PLAN OF CARE
Spoke with pharmacist at [id number] ([phone]) and informed her maxzided is discontinued and losartan 50 mg is prescribed for patient's BP control. No maxzided will be refilled for patient.      Electronically signed by Shahram Calvin MD on 11/13/2022 at 12:16 PM

## 2022-11-13 NOTE — PLAN OF CARE
Problem: Chronic Conditions and Co-morbidities  Goal: Patient's chronic conditions and co-morbidity symptoms are monitored and maintained or improved  Outcome: Adequate for Discharge     Problem: Discharge Planning  Goal: Discharge to home or other facility with appropriate resources  Outcome: Adequate for Discharge     Problem: ABCDS Injury Assessment  Goal: Absence of physical injury  Outcome: Adequate for Discharge     Problem: Safety - Adult  Goal: Free from fall injury  Outcome: Adequate for Discharge     Problem: Pain  Goal: Verbalizes/displays adequate comfort level or baseline comfort level  Outcome: Adequate for Discharge

## 2022-11-13 NOTE — PROGRESS NOTES
[facility]  Internal Medicine Residency Program  Progress Note - House Team     Patient:  Tony Sher 79 y.o. female MRN: [id number]     Date of Service: 11/13/2022     CC: cough, SOB, wheezing  Overnight events: BS elevated 247--> 10 units Lantus at night. Lost IV access, IV solumedrol switched to prednisone 40 mg BID. Pulmonary started oral taper. Subjective     Patient was seen and examined this morning at bedside in no acute distress. Overnight no acute changes. Breathing and cough have improved, but still not at baseline per patient. Denies CP. Objective     Physical Exam:  Vitals: /69   Pulse 66   Temp 98.4 °F (36.9 °C) (Oral)   Resp 17   Ht 5' 6\" (1.676 m)   Wt 228 lb 4.8 oz (103.6 kg)   SpO2 99%   BMI 36.85 kg/m²     I & O - 24hr: No intake/output data recorded. General Appearance: alert, appears stated age, mild distress, and fatigue   HEENT:  Head: Normocephalic, no lesions, without obvious abnormality  Neck: no adenopathy and no JVD  Lung:  ronchi and wheezes bilaterally, improving   Heart: regular rate and rhythm, S1, S2 normal, no murmur, click, rub or gallop  Abdomen: soft, non-tender; bowel sounds normal; no masses,  no organomegaly  Extremities:  extremities normal, atraumatic, no cyanosis or edema  Musculokeletal: No joint swelling, no muscle tenderness. ROM normal in all joints of extremities.    Neurologic: Mental status: Alert, oriented, thought content appropriate    Subjective  Pertinent Labs & Imaging Studies     CBC with Differential:    Lab Results   Component Value Date/Time    WBC 10.2 11/13/2022 06:35 AM    RBC 4.08 11/13/2022 06:35 AM    HGB 11.5 11/13/2022 06:35 AM    HCT 37.6 11/13/2022 06:35 AM     11/13/2022 06:35 AM    MCV 92.2 11/13/2022 06:35 AM    MCH 28.2 11/13/2022 06:35 AM    MCHC 30.6 11/13/2022 06:35 AM    RDW 12.7 11/13/2022 06:35 AM    SEGSPCT 36 01/03/2014 09:54 AM    METASPCT 0.9 01/26/2020 07:11 AM    LYMPHOPCT 15.1 11/13/2022 06:35 AM    PROMYELOPCT 3.5 01/23/2020 07:25 AM    MONOPCT 5.1 11/13/2022 06:35 AM    MYELOPCT 0.9 01/26/2020 07:11 AM    BASOPCT 0.1 11/13/2022 06:35 AM    MONOSABS 0.52 11/13/2022 06:35 AM    LYMPHSABS 1.54 11/13/2022 06:35 AM    EOSABS 0.00 11/13/2022 06:35 AM    BASOSABS 0.01 11/13/2022 06:35 AM     BMP:    Lab Results   Component Value Date/Time     11/13/2022 06:35 AM    K 4.8 11/13/2022 06:35 AM     11/13/2022 06:35 AM    CO2 29 11/13/2022 06:35 AM    BUN 32 11/13/2022 06:35 AM    LABALBU 3.7 11/05/2022 02:29 PM    LABALBU 3.9 10/26/2011 04:50 AM    CREATININE 0.8 11/13/2022 06:35 AM    CALCIUM 9.4 11/13/2022 06:35 AM    GFRAA >60 06/23/2022 02:12 PM    LABGLOM >60 11/13/2022 06:35 AM    GLUCOSE 279 11/13/2022 06:35 AM    GLUCOSE 124 10/26/2011 04:50 AM       XR CHEST PORTABLE   Final Result   Cardiomegaly demonstrates no change. XR CHEST PORTABLE   Final Result   No acute process. Resident's Assessment and Plan     Assessment and Plan:    Acute respiratory insufficiency secondary to COPD exacerbation versus medication noncompliance, rule out infection  Blood cultures for 24 hours no growth  Respiratory panel negative x2   Pro-Ronnie level 0.2  Leukocytosis resolved  PLAN  Respiratory cultures--> no organisms seen  Pending strep pneumonia and Legionella antigen  Pulmonology consulted--> started on oral taper 11/12, outpatient pulm rehab in order to obtain new pap and PFT and PSG  Repeat chest x-ray ---> no changes seen  Continue Tessalon and guaifenesin for cough  Continue Brovana, Pulmicort and singular   Albuterol nebulizer q4hrs PRN started on 11/12  Continue oral taper per pulmonology  Doxycycline with last dose on 11/10/2022  Continue to monitor glucose levels with steroid use    History of hypertension-home medication of Maxzide-- HELD for 1 day, was stopped and started on losartan, continue losartan of 50 mg daily.  Monitor BP with PRNS in place    History of asthma and COPD overlap syndrome-baseline on 2 L nasal cannula at home    History of hyperlipidemia-continue home medication of Crestor     History of type 2 diabetes mellitus-on Trulicity at home  Continue 10 units nightly and 5 units pre meals with MDSS  Hypoglycemia protocol in place    History of chronic back pain secondary to fibromyalgia-continue home medication of Celexa 40 mg daily ,  Celebrex 100 mg daily, Lyrica 75 mg 3 times daily, and baclofen     History of depression-continue home medication of Celexa 40 mg daily    History of hypothyroidism-continue home medication of Synthroid 112 mg daily    History of sleep apnea-continue BiPAP at night- f/u outpt for new CPAP machine     ?  RLS--> started on pramipexole in patient       PT/OT evaluation: ordered [facility] 20/24  DVT prophylaxis/ GI prophylaxis: Lovenox / Protonix  Disposition: continue current care-- possible d/c today or tomorrow     Jena Freeman DO, PGY-1  Attending physician: Dr. Tono Browning

## 2022-11-13 NOTE — DISCHARGE INSTRUCTIONS
Internal medicine    Follow ups  Please follow up with the internal medicine clinic at [facility] within 12 days of discharge. You will receive a phone call within 7 days from discharge. Please keep all other follow up appointments:  Call to  follow up with Dr. Baljinder Mccormack at [facility]  [address]   [facility] [address]     [phone]    Changes in healthcare   Please take all medications as indicated above  Diet: regular diet   Activity: activity as tolerated  Additional labs, testing or imaging needed after discharge   None  New medication prescribed after this hospitalization are   Losartan 50 mg BID  Prednisone tapering dose  Spiriva inhaler  Symbicort Aero   Guaifenesin and tessalon as needed for coughing  Please refer to your Med list for details   Please contact us if you have any concerns, wish to change or make an appointment:  Internal medicine clinic   Phone: [phone]  Fax: [phone]  [address]  Or please call the nurse line at [phone]. Should you have further questions in regards to this visit, you can review your clinical note and after visit summary document on your [email or web address] account. Other questions can be directed to our nurse line at [phone]. Other than any new prescriptions given to you today, the list of home medications on this After Visit Summary are based on information provided to us from you and your healthcare providers. This information, including the list, dose, and frequency of medications is only as accurate as the information you provided. If you have any questions or concerns about your home medications, please contact your Primary Care Physician for further clarification.

## 2022-11-14 ENCOUNTER — OFFICE VISIT (OUTPATIENT)
Dept: INTERNAL MEDICINE | Age: 67
End: 2022-11-14
Payer: MEDICARE

## 2022-11-14 VITALS
OXYGEN SATURATION: 94 % | HEIGHT: 66 IN | HEART RATE: 75 BPM | SYSTOLIC BLOOD PRESSURE: 134 MMHG | DIASTOLIC BLOOD PRESSURE: 65 MMHG | WEIGHT: 231 LBS | TEMPERATURE: 98.1 F | BODY MASS INDEX: 37.12 KG/M2 | RESPIRATION RATE: 18 BRPM

## 2022-11-14 DIAGNOSIS — J44.1 COPD EXACERBATION (HCC): Primary | ICD-10-CM

## 2022-11-14 PROCEDURE — 1123F ACP DISCUSS/DSCN MKR DOCD: CPT | Performed by: CHIROPRACTOR

## 2022-11-14 PROCEDURE — 99213 OFFICE O/P EST LOW 20 MIN: CPT | Performed by: CHIROPRACTOR

## 2022-11-14 PROCEDURE — 3078F DIAST BP <80 MM HG: CPT | Performed by: CHIROPRACTOR

## 2022-11-14 PROCEDURE — 99212 OFFICE O/P EST SF 10 MIN: CPT | Performed by: CHIROPRACTOR

## 2022-11-14 PROCEDURE — 3074F SYST BP LT 130 MM HG: CPT | Performed by: CHIROPRACTOR

## 2022-11-14 ASSESSMENT — ENCOUNTER SYMPTOMS
EYE PAIN: 0
DIARRHEA: 0
CONSTIPATION: 0
BLOOD IN STOOL: 0
COUGH: 0
CHEST TIGHTNESS: 0
SHORTNESS OF BREATH: 0
ABDOMINAL PAIN: 0
WHEEZING: 0
NAUSEA: 0
VOMITING: 0
SINUS PAIN: 0

## 2022-11-14 NOTE — LETTER
[facility]  [address] [place] [address]  Phone: [phone]  Fax: [phone]    Lucia Cabello MD        November 14, 2022     Patient: Natalia Chaney   YOB: 1955   Date of Visit: 11/14/2022       To Whom It May Concern: It is my medical opinion that Jenna Montoya may return to full duty immediately with no restrictionson 11/16/2022. If you have any questions or concerns, please don't hesitate to call.                                 Sincerely,        Lucia Cabello MD

## 2022-11-14 NOTE — PATIENT INSTRUCTIONS
Dear Jocelyn Gandhi,        Thank you for coming to your appointment today. I hope we have addressed all of your needs. Please make sure to do the following:  - Continue your medications as listed. - We will see each other again in 3 months  -Please check your blood sugar at home and call us if it is more than 250. Call for a sooner appointment if you develop severe shortness of breath and severe cough     Have a great day!         Sincerely,  Ian Hunter MD  11/14/2022  3:41 PM

## 2022-11-14 NOTE — PROGRESS NOTES
[facility]  Internal Medicine Residency Clinic    Attending Physician Statement  I have discussed the case, including pertinent history and exam findings with the resident physician. I agree with the assessment, plan and orders as documented by the resident. I have reviewed all pertinent PMHx, PSHx, FamHx, SocialHx, medications, and allergies and updated history as appropriate. Patient presents for hospital follow up appointment. COPD exacerbation - 7 day hospitalization. Negative cultures and viral panel. Was on IV steroids and PO doxycycline. Was discharged on prednisone taper. On room air at 94 %. Lungs are clear today and patient is breathing comfortably. Sporadic cough. Finish taper and continue PRN albuterol. HTN - was on maxide and this was changed to losartan. BP is controlled today. Continue present management. Remainder of medical problems as per resident note.     Yuli Loomis MD  11/14/2022 3:37 PM

## 2022-11-14 NOTE — PROGRESS NOTES
Post-Discharge Transitional Care Follow Up    Stephen Whiting   YOB: 1955    Date of Office Visit:  11/14/2022  Date of Hospital Admission: 11/5/2022  Date of Hospital Discharge: 11/13/2022    Care management risk score Rising risk (score 2-5) and Complex Care (Scores >=6): No Risk Score On File     Non face to face  following discharge, date last encounter closed (first attempt may have been earlier): *No documented post hospital discharge outreach found in the last 14 days     Call initiated 2 business days of discharge: *No response recorded in the last 14 days    ASSESSMENT/PLAN:       Medical Decision Making: straightforward  No follow-ups on file. On this date 11/14/2022 I have spent 25 minutes reviewing previous notes, test results and face to face with the patient discussing the diagnosis and importance of compliance with the treatment plan as well as documenting on the day of the visit. COPD with chronic hypoxia on nc 2L nocturnally, s/p recent hospitalization for COPD exacerbation  Negative respiratory panel and respiratory culture  Completed doxycycline   Required 9 days of IV steroid  Saturating 94% on room air, Clear lung examination, Not using any accessory muscle  Instruction of steroid tapering reviewed with the patient  She was also educated about checking her blood glucose at home since she is on a steroid  Continue Spiriva, PRN JOESPH and singulair       HTN   SBP: 130s  Currently on Losartan 50 mg   Continue to current regimen    Hypothyroidism   On Synthroid 112 mcg daily    Hx of thyroid nodule    DM 2, controlled   On trulicity  tolerating statin      Subjective:   Presented to the office for hospital follow-up. She was in the hospital on 11/5/2022 because of COPD exacerbation. She had negative respiratory panel and respiratory culture. She required lengthy hospital stay due to IV steroid requirement.   She was finally discharged on oral steroid and instruction to taper it  This visit patient does not have any shortness of breath, increased sputum production. Cough patient reports much better she has a sporadic cough though. Patient is currently on room air saturating well. Also walked down the entire hallway without any oxygen desaturation and the oxygen saturation was 94% during the visit. Inpatient course: Discharge summary reviewed- see chart.     Interval history/Current status: Stable    Patient Active Problem List   Diagnosis    Adrenal incidentaloma ([address]    Hyperlipidemia    Hypothyroidism    Fibromyalgia    Obesity    Hypertension, uncontrolled    Chronic right-sided low back pain with right-sided sciatica    Tobacco abuse    Myofascial pain    Lumbar radiculitis    Smoldering multiple myeloma    Chronic obstructive pulmonary disease (HCC)    Type 2 diabetes mellitus without complication, with long-term current use of insulin (HCC)    Cervical facet joint syndrome    Cervical spondylosis    Lumbar radiculopathy    Pain in right hip    Lumbar disc disorder    Lumbar spondylosis    Diverticulosis of colon without diverticulitis    Rectal bleeding    Alternating constipation and diarrhea    History of colon polyps    Heartburn    Indigestion    Gastritis    Hemorrhage of tongue    COVID-19    Current mild episode of major depressive disorder, unspecified whether recurrent ([address]    DM type 2 with diabetic peripheral neuropathy (HCC)    Knee pain    COPD exacerbation (HCC)    Acute and chronic respiratory failure with hypoxia ([address]    Primary hypertension    Type 2 diabetes mellitus with hyperglycemia, without long-term current use of insulin ([address]       Medication list at time of discharge reviewed: Yes    Medications marked \"taking\" at this time  Outpatient Medications Marked as Taking for the 11/14/22 encounter (Office Visit) with Lexus Aranda MD   Medication Sig Dispense Refill    losartan (COZAAR) 50 MG tablet Take 1 tablet by mouth daily 30 tablet 3 predniSONE (DELTASONE) 20 MG tablet Take 2 tablets by mouth Daily with lunch for 2 doses 4 tablet 0    [START ON 11/16/2022] predniSONE (DELTASONE) 10 MG tablet Take 3 tablets by mouth Daily with lunch for 3 doses 9 tablet 0    [START ON 11/19/2022] predniSONE (DELTASONE) 20 MG tablet Take 1 tablet by mouth Daily with lunch for 3 doses 3 tablet 0    [START ON 11/22/2022] predniSONE (DELTASONE) 10 MG tablet Take 1 tablet by mouth Daily with lunch for 3 doses 3 tablet 0    benzonatate (TESSALON) 100 MG capsule Take 1 capsule by mouth 3 times daily as needed for Cough 30 capsule 1    tiotropium (SPIRIVA HANDIHALER) 18 MCG inhalation capsule Inhale 1 capsule into the lungs daily 30 capsule 5    SYMBICORT 160-4.5 MCG/ACT AERO INHALE 2 PUFFS INTO THE LUNGS TWICE DAILY 10.2 g 3    montelukast (SINGULAIR) 10 MG tablet TAKE 1 TABLET BY MOUTH EVERY NIGHT AT BEDTIME 90 tablet 1    albuterol sulfate HFA (PROVENTIL;VENTOLIN;PROAIR) 108 (90 Base) MCG/ACT inhaler Inhale 3 puffs into the lungs 4 times daily as needed for Wheezing 3 each 1    cetirizine (ZYRTEC) 10 MG tablet TAKE 1 TABLET BY MOUTH DAILY 30 tablet 2    Dulaglutide (TRULICITY) 0.58 NP/2.5KF SOPN Inject 0.75 mg into the skin once a week 5 Adjustable Dose Pre-filled Pen Syringe 3    pregabalin (LYRICA) 75 MG capsule Take 1 capsule by mouth 3 times daily for 90 days. 90 capsule 2    Elastic Bandages & Supports (ANKLE BRACE ADJUST-TO-FIT) MISC Soft left ankle brace  Size to fit  Dx:   Ankle sprain 1 each 0    amitriptyline (ELAVIL) 50 MG tablet TAKE 1 TABLET BY MOUTH EVERY EVENING 90 tablet 1    celecoxib (CELEBREX) 100 MG capsule Take 1 capsule by mouth daily 90 capsule 1    citalopram (CELEXA) 40 MG tablet Take 1 tablet by mouth daily 90 tablet 1    docusate sodium (COLACE) 100 MG capsule Take 1 capsule by mouth 2 times daily 180 capsule 1    levothyroxine (SYNTHROID) 112 MCG tablet Take 1 tablet by mouth Daily 90 tablet 1    ammonium lactate (LAC-HYDRIN) 12 % lotion Apply topically twice daily 400 g 2    calcium-cholecalciferol (CALCIUM 500/D) 500-200 MG-UNIT per tablet TAKE 1 TABLET BY MOUTH DAILY 180 tablet 1    rosuvastatin (CRESTOR) 20 MG tablet Take 1 tablet by mouth daily 90 tablet 1    mineral oil-hydrophilic petrolatum (HYDROPHOR) ointment Apply topically as needed. 3 each 2    Calcium Polycarbophil (FIBER) 625 MG TABS Take 1 tablet by mouth 2 times daily 180 tablet 3    baclofen (LIORESAL) 10 MG tablet TAKE 1/2 TABLET THREE TIMES DAILY AS NEEDED(PAIN, SPASMS) 90 tablet 1    OXYGEN Inhale into the lungs Uses 2 liters at night      aspirin 81 MG tablet Take 1 tablet by mouth daily 90 tablet 0    Misc. Devices MISC Oxygen concentrator  j44.9 1 Device 0        Medications patient taking as of now reconciled against medications ordered at time of hospital discharge: No    Review of Systems   Constitutional:  Negative for appetite change, fatigue, fever and unexpected weight change. HENT:  Negative for hearing loss and sinus pain. Eyes:  Negative for pain and visual disturbance. Respiratory:  Negative for cough, chest tightness, shortness of breath and wheezing. Sporadic cough   Cardiovascular:  Negative for chest pain, palpitations and leg swelling. Gastrointestinal:  Negative for abdominal pain, blood in stool, constipation, diarrhea, nausea and vomiting. Genitourinary:  Negative for dysuria, flank pain, frequency and hematuria. Skin:  Negative for rash. Allergic/Immunologic: Negative for environmental allergies and food allergies. Neurological:  Negative for dizziness, weakness, numbness and headaches. Objective:    /65   Pulse 75   Temp 98.1 °F (36.7 °C) (Temporal)   Resp 18   Ht 5' 6\" (1.676 m)   Wt 231 lb (104.8 kg)   SpO2 94% Comment: ra  BMI 37.28 kg/m²   Physical Exam  Constitutional:       General: She is not in acute distress. HENT:      Head: Normocephalic.       Right Ear: External ear normal.      Left Ear: External ear normal.      Nose: No congestion or rhinorrhea. Mouth/Throat:      Mouth: Mucous membranes are moist.   Eyes:      General:         Right eye: No discharge. Left eye: No discharge. Conjunctiva/sclera: Conjunctivae normal.   Cardiovascular:      Rate and Rhythm: Normal rate and regular rhythm. Heart sounds: No murmur heard. Pulmonary:      Effort: Pulmonary effort is normal. No respiratory distress. Breath sounds: Normal breath sounds. No wheezing, rhonchi or rales. Abdominal:      General: Bowel sounds are normal. There is no distension. Palpations: Abdomen is soft. There is no mass. Tenderness: There is no abdominal tenderness. There is no guarding. Musculoskeletal:      Cervical back: Neck supple. No tenderness. Right lower leg: No edema. Left lower leg: No edema. Lymphadenopathy:      Cervical: No cervical adenopathy. Skin:     General: Skin is warm. Coloration: Skin is not pale. Findings: No erythema or rash. Neurological:      General: No focal deficit present. Mental Status: She is alert and oriented to person, place, and time. Psychiatric:         Mood and Affect: Mood normal.         Behavior: Behavior normal.         An electronic signature was used to authenticate this note.   --Darrel Doe MD

## 2022-11-14 NOTE — LETTER
[facility]  [address] [place] [address]  Phone: [phone]  Fax: [phone]    Cristal Altman MD        November 14, 2022     Patient: Baron Hale   YOB: 1955   Date of Visit: 11/14/2022       To Whom It May Concern: It is my medical opinion that Cara Essex may return to full duty immediately with no restrictions   If you have any questions or concerns, please don't hesitate to call.                                   Sincerely,        Cristal Altman MD

## 2022-11-21 DIAGNOSIS — M16.0 PRIMARY OSTEOARTHRITIS OF BOTH HIPS: ICD-10-CM

## 2022-11-21 RX ORDER — IBUPROFEN 800 MG/1
TABLET ORAL
Qty: 90 TABLET | Refills: 1 | OUTPATIENT
Start: 2022-11-21

## 2022-11-25 ENCOUNTER — TELEPHONE (OUTPATIENT)
Dept: OTHER | Facility: CLINIC | Age: 67
End: 2022-11-25

## 2022-11-25 ENCOUNTER — APPOINTMENT (OUTPATIENT)
Dept: CT IMAGING | Age: 67
End: 2022-11-25
Payer: MEDICARE

## 2022-11-25 ENCOUNTER — APPOINTMENT (OUTPATIENT)
Dept: GENERAL RADIOLOGY | Age: 67
End: 2022-11-25
Payer: MEDICARE

## 2022-11-25 ENCOUNTER — HOSPITAL ENCOUNTER (EMERGENCY)
Age: 67
Discharge: HOME | End: 2022-11-25
Attending: EMERGENCY MEDICINE
Payer: MEDICARE

## 2022-11-25 VITALS
WEIGHT: 212 LBS | HEART RATE: 80 BPM | DIASTOLIC BLOOD PRESSURE: 65 MMHG | SYSTOLIC BLOOD PRESSURE: 134 MMHG | RESPIRATION RATE: 16 BRPM | OXYGEN SATURATION: 97 % | BODY MASS INDEX: 34.22 KG/M2 | TEMPERATURE: 97.3 F

## 2022-11-25 DIAGNOSIS — K29.00 ACUTE GASTRITIS WITHOUT HEMORRHAGE, UNSPECIFIED GASTRITIS TYPE: ICD-10-CM

## 2022-11-25 DIAGNOSIS — J18.9 PNEUMONIA OF RIGHT LUNG DUE TO INFECTIOUS ORGANISM, UNSPECIFIED PART OF LUNG: Primary | ICD-10-CM

## 2022-11-25 LAB
ALBUMIN SERPL-MCNC: 3.7 G/DL (ref 3.5–5.2)
ALP BLD-CCNC: 84 U/L (ref 35–104)
ALT SERPL-CCNC: 17 U/L (ref 0–32)
ANION GAP SERPL CALCULATED.3IONS-SCNC: 11 MMOL/L (ref 7–16)
AST SERPL-CCNC: 12 U/L (ref 0–31)
BASOPHILS ABSOLUTE: 0.03 E9/L (ref 0–0.2)
BASOPHILS RELATIVE PERCENT: 0.4 % (ref 0–2)
BILIRUB SERPL-MCNC: 0.5 MG/DL (ref 0–1.2)
BILIRUBIN URINE: NEGATIVE
BLOOD, URINE: NEGATIVE
BUN BLDV-MCNC: 18 MG/DL (ref 6–23)
CALCIUM SERPL-MCNC: 9.9 MG/DL (ref 8.6–10.2)
CHLORIDE BLD-SCNC: 99 MMOL/L (ref 98–107)
CLARITY: CLEAR
CO2: 31 MMOL/L (ref 22–29)
COLOR: YELLOW
CREAT SERPL-MCNC: 0.8 MG/DL (ref 0.5–1)
EOSINOPHILS ABSOLUTE: 0.14 E9/L (ref 0.05–0.5)
EOSINOPHILS RELATIVE PERCENT: 1.7 % (ref 0–6)
GFR SERPL CREATININE-BSD FRML MDRD: >60 ML/MIN/1.73
GLUCOSE BLD-MCNC: 206 MG/DL (ref 74–99)
GLUCOSE URINE: NEGATIVE MG/DL
HCT VFR BLD CALC: 37.7 % (ref 34–48)
HEMOGLOBIN: 11.5 G/DL (ref 11.5–15.5)
IMMATURE GRANULOCYTES #: 0.02 E9/L
IMMATURE GRANULOCYTES %: 0.2 % (ref 0–5)
INFLUENZA A BY PCR: NOT DETECTED
INFLUENZA B BY PCR: NOT DETECTED
KETONES, URINE: ABNORMAL MG/DL
LEUKOCYTE ESTERASE, URINE: NEGATIVE
LIPASE: 63 U/L (ref 13–60)
LYMPHOCYTES ABSOLUTE: 2.52 E9/L (ref 1.5–4)
LYMPHOCYTES RELATIVE PERCENT: 30.2 % (ref 20–42)
MAGNESIUM: 1.9 MG/DL (ref 1.6–2.6)
MCH RBC QN AUTO: 28.6 PG (ref 26–35)
MCHC RBC AUTO-ENTMCNC: 30.5 % (ref 32–34.5)
MCV RBC AUTO: 93.8 FL (ref 80–99.9)
MONOCYTES ABSOLUTE: 0.56 E9/L (ref 0.1–0.95)
MONOCYTES RELATIVE PERCENT: 6.7 % (ref 2–12)
NEUTROPHILS ABSOLUTE: 5.08 E9/L (ref 1.8–7.3)
NEUTROPHILS RELATIVE PERCENT: 60.8 % (ref 43–80)
NITRITE, URINE: NEGATIVE
PDW BLD-RTO: 13.3 FL (ref 11.5–15)
PH UA: 6 (ref 5–9)
PLATELET # BLD: 278 E9/L (ref 130–450)
PMV BLD AUTO: 10.4 FL (ref 7–12)
POTASSIUM REFLEX MAGNESIUM: 3.4 MMOL/L (ref 3.5–5)
PROTEIN UA: NEGATIVE MG/DL
RBC # BLD: 4.02 E12/L (ref 3.5–5.5)
SARS-COV-2, NAAT: NOT DETECTED
SODIUM BLD-SCNC: 141 MMOL/L (ref 132–146)
SPECIFIC GRAVITY UA: 1.02 (ref 1–1.03)
TOTAL PROTEIN: 7.1 G/DL (ref 6.4–8.3)
TROPONIN, HIGH SENSITIVITY: 10 NG/L (ref 0–9)
UROBILINOGEN, URINE: 1 E.U./DL
WBC # BLD: 8.4 E9/L (ref 4.5–11.5)

## 2022-11-25 PROCEDURE — 99285 EMERGENCY DEPT VISIT HI MDM: CPT

## 2022-11-25 PROCEDURE — 80053 COMPREHEN METABOLIC PANEL: CPT

## 2022-11-25 PROCEDURE — 93005 ELECTROCARDIOGRAM TRACING: CPT | Performed by: PHYSICIAN ASSISTANT

## 2022-11-25 PROCEDURE — 85025 COMPLETE CBC W/AUTO DIFF WBC: CPT

## 2022-11-25 PROCEDURE — 96374 THER/PROPH/DIAG INJ IV PUSH: CPT

## 2022-11-25 PROCEDURE — 83735 ASSAY OF MAGNESIUM: CPT

## 2022-11-25 PROCEDURE — 6370000000 HC RX 637 (ALT 250 FOR IP): Performed by: STUDENT IN AN ORGANIZED HEALTH CARE EDUCATION/TRAINING PROGRAM

## 2022-11-25 PROCEDURE — 84484 ASSAY OF TROPONIN QUANT: CPT

## 2022-11-25 PROCEDURE — 87502 INFLUENZA DNA AMP PROBE: CPT

## 2022-11-25 PROCEDURE — 83690 ASSAY OF LIPASE: CPT

## 2022-11-25 PROCEDURE — 70450 CT HEAD/BRAIN W/O DYE: CPT

## 2022-11-25 PROCEDURE — 71046 X-RAY EXAM CHEST 2 VIEWS: CPT

## 2022-11-25 PROCEDURE — 81003 URINALYSIS AUTO W/O SCOPE: CPT

## 2022-11-25 PROCEDURE — 87635 SARS-COV-2 COVID-19 AMP PRB: CPT

## 2022-11-25 PROCEDURE — 6360000002 HC RX W HCPCS: Performed by: STUDENT IN AN ORGANIZED HEALTH CARE EDUCATION/TRAINING PROGRAM

## 2022-11-25 PROCEDURE — 6360000004 HC RX CONTRAST MEDICATION: Performed by: RADIOLOGY

## 2022-11-25 PROCEDURE — 36415 COLL VENOUS BLD VENIPUNCTURE: CPT

## 2022-11-25 PROCEDURE — 74177 CT ABD & PELVIS W/CONTRAST: CPT

## 2022-11-25 RX ORDER — CEFDINIR 300 MG/1
300 CAPSULE ORAL ONCE
Status: COMPLETED | OUTPATIENT
Start: 2022-11-25 | End: 2022-11-25

## 2022-11-25 RX ORDER — FAMOTIDINE 20 MG/1
20 TABLET, FILM COATED ORAL ONCE
Status: COMPLETED | OUTPATIENT
Start: 2022-11-25 | End: 2022-11-25

## 2022-11-25 RX ORDER — DOXYCYCLINE HYCLATE 100 MG
100 TABLET ORAL 2 TIMES DAILY
Qty: 14 TABLET | Refills: 0 | Status: SHIPPED | OUTPATIENT
Start: 2022-11-25 | End: 2022-12-02

## 2022-11-25 RX ORDER — CEFDINIR 300 MG/1
300 CAPSULE ORAL 2 TIMES DAILY
Qty: 14 CAPSULE | Refills: 0 | Status: SHIPPED | OUTPATIENT
Start: 2022-11-25 | End: 2022-12-02

## 2022-11-25 RX ORDER — MORPHINE SULFATE 4 MG/ML
4 INJECTION, SOLUTION INTRAMUSCULAR; INTRAVENOUS ONCE
Status: COMPLETED | OUTPATIENT
Start: 2022-11-25 | End: 2022-11-25

## 2022-11-25 RX ORDER — POTASSIUM CHLORIDE 20 MEQ/1
40 TABLET, EXTENDED RELEASE ORAL ONCE
Status: COMPLETED | OUTPATIENT
Start: 2022-11-25 | End: 2022-11-25

## 2022-11-25 RX ORDER — DOXYCYCLINE HYCLATE 100 MG/1
100 CAPSULE ORAL ONCE
Status: COMPLETED | OUTPATIENT
Start: 2022-11-25 | End: 2022-11-25

## 2022-11-25 RX ORDER — FAMOTIDINE 20 MG/1
20 TABLET, FILM COATED ORAL 2 TIMES DAILY
Qty: 60 TABLET | Refills: 0 | Status: SHIPPED | OUTPATIENT
Start: 2022-11-25

## 2022-11-25 RX ADMIN — MORPHINE SULFATE 4 MG: 4 INJECTION, SOLUTION INTRAMUSCULAR; INTRAVENOUS at 19:06

## 2022-11-25 RX ADMIN — IOPAMIDOL 75 ML: 755 INJECTION, SOLUTION INTRAVENOUS at 19:52

## 2022-11-25 RX ADMIN — CEFDINIR 300 MG: 300 CAPSULE ORAL at 21:09

## 2022-11-25 RX ADMIN — POTASSIUM CHLORIDE 40 MEQ: 1500 TABLET, EXTENDED RELEASE ORAL at 21:17

## 2022-11-25 RX ADMIN — FAMOTIDINE 20 MG: 20 TABLET, FILM COATED ORAL at 21:09

## 2022-11-25 RX ADMIN — DOXYCYCLINE HYCLATE 100 MG: 100 CAPSULE ORAL at 21:09

## 2022-11-25 ASSESSMENT — PAIN DESCRIPTION - DESCRIPTORS
DESCRIPTORS_2: SORE;ACHING
DESCRIPTORS: ACHING

## 2022-11-25 ASSESSMENT — ENCOUNTER SYMPTOMS
NAUSEA: 1
VOMITING: 0
ABDOMINAL PAIN: 1
COUGH: 1
SORE THROAT: 0
CONSTIPATION: 0
BACK PAIN: 0
SHORTNESS OF BREATH: 0
PHOTOPHOBIA: 0

## 2022-11-25 ASSESSMENT — PAIN DESCRIPTION - INTENSITY: RATING_2: 10

## 2022-11-25 ASSESSMENT — PAIN DESCRIPTION - LOCATION
LOCATION_2: ABDOMEN
LOCATION: ABDOMEN
LOCATION: HEAD

## 2022-11-25 ASSESSMENT — PAIN SCALES - GENERAL
PAINLEVEL_OUTOF10: 10
PAINLEVEL_OUTOF10: 10

## 2022-11-25 ASSESSMENT — PAIN DESCRIPTION - ORIENTATION: ORIENTATION_2: MID

## 2022-11-25 ASSESSMENT — PAIN - FUNCTIONAL ASSESSMENT: PAIN_FUNCTIONAL_ASSESSMENT: 0-10

## 2022-11-25 ASSESSMENT — PAIN DESCRIPTION - PAIN TYPE: TYPE: ACUTE PAIN

## 2022-11-26 NOTE — ED PROVIDER NOTES
HPI  79 y.o. female presenting for fatigue, headache, nausea. Symptoms have been present for 2 weeks. They have been persistent and moderate in severity. They are worsened by nothing and relieved by nothing. Patient complains of fatigue for the past couple weeks. She complains of nausea as well. She complains of persistent headache with blurred vision. She complains of upper abdominal pain as well. She has taken Tylenol and [place] at home with some relief. She does complain of some left-sided chest discomfort that has also been present since previous admission. She denies any emesis, changes in bowel habits, or urinary symptoms. --------------------------------------------- PAST HISTORY ---------------------------------------------  Past Medical History:  has a past medical history of Adrenal incidentaloma ([facility] Anxiety, Arthritis, Asthma, Bladder incontinence, Cancer ([facility] Chronic back pain, COPD (chronic obstructive pulmonary disease) ([facility] Depression, Fibromyalgia, GERD (gastroesophageal reflux disease), Hyperlipidemia, Hypertension, Hypothyroidism, MGUS (monoclonal gammopathy of unknown significance), Neuropathy, Pneumonia, Prolonged emergence from general anesthesia, Sleep apnea, Type II or unspecified type diabetes mellitus without mention of complication, not stated as uncontrolled, and Vaginal bleeding. Past Surgical History:  has a past surgical history that includes knee surgery (1980); Upper gastrointestinal endoscopy (2008); Colonoscopy (07/20/2016); Upper gastrointestinal endoscopy (07/20/2016); Nerve Block (Bilateral, 09/22/2016); Nerve Block (07/06/2020); Pain management procedure (N/A, 7/6/2020); Nerve Block (Bilateral, 08/10/2020); Anesthesia Nerve Block (Bilateral, 8/10/2020); other surgical history (12/13/2016); Colonoscopy (2008); Upper gastrointestinal endoscopy (2008); Upper gastrointestinal endoscopy (N/A, 11/9/2020); Colonoscopy (N/A, 11/9/2020);  Colonoscopy (11/9/2020); and Dilation and curettage of uterus (N/A, 5/11/2021). Social History:  reports that she quit smoking about 2 years ago. Her smoking use included cigarettes. She started smoking about 51 years ago. She has a 100.00 pack-year smoking history. She has never used smokeless tobacco. She reports that she does not drink alcohol and does not use drugs. Family History: family history includes Arthritis in her brother, maternal grandfather, and maternal grandmother; Cancer in her father, maternal uncle, mother, and paternal aunt; Diabetes in her brother, father, maternal grandmother, and mother; Heart Disease in her paternal grandfather; Heart Disease (age of onset: 79) in her mother; High Blood Pressure in her father, maternal grandmother, paternal aunt, and paternal uncle; High Cholesterol in her paternal grandmother; Hypertension in her father; Kidney Disease in her paternal grandmother; Stroke in her maternal grandfather. The patients home medications have been reviewed. Allergies: Aceon [perindopril erbumine] and Nsaids      Review of Systems   Constitutional:  Positive for fatigue. Negative for chills and fever. HENT:  Negative for congestion and sore throat. Eyes:  Positive for visual disturbance. Negative for photophobia. Respiratory:  Positive for cough. Negative for shortness of breath. Cardiovascular:  Positive for chest pain. Negative for leg swelling. Gastrointestinal:  Positive for abdominal pain and nausea. Negative for constipation and vomiting. Genitourinary:  Negative for dysuria and flank pain. Musculoskeletal:  Negative for back pain and myalgias. Skin:  Negative for rash and wound. Neurological:  Positive for headaches. Negative for dizziness and light-headedness. Physical Exam  Constitutional:       General: She is not in acute distress. Appearance: Normal appearance. She is not ill-appearing. HENT:      Head: Normocephalic and atraumatic. Right Ear: External ear normal.      Left Ear: External ear normal.      Nose: Nose normal.   Eyes:      Conjunctiva/sclera: Conjunctivae normal.   Cardiovascular:      Rate and Rhythm: Normal rate and regular rhythm. Pulmonary:      Effort: Pulmonary effort is normal. No respiratory distress. Breath sounds: Normal breath sounds. No stridor. No wheezing, rhonchi or rales. Abdominal:      General: There is no distension. Palpations: Abdomen is soft. Tenderness: There is abdominal tenderness in the right upper quadrant, epigastric area and left upper quadrant. There is no right CVA tenderness or left CVA tenderness. Musculoskeletal:         General: No swelling or deformity. Skin:     General: Skin is warm and dry. Neurological:      General: No focal deficit present. Mental Status: She is alert.    Psychiatric:         Mood and Affect: Mood normal.        Procedures        -------------------------------------------------- RESULTS -------------------------------------------------  Labs:  Results for orders placed or performed during the hospital encounter of 11/25/22   COVID-19, Rapid    Specimen: Nasopharyngeal Swab   Result Value Ref Range    SARS-CoV-2, NAAT Not Detected Not Detected   RAPID INFLUENZA A/B ANTIGENS    Specimen: Nasopharyngeal   Result Value Ref Range    Influenza A by PCR Not Detected Not Detected    Influenza B by PCR Not Detected Not Detected   CBC with Auto Differential   Result Value Ref Range    WBC 8.4 4.5 - 11.5 E9/L    RBC 4.02 3.50 - 5.50 E12/L    Hemoglobin 11.5 11.5 - 15.5 g/dL    Hematocrit 37.7 34.0 - 48.0 %    MCV 93.8 80.0 - 99.9 fL    MCH 28.6 26.0 - 35.0 pg    MCHC 30.5 (L) 32.0 - 34.5 %    RDW 13.3 11.5 - 15.0 fL    Platelets 510 046 - 393 E9/L    MPV 10.4 7.0 - 12.0 fL    Neutrophils % 60.8 43.0 - 80.0 %    Immature Granulocytes % 0.2 0.0 - 5.0 %    Lymphocytes % 30.2 20.0 - 42.0 %    Monocytes % 6.7 2.0 - 12.0 %    Eosinophils % 1.7 0.0 - 6.0 % Basophils % 0.4 0.0 - 2.0 %    Neutrophils Absolute 5.08 1.80 - 7.30 E9/L    Immature Granulocytes # 0.02 E9/L    Lymphocytes Absolute 2.52 1.50 - 4.00 E9/L    Monocytes Absolute 0.56 0.10 - 0.95 E9/L    Eosinophils Absolute 0.14 0.05 - 0.50 E9/L    Basophils Absolute 0.03 0.00 - 0.20 E9/L   Comprehensive Metabolic Panel w/ Reflex to MG   Result Value Ref Range    Sodium 141 132 - 146 mmol/L    Potassium reflex Magnesium 3.4 (L) 3.5 - 5.0 mmol/L    Chloride 99 98 - 107 mmol/L    CO2 31 (H) 22 - 29 mmol/L    Anion Gap 11 7 - 16 mmol/L    Glucose 206 (H) 74 - 99 mg/dL    BUN 18 6 - 23 mg/dL    Creatinine 0.8 0.5 - 1.0 mg/dL    Est, Glom Filt Rate >60 >=60 mL/min/1.73    Calcium 9.9 8.6 - 10.2 mg/dL    Total Protein 7.1 6.4 - 8.3 g/dL    Albumin 3.7 3.5 - 5.2 g/dL    Total Bilirubin 0.5 0.0 - 1.2 mg/dL    Alkaline Phosphatase 84 35 - 104 U/L    ALT 17 0 - 32 U/L    AST 12 0 - 31 U/L   Lipase   Result Value Ref Range    Lipase 63 (H) 13 - 60 U/L   Troponin   Result Value Ref Range    Troponin, High Sensitivity 10 (H) 0 - 9 ng/L   Urinalysis   Result Value Ref Range    Color, UA Yellow Straw/Yellow    Clarity, UA Clear Clear    Glucose, Ur Negative Negative mg/dL    Bilirubin Urine Negative Negative    Ketones, Urine TRACE (A) Negative mg/dL    Specific Gravity, UA 1.025 1.005 - 1.030    Blood, Urine Negative Negative    pH, UA 6.0 5.0 - 9.0    Protein, UA Negative Negative mg/dL    Urobilinogen, Urine 1.0 <2.0 E.U./dL    Nitrite, Urine Negative Negative    Leukocyte Esterase, Urine Negative Negative   Magnesium   Result Value Ref Range    Magnesium 1.9 1.6 - 2.6 mg/dL   EKG 12 Lead   Result Value Ref Range    Ventricular Rate 93 BPM    Atrial Rate 93 BPM    P-R Interval 168 ms    QRS Duration 96 ms    Q-T Interval 374 ms    QTc Calculation (Bazett) 465 ms    P Axis 50 degrees    R Axis -24 degrees    T Axis 28 degrees       Radiology:  CT HEAD WO CONTRAST   Final Result   No acute intracranial abnormality.          CT ABDOMEN PELVIS W IV CONTRAST Additional Contrast? None   Final Result   Questionable wall thickening of the distal stomach, which could suggest   gastritis/antritis. Clinical correlation recommended. Additional findings as above. XR CHEST (2 VW)   Final Result   Subtle patchy opacities visualized in the right perihilar region. No evidence of acute cardiopulmonary disease is seen. ------------------------- NURSING NOTES AND VITALS REVIEWED ---------------------------  Date / Time Roomed:  11/25/2022  5:32 PM  ED Bed Assignment:  20/20    The nursing notes within the ED encounter and vital signs as below have been reviewed. /65   Pulse 80   Temp 97.3 °F (36.3 °C)   Resp 16   Wt 212 lb (96.2 kg)   SpO2 97%   BMI 34.22 kg/m²   Oxygen Saturation Interpretation: Normal    MDM  Number of Diagnoses or Management Options  Acute gastritis without hemorrhage, unspecified gastritis type  Pneumonia of right lung due to infectious organism, unspecified part of lung  Diagnosis management comments: 80 yo presenting for fatigue, nausea, headache x2 weeks. EKG and troponin were obtained due to complaint of chest discomfort, but were reassuring. Low suspicion for ACS at this time as discomfort has been present since previous admission and is no worse than it was. CXR concerning for early pneumonia. CT abdomen and pelvis concerning for gastritis and patient's symptoms are consistent with this. CT head did not show any acute process. Lipase 63, potassium 3.4, replaced in the ED. Patient tolerated PO in the ED. Results were discussed with the patient. At this time, she is felt stable for discharge with outpatient follow up. Patient was given pepcid and referral to GI for gastritis. She was given omnicef and doxycycline and instructed to follow up with PCP and to return to the ED if symptoms worsen.         ------------------------------------------ PROGRESS NOTES ------------------------------------------  I have spoken with the patient and discussed todays results, in addition to providing specific details for the plan of care and counseling regarding the diagnosis and prognosis. Their questions are answered at this time and they are agreeable with the plan. I discussed at length with them reasons for immediate return here for re evaluation. They will followup with PCP, GI.      --------------------------------- ADDITIONAL PROVIDER NOTES ---------------------------------  At this time the patient is without objective evidence of an acute process requiring hospitalization or inpatient management. They have remained hemodynamically stable throughout their entire ED visit and are stable for discharge with outpatient follow-up. The plan has been discussed in detail and they are aware of the specific conditions for emergent return, as well as the importance of follow-up. Discharge Medication List as of 11/25/2022  9:19 PM        START taking these medications    Details   cefdinir (OMNICEF) 300 MG capsule Take 1 capsule by mouth 2 times daily for 7 days, Disp-14 capsule, R-0Normal      doxycycline hyclate (VIBRA-TABS) 100 MG tablet Take 1 tablet by mouth 2 times daily for 7 days, Disp-14 tablet, R-0Normal      famotidine (PEPCID) 20 MG tablet Take 1 tablet by mouth 2 times daily, Disp-60 tablet, R-0Normal             Diagnosis:  1. Pneumonia of right lung due to infectious organism, unspecified part of lung    2. Acute gastritis without hemorrhage, unspecified gastritis type        Disposition:  Patient's disposition: Discharge to home  Patient's condition is stable.          Stephanie Graham MD  Resident  11/25/22 2910

## 2022-11-26 NOTE — TELEPHONE ENCOUNTER
Writer contacted Dr. Mayer  to inform of 30 day readmission risk. Dr. Mayer  informed writer of intention to discharge pending results.  Follow up with PCP within 5-7 days

## 2022-11-26 NOTE — ED NOTES
Patient o2 sat. at 87% on room air. Patient stated that she is usually on 3L o2 at home but was not set up with o2 here. Put patient on 3L and o2 sat. Came up to 96%. Patient resting comfortably in bed with family at bedside.  Call light within reach     Hortensia Paez RN  11/25/22 2004

## 2022-11-26 NOTE — ED NOTES
Discussed discharge medications and plans with patient and family member in room. Acknowledged understanding and no questions at this time.  Vitals stable upon discharge     Alana Reid RN  11/25/22 [id number]

## 2022-11-28 ENCOUNTER — TELEPHONE (OUTPATIENT)
Dept: INTERNAL MEDICINE | Age: 67
End: 2022-11-28

## 2022-11-28 ENCOUNTER — TELEPHONE (OUTPATIENT)
Dept: OTHER | Facility: CLINIC | Age: 67
End: 2022-11-28

## 2022-11-28 LAB
EKG ATRIAL RATE: 93 BPM
EKG P AXIS: 50 DEGREES
EKG P-R INTERVAL: 168 MS
EKG Q-T INTERVAL: 374 MS
EKG QRS DURATION: 96 MS
EKG QTC CALCULATION (BAZETT): 465 MS
EKG R AXIS: -24 DEGREES
EKG T AXIS: 28 DEGREES
EKG VENTRICULAR RATE: 93 BPM

## 2022-11-28 PROCEDURE — 93010 ELECTROCARDIOGRAM REPORT: CPT | Performed by: INTERNAL MEDICINE

## 2022-11-28 NOTE — TELEPHONE ENCOUNTER
Pt was in er was diagnosed with pneumonia pt states she is having severe joint or muscle pain and is asking if she can get something fir the pain.  Please call and advise

## 2022-11-28 NOTE — TELEPHONE ENCOUNTER
PT scheduled for Ed f/u tomorrow. Mastoid Interpolation Flap Text: A decision was made to reconstruct the defect utilizing an interpolation axial flap and a staged reconstruction.  A telfa template was made of the defect.  This telfa template was then used to outline the mastoid interpolation flap.  The donor area for the pedicle flap was then injected with anesthesia.  The flap was excised through the skin and subcutaneous tissue down to the layer of the underlying musculature.  The pedicle flap was carefully excised within this deep plane to maintain its blood supply.  The edges of the donor site were undermined.   The donor site was closed in a primary fashion.  The pedicle was then rotated into position and sutured.  Once the tube was sutured into place, adequate blood supply was confirmed with blanching and refill.  The pedicle was then wrapped with xeroform gauze and dressed appropriately with a telfa and gauze bandage to ensure continued blood supply and protect the attached pedicle.

## 2022-11-29 ENCOUNTER — HOSPITAL ENCOUNTER (OUTPATIENT)
Age: 67
Discharge: HOME | End: 2022-11-29
Payer: MEDICARE

## 2022-11-29 ENCOUNTER — OFFICE VISIT (OUTPATIENT)
Dept: INTERNAL MEDICINE | Age: 67
End: 2022-11-29
Payer: MEDICARE

## 2022-11-29 VITALS
OXYGEN SATURATION: 96 % | DIASTOLIC BLOOD PRESSURE: 77 MMHG | SYSTOLIC BLOOD PRESSURE: 126 MMHG | BODY MASS INDEX: 36.24 KG/M2 | HEART RATE: 104 BPM | HEIGHT: 66 IN | TEMPERATURE: 99.3 F | WEIGHT: 225.5 LBS

## 2022-11-29 DIAGNOSIS — M79.10 MYALGIA: ICD-10-CM

## 2022-11-29 DIAGNOSIS — M47.816 OSTEOARTHRITIS OF LUMBAR SPINE, UNSPECIFIED SPINAL OSTEOARTHRITIS COMPLICATION STATUS: ICD-10-CM

## 2022-11-29 DIAGNOSIS — M79.10 MYALGIA: Primary | ICD-10-CM

## 2022-11-29 DIAGNOSIS — G47.33 OBSTRUCTIVE SLEEP APNEA SYNDROME, SEVERE: ICD-10-CM

## 2022-11-29 LAB
C-REACTIVE PROTEIN: 8.1 MG/DL (ref 0–0.4)
SEDIMENTATION RATE, ERYTHROCYTE: 78 MM/HR (ref 0–20)
TOTAL CK: 173 U/L (ref 20–180)

## 2022-11-29 PROCEDURE — 1090F PRES/ABSN URINE INCON ASSESS: CPT | Performed by: STUDENT IN AN ORGANIZED HEALTH CARE EDUCATION/TRAINING PROGRAM

## 2022-11-29 PROCEDURE — 36415 COLL VENOUS BLD VENIPUNCTURE: CPT

## 2022-11-29 PROCEDURE — 3017F COLORECTAL CA SCREEN DOC REV: CPT | Performed by: STUDENT IN AN ORGANIZED HEALTH CARE EDUCATION/TRAINING PROGRAM

## 2022-11-29 PROCEDURE — 86140 C-REACTIVE PROTEIN: CPT

## 2022-11-29 PROCEDURE — G8399 PT W/DXA RESULTS DOCUMENT: HCPCS | Performed by: STUDENT IN AN ORGANIZED HEALTH CARE EDUCATION/TRAINING PROGRAM

## 2022-11-29 PROCEDURE — 1123F ACP DISCUSS/DSCN MKR DOCD: CPT | Performed by: STUDENT IN AN ORGANIZED HEALTH CARE EDUCATION/TRAINING PROGRAM

## 2022-11-29 PROCEDURE — 3074F SYST BP LT 130 MM HG: CPT | Performed by: STUDENT IN AN ORGANIZED HEALTH CARE EDUCATION/TRAINING PROGRAM

## 2022-11-29 PROCEDURE — G8427 DOCREV CUR MEDS BY ELIG CLIN: HCPCS | Performed by: STUDENT IN AN ORGANIZED HEALTH CARE EDUCATION/TRAINING PROGRAM

## 2022-11-29 PROCEDURE — 85651 RBC SED RATE NONAUTOMATED: CPT

## 2022-11-29 PROCEDURE — 3078F DIAST BP <80 MM HG: CPT | Performed by: STUDENT IN AN ORGANIZED HEALTH CARE EDUCATION/TRAINING PROGRAM

## 2022-11-29 PROCEDURE — 1111F DSCHRG MED/CURRENT MED MERGE: CPT | Performed by: STUDENT IN AN ORGANIZED HEALTH CARE EDUCATION/TRAINING PROGRAM

## 2022-11-29 PROCEDURE — 99213 OFFICE O/P EST LOW 20 MIN: CPT | Performed by: STUDENT IN AN ORGANIZED HEALTH CARE EDUCATION/TRAINING PROGRAM

## 2022-11-29 PROCEDURE — G8417 CALC BMI ABV UP PARAM F/U: HCPCS | Performed by: STUDENT IN AN ORGANIZED HEALTH CARE EDUCATION/TRAINING PROGRAM

## 2022-11-29 PROCEDURE — 82550 ASSAY OF CK (CPK): CPT

## 2022-11-29 PROCEDURE — G8484 FLU IMMUNIZE NO ADMIN: HCPCS | Performed by: STUDENT IN AN ORGANIZED HEALTH CARE EDUCATION/TRAINING PROGRAM

## 2022-11-29 PROCEDURE — 99212 OFFICE O/P EST SF 10 MIN: CPT | Performed by: STUDENT IN AN ORGANIZED HEALTH CARE EDUCATION/TRAINING PROGRAM

## 2022-11-29 PROCEDURE — 1036F TOBACCO NON-USER: CPT | Performed by: STUDENT IN AN ORGANIZED HEALTH CARE EDUCATION/TRAINING PROGRAM

## 2022-11-29 RX ORDER — BACLOFEN 10 MG/1
TABLET ORAL
Qty: 90 TABLET | Refills: 1 | Status: CANCELLED | OUTPATIENT
Start: 2022-11-29

## 2022-11-29 RX ORDER — BACLOFEN 10 MG/1
10 TABLET ORAL 3 TIMES DAILY PRN
Qty: 30 TABLET | Refills: 0 | Status: SHIPPED | OUTPATIENT
Start: 2022-11-29

## 2022-11-29 NOTE — PROGRESS NOTES
[facility]    Attending Physician Statement  I have discussed the case, including pertinent history and exam findings with the resident physician. I agree with the assessment, plan and orders as documented by the resident. I have reviewed all pertinent PMHx, PSHx, FamHx, SocialHx, medications, and allergies and updated history as appropriate. Patient presents for routine follow up of medical problems. Was in the hospital on 11/14/2022 for a COPD exacerbation. Noted to be wheezing on exam.  She was treated with antibiotics and a steroid taper. Felt unwell upon discharge. She uses oxygen all day though is supposed to be on at night. 11/25/2022 - went back to ED due to myalgia. Repeat CXR was negative but patient was sent home with antibiotics. Check ESR. CRP and CK. If negative, increase fluids and continue rest.     Remainder of medical problems as per resident note.     Rudy Meyer MD  11/29/2022 3:32 PM

## 2022-11-29 NOTE — PATIENT INSTRUCTIONS
Please follow up in 1 month with our clinic. Please call if your symptoms worsen or fail to improve.

## 2022-11-29 NOTE — PROGRESS NOTES
[facility]  [facility]  ACC Note      SUBJECTIVE:  CC: had concerns including Pneumonia (Sob, achy joints, pain under breast and lt shoulder blade). HPI:  Estrellita Johnson is a 79 y. o.female with PMH of HTN, COPD, severe EDWARD on 2L qhs, fibromyalgia, DM2, hypothyroidism presenting to [facility] for cough. Patient follows with Dr. Emperatriz Oakes as her PCP. Pt was last seen in clinic on 11/14/22 for hospital follow-up. Patient was admitted for COPD exacerbation, ambulatory pulse ox was >92% on RA at that time. Patient complains of continued cough with occasional thick white sputum since hospitalization earlier this month. She states the SOB has improved and her appetite has come back. She endorses continued muscle pain/joint pain since discharge from hospital.     She presented to ED on 11/25 for the continued joint pain and cough, CXR is clear (my reading) but radiologist could not rule out small perihilar infiltrates so patient was diagnosed with pneumonia and discharged on doxycycline and cefdinir. Presently, she denies fever, chills. While reviewing previous documentation, found that patient was diagnosed with severe EDWARD at [facility] in 2007. She was given a CPAP machine at that time but was non-compliant and CPAP was returned. She was given an oxygen concentrator instead, and told to use nightly. Today, patient states that she uses the 2L NC whenever she is at home- even during the day despite normal SpO2. We discussed that she does not need to use it during day, only at night for the EDWARD. Review of Systems   Constitutional:  Negative for chills, diaphoresis and fever. HENT:  Negative for congestion, hearing loss, mouth sores and sinus pain. Respiratory:  Negative for cough and shortness of breath. Cardiovascular:  Negative for chest pain, palpitations and leg swelling.    Gastrointestinal:  Negative for abdominal pain, blood in stool, constipation, diarrhea, nausea and vomiting. Genitourinary:  Negative for dysuria, frequency, hematuria and urgency. Musculoskeletal:  Negative for arthralgias, back pain and neck pain. Neurological:  Negative for dizziness, seizures, weakness and light-headedness. Outpatient Medications Marked as Taking for the 11/29/22 encounter (Office Visit) with Sherry Webber MD   Medication Sig Dispense Refill    baclofen (LIORESAL) 10 MG tablet Take 1 tablet by mouth 3 times daily as needed (back muscle spasms) 30 tablet 0    cefdinir (OMNICEF) 300 MG capsule Take 1 capsule by mouth 2 times daily for 7 days 14 capsule 0    doxycycline hyclate (VIBRA-TABS) 100 MG tablet Take 1 tablet by mouth 2 times daily for 7 days 14 tablet 0    famotidine (PEPCID) 20 MG tablet Take 1 tablet by mouth 2 times daily 60 tablet 0    losartan (COZAAR) 50 MG tablet Take 1 tablet by mouth daily 30 tablet 3    benzonatate (TESSALON) 100 MG capsule Take 1 capsule by mouth 3 times daily as needed for Cough 30 capsule 1    guaiFENesin 400 MG tablet Take 1 tablet by mouth 2 times daily as needed for Cough 56 tablet 0    tiotropium (SPIRIVA HANDIHALER) 18 MCG inhalation capsule Inhale 1 capsule into the lungs daily 30 capsule 5    SYMBICORT 160-4.5 MCG/ACT AERO INHALE 2 PUFFS INTO THE LUNGS TWICE DAILY 10.2 g 3    montelukast (SINGULAIR) 10 MG tablet TAKE 1 TABLET BY MOUTH EVERY NIGHT AT BEDTIME 90 tablet 1    albuterol sulfate HFA (PROVENTIL;VENTOLIN;PROAIR) 108 (90 Base) MCG/ACT inhaler Inhale 3 puffs into the lungs 4 times daily as needed for Wheezing 3 each 1    pantoprazole (PROTONIX) 40 MG tablet Take 1 tablet by mouth every morning (before breakfast) 30 tablet 5    Dulaglutide (TRULICITY) 8.52 MT/9.1ZZ SOPN Inject 0.75 mg into the skin once a week 5 Adjustable Dose Pre-filled Pen Syringe 3    pregabalin (LYRICA) 75 MG capsule Take 1 capsule by mouth 3 times daily for 90 days.  90 capsule 2    Elastic Bandages & Supports (ANKLE BRACE ADJUST-TO-FIT) MISC Soft left ankle brace  Size to fit  Dx: Ankle sprain 1 each 0    amitriptyline (ELAVIL) 50 MG tablet TAKE 1 TABLET BY MOUTH EVERY EVENING 90 tablet 1    celecoxib (CELEBREX) 100 MG capsule Take 1 capsule by mouth daily 90 capsule 1    citalopram (CELEXA) 40 MG tablet Take 1 tablet by mouth daily 90 tablet 1    docusate sodium (COLACE) 100 MG capsule Take 1 capsule by mouth 2 times daily 180 capsule 1    levothyroxine (SYNTHROID) 112 MCG tablet Take 1 tablet by mouth Daily 90 tablet 1    ammonium lactate (LAC-HYDRIN) 12 % lotion Apply topically twice daily 400 g 2    calcium-cholecalciferol (CALCIUM 500/D) 500-200 MG-UNIT per tablet TAKE 1 TABLET BY MOUTH DAILY 180 tablet 1    rosuvastatin (CRESTOR) 20 MG tablet Take 1 tablet by mouth daily 90 tablet 1    mineral oil-hydrophilic petrolatum (HYDROPHOR) ointment Apply topically as needed. 3 each 2    Calcium Polycarbophil (FIBER) 625 MG TABS Take 1 tablet by mouth 2 times daily 180 tablet 3    OXYGEN Inhale into the lungs Uses 2 liters at night      aspirin 81 MG tablet Take 1 tablet by mouth daily 90 tablet 0    Misc. Devices MISC Oxygen concentrator  j44.9 1 Device 0       OBJECTIVE:    VS:   /77   Pulse (!) 104   Temp 99.3 °F (37.4 °C) (Temporal)   Ht 5' 6\" (1.676 m)   Wt 225 lb 8 oz (102.3 kg)   SpO2 96%   BMI 36.40 kg/m²     EXAM:  Physical Exam  Constitutional:       Appearance: Normal appearance. She is not ill-appearing or diaphoretic. HENT:      Head: Normocephalic and atraumatic. Cardiovascular:      Rate and Rhythm: Normal rate and regular rhythm. Heart sounds: No murmur heard. No friction rub. No gallop. Pulmonary:      Effort: No respiratory distress. Breath sounds: No stridor. No wheezing, rhonchi or rales. Abdominal:      General: Bowel sounds are normal. There is no distension. Palpations: Abdomen is soft. There is no mass. Tenderness: There is no abdominal tenderness.  There is no guarding or rebound. Hernia: No hernia is present. Musculoskeletal:         General: No swelling or tenderness. Right lower leg: No edema. Left lower leg: No edema. Skin:     Coloration: Skin is not jaundiced. Findings: No erythema or rash. Neurological:      Mental Status: She is alert and oriented to person, place, and time. ASSESSMENT/PLAN:  I have reviewed all pertinent PMH, PSH, FH, SH, medications and allergies and updated history as appropriate. Angelita was seen today for pneumonia. Diagnoses and all orders for this visit:    Myalgia  -     CK; Future  -     Sedimentation Rate; Future  -     C-Reactive Protein; Future  -     baclofen (LIORESAL) 10 MG tablet; Take 1 tablet by mouth 3 times daily as needed (back muscle spasms)  - post marketing studies of cefdinir reported associated with rhabdomyolysis so will obtain CK to rule this out    Osteoarthritis of lumbar spine, unspecified spinal osteoarthritis complication status  - patient previously on multiple NSAID regimen which was scaled back for renal protection  - encouraged tylenol     HTN  - stable on current regimen    Hypothyroidism  - stable on current regimen    Severe EDWARD  - to continue 2L NC oxygen at night.  Instructed patient to discontinue daytime use as she saturates fully on room air during day        RTC 1 month for follow up with PCP      I have reviewed my findings and recommendations with Lisa Castañeda and Dr Pascual Godwin MD PGY-2  11/30/2022 11:15 AM

## 2022-11-30 PROBLEM — G47.33 OBSTRUCTIVE SLEEP APNEA SYNDROME, SEVERE: Status: ACTIVE | Noted: 2022-11-30

## 2022-11-30 PROBLEM — J44.1 COPD EXACERBATION (HCC): Status: RESOLVED | Noted: 2022-11-05 | Resolved: 2022-11-30

## 2022-11-30 PROBLEM — E11.65 TYPE 2 DIABETES MELLITUS WITH HYPERGLYCEMIA, WITHOUT LONG-TERM CURRENT USE OF INSULIN (HCC): Status: RESOLVED | Noted: 2022-11-12 | Resolved: 2022-11-30

## 2022-11-30 PROBLEM — J96.21 ACUTE AND CHRONIC RESPIRATORY FAILURE WITH HYPOXIA (HCC): Status: RESOLVED | Noted: 2022-11-09 | Resolved: 2022-11-30

## 2022-11-30 PROBLEM — U07.1 COVID-19: Status: RESOLVED | Noted: 2021-09-30 | Resolved: 2022-11-30

## 2022-11-30 PROBLEM — K14.8 HEMORRHAGE OF TONGUE: Status: RESOLVED | Noted: 2021-09-18 | Resolved: 2022-11-30

## 2022-11-30 PROBLEM — E11.9 TYPE 2 DIABETES MELLITUS WITHOUT COMPLICATION, WITH LONG-TERM CURRENT USE OF INSULIN (HCC): Status: RESOLVED | Noted: 2018-06-19 | Resolved: 2022-11-30

## 2022-11-30 PROBLEM — Z79.4 TYPE 2 DIABETES MELLITUS WITHOUT COMPLICATION, WITH LONG-TERM CURRENT USE OF INSULIN (HCC): Status: RESOLVED | Noted: 2018-06-19 | Resolved: 2022-11-30

## 2022-11-30 ASSESSMENT — ENCOUNTER SYMPTOMS
SHORTNESS OF BREATH: 0
VOMITING: 0
CONSTIPATION: 0
COUGH: 0
NAUSEA: 0
SINUS PAIN: 0
BACK PAIN: 0
ABDOMINAL PAIN: 0
BLOOD IN STOOL: 0
DIARRHEA: 0

## 2022-12-15 ENCOUNTER — OFFICE VISIT (OUTPATIENT)
Dept: PRIMARY CARE CLINIC | Age: 67
End: 2022-12-15
Payer: MEDICARE

## 2022-12-15 ENCOUNTER — HOSPITAL ENCOUNTER (EMERGENCY)
Age: 67
Discharge: HOME | End: 2022-12-16
Attending: EMERGENCY MEDICINE
Payer: MEDICARE

## 2022-12-15 ENCOUNTER — APPOINTMENT (OUTPATIENT)
Dept: CT IMAGING | Age: 67
End: 2022-12-15
Payer: MEDICARE

## 2022-12-15 ENCOUNTER — APPOINTMENT (OUTPATIENT)
Dept: GENERAL RADIOLOGY | Age: 67
End: 2022-12-15
Payer: MEDICARE

## 2022-12-15 VITALS
HEART RATE: 99 BPM | SYSTOLIC BLOOD PRESSURE: 154 MMHG | DIASTOLIC BLOOD PRESSURE: 94 MMHG | HEIGHT: 66 IN | OXYGEN SATURATION: 95 % | WEIGHT: 220 LBS | BODY MASS INDEX: 35.36 KG/M2 | RESPIRATION RATE: 20 BRPM | TEMPERATURE: 97.3 F

## 2022-12-15 VITALS
DIASTOLIC BLOOD PRESSURE: 92 MMHG | SYSTOLIC BLOOD PRESSURE: 173 MMHG | HEART RATE: 98 BPM | OXYGEN SATURATION: 99 % | RESPIRATION RATE: 20 BRPM | TEMPERATURE: 98.4 F

## 2022-12-15 DIAGNOSIS — R06.03 RESPIRATORY DISTRESS: ICD-10-CM

## 2022-12-15 DIAGNOSIS — R09.02 HYPOXIA: Primary | ICD-10-CM

## 2022-12-15 DIAGNOSIS — M79.10 MYALGIA: ICD-10-CM

## 2022-12-15 DIAGNOSIS — J44.9 CHRONIC OBSTRUCTIVE PULMONARY DISEASE, UNSPECIFIED COPD TYPE (HCC): ICD-10-CM

## 2022-12-15 DIAGNOSIS — I10 ESSENTIAL HYPERTENSION: ICD-10-CM

## 2022-12-15 DIAGNOSIS — R06.02 SHORTNESS OF BREATH: ICD-10-CM

## 2022-12-15 DIAGNOSIS — J44.1 COPD EXACERBATION (HCC): ICD-10-CM

## 2022-12-15 DIAGNOSIS — R07.9 CHEST PAIN, UNSPECIFIED TYPE: ICD-10-CM

## 2022-12-15 DIAGNOSIS — J18.9 PNEUMONIA OF RIGHT UPPER LOBE DUE TO INFECTIOUS ORGANISM: Primary | ICD-10-CM

## 2022-12-15 LAB
ALBUMIN SERPL-MCNC: 3.3 G/DL (ref 3.5–5.2)
ALP BLD-CCNC: 135 U/L (ref 35–104)
ALT SERPL-CCNC: 38 U/L (ref 0–32)
ANION GAP SERPL CALCULATED.3IONS-SCNC: 11 MMOL/L (ref 7–16)
AST SERPL-CCNC: 39 U/L (ref 0–31)
B.E.: 7 MMOL/L (ref -3–3)
BASOPHILS ABSOLUTE: 0.03 E9/L (ref 0–0.2)
BASOPHILS RELATIVE PERCENT: 0.4 % (ref 0–2)
BILIRUB SERPL-MCNC: 0.4 MG/DL (ref 0–1.2)
BUN BLDV-MCNC: 9 MG/DL (ref 6–23)
CALCIUM SERPL-MCNC: 9.4 MG/DL (ref 8.6–10.2)
CARDIOPULMONARY BYPASS: NO
CHLORIDE BLD-SCNC: 104 MMOL/L (ref 98–107)
CO2: 27 MMOL/L (ref 22–29)
CREAT SERPL-MCNC: 0.6 MG/DL (ref 0.5–1)
D DIMER: 467 NG/ML DDU
DELIVERY SYSTEMS: ABNORMAL
DEVICE: ABNORMAL
EOSINOPHILS ABSOLUTE: 0.41 E9/L (ref 0.05–0.5)
EOSINOPHILS RELATIVE PERCENT: 5 % (ref 0–6)
FIO2: 2
GFR SERPL CREATININE-BSD FRML MDRD: >60 ML/MIN/1.73
GLUCOSE BLD-MCNC: 107 MG/DL (ref 74–99)
HCO3: 31.3 MMOL/L (ref 22–26)
HCT VFR BLD CALC: 31.5 % (ref 34–48)
HEMOGLOBIN: 9.8 G/DL (ref 11.5–15.5)
IMMATURE GRANULOCYTES #: 0.03 E9/L
IMMATURE GRANULOCYTES %: 0.4 % (ref 0–5)
INFLUENZA A: NOT DETECTED
INFLUENZA B: NOT DETECTED
LYMPHOCYTES ABSOLUTE: 2.19 E9/L (ref 1.5–4)
LYMPHOCYTES RELATIVE PERCENT: 26.6 % (ref 20–42)
MAGNESIUM: 2 MG/DL (ref 1.6–2.6)
MCH RBC QN AUTO: 28.4 PG (ref 26–35)
MCHC RBC AUTO-ENTMCNC: 31.1 % (ref 32–34.5)
MCV RBC AUTO: 91.3 FL (ref 80–99.9)
MONOCYTES ABSOLUTE: 0.85 E9/L (ref 0.1–0.95)
MONOCYTES RELATIVE PERCENT: 10.3 % (ref 2–12)
NEUTROPHILS ABSOLUTE: 4.72 E9/L (ref 1.8–7.3)
NEUTROPHILS RELATIVE PERCENT: 57.3 % (ref 43–80)
O2 SATURATION: 97.9 % (ref 92–98.5)
OPERATOR ID: ABNORMAL
PCO2 37: 42.7 MMHG (ref 35–45)
PDW BLD-RTO: 14.2 FL (ref 11.5–15)
PH 37: 7.47 (ref 7.35–7.45)
PLATELET # BLD: 627 E9/L (ref 130–450)
PMV BLD AUTO: 9.8 FL (ref 7–12)
PO2 37: 96.5 MMHG (ref 60–80)
POC SOURCE: ABNORMAL
POTASSIUM SERPL-SCNC: 3.5 MMOL/L (ref 3.5–5)
PRO-BNP: 332 PG/ML (ref 0–125)
RBC # BLD: 3.45 E12/L (ref 3.5–5.5)
SARS-COV-2 RNA, RT PCR: NOT DETECTED
SODIUM BLD-SCNC: 142 MMOL/L (ref 132–146)
TOTAL PROTEIN: 8.2 G/DL (ref 6.4–8.3)
TROPONIN, HIGH SENSITIVITY: 11 NG/L (ref 0–9)
TROPONIN, HIGH SENSITIVITY: 13 NG/L (ref 0–9)
WBC # BLD: 8.2 E9/L (ref 4.5–11.5)

## 2022-12-15 PROCEDURE — 3017F COLORECTAL CA SCREEN DOC REV: CPT | Performed by: NURSE PRACTITIONER

## 2022-12-15 PROCEDURE — 36415 COLL VENOUS BLD VENIPUNCTURE: CPT

## 2022-12-15 PROCEDURE — 99215 OFFICE O/P EST HI 40 MIN: CPT | Performed by: NURSE PRACTITIONER

## 2022-12-15 PROCEDURE — 93000 ELECTROCARDIOGRAM COMPLETE: CPT | Performed by: NURSE PRACTITIONER

## 2022-12-15 PROCEDURE — 2500000003 HC RX 250 WO HCPCS: Performed by: EMERGENCY MEDICINE

## 2022-12-15 PROCEDURE — 3023F SPIROM DOC REV: CPT | Performed by: NURSE PRACTITIONER

## 2022-12-15 PROCEDURE — 6360000002 HC RX W HCPCS: Performed by: EMERGENCY MEDICINE

## 2022-12-15 PROCEDURE — 3074F SYST BP LT 130 MM HG: CPT | Performed by: NURSE PRACTITIONER

## 2022-12-15 PROCEDURE — 1036F TOBACCO NON-USER: CPT | Performed by: NURSE PRACTITIONER

## 2022-12-15 PROCEDURE — G8417 CALC BMI ABV UP PARAM F/U: HCPCS | Performed by: NURSE PRACTITIONER

## 2022-12-15 PROCEDURE — 85378 FIBRIN DEGRADE SEMIQUANT: CPT

## 2022-12-15 PROCEDURE — 1123F ACP DISCUSS/DSCN MKR DOCD: CPT | Performed by: NURSE PRACTITIONER

## 2022-12-15 PROCEDURE — 6370000000 HC RX 637 (ALT 250 FOR IP): Performed by: EMERGENCY MEDICINE

## 2022-12-15 PROCEDURE — 2580000003 HC RX 258: Performed by: EMERGENCY MEDICINE

## 2022-12-15 PROCEDURE — 85025 COMPLETE CBC W/AUTO DIFF WBC: CPT

## 2022-12-15 PROCEDURE — 82803 BLOOD GASES ANY COMBINATION: CPT

## 2022-12-15 PROCEDURE — 71275 CT ANGIOGRAPHY CHEST: CPT | Performed by: RADIOLOGY

## 2022-12-15 PROCEDURE — 96372 THER/PROPH/DIAG INJ SC/IM: CPT

## 2022-12-15 PROCEDURE — G8427 DOCREV CUR MEDS BY ELIG CLIN: HCPCS | Performed by: NURSE PRACTITIONER

## 2022-12-15 PROCEDURE — 87636 SARSCOV2 & INF A&B AMP PRB: CPT

## 2022-12-15 PROCEDURE — 93005 ELECTROCARDIOGRAM TRACING: CPT | Performed by: EMERGENCY MEDICINE

## 2022-12-15 PROCEDURE — 1090F PRES/ABSN URINE INCON ASSESS: CPT | Performed by: NURSE PRACTITIONER

## 2022-12-15 PROCEDURE — 99285 EMERGENCY DEPT VISIT HI MDM: CPT

## 2022-12-15 PROCEDURE — 84484 ASSAY OF TROPONIN QUANT: CPT

## 2022-12-15 PROCEDURE — 83880 ASSAY OF NATRIURETIC PEPTIDE: CPT

## 2022-12-15 PROCEDURE — 6360000004 HC RX CONTRAST MEDICATION: Performed by: RADIOLOGY

## 2022-12-15 PROCEDURE — G8484 FLU IMMUNIZE NO ADMIN: HCPCS | Performed by: NURSE PRACTITIONER

## 2022-12-15 PROCEDURE — 71045 X-RAY EXAM CHEST 1 VIEW: CPT

## 2022-12-15 PROCEDURE — 2580000003 HC RX 258: Performed by: RADIOLOGY

## 2022-12-15 PROCEDURE — 71275 CT ANGIOGRAPHY CHEST: CPT

## 2022-12-15 PROCEDURE — 3078F DIAST BP <80 MM HG: CPT | Performed by: NURSE PRACTITIONER

## 2022-12-15 PROCEDURE — 80053 COMPREHEN METABOLIC PANEL: CPT

## 2022-12-15 PROCEDURE — 83735 ASSAY OF MAGNESIUM: CPT

## 2022-12-15 PROCEDURE — 96375 TX/PRO/DX INJ NEW DRUG ADDON: CPT

## 2022-12-15 PROCEDURE — G8399 PT W/DXA RESULTS DOCUMENT: HCPCS | Performed by: NURSE PRACTITIONER

## 2022-12-15 PROCEDURE — 96365 THER/PROPH/DIAG IV INF INIT: CPT

## 2022-12-15 RX ORDER — ALBUTEROL SULFATE 2.5 MG/3ML
2.5 SOLUTION RESPIRATORY (INHALATION) EVERY 6 HOURS PRN
Qty: 120 EACH | Refills: 1 | Status: SHIPPED | OUTPATIENT
Start: 2022-12-15

## 2022-12-15 RX ORDER — LORATADINE 10 MG
1 CAPSULE ORAL 2 TIMES DAILY PRN
Qty: 30 CAPSULE | Refills: 0 | Status: SHIPPED | OUTPATIENT
Start: 2022-12-15

## 2022-12-15 RX ORDER — LOSARTAN POTASSIUM 25 MG/1
50 TABLET ORAL DAILY
Status: DISCONTINUED | OUTPATIENT
Start: 2022-12-15 | End: 2022-12-16 | Stop reason: HOSPADM

## 2022-12-15 RX ORDER — DOXYCYCLINE HYCLATE 100 MG
100 TABLET ORAL 2 TIMES DAILY
Qty: 20 TABLET | Refills: 0 | Status: ON HOLD | OUTPATIENT
Start: 2022-12-15 | End: 2022-12-23 | Stop reason: HOSPADM

## 2022-12-15 RX ORDER — IPRATROPIUM BROMIDE AND ALBUTEROL SULFATE 2.5; .5 MG/3ML; MG/3ML
1 SOLUTION RESPIRATORY (INHALATION) ONCE
Status: DISCONTINUED | OUTPATIENT
Start: 2022-12-15 | End: 2022-12-16 | Stop reason: HOSPADM

## 2022-12-15 RX ORDER — METHYLPREDNISOLONE SODIUM SUCCINATE 125 MG/2ML
125 INJECTION, POWDER, LYOPHILIZED, FOR SOLUTION INTRAMUSCULAR; INTRAVENOUS ONCE
Status: COMPLETED | OUTPATIENT
Start: 2022-12-15 | End: 2022-12-15

## 2022-12-15 RX ORDER — SODIUM CHLORIDE 0.9 % (FLUSH) 0.9 %
10 SYRINGE (ML) INJECTION ONCE
Status: COMPLETED | OUTPATIENT
Start: 2022-12-15 | End: 2022-12-15

## 2022-12-15 RX ORDER — NEBULIZER ACCESSORIES
1 KIT MISCELLANEOUS DAILY PRN
Qty: 1 KIT | Refills: 0 | Status: SHIPPED | OUTPATIENT
Start: 2022-12-15

## 2022-12-15 RX ORDER — IPRATROPIUM BROMIDE AND ALBUTEROL SULFATE 2.5; .5 MG/3ML; MG/3ML
1 SOLUTION RESPIRATORY (INHALATION)
Status: DISCONTINUED | OUTPATIENT
Start: 2022-12-15 | End: 2022-12-16 | Stop reason: HOSPADM

## 2022-12-15 RX ORDER — ENOXAPARIN SODIUM 100 MG/ML
1 INJECTION SUBCUTANEOUS ONCE
Status: COMPLETED | OUTPATIENT
Start: 2022-12-15 | End: 2022-12-15

## 2022-12-15 RX ORDER — OXYCODONE HYDROCHLORIDE AND ACETAMINOPHEN 5; 325 MG/1; MG/1
1 TABLET ORAL ONCE
Status: COMPLETED | OUTPATIENT
Start: 2022-12-15 | End: 2022-12-15

## 2022-12-15 RX ORDER — IPRATROPIUM BROMIDE AND ALBUTEROL SULFATE 2.5; .5 MG/3ML; MG/3ML
1 SOLUTION RESPIRATORY (INHALATION) ONCE
Status: COMPLETED | OUTPATIENT
Start: 2022-12-15 | End: 2022-12-15

## 2022-12-15 RX ORDER — CLONIDINE HYDROCHLORIDE 0.1 MG/1
0.1 TABLET ORAL ONCE
Status: COMPLETED | OUTPATIENT
Start: 2022-12-15 | End: 2022-12-15

## 2022-12-15 RX ORDER — HYDROCODONE BITARTRATE AND ACETAMINOPHEN 5; 325 MG/1; MG/1
1 TABLET ORAL EVERY 6 HOURS PRN
Qty: 12 TABLET | Refills: 0 | Status: ON HOLD | OUTPATIENT
Start: 2022-12-15 | End: 2022-12-23 | Stop reason: HOSPADM

## 2022-12-15 RX ORDER — PREDNISONE 20 MG/1
20 TABLET ORAL 2 TIMES DAILY
Qty: 10 TABLET | Refills: 0 | Status: ON HOLD | OUTPATIENT
Start: 2022-12-15 | End: 2022-12-23 | Stop reason: HOSPADM

## 2022-12-15 RX ADMIN — SODIUM CHLORIDE, PRESERVATIVE FREE 10 ML: 5 INJECTION INTRAVENOUS at 21:15

## 2022-12-15 RX ADMIN — DOXYCYCLINE 100 MG: 100 INJECTION, POWDER, LYOPHILIZED, FOR SOLUTION INTRAVENOUS at 19:40

## 2022-12-15 RX ADMIN — OXYCODONE AND ACETAMINOPHEN 1 TABLET: 5; 325 TABLET ORAL at 19:32

## 2022-12-15 RX ADMIN — METHYLPREDNISOLONE SODIUM SUCCINATE 125 MG: 125 INJECTION, POWDER, FOR SOLUTION INTRAMUSCULAR; INTRAVENOUS at 15:48

## 2022-12-15 RX ADMIN — LOSARTAN POTASSIUM 50 MG: 25 TABLET, FILM COATED ORAL at 19:24

## 2022-12-15 RX ADMIN — IOPAMIDOL 75 ML: 755 INJECTION, SOLUTION INTRAVENOUS at 21:14

## 2022-12-15 RX ADMIN — CEFTRIAXONE 1000 MG: 1 INJECTION, POWDER, FOR SOLUTION INTRAMUSCULAR; INTRAVENOUS at 19:25

## 2022-12-15 RX ADMIN — IPRATROPIUM BROMIDE AND ALBUTEROL SULFATE 1 AMPULE: .5; 2.5 SOLUTION RESPIRATORY (INHALATION) at 15:51

## 2022-12-15 RX ADMIN — CLONIDINE HYDROCHLORIDE 0.1 MG: 0.1 TABLET ORAL at 20:23

## 2022-12-15 RX ADMIN — IPRATROPIUM BROMIDE AND ALBUTEROL SULFATE 1 AMPULE: .5; 2.5 SOLUTION RESPIRATORY (INHALATION) at 17:52

## 2022-12-15 RX ADMIN — ENOXAPARIN SODIUM 100 MG: 100 INJECTION SUBCUTANEOUS at 19:26

## 2022-12-15 ASSESSMENT — PAIN SCALES - GENERAL
PAINLEVEL_OUTOF10: 10
PAINLEVEL_OUTOF10: 10

## 2022-12-15 ASSESSMENT — PAIN DESCRIPTION - FREQUENCY: FREQUENCY: CONTINUOUS

## 2022-12-15 ASSESSMENT — PAIN DESCRIPTION - PAIN TYPE: TYPE: ACUTE PAIN

## 2022-12-15 ASSESSMENT — PAIN - FUNCTIONAL ASSESSMENT
PAIN_FUNCTIONAL_ASSESSMENT: ACTIVITIES ARE NOT PREVENTED
PAIN_FUNCTIONAL_ASSESSMENT: 0-10

## 2022-12-15 ASSESSMENT — PAIN DESCRIPTION - ORIENTATION
ORIENTATION: RIGHT
ORIENTATION: MID

## 2022-12-15 ASSESSMENT — PAIN DESCRIPTION - DESCRIPTORS: DESCRIPTORS: DISCOMFORT

## 2022-12-15 ASSESSMENT — PAIN DESCRIPTION - ONSET: ONSET: ON-GOING

## 2022-12-15 ASSESSMENT — PAIN DESCRIPTION - LOCATION
LOCATION: CHEST
LOCATION: ARM

## 2022-12-15 NOTE — ED PROVIDER NOTES
HPI:  12/15/22, Time: 3:34 PM JULES Miller is a 79 y.o. female presenting to the ED for shortness of breath and chest pressure, beginning 2 days ago. Endorses some cough. Quit smoking a couple years ago. Per medical record she is to use 2L NC at night. The complaint has been persistent, moderate in severity, and worsened by nothing. Patient denies fever/chills, sore throat, congestion, edema, headache, visual disturbances, focal paresthesias, focal weakness, abdominal pain, nausea, vomiting, diarrhea, constipation, dysuria, hematuria, trauma, neck or back pain, rash or other complaints. Recent flight to [place]. ROS:   A complete review of systems was performed and all pertinent positives and negatives are stated within HPI, all other systems reviewed and are negative.      --------------------------------------------- PAST HISTORY ---------------------------------------------  Past Medical History:  has a past medical history of Adrenal incidentaloma ([facility] [address] Anxiety, Arthritis, Asthma, Bladder incontinence, Cancer ([facility] [address] Chronic back pain, COPD (chronic obstructive pulmonary disease) ([facility] [address] Depression, Fibromyalgia, GERD (gastroesophageal reflux disease), Hyperlipidemia, Hypertension, Hypothyroidism, MGUS (monoclonal gammopathy of unknown significance), Neuropathy, Pneumonia, Prolonged emergence from general anesthesia, Sleep apnea, Type II or unspecified type diabetes mellitus without mention of complication, not stated as uncontrolled, and Vaginal bleeding. Past Surgical History:  has a past surgical history that includes knee surgery (1980); Upper gastrointestinal endoscopy (2008); Colonoscopy (07/20/2016); Upper gastrointestinal endoscopy (07/20/2016); Nerve Block (Bilateral, 09/22/2016); Nerve Block (07/06/2020); Pain management procedure (N/A, 7/6/2020); Nerve Block (Bilateral, 08/10/2020);  Anesthesia Nerve Block (Bilateral, 8/10/2020); other surgical history (12/13/2016); Colonoscopy (2008); Upper gastrointestinal endoscopy (2008); Upper gastrointestinal endoscopy (N/A, 11/9/2020); Colonoscopy (N/A, 11/9/2020); Colonoscopy (11/9/2020); and Dilation and curettage of uterus (N/A, 5/11/2021). Social History:  reports that she quit smoking about 2 years ago. Her smoking use included cigarettes. She started smoking about 51 years ago. She has a 100.00 pack-year smoking history. She has never used smokeless tobacco. She reports that she does not drink alcohol and does not use drugs. Family History: family history includes Arthritis in her brother, maternal grandfather, and maternal grandmother; Cancer in her father, maternal uncle, mother, and paternal aunt; Diabetes in her brother, father, maternal grandmother, and mother; Heart Disease in her paternal grandfather; Heart Disease (age of onset: 79) in her mother; High Blood Pressure in her father, maternal grandmother, paternal aunt, and paternal uncle; High Cholesterol in her paternal grandmother; Hypertension in her father; Kidney Disease in her paternal grandmother; Stroke in her maternal grandfather. The patients home medications have been reviewed. Allergies: Aceon [perindopril erbumine] and Nsaids        ----------------------------------------PHYSICAL EXAM--------------------------------------  Constitutional:  Well developed, well nourished, no acute distress, non-toxic appearance   Eyes:  PERRL, conjunctiva normal, EOMI  HENT:  Atraumatic, external ears normal, nose normal, oropharynx moist. Neck- normal range of motion, no nuchal rigidity   Respiratory:  Mild respiratory distress, diffusely diminished breath sounds with audible expiratory wheezing, no rhonchi, no crackles  Cardiovascular:  Normal rate, normal rhythm, no murmurs, no gallops, no rubs. Radial and DP pulses 2+ bilaterally.'s are soft. She is warm and well perfused. Cap refill less than 3 seconds.   GI:  Soft, nondistended, normal bowel sounds, nontender, no organomegaly, no mass, no rebound, no guarding   :  No costovertebral angle tenderness   Musculoskeletal:  No edema, no tenderness, no deformities. Back- no tenderness  Integument:  Well hydrated, no rash. Adequate perfusion. Neurologic:  Alert & oriented x 3, CN 2-12 normal, no focal deficits noted. Normal gait. Psychiatric:  Speech and behavior appropriate       -------------------------------------------------- RESULTS -------------------------------------------------  I have personally reviewed all laboratory and imaging results for this patient. Results are listed below.      LABS:  Results for orders placed or performed during the hospital encounter of 12/15/22   COVID-19 & Influenza Combo    Specimen: Nasopharyngeal Swab   Result Value Ref Range    SARS-CoV-2 RNA, RT PCR NOT DETECTED NOT DETECTED    INFLUENZA A NOT DETECTED NOT DETECTED    INFLUENZA B NOT DETECTED NOT DETECTED   CBC with Auto Differential   Result Value Ref Range    WBC 8.2 4.5 - 11.5 E9/L    RBC 3.45 (L) 3.50 - 5.50 E12/L    Hemoglobin 9.8 (L) 11.5 - 15.5 g/dL    Hematocrit 31.5 (L) 34.0 - 48.0 %    MCV 91.3 80.0 - 99.9 fL    MCH 28.4 26.0 - 35.0 pg    MCHC 31.1 (L) 32.0 - 34.5 %    RDW 14.2 11.5 - 15.0 fL    Platelets 083 (H) 550 - 450 E9/L    MPV 9.8 7.0 - 12.0 fL    Neutrophils % 57.3 43.0 - 80.0 %    Immature Granulocytes % 0.4 0.0 - 5.0 %    Lymphocytes % 26.6 20.0 - 42.0 %    Monocytes % 10.3 2.0 - 12.0 %    Eosinophils % 5.0 0.0 - 6.0 %    Basophils % 0.4 0.0 - 2.0 %    Neutrophils Absolute 4.72 1.80 - 7.30 E9/L    Immature Granulocytes # 0.03 E9/L    Lymphocytes Absolute 2.19 1.50 - 4.00 E9/L    Monocytes Absolute 0.85 0.10 - 0.95 E9/L    Eosinophils Absolute 0.41 0.05 - 0.50 E9/L    Basophils Absolute 0.03 0.00 - 0.20 E9/L   Comprehensive Metabolic Panel   Result Value Ref Range    Sodium 142 132 - 146 mmol/L    Potassium 3.5 3.5 - 5.0 mmol/L    Chloride 104 98 - 107 mmol/L    CO2 27 22 - 29 mmol/L    Anion Gap 11 7 - 16 mmol/L    Glucose 107 (H) 74 - 99 mg/dL    BUN 9 6 - 23 mg/dL    Creatinine 0.6 0.5 - 1.0 mg/dL    Est, Glom Filt Rate >60 >=60 mL/min/1.73    Calcium 9.4 8.6 - 10.2 mg/dL    Total Protein 8.2 6.4 - 8.3 g/dL    Albumin 3.3 (L) 3.5 - 5.2 g/dL    Total Bilirubin 0.4 0.0 - 1.2 mg/dL    Alkaline Phosphatase 135 (H) 35 - 104 U/L    ALT 38 (H) 0 - 32 U/L    AST 39 (H) 0 - 31 U/L   Troponin   Result Value Ref Range    Troponin, High Sensitivity 11 (H) 0 - 9 ng/L   Brain Natriuretic Peptide   Result Value Ref Range    Pro- (H) 0 - 125 pg/mL   Magnesium   Result Value Ref Range    Magnesium 2.0 1.6 - 2.6 mg/dL   POCT blood gases   Result Value Ref Range    POC Source Arterial     FIO2 2.0     PH 37 7.473 (H) 7.350 - 7.450    PCO2 37 42.7 35.0 - 45.0 mmHg    PO2 37 96.5 (H) 60.0 - 80.0 mmHg    HCO3 31.3 (H) 22.0 - 26.0 mmol/L    B.E. 7.0 (H) -3.0 - 3.0 mmol/L    O2 Sat 97.9 92.0 - 98.5 %    Cardiopulmonary Bypass No      ID [id number]     DEVICE [id number]     Delivery Systems Cannula        RADIOLOGY:  Interpreted by Radiologist.  XR CHEST PORTABLE   Final Result   1. Patchy right upper lobe pneumonia               EKG Interpretation  Time: 15:27 PM EDT  Rhythm: normal sinus  and PACs  Rate: 95  Axis: normal  Conduction: normal  ST Segments: no acute change  T Waves: non specific changes  Clinical Impression: non-specific EKG  Comparison to prior EKG: changes compared to previous EKG      ------------------------- NURSING NOTES AND VITALS REVIEWED ---------------------------  The nursing notes within the ED encounter and vital signs as below have been reviewed by myself. BP (!) 200/101   Pulse 98   Temp 98.4 °F (36.9 °C) (Oral)   Resp 20   SpO2 99%   Oxygen Saturation Interpretation: Normal      The patients available past medical records and past encounters were reviewed.         ------------------------------ ED COURSE/MEDICAL DECISION MAKING----------------------  Medications ipratropium-albuterol (DUONEB) nebulizer solution 1 ampule (1 ampule Inhalation Given 12/15/22 1551)   cefTRIAXone (ROCEPHIN) 1,000 mg in sterile water 10 mL IV syringe (has no administration in time range)   doxycycline (VIBRAMYCIN) 100 mg in dextrose 5 % 100 mL IVPB ([id number]) (has no administration in time range)   losartan (COZAAR) tablet 50 mg (has no administration in time range)   methylPREDNISolone sodium (SOLU-MEDROL) injection 125 mg (125 mg IntraVENous Given 12/15/22 1548)   ipratropium-albuterol (DUONEB) nebulizer solution 1 ampule (1 ampule Inhalation Given 12/15/22 1752)           Procedures:   none      Medical Decision Making:    RUL PNA -Rocephin and doxycycline ordered. Respiratory distress-home oxygen and breathing treatments. DuoNeb breathing treatments and 125 IV Solu-Medrol ordered here with some improvement. She does get short of breath with exertion. Trop normal.  Pending. D-dimer ordered and pending. Hypertension -can her meds today. Home dose losartan ordered. Patient care turned over to Dr. Harsh Jc pending repeat troponin, D-dimer, further treatment and disposition. This patient's ED course included: re-evaluation prior to disposition, IV medications, cardiac monitoring, continuous pulse oximetry, and a personal history and physicial eaxmination    This patient has remained hemodynamically stable, remained unchanged, and been closely monitored during their ED course. Re-Evaluations:  Time: 6:12 PM EST   Re-evaluation. Patients symptoms show no change  Repeat physical examination is not changed    Counseling: The emergency provider has spoken with the patient and cousin and discussed todays results, in addition to providing specific details for the plan of care and counseling regarding the diagnosis and prognosis.   Questions are answered at this time and they are agreeable with the plan.    --------------------------- IMPRESSION AND DISPOSITION ---------------------------------    IMPRESSION  1. Pneumonia of right upper lobe due to infectious organism    2. Respiratory distress    3.  Essential hypertension        DISPOSITION  Disposition: as per Dr Will Brennan  Patient condition is stable              Ezra Singleton DO  12/15/22 2776

## 2022-12-15 NOTE — PROGRESS NOTES
Chief Complaint:       Shortness of Breath (SOB, Chest Pain/Pressure, Wheezing. Inhalers help a little. Worse when walking/talking. Has hx of COPD. Onset: 12/10/2022 Mother just passed on 12/05/2022, feels very stressed. )    History of Present Illness   Source of history provided by:  patient. Baron Hale is a 79 y.o. old female who presents to walk-in with complaints of shortness of breath which began 5 days ago. Patient reports that she is also having chest heaviness. She does have a history of COPD and asthma, has been using her inhalers which help her slightly. States that she has worsening SOB with exertion, states she can only go short distances before she needs to sit down and use her inhaler again. Of note she was in the ER on 11/25 and was diagnosed with Pneumonia of the right lung and treated with Cefdinir and Doxycycline. She also flew to [place] and back in the last week to bring her mother back home after she passed away. Pt denies any vomiting, abdominal pain, neck stiffness, or lethargy. At home treatment has been unsuccessful. Review of Systems   Unless otherwise stated in this report or unable to obtain because of the patient's clinical or mental status as evidenced by the medical record, this patients's positive and negative responses for Review of Systems, constitutional, psych, eyes, ENT, cardiovascular, respiratory, gastrointestinal, neurological, genitourinary, musculoskeletal, integument systems and systems related to the presenting problem are either stated in the preceding or were not pertinent or were negative for the symptoms and/or complaints related to the medical problem.     Past Medical History:  has a past medical history of Adrenal incidentaloma ([address] Anxiety, Arthritis, Asthma, Bladder incontinence, Cancer ([address] Chronic back pain, COPD (chronic obstructive pulmonary disease) ([address] Depression, Fibromyalgia, GERD (gastroesophageal reflux disease), Hyperlipidemia, Hypertension, Hypothyroidism, MGUS (monoclonal gammopathy of unknown significance), Neuropathy, Pneumonia, Prolonged emergence from general anesthesia, Sleep apnea, Type II or unspecified type diabetes mellitus without mention of complication, not stated as uncontrolled, and Vaginal bleeding. Past Surgical History:  has a past surgical history that includes knee surgery (1980); Upper gastrointestinal endoscopy (2008); Colonoscopy (07/20/2016); Upper gastrointestinal endoscopy (07/20/2016); Nerve Block (Bilateral, 09/22/2016); Nerve Block (07/06/2020); Pain management procedure (N/A, 7/6/2020); Nerve Block (Bilateral, 08/10/2020); Anesthesia Nerve Block (Bilateral, 8/10/2020); other surgical history (12/13/2016); Colonoscopy (2008); Upper gastrointestinal endoscopy (2008); Upper gastrointestinal endoscopy (N/A, 11/9/2020); Colonoscopy (N/A, 11/9/2020); Colonoscopy (11/9/2020); and Dilation and curettage of uterus (N/A, 5/11/2021). Social History:  reports that she quit smoking about 2 years ago. Her smoking use included cigarettes. She started smoking about 51 years ago. She has a 100.00 pack-year smoking history. She has never used smokeless tobacco. She reports that she does not drink alcohol and does not use drugs. Family History: family history includes Arthritis in her brother, maternal grandfather, and maternal grandmother; Cancer in her father, maternal uncle, mother, and paternal aunt; Diabetes in her brother, father, maternal grandmother, and mother; Heart Disease in her paternal grandfather; Heart Disease (age of onset: 79) in her mother; High Blood Pressure in her father, maternal grandmother, paternal aunt, and paternal uncle; High Cholesterol in her paternal grandmother; Hypertension in her father; Kidney Disease in her paternal grandmother; Stroke in her maternal grandfather.   Allergies: Aceon [perindopril erbumine] and Nsaids    Physical Exam   Vital Signs:  BP (!) 154/94 (Site: Left Upper Arm, Position: Sitting, Cuff Size: Large Adult)   Pulse 99   Temp 97.3 °F (36.3 °C) (Oral)   Resp 20   Ht 5' 6\" (1.676 m)   Wt 220 lb (99.8 kg)   SpO2 95%   BMI 35.51 kg/m²    Oxygen Saturation Interpretation: Normal.    General Appearance/Constitutional:  Alert, NAD. HEENT:  NC/NT. PERRLA. Airway patent. Mild posterior pharyngeal erythema without edema. Neck:  Normal ROM. Supple. No adenopathy. Lungs: Inspiratory and expiratory breath sounds with slight end expiratory wheezing without rales or rhonchi. Heart:  Regular rate and rhythm, normal heart sounds, without pathological murmurs, ectopy, gallops, or rubs. .  Abdomen:  Soft, nontender, good bowel sounds. No firm or pulsatile mass. Skin:  Normal turgor. Warm, dry, without visible rash. Extremities:  No clubbing, cyanosis, or edema bilaterally. Neurological:  Oriented x3. Motor functions intact. Test Results Section   (All laboratory and radiology results have been personally reviewed by myself)  Labs:  No results found for this visit on 12/15/22. Imaging: All Radiology results interpreted by Radiologist unless otherwise noted. EKG #1:  Interpreted by me unless otherwise noted. Time:  1325    Rate: 93  Rhythm: sinus  Interpretation: sinus rhythm  Comparison: Stable compared to previous EKG. Assessment / Plan   Impression(s):  Angelita was seen today for shortness of breath. Diagnoses and all orders for this visit:    Hypoxia    Chest pain, unspecified type  -     EKG 12 lead; Future  -     EKG 12 lead    Shortness of breath  -     Check Pulse Oximetry while ambulating    Chronic obstructive pulmonary disease, unspecified COPD type ([facility]    Is a 24-year-old female who presents today for shortness of breath that has been ongoing for the last 5 days. Vital signs reviewed she was found to be hypertensive and tachypneic. She is afebrile.   Patient visibly short of breath in office, she was ambulated in the office with pulse oximetry and her oxygen did drop to 79% and heart rate increased to 112. She was put on 2 L of oxygen and did recuperate to 94% at rest.  With patient's recent travel by airplane to [place], history of pneumonia 3 weeks ago and hx of COPD and asthma I advised her she needs to go to the emergency room for further work-up including likely stat imaging and blood work that is unable to be performed in [facility]. I discussed possible differential diagnoses including worsening pneumonia vs. COPD exacerbation vs. PE.  I did speak with patient's sisters in the office as well as her brother over the phone and they are agreeable that she needs to go to the ER and they will take her immediately. The patient is requesting to go to [address] [place] [address] ER. I did offer EMS however she refused EMS at this time, states that her sisters can take her to the ER. I did call [facility] ER and gave report to Hernan Chen and discussed patient's case. Further disposition per emergency room. Patient did leave our office in stable condition. All questions answered to best ability. Go directly to the ER    Electronically signed by BYRAN Galarza CNP   DD: 12/15/22    **This report was transcribed using voice recognition software. Every effort was made to ensure accuracy; however, inadvertent computerized transcription errors may be present.

## 2022-12-16 LAB
EKG ATRIAL RATE: 95 BPM
EKG P AXIS: 58 DEGREES
EKG P-R INTERVAL: 168 MS
EKG Q-T INTERVAL: 360 MS
EKG QRS DURATION: 92 MS
EKG QTC CALCULATION (BAZETT): 452 MS
EKG R AXIS: -29 DEGREES
EKG T AXIS: 35 DEGREES
EKG VENTRICULAR RATE: 95 BPM

## 2022-12-16 PROCEDURE — 99285 EMERGENCY DEPT VISIT HI MDM: CPT

## 2022-12-16 PROCEDURE — 93010 ELECTROCARDIOGRAM REPORT: CPT | Performed by: INTERNAL MEDICINE

## 2022-12-16 ASSESSMENT — PAIN - FUNCTIONAL ASSESSMENT: PAIN_FUNCTIONAL_ASSESSMENT: NONE - DENIES PAIN

## 2022-12-16 NOTE — ED PROVIDER NOTES
ADDENDUM NOTE:    6:26 PM EST  I received this patient at sign out from Dr. Derrick Dubois. I have discussed the patient's initial exam, treatment and plan of care with the out going physician. I have introduced my self to the patient / family and have answered their questions to this point. I have examined the patient myself and reviewed ordered tests / medications and  reviewed any available results to this point. See Dr. King Gilford note regarding HPI/ROS/family history/social history/EKG interpretation, which I did review. --------------------------------------------- PAST HISTORY ---------------------------------------------  Past Medical History:  has a past medical history of Adrenal incidentaloma ([facility] Anxiety, Arthritis, Asthma, Bladder incontinence, Cancer ([facility] Chronic back pain, COPD (chronic obstructive pulmonary disease) ([facility] Depression, Fibromyalgia, GERD (gastroesophageal reflux disease), Hyperlipidemia, Hypertension, Hypothyroidism, MGUS (monoclonal gammopathy of unknown significance), Neuropathy, Pneumonia, Prolonged emergence from general anesthesia, Sleep apnea, Type II or unspecified type diabetes mellitus without mention of complication, not stated as uncontrolled, and Vaginal bleeding. Past Surgical History:  has a past surgical history that includes knee surgery (1980); Upper gastrointestinal endoscopy (2008); Colonoscopy (07/20/2016); Upper gastrointestinal endoscopy (07/20/2016); Nerve Block (Bilateral, 09/22/2016); Nerve Block (07/06/2020); Pain management procedure (N/A, 7/6/2020); Nerve Block (Bilateral, 08/10/2020); Anesthesia Nerve Block (Bilateral, 8/10/2020); other surgical history (12/13/2016); Colonoscopy (2008); Upper gastrointestinal endoscopy (2008); Upper gastrointestinal endoscopy (N/A, 11/9/2020); Colonoscopy (N/A, 11/9/2020); Colonoscopy (11/9/2020); and Dilation and curettage of uterus (N/A, 5/11/2021).     Social History:  reports that she quit smoking about 2 years ago. Her smoking use included cigarettes. She started smoking about 51 years ago. She has a 100.00 pack-year smoking history. She has never used smokeless tobacco. She reports that she does not drink alcohol and does not use drugs. Family History: family history includes Arthritis in her brother, maternal grandfather, and maternal grandmother; Cancer in her father, maternal uncle, mother, and paternal aunt; Diabetes in her brother, father, maternal grandmother, and mother; Heart Disease in her paternal grandfather; Heart Disease (age of onset: 79) in her mother; High Blood Pressure in her father, maternal grandmother, paternal aunt, and paternal uncle; High Cholesterol in her paternal grandmother; Hypertension in her father; Kidney Disease in her paternal grandmother; Stroke in her maternal grandfather. The patients home medications have been reviewed.     Allergies: Aceon [perindopril erbumine] and Nsaids    -------------------------------------------------- RESULTS -------------------------------------------------  All laboratory and radiology results have been personally reviewed by myself   LABS:  Results for orders placed or performed during the hospital encounter of 12/15/22   COVID-19 & Influenza Combo    Specimen: Nasopharyngeal Swab   Result Value Ref Range    SARS-CoV-2 RNA, RT PCR NOT DETECTED NOT DETECTED    INFLUENZA A NOT DETECTED NOT DETECTED    INFLUENZA B NOT DETECTED NOT DETECTED   CBC with Auto Differential   Result Value Ref Range    WBC 8.2 4.5 - 11.5 E9/L    RBC 3.45 (L) 3.50 - 5.50 E12/L    Hemoglobin 9.8 (L) 11.5 - 15.5 g/dL    Hematocrit 31.5 (L) 34.0 - 48.0 %    MCV 91.3 80.0 - 99.9 fL    MCH 28.4 26.0 - 35.0 pg    MCHC 31.1 (L) 32.0 - 34.5 %    RDW 14.2 11.5 - 15.0 fL    Platelets 762 (H) 341 - 450 E9/L    MPV 9.8 7.0 - 12.0 fL    Neutrophils % 57.3 43.0 - 80.0 %    Immature Granulocytes % 0.4 0.0 - 5.0 %    Lymphocytes % 26.6 20.0 - 42.0 %    Monocytes % 10.3 2.0 - 12.0 % Eosinophils % 5.0 0.0 - 6.0 %    Basophils % 0.4 0.0 - 2.0 %    Neutrophils Absolute 4.72 1.80 - 7.30 E9/L    Immature Granulocytes # 0.03 E9/L    Lymphocytes Absolute 2.19 1.50 - 4.00 E9/L    Monocytes Absolute 0.85 0.10 - 0.95 E9/L    Eosinophils Absolute 0.41 0.05 - 0.50 E9/L    Basophils Absolute 0.03 0.00 - 0.20 E9/L   Comprehensive Metabolic Panel   Result Value Ref Range    Sodium 142 132 - 146 mmol/L    Potassium 3.5 3.5 - 5.0 mmol/L    Chloride 104 98 - 107 mmol/L    CO2 27 22 - 29 mmol/L    Anion Gap 11 7 - 16 mmol/L    Glucose 107 (H) 74 - 99 mg/dL    BUN 9 6 - 23 mg/dL    Creatinine 0.6 0.5 - 1.0 mg/dL    Est, Glom Filt Rate >60 >=60 mL/min/1.73    Calcium 9.4 8.6 - 10.2 mg/dL    Total Protein 8.2 6.4 - 8.3 g/dL    Albumin 3.3 (L) 3.5 - 5.2 g/dL    Total Bilirubin 0.4 0.0 - 1.2 mg/dL    Alkaline Phosphatase 135 (H) 35 - 104 U/L    ALT 38 (H) 0 - 32 U/L    AST 39 (H) 0 - 31 U/L   Troponin   Result Value Ref Range    Troponin, High Sensitivity 11 (H) 0 - 9 ng/L   Brain Natriuretic Peptide   Result Value Ref Range    Pro- (H) 0 - 125 pg/mL   Magnesium   Result Value Ref Range    Magnesium 2.0 1.6 - 2.6 mg/dL   Troponin   Result Value Ref Range    Troponin, High Sensitivity 13 (H) 0 - 9 ng/L   D-Dimer, Quantitative   Result Value Ref Range    D-Dimer, Quant 467 ng/mL DDU   POCT blood gases   Result Value Ref Range    POC Source Arterial     FIO2 2.0     PH 37 7.473 (H) 7.350 - 7.450    PCO2 37 42.7 35.0 - 45.0 mmHg    PO2 37 96.5 (H) 60.0 - 80.0 mmHg    HCO3 31.3 (H) 22.0 - 26.0 mmol/L    B.E. 7.0 (H) -3.0 - 3.0 mmol/L    O2 Sat 97.9 92.0 - 98.5 %    Cardiopulmonary Bypass No      ID [id number]     DEVICE [id number]     Delivery Systems Cannula        RADIOLOGY:  Interpreted by Radiologist.  CTA PULMONARY W CONTRAST   Final Result   No evidence of pulmonary embolism or acute pulmonary abnormality. Mild bilateral interstitial pulmonary edema.       New mild pneumonitis in the right upper lobe and right middle lobe. New indeterminate subcentimeter, subpleural nodules in the right middle lobe   which may have association with metastatic disease. Cardiomegaly and minimal pericardial effusion. XR CHEST PORTABLE   Final Result   1. Patchy right upper lobe pneumonia             ------------------------- NURSING NOTES AND VITALS REVIEWED ---------------------------   The nursing notes within the ED encounter and vital signs as below have been reviewed. BP (!) 173/92   Pulse 98   Temp 98.4 °F (36.9 °C) (Oral)   Resp 20   SpO2 99%   Oxygen Saturation Interpretation: Abnormal - but at baseline      ---------------------------------------------------PHYSICAL EXAM--------------------------------------      Constitutional/General: Alert and oriented x3, well appearing, non toxic in distress at the time of my exam.  Head: Normocephalic and atraumatic  Eyes: PERRL, EOMI no scleral icterus, no scleral injection  Mouth: Oropharynx clear, handling secretions, no trismus  Neck: Supple, full ROM, JVD, trachea midline  Pulmonary: Lungs --rhonchi with some expiratory wheezes, crackles noted in the right lung fields. Not in respiratory distress  Cardiovascular:  Regular rate and rhythm, no murmurs, gallops, or rubs. 2+ distal pulses  Abdomen: Soft, non tender, non distended, no masses no guarding no rigidity normal active bowel sounds. Extremities: Moves all extremities x 4. Warm and well perfused  Skin: warm and dry without rash  Neurologic: GCS 15, cranial nerves II through XII grossly intact with no focal deficits. No meningeal signs.   Psych: Normal Affect      ------------------------------ ED COURSE/MEDICAL DECISION MAKING----------------------  Medications   ipratropium-albuterol (DUONEB) nebulizer solution 1 ampule (1 ampule Inhalation Given 12/15/22 1551)   losartan (COZAAR) tablet 50 mg (50 mg Oral Given 12/15/22 1924)   ipratropium-albuterol (DUONEB) nebulizer solution 1 ampule (has no administration in time range)   methylPREDNISolone sodium (SOLU-MEDROL) injection 125 mg (125 mg IntraVENous Given 12/15/22 1548)   ipratropium-albuterol (DUONEB) nebulizer solution 1 ampule (1 ampule Inhalation Given 12/15/22 1752)   cefTRIAXone (ROCEPHIN) 1,000 mg in sterile water 10 mL IV syringe (1,000 mg IntraVENous Given 12/15/22 1925)   doxycycline (VIBRAMYCIN) 100 mg in dextrose 5 % 100 mL IVPB ([id number]) (100 mg IntraVENous New Bag 12/15/22 1940)   enoxaparin (LOVENOX) injection 100 mg (100 mg SubCUTAneous Given 12/15/22 1926)   cloNIDine (CATAPRES) tablet 0.1 mg (0.1 mg Oral Given 12/15/22 2023)   iopamidol (ISOVUE-370) 76 % injection 75 mL (75 mLs IntraVENous Given 12/15/22 2114)   sodium chloride flush 0.9 % injection 10 mL (10 mLs IntraVENous Given 12/15/22 2115)   oxyCODONE-acetaminophen (PERCOCET) 5-325 MG per tablet 1 tablet (1 tablet Oral Given 12/15/22 1932)       ED COURSE:     Medical Decision Making:   Given the patient's recent travel history of traveling here by plane due from [place] due to a   of a family member and her underlying history of multiple myeloma/MGUS, it was felt prudent to screen her in terms of a CTA pulmonary study for possible pulmonary embolus. Patient was initially treated with antibiotics for possible community-acquired pneumonia based on her initial chest x-ray reading ordered  by Dr. Aicha Restrepo. Serial evaluations been done and the patient has had gradual ,significant improvement in her symptoms. She was empirically anticoagulated while awaiting her CTA study and her perceived moderate to high pretest clinical risk for PE. Patient again is here visiting. She is given the [id number] [facility] referral line if she ends up staying for a longer period of time to have outpatient follow-up or she can follow-up with her primary care physician. Counseling:   The emergency provider has spoken with the patient and family member patient and sister and discussed todays results, in addition to providing specific details for the plan of care and counseling regarding the diagnosis and prognosis. Questions are answered at this time and they are agreeable with the plan. Controlled Substance Monitoring:  Acute and Chronic Pain Monitoring:   RX Monitoring 12/15/2022   Periodic Controlled Substance Monitoring No signs of potential drug abuse or diversion identified.   --------------------------------- IMPRESSION AND DISPOSITION ---------------------------------    IMPRESSION  1. Pneumonia of right upper lobe due to infectious organism    2. Respiratory distress    3. Essential hypertension    4. COPD exacerbation ([facility] [address]    5. Myalgia        DISPOSITION  Disposition: Discharge to home  Patient condition is stable      NOTE: This report was transcribed using voice recognition software.  Every effort was made to ensure accuracy; however, inadvertent computerized transcription errors may be present          Vishnu Chen MD  12/15/22 [id number]

## 2022-12-16 NOTE — DISCHARGE INSTRUCTIONS
NOTE:  Get IMMEDIATE medical attention if any new/worsening symptoms occur! You are being Treated for pneumonia and suspected COPD. Your tests tonight for COVID 19/influenza were negative/normal.  A prescription for a nebulizer is provided for you to administer Albuterol aerosol treatments every 4 hours as needed for shortness of breath/wheezing. Call the [phone] referral line to get a physician here locally if your stay will be extended.

## 2022-12-17 ENCOUNTER — APPOINTMENT (OUTPATIENT)
Dept: GENERAL RADIOLOGY | Age: 67
End: 2022-12-17
Payer: COMMERCIAL

## 2022-12-17 ENCOUNTER — HOSPITAL ENCOUNTER (INPATIENT)
Age: 67
LOS: 6 days | Discharge: HOME | End: 2022-12-23
Attending: EMERGENCY MEDICINE | Admitting: INTERNAL MEDICINE
Payer: COMMERCIAL

## 2022-12-17 DIAGNOSIS — J18.9 PNEUMONIA DUE TO INFECTIOUS ORGANISM, UNSPECIFIED LATERALITY, UNSPECIFIED PART OF LUNG: ICD-10-CM

## 2022-12-17 DIAGNOSIS — J96.01 ACUTE RESPIRATORY FAILURE WITH HYPOXIA (HCC): Primary | ICD-10-CM

## 2022-12-17 DIAGNOSIS — R19.8 ALTERNATING CONSTIPATION AND DIARRHEA: ICD-10-CM

## 2022-12-17 LAB
ANION GAP SERPL CALCULATED.3IONS-SCNC: 9 MMOL/L (ref 7–16)
B.E.: 2.2 MMOL/L (ref -3–3)
BASOPHILS ABSOLUTE: 0.03 E9/L (ref 0–0.2)
BASOPHILS RELATIVE PERCENT: 0.4 % (ref 0–2)
BUN BLDV-MCNC: 15 MG/DL (ref 6–23)
CALCIUM SERPL-MCNC: 9.2 MG/DL (ref 8.6–10.2)
CHLORIDE BLD-SCNC: 103 MMOL/L (ref 98–107)
CO2: 28 MMOL/L (ref 22–29)
COHB: 0.9 % (ref 0–1.5)
CREAT SERPL-MCNC: 0.7 MG/DL (ref 0.5–1)
CRITICAL: ABNORMAL
DATE ANALYZED: ABNORMAL
DATE OF COLLECTION: ABNORMAL
EOSINOPHILS ABSOLUTE: 0.21 E9/L (ref 0.05–0.5)
EOSINOPHILS RELATIVE PERCENT: 2.5 % (ref 0–6)
GFR SERPL CREATININE-BSD FRML MDRD: >60 ML/MIN/1.73
GLUCOSE BLD-MCNC: 153 MG/DL (ref 74–99)
HCO3: 27.9 MMOL/L (ref 22–26)
HCT VFR BLD CALC: 31.8 % (ref 34–48)
HEMOGLOBIN: 9.6 G/DL (ref 11.5–15.5)
HHB: 6.3 % (ref 0–5)
IMMATURE GRANULOCYTES #: 0.03 E9/L
IMMATURE GRANULOCYTES %: 0.4 % (ref 0–5)
INFLUENZA A BY PCR: NOT DETECTED
INFLUENZA B BY PCR: NOT DETECTED
LAB: ABNORMAL
LYMPHOCYTES ABSOLUTE: 3.36 E9/L (ref 1.5–4)
LYMPHOCYTES RELATIVE PERCENT: 39.9 % (ref 20–42)
Lab: ABNORMAL
MCH RBC QN AUTO: 28.2 PG (ref 26–35)
MCHC RBC AUTO-ENTMCNC: 30.2 % (ref 32–34.5)
MCV RBC AUTO: 93.5 FL (ref 80–99.9)
METHB: 0.3 % (ref 0–1.5)
MODE: ABNORMAL
MONOCYTES ABSOLUTE: 0.7 E9/L (ref 0.1–0.95)
MONOCYTES RELATIVE PERCENT: 8.3 % (ref 2–12)
NEUTROPHILS ABSOLUTE: 4.09 E9/L (ref 1.8–7.3)
NEUTROPHILS RELATIVE PERCENT: 48.5 % (ref 43–80)
O2 CONTENT: 14.1 ML/DL
O2 SATURATION: 93.6 % (ref 92–98.5)
O2HB: 92.5 % (ref 94–97)
OPERATOR ID: 3114
PATIENT TEMP: 37 C
PCO2: 48.6 MMHG (ref 35–45)
PDW BLD-RTO: 14.2 FL (ref 11.5–15)
PH BLOOD GAS: 7.38 (ref 7.35–7.45)
PLATELET # BLD: 635 E9/L (ref 130–450)
PMV BLD AUTO: 9.5 FL (ref 7–12)
PO2: 69.7 MMHG (ref 75–100)
POTASSIUM SERPL-SCNC: 3.6 MMOL/L (ref 3.5–5)
PRO-BNP: 520 PG/ML (ref 0–125)
PROCALCITONIN: 0.14 NG/ML (ref 0–0.08)
RBC # BLD: 3.4 E12/L (ref 3.5–5.5)
SARS-COV-2, NAAT: NOT DETECTED
SODIUM BLD-SCNC: 140 MMOL/L (ref 132–146)
SOURCE, BLOOD GAS: ABNORMAL
THB: 10.8 G/DL (ref 11.5–16.5)
TIME ANALYZED: 1033
TROPONIN, HIGH SENSITIVITY: 10 NG/L (ref 0–9)
TROPONIN, HIGH SENSITIVITY: 11 NG/L (ref 0–9)
WBC # BLD: 8.4 E9/L (ref 4.5–11.5)

## 2022-12-17 PROCEDURE — 2580000003 HC RX 258: Performed by: NURSE PRACTITIONER

## 2022-12-17 PROCEDURE — 82805 BLOOD GASES W/O2 SATURATION: CPT

## 2022-12-17 PROCEDURE — 94640 AIRWAY INHALATION TREATMENT: CPT

## 2022-12-17 PROCEDURE — 84484 ASSAY OF TROPONIN QUANT: CPT

## 2022-12-17 PROCEDURE — 2500000003 HC RX 250 WO HCPCS: Performed by: STUDENT IN AN ORGANIZED HEALTH CARE EDUCATION/TRAINING PROGRAM

## 2022-12-17 PROCEDURE — 2700000000 HC OXYGEN THERAPY PER DAY

## 2022-12-17 PROCEDURE — 84145 PROCALCITONIN (PCT): CPT

## 2022-12-17 PROCEDURE — 99223 1ST HOSP IP/OBS HIGH 75: CPT | Performed by: NURSE PRACTITIONER

## 2022-12-17 PROCEDURE — 87635 SARS-COV-2 COVID-19 AMP PRB: CPT

## 2022-12-17 PROCEDURE — 2580000003 HC RX 258: Performed by: STUDENT IN AN ORGANIZED HEALTH CARE EDUCATION/TRAINING PROGRAM

## 2022-12-17 PROCEDURE — 99223 1ST HOSP IP/OBS HIGH 75: CPT | Performed by: INTERNAL MEDICINE

## 2022-12-17 PROCEDURE — 87502 INFLUENZA DNA AMP PROBE: CPT

## 2022-12-17 PROCEDURE — 77075 RADEX OSSEOUS SURVEY COMPL: CPT

## 2022-12-17 PROCEDURE — 6360000002 HC RX W HCPCS: Performed by: STUDENT IN AN ORGANIZED HEALTH CARE EDUCATION/TRAINING PROGRAM

## 2022-12-17 PROCEDURE — 6370000000 HC RX 637 (ALT 250 FOR IP): Performed by: CLINICAL NURSE SPECIALIST

## 2022-12-17 PROCEDURE — 94664 DEMO&/EVAL PT USE INHALER: CPT

## 2022-12-17 PROCEDURE — 85025 COMPLETE CBC W/AUTO DIFF WBC: CPT

## 2022-12-17 PROCEDURE — 80048 BASIC METABOLIC PNL TOTAL CA: CPT

## 2022-12-17 PROCEDURE — 6370000000 HC RX 637 (ALT 250 FOR IP): Performed by: NURSE PRACTITIONER

## 2022-12-17 PROCEDURE — 93005 ELECTROCARDIOGRAM TRACING: CPT | Performed by: STUDENT IN AN ORGANIZED HEALTH CARE EDUCATION/TRAINING PROGRAM

## 2022-12-17 PROCEDURE — 6360000002 HC RX W HCPCS: Performed by: CLINICAL NURSE SPECIALIST

## 2022-12-17 PROCEDURE — 83880 ASSAY OF NATRIURETIC PEPTIDE: CPT

## 2022-12-17 PROCEDURE — 6370000000 HC RX 637 (ALT 250 FOR IP): Performed by: STUDENT IN AN ORGANIZED HEALTH CARE EDUCATION/TRAINING PROGRAM

## 2022-12-17 PROCEDURE — 71045 X-RAY EXAM CHEST 1 VIEW: CPT

## 2022-12-17 PROCEDURE — 2060000000 HC ICU INTERMEDIATE R&B

## 2022-12-17 PROCEDURE — 6360000002 HC RX W HCPCS: Performed by: NURSE PRACTITIONER

## 2022-12-17 RX ORDER — ARFORMOTEROL TARTRATE 15 UG/2ML
15 SOLUTION RESPIRATORY (INHALATION) 2 TIMES DAILY
Status: DISCONTINUED | OUTPATIENT
Start: 2022-12-17 | End: 2022-12-23 | Stop reason: HOSPADM

## 2022-12-17 RX ORDER — METHYLPREDNISOLONE SODIUM SUCCINATE 40 MG/ML
40 INJECTION, POWDER, LYOPHILIZED, FOR SOLUTION INTRAMUSCULAR; INTRAVENOUS EVERY 8 HOURS
Status: DISCONTINUED | OUTPATIENT
Start: 2022-12-17 | End: 2022-12-20

## 2022-12-17 RX ORDER — SODIUM CHLORIDE 0.9 % (FLUSH) 0.9 %
5-40 SYRINGE (ML) INJECTION PRN
Status: DISCONTINUED | OUTPATIENT
Start: 2022-12-17 | End: 2022-12-23 | Stop reason: HOSPADM

## 2022-12-17 RX ORDER — SODIUM CHLORIDE 0.9 % (FLUSH) 0.9 %
5-40 SYRINGE (ML) INJECTION EVERY 12 HOURS SCHEDULED
Status: DISCONTINUED | OUTPATIENT
Start: 2022-12-17 | End: 2022-12-23 | Stop reason: HOSPADM

## 2022-12-17 RX ORDER — BACLOFEN 10 MG/1
10 TABLET ORAL 3 TIMES DAILY PRN
Status: DISCONTINUED | OUTPATIENT
Start: 2022-12-17 | End: 2022-12-23 | Stop reason: HOSPADM

## 2022-12-17 RX ORDER — LOSARTAN POTASSIUM 50 MG/1
50 TABLET ORAL DAILY
Status: DISCONTINUED | OUTPATIENT
Start: 2022-12-17 | End: 2022-12-23 | Stop reason: HOSPADM

## 2022-12-17 RX ORDER — DOCUSATE SODIUM 100 MG/1
100 CAPSULE, LIQUID FILLED ORAL 2 TIMES DAILY
Status: DISCONTINUED | OUTPATIENT
Start: 2022-12-17 | End: 2022-12-23 | Stop reason: HOSPADM

## 2022-12-17 RX ORDER — ENOXAPARIN SODIUM 100 MG/ML
40 INJECTION SUBCUTANEOUS DAILY
Status: DISCONTINUED | OUTPATIENT
Start: 2022-12-17 | End: 2022-12-20

## 2022-12-17 RX ORDER — IPRATROPIUM BROMIDE AND ALBUTEROL SULFATE 2.5; .5 MG/3ML; MG/3ML
1 SOLUTION RESPIRATORY (INHALATION)
Status: DISCONTINUED | OUTPATIENT
Start: 2022-12-17 | End: 2022-12-23 | Stop reason: HOSPADM

## 2022-12-17 RX ORDER — POLYETHYLENE GLYCOL 3350 17 G/17G
17 POWDER, FOR SOLUTION ORAL DAILY PRN
Status: DISCONTINUED | OUTPATIENT
Start: 2022-12-17 | End: 2022-12-23 | Stop reason: HOSPADM

## 2022-12-17 RX ORDER — MONTELUKAST SODIUM 10 MG/1
10 TABLET ORAL NIGHTLY
Status: DISCONTINUED | OUTPATIENT
Start: 2022-12-17 | End: 2022-12-23 | Stop reason: HOSPADM

## 2022-12-17 RX ORDER — ACETAMINOPHEN 650 MG/1
650 SUPPOSITORY RECTAL EVERY 6 HOURS PRN
Status: DISCONTINUED | OUTPATIENT
Start: 2022-12-17 | End: 2022-12-23 | Stop reason: HOSPADM

## 2022-12-17 RX ORDER — METHYLPREDNISOLONE SODIUM SUCCINATE 125 MG/2ML
125 INJECTION, POWDER, LYOPHILIZED, FOR SOLUTION INTRAMUSCULAR; INTRAVENOUS EVERY 6 HOURS
Status: DISCONTINUED | OUTPATIENT
Start: 2022-12-17 | End: 2022-12-17

## 2022-12-17 RX ORDER — ACETAMINOPHEN 325 MG/1
650 TABLET ORAL EVERY 6 HOURS PRN
Status: DISCONTINUED | OUTPATIENT
Start: 2022-12-17 | End: 2022-12-23 | Stop reason: HOSPADM

## 2022-12-17 RX ORDER — ROSUVASTATIN CALCIUM 20 MG/1
20 TABLET, COATED ORAL DAILY
Status: DISCONTINUED | OUTPATIENT
Start: 2022-12-17 | End: 2022-12-23 | Stop reason: HOSPADM

## 2022-12-17 RX ORDER — SODIUM CHLORIDE 9 MG/ML
INJECTION, SOLUTION INTRAVENOUS PRN
Status: DISCONTINUED | OUTPATIENT
Start: 2022-12-17 | End: 2022-12-23 | Stop reason: HOSPADM

## 2022-12-17 RX ORDER — LEVOTHYROXINE SODIUM 112 UG/1
112 TABLET ORAL DAILY
Status: DISCONTINUED | OUTPATIENT
Start: 2022-12-17 | End: 2022-12-17

## 2022-12-17 RX ORDER — PREGABALIN 75 MG/1
75 CAPSULE ORAL 3 TIMES DAILY
Status: DISCONTINUED | OUTPATIENT
Start: 2022-12-17 | End: 2022-12-23 | Stop reason: HOSPADM

## 2022-12-17 RX ORDER — ONDANSETRON 4 MG/1
4 TABLET, ORALLY DISINTEGRATING ORAL EVERY 8 HOURS PRN
Status: DISCONTINUED | OUTPATIENT
Start: 2022-12-17 | End: 2022-12-23 | Stop reason: HOSPADM

## 2022-12-17 RX ORDER — PANTOPRAZOLE SODIUM 40 MG/1
40 TABLET, DELAYED RELEASE ORAL
Status: DISCONTINUED | OUTPATIENT
Start: 2022-12-18 | End: 2022-12-23 | Stop reason: HOSPADM

## 2022-12-17 RX ORDER — GUAIFENESIN/DEXTROMETHORPHAN 100-10MG/5
5 SYRUP ORAL EVERY 4 HOURS PRN
Status: DISCONTINUED | OUTPATIENT
Start: 2022-12-17 | End: 2022-12-23 | Stop reason: HOSPADM

## 2022-12-17 RX ORDER — ONDANSETRON 2 MG/ML
4 INJECTION INTRAMUSCULAR; INTRAVENOUS EVERY 6 HOURS PRN
Status: DISCONTINUED | OUTPATIENT
Start: 2022-12-17 | End: 2022-12-23 | Stop reason: HOSPADM

## 2022-12-17 RX ORDER — DOXYCYCLINE HYCLATE 100 MG/1
100 CAPSULE ORAL EVERY 12 HOURS SCHEDULED
Status: DISCONTINUED | OUTPATIENT
Start: 2022-12-17 | End: 2022-12-23 | Stop reason: HOSPADM

## 2022-12-17 RX ORDER — AMITRIPTYLINE HYDROCHLORIDE 25 MG/1
50 TABLET, FILM COATED ORAL NIGHTLY
Status: DISCONTINUED | OUTPATIENT
Start: 2022-12-17 | End: 2022-12-23 | Stop reason: HOSPADM

## 2022-12-17 RX ORDER — METHYLPREDNISOLONE SODIUM SUCCINATE 40 MG/ML
40 INJECTION, POWDER, LYOPHILIZED, FOR SOLUTION INTRAMUSCULAR; INTRAVENOUS EVERY 12 HOURS
Status: DISCONTINUED | OUTPATIENT
Start: 2022-12-17 | End: 2022-12-17

## 2022-12-17 RX ORDER — IPRATROPIUM BROMIDE AND ALBUTEROL SULFATE 2.5; .5 MG/3ML; MG/3ML
3 SOLUTION RESPIRATORY (INHALATION)
Status: DISCONTINUED | OUTPATIENT
Start: 2022-12-17 | End: 2022-12-17

## 2022-12-17 RX ORDER — BUDESONIDE 0.5 MG/2ML
0.5 INHALANT ORAL 2 TIMES DAILY
Status: DISCONTINUED | OUTPATIENT
Start: 2022-12-17 | End: 2022-12-23 | Stop reason: HOSPADM

## 2022-12-17 RX ORDER — CITALOPRAM 20 MG/1
20 TABLET ORAL DAILY
Status: DISCONTINUED | OUTPATIENT
Start: 2022-12-17 | End: 2022-12-23 | Stop reason: HOSPADM

## 2022-12-17 RX ADMIN — PREGABALIN 75 MG: 75 CAPSULE ORAL at 21:51

## 2022-12-17 RX ADMIN — DOXYCYCLINE HYCLATE 100 MG: 100 CAPSULE ORAL at 21:50

## 2022-12-17 RX ADMIN — ENOXAPARIN SODIUM 40 MG: 100 INJECTION SUBCUTANEOUS at 12:01

## 2022-12-17 RX ADMIN — SODIUM CHLORIDE, PRESERVATIVE FREE 10 ML: 5 INJECTION INTRAVENOUS at 12:03

## 2022-12-17 RX ADMIN — WATER 2000 MG: 1 INJECTION INTRAMUSCULAR; INTRAVENOUS; SUBCUTANEOUS at 06:26

## 2022-12-17 RX ADMIN — PREGABALIN 75 MG: 75 CAPSULE ORAL at 14:52

## 2022-12-17 RX ADMIN — BUDESONIDE 500 MCG: 0.5 SUSPENSION RESPIRATORY (INHALATION) at 19:12

## 2022-12-17 RX ADMIN — PREGABALIN 75 MG: 75 CAPSULE ORAL at 12:01

## 2022-12-17 RX ADMIN — GUAIFENESIN AND DEXTROMETHORPHAN 5 ML: 100; 10 SYRUP ORAL at 12:01

## 2022-12-17 RX ADMIN — LOSARTAN POTASSIUM 50 MG: 50 TABLET, FILM COATED ORAL at 12:01

## 2022-12-17 RX ADMIN — METHYLPREDNISOLONE SODIUM SUCCINATE 40 MG: 40 INJECTION, POWDER, LYOPHILIZED, FOR SOLUTION INTRAMUSCULAR; INTRAVENOUS at 17:52

## 2022-12-17 RX ADMIN — DOCUSATE SODIUM 100 MG: 100 CAPSULE, LIQUID FILLED ORAL at 12:01

## 2022-12-17 RX ADMIN — MONTELUKAST SODIUM 10 MG: 10 TABLET ORAL at 21:50

## 2022-12-17 RX ADMIN — IPRATROPIUM BROMIDE AND ALBUTEROL SULFATE 3 AMPULE: .5; 2.5 SOLUTION RESPIRATORY (INHALATION) at 08:05

## 2022-12-17 RX ADMIN — CITALOPRAM HYDROBROMIDE 20 MG: 20 TABLET ORAL at 12:01

## 2022-12-17 RX ADMIN — ARFORMOTEROL TARTRATE 15 MCG: 15 SOLUTION RESPIRATORY (INHALATION) at 19:12

## 2022-12-17 RX ADMIN — METHYLPREDNISOLONE SODIUM SUCCINATE 125 MG: 125 INJECTION, POWDER, FOR SOLUTION INTRAMUSCULAR; INTRAVENOUS at 06:17

## 2022-12-17 RX ADMIN — AMITRIPTYLINE HYDROCHLORIDE 50 MG: 25 TABLET, FILM COATED ORAL at 21:51

## 2022-12-17 RX ADMIN — SODIUM CHLORIDE, PRESERVATIVE FREE 10 ML: 5 INJECTION INTRAVENOUS at 21:00

## 2022-12-17 RX ADMIN — ROSUVASTATIN CALCIUM 20 MG: 20 TABLET, FILM COATED ORAL at 12:01

## 2022-12-17 RX ADMIN — DOXYCYCLINE 100 MG: 100 INJECTION, POWDER, LYOPHILIZED, FOR SOLUTION INTRAVENOUS at 06:34

## 2022-12-17 RX ADMIN — DOCUSATE SODIUM 100 MG: 100 CAPSULE, LIQUID FILLED ORAL at 21:50

## 2022-12-17 ASSESSMENT — ENCOUNTER SYMPTOMS
RHINORRHEA: 0
ABDOMINAL PAIN: 0
SINUS PAIN: 0
COUGH: 1
CHEST TIGHTNESS: 1
EYE REDNESS: 0
SINUS PRESSURE: 0
VOMITING: 0
DIARRHEA: 0
NAUSEA: 0
BACK PAIN: 0
CONSTIPATION: 0
SHORTNESS OF BREATH: 1
EYE PAIN: 0

## 2022-12-17 ASSESSMENT — PAIN SCALES - GENERAL: PAINLEVEL_OUTOF10: 0

## 2022-12-17 ASSESSMENT — PAIN - FUNCTIONAL ASSESSMENT: PAIN_FUNCTIONAL_ASSESSMENT: NONE - DENIES PAIN

## 2022-12-17 NOTE — H&P
[facility] [facility]   History and Physical      CHIEF COMPLAINT:  SOB    History of Present Illness:  79 y.o. female with a history of anxiety, asthma, multiple myeloma, COPD, depression, GERD, fibromyalgia, HLD, HTN, hypothyroidism, neuropathy, EDWARD, and DM presents with increased shortness of breath beginning approximately 5 days prior to presentation. Pain is moderate in severity, persistent, worsened by exertion. Has been using her inhalers at home, however do not seem to help much. Did present to PCP 12/15/2022 she was found hypertensive and tachypneic. Ambulated in the office SPO2 found to be at 79% with increased HR. She then presented to [facility] ED. Per chart review she was to be admitted on 1215 with [place] ED visit, however stated she could not stay and would be back. Did have recent travel to [place] last week. Patient denies fevers, chills, N/V/D.  BUN/Crt 15/0.7 glucose 153 proBNP 520 HS Troponin 11. H/H 9.6/31.8 D-dimer 467 12/15 influenza A/B. CXR no acute disease. 12/15 CTA chest no PE. Mild bilateral interstitial pulmonary edema. New mild pneumonitis RUL/RML. New indeterminate subcentimeter subpleural nodules RML. Be related to metastatic disease. I cardiomegaly and minimal pericardial effusion. 12/15/2022 presented to ED with complaints of SOB, chest pressure beginning approximately 2 days prior to presentation. Received DuoNeb treatments/Solu-Medrol 125 mg IV received doxycycline/ceftriaxone/DuoNeb's/clonidine/therapeutic Lovenox/losartan in ED course. CT chest showed no PE mild bilateral interstitial pulmonary edema. Pneumonitis right upper lobe/right middle lobe. She remained stable. And was discharged home. 11/25/2022 patient presented to ED fatigue, headache, nausea. CXR concerning for early pneumonia. CT A/P concerning for gastritis. No acute process. Lipase 63. Potassium replaced in ED.   Patient did feel better upon reevaluation. She received Omnicef and doxycycline in the ED course and was discharged home on Omnicef 300 mg 2 times daily x7 days doxycycline 100 mg 2 times daily x7 days and Pepcid 20 mg daily. Informant(s) for H&P:EMR and pt    REVIEW OF SYSTEMS:  Admits to SOB and Chest pressure. Denies  fevers, chills,  n/v, ha, vision/hearing changes, wt changes, hot/cold flashes, other open skin lesions, diarrhea, constipation, dysuria/hematuria unless noted in HPI. Complete ROS performed with the patient and is otherwise negative.       PMH:  Past Medical History:   Diagnosis Date    Adrenal incidentaloma ([address]     Anxiety     Arthritis     Asthma     Bladder incontinence     Cancer [facility]) Dx 2009    multiple Myeloma, in remission currently     Chronic back pain     COPD (chronic obstructive pulmonary disease) (HCC)     on O2 2 liters  (uses with activity and at night to sleep)    Depression     Fibromyalgia     GERD (gastroesophageal reflux disease)     Hyperlipidemia     Hypertension     Hypothyroidism     MGUS (monoclonal gammopathy of unknown significance)     Neuropathy     Pneumonia 02/2020    Prolonged emergence from general anesthesia     Sleep apnea     doesnt use her cpap    Type II or unspecified type diabetes mellitus without mention of complication, not stated as uncontrolled     Vaginal bleeding        Surgical History:  Past Surgical History:   Procedure Laterality Date    ANESTHESIA NERVE BLOCK Bilateral 8/10/2020    BILATERAL L3 L4 MEDIAL BRANCH L5 DORSSAL RAMUS NERVE BLOCK (CPT 45001) SEDATION performed by Conrado Britton MD at [address] [place] [address]  07/20/2016    removed 3 polyps (tubular adenomas), diverticulosis, Dr. Marla Holcomb  2008    approximately 2008, no report available, per patient some polyps removed,  [facility]    COLONOSCOPY N/A 11/9/2020    Small sessile polyp distal ascending colon removed with bx/cauterized (path Tubular Adenoma), right and left diverticulosis without diverticulitis, Dr. Hannah Hernandez, [facility]    COLONOSCOPY  11/9/2020    Small sessile polyp distal ascending colon removed with bx/cauterized (path Tubular Adenoma), right and left diverticulosis without diverticulitis, Dr. Hannah Hernandez, [facility] N/A 5/11/2021    DILATATION AND CURETTAGE HYSTEROSCOPY POSSIBLE REMOVAL OF MASS performed by Khanh Martinez MD at [address], left knee, arthroscopic    NERVE BLOCK Bilateral 09/22/2016    lumbar transforaminal nerve block #1    NERVE BLOCK  07/06/2020    lumbar epidural steroid injectio L4-5    NERVE BLOCK Bilateral 08/10/2020    L3, L4, L5     OTHER SURGICAL HISTORY  12/13/2016    Excision cyst right face    PAIN MANAGEMENT PROCEDURE N/A 7/6/2020    LUMBAR EPIDURAL STEROID INJECTION L4-5 performed by Benedicto Walton MD at [address] [facility]  2008 2008    UPPER GASTROINTESTINAL ENDOSCOPY  07/20/2016    GERD and gastric ulcers, Bx H pylori neg, Dr. Dawna Cao  2008    approximately 2008, no report, patient does not know the findings, Dr Maren Edouard, [facility] N/A 11/9/2020    Severe gastritis with superficial ulcerations with bx neg H pylori, Dr. Hannah Hernandez, [facility]       Medications Prior to Admission:    Prior to Admission medications    Medication Sig Start Date End Date Taking? Authorizing Provider   HYDROcodone-acetaminophen (NORCO) 5-325 MG per tablet Take 1 tablet by mouth every 6 hours as needed for Pain for up to 3 days. For severe pain due to muscle aches and spasms.  12/15/22 12/18/22  Nurys Mcmanus MD   Respiratory Therapy Supplies (NEBULIZER/TUBING/MOUTHPIECE) KIT 1 kit by Does not apply route daily as needed (for shortness of breath/wheezing) 12/15/22   Nurys Mcmanus MD   albuterol (PROVENTIL) (2.5 MG/3ML) 0.083% nebulizer solution Take 3 mLs by nebulization every 6 hours as needed for Wheezing or Shortness of Breath 12/15/22   Angel Luis Kim MD   doxycycline hyclate (VIBRA-TABS) 100 MG tablet Take 1 tablet by mouth 2 times daily for 10 days (Pharmacist: May substitute monohydrate form prn) 12/15/22 12/25/22  Angel Luis Kim MD   Dextromethorphan-guaiFENesin (CORICIDIN HBP CONGESTION/COUGH)  MG CAPS Take 1 capsule by mouth 2 times daily as needed (congestion) 12/15/22   Angel Luis Kim MD   predniSONE (DELTASONE) 20 MG tablet Take 1 tablet by mouth 2 times daily for 5 days 12/15/22 12/20/22  Angel Luis Kim MD   baclofen (LIORESAL) 10 MG tablet Take 1 tablet by mouth 3 times daily as needed (back muscle spasms) 11/29/22   Madina Webb MD   famotidine (PEPCID) 20 MG tablet Take 1 tablet by mouth 2 times daily 11/25/22   Des Scales MD   losartan (COZAAR) 50 MG tablet Take 1 tablet by mouth daily 11/14/22   Idalmis Toscano MD   guaiFENesin 400 MG tablet Take 1 tablet by mouth 2 times daily as needed for Cough 11/13/22   Idalmis Toscano MD   tiotropium (Jagruti Hylan) 18 MCG inhalation capsule Inhale 1 capsule into the lungs daily  Patient not taking: No sig reported 11/13/22   Idalmis Toscano MD   SYMBICORT 160-4.5 MCG/ACT AERO INHALE 2 PUFFS INTO THE LUNGS TWICE DAILY 11/13/22   Idalmis Toscano MD   montelukast (SINGULAIR) 10 MG tablet TAKE 1 TABLET BY MOUTH EVERY NIGHT AT BEDTIME 11/13/22   Idalmis Toscano MD   albuterol sulfate HFA (PROVENTIL;VENTOLIN;PROAIR) 108 (90 Base) MCG/ACT inhaler Inhale 3 puffs into the lungs 4 times daily as needed for Wheezing 11/13/22   Idalmis Toscano MD   pantoprazole (PROTONIX) 40 MG tablet Take 1 tablet by mouth every morning (before breakfast) 9/13/22   Dorian Murphy MD   Dulaglutide (TRULICITY) 1.15 BZ/0.0VG SOPN Inject 0.75 mg into the skin once a week 9/13/22   Dorian Murphy MD   pregabalin (LYRICA) 75 MG capsule Take 1 capsule by mouth 3 times daily for 90 days.  9/13/22 12/12/22  Dorian Murphy MD   Elastic Bandages & Supports (ANKLE BRACE ADJUST-TO-FIT) MISC Soft left ankle brace  Size to fit  Dx: Ankle sprain 9/13/22   Alexandra Pro MD   amitriptyline (ELAVIL) 50 MG tablet TAKE 1 TABLET BY MOUTH EVERY EVENING 9/6/22   Ian Hunter MD   celecoxib (CELEBREX) 100 MG capsule Take 1 capsule by mouth daily 9/6/22   Ian Hunter MD   citalopram (CELEXA) 40 MG tablet Take 1 tablet by mouth daily 9/6/22   Ian Hunter MD   docusate sodium (COLACE) 100 MG capsule Take 1 capsule by mouth 2 times daily 9/6/22   Ian Hunter MD   levothyroxine (SYNTHROID) 112 MCG tablet Take 1 tablet by mouth Daily 9/6/22   Ian Hunter MD   ammonium lactate (LAC-HYDRIN) 12 % lotion Apply topically twice daily 6/13/22   Denise Grajeda DPM   calcium-cholecalciferol (CALCIUM 500/D) 500-200 MG-UNIT per tablet TAKE 1 TABLET BY MOUTH DAILY 6/1/22   Leigh Velásquez MD   rosuvastatin (CRESTOR) 20 MG tablet Take 1 tablet by mouth daily 6/1/22   Leigh Velásquez MD   mineral oil-hydrophilic petrolatum (HYDROPHOR) ointment Apply topically as needed. 6/1/22   Leigh Velásquez MD   Calcium Polycarbophil (FIBER) 625 MG TABS Take 1 tablet by mouth 2 times daily  Patient not taking: No sig reported 12/15/21   Ian Hunter MD   OXYGEN Inhale into the lungs Uses 2 liters at night    Historical Provider, MD   aspirin 81 MG tablet Take 1 tablet by mouth daily 1/31/20   Ema Phipps MD   Misc. Devices MISC Oxygen concentrator  j44.9 10/26/18   Dante Badillo DO       Allergies:    Aceon [perindopril erbumine] and Nsaids    Social History:    reports that she quit smoking about 2 years ago. Her smoking use included cigarettes. She started smoking about 51 years ago. She has a 100.00 pack-year smoking history. She has never used smokeless tobacco. She reports that she does not drink alcohol and does not use drugs.       Family History:   family history includes Arthritis in her brother, maternal grandfather, and maternal grandmother; Cancer in her father, maternal uncle, mother, and paternal aunt; Diabetes in her brother, father, maternal grandmother, and mother; Heart Disease in her paternal grandfather; Heart Disease (age of onset: 79) in her mother; High Blood Pressure in her father, maternal grandmother, paternal aunt, and paternal uncle; High Cholesterol in her paternal grandmother; Hypertension in her father; Kidney Disease in her paternal grandmother; Stroke in her maternal grandfather. Reviewed and updated with pt    PHYSICAL EXAM:  Vitals:  BP (!) 167/89   Pulse 77   Temp 98.3 °F (36.8 °C) (Oral)   Resp 16   Wt 220 lb (99.8 kg)   SpO2 98%   BMI 35.51 kg/m²     General Appearance: alert and oriented to person, place and time and in no acute distress  Skin: warm and dry  Head: normocephalic and atraumatic  Eyes: pupils equal, round, and reactive to light, extraocular eye movements intact, conjunctivae normal  Neck: neck supple and non tender without mass   Pulmonary/Chest: clear to auscultation bilaterally- no wheezes, rales or rhonchi, normal air movement, no respiratory distress  Cardiovascular: normal rate, normal S1 and S2 and no RGM  Abdomen: soft, non-tender, non-distended, normal bowel sounds  Extremities: no cyanosis, no clubbing and no edema  Neurologic: no cranial nerve deficit and speech normal    LABS:  Recent Labs     12/15/22  1600 12/17/22  0502    140   K 3.5 3.6    103   CO2 27 28   BUN 9 15   CREATININE 0.6 0.7   GLUCOSE 107* 153*   CALCIUM 9.4 9.2       Recent Labs     12/15/22  1600 12/17/22  0502   WBC 8.2 8.4   RBC 3.45* 3.40*   HGB 9.8* 9.6*   HCT 31.5* 31.8*   MCV 91.3 93.5   MCH 28.4 28.2   MCHC 31.1* 30.2*   RDW 14.2 14.2   * 635*   MPV 9.8 9.5       No results for input(s): POCGLU in the last 72 hours.         Radiology: XR CHEST PORTABLE    Result Date: 12/17/2022  EXAMINATION: ONE XRAY VIEW OF THE CHEST 12/17/2022 5:06 am COMPARISON: December 15, 2022 HISTORY: ORDERING SYSTEM PROVIDED HISTORY: SOB TECHNOLOGIST PROVIDED HISTORY: Reason for exam:->SOB FINDINGS: Normal cardiomediastinal silhouette. Lungs clear. No pneumothorax or effusion. Osseous thorax intact. No acute disease. RECOMMENDATION: Careful clinical correlation and follow up recommended. EKG: Hypertension per ED physician:    ASSESSMENT:      Principal Problem:    Acute respiratory failure with hypoxia ([address]  Active Problems:    Primary hypertension    Hypothyroidism  Resolved Problems:    * No resolved hospital problems. *      PLAN:    Acute respiratory failure with hypoxia-complaints of shortness of breath beginning approximately 5 days prior to presentation. Chronically wears 2L NC at night. 12/15 CT chest showed no PE mild bilateral interstitial pulmonary edema. Pneumonitis right upper lobe/right middle lobe. She is currently requiring 2LNC. Continue to wean as tolerated maintain SPO2>92%. Check procalcitonin. Respiratory panel. Follows with Dr. Robe Booker. Pulmonology input appreciated. ? Pneumonia/pneumonitis-11/25 CXR concerning for new pneumonia. She was DC'd home in stable condition prescribed Omnicef 300 mg 2 times daily x7 days doxycycline 100 mg 2 times daily x7 days. We will check procalcitonin. Received ceftriaxone/doxycycline in ED course. Sputum culture. RML Nodules-12/15 CTA chest New indeterminate subcentimeter subpleural nodules RML. Be related to metastatic disease. Pulmonology/oncology input appreciated. COPD-received 125 mg IV Solu-Medrol/DuoNeb's in ED course. We will continue and defer steroid taper to pulmonology. Pulmonology input appreciated. EDWARD-CPAP  Anxiety/depression-continue home regimen  GERD-PPI  HTN-continue home regimen  HLD-continue statin therapy  Hx Multiple Myeloma  Hypothyroid- Continue levothyroxine        Code Status: Full  DVT prophylaxis: Lovenox    NOTE: This report was transcribed using voice recognition software. Every effort was made to ensure accuracy; however, inadvertent computerized transcription errors may be present.     In Review of EMR,  evaluation, management and diagnosis. Care plan has been discussed with attending. Time spent 42  minutes. Electronically signed by Angel Luis Mao CNP on 12/17/2022 at 8:02 AM

## 2022-12-17 NOTE — CONSULTS
Hyperlipidemia     Hypertension     Hypothyroidism     MGUS (monoclonal gammopathy of unknown significance)     Neuropathy     Pneumonia 02/2020    Prolonged emergence from general anesthesia     Sleep apnea     doesnt use her cpap    Type II or unspecified type diabetes mellitus without mention of complication, not stated as uncontrolled     Vaginal bleeding      Past Surgical History:      Procedure Laterality Date    ANESTHESIA NERVE BLOCK Bilateral 8/10/2020    BILATERAL L3 L4 MEDIAL BRANCH L5 DORSSAL RAMUS NERVE BLOCK (CPT 42571) SEDATION performed by Casandra Peter MD at [address]  07/20/2016    removed 3 polyps (tubular adenomas), diverticulosis, Dr. Kory Chavarria  2008    approximately 2008, no report available, per patient some polyps removed, Dr Kierra Mckinley, [facility]    COLONOSCOPY N/A 11/9/2020    Small sessile polyp distal ascending colon removed with bx/cauterized (path Tubular Adenoma), right and left diverticulosis without diverticulitis, Dr. Juliette Marquis, [facility]    COLONOSCOPY  11/9/2020    Small sessile polyp distal ascending colon removed with bx/cauterized (path Tubular Adenoma), right and left diverticulosis without diverticulitis, Dr. Juliette Marquis, [address] N/A 5/11/2021    DILATATION AND CURETTAGE HYSTEROSCOPY POSSIBLE REMOVAL OF MASS performed by Randolph Gutiérrez MD at [address], left knee, arthroscopic    NERVE BLOCK Bilateral 09/22/2016    lumbar transforaminal nerve block #1    NERVE BLOCK  07/06/2020    lumbar epidural steroid injectio L4-5    NERVE BLOCK Bilateral 08/10/2020    L3, L4, L5     OTHER SURGICAL HISTORY  12/13/2016    Excision cyst right face    PAIN MANAGEMENT PROCEDURE N/A 7/6/2020    LUMBAR EPIDURAL STEROID INJECTION L4-5 performed by Casandra Peter MD at [address]  2008 2008    [address] [facility] ENDOSCOPY  07/20/2016    GERD and gastric ulcers, Bx H pylori neg, Dr. Maddie Guzman      approximately 2008, no report, patient does not know the findings, Dr Lizandro Machuca, [address] [place] [address] Street N/A 2020    Severe gastritis with superficial ulcerations with bx neg H pylori, Dr. Gerardine Babinski, [facility]     Family History:  Family History   Problem Relation Age of Onset    Heart Disease Mother 79    Diabetes Mother     Cancer Mother         Breast    Hypertension Father     Cancer Father         Pancreas    Diabetes Father     High Blood Pressure Father     Arthritis Brother     Diabetes Brother     Cancer Maternal Uncle     Cancer Paternal Aunt         breast    High Blood Pressure Paternal Aunt     High Blood Pressure Paternal Uncle     Arthritis Maternal Grandmother     Diabetes Maternal Grandmother     High Blood Pressure Maternal Grandmother     Arthritis Maternal Grandfather     Stroke Maternal Grandfather     High Cholesterol Paternal Grandmother     Kidney Disease Paternal Grandmother     Heart Disease Paternal Grandfather      Medications:  Reviewed and reconciled.     Social History:  Social History     Socioeconomic History    Marital status: Single     Spouse name: Not on file    Number of children: Not on file    Years of education: Not on file    Highest education level: Not on file   Occupational History    Not on file   Tobacco Use    Smoking status: Former     Packs/day: 2.00     Years: 50.00     Pack years: 100.00     Types: Cigarettes     Start date: 7/15/1971     Quit date: 2/10/2020     Years since quittin.8    Smokeless tobacco: Never   Vaping Use    Vaping Use: Never used   Substance and Sexual Activity    Alcohol use: No     Alcohol/week: 0.0 standard drinks    Drug use: No    Sexual activity: Not on file   Other Topics Concern    Not on file   Social History Narrative    Not on file     Social Determinants of Health     Financial Resource Strain: Medium Risk    Difficulty of Paying Living Expenses: Somewhat hard   Food Insecurity: No Food Insecurity    Worried About Running Out of Food in the Last Year: Never true    Ran Out of Food in the Last Year: Never true   Transportation Needs: No Transportation Needs    Lack of Transportation (Medical): No    Lack of Transportation (Non-Medical): No   Physical Activity: Inactive    Days of Exercise per Week: 0 days    Minutes of Exercise per Session: 0 min   Stress: Not on file   Social Connections: Not on file   Intimate Partner Violence: Not on file   Housing Stability: Low Risk     Unable to Pay for Housing in the Last Year: No    Number of Places Lived in the Last Year: 1    Unstable Housing in the Last Year: No     Allergies: Allergies   Allergen Reactions    Aceon [Perindopril Erbumine] Anaphylaxis     Tongue swells up    Nsaids Shortness Of Breath and Swelling     tongue     Physical Exam:  BP (!) 163/95   Pulse 87   Temp 97.1 °F (36.2 °C) (Temporal)   Resp 20   Ht 5' 6\" (1.676 m)   Wt 220 lb (99.8 kg)   SpO2 96%   BMI 35.51 kg/m²   GENERAL: Alert, oriented x 3, tired  HEENT: PERRLA; EOMI. Oropharynx clear. NECK: Supple. Without lymphadenopathy. LUNGS: Good air entry bilaterally. No wheezing, crackles or ronchi. CARDIOVASCULAR: Regular rate. No murmurs, rubs or gallops. ABDOMEN: Soft. Non-tender, non-distended. EXTREMITIES: Without clubbing, cyanosis, or edema. NEUROLOGIC: No focal deficits.    ECOG PS 2    Lab Results   Component Value Date    WBC 8.4 12/17/2022    HGB 9.6 (L) 12/17/2022    HCT 31.8 (L) 12/17/2022    MCV 93.5 12/17/2022     (H) 12/17/2022     Lab Results   Component Value Date     12/17/2022    K 3.6 12/17/2022     12/17/2022    CO2 28 12/17/2022    BUN 15 12/17/2022    CREATININE 0.7 12/17/2022    GLUCOSE 153 (H) 12/17/2022    CALCIUM 9.2 12/17/2022    PROT 8.2 12/15/2022    LABALBU 3.3 (L) 12/15/2022    BILITOT 0.4 12/15/2022    ALKPHOS 135 (H) 12/15/2022    AST 39 (H) 12/15/2022    ALT 38 (H) 12/15/2022    LABGLOM >60 12/17/2022    GFRAA >60 06/23/2022     Impression/Plan:  60-year-old lady who was diagnosed with IgG lambda MGUS in 2008, used to follow up with Dr. Gurwinder Castañeda at [facility], last f/up with him was in 2012. She did not have a bone marrow biopsy and aspirate done when she was diagnosed with MGUS. SPEP with immunofixation revealed monoclonal IgG lambda, M-spike 0.7 gm/dl  from 9/9/2016, stable compared with the previous M-spike level. The patient does not have anemia, renal failure, hypercalcemia or lytic lesions on the lumbar spine MRI. IgG had increased sine 2014, skeletal survey was ordered, and is negative for lytic lesions    BM bx and aspirate on 11/28/2016, Clot and aspirate suboptimal, biopsy showing 15% plasma cells, Flow cytometry positive for a  lambda restricted plasma cell neoplasm, Cytogenetics revealing a normal female karyotype, MM FISH negative. 15% plasma cells in the bone marrow, no evidence of end organ damage, she has a smoldering multiple myeloma,risk of progression to MM, at a rate of 10 percent per year for the first five years, 3 percent per year for the next five years, and 1 to 2 percent per year for the following 10 years. SPEP with immunofixation had revealed IgG lambda monoclonal protein, and spike is 1G/DL, IgA 108, IgG 2/6/2008, had increased slightly, IgM 29, kappa 49.81 lambda 22.56, ratio is 2. 21. She does not have anemia, no hypercalcemia or kidney disease. The risk of progression to myeloma was discussed with the patient, Skeletal survey was done in September 2021, was negative for lytic lesions. Admitted for fatigue SOB and coughing. Pneumonia, on coverage for community-acquired pneumonia. Hypoxic respiratory failure, lung nodule, follows with pulmonary, have been consulted. On Singulair  On Doxy for 7 days. Continue steroids as prescribed.  Continue Nebulizer  Myeloma markers ordered   24hr UPEP  XR bone survey ordered to r/o lytic lesions    Thank you for allowing us to participate in the care of .  Danielle Uriostegui MD   12/17/2022

## 2022-12-17 NOTE — ED PROVIDER NOTES
[facility]  Department of Emergency Medicine     Written by: Micah Collins DO  Patient Name: Natalia Chaney  Attending Provider: Flaco Baer DO  Admit Date: 2022  3:43 AM  MRN: [id number]                   : 1955        Chief Complaint   Patient presents with    Shortness of Breath     Dx with pneumonia, was supposed to be admitted from [facility] but couldn't stay, told them she would come back     - Chief complaint    Ms. Kira Mead is a 79year old female who presents to the ED due to shortness of breath. Patient states that she has been having progressively worsening shortness of breath and cough for the past several days. States that she was seen at [facility] yesterday and diagnosed with right-sided pneumonia and placed on oxygen when she was found to be hypoxic. Patient left AGAINST MEDICAL ADVICE as she had to attend her mother's  today. She says that she has had associated fever and chills with decreased appetite and energy. She has a mildly productive cough with chest tightness and generalized body aches. Patient symptoms have been constant since onset and progressively worsening. She states they are aggravated with any sort of exertion and relieved by nothing. She denies any chest pain, abdominal pain, bowel or urinary changes, headaches or vision changes. Review of Systems   Constitutional:  Positive for activity change, appetite change, chills, fatigue and fever. HENT:  Negative for congestion, rhinorrhea, sinus pressure, sinus pain and sneezing. Eyes:  Negative for pain and redness. Respiratory:  Positive for cough, chest tightness and shortness of breath. Cardiovascular:  Negative for chest pain, palpitations and leg swelling. Gastrointestinal:  Negative for abdominal pain, constipation, diarrhea, nausea and vomiting. Genitourinary:  Negative for dysuria, flank pain, frequency, hematuria and urgency.    Musculoskeletal: Positive for myalgias. Negative for arthralgias, back pain, gait problem and joint swelling. Skin:  Negative for rash. Neurological:  Positive for weakness. Negative for dizziness, syncope, light-headedness, numbness and headaches. Physical Exam  Constitutional:       General: She is not in acute distress. Appearance: Normal appearance. She is normal weight. She is not ill-appearing or toxic-appearing. HENT:      Head: Normocephalic and atraumatic. Right Ear: External ear normal.      Left Ear: External ear normal.      Mouth/Throat:      Mouth: Mucous membranes are moist.      Pharynx: Oropharynx is clear. Eyes:      Extraocular Movements: Extraocular movements intact. Conjunctiva/sclera: Conjunctivae normal.   Cardiovascular:      Rate and Rhythm: Normal rate and regular rhythm. Pulses: Normal pulses. Heart sounds: Normal heart sounds. Pulmonary:      Effort: Pulmonary effort is normal. No tachypnea or respiratory distress. Breath sounds: Decreased breath sounds and wheezing present. Abdominal:      General: Abdomen is flat. Bowel sounds are normal.      Palpations: Abdomen is soft. Tenderness: There is no abdominal tenderness. There is no guarding or rebound. Musculoskeletal:         General: Normal range of motion. Cervical back: Normal range of motion and neck supple. Right lower leg: No edema. Left lower leg: No edema. Skin:     General: Skin is warm and dry. Findings: No erythema or rash. Neurological:      General: No focal deficit present. Mental Status: She is alert and oriented to person, place, and time. Mental status is at baseline. Motor: No weakness.    Psychiatric:         Mood and Affect: Mood normal.         Behavior: Behavior normal.        Procedures       MDM  Number of Diagnoses or Management Options  Acute respiratory failure with hypoxia (HCC)  Pneumonia due to infectious organism, unspecified laterality, unspecified part of lung  Diagnosis management comments: This is a 79year old female who presents to the ED due to shortness of breath. Upon patient's arrival she was in the high 80s low 90s on room air and placed on 2 L via nasal cannula. CBC revealed mild anemia with hemoglobin 9.6. BMP was benign within normal limits. Initial troponin was 11 with a repeat of 10. She tested negative for both COVID and flu in the ED. BNP was mildly elevated at 520. Chest x-ray revealed no acute disease. She was given a DuoNeb treatment and steroids in the ED for suspected COPD exacerbation. She was also started on doxycycline and Rocephin for possible pneumonia. She will be admitted for further work-up and treatment by Dr. Isai Quezada at this time. --------------------------------------------- PAST HISTORY ---------------------------------------------  Past Medical History:  has a past medical history of Adrenal incidentaloma ([facility] [address] Anxiety, Arthritis, Asthma, Bladder incontinence, Cancer ([facility] [address] Chronic back pain, COPD (chronic obstructive pulmonary disease) ([facility] [address] Depression, Fibromyalgia, GERD (gastroesophageal reflux disease), Hyperlipidemia, Hypertension, Hypothyroidism, MGUS (monoclonal gammopathy of unknown significance), Neuropathy, Pneumonia, Prolonged emergence from general anesthesia, Sleep apnea, Type II or unspecified type diabetes mellitus without mention of complication, not stated as uncontrolled, and Vaginal bleeding. Past Surgical History:  has a past surgical history that includes knee surgery (1980); Upper gastrointestinal endoscopy (2008); Colonoscopy (07/20/2016); Upper gastrointestinal endoscopy (07/20/2016); Nerve Block (Bilateral, 09/22/2016); Nerve Block (07/06/2020); Pain management procedure (N/A, 7/6/2020); Nerve Block (Bilateral, 08/10/2020); Anesthesia Nerve Block (Bilateral, 8/10/2020); other surgical history (12/13/2016); Colonoscopy (2008);  Upper gastrointestinal endoscopy (2008); Upper gastrointestinal endoscopy (N/A, 11/9/2020); Colonoscopy (N/A, 11/9/2020); Colonoscopy (11/9/2020); and Dilation and curettage of uterus (N/A, 5/11/2021). Social History:  reports that she quit smoking about 2 years ago. Her smoking use included cigarettes. She started smoking about 51 years ago. She has a 100.00 pack-year smoking history. She has never used smokeless tobacco. She reports that she does not drink alcohol and does not use drugs. Family History: family history includes Arthritis in her brother, maternal grandfather, and maternal grandmother; Cancer in her father, maternal uncle, mother, and paternal aunt; Diabetes in her brother, father, maternal grandmother, and mother; Heart Disease in her paternal grandfather; Heart Disease (age of onset: 79) in her mother; High Blood Pressure in her father, maternal grandmother, paternal aunt, and paternal uncle; High Cholesterol in her paternal grandmother; Hypertension in her father; Kidney Disease in her paternal grandmother; Stroke in her maternal grandfather. The patients home medications have been reviewed.     Allergies: Aceon [perindopril erbumine] and Nsaids    -------------------------------------------------- RESULTS -------------------------------------------------    LABS:  Results for orders placed or performed during the hospital encounter of 12/17/22   COVID-19, Rapid    Specimen: Nares   Result Value Ref Range    SARS-CoV-2, NAAT Not Detected Not Detected   Rapid influenza A/B antigens    Specimen: Nares   Result Value Ref Range    Influenza A by PCR Not Detected Not Detected    Influenza B by PCR Not Detected Not Detected   CBC with Auto Differential   Result Value Ref Range    WBC 8.4 4.5 - 11.5 E9/L    RBC 3.40 (L) 3.50 - 5.50 E12/L    Hemoglobin 9.6 (L) 11.5 - 15.5 g/dL    Hematocrit 31.8 (L) 34.0 - 48.0 %    MCV 93.5 80.0 - 99.9 fL    MCH 28.2 26.0 - 35.0 pg    MCHC 30.2 (L) 32.0 - 34.5 %    RDW 14.2 11.5 - 15.0 fL Platelets 822 (H) 489 - 450 E9/L    MPV 9.5 7.0 - 12.0 fL    Neutrophils % 48.5 43.0 - 80.0 %    Immature Granulocytes % 0.4 0.0 - 5.0 %    Lymphocytes % 39.9 20.0 - 42.0 %    Monocytes % 8.3 2.0 - 12.0 %    Eosinophils % 2.5 0.0 - 6.0 %    Basophils % 0.4 0.0 - 2.0 %    Neutrophils Absolute 4.09 1.80 - 7.30 E9/L    Immature Granulocytes # 0.03 E9/L    Lymphocytes Absolute 3.36 1.50 - 4.00 E9/L    Monocytes Absolute 0.70 0.10 - 0.95 E9/L    Eosinophils Absolute 0.21 0.05 - 0.50 E9/L    Basophils Absolute 0.03 0.00 - 0.20 E9/L   BMP   Result Value Ref Range    Sodium 140 132 - 146 mmol/L    Potassium 3.6 3.5 - 5.0 mmol/L    Chloride 103 98 - 107 mmol/L    CO2 28 22 - 29 mmol/L    Anion Gap 9 7 - 16 mmol/L    Glucose 153 (H) 74 - 99 mg/dL    BUN 15 6 - 23 mg/dL    Creatinine 0.7 0.5 - 1.0 mg/dL    Est, Glom Filt Rate >60 >=60 mL/min/1.73    Calcium 9.2 8.6 - 10.2 mg/dL   Troponin   Result Value Ref Range    Troponin, High Sensitivity 11 (H) 0 - 9 ng/L   Brain Natriuretic Peptide   Result Value Ref Range    Pro- (H) 0 - 125 pg/mL   Troponin   Result Value Ref Range    Troponin, High Sensitivity 10 (H) 0 - 9 ng/L   Blood Gas, Arterial   Result Value Ref Range    Date Analyzed 20221217     Time Analyzed 1033     Source: Blood Arterial     pH, Blood Gas 7.377 7.350 - 7.450    PCO2 48.6 (H) 35.0 - 45.0 mmHg    PO2 69.7 (L) 75.0 - 100.0 mmHg    HCO3 27.9 (H) 22.0 - 26.0 mmol/L    B.E. 2.2 -3.0 - 3.0 mmol/L    O2 Sat 93.6 92.0 - 98.5 %    O2Hb 92.5 (L) 94.0 - 97.0 %    COHb 0.9 0.0 - 1.5 %    MetHb 0.3 0.0 - 1.5 %    O2 Content 14.1 mL/dL    HHb 6.3 (H) 0.0 - 5.0 %    tHb (est) 10.8 (L) 11.5 - 16.5 g/dL    Mode NC-  2L     Date Of Collection      Time Collected      Pt Temp 37.0 C     ID 1943     Lab [id number]     Critical(s) Notified .  No Critical Values    EKG 12 Lead   Result Value Ref Range    Ventricular Rate 78 BPM    Atrial Rate 78 BPM    P-R Interval 172 ms    QRS Duration 92 ms    Q-T Interval 428 ms QTc Calculation (Bazett) 487 ms    P Axis 49 degrees    R Axis -13 degrees    T Axis 9 degrees       RADIOLOGY:  XR CHEST PORTABLE   Final Result   No acute disease. RECOMMENDATION:   Careful clinical correlation and follow up recommended. EKG:  This EKG is signed and interpreted by me. Rate: 78  Rhythm: Sinus and few PACs  Interpretation: non-specific EKG  Comparison: stable as compared to patient's most recent EKG      ------------------------- NURSING NOTES AND VITALS REVIEWED ---------------------------  Date / Time Roomed:  12/17/2022  3:43 AM  ED Bed Assignment:  [id number]    The nursing notes within the ED encounter and vital signs as below have been reviewed. Patient Vitals for the past 24 hrs:   BP Temp Temp src Pulse Resp SpO2 Height Weight   12/17/22 1421 (!) 163/95 97.1 °F (36.2 °C) Temporal 87 20 96 % -- --   12/17/22 0845 -- -- -- -- -- -- 5' 6\" (1.676 m) 220 lb (99.8 kg)   12/17/22 0830 (!) 162/90 97.9 °F (36.6 °C) Oral 85 18 95 % -- --   12/17/22 0807 -- -- -- 78 17 -- -- --   12/17/22 0806 -- -- -- 72 16 -- -- --   12/17/22 0805 -- -- -- 72 15 -- -- --   12/17/22 0627 (!) 167/89 98.3 °F (36.8 °C) Oral 77 16 98 % -- --   12/17/22 0525 (!) 140/84 -- -- 82 22 94 % -- --   12/16/22 2205 -- 98.3 °F (36.8 °C) -- 91 18 91 % -- 220 lb (99.8 kg)       Oxygen Saturation Interpretation: Abnormal and Improved after treatment    ------------------------------------------ PROGRESS NOTES ------------------------------------------  Re-evaluation(s):  Time: 0600  Patients symptoms show no change  Repeat physical examination is not changed    Counseling:  I have spoken with the patient and discussed todays results, in addition to providing specific details for the plan of care and counseling regarding the diagnosis and prognosis.   Their questions are answered at this time and they are agreeable with the plan of admission.    --------------------------------- ADDITIONAL PROVIDER NOTES ---------------------------------  Consultations:  Time: 7180. Spoke with Dr. Angely Briggs. Discussed case. They will admit the patient. This patient's ED course included: a personal history and physicial examination, re-evaluation prior to disposition, multiple bedside re-evaluations, IV medications, cardiac monitoring, and continuous pulse oximetry    This patient has remained hemodynamically stable during their ED course. Diagnosis:  1. Acute respiratory failure with hypoxia ([address]    2. Pneumonia due to infectious organism, unspecified laterality, unspecified part of lung        Disposition:  Patient's disposition: Admit to telemetry  Patient's condition is stable. Patient was seen and evaluated by myself and my attending Arcadio Edge DO. Assessment and Plan discussed with attending provider, please see attestation for final plan of care.      DO Niurka Childers DO  Resident  12/17/22 1464

## 2022-12-17 NOTE — CONSULTS
Pulmonary Consultation    Admit Date: 2022  Requesting Physician: Bradley Navarro MD    CC:  hypoxia, pneumonia, COPD    HPI:      79 YOF, known to Dr. Giovani Koroma, [facility], however has not been to office since before  and records were unable to be obtained. States she has asthma/copd overlap was on inhalers but now just takes albuterol as needed. She has EDWARD and non-compliant no longer has machine. She does wer oxygen at 2L continuous at night. She was last seen here previous admission  with COPD exacerbation and was to follow up in office with an outpatient sleep study, but she states she never had that done or followed through. Presents to the ED 12/15 with increasing shortness of breath for one week with recent travel to [place]. Sister was sick with similar symptoms. Cta was negative for PE, but found to have new mild pneumonia RUL and RML. Patient was not admitted to the hospital at that time as she wanted to come back after her mother's .    Presents back to the hospital  and was admitted.  Pulmonary consulted for acute hypoxia respiratory failure and pneumonia. Seen and examined resting in bed on 2L nc sats 96%. States she is short of breath and coughing and wheezing. Multiple family members at bedside.     PMH:    Past Medical History:   Diagnosis Date    Adrenal incidentaloma ([address]     Anxiety     Arthritis     Asthma     Bladder incontinence     Cancer [facility]) Dx     multiple Myeloma, in remission currently     Chronic back pain     COPD (chronic obstructive pulmonary disease) (HCC)     on O2 2 liters  (uses with activity and at night to sleep)    Depression     Fibromyalgia     GERD (gastroesophageal reflux disease)     Hyperlipidemia     Hypertension     Hypothyroidism     MGUS (monoclonal gammopathy of unknown significance)     Neuropathy     Pneumonia 2020    Prolonged emergence from general anesthesia     Sleep apnea     doesnt use her cpap    Type II or Severe gastritis with superficial ulcerations with bx neg H pylori, Dr. Gerardine Babinski, [facility]          Respiratory ROS: Shortness of breath, cough, wheeze. Otherwise, a complete review of systems is undertaken and is negative. Social History:  Social History     Socioeconomic History    Marital status: Single     Spouse name: Not on file    Number of children: Not on file    Years of education: Not on file    Highest education level: Not on file   Occupational History    Not on file   Tobacco Use    Smoking status: Former     Packs/day: 2.00     Years: 50.00     Pack years: 100.00     Types: Cigarettes     Start date: 7/15/1971     Quit date: 2/10/2020     Years since quittin.8    Smokeless tobacco: Never   Vaping Use    Vaping Use: Never used   Substance and Sexual Activity    Alcohol use: No     Alcohol/week: 0.0 standard drinks    Drug use: No    Sexual activity: Not on file   Other Topics Concern    Not on file   Social History Narrative    Not on file     Social Determinants of Health     Financial Resource Strain: Medium Risk    Difficulty of Paying Living Expenses: Somewhat hard   Food Insecurity: No Food Insecurity    Worried About Running Out of Food in the Last Year: Never true    Ran Out of Food in the Last Year: Never true   Transportation Needs: No Transportation Needs    Lack of Transportation (Medical): No    Lack of Transportation (Non-Medical):  No   Physical Activity: Inactive    Days of Exercise per Week: 0 days    Minutes of Exercise per Session: 0 min   Stress: Not on file   Social Connections: Not on file   Intimate Partner Violence: Not on file   Housing Stability: Low Risk     Unable to Pay for Housing in the Last Year: No    Number of Places Lived in the Last Year: 1    Unstable Housing in the Last Year: No       Family History:  Family History   Problem Relation Age of Onset    Heart Disease Mother 79    Diabetes Mother     Cancer Mother         Breast    Hypertension Father     Cancer Father Pancreas    Diabetes Father     High Blood Pressure Father     Arthritis Brother     Diabetes Brother     Cancer Maternal Uncle     Cancer Paternal Aunt         breast    High Blood Pressure Paternal Aunt     High Blood Pressure Paternal Uncle     Arthritis Maternal Grandmother     Diabetes Maternal Grandmother     High Blood Pressure Maternal Grandmother     Arthritis Maternal Grandfather     Stroke Maternal Grandfather     High Cholesterol Paternal Grandmother     Kidney Disease Paternal Grandmother     Heart Disease Paternal Grandfather        Medications:     sodium chloride        amitriptyline  50 mg Oral Nightly    citalopram  20 mg Oral Daily    docusate sodium  100 mg Oral BID    montelukast  10 mg Oral Nightly    [START ON 2022] pantoprazole  40 mg Oral QAM AC    pregabalin  75 mg Oral TID    rosuvastatin  20 mg Oral Daily    losartan  50 mg Oral Daily    sodium chloride flush  5-40 mL IntraVENous 2 times per day    enoxaparin  40 mg SubCUTAneous Daily    methylPREDNISolone  40 mg IntraVENous Q12H         Vitals:    VITALS:  BP (!) 163/95   Pulse 87   Temp 97.1 °F (36.2 °C) (Temporal)   Resp 20   Ht 5' 6\" (1.676 m)   Wt 220 lb (99.8 kg)   SpO2 96%   BMI 35.51 kg/m²   24HR INTAKE/OUTPUT:    Intake/Output Summary (Last 24 hours) at 2022 1607  Last data filed at 2022 0738  Gross per 24 hour   Intake 100 ml   Output --   Net 100 ml     CURRENT PULSE OXIMETRY:  SpO2: 96 %  24HR PULSE OXIMETRY RANGE:  SpO2  Av.8 %  Min: 91 %  Max: 98 %      EXAM:  General: No distress. On 2L nc. Eyes: PERRL. No sclera icterus. No conjunctival injection. ENT: No discharge. Pharynx clear. Neck: Trachea midline. Normal thyroid. Resp: No accessory muscle use. No crackles. Scattered inspiratory/expiratory wheezing. No rhonchi. CV: Regular rate. Regular rhythm. No mumur or rub. ABD: Non-tender. Non-distended. No masses. No organmegaly. Normal bowel sounds. Skin: Warm and dry.  No nodule on exposed extremities. No rash on exposed extremities. Lymph: No cervical LAD. No supraclavicular LAD. Ext: No joint deformity. No clubbing. No cyanosis. No edema  Neuro: Awake. Follows commands      Lab Results:  CBC:   Recent Labs     12/15/22  1600 12/17/22  0502   WBC 8.2 8.4   HGB 9.8* 9.6*   HCT 31.5* 31.8*   MCV 91.3 93.5   * 635*     BMP:   Recent Labs     12/15/22  1600 12/17/22  0502    140   K 3.5 3.6    103   CO2 27 28   BUN 9 15   CREATININE 0.6 0.7     LFT:   Recent Labs     12/15/22  1600   ALKPHOS 135*   ALT 38*   AST 39*   PROT 8.2   BILITOT 0.4   LABALBU 3.3*     PT/INR: No results for input(s): PROTIME, INR in the last 72 hours. Cultures:  No results for input(s): CULTRESP in the last 72 hours. ABG:   Recent Labs     12/17/22  1033   PH 7.377   PO2 69.7*   PCO2 48.6*   HCO3 27.9*   BE 2.2   O2SAT 93.6       Films:  XR CHEST PORTABLE    Result Date: 12/17/2022  EXAMINATION: ONE XRAY VIEW OF THE CHEST 12/17/2022 5:06 am COMPARISON: December 15, 2022 HISTORY: ORDERING SYSTEM PROVIDED HISTORY: SOB TECHNOLOGIST PROVIDED HISTORY: Reason for exam:->SOB FINDINGS: Normal cardiomediastinal silhouette. Lungs clear. No pneumothorax or effusion. Osseous thorax intact. No acute disease. RECOMMENDATION: Careful clinical correlation and follow up recommended.          Assessment:  Acute exacerbation COPD  RUL/RML pneumonia   Hypoxia  EDWARD-non compliant        Plan:  Maintain POX >90%, currently on 2L which is her baseline  Start brovana, budesonide and albuterol  Solumedrol 40 q8 hours  On singuair   On doxy oral x 7 days , one dose rocephin in ER   Check a procal whole resp panel, strep legionella and strep antigens,  Send a sputum cx if able   IS for pulmonary hygiene  Will need outpatient PFT and PSG in order to obtain new pap  Will benefit from outpatient pulm rehab and regular follow up   Supportive care            Electronically signed by BRYAN Chong on 12/17/2022 at 4:07 PM

## 2022-12-17 NOTE — LETTER
[facility] [address]  [address] [facility] [address]  Phone: [phone]         December 21, 2022     Patient: Baron Hale   YOB: 1955   Date of Visit: 12/16/2022       To Whom It May Concern:    Patient was admitted to hospital 12/17/22. She is currently still admitted as of 12/21/22.     Sincerely,        PAM Ruiz        Signature:__________________________________

## 2022-12-17 NOTE — ED NOTES
Pt refused covid and flu tests, stated \"they were negative yesterday. \"     [facility], RN  12/17/22 3248

## 2022-12-18 ENCOUNTER — APPOINTMENT (OUTPATIENT)
Dept: ULTRASOUND IMAGING | Age: 67
End: 2022-12-18
Payer: COMMERCIAL

## 2022-12-18 PROBLEM — J44.1 COPD EXACERBATION (HCC): Status: ACTIVE | Noted: 2022-12-18

## 2022-12-18 PROBLEM — J15.9 BACTERIAL PNEUMONIA: Status: ACTIVE | Noted: 2022-12-18

## 2022-12-18 PROBLEM — R79.89 ELEVATED PLATELET COUNT: Status: ACTIVE | Noted: 2022-12-18

## 2022-12-18 LAB
ANION GAP SERPL CALCULATED.3IONS-SCNC: 8 MMOL/L (ref 7–16)
BASOPHILS ABSOLUTE: 0.01 E9/L (ref 0–0.2)
BASOPHILS RELATIVE PERCENT: 0.1 % (ref 0–2)
BUN BLDV-MCNC: 21 MG/DL (ref 6–23)
CALCIUM SERPL-MCNC: 9.4 MG/DL (ref 8.6–10.2)
CHLORIDE BLD-SCNC: 103 MMOL/L (ref 98–107)
CO2: 27 MMOL/L (ref 22–29)
CREAT SERPL-MCNC: 0.8 MG/DL (ref 0.5–1)
EKG ATRIAL RATE: 78 BPM
EKG P AXIS: 49 DEGREES
EKG P-R INTERVAL: 172 MS
EKG Q-T INTERVAL: 428 MS
EKG QRS DURATION: 92 MS
EKG QTC CALCULATION (BAZETT): 487 MS
EKG R AXIS: -13 DEGREES
EKG T AXIS: 9 DEGREES
EKG VENTRICULAR RATE: 78 BPM
EOSINOPHILS ABSOLUTE: 0 E9/L (ref 0.05–0.5)
EOSINOPHILS RELATIVE PERCENT: 0 % (ref 0–6)
FERRITIN: 69 NG/ML
GFR SERPL CREATININE-BSD FRML MDRD: >60 ML/MIN/1.73
GLUCOSE BLD-MCNC: 308 MG/DL (ref 74–99)
HCT VFR BLD CALC: 32.3 % (ref 34–48)
HEMOGLOBIN: 9.7 G/DL (ref 11.5–15.5)
IMMATURE GRANULOCYTES #: 0.05 E9/L
IMMATURE GRANULOCYTES %: 0.5 % (ref 0–5)
IRON SATURATION: 19 % (ref 15–50)
IRON: 49 MCG/DL (ref 37–145)
LYMPHOCYTES ABSOLUTE: 1.12 E9/L (ref 1.5–4)
LYMPHOCYTES RELATIVE PERCENT: 11.9 % (ref 20–42)
MCH RBC QN AUTO: 28.3 PG (ref 26–35)
MCHC RBC AUTO-ENTMCNC: 30 % (ref 32–34.5)
MCV RBC AUTO: 94.2 FL (ref 80–99.9)
MONOCYTES ABSOLUTE: 0.4 E9/L (ref 0.1–0.95)
MONOCYTES RELATIVE PERCENT: 4.3 % (ref 2–12)
NEUTROPHILS ABSOLUTE: 7.81 E9/L (ref 1.8–7.3)
NEUTROPHILS RELATIVE PERCENT: 83.2 % (ref 43–80)
PDW BLD-RTO: 13.9 FL (ref 11.5–15)
PLATELET # BLD: 639 E9/L (ref 130–450)
PMV BLD AUTO: 9.5 FL (ref 7–12)
POTASSIUM REFLEX MAGNESIUM: 4.7 MMOL/L (ref 3.5–5)
RBC # BLD: 3.43 E12/L (ref 3.5–5.5)
SODIUM BLD-SCNC: 138 MMOL/L (ref 132–146)
TOTAL IRON BINDING CAPACITY: 263 MCG/DL (ref 250–450)
WBC # BLD: 9.4 E9/L (ref 4.5–11.5)

## 2022-12-18 PROCEDURE — 86334 IMMUNOFIX E-PHORESIS SERUM: CPT

## 2022-12-18 PROCEDURE — 6370000000 HC RX 637 (ALT 250 FOR IP): Performed by: INTERNAL MEDICINE

## 2022-12-18 PROCEDURE — 87449 NOS EACH ORGANISM AG IA: CPT

## 2022-12-18 PROCEDURE — 36415 COLL VENOUS BLD VENIPUNCTURE: CPT

## 2022-12-18 PROCEDURE — 93010 ELECTROCARDIOGRAM REPORT: CPT | Performed by: INTERNAL MEDICINE

## 2022-12-18 PROCEDURE — 80048 BASIC METABOLIC PNL TOTAL CA: CPT

## 2022-12-18 PROCEDURE — 94640 AIRWAY INHALATION TREATMENT: CPT

## 2022-12-18 PROCEDURE — 6360000002 HC RX W HCPCS: Performed by: CLINICAL NURSE SPECIALIST

## 2022-12-18 PROCEDURE — 2580000003 HC RX 258: Performed by: NURSE PRACTITIONER

## 2022-12-18 PROCEDURE — 99232 SBSQ HOSP IP/OBS MODERATE 35: CPT | Performed by: INTERNAL MEDICINE

## 2022-12-18 PROCEDURE — 83540 ASSAY OF IRON: CPT

## 2022-12-18 PROCEDURE — 83550 IRON BINDING TEST: CPT

## 2022-12-18 PROCEDURE — 6360000002 HC RX W HCPCS: Performed by: NURSE PRACTITIONER

## 2022-12-18 PROCEDURE — 82728 ASSAY OF FERRITIN: CPT

## 2022-12-18 PROCEDURE — 82232 ASSAY OF BETA-2 PROTEIN: CPT

## 2022-12-18 PROCEDURE — 2060000000 HC ICU INTERMEDIATE R&B

## 2022-12-18 PROCEDURE — 6370000000 HC RX 637 (ALT 250 FOR IP): Performed by: CLINICAL NURSE SPECIALIST

## 2022-12-18 PROCEDURE — 84165 PROTEIN E-PHORESIS SERUM: CPT

## 2022-12-18 PROCEDURE — 2700000000 HC OXYGEN THERAPY PER DAY

## 2022-12-18 PROCEDURE — 82784 ASSAY IGA/IGD/IGG/IGM EACH: CPT

## 2022-12-18 PROCEDURE — 93970 EXTREMITY STUDY: CPT

## 2022-12-18 PROCEDURE — 6370000000 HC RX 637 (ALT 250 FOR IP): Performed by: NURSE PRACTITIONER

## 2022-12-18 PROCEDURE — 85025 COMPLETE CBC W/AUTO DIFF WBC: CPT

## 2022-12-18 RX ORDER — AMLODIPINE BESYLATE 2.5 MG/1
2.5 TABLET ORAL DAILY
Status: DISCONTINUED | OUTPATIENT
Start: 2022-12-18 | End: 2022-12-23 | Stop reason: HOSPADM

## 2022-12-18 RX ADMIN — ROSUVASTATIN CALCIUM 20 MG: 20 TABLET, FILM COATED ORAL at 08:05

## 2022-12-18 RX ADMIN — CITALOPRAM HYDROBROMIDE 20 MG: 20 TABLET ORAL at 08:06

## 2022-12-18 RX ADMIN — BUDESONIDE 500 MCG: 0.5 SUSPENSION RESPIRATORY (INHALATION) at 20:48

## 2022-12-18 RX ADMIN — METHYLPREDNISOLONE SODIUM SUCCINATE 40 MG: 40 INJECTION, POWDER, LYOPHILIZED, FOR SOLUTION INTRAMUSCULAR; INTRAVENOUS at 08:06

## 2022-12-18 RX ADMIN — PREGABALIN 75 MG: 75 CAPSULE ORAL at 08:05

## 2022-12-18 RX ADMIN — PANTOPRAZOLE SODIUM 40 MG: 40 TABLET, DELAYED RELEASE ORAL at 07:01

## 2022-12-18 RX ADMIN — BUDESONIDE 500 MCG: 0.5 SUSPENSION RESPIRATORY (INHALATION) at 08:35

## 2022-12-18 RX ADMIN — MONTELUKAST SODIUM 10 MG: 10 TABLET ORAL at 20:40

## 2022-12-18 RX ADMIN — DOCUSATE SODIUM 100 MG: 100 CAPSULE, LIQUID FILLED ORAL at 20:40

## 2022-12-18 RX ADMIN — PREGABALIN 75 MG: 75 CAPSULE ORAL at 14:04

## 2022-12-18 RX ADMIN — LOSARTAN POTASSIUM 50 MG: 50 TABLET, FILM COATED ORAL at 08:06

## 2022-12-18 RX ADMIN — DOCUSATE SODIUM 100 MG: 100 CAPSULE, LIQUID FILLED ORAL at 08:05

## 2022-12-18 RX ADMIN — ARFORMOTEROL TARTRATE 15 MCG: 15 SOLUTION RESPIRATORY (INHALATION) at 20:48

## 2022-12-18 RX ADMIN — AMLODIPINE BESYLATE 2.5 MG: 2.5 TABLET ORAL at 20:40

## 2022-12-18 RX ADMIN — SODIUM CHLORIDE, PRESERVATIVE FREE 10 ML: 5 INJECTION INTRAVENOUS at 08:06

## 2022-12-18 RX ADMIN — AMITRIPTYLINE HYDROCHLORIDE 50 MG: 25 TABLET, FILM COATED ORAL at 20:40

## 2022-12-18 RX ADMIN — DOXYCYCLINE HYCLATE 100 MG: 100 CAPSULE ORAL at 20:40

## 2022-12-18 RX ADMIN — DOXYCYCLINE HYCLATE 100 MG: 100 CAPSULE ORAL at 08:05

## 2022-12-18 RX ADMIN — ARFORMOTEROL TARTRATE 15 MCG: 15 SOLUTION RESPIRATORY (INHALATION) at 08:35

## 2022-12-18 RX ADMIN — ENOXAPARIN SODIUM 40 MG: 100 INJECTION SUBCUTANEOUS at 11:46

## 2022-12-18 RX ADMIN — METHYLPREDNISOLONE SODIUM SUCCINATE 40 MG: 40 INJECTION, POWDER, LYOPHILIZED, FOR SOLUTION INTRAMUSCULAR; INTRAVENOUS at 16:11

## 2022-12-18 RX ADMIN — METHYLPREDNISOLONE SODIUM SUCCINATE 40 MG: 40 INJECTION, POWDER, LYOPHILIZED, FOR SOLUTION INTRAMUSCULAR; INTRAVENOUS at 02:17

## 2022-12-18 RX ADMIN — PREGABALIN 75 MG: 75 CAPSULE ORAL at 20:40

## 2022-12-18 NOTE — PROGRESS NOTES
[facility] Progress Note    Admitting Date and Time: 12/17/2022  3:43 AM  Admit Dx: Acute respiratory failure with hypoxia (HCC) [J96.01]    Subjective:  Patient is being followed for Acute respiratory failure with hypoxia ([address] [J96.01]     Seen at bedside. Awake and alert. Afebrile. Tolerating medications, no side effects. She relates a pain behind her lungs \"around my heart\" stating she notices it beats fast a lot. Denies chest pain. Does have SOB with exertion even during conversation. C/o nonproductive harsh cough. ROS: denies fever, chills, cp, sob, n/v, HA unless stated above.       amitriptyline  50 mg Oral Nightly    citalopram  20 mg Oral Daily    docusate sodium  100 mg Oral BID    montelukast  10 mg Oral Nightly    pantoprazole  40 mg Oral QAM AC    pregabalin  75 mg Oral TID    rosuvastatin  20 mg Oral Daily    losartan  50 mg Oral Daily    sodium chloride flush  5-40 mL IntraVENous 2 times per day    enoxaparin  40 mg SubCUTAneous Daily    methylPREDNISolone  40 mg IntraVENous Q8H    Arformoterol Tartrate  15 mcg Nebulization BID    budesonide  0.5 mg Nebulization BID    doxycycline hyclate  100 mg Oral 2 times per day     baclofen, 10 mg, TID PRN  ipratropium-albuterol, 1 ampule, Q15 Min PRN  sodium chloride flush, 5-40 mL, PRN  sodium chloride, , PRN  ondansetron, 4 mg, Q8H PRN   Or  ondansetron, 4 mg, Q6H PRN  polyethylene glycol, 17 g, Daily PRN  acetaminophen, 650 mg, Q6H PRN   Or  acetaminophen, 650 mg, Q6H PRN  guaiFENesin-dextromethorphan, 5 mL, Q4H PRN         Objective:    BP (!) 167/83   Pulse 67   Temp 97.8 °F (36.6 °C) (Oral)   Resp 18   Ht 5' 6\" (1.676 m)   Wt 220 lb (99.8 kg)   SpO2 97%   BMI 35.51 kg/m²     General Appearance: alert and oriented to person, place and time and in no acute distress - inc BMI   Skin: warm and dry  Head: normocephalic and atraumatic  Eyes: pupils equal, round, and reactive to light, extraocular eye movements intact, conjunctivae normal  Neck: neck supple and non tender without mass   Pulmonary/Chest: diminished to auscultation bilaterally- no wheezes, rales or rhonchi, normal air movement, no respiratory distress +4 L via NC   Cardiovascular: normal rate, normal S1 and S2 and no carotid bruits  Abdomen: soft, non-tender, non-distended, normal bowel sounds, no masses or organomegaly  Extremities: no cyanosis, no clubbing and no edema  Neurologic: no cranial nerve deficit and speech normal  Peripheral     Recent Labs     12/15/22  1600 12/17/22  0502 12/18/22  0503    140 138   K 3.5 3.6 4.7    103 103   CO2 27 28 27   BUN 9 15 21   CREATININE 0.6 0.7 0.8   GLUCOSE 107* 153* 308*   CALCIUM 9.4 9.2 9.4       Recent Labs     12/15/22  1600 12/17/22  0502 12/18/22  0503   WBC 8.2 8.4 9.4   RBC 3.45* 3.40* 3.43*   HGB 9.8* 9.6* 9.7*   HCT 31.5* 31.8* 32.3*   MCV 91.3 93.5 94.2   MCH 28.4 28.2 28.3   MCHC 31.1* 30.2* 30.0*   RDW 14.2 14.2 13.9   * 635* 639*   MPV 9.8 9.5 9.5       Radiology: reviewed     Assessment:    Principal Problem:    Acute respiratory failure with hypoxia (HCC)  Active Problems:    Primary hypertension    Hypothyroidism  Resolved Problems:    * No resolved hospital problems. *    Plan:  Acute respiratory failure with hypoxia-complaints of shortness of breath beginning approximately 5 days prior to presentation. Chronically wears 2L NC at night. 12/15 CT chest showed no PE mild bilateral interstitial pulmonary edema. Pneumonitis right upper lobe/right middle lobe. d dimer 467. She is currently requiring 4 LNC. Continue to wean as tolerated maintain SPO2>92%. PCT 0.14. Respiratory panel pending. Follows with Dr. Andrew Vazquez. Pulmonology input appreciated. Continue brovana, budesonide, and albuterol. Urine Ag pending. Will obtain echo and US to rule out DVT and rule out cardiac involvement. ?Pneumonia/pneumonitis-11/25 CXR concerning for new pneumonia.   She was [address] home in stable condition prescribed Omnicef 300 mg 2 times daily x7 days doxycycline 100 mg 2 times daily x7 days. PCT 0.14 (falsely elevated due to multiple myeloma?). trivial right lung pneumonia as per pulmonary. Continue on Doxycycline x 7 days, ceftriaxone x 1 in ER. Follow sputum cultures. Continue steroids 40 Q8 - wean as per pulmonary. Will need OP PFT, PSG, pulmonary rehab and routine follow up. RML Nodules-12/15 CTA chest New indeterminate subcentimeter subpleural nodules RML. Related to metastatic disease. Acute exacerbation of COPD-received 125 mg IV Solu-Medrol/DuoNeb's in ED course. Now on 40 IV Q8, wean as per pulmonary. Pulmonary edema on CT.   EDWARD-CPAP  Anxiety/depression-continue home regimen  GERD-PPI  HTN-continue home regimen  HLD-continue statin therapy  Hx Multiple Myeloma - oncology on   Hypothyroid- Continue levothyroxine    CODE: FULL   DVT prophylaxis: Lovenox     28 minutes time spent reviewing patient chart, assessing patient, discussing plan of care with patient and family, discussing plan of care with collaborating physician, and charting. NOTE: This report was transcribed using voice recognition software. Every effort was made to ensure accuracy; however, inadvertent computerized transcription errors may be present. Electronically signed by BRYAN Canseco NP on 12/18/2022 at 10:31 AM   HOSPITALIST ATTENDING PHYSICIAN NOTE 12/18/2022 1901PM:    Details of the evaluation - subjective assessment (including medication profile, past medical, family and social history when applicable), examination, review of lab and test data, diagnostic impressions and medical decision making - performed by BRYAN Canseco NP, were discussed with me on the date of service and I agree with clinical information herein unless otherwise noted. The patient has been evaluated by me personally earlier today.   Pt reports no fevers, chills,n/v.     Exam: heart reg at rate of 64, lungs cta, abd pos bs soft nt, ext neg for le edema    I agree with the assessment and plan of Rodger Garcia APRN - NP    Copd exacerbation  Bacterial pneumonia  Htn  Elevated plts  MM  Hypothyroidism  Hyperlipidemia  Htn  Gerd  Depression  Andrew  Chronic respiratory failure with hypoxia with possible acute(there was some documentation that pt had been high 80's in ER)      Electronically signed by Rema Ferguson D.O.   Hospitalist  [facility]

## 2022-12-18 NOTE — PROGRESS NOTES
Pulmonary Progress Note    Admit Date: 2022                            PCP: Gaby Ann MD  Principal Problem:    Acute respiratory failure with hypoxia ([address]  Active Problems:    Primary hypertension    Hypothyroidism  Resolved Problems:    * No resolved hospital problems. *      Subjective:  Seen resting in bed on 2L nc sats 97%. Still dyspneic with cough. Medications:   sodium chloride          amitriptyline  50 mg Oral Nightly    citalopram  20 mg Oral Daily    docusate sodium  100 mg Oral BID    montelukast  10 mg Oral Nightly    pantoprazole  40 mg Oral QAM AC    pregabalin  75 mg Oral TID    rosuvastatin  20 mg Oral Daily    losartan  50 mg Oral Daily    sodium chloride flush  5-40 mL IntraVENous 2 times per day    enoxaparin  40 mg SubCUTAneous Daily    methylPREDNISolone  40 mg IntraVENous Q8H    Arformoterol Tartrate  15 mcg Nebulization BID    budesonide  0.5 mg Nebulization BID    doxycycline hyclate  100 mg Oral 2 times per day       Vitals:  VITALS:  BP (!) 167/83   Pulse 67   Temp 97.8 °F (36.6 °C) (Oral)   Resp 18   Ht 5' 6\" (1.676 m)   Wt 220 lb (99.8 kg)   SpO2 97%   BMI 35.51 kg/m²   24HR INTAKE/OUTPUT:    Intake/Output Summary (Last 24 hours) at 2022 1222  Last data filed at 2022 0817  Gross per 24 hour   Intake --   Output 1200 ml   Net -1200 ml     CURRENT PULSE OXIMETRY:  SpO2: 97 %  24HR PULSE OXIMETRY RANGE:  SpO2  Av.3 %  Min: 96 %  Max: 99 %  CVP:    VENT SETTINGS:      Additional Respiratory Assessments  Heart Rate: 67  Resp: 18  SpO2: 97 %      EXAM:  General: Alert, in NAD, on 2L nc  ENT: No discharge. Pharynx clear. membranes moist   Neck: Supple, Trachea midline. Resp: No accessory muscle use. Non-labored. Lungs clear with No crackles. No wheezing. No rhonchi. Diminished. CV: Regular rate. Regular rhythm. No murmur . No edema. ABD: Non-tender. Non-distended. Soft, round . Normal bowel sounds. Skin: Warm and dry. M/S: No cyanosis.  No joint deformity. No clubbing. Neuro: Awake. Follows commands. O x 3, SIMMONS  Psych:  calm and interactive     I/O: I/O last 3 completed shifts: In: 100 [IV Piggyback:100]  Out: 500 [Urine:500]  I/O this shift:  In: -   Out: 700 [Urine:700]     Results:  CBC:   Recent Labs     12/15/22  1600 12/17/22  0502 12/18/22  0503   WBC 8.2 8.4 9.4   HGB 9.8* 9.6* 9.7*   HCT 31.5* 31.8* 32.3*   MCV 91.3 93.5 94.2   * 635* 639*     BMP:   Recent Labs     12/15/22  1600 12/17/22  0502 12/18/22  0503    140 138   K 3.5 3.6 4.7    103 103   CO2 27 28 27   BUN 9 15 21   CREATININE 0.6 0.7 0.8     LFT:   Recent Labs     12/15/22  1600   ALKPHOS 135*   ALT 38*   AST 39*   PROT 8.2   BILITOT 0.4   LABALBU 3.3*     PT/INR: No results for input(s): PROTIME, INR in the last 72 hours. Procalcitonin:    Latest Reference Range & Units 12/17/22 07:53   Procalcitonin 0.00 - 0.08 ng/mL 0.14 (H)   (H): Data is abnormally high  Recent Labs     12/17/22  0753   PROCAL 0.14*     Cultures:   Latest Reference Range & Units 12/15/22 16:00 12/17/22 10:46   INFLUENZA A NOT DETECTED  NOT DETECTED    INFLUENZA B NOT DETECTED  NOT DETECTED    RAPID INFLUENZA A/B ANTIGENS   Rpt   Influenza A by PCR Not Detected   Not Detected   Influenza B by PCR Not Detected   Not Detected   SARS-CoV-2 RNA, RT PCR NOT DETECTED  NOT DETECTED    SARS-CoV-2, NAAT Not Detected   Not Detected   Rpt: View report in Results Review for more information      ABG:   Recent Labs     12/17/22  1033   PH 7.377   PO2 69.7*   PCO2 48.6*   HCO3 27.9*   BE 2.2   O2SAT 93.6       Films:  XR BONE SURVEY COMPLETE    Result Date: 12/18/2022  EXAMINATION: COMPLETE BONE SURVEY 12/17/2022 8:55 pm COMPARISON: None. HISTORY: ORDERING SYSTEM PROVIDED HISTORY: r/o lytic lesions TECHNOLOGIST PROVIDED HISTORY: Reason for exam:->r/o lytic lesions FINDINGS: No aggressive or suspicious osseous lesions.   Moderate lower lumbar facet degenerative change in moderate bilateral hip joint degenerative change. No aggressive or suspicious osseous lesion. RECOMMENDATION: Careful clinical correlation and follow up recommended. XR CHEST PORTABLE    Result Date: 12/17/2022  EXAMINATION: ONE XRAY VIEW OF THE CHEST 12/17/2022 5:06 am COMPARISON: December 15, 2022 HISTORY: ORDERING SYSTEM PROVIDED HISTORY: SOB TECHNOLOGIST PROVIDED HISTORY: Reason for exam:->SOB FINDINGS: Normal cardiomediastinal silhouette. Lungs clear. No pneumothorax or effusion. Osseous thorax intact. No acute disease. RECOMMENDATION: Careful clinical correlation and follow up recommended. Assessment:  Acute exacerbation COPD  RUL/RML pneumonia   Hypoxia  EDWARD-non compliant        Plan:  Maintain POX >90%, currently on 2L which is her baseline  Start brovana, budesonide and albuterol  Continue Solumedrol 40 q8 hours, hope to wean tomorrow   On singulair   On doxy oral x 7 days , one dose rocephin in ER   12/17 procal 0.14, resp panel pending, strep/legionella antigens pending. Sputum cx  pending  IS for pulmonary hygiene  Will need outpatient PFT and PSG in order to obtain new pap  Will benefit from outpatient pulm rehab and regular follow up            Electronically signed by BRYAN Wong NP on 12/18/2022 at 12:22 PM       Seen and examined, agree with above. Acute exac of copd doing better with just slight wheezing. Continue steroids and nebs. On doxy for possible pneumonia and will continue as is.

## 2022-12-19 PROBLEM — J18.9 PNEUMONIA DUE TO INFECTIOUS ORGANISM: Status: ACTIVE | Noted: 2022-12-18

## 2022-12-19 PROBLEM — D64.9 NORMOCYTIC ANEMIA: Status: ACTIVE | Noted: 2022-12-19

## 2022-12-19 PROBLEM — D47.2 SMOLDERING MYELOMA: Status: ACTIVE | Noted: 2022-12-19

## 2022-12-19 LAB
ALBUMIN SERPL-MCNC: 2.4 G/DL (ref 3.5–4.7)
ALPHA-1-GLOBULIN: 0.3 G/DL (ref 0.2–0.4)
ALPHA-2-GLOBULIN: 1.3 G/DL (ref 0.5–1)
ANION GAP SERPL CALCULATED.3IONS-SCNC: 7 MMOL/L (ref 7–16)
BASOPHILS ABSOLUTE: 0 E9/L (ref 0–0.2)
BASOPHILS RELATIVE PERCENT: 0 % (ref 0–2)
BETA GLOBULIN: 1.1 G/DL (ref 0.8–1.3)
BUN BLDV-MCNC: 18 MG/DL (ref 6–23)
CALCIUM SERPL-MCNC: 9.6 MG/DL (ref 8.6–10.2)
CHLORIDE BLD-SCNC: 101 MMOL/L (ref 98–107)
CO2: 30 MMOL/L (ref 22–29)
CREAT SERPL-MCNC: 0.6 MG/DL (ref 0.5–1)
ELECTROPHORESIS: ABNORMAL
EOSINOPHILS ABSOLUTE: 0.01 E9/L (ref 0.05–0.5)
EOSINOPHILS RELATIVE PERCENT: 0.1 % (ref 0–6)
GAMMA GLOBULIN: 2.1 G/DL (ref 0.7–1.6)
GFR SERPL CREATININE-BSD FRML MDRD: >60 ML/MIN/1.73
GLUCOSE BLD-MCNC: 375 MG/DL (ref 74–99)
HCT VFR BLD CALC: 34 % (ref 34–48)
HEMOGLOBIN: 9.9 G/DL (ref 11.5–15.5)
IGA: 127 MG/DL (ref 70–400)
IGG: 2159 MG/DL (ref 700–1600)
IGM: 39 MG/DL (ref 40–230)
IMMATURE GRANULOCYTES #: 0.09 E9/L
IMMATURE GRANULOCYTES %: 0.9 % (ref 0–5)
IMMUNOFIXATION RESULT, SERUM: NORMAL
L. PNEUMOPHILA SEROGP 1 UR AG: NORMAL
LYMPHOCYTES ABSOLUTE: 1.2 E9/L (ref 1.5–4)
LYMPHOCYTES RELATIVE PERCENT: 12.2 % (ref 20–42)
MCH RBC QN AUTO: 27.2 PG (ref 26–35)
MCHC RBC AUTO-ENTMCNC: 29.1 % (ref 32–34.5)
MCV RBC AUTO: 93.4 FL (ref 80–99.9)
METER GLUCOSE: 349 MG/DL (ref 74–99)
METER GLUCOSE: 416 MG/DL (ref 74–99)
METER GLUCOSE: 480 MG/DL (ref 74–99)
MONOCYTES ABSOLUTE: 0.23 E9/L (ref 0.1–0.95)
MONOCYTES RELATIVE PERCENT: 2.3 % (ref 2–12)
NEUTROPHILS ABSOLUTE: 8.32 E9/L (ref 1.8–7.3)
NEUTROPHILS RELATIVE PERCENT: 84.5 % (ref 43–80)
PDW BLD-RTO: 13.8 FL (ref 11.5–15)
PLATELET # BLD: 625 E9/L (ref 130–450)
PMV BLD AUTO: 10.1 FL (ref 7–12)
POTASSIUM REFLEX MAGNESIUM: 4.1 MMOL/L (ref 3.5–5)
RBC # BLD: 3.64 E12/L (ref 3.5–5.5)
SODIUM BLD-SCNC: 138 MMOL/L (ref 132–146)
STREP PNEUMONIAE ANTIGEN, URINE: NORMAL
TOTAL PROTEIN: 7.2 G/DL (ref 6.4–8.3)
WBC # BLD: 9.9 E9/L (ref 4.5–11.5)

## 2022-12-19 PROCEDURE — 85025 COMPLETE CBC W/AUTO DIFF WBC: CPT

## 2022-12-19 PROCEDURE — 94640 AIRWAY INHALATION TREATMENT: CPT

## 2022-12-19 PROCEDURE — 99232 SBSQ HOSP IP/OBS MODERATE 35: CPT | Performed by: STUDENT IN AN ORGANIZED HEALTH CARE EDUCATION/TRAINING PROGRAM

## 2022-12-19 PROCEDURE — 83883 ASSAY NEPHELOMETRY NOT SPEC: CPT

## 2022-12-19 PROCEDURE — 6370000000 HC RX 637 (ALT 250 FOR IP): Performed by: INTERNAL MEDICINE

## 2022-12-19 PROCEDURE — 36415 COLL VENOUS BLD VENIPUNCTURE: CPT

## 2022-12-19 PROCEDURE — 6360000002 HC RX W HCPCS: Performed by: CLINICAL NURSE SPECIALIST

## 2022-12-19 PROCEDURE — 6370000000 HC RX 637 (ALT 250 FOR IP): Performed by: NURSE PRACTITIONER

## 2022-12-19 PROCEDURE — 80048 BASIC METABOLIC PNL TOTAL CA: CPT

## 2022-12-19 PROCEDURE — 2700000000 HC OXYGEN THERAPY PER DAY

## 2022-12-19 PROCEDURE — 99233 SBSQ HOSP IP/OBS HIGH 50: CPT | Performed by: STUDENT IN AN ORGANIZED HEALTH CARE EDUCATION/TRAINING PROGRAM

## 2022-12-19 PROCEDURE — 87070 CULTURE OTHR SPECIMN AEROBIC: CPT

## 2022-12-19 PROCEDURE — 6370000000 HC RX 637 (ALT 250 FOR IP): Performed by: CLINICAL NURSE SPECIALIST

## 2022-12-19 PROCEDURE — 86334 IMMUNOFIX E-PHORESIS SERUM: CPT

## 2022-12-19 PROCEDURE — 84166 PROTEIN E-PHORESIS/URINE/CSF: CPT

## 2022-12-19 PROCEDURE — 6360000002 HC RX W HCPCS: Performed by: NURSE PRACTITIONER

## 2022-12-19 PROCEDURE — 93306 TTE W/DOPPLER COMPLETE: CPT

## 2022-12-19 PROCEDURE — 82962 GLUCOSE BLOOD TEST: CPT

## 2022-12-19 PROCEDURE — 2580000003 HC RX 258: Performed by: NURSE PRACTITIONER

## 2022-12-19 PROCEDURE — 2060000000 HC ICU INTERMEDIATE R&B

## 2022-12-19 PROCEDURE — 6370000000 HC RX 637 (ALT 250 FOR IP): Performed by: STUDENT IN AN ORGANIZED HEALTH CARE EDUCATION/TRAINING PROGRAM

## 2022-12-19 RX ORDER — DEXTROSE MONOHYDRATE 100 MG/ML
INJECTION, SOLUTION INTRAVENOUS CONTINUOUS PRN
Status: DISCONTINUED | OUTPATIENT
Start: 2022-12-19 | End: 2022-12-21 | Stop reason: SDUPTHER

## 2022-12-19 RX ORDER — INSULIN LISPRO 100 [IU]/ML
8 INJECTION, SOLUTION INTRAVENOUS; SUBCUTANEOUS ONCE
Status: COMPLETED | OUTPATIENT
Start: 2022-12-19 | End: 2022-12-19

## 2022-12-19 RX ORDER — INSULIN LISPRO 100 [IU]/ML
0-4 INJECTION, SOLUTION INTRAVENOUS; SUBCUTANEOUS
Status: DISCONTINUED | OUTPATIENT
Start: 2022-12-19 | End: 2022-12-20

## 2022-12-19 RX ORDER — INSULIN LISPRO 100 [IU]/ML
0-4 INJECTION, SOLUTION INTRAVENOUS; SUBCUTANEOUS NIGHTLY
Status: DISCONTINUED | OUTPATIENT
Start: 2022-12-19 | End: 2022-12-20

## 2022-12-19 RX ADMIN — METHYLPREDNISOLONE SODIUM SUCCINATE 40 MG: 40 INJECTION, POWDER, LYOPHILIZED, FOR SOLUTION INTRAMUSCULAR; INTRAVENOUS at 23:28

## 2022-12-19 RX ADMIN — SODIUM CHLORIDE, PRESERVATIVE FREE 10 ML: 5 INJECTION INTRAVENOUS at 07:57

## 2022-12-19 RX ADMIN — NYSTATIN 500000 UNITS: 500000 SUSPENSION ORAL at 20:10

## 2022-12-19 RX ADMIN — METHYLPREDNISOLONE SODIUM SUCCINATE 40 MG: 40 INJECTION, POWDER, LYOPHILIZED, FOR SOLUTION INTRAMUSCULAR; INTRAVENOUS at 16:11

## 2022-12-19 RX ADMIN — INSULIN LISPRO 4 UNITS: 100 INJECTION, SOLUTION INTRAVENOUS; SUBCUTANEOUS at 11:56

## 2022-12-19 RX ADMIN — PREGABALIN 75 MG: 75 CAPSULE ORAL at 07:57

## 2022-12-19 RX ADMIN — METHYLPREDNISOLONE SODIUM SUCCINATE 40 MG: 40 INJECTION, POWDER, LYOPHILIZED, FOR SOLUTION INTRAMUSCULAR; INTRAVENOUS at 07:57

## 2022-12-19 RX ADMIN — METHYLPREDNISOLONE SODIUM SUCCINATE 40 MG: 40 INJECTION, POWDER, LYOPHILIZED, FOR SOLUTION INTRAMUSCULAR; INTRAVENOUS at 01:49

## 2022-12-19 RX ADMIN — MONTELUKAST SODIUM 10 MG: 10 TABLET ORAL at 20:09

## 2022-12-19 RX ADMIN — DOCUSATE SODIUM 100 MG: 100 CAPSULE, LIQUID FILLED ORAL at 20:10

## 2022-12-19 RX ADMIN — DOXYCYCLINE HYCLATE 100 MG: 100 CAPSULE ORAL at 20:09

## 2022-12-19 RX ADMIN — INSULIN LISPRO 4 UNITS: 100 INJECTION, SOLUTION INTRAVENOUS; SUBCUTANEOUS at 20:11

## 2022-12-19 RX ADMIN — ACETAMINOPHEN 650 MG: 325 TABLET ORAL at 23:32

## 2022-12-19 RX ADMIN — BUDESONIDE 500 MCG: 0.5 SUSPENSION RESPIRATORY (INHALATION) at 21:25

## 2022-12-19 RX ADMIN — ROSUVASTATIN CALCIUM 20 MG: 20 TABLET, FILM COATED ORAL at 07:57

## 2022-12-19 RX ADMIN — PANTOPRAZOLE SODIUM 40 MG: 40 TABLET, DELAYED RELEASE ORAL at 06:25

## 2022-12-19 RX ADMIN — CITALOPRAM HYDROBROMIDE 20 MG: 20 TABLET ORAL at 07:57

## 2022-12-19 RX ADMIN — PREGABALIN 75 MG: 75 CAPSULE ORAL at 13:51

## 2022-12-19 RX ADMIN — BUDESONIDE 500 MCG: 0.5 SUSPENSION RESPIRATORY (INHALATION) at 09:31

## 2022-12-19 RX ADMIN — INSULIN LISPRO 4 UNITS: 100 INJECTION, SOLUTION INTRAVENOUS; SUBCUTANEOUS at 16:10

## 2022-12-19 RX ADMIN — PREGABALIN 75 MG: 75 CAPSULE ORAL at 20:09

## 2022-12-19 RX ADMIN — ARFORMOTEROL TARTRATE 15 MCG: 15 SOLUTION RESPIRATORY (INHALATION) at 09:31

## 2022-12-19 RX ADMIN — DOCUSATE SODIUM 100 MG: 100 CAPSULE, LIQUID FILLED ORAL at 07:57

## 2022-12-19 RX ADMIN — AMLODIPINE BESYLATE 2.5 MG: 2.5 TABLET ORAL at 07:57

## 2022-12-19 RX ADMIN — DOXYCYCLINE HYCLATE 100 MG: 100 CAPSULE ORAL at 07:57

## 2022-12-19 RX ADMIN — SODIUM CHLORIDE, PRESERVATIVE FREE 10 ML: 5 INJECTION INTRAVENOUS at 01:50

## 2022-12-19 RX ADMIN — AMITRIPTYLINE HYDROCHLORIDE 50 MG: 25 TABLET, FILM COATED ORAL at 20:09

## 2022-12-19 RX ADMIN — ARFORMOTEROL TARTRATE 15 MCG: 15 SOLUTION RESPIRATORY (INHALATION) at 21:25

## 2022-12-19 RX ADMIN — ENOXAPARIN SODIUM 40 MG: 100 INJECTION SUBCUTANEOUS at 11:41

## 2022-12-19 RX ADMIN — INSULIN LISPRO 8 UNITS: 100 INJECTION, SOLUTION INTRAVENOUS; SUBCUTANEOUS at 16:11

## 2022-12-19 RX ADMIN — LOSARTAN POTASSIUM 50 MG: 50 TABLET, FILM COATED ORAL at 07:57

## 2022-12-19 RX ADMIN — SODIUM CHLORIDE, PRESERVATIVE FREE 10 ML: 5 INJECTION INTRAVENOUS at 20:10

## 2022-12-19 ASSESSMENT — PAIN SCALES - GENERAL: PAINLEVEL_OUTOF10: 2

## 2022-12-19 NOTE — PROGRESS NOTES
Inpatient Hematology/Oncology Progress Note    Subjective:   SOB improved. No fever, chills. Fatigue. Fair appetite. No nausea  LE edema    Objective:  BP (!) 173/104   Pulse 78   Temp 98.3 °F (36.8 °C) (Oral)   Resp 18   Ht 5' 6\" (1.676 m)   Wt 220 lb (99.8 kg)   SpO2 97%   BMI 35.51 kg/m²   GENERAL: Alert, oriented x 3, tired  HEENT: PERRLA; EOMI. Oropharynx clear. NECK: Supple. Without lymphadenopathy. LUNGS: Good air entry bilaterally. No wheezing, crackles or ronchi. CARDIOVASCULAR: Regular rate. No murmurs, rubs or gallops. ABDOMEN: Soft. Non-tender, non-distended. EXTREMITIES: Without clubbing, cyanosis. LE edema. NEUROLOGIC: No focal deficits. ECOG PS 2    Lab Results   Component Value Date    WBC 9.4 12/18/2022    HGB 9.7 (L) 12/18/2022    HCT 32.3 (L) 12/18/2022    MCV 94.2 12/18/2022     (H) 12/18/2022     Lab Results   Component Value Date     12/18/2022    K 4.7 12/18/2022     12/18/2022    CO2 27 12/18/2022    BUN 21 12/18/2022    CREATININE 0.8 12/18/2022    GLUCOSE 308 (H) 12/18/2022    CALCIUM 9.4 12/18/2022    PROT 8.2 12/15/2022    LABALBU 3.3 (L) 12/15/2022    BILITOT 0.4 12/15/2022    ALKPHOS 135 (H) 12/15/2022    AST 39 (H) 12/15/2022    ALT 38 (H) 12/15/2022    LABGLOM >60 12/18/2022    GFRAA >60 06/23/2022     Impression/Plan:  66-year-old lady who was diagnosed with IgG lambda MGUS in 2008, used to follow up with Dr. Silvio Belcher at [facility], last f/up with him was in 2012. She did not have a bone marrow biopsy and aspirate done when she was diagnosed with MGUS. SPEP with immunofixation revealed monoclonal IgG lambda, M-spike 0.7 gm/dl  from 9/9/2016, stable compared with the previous M-spike level. The patient does not have anemia, renal failure, hypercalcemia or lytic lesions on the lumbar spine MRI.  IgG had increased sine 2014, skeletal survey was ordered, and is negative for lytic lesions    BM bx and aspirate on 11/28/2016, Clot and aspirate suboptimal, biopsy showing 15% plasma cells, Flow cytometry positive for a  lambda restricted plasma cell neoplasm, Cytogenetics revealing a normal female karyotype, MM FISH negative. 15% plasma cells in the bone marrow, no evidence of end organ damage, she has a smoldering multiple myeloma,risk of progression to MM, at a rate of 10 percent per year for the first five years, 3 percent per year for the next five years, and 1 to 2 percent per year for the following 10 years. SPEP with immunofixation had revealed IgG lambda monoclonal protein, and spike is 1G/DL, IgA 108, IgG 2/6/2008, had increased slightly, IgM 29, kappa 49.81 lambda 22.56, ratio is 2. 21. She does not have anemia, no hypercalcemia or kidney disease. The risk of progression to myeloma was discussed with the patient, Skeletal survey was done in September 2021, was negative for lytic lesions. Admitted for fatigue SOB and coughing. Pneumonia, on coverage for community-acquired pneumonia. Hypoxic respiratory failure, lung nodule, follows with pulmonary, have been consulted. Start Brovana, budesonide, and albuterol  On Doxy for 7 days. One dose of rocephin in ER  Continue steroid solumedrol 40 q8h  LE edema; Sanya LE u/s noted no DVT. Imaging reviewed. Myeloma markers ordered   Hb 9.7 Hct 32.3  MCV 94.2  WBC 9.4    BUN 21  Creat 0.8  Ca 9.4  Fe 49 TIBC 263  FeSat 19% Ferritin 69  Labs reviewed. SPEP, SIFE, IgA IgG IgM, Kappa/Lambda FLC pending  24hr UPEP pending   XR bone survey 12/17/2022 noted no lytic lesions. Imaging reviewed.      Cristino Hitchcock MD   12/18/2022

## 2022-12-19 NOTE — PROGRESS NOTES
Inpatient Hematology/Oncology Progress Note    Subjective:   On O2 NC. Was asleep upon arrival but easy to awaken. Son was present at bedside. No new symptoms and otherwise appears in no acute distress. Objective:  BP (!) 167/76   Pulse 87   Temp 98.1 °F (36.7 °C) (Oral)   Resp 20   Ht 5' 6\" (1.676 m)   Wt 239 lb 8 oz (108.6 kg)   SpO2 98%   BMI 38.66 kg/m²   GENERAL: Alert, oriented x 3, tired  HEENT: EOMI. Oropharynx clear. NECK: Supple. Without lymphadenopathy. LUNGS: Good air entry bilaterally. No wheezing, crackles or ronchi. CARDIOVASCULAR: Regular rate. No murmurs, rubs or gallops. ABDOMEN: Soft. Non-tender, non-distended. EXTREMITIES: Without clubbing, cyanosis. LE edema bilaterally. NEUROLOGIC: No focal deficits. ECOG PS 2    Lab Results   Component Value Date    WBC 9.9 12/19/2022    HGB 9.9 (L) 12/19/2022    HCT 34.0 12/19/2022    MCV 93.4 12/19/2022     (H) 12/19/2022     Lab Results   Component Value Date     12/19/2022    K 4.1 12/19/2022     12/19/2022    CO2 30 (H) 12/19/2022    BUN 18 12/19/2022    CREATININE 0.6 12/19/2022    GLUCOSE 375 (H) 12/19/2022    CALCIUM 9.6 12/19/2022    PROT 8.2 12/15/2022    LABALBU 3.3 (L) 12/15/2022    BILITOT 0.4 12/15/2022    ALKPHOS 135 (H) 12/15/2022    AST 39 (H) 12/15/2022    ALT 38 (H) 12/15/2022    LABGLOM >60 12/19/2022    GFRAA >60 06/23/2022     Impression/Plan:  26-year-old lady who was diagnosed with IgG lambda MGUS in 2008, used to follow up with Dr. Luis Hsieh at [facility], last f/up with him was in 2012. She did not have a bone marrow biopsy and aspirate done when she was diagnosed with MGUS. SPEP with immunofixation revealed monoclonal IgG lambda, M-spike 0.7 gm/dl  from 9/9/2016, stable compared with the previous M-spike level. The patient does not have anemia, renal failure, hypercalcemia or lytic lesions on the lumbar spine MRI.  IgG had increased sine 2014, skeletal survey was ordered, and is negative for lytic lesions    BM bx and aspirate on 11/28/2016, Clot and aspirate suboptimal, biopsy showing 15% plasma cells, Flow cytometry positive for a  lambda restricted plasma cell neoplasm, Cytogenetics revealing a normal female karyotype, MM FISH negative. 15% plasma cells in the bone marrow, no evidence of end organ damage, she has a smoldering multiple myeloma,risk of progression to MM, at a rate of 10 percent per year for the first five years, 3 percent per year for the next five years, and 1 to 2 percent per year for the following 10 years. SPEP with immunofixation had revealed IgG lambda monoclonal protein, and spike is 1G/DL, IgA 108, IgG 2/6/2008, had increased slightly, IgM 29, kappa 49.81 lambda 22.56, ratio is 2. 21. She does not have anemia, no hypercalcemia or kidney disease. The risk of progression to myeloma was discussed with the patient, Skeletal survey was done in September 2021, was negative for lytic lesions. Admitted for fatigue SOB and coughing. Pneumonia, on coverage for community-acquired pneumonia. Hypoxic respiratory failure, lung nodule, follows with pulmonary, have been consulted. Start Brovana, budesonide, and albuterol  On Doxy for 7 days. One dose of rocephin in ER  Continue steroid solumedrol 40 q8h  LE edema; Sanya LE u/s noted no DVT. Imaging reviewed. Myeloma markers ordered   Hb 9.7 Hct 32.3  MCV 94.2  WBC 9.4    BUN 21  Creat 0.8  Ca 9.4  Fe 49 TIBC 263  FeSat 19% Ferritin 69  Labs reviewed. SPEP shows M spike 1.1 g/dL (previously 1.3 g/dL on 7/5/2022)  Serum immunofixation, redemonstrating IgG lambda monoclonal protein  Immunoglobulins have resolved   - IgA 127   - IgG 2159 (previously 2509 on 7/5/2022)   - IgM 39  [place]/Lambda FLC pending  24hr UPEP pending   XR bone survey 12/17/2022 noted no lytic lesions. Imaging reviewed. Patient is anemia. I have reviewed iron studies.  Will need to monitor CBCs closely once acute illness/issues resolve. Will will B12/folate levels  Will continue to update patient on myeloma panel as it results.     Katarzyna Collado MD  Medical Oncology  [facility] [place]  12/19/22 11:14 AM

## 2022-12-19 NOTE — PROGRESS NOTES
Pulmonary Progress Note    Admit Date: 2022                            PCP: Mckayla Puentes MD  Principal Problem:    Acute respiratory failure with hypoxia ([address]  Active Problems:    Primary hypertension    Pneumonia due to infectious organism    Elevated platelet count    COPD exacerbation (HCC)    Hypothyroidism  Resolved Problems:    * No resolved hospital problems. *      Subjective:  Seen resting in bed on 2L nc sats 97%. Still dyspneic with cough. Medications:   dextrose      sodium chloride          insulin lispro  0-4 Units SubCUTAneous TID WC    insulin lispro  0-4 Units SubCUTAneous Nightly    nystatin  5 mL Oral 4x Daily    amLODIPine  2.5 mg Oral Daily    amitriptyline  50 mg Oral Nightly    citalopram  20 mg Oral Daily    docusate sodium  100 mg Oral BID    montelukast  10 mg Oral Nightly    pantoprazole  40 mg Oral QAM AC    pregabalin  75 mg Oral TID    rosuvastatin  20 mg Oral Daily    losartan  50 mg Oral Daily    sodium chloride flush  5-40 mL IntraVENous 2 times per day    enoxaparin  40 mg SubCUTAneous Daily    methylPREDNISolone  40 mg IntraVENous Q8H    Arformoterol Tartrate  15 mcg Nebulization BID    budesonide  0.5 mg Nebulization BID    doxycycline hyclate  100 mg Oral 2 times per day       Vitals:  VITALS:  BP (!) 160/69   Pulse 75   Temp 98.5 °F (36.9 °C) (Oral)   Resp 18   Ht 5' 6\" (1.676 m)   Wt 239 lb 8 oz (108.6 kg)   SpO2 97%   BMI 38.66 kg/m²   24HR INTAKE/OUTPUT:    Intake/Output Summary (Last 24 hours) at 2022 1712  Last data filed at 2022 3822  Gross per 24 hour   Intake --   Output 2100 ml   Net -2100 ml       CURRENT PULSE OXIMETRY:  SpO2: 97 %  24HR PULSE OXIMETRY RANGE:  SpO2  Av.5 %  Min: 97 %  Max: 98 %  CVP:    VENT SETTINGS:      Additional Respiratory Assessments  Heart Rate: 75  Resp: 18  SpO2: 97 %      EXAM:  General: Alert, in NAD, on 2L nc  ENT: No discharge. Pharynx clear. membranes moist   Neck: Supple, Trachea midline.   Resp: No accessory muscle use. Non-labored. Lungs clear with No crackles. No wheezing. No rhonchi. Diminished. CV: Regular rate. Regular rhythm. No murmur . No edema. ABD: Non-tender. Non-distended. Soft, round . Normal bowel sounds. Skin: Warm and dry. M/S: No cyanosis. No joint deformity. No clubbing. Neuro: Awake. Follows commands. O x 3, SIMMONS  Psych:  calm and interactive     I/O: I/O last 3 completed shifts:  In: -   Out: 2800 [Urine:2800]  I/O this shift:  In: -   Out: 1200 [Urine:1200]     Results:  CBC:   Recent Labs     12/17/22  0502 12/18/22  0503 12/19/22  0755   WBC 8.4 9.4 9.9   HGB 9.6* 9.7* 9.9*   HCT 31.8* 32.3* 34.0   MCV 93.5 94.2 93.4   * 639* 625*       BMP:   Recent Labs     12/17/22  0502 12/18/22  0503 12/19/22  0755    138 138   K 3.6 4.7 4.1    103 101   CO2 28 27 30*   BUN 15 21 18   CREATININE 0.7 0.8 0.6       LFT:   Recent Labs     12/18/22  0503   PROT 7.2   LABALBU 2.4*       PT/INR: No results for input(s): PROTIME, INR in the last 72 hours. Procalcitonin:    Latest Reference Range & Units 12/17/22 07:53   Procalcitonin 0.00 - 0.08 ng/mL 0.14 (H)   (H): Data is abnormally high  Recent Labs     12/17/22  0753   PROCAL 0.14*       Cultures:   Latest Reference Range & Units 12/15/22 16:00 12/17/22 10:46   INFLUENZA A NOT DETECTED  NOT DETECTED    INFLUENZA B NOT DETECTED  NOT DETECTED    RAPID INFLUENZA A/B ANTIGENS   Rpt   Influenza A by PCR Not Detected   Not Detected   Influenza B by PCR Not Detected   Not Detected   SARS-CoV-2 RNA, RT PCR NOT DETECTED  NOT DETECTED    SARS-CoV-2, NAAT Not Detected   Not Detected   Rpt: View report in Results Review for more information      ABG:   Recent Labs     12/17/22  1033   PH 7.377   PO2 69.7*   PCO2 48.6*   HCO3 27.9*   BE 2.2   O2SAT 93.6         Films:  XR BONE SURVEY COMPLETE    Result Date: 12/18/2022  EXAMINATION: COMPLETE BONE SURVEY 12/17/2022 8:55 pm COMPARISON: None.  HISTORY: ORDERING SYSTEM PROVIDED HISTORY: r/o lytic lesions TECHNOLOGIST PROVIDED HISTORY: Reason for exam:->r/o lytic lesions FINDINGS: No aggressive or suspicious osseous lesions. Moderate lower lumbar facet degenerative change in moderate bilateral hip joint degenerative change. No aggressive or suspicious osseous lesion. RECOMMENDATION: Careful clinical correlation and follow up recommended. XR CHEST PORTABLE    Result Date: 12/17/2022  EXAMINATION: ONE XRAY VIEW OF THE CHEST 12/17/2022 5:06 am COMPARISON: December 15, 2022 HISTORY: ORDERING SYSTEM PROVIDED HISTORY: SOB TECHNOLOGIST PROVIDED HISTORY: Reason for exam:->SOB FINDINGS: Normal cardiomediastinal silhouette. Lungs clear. No pneumothorax or effusion. Osseous thorax intact. No acute disease. RECOMMENDATION: Careful clinical correlation and follow up recommended. Assessment:  Acute exacerbation COPD  RUL/RML pneumonia   Hypoxia  EDWARD-non compliant        Plan:  Maintain POX >90%, currently on 2L which is her baseline  Start brovana, budesonide and albuterol  Continue Solumedrol 40 q8 hours, hope to wean tomorrow   On singulair   On doxy oral x 7 days , one dose rocephin in ER   12/17 procal 0.14, resp panel pending, strep/legionella antigens pending.   Sputum cx  pending  IS for pulmonary hygiene  Will need outpatient PFT and PSG in order to obtain new pap  Will benefit from outpatient pulm rehab and regular follow up        - supportive care to continue at this time  - outpatient PFT, PSG    Electronically signed by Jacinto Dubose MD on 12/19/2022 at 5:12 PM

## 2022-12-19 NOTE — PROGRESS NOTES
[facility] Progress Note    Admitting Date and Time: 12/17/2022  3:43 AM  Admit Dx: Acute respiratory failure with hypoxia (HCC) [J96.01]    Subjective:  Patient is being followed for Acute respiratory failure with hypoxia ([address] [J96.01]     Ms. Tavia Yee is a 51-year-old female with past medical history significant for COPD, EDWARD on CPAP, anxiety, depression, hypertension, hyperlipidemia, multiple myeloma who presents with acute hypoxic respiratory failure requiring supplemental oxygen via nasal cannula, and an acute COPD exacerbation. No acute events overnight. Patient is receiving a breathing treatment this morning. She denies significant chest pain, shortness of breath, nausea, vomiting, headache. ROS: denies fever, chills, cp, sob, n/v, HA unless stated above.       amLODIPine  2.5 mg Oral Daily    amitriptyline  50 mg Oral Nightly    citalopram  20 mg Oral Daily    docusate sodium  100 mg Oral BID    montelukast  10 mg Oral Nightly    pantoprazole  40 mg Oral QAM AC    pregabalin  75 mg Oral TID    rosuvastatin  20 mg Oral Daily    losartan  50 mg Oral Daily    sodium chloride flush  5-40 mL IntraVENous 2 times per day    enoxaparin  40 mg SubCUTAneous Daily    methylPREDNISolone  40 mg IntraVENous Q8H    Arformoterol Tartrate  15 mcg Nebulization BID    budesonide  0.5 mg Nebulization BID    doxycycline hyclate  100 mg Oral 2 times per day     baclofen, 10 mg, TID PRN  ipratropium-albuterol, 1 ampule, Q15 Min PRN  sodium chloride flush, 5-40 mL, PRN  sodium chloride, , PRN  ondansetron, 4 mg, Q8H PRN   Or  ondansetron, 4 mg, Q6H PRN  polyethylene glycol, 17 g, Daily PRN  acetaminophen, 650 mg, Q6H PRN   Or  acetaminophen, 650 mg, Q6H PRN  guaiFENesin-dextromethorphan, 5 mL, Q4H PRN         Objective:    BP (!) 167/76   Pulse 87   Temp 98.1 °F (36.7 °C) (Oral)   Resp 20   Ht 5' 6\" (1.676 m)   Wt 239 lb 8 oz (108.6 kg)   SpO2 98%   BMI 38.66 kg/m²     General Appearance: alert and oriented to person, place and time and in no acute distress  Skin: warm and dry  Head: normocephalic and atraumatic  Eyes: pupils equal, round, and reactive to light, extraocular eye movements intact, conjunctivae normal  Neck: neck supple and non tender without mass   Pulmonary/Chest: Scattered wheezes appreciated  Cardiovascular: normal rate, normal S1 and S2 and no carotid bruits  Abdomen: soft, non-tender, non-distended, normal bowel sounds, no masses or organomegaly  Extremities: no cyanosis, no clubbing and no edema  Neurologic: no cranial nerve deficit and speech normal        Recent Labs     12/17/22  0502 12/18/22  0503 12/19/22  0755    138 138   K 3.6 4.7 4.1    103 101   CO2 28 27 30*   BUN 15 21 18   CREATININE 0.7 0.8 0.6   GLUCOSE 153* 308* 375*   CALCIUM 9.2 9.4 9.6       Recent Labs     12/17/22  0502 12/18/22  0503 12/19/22  0755   WBC 8.4 9.4 9.9   RBC 3.40* 3.43* 3.64   HGB 9.6* 9.7* 9.9*   HCT 31.8* 32.3* 34.0   MCV 93.5 94.2 93.4   MCH 28.2 28.3 27.2   MCHC 30.2* 30.0* 29.1*   RDW 14.2 13.9 13.8   * 639* 625*   MPV 9.5 9.5 10.1       Radiology: No new imaging to report. Assessment:    Principal Problem:    Acute respiratory failure with hypoxia (HCC)  Active Problems:    Primary hypertension    Bacterial pneumonia    Elevated platelet count    COPD exacerbation (HCC)    Hypothyroidism  Resolved Problems:    * No resolved hospital problems. *      Plan:  1. Acute hypoxic respiratory failure requiring supplemental oxygen via nasal cannula-pulmonology consulted and following. Appreciate recommendations. Typically wears 2 L supplemental oxygen nightly. Concern for right upper lobe/right middle lobe pneumonia. On doxycycline p.o. for 7 days, did receive 1 dose of Rocephin in the ER. Pro-Ronnie 0.14 on 12/17. Urine Legionella and strep pneumo antigens negative.   2.  Right upper lobe/right middle lobe pneumonia-continue doxycycline 100 mg p.o. twice daily for 7 days. Procalcitonin 0.14 on 12/17. 3.  Right middle lobe nodules-12/15 CTA chest new indeterminate subcentimeter subpleural nodules right middle lobe. 4.  Acute COPD exacerbation-continue IV Solu-Medrol 40 mg every 8 hours, wean as able. Pulmonology consulted and following. 5.  EDWARD-continue home CPAP  6. Anxiety/depression-continue home regimen  7. GERD-PPI  8. Hypertension-continue home regimen  9. Hyperlipidemia-continue statin  10. History of multiple myeloma-hematology/oncology following  11. Hypothyroidism-continue Synthroid    CODE STATUS: Full code  DVT prophylaxis: Lovenox subcutaneously      NOTE: This report was transcribed using voice recognition software. Every effort was made to ensure accuracy; however, inadvertent computerized transcription errors may be present.   Electronically signed by Kianna Vicente MD on 12/19/2022 at 11:50 AM

## 2022-12-20 PROBLEM — R91.8 MULTIPLE LUNG NODULES: Status: ACTIVE | Noted: 2022-12-20

## 2022-12-20 LAB
ANION GAP SERPL CALCULATED.3IONS-SCNC: 6 MMOL/L (ref 7–16)
BASOPHILS ABSOLUTE: 0.01 E9/L (ref 0–0.2)
BASOPHILS RELATIVE PERCENT: 0.1 % (ref 0–2)
BETA-2 MICROGLOBULIN: 1.9 MG/L (ref 0.6–2.4)
BUN BLDV-MCNC: 18 MG/DL (ref 6–23)
CALCIUM SERPL-MCNC: 9.5 MG/DL (ref 8.6–10.2)
CHLORIDE BLD-SCNC: 99 MMOL/L (ref 98–107)
CO2: 32 MMOL/L (ref 22–29)
CREAT SERPL-MCNC: 0.7 MG/DL (ref 0.5–1)
EOSINOPHILS ABSOLUTE: 0 E9/L (ref 0.05–0.5)
EOSINOPHILS RELATIVE PERCENT: 0 % (ref 0–6)
FOLATE: 12.6 NG/ML (ref 4.8–24.2)
GFR SERPL CREATININE-BSD FRML MDRD: >60 ML/MIN/1.73
GLUCOSE BLD-MCNC: 319 MG/DL (ref 74–99)
GLUCOSE BLD-MCNC: 480 MG/DL (ref 74–99)
HBA1C MFR BLD: 8.5 % (ref 4–5.6)
HCT VFR BLD CALC: 34.9 % (ref 34–48)
HEMOGLOBIN: 10.2 G/DL (ref 11.5–15.5)
IMMATURE GRANULOCYTES #: 0.12 E9/L
IMMATURE GRANULOCYTES %: 1.3 % (ref 0–5)
KAPPA FREE LIGHT CHAINS QNT: 18.11 MG/L (ref 3.3–19.4)
KAPPA/LAMBDA FREE LIGHT CHAIN RATIO: 1.67 (ref 0.26–1.65)
LAMBDA FREE LIGHT CHAINS QNT: 10.83 MG/L (ref 5.71–26.3)
LYMPHOCYTES ABSOLUTE: 1.12 E9/L (ref 1.5–4)
LYMPHOCYTES RELATIVE PERCENT: 12.6 % (ref 20–42)
MCH RBC QN AUTO: 27.5 PG (ref 26–35)
MCHC RBC AUTO-ENTMCNC: 29.2 % (ref 32–34.5)
MCV RBC AUTO: 94.1 FL (ref 80–99.9)
METER GLUCOSE: 295 MG/DL (ref 74–99)
METER GLUCOSE: 327 MG/DL (ref 74–99)
METER GLUCOSE: 336 MG/DL (ref 74–99)
METER GLUCOSE: 401 MG/DL (ref 74–99)
METER GLUCOSE: >500 MG/DL (ref 74–99)
MONOCYTES ABSOLUTE: 0.29 E9/L (ref 0.1–0.95)
MONOCYTES RELATIVE PERCENT: 3.3 % (ref 2–12)
NEUTROPHILS ABSOLUTE: 7.36 E9/L (ref 1.8–7.3)
NEUTROPHILS RELATIVE PERCENT: 82.7 % (ref 43–80)
PDW BLD-RTO: 13.6 FL (ref 11.5–15)
PLATELET # BLD: 625 E9/L (ref 130–450)
PMV BLD AUTO: 9.5 FL (ref 7–12)
POTASSIUM REFLEX MAGNESIUM: 4.3 MMOL/L (ref 3.5–5)
PROCALCITONIN: 0.1 NG/ML (ref 0–0.08)
RBC # BLD: 3.71 E12/L (ref 3.5–5.5)
REASON FOR REJECTION: NORMAL
REJECTED TEST: NORMAL
SODIUM BLD-SCNC: 137 MMOL/L (ref 132–146)
VITAMIN B-12: 1881 PG/ML (ref 211–946)
WBC # BLD: 8.9 E9/L (ref 4.5–11.5)

## 2022-12-20 PROCEDURE — 84145 PROCALCITONIN (PCT): CPT

## 2022-12-20 PROCEDURE — 6370000000 HC RX 637 (ALT 250 FOR IP): Performed by: STUDENT IN AN ORGANIZED HEALTH CARE EDUCATION/TRAINING PROGRAM

## 2022-12-20 PROCEDURE — 6360000002 HC RX W HCPCS: Performed by: NURSE PRACTITIONER

## 2022-12-20 PROCEDURE — 82947 ASSAY GLUCOSE BLOOD QUANT: CPT

## 2022-12-20 PROCEDURE — 82746 ASSAY OF FOLIC ACID SERUM: CPT

## 2022-12-20 PROCEDURE — 85025 COMPLETE CBC W/AUTO DIFF WBC: CPT

## 2022-12-20 PROCEDURE — 99232 SBSQ HOSP IP/OBS MODERATE 35: CPT | Performed by: INTERNAL MEDICINE

## 2022-12-20 PROCEDURE — 6360000002 HC RX W HCPCS

## 2022-12-20 PROCEDURE — 94640 AIRWAY INHALATION TREATMENT: CPT

## 2022-12-20 PROCEDURE — 99232 SBSQ HOSP IP/OBS MODERATE 35: CPT | Performed by: STUDENT IN AN ORGANIZED HEALTH CARE EDUCATION/TRAINING PROGRAM

## 2022-12-20 PROCEDURE — 99233 SBSQ HOSP IP/OBS HIGH 50: CPT | Performed by: STUDENT IN AN ORGANIZED HEALTH CARE EDUCATION/TRAINING PROGRAM

## 2022-12-20 PROCEDURE — 6360000002 HC RX W HCPCS: Performed by: CLINICAL NURSE SPECIALIST

## 2022-12-20 PROCEDURE — 80048 BASIC METABOLIC PNL TOTAL CA: CPT

## 2022-12-20 PROCEDURE — 83036 HEMOGLOBIN GLYCOSYLATED A1C: CPT

## 2022-12-20 PROCEDURE — 2060000000 HC ICU INTERMEDIATE R&B

## 2022-12-20 PROCEDURE — 6370000000 HC RX 637 (ALT 250 FOR IP): Performed by: NURSE PRACTITIONER

## 2022-12-20 PROCEDURE — 6370000000 HC RX 637 (ALT 250 FOR IP): Performed by: INTERNAL MEDICINE

## 2022-12-20 PROCEDURE — 36415 COLL VENOUS BLD VENIPUNCTURE: CPT

## 2022-12-20 PROCEDURE — 82607 VITAMIN B-12: CPT

## 2022-12-20 PROCEDURE — 2580000003 HC RX 258: Performed by: NURSE PRACTITIONER

## 2022-12-20 PROCEDURE — 6370000000 HC RX 637 (ALT 250 FOR IP): Performed by: CLINICAL NURSE SPECIALIST

## 2022-12-20 PROCEDURE — 82962 GLUCOSE BLOOD TEST: CPT

## 2022-12-20 PROCEDURE — 2700000000 HC OXYGEN THERAPY PER DAY

## 2022-12-20 RX ORDER — DEXTROSE MONOHYDRATE 100 MG/ML
INJECTION, SOLUTION INTRAVENOUS CONTINUOUS PRN
Status: DISCONTINUED | OUTPATIENT
Start: 2022-12-20 | End: 2022-12-21 | Stop reason: SDUPTHER

## 2022-12-20 RX ORDER — ENOXAPARIN SODIUM 100 MG/ML
30 INJECTION SUBCUTANEOUS 2 TIMES DAILY
Status: DISCONTINUED | OUTPATIENT
Start: 2022-12-20 | End: 2022-12-23 | Stop reason: DRUGHIGH

## 2022-12-20 RX ORDER — INSULIN GLARGINE 100 [IU]/ML
15 INJECTION, SOLUTION SUBCUTANEOUS NIGHTLY
Status: DISCONTINUED | OUTPATIENT
Start: 2022-12-20 | End: 2022-12-21

## 2022-12-20 RX ORDER — INSULIN LISPRO 100 [IU]/ML
0-8 INJECTION, SOLUTION INTRAVENOUS; SUBCUTANEOUS
Status: DISCONTINUED | OUTPATIENT
Start: 2022-12-20 | End: 2022-12-21

## 2022-12-20 RX ORDER — INSULIN LISPRO 100 [IU]/ML
10 INJECTION, SOLUTION INTRAVENOUS; SUBCUTANEOUS ONCE
Status: COMPLETED | OUTPATIENT
Start: 2022-12-20 | End: 2022-12-20

## 2022-12-20 RX ORDER — INSULIN LISPRO 100 [IU]/ML
0-4 INJECTION, SOLUTION INTRAVENOUS; SUBCUTANEOUS NIGHTLY
Status: DISCONTINUED | OUTPATIENT
Start: 2022-12-20 | End: 2022-12-21

## 2022-12-20 RX ORDER — METHYLPREDNISOLONE SODIUM SUCCINATE 40 MG/ML
40 INJECTION, POWDER, LYOPHILIZED, FOR SOLUTION INTRAMUSCULAR; INTRAVENOUS EVERY 12 HOURS
Status: DISCONTINUED | OUTPATIENT
Start: 2022-12-20 | End: 2022-12-21

## 2022-12-20 RX ADMIN — PREGABALIN 75 MG: 75 CAPSULE ORAL at 20:19

## 2022-12-20 RX ADMIN — NYSTATIN 500000 UNITS: 500000 SUSPENSION ORAL at 15:59

## 2022-12-20 RX ADMIN — INSULIN LISPRO 4 UNITS: 100 INJECTION, SOLUTION INTRAVENOUS; SUBCUTANEOUS at 16:10

## 2022-12-20 RX ADMIN — MONTELUKAST SODIUM 10 MG: 10 TABLET ORAL at 20:19

## 2022-12-20 RX ADMIN — ENOXAPARIN SODIUM 40 MG: 100 INJECTION SUBCUTANEOUS at 11:41

## 2022-12-20 RX ADMIN — BUDESONIDE 500 MCG: 0.5 SUSPENSION RESPIRATORY (INHALATION) at 20:49

## 2022-12-20 RX ADMIN — INSULIN LISPRO 2 UNITS: 100 INJECTION, SOLUTION INTRAVENOUS; SUBCUTANEOUS at 06:16

## 2022-12-20 RX ADMIN — SODIUM CHLORIDE, PRESERVATIVE FREE 10 ML: 5 INJECTION INTRAVENOUS at 08:03

## 2022-12-20 RX ADMIN — ARFORMOTEROL TARTRATE 15 MCG: 15 SOLUTION RESPIRATORY (INHALATION) at 08:18

## 2022-12-20 RX ADMIN — ROSUVASTATIN CALCIUM 20 MG: 20 TABLET, FILM COATED ORAL at 08:02

## 2022-12-20 RX ADMIN — ARFORMOTEROL TARTRATE 15 MCG: 15 SOLUTION RESPIRATORY (INHALATION) at 20:49

## 2022-12-20 RX ADMIN — AMITRIPTYLINE HYDROCHLORIDE 50 MG: 25 TABLET, FILM COATED ORAL at 20:19

## 2022-12-20 RX ADMIN — NYSTATIN 500000 UNITS: 500000 SUSPENSION ORAL at 08:02

## 2022-12-20 RX ADMIN — DOXYCYCLINE HYCLATE 100 MG: 100 CAPSULE ORAL at 08:02

## 2022-12-20 RX ADMIN — CITALOPRAM HYDROBROMIDE 20 MG: 20 TABLET ORAL at 08:02

## 2022-12-20 RX ADMIN — BUDESONIDE 500 MCG: 0.5 SUSPENSION RESPIRATORY (INHALATION) at 08:18

## 2022-12-20 RX ADMIN — SODIUM CHLORIDE, PRESERVATIVE FREE 10 ML: 5 INJECTION INTRAVENOUS at 20:19

## 2022-12-20 RX ADMIN — METHYLPREDNISOLONE SODIUM SUCCINATE 40 MG: 40 INJECTION, POWDER, LYOPHILIZED, FOR SOLUTION INTRAMUSCULAR; INTRAVENOUS at 20:19

## 2022-12-20 RX ADMIN — LOSARTAN POTASSIUM 50 MG: 50 TABLET, FILM COATED ORAL at 08:02

## 2022-12-20 RX ADMIN — INSULIN LISPRO 10 UNITS: 100 INJECTION, SOLUTION INTRAVENOUS; SUBCUTANEOUS at 17:41

## 2022-12-20 RX ADMIN — DOXYCYCLINE HYCLATE 100 MG: 100 CAPSULE ORAL at 20:19

## 2022-12-20 RX ADMIN — DOCUSATE SODIUM 100 MG: 100 CAPSULE, LIQUID FILLED ORAL at 20:19

## 2022-12-20 RX ADMIN — NYSTATIN 500000 UNITS: 500000 SUSPENSION ORAL at 11:41

## 2022-12-20 RX ADMIN — INSULIN LISPRO 3 UNITS: 100 INJECTION, SOLUTION INTRAVENOUS; SUBCUTANEOUS at 11:40

## 2022-12-20 RX ADMIN — PREGABALIN 75 MG: 75 CAPSULE ORAL at 15:59

## 2022-12-20 RX ADMIN — PREGABALIN 75 MG: 75 CAPSULE ORAL at 08:02

## 2022-12-20 RX ADMIN — NYSTATIN 500000 UNITS: 500000 SUSPENSION ORAL at 20:19

## 2022-12-20 RX ADMIN — AMLODIPINE BESYLATE 2.5 MG: 2.5 TABLET ORAL at 08:02

## 2022-12-20 RX ADMIN — INSULIN GLARGINE 15 UNITS: 100 INJECTION, SOLUTION SUBCUTANEOUS at 20:23

## 2022-12-20 RX ADMIN — PANTOPRAZOLE SODIUM 40 MG: 40 TABLET, DELAYED RELEASE ORAL at 06:16

## 2022-12-20 RX ADMIN — INSULIN LISPRO 4 UNITS: 100 INJECTION, SOLUTION INTRAVENOUS; SUBCUTANEOUS at 20:23

## 2022-12-20 RX ADMIN — ENOXAPARIN SODIUM 30 MG: 100 INJECTION SUBCUTANEOUS at 20:19

## 2022-12-20 RX ADMIN — METHYLPREDNISOLONE SODIUM SUCCINATE 40 MG: 40 INJECTION, POWDER, LYOPHILIZED, FOR SOLUTION INTRAMUSCULAR; INTRAVENOUS at 08:02

## 2022-12-20 NOTE — PROGRESS NOTES
Inpatient Hematology/Oncology Progress Note    Subjective:   On O2 NC. She is feeling a little more unwell today. Still short of breath but not in major acute distress. Objective:  BP (!) 172/92   Pulse 70   Temp 97.6 °F (36.4 °C) (Oral)   Resp 20   Ht 5' 6\" (1.676 m)   Wt 228 lb 8 oz (103.6 kg)   SpO2 91%   BMI 36.88 kg/m²   GENERAL: Alert, oriented x 3, tired  HEENT: EOMI. Oropharynx clear. NECK: Supple. Without lymphadenopathy. LUNGS: Decreased airflow, wheezing present bilaterally in all lung fields. CARDIOVASCULAR: Regular rate. No murmurs, rubs or gallops. ABDOMEN: Soft. Non-tender, non-distended. EXTREMITIES: Without clubbing, cyanosis. LE edema bilaterally. NEUROLOGIC: No focal deficits. ECOG PS 2    Lab Results   Component Value Date    WBC 8.9 12/20/2022    HGB 10.2 (L) 12/20/2022    HCT 34.9 12/20/2022    MCV 94.1 12/20/2022     (H) 12/20/2022     Lab Results   Component Value Date     12/20/2022    K 4.3 12/20/2022    CL 99 12/20/2022    CO2 32 (H) 12/20/2022    BUN 18 12/20/2022    CREATININE 0.7 12/20/2022    GLUCOSE 319 (H) 12/20/2022    CALCIUM 9.5 12/20/2022    PROT 7.2 12/18/2022    LABALBU 2.4 (L) 12/18/2022    BILITOT 0.4 12/15/2022    ALKPHOS 135 (H) 12/15/2022    AST 39 (H) 12/15/2022    ALT 38 (H) 12/15/2022    LABGLOM >60 12/20/2022    GFRAA >60 06/23/2022     Impression/Plan:  28-year-old lady who was diagnosed with IgG lambda MGUS in 2008, used to follow up with Dr. Soheila Decker at [facility], last f/up with him was in 2012. She did not have a bone marrow biopsy and aspirate done when she was diagnosed with MGUS. SPEP with immunofixation revealed monoclonal IgG lambda, M-spike 0.7 gm/dl  from 9/9/2016, stable compared with the previous M-spike level. The patient does not have anemia, renal failure, hypercalcemia or lytic lesions on the lumbar spine MRI.  IgG had increased sine 2014, skeletal survey was ordered, and is negative for lytic lesions    BM bx and aspirate on 11/28/2016, Clot and aspirate suboptimal, biopsy showing 15% plasma cells, Flow cytometry positive for a  lambda restricted plasma cell neoplasm, Cytogenetics revealing a normal female karyotype, MM FISH negative. 15% plasma cells in the bone marrow, no evidence of end organ damage, she has a smoldering multiple myeloma,risk of progression to MM, at a rate of 10 percent per year for the first five years, 3 percent per year for the next five years, and 1 to 2 percent per year for the following 10 years. SPEP with immunofixation had revealed IgG lambda monoclonal protein, and spike is 1G/DL, IgA 108, IgG 2/6/2008, had increased slightly, IgM 29, kappa 49.81 lambda 22.56, ratio is 2. 21. She does not have anemia, no hypercalcemia or kidney disease. The risk of progression to myeloma was discussed with the patient, Skeletal survey was done in September 2021, was negative for lytic lesions. Admitted for fatigue SOB and coughing. Pneumonia, on coverage for community-acquired pneumonia. Hypoxic respiratory failure, lung nodule, follows with pulmonary, have been consulted. Start Brovana, budesonide, and albuterol  On Doxy for 7 days. One dose of rocephin in ER  Continue steroid solumedrol 40 q8h  LE edema; Sanya LE u/s noted no DVT. Imaging reviewed. Myeloma markers ordered   Hb 9.7 Hct 32.3  MCV 94.2  WBC 9.4    BUN 21  Creat 0.8  Ca 9.4  Fe 49 TIBC 263  FeSat 19% Ferritin 69  Labs reviewed. SPEP shows M spike 1.1 g/dL (previously 1.3 g/dL on 7/5/2022)  Serum immunofixation, redemonstrating IgG lambda monoclonal protein  Immunoglobulins have resolved   - IgA 127   - IgG 2159 (previously 2509 on 7/5/2022)   - IgM 39    [place]/Lambda FLC pending  24hr UPEP pending   XR bone survey 12/17/2022 noted no lytic lesions. Imaging reviewed. Patient is anemic. I have reviewed iron studies. Will need to monitor CBCs closely once acute illness/issues resolve.    Will will B12/folate levels  Will continue to update patient on myeloma panel as it results.      Pulmonology following  Being treated for COPD exacerbation and right sided pnuemonia  On inhaler, steroids, doxcycycline     I have reviewed consult reason, noting new indeterminate subcentimeter, subpleural nodules in RML lobe with possible association with metastatic disease  Had EGD/colonoscopy in 2020, finding fragments of tubular adenoma  Mammogram in 2019 noting BIRADS 1  Thyroid nodule FNA in 2018 inconclusive of malignant cells (follicular lesions undetermined significance)  Pt reports she had PAP smear a few years ago with unremarkable results  D&C on 5/11/21 done for postmenopausal bleed and endometrial thickening, finding a benign endometrial polyp  CT abdomen on 11/25/2022 noted questionable wall thickening of distal stomach, suggesting gastritis/antritis  Once patient is more stable and acute issues are resolved, could consider GI evaluation  Lesions may be too small to biopsy and likely too small to enhance on PET scan  At least consider serial repeat scan(s) after resolution of acute issues       Tirso Garcia, [address]  12/20/22 4:31 PM

## 2022-12-20 NOTE — PROGRESS NOTES
Appearance: alert and oriented to person, place and time and in no acute distress - inc BMI   Skin: warm and dry  Head: normocephalic and atraumatic  Eyes: pupils equal, round, and reactive to light, extraocular eye movements intact, conjunctivae normal  Neck: neck supple and non tender without mass   Pulmonary/Chest: diminished to auscultation bilaterally- no wheezes, rales or rhonchi, normal air movement, no respiratory distress +3 L via NC + dry cough   Cardiovascular: normal rate, normal S1 and S2 and no carotid bruits  Abdomen: soft, non-tender, non-distended, normal bowel sounds, no masses or organomegaly  Extremities: no cyanosis, no clubbing and no edema  Neurologic: no cranial nerve deficit and speech normal  Peripheral     Recent Labs     12/18/22  0503 12/19/22  0755 12/20/22  0250    138 137   K 4.7 4.1 4.3    101 99   CO2 27 30* 32*   BUN 21 18 18   CREATININE 0.8 0.6 0.7   GLUCOSE 308* 375* 319*   CALCIUM 9.4 9.6 9.5       Recent Labs     12/18/22  0503 12/19/22  0755 12/20/22  0250   WBC 9.4 9.9 8.9   RBC 3.43* 3.64 3.71   HGB 9.7* 9.9* 10.2*   HCT 32.3* 34.0 34.9   MCV 94.2 93.4 94.1   MCH 28.3 27.2 27.5   MCHC 30.0* 29.1* 29.2*   RDW 13.9 13.8 13.6   * 625* 625*   MPV 9.5 10.1 9.5     Radiology: reviewed     Assessment:  Principal Problem:    Acute respiratory failure with hypoxia (HCC)  Active Problems:    Primary hypertension    Hypothyroidism  Resolved Problems:    * No resolved hospital problems. *    Plan:  Acute respiratory failure with hypoxia-complaints of shortness of breath beginning approximately 5 days prior to presentation. Chronically wears 2L NC at night. 12/15 CT chest showed no PE mild bilateral interstitial pulmonary edema. Pneumonitis right upper lobe/right middle lobe. d dimer 467. She is currently requiring 4 LNC. Continue to wean as tolerated maintain SPO2>92%. PCT 0.14 < 0.10. Respiratory panel pending. Follows with Dr. Subhash Dorantes.  Pulmonology input appreciated. Continue brovana, budesonide, and albuterol. Urine Ag negative. Echo with EF 55-60 and enlarged RA. US negative for DVT. ? Pneumonia/pneumonitis-11/25 CXR concerning for new pneumonia. She was DC'd home in stable condition prescribed Omnicef 300 mg 2 times daily x7 days doxycycline 100 mg 2 times daily x7 days. PCT 0.14 (falsely elevated due to multiple myeloma?). Continue on Doxycycline x 7 days - day 4/7, had ceftriaxone x 1 in ER. Continue steroids 40 Q8 - wean as per pulmonary. Will need OP PFT, PSG, pulmonary rehab and routine follow up. Unable to produce sputum for sample. RML Nodules-12/15 CTA chest New indeterminate subcentimeter subpleural nodules RML. Related to metastatic disease. Acute exacerbation of COPD-received 125 mg IV Solu-Medrol/DuoNeb's in ED course. Now on 40 IV Q8, wean as per pulmonary. Pulmonary edema on CT.   EDWARD-CPAP  Anxiety/depression-continue home regimen  GERD-PPI  HTN-continue home regimen  HLD-continue statin therapy  Hx Multiple Myeloma - oncology following. XR bone survey negative for lytic lesions 12/17/22. Vitamin B12 1881/folate 12.6  Hypothyroid- Continue levothyroxine    CODE: FULL   DVT prophylaxis: Lovenox   Discharge dispo: will need ambulatory pulse ox prior to discharge     27 minutes time spent reviewing patient chart, assessing patient, discussing plan of care with patient and family, discussing plan of care with collaborating physician, and charting. Electronically signed by BRYAN Martinez NP on 12/20/2022 at 8:05 AM    Addendum: I have personally participated in the history, exam, medical decision making with  Vanda Rodriguez NP  on the date of service and I agree with all of the pertinent clinical information unless otherwise noted. I have also reviewed and agree with the past medical, family, and social history unless otherwise noted.      Patient was admitted with acute on chronic hypoxic respiratory failure requiring supplemental oxygen via nasal cannula likely secondary to possible pneumonia as well as an acute COPD exacerbation. PHYSICAL EXAM:  Vitals:  BP (!) 179/92   Pulse 68   Temp 98.1 °F (36.7 °C) (Oral)   Resp 16   Ht 5' 6\" (1.676 m)   Wt 228 lb 8 oz (103.6 kg)   SpO2 91%   BMI 36.88 kg/m²   Gen: awake, alert, NAD, on 3 L supplemental oxygen  Lungs: Scattered wheezes appreciated  Heart: RRR, no murmur   Abdomen: soft nontender nondistended positive bowel sounds. Extremities: full range of motion no peripheral edema. Impression:  Principal Problem:    Acute respiratory failure with hypoxia (HCC)  Active Problems:    Primary hypertension    Pneumonia due to infectious organism    Elevated platelet count    COPD exacerbation (HCC)    Normocytic anemia    Smoldering myeloma    Hypothyroidism  Resolved Problems:    * No resolved hospital problems. *      My findings/plan include:  Patient is admitted with acute on chronic hypoxic respiratory failure requiring supplemental oxygen via nasal cannula. Currently requiring 3 L of oxygen, baseline is 2 L. She states she feels about the same as yesterday, may be slightly improved. She continues on IV Solu-Medrol 40 mg every 8 hours for acute COPD exacerbation. She also continues on doxycycline for possible pneumonia. Pulmonology continues to follow. Continue current therapies at this time. NOTE: This report was transcribed using voice recognition software. Every effort was made to ensure accuracy; however, inadvertent computerized transcription errors may be present.   Electronically signed by Bon Giordano MD on 12/20/2022 at 1:22 PM

## 2022-12-20 NOTE — PROGRESS NOTES
Your patient is on a medication that requires a renal and/or weight dose adjustment. Renal/Body Weight Function Assessment:    Date Body Weight IBW  Adjusted BW SCr  CrCl Dialysis status   12/20/2022 228 lb 8 oz (103.6 kg)  Ideal body weight: 59.3 kg (130 lb 11.7 oz)  Adjusted ideal body weight: 77 kg (169 lb 13.4 oz) Serum creatinine: 0.7 mg/dL 12/20/22 0250  Estimated creatinine clearance: 95 mL/min N/A       Pharmacy has dose-adjusted the following medication(s):    Date Previous Order Adjusted Order   12/20/2022 Enoxaparin 40 mg Daily Enoxaparin 30 mg BID       These changes were made per protocol according to the [facility] Renal Dosing Policy/ [facility] Pharmacist Anticoagulant Review. *Please note this dose may need readjusted if your patient's condition changes. Please contact pharmacy with any questions regarding these changes.     Adam Mahajan, [facility]  12/20/2022  12:38 PM

## 2022-12-20 NOTE — PROGRESS NOTES
Pulmonary Progress Note    Admit Date: 2022                            PCP: Axel Burkitt, MD  Principal Problem:    Acute respiratory failure with hypoxia ([address]  Active Problems:    Primary hypertension    Pneumonia due to infectious organism    Elevated platelet count    COPD exacerbation (HCC)    Normocytic anemia    Smoldering myeloma    Hypothyroidism  Resolved Problems:    * No resolved hospital problems. *      Subjective:  Patient seen resting in bed on 2 L nasal cannula, pulse ox 95%  Still with dyspnea and wheezing on exertion but notes this to be improving. She does have occasional nonproductive cough.     Medications:   dextrose      sodium chloride          insulin lispro  0-4 Units SubCUTAneous TID WC    insulin lispro  0-4 Units SubCUTAneous Nightly    nystatin  5 mL Oral 4x Daily    amLODIPine  2.5 mg Oral Daily    amitriptyline  50 mg Oral Nightly    citalopram  20 mg Oral Daily    docusate sodium  100 mg Oral BID    montelukast  10 mg Oral Nightly    pantoprazole  40 mg Oral QAM AC    pregabalin  75 mg Oral TID    rosuvastatin  20 mg Oral Daily    losartan  50 mg Oral Daily    sodium chloride flush  5-40 mL IntraVENous 2 times per day    enoxaparin  40 mg SubCUTAneous Daily    methylPREDNISolone  40 mg IntraVENous Q8H    Arformoterol Tartrate  15 mcg Nebulization BID    budesonide  0.5 mg Nebulization BID    doxycycline hyclate  100 mg Oral 2 times per day       Vitals:  VITALS:  BP (!) 179/92   Pulse 68   Temp 98.1 °F (36.7 °C) (Oral)   Resp 16   Ht 5' 6\" (1.676 m)   Wt 235 lb 9.6 oz (106.9 kg)   SpO2 95%   BMI 38.03 kg/m²   24HR INTAKE/OUTPUT:    Intake/Output Summary (Last 24 hours) at 2022 0907  Last data filed at 2022 1910  Gross per 24 hour   Intake --   Output 800 ml   Net -800 ml     CURRENT PULSE OXIMETRY:  SpO2: 95 %  24HR PULSE OXIMETRY RANGE:  SpO2  Av.7 %  Min: 95 %  Max: 97 %  CVP:    VENT SETTINGS:      Additional Respiratory Assessments  Heart Rate: 68  Resp: 16  SpO2: 95 %      EXAM:  General: Alert, in NAD, on 2L nc  ENT: No discharge. Pharynx clear. membranes moist   Neck: Supple, Trachea midline. Resp: No accessory muscle use. Non-labored. No rhonchi. Mild expiratory wheeze posteriorly on right. CV: Regular rate. Regular rhythm. No murmur . No edema. ABD: Non-tender. Non-distended. Soft, round . Normal bowel sounds. Skin: Warm and dry. M/S: No cyanosis. No joint deformity. No clubbing. Neuro: Awake. Follows commands. O x 3, SIMMONS  Psych:  calm and interactive     I/O: I/O last 3 completed shifts:  In: -   Out: 2900 [Urine:2900]  No intake/output data recorded. Results:  CBC:   Recent Labs     12/18/22  0503 12/19/22  0755 12/20/22  0250   WBC 9.4 9.9 8.9   HGB 9.7* 9.9* 10.2*   HCT 32.3* 34.0 34.9   MCV 94.2 93.4 94.1   * 625* 625*     BMP:   Recent Labs     12/18/22  0503 12/19/22  0755 12/20/22  0250    138 137   K 4.7 4.1 4.3    101 99   CO2 27 30* 32*   BUN 21 18 18   CREATININE 0.8 0.6 0.7     LFT:   Recent Labs     12/18/22  0503   PROT 7.2   LABALBU 2.4*     PT/INR: No results for input(s): PROTIME, INR in the last 72 hours. Procalcitonin:    Latest Reference Range & Units 12/17/22 07:53   Procalcitonin 0.00 - 0.08 ng/mL 0.14 (H)   (H): Data is abnormally high  No results for input(s): PROCAL in the last 72 hours.   Cultures:   Latest Reference Range & Units 12/15/22 16:00 12/17/22 10:46   INFLUENZA A NOT DETECTED  NOT DETECTED    INFLUENZA B NOT DETECTED  NOT DETECTED    RAPID INFLUENZA A/B ANTIGENS   Rpt   Influenza A by PCR Not Detected   Not Detected   Influenza B by PCR Not Detected   Not Detected   SARS-CoV-2 RNA, RT PCR NOT DETECTED  NOT DETECTED    SARS-CoV-2, NAAT Not Detected   Not Detected   Rpt: View report in Results Review for more information      ABG:   Recent Labs     12/17/22  1033   PH 7.377   PO2 69.7*   PCO2 48.6*   HCO3 27.9*   BE 2.2   O2SAT 93.6       Films:  XR BONE SURVEY COMPLETE    Result Date: 12/18/2022  EXAMINATION: COMPLETE BONE SURVEY 12/17/2022 8:55 pm COMPARISON: None. HISTORY: ORDERING SYSTEM PROVIDED HISTORY: r/o lytic lesions TECHNOLOGIST PROVIDED HISTORY: Reason for exam:->r/o lytic lesions FINDINGS: No aggressive or suspicious osseous lesions. Moderate lower lumbar facet degenerative change in moderate bilateral hip joint degenerative change. No aggressive or suspicious osseous lesion. RECOMMENDATION: Careful clinical correlation and follow up recommended. XR CHEST PORTABLE    Result Date: 12/17/2022  EXAMINATION: ONE XRAY VIEW OF THE CHEST 12/17/2022 5:06 am COMPARISON: December 15, 2022 HISTORY: ORDERING SYSTEM PROVIDED HISTORY: SOB TECHNOLOGIST PROVIDED HISTORY: Reason for exam:->SOB FINDINGS: Normal cardiomediastinal silhouette. Lungs clear. No pneumothorax or effusion. Osseous thorax intact. No acute disease. RECOMMENDATION: Careful clinical correlation and follow up recommended. Assessment:    12/17: Procalcitonin 0.14, rapid flu and COVID-negative  12/18: Strep and Legionella negative  12/19: Echo EF 55 to 60%  12/20: 2 L  Acute exacerbation COPD  RUL/RML pneumonia   Nocturnal hypoxia on 2 L at at bedtime  EDWARD-non compliant        Plan:  Patient is currently on 2 L nasal cannula with pulse ox 95%. She wears 2 L at at bedtime at baseline. She will need ambulatory pulse ox prior to discharge. Continue supplemental O2 to maintain POX >90%  Continue brovana, budesonide and albuterol  Decrease IV Solumedrol 40 to q12 hours today, hope to further wean tomorrow  On singulair   Continue doxycycline 100 mg twice daily x7 days total, last dose 12/24.   Repeat procalcitonin  Sputum culture ordered, awaiting specimen  IS for pulmonary hygiene  Will need outpatient PFT and PSG in order to obtain new pap  Will benefit from outpatient pulm rehab and regular follow up         Electronically signed by Lonie Severin, APRN - CNP on 12/20/2022 at 9:07 AM    Attending Attestation Note:    Patient seen and examined with Hospital Staff, NP. I have extensively reviewed the chart lab work and imaging. I agree with above. Modifications and amendment of note made as necessary.     In addition, the following apply:    Current Facility-Administered Medications   Medication Dose Route Frequency Provider Last Rate Last Admin    methylPREDNISolone sodium (SOLU-MEDROL) injection 40 mg  40 mg IntraVENous Q12H BRYAN Tello CNP        enoxaparin Sodium (LOVENOX) injection 30 mg  30 mg SubCUTAneous BID BRYAN Laguna CNP        glucose chewable tablet 16 g  4 tablet Oral PRN Maria Del Carmen Camp MD        dextrose bolus 10% 125 mL  125 mL IntraVENous PRN Maria Del Carmen Camp MD        Or    dextrose bolus 10% 250 mL  250 mL IntraVENous PRN Maria Del Carmen Camp MD        glucagon (rDNA) injection 1 mg  1 mg SubCUTAneous PRN Maria Del Carmen Camp MD        dextrose 10 % infusion   IntraVENous Continuous PRN Maria Del Carmen Camp MD        insulin lispro (HUMALOG) injection vial 0-8 Units  0-8 Units SubCUTAneous TID WC Maria Del Carmen Camp MD        insulin lispro (HUMALOG) injection vial 0-4 Units  0-4 Units SubCUTAneous Nightly Maria Del Carmen Camp MD        insulin lispro (HUMALOG) injection vial 10 Units  10 Units SubCUTAneous Once Maria Del Carmen Camp MD        insulin glargine (LANTUS) injection vial 15 Units  15 Units SubCUTAneous Nightly Maria Del Carmen Camp MD        glucose chewable tablet 16 g  4 tablet Oral PRN Maria Del Carmen Camp MD        dextrose bolus 10% 125 mL  125 mL IntraVENous PRN Maria Del Carmen Camp MD        Or    dextrose bolus 10% 250 mL  250 mL IntraVENous PRN Maria Del Carmen Camp MD        glucagon (rDNA) injection 1 mg  1 mg SubCUTAneous PRN Maria Del Carmen Camp MD        dextrose 10 % infusion   IntraVENous Continuous PRN Maria Del Carmen Camp MD        nystatin (MYCOSTATIN) [id number] UNIT/ML suspension 500,000 Units  5 mL Oral 4x Daily Benjamine Heimlich, MD   500,000 Units at 12/20/22 1559    amLODIPine (NORVASC) tablet 2.5 mg  2.5 mg Oral Daily Robert Hric, DO   2.5 mg at 12/20/22 0802    amitriptyline (ELAVIL) tablet 50 mg  50 mg Oral Nightly Stephanie Pozo, APRN - CNP   50 mg at 12/19/22 2009    baclofen (LIORESAL) tablet 10 mg  10 mg Oral TID PRN Stephanie Pozo, APRN - CNP        citalopram (CELEXA) tablet 20 mg  20 mg Oral Daily Stephanie Pozo, APRN - CNP   20 mg at 12/20/22 0802    docusate sodium (COLACE) capsule 100 mg  100 mg Oral BID Stephanie Pozo, APRN - CNP   100 mg at 12/19/22 2010    montelukast (SINGULAIR) tablet 10 mg  10 mg Oral Nightly Stephanie Pozo, APRN - CNP   10 mg at 12/19/22 2009    pantoprazole (PROTONIX) tablet 40 mg  40 mg Oral QAM AC Stephanie Pozo, APRN - CNP   40 mg at 12/20/22 0616    pregabalin (LYRICA) capsule 75 mg  75 mg Oral TID Stephanie Pozo, APRN - CNP   75 mg at 12/20/22 1559    rosuvastatin (CRESTOR) tablet 20 mg  20 mg Oral Daily Stephanie Pozo, APRN - CNP   20 mg at 12/20/22 0802    ipratropium-albuterol (DUONEB) nebulizer solution 1 ampule  1 ampule Inhalation Q15 Min PRN Stephanie Pozo, APRN - CNP        losartan (COZAAR) tablet 50 mg  50 mg Oral Daily Stephanie Pozo, APRN - CNP   50 mg at 12/20/22 0802    sodium chloride flush 0.9 % injection 5-40 mL  5-40 mL IntraVENous 2 times per day Stephanie Pozo, APRN - CNP   10 mL at 12/20/22 0803    sodium chloride flush 0.9 % injection 5-40 mL  5-40 mL IntraVENous PRN Stephanie Pozo, APRN - CNP        0.9 % sodium chloride infusion   IntraVENous PRN Stephanie Pozo, APRN - CNP        ondansetron (ZOFRAN-ODT) disintegrating tablet 4 mg  4 mg Oral Q8H PRN BRYAN Villeda - CNP        Or    ondansetron [facility]) injection 4 mg  4 mg IntraVENous Q6H PRN Stephanie Pozo APRN - [place]        polyethylene glycol (GLYCOLAX) packet 17 g  17 g Oral Daily PRN Stephanie Pozo APRN - [place]        acetaminophen (TYLENOL) tablet 650 mg  650 mg Oral Q6H PRN Parthenia [place], APRN - CNP   650 mg at 12/19/22 2332    Or    acetaminophen (TYLENOL) suppository 650 mg  650 mg Rectal Q6H PRN Parthenia [place], APRN - CNP        guaiFENesin-dextromethorphan (ROBITUSSIN DM) 100-10 MG/5ML syrup 5 mL  5 mL Oral Q4H PRN Parthenia [place], APRN - CNP   5 mL at 12/17/22 1201    Arformoterol Tartrate (BROVANA) nebulizer solution 15 mcg  15 mcg Nebulization BID Stephanie Alarcon APRN - CNS   15 mcg at 12/20/22 0818    budesonide (PULMICORT) nebulizer suspension 500 mcg  0.5 mg Nebulization BID BRYAN Bravo - CNS   500 mcg at 12/20/22 0818    doxycycline hyclate (VIBRAMYCIN) capsule 100 mg  100 mg Oral 2 times per day BRYAN Bravo - CNS   100 mg at 12/20/22 4768     - supportive care to continue at this time  - outpatient PFT, PSG   - steroids to PO in AM 12/21/2022  - await D/C planning for patient     Lana Mcburney, MD  12/20/2022  5:01 PM

## 2022-12-21 LAB
ANION GAP SERPL CALCULATED.3IONS-SCNC: 7 MMOL/L (ref 7–16)
BASOPHILS ABSOLUTE: 0.01 E9/L (ref 0–0.2)
BASOPHILS RELATIVE PERCENT: 0.1 % (ref 0–2)
BUN BLDV-MCNC: 24 MG/DL (ref 6–23)
CALCIUM SERPL-MCNC: 9 MG/DL (ref 8.6–10.2)
CHLORIDE BLD-SCNC: 94 MMOL/L (ref 98–107)
CO2: 32 MMOL/L (ref 22–29)
CREAT SERPL-MCNC: 0.8 MG/DL (ref 0.5–1)
EOSINOPHILS ABSOLUTE: 0.01 E9/L (ref 0.05–0.5)
EOSINOPHILS RELATIVE PERCENT: 0.1 % (ref 0–6)
GFR SERPL CREATININE-BSD FRML MDRD: >60 ML/MIN/1.73
GLUCOSE BLD-MCNC: 377 MG/DL (ref 74–99)
HCT VFR BLD CALC: 37.8 % (ref 34–48)
HEMOGLOBIN: 11.2 G/DL (ref 11.5–15.5)
IMMATURE GRANULOCYTES #: 0.08 E9/L
IMMATURE GRANULOCYTES %: 0.9 % (ref 0–5)
LYMPHOCYTES ABSOLUTE: 1.13 E9/L (ref 1.5–4)
LYMPHOCYTES RELATIVE PERCENT: 12.4 % (ref 20–42)
MCH RBC QN AUTO: 27.5 PG (ref 26–35)
MCHC RBC AUTO-ENTMCNC: 29.6 % (ref 32–34.5)
MCV RBC AUTO: 92.9 FL (ref 80–99.9)
METER GLUCOSE: 295 MG/DL (ref 74–99)
METER GLUCOSE: 306 MG/DL (ref 74–99)
METER GLUCOSE: 371 MG/DL (ref 74–99)
METER GLUCOSE: 433 MG/DL (ref 74–99)
MONOCYTES ABSOLUTE: 0.4 E9/L (ref 0.1–0.95)
MONOCYTES RELATIVE PERCENT: 4.4 % (ref 2–12)
NEUTROPHILS ABSOLUTE: 7.49 E9/L (ref 1.8–7.3)
NEUTROPHILS RELATIVE PERCENT: 82.1 % (ref 43–80)
PDW BLD-RTO: 13.6 FL (ref 11.5–15)
PLATELET # BLD: 557 E9/L (ref 130–450)
PMV BLD AUTO: 9.5 FL (ref 7–12)
POTASSIUM REFLEX MAGNESIUM: 4.3 MMOL/L (ref 3.5–5)
RBC # BLD: 4.07 E12/L (ref 3.5–5.5)
SODIUM BLD-SCNC: 133 MMOL/L (ref 132–146)
WBC # BLD: 9.1 E9/L (ref 4.5–11.5)

## 2022-12-21 PROCEDURE — 6370000000 HC RX 637 (ALT 250 FOR IP): Performed by: STUDENT IN AN ORGANIZED HEALTH CARE EDUCATION/TRAINING PROGRAM

## 2022-12-21 PROCEDURE — 6370000000 HC RX 637 (ALT 250 FOR IP): Performed by: NURSE PRACTITIONER

## 2022-12-21 PROCEDURE — 94640 AIRWAY INHALATION TREATMENT: CPT

## 2022-12-21 PROCEDURE — 2700000000 HC OXYGEN THERAPY PER DAY

## 2022-12-21 PROCEDURE — 80048 BASIC METABOLIC PNL TOTAL CA: CPT

## 2022-12-21 PROCEDURE — 85025 COMPLETE CBC W/AUTO DIFF WBC: CPT

## 2022-12-21 PROCEDURE — 99232 SBSQ HOSP IP/OBS MODERATE 35: CPT | Performed by: STUDENT IN AN ORGANIZED HEALTH CARE EDUCATION/TRAINING PROGRAM

## 2022-12-21 PROCEDURE — 6360000002 HC RX W HCPCS: Performed by: NURSE PRACTITIONER

## 2022-12-21 PROCEDURE — 6370000000 HC RX 637 (ALT 250 FOR IP): Performed by: CLINICAL NURSE SPECIALIST

## 2022-12-21 PROCEDURE — APPSS30 APP SPLIT SHARED TIME 16-30 MINUTES: Performed by: NURSE PRACTITIONER

## 2022-12-21 PROCEDURE — 36415 COLL VENOUS BLD VENIPUNCTURE: CPT

## 2022-12-21 PROCEDURE — 99232 SBSQ HOSP IP/OBS MODERATE 35: CPT | Performed by: INTERNAL MEDICINE

## 2022-12-21 PROCEDURE — 6360000002 HC RX W HCPCS

## 2022-12-21 PROCEDURE — 82962 GLUCOSE BLOOD TEST: CPT

## 2022-12-21 PROCEDURE — 2580000003 HC RX 258: Performed by: NURSE PRACTITIONER

## 2022-12-21 PROCEDURE — 2060000000 HC ICU INTERMEDIATE R&B

## 2022-12-21 PROCEDURE — 6370000000 HC RX 637 (ALT 250 FOR IP): Performed by: INTERNAL MEDICINE

## 2022-12-21 PROCEDURE — 6360000002 HC RX W HCPCS: Performed by: CLINICAL NURSE SPECIALIST

## 2022-12-21 RX ORDER — PREDNISONE 20 MG/1
20 TABLET ORAL DAILY
Status: DISCONTINUED | OUTPATIENT
Start: 2022-12-28 | End: 2022-12-23 | Stop reason: HOSPADM

## 2022-12-21 RX ORDER — INSULIN LISPRO 100 [IU]/ML
0-4 INJECTION, SOLUTION INTRAVENOUS; SUBCUTANEOUS NIGHTLY
Status: DISCONTINUED | OUTPATIENT
Start: 2022-12-21 | End: 2022-12-23 | Stop reason: HOSPADM

## 2022-12-21 RX ORDER — PREDNISONE 20 MG/1
40 TABLET ORAL DAILY
Status: DISCONTINUED | OUTPATIENT
Start: 2022-12-22 | End: 2022-12-23 | Stop reason: HOSPADM

## 2022-12-21 RX ORDER — PREDNISONE 1 MG/1
10 TABLET ORAL DAILY
Status: DISCONTINUED | OUTPATIENT
Start: 2022-12-31 | End: 2022-12-23 | Stop reason: HOSPADM

## 2022-12-21 RX ORDER — INSULIN GLARGINE 100 [IU]/ML
25 INJECTION, SOLUTION SUBCUTANEOUS NIGHTLY
Status: DISCONTINUED | OUTPATIENT
Start: 2022-12-21 | End: 2022-12-22

## 2022-12-21 RX ORDER — INSULIN LISPRO 100 [IU]/ML
0-16 INJECTION, SOLUTION INTRAVENOUS; SUBCUTANEOUS
Status: DISCONTINUED | OUTPATIENT
Start: 2022-12-21 | End: 2022-12-23 | Stop reason: HOSPADM

## 2022-12-21 RX ORDER — DEXTROSE MONOHYDRATE 100 MG/ML
INJECTION, SOLUTION INTRAVENOUS CONTINUOUS PRN
Status: DISCONTINUED | OUTPATIENT
Start: 2022-12-21 | End: 2022-12-23 | Stop reason: HOSPADM

## 2022-12-21 RX ADMIN — INSULIN LISPRO 4 UNITS: 100 INJECTION, SOLUTION INTRAVENOUS; SUBCUTANEOUS at 20:45

## 2022-12-21 RX ADMIN — ARFORMOTEROL TARTRATE 15 MCG: 15 SOLUTION RESPIRATORY (INHALATION) at 08:59

## 2022-12-21 RX ADMIN — INSULIN GLARGINE 25 UNITS: 100 INJECTION, SOLUTION SUBCUTANEOUS at 20:45

## 2022-12-21 RX ADMIN — PANTOPRAZOLE SODIUM 40 MG: 40 TABLET, DELAYED RELEASE ORAL at 06:38

## 2022-12-21 RX ADMIN — PREGABALIN 75 MG: 75 CAPSULE ORAL at 20:45

## 2022-12-21 RX ADMIN — LOSARTAN POTASSIUM 50 MG: 50 TABLET, FILM COATED ORAL at 08:35

## 2022-12-21 RX ADMIN — INSULIN LISPRO 6 UNITS: 100 INJECTION, SOLUTION INTRAVENOUS; SUBCUTANEOUS at 06:38

## 2022-12-21 RX ADMIN — AMITRIPTYLINE HYDROCHLORIDE 50 MG: 25 TABLET, FILM COATED ORAL at 20:45

## 2022-12-21 RX ADMIN — CITALOPRAM HYDROBROMIDE 20 MG: 20 TABLET ORAL at 08:35

## 2022-12-21 RX ADMIN — PREGABALIN 75 MG: 75 CAPSULE ORAL at 13:24

## 2022-12-21 RX ADMIN — SODIUM CHLORIDE, PRESERVATIVE FREE 10 ML: 5 INJECTION INTRAVENOUS at 20:45

## 2022-12-21 RX ADMIN — BUDESONIDE 500 MCG: 0.5 SUSPENSION RESPIRATORY (INHALATION) at 08:59

## 2022-12-21 RX ADMIN — METHYLPREDNISOLONE SODIUM SUCCINATE 40 MG: 40 INJECTION, POWDER, LYOPHILIZED, FOR SOLUTION INTRAMUSCULAR; INTRAVENOUS at 08:34

## 2022-12-21 RX ADMIN — NYSTATIN 500000 UNITS: 500000 SUSPENSION ORAL at 16:02

## 2022-12-21 RX ADMIN — ENOXAPARIN SODIUM 30 MG: 100 INJECTION SUBCUTANEOUS at 08:36

## 2022-12-21 RX ADMIN — INSULIN LISPRO 16 UNITS: 100 INJECTION, SOLUTION INTRAVENOUS; SUBCUTANEOUS at 16:02

## 2022-12-21 RX ADMIN — NYSTATIN 500000 UNITS: 500000 SUSPENSION ORAL at 13:24

## 2022-12-21 RX ADMIN — ARFORMOTEROL TARTRATE 15 MCG: 15 SOLUTION RESPIRATORY (INHALATION) at 20:24

## 2022-12-21 RX ADMIN — INSULIN LISPRO 4 UNITS: 100 INJECTION, SOLUTION INTRAVENOUS; SUBCUTANEOUS at 11:17

## 2022-12-21 RX ADMIN — ROSUVASTATIN CALCIUM 20 MG: 20 TABLET, FILM COATED ORAL at 08:35

## 2022-12-21 RX ADMIN — DOXYCYCLINE HYCLATE 100 MG: 100 CAPSULE ORAL at 08:35

## 2022-12-21 RX ADMIN — NYSTATIN 500000 UNITS: 500000 SUSPENSION ORAL at 08:35

## 2022-12-21 RX ADMIN — AMLODIPINE BESYLATE 2.5 MG: 2.5 TABLET ORAL at 08:35

## 2022-12-21 RX ADMIN — PREGABALIN 75 MG: 75 CAPSULE ORAL at 08:35

## 2022-12-21 RX ADMIN — MONTELUKAST SODIUM 10 MG: 10 TABLET ORAL at 20:45

## 2022-12-21 RX ADMIN — DOXYCYCLINE HYCLATE 100 MG: 100 CAPSULE ORAL at 20:45

## 2022-12-21 RX ADMIN — DOCUSATE SODIUM 100 MG: 100 CAPSULE, LIQUID FILLED ORAL at 20:45

## 2022-12-21 RX ADMIN — SODIUM CHLORIDE, PRESERVATIVE FREE 10 ML: 5 INJECTION INTRAVENOUS at 08:36

## 2022-12-21 RX ADMIN — BUDESONIDE 500 MCG: 0.5 SUSPENSION RESPIRATORY (INHALATION) at 20:24

## 2022-12-21 RX ADMIN — ENOXAPARIN SODIUM 30 MG: 100 INJECTION SUBCUTANEOUS at 20:45

## 2022-12-21 RX ADMIN — NYSTATIN 500000 UNITS: 500000 SUSPENSION ORAL at 20:45

## 2022-12-21 ASSESSMENT — PAIN SCALES - GENERAL: PAINLEVEL_OUTOF10: 0

## 2022-12-21 NOTE — PROGRESS NOTES
[facility] Progress Note    Admitting Date and Time: 12/17/2022  3:43 AM  Admit Dx: Acute respiratory failure with hypoxia (HCC) [J96.01]    Subjective:  Patient is being followed for Acute respiratory failure with hypoxia ([address] [J96.01]       Patient seen resting in bed comfortably in no acute distress  Reporting overall better  However still wheezing and not feeling quite back to normal  Appetite good          ROS: denies fever, chills, cp, sob, n/v, HA unless stated above.       methylPREDNISolone  40 mg IntraVENous Q12H    enoxaparin  30 mg SubCUTAneous BID    insulin lispro  0-8 Units SubCUTAneous TID WC    insulin lispro  0-4 Units SubCUTAneous Nightly    insulin glargine  15 Units SubCUTAneous Nightly    nystatin  5 mL Oral 4x Daily    amLODIPine  2.5 mg Oral Daily    amitriptyline  50 mg Oral Nightly    citalopram  20 mg Oral Daily    docusate sodium  100 mg Oral BID    montelukast  10 mg Oral Nightly    pantoprazole  40 mg Oral QAM AC    pregabalin  75 mg Oral TID    rosuvastatin  20 mg Oral Daily    losartan  50 mg Oral Daily    sodium chloride flush  5-40 mL IntraVENous 2 times per day    Arformoterol Tartrate  15 mcg Nebulization BID    budesonide  0.5 mg Nebulization BID    doxycycline hyclate  100 mg Oral 2 times per day     glucose, 4 tablet, PRN  dextrose bolus, 125 mL, PRN   Or  dextrose bolus, 250 mL, PRN  glucagon (rDNA), 1 mg, PRN  dextrose, , Continuous PRN  glucose, 4 tablet, PRN  dextrose bolus, 125 mL, PRN   Or  dextrose bolus, 250 mL, PRN  glucagon (rDNA), 1 mg, PRN  dextrose, , Continuous PRN  baclofen, 10 mg, TID PRN  ipratropium-albuterol, 1 ampule, Q15 Min PRN  sodium chloride flush, 5-40 mL, PRN  sodium chloride, , PRN  ondansetron, 4 mg, Q8H PRN   Or  ondansetron, 4 mg, Q6H PRN  polyethylene glycol, 17 g, Daily PRN  acetaminophen, 650 mg, Q6H PRN   Or  acetaminophen, 650 mg, Q6H PRN  guaiFENesin-dextromethorphan, 5 mL, Q4H PRN         Objective:    BP (!) 152/92 Pulse 70   Temp 98 °F (36.7 °C) (Temporal)   Resp 18   Ht 5' 6\" (1.676 m)   Wt 228 lb 7 oz (103.6 kg)   SpO2 99%   BMI 36.87 kg/m²   General Appearance: alert and oriented to person, place and time and in no acute distress  Skin: warm and dry  Head: normocephalic and atraumatic  Neck: neck supple and non tender without mass   Pulmonary/Chest: exp wheezing throughout   Cardiovascular: normal rate, normal S1 and S2 and no carotid bruits  Abdomen: soft, non-tender, non-distended, normal bowel sounds  Extremities: no cyanosis, no clubbing and no edema  Neurologic: speech normal         Recent Labs     12/19/22  0755 12/20/22  0250 12/20/22  1640    137  --    K 4.1 4.3  --     99  --    CO2 30* 32*  --    BUN 18 18  --    CREATININE 0.6 0.7  --    GLUCOSE 375* 319* 480*   CALCIUM 9.6 9.5  --        Recent Labs     12/19/22  0755 12/20/22  0250   WBC 9.9 8.9   RBC 3.64 3.71   HGB 9.9* 10.2*   HCT 34.0 34.9   MCV 93.4 94.1   MCH 27.2 27.5   MCHC 29.1* 29.2*   RDW 13.8 13.6   * 625*   MPV 10.1 9.5         Assessment:    Principal Problem:    Acute respiratory failure with hypoxia (HCC)  Active Problems:    Primary hypertension    Pneumonia due to infectious organism    Elevated platelet count    COPD exacerbation (HCC)    Normocytic anemia    Smoldering myeloma    Multiple lung nodules    Hypothyroidism  Resolved Problems:    * No resolved hospital problems. *      Plan:  1. Acute on chronic respiratory failure with hypoxia likely related to COPD exacerbation +/- pneumonia: baseline of 2L. PT presented to the ER with progressive sob starting 5 days prior to presentation. She saw PCP in office and  was noted to be hypoxic- pulse ox 79% Sent to [facility] ER-however was unable to stay, Then return to ER with sob/ chest pressure. CTA lungs reviewed from ER- no PE. New mild pneumonitis, mild bilateral interstitial pulmonary edema. New subpleural nodules. Procal 0.10. Rapid/ flu negative.  Currently on 2 L NC.     2. Acute exacerbation of COPD; continue steroids/ nebulizers. Per pulmonology plan to change to po steroid taper. 3. Possible pneumonia vs pneumonitis: recently treated 11/25 for pneumonia with omnicef and doxycyline. Procal is negative. Continue IV steroids- plan to change to po steroid taper tomorrow. 4. RML nodules: CTA reviewed. Pulmonology/ oncology consulted. Reporting history of EGD/ colonoscopy in 202- fragments of tubular adenoma. Per oncology once pt more stable- could consider GI evaluation. 5. EDWARD: CPAP    6. Anxiety/ depression: continue home regimen    7. GERD: PPI    8. HTN; continue home regimen    9. HLD; statin    10. History of multiple myelpma    11. Thyroid disease; continue synthroid    12. Hyperglycemia: likely due to steroids-  however hga1c 8.5-  lifestlye modifications and close outpt follow up with PCP           Time spent reviewing chart, clinical exam, discussing case and answering questions with staff/consultants/patient/family = 20 minutes         Dispo: likely dc in 24 hours. NOTE: This report was transcribed using voice recognition software. Every effort was made to ensure accuracy; however, inadvertent computerized transcription errors may be present. Electronically signed by PAM Banda on 12/21/2022 at 9:16 AM       Addendum: I have personally participated in the history, exam, medical decision making with Mary De Anda NP on the date of service and I agree with all of the pertinent clinical information unless otherwise noted. I have also reviewed and agree with the past medical, family, and social history unless otherwise noted. Patient was admitted with acute on chronic hypoxic respiratory failure requiring supplemental oxygen via nasal cannula likely secondary to an acute COPD exacerbation.     PHYSICAL EXAM:  Vitals:  BP (!) 152/92   Pulse 70   Temp 98 °F (36.7 °C) (Temporal)   Resp 18   Ht 5' 6\" (1.676 m)   Wt 228 lb 7 oz (103.6 kg)   SpO2 99%   BMI 36.87 kg/m²   Gen: awake, alert, NAD  Lungs: Scattered wheezes appreciated on auscultation  Heart: RRR, no murmur   Abdomen: soft nontender nondistended positive bowel sounds. Extremities: full range of motion no peripheral edema. Impression:  Principal Problem:    Acute respiratory failure with hypoxia (HCC)  Active Problems:    Primary hypertension    Pneumonia due to infectious organism    Elevated platelet count    COPD exacerbation (HCC)    Normocytic anemia    Smoldering myeloma    Multiple lung nodules    Hypothyroidism  Resolved Problems:    * No resolved hospital problems. *      My findings/plan include:  Patient is admitted with acute on chronic hypoxic respiratory failure requiring supplemental oxygen via nasal cannula likely secondary to an acute COPD exacerbation. She continues on IV Solu-Medrol but plans to transition to oral prednisone soon. She still has scattered wheezes on pulmonary auscultation. The wheezing seems to be improving. I anticipate she will be transitioned to oral prednisone soon and can likely discharge from the hospital within the next 24 hours. NOTE: This report was transcribed using voice recognition software. Every effort was made to ensure accuracy; however, inadvertent computerized transcription errors may be present.   Electronically signed by Parul Henry MD on 12/21/2022 at 12:27 PM

## 2022-12-21 NOTE — CARE COORDINATION
Social Work/Discharge Planning:  Met with patient, her son Jonh Rojas and his girlfriend and completed initial assessment. Explained Social Work role and discussed transition of care/discharge planning. She lives alone in a one story house. PTA she is independent with no adaptive device. She has a cane, walker and wears two liters oxygen at night through [employer]. Patient states she does not have portable oxygen tanks. She has a home health care history but can not recall name of company. She denies any snf history. She states she no longer has a nebulizer. She states she has a prescription to obtain a nebulizer. She is currently requiring four liters oxygen and RN to wean as tolerated. She is seen by PCP at [facility] on [address] and she uses [facility] on [address]. Provided patient with information on [employer] for her furnace per her request.  [facility] plans to return home at discharge. Called Parul with [facility] (ph: [phone]) and confirmed patient order is for two liters continuously. Aimee Smith states patient has order for portable oxygen tanks, but they have not delivered since February. [employer] will need to be called prior to discharge to deliver portable oxygen tank for transport home. Will continue to follow and assist with discharge planning.   Electronically signed by RANDY Paris Chi on 12/21/2022 at 2:25 PM

## 2022-12-21 NOTE — PROGRESS NOTES
Pulmonary Progress Note    Admit Date: 2022                            PCP: Teddy Clinton MD  Principal Problem:    Acute respiratory failure with hypoxia ([address]  Active Problems:    Primary hypertension    Pneumonia due to infectious organism    Elevated platelet count    COPD exacerbation (HCC)    Normocytic anemia    Smoldering myeloma    Multiple lung nodules    Hypothyroidism  Resolved Problems:    * No resolved hospital problems. *      Subjective:  Patient seen resting in bed on 2 L nasal cannula. States she is feeling better       Medications:   dextrose      dextrose      sodium chloride          methylPREDNISolone  40 mg IntraVENous Q12H    enoxaparin  30 mg SubCUTAneous BID    insulin lispro  0-8 Units SubCUTAneous TID WC    insulin lispro  0-4 Units SubCUTAneous Nightly    insulin glargine  15 Units SubCUTAneous Nightly    nystatin  5 mL Oral 4x Daily    amLODIPine  2.5 mg Oral Daily    amitriptyline  50 mg Oral Nightly    citalopram  20 mg Oral Daily    docusate sodium  100 mg Oral BID    montelukast  10 mg Oral Nightly    pantoprazole  40 mg Oral QAM AC    pregabalin  75 mg Oral TID    rosuvastatin  20 mg Oral Daily    losartan  50 mg Oral Daily    sodium chloride flush  5-40 mL IntraVENous 2 times per day    Arformoterol Tartrate  15 mcg Nebulization BID    budesonide  0.5 mg Nebulization BID    doxycycline hyclate  100 mg Oral 2 times per day       Vitals:  VITALS:  BP (!) 152/92   Pulse 70   Temp 98 °F (36.7 °C) (Temporal)   Resp 18   Ht 5' 6\" (1.676 m)   Wt 228 lb 7 oz (103.6 kg)   SpO2 99%   BMI 36.87 kg/m²   24HR INTAKE/OUTPUT:  No intake or output data in the 24 hours ending 22 0957    CURRENT PULSE OXIMETRY:  SpO2: 99 %  24HR PULSE OXIMETRY RANGE:  SpO2  Av.3 %  Min: 85 %  Max: 100 %  CVP:    VENT SETTINGS:      Additional Respiratory Assessments  Heart Rate: 70  Resp: 18  SpO2: 99 %      EXAM:  General: Alert, in NAD, on 2L nc  ENT: No discharge. Pharynx clear. membranes moist   Neck: Supple, Trachea midline. Resp: No accessory muscle use. Non-labored. No rhonchi. Mild expiratory wheeze posteriorly on right. CV: Regular rate. Regular rhythm. No murmur . No edema. ABD: Non-tender. Non-distended. Soft, round . Normal bowel sounds. Skin: Warm and dry. M/S: No cyanosis. No joint deformity. No clubbing. Neuro: Awake. Follows commands. O x 3, SIMMONS  Psych:  calm and interactive     I/O: I/O last 3 completed shifts:  In: -   Out: 800 [Urine:800]  No intake/output data recorded. Results:  CBC:   Recent Labs     12/19/22  0755 12/20/22  0250   WBC 9.9 8.9   HGB 9.9* 10.2*   HCT 34.0 34.9   MCV 93.4 94.1   * 625*     BMP:   Recent Labs     12/19/22  0755 12/20/22  0250    137   K 4.1 4.3    99   CO2 30* 32*   BUN 18 18   CREATININE 0.6 0.7     LFT:   No results for input(s): ALKPHOS, ALT, AST, PROT, BILITOT, BILIDIR, LABALBU in the last 72 hours. PT/INR: No results for input(s): PROTIME, INR in the last 72 hours. Procalcitonin:    Latest Reference Range & Units 12/17/22 07:53   Procalcitonin 0.00 - 0.08 ng/mL 0.14 (H)   (H): Data is abnormally high  Recent Labs     12/20/22  0250   PROCAL 0.10*     Cultures:   Latest Reference Range & Units 12/15/22 16:00 12/17/22 10:46   INFLUENZA A NOT DETECTED  NOT DETECTED    INFLUENZA B NOT DETECTED  NOT DETECTED    RAPID INFLUENZA A/B ANTIGENS   Rpt   Influenza A by PCR Not Detected   Not Detected   Influenza B by PCR Not Detected   Not Detected   SARS-CoV-2 RNA, RT PCR NOT DETECTED  NOT DETECTED    SARS-CoV-2, NAAT Not Detected   Not Detected   Rpt: View report in Results Review for more information      ABG:   No results for input(s): PH, PO2, PCO2, HCO3, BE, O2SAT in the last 72 hours. Films:  XR BONE SURVEY COMPLETE    Result Date: 12/18/2022  EXAMINATION: COMPLETE BONE SURVEY 12/17/2022 8:55 pm COMPARISON: None.  HISTORY: ORDERING SYSTEM PROVIDED HISTORY: r/o lytic lesions TECHNOLOGIST PROVIDED HISTORY: Reason for exam:->r/o lytic lesions FINDINGS: No aggressive or suspicious osseous lesions. Moderate lower lumbar facet degenerative change in moderate bilateral hip joint degenerative change. No aggressive or suspicious osseous lesion. RECOMMENDATION: Careful clinical correlation and follow up recommended. XR CHEST PORTABLE    Result Date: 12/17/2022  EXAMINATION: ONE XRAY VIEW OF THE CHEST 12/17/2022 5:06 am COMPARISON: December 15, 2022 HISTORY: ORDERING SYSTEM PROVIDED HISTORY: SOB TECHNOLOGIST PROVIDED HISTORY: Reason for exam:->SOB FINDINGS: Normal cardiomediastinal silhouette. Lungs clear. No pneumothorax or effusion. Osseous thorax intact. No acute disease. RECOMMENDATION: Careful clinical correlation and follow up recommended. Assessment:    12/17: Procalcitonin 0.14, rapid flu and COVID-negative  12/18: Strep and Legionella negative  12/19: Echo EF 55 to 60%  12/20: 2 L  12/21: 2LNC    Acute exacerbation COPD  RUL/RML pneumonia   Nocturnal hypoxia on 2 L at at bedtime  EDWARD-non compliant        Plan:  Patient is currently on 2 L nasal cannula with pulse ox 95%. She wears 2 L at at bedtime at baseline. She will need ambulatory pulse ox prior to discharge. Continue supplemental O2 to maintain POX >90%  Continue brovana, budesonide and albuterol  Ok to oral steroid taper in am 40x3,30x3,20x3,10x3   On singulair   Continue doxycycline 100 mg twice daily x7 days total, last dose 12/24. Repeat procalcitonin 0.10 12/20   Sputum culture ordered, awaiting specimen  IS for pulmonary hygiene  Will need outpatient PFT and PSG in order to obtain new pap  Will benefit from outpatient pulm rehab and regular follow up         Electronically signed by BRYAN Edmonds on 12/21/2022 at 9:57 AM    Attending Attestation Note:    Patient seen and examined with Hospital Staff, NP. I have extensively reviewed the chart lab work and imaging. I agree with above.   Modifications and amendment of note made as necessary.     In addition, the following apply:    Current Facility-Administered Medications   Medication Dose Route Frequency Provider Last Rate Last Admin    [START ON 12/22/2022] predniSONE (DELTASONE) tablet 40 mg  40 mg Oral Daily BRYAN Zhao - [place]        Followed by    Suraj Vidal ON 12/25/2022] predniSONE (DELTASONE) tablet 30 mg  30 mg Oral Daily Srinivasa Sanchez APRN - CNS        Followed by    Suraj Vidal ON 12/28/2022] predniSONE (DELTASONE) tablet 20 mg  20 mg Oral Daily Srinivasa Sanchez, APRN - CNS        Followed by    Suraj Vidal ON 12/31/2022] predniSONE (DELTASONE) tablet 10 mg  10 mg Oral Daily BRYAN Zhao - CNS        insulin glargine (LANTUS) injection vial 25 Units  25 Units SubCUTAneous Nightly Owen Fry MD        glucose chewable tablet 16 g  4 tablet Oral PRN Owen Fry MD        dextrose bolus 10% 125 mL  125 mL IntraVENous PRN Owen Fry MD        Or    dextrose bolus 10% 250 mL  250 mL IntraVENous PRN Owen Fry MD        glucagon (rDNA) injection 1 mg  1 mg SubCUTAneous PRN Owen Fry MD        dextrose 10 % infusion   IntraVENous Continuous PRN Owen Fry MD        insulin lispro (HUMALOG) injection vial 0-16 Units  0-16 Units SubCUTAneous TID WC Owen Fry MD        insulin lispro (HUMALOG) injection vial 0-4 Units  0-4 Units SubCUTAneous Nightly Owen Fry MD        enoxaparin Sodium (LOVENOX) injection 30 mg  30 mg SubCUTAneous BID Jorge Ambrosia, APRN - CNP   30 mg at 12/21/22 0836    nystatin (MYCOSTATIN) 652790 UNIT/ML suspension 500,000 Units  5 mL Oral 4x Daily Owen Fry MD   500,000 Units at 12/21/22 1324    amLODIPine (NORVASC) tablet 2.5 mg  2.5 mg Oral Daily Robert Hric, DO   2.5 mg at 12/21/22 0835    amitriptyline (ELAVIL) tablet 50 mg  50 mg Oral Nightly Jorge Ambrosia, APRN - CNP   50 mg at 12/20/22 2019    baclofen (LIORESAL) tablet 10 mg  10 mg Oral TID PRN BRYAN Piper - CNP        citalopram (CELEXA) tablet 20 mg  20 mg Oral Daily BRYAN Piper - CNP   20 mg at 12/21/22 6872    docusate sodium (COLACE) capsule 100 mg  100 mg Oral BID Rachel Walters APRN - CNP   100 mg at 12/20/22 2019    montelukast (SINGULAIR) tablet 10 mg  10 mg Oral Nightly BRYAN Piper - CNP   10 mg at 12/20/22 2019    pantoprazole (PROTONIX) tablet 40 mg  40 mg Oral QAM AC Rachel Walters APRN - CNP   40 mg at 12/21/22 7285    pregabalin (LYRICA) capsule 75 mg  75 mg Oral TID BRYAN Piper - CNP   75 mg at 12/21/22 1324    rosuvastatin (CRESTOR) tablet 20 mg  20 mg Oral Daily BRYAN Piper - CNP   20 mg at 12/21/22 0835    ipratropium-albuterol (DUONEB) nebulizer solution 1 ampule  1 ampule Inhalation Q15 Min PRN BRYAN Piper - CNP        losartan (COZAAR) tablet 50 mg  50 mg Oral Daily Rachel Walters APRN - CNP   50 mg at 12/21/22 0835    sodium chloride flush 0.9 % injection 5-40 mL  5-40 mL IntraVENous 2 times per day BRYAN Piper - CNP   10 mL at 12/21/22 0836    sodium chloride flush 0.9 % injection 5-40 mL  5-40 mL IntraVENous PRN BRYAN Piper - CNP        0.9 % sodium chloride infusion   IntraVENous PRN BRYAN Piper - [place]        ondansetron (ZOFRAN-ODT) disintegrating tablet 4 mg  4 mg Oral Q8H PRN BRYAN Piper CNP        Or    ondansetron [facility]) injection 4 mg  4 mg IntraVENous Q6H PRN Rachel Walters APRN - [place]        polyethylene glycol (GLYCOLAX) packet 17 g  17 g Oral Daily PRN Rachel Walters APRN - CNP        acetaminophen (TYLENOL) tablet 650 mg  650 mg Oral Q6H PRN Rachel Walters APRN - CNP   650 mg at 12/19/22 2332    Or    acetaminophen (TYLENOL) suppository 650 mg  650 mg Rectal Q6H PRN BRYAN Piper - CNP        guaiFENesin-dextromethorphan (ROBITUSSIN DM) 100-10 MG/5ML syrup 5 mL  5 mL Oral Q4H PRN BRYAN Piper - CNP   5 mL at 12/17/22 1201    Arformoterol Tartrate (BROVANA) nebulizer solution 15 mcg  15 mcg Nebulization BID Darrel Sanchez APRN - CNS   15 mcg at 12/21/22 0859    budesonide (PULMICORT) nebulizer suspension 500 mcg  0.5 mg Nebulization BID Darrel Sanchez APRN - CNS   500 mcg at 12/21/22 9903    doxycycline hyclate (VIBRAMYCIN) capsule 100 mg  100 mg Oral 2 times per day RBYAN Bradley - CNS   100 mg at 12/21/22 3936      - supportive care to continue at this time  - outpatient PFT, PSG   - PO steroids with taper   - await D/C planning for patient     Vivienne Shirley MD  12/21/2022  3:41 PM

## 2022-12-22 LAB
ADDENDUM ELECTROPHORESIS URINE RANDOM: NORMAL
ANION GAP SERPL CALCULATED.3IONS-SCNC: 6 MMOL/L (ref 7–16)
BUN BLDV-MCNC: 21 MG/DL (ref 6–23)
CALCIUM SERPL-MCNC: 9 MG/DL (ref 8.6–10.2)
CHLORIDE BLD-SCNC: 97 MMOL/L (ref 98–107)
CO2: 35 MMOL/L (ref 22–29)
CREAT SERPL-MCNC: 0.7 MG/DL (ref 0.5–1)
GFR SERPL CREATININE-BSD FRML MDRD: >60 ML/MIN/1.73
GLUCOSE BLD-MCNC: 195 MG/DL (ref 74–99)
HCT VFR BLD CALC: 39.1 % (ref 34–48)
HEMOGLOBIN: 11.8 G/DL (ref 11.5–15.5)
IMMUNOFIXATION URINE: NORMAL
MCH RBC QN AUTO: 28 PG (ref 26–35)
MCHC RBC AUTO-ENTMCNC: 30.2 % (ref 32–34.5)
MCV RBC AUTO: 92.7 FL (ref 80–99.9)
METER GLUCOSE: 194 MG/DL (ref 74–99)
METER GLUCOSE: 293 MG/DL (ref 74–99)
METER GLUCOSE: 334 MG/DL (ref 74–99)
METER GLUCOSE: 414 MG/DL (ref 74–99)
PDW BLD-RTO: 13.7 FL (ref 11.5–15)
PLATELET # BLD: 528 E9/L (ref 130–450)
PMV BLD AUTO: 9.3 FL (ref 7–12)
POTASSIUM SERPL-SCNC: 3.8 MMOL/L (ref 3.5–5)
RBC # BLD: 4.22 E12/L (ref 3.5–5.5)
SODIUM BLD-SCNC: 138 MMOL/L (ref 132–146)
WBC # BLD: 10.2 E9/L (ref 4.5–11.5)

## 2022-12-22 PROCEDURE — 6360000002 HC RX W HCPCS: Performed by: NURSE PRACTITIONER

## 2022-12-22 PROCEDURE — 82962 GLUCOSE BLOOD TEST: CPT

## 2022-12-22 PROCEDURE — 80048 BASIC METABOLIC PNL TOTAL CA: CPT

## 2022-12-22 PROCEDURE — 36415 COLL VENOUS BLD VENIPUNCTURE: CPT

## 2022-12-22 PROCEDURE — 6370000000 HC RX 637 (ALT 250 FOR IP): Performed by: STUDENT IN AN ORGANIZED HEALTH CARE EDUCATION/TRAINING PROGRAM

## 2022-12-22 PROCEDURE — 6370000000 HC RX 637 (ALT 250 FOR IP): Performed by: INTERNAL MEDICINE

## 2022-12-22 PROCEDURE — 2580000003 HC RX 258: Performed by: NURSE PRACTITIONER

## 2022-12-22 PROCEDURE — 99232 SBSQ HOSP IP/OBS MODERATE 35: CPT | Performed by: STUDENT IN AN ORGANIZED HEALTH CARE EDUCATION/TRAINING PROGRAM

## 2022-12-22 PROCEDURE — 6370000000 HC RX 637 (ALT 250 FOR IP): Performed by: CLINICAL NURSE SPECIALIST

## 2022-12-22 PROCEDURE — 6360000002 HC RX W HCPCS: Performed by: CLINICAL NURSE SPECIALIST

## 2022-12-22 PROCEDURE — 2700000000 HC OXYGEN THERAPY PER DAY

## 2022-12-22 PROCEDURE — 6370000000 HC RX 637 (ALT 250 FOR IP): Performed by: NURSE PRACTITIONER

## 2022-12-22 PROCEDURE — APPSS30 APP SPLIT SHARED TIME 16-30 MINUTES: Performed by: NURSE PRACTITIONER

## 2022-12-22 PROCEDURE — 85027 COMPLETE CBC AUTOMATED: CPT

## 2022-12-22 PROCEDURE — 94640 AIRWAY INHALATION TREATMENT: CPT

## 2022-12-22 PROCEDURE — 2060000000 HC ICU INTERMEDIATE R&B

## 2022-12-22 RX ORDER — INSULIN GLARGINE 100 [IU]/ML
32 INJECTION, SOLUTION SUBCUTANEOUS NIGHTLY
Status: DISCONTINUED | OUTPATIENT
Start: 2022-12-22 | End: 2022-12-23 | Stop reason: HOSPADM

## 2022-12-22 RX ADMIN — INSULIN LISPRO 8 UNITS: 100 INJECTION, SOLUTION INTRAVENOUS; SUBCUTANEOUS at 11:07

## 2022-12-22 RX ADMIN — DOCUSATE SODIUM 100 MG: 100 CAPSULE, LIQUID FILLED ORAL at 20:45

## 2022-12-22 RX ADMIN — PANTOPRAZOLE SODIUM 40 MG: 40 TABLET, DELAYED RELEASE ORAL at 06:05

## 2022-12-22 RX ADMIN — SODIUM CHLORIDE, PRESERVATIVE FREE 10 ML: 5 INJECTION INTRAVENOUS at 20:45

## 2022-12-22 RX ADMIN — DOXYCYCLINE HYCLATE 100 MG: 100 CAPSULE ORAL at 08:50

## 2022-12-22 RX ADMIN — DOXYCYCLINE HYCLATE 100 MG: 100 CAPSULE ORAL at 20:45

## 2022-12-22 RX ADMIN — CITALOPRAM HYDROBROMIDE 20 MG: 20 TABLET ORAL at 08:50

## 2022-12-22 RX ADMIN — AMLODIPINE BESYLATE 2.5 MG: 2.5 TABLET ORAL at 08:50

## 2022-12-22 RX ADMIN — PETROLATUM: 420 OINTMENT TOPICAL at 11:05

## 2022-12-22 RX ADMIN — PREGABALIN 75 MG: 75 CAPSULE ORAL at 14:04

## 2022-12-22 RX ADMIN — NYSTATIN 500000 UNITS: 500000 SUSPENSION ORAL at 08:52

## 2022-12-22 RX ADMIN — PREGABALIN 75 MG: 75 CAPSULE ORAL at 20:45

## 2022-12-22 RX ADMIN — PREDNISONE 40 MG: 20 TABLET ORAL at 08:51

## 2022-12-22 RX ADMIN — PREGABALIN 75 MG: 75 CAPSULE ORAL at 08:50

## 2022-12-22 RX ADMIN — ARFORMOTEROL TARTRATE 15 MCG: 15 SOLUTION RESPIRATORY (INHALATION) at 08:40

## 2022-12-22 RX ADMIN — ENOXAPARIN SODIUM 30 MG: 100 INJECTION SUBCUTANEOUS at 20:45

## 2022-12-22 RX ADMIN — NYSTATIN 500000 UNITS: 500000 SUSPENSION ORAL at 14:04

## 2022-12-22 RX ADMIN — ENOXAPARIN SODIUM 30 MG: 100 INJECTION SUBCUTANEOUS at 08:56

## 2022-12-22 RX ADMIN — INSULIN LISPRO 4 UNITS: 100 INJECTION, SOLUTION INTRAVENOUS; SUBCUTANEOUS at 20:45

## 2022-12-22 RX ADMIN — INSULIN LISPRO 16 UNITS: 100 INJECTION, SOLUTION INTRAVENOUS; SUBCUTANEOUS at 15:48

## 2022-12-22 RX ADMIN — ROSUVASTATIN CALCIUM 20 MG: 20 TABLET, FILM COATED ORAL at 08:50

## 2022-12-22 RX ADMIN — NYSTATIN 500000 UNITS: 500000 SUSPENSION ORAL at 20:45

## 2022-12-22 RX ADMIN — AMITRIPTYLINE HYDROCHLORIDE 50 MG: 25 TABLET, FILM COATED ORAL at 20:45

## 2022-12-22 RX ADMIN — INSULIN GLARGINE 32 UNITS: 100 INJECTION, SOLUTION SUBCUTANEOUS at 20:45

## 2022-12-22 RX ADMIN — NYSTATIN 500000 UNITS: 500000 SUSPENSION ORAL at 17:00

## 2022-12-22 RX ADMIN — BUDESONIDE 500 MCG: 0.5 SUSPENSION RESPIRATORY (INHALATION) at 19:40

## 2022-12-22 RX ADMIN — SODIUM CHLORIDE, PRESERVATIVE FREE 10 ML: 5 INJECTION INTRAVENOUS at 08:52

## 2022-12-22 RX ADMIN — LOSARTAN POTASSIUM 50 MG: 50 TABLET, FILM COATED ORAL at 08:50

## 2022-12-22 RX ADMIN — ARFORMOTEROL TARTRATE 15 MCG: 15 SOLUTION RESPIRATORY (INHALATION) at 19:40

## 2022-12-22 RX ADMIN — MONTELUKAST SODIUM 10 MG: 10 TABLET ORAL at 20:45

## 2022-12-22 RX ADMIN — BUDESONIDE 500 MCG: 0.5 SUSPENSION RESPIRATORY (INHALATION) at 08:40

## 2022-12-22 ASSESSMENT — PAIN SCALES - GENERAL: PAINLEVEL_OUTOF10: 0

## 2022-12-22 NOTE — PROGRESS NOTES
[facility] Progress Note    Admitting Date and Time: 12/17/2022  3:43 AM  Admit Dx: Acute respiratory failure with hypoxia (HCC) [J96.01]    Subjective:  Patient is being followed for Acute respiratory failure with hypoxia ([address] [J96.01]     Patient awake and alert- resting in bed comfortably  Feels better  Less wheezing today  Blood sugars running high with steroids      ROS: denies fever, chills, cp, sob, n/v, HA unless stated above.       predniSONE  40 mg Oral Daily    Followed by    Arsen Ivory ON 12/25/2022] predniSONE  30 mg Oral Daily    Followed by    Arsen Ivory ON 12/28/2022] predniSONE  20 mg Oral Daily    Followed by    Arsen Ivory ON 12/31/2022] predniSONE  10 mg Oral Daily    insulin glargine  25 Units SubCUTAneous Nightly    insulin lispro  0-16 Units SubCUTAneous TID WC    insulin lispro  0-4 Units SubCUTAneous Nightly    enoxaparin  30 mg SubCUTAneous BID    nystatin  5 mL Oral 4x Daily    amLODIPine  2.5 mg Oral Daily    amitriptyline  50 mg Oral Nightly    citalopram  20 mg Oral Daily    docusate sodium  100 mg Oral BID    montelukast  10 mg Oral Nightly    pantoprazole  40 mg Oral QAM AC    pregabalin  75 mg Oral TID    rosuvastatin  20 mg Oral Daily    losartan  50 mg Oral Daily    sodium chloride flush  5-40 mL IntraVENous 2 times per day    Arformoterol Tartrate  15 mcg Nebulization BID    budesonide  0.5 mg Nebulization BID    doxycycline hyclate  100 mg Oral 2 times per day     white petrolatum, , BID PRN  glucose, 4 tablet, PRN  dextrose bolus, 125 mL, PRN   Or  dextrose bolus, 250 mL, PRN  glucagon (rDNA), 1 mg, PRN  dextrose, , Continuous PRN  baclofen, 10 mg, TID PRN  ipratropium-albuterol, 1 ampule, Q15 Min PRN  sodium chloride flush, 5-40 mL, PRN  sodium chloride, , PRN  ondansetron, 4 mg, Q8H PRN   Or  ondansetron, 4 mg, Q6H PRN  polyethylene glycol, 17 g, Daily PRN  acetaminophen, 650 mg, Q6H PRN   Or  acetaminophen, 650 mg, Q6H PRN  guaiFENesin-dextromethorphan, 5 mL, Q4H PRN         Objective:    BP (!) 147/88   Pulse 84   Temp 97.5 °F (36.4 °C) (Oral)   Resp 20   Ht 5' 6\" (1.676 m)   Wt 220 lb 6.4 oz (100 kg)   SpO2 95%   BMI 35.57 kg/m²   General Appearance: alert and oriented to person, place and time and in no acute distress  Skin: warm and dry  Head: normocephalic and atraumatic  Neck: neck supple and non tender without mass   Pulmonary/Chest: diminished throughout with scattered wheezing   Cardiovascular: normal rate, normal S1 and S2 and no carotid bruits  Abdomen: soft, non-tender, non-distended, normal bowel sounds  Extremities: no cyanosis, no clubbing and no edema  Neurologic: speech normal         Recent Labs     12/20/22  0250 12/20/22  1640 12/21/22  1158 12/22/22  0720     --  133 138   K 4.3  --  4.3 3.8   CL 99  --  94* 97*   CO2 32*  --  32* 35*   BUN 18  --  24* 21   CREATININE 0.7  --  0.8 0.7   GLUCOSE 319* 480* 377* 195*   CALCIUM 9.5  --  9.0 9.0       Recent Labs     12/20/22  0250 12/21/22  1158 12/22/22  0720   WBC 8.9 9.1 10.2   RBC 3.71 4.07 4.22   HGB 10.2* 11.2* 11.8   HCT 34.9 37.8 39.1   MCV 94.1 92.9 92.7   MCH 27.5 27.5 28.0   MCHC 29.2* 29.6* 30.2*   RDW 13.6 13.6 13.7   * 557* 528*   MPV 9.5 9.5 9.3       Assessment:    Principal Problem:    Acute respiratory failure with hypoxia (HCC)  Active Problems:    Primary hypertension    Pneumonia due to infectious organism    Elevated platelet count    COPD exacerbation (HCC)    Normocytic anemia    Smoldering myeloma    Multiple lung nodules    Hypothyroidism  Resolved Problems:    * No resolved hospital problems. *      Plan:  1. Acute on chronic respiratory failure with hypoxia likely related to COPD exacerbation +/- pneumonia: baseline of 2L. PT presented to the ER with progressive sob starting 5 days prior to presentation. She saw PCP in office and  was noted to be hypoxic- pulse ox 79% Sent to [place] ER-however was unable to stay, Then return to ER with sob/ chest pressure. CTA lungs reviewed from ER- no PE. New mild pneumonitis, mild bilateral interstitial pulmonary edema. New subpleural nodules. Procal 0.10. Rapid/ flu negative. Currently on 2 L NC. Ambulatory pulse ox check      2. Acute exacerbation of COPD; continue steroids/ nebulizers. po steroid taper     3. Possible pneumonia vs pneumonitis: recently treated 11/25 for pneumonia with omnicef and doxycyline. Procal is negative. IV steroids changed to po     4. RML nodules: CTA reviewed. Pulmonology/ oncology consulted. Reporting history of EGD/ colonoscopy in 202- fragments of tubular adenoma. Per oncology once pt more stable- could consider GI evaluation. 5. EDWARD: CPAP     6. Anxiety/ depression: continue home regimen     7. GERD: PPI     8. HTN; continue home regimen     9. HLD; statin     10. History of multiple myelpma     11. Thyroid disease; continue synthroid     12. Hyperglycemia in DM II: likely due to steroids-  hga1c 8.5- Pt has been on basal insulin /ss inpatient. Since  hga1c was rather controlled prior to acute illness will plan to not discharge on insulin with hopes that blood sugars will start trending down off of IV steeoids. Pt  reporting her glucometer is currently not working and will need a glucometer/ testing supplies on discharge. Will need to monitor glucose closely at home. Dispo: plan for DC in 24 hours. Would like to monitor blood sugars overnight. Time spent reviewing chart, clinical exam, discussing case and answering questions with staff/consultants/patient/family = 20 minutes            Dispo: likely dc in 24 hours. NOTE: This report was transcribed using voice recognition software. Every effort was made to ensure accuracy; however, inadvertent computerized transcription errors may be present.   Electronically signed by PAM Siegel Se on 12/22/2022 at 2:21 PM       Addendum: I have personally participated in the history, exam, medical decision making with Ramila Marinelli NP on the date of service and I agree with all of the pertinent clinical information unless otherwise noted. I have also reviewed and agree with the past medical, family, and social history unless otherwise noted. Patient was admitted with acute on chronic hypoxic respiratory failure requiring supplemental oxygen via nasal cannula likely secondary to an acute COPD exacerbation and possible community-acquired versus atypical pneumonia. He is being transitioned to oral steroids today. She reports her wheezing has improved and feels less short of breath. She is on her baseline oxygen requirement of 2 L supplemental oxygen via nasal cannula. PHYSICAL EXAM:  Vitals:  BP (!) 147/88   Pulse 84   Temp 97.5 °F (36.4 °C) (Oral)   Resp 20   Ht 5' 6\" (1.676 m)   Wt 220 lb 6.4 oz (100 kg)   SpO2 95%   BMI 35.57 kg/m²   Gen: awake, alert, NAD  Lungs: Scattered wheezes appreciated  Heart: RRR, no murmur   Abdomen: soft nontender nondistended positive bowel sounds. Extremities: full range of motion no peripheral edema. Impression:  Principal Problem:    Acute respiratory failure with hypoxia (HCC)  Active Problems:    Primary hypertension    Pneumonia due to infectious organism    Elevated platelet count    COPD exacerbation (HCC)    Normocytic anemia    Smoldering myeloma    Multiple lung nodules    Hypothyroidism  Resolved Problems:    * No resolved hospital problems. *      My findings/plan include:  Patient is admitted with acute on chronic hypoxic respiratory failure requiring supplemental oxygen via nasal cannula likely secondary to an acute COPD exacerbation with possible community-acquired versus atypical pneumonia. She has been weaned to her baseline of 2 L supplemental oxygen via nasal cannula. She reports her breathing has improved and her wheezing has lessened. She is being transitioned to oral steroids today by pulmonology.   Her blood sugars are still elevated and uncontrolled. Her Lantus dose will be increased to 32 units nightly tonight. We will continue insulin lispro high-dose sliding scale. May require additional doses of insulin lispro to keep her blood sugar less than 180 as a goal.  We will continue hospitalization for 24 hours and likely discharge in the morning. NOTE: This report was transcribed using voice recognition software. Every effort was made to ensure accuracy; however, inadvertent computerized transcription errors may be present.   Electronically signed by Esperanza Palma MD on 12/22/2022 at 2:30 PM

## 2022-12-22 NOTE — PROGRESS NOTES
Inpatient Hematology/Oncology Progress Note    Subjective:   Better, the shortness of breath had improved, on O2 NC at 2l/min. Objective:  BP (!) 105/93   Pulse 74   Temp 97.7 °F (36.5 °C) (Oral)   Resp 20   Ht 5' 6\" (1.676 m)   Wt 228 lb 7 oz (103.6 kg)   SpO2 93%   BMI 36.87 kg/m²   GENERAL: Alert, oriented x 3, tired  HEENT: EOMI. Oropharynx clear. NECK: Supple. Without lymphadenopathy. LUNGS: Good air entry bilaterally. No wheezing, crackles or ronchi. CARDIOVASCULAR: Regular rate. No murmurs, rubs or gallops. ABDOMEN: Soft. Non-tender, non-distended. EXTREMITIES: Without clubbing, cyanosis. LE edema bilaterally. NEUROLOGIC: No focal deficits. ECOG PS 2    Lab Results   Component Value Date    WBC 9.1 12/21/2022    HGB 11.2 (L) 12/21/2022    HCT 37.8 12/21/2022    MCV 92.9 12/21/2022     (H) 12/21/2022     Lab Results   Component Value Date     12/21/2022    K 4.3 12/21/2022    CL 94 (L) 12/21/2022    CO2 32 (H) 12/21/2022    BUN 24 (H) 12/21/2022    CREATININE 0.8 12/21/2022    GLUCOSE 377 (H) 12/21/2022    CALCIUM 9.0 12/21/2022    PROT 7.2 12/18/2022    LABALBU 2.4 (L) 12/18/2022    BILITOT 0.4 12/15/2022    ALKPHOS 135 (H) 12/15/2022    AST 39 (H) 12/15/2022    ALT 38 (H) 12/15/2022    LABGLOM >60 12/21/2022    GFRAA >60 06/23/2022     Impression/Plan:  80-year-old lady who was diagnosed with IgG lambda MGUS in 2008, used to follow up with Dr. Jarad Sherman at [facility], last f/up with him was in 2012. She did not have a bone marrow biopsy and aspirate done when she was diagnosed with MGUS. SPEP with immunofixation revealed monoclonal IgG lambda, M-spike 0.7 gm/dl  from 9/9/2016, stable compared with the previous M-spike level. The patient does not have anemia, renal failure, hypercalcemia or lytic lesions on the lumbar spine MRI.  IgG had increased sine 2014, skeletal survey was ordered, and is negative for lytic lesions    BM bx and aspirate on 11/28/2016, Clot and aspirate suboptimal, biopsy showing 15% plasma cells, Flow cytometry positive for a  lambda restricted plasma cell neoplasm, Cytogenetics revealing a normal female karyotype, MM FISH negative. 15% plasma cells in the bone marrow, no evidence of end organ damage, she has a smoldering multiple myeloma,risk of progression to MM, at a rate of 10 percent per year for the first five years, 3 percent per year for the next five years, and 1 to 2 percent per year for the following 10 years. SPEP with immunofixation had revealed IgG lambda monoclonal protein, and spike is 1G/DL, IgA 108, IgG 2/6/2008, had increased slightly, IgM 29, kappa 49.81 lambda 22.56, ratio is 2. 21. She does not have anemia, no hypercalcemia or kidney disease. The risk of progression to myeloma was discussed with the patient, Skeletal survey was done in September 2021, was negative for lytic lesions. Admitted for fatigue SOB and coughing. Pneumonia, on coverage for community-acquired pneumonia. Hypoxic respiratory failure, lung nodule, follows with pulmonary, have been consulted. Start Brovana, budesonide, and albuterol  On Doxy for 7 days. One dose of rocephin in ER  Taper per pulmonary  LE edema; Sanya LE u/s noted no DVT. Imaging reviewed. Myeloma markers ordered   Hb 9.7 Hct 32.3  MCV 94.2  WBC 9.4    BUN 21  Creat 0.8  Ca 9.4  Fe 49 TIBC 263  FeSat 19% Ferritin 69  Labs reviewed. SPEP shows M spike 1.1 g/dL (previously 1.3 g/dL on 7/5/2022)  Serum immunofixation, redemonstrating IgG lambda monoclonal protein  Immunoglobulins have resolved   - IgA 127   - IgG 2159 (previously 2509 on 7/5/2022)   - IgM 39  Kappa is 57.72, overall stable, lambda 28.58, decreased, kappa to lambda ratio 1.67. XR bone survey 12/17/2022 noted no lytic lesions. Imaging reviewed. No evidence of progression to multiple myeloma.   Anemia, likely secondary to the infection, and chronic inflammation, iron panel is okay, B12 folate deficiency, the thrombocytosis likely reactive, we will monitor her CBCs closely once acute illness/issues resolve. F/up Chest CT per pulmonary.     Pankaj Cortez MD   HEMATOLOGY/MEDICAL [address] MEDICAL ONCOLOGY  [address] [place] [id number]  Dept: [id number] Rocco Martin: [phone]

## 2022-12-22 NOTE — CARE COORDINATION
Social Work/Discharge Planning:  Jeff Morales with [employer] and requested for him to deliver a portable oxygen tank. Plan remains home at discharge. Will continue to follow.   Electronically signed by RANDY Horton on 12/22/2022 at 11:41 AM

## 2022-12-22 NOTE — PROGRESS NOTES
Pulse ox was 95% on 2L at rest.   Ambulated patient on 2L  Oxygen saturation was 94% 2L while ambulating.    Recovery pulse ox was 94% on 2L liters of oxygen at rest.

## 2022-12-22 NOTE — PROGRESS NOTES
Inpatient Hematology/Oncology Progress Note    Subjective:   She feels gradual improvement in her breathing. Denies of new events or new symptoms. Objective:  BP (!) 147/88   Pulse 84   Temp 97.5 °F (36.4 °C) (Oral)   Resp 20   Ht 5' 6\" (1.676 m)   Wt 220 lb 6.4 oz (100 kg)   SpO2 95%   BMI 35.57 kg/m²   GENERAL: Alert, oriented x 3, tired  HEENT: EOMI. Oropharynx clear. NECK: Supple. Without lymphadenopathy. LUNGS: Wheezes bilaterally. Decreased air flow  CARDIOVASCULAR: Regular rate. No murmurs, rubs or gallops. ABDOMEN: Soft. Non-tender, non-distended. EXTREMITIES: Without clubbing, cyanosis. LE edema bilaterally. NEUROLOGIC: No focal deficits. ECOG PS 2    Lab Results   Component Value Date    WBC 10.2 12/22/2022    HGB 11.8 12/22/2022    HCT 39.1 12/22/2022    MCV 92.7 12/22/2022     (H) 12/22/2022     Lab Results   Component Value Date     12/22/2022    K 3.8 12/22/2022    CL 97 (L) 12/22/2022    CO2 35 (H) 12/22/2022    BUN 21 12/22/2022    CREATININE 0.7 12/22/2022    GLUCOSE 195 (H) 12/22/2022    CALCIUM 9.0 12/22/2022    PROT 7.2 12/18/2022    LABALBU 2.4 (L) 12/18/2022    BILITOT 0.4 12/15/2022    ALKPHOS 135 (H) 12/15/2022    AST 39 (H) 12/15/2022    ALT 38 (H) 12/15/2022    LABGLOM >60 12/22/2022    GFRAA >60 06/23/2022     Impression/Plan:  59-year-old lady who was diagnosed with IgG lambda MGUS in 2008, used to follow up with Dr. Jose R Perez at [facility], last f/up with him was in 2012. She did not have a bone marrow biopsy and aspirate done when she was diagnosed with MGUS. SPEP with immunofixation revealed monoclonal IgG lambda, M-spike 0.7 gm/dl  from 9/9/2016, stable compared with the previous M-spike level. The patient does not have anemia, renal failure, hypercalcemia or lytic lesions on the lumbar spine MRI.  IgG had increased sine 2014, skeletal survey was ordered, and is negative for lytic lesions    BM bx and aspirate on 11/28/2016, Clot and aspirate suboptimal, biopsy showing 15% plasma cells, Flow cytometry positive for a  lambda restricted plasma cell neoplasm, Cytogenetics revealing a normal female karyotype, MM FISH negative. 15% plasma cells in the bone marrow, no evidence of end organ damage, she has a smoldering multiple myeloma,risk of progression to MM, at a rate of 10 percent per year for the first five years, 3 percent per year for the next five years, and 1 to 2 percent per year for the following 10 years. SPEP with immunofixation had revealed IgG lambda monoclonal protein, and spike is 1G/DL, IgA 108, IgG 2/6/2008, had increased slightly, IgM 29, kappa 49.81 lambda 22.56, ratio is 2. 21. She does not have anemia, no hypercalcemia or kidney disease. The risk of progression to myeloma was discussed with the patient, Skeletal survey was done in September 2021, was negative for lytic lesions. Admitted for fatigue SOB and coughing. Pneumonia, on coverage for community-acquired pneumonia. Hypoxic respiratory failure, lung nodule, follows with pulmonary, have been consulted. Start Brovana, budesonide, and albuterol  On Doxy for 7 days. One dose of rocephin in ER  Taper per pulmonary  LE edema; Sanya LE u/s noted no DVT. Imaging reviewed. Myeloma markers ordered   Hb 9.7 Hct 32.3  MCV 94.2  WBC 9.4    BUN 21  Creat 0.8  Ca 9.4  Fe 49 TIBC 263  FeSat 19% Ferritin 69  Labs reviewed. SPEP shows M spike 1.1 g/dL (previously 1.3 g/dL on 7/5/2022)  Serum immunofixation, redemonstrating IgG lambda monoclonal protein  Immunoglobulins have resolved   - IgA 127   - IgG 2159 (previously 2509 on 7/5/2022)   - IgM 39  Kappa is 57.72, overall stable, lambda 28.58, decreased, kappa to lambda ratio 1.67. XR bone survey 12/17/2022 noted no lytic lesions. Imaging reviewed. No evidence of progression to multiple myeloma.   Anemia, likely secondary to the infection, and chronic inflammation, iron panel is okay, B12 folate deficiency, the thrombocytosis likely reactive, we will monitor her CBCs closely once acute illness/issues resolve. Right sided lung nodules noted  F/up Chest CT per pulmonary.     Stormy Wheeler MD  Medical Oncology  [facility]  12/22/22 3:55 PM

## 2022-12-22 NOTE — PROGRESS NOTES
Pulmonary Progress Note    Admit Date: 12/17/2022                            PCP: Jaleesa Rome MD  Principal Problem:    Acute respiratory failure with hypoxia ([address]  Active Problems:    Primary hypertension    Pneumonia due to infectious organism    Elevated platelet count    COPD exacerbation (HCC)    Normocytic anemia    Smoldering myeloma    Multiple lung nodules    Hypothyroidism  Resolved Problems:    * No resolved hospital problems. *      Subjective:  Patient seen resting in bed on 2 L nasal cannula. States she is feeling better. Reports her BS are still elevated and will be d/c tomorrow.         Medications:   dextrose      sodium chloride          predniSONE  40 mg Oral Daily    Followed by    Obed Coulter ON 12/25/2022] predniSONE  30 mg Oral Daily    Followed by    Obed Coulter ON 12/28/2022] predniSONE  20 mg Oral Daily    Followed by    Obed Coulter ON 12/31/2022] predniSONE  10 mg Oral Daily    insulin glargine  25 Units SubCUTAneous Nightly    insulin lispro  0-16 Units SubCUTAneous TID WC    insulin lispro  0-4 Units SubCUTAneous Nightly    enoxaparin  30 mg SubCUTAneous BID    nystatin  5 mL Oral 4x Daily    amLODIPine  2.5 mg Oral Daily    amitriptyline  50 mg Oral Nightly    citalopram  20 mg Oral Daily    docusate sodium  100 mg Oral BID    montelukast  10 mg Oral Nightly    pantoprazole  40 mg Oral QAM AC    pregabalin  75 mg Oral TID    rosuvastatin  20 mg Oral Daily    losartan  50 mg Oral Daily    sodium chloride flush  5-40 mL IntraVENous 2 times per day    Arformoterol Tartrate  15 mcg Nebulization BID    budesonide  0.5 mg Nebulization BID    doxycycline hyclate  100 mg Oral 2 times per day       Vitals:  VITALS:  BP (!) 164/82   Pulse 62   Temp 98.5 °F (36.9 °C) (Oral)   Resp 18   Ht 5' 6\" (1.676 m)   Wt 220 lb 6.4 oz (100 kg)   SpO2 100%   BMI 35.57 kg/m²   24HR INTAKE/OUTPUT:    Intake/Output Summary (Last 24 hours) at 12/22/2022 1216  Last data filed at 12/22/2022 0605  Gross per 24 hour Intake 10 ml   Output 550 ml   Net -540 ml       CURRENT PULSE OXIMETRY:  SpO2: 100 %  24HR PULSE OXIMETRY RANGE:  SpO2  Av.8 %  Min: 93 %  Max: 100 %  CVP:    VENT SETTINGS:      Additional Respiratory Assessments  Heart Rate: 62  Resp: 18  SpO2: 100 %      EXAM:  General: Alert, in NAD, on 2L nc  ENT: No discharge. Pharynx clear. membranes moist   Neck: Supple, Trachea midline. Resp: No accessory muscle use. Non-labored. No rhonchi. Mild expiratory wheeze posteriorly bilaterally  CV: Regular rate. Regular rhythm. No murmur . No edema. ABD: Non-tender. Non-distended. Soft, round . Normal bowel sounds. Skin: Warm and dry. M/S: No cyanosis. No joint deformity. No clubbing. Neuro: Awake. Follows commands. O x 3, SIMMONS  Psych:  calm and interactive     I/O: I/O last 3 completed shifts: In: 10 [I.V.:10]  Out: 550 [Urine:550]  No intake/output data recorded. Results:  CBC:   Recent Labs     22  0250 22  1158 22  0720   WBC 8.9 9.1 10.2   HGB 10.2* 11.2* 11.8   HCT 34.9 37.8 39.1   MCV 94.1 92.9 92.7   * 557* 528*     BMP:   Recent Labs     22  0250 22  1158 22  0720    133 138   K 4.3 4.3 3.8   CL 99 94* 97*   CO2 32* 32* 35*   BUN 18 24* 21   CREATININE 0.7 0.8 0.7     LFT:   No results for input(s): ALKPHOS, ALT, AST, PROT, BILITOT, BILIDIR, LABALBU in the last 72 hours. PT/INR: No results for input(s): PROTIME, INR in the last 72 hours.   Procalcitonin:    Latest Reference Range & Units 22 07:53   Procalcitonin 0.00 - 0.08 ng/mL 0.14 (H)   (H): Data is abnormally high  Recent Labs     22  025   PROCAL 0.10*     Cultures:   Latest Reference Range & Units 12/15/22 16:00 22 10:46   INFLUENZA A NOT DETECTED  NOT DETECTED    INFLUENZA B NOT DETECTED  NOT DETECTED    RAPID INFLUENZA A/B ANTIGENS   Rpt   Influenza A by PCR Not Detected   Not Detected   Influenza B by PCR Not Detected   Not Detected   SARS-CoV-2 RNA, RT PCR NOT DETECTED procalcitonin 0.10 12/20   Sputum culture ordered, awaiting specimen  IS for pulmonary hygiene  Will need outpatient PFT and PSG in order to obtain new pap  Will benefit from outpatient pulm rehab and regular follow up     Appears at baseline, ok for discharge from pulmonary standpoint. Follow up in office in 2 weeks for follow up with Dr. Lacey Paredes.            Electronically signed by BRYAN Burns NP on 12/22/2022 at 12:16 PM

## 2022-12-23 VITALS
RESPIRATION RATE: 18 BRPM | HEART RATE: 75 BPM | OXYGEN SATURATION: 96 % | WEIGHT: 221 LBS | SYSTOLIC BLOOD PRESSURE: 160 MMHG | TEMPERATURE: 97 F | BODY MASS INDEX: 35.52 KG/M2 | DIASTOLIC BLOOD PRESSURE: 87 MMHG | HEIGHT: 66 IN

## 2022-12-23 LAB
ANION GAP SERPL CALCULATED.3IONS-SCNC: 6 MMOL/L (ref 7–16)
BUN BLDV-MCNC: 19 MG/DL (ref 6–23)
CALCIUM SERPL-MCNC: 9.2 MG/DL (ref 8.6–10.2)
CHLORIDE BLD-SCNC: 98 MMOL/L (ref 98–107)
CO2: 34 MMOL/L (ref 22–29)
CREAT SERPL-MCNC: 0.7 MG/DL (ref 0.5–1)
GFR SERPL CREATININE-BSD FRML MDRD: >60 ML/MIN/1.73
GLUCOSE BLD-MCNC: 164 MG/DL (ref 74–99)
METER GLUCOSE: 157 MG/DL (ref 74–99)
METER GLUCOSE: 97 MG/DL (ref 74–99)
POTASSIUM REFLEX MAGNESIUM: 4.6 MMOL/L (ref 3.5–5)
REASON FOR REJECTION: NORMAL
REJECTED TEST: NORMAL
SODIUM BLD-SCNC: 138 MMOL/L (ref 132–146)

## 2022-12-23 PROCEDURE — 94640 AIRWAY INHALATION TREATMENT: CPT

## 2022-12-23 PROCEDURE — 6370000000 HC RX 637 (ALT 250 FOR IP): Performed by: STUDENT IN AN ORGANIZED HEALTH CARE EDUCATION/TRAINING PROGRAM

## 2022-12-23 PROCEDURE — 6370000000 HC RX 637 (ALT 250 FOR IP): Performed by: INTERNAL MEDICINE

## 2022-12-23 PROCEDURE — 80048 BASIC METABOLIC PNL TOTAL CA: CPT

## 2022-12-23 PROCEDURE — 36415 COLL VENOUS BLD VENIPUNCTURE: CPT

## 2022-12-23 PROCEDURE — 6370000000 HC RX 637 (ALT 250 FOR IP): Performed by: NURSE PRACTITIONER

## 2022-12-23 PROCEDURE — 2580000003 HC RX 258: Performed by: NURSE PRACTITIONER

## 2022-12-23 PROCEDURE — 2700000000 HC OXYGEN THERAPY PER DAY

## 2022-12-23 PROCEDURE — 6370000000 HC RX 637 (ALT 250 FOR IP): Performed by: CLINICAL NURSE SPECIALIST

## 2022-12-23 PROCEDURE — 6360000002 HC RX W HCPCS: Performed by: CLINICAL NURSE SPECIALIST

## 2022-12-23 PROCEDURE — 82962 GLUCOSE BLOOD TEST: CPT

## 2022-12-23 PROCEDURE — 99239 HOSP IP/OBS DSCHRG MGMT >30: CPT | Performed by: STUDENT IN AN ORGANIZED HEALTH CARE EDUCATION/TRAINING PROGRAM

## 2022-12-23 RX ORDER — GLUCOSAMINE HCL/CHONDROITIN SU 500-400 MG
CAPSULE ORAL
Qty: 100 STRIP | Refills: 0 | Status: SHIPPED | OUTPATIENT
Start: 2022-12-23 | End: 2022-12-23 | Stop reason: SDUPTHER

## 2022-12-23 RX ORDER — LANCETS 30 GAUGE
EACH MISCELLANEOUS
Qty: 100 EACH | Refills: 0 | Status: SHIPPED | OUTPATIENT
Start: 2022-12-23

## 2022-12-23 RX ORDER — BLOOD-GLUCOSE METER
1 KIT MISCELLANEOUS DAILY
Qty: 1 KIT | Refills: 0 | Status: SHIPPED | OUTPATIENT
Start: 2022-12-23

## 2022-12-23 RX ORDER — ENOXAPARIN SODIUM 100 MG/ML
40 INJECTION SUBCUTANEOUS DAILY
Status: DISCONTINUED | OUTPATIENT
Start: 2022-12-23 | End: 2022-12-23 | Stop reason: HOSPADM

## 2022-12-23 RX ORDER — AMLODIPINE BESYLATE 2.5 MG/1
2.5 TABLET ORAL DAILY
Qty: 30 TABLET | Refills: 0 | Status: SHIPPED | OUTPATIENT
Start: 2022-12-24 | End: 2023-01-23

## 2022-12-23 RX ORDER — TIOTROPIUM BROMIDE 18 UG/1
18 CAPSULE ORAL; RESPIRATORY (INHALATION) DAILY
Qty: 30 CAPSULE | Refills: 5 | Status: SHIPPED | OUTPATIENT
Start: 2022-12-23

## 2022-12-23 RX ORDER — GLUCOSAMINE HCL/CHONDROITIN SU 500-400 MG
CAPSULE ORAL
Qty: 100 STRIP | Refills: 0 | Status: SHIPPED | OUTPATIENT
Start: 2022-12-23

## 2022-12-23 RX ORDER — CALCIUM POLYCARBOPHIL 625 MG
625 TABLET ORAL 2 TIMES DAILY
Qty: 180 TABLET | Refills: 3 | Status: SHIPPED | OUTPATIENT
Start: 2022-12-23

## 2022-12-23 RX ORDER — BLOOD-GLUCOSE METER
1 KIT MISCELLANEOUS DAILY
Qty: 1 KIT | Refills: 0 | Status: SHIPPED | OUTPATIENT
Start: 2022-12-23 | End: 2022-12-23 | Stop reason: SDUPTHER

## 2022-12-23 RX ORDER — DOXYCYCLINE HYCLATE 100 MG/1
100 CAPSULE ORAL EVERY 12 HOURS SCHEDULED
Qty: 2 CAPSULE | Refills: 0 | Status: SHIPPED | OUTPATIENT
Start: 2022-12-23 | End: 2022-12-24

## 2022-12-23 RX ORDER — PREDNISONE 10 MG/1
TABLET ORAL
Qty: 22 TABLET | Refills: 0 | Status: SHIPPED | OUTPATIENT
Start: 2022-12-23

## 2022-12-23 RX ORDER — PREDNISONE 10 MG/1
TABLET ORAL
Qty: 22 TABLET | Refills: 0 | Status: SHIPPED | OUTPATIENT
Start: 2022-12-23 | End: 2022-12-23 | Stop reason: SDUPTHER

## 2022-12-23 RX ORDER — LANCETS 30 GAUGE
EACH MISCELLANEOUS
Qty: 100 EACH | Refills: 0 | Status: SHIPPED | OUTPATIENT
Start: 2022-12-23 | End: 2022-12-23 | Stop reason: SDUPTHER

## 2022-12-23 RX ADMIN — SODIUM CHLORIDE, PRESERVATIVE FREE 10 ML: 5 INJECTION INTRAVENOUS at 08:21

## 2022-12-23 RX ADMIN — CITALOPRAM HYDROBROMIDE 20 MG: 20 TABLET ORAL at 08:21

## 2022-12-23 RX ADMIN — ACETAMINOPHEN 650 MG: 325 TABLET ORAL at 15:24

## 2022-12-23 RX ADMIN — DOCUSATE SODIUM 100 MG: 100 CAPSULE, LIQUID FILLED ORAL at 08:20

## 2022-12-23 RX ADMIN — NYSTATIN 500000 UNITS: 500000 SUSPENSION ORAL at 12:52

## 2022-12-23 RX ADMIN — PREGABALIN 75 MG: 75 CAPSULE ORAL at 08:20

## 2022-12-23 RX ADMIN — AMLODIPINE BESYLATE 2.5 MG: 2.5 TABLET ORAL at 08:21

## 2022-12-23 RX ADMIN — PANTOPRAZOLE SODIUM 40 MG: 40 TABLET, DELAYED RELEASE ORAL at 06:20

## 2022-12-23 RX ADMIN — DOXYCYCLINE HYCLATE 100 MG: 100 CAPSULE ORAL at 08:21

## 2022-12-23 RX ADMIN — PREGABALIN 75 MG: 75 CAPSULE ORAL at 12:52

## 2022-12-23 RX ADMIN — ARFORMOTEROL TARTRATE 15 MCG: 15 SOLUTION RESPIRATORY (INHALATION) at 08:08

## 2022-12-23 RX ADMIN — NYSTATIN 500000 UNITS: 500000 SUSPENSION ORAL at 08:20

## 2022-12-23 RX ADMIN — LOSARTAN POTASSIUM 50 MG: 50 TABLET, FILM COATED ORAL at 08:20

## 2022-12-23 RX ADMIN — BUDESONIDE 500 MCG: 0.5 SUSPENSION RESPIRATORY (INHALATION) at 08:08

## 2022-12-23 RX ADMIN — PREDNISONE 40 MG: 20 TABLET ORAL at 08:20

## 2022-12-23 RX ADMIN — ROSUVASTATIN CALCIUM 20 MG: 20 TABLET, FILM COATED ORAL at 08:20

## 2022-12-23 ASSESSMENT — PAIN SCALES - GENERAL: PAINLEVEL_OUTOF10: 0

## 2022-12-23 NOTE — PROGRESS NOTES
Pulse ox was 97% on baseline 2L at rest. Ambulated patient on 2L. Oxygen saturation was 94% on 2L while ambulating.

## 2022-12-23 NOTE — PROGRESS NOTES
Nutrition Assessment     Type and Reason for Visit: Initial, RD Nutrition Re-Screen/LOS    Nutrition Recommendations/Plan:   Continue current Carb Controlled diet  Continue inpatient monitoring     Nutrition Assessment:  Pt admit 2/2 acute exac COPD. PMH=DM, COPD, MGUS, MM. She is feeling better now and discharge planning in progress. Hyperglycemia had been worsened d/t steroid but improved today w/insulin dose increase 12/22. Recommend continue current diet and will continue inpatient monitoring. Nutrition Related Findings:   A&Ox4, no edema, -I/O 5.6L, abd +BS, 12/23 gluc 164, 157, Wound Type: None    Current Nutrition Therapies:    ADULT DIET; Regular; 5 carb choices (75 gm/meal);  Low Sodium (2 gm)    Anthropometric Measures:  Height: 5' 6\" (167.6 cm)  Current Body Wt: 221 lb (100.2 kg) (12/23 bedscale)   BMI: 35.7      Nutrition Interventions:   Food and/or Nutrient Delivery: Continue Current Diet  Nutrition Education/Counseling: No recommendation at this time  Coordination of Nutrition Care: Continue to monitor while inpatient       Goals:     Goals: PO intake 75% or greater       Nutrition Monitoring and Evaluation:   Behavioral-Environmental Outcomes: None Identified  Food/Nutrient Intake Outcomes: Food and Nutrient Intake  Physical Signs/Symptoms Outcomes: Biochemical Data, GI Status, Fluid Status or Edema, Nutrition Focused Physical Findings, Skin, Weight    Discharge Planning:    No discharge needs at this time     [facility] TEREZA MEHTA, CNSC, LD  Contact: [phone]

## 2022-12-23 NOTE — DISCHARGE SUMMARY
[facility] Physician Discharge Summary       Raffi Castillo MD  [facility] [phone]    Follow up in 1 week(s)  As needed, If symptoms worsen    Viviana Machado MD  [address] [place], [place] [id number]  [phone]    Follow up in 2 week(s)  follow up    [facility] [phone]    Follow up in 1 week(s)  As needed, If symptoms worsen      Activity level: As tolerated     Dispo: Home    Condition on discharge: Stable    Patient ID:  Micki Whitehead  [id number]  79 y.o.  1955    Admit date: 12/17/2022    Discharge date and time:  12/23/2022  1:58 PM    Admission Diagnoses:   Principal Problem:    Acute respiratory failure with hypoxia ([address]  Active Problems:    Primary hypertension    Pneumonia due to infectious organism    Elevated platelet count    COPD exacerbation (HCC)    Normocytic anemia    Smoldering myeloma    Multiple lung nodules    Hypothyroidism  Resolved Problems:    * No resolved hospital problems. *      Discharge Diagnoses:   Principal Problem:    Acute respiratory failure with hypoxia (HCC)  Active Problems:    Primary hypertension    Pneumonia due to infectious organism    Elevated platelet count    COPD exacerbation (HCC)    Normocytic anemia    Smoldering myeloma    Multiple lung nodules    Hypothyroidism  Resolved Problems:    * No resolved hospital problems. *      Consults:  IP CONSULT TO PULMONOLOGY  IP CONSULT TO HEM/ONC  IP CONSULT TO IV TEAM  IP CONSULT TO IV TEAM    Hospital Course:   Patient Micki Whitehead is a 79 y.o. presented with Acute respiratory failure with hypoxia ([address] [J96.01]   Patient presented to the ER with progressive sob. Pulmonology was consulted and followed/ Patient was treated for;    1. Acute on chronic respiratory failure with hypoxia likely related to COPD exacerbation +/- pneumonia: baseline of 2L.  PT presented to the ER with progressive sob starting 5 days prior to presentation. She saw PCP in office and  was noted to be hypoxic- pulse ox 79% Sent to [place] ER-however was unable to stay, Then return to ER with sob/ chest pressure. CTA lungs reviewed from ER- no PE. New mild pneumonitis, mild bilateral interstitial pulmonary edema. New subpleural nodules. Procal 0.10. Rapid/ flu negative. Currently on 2 L NC. Pt requiring baseline 2 L on ambulation. Feels better today and ready to go home. 2. Acute exacerbation of COPD; continue steroids/ nebulizers. po steroid taper twan dc     3. Possible pneumonia vs pneumonitis: recently treated 11/25 for pneumonia with omnicef and doxycyline. Procal is negative. IV steroids changed to po. To complete po doxycycline on dc. 4. RML nodules: CTA reviewed. Pulmonology/ oncology consulted. Reporting history of EGD/ colonoscopy in 202- fragments of tubular adenoma. Per oncology once pt more stable- discussed outpt GI evaluation. 5. EDWARD: CPAP     6. Anxiety/ depression: continue home regimen     7. GERD: PPI     8. HTN; continue home regimen     9. HLD; statin     10. History of multiple myelpma     11. Thyroid disease; continue synthroid     12. Hyperglycemia in DM II: likely due to steroids-  hga1c 8.5- Pt has been on basal insulin /ss inpatient. Since  hga1c was rather controlled prior to acute illness will plan to not discharge on insulin with hopes that blood sugars will start trending down off of IV steeoids. Pt  reporting her glucometer is currently not working and will need a glucometer/ testing supplies on discharge. Will need to monitor glucose closely at home. 13. ? Concerns of food getting stuck at times/ Dysphagia_ outpt follow up with GI     Pt feels better and ready to go home. Patient discharged in stable condition with the following medications, instructions and follow up.            Discharge Exam:    General Appearance: alert and oriented to person, place and time and in no acute distress  Skin: warm and dry  Head: normocephalic and atraumatic  Neck: neck supple and non tender without mass   Pulmonary/Chest: clear to auscultation bilaterally-  Cardiovascular: normal rate, normal S1 and S2 and no carotid bruits  Abdomen: soft, non-tender, non-distended, normal bowel sounds, no masses or organomegaly  Extremities: no cyanosis, no clubbing and no edema  Neurologic: speech normal     I/O last 3 completed shifts: In: 20 [I.V.:20]  Out: 950 [Urine:950]  No intake/output data recorded. LABS:  Recent Labs     12/21/22  1158 12/22/22  0720 12/23/22  1007    138 138   K 4.3 3.8 4.6   CL 94* 97* 98   CO2 32* 35* 34*   BUN 24* 21 19   CREATININE 0.8 0.7 0.7   GLUCOSE 377* 195* 164*   CALCIUM 9.0 9.0 9.2       Recent Labs     12/21/22  1158 12/22/22  0720   WBC 9.1 10.2   RBC 4.07 4.22   HGB 11.2* 11.8   HCT 37.8 39.1   MCV 92.9 92.7   MCH 27.5 28.0   MCHC 29.6* 30.2*   RDW 13.6 13.7   * 528*   MPV 9.5 9.3       No results for input(s): POCGLU in the last 72 hours. Imaging:  XR BONE SURVEY COMPLETE    Result Date: 12/18/2022  EXAMINATION: COMPLETE BONE SURVEY 12/17/2022 8:55 pm COMPARISON: None. HISTORY: ORDERING SYSTEM PROVIDED HISTORY: r/o lytic lesions TECHNOLOGIST PROVIDED HISTORY: Reason for exam:->r/o lytic lesions FINDINGS: No aggressive or suspicious osseous lesions. Moderate lower lumbar facet degenerative change in moderate bilateral hip joint degenerative change. No aggressive or suspicious osseous lesion. RECOMMENDATION: Careful clinical correlation and follow up recommended. XR CHEST PORTABLE    Result Date: 12/17/2022  EXAMINATION: ONE XRAY VIEW OF THE CHEST 12/17/2022 5:06 am COMPARISON: December 15, 2022 HISTORY: ORDERING SYSTEM PROVIDED HISTORY: SOB TECHNOLOGIST PROVIDED HISTORY: Reason for exam:->SOB FINDINGS: Normal cardiomediastinal silhouette. Lungs clear. No pneumothorax or effusion. Osseous thorax intact. No acute disease.  RECOMMENDATION: Careful clinical correlation and follow up recommended. US DUP LOWER EXTREMITIES BILATERAL VENOUS    Result Date: 12/18/2022  EXAMINATION: DUPLEX VENOUS ULTRASOUND OF THE BILATERAL LOWER [id number]/18/2022 4:01 pm TECHNIQUE: Duplex ultrasound using B-mode/gray scaled imaging, Doppler spectral analysis and color flow Doppler was obtained of the deep venous structures of the lower bilateral extremities. COMPARISON: None. HISTORY: ORDERING SYSTEM PROVIDED HISTORY: swelling TECHNOLOGIST PROVIDED HISTORY: Reason for exam:->swelling What reading provider will be dictating this exam?->CRC FINDINGS: The visualized veins of the bilateral lower extremities are patent and free of echogenic thrombus. The veins demonstrate good compressibility with normal color flow study and spectral analysis. Fluid collection in the right popliteal fossa measured 3.5 x 2.5 x 1.0 cm     1. No evidence of DVT in either lower extremity. 2.  Fluid collection in the right popliteal fossa measured 3.5 cm in largest dimension. Right popliteal fossa cyst (Baker's cyst). Patient Instructions:      Medication List        START taking these medications      amLODIPine 2.5 MG tablet  Commonly known as: NORVASC  Take 1 tablet by mouth daily  Start taking on: December 24, 2022     doxycycline hyclate 100 MG capsule  Commonly known as: VIBRAMYCIN  Take 1 capsule by mouth every 12 hours for 2 doses  Replaces: doxycycline hyclate 100 MG tablet     nystatin 951901 UNIT/ML suspension  Commonly known as: MYCOSTATIN  Take 5 mLs by mouth 4 times daily for 4 days            CHANGE how you take these medications      predniSONE 10 MG tablet  Commonly known as: DELTASONE  4 tabs x 1 days, then 3 tabs x 3 days, then 2 tabs x 3 days, then 1 tab x 3 days.   What changed:   medication strength  how much to take  how to take this  when to take this  additional instructions            CONTINUE taking these medications      * albuterol sulfate  (90 Base) MCG/ACT inhaler  Commonly known as: PROVENTIL;VENTOLIN;PROAIR  Inhale 3 puffs into the lungs 4 times daily as needed for Wheezing     * albuterol (2.5 MG/3ML) 0.083% nebulizer solution  Commonly known as: PROVENTIL  Take 3 mLs by nebulization every 6 hours as needed for Wheezing or Shortness of Breath     amitriptyline 50 MG tablet  Commonly known as: ELAVIL  TAKE 1 TABLET BY MOUTH EVERY EVENING     ammonium lactate 12 % lotion  Commonly known as: LAC-HYDRIN  Apply topically twice daily     Ankle Brace Adjust-to-Fit Misc  Soft left ankle brace  Size to fit  Dx: Ankle sprain     aspirin 81 MG tablet  Take 1 tablet by mouth daily     baclofen 10 MG tablet  Commonly known as: LIORESAL  Take 1 tablet by mouth 3 times daily as needed (back muscle spasms)     Calcium 500/D 500-5 MG-MCG Tabs  Generic drug: Calcium Carb-Cholecalciferol  TAKE 1 TABLET BY MOUTH DAILY     celecoxib 100 MG capsule  Commonly known as: CELEBREX  Take 1 capsule by mouth daily     citalopram 40 MG tablet  Commonly known as: CELEXA  Take 1 tablet by mouth daily     Coricidin HBP Congestion/Cough  MG Caps  Generic drug: Dextromethorphan-guaiFENesin  Take 1 capsule by mouth 2 times daily as needed (congestion)     docusate sodium 100 MG capsule  Commonly known as: Colace  Take 1 capsule by mouth 2 times daily     famotidine 20 MG tablet  Commonly known as: Pepcid  Take 1 tablet by mouth 2 times daily     Fiber 625 MG Tabs  Take 1 tablet by mouth 2 times daily     guaiFENesin 400 MG tablet  Take 1 tablet by mouth 2 times daily as needed for Cough     levothyroxine 112 MCG tablet  Commonly known as: SYNTHROID  Take 1 tablet by mouth Daily     losartan 50 MG tablet  Commonly known as: COZAAR  Take 1 tablet by mouth daily     mineral oil-hydrophilic petrolatum ointment  Apply topically as needed. Misc.  Devices Misc  Oxygen concentrator  j44.9     montelukast 10 MG tablet  Commonly known as: SINGULAIR  TAKE 1 TABLET BY MOUTH EVERY NIGHT AT BEDTIME     Nebulizer/Tubing/Mouthpiece Kit  1 kit by Does not apply route daily as needed (for shortness of breath/wheezing)     OXYGEN     pantoprazole 40 MG tablet  Commonly known as: PROTONIX  Take 1 tablet by mouth every morning (before breakfast)     pregabalin 75 MG capsule  Commonly known as: Lyrica  Take 1 capsule by mouth 3 times daily for 90 days. rosuvastatin 20 MG tablet  Commonly known as: CRESTOR  Take 1 tablet by mouth daily     Spiriva HandiHaler 18 MCG inhalation capsule  Generic drug: tiotropium  Inhale 1 capsule into the lungs daily     Symbicort 160-4.5 MCG/ACT Aero  Generic drug: budesonide-formoterol  INHALE 2 PUFFS INTO THE LUNGS TWICE DAILY     Trulicity 7.71 WY/5.9NS Sopn  Generic drug: Dulaglutide  Inject 0.75 mg into the skin once a week           * This list has 2 medication(s) that are the same as other medications prescribed for you. Read the directions carefully, and ask your doctor or other care provider to review them with you.                 STOP taking these medications      doxycycline hyclate 100 MG tablet  Commonly known as: VIBRA-TABS  Replaced by: doxycycline hyclate 100 MG capsule     HYDROcodone-acetaminophen 5-325 MG per tablet  Commonly known as: Norco               Where to Get Your Medications        These medications were sent to [address] [place] [address] [phone]  [address] [place] [address]      Phone: [phone]   amLODIPine 2.5 MG tablet  doxycycline hyclate 100 MG capsule  nystatin [id number] UNIT/ML suspension  predniSONE 10 MG tablet       You can get these medications from any pharmacy    Bring a paper prescription for each of these medications  Fiber 625 MG Tabs  Spiriva HandiHaler 18 MCG inhalation capsule           Note that more than 30 minutes was spent in preparing discharge papers, discussing discharge with patient, medication review, etc.    Signed:  Electronically signed by Marlo Weathers PAM Steve on 12/23/2022 at 1:58 PM     Addendum: I have personally participated in the history, exam, medical decision making with Fuentes Henderson NP on the date of service and I agree with all of the pertinent clinical information unless otherwise noted. I have also reviewed and agree with the past medical, family, and social history unless otherwise noted. Patient was admitted with acute on chronic hypoxic respiratory failure requiring supplemental oxygen via nasal cannula likely secondary to an acute COPD exacerbation with possible pneumonia. She is clinically improved and is back to her baseline home oxygen of 2 L. Plans are for discharge today. PHYSICAL EXAM:  Vitals:  BP (!) 160/87   Pulse 75   Temp 97 °F (36.1 °C) (Temporal)   Resp 18   Ht 5' 6\" (1.676 m)   Wt 221 lb (100.2 kg)   SpO2 96%   BMI 35.67 kg/m²   Gen: awake, alert, NAD  Lungs: Scattered wheezes  Heart: RRR, no murmur   Abdomen: soft nontender nondistended positive bowel sounds. Extremities: full range of motion no peripheral edema. Impression:  Principal Problem:    Acute respiratory failure with hypoxia (HCC)  Active Problems:    Primary hypertension    Pneumonia due to infectious organism    Elevated platelet count    COPD exacerbation (HCC)    Normocytic anemia    Smoldering myeloma    Multiple lung nodules    Hypothyroidism  Resolved Problems:    * No resolved hospital problems. *      My findings/plan include:  Patient was admitted with acute on chronic hypoxic respiratory failure requiring supplemental oxygen via nasal cannula likely secondary to an acute COPD exacerbation and possible pneumonia. She continues on steroids orally, pulmonology continues to follow and is recommending discharge at this point. Is back to her baseline oxygen requirement of 2 L. She is also to complete doxycycline course for possible pneumonia.   She was hyperglycemic on IV steroids while hospitalized, requiring short and long-acting insulin. She was discharged with a prescription for a glucometer so she can track her blood glucose. Her blood glucose should improve once steroid courses complete. Stable for discharge on 12/23/2022. Close follow-up with PCP. NOTE: This report was transcribed using voice recognition software. Every effort was made to ensure accuracy; however, inadvertent computerized transcription errors may be present.   Electronically signed by Maria Del Carmen Camp MD on 12/23/2022 at 2:42 PM

## 2022-12-23 NOTE — PROGRESS NOTES
This patient is on medication that requires renal, weight, and/or indication dose adjustment. Date Body Weight IBW  Adjusted BW SCr  CrCl Dialysis status   12/23/2022 221 lb (100.2 kg) Ideal body weight: 59.3 kg (130 lb 11.7 oz)  Adjusted ideal body weight: 75.7 kg (166 lb 13.4 oz) Serum creatinine: 0.7 mg/dL 12/22/22 0720  Estimated creatinine clearance: 93 mL/min N/a       Pharmacy has dose-adjusted the following medication(s):    Date Previous Order Adjusted Order   12/23/2022 Enoxaparin 30 mg twice daily Enoxaparin 40 mg daily       These changes were made per protocol according to the [address] for Pharmacists. *Please note this dose may need readjusted if patient's condition changes. Please contact pharmacy with any questions regarding these changes.     sandhya clay [facility]  12/23/2022  6:18 AM

## 2022-12-23 NOTE — PROGRESS NOTES
CLINICAL PHARMACY NOTE: MEDS TO BEDS    Total # of Prescriptions Filled: 4   The following medications were delivered to the patient:  Nystatin   Norvasc 2.5  Doxy hy 100 caps  Prednisone 10     Additional Documentation:

## 2022-12-23 NOTE — CARE COORDINATION
Social Work/Discharge Planning:  Patient on her oxygen baseline of two liters. Portable oxygen tank was delivered yesterday. Plan remains home at discharge. Will continue to follow.   Electronically signed by RANDY Segundo on 12/23/2022 at 10:26 AM

## 2023-01-04 ENCOUNTER — OFFICE VISIT (OUTPATIENT)
Dept: INTERNAL MEDICINE | Age: 68
End: 2023-01-04
Payer: COMMERCIAL

## 2023-01-04 VITALS
DIASTOLIC BLOOD PRESSURE: 73 MMHG | HEIGHT: 66 IN | RESPIRATION RATE: 18 BRPM | TEMPERATURE: 96.9 F | BODY MASS INDEX: 33.11 KG/M2 | HEART RATE: 105 BPM | WEIGHT: 206 LBS | OXYGEN SATURATION: 95 % | SYSTOLIC BLOOD PRESSURE: 110 MMHG

## 2023-01-04 DIAGNOSIS — E11.9 TYPE 2 DIABETES MELLITUS WITHOUT COMPLICATION, WITHOUT LONG-TERM CURRENT USE OF INSULIN (HCC): Primary | ICD-10-CM

## 2023-01-04 LAB
ALBUMIN SERPL-MCNC: 4.4 G/DL (ref 3.5–5.2)
ALP BLD-CCNC: 127 U/L (ref 35–104)
ALT SERPL-CCNC: 28 U/L (ref 0–32)
ANION GAP SERPL CALCULATED.3IONS-SCNC: 16 MMOL/L (ref 7–16)
AST SERPL-CCNC: 19 U/L (ref 0–31)
BASOPHILS ABSOLUTE: 0.02 E9/L (ref 0–0.2)
BASOPHILS RELATIVE PERCENT: 0.3 % (ref 0–2)
BETA-HYDROXYBUTYRATE: 1.99 MMOL/L (ref 0.02–0.27)
BILIRUB SERPL-MCNC: 0.6 MG/DL (ref 0–1.2)
BILIRUBIN URINE: NEGATIVE
BLOOD, URINE: NEGATIVE
BUN BLDV-MCNC: 26 MG/DL (ref 6–23)
CALCIUM SERPL-MCNC: 10.2 MG/DL (ref 8.6–10.2)
CHLORIDE BLD-SCNC: 86 MMOL/L (ref 98–107)
CHP ED QC CHECK: ABNORMAL
CLARITY: CLEAR
CO2: 27 MMOL/L (ref 22–29)
COLOR: YELLOW
CREAT SERPL-MCNC: 1.1 MG/DL (ref 0.5–1)
EOSINOPHILS ABSOLUTE: 0.09 E9/L (ref 0.05–0.5)
EOSINOPHILS RELATIVE PERCENT: 1.2 % (ref 0–6)
GFR SERPL CREATININE-BSD FRML MDRD: 55 ML/MIN/1.73
GLUCOSE BLD-MCNC: 500 MG/DL
GLUCOSE BLD-MCNC: 595 MG/DL (ref 74–99)
GLUCOSE URINE: >=1000 MG/DL
HCT VFR BLD CALC: 42 % (ref 34–48)
HEMOGLOBIN: 13.8 G/DL (ref 11.5–15.5)
IMMATURE GRANULOCYTES #: 0.02 E9/L
IMMATURE GRANULOCYTES %: 0.3 % (ref 0–5)
KETONES, URINE: 15 MG/DL
LACTIC ACID: 1.7 MMOL/L (ref 0.5–2.2)
LEUKOCYTE ESTERASE, URINE: NEGATIVE
LYMPHOCYTES ABSOLUTE: 2.85 E9/L (ref 1.5–4)
LYMPHOCYTES RELATIVE PERCENT: 39.1 % (ref 20–42)
MCH RBC QN AUTO: 27.7 PG (ref 26–35)
MCHC RBC AUTO-ENTMCNC: 32.9 % (ref 32–34.5)
MCV RBC AUTO: 84.2 FL (ref 80–99.9)
MONOCYTES ABSOLUTE: 0.44 E9/L (ref 0.1–0.95)
MONOCYTES RELATIVE PERCENT: 6 % (ref 2–12)
NEUTROPHILS ABSOLUTE: 3.86 E9/L (ref 1.8–7.3)
NEUTROPHILS RELATIVE PERCENT: 53.1 % (ref 43–80)
NITRITE, URINE: NEGATIVE
PDW BLD-RTO: 13.2 FL (ref 11.5–15)
PH UA: 6 (ref 5–9)
PH VENOUS: 7.35 (ref 7.35–7.45)
PLATELET # BLD: 315 E9/L (ref 130–450)
PMV BLD AUTO: 11.2 FL (ref 7–12)
POTASSIUM SERPL-SCNC: 4.1 MMOL/L (ref 3.5–5)
PROTEIN UA: NEGATIVE MG/DL
RBC # BLD: 4.99 E12/L (ref 3.5–5.5)
SODIUM BLD-SCNC: 129 MMOL/L (ref 132–146)
SPECIFIC GRAVITY UA: 1.01 (ref 1–1.03)
TOTAL PROTEIN: 8.6 G/DL (ref 6.4–8.3)
TROPONIN, HIGH SENSITIVITY: 11 NG/L (ref 0–9)
UROBILINOGEN, URINE: 0.2 E.U./DL
WBC # BLD: 7.3 E9/L (ref 4.5–11.5)

## 2023-01-04 PROCEDURE — 80053 COMPREHEN METABOLIC PANEL: CPT

## 2023-01-04 PROCEDURE — 99285 EMERGENCY DEPT VISIT HI MDM: CPT

## 2023-01-04 PROCEDURE — 82800 BLOOD PH: CPT

## 2023-01-04 PROCEDURE — 84484 ASSAY OF TROPONIN QUANT: CPT

## 2023-01-04 PROCEDURE — 82962 GLUCOSE BLOOD TEST: CPT | Performed by: STUDENT IN AN ORGANIZED HEALTH CARE EDUCATION/TRAINING PROGRAM

## 2023-01-04 PROCEDURE — 96374 THER/PROPH/DIAG INJ IV PUSH: CPT

## 2023-01-04 PROCEDURE — 99213 OFFICE O/P EST LOW 20 MIN: CPT | Performed by: STUDENT IN AN ORGANIZED HEALTH CARE EDUCATION/TRAINING PROGRAM

## 2023-01-04 PROCEDURE — 93005 ELECTROCARDIOGRAM TRACING: CPT | Performed by: PHYSICIAN ASSISTANT

## 2023-01-04 PROCEDURE — 85025 COMPLETE CBC W/AUTO DIFF WBC: CPT

## 2023-01-04 PROCEDURE — 83605 ASSAY OF LACTIC ACID: CPT

## 2023-01-04 PROCEDURE — 82010 KETONE BODYS QUAN: CPT

## 2023-01-04 PROCEDURE — 81003 URINALYSIS AUTO W/O SCOPE: CPT

## 2023-01-04 ASSESSMENT — ENCOUNTER SYMPTOMS
CONSTIPATION: 0
VOMITING: 0
BLOOD IN STOOL: 0
NAUSEA: 1
SHORTNESS OF BREATH: 0
SORE THROAT: 1
SINUS PAIN: 0
ABDOMINAL PAIN: 1
BACK PAIN: 0
COUGH: 1
DIARRHEA: 0

## 2023-01-04 ASSESSMENT — PAIN - FUNCTIONAL ASSESSMENT: PAIN_FUNCTIONAL_ASSESSMENT: NONE - DENIES PAIN

## 2023-01-04 NOTE — ED NOTES
Department of Emergency Medicine  FIRST PROVIDER TRIAGE NOTE             Independent MLP           1/4/23  4:51 PM EST    Date of Encounter: 1/4/23   MRN: [id number]      HPI: Rickey Painter is a 79 y.o. female who presents to the ED for Hyperglycemia (\"HI\", increased urination and blurred vision)     Pt presenting sent by [facility] to r/o dka due to increased urination, blurred vision, light headed, hyperglycemic for a few days    ROS: Negative for cp or sob. PE: Gen Appearance/Constitutional: alert  HEENT: NC/NT. PERRLA,  Airway patent. Initial Plan of Care: All treatment areas with department are currently occupied. Plan to order/Initiate the following while awaiting opening in ED: labs and EKG.   Initiate Treatment-Testing, Proceed toTreatment Area When Bed Available for ED Attending/MLP to Continue Care    Electronically signed by Kathrin Maldonado PA-C   DD: 1/4/23      Kathrin Maldonado PA-C  01/04/23 [id number]

## 2023-01-04 NOTE — PROGRESS NOTES
[facility]  Internal Medicine Residency Program  [facility] Note      SUBJECTIVE:  CC: had concerns including Follow-Up from Hospital (Seen for Lillian FISHER taken was 251   had a Banana ) and Shortness of Breath (States wears 02 at bedtime 2liters  ). HPI:  Letitia Dakin is a 79 y. o.female presenting to [facility] for hospital follow up from discharge 12/17/22. Patient was hospitalized with respiratory failure and pneumonia which have resolved. During hospitalization, she required lantus and sliding scale insulin. She was not on home insulin so she was discharged with her previous regimen of truclity. Patient states she has had urinary frequency that has progressively worsened since hospital discharge. She endorses new nausea without vomiting, loss of appetite, blurry vision, and lightheadedness since this morning when she awoke. She endorses burning with urination, urinary frequency, and vaginal pruritis over the past week or so. She has not been checking her BG consistently, but noted they were recently from 200-300. Today during our encounter, she is bent over holding her head in her hands, breathing shallow and looking visibly unwell. She states she feels \"bad all over. \" Also endorses sore throat and productive cough but does not know when they started. Her sister is with her and says the patient has been sweating badly all morning. Denies fever, chills. Review of Systems   Constitutional:  Positive for appetite change and diaphoresis. Negative for chills and fever. HENT:  Positive for sore throat. Negative for congestion, hearing loss, mouth sores and sinus pain. Respiratory:  Positive for cough. Negative for shortness of breath. Cardiovascular:  Negative for chest pain, palpitations and leg swelling. Gastrointestinal:  Positive for abdominal pain and nausea. Negative for blood in stool, constipation, diarrhea and vomiting.    Endocrine: Positive for polyuria. Genitourinary:  Positive for dysuria and frequency. Negative for hematuria and urgency. Musculoskeletal:  Positive for myalgias. Negative for arthralgias, back pain and neck pain. Neurological:  Positive for dizziness and light-headedness. Negative for seizures. Outpatient Medications Marked as Taking for the 1/4/23 encounter (Office Visit) with Rosa Rausch MD   Medication Sig Dispense Refill    amLODIPine (NORVASC) 2.5 MG tablet Take 1 tablet by mouth daily 30 tablet 0    Calcium Polycarbophil (FIBER) 625 MG TABS Take 1 tablet by mouth 2 times daily 180 tablet 3    blood glucose monitor strips Check blood sugar fasting before breakfast and as needed for symptoms of irregular blood glucose. Dispense sufficient amount for indicated testing frequency plus additional to accommodate PRN testing needs.  100 strip 0    Lancets MISC Check blood sugar daily and prn as needed 100 each 0    albuterol (PROVENTIL) (2.5 MG/3ML) 0.083% nebulizer solution Take 3 mLs by nebulization every 6 hours as needed for Wheezing or Shortness of Breath 120 each 1    baclofen (LIORESAL) 10 MG tablet Take 1 tablet by mouth 3 times daily as needed (back muscle spasms) 30 tablet 0    famotidine (PEPCID) 20 MG tablet Take 1 tablet by mouth 2 times daily 60 tablet 0    losartan (COZAAR) 50 MG tablet Take 1 tablet by mouth daily 30 tablet 3    SYMBICORT 160-4.5 MCG/ACT AERO INHALE 2 PUFFS INTO THE LUNGS TWICE DAILY 10.2 g 3    montelukast (SINGULAIR) 10 MG tablet TAKE 1 TABLET BY MOUTH EVERY NIGHT AT BEDTIME 90 tablet 1    albuterol sulfate HFA (PROVENTIL;VENTOLIN;PROAIR) 108 (90 Base) MCG/ACT inhaler Inhale 3 puffs into the lungs 4 times daily as needed for Wheezing 3 each 1    pantoprazole (PROTONIX) 40 MG tablet Take 1 tablet by mouth every morning (before breakfast) 30 tablet 5    Dulaglutide (TRULICITY) 5.29 XL/4.1WJ SOPN Inject 0.75 mg into the skin once a week 5 Adjustable Dose Pre-filled Pen Syringe 3 amitriptyline (ELAVIL) 50 MG tablet TAKE 1 TABLET BY MOUTH EVERY EVENING 90 tablet 1    celecoxib (CELEBREX) 100 MG capsule Take 1 capsule by mouth daily 90 capsule 1    citalopram (CELEXA) 40 MG tablet Take 1 tablet by mouth daily 90 tablet 1    docusate sodium (COLACE) 100 MG capsule Take 1 capsule by mouth 2 times daily 180 capsule 1    levothyroxine (SYNTHROID) 112 MCG tablet Take 1 tablet by mouth Daily 90 tablet 1    ammonium lactate (LAC-HYDRIN) 12 % lotion Apply topically twice daily 400 g 2    calcium-cholecalciferol (CALCIUM 500/D) 500-200 MG-UNIT per tablet TAKE 1 TABLET BY MOUTH DAILY 180 tablet 1    rosuvastatin (CRESTOR) 20 MG tablet Take 1 tablet by mouth daily 90 tablet 1    mineral oil-hydrophilic petrolatum (HYDROPHOR) ointment Apply topically as needed. 3 each 2    OXYGEN Inhale into the lungs Uses 2 liters at night      aspirin 81 MG tablet Take 1 tablet by mouth daily 90 tablet 0       OBJECTIVE:    VS:   /73 (Site: Right Upper Arm, Position: Sitting, Cuff Size: Large Adult)   Pulse (!) 105   Temp 96.9 °F (36.1 °C) (Temporal)   Resp 18   Ht 5' 6\" (1.676 m)   Wt 206 lb (93.4 kg)   SpO2 95%   BMI 33.25 kg/m²     EXAM:  Physical Exam  Constitutional:       Appearance: Normal appearance. She is not ill-appearing or diaphoretic. HENT:      Head: Normocephalic and atraumatic. Mouth/Throat:      Mouth: Mucous membranes are dry. Cardiovascular:      Rate and Rhythm: Normal rate and regular rhythm. Heart sounds: No murmur heard. No friction rub. No gallop. Pulmonary:      Effort: No respiratory distress. Breath sounds: No stridor. No wheezing, rhonchi or rales. Abdominal:      General: Bowel sounds are normal. There is no distension. Palpations: Abdomen is soft. There is no mass. Tenderness: There is no abdominal tenderness. There is no guarding or rebound. Hernia: No hernia is present. Musculoskeletal:         General: No swelling or tenderness. Right lower leg: No edema. Left lower leg: No edema. Skin:     General: Skin is cool and dry. Capillary Refill: Capillary refill takes less than 2 seconds. Coloration: Skin is pale. Skin is not jaundiced. Findings: No erythema or rash. Neurological:      Mental Status: She is alert and oriented to person, place, and time. ASSESSMENT/PLAN:  I have reviewed all pertinent PMH, PSH, FH, SH, medications and allergies and updated history as appropriate. Butch Osgood was seen today for follow-up from hospital and shortness of breath.     Diagnoses and all orders for this visit:    Type 2 diabetes mellitus without complication, without long-term current use of insulin ([facility] [address]  -     POCT Glucose  DKA  - Transport called, ED called spoke with resident [facility], patient agrees to go to ED for DKA workup      RTC: will need ED/hospital follow up      I have reviewed my findings and recommendations with Chester Ibrahim and Dr Sahil Maher MD PGY-2  1/4/2023 10:07 PM

## 2023-01-04 NOTE — PROGRESS NOTES
[facility]  [facility]  Attending Physician Statement:  Vadim Knox M.D., F.A.C.P. I have seen/discussed the case, including pertinent history and exam findings with the resident. I agree with the assessment, plan and orders as documented by the resident. Last office notes reviewed, relative labs and imaging. Remainder of medical problems as per resident note. Billing based on Coordination of care and records reviewed and time spent +medical complexity of case    Per DC summary  Acute on chronic respiratory failure with hypoxia likely related to COPD exacerbation +/- pneumonia: baseline of 2L. PT presented to the ER with progressive sob starting 5 days prior to presentation. She saw PCP in office and  was noted to be hypoxic- pulse ox 79% Sent to [place] ER-however was unable to stay, Then return to ER with sob/ chest pressure. CTA lungs reviewed from ER- no PE. New mild pneumonitis, mild bilateral interstitial pulmonary edema. New subpleural nodules. Procal 0.10. Rapid/ flu negative. Currently on 2 L NC. Pt requiring baseline 2 L on ambulation. Feels better today and ready to go home. 2. Acute exacerbation of COPD; continue steroids/ nebulizers. po steroid taper twan dc     3. Possible pneumonia vs pneumonitis: recently treated 11/25 for pneumonia with omnicef and doxycyline. Procal is negative. IV steroids changed to po. To complete po doxycycline on dc. 4. RML nodules: CTA reviewed. Pulmonology/ oncology consulted. Reporting history of EGD/ colonoscopy in 202- fragments of tubular adenoma. Per oncology once pt more stable- discussed outpt GI evaluation. 5. EDWARD: CPAP     6. Anxiety/ depression: continue home regimen     7. GERD: PPI     8. HTN; continue home regimen     9. HLD; statin     10. History of multiple myelpma     11. Thyroid disease; continue synthroid     12.  Hyperglycemia in DM II: likely due to steroids-  hga1c 8.5- Pt has been on basal insulin /ss inpatient. Since  hga1c was rather controlled prior to acute illness will plan to not discharge on insulin with hopes that blood sugars will start trending down off of IV steeoids. Pt  reporting her glucometer is currently not working and will need a glucometer/ testing supplies on discharge. Will need to monitor glucose closely at home. 13. ? Concerns of food getting stuck at times/ Dysphagia_ Pt feels better and ready to go home. Patient discharged in stable condition with the following medications, instructions and follow up.      Noted she was just [facility] from hospital on 12/23  <14 days, moderate - hgih complexity (but I don't see telephone call between?)  But will charge level 4 -- high complexity plan to admit  Admitted 12/17-23[de-identified]    Nystatin   Norvasc 2.5  Doxy hy 100 caps  Prednisone 10   Pneumonia--?better- thrush improved  /73 (Site: Right Upper Arm, Position: Sitting, Cuff Size: Large Adult)   Pulse (!) 105   Temp 96.9 °F (36.1 °C) (Temporal)   Resp 18   Ht 5' 6\" (1.676 m)   Wt 206 lb (93.4 kg)   SpO2 95%   BMI 33.25 kg/m²   Not hypoxic, RR stable, not wheezing    But sinus tachycardic, dehydrated etc..  She doesn't know how to do insulin at home  (Only done in hospita)  Polyuria, BS uncontrolled- glucometer here >500  But uncontrolled BS  likely DKA today  Nausea- not vomiting- so we are pushing IV fluids -called transport WC for ER- noted Presyncopal complaints  Called Discussed with her PCP, called ER- dsicussed with them  Plan to send over for probable admission, insulin drip/iv fluids etc.

## 2023-01-04 NOTE — PROGRESS NOTES
4: 05 p.m.  Dr. Oliva Memory spoke to ER Dr. Ana Pulido and I then gave nurse to nurse report to Juan Means RN   4:15 transport here to take pt to ER per wheelchair with personal belongings

## 2023-01-05 ENCOUNTER — APPOINTMENT (OUTPATIENT)
Dept: CT IMAGING | Age: 68
DRG: 638 | End: 2023-01-05
Payer: COMMERCIAL

## 2023-01-05 ENCOUNTER — HOSPITAL ENCOUNTER (INPATIENT)
Age: 68
LOS: 4 days | Discharge: HOME | DRG: 638 | End: 2023-01-09
Attending: EMERGENCY MEDICINE | Admitting: INTERNAL MEDICINE
Payer: COMMERCIAL

## 2023-01-05 ENCOUNTER — APPOINTMENT (OUTPATIENT)
Dept: GENERAL RADIOLOGY | Age: 68
DRG: 638 | End: 2023-01-05
Payer: COMMERCIAL

## 2023-01-05 DIAGNOSIS — H53.8 BLURRED VISION: ICD-10-CM

## 2023-01-05 DIAGNOSIS — E11.65 UNCONTROLLED TYPE 2 DIABETES MELLITUS WITH HYPERGLYCEMIA (HCC): ICD-10-CM

## 2023-01-05 DIAGNOSIS — R73.9 HYPERGLYCEMIA: Primary | ICD-10-CM

## 2023-01-05 LAB
ADENOVIRUS BY PCR: NOT DETECTED
ANION GAP SERPL CALCULATED.3IONS-SCNC: 12 MMOL/L (ref 7–16)
ANION GAP SERPL CALCULATED.3IONS-SCNC: 13 MMOL/L (ref 7–16)
ANION GAP SERPL CALCULATED.3IONS-SCNC: 18 MMOL/L (ref 7–16)
BASOPHILS ABSOLUTE: 0.03 E9/L (ref 0–0.2)
BASOPHILS RELATIVE PERCENT: 0.4 % (ref 0–2)
BETA-HYDROXYBUTYRATE: 1.34 MMOL/L (ref 0.02–0.27)
BORDETELLA PARAPERTUSSIS BY PCR: NOT DETECTED
BORDETELLA PERTUSSIS BY PCR: NOT DETECTED
BUN BLDV-MCNC: 17 MG/DL (ref 6–23)
BUN BLDV-MCNC: 22 MG/DL (ref 6–23)
BUN BLDV-MCNC: 26 MG/DL (ref 6–23)
CALCIUM SERPL-MCNC: 8.8 MG/DL (ref 8.6–10.2)
CALCIUM SERPL-MCNC: 8.8 MG/DL (ref 8.6–10.2)
CALCIUM SERPL-MCNC: 9.7 MG/DL (ref 8.6–10.2)
CHLAMYDOPHILIA PNEUMONIAE BY PCR: NOT DETECTED
CHLORIDE BLD-SCNC: 86 MMOL/L (ref 98–107)
CHLORIDE BLD-SCNC: 93 MMOL/L (ref 98–107)
CHLORIDE BLD-SCNC: 94 MMOL/L (ref 98–107)
CHOLESTEROL, TOTAL: 122 MG/DL (ref 0–199)
CHP ED QC CHECK: YES
CO2: 24 MMOL/L (ref 22–29)
CO2: 25 MMOL/L (ref 22–29)
CO2: 26 MMOL/L (ref 22–29)
CORONAVIRUS 229E BY PCR: NOT DETECTED
CORONAVIRUS HKU1 BY PCR: NOT DETECTED
CORONAVIRUS NL63 BY PCR: NOT DETECTED
CORONAVIRUS OC43 BY PCR: NOT DETECTED
CREAT SERPL-MCNC: 0.7 MG/DL (ref 0.5–1)
CREAT SERPL-MCNC: 0.7 MG/DL (ref 0.5–1)
CREAT SERPL-MCNC: 0.8 MG/DL (ref 0.5–1)
EKG ATRIAL RATE: 93 BPM
EKG P AXIS: 46 DEGREES
EKG P-R INTERVAL: 164 MS
EKG Q-T INTERVAL: 370 MS
EKG QRS DURATION: 96 MS
EKG QTC CALCULATION (BAZETT): 460 MS
EKG R AXIS: -26 DEGREES
EKG T AXIS: 26 DEGREES
EKG VENTRICULAR RATE: 93 BPM
EOSINOPHILS ABSOLUTE: 0.16 E9/L (ref 0.05–0.5)
EOSINOPHILS RELATIVE PERCENT: 2.1 % (ref 0–6)
GFR SERPL CREATININE-BSD FRML MDRD: >60 ML/MIN/1.73
GLUCOSE BLD-MCNC: 376 MG/DL
GLUCOSE BLD-MCNC: 464 MG/DL (ref 74–99)
GLUCOSE BLD-MCNC: 488 MG/DL (ref 74–99)
GLUCOSE BLD-MCNC: 533 MG/DL (ref 74–99)
HCT VFR BLD CALC: 43.1 % (ref 34–48)
HDLC SERPL-MCNC: 43 MG/DL
HEMOGLOBIN: 13.9 G/DL (ref 11.5–15.5)
HUMAN METAPNEUMOVIRUS BY PCR: NOT DETECTED
HUMAN RHINOVIRUS/ENTEROVIRUS BY PCR: NOT DETECTED
IMMATURE GRANULOCYTES #: 0.01 E9/L
IMMATURE GRANULOCYTES %: 0.1 % (ref 0–5)
INFLUENZA A BY PCR: NOT DETECTED
INFLUENZA B BY PCR: NOT DETECTED
LDL CHOLESTEROL CALCULATED: 57 MG/DL (ref 0–99)
LIPASE: 38 U/L (ref 13–60)
LYMPHOCYTES ABSOLUTE: 3.66 E9/L (ref 1.5–4)
LYMPHOCYTES RELATIVE PERCENT: 48.3 % (ref 20–42)
MAGNESIUM: 1.9 MG/DL (ref 1.6–2.6)
MCH RBC QN AUTO: 28.1 PG (ref 26–35)
MCHC RBC AUTO-ENTMCNC: 32.3 % (ref 32–34.5)
MCV RBC AUTO: 87.1 FL (ref 80–99.9)
METER GLUCOSE: 324 MG/DL (ref 74–99)
METER GLUCOSE: 332 MG/DL (ref 74–99)
METER GLUCOSE: 363 MG/DL (ref 74–99)
METER GLUCOSE: 376 MG/DL (ref 74–99)
METER GLUCOSE: 459 MG/DL (ref 74–99)
METER GLUCOSE: >500 MG/DL (ref 74–99)
METER GLUCOSE: >500 MG/DL (ref 74–99)
MONOCYTES ABSOLUTE: 0.51 E9/L (ref 0.1–0.95)
MONOCYTES RELATIVE PERCENT: 6.7 % (ref 2–12)
MYCOPLASMA PNEUMONIAE BY PCR: NOT DETECTED
NEUTROPHILS ABSOLUTE: 3.2 E9/L (ref 1.8–7.3)
NEUTROPHILS RELATIVE PERCENT: 42.4 % (ref 43–80)
PARAINFLUENZA VIRUS 1 BY PCR: NOT DETECTED
PARAINFLUENZA VIRUS 2 BY PCR: NOT DETECTED
PARAINFLUENZA VIRUS 3 BY PCR: NOT DETECTED
PARAINFLUENZA VIRUS 4 BY PCR: NOT DETECTED
PDW BLD-RTO: 13.2 FL (ref 11.5–15)
PHOSPHORUS: 2.4 MG/DL (ref 2.5–4.5)
PLATELET # BLD: 307 E9/L (ref 130–450)
PMV BLD AUTO: 11.5 FL (ref 7–12)
POTASSIUM REFLEX MAGNESIUM: 3.7 MMOL/L (ref 3.5–5)
POTASSIUM SERPL-SCNC: 3.8 MMOL/L (ref 3.5–5)
POTASSIUM SERPL-SCNC: 4.9 MMOL/L (ref 3.5–5)
RBC # BLD: 4.95 E12/L (ref 3.5–5.5)
REASON FOR REJECTION: NORMAL
REJECTED TEST: NORMAL
RESPIRATORY SYNCYTIAL VIRUS BY PCR: NOT DETECTED
SARS-COV-2, PCR: NOT DETECTED
SODIUM BLD-SCNC: 128 MMOL/L (ref 132–146)
SODIUM BLD-SCNC: 131 MMOL/L (ref 132–146)
SODIUM BLD-SCNC: 132 MMOL/L (ref 132–146)
T4 FREE: 1.61 NG/DL (ref 0.93–1.7)
TRIGL SERPL-MCNC: 112 MG/DL (ref 0–149)
TROPONIN, HIGH SENSITIVITY: 11 NG/L (ref 0–9)
TROPONIN, HIGH SENSITIVITY: 9 NG/L (ref 0–9)
TSH SERPL DL<=0.05 MIU/L-ACNC: 0.77 UIU/ML (ref 0.27–4.2)
VLDLC SERPL CALC-MCNC: 22 MG/DL
WBC # BLD: 7.6 E9/L (ref 4.5–11.5)

## 2023-01-05 PROCEDURE — 84443 ASSAY THYROID STIM HORMONE: CPT

## 2023-01-05 PROCEDURE — 80053 COMPREHEN METABOLIC PANEL: CPT

## 2023-01-05 PROCEDURE — 84100 ASSAY OF PHOSPHORUS: CPT

## 2023-01-05 PROCEDURE — 0202U NFCT DS 22 TRGT SARS-COV-2: CPT

## 2023-01-05 PROCEDURE — 94640 AIRWAY INHALATION TREATMENT: CPT

## 2023-01-05 PROCEDURE — 6370000000 HC RX 637 (ALT 250 FOR IP): Performed by: EMERGENCY MEDICINE

## 2023-01-05 PROCEDURE — 6370000000 HC RX 637 (ALT 250 FOR IP)

## 2023-01-05 PROCEDURE — 82010 KETONE BODYS QUAN: CPT

## 2023-01-05 PROCEDURE — 2580000003 HC RX 258

## 2023-01-05 PROCEDURE — 80061 LIPID PANEL: CPT

## 2023-01-05 PROCEDURE — 36415 COLL VENOUS BLD VENIPUNCTURE: CPT

## 2023-01-05 PROCEDURE — 84484 ASSAY OF TROPONIN QUANT: CPT

## 2023-01-05 PROCEDURE — 6370000000 HC RX 637 (ALT 250 FOR IP): Performed by: STUDENT IN AN ORGANIZED HEALTH CARE EDUCATION/TRAINING PROGRAM

## 2023-01-05 PROCEDURE — 70450 CT HEAD/BRAIN W/O DYE: CPT

## 2023-01-05 PROCEDURE — 6360000002 HC RX W HCPCS

## 2023-01-05 PROCEDURE — 83735 ASSAY OF MAGNESIUM: CPT

## 2023-01-05 PROCEDURE — 71045 X-RAY EXAM CHEST 1 VIEW: CPT

## 2023-01-05 PROCEDURE — 82962 GLUCOSE BLOOD TEST: CPT

## 2023-01-05 PROCEDURE — 83690 ASSAY OF LIPASE: CPT

## 2023-01-05 PROCEDURE — 2580000003 HC RX 258: Performed by: STUDENT IN AN ORGANIZED HEALTH CARE EDUCATION/TRAINING PROGRAM

## 2023-01-05 PROCEDURE — 93010 ELECTROCARDIOGRAM REPORT: CPT | Performed by: INTERNAL MEDICINE

## 2023-01-05 PROCEDURE — S5553 INSULIN LONG ACTING 5 U: HCPCS | Performed by: STUDENT IN AN ORGANIZED HEALTH CARE EDUCATION/TRAINING PROGRAM

## 2023-01-05 PROCEDURE — 84439 ASSAY OF FREE THYROXINE: CPT

## 2023-01-05 PROCEDURE — 6370000000 HC RX 637 (ALT 250 FOR IP): Performed by: PHYSICIAN ASSISTANT

## 2023-01-05 PROCEDURE — 99222 1ST HOSP IP/OBS MODERATE 55: CPT | Performed by: INTERNAL MEDICINE

## 2023-01-05 PROCEDURE — 85025 COMPLETE CBC W/AUTO DIFF WBC: CPT

## 2023-01-05 PROCEDURE — 2580000003 HC RX 258: Performed by: EMERGENCY MEDICINE

## 2023-01-05 PROCEDURE — 2060000000 HC ICU INTERMEDIATE R&B

## 2023-01-05 PROCEDURE — 2700000000 HC OXYGEN THERAPY PER DAY

## 2023-01-05 PROCEDURE — 94664 DEMO&/EVAL PT USE INHALER: CPT

## 2023-01-05 PROCEDURE — 80048 BASIC METABOLIC PNL TOTAL CA: CPT

## 2023-01-05 RX ORDER — ONDANSETRON 4 MG/1
4 TABLET, ORALLY DISINTEGRATING ORAL EVERY 8 HOURS PRN
Status: DISCONTINUED | OUTPATIENT
Start: 2023-01-05 | End: 2023-01-09 | Stop reason: HOSPADM

## 2023-01-05 RX ORDER — BUDESONIDE 0.5 MG/2ML
0.5 INHALANT ORAL 2 TIMES DAILY
Status: DISCONTINUED | OUTPATIENT
Start: 2023-01-05 | End: 2023-01-09 | Stop reason: HOSPADM

## 2023-01-05 RX ORDER — SODIUM CHLORIDE 0.9 % (FLUSH) 0.9 %
5-40 SYRINGE (ML) INJECTION PRN
Status: DISCONTINUED | OUTPATIENT
Start: 2023-01-05 | End: 2023-01-09 | Stop reason: HOSPADM

## 2023-01-05 RX ORDER — ONDANSETRON 2 MG/ML
4 INJECTION INTRAMUSCULAR; INTRAVENOUS EVERY 6 HOURS PRN
Status: DISCONTINUED | OUTPATIENT
Start: 2023-01-05 | End: 2023-01-09 | Stop reason: HOSPADM

## 2023-01-05 RX ORDER — ACETAMINOPHEN 325 MG/1
650 TABLET ORAL EVERY 6 HOURS PRN
Status: DISCONTINUED | OUTPATIENT
Start: 2023-01-05 | End: 2023-01-09 | Stop reason: HOSPADM

## 2023-01-05 RX ORDER — AMITRIPTYLINE HYDROCHLORIDE 25 MG/1
50 TABLET, FILM COATED ORAL NIGHTLY
Status: DISCONTINUED | OUTPATIENT
Start: 2023-01-05 | End: 2023-01-09 | Stop reason: HOSPADM

## 2023-01-05 RX ORDER — ARFORMOTEROL TARTRATE 15 UG/2ML
15 SOLUTION RESPIRATORY (INHALATION) 2 TIMES DAILY
Status: DISCONTINUED | OUTPATIENT
Start: 2023-01-05 | End: 2023-01-09 | Stop reason: HOSPADM

## 2023-01-05 RX ORDER — IPRATROPIUM BROMIDE AND ALBUTEROL SULFATE 2.5; .5 MG/3ML; MG/3ML
1 SOLUTION RESPIRATORY (INHALATION)
Status: DISCONTINUED | OUTPATIENT
Start: 2023-01-05 | End: 2023-01-09 | Stop reason: HOSPADM

## 2023-01-05 RX ORDER — CITALOPRAM 10 MG/1
40 TABLET ORAL DAILY
Status: DISCONTINUED | OUTPATIENT
Start: 2023-01-05 | End: 2023-01-09 | Stop reason: HOSPADM

## 2023-01-05 RX ORDER — POLYETHYLENE GLYCOL 3350 17 G/17G
17 POWDER, FOR SOLUTION ORAL DAILY PRN
Status: DISCONTINUED | OUTPATIENT
Start: 2023-01-05 | End: 2023-01-09 | Stop reason: HOSPADM

## 2023-01-05 RX ORDER — INSULIN LISPRO 100 [IU]/ML
0-4 INJECTION, SOLUTION INTRAVENOUS; SUBCUTANEOUS NIGHTLY
Status: DISCONTINUED | OUTPATIENT
Start: 2023-01-05 | End: 2023-01-07

## 2023-01-05 RX ORDER — DEXTROSE MONOHYDRATE 100 MG/ML
INJECTION, SOLUTION INTRAVENOUS CONTINUOUS PRN
Status: DISCONTINUED | OUTPATIENT
Start: 2023-01-05 | End: 2023-01-05 | Stop reason: SDUPTHER

## 2023-01-05 RX ORDER — INSULIN LISPRO 100 [IU]/ML
0-8 INJECTION, SOLUTION INTRAVENOUS; SUBCUTANEOUS
Status: CANCELLED | OUTPATIENT
Start: 2023-01-05

## 2023-01-05 RX ORDER — ENOXAPARIN SODIUM 100 MG/ML
40 INJECTION SUBCUTANEOUS DAILY
Status: DISCONTINUED | OUTPATIENT
Start: 2023-01-05 | End: 2023-01-09 | Stop reason: HOSPADM

## 2023-01-05 RX ORDER — INSULIN LISPRO 100 [IU]/ML
0-8 INJECTION, SOLUTION INTRAVENOUS; SUBCUTANEOUS
Status: DISCONTINUED | OUTPATIENT
Start: 2023-01-06 | End: 2023-01-06

## 2023-01-05 RX ORDER — HYDROCODONE BITARTRATE AND ACETAMINOPHEN 5; 325 MG/1; MG/1
1 TABLET ORAL ONCE
Status: COMPLETED | OUTPATIENT
Start: 2023-01-05 | End: 2023-01-05

## 2023-01-05 RX ORDER — GUAIFENESIN AND DEXTROMETHORPHAN HBR 20; 400 MG/1; MG/1
1 TABLET ORAL 2 TIMES DAILY PRN
Status: DISCONTINUED | OUTPATIENT
Start: 2023-01-05 | End: 2023-01-09 | Stop reason: HOSPADM

## 2023-01-05 RX ORDER — INSULIN GLARGINE-YFGN 100 [IU]/ML
14 INJECTION, SOLUTION SUBCUTANEOUS DAILY
Status: DISCONTINUED | OUTPATIENT
Start: 2023-01-05 | End: 2023-01-06

## 2023-01-05 RX ORDER — LEVOTHYROXINE SODIUM 112 UG/1
112 TABLET ORAL DAILY
Status: DISCONTINUED | OUTPATIENT
Start: 2023-01-06 | End: 2023-01-09 | Stop reason: HOSPADM

## 2023-01-05 RX ORDER — 0.9 % SODIUM CHLORIDE 0.9 %
1000 INTRAVENOUS SOLUTION INTRAVENOUS ONCE
Status: COMPLETED | OUTPATIENT
Start: 2023-01-05 | End: 2023-01-05

## 2023-01-05 RX ORDER — ACETAMINOPHEN 650 MG/1
650 SUPPOSITORY RECTAL EVERY 6 HOURS PRN
Status: DISCONTINUED | OUTPATIENT
Start: 2023-01-05 | End: 2023-01-09 | Stop reason: HOSPADM

## 2023-01-05 RX ORDER — ROSUVASTATIN CALCIUM 20 MG/1
20 TABLET, COATED ORAL DAILY
Status: DISCONTINUED | OUTPATIENT
Start: 2023-01-05 | End: 2023-01-09 | Stop reason: HOSPADM

## 2023-01-05 RX ORDER — INSULIN LISPRO 100 [IU]/ML
0-4 INJECTION, SOLUTION INTRAVENOUS; SUBCUTANEOUS
Status: DISCONTINUED | OUTPATIENT
Start: 2023-01-05 | End: 2023-01-05

## 2023-01-05 RX ORDER — PANTOPRAZOLE SODIUM 40 MG/1
40 TABLET, DELAYED RELEASE ORAL
Status: DISCONTINUED | OUTPATIENT
Start: 2023-01-06 | End: 2023-01-09 | Stop reason: HOSPADM

## 2023-01-05 RX ORDER — PREGABALIN 75 MG/1
75 CAPSULE ORAL 3 TIMES DAILY
Status: DISCONTINUED | OUTPATIENT
Start: 2023-01-05 | End: 2023-01-09 | Stop reason: HOSPADM

## 2023-01-05 RX ORDER — SODIUM CHLORIDE 0.9 % (FLUSH) 0.9 %
5-40 SYRINGE (ML) INJECTION EVERY 12 HOURS SCHEDULED
Status: DISCONTINUED | OUTPATIENT
Start: 2023-01-05 | End: 2023-01-09 | Stop reason: HOSPADM

## 2023-01-05 RX ORDER — INSULIN LISPRO 100 [IU]/ML
0-4 INJECTION, SOLUTION INTRAVENOUS; SUBCUTANEOUS NIGHTLY
Status: DISCONTINUED | OUTPATIENT
Start: 2023-01-05 | End: 2023-01-05

## 2023-01-05 RX ORDER — INSULIN LISPRO 100 [IU]/ML
0-4 INJECTION, SOLUTION INTRAVENOUS; SUBCUTANEOUS NIGHTLY
Status: CANCELLED | OUTPATIENT
Start: 2023-01-05

## 2023-01-05 RX ORDER — MONTELUKAST SODIUM 10 MG/1
10 TABLET ORAL NIGHTLY
Status: DISCONTINUED | OUTPATIENT
Start: 2023-01-05 | End: 2023-01-09 | Stop reason: HOSPADM

## 2023-01-05 RX ORDER — AMLODIPINE BESYLATE 2.5 MG/1
2.5 TABLET ORAL DAILY
Status: DISCONTINUED | OUTPATIENT
Start: 2023-01-05 | End: 2023-01-09 | Stop reason: HOSPADM

## 2023-01-05 RX ORDER — DEXTROSE MONOHYDRATE 100 MG/ML
INJECTION, SOLUTION INTRAVENOUS CONTINUOUS PRN
Status: DISCONTINUED | OUTPATIENT
Start: 2023-01-05 | End: 2023-01-09 | Stop reason: HOSPADM

## 2023-01-05 RX ORDER — LOSARTAN POTASSIUM 50 MG/1
50 TABLET ORAL DAILY
Status: DISCONTINUED | OUTPATIENT
Start: 2023-01-05 | End: 2023-01-09 | Stop reason: HOSPADM

## 2023-01-05 RX ORDER — SODIUM CHLORIDE 9 MG/ML
INJECTION, SOLUTION INTRAVENOUS PRN
Status: DISCONTINUED | OUTPATIENT
Start: 2023-01-05 | End: 2023-01-09 | Stop reason: HOSPADM

## 2023-01-05 RX ADMIN — ACETAMINOPHEN 650 MG: 325 TABLET, FILM COATED ORAL at 17:46

## 2023-01-05 RX ADMIN — INSULIN LISPRO 4 UNITS: 100 INJECTION, SOLUTION INTRAVENOUS; SUBCUTANEOUS at 17:32

## 2023-01-05 RX ADMIN — INSULIN GLARGINE-YFGN 14 UNITS: 100 INJECTION, SOLUTION SUBCUTANEOUS at 16:44

## 2023-01-05 RX ADMIN — ENOXAPARIN SODIUM 40 MG: 100 INJECTION SUBCUTANEOUS at 18:40

## 2023-01-05 RX ADMIN — ROSUVASTATIN CALCIUM 20 MG: 20 TABLET, FILM COATED ORAL at 23:18

## 2023-01-05 RX ADMIN — BUDESONIDE 500 MCG: 0.5 SUSPENSION RESPIRATORY (INHALATION) at 19:58

## 2023-01-05 RX ADMIN — HYDROCODONE BITARTRATE AND ACETAMINOPHEN 1 TABLET: 5; 325 TABLET ORAL at 00:16

## 2023-01-05 RX ADMIN — INSULIN HUMAN 6 UNITS: 100 INJECTION, SOLUTION PARENTERAL at 04:12

## 2023-01-05 RX ADMIN — IPRATROPIUM BROMIDE AND ALBUTEROL SULFATE 1 AMPULE: .5; 2.5 SOLUTION RESPIRATORY (INHALATION) at 17:02

## 2023-01-05 RX ADMIN — ARFORMOTEROL TARTRATE 15 MCG: 15 SOLUTION RESPIRATORY (INHALATION) at 19:58

## 2023-01-05 RX ADMIN — MONTELUKAST SODIUM 10 MG: 10 TABLET, FILM COATED ORAL at 23:18

## 2023-01-05 RX ADMIN — SODIUM CHLORIDE, PRESERVATIVE FREE 10 ML: 5 INJECTION INTRAVENOUS at 23:19

## 2023-01-05 RX ADMIN — CITALOPRAM HYDROBROMIDE 40 MG: 20 TABLET ORAL at 18:36

## 2023-01-05 RX ADMIN — SODIUM CHLORIDE 1000 ML: 9 INJECTION, SOLUTION INTRAVENOUS at 04:19

## 2023-01-05 RX ADMIN — PREGABALIN 75 MG: 75 CAPSULE ORAL at 23:18

## 2023-01-05 RX ADMIN — SODIUM CHLORIDE 1000 ML: 9 INJECTION, SOLUTION INTRAVENOUS at 11:31

## 2023-01-05 RX ADMIN — IPRATROPIUM BROMIDE AND ALBUTEROL SULFATE 1 AMPULE: .5; 2.5 SOLUTION RESPIRATORY (INHALATION) at 19:58

## 2023-01-05 RX ADMIN — INSULIN HUMAN 10 UNITS: 100 INJECTION, SOLUTION PARENTERAL at 23:11

## 2023-01-05 RX ADMIN — AMLODIPINE BESYLATE 2.5 MG: 5 TABLET ORAL at 18:36

## 2023-01-05 RX ADMIN — LOSARTAN POTASSIUM 50 MG: 50 TABLET, FILM COATED ORAL at 18:36

## 2023-01-05 ASSESSMENT — PAIN DESCRIPTION - LOCATION
LOCATION: HEAD
LOCATION: OTHER (COMMENT)

## 2023-01-05 ASSESSMENT — PAIN DESCRIPTION - PAIN TYPE: TYPE: ACUTE PAIN

## 2023-01-05 ASSESSMENT — PAIN SCALES - GENERAL
PAINLEVEL_OUTOF10: 0
PAINLEVEL_OUTOF10: 10

## 2023-01-05 ASSESSMENT — PAIN - FUNCTIONAL ASSESSMENT: PAIN_FUNCTIONAL_ASSESSMENT: 0-10

## 2023-01-05 ASSESSMENT — PAIN DESCRIPTION - DESCRIPTORS
DESCRIPTORS: ACHING

## 2023-01-05 ASSESSMENT — PAIN DESCRIPTION - FREQUENCY: FREQUENCY: CONTINUOUS

## 2023-01-05 ASSESSMENT — LIFESTYLE VARIABLES
HOW OFTEN DO YOU HAVE A DRINK CONTAINING ALCOHOL: NEVER
HOW MANY STANDARD DRINKS CONTAINING ALCOHOL DO YOU HAVE ON A TYPICAL DAY: PATIENT DOES NOT DRINK

## 2023-01-05 NOTE — ED PROVIDER NOTES
HPI:  1/5/23, Time: 3:55 AM JULES Potter is a 79 y.o. female presenting to the ED for hyperglycemia, beginning since Christmas Eve ago. The complaint has been persistent, moderate in severity, and worsened by nothing. Reportedly having increased urination since Christmas eve as well as having blurred vision since Tuesday she reports some headache as well patient reports history of diabetes she is on Trulicity. Patient reporting some mild upper abdominal pain she reports no vomiting she does report loose stools she reports no black or tarry stools. Patient reporting no chest pain she has had some cough. Patient is on oxygen at home due to her history of COPD. Patient was reportedly seen at clinic today and told to come to the hospital for further evaluation and treatment and admission. ROS:   Pertinent positives and negatives are stated within HPI, all other systems reviewed and are negative.  --------------------------------------------- PAST HISTORY ---------------------------------------------  Past Medical History:  has a past medical history of Adrenal incidentaloma ([facility] [address] Anxiety, Arthritis, Asthma, Bladder incontinence, Cancer ([facility] [address] Chronic back pain, [facility] Depression, Fibromyalgia, GERD (gastroesophageal reflux disease), Hyperlipidemia, Hypertension, Hypothyroidism, MGUS (monoclonal gammopathy of unknown significance), Neuropathy, Pneumonia, Prolonged emergence from general anesthesia, Sleep apnea, Type II or unspecified type diabetes mellitus without mention of complication, not stated as uncontrolled, and Vaginal bleeding. Past Surgical History:  has a past surgical history that includes knee surgery (1980); Upper gastrointestinal endoscopy (2008); Colonoscopy (07/20/2016); Upper gastrointestinal endoscopy (07/20/2016); Nerve Block (Bilateral, 09/22/2016); Nerve Block (07/06/2020); Pain management procedure (N/A, 7/6/2020);  Nerve Block (Bilateral, 08/10/2020); Anesthesia Nerve Block (Bilateral, 8/10/2020); other surgical history (12/13/2016); Colonoscopy (2008); Upper gastrointestinal endoscopy (2008); Upper gastrointestinal endoscopy (N/A, 11/9/2020); Colonoscopy (N/A, 11/9/2020); Colonoscopy (11/9/2020); and Dilation and curettage of uterus (N/A, 5/11/2021). Social History:  reports that she quit smoking about 2 years ago. Her smoking use included cigarettes. She started smoking about 51 years ago. She has a 100.00 pack-year smoking history. She has never used smokeless tobacco. She reports that she does not drink alcohol and does not use drugs. Family History: family history includes Arthritis in her brother, maternal grandfather, and maternal grandmother; Cancer in her father, maternal uncle, mother, and paternal aunt; Diabetes in her brother, father, maternal grandmother, and mother; Heart Disease in her paternal grandfather; Heart Disease (age of onset: 79) in her mother; High Blood Pressure in her father, maternal grandmother, paternal aunt, and paternal uncle; High Cholesterol in her paternal grandmother; Hypertension in her father; Kidney Disease in her paternal grandmother; Stroke in her maternal grandfather. The patients home medications have been reviewed. Allergies: Aceon [perindopril erbumine] and Nsaids    ---------------------------------------------------PHYSICAL EXAM--------------------------------------    Constitutional/General: Alert and oriented x3,   Head: Normocephalic and atraumatic  Eyes: PERRL, EOMI  Mouth: Oropharynx clear, handling secretions, no trismus  Neck: Supple, full ROM, non tender to palpation in the midline, no stridor, no crepitus, no meningeal signs  Pulmonary: Lungs clear to auscultation bilaterally, no wheezes, rales, or rhonchi. Not in respiratory distress  Cardiovascular:  Regular rate. Regular rhythm. No murmurs, gallops, or rubs.  2+ distal pulses  Chest: no chest wall tenderness  Abdomen: Soft. Tender upper abdomen. Non distended. +BS. No rebound, guarding, or rigidity. No pulsatile masses appreciated. Musculoskeletal: Moves all extremities x 4. Warm and well perfused, no clubbing, cyanosis, or edema. Capillary refill <3 seconds  Skin: warm and dry. No rashes. Neurologic: GCS 15, CN 2-12 grossly intact, no focal deficits, symmetric strength 5/5 in the upper and lower extremities bilaterally  Psych: Normal Affect    -------------------------------------------------- RESULTS -------------------------------------------------  I have personally reviewed all laboratory and imaging results for this patient. Results are listed below.      LABS:  Results for orders placed or performed during the hospital encounter of 01/05/23   CBC with Auto Differential   Result Value Ref Range    WBC 7.3 4.5 - 11.5 E9/L    RBC 4.99 3.50 - 5.50 E12/L    Hemoglobin 13.8 11.5 - 15.5 g/dL    Hematocrit 42.0 34.0 - 48.0 %    MCV 84.2 80.0 - 99.9 fL    MCH 27.7 26.0 - 35.0 pg    MCHC 32.9 32.0 - 34.5 %    RDW 13.2 11.5 - 15.0 fL    Platelets 844 188 - 756 E9/L    MPV 11.2 7.0 - 12.0 fL    Neutrophils % 53.1 43.0 - 80.0 %    Immature Granulocytes % 0.3 0.0 - 5.0 %    Lymphocytes % 39.1 20.0 - 42.0 %    Monocytes % 6.0 2.0 - 12.0 %    Eosinophils % 1.2 0.0 - 6.0 %    Basophils % 0.3 0.0 - 2.0 %    Neutrophils Absolute 3.86 1.80 - 7.30 E9/L    Immature Granulocytes # 0.02 E9/L    Lymphocytes Absolute 2.85 1.50 - 4.00 E9/L    Monocytes Absolute 0.44 0.10 - 0.95 E9/L    Eosinophils Absolute 0.09 0.05 - 0.50 E9/L    Basophils Absolute 0.02 0.00 - 0.20 E9/L   Comprehensive Metabolic Panel   Result Value Ref Range    Sodium 129 (L) 132 - 146 mmol/L    Potassium 4.1 3.5 - 5.0 mmol/L    Chloride 86 (L) 98 - 107 mmol/L    CO2 27 22 - 29 mmol/L    Anion Gap 16 7 - 16 mmol/L    Glucose 595 (HH) 74 - 99 mg/dL    BUN 26 (H) 6 - 23 mg/dL    Creatinine 1.1 (H) 0.5 - 1.0 mg/dL    Est, Glom Filt Rate 55 >=60 mL/min/1.73 Calcium 10.2 8.6 - 10.2 mg/dL    Total Protein 8.6 (H) 6.4 - 8.3 g/dL    Albumin 4.4 3.5 - 5.2 g/dL    Total Bilirubin 0.6 0.0 - 1.2 mg/dL    Alkaline Phosphatase 127 (H) 35 - 104 U/L    ALT 28 0 - 32 U/L    AST 19 0 - 31 U/L   Troponin   Result Value Ref Range    Troponin, High Sensitivity 11 (H) 0 - 9 ng/L   Lactic Acid   Result Value Ref Range    Lactic Acid 1.7 0.5 - 2.2 mmol/L   Beta-Hydroxybutyrate   Result Value Ref Range    Beta-Hydroxybutyrate 1.99 (H) 0.02 - 0.27 mmol/L   PH, VENOUS   Result Value Ref Range    pH, Jaleel 7.35 7.35 - 7.45   Urinalysis   Result Value Ref Range    Color, UA Yellow Straw/Yellow    Clarity, UA Clear Clear    Glucose, Ur >=1000 (A) Negative mg/dL    Bilirubin Urine Negative Negative    Ketones, Urine 15 (A) Negative mg/dL    Specific Gravity, UA 1.010 1.005 - 1.030    Blood, Urine Negative Negative    pH, UA 6.0 5.0 - 9.0    Protein, UA Negative Negative mg/dL    Urobilinogen, Urine 0.2 <2.0 E.U./dL    Nitrite, Urine Negative Negative    Leukocyte Esterase, Urine Negative Negative   Troponin   Result Value Ref Range    Troponin, High Sensitivity 11 (H) 0 - 9 ng/L   Basic Metabolic Panel   Result Value Ref Range    Sodium 128 (L) 132 - 146 mmol/L    Potassium 4.9 3.5 - 5.0 mmol/L    Chloride 86 (L) 98 - 107 mmol/L    CO2 24 22 - 29 mmol/L    Anion Gap 18 (H) 7 - 16 mmol/L    Glucose 533 (HH) 74 - 99 mg/dL    BUN 26 (H) 6 - 23 mg/dL    Creatinine 0.8 0.5 - 1.0 mg/dL    Est, Glom Filt Rate >60 >=60 mL/min/1.73    Calcium 9.7 8.6 - 10.2 mg/dL   Lipase   Result Value Ref Range    Lipase 38 13 - 60 U/L   CBC with Auto Differential   Result Value Ref Range    WBC 7.6 4.5 - 11.5 E9/L    RBC 4.95 3.50 - 5.50 E12/L    Hemoglobin 13.9 11.5 - 15.5 g/dL    Hematocrit 43.1 34.0 - 48.0 %    MCV 87.1 80.0 - 99.9 fL    MCH 28.1 26.0 - 35.0 pg    MCHC 32.3 32.0 - 34.5 %    RDW 13.2 11.5 - 15.0 fL    Platelets 492 875 - 341 E9/L    MPV 11.5 7.0 - 12.0 fL    Neutrophils % 42.4 (L) 43.0 - 80.0 % Immature Granulocytes % 0.1 0.0 - 5.0 %    Lymphocytes % 48.3 (H) 20.0 - 42.0 %    Monocytes % 6.7 2.0 - 12.0 %    Eosinophils % 2.1 0.0 - 6.0 %    Basophils % 0.4 0.0 - 2.0 %    Neutrophils Absolute 3.20 1.80 - 7.30 E9/L    Immature Granulocytes # 0.01 E9/L    Lymphocytes Absolute 3.66 1.50 - 4.00 E9/L    Monocytes Absolute 0.51 0.10 - 0.95 E9/L    Eosinophils Absolute 0.16 0.05 - 0.50 E9/L    Basophils Absolute 0.03 0.00 - 0.20 E9/L   POCT Glucose   Result Value Ref Range    Meter Glucose >500 (H) 74 - 99 mg/dL   POCT Glucose   Result Value Ref Range    Meter Glucose 332 (H) 74 - 99 mg/dL   EKG 12 Lead   Result Value Ref Range    Ventricular Rate 93 BPM    Atrial Rate 93 BPM    P-R Interval 164 ms    QRS Duration 96 ms    Q-T Interval 370 ms    QTc Calculation (Bazett) 460 ms    P Axis 46 degrees    R Axis -26 degrees    T Axis 26 degrees       RADIOLOGY:  Interpreted by Radiologist.  XR CHEST PORTABLE   Final Result   No acute process. CT HEAD WO CONTRAST   Final Result   Mild atrophy and likely chronic microvascular ischemic disease. EKG:  This EKG is signed and interpreted by me. Rate: 93  Rhythm: Sinus  Interpretation: non-specific EKG  Comparison: stable as compared to patient's most recent EKG        ------------------------- NURSING NOTES AND VITALS REVIEWED ---------------------------   The nursing notes within the ED encounter and vital signs as below have been reviewed by myself. BP (!) 169/89   Pulse 90   Temp 98 °F (36.7 °C) (Oral)   Resp 19   Wt 206 lb (93.4 kg)   SpO2 100%   BMI 33.25 kg/m²   Oxygen Saturation Interpretation: Normal    The patients available past medical records and past encounters were reviewed.         ------------------------------ ED COURSE/MEDICAL DECISION MAKING----------------------  Medications   HYDROcodone-acetaminophen (NORCO) 5-325 MG per tablet 1 tablet (1 tablet Oral Given 1/5/23 0016)   0.9 % sodium chloride bolus (1,000 mLs IntraVENous New Bag 1/5/23 0419)   insulin regular (HUMULIN R;NOVOLIN R) injection 6 Units (6 Units IntraVENous Given 1/5/23 0412)             Medical Decision Making:      History From: Patient presenting here because of elevated blood sugars since Christmas Eve she has been having urinary frequency as well as having some blurred vision. Patient is on Trulicity. Patient reporting some upper abdominal pain. Patient reporting no vomit she is reporting some loose stools. Patient seen at clinic and sent here for evaluation. CC/HPI Summary, DDx, ED Course, Reassessment, Tests Considered, Patient expectation:   Patient while here in the emergency department ordered IV fluids normal saline wide open 1 L as well as insulin IV 6 units IV. Patient reexamined and repeat labs noted. Patient was still hyperglycemic Accu-Chek was too high to read. Repeat blood sugar on lab work showed a blood sugar of 533. Patient vital signs noted and patient afebrile. Patient not tachycardic. Patient neurologically intact she is tender upper abdomen. Patient believes symptoms are related to her diabetes and elevated blood sugar. Patient did undergo CT of her head due to her complaint of headache as well as some blurred vision. Showed no acute findings. Differential included DKA, hyperglycemia, infectious cause for her hyperglycemia. Chest x-ray also done. She has had some cough. Patient was reassessed several times. Patient still reporting some headache and some mild upper abdominal pain. Patient afebrile here. Patient will be admitted due to her uncontrolled diabetes and elevated blood sugar. Patient will be admitted to monitored bed. House attending was notified and they will admit call was placed to resident. Social Determinants affecting Dx or Tx: Patient denies any recent smoking. She does not drink.     Chronic Conditions: Patient does have underlying history of diabetes history of COPD fibromyalgia hyperlipidemia and hypertension. Records Reviewed: Old records were reviewed and patient was recently admitted for pneumonia respiratory failure. Patient also had been admitted in early November for COPD exacerbation. Re-Evaluations:             Re-evaluation. Patients symptoms show no change      Consultations:             I did speak to [facility] and he will admit call was placed to resident. Critical Care: This patient's ED course included: a personal history and physicial eaxmination    This patient has been closely monitored during their ED course. Counseling: The emergency provider has spoken with the patient and discussed todays results, in addition to providing specific details for the plan of care and counseling regarding the diagnosis and prognosis. Questions are answered at this time and they are agreeable with the plan.       --------------------------------- IMPRESSION AND DISPOSITION ---------------------------------    IMPRESSION  1. Hyperglycemia    2. Blurred vision    3. Uncontrolled type 2 diabetes mellitus with hyperglycemia ([facility] [address]        DISPOSITION  Disposition: Admit to telemetry  Patient condition is stable        NOTE: This report was transcribed using voice recognition software.  Every effort was made to ensure accuracy; however, inadvertent computerized transcription errors may be present          Heather Lora MD  01/05/23 7012

## 2023-01-05 NOTE — LETTER
[facility]  [address] [facility]  Phone: [phone]    No name on file. January 9, 2023     Patient: Paco Giles   YOB: 1955   Date of Visit: 1/4/2023       To Whom It May Concern: It is my medical opinion that Manohar Martinez {Work release (duty restriction):[id number]}. If you have any questions or concerns, please don't hesitate to call. Sincerely,        No name on file.

## 2023-01-05 NOTE — H&P
[facility]  Internal Medicine Residency / [address]    Attending Physician Statement  I have discussed the case, including pertinent history and exam findings with the resident and the team.  I have seen and examined the patient and the key elements of the encounter have been performed by me. I have also personally reviewed imaging studies and labs. I agree with the assessment, plan and orders as documented by the resident. Patient admitted early this morning. She was recently admitted for pneumonia. She has DM but is not on insulin. Required up to 32 units of lantus nightly during last admission (was also on steroids). She has not missed a dose of weekly trulicity since discharge. Noted increase urination, generalized fatigue, and on/off blurring of vision since last week. Initial labs showed hyperglycemia with elevated AG (normal bicarb of 27 but she has chronic metabolic acidosis from COPD with baseline bicarb in 30s). BHB 1.99. (+) glucosuria but no infection. Received fluids and regular insulin. BS in 300s this am. Patient feeling improved. No signs of dehydration, no focal deficits, denies chest pain/shortness of breath, no blurring of vision at the moment. Breath sounds are clear. Assessment:  Uncontrolled DM. Non-insulin requiring but a1c up to 8.5 from 6 (June 2022). Was receiving up to 32 units lantus nightly during recent hospitalization. May have been in early stages DKA during initial presentation but this has improved with fluids and insulin. Symptoms of BOV, increased urination likely from hyperosmolar effect of glucose.    Translocation hyponatremia  Hypoxic respiratory failure 2/2 COPD on 2L oxygen at home, not in exacerbation  EDWARD  HTN  Hypothyroidism        Plan:  Rpt BMP and additional fluids ordered this am. BS at 400s but gap has significantly improved  Start basal insulin + sliding scale  DM educator to see  Hold trulicity for now   Continue other chronic medications        Remainder of medical problems as per resident note. Wendy Vicente MD  Internal Medicine

## 2023-01-05 NOTE — H&P
[facility]  Internal Medicine Residency Program  History and Physical    Patient:  Nannette Willard 79 y.o. female MRN: [id number]     Date of Service: 1/5/2023    Hospital Day: 1      Chief complaint: had concerns including Hyperglycemia (\"HI\", increased urination and blurred vision). History of Present Illness     The patient is a 79 y.o. female with a past medical history of noninsulin-dependent diabetes mellitus, COPD, GERD, hyperlipidemia, hypertension, hyperlipidemia and smoldering myeloma who was admitted to the ED from clinic for hyperglycemia. History was obtained from patient. Patient was recently admitted and managed for respiratory failure and pneumonia at [facility] and was discharged on 12/24/2022. She states that her excessive urination and increased fluid intake has worsened progressively since discharge. She also reports associated poor appetite, abdominal pain, blurring of vision, shortness of breath worse on exertion, nonproductive cough, sore throat, and abdominal pain. She denies any fever, nausea or vomiting. She also denies any weakness in any part of the body. Patient states that upon measuring her blood glucose at home, it typically reads in 200s-300s. She was discharged on Trulicity to follow-up in the clinic. In the ED, patient presented hemodynamically stable with initial labs showing elevated blood glucose at 595 with high anion gap of 16 and sodium level 129, beta hydroxybutyrate elevated to 1.99, UA showing glucosuria and 2 nausea of 15, troponin was not significantly elevated 11, elevated alkaline phosphatase 127. CT head was unremarkable of any acute intracranial process. Hyperglycemia gradually improved on insulin and IV fluids and patient was never on DKA protocol. Anion gap now 13 with random blood glucose 464 and patient currently on 14 units Lantus and low-dose sliding scale insulin.     Past Medical History: Diagnosis Date    Adrenal incidentaloma ([facility] [address]     Anxiety     Arthritis     Asthma     Bladder incontinence     Cancer [facility]) Dx 2009    multiple Myeloma, in remission currently     Chronic back pain     COPD (chronic obstructive pulmonary disease) (HCC)     on O2 2 liters  (uses with activity and at night to sleep)    Depression     Fibromyalgia     GERD (gastroesophageal reflux disease)     Hyperlipidemia     Hypertension     Hypothyroidism     MGUS (monoclonal gammopathy of unknown significance)     Neuropathy     Pneumonia 02/2020    Prolonged emergence from general anesthesia     Sleep apnea     doesnt use her cpap    Type II or unspecified type diabetes mellitus without mention of complication, not stated as uncontrolled     Vaginal bleeding        Past Surgical History:        Procedure Laterality Date    ANESTHESIA NERVE BLOCK Bilateral 8/10/2020    BILATERAL L3 L4 MEDIAL BRANCH L5 DORSSAL RAMUS NERVE BLOCK (CPT 16724) SEDATION performed by Tracy Ugalde MD at [address] [facility]  07/20/2016    removed 3 polyps (tubular adenomas), diverticulosis, Dr. Vidales Beat  2008    approximately 2008, no report available, per patient some polyps removed, Dr Roma Powers, [facility]    COLONOSCOPY N/A 11/9/2020    Small sessile polyp distal ascending colon removed with bx/cauterized (path Tubular Adenoma), right and left diverticulosis without diverticulitis, Dr. Austen Cain, [facility]    COLONOSCOPY  11/9/2020    Small sessile polyp distal ascending colon removed with bx/cauterized (path Tubular Adenoma), right and left diverticulosis without diverticulitis, Dr. Austen Cain, [facility] N/A 5/11/2021    DILATATION AND CURETTAGE HYSTEROSCOPY POSSIBLE REMOVAL OF MASS performed by Maria Del Carmen Ugalde MD at [address] [facility], left knee, arthroscopic    NERVE BLOCK Bilateral 09/22/2016    lumbar transforaminal nerve block #1    NERVE BLOCK  07/06/2020    lumbar epidural steroid injectio L4-5    NERVE BLOCK Bilateral 08/10/2020    L3, L4, L5     OTHER SURGICAL HISTORY  12/13/2016    Excision cyst right face    PAIN MANAGEMENT PROCEDURE N/A 7/6/2020    LUMBAR EPIDURAL STEROID INJECTION L4-5 performed by Lyn Smith MD at [address]  2008 2008    UPPER GASTROINTESTINAL ENDOSCOPY  07/20/2016    GERD and gastric ulcers, Bx H pylori neg, Dr. April Zamora  2008    approximately 2008, no report, patient does not know the findings, Dr Dale Urrutia, [facility] N/A 11/9/2020    Severe gastritis with superficial ulcerations with bx neg H pylori, Dr. Gonzalo Jose, [facility]       Medications Prior to Admission:    Prior to Admission medications    Medication Sig Start Date End Date Taking? Authorizing Provider   amLODIPine (NORVASC) 2.5 MG tablet Take 1 tablet by mouth daily 12/24/22 1/23/23  PAM Hale   tiotropium (SPIRIVA HANDIHALER) 18 MCG inhalation capsule Inhale 1 capsule into the lungs daily  Patient not taking: Reported on 1/4/2023 12/23/22   Silverio Galeano MD   Calcium Polycarbophil (FIBER) 625 MG TABS Take 1 tablet by mouth 2 times daily 12/23/22   Silverio Galeano MD   blood glucose monitor strips Check blood sugar fasting before breakfast and as needed for symptoms of irregular blood glucose. Dispense sufficient amount for indicated testing frequency plus additional to accommodate PRN testing needs. 12/23/22   Silverio Galeano MD   glucose monitoring (FREESTYLE FREEDOM) kit 1 kit by Does not apply route daily Ok to change brand that insurance will cover.  12/23/22   Silverio Galeano MD   Lancets MISC Check blood sugar daily and prn as needed 12/23/22   Silverio Galeano MD   Respiratory Therapy Supplies (NEBULIZER/TUBING/MOUTHPIECE) KIT 1 kit by Does not apply route daily as needed (for shortness of breath/wheezing) 12/15/22   Tosin Dumont MD   albuterol (PROVENTIL) (2.5 MG/3ML) 0.083% nebulizer solution Take 3 mLs by nebulization every 6 hours as needed for Wheezing or Shortness of Breath 12/15/22   Tosin Dumont MD   Dextromethorphan-guaiFENesin (CORICIDIN HBP CONGESTION/COUGH)  MG CAPS Take 1 capsule by mouth 2 times daily as needed (congestion)  Patient not taking: Reported on 1/4/2023 12/15/22   Tosin Dumont MD   baclofen (LIORESAL) 10 MG tablet Take 1 tablet by mouth 3 times daily as needed (back muscle spasms) 11/29/22   Vero Fuller MD   famotidine (PEPCID) 20 MG tablet Take 1 tablet by mouth 2 times daily 11/25/22   Maribel Traylor MD   losartan (COZAAR) 50 MG tablet Take 1 tablet by mouth daily 11/14/22   Lopez Mckeon MD   SYMBICORT 160-4.5 MCG/ACT AERO INHALE 2 PUFFS INTO THE LUNGS TWICE DAILY 11/13/22   Lopez Mckeon MD   montelukast (SINGULAIR) 10 MG tablet TAKE 1 TABLET BY MOUTH EVERY NIGHT AT BEDTIME 11/13/22   Lopez Mckeon MD   albuterol sulfate HFA (PROVENTIL;VENTOLIN;PROAIR) 108 (90 Base) MCG/ACT inhaler Inhale 3 puffs into the lungs 4 times daily as needed for Wheezing 11/13/22   Lopez Mckeon MD   pantoprazole (PROTONIX) 40 MG tablet Take 1 tablet by mouth every morning (before breakfast) 9/13/22   Cheyanne Hillman MD   Dulaglutide (TRULICITY) 4.98 UT/7.3CO SOPN Inject 0.75 mg into the skin once a week 9/13/22   Cheyanne Hillman MD   pregabalin (LYRICA) 75 MG capsule Take 1 capsule by mouth 3 times daily for 90 days.  9/13/22 12/17/22  Cheyanne Hillman MD   amitriptyline (ELAVIL) 50 MG tablet TAKE 1 TABLET BY MOUTH EVERY EVENING 9/6/22   Hipolito Glover MD   celecoxib (CELEBREX) 100 MG capsule Take 1 capsule by mouth daily 9/6/22   Hipolito Glover MD   citalopram (CELEXA) 40 MG tablet Take 1 tablet by mouth daily 9/6/22   Hipolito Glover MD   docusate sodium (COLACE) 100 MG capsule Take 1 capsule by mouth 2 times daily 9/6/22   Hipolito Glover MD   levothyroxine (SYNTHROID) 112 MCG tablet Take 1 tablet by mouth Daily 9/6/22   Warren Dorsey MD   ammonium lactate (LAC-HYDRIN) 12 % lotion Apply topically twice daily 6/13/22   Humera Dobbins DPM   calcium-cholecalciferol (CALCIUM 500/D) 500-200 MG-UNIT per tablet TAKE 1 TABLET BY MOUTH DAILY 6/1/22   Shawn De La Cruz MD   rosuvastatin (CRESTOR) 20 MG tablet Take 1 tablet by mouth daily 6/1/22   Shawn De La Cruz MD   mineral oil-hydrophilic petrolatum (HYDROPHOR) ointment Apply topically as needed. 6/1/22   Shawn De La Cruz MD   OXYGEN Inhale into the lungs Uses 2 liters at night    Historical Provider, MD   aspirin 81 MG tablet Take 1 tablet by mouth daily 1/31/20   Cirilo Jacobo MD   Misc. Devices MISC Oxygen concentrator  j44.9 10/26/18   Lawyer Dominguez, DO       Allergies:  Aceon [perindopril erbumine] and Nsaids    Social History:   TOBACCO:   reports that she quit smoking about 2 years ago. Her smoking use included cigarettes. She started smoking about 51 years ago. She has a 100.00 pack-year smoking history. She has never used smokeless tobacco.  ETOH:   reports no history of alcohol use.     Family History:       Problem Relation Age of Onset    Heart Disease Mother 79    Diabetes Mother     Cancer Mother         Breast    Hypertension Father     Cancer Father         Pancreas    Diabetes Father     High Blood Pressure Father     Arthritis Brother     Diabetes Brother     Cancer Maternal Uncle     Cancer Paternal Aunt         breast    High Blood Pressure Paternal Aunt     High Blood Pressure Paternal Uncle     Arthritis Maternal Grandmother     Diabetes Maternal Grandmother     High Blood Pressure Maternal Grandmother     Arthritis Maternal Grandfather     Stroke Maternal Grandfather     High Cholesterol Paternal Grandmother     Kidney Disease Paternal Grandmother     Heart Disease Paternal Grandfather        REVIEW OF SYSTEMS:    Constitutional: Positive for loss of appetite, no fever, no chills, no change in weight  HEENT: Positive for blurred vision, sore throat, no ear problems, no rhinorrhea. Respiratory: Positive for shortness of breath, cough, no sputum production, no pleuritic chest pain  Cardiology: Positive for nonradiating sharp left-sided chest pain, dyspnea on exertion, no paroxysmal nocturnal dyspnea, no orthopnea, no palpitation, no leg swelling. Gastroenterology: Positive for abdominal pain, no dysphagia, no reflux, no nausea or vomiting; no constipation or diarrhea.  No hematochezia   Genitourinary: No dysuria, no frequency, hesitancy; no hematuria  Musculoskeletal: no joint pain, no myalgia, no change in range of movement  Neurology: no focal weakness in extremities, no slurred speech, no double vision, no tingling or numbness sensation  Endocrinology: Positive for polydipsia and polyuria, no temperature intolerance, no polyphagia  Hematology: no increased bleeding, no bruising, no lymphadenopathy  Skin: no skin changes noticed by patient  Psychology: no depressed mood, no suicidal ideation    Physical Exam   Vitals: BP (!) 146/91   Pulse 87   Temp 98 °F (36.7 °C) (Oral)   Resp 19   Wt 206 lb (93.4 kg)   SpO2 99%   BMI 33.25 kg/m²     General Appearance: alert and oriented to person, place and time  Skin: warm and dry, no rash or erythema  Head: normocephalic and atraumatic  Eyes: pupils equal, round, and reactive to light  Neck: neck supple and non tender without mass   Pulmonary/Chest: rhochi bilaterally in the upper lung zones  Cardiovascular: normal rate and normal S1 and S2  Abdomen: soft, non-tender, non-distended, normal bowel sounds, no masses or organomegaly  Extremities: no cyanosis and no clubbing  Musculoskeletal: normal range of motion, no joint swelling, deformity or tenderness  Neurologic: reflexes normal and symmetric and speech normal   Labs and Imaging Studies   Basic Labs  Recent Labs     01/04/23  1736 01/05/23  0416 01/05/23  1059   * 128* 132   K 4.1 4.9 3.8   CL 86* 86* 93*   CO2 27 24 26   BUN 26* 26* 22   CREATININE 1.1* 0.8 0.7 GLUCOSE 595* 533* 464*   CALCIUM 10.2 9.7 8.8       Recent Labs     01/04/23  1736 01/05/23  0416   WBC 7.3 7.6   RBC 4.99 4.95   HGB 13.8 13.9   HCT 42.0 43.1   MCV 84.2 87.1   MCH 27.7 28.1   MCHC 32.9 32.3   RDW 13.2 13.2    307   MPV 11.2 11.5     Imaging Studies:  XR CHEST PORTABLE   Final Result   No acute process. CT HEAD WO CONTRAST   Final Result   Mild atrophy and likely chronic microvascular ischemic disease. EKG: normal sinus rhythm, unchanged from previous tracings.     Resident's Assessment and Plan       Uncontrolled NIDDM in early stages of DKA, A1c 8.5 71/88  On weekly Trulicity at home, insulin naïve  Presented with hypoglycemia with high anion gap  BG and AG currently improving s/p insulin/IV fluids  Hold Trulicity for now  Continue Lantus 14 units/low-dose sliding scale insulin  Repeat BMP at 2100, if AG still elevated consider ICU admission  Monitor BG ACHS  Ensure diabetic education    Chronic hypoxic respiratory failure 2/2 COPD  PFT 2020 - FEV1/FVC 69%, predicted 87%  On 2 L home oxygen, compliant  Currently on baseline oxygen, wean down as able  Start breathing treatments (DuoNeb, Brovana, Pulmicort)  Monitor respiratory status closely    History of asthma, on inhalers  Start breathing treatments  Resume montelukast  Monitor respiratory status    History of EDWARD  Ensure PAP at night    History of hypertension  BP currently elevated, on amlodipine 2.5 mg, losartan 50 mg at home  Resume home BP meds  Monitor blood pressure closely    History of GERD  On famotidine/Protonix at home  Resume Protonix 40 mg daily    History of hyperlipidemia, ASCVD risk score 26.8%  No lipid panel on file, obtain lipid panel  On Crestor 20 mg at home, resume    History of hypothyroidism  Last TSH 5.31 6/23/2022   On Synthroid 112 mcg at home, resume    History of smoldering myeloma  Follows Dr. Sandra Patiño, no active intervention      PT/OT evaluation: Not indicated  DVT prophylaxis/ GI prophylaxis: Lovenox/Protonix  Disposition: admit to floor    Jose Slade MD, PGY-1  Attending physician: Dr. Razia Mcfarlane

## 2023-01-05 NOTE — LETTER
[facility]  [address] [facility]  Phone: [phone]             January 9, 2023    Patient: Tamie Toro   YOB: 1955   Date of Visit: 01/04/2023       To Whom It May Concern:    Denis Cox was seen and treated in our facility  beginning 01/04/2023 until 01/09/2023.     Sincerely,       RANDY Franklin         Signature:__________________________________

## 2023-01-05 NOTE — ED NOTES
, waiting for admitting orders. Pt given cream of wheat and coffee as requested. Okay to feed per verbal order from Dr. Gabi Merida.       Louana Brittle, RN  01/05/23 9731

## 2023-01-05 NOTE — LETTER
[facility]  [address] [facility]  Phone: [phone]    No name on file. January 9, 2023     Patient: Shady Carranza   YOB: 1955   Date of Visit: 1/4/2023       To Whom It May Concern:    Diego Duagn was seen and treated in our facility  beginning 01/04/2023 until 01/09/2023. If you have any questions or concerns, please don't hesitate to call.     Sincerely,        Candida Ayala MD

## 2023-01-05 NOTE — ED NOTES
Patient sitting in wheelchair in waiting area, with eyes closed, vitals retaken, patient updated on labs, diagnostic testing and room status. Another warm blanket provided to patient no s/sx of distress, complaining of 10/10 body wide pain, NP notified new orders obtained at this time. Will continue to monitor.      Barney Victoria, ENRICON  64/10/83 [address] [place] [address] Laura, ENRICON  91/16/10 [id number]

## 2023-01-05 NOTE — LETTER
Conor Caldera [address] [place] [address]  [address] [facility]  Phone: [phone]             January 9, 2023    Patient: Devang Jules   YOB: 1955   Date of Visit: 1/5/2023       To Whom It May Concern:    Governor Stewart was seen and treated in our facility  beginning 1/5/2023 until 01/09/2023.        Sincerely,       RANDY Patel         Signature:__________________________________

## 2023-01-06 LAB
ALBUMIN SERPL-MCNC: 3.5 G/DL (ref 3.5–5.2)
ALP BLD-CCNC: 93 U/L (ref 35–104)
ALT SERPL-CCNC: 19 U/L (ref 0–32)
ANION GAP SERPL CALCULATED.3IONS-SCNC: 6 MMOL/L (ref 7–16)
AST SERPL-CCNC: 16 U/L (ref 0–31)
BASOPHILS ABSOLUTE: 0.04 E9/L (ref 0–0.2)
BASOPHILS RELATIVE PERCENT: 0.8 % (ref 0–2)
BILIRUB SERPL-MCNC: 0.4 MG/DL (ref 0–1.2)
BUN BLDV-MCNC: 15 MG/DL (ref 6–23)
CALCIUM SERPL-MCNC: 9.1 MG/DL (ref 8.6–10.2)
CHLORIDE BLD-SCNC: 100 MMOL/L (ref 98–107)
CO2: 30 MMOL/L (ref 22–29)
CREAT SERPL-MCNC: 0.6 MG/DL (ref 0.5–1)
EOSINOPHILS ABSOLUTE: 0.27 E9/L (ref 0.05–0.5)
EOSINOPHILS RELATIVE PERCENT: 5.2 % (ref 0–6)
GFR SERPL CREATININE-BSD FRML MDRD: >60 ML/MIN/1.73
GLUCOSE BLD-MCNC: 272 MG/DL (ref 74–99)
HCT VFR BLD CALC: 36.4 % (ref 34–48)
HEMOGLOBIN: 11.5 G/DL (ref 11.5–15.5)
IMMATURE GRANULOCYTES #: 0.01 E9/L
IMMATURE GRANULOCYTES %: 0.2 % (ref 0–5)
LYMPHOCYTES ABSOLUTE: 3.07 E9/L (ref 1.5–4)
LYMPHOCYTES RELATIVE PERCENT: 58.8 % (ref 20–42)
MAGNESIUM: 2.2 MG/DL (ref 1.6–2.6)
MCH RBC QN AUTO: 27.3 PG (ref 26–35)
MCHC RBC AUTO-ENTMCNC: 31.6 % (ref 32–34.5)
MCV RBC AUTO: 86.3 FL (ref 80–99.9)
METER GLUCOSE: 265 MG/DL (ref 74–99)
METER GLUCOSE: 316 MG/DL (ref 74–99)
METER GLUCOSE: 359 MG/DL (ref 74–99)
METER GLUCOSE: 369 MG/DL (ref 74–99)
METER GLUCOSE: 402 MG/DL (ref 74–99)
METER GLUCOSE: 405 MG/DL (ref 74–99)
METER GLUCOSE: 433 MG/DL (ref 74–99)
METER GLUCOSE: 444 MG/DL (ref 74–99)
MONOCYTES ABSOLUTE: 0.43 E9/L (ref 0.1–0.95)
MONOCYTES RELATIVE PERCENT: 8.2 % (ref 2–12)
NEUTROPHILS ABSOLUTE: 1.4 E9/L (ref 1.8–7.3)
NEUTROPHILS RELATIVE PERCENT: 26.8 % (ref 43–80)
PDW BLD-RTO: 13.3 FL (ref 11.5–15)
PLATELET # BLD: 273 E9/L (ref 130–450)
PMV BLD AUTO: 11.1 FL (ref 7–12)
POTASSIUM REFLEX MAGNESIUM: 3.2 MMOL/L (ref 3.5–5)
POTASSIUM SERPL-SCNC: 3.7 MMOL/L (ref 3.5–5)
RBC # BLD: 4.22 E12/L (ref 3.5–5.5)
SODIUM BLD-SCNC: 136 MMOL/L (ref 132–146)
TOTAL PROTEIN: 6.5 G/DL (ref 6.4–8.3)
WBC # BLD: 5.2 E9/L (ref 4.5–11.5)

## 2023-01-06 PROCEDURE — 2500000003 HC RX 250 WO HCPCS: Performed by: STUDENT IN AN ORGANIZED HEALTH CARE EDUCATION/TRAINING PROGRAM

## 2023-01-06 PROCEDURE — 6370000000 HC RX 637 (ALT 250 FOR IP)

## 2023-01-06 PROCEDURE — 83735 ASSAY OF MAGNESIUM: CPT

## 2023-01-06 PROCEDURE — 99232 SBSQ HOSP IP/OBS MODERATE 35: CPT | Performed by: INTERNAL MEDICINE

## 2023-01-06 PROCEDURE — S5553 INSULIN LONG ACTING 5 U: HCPCS | Performed by: STUDENT IN AN ORGANIZED HEALTH CARE EDUCATION/TRAINING PROGRAM

## 2023-01-06 PROCEDURE — 6370000000 HC RX 637 (ALT 250 FOR IP): Performed by: STUDENT IN AN ORGANIZED HEALTH CARE EDUCATION/TRAINING PROGRAM

## 2023-01-06 PROCEDURE — 85025 COMPLETE CBC W/AUTO DIFF WBC: CPT

## 2023-01-06 PROCEDURE — 94640 AIRWAY INHALATION TREATMENT: CPT

## 2023-01-06 PROCEDURE — 6360000002 HC RX W HCPCS

## 2023-01-06 PROCEDURE — 80048 BASIC METABOLIC PNL TOTAL CA: CPT

## 2023-01-06 PROCEDURE — 2060000000 HC ICU INTERMEDIATE R&B

## 2023-01-06 PROCEDURE — 2580000003 HC RX 258: Performed by: STUDENT IN AN ORGANIZED HEALTH CARE EDUCATION/TRAINING PROGRAM

## 2023-01-06 PROCEDURE — 2580000003 HC RX 258

## 2023-01-06 PROCEDURE — 82962 GLUCOSE BLOOD TEST: CPT

## 2023-01-06 PROCEDURE — 36415 COLL VENOUS BLD VENIPUNCTURE: CPT

## 2023-01-06 RX ORDER — POLYETHYLENE GLYCOL 3350 17 G/17G
17 POWDER, FOR SOLUTION ORAL ONCE
Status: COMPLETED | OUTPATIENT
Start: 2023-01-06 | End: 2023-01-06

## 2023-01-06 RX ORDER — INSULIN LISPRO 100 [IU]/ML
0-4 INJECTION, SOLUTION INTRAVENOUS; SUBCUTANEOUS NIGHTLY
Status: DISCONTINUED | OUTPATIENT
Start: 2023-01-06 | End: 2023-01-07 | Stop reason: SDUPTHER

## 2023-01-06 RX ORDER — INSULIN LISPRO 100 [IU]/ML
5 INJECTION, SOLUTION INTRAVENOUS; SUBCUTANEOUS ONCE
Status: COMPLETED | OUTPATIENT
Start: 2023-01-06 | End: 2023-01-06

## 2023-01-06 RX ORDER — INSULIN LISPRO 100 [IU]/ML
0-16 INJECTION, SOLUTION INTRAVENOUS; SUBCUTANEOUS
Status: DISCONTINUED | OUTPATIENT
Start: 2023-01-06 | End: 2023-01-07

## 2023-01-06 RX ORDER — INSULIN GLARGINE-YFGN 100 [IU]/ML
16 INJECTION, SOLUTION SUBCUTANEOUS DAILY
Status: DISCONTINUED | OUTPATIENT
Start: 2023-01-06 | End: 2023-01-07

## 2023-01-06 RX ORDER — INSULIN GLARGINE-YFGN 100 [IU]/ML
20 INJECTION, SOLUTION SUBCUTANEOUS DAILY
Status: DISCONTINUED | OUTPATIENT
Start: 2023-01-06 | End: 2023-01-06

## 2023-01-06 RX ADMIN — ENOXAPARIN SODIUM 40 MG: 100 INJECTION SUBCUTANEOUS at 08:34

## 2023-01-06 RX ADMIN — INSULIN LISPRO 5 UNITS: 100 INJECTION, SOLUTION INTRAVENOUS; SUBCUTANEOUS at 22:15

## 2023-01-06 RX ADMIN — INSULIN GLARGINE-YFGN 16 UNITS: 100 INJECTION, SOLUTION SUBCUTANEOUS at 08:38

## 2023-01-06 RX ADMIN — IPRATROPIUM BROMIDE AND ALBUTEROL SULFATE 1 AMPULE: .5; 2.5 SOLUTION RESPIRATORY (INHALATION) at 13:13

## 2023-01-06 RX ADMIN — AMITRIPTYLINE HYDROCHLORIDE 50 MG: 25 TABLET, FILM COATED ORAL at 20:59

## 2023-01-06 RX ADMIN — IPRATROPIUM BROMIDE AND ALBUTEROL SULFATE 1 AMPULE: .5; 2.5 SOLUTION RESPIRATORY (INHALATION) at 16:57

## 2023-01-06 RX ADMIN — INSULIN LISPRO 4 UNITS: 100 INJECTION, SOLUTION INTRAVENOUS; SUBCUTANEOUS at 08:37

## 2023-01-06 RX ADMIN — SODIUM CHLORIDE, PRESERVATIVE FREE 10 ML: 5 INJECTION INTRAVENOUS at 08:30

## 2023-01-06 RX ADMIN — BUDESONIDE 500 MCG: 0.5 SUSPENSION RESPIRATORY (INHALATION) at 20:18

## 2023-01-06 RX ADMIN — INSULIN LISPRO 12 UNITS: 100 INJECTION, SOLUTION INTRAVENOUS; SUBCUTANEOUS at 17:06

## 2023-01-06 RX ADMIN — ACETAMINOPHEN 650 MG: 325 TABLET, FILM COATED ORAL at 12:56

## 2023-01-06 RX ADMIN — BUDESONIDE 500 MCG: 0.5 SUSPENSION RESPIRATORY (INHALATION) at 09:17

## 2023-01-06 RX ADMIN — CALCIUM CARBONATE-VITAMIN D TAB 500 MG-200 UNIT 1 TABLET: 500-200 TAB at 08:35

## 2023-01-06 RX ADMIN — IPRATROPIUM BROMIDE AND ALBUTEROL SULFATE 1 AMPULE: .5; 2.5 SOLUTION RESPIRATORY (INHALATION) at 09:17

## 2023-01-06 RX ADMIN — LOSARTAN POTASSIUM 50 MG: 50 TABLET, FILM COATED ORAL at 12:56

## 2023-01-06 RX ADMIN — AMITRIPTYLINE HYDROCHLORIDE 50 MG: 25 TABLET, FILM COATED ORAL at 00:26

## 2023-01-06 RX ADMIN — IPRATROPIUM BROMIDE AND ALBUTEROL SULFATE 1 AMPULE: .5; 2.5 SOLUTION RESPIRATORY (INHALATION) at 20:18

## 2023-01-06 RX ADMIN — POLYETHYLENE GLYCOL 3350 17 G: 17 POWDER, FOR SOLUTION ORAL at 17:22

## 2023-01-06 RX ADMIN — INSULIN LISPRO 4 UNITS: 100 INJECTION, SOLUTION INTRAVENOUS; SUBCUTANEOUS at 21:00

## 2023-01-06 RX ADMIN — ARFORMOTEROL TARTRATE 15 MCG: 15 SOLUTION RESPIRATORY (INHALATION) at 20:18

## 2023-01-06 RX ADMIN — SODIUM PHOSPHATE, MONOBASIC, MONOHYDRATE AND SODIUM PHOSPHATE, DIBASIC, ANHYDROUS 10 MMOL: 142; 276 INJECTION, SOLUTION INTRAVENOUS at 06:19

## 2023-01-06 RX ADMIN — ARFORMOTEROL TARTRATE 15 MCG: 15 SOLUTION RESPIRATORY (INHALATION) at 09:17

## 2023-01-06 RX ADMIN — SODIUM CHLORIDE, PRESERVATIVE FREE 10 ML: 5 INJECTION INTRAVENOUS at 21:03

## 2023-01-06 RX ADMIN — CITALOPRAM HYDROBROMIDE 40 MG: 20 TABLET ORAL at 08:34

## 2023-01-06 RX ADMIN — PREGABALIN 75 MG: 75 CAPSULE ORAL at 21:03

## 2023-01-06 RX ADMIN — AMLODIPINE BESYLATE 2.5 MG: 5 TABLET ORAL at 08:34

## 2023-01-06 RX ADMIN — ROSUVASTATIN CALCIUM 20 MG: 20 TABLET, FILM COATED ORAL at 20:59

## 2023-01-06 RX ADMIN — PREGABALIN 75 MG: 75 CAPSULE ORAL at 16:00

## 2023-01-06 RX ADMIN — INSULIN LISPRO 8 UNITS: 100 INJECTION, SOLUTION INTRAVENOUS; SUBCUTANEOUS at 11:38

## 2023-01-06 RX ADMIN — POTASSIUM BICARBONATE 20 MEQ: 782 TABLET, EFFERVESCENT ORAL at 06:19

## 2023-01-06 RX ADMIN — PANTOPRAZOLE SODIUM 40 MG: 40 TABLET, DELAYED RELEASE ORAL at 09:00

## 2023-01-06 RX ADMIN — POTASSIUM BICARBONATE 40 MEQ: 782 TABLET, EFFERVESCENT ORAL at 17:22

## 2023-01-06 RX ADMIN — MONTELUKAST SODIUM 10 MG: 10 TABLET, FILM COATED ORAL at 20:59

## 2023-01-06 ASSESSMENT — PAIN DESCRIPTION - DESCRIPTORS: DESCRIPTORS: BURNING;ACHING;NAGGING

## 2023-01-06 ASSESSMENT — PAIN SCALES - GENERAL
PAINLEVEL_OUTOF10: 3
PAINLEVEL_OUTOF10: 5
PAINLEVEL_OUTOF10: 0
PAINLEVEL_OUTOF10: 3

## 2023-01-06 ASSESSMENT — PAIN DESCRIPTION - PAIN TYPE: TYPE: NEUROPATHIC PAIN

## 2023-01-06 ASSESSMENT — PAIN DESCRIPTION - LOCATION
LOCATION: HEAD;GROIN
LOCATION: OTHER (COMMENT)

## 2023-01-06 ASSESSMENT — PAIN DESCRIPTION - FREQUENCY: FREQUENCY: INTERMITTENT

## 2023-01-06 NOTE — PROGRESS NOTES
Reason for consult: Uncontrolled type 2 DM    A1C: 8.5% 12/20/22     [] Not available                     Patient states the following concerns/barriers to diabetes self-management:     [x] None       [] Medication cost   [] Food cost/availability        [] Reading  [] Hearing   [] Vision                [] Work    [] Transportation  [] No insurance  [] Physical limitations    [] Other:        Patient states the following about their diabetes/health:   [x]  Patient takes Trulicity weekly, but admits she did not take for the last two weeks d/t hospitalization and other factors. [x]  Patient owns a glucose meter and self-monitors at home--states her glucose levels have been reading \"HI\" at home. [x]  Patient states she was on steroids for respiratory illness at the end of December. [x]  Patient eats 2 times daily and is not familiar with which foods are carbohydrates. [x]  She has been very symptomatic of hyperglycemia, including polyuria, polydipsia and tiredness. Diabetes survival packet provided to:   [x] Patient     [x] Other: sister, son, cousin     Information reviewed:   Definition of diabetes   Target glucose ranges/A1C   Self-monitoring of blood glucose   Prevention/symptoms/treatment of hypo-/hyperglycemia   Medication adherence   The plate method/meal planning guidelines   The benefits of exercise and recommendations   Reducing the risk of chronic complications      Diabetes medications reviewed (use, purpose, action): Trulicity, basal/bolus insulin. Patient would like to resume Trulicity at home. She is agreeable to continuing insulin as well, but would prefer long-acting only. I instructed patient on the use of an insulin pen and provided her with a picture guide. She has been on metformin in the past, but suffered from GI side effects.              Post-education Assessment  [x]  Attentive to teaching  [x]  Answered questions appropriately when asked   [x]  Seems able to apply concepts to daily lifestyle  [x]  Seems motivated to do well  [x]  Verbalized an understanding of the plate method for portion control   [x]  Verbalized an understanding of prescribed antidiabetes medications   [x]  Verbalized an understanding of target glucose ranges/A1C level  [x]  Expresses an intent to comply with treatment plan   []  Showed very little interest in complying with treatment plan   []  Seems to have trouble applying concepts to daily lifestyle       COMMENTS:  Patient and family receptive to education. Very interested in outpatient diabetes education when she is feeling better. Recommendations:   [x] Carbohydrate-controlled diet    [] Script for glucometer and supplies (per preference of patient's insurance)  [x] Script for insulin pens and pen needles (if insulin is ordered at discharge for home use)   [] Consult to social work: patient has no insurance or has financial hardship  [x] Inpatient consult to endocrinologist   [x] Follow up with endocrinologist as an outpatient   [] Home healthcare nursing to increase compliance to treatment plan   [x] Script for outpatient diabetes education classes (from doctor)        Thank you for this consult.     Mingo Serna, RN, BSN, Luisa Pimentel

## 2023-01-06 NOTE — CARE COORDINATION
Care Coordination:  79year old admitted for uncontrolled diabetes. Met with patient to assess for discharge planning. Patient states she lives with her sister and her son and her niece  in a one floor in [facility]. Patient was independent in all areas and works as a   for [employer]. She was independent and has no DME. No NEAL or HHC in the past.  She uses the clinic on [address] for primary care and her pharmacy is [facility]. Her plan is to return home. Family will transport. No needs identified at this time.

## 2023-01-06 NOTE — PROGRESS NOTES
[facility]  Internal Medicine Residency / [address]    Attending Physician Statement  I have discussed the case, including pertinent history and exam findings with the resident and the team.  I have seen and examined the patient and the key elements of the encounter have been performed by me. I have also personally reviewed imaging studies and labs. I agree with the assessment, plan and orders as documented by the resident. Symptomatically improved. AG has closed and bicarb back to baseline (30s). Continues to have on/off blurring of vision. Breath sounds remain clear, no signs of dehydration. (+)periumbilical tenderness today. Abdomen with good bowel sounds, no rebound/guarding. (-) urinary infection. Last BM prior to admission. Symptoms of BOV, increased urination likely from hyperosmolar effect of glucose. Assessment:  Uncontrolled DM. Significantly elevations of BS with meals. Symptoms of BOV, increased urination likely from hyperosmolar effect of glucose. Abdominal pain ?constipation  Hypoxic respiratory failure 2/2 COPD on 2L oxygen at home, not in exacerbation  EDWARD  HTN  Hypothyroidism      Plan:  Basal insulin already given this morning. Increase SS. Likely will need continued adjustment of basal insulin in the next 24 hours. BM regimen  Discharge planning        Remainder of medical problems as per resident note. Samia Jernigan MD  Internal Medicine

## 2023-01-06 NOTE — PROGRESS NOTES
[facility]  Internal Medicine Residency Program  Progress Note - House Team     Patient:  Kareem Malcolm 79 y.o. female MRN: [id number]     Date of Service: 1/6/2023     CC: Hyperglycemia  Overnight events: Anion gap remained closed, blood glucose slowly improving. Patient was given regular insulin overnight. Subjective     Patient was seen and examined this morning at bedside in no acute distress. She continues to report abdominal pain otherwise she endorses feeling better since admission. Objective     Physical Exam:  Vitals: /72   Pulse 86   Temp 98.3 °F (36.8 °C) (Oral)   Resp 15   Ht 5' 6\" (1.676 m)   Wt 207 lb (93.9 kg)   SpO2 97%   BMI 33.41 kg/m²     I & O - 24hr: I/O this shift:  In: 320 [P.O.:320]  Out: -    General Appearance: alert, appears stated age, and cooperative  HEENT:  Head: Normal, normocephalic, atraumatic. Neck: supple, symmetrical, trachea midline  Lung: clear to auscultation bilaterally  Heart: regular rate and rhythm, S1, S2 normal, no murmur, click, rub or gallop  Abdomen:  Soft but slightly tender especially right iliac area, no masses or organomegaly palpated  Extremities:  extremities normal, atraumatic, no cyanosis or edema  Musculokeletal: No joint swelling, no muscle tenderness. ROM normal in all joints of extremities.    Neurologic: Mental status: Alert, oriented, thought content appropriate  Subject  Pertinent Labs & Imaging Studies   yusuf  CBC with Differential:    Lab Results   Component Value Date/Time    WBC 5.2 01/06/2023 05:54 AM    RBC 4.22 01/06/2023 05:54 AM    HGB 11.5 01/06/2023 05:54 AM    HCT 36.4 01/06/2023 05:54 AM     01/06/2023 05:54 AM    MCV 86.3 01/06/2023 05:54 AM    MCH 27.3 01/06/2023 05:54 AM    MCHC 31.6 01/06/2023 05:54 AM    RDW 13.3 01/06/2023 05:54 AM    SEGSPCT 36 01/03/2014 09:54 AM    METASPCT 0.9 01/26/2020 07:11 AM    LYMPHOPCT 58.8 01/06/2023 05:54 AM    PROMYELOPCT 3.5 01/23/2020 07:25 AM MONOPCT 8.2 01/06/2023 05:54 AM    MYELOPCT 0.9 01/26/2020 07:11 AM    BASOPCT 0.8 01/06/2023 05:54 AM    MONOSABS 0.43 01/06/2023 05:54 AM    LYMPHSABS 3.07 01/06/2023 05:54 AM    EOSABS 0.27 01/06/2023 05:54 AM    BASOSABS 0.04 01/06/2023 05:54 AM     BMP:    Lab Results   Component Value Date/Time     01/06/2023 05:54 AM    K 3.2 01/06/2023 05:54 AM     01/06/2023 05:54 AM    CO2 30 01/06/2023 05:54 AM    BUN 15 01/06/2023 05:54 AM    LABALBU 3.5 01/05/2023 11:18 PM    LABALBU 3.9 10/26/2011 04:50 AM    CREATININE 0.6 01/06/2023 05:54 AM    CALCIUM 9.1 01/06/2023 05:54 AM    GFRAA >60 06/23/2022 02:12 PM    LABGLOM >60 01/06/2023 05:54 AM    GLUCOSE 272 01/06/2023 05:54 AM    GLUCOSE 124 10/26/2011 04:50 AM       XR CHEST PORTABLE   Final Result   No acute process. CT HEAD WO CONTRAST   Final Result   Mild atrophy and likely chronic microvascular ischemic disease. Resident's Assessment and Plan     Assessment and Plan:    Uncontrolled NIDDM in early stages of DKA, A1c 8.5 68/01  On weekly Trulicity at home, insulin naïve holding Trulicity for now  Diabetes education inputs appreciated and recommendations noted  Patient interested in outpatient diabetes education noted  Blood glucose still elevated in 400s   Continue Lantus 16 units/HDSS insulin  Monitor BMP in the a.m.   Monitor BG ACHS     Chronic hypoxic respiratory failure 2/2 COPD  PFT 2020 - FEV1/FVC 69%, predicted 87%  On 2 L home oxygen, compliant  Currently on baseline oxygen, wean down as able  Continue breathing treatments (DuoNeb, Brovana, Pulmicort)  Monitor respiratory status closely     History of asthma, on inhalers  Continue breathing treatments  Continue montelukast  Monitor respiratory status     History of EDWARD  Ensure PAP at night     History of hypertension  On amlodipine 2.5 mg, losartan 50 mg at home  BP normotensive, continue amlodipine/losartan  Monitor blood pressure closely     History of GERD  On famotidine/Protonix at home  Continue Protonix 40 mg daily     History of hyperlipidemia, ASCVD risk score 26.8%  No lipid panel on file, lipid panel wnl  On Crestor 20 mg at home, continue     History of hypothyroidism  Last TSH 5.31 6/23/2022   On Synthroid 112 mcg at home, continue     History of smoldering myeloma  Follows Dr. Joyce Rodriguez, no active intervention      PT/OT evaluation: Not  DVT prophylaxis/ GI prophylaxis: Lovenox/Protonix  Disposition: continue current care, likely discharge tomorrow    Savannah Walton MD, PGY-1  Attending physician: Dr. Joseph Allred

## 2023-01-06 NOTE — PROGRESS NOTES
Pt lunch time BG was 444. She was given 8 units humalog. I rechecked it after lunch it was 433. Message sent to IM team 2 resident to see if they wanted any additional insulin given.

## 2023-01-06 NOTE — ED NOTES
HISTORY OF PRESENT ILLNESS  Colby Zhang is a 79 y.o. female. HPI  DM, well controlled, her a1c is 6.5 today, glucose 100-110 in am  HTN, stable, bp is well controlled on meds daily  HLD, stable on crestor daily, no myalgia  Vit d def, controlled on vit d weekly  Review of Systems   Constitutional: Negative for fever. HENT: Negative for sinus pain and sore throat. Respiratory: Negative for shortness of breath. Cardiovascular: Negative for chest pain, palpitations and leg swelling. Gastrointestinal: Negative for abdominal pain and nausea. Musculoskeletal: Negative for falls. Neurological: Negative for dizziness and tingling. Physical Exam  Vitals signs reviewed. Cardiovascular:      Rate and Rhythm: Normal rate and regular rhythm. Heart sounds: Normal heart sounds. Pulmonary:      Effort: Pulmonary effort is normal.      Breath sounds: Normal breath sounds. Abdominal:      Palpations: Abdomen is soft. Tenderness: There is no abdominal tenderness. Musculoskeletal:      Right lower leg: No edema. Left lower leg: No edema. Neurological:      Mental Status: She is alert and oriented to person, place, and time. ASSESSMENT and PLAN  Diagnoses and all orders for this visit:    1. Diabetes mellitus without complication ([facility] [address] controlled  -     AMB POC HEMOGLOBIN N9B  -     METABOLIC PANEL, COMPREHENSIVE; Future    2. Essential hypertension, controlled  -     METABOLIC PANEL, COMPREHENSIVE; Future  -     losartan (COZAAR) 100 mg tablet; Take 1 Tab by mouth daily. 3. Pure hypercholesterolemia, controlled  -     LIPID PANEL; Future  -     METABOLIC PANEL, COMPREHENSIVE; Future  -     rosuvastatin (CRESTOR) 10 mg tablet; Take 1 Tab by mouth daily. 4. Vitamin D deficiency, controlled  -     VITAMIN D, 25 HYDROXY; Future    5.  Needs flu shot  -     FLU (FLUAD QUAD INFLUENZA VACCINE,QUAD,ADJUVANTED)      Follow-up and Dispositions    · Return in about 4 months (around Report given to Madai Cole RN  01/05/23 2004 2/26/2021). reviewed diet, exercise and weight control    Results for orders placed or performed in visit on 10/26/20   AMB POC HEMOGLOBIN A1C   Result Value Ref Range    Hemoglobin A1c (POC) 6.5 %     Lab Results   Component Value Date/Time    Cholesterol, total 169 06/22/2020 11:48 AM    HDL Cholesterol 45 06/22/2020 11:48 AM    LDL, calculated 110 (H) 06/22/2020 11:48 AM    VLDL, calculated 14 06/22/2020 11:48 AM    Triglyceride 70 06/22/2020 11:48 AM     Lab Results   Component Value Date/Time    Sodium 142 06/22/2020 11:48 AM    Potassium 4.8 06/22/2020 11:48 AM    Chloride 104 06/22/2020 11:48 AM    CO2 23 06/22/2020 11:48 AM    Glucose 103 (H) 06/22/2020 11:48 AM    BUN 17 06/22/2020 11:48 AM    Creatinine 0.87 06/22/2020 11:48 AM    BUN/Creatinine ratio 20 06/22/2020 11:48 AM    GFR est AA 80 06/22/2020 11:48 AM    GFR est non-AA 69 06/22/2020 11:48 AM    Calcium 9.7 06/22/2020 11:48 AM    Bilirubin, total 0.4 06/22/2020 11:48 AM    Alk.  phosphatase 66 06/22/2020 11:48 AM    Protein, total 6.9 06/22/2020 11:48 AM    Albumin 4.2 06/22/2020 11:48 AM    A-G Ratio 1.6 06/22/2020 11:48 AM    ALT (SGPT) 28 06/22/2020 11:48 AM    AST (SGOT) 25 06/22/2020 11:48 AM     Lab Results   Component Value Date/Time    VITAMIN D, 25-HYDROXY 48.3 06/22/2020 11:48 AM

## 2023-01-06 NOTE — PLAN OF CARE
Problem: Discharge Planning  Goal: Discharge to home or other facility with appropriate resources  Outcome: Progressing  Flowsheets (Taken 1/5/2023 2135)  Discharge to home or other facility with appropriate resources: Identify barriers to discharge with patient and caregiver     Problem: Pain  Goal: Verbalizes/displays adequate comfort level or baseline comfort level  Outcome: Progressing     Problem: Safety - Adult  Goal: Free from fall injury  Outcome: Progressing

## 2023-01-06 NOTE — ACP (ADVANCE CARE PLANNING)
Advance Care Planning   The patient has the following advanced directives on file:  Advance Directives       Power of 99 Marcie Morales Will ACP-Advance Directive ACP-Power of     Not on File Filed on 02/11/22 Filed Not on File            The patient has appointed the following active healthcare agents:    Primary Decision Maker: Annette Beyer - Other Relative - [phone]    Secondary Decision Maker: Marcial Field - Child - [phone]    Supplemental (Other) Decision Maker: Tana Baker Child - [phone]    The Patient has the following current code status:    Code Status: Full Code    Visit Documentation:  Verified with patient. States she has written DPOA that names Belkys Soto, her cousin as decision maker.    Adolfo Souza  1/6/2023

## 2023-01-07 PROBLEM — E11.00 TYPE 2 DIABETES MELLITUS WITH HYPEROSMOLARITY WITHOUT COMA, WITHOUT LONG-TERM CURRENT USE OF INSULIN (HCC): Status: ACTIVE | Noted: 2023-01-07

## 2023-01-07 PROBLEM — J96.12 CHRONIC RESPIRATORY FAILURE WITH HYPOXIA AND HYPERCAPNIA (HCC): Status: ACTIVE | Noted: 2023-01-07

## 2023-01-07 PROBLEM — J96.11 CHRONIC RESPIRATORY FAILURE WITH HYPOXIA AND HYPERCAPNIA (HCC): Status: ACTIVE | Noted: 2023-01-07

## 2023-01-07 PROBLEM — B37.31 VAGINAL CANDIDIASIS: Status: ACTIVE | Noted: 2023-01-07

## 2023-01-07 LAB
ANION GAP SERPL CALCULATED.3IONS-SCNC: 9 MMOL/L (ref 7–16)
BASOPHILS ABSOLUTE: 0.03 E9/L (ref 0–0.2)
BASOPHILS RELATIVE PERCENT: 0.6 % (ref 0–2)
BUN BLDV-MCNC: 12 MG/DL (ref 6–23)
CALCIUM SERPL-MCNC: 8.9 MG/DL (ref 8.6–10.2)
CHLORIDE BLD-SCNC: 98 MMOL/L (ref 98–107)
CO2: 30 MMOL/L (ref 22–29)
CREAT SERPL-MCNC: 0.7 MG/DL (ref 0.5–1)
EOSINOPHILS ABSOLUTE: 0.28 E9/L (ref 0.05–0.5)
EOSINOPHILS RELATIVE PERCENT: 5.3 % (ref 0–6)
GFR SERPL CREATININE-BSD FRML MDRD: >60 ML/MIN/1.73
GLUCOSE BLD-MCNC: 197 MG/DL (ref 74–99)
HCT VFR BLD CALC: 35.1 % (ref 34–48)
HEMOGLOBIN: 11.1 G/DL (ref 11.5–15.5)
IMMATURE GRANULOCYTES #: 0.02 E9/L
IMMATURE GRANULOCYTES %: 0.4 % (ref 0–5)
LYMPHOCYTES ABSOLUTE: 2.81 E9/L (ref 1.5–4)
LYMPHOCYTES RELATIVE PERCENT: 53.1 % (ref 20–42)
MCH RBC QN AUTO: 27.5 PG (ref 26–35)
MCHC RBC AUTO-ENTMCNC: 31.6 % (ref 32–34.5)
MCV RBC AUTO: 86.9 FL (ref 80–99.9)
METER GLUCOSE: 221 MG/DL (ref 74–99)
METER GLUCOSE: 283 MG/DL (ref 74–99)
METER GLUCOSE: 312 MG/DL (ref 74–99)
METER GLUCOSE: 361 MG/DL (ref 74–99)
MONOCYTES ABSOLUTE: 0.43 E9/L (ref 0.1–0.95)
MONOCYTES RELATIVE PERCENT: 8.1 % (ref 2–12)
NEUTROPHILS ABSOLUTE: 1.72 E9/L (ref 1.8–7.3)
NEUTROPHILS RELATIVE PERCENT: 32.5 % (ref 43–80)
PDW BLD-RTO: 13.6 FL (ref 11.5–15)
PLATELET # BLD: 235 E9/L (ref 130–450)
PMV BLD AUTO: 11.3 FL (ref 7–12)
POTASSIUM REFLEX MAGNESIUM: 3.7 MMOL/L (ref 3.5–5)
RBC # BLD: 4.04 E12/L (ref 3.5–5.5)
SODIUM BLD-SCNC: 137 MMOL/L (ref 132–146)
WBC # BLD: 5.3 E9/L (ref 4.5–11.5)

## 2023-01-07 PROCEDURE — 6370000000 HC RX 637 (ALT 250 FOR IP): Performed by: STUDENT IN AN ORGANIZED HEALTH CARE EDUCATION/TRAINING PROGRAM

## 2023-01-07 PROCEDURE — 6370000000 HC RX 637 (ALT 250 FOR IP)

## 2023-01-07 PROCEDURE — 2060000000 HC ICU INTERMEDIATE R&B

## 2023-01-07 PROCEDURE — 82962 GLUCOSE BLOOD TEST: CPT

## 2023-01-07 PROCEDURE — S5553 INSULIN LONG ACTING 5 U: HCPCS | Performed by: STUDENT IN AN ORGANIZED HEALTH CARE EDUCATION/TRAINING PROGRAM

## 2023-01-07 PROCEDURE — 97530 THERAPEUTIC ACTIVITIES: CPT

## 2023-01-07 PROCEDURE — 2580000003 HC RX 258

## 2023-01-07 PROCEDURE — 6360000002 HC RX W HCPCS

## 2023-01-07 PROCEDURE — 94640 AIRWAY INHALATION TREATMENT: CPT

## 2023-01-07 PROCEDURE — 80048 BASIC METABOLIC PNL TOTAL CA: CPT

## 2023-01-07 PROCEDURE — 99232 SBSQ HOSP IP/OBS MODERATE 35: CPT | Performed by: INTERNAL MEDICINE

## 2023-01-07 PROCEDURE — 97161 PT EVAL LOW COMPLEX 20 MIN: CPT

## 2023-01-07 PROCEDURE — 2700000000 HC OXYGEN THERAPY PER DAY

## 2023-01-07 PROCEDURE — 36415 COLL VENOUS BLD VENIPUNCTURE: CPT

## 2023-01-07 PROCEDURE — 85025 COMPLETE CBC W/AUTO DIFF WBC: CPT

## 2023-01-07 RX ORDER — INSULIN LISPRO 100 [IU]/ML
0-8 INJECTION, SOLUTION INTRAVENOUS; SUBCUTANEOUS
Status: DISCONTINUED | OUTPATIENT
Start: 2023-01-07 | End: 2023-01-09 | Stop reason: HOSPADM

## 2023-01-07 RX ORDER — INSULIN GLARGINE-YFGN 100 [IU]/ML
22 INJECTION, SOLUTION SUBCUTANEOUS DAILY
Status: DISCONTINUED | OUTPATIENT
Start: 2023-01-07 | End: 2023-01-08

## 2023-01-07 RX ORDER — FLUCONAZOLE 150 MG/1
150 TABLET ORAL ONCE
Status: COMPLETED | OUTPATIENT
Start: 2023-01-07 | End: 2023-01-07

## 2023-01-07 RX ORDER — INSULIN GLARGINE-YFGN 100 [IU]/ML
22 INJECTION, SOLUTION SUBCUTANEOUS DAILY
Qty: 1 EACH | Refills: 1 | Status: CANCELLED | OUTPATIENT
Start: 2023-01-08

## 2023-01-07 RX ORDER — INSULIN LISPRO 100 [IU]/ML
0-4 INJECTION, SOLUTION INTRAVENOUS; SUBCUTANEOUS NIGHTLY
Status: DISCONTINUED | OUTPATIENT
Start: 2023-01-07 | End: 2023-01-09 | Stop reason: HOSPADM

## 2023-01-07 RX ADMIN — INSULIN GLARGINE-YFGN 22 UNITS: 100 INJECTION, SOLUTION SUBCUTANEOUS at 08:53

## 2023-01-07 RX ADMIN — IPRATROPIUM BROMIDE AND ALBUTEROL SULFATE 1 AMPULE: .5; 2.5 SOLUTION RESPIRATORY (INHALATION) at 08:26

## 2023-01-07 RX ADMIN — SODIUM CHLORIDE, PRESERVATIVE FREE 10 ML: 5 INJECTION INTRAVENOUS at 20:37

## 2023-01-07 RX ADMIN — INSULIN LISPRO 2 UNITS: 100 INJECTION, SOLUTION INTRAVENOUS; SUBCUTANEOUS at 08:53

## 2023-01-07 RX ADMIN — PREGABALIN 75 MG: 75 CAPSULE ORAL at 14:30

## 2023-01-07 RX ADMIN — ARFORMOTEROL TARTRATE 15 MCG: 15 SOLUTION RESPIRATORY (INHALATION) at 08:26

## 2023-01-07 RX ADMIN — LEVOTHYROXINE SODIUM 112 MCG: 0.11 TABLET ORAL at 05:35

## 2023-01-07 RX ADMIN — AMITRIPTYLINE HYDROCHLORIDE 50 MG: 25 TABLET, FILM COATED ORAL at 20:36

## 2023-01-07 RX ADMIN — IPRATROPIUM BROMIDE AND ALBUTEROL SULFATE 1 AMPULE: .5; 2.5 SOLUTION RESPIRATORY (INHALATION) at 21:18

## 2023-01-07 RX ADMIN — SODIUM CHLORIDE, PRESERVATIVE FREE 10 ML: 5 INJECTION INTRAVENOUS at 09:08

## 2023-01-07 RX ADMIN — FLUCONAZOLE 150 MG: 150 TABLET ORAL at 14:00

## 2023-01-07 RX ADMIN — CALCIUM CARBONATE-VITAMIN D TAB 500 MG-200 UNIT 1 TABLET: 500-200 TAB at 08:15

## 2023-01-07 RX ADMIN — BUDESONIDE 500 MCG: 0.5 SUSPENSION RESPIRATORY (INHALATION) at 08:26

## 2023-01-07 RX ADMIN — INSULIN LISPRO 4 UNITS: 100 INJECTION, SOLUTION INTRAVENOUS; SUBCUTANEOUS at 16:57

## 2023-01-07 RX ADMIN — PREGABALIN 75 MG: 75 CAPSULE ORAL at 09:08

## 2023-01-07 RX ADMIN — ARFORMOTEROL TARTRATE 15 MCG: 15 SOLUTION RESPIRATORY (INHALATION) at 21:18

## 2023-01-07 RX ADMIN — INSULIN LISPRO 6 UNITS: 100 INJECTION, SOLUTION INTRAVENOUS; SUBCUTANEOUS at 12:10

## 2023-01-07 RX ADMIN — IPRATROPIUM BROMIDE AND ALBUTEROL SULFATE 1 AMPULE: .5; 2.5 SOLUTION RESPIRATORY (INHALATION) at 16:40

## 2023-01-07 RX ADMIN — INSULIN LISPRO 4 UNITS: 100 INJECTION, SOLUTION INTRAVENOUS; SUBCUTANEOUS at 20:38

## 2023-01-07 RX ADMIN — ENOXAPARIN SODIUM 40 MG: 100 INJECTION SUBCUTANEOUS at 09:08

## 2023-01-07 RX ADMIN — CITALOPRAM HYDROBROMIDE 40 MG: 20 TABLET ORAL at 08:52

## 2023-01-07 RX ADMIN — LOSARTAN POTASSIUM 50 MG: 50 TABLET, FILM COATED ORAL at 09:08

## 2023-01-07 RX ADMIN — IPRATROPIUM BROMIDE AND ALBUTEROL SULFATE 1 AMPULE: .5; 2.5 SOLUTION RESPIRATORY (INHALATION) at 11:46

## 2023-01-07 RX ADMIN — MONTELUKAST SODIUM 10 MG: 10 TABLET, FILM COATED ORAL at 20:37

## 2023-01-07 RX ADMIN — PREGABALIN 75 MG: 75 CAPSULE ORAL at 20:37

## 2023-01-07 RX ADMIN — BUDESONIDE 500 MCG: 0.5 SUSPENSION RESPIRATORY (INHALATION) at 21:18

## 2023-01-07 RX ADMIN — AMLODIPINE BESYLATE 2.5 MG: 5 TABLET ORAL at 09:08

## 2023-01-07 RX ADMIN — PANTOPRAZOLE SODIUM 40 MG: 40 TABLET, DELAYED RELEASE ORAL at 05:35

## 2023-01-07 RX ADMIN — ROSUVASTATIN CALCIUM 20 MG: 20 TABLET, FILM COATED ORAL at 20:37

## 2023-01-07 ASSESSMENT — PAIN SCALES - GENERAL
PAINLEVEL_OUTOF10: 0
PAINLEVEL_OUTOF10: 0
PAINLEVEL_OUTOF10: 3

## 2023-01-07 ASSESSMENT — PAIN DESCRIPTION - DESCRIPTORS: DESCRIPTORS: ACHING;DULL;GNAWING

## 2023-01-07 ASSESSMENT — PAIN DESCRIPTION - LOCATION: LOCATION: BACK;RIB CAGE

## 2023-01-07 ASSESSMENT — PAIN DESCRIPTION - ORIENTATION: ORIENTATION: LEFT

## 2023-01-07 NOTE — PROGRESS NOTES
[facility]  Internal Medicine Residency Program  Progress Note - House Team     Patient:  Jocelyn Gandhi 79 y.o. female MRN: [id number]     Date of Service: 1/7/2023     CC: Hyperglycemia  Overnight events: No acute concerns, blood glucose improved in the morning    Subjective     Patient was seen and examined at the bedside in no acute distress. She continues to report abdominal pain however better than yesterday. Patient also complained of blurring of vision with headache which has been on prior to admission. She wears glasses and last changed it about 9 months ago and follows with ophthalmologist, patient will require ophthalmology evaluation as outpatient. She also complained of vaginal itching with some burning sensation. We will be giving fluconazole 150 mg x 1. Insulin regimen was adjusted to Lantus 22 units and medium dose sliding scale, will recheck blood glucose p.m. and follow PT OT eval as per discharge planning. Objective     Physical Exam:  Vitals: /68   Pulse (!) 102   Temp 97.2 °F (36.2 °C) (Oral)   Resp 18   Ht 5' 6\" (1.676 m)   Wt 207 lb (93.9 kg)   SpO2 95%   BMI 33.41 kg/m²     I & O - 24hr: No intake/output data recorded. General Appearance: alert, appears stated age, and cooperative  HEENT:  Head: Normal, normocephalic, atraumatic. Neck: supple, symmetrical, trachea midline  Lung: clear to auscultation bilaterally  Heart: regular rate and rhythm, S1, S2 normal, no murmur, click, rub or gallop  Abdomen:  Soft but slightly tender especially right iliac area, no masses or organomegaly palpated  Extremities:  extremities normal, atraumatic, no cyanosis or edema  Musculokeletal: No joint swelling, no muscle tenderness. ROM normal in all joints of extremities.    Neurologic: Mental status: Alert, oriented, thought content appropriate  Subject  Pertinent Labs & Imaging Studies   yusuf  CBC with Differential:    Lab Results   Component Value Date/Time WBC 5.3 01/07/2023 05:08 AM    RBC 4.04 01/07/2023 05:08 AM    HGB 11.1 01/07/2023 05:08 AM    HCT 35.1 01/07/2023 05:08 AM     01/07/2023 05:08 AM    MCV 86.9 01/07/2023 05:08 AM    MCH 27.5 01/07/2023 05:08 AM    MCHC 31.6 01/07/2023 05:08 AM    RDW 13.6 01/07/2023 05:08 AM    SEGSPCT 36 01/03/2014 09:54 AM    METASPCT 0.9 01/26/2020 07:11 AM    LYMPHOPCT 53.1 01/07/2023 05:08 AM    PROMYELOPCT 3.5 01/23/2020 07:25 AM    MONOPCT 8.1 01/07/2023 05:08 AM    MYELOPCT 0.9 01/26/2020 07:11 AM    BASOPCT 0.6 01/07/2023 05:08 AM    MONOSABS 0.43 01/07/2023 05:08 AM    LYMPHSABS 2.81 01/07/2023 05:08 AM    EOSABS 0.28 01/07/2023 05:08 AM    BASOSABS 0.03 01/07/2023 05:08 AM     BMP:    Lab Results   Component Value Date/Time     01/07/2023 05:08 AM    K 3.7 01/07/2023 05:08 AM    CL 98 01/07/2023 05:08 AM    CO2 30 01/07/2023 05:08 AM    BUN 12 01/07/2023 05:08 AM    LABALBU 3.5 01/05/2023 11:18 PM    LABALBU 3.9 10/26/2011 04:50 AM    CREATININE 0.7 01/07/2023 05:08 AM    CALCIUM 8.9 01/07/2023 05:08 AM    GFRAA >60 06/23/2022 02:12 PM    LABGLOM >60 01/07/2023 05:08 AM    GLUCOSE 197 01/07/2023 05:08 AM    GLUCOSE 124 10/26/2011 04:50 AM       XR CHEST PORTABLE   Final Result   No acute process. CT HEAD WO CONTRAST   Final Result   Mild atrophy and likely chronic microvascular ischemic disease. Resident's Assessment and Plan     Assessment and Plan:    Uncontrolled NIDDM in early stages of DKA, A1c 8.5 00/19  On weekly Trulicity at home, insulin naïve holding Trulicity for now  Diabetes education inputs appreciated and recommendations noted  Patient interested in outpatient diabetes education noted  Blood glucose improving, 197  Continue Lantus 22 units/MDSS insulin  Monitor BMP in the a.m.   Monitor BG ACHS     Chronic hypoxic respiratory failure 2/2 COPD  PFT 2020 - FEV1/FVC 69%, predicted 87%  On 2 L home oxygen, compliant  Currently on baseline oxygen, wean down as able  Continue breathing treatments (DuoNeb, Brovana, Pulmicort)  Monitor respiratory status closely     Vaginal candidiasis  Fluconazole 150 mg p.o. x 1    History of asthma, on inhalers  Continue breathing treatments  Continue montelukast  Monitor respiratory status     History of EDWARD  Ensure PAP at night     History of hypertension  On amlodipine 2.5 mg, losartan 50 mg at home  BP normotensive, continue amlodipine/losartan  Monitor blood pressure closely     History of GERD  On famotidine/Protonix at home  Continue Protonix 40 mg daily     History of hyperlipidemia, ASCVD risk score 26.8%  No lipid panel on file, lipid panel wnl  On Crestor 20 mg at home, continue     History of hypothyroidism  Last TSH 5.31 6/23/2022   On Synthroid 112 mcg at home, continue     History of smoldering myeloma  Follows Dr. Adilene Husain, no active intervention      PT/OT evaluation: AM-PAC 16/24  DVT prophylaxis/ GI prophylaxis: Lovenox/Protonix  Disposition: continue current care, likely discharge tomorrow    Yomi Martines MD, PGY-1  Attending physician: Dr. Trice Garcia

## 2023-01-07 NOTE — PROGRESS NOTES
Physical Therapy    Initial Assessment     Name: Lisa Castañeda  : 1955  MRN: [id number]      Date of Service: 2023    Evaluating PT: Tracy Darell, PT, DPT [id number]      Room #:  0635/5667-P  Diagnosis:  Blurred vision [H53.8]  Hyperglycemia [R73.9]  Uncontrolled type 2 diabetes mellitus with hyperglycemia ([facility] [address] [E11.65]  PMHx/PSHx:   has a past medical history of Adrenal incidentaloma ([facility] [address] Anxiety, Arthritis, Asthma, Bladder incontinence, Cancer ([facility] [address] Chronic back pain, COPD (chronic obstructive pulmonary disease) ([facility] [address] Depression, Fibromyalgia, GERD (gastroesophageal reflux disease), Hyperlipidemia, Hypertension, Hypothyroidism, MGUS (monoclonal gammopathy of unknown significance), Neuropathy, Pneumonia, Prolonged emergence from general anesthesia, Sleep apnea, Type II or unspecified type diabetes mellitus without mention of complication, not stated as uncontrolled, and Vaginal bleeding. Precautions:  Fall Risk, O2    SUBJECTIVE:    Pt lives with family in a single story house with 2 stair(s) and 1 rail(s) to enter. Pt ambulated without AD prior to admission. Pt is on 2 L O2/min at baseline. OBJECTIVE:   Initial Evaluation  Date: 23 Treatment Date: Short Term/ Long Term   Goals   AM-PAC 6 Clicks 84/09     Was pt agreeable to Eval/treatment? Yes     Does pt have pain? 8/10 L sciatic pain     Bed Mobility  Rolling: NT  Supine to sit: SBA  Sit to supine: NT  Scooting: SBA to EOB  Rolling: Independent   Supine to sit: Independent   Sit to supine: Independent   Scooting: Independent    Transfers Sit to stand: Min A  Stand to sit: Min A  Stand pivot: Min A with HHA  Sit to stand: Independent   Stand to sit:  Independent   Stand pivot: Independent    Ambulation   2 feet with HHA with Min A  >200 feet Independent    Stair negotiation: ascended and descended NT  2 step(s) with 1 rail(s) Mod Independent    ROM BUE: Refer to OT note  BLE: WFL     Strength BUE: Refer to OT note  BLE: 4/5 grossly Balance Sitting EOB: SBA  Dynamic Standing: Min A without AD  Sitting EOB: Independent   Dynamic Standing: Independent      Pt is A & O x: 4 to person, place, month/year, and situation. Sensation: Pt denies numbness and tingling of extremities. Edema: Unremarkable. Patient education  Pt educated on PT role in acute care setting. Patient response to education:   Pt verbalized understanding Pt demonstrated skill Pt requires further education in this area   Yes NA No     ASSESSMENT:    Conditions Requiring Skilled Therapeutic Intervention:    [x]Decreased strength     []Decreased ROM  [x]Decreased functional mobility  [x]Decreased balance   [x]Decreased endurance   []Decreased posture  []Decreased sensation  []Decreased coordination   [x]Decreased vision  []Decreased safety awareness   [x]Increased pain       Comments:    Pt was in bed upon room entry; agreeable to PT evaluation. Pt remained on 2 L O2/min throughout session and O2 sat was 97% with all activity. Pt completed transfer to EOB with no assistance required. Sitting balance was Fair. Pt reported blurred vision has not improved since admission. Pt completed stand pivot to chair. Pt denied dizziness but reported fatigue with minimal exertion. Pt politely declined further activity. Pt was left in chair with all needs met at conclusion of session. Treatment:  Patient practiced and was instructed in the following treatment:    Therapeutic activities:  Transfers: Pt was cued for hand placement during sit <> stand transfers. Ambulation: Pt ambulated short distance to chair. Pt was cued for safety. Vitals and symptoms were closely monitored throughout session. Pt's/family goals:  1. To return home. Prognosis is Good for reaching above PT goals. Patient and or family understand(s) diagnosis, prognosis, and plan of care. Yes.     PHYSICAL THERAPY PLAN OF CARE:    PT POC is established based on physician order and patient diagnosis     Referring provider/PT Order:    Start   Ordering Provider    01/06/23 1615  PT eval and treat  Start:  01/06/23 1615,   End:  01/06/23 1615,   ONE TIME,   Standing Count:  1 Occurrences,   R         Srinath Gurrola MD      Diagnosis:  Blurred vision [H53.8]  Hyperglycemia [R73.9]  Uncontrolled type 2 diabetes mellitus with hyperglycemia ([facility] [address] [E11.65]  Specific instructions for next treatment:  Progress activity. Current Treatment Recommendations:     [x] Strengthening to improve independence with functional mobility   [] ROM to improve independence with functional mobility   [x] Balance Training to improve static/dynamic balance and to reduce fall risk  [x] Endurance Training to improve activity tolerance during functional mobility   [x] Transfer Training to improve safety and independence with all functional transfers   [x] Gait Training to improve gait mechanics, endurance and assess need for appropriate assistive device  [x] Stair Training in preparation for safe discharge home and/or into the community   [] Positioning to prevent skin breakdown and contractures  [x] Safety and Education Training   [x] Patient/Caregiver Education   [] HEP  [] Other     PT long term treatment goals are located in above grid    Frequency of treatments: 2-5x/week x 1-2 weeks. Time in  0740  Time out  0800    Total Treatment Time  10 minutes     Evaluation Time includes thorough review of current medical information, gathering information on past medical history/social history and prior level of function, completion of standardized testing/informal observation of tasks, assessment of data and education on plan of care and goals.     CPT codes:  [x] Low Complexity PT evaluation 82981  [] Moderate Complexity PT evaluation 35111  [] High Complexity PT evaluation 33959  [] PT Re-evaluation 11280  [] Gait training 84541 0 minutes  [] Manual therapy 31007 0 minutes  [x] Therapeutic activities 45463 10 minutes  [] Therapeutic exercises 23155 0 minutes  [] Neuromuscular reeducation 73639 0 minutes     Mp Hassan, PT, DPT  [id number]

## 2023-01-08 LAB
ANION GAP SERPL CALCULATED.3IONS-SCNC: 7 MMOL/L (ref 7–16)
BASOPHILS ABSOLUTE: 0.03 E9/L (ref 0–0.2)
BASOPHILS RELATIVE PERCENT: 0.6 % (ref 0–2)
BUN BLDV-MCNC: 9 MG/DL (ref 6–23)
CALCIUM SERPL-MCNC: 8.8 MG/DL (ref 8.6–10.2)
CHLORIDE BLD-SCNC: 104 MMOL/L (ref 98–107)
CO2: 27 MMOL/L (ref 22–29)
CREAT SERPL-MCNC: 0.7 MG/DL (ref 0.5–1)
EOSINOPHILS ABSOLUTE: 0.29 E9/L (ref 0.05–0.5)
EOSINOPHILS RELATIVE PERCENT: 6.2 % (ref 0–6)
GFR SERPL CREATININE-BSD FRML MDRD: >60 ML/MIN/1.73
GLUCOSE BLD-MCNC: 275 MG/DL (ref 74–99)
HCT VFR BLD CALC: 34.8 % (ref 34–48)
HEMOGLOBIN: 10.5 G/DL (ref 11.5–15.5)
IMMATURE GRANULOCYTES #: 0.01 E9/L
IMMATURE GRANULOCYTES %: 0.2 % (ref 0–5)
LYMPHOCYTES ABSOLUTE: 2.49 E9/L (ref 1.5–4)
LYMPHOCYTES RELATIVE PERCENT: 53.3 % (ref 20–42)
MCH RBC QN AUTO: 27.7 PG (ref 26–35)
MCHC RBC AUTO-ENTMCNC: 30.2 % (ref 32–34.5)
MCV RBC AUTO: 91.8 FL (ref 80–99.9)
METER GLUCOSE: 244 MG/DL (ref 74–99)
METER GLUCOSE: 247 MG/DL (ref 74–99)
METER GLUCOSE: 250 MG/DL (ref 74–99)
METER GLUCOSE: 373 MG/DL (ref 74–99)
MONOCYTES ABSOLUTE: 0.43 E9/L (ref 0.1–0.95)
MONOCYTES RELATIVE PERCENT: 9.2 % (ref 2–12)
NEUTROPHILS ABSOLUTE: 1.42 E9/L (ref 1.8–7.3)
NEUTROPHILS RELATIVE PERCENT: 30.5 % (ref 43–80)
PDW BLD-RTO: 13.5 FL (ref 11.5–15)
PLATELET # BLD: 196 E9/L (ref 130–450)
PMV BLD AUTO: 11.2 FL (ref 7–12)
POTASSIUM REFLEX MAGNESIUM: 4.1 MMOL/L (ref 3.5–5)
RBC # BLD: 3.79 E12/L (ref 3.5–5.5)
SODIUM BLD-SCNC: 138 MMOL/L (ref 132–146)
WBC # BLD: 4.7 E9/L (ref 4.5–11.5)

## 2023-01-08 PROCEDURE — 82962 GLUCOSE BLOOD TEST: CPT

## 2023-01-08 PROCEDURE — 36415 COLL VENOUS BLD VENIPUNCTURE: CPT

## 2023-01-08 PROCEDURE — 80048 BASIC METABOLIC PNL TOTAL CA: CPT

## 2023-01-08 PROCEDURE — 6370000000 HC RX 637 (ALT 250 FOR IP)

## 2023-01-08 PROCEDURE — S5553 INSULIN LONG ACTING 5 U: HCPCS

## 2023-01-08 PROCEDURE — 2700000000 HC OXYGEN THERAPY PER DAY

## 2023-01-08 PROCEDURE — 99232 SBSQ HOSP IP/OBS MODERATE 35: CPT | Performed by: INTERNAL MEDICINE

## 2023-01-08 PROCEDURE — 2580000003 HC RX 258

## 2023-01-08 PROCEDURE — 85025 COMPLETE CBC W/AUTO DIFF WBC: CPT

## 2023-01-08 PROCEDURE — 94640 AIRWAY INHALATION TREATMENT: CPT

## 2023-01-08 PROCEDURE — 6360000002 HC RX W HCPCS

## 2023-01-08 PROCEDURE — 2060000000 HC ICU INTERMEDIATE R&B

## 2023-01-08 RX ORDER — MINERAL OIL AND WHITE PETROLATUM 150; 830 MG/G; MG/G
OINTMENT OPHTHALMIC PRN
Status: DISCONTINUED | OUTPATIENT
Start: 2023-01-08 | End: 2023-01-09 | Stop reason: HOSPADM

## 2023-01-08 RX ORDER — INSULIN GLARGINE-YFGN 100 [IU]/ML
25 INJECTION, SOLUTION SUBCUTANEOUS DAILY
Status: DISCONTINUED | OUTPATIENT
Start: 2023-01-08 | End: 2023-01-09

## 2023-01-08 RX ADMIN — BUDESONIDE 500 MCG: 0.5 SUSPENSION RESPIRATORY (INHALATION) at 20:07

## 2023-01-08 RX ADMIN — LEVOTHYROXINE SODIUM 112 MCG: 0.11 TABLET ORAL at 05:13

## 2023-01-08 RX ADMIN — PREGABALIN 75 MG: 75 CAPSULE ORAL at 20:37

## 2023-01-08 RX ADMIN — INSULIN LISPRO 4 UNITS: 100 INJECTION, SOLUTION INTRAVENOUS; SUBCUTANEOUS at 08:34

## 2023-01-08 RX ADMIN — SODIUM CHLORIDE, PRESERVATIVE FREE 10 ML: 5 INJECTION INTRAVENOUS at 08:36

## 2023-01-08 RX ADMIN — DEXTROMETHORPHAN HYDROBROMIDE / GUAIFENESIN 1 TABLET: 20; 400 TABLET ORAL at 13:45

## 2023-01-08 RX ADMIN — INSULIN LISPRO 2 UNITS: 100 INJECTION, SOLUTION INTRAVENOUS; SUBCUTANEOUS at 11:50

## 2023-01-08 RX ADMIN — MONTELUKAST SODIUM 10 MG: 10 TABLET, FILM COATED ORAL at 20:37

## 2023-01-08 RX ADMIN — ENOXAPARIN SODIUM 40 MG: 100 INJECTION SUBCUTANEOUS at 08:36

## 2023-01-08 RX ADMIN — CITALOPRAM HYDROBROMIDE 40 MG: 20 TABLET ORAL at 08:31

## 2023-01-08 RX ADMIN — AMITRIPTYLINE HYDROCHLORIDE 50 MG: 25 TABLET, FILM COATED ORAL at 20:38

## 2023-01-08 RX ADMIN — PREGABALIN 75 MG: 75 CAPSULE ORAL at 13:44

## 2023-01-08 RX ADMIN — AMLODIPINE BESYLATE 2.5 MG: 5 TABLET ORAL at 08:32

## 2023-01-08 RX ADMIN — ARFORMOTEROL TARTRATE 15 MCG: 15 SOLUTION RESPIRATORY (INHALATION) at 20:07

## 2023-01-08 RX ADMIN — IPRATROPIUM BROMIDE AND ALBUTEROL SULFATE 1 AMPULE: .5; 2.5 SOLUTION RESPIRATORY (INHALATION) at 15:25

## 2023-01-08 RX ADMIN — CALCIUM CARBONATE-VITAMIN D TAB 500 MG-200 UNIT 1 TABLET: 500-200 TAB at 08:39

## 2023-01-08 RX ADMIN — PREGABALIN 75 MG: 75 CAPSULE ORAL at 08:31

## 2023-01-08 RX ADMIN — BUDESONIDE 500 MCG: 0.5 SUSPENSION RESPIRATORY (INHALATION) at 07:27

## 2023-01-08 RX ADMIN — ARFORMOTEROL TARTRATE 15 MCG: 15 SOLUTION RESPIRATORY (INHALATION) at 07:27

## 2023-01-08 RX ADMIN — SODIUM CHLORIDE, PRESERVATIVE FREE 10 ML: 5 INJECTION INTRAVENOUS at 20:38

## 2023-01-08 RX ADMIN — LOSARTAN POTASSIUM 50 MG: 50 TABLET, FILM COATED ORAL at 08:31

## 2023-01-08 RX ADMIN — INSULIN LISPRO 4 UNITS: 100 INJECTION, SOLUTION INTRAVENOUS; SUBCUTANEOUS at 20:36

## 2023-01-08 RX ADMIN — INSULIN LISPRO 2 UNITS: 100 INJECTION, SOLUTION INTRAVENOUS; SUBCUTANEOUS at 17:13

## 2023-01-08 RX ADMIN — IPRATROPIUM BROMIDE AND ALBUTEROL SULFATE 1 AMPULE: .5; 2.5 SOLUTION RESPIRATORY (INHALATION) at 07:27

## 2023-01-08 RX ADMIN — IPRATROPIUM BROMIDE AND ALBUTEROL SULFATE 1 AMPULE: .5; 2.5 SOLUTION RESPIRATORY (INHALATION) at 20:07

## 2023-01-08 RX ADMIN — PANTOPRAZOLE SODIUM 40 MG: 40 TABLET, DELAYED RELEASE ORAL at 05:13

## 2023-01-08 RX ADMIN — ROSUVASTATIN CALCIUM 20 MG: 20 TABLET, FILM COATED ORAL at 20:37

## 2023-01-08 RX ADMIN — INSULIN GLARGINE-YFGN 25 UNITS: 100 INJECTION, SOLUTION SUBCUTANEOUS at 08:35

## 2023-01-08 ASSESSMENT — PAIN SCALES - GENERAL
PAINLEVEL_OUTOF10: 0

## 2023-01-08 NOTE — PROGRESS NOTES
Labs reported to Dr. Bernice Krause.  Electronically signed by Mehul Deshpande RN on 1/8/2023 at 8:33 AM

## 2023-01-08 NOTE — PROGRESS NOTES
[facility]  Internal Medicine Residency Program  Progress Note - House Team     Patient:  Dana Cobb 79 y.o. female MRN: [id number]     Date of Service: 1/8/2023     CC: Hyperglycemia  Overnight events: Blood glucose still elevated, not within goal    Subjective     Patient was seen and examined at the bedside in no acute distress. She acknowledged improvement in her symptoms overall. She still reporting blurring of vision and not able to move her bowel. Appetite is good and headache is improving. Plan is to increase Lantus to 25 units and follow recommendation as per placement for discharge. Objective     Physical Exam:  Vitals: BP (!) 136/92   Pulse 86   Temp 98.2 °F (36.8 °C) (Oral)   Resp 20   Ht 5' 6\" (1.676 m)   Wt 207 lb (93.9 kg)   SpO2 98%   BMI 33.41 kg/m²     I & O - 24hr: I/O this shift: In: 480 [P.O.:480]  Out: -    General Appearance: alert, appears stated age, and cooperative  HEENT:  Head: Normal, normocephalic, atraumatic. Neck: supple, symmetrical, trachea midline  Lung: clear to auscultation bilaterally  Heart: regular rate and rhythm, S1, S2 normal, no murmur, click, rub or gallop  Abdomen: Soft, nondistended and nontender  Extremities:  extremities normal, atraumatic, no cyanosis or edema  Musculokeletal: No joint swelling, no muscle tenderness. ROM normal in all joints of extremities.    Neurologic: Mental status: Alert, oriented, thought content appropriate  Subject  Pertinent Labs & Imaging Studies   yusuf  CBC with Differential:    Lab Results   Component Value Date/Time    WBC 4.7 01/08/2023 05:10 AM    RBC 3.79 01/08/2023 05:10 AM    HGB 10.5 01/08/2023 05:10 AM    HCT 34.8 01/08/2023 05:10 AM     01/08/2023 05:10 AM    MCV 91.8 01/08/2023 05:10 AM    MCH 27.7 01/08/2023 05:10 AM    MCHC 30.2 01/08/2023 05:10 AM    RDW 13.5 01/08/2023 05:10 AM    SEGSPCT 36 01/03/2014 09:54 AM    METASPCT 0.9 01/26/2020 07:11 AM    LYMPHOPCT 53.3 01/08/2023 05:10 AM    PROMYELOPCT 3.5 01/23/2020 07:25 AM    MONOPCT 9.2 01/08/2023 05:10 AM    MYELOPCT 0.9 01/26/2020 07:11 AM    BASOPCT 0.6 01/08/2023 05:10 AM    MONOSABS 0.43 01/08/2023 05:10 AM    LYMPHSABS 2.49 01/08/2023 05:10 AM    EOSABS 0.29 01/08/2023 05:10 AM    BASOSABS 0.03 01/08/2023 05:10 AM     BMP:    Lab Results   Component Value Date/Time     01/08/2023 05:10 AM    K 4.1 01/08/2023 05:10 AM     01/08/2023 05:10 AM    CO2 27 01/08/2023 05:10 AM    BUN 9 01/08/2023 05:10 AM    LABALBU 3.5 01/05/2023 11:18 PM    LABALBU 3.9 10/26/2011 04:50 AM    CREATININE 0.7 01/08/2023 05:10 AM    CALCIUM 8.8 01/08/2023 05:10 AM    GFRAA >60 06/23/2022 02:12 PM    LABGLOM >60 01/08/2023 05:10 AM    GLUCOSE 275 01/08/2023 05:10 AM    GLUCOSE 124 10/26/2011 04:50 AM       XR CHEST PORTABLE   Final Result   No acute process. CT HEAD WO CONTRAST   Final Result   Mild atrophy and likely chronic microvascular ischemic disease.               Resident's Assessment and Plan     Assessment and Plan:    Uncontrolled NIDDM in early stages of DKA, A1c 8.5 40/50  On weekly Trulicity at home, insulin naïve holding Trulicity for now  Diabetes education inputs appreciated and recommendations noted  Patient interested in outpatient diabetes education noted  Blood glucose improving, 197  Increase Lantus to 25 units/MDSS insulin  Monitor BG ACHS     Chronic hypoxic respiratory failure 2/2 COPD  PFT 2020 - FEV1/FVC 69%, predicted 87%  On 2 L home oxygen, compliant  Currently on baseline oxygen, wean down as able  Continue breathing treatments (DuoNeb, Brovana, Pulmicort)  Monitor respiratory status closely     Vaginal candidiasis  Improved    History of asthma, on inhalers  Continue breathing treatments  Continue montelukast  Monitor respiratory status     History of EDWARD  Ensure PAP at night     History of hypertension  On amlodipine 2.5 mg, losartan 50 mg at home  BP normotensive, continue amlodipine/losartan  Monitor blood pressure closely     History of GERD  On famotidine/Protonix at home  Continue Protonix 40 mg daily     History of hyperlipidemia, ASCVD risk score 26.8%  No lipid panel on file, lipid panel wnl  On Crestor 20 mg at home, continue     History of hypothyroidism  Last TSH 5.31 6/23/2022   On Synthroid 112 mcg at home, continue     History of smoldering Nida Scales Dr. Diann Sullivan, no active intervention      PT/OT evaluation: AM-PAC 16/24  DVT prophylaxis/ GI prophylaxis: Lovenox/Protonix  Disposition: continue current care, pending placement    Nick Cartwright MD, PGY-1  Attending physician: Dr. Roly Recinos

## 2023-01-08 NOTE — PROGRESS NOTES
Date: 1/7/2023    Time: 9:43 PM    Patient Placed On BIPAP/CPAP/ Non-Invasive Ventilation? No    If no must comment. Facial area red/color change? No           If YES are Blister/Lesion present? No   If yes must notify nursing staff  BIPAP/CPAP skin barrier? No    Skin barrier type: none        Comments:  Patient refuses CPAP of 5 at night. NIV is currently not in the room. Patient remains on a 3 liter nasal cannula. Patient is in no distress at this time. SpO2 98% on this device.         Mary Tamez, ELANA

## 2023-01-09 VITALS
DIASTOLIC BLOOD PRESSURE: 76 MMHG | OXYGEN SATURATION: 98 % | RESPIRATION RATE: 18 BRPM | HEART RATE: 80 BPM | TEMPERATURE: 98.3 F | SYSTOLIC BLOOD PRESSURE: 128 MMHG | WEIGHT: 207 LBS | BODY MASS INDEX: 33.27 KG/M2 | HEIGHT: 66 IN

## 2023-01-09 LAB
ANION GAP SERPL CALCULATED.3IONS-SCNC: 8 MMOL/L (ref 7–16)
BASOPHILS ABSOLUTE: 0.04 E9/L (ref 0–0.2)
BASOPHILS RELATIVE PERCENT: 0.8 % (ref 0–2)
BUN BLDV-MCNC: 7 MG/DL (ref 6–23)
CALCIUM SERPL-MCNC: 8.5 MG/DL (ref 8.6–10.2)
CHLORIDE BLD-SCNC: 100 MMOL/L (ref 98–107)
CO2: 28 MMOL/L (ref 22–29)
CREAT SERPL-MCNC: 0.6 MG/DL (ref 0.5–1)
EOSINOPHILS ABSOLUTE: 0.28 E9/L (ref 0.05–0.5)
EOSINOPHILS RELATIVE PERCENT: 5.9 % (ref 0–6)
GFR SERPL CREATININE-BSD FRML MDRD: >60 ML/MIN/1.73
GLUCOSE BLD-MCNC: 327 MG/DL (ref 74–99)
HCT VFR BLD CALC: 34.3 % (ref 34–48)
HEMOGLOBIN: 10.1 G/DL (ref 11.5–15.5)
IMMATURE GRANULOCYTES #: 0.01 E9/L
IMMATURE GRANULOCYTES %: 0.2 % (ref 0–5)
LYMPHOCYTES ABSOLUTE: 1.46 E9/L (ref 1.5–4)
LYMPHOCYTES RELATIVE PERCENT: 30.7 % (ref 20–42)
MCH RBC QN AUTO: 28.3 PG (ref 26–35)
MCHC RBC AUTO-ENTMCNC: 29.4 % (ref 32–34.5)
MCV RBC AUTO: 96.1 FL (ref 80–99.9)
METER GLUCOSE: 207 MG/DL (ref 74–99)
METER GLUCOSE: 238 MG/DL (ref 74–99)
METER GLUCOSE: 269 MG/DL (ref 74–99)
METER GLUCOSE: 297 MG/DL (ref 74–99)
MONOCYTES ABSOLUTE: 0.37 E9/L (ref 0.1–0.95)
MONOCYTES RELATIVE PERCENT: 7.8 % (ref 2–12)
NEUTROPHILS ABSOLUTE: 2.59 E9/L (ref 1.8–7.3)
NEUTROPHILS RELATIVE PERCENT: 54.6 % (ref 43–80)
PDW BLD-RTO: 13.7 FL (ref 11.5–15)
PLATELET # BLD: 196 E9/L (ref 130–450)
PMV BLD AUTO: 11.5 FL (ref 7–12)
POTASSIUM REFLEX MAGNESIUM: 4.1 MMOL/L (ref 3.5–5)
RBC # BLD: 3.57 E12/L (ref 3.5–5.5)
SODIUM BLD-SCNC: 136 MMOL/L (ref 132–146)
WBC # BLD: 4.8 E9/L (ref 4.5–11.5)

## 2023-01-09 PROCEDURE — 2580000003 HC RX 258

## 2023-01-09 PROCEDURE — 6370000000 HC RX 637 (ALT 250 FOR IP)

## 2023-01-09 PROCEDURE — 6360000002 HC RX W HCPCS

## 2023-01-09 PROCEDURE — 36415 COLL VENOUS BLD VENIPUNCTURE: CPT

## 2023-01-09 PROCEDURE — 97535 SELF CARE MNGMENT TRAINING: CPT

## 2023-01-09 PROCEDURE — 85025 COMPLETE CBC W/AUTO DIFF WBC: CPT

## 2023-01-09 PROCEDURE — 80048 BASIC METABOLIC PNL TOTAL CA: CPT

## 2023-01-09 PROCEDURE — 99231 SBSQ HOSP IP/OBS SF/LOW 25: CPT | Performed by: INTERNAL MEDICINE

## 2023-01-09 PROCEDURE — 94640 AIRWAY INHALATION TREATMENT: CPT

## 2023-01-09 PROCEDURE — S5553 INSULIN LONG ACTING 5 U: HCPCS

## 2023-01-09 PROCEDURE — 97165 OT EVAL LOW COMPLEX 30 MIN: CPT

## 2023-01-09 PROCEDURE — 82962 GLUCOSE BLOOD TEST: CPT

## 2023-01-09 PROCEDURE — 6370000000 HC RX 637 (ALT 250 FOR IP): Performed by: STUDENT IN AN ORGANIZED HEALTH CARE EDUCATION/TRAINING PROGRAM

## 2023-01-09 PROCEDURE — 2700000000 HC OXYGEN THERAPY PER DAY

## 2023-01-09 RX ORDER — INSULIN GLARGINE 100 [IU]/ML
28 INJECTION, SOLUTION SUBCUTANEOUS DAILY
Qty: 9 ADJUSTABLE DOSE PRE-FILLED PEN SYRINGE | Refills: 0 | Status: SHIPPED | OUTPATIENT
Start: 2023-01-09

## 2023-01-09 RX ORDER — INSULIN GLARGINE-YFGN 100 [IU]/ML
28 INJECTION, SOLUTION SUBCUTANEOUS DAILY
Status: DISCONTINUED | OUTPATIENT
Start: 2023-01-09 | End: 2023-01-09 | Stop reason: HOSPADM

## 2023-01-09 RX ADMIN — WHITE PETROLATUM: .15; .85 OINTMENT OPHTHALMIC at 05:54

## 2023-01-09 RX ADMIN — POLYETHYLENE GLYCOL 3350 17 G: 17 POWDER, FOR SOLUTION ORAL at 09:00

## 2023-01-09 RX ADMIN — ARFORMOTEROL TARTRATE 15 MCG: 15 SOLUTION RESPIRATORY (INHALATION) at 11:10

## 2023-01-09 RX ADMIN — INSULIN LISPRO 4 UNITS: 100 INJECTION, SOLUTION INTRAVENOUS; SUBCUTANEOUS at 16:56

## 2023-01-09 RX ADMIN — LEVOTHYROXINE SODIUM 112 MCG: 0.11 TABLET ORAL at 05:06

## 2023-01-09 RX ADMIN — INSULIN LISPRO 2 UNITS: 100 INJECTION, SOLUTION INTRAVENOUS; SUBCUTANEOUS at 09:00

## 2023-01-09 RX ADMIN — ENOXAPARIN SODIUM 40 MG: 100 INJECTION SUBCUTANEOUS at 09:00

## 2023-01-09 RX ADMIN — SODIUM CHLORIDE, PRESERVATIVE FREE 10 ML: 5 INJECTION INTRAVENOUS at 09:00

## 2023-01-09 RX ADMIN — INSULIN GLARGINE-YFGN 28 UNITS: 100 INJECTION, SOLUTION SUBCUTANEOUS at 09:00

## 2023-01-09 RX ADMIN — AMLODIPINE BESYLATE 2.5 MG: 5 TABLET ORAL at 09:00

## 2023-01-09 RX ADMIN — INSULIN LISPRO 2 UNITS: 100 INJECTION, SOLUTION INTRAVENOUS; SUBCUTANEOUS at 11:59

## 2023-01-09 RX ADMIN — WHITE PETROLATUM: .15; .85 OINTMENT OPHTHALMIC at 14:22

## 2023-01-09 RX ADMIN — DEXTROMETHORPHAN HYDROBROMIDE / GUAIFENESIN 1 TABLET: 20; 400 TABLET ORAL at 09:06

## 2023-01-09 RX ADMIN — LOSARTAN POTASSIUM 50 MG: 50 TABLET, FILM COATED ORAL at 09:00

## 2023-01-09 RX ADMIN — IPRATROPIUM BROMIDE AND ALBUTEROL SULFATE 1 AMPULE: .5; 2.5 SOLUTION RESPIRATORY (INHALATION) at 15:57

## 2023-01-09 RX ADMIN — CALCIUM CARBONATE-VITAMIN D TAB 500 MG-200 UNIT 1 TABLET: 500-200 TAB at 09:00

## 2023-01-09 RX ADMIN — PREGABALIN 75 MG: 75 CAPSULE ORAL at 14:20

## 2023-01-09 RX ADMIN — IPRATROPIUM BROMIDE AND ALBUTEROL SULFATE 1 AMPULE: .5; 2.5 SOLUTION RESPIRATORY (INHALATION) at 11:10

## 2023-01-09 RX ADMIN — WHITE PETROLATUM: .15; .85 OINTMENT OPHTHALMIC at 09:17

## 2023-01-09 RX ADMIN — PREGABALIN 75 MG: 75 CAPSULE ORAL at 09:00

## 2023-01-09 RX ADMIN — BUDESONIDE 500 MCG: 0.5 SUSPENSION RESPIRATORY (INHALATION) at 11:10

## 2023-01-09 RX ADMIN — CITALOPRAM HYDROBROMIDE 40 MG: 20 TABLET ORAL at 09:00

## 2023-01-09 RX ADMIN — PANTOPRAZOLE SODIUM 40 MG: 40 TABLET, DELAYED RELEASE ORAL at 05:06

## 2023-01-09 ASSESSMENT — PAIN SCALES - GENERAL: PAINLEVEL_OUTOF10: 0

## 2023-01-09 NOTE — PROGRESS NOTES
[facility]  Internal Medicine Residency / [address]    Attending Physician Statement  I have discussed the case, including pertinent history and exam findings with the resident and the team.  I have seen and examined the patient and the key elements of the encounter have been performed by me. I have also personally reviewed imaging studies and labs. I agree with the assessment, plan and orders as documented by the resident. Assessment:  Uncontrolled DM. Improved control with titration of insulin. Symptoms of BOV, increased urination likely from hyperosmolar effect of glucose. Ct head 1/5/23 without acute events. Abdominal pain, resolved with BM  Hypoxic respiratory failure 2/2 COPD on 2L oxygen at home, not in exacerbation  EDWARD  HTN  Hypothyroidism       Plan:  Okay for discharge today once disposition is finalized  New insulin regimen on discharge  Hold Trulicity for now (nausea, vomiting) - may be initiated at lower dose outpatient  Needs ophthalmological assessment outpatient  IM [facility] follow up        Remainder of medical problems as per resident note. Juliet Rodriguez MD  Internal Medicine

## 2023-01-09 NOTE — DISCHARGE INSTRUCTIONS
Internal medicine    Follow ups  Please follow up with the internal medicine clinic at [facility] appointment has been scheduled for 1/16/2023 at 1:30 PM  Please keep all other follow up appointments:  Future Appointments   Date Time Provider Jennifer Arzate   1/16/2023  1:00 PM Jett Gabriel MD [facility]   1/26/2023  1:00 PM Jaqui Toro MD [facility]   Please follow-up with diabetic education as outpatient    Changes in healthcare   Please take all medications as indicated  Diet: diabetic diet   Activity: activity as tolerated  New Medications started during this hospital stay  Insulin glargine (Lantus) 28 units into the skin every morning  Please monitor for symptoms of low blood glucose while on insulin like excessive sweating and weakness and take hypoglycemic precautions as instructed  Changes to your medications  Continue home oxygen 2 L   Medications you should stop taking  Famotidine  Trulicity for now until post hospital follow-up  Please contact us if you have any concerns, wish to change or make an appointment:  Internal medicine clinic   Phone: [phone]  Fax: [phone]  [address]  Should you have further questions in regards to this visit, you can review your clinical note and after visit summary document on your [employer] account. Other than any new prescriptions given to you today, the list of home medications on this After Visit Summary are based on information provided to us from you and your healthcare providers. This information, including the list, dose, and frequency of medications is only as accurate as the information you provided. If you have any questions or concerns about your home medications, please contact your Primary Care Physician for further clarification.

## 2023-01-09 NOTE — PROGRESS NOTES
[address]      Date:2023                                                  Patient Name: Ronny Ferrera  MRN: [id number]  : 1955  Room: [address] [place] [address]    Evaluating OT: SELENE Granado, OTR/L  # [id number]    Referring Provider:  Adrienne Rinaldi MD  Specific Provider Orders:  Ramy Baker and Treat\"23    Diagnosis: Blurred vision [H53.8]  Hyperglycemia [R73.9]  Uncontrolled type 2 diabetes mellitus with hyperglycemia ([address] [E11.65]    Pt was admitted w/ increased urination, blurred vision, lightheadedness and elevated blood sugar    Pertinent Medical History:  Pt has a past medical history of Adrenal incidentaloma ([address] Anxiety, Arthritis, Asthma, Bladder incontinence, Cancer ([address] Chronic back pain, COPD (chronic obstructive pulmonary disease) ([address] Depression, Fibromyalgia, GERD (gastroesophageal reflux disease), Hyperlipidemia, Hypertension, Hypothyroidism, MGUS (monoclonal gammopathy of unknown significance), Neuropathy, Pneumonia, Prolonged emergence from general anesthesia, Sleep apnea, Type II or unspecified type diabetes mellitus without mention of complication, not stated as uncontrolled, and Vaginal bleeding. ,  has a past surgical history that includes knee surgery (); Upper gastrointestinal endoscopy (); Colonoscopy (2016); Upper gastrointestinal endoscopy (2016); Nerve Block (Bilateral, 2016); Nerve Block (2020); Pain management procedure (N/A, 2020); Nerve Block (Bilateral, 08/10/2020); Anesthesia Nerve Block (Bilateral, 8/10/2020); other surgical history (2016); Colonoscopy (); Upper gastrointestinal endoscopy (); Upper gastrointestinal endoscopy (N/A, 2020); Colonoscopy (N/A, 2020); Colonoscopy (2020); and Dilation and curettage of uterus (N/A, 2021).     Surgeries this admission: None     Precautions:  Fall Risk  2L O2    Assessment of current deficits   [x] Functional mobility  [x]ADLs  [x] Strength               []Cognition   [x] Functional transfers   [x] IADLs         [x] Safety Awareness   [x]Endurance   [] Fine Coordination              [x] Balance     [] Vision/perception   []Sensation    []Gross Motor Coordination  [] ROM  [] Delirium                  [] Motor Control       OT PLAN OF CARE   OT POC based on physician orders, patient diagnosis and results of clinical assessment    Frequency/Duration 1-3 days/wk for 2 weeks PRN   Specific OT Treatment to include:     * Instruction/training on adapted ADL techniques and AE recommendations to increase functional independence within precautions       * Training on energy conservation strategies, correct breathing pattern and techniques to improve independence/tolerance for self-care routine  * Functional transfer/mobility training/DME recommendations for increased independence, safety, and fall prevention  * Patient/Family education to increase follow through with safety techniques and functional independence  * Recommendation of environmental modifications for increased safety with functional transfers/mobility and ADLs  * Cognitive retraining/development of therapeutic activities to improve problem solving, judgement, memory, and attention for increased safety/participation in ADL/IADL tasks  * Therapeutic exercise to improve motor endurance, ROM, and functional strength for ADLs/functional transfers  * Therapeutic activities to facilitate/challenge dynamic balance, stand tolerance for increased safety and independence with ADLs  * Therapeutic activities to facilitate gross/fine motor skills for increased independence with ADLs  * Neuro-muscular re-education: facilitation of righting/equilibrium reactions  * Positioning to improve skin integrity, interaction with environment and functional independence  * Delirium prevention/treatment  * Manual techniques for edema management  Other:    Recommended Adaptive Equipment: TBD as pt progresses - shower chair, adaptive equipment      Home Living:  Pt lives w/ family members (Her Sons and Her Sister) in a 1-story house. Bed/bath on the main floor. Bathroom setup:  Tub-Shower, Standard-height Commode   Equipment owned:  None - Home O2    Available Family Assist:  Not reported    Prior Level of Function:  Pt reported being IND w/ all ADLs, IADLs, Transfers and Mobility using No AD for ambulation. Driving:  Not reported  Occupation:  Currently employed    Pain Level:  Denied pain    Additional Complaints:  None this session - denied blurred vision and lightheadedness, recent     Cognition: A & O x 4   Able to Follow Multi-Step Commands INDly   Memory:  good    Sequencing:  good    Problem solving:  good    Judgement/safety:  good   Additional Comments:  Pt was pleasant and cooperative.       Vitals/Lab Values:  O2 sats remained > 94% for duration of session on 2L O2       Functional Assessment:  AM-PAC Daily Activity Raw Score: 19/24     Initial Eval Status  Date: 1-9-23   Treatment Status  Date: STGs = LTGs  Time frame: 10-14 days   Feeding IND    Seated in chair    NA   Grooming SUP/Set up    Washing hands standing at sink    IND  Standing at the Sink   UB Dressing Set up    Donning gown seated EOB    IND     LB Dressing Set up    Simulated - seated/standing    IND     Bathing NT    Pt ed re: Benefits of use of Shower chair    Mod I      Toileting SUP    Completed Hygiene - Seated  Clothing adjustment - standing  VCs for safety    IND     Bed Mobility  Supine to sit: IND   Sit to supine:  NT     VCs for safety    Supine to sit: IND  Sit to supine: IND     Functional Transfers SUP    EOB, Commode, Chair  Pt ed for safety/hand placement    IND     Functional Mobility SUP    Household distances in room, bathroom w/o AD - No SOB or LOB  Pt ed for safety/improved safety awareness    IND     Balance Sitting:     Static:  Remote SUP - EOB/Commode, IND in armed chair    Dynamic:  Remote SUP w/ functional ax EOB/Commode     Standing:     Static:  SUP    Dynamic:  SUP w/ functional ax/mobility w/o AD    Sitting:     Static:  IND    Dynamic:  IND w/ functional ax    Standing:     Static:  IND w/ AD PRN    Dynamic:  IND w/ functional ax/mobility w/ AD PRN   Activity Tolerance Fair(+)      Good   Visual/  Perceptual    Hearing: WFL - reported vision at baseline   Glasses: Yes - at b/s    WFL   Hearing Aids:  No               Hand Dominance: Right   AROM Strength Additional Info:    RUE  WFL WNL Good ;   Good FMC/dexterity noted during ADL tasks     LUE WFL WNL Good ;    Good FMC/dexterity noted during ADL tasks       Sensation:  Denies numbness or tingling Sanya UEs   Tone: WFL Sanya UEs   Edema: None Noted Sanya UEs     Comments: Upon arrival, patient was found in supine.  She was agreeable to participate in therapeutic ax.  No Family present during session.  Received permission from RN prior to engaging pt in OT services.  Educated pt on role of OT services.      At the end of the session, patient was properly positioned in bs chair - set up to eat meal.  Call light and phone within reach, all lines and tubes intact.  Oriented pt to call bell.  Made all appropriate Environmental Modifications to facilitate pt's level of IND and safety.  All needs met.           Overall patient demonstrated decreased independence and safety during completion of ADL/functional transfer/mobility tasks.  Pt would benefit from continued skilled OT to increase safety and independence with completion of ADL/IADL tasks for functional independence and quality of life.    Treatment: OT treatment provided this date includes:   Instruction/training on safety and adapted techniques for completion of ADLs, use of DME/AD/Adaptive equip:    Instruction/training on safe functional mobility/transfer techniques, use of  DME/AD:    Instruction/training on energy conservation techs (EC)/Work Simplification for completion of ADLs:     Neuromuscular Reeducation to facilitate balance/righting reactions for increased function with ADLs:    Skilled positioning/alignment to maximize Pt's safety and ability to safely and INDly interact w/ his/her environment, maximize respiratory status  Activity tolerance - Sitting/Standing to improve endurance w/ functional ax   Cognitive retraining -  Oriented pt to current Date, Place and Situation; Cues for safety/safety awareness   Skilled monitoring of Vitals during session and pt's response to tx ax      Consulted RN, SW/CM    Made all appropriate Environmental Modifications to facilitate pt's level of IND and safety. Recommendations for Continued Participation in OT services during Hospitalization     Pt and/or Family verbalized/demonstrated a Good understanding of education provided. Will Review PRN. Rehab Potential: Good for established goals     Patient / Family Goal: Not stated at this time      Patient and/or family were instructed on functional diagnosis, prognosis/goals and OT plan of care. Demonstrated Good understanding. Eval Complexity: Low    Time In: 1144  Time Out: 1208  Total Treatment Time: 9 minutes    Min Units   OT Eval Low 97165  X  1   OT Eval Medium 75056      OT Eval High 95804      OT Re-Eval [id number]       Therapeutic Ex 32762       Therapeutic Activities 99008       ADL/Self Care 44670  9  1   Orthotic Management 67447       Manual 84259     Neuro Re-Ed 83092       Non-Billable Time              Evaluation Time additionally includes thorough review of current medical information, gathering information on past medical history/social history and prior level of function, completion of standardized testing/informal observation of tasks, assessment of data and education on plan of care and goals.             Kendell Kussmaul, SELENE, OTR/L  # [id number]

## 2023-01-09 NOTE — PROGRESS NOTES
Date: 1/8/2023    Time: 9:43 PM    Patient Placed On BIPAP/CPAP/ Non-Invasive Ventilation? No    If no must comment. Facial area red/color change? No           If YES are Blister/Lesion present? No   If yes must notify nursing staff  BIPAP/CPAP skin barrier? No    Skin barrier type: none        Comments:  Patient refusing CPAP of 5 and 40% FiO2. NIV at this time is not even in the room. Patient remains on a 2 liter nasal cannula. No shortness of breath noted. SpO2 98% on this device.       Gavinelia Lang RCP

## 2023-01-09 NOTE — CARE COORDINATION
Spoke with patient, she worked with OT, did not require assistive devices, was independent with care. Patient intends to return to work in a few days and will not meet homebound criteria for homecare. She does live with her sister and her sons that can support her in her recovery. She has oxygen 2L at home through [employer]. She has transportation available today, I did ask her to please have her ride bring her portable oxygen tank when they come. For questions I can be reached at [phone].  Adolfo Chavez

## 2023-01-09 NOTE — PROGRESS NOTES
CLINICAL PHARMACY NOTE: MEDS TO BEDS    Total # of Prescriptions Filled: 2   The following medications were delivered to the patient:  Basaglar 100  B-d pen needle    Additional Documentation:

## 2023-01-09 NOTE — DISCHARGE SUMMARY
[address]  Discharge Summary    PCP: Vanita Mcdonald MD    Admit Date:1/5/2023  Discharge Date: 1/9/2023    Chief Complaint   Patient presents with    Hyperglycemia     \"HI\", increased urination and blurred vision       Admission Diagnosis:   Hyperglycemia 2/2 uncontrolled NIDDM presenting in early stages of DKA  Chronic hypoxic respiratory failure 2/2 COPD, PFT 2020, on 2 L  History of asthma, on inhalers/montelukast  History of hypertension, on amlodipine/losartan  History of GERD, on PPI  History of hyperlipidemia, ASCVD risk score 26.8%  History of hypothyroidism, on Synthroid 112 mcg  History of EDWARD  History of smoldering myeloma      Discharge Diagnosis:  Hyperglycemia 2/2 uncontrolled NIDDM presenting in early stages of DKA, improved  Chronic hypoxic respiratory failure 2/2 COPD, PFT 2020, on 2 L, stable  Vaginal candidiasis, received 1 dose of fluconazole, resolved  History of asthma, on inhalers, stable  History of hypertension, on amlodipine/losartan, stable  History of GERD, on PPI, stable  History of hyperlipidemia, ASCVD risk score 26.8%, on statin  History of hypothyroidism, on Synthroid 112 mcg, stable  History of EDWARD, stable  History of smoldering myeloma, follows with Dr. Malik Vergara: The patient is a 79 y.o. female with a past medical history of noninsulin-dependent diabetes mellitus, COPD, GERD, hyperlipidemia, hypertension, hyperlipidemia and smoldering myeloma who was admitted to the ED from clinic for hyperglycemia. Patient was recently admitted and managed for respiratory failure and pneumonia at [facility] and was discharged on 12/24/2022. She stated that her excessive urination and increased fluid intake has worsened progressively since discharge.   She also reported associated poor appetite, abdominal pain, blurring of vision, shortness of breath worse on exertion, nonproductive cough, sore throat, and abdominal pain. She denied any fever, nausea or vomiting. She also denied any weakness in any part of the body. Patient stated that upon measuring her blood glucose at home, it typically reads in 200s-300s. She was discharged on Trulicity to follow-up in the clinic. In the ED, patient presented hemodynamically stable with initial labs showing elevated blood glucose at 595 with high anion gap of 16 and sodium level 129, beta hydroxybutyrate elevated to 1.99, UA showing glucosuria and 2 nausea of 15, troponin was not significantly elevated 11, elevated alkaline phosphatase 127. CT head was unremarkable of any acute intracranial process. Hyperglycemia gradually improved on insulin and IV fluids and patient was never on DKA protocol. She was subsequently transferred to the floor for further medical management. In the floor, Trulicity was held and blood glucose gradually improved following serial adjustment of her insulin regimen. Diabetes education was consulted and patient had a session while admitted in the floor and was agreeable to outpatient follow-up for diabetic education. Abdominal pain gradually improved over time. Initial AM-PAC score was marginal at 16/24, HHC was recommended. Today, patient was seen and examined at the bedside in no acute distress. She endorsed resolution of abdominal pain. No new complaints reported today. Insulin regimen, Lantus was increased to 28 units and upon reassessment blood glucose was 207. PT OT evaluation was done which showed remarkable improvement with OT AM-PAC score of 19/24 at this time.  recommended discharge home as patient AM-PAC has improved. Patient was agreeable to this plan. She then discharged home on insulin glargine (Lantus) 28 units subcu every morning as well as her routine home medications. Trulicity is held at this time for possible resumption as outpatient during hospital follow-up visit in the clinic.   Patient will need referral to diabetes education as outpatient and will need follow-up with ophthalmology for evaluation. Excuse letter was issued for the period of stay in this hospital.    General Appearance: alert, appears stated age, and cooperative  HEENT:  Head: Normal, normocephalic, atraumatic. Neck: supple, symmetrical, trachea midline  Lung: clear to auscultation bilaterally  Heart: regular rate and rhythm, S1, S2 normal, no murmur, click, rub or gallop  Abdomen: soft, non-tender; bowel sounds normal; no masses,  no organomegaly  Extremities:  extremities normal, atraumatic, no cyanosis or edema  Musculokeletal: No joint swelling, no muscle tenderness. ROM normal in all joints of extremities. Neurologic: Mental status: Alert, oriented, thought content appropriate    Pending test results: None    Consults:  Diabetes Education    Procedures:  None    Condition at discharge: Stable    Disposition: home    Time taken for discharge : < 30 mins [] >30 mins []    Discharge Medications:  Current Discharge Medication List        START taking these medications    Details   insulin glargine (LANTUS SOLOSTAR) 100 UNIT/ML injection pen Inject 28 Units into the skin daily  Qty: 9 Adjustable Dose Pre-filled Pen Syringe, Refills: 0      Insulin Pen Needle 31G X 5 MM MISC 1 each by Does not apply route daily  Qty: 100 each, Refills: 0           CONTINUE these medications which have CHANGED    Details   OXYGEN Inhale 2 L into the lungs daily Uses 2 liters at night  Qty: 1 each, Refills: 0           CONTINUE these medications which have NOT CHANGED    Details   amLODIPine (NORVASC) 2.5 MG tablet Take 1 tablet by mouth daily  Qty: 30 tablet, Refills: 0      tiotropium (SPIRIVA HANDIHALER) 18 MCG inhalation capsule Inhale 1 capsule into the lungs daily  Qty: 30 capsule, Refills: 5      Calcium Polycarbophil (FIBER) 625 MG TABS Take 1 tablet by mouth 2 times daily  Qty: 180 tablet, Refills: 3    Associated Diagnoses:  Alternating constipation and diarrhea      blood glucose monitor strips Check blood sugar fasting before breakfast and as needed for symptoms of irregular blood glucose. Dispense sufficient amount for indicated testing frequency plus additional to accommodate PRN testing needs. Qty: 100 strip, Refills: 0    Comments: Brand per patient preference. May round up to next available package size. glucose monitoring (FREESTYLE FREEDOM) kit 1 kit by Does not apply route daily Ok to change brand that insurance will cover.   Qty: 1 kit, Refills: 0      Lancets MISC Check blood sugar daily and prn as needed  Qty: 100 each, Refills: 0      Respiratory Therapy Supplies (NEBULIZER/TUBING/MOUTHPIECE) KIT 1 kit by Does not apply route daily as needed (for shortness of breath/wheezing)  Qty: 1 kit, Refills: 0      albuterol (PROVENTIL) (2.5 MG/3ML) 0.083% nebulizer solution Take 3 mLs by nebulization every 6 hours as needed for Wheezing or Shortness of Breath  Qty: 120 each, Refills: 1      Dextromethorphan-guaiFENesin (CORICIDIN HBP CONGESTION/COUGH)  MG CAPS Take 1 capsule by mouth 2 times daily as needed (congestion)  Qty: 30 capsule, Refills: 0      baclofen (LIORESAL) 10 MG tablet Take 1 tablet by mouth 3 times daily as needed (back muscle spasms)  Qty: 30 tablet, Refills: 0    Associated Diagnoses: Myalgia      losartan (COZAAR) 50 MG tablet Take 1 tablet by mouth daily  Qty: 30 tablet, Refills: 3      SYMBICORT 160-4.5 MCG/ACT AERO INHALE 2 PUFFS INTO THE LUNGS TWICE DAILY  Qty: 10.2 g, Refills: 3      montelukast (SINGULAIR) 10 MG tablet TAKE 1 TABLET BY MOUTH EVERY NIGHT AT BEDTIME  Qty: 90 tablet, Refills: 1    Comments: **Patient requests 90 days supply**  Associated Diagnoses: Chronic obstructive pulmonary disease, unspecified COPD type (HCC)      albuterol sulfate HFA (PROVENTIL;VENTOLIN;PROAIR) 108 (90 Base) MCG/ACT inhaler Inhale 3 puffs into the lungs 4 times daily as needed for Wheezing  Qty: 3 each, Refills: 1    Associated Diagnoses: Chronic obstructive pulmonary disease, unspecified COPD type ([facility] [address] [address]      pantoprazole (PROTONIX) 40 MG tablet Take 1 tablet by mouth every morning (before breakfast)  Qty: 30 tablet, Refills: 5    Associated Diagnoses: Primary osteoarthritis of both hips      pregabalin (LYRICA) 75 MG capsule Take 1 capsule by mouth 3 times daily for 90 days. Qty: 90 capsule, Refills: 2    Associated Diagnoses: DM type 2 with diabetic peripheral neuropathy (HCC)      amitriptyline (ELAVIL) 50 MG tablet TAKE 1 TABLET BY MOUTH EVERY EVENING  Qty: 90 tablet, Refills: 1    Associated Diagnoses: Fibromyalgia      celecoxib (CELEBREX) 100 MG capsule Take 1 capsule by mouth daily  Qty: 90 capsule, Refills: 1    Associated Diagnoses: Primary osteoarthritis of both hips      citalopram (CELEXA) 40 MG tablet Take 1 tablet by mouth daily  Qty: 90 tablet, Refills: 1    Associated Diagnoses: Depression, unspecified depression type      docusate sodium (COLACE) 100 MG capsule Take 1 capsule by mouth 2 times daily  Qty: 180 capsule, Refills: 1    Associated Diagnoses: Alternating constipation and diarrhea      levothyroxine (SYNTHROID) 112 MCG tablet Take 1 tablet by mouth Daily  Qty: 90 tablet, Refills: 1    Associated Diagnoses: Hypothyroidism, unspecified type      ammonium lactate (LAC-HYDRIN) 12 % lotion Apply topically twice daily  Qty: 400 g, Refills: 2      calcium-cholecalciferol (CALCIUM 500/D) 500-200 MG-UNIT per tablet TAKE 1 TABLET BY MOUTH DAILY  Qty: 180 tablet, Refills: 1    Associated Diagnoses: Vitamin D deficiency      rosuvastatin (CRESTOR) 20 MG tablet Take 1 tablet by mouth daily  Qty: 90 tablet, Refills: 1    Associated Diagnoses: DM type 2 with diabetic peripheral neuropathy (HCC)      mineral oil-hydrophilic petrolatum (HYDROPHOR) ointment Apply topically as needed.   Qty: 3 each, Refills: 2    Associated Diagnoses: Dry skin      aspirin 81 MG tablet Take 1 tablet by mouth daily  Qty: 90 tablet, Refills: 0 Associated Diagnoses: Essential hypertension           STOP taking these medications       famotidine (PEPCID) 20 MG tablet Comments:   Reason for Stopping:         Dulaglutide (TRULICITY) 1.19 BV/5.7JW SOPN Comments:   Reason for Stopping:         Misc. Devices MISC Comments:   Reason for Stopping: Follow ups  Please follow up with the internal medicine clinic at [facility] appointment has been scheduled for 1/16/2023 at 1:30 PM  Please keep all other follow up appointments:  Future Appointments   Date Time Provider Jennifer Arzate   1/16/2023  1:00 PM Yamileth Hart MD [facility]   1/26/2023  1:00 PM Martina Ahuja MD [facility]   Please follow-up with diabetic education as outpatient  Please follow-up with your ophthalmologist as outpatient    Changes in healthcare   Please take all medications as indicated  Diet: diabetic diet   Activity: activity as tolerated  New Medications started during this hospital stay  Insulin glargine (Lantus) 28 units into the skin every morning  Please monitor for symptoms of low blood glucose while on insulin like excessive sweating and weakness and take hypoglycemic precautions as instructed  Changes to your medications  Continue home oxygen 2 L   Medications you should stop taking  Famotidine  Trulicity for now until post hospital follow-up  Please contact us if you have any concerns, wish to change or make an appointment:  Internal medicine clinic   Phone: [phone]  Fax: [phone]  [address] [facility] [address]  Should you have further questions in regards to this visit, you can review your clinical note and after visit summary document on your [employer] account. Other than any new prescriptions given to you today, the list of home medications on this After Visit Summary are based on information provided to us from you and your healthcare providers.  This information, including the list, dose, and frequency of medications is only as accurate as the information you provided. If you have any questions or concerns about your home medications, please contact your Primary Care Physician for further clarification.     Bony Cook MD  PGY 1   2:01 PM 1/9/2023

## 2023-01-10 ENCOUNTER — TELEPHONE (OUTPATIENT)
Dept: INTERNAL MEDICINE | Age: 68
End: 2023-01-10

## 2023-01-10 NOTE — TELEPHONE ENCOUNTER
Care Transitions Initial Follow Up Call    Outreach made within 2 business days of discharge: Yes    Patient: Tamie Toro Patient : 1955   MRN: [id number]  Reason for Admission: There are no discharge diagnoses documented for the most recent discharge. Discharge Date: 23       Spoke with: Patient    Discharge department/facility: [facility] Interactive Patient Contact:  Was patient able to fill all prescriptions: Yes  Was patient instructed to bring all medications to the follow-up visit: Yes  Is patient taking all medications as directed in the discharge summary? States she misplaced her discharge instructions. Reviewed with patient her new discharge medications and the medications she was to stop on discharge. Does patient understand their discharge instructions: Yes  Does patient have questions or concerns that need addressed prior to 7-14 day follow up office visit: yes -asking how to take lid off of insulin pen. Instructions given to patient. Pt verbalized she was able to open insulin pen. Pt states she picked up insulin from pharmacy. States she has pen needles. Reminded that pen needles were also ordered upon discharge. Scheduled appointment with PCP within 7-14 days    Follow Up-pt reminded of date and time of f/u appt.    Future Appointments   Date Time Provider Jennifer Arzate   2023  1:00 PM Yamileth Hart MD [facility]   2023  1:00 PM Martina Ahuja MD [facility] RN

## 2023-01-10 NOTE — PROGRESS NOTES
Post-Discharge Transitional Care  Follow Up      Cha Yee   YOB: 1955    Date of Office Visit:  1/16/2023  Date of Hospital Admission: 1/5/23  Date of Hospital Discharge: 1/9/23  Risk of hospital readmission (high >=14%. Medium >=10%) :Readmission Risk Score: 26.5      Care management risk score Rising risk (score 2-5) and Complex Care (Scores >=6): No Risk Score On File     Non face to face  following discharge, date last encounter closed (first attempt may have been earlier): 01/10/2023    Call initiated 2 business days of discharge: Yes    ASSESSMENT/PLAN:   DM type 2 with diabetic peripheral neuropathy ([facility] [address]  -     [facility]  -     Dulaglutide (TRULICITY) 3.72 JF/2.2AF SOPN; Inject 0.75 mg into the skin once a week, Disp-1 Adjustable Dose Pre-filled Pen Syringe, R-3Normal  -     Ac Cervantes MD, Endocrinology, [place]  -     insulin glargine (LANTUS SOLOSTAR) 100 UNIT/ML injection pen; Inject 34 Units into the skin daily, Disp-9 Adjustable Dose Pre-filled Pen Syringe, R-3Normal  -     rosuvastatin (CRESTOR) 20 MG tablet; Take 1 tablet by mouth daily, Disp-90 tablet, R-1Normal  -     pregabalin (LYRICA) 75 MG capsule; Take 1 capsule by mouth 3 times daily for 30 days. Max Daily Amount: 225 mg, Disp-90 capsule, R-0Normal  Neck pain  -     EKG 12 Lead - Clinic Performed; Future  Hospital discharge follow-up  -     [place] DISCHARGE MEDS RECONCILED W/ CURRENT OUTPATIENT MED LIST  Primary hypertension  -     amLODIPine (NORVASC) 2.5 MG tablet; Take 1 tablet by mouth daily, Disp-30 tablet, R-3Normal  Chronic obstructive pulmonary disease, unspecified COPD type (HCC)  -     albuterol sulfate HFA (PROVENTIL;VENTOLIN;PROAIR) 108 (90 Base) MCG/ACT inhaler;  Inhale 3 puffs into the lungs 4 times daily as needed for Wheezing, Disp-3 each, R-1Normal  -     montelukast (SINGULAIR) 10 MG tablet; TAKE 1 TABLET BY MOUTH EVERY NIGHT AT BEDTIME, Disp-90 tablet, R-1**Patient requests 90 days supply**Normal  Fibromyalgia  -     amitriptyline (ELAVIL) 50 MG tablet; TAKE 1 TABLET BY MOUTH EVERY EVENING, Disp-90 tablet, R-1Normal  Hypothyroidism, unspecified type  -     levothyroxine (SYNTHROID) 112 MCG tablet; Take 1 tablet by mouth Daily, Disp-90 tablet, R-1Normal  Primary osteoarthritis of both hips  -     pantoprazole (PROTONIX) 40 MG tablet; Take 1 tablet by mouth every morning (before breakfast), Disp-30 tablet, R-5Normal  Depression, unspecified depression type  -     citalopram (CELEXA) 40 MG tablet; Take 1 tablet by mouth daily, Disp-90 tablet, R-1Normal  Essential hypertension  -     amLODIPine (NORVASC) 2.5 MG tablet; Take 1 tablet by mouth daily, Disp-30 tablet, R-3Normal  -     losartan (COZAAR) 50 MG tablet; Take 1 tablet by mouth daily, Disp-30 tablet, R-3Normal      Medical Decision Making: moderate complexity  Return in 1 month (on 2/16/2023) for 1 month with PCP for follow up. On this date 1/16/2023 I have spent 40 minutes reviewing previous notes, test results and face to face with the patient discussing the diagnosis and importance of compliance with the treatment plan as well as documenting on the day of the visit. Subjective:   HPI:  Follow up of Hospital problems/diagnosis(es): Sp Connor is a 79 y. o.female with PMH of NIDDM, COPD, GERD, HTN, HLD, smoldering myeloma presenting to [facility] for 2-week post hospital follow-up visit. Patient was admitted on 1/5/2023-1/9/2023 with hyperglycemia (early stages of DKA). Patient was managed with insulin drip and IV fluids. Patient's home Trulicity was held and patient was discharged on insulin Lantus 28 units daily with plans to resume Trulicity as outpatient. Since discharge from the hospital patient reports that she has been regularly taking her medication including insulin Lantus.   Patient reports that her blood glucose has been 230s to 250s. Patient also complained of neck pain/pressure like sensation since 2 weeks, on and off, associated with medication intake. She feels that her medication get stuck in her throat. She complains of ongoing chest pressure and neck pressure currently during the clinic visit. EKG done during visit was normal.  Patient reports that she felt better after drinking water and that her neck pressure has decreased. Detailed discussion about hypoglycemia symptoms was held during current clinic visit. Patient was advised to keep sugar candies in every room and bathroom. Patient was advised to take sugar candies if she feels racing of heart, excessive sweating, eating of dizziness, weakness as they can be signs of hypoglycemia. Patient verbalized understanding and agreed to comply with the recommendations. Inpatient course: Discharge summary reviewed- see chart. Interval history/Current status:   Patient denies any headache, lightheadedness, blurry vision, chest pain, shortness of breath, palpitations, cough, heat/cold intolerance, polyuria, polydipsia, polyphagia, abdominal pain, bowel or bladder complaints. Patient reports compliance with medication and denies any associated side effects with medications.      Patient Active Problem List   Diagnosis    Adrenal incidentaloma ([facility] [address]    Hyperlipidemia    Hypothyroidism    Fibromyalgia    Obesity    Chronic right-sided low back pain with right-sided sciatica    Tobacco abuse    Myofascial pain    Lumbar radiculitis    Smoldering multiple myeloma    Chronic obstructive pulmonary disease (HCC)    Cervical facet joint syndrome    Cervical spondylosis    Lumbar radiculopathy    Pain in right hip    Lumbar disc disorder    Lumbar spondylosis    Diverticulosis of colon without diverticulitis    Alternating constipation and diarrhea    History of colon polyps    Heartburn    Indigestion    Current mild episode of major depressive disorder, unspecified whether recurrent ([facility]    DM type 2 with diabetic peripheral neuropathy (HCC)    Knee pain    Primary hypertension    Obstructive sleep apnea syndrome, severe    Acute respiratory failure with hypoxia (HCC)    Pneumonia due to infectious organism    Elevated platelet count    COPD exacerbation (HCC)    Normocytic anemia    Smoldering myeloma    Multiple lung nodules    Hyperglycemia    Vaginal candidiasis    Chronic respiratory failure with hypoxia and hypercapnia ([facility] [address]    Type 2 diabetes mellitus with hyperosmolarity without coma, without long-term current use of insulin ([facility]       Medications listed as ordered at the time of discharge from hospital     Medication List            Accurate as of January 16, 2023  3:56 PM. If you have any questions, ask your nurse or doctor.                 START taking these medications      Trulicity 9.97 VY/3.0UQ Sopn  Generic drug: Dulaglutide  Inject 0.75 mg into the skin once a week  Started by: Julián Dewitt MD            CHANGE how you take these medications      Lantus SoloStar 100 UNIT/ML injection pen  Generic drug: insulin glargine  Inject 34 Units into the skin daily  What changed: how much to take  Changed by: Julián Dewitt MD            CONTINUE taking these medications      acetaminophen 500 MG tablet  Commonly known as: TYLENOL     * albuterol (2.5 MG/3ML) 0.083% nebulizer solution  Commonly known as: PROVENTIL  Take 3 mLs by nebulization every 6 hours as needed for Wheezing or Shortness of Breath     * albuterol sulfate  (90 Base) MCG/ACT inhaler  Commonly known as: PROVENTIL;VENTOLIN;PROAIR  Inhale 3 puffs into the lungs 4 times daily as needed for Wheezing     amitriptyline 50 MG tablet  Commonly known as: ELAVIL  TAKE 1 TABLET BY MOUTH EVERY EVENING     amLODIPine 2.5 MG tablet  Commonly known as: NORVASC  Take 1 tablet by mouth daily     ammonium lactate 12 % lotion  Commonly known as: LAC-HYDRIN  Apply topically twice daily     baclofen 10 MG tablet  Commonly known as: LIORESAL  Take 1 tablet by mouth 3 times daily as needed (back muscle spasms)     blood glucose test strips  Check blood sugar fasting before breakfast and as needed for symptoms of irregular blood glucose. Dispense sufficient amount for indicated testing frequency plus additional to accommodate PRN testing needs. Calcium 500/D 500-5 MG-MCG Tabs  Generic drug: Calcium Carb-Cholecalciferol  TAKE 1 TABLET BY MOUTH DAILY     celecoxib 100 MG capsule  Commonly known as: CELEBREX  Take 1 capsule by mouth daily     citalopram 40 MG tablet  Commonly known as: CELEXA  Take 1 tablet by mouth daily     Coricidin HBP Congestion/Cough  MG Caps  Generic drug: Dextromethorphan-guaiFENesin  Take 1 capsule by mouth 2 times daily as needed (congestion)     docusate sodium 100 MG capsule  Commonly known as: Colace  Take 1 capsule by mouth 2 times daily     Fiber 625 MG Tabs  Take 1 tablet by mouth 2 times daily     glucose monitoring kit  1 kit by Does not apply route daily Ok to change brand that insurance will cover. Insulin Pen Needle 31G X 5 MM Misc  1 each by Does not apply route daily     Lancets Misc  Check blood sugar daily and prn as needed     levothyroxine 112 MCG tablet  Commonly known as: SYNTHROID  Take 1 tablet by mouth Daily     losartan 50 MG tablet  Commonly known as: COZAAR  Take 1 tablet by mouth daily     mineral oil-hydrophilic petrolatum ointment  Apply topically as needed. montelukast 10 MG tablet  Commonly known as: SINGULAIR  TAKE 1 TABLET BY MOUTH EVERY NIGHT AT BEDTIME     Nebulizer/Tubing/Mouthpiece Kit  1 kit by Does not apply route daily as needed (for shortness of breath/wheezing)     OXYGEN  Inhale 2 L into the lungs daily Uses 2 liters at night     pantoprazole 40 MG tablet  Commonly known as: PROTONIX  Take 1 tablet by mouth every morning (before breakfast)     pregabalin 75 MG capsule  Commonly known as: Lyrica  Take 1 capsule by mouth 3 times daily for 30 days.  Max Daily Amount: 225 mg     rosuvastatin 20 MG tablet  Commonly known as: CRESTOR  Take 1 tablet by mouth daily     Spiriva HandiHaler 18 MCG inhalation capsule  Generic drug: tiotropium  Inhale 1 capsule into the lungs daily     Symbicort 160-4.5 MCG/ACT Aero  Generic drug: budesonide-formoterol  INHALE 2 PUFFS INTO THE LUNGS TWICE DAILY           * This list has 2 medication(s) that are the same as other medications prescribed for you. Read the directions carefully, and ask your doctor or other care provider to review them with you.                    Where to Get Your Medications        These medications were sent to [facility], [address] [facility] [phone] [address]      Phone: [phone]   albuterol sulfate  (90 Base) MCG/ACT inhaler  amitriptyline 50 MG tablet  amLODIPine 2.5 MG tablet  citalopram 40 MG tablet  Lantus SoloStar 100 UNIT/ML injection pen  levothyroxine 112 MCG tablet  losartan 50 MG tablet  montelukast 10 MG tablet  pantoprazole 40 MG tablet  pregabalin 75 MG capsule  rosuvastatin 20 MG tablet  Trulicity 9.63 IT/5.7LK Sopn           Medications marked \"taking\" at this time  Outpatient Medications Marked as Taking for the 1/16/23 encounter (Office Visit) with Simran Cope MD   Medication Sig Dispense Refill    acetaminophen (TYLENOL) 500 MG tablet Take 500 mg by mouth every 6 hours as needed for Pain      Dulaglutide (TRULICITY) 2.83 FV/6.2UK SOPN Inject 0.75 mg into the skin once a week 1 Adjustable Dose Pre-filled Pen Syringe 3    insulin glargine (LANTUS SOLOSTAR) 100 UNIT/ML injection pen Inject 34 Units into the skin daily 9 Adjustable Dose Pre-filled Pen Syringe 3    amLODIPine (NORVASC) 2.5 MG tablet Take 1 tablet by mouth daily 30 tablet 3    losartan (COZAAR) 50 MG tablet Take 1 tablet by mouth daily 30 tablet 3    albuterol sulfate HFA (PROVENTIL;VENTOLIN;PROAIR) 108 (90 Base) MCG/ACT inhaler Inhale 3 puffs into the lungs 4 times daily as needed for Wheezing 3 each 1    amitriptyline (ELAVIL) 50 MG tablet TAKE 1 TABLET BY MOUTH EVERY EVENING 90 tablet 1    levothyroxine (SYNTHROID) 112 MCG tablet Take 1 tablet by mouth Daily 90 tablet 1    rosuvastatin (CRESTOR) 20 MG tablet Take 1 tablet by mouth daily 90 tablet 1    pantoprazole (PROTONIX) 40 MG tablet Take 1 tablet by mouth every morning (before breakfast) 30 tablet 5    citalopram (CELEXA) 40 MG tablet Take 1 tablet by mouth daily 90 tablet 1    pregabalin (LYRICA) 75 MG capsule Take 1 capsule by mouth 3 times daily for 30 days. Max Daily Amount: 225 mg 90 capsule 0    montelukast (SINGULAIR) 10 MG tablet TAKE 1 TABLET BY MOUTH EVERY NIGHT AT BEDTIME 90 tablet 1    OXYGEN Inhale 2 L into the lungs daily Uses 2 liters at night 1 each 0    Insulin Pen Needle 31G X 5 MM MISC 1 each by Does not apply route daily 100 each 0    blood glucose monitor strips Check blood sugar fasting before breakfast and as needed for symptoms of irregular blood glucose. Dispense sufficient amount for indicated testing frequency plus additional to accommodate PRN testing needs. 100 strip 0    Lancets MISC Check blood sugar daily and prn as needed 100 each 0    baclofen (LIORESAL) 10 MG tablet Take 1 tablet by mouth 3 times daily as needed (back muscle spasms) 30 tablet 0    SYMBICORT 160-4.5 MCG/ACT AERO INHALE 2 PUFFS INTO THE LUNGS TWICE DAILY 10.2 g 3    celecoxib (CELEBREX) 100 MG capsule Take 1 capsule by mouth daily 90 capsule 1    docusate sodium (COLACE) 100 MG capsule Take 1 capsule by mouth 2 times daily 180 capsule 1    ammonium lactate (LAC-HYDRIN) 12 % lotion Apply topically twice daily 400 g 2    mineral oil-hydrophilic petrolatum (HYDROPHOR) ointment Apply topically as needed. 3 each 2        Medications patient taking as of now reconciled against medications ordered at time of hospital discharge:  Yes    A comprehensive review of systems was negative except for what was noted in the HPI.     Objective:    /76 (Site: Right Upper Arm, Position: Sitting, Cuff Size: Large Adult)   Pulse 93   Temp 97 °F (36.1 °C) (Temporal)   Resp 16   Ht 5' 4\" (1.626 m)   Wt 218 lb (98.9 kg)   SpO2 96%   BMI 37.42 kg/m²   General Appearance: alert and oriented to person, place and time, well developed and well- nourished, in no acute distress  Skin: warm and dry, no rash or erythema  Head: normocephalic and atraumatic  Eyes: pupils equal, round, and reactive to light, extraocular eye movements intact, conjunctivae normal  ENT: tympanic membrane, external ear and ear canal normal bilaterally, nose without deformity, nasal mucosa and turbinates normal without polyps  Neck: supple and non-tender without mass, no thyromegaly or thyroid nodules, no cervical lymphadenopathy  Pulmonary/Chest: clear to auscultation bilaterally- no wheezes, rales or rhonchi, normal air movement, no respiratory distress  Cardiovascular: normal rate, regular rhythm, normal S1 and S2, no murmurs, rubs, clicks, or gallops, distal pulses intact, no carotid bruits  Abdomen: soft, non-tender, non-distended, normal bowel sounds, no masses or organomegaly  Extremities: no cyanosis, clubbing or edema  Musculoskeletal: normal range of motion, no joint swelling, deformity or tenderness  Neurologic: reflexes normal and symmetric, no cranial nerve deficit, gait, coordination and speech normal    Assessment and plan:    Neck/chest discomfort  EKG done during visit- wnl  Plan  Continue to monitor    IDDM type 2  Current regimen: lantus 28 U daily  Glucose lo-250s  Last A1c: 8.5 on   LDL: 57 ()  BP: 615/44 mmHg  Complications: no   Hypoglycemia episodes: Patient aware, hypoglycemia discussed during current clinic visit  Eye check: Referral provided during this visit   Review of system: No Dizziness/shortness of breath/chest pain/abdominal pain/burning pain with urination   Plan:  Increase Lantus dose to 34 units daily  Start injection Trulicity 0.75 mg subcutaneously every week  Diabetic education referral provided  Diabetic eye exam ordered  Engage in moderate intensity aerobic physical activity 3-4 sessions per week, lasting approx 40 minutes   Dietary advice to have balanced diet, avoid high carbohydrate, soda pop, sugary food, diabetic food options were reviewed.  Detailed discussion about hypoglycemic symptoms was held.    Hx of HTN  /76  Denies Headache, chest pain, shortness of breath, palpitation, pedal edema  Reports compliance with medication and denies side effects of medication  Plan:  Continue amlodipine 2.5 mg daily and tablet losartan 50 mg daily  Recommended sodium restriction to <2 g/day  Cardiovascular risk and specific lipid/LDL goals reviewed.  Continue BP monitoring at home       HCM  Diabetic eye exam  Flu vaccine- patient declined    RTC: 1 month with PCP      I have reviewed my findings and recommendations with Angelita Slater and Dr. Laurie Gonzalez MD PGY-2  1/9/2023 9:03 PM     An electronic signature was used to authenticate this note.  --Ruby Gonzalez MD

## 2023-01-16 ENCOUNTER — OFFICE VISIT (OUTPATIENT)
Dept: INTERNAL MEDICINE | Age: 68
End: 2023-01-16

## 2023-01-16 VITALS
WEIGHT: 218 LBS | TEMPERATURE: 97 F | HEIGHT: 64 IN | SYSTOLIC BLOOD PRESSURE: 117 MMHG | RESPIRATION RATE: 16 BRPM | HEART RATE: 93 BPM | OXYGEN SATURATION: 96 % | BODY MASS INDEX: 37.22 KG/M2 | DIASTOLIC BLOOD PRESSURE: 76 MMHG

## 2023-01-16 DIAGNOSIS — M79.7 FIBROMYALGIA: ICD-10-CM

## 2023-01-16 DIAGNOSIS — M16.0 PRIMARY OSTEOARTHRITIS OF BOTH HIPS: ICD-10-CM

## 2023-01-16 DIAGNOSIS — E03.9 HYPOTHYROIDISM, UNSPECIFIED TYPE: ICD-10-CM

## 2023-01-16 DIAGNOSIS — F32.A DEPRESSION, UNSPECIFIED DEPRESSION TYPE: ICD-10-CM

## 2023-01-16 DIAGNOSIS — J44.9 CHRONIC OBSTRUCTIVE PULMONARY DISEASE, UNSPECIFIED COPD TYPE (HCC): ICD-10-CM

## 2023-01-16 DIAGNOSIS — I10 ESSENTIAL HYPERTENSION: ICD-10-CM

## 2023-01-16 DIAGNOSIS — Z09 HOSPITAL DISCHARGE FOLLOW-UP: ICD-10-CM

## 2023-01-16 DIAGNOSIS — I10 PRIMARY HYPERTENSION: ICD-10-CM

## 2023-01-16 DIAGNOSIS — E11.42 DM TYPE 2 WITH DIABETIC PERIPHERAL NEUROPATHY (HCC): Primary | ICD-10-CM

## 2023-01-16 DIAGNOSIS — M54.2 NECK PAIN: ICD-10-CM

## 2023-01-16 RX ORDER — ROSUVASTATIN CALCIUM 20 MG/1
20 TABLET, COATED ORAL DAILY
Qty: 90 TABLET | Refills: 1 | Status: SHIPPED | OUTPATIENT
Start: 2023-01-16

## 2023-01-16 RX ORDER — ALBUTEROL SULFATE 90 UG/1
3 AEROSOL, METERED RESPIRATORY (INHALATION) 4 TIMES DAILY PRN
Qty: 3 EACH | Refills: 1 | Status: SHIPPED | OUTPATIENT
Start: 2023-01-16

## 2023-01-16 RX ORDER — AMITRIPTYLINE HYDROCHLORIDE 50 MG/1
TABLET, FILM COATED ORAL
Qty: 90 TABLET | Refills: 1 | Status: SHIPPED | OUTPATIENT
Start: 2023-01-16

## 2023-01-16 RX ORDER — LEVOTHYROXINE SODIUM 112 UG/1
112 TABLET ORAL DAILY
Qty: 90 TABLET | Refills: 1 | Status: SHIPPED | OUTPATIENT
Start: 2023-01-16

## 2023-01-16 RX ORDER — PREGABALIN 75 MG/1
75 CAPSULE ORAL 3 TIMES DAILY
Qty: 90 CAPSULE | Refills: 0 | Status: SHIPPED | OUTPATIENT
Start: 2023-01-16 | End: 2023-02-15

## 2023-01-16 RX ORDER — ACETAMINOPHEN 500 MG
500 TABLET ORAL EVERY 6 HOURS PRN
COMMUNITY

## 2023-01-16 RX ORDER — AMLODIPINE BESYLATE 2.5 MG/1
2.5 TABLET ORAL DAILY
Qty: 30 TABLET | Refills: 3 | Status: SHIPPED | OUTPATIENT
Start: 2023-01-16 | End: 2023-05-16

## 2023-01-16 RX ORDER — LOSARTAN POTASSIUM 50 MG/1
50 TABLET ORAL DAILY
Qty: 30 TABLET | Refills: 3 | Status: SHIPPED | OUTPATIENT
Start: 2023-01-16

## 2023-01-16 RX ORDER — PANTOPRAZOLE SODIUM 40 MG/1
40 TABLET, DELAYED RELEASE ORAL
Qty: 30 TABLET | Refills: 5 | Status: SHIPPED | OUTPATIENT
Start: 2023-01-16

## 2023-01-16 RX ORDER — CITALOPRAM 40 MG/1
40 TABLET ORAL DAILY
Qty: 90 TABLET | Refills: 1 | Status: SHIPPED | OUTPATIENT
Start: 2023-01-16

## 2023-01-16 RX ORDER — DULAGLUTIDE 0.75 MG/.5ML
0.75 INJECTION, SOLUTION SUBCUTANEOUS WEEKLY
Qty: 1 ADJUSTABLE DOSE PRE-FILLED PEN SYRINGE | Refills: 3 | Status: SHIPPED | OUTPATIENT
Start: 2023-01-16

## 2023-01-16 RX ORDER — INSULIN GLARGINE 100 [IU]/ML
34 INJECTION, SOLUTION SUBCUTANEOUS DAILY
Qty: 9 ADJUSTABLE DOSE PRE-FILLED PEN SYRINGE | Refills: 3 | Status: SHIPPED
Start: 2023-01-16 | End: 2023-01-24

## 2023-01-16 RX ORDER — MONTELUKAST SODIUM 10 MG/1
TABLET ORAL
Qty: 90 TABLET | Refills: 1 | Status: SHIPPED | OUTPATIENT
Start: 2023-01-16

## 2023-01-16 NOTE — PATIENT INSTRUCTIONS
Dear Camila Roth,        Thank you for coming to your appointment today. I hope we have addressed all of your needs. Please make sure to do the following:  - Continue your medications as as advised. -Please start taking injection Trulicity 8.93TK subcutaneously weekly  -Please increase dose of insulin Lantus to 34 units daily.  -Strict diabetic diet, regular exercise and healthy lifestyle as discussed during clinic visit is recommended. - A referral was placed on your behalf during your visit for diabetic eye exam, endocrinology referral, and diabetic educator. You should expect to receive information about the date and time of your referral appointment within 7-10 days. If not, please call the nurse  line at [phone]  -Should you have further questions in regards to this visit, you can review your clinical note and after visit summary document on your [employer] account. Other questions can be directed to our nurse line at [phone]   -Please follow with your PCP in 1 month or as needed. Call for a sooner appointment if you develop any acute concerns    Have a great day!         Sincerely,  Winnie Magana M.D  1/16/2023  3:05 PM

## 2023-01-16 NOTE — Clinical Note
[facility] Internal Medicine  [address] [place] [address]  Phone: [phone]  Fax: [phone]    Jacquelyn Lemons MD    January 16, 2023     Nav Turcios, [address] [place] [address]    Patient: Rashel Barajas   MR Number: [id number]   YOB: 1955   Date of Visit: 1/16/2023       Dear Nav Turcios: Thank you for referring Tressa Pete to me for evaluation/treatment. Below are the relevant portions of my assessment and plan of care. If you have questions, please do not hesitate to call me. I look forward to following Angelita along with you.     Sincerely,      Jacquelyn Lemons MD

## 2023-01-16 NOTE — LETTER
[facility] Internal Medicine  [address] [place] [address]  Phone: [phone]  Fax: [phone]    Isaac Albright MD        January 16, 2023     Patient: Padmini Carrera   YOB: 1955   Date of Visit: 1/16/2023       To Whom It May Concern: It is my medical opinion that Austin Barron should refrain from work  participation until February 15, 2023. If you have any questions or concerns, please don't hesitate to call.     Sincerely,        Isaac Albright MD

## 2023-01-16 NOTE — LETTER
[facility] Internal Medicine  [address] [place] [address]  Phone: [phone]  Fax: [phone]    Vianney Jj MD        January 16, 2023     Patient: Jennifer Ennis   YOB: 1955   Date of Visit: 1/16/2023       To Whom It May Concern: It is my medical opinion that Kevin Hannon may return to full duty immediately with no restrictions. If you have any questions or concerns, please don't hesitate to call.     Sincerely,        Vianney Jj MD

## 2023-01-16 NOTE — PROGRESS NOTES
[facility] Residency [facility]  Attending Physician Statement:  Vinie Eisenmenger, M.D., F.A.C.P. I have seen/discussed the case, including pertinent history and exam findings with the resident. I agree with the assessment, plan and orders as documented by the resident. Patient is seen for hosptial u visit today  . -- acute and chronic problems addressed  Remainder of medical problems as per resident note. Last hsopt/office notes reviewed, relative labs and imaging. Health maintenance issues of vaccinations, depression screening, tobacco cessation   Billiing assessed by time spent with review of medical records, time spent coordinating care with residents, nurses and patient  +medical complexity of case   79 y.o. female with a past medical history of noninsulin-dependent diabetes mellitus, COPD, GERD, hyperlipidemia, hypertension, hyperlipidemia and smoldering myeloma     Last seen in clinic-- hyperosmoloar, dehydration inc BS  Wasn't on dm2 meds    \". ..who was admitted to the ED from clinic for hyperglycemia. Patient was recently admitted and managed for respiratory failure and pneumonia at [facility] and was discharged on 12/24/2022. She stated that her excessive urination and increased fluid intake has worsened progressively since discharge. She also reported associated poor appetite, abdominal pain, blurring of vision, shortness of breath worse on exertion, nonproductive cough, sore throat, and abdominal pain. She denied any fever, nausea or vomiting. She also denied any weakness in any part of the body. Patient stated that upon measuring her blood glucose at home, it typically reads in 200s-300s. She was discharged on Trulicity to follow-up in the clinic.      In the ED, patient presented hemodynamically stable with initial labs showing elevated blood glucose at 595 with high anion gap of 16 and sodium level 129, beta hydroxybutyrate elevated to 1.99, UA showing glucosuria and 2 nausea of 15, troponin was not significantly elevated 11, elevated alkaline phosphatase 127. CT head was unremarkable of any acute intracranial process. Hyperglycemia gradually improved on insulin and IV fluids and patient was never on DKA protocol. She was subsequently transferred to the floor for further medical management. In the floor, Trulicity was held and blood glucose gradually improved following serial adjustment of her insulin regimen. Diabetes education was consulted and patient had a session while admitted in the floor and was agreeable to outpatient follow-up for diabetic education. Abdominal pain gradually improved over time. Initial AM-PAC score was marginal at 16/24, HHC was recommended. Today, patient was seen and examined at the bedside in no acute distress. She endorsed resolution of abdominal pain. No new complaints reported today. Insulin regimen, Lantus was increased to 28 units and upon reassessment blood glucose was 207. PT OT evaluation was done which showed remarkable improvement with OT AM-PAC score of 19/24 at this time.  recommended discharge home as patient AM-PAC has improved. .. discharged home on insulin glargine (Lantus) 28 units subcu every morning as well as her routine home medications. Trulicity is held at this time for possible resumption as outpatient during hospital follow-up visit in the clinic. \"      Dehyrartoin treated, less lightheaded now  Will resume trulicity  Inc lantus  Upper Chest pressure/tightness- ekg now  Triall PPI- for possible GERD  Return to work excuse    Endocrine referral, diabetic ed  ? optho fu

## 2023-01-20 ENCOUNTER — HOSPITAL ENCOUNTER (OUTPATIENT)
Dept: DIABETES SERVICES | Age: 68
Setting detail: THERAPIES SERIES
Discharge: HOME | End: 2023-01-20

## 2023-01-22 ENCOUNTER — HOSPITAL ENCOUNTER (EMERGENCY)
Age: 68
Discharge: HOME | End: 2023-01-22
Attending: STUDENT IN AN ORGANIZED HEALTH CARE EDUCATION/TRAINING PROGRAM
Payer: COMMERCIAL

## 2023-01-22 ENCOUNTER — APPOINTMENT (OUTPATIENT)
Dept: CT IMAGING | Age: 68
End: 2023-01-22
Payer: COMMERCIAL

## 2023-01-22 VITALS
HEART RATE: 82 BPM | SYSTOLIC BLOOD PRESSURE: 138 MMHG | OXYGEN SATURATION: 98 % | RESPIRATION RATE: 16 BRPM | TEMPERATURE: 98.7 F | DIASTOLIC BLOOD PRESSURE: 72 MMHG

## 2023-01-22 DIAGNOSIS — S05.02XA ABRASION OF LEFT CORNEA, INITIAL ENCOUNTER: Primary | ICD-10-CM

## 2023-01-22 DIAGNOSIS — D64.9 ANEMIA, UNSPECIFIED TYPE: ICD-10-CM

## 2023-01-22 DIAGNOSIS — S05.92XA LEFT EYE INJURY, INITIAL ENCOUNTER: ICD-10-CM

## 2023-01-22 LAB
ALBUMIN SERPL-MCNC: 3.3 G/DL (ref 3.5–5.2)
ALP BLD-CCNC: 92 U/L (ref 35–104)
ALT SERPL-CCNC: 14 U/L (ref 0–32)
ANION GAP SERPL CALCULATED.3IONS-SCNC: 11 MMOL/L (ref 7–16)
AST SERPL-CCNC: 17 U/L (ref 0–31)
BASOPHILS ABSOLUTE: 0.04 E9/L (ref 0–0.2)
BASOPHILS RELATIVE PERCENT: 0.6 % (ref 0–2)
BILIRUB SERPL-MCNC: 0.5 MG/DL (ref 0–1.2)
BUN BLDV-MCNC: 15 MG/DL (ref 6–23)
CALCIUM SERPL-MCNC: 9.2 MG/DL (ref 8.6–10.2)
CHLORIDE BLD-SCNC: 102 MMOL/L (ref 98–107)
CO2: 27 MMOL/L (ref 22–29)
CREAT SERPL-MCNC: 0.6 MG/DL (ref 0.5–1)
EOSINOPHILS ABSOLUTE: 0.22 E9/L (ref 0.05–0.5)
EOSINOPHILS RELATIVE PERCENT: 3.3 % (ref 0–6)
GFR SERPL CREATININE-BSD FRML MDRD: >60 ML/MIN/1.73
GLUCOSE BLD-MCNC: 118 MG/DL (ref 74–99)
HCT VFR BLD CALC: 28.3 % (ref 34–48)
HEMOGLOBIN: 8.6 G/DL (ref 11.5–15.5)
IMMATURE GRANULOCYTES #: 0.01 E9/L
IMMATURE GRANULOCYTES %: 0.2 % (ref 0–5)
LYMPHOCYTES ABSOLUTE: 2.33 E9/L (ref 1.5–4)
LYMPHOCYTES RELATIVE PERCENT: 35.3 % (ref 20–42)
MCH RBC QN AUTO: 27.4 PG (ref 26–35)
MCHC RBC AUTO-ENTMCNC: 30.4 % (ref 32–34.5)
MCV RBC AUTO: 90.1 FL (ref 80–99.9)
MONOCYTES ABSOLUTE: 0.66 E9/L (ref 0.1–0.95)
MONOCYTES RELATIVE PERCENT: 10 % (ref 2–12)
NEUTROPHILS ABSOLUTE: 3.34 E9/L (ref 1.8–7.3)
NEUTROPHILS RELATIVE PERCENT: 50.6 % (ref 43–80)
PDW BLD-RTO: 14.2 FL (ref 11.5–15)
PLATELET # BLD: 527 E9/L (ref 130–450)
PMV BLD AUTO: 9.7 FL (ref 7–12)
POTASSIUM REFLEX MAGNESIUM: 3.9 MMOL/L (ref 3.5–5)
RBC # BLD: 3.14 E12/L (ref 3.5–5.5)
SODIUM BLD-SCNC: 140 MMOL/L (ref 132–146)
TOTAL PROTEIN: 7.9 G/DL (ref 6.4–8.3)
WBC # BLD: 6.6 E9/L (ref 4.5–11.5)

## 2023-01-22 PROCEDURE — 70480 CT ORBIT/EAR/FOSSA W/O DYE: CPT

## 2023-01-22 PROCEDURE — 99284 EMERGENCY DEPT VISIT MOD MDM: CPT

## 2023-01-22 PROCEDURE — 80053 COMPREHEN METABOLIC PANEL: CPT

## 2023-01-22 PROCEDURE — 70450 CT HEAD/BRAIN W/O DYE: CPT

## 2023-01-22 PROCEDURE — 85025 COMPLETE CBC W/AUTO DIFF WBC: CPT

## 2023-01-22 RX ORDER — ERYTHROMYCIN 5 MG/G
OINTMENT OPHTHALMIC
Qty: 3.5 G | Refills: 1 | Status: SHIPPED | OUTPATIENT
Start: 2023-01-22 | End: 2023-02-01

## 2023-01-22 RX ORDER — TETRACAINE HYDROCHLORIDE 5 MG/ML
1 SOLUTION OPHTHALMIC ONCE
Status: DISCONTINUED | OUTPATIENT
Start: 2023-01-22 | End: 2023-01-22 | Stop reason: HOSPADM

## 2023-01-22 ASSESSMENT — ENCOUNTER SYMPTOMS
COUGH: 0
ABDOMINAL PAIN: 0
SHORTNESS OF BREATH: 0
EYE PAIN: 1
EYE ITCHING: 0
ABDOMINAL DISTENTION: 0
PHOTOPHOBIA: 0
EYE REDNESS: 1
EYE DISCHARGE: 0
NAUSEA: 0
DIARRHEA: 0
CHEST TIGHTNESS: 0
VOMITING: 0

## 2023-01-22 NOTE — ED PROVIDER NOTES
Merlyn Zambrano is a 60-year-old female with a past medical history of hypertension, EDWARD, hyperlipidemia, diabetes, hypothyroidism who presents emergency department with concern for injury to left eye patient stated that she rolled over and hit her head and her eye on the corner of the dresser patient noticed swelling to the eye. Patient does have a history of vision changes chronically in that eye and had plans to follow-up with ophthalmology but does not yet have an appointment. Patient denies any additional trauma patient not lose consciousness patient denies any trauma to the chest abdomen pelvis patient was ambulatory after the fall. Patient does take aspirin but denies being on any anticoagulation    The history is provided by the patient and medical records. Review of Systems   Constitutional:  Negative for chills, diaphoresis, fatigue and fever. Eyes:  Positive for pain and redness. Negative for photophobia, discharge, itching and visual disturbance. Respiratory:  Negative for cough, chest tightness and shortness of breath. Cardiovascular:  Negative for chest pain, palpitations and leg swelling. Gastrointestinal:  Negative for abdominal distention, abdominal pain, diarrhea, nausea and vomiting. Genitourinary:  Negative for dysuria. Musculoskeletal:  Negative for neck pain and neck stiffness. Skin:  Negative for pallor and rash. Neurological:  Negative for headaches. Psychiatric/Behavioral:  Negative for confusion. Physical Exam  Vitals and nursing note reviewed. Constitutional:       General: She is not in acute distress. Appearance: Normal appearance. She is not ill-appearing. HENT:      Head: Normocephalic and atraumatic. Eyes:      General: No scleral icterus. Pupils: Pupils are equal, round, and reactive to light.       Comments: Patient does have chemosis   No hyphema   No signs of entrapment   PERRL    Cardiovascular:      Rate and Rhythm: Normal rate and regular rhythm. Pulmonary:      Effort: Pulmonary effort is normal.      Breath sounds: Normal breath sounds. Abdominal:      General: Bowel sounds are normal. There is no distension. Palpations: Abdomen is soft. Tenderness: There is no abdominal tenderness. There is no guarding or rebound. Musculoskeletal:      Cervical back: Normal range of motion and neck supple. No rigidity. No muscular tenderness. Right lower leg: No edema. Left lower leg: No edema. Skin:     General: Skin is warm and dry. Capillary Refill: Capillary refill takes less than 2 seconds. Coloration: Skin is not pale. Findings: No erythema or rash. Neurological:      Mental Status: She is alert and oriented to person, place, and time. Psychiatric:         Mood and Affect: Mood normal.        Procedures     MDM  Number of Diagnoses or Management Options  Abrasion of left cornea, initial encounter  Anemia, unspecified type  Left eye injury, initial encounter  Diagnosis management comments: Maxwell Delaney is a 79year old female who presented to ED with concern for injury to head and eye. Patient rolled out of bed and hit her left eye and side of head on the nightstand. Patient noticed swelling to the conjunctiva present emerged part for evaluation patient is having intermittent episodes where she has blurry vision but is able to clear with blinking. Patient states she also has had difficulty with double vision and blurry vision occasionally and is supposed to follow-up with ophthalmology for the symptoms. Patient has normal extraocular pressure on exam patient does have chemosis but does not have hyphema patient with pupils are equal round and reactive patient does have intact vision and intact peripheral vision patient has intact extraocular movements and does not have disconjugate gaze  Patient has a normal neurological exam without any focal deficits.   Her ocular pressure was 12 in the left eye  CT head and CT orbits were reviewed interpreted by radiology and also reviewed by myself I did not see any findings concerning for trauma  Radiology report was reviewed and was not significant for any trauma patient does not have any infectious symptoms  Fluorescein stain was used to evaluate the left eye and under Woods lamp patient was found to have a corneal abrasion over area of chemosis. Patient does not have any evidence of foreign body patient is comfortable.   Patient was given referral for ophthalmology and advised to call their office tomorrow morning to arrange close follow-up appointment  Patient's lab work was also significant for acute on chronic anemia patient does have a history of anemia and history of myeloma patient denies any symptoms of blood loss or abdominal pain or signs and symptoms of GI bleed patient is well-appearing patient has normal vital signs patient is not hypotensive is not tachycardic patient is well-appearing patient does not have any abdominal pain or chest pain  Patient would like to be discharged home patient will be given erythromycin ointment and advised to follow closely with ophthalmology and call PCP also for follow-up and repeat hemoglobin and anemia studies  Patient and family member are agreeable to plan  History provided by patient and family member at bedside  Patient does have significant comorbidities including COPD, myeloma, hyperlipidemia, diabetes  Patient is not on anticoagulation but is on aspirin patient was advised to hold aspirin at this time until she follows with primary care physician and ophthalmology or symptoms resolve  Patient symptoms did resolve with tetracaine drops in left eye patient did not require IV or p.o. medication emergency department    Differential diagnosis includes but is not limited to, corneal abrasion, orbital fracture, subconjunctival hemorrhage, hyphema, intracranial hemorrhage, globe rupture           ED Course as of 01/22/23 0932   [place] Jan 22, 2023   0921 IOP in the left eye 12  Patient has intact peripheral vision and is able to count fingers patient states that she does have some intermittent blurry vision and has to blink and that it is cleared [SS]      ED Course User Index  [SS] Trinity Miller MD        ED Course as of 01/23/23 0715   Sun Jan 22, 2023   0921 IOP in the left eye 12  Patient has intact peripheral vision and is able to count fingers patient states that she does have some intermittent blurry vision and has to blink and that it is cleared [SS]      ED Course User Index  [SS] Trinity Miller MD       --------------------------------------------- PAST HISTORY ---------------------------------------------  Past Medical History:  has a past medical history of Adrenal incidentaloma ([facility] [address] Anxiety, Arthritis, Asthma, Bladder incontinence, Cancer ([facility] [address] Chronic back pain, COPD (chronic obstructive pulmonary disease) ([facility] [address] Depression, Fibromyalgia, GERD (gastroesophageal reflux disease), Hyperlipidemia, Hypertension, Hypothyroidism, MGUS (monoclonal gammopathy of unknown significance), Neuropathy, Pneumonia, Prolonged emergence from general anesthesia, Sleep apnea, Type II or unspecified type diabetes mellitus without mention of complication, not stated as uncontrolled, and Vaginal bleeding. Past Surgical History:  has a past surgical history that includes knee surgery (1980); Upper gastrointestinal endoscopy (2008); Colonoscopy (07/20/2016); Upper gastrointestinal endoscopy (07/20/2016); Nerve Block (Bilateral, 09/22/2016); Nerve Block (07/06/2020); Pain management procedure (N/A, 7/6/2020); Nerve Block (Bilateral, 08/10/2020); Anesthesia Nerve Block (Bilateral, 8/10/2020); other surgical history (12/13/2016); Colonoscopy (2008); Upper gastrointestinal endoscopy (2008); Upper gastrointestinal endoscopy (N/A, 11/9/2020); Colonoscopy (N/A, 11/9/2020);  Colonoscopy (11/9/2020); and Dilation and curettage of uterus (N/A, 5/11/2021). Social History:  reports that she quit smoking about 2 years ago. Her smoking use included cigarettes. She started smoking about 51 years ago. She has a 100.00 pack-year smoking history. She has never used smokeless tobacco. She reports that she does not drink alcohol and does not use drugs. Family History: family history includes Arthritis in her brother, maternal grandfather, and maternal grandmother; Cancer in her father, maternal uncle, mother, and paternal aunt; Diabetes in her brother, father, maternal grandmother, and mother; Heart Disease in her paternal grandfather; Heart Disease (age of onset: 79) in her mother; High Blood Pressure in her father, maternal grandmother, paternal aunt, and paternal uncle; High Cholesterol in her paternal grandmother; Hypertension in her father; Kidney Disease in her paternal grandmother; Stroke in her maternal grandfather. The patients home medications have been reviewed.     Allergies: Aceon [perindopril erbumine] and Nsaids    -------------------------------------------------- RESULTS -------------------------------------------------  Labs:  Results for orders placed or performed during the hospital encounter of 01/22/23   CBC with Auto Differential   Result Value Ref Range    WBC 6.6 4.5 - 11.5 E9/L    RBC 3.14 (L) 3.50 - 5.50 E12/L    Hemoglobin 8.6 (L) 11.5 - 15.5 g/dL    Hematocrit 28.3 (L) 34.0 - 48.0 %    MCV 90.1 80.0 - 99.9 fL    MCH 27.4 26.0 - 35.0 pg    MCHC 30.4 (L) 32.0 - 34.5 %    RDW 14.2 11.5 - 15.0 fL    Platelets 711 (H) 894 - 450 E9/L    MPV 9.7 7.0 - 12.0 fL    Neutrophils % 50.6 43.0 - 80.0 %    Immature Granulocytes % 0.2 0.0 - 5.0 %    Lymphocytes % 35.3 20.0 - 42.0 %    Monocytes % 10.0 2.0 - 12.0 %    Eosinophils % 3.3 0.0 - 6.0 %    Basophils % 0.6 0.0 - 2.0 %    Neutrophils Absolute 3.34 1.80 - 7.30 E9/L    Immature Granulocytes # 0.01 E9/L    Lymphocytes Absolute 2.33 1.50 - 4.00 E9/L    Monocytes Absolute 0.66 0.10 - 0.95 E9/L    Eosinophils Absolute 0.22 0.05 - 0.50 E9/L    Basophils Absolute 0.04 0.00 - 0.20 E9/L   Comprehensive Metabolic Panel w/ Reflex to MG   Result Value Ref Range    Sodium 140 132 - 146 mmol/L    Potassium reflex Magnesium 3.9 3.5 - 5.0 mmol/L    Chloride 102 98 - 107 mmol/L    CO2 27 22 - 29 mmol/L    Anion Gap 11 7 - 16 mmol/L    Glucose 118 (H) 74 - 99 mg/dL    BUN 15 6 - 23 mg/dL    Creatinine 0.6 0.5 - 1.0 mg/dL    Est, Glom Filt Rate >60 >=60 mL/min/1.73    Calcium 9.2 8.6 - 10.2 mg/dL    Total Protein 7.9 6.4 - 8.3 g/dL    Albumin 3.3 (L) 3.5 - 5.2 g/dL    Total Bilirubin 0.5 0.0 - 1.2 mg/dL    Alkaline Phosphatase 92 35 - 104 U/L    ALT 14 0 - 32 U/L    AST 17 0 - 31 U/L       Radiology:  CT HEAD WO CONTRAST   Final Result   1. There is no acute intracranial abnormality. Specifically, there is no   intracranial hemorrhage. 2. Very minimal atrophy and periventricular leukomalacia,   3. No orbital fracture seen on the CT of the brain. CT of the orbits is   pending. .         CT ORBITS WO CONTRAST   Final Result   No acute abnormality of the orbits.             ------------------------- NURSING NOTES AND VITALS REVIEWED ---------------------------  Date / Time Roomed:  1/22/2023  6:44 AM  ED Bed Assignment:  [id number]/[id number]-[id number]    The nursing notes within the ED encounter and vital signs as below have been reviewed. /72   Pulse 82   Temp 98.7 °F (37.1 °C) (Oral)   Resp 16   SpO2 98%   Oxygen Saturation Interpretation: Normal      ------------------------------------------ PROGRESS NOTES ------------------------------------------  7:15 AM EST  I have spoken with the patient and discussed todays results, in addition to providing specific details for the plan of care and counseling regarding the diagnosis and prognosis. Their questions are answered at this time and they are agreeable with the plan. I discussed at length with them reasons for immediate return here for re evaluation. They will followup with their primary care physician by calling their office tomorrow. --------------------------------- ADDITIONAL PROVIDER NOTES ---------------------------------  At this time the patient is without objective evidence of an acute process requiring hospitalization or inpatient management. They have remained hemodynamically stable throughout their entire ED visit and are stable for discharge with outpatient follow-up. The plan has been discussed in detail and they are aware of the specific conditions for emergent return, as well as the importance of follow-up. Discharge Medication List as of 1/22/2023  9:49 AM        START taking these medications    Details   erythromycin (ROMYCIN) 5 MG/GM ophthalmic ointment Apply thin layer oint to affected eye 4 times a day to the left eye, Disp-3.5 g, R-1, Normal             Diagnosis:  1. Abrasion of left cornea, initial encounter    2. Left eye injury, initial encounter    3. Anemia, unspecified type        Disposition:  Patient's disposition: Discharge to home  Patient's condition is stable.        Sofia Draper MD  01/23/23 8669

## 2023-01-22 NOTE — ED NOTES
Pt resting at this time, no distress noted. Pt reports blurred vision in her Left eye. Pt states that eye feels the same as when she arrived in dept.       Jie Enrique RN  01/22/23 8845

## 2023-01-22 NOTE — ED NOTES
Pt visual acuity    Both 20/20  Right 20/20  Left  20/25- did report some double vision      Audrey Menon RN  01/22/23 4251

## 2023-01-22 NOTE — ED NOTES
Patient to ED after rolling out of bed and hitting her left eye on the night stand. Denies LOC.      Aleida Lang RN  01/22/23 9299

## 2023-01-24 ENCOUNTER — OFFICE VISIT (OUTPATIENT)
Dept: PRIMARY CARE CLINIC | Age: 68
End: 2023-01-24

## 2023-01-24 VITALS
HEART RATE: 99 BPM | WEIGHT: 218 LBS | DIASTOLIC BLOOD PRESSURE: 76 MMHG | OXYGEN SATURATION: 92 % | SYSTOLIC BLOOD PRESSURE: 124 MMHG | BODY MASS INDEX: 37.22 KG/M2 | HEIGHT: 64 IN | TEMPERATURE: 97.9 F | RESPIRATION RATE: 18 BRPM

## 2023-01-24 DIAGNOSIS — J44.9 CHRONIC OBSTRUCTIVE PULMONARY DISEASE, UNSPECIFIED COPD TYPE (HCC): ICD-10-CM

## 2023-01-24 DIAGNOSIS — D64.9 ANEMIA, UNSPECIFIED TYPE: ICD-10-CM

## 2023-01-24 DIAGNOSIS — E11.65 UNCONTROLLED TYPE 2 DIABETES MELLITUS WITH HYPERGLYCEMIA (HCC): ICD-10-CM

## 2023-01-24 DIAGNOSIS — E11.42 DM TYPE 2 WITH DIABETIC PERIPHERAL NEUROPATHY (HCC): ICD-10-CM

## 2023-01-24 DIAGNOSIS — R09.02 HYPOXIA: ICD-10-CM

## 2023-01-24 DIAGNOSIS — R10.13 EPIGASTRIC PAIN: ICD-10-CM

## 2023-01-24 DIAGNOSIS — J96.12 CHRONIC RESPIRATORY FAILURE WITH HYPOXIA AND HYPERCAPNIA (HCC): ICD-10-CM

## 2023-01-24 DIAGNOSIS — J96.11 CHRONIC RESPIRATORY FAILURE WITH HYPOXIA AND HYPERCAPNIA (HCC): ICD-10-CM

## 2023-01-24 DIAGNOSIS — Z12.11 COLON CANCER SCREENING: ICD-10-CM
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
## 2023-01-24 RX ORDER — INSULIN GLARGINE 100 [IU]/ML
INJECTION, SOLUTION SUBCUTANEOUS
Qty: 9 ADJUSTABLE DOSE PRE-FILLED PEN SYRINGE | Refills: 3 | Status: SHIPPED | OUTPATIENT
Start: 2023-01-24

## 2023-01-24 ASSESSMENT — PATIENT HEALTH QUESTIONNAIRE - PHQ9
SUM OF ALL RESPONSES TO PHQ QUESTIONS 1-9: 6
2. FEELING DOWN, DEPRESSED OR HOPELESS: 0
SUM OF ALL RESPONSES TO PHQ QUESTIONS 1-9: 6
5. POOR APPETITE OR OVEREATING: 2
6. FEELING BAD ABOUT YOURSELF - OR THAT YOU ARE A FAILURE OR HAVE LET YOURSELF OR YOUR FAMILY DOWN: 1
7. TROUBLE CONCENTRATING ON THINGS, SUCH AS READING THE NEWSPAPER OR WATCHING TELEVISION: 1
SUM OF ALL RESPONSES TO PHQ9 QUESTIONS 1 & 2: 1
10. IF YOU CHECKED OFF ANY PROBLEMS, HOW DIFFICULT HAVE THESE PROBLEMS MADE IT FOR YOU TO DO YOUR WORK, TAKE CARE OF THINGS AT HOME, OR GET ALONG WITH OTHER PEOPLE: 1
9. THOUGHTS THAT YOU WOULD BE BETTER OFF DEAD, OR OF HURTING YOURSELF: 0
4. FEELING TIRED OR HAVING LITTLE ENERGY: 1
1. LITTLE INTEREST OR PLEASURE IN DOING THINGS: 1
3. TROUBLE FALLING OR STAYING ASLEEP: 0
SUM OF ALL RESPONSES TO PHQ QUESTIONS 1-9: 6
SUM OF ALL RESPONSES TO PHQ QUESTIONS 1-9: 6
8. MOVING OR SPEAKING SO SLOWLY THAT OTHER PEOPLE COULD HAVE NOTICED. OR THE OPPOSITE, BEING SO FIGETY OR RESTLESS THAT YOU HAVE BEEN MOVING AROUND A LOT MORE THAN USUAL: 0

## 2023-01-24 ASSESSMENT — ENCOUNTER SYMPTOMS
BACK PAIN: 1
WHEEZING: 0
NAUSEA: 0
VOMITING: 0
SORE THROAT: 0
VOICE CHANGE: 0
CHEST TIGHTNESS: 0
SHORTNESS OF BREATH: 0
ABDOMINAL PAIN: 0
EYE REDNESS: 1

## 2023-01-24 NOTE — PROGRESS NOTES
HPI:  Patient comes in today for to establish patient has been seen at the clinic and patient reports that she fell out of bed she was sleeping when she fell she hit the nightstand bruised her left thigh and her left chest..    Review of Systems   Constitutional:  Negative for chills, fever and unexpected weight change. HENT:  Negative for postnasal drip, sore throat and voice change. Eyes:  Positive for redness. Eyes bruised due to the fall. Was seen in ER and is scheduled to see ophthalmology  Patient is having some blurry vision in that left eye. Respiratory:  Negative for chest tightness, shortness of breath and wheezing. Cardiovascular:  Negative for chest pain and leg swelling. Gastrointestinal:  Negative for abdominal pain, nausea and vomiting. Musculoskeletal:  Positive for back pain (Patient following with pain management for her back pain complains of lower back pain that shoots down her left lower extremity). Negative for gait problem and joint swelling. Skin:  Negative for rash and wound. Neurological: Negative. Psychiatric/Behavioral: Negative.         Past Medical History:   Diagnosis Date    Adrenal incidentaloma ([facility] [address]     Anxiety     Arthritis     Asthma     Bladder incontinence     Cancer [facility]) Dx 2009    multiple Myeloma, in remission currently     Chronic back pain     COPD (chronic obstructive pulmonary disease) (HCC)     on O2 2 liters  (uses with activity and at night to sleep)    Depression     Fibromyalgia     GERD (gastroesophageal reflux disease)     Hyperlipidemia     Hypertension     Hypothyroidism     MGUS (monoclonal gammopathy of unknown significance)     Neuropathy     Pneumonia 02/2020    Prolonged emergence from general anesthesia     Sleep apnea     doesnt use her cpap    Type II or unspecified type diabetes mellitus without mention of complication, not stated as uncontrolled     Vaginal bleeding      Past Surgical History:   Procedure Laterality Date ANESTHESIA NERVE BLOCK Bilateral 8/10/2020    BILATERAL L3 L4 MEDIAL BRANCH L5 DORSSAL RAMUS NERVE BLOCK (CPT 20844) SEDATION performed by Don Maldonado MD at [address] [facility]  07/20/2016    removed 3 polyps (tubular adenomas), diverticulosis, Dr. Tyler Florentino  2008    approximately 2008, no report available, per patient some polyps removed, Dr Rashaad Becker, [facility]    COLONOSCOPY N/A 11/9/2020    Small sessile polyp distal ascending colon removed with bx/cauterized (path Tubular Adenoma), right and left diverticulosis without diverticulitis, Dr. Mascorro Spine, [facility]    COLONOSCOPY  11/9/2020    Small sessile polyp distal ascending colon removed with bx/cauterized (path Tubular Adenoma), right and left diverticulosis without diverticulitis, Dr. Mascorro Spine, [address] N/A 5/11/2021    DILATATION AND CURETTAGE HYSTEROSCOPY POSSIBLE REMOVAL OF MASS performed by Myrna Head MD at [address], left knee, arthroscopic    NERVE BLOCK Bilateral 09/22/2016    lumbar transforaminal nerve block #1    NERVE BLOCK  07/06/2020    lumbar epidural steroid injectio L4-5    NERVE BLOCK Bilateral 08/10/2020    L3, L4, L5     OTHER SURGICAL HISTORY  12/13/2016    Excision cyst right face    PAIN MANAGEMENT PROCEDURE N/A 7/6/2020    LUMBAR EPIDURAL STEROID INJECTION L4-5 performed by Don Maldonado MD at [address]  2008 2008    UPPER GASTROINTESTINAL ENDOSCOPY  07/20/2016    GERD and gastric ulcers, Bx H pylori neg, Dr. Lisa Ivey  2008    approximately 2008, no report, patient does not know the findings, Dr Rashaad Becker, [address] [facility] [address] N/A 11/9/2020    Severe gastritis with superficial ulcerations with bx neg H pylori, Dr. Mascorro Spine, [facility]     Family History   Problem Relation Age of Onset    Heart Disease Mother 79    Diabetes Mother Cancer Mother         Breast    Hypertension Father     Cancer Father         Pancreas    Diabetes Father     High Blood Pressure Father     Arthritis Brother     Diabetes Brother     Cancer Maternal Uncle     Cancer Paternal Aunt         breast    High Blood Pressure Paternal Aunt     High Blood Pressure Paternal Uncle     Arthritis Maternal Grandmother     Diabetes Maternal Grandmother     High Blood Pressure Maternal Grandmother     Arthritis Maternal Grandfather     Stroke Maternal Grandfather     High Cholesterol Paternal Grandmother     Kidney Disease Paternal Grandmother     Heart Disease Paternal Grandfather       Social History     Tobacco Use    Smoking status: Former     Packs/day: 2.00     Years: 50.00     Pack years: 100.00     Types: Cigarettes     Start date: 7/15/1971     Quit date: 2/10/2020     Years since quittin.9    Smokeless tobacco: Never   Vaping Use    Vaping Use: Never used   Substance Use Topics    Alcohol use: No     Alcohol/week: 0.0 standard drinks    Drug use: No        Current Outpatient Medications on File Prior to Visit   Medication Sig Dispense Refill    erythromycin (ROMYCIN) 5 MG/GM ophthalmic ointment Apply thin layer oint to affected eye 4 times a day to the left eye 3.5 g 1    acetaminophen (TYLENOL) 500 MG tablet Take 500 mg by mouth every 6 hours as needed for Pain      Dulaglutide (TRULICITY) 3.15 XH/5.5VN SOPN Inject 0.75 mg into the skin once a week 1 Adjustable Dose Pre-filled Pen Syringe 3    amLODIPine (NORVASC) 2.5 MG tablet Take 1 tablet by mouth daily 30 tablet 3    losartan (COZAAR) 50 MG tablet Take 1 tablet by mouth daily 30 tablet 3    albuterol sulfate HFA (PROVENTIL;VENTOLIN;PROAIR) 108 (90 Base) MCG/ACT inhaler Inhale 3 puffs into the lungs 4 times daily as needed for Wheezing 3 each 1    amitriptyline (ELAVIL) 50 MG tablet TAKE 1 TABLET BY MOUTH EVERY EVENING 90 tablet 1    levothyroxine (SYNTHROID) 112 MCG tablet Take 1 tablet by mouth Daily 90 tablet 1 rosuvastatin (CRESTOR) 20 MG tablet Take 1 tablet by mouth daily 90 tablet 1    pantoprazole (PROTONIX) 40 MG tablet Take 1 tablet by mouth every morning (before breakfast) 30 tablet 5    citalopram (CELEXA) 40 MG tablet Take 1 tablet by mouth daily 90 tablet 1    pregabalin (LYRICA) 75 MG capsule Take 1 capsule by mouth 3 times daily for 30 days. Max Daily Amount: 225 mg 90 capsule 0    montelukast (SINGULAIR) 10 MG tablet TAKE 1 TABLET BY MOUTH EVERY NIGHT AT BEDTIME 90 tablet 1    OXYGEN Inhale 2 L into the lungs daily Uses 2 liters at night 1 each 0    Insulin Pen Needle 31G X 5 MM MISC 1 each by Does not apply route daily 100 each 0    tiotropium (SPIRIVA HANDIHALER) 18 MCG inhalation capsule Inhale 1 capsule into the lungs daily 30 capsule 5    Calcium Polycarbophil (FIBER) 625 MG TABS Take 1 tablet by mouth 2 times daily 180 tablet 3    blood glucose monitor strips Check blood sugar fasting before breakfast and as needed for symptoms of irregular blood glucose. Dispense sufficient amount for indicated testing frequency plus additional to accommodate PRN testing needs. 100 strip 0    glucose monitoring (FREESTYLE FREEDOM) kit 1 kit by Does not apply route daily Ok to change brand that insurance will cover.  1 kit 0    Lancets MISC Check blood sugar daily and prn as needed 100 each 0    Respiratory Therapy Supplies (NEBULIZER/TUBING/MOUTHPIECE) KIT 1 kit by Does not apply route daily as needed (for shortness of breath/wheezing) 1 kit 0    albuterol (PROVENTIL) (2.5 MG/3ML) 0.083% nebulizer solution Take 3 mLs by nebulization every 6 hours as needed for Wheezing or Shortness of Breath 120 each 1    Dextromethorphan-guaiFENesin (CORICIDIN HBP CONGESTION/COUGH)  MG CAPS Take 1 capsule by mouth 2 times daily as needed (congestion) 30 capsule 0    baclofen (LIORESAL) 10 MG tablet Take 1 tablet by mouth 3 times daily as needed (back muscle spasms) 30 tablet 0    SYMBICORT 160-4.5 MCG/ACT AERO INHALE 2 PUFFS INTO THE LUNGS TWICE DAILY 10.2 g 3    celecoxib (CELEBREX) 100 MG capsule Take 1 capsule by mouth daily 90 capsule 1    docusate sodium (COLACE) 100 MG capsule Take 1 capsule by mouth 2 times daily 180 capsule 1    ammonium lactate (LAC-HYDRIN) 12 % lotion Apply topically twice daily 400 g 2    calcium-cholecalciferol (CALCIUM 500/D) 500-200 MG-UNIT per tablet TAKE 1 TABLET BY MOUTH DAILY 180 tablet 1    mineral oil-hydrophilic petrolatum (HYDROPHOR) ointment Apply topically as needed. 3 each 2     No current facility-administered medications on file prior to visit. PE:  VS:  /76   Pulse 99   Temp 97.9 °F (36.6 °C)   Resp 18   Ht 5' 4\" (1.626 m)   Wt 218 lb (98.9 kg)   SpO2 92%   BMI 37.42 kg/m²   General:  Alert and oriented, NAD  HEENT: Ear auricles are normal, EOMI, Conjunctivae clear  NECK:  Supple without adenopathy or thyromegaly, no carotid bruits. LUNGS:  CTA all fields  HEART:  RRR without M, R, or G  ABDOMEN:  Soft and nontender without palpable abnormalities, No renal or aortic bruits. EXTREMITIES: No ankle edema bilaterally. Neuro: No focal deficit. Lab Results   Component Value Date    WBC 6.2 01/25/2023    HGB 8.9 (L) 01/25/2023    HCT 29.8 (L) 01/25/2023     (H) 01/25/2023    CHOL 122 01/05/2023    TRIG 112 01/05/2023    HDL 43 01/05/2023    LDLCALC 57 01/05/2023    ALT 14 01/22/2023    AST 17 01/22/2023     01/22/2023    K 3.9 01/22/2023     01/22/2023    CREATININE 0.6 01/22/2023    BUN 15 01/22/2023    LABGLOM >60 01/22/2023    GFRAA >60 06/23/2022    CO2 27 01/22/2023    TSH 0.766 01/05/2023    INR 1.0 04/17/2022    GLUCOSE 118 (H) 01/22/2023    LABA1C 8.5 (H) 12/20/2022    LABMICR 30.4 (H) 01/25/2023    LABCREA 191 01/25/2023    MALBCR 15.9 01/25/2023         Impression/Plan:    1. Fall, initial encounter  2. Uncontrolled type 2 diabetes mellitus with hyperglycemia ([facility] [address]  3.  Anemia, unspecified type  -     Microalbumin / Creatinine Urine Ratio; Future  -     CBC with Auto Differential; Future  -     Ferritin; Future  -     Iron and TIBC; Future  -     Vitamin B12 & Folate; Future  -     Reticulocytes; Future  -     POCT occult blood stool; Future  -     Lee Crawley MD, General Surgery, [place]  4. Epigastric pain  -     Lee Crawley MD, General Surgery, [place]  5. Colon cancer screening  -     POCT Fit Test; Future  6. Hypoxia  7. Chronic obstructive pulmonary disease, unspecified COPD type ([facility] [address]  8. Chronic respiratory failure with hypoxia and hypercapnia (HCC)  9.  DM type 2 with diabetic peripheral neuropathy (HCC)  -     insulin glargine (LANTUS SOLOSTAR) 100 UNIT/ML injection pen; 34 units every morning and 10 units every night, Disp-9 Adjustable Dose Pre-filled Pen Syringe, R-3Noal  -     [facility] - Diabetes Education, [facility]

## 2023-01-25 ENCOUNTER — HOSPITAL ENCOUNTER (OUTPATIENT)
Age: 68
Discharge: HOME | End: 2023-01-25
Payer: COMMERCIAL

## 2023-01-25 DIAGNOSIS — D64.9 ANEMIA, UNSPECIFIED TYPE: ICD-10-CM

## 2023-01-25 LAB
BASOPHILS ABSOLUTE: 0.04 E9/L (ref 0–0.2)
BASOPHILS RELATIVE PERCENT: 0.6 % (ref 0–2)
CREATININE URINE: 191 MG/DL (ref 29–226)
EOSINOPHILS ABSOLUTE: 0.18 E9/L (ref 0.05–0.5)
EOSINOPHILS RELATIVE PERCENT: 2.9 % (ref 0–6)
FERRITIN: 202 NG/ML
FOLATE: 15.4 NG/ML (ref 4.8–24.2)
HCT VFR BLD CALC: 29.8 % (ref 34–48)
HEMOGLOBIN: 8.9 G/DL (ref 11.5–15.5)
IMMATURE GRANULOCYTES #: 0.02 E9/L
IMMATURE GRANULOCYTES %: 0.3 % (ref 0–5)
IMMATURE RETIC FRACT: 22.9 % (ref 3–15.9)
IRON SATURATION: 33 % (ref 15–50)
IRON: 88 MCG/DL (ref 37–145)
LYMPHOCYTES ABSOLUTE: 2.58 E9/L (ref 1.5–4)
LYMPHOCYTES RELATIVE PERCENT: 41.4 % (ref 20–42)
MCH RBC QN AUTO: 27.6 PG (ref 26–35)
MCHC RBC AUTO-ENTMCNC: 29.9 % (ref 32–34.5)
MCV RBC AUTO: 92.3 FL (ref 80–99.9)
MICROALBUMIN UR-MCNC: 30.4 MG/L
MICROALBUMIN/CREAT UR-RTO: 15.9 (ref 0–30)
MONOCYTES ABSOLUTE: 0.47 E9/L (ref 0.1–0.95)
MONOCYTES RELATIVE PERCENT: 7.5 % (ref 2–12)
NEUTROPHILS ABSOLUTE: 2.94 E9/L (ref 1.8–7.3)
NEUTROPHILS RELATIVE PERCENT: 47.3 % (ref 43–80)
PDW BLD-RTO: 14.2 FL (ref 11.5–15)
PLATELET # BLD: 574 E9/L (ref 130–450)
PMV BLD AUTO: 9.5 FL (ref 7–12)
RBC # BLD: 3.23 E12/L (ref 3.5–5.5)
RETIC HGB EQUIVALENT: 29.7 PG (ref 28.2–36.6)
RETICULOCYTE ABSOLUTE COUNT: 0.09 E12/L
RETICULOCYTE COUNT PCT: 2.7 % (ref 0.4–1.9)
TOTAL IRON BINDING CAPACITY: 270 MCG/DL (ref 250–450)
VITAMIN B-12: 1448 PG/ML (ref 211–946)
WBC # BLD: 6.2 E9/L (ref 4.5–11.5)

## 2023-01-25 PROCEDURE — 85025 COMPLETE CBC W/AUTO DIFF WBC: CPT

## 2023-01-25 PROCEDURE — 82728 ASSAY OF FERRITIN: CPT

## 2023-01-25 PROCEDURE — 82570 ASSAY OF URINE CREATININE: CPT

## 2023-01-25 PROCEDURE — 85045 AUTOMATED RETICULOCYTE COUNT: CPT

## 2023-01-25 PROCEDURE — 83540 ASSAY OF IRON: CPT

## 2023-01-25 PROCEDURE — 82044 UR ALBUMIN SEMIQUANTITATIVE: CPT

## 2023-01-25 PROCEDURE — 82607 VITAMIN B-12: CPT

## 2023-01-25 PROCEDURE — 83550 IRON BINDING TEST: CPT

## 2023-01-25 PROCEDURE — 36415 COLL VENOUS BLD VENIPUNCTURE: CPT

## 2023-01-25 PROCEDURE — 82746 ASSAY OF FOLIC ACID SERUM: CPT

## 2023-02-01 ENCOUNTER — TELEPHONE (OUTPATIENT)
Dept: PRIMARY CARE CLINIC | Age: 68
End: 2023-02-01

## 2023-02-01 ENCOUNTER — HOSPITAL ENCOUNTER (OUTPATIENT)
Dept: DIABETES SERVICES | Age: 68
Setting detail: THERAPIES SERIES
Discharge: HOME | End: 2023-02-01
Payer: COMMERCIAL

## 2023-02-01 PROCEDURE — G0109 DIAB MANAGE TRN IND/GROUP: HCPCS | Performed by: DIETITIAN, REGISTERED

## 2023-02-02 ENCOUNTER — HOSPITAL ENCOUNTER (OUTPATIENT)
Dept: DIABETES SERVICES | Age: 68
Setting detail: THERAPIES SERIES
Discharge: HOME | End: 2023-02-02
Payer: COMMERCIAL

## 2023-02-02 PROCEDURE — G0109 DIAB MANAGE TRN IND/GROUP: HCPCS | Performed by: DIETITIAN, REGISTERED

## 2023-02-02 SDOH — ECONOMIC STABILITY: FOOD INSECURITY: ADDITIONAL INFORMATION: YES

## 2023-02-02 ASSESSMENT — PROBLEM AREAS IN DIABETES QUESTIONNAIRE (PAID)
FEELING THAT DIABETES IS TAKING UP TOO MUCH OF YOUR MENTAL AND PHYSICAL ENERGY EVERY DAY: 3
PAID-5 TOTAL SCORE: 16
COPING WITH COMPLICATIONS OF DIABETES: 4
FEELING SCARED WHEN YOU THINK ABOUT LIVING WITH DIABETES: 2
WORRYING ABOUT THE FUTURE AND THE POSSIBILITY OF SERIOUS COMPLICATIONS: 4
FEELING DEPRESSED WHEN YOU THINK ABOUT LIVING WITH DIABETES: 3

## 2023-02-02 NOTE — PROGRESS NOTES
Diabetes Self-Management Education Record    Participant Name: Steph Brown  Referring Provider: Jani Cr MD  Assessment/Evaluation Ratings:  1=Needs Instruction   4=Demonstrates Understanding/Competency  2=Needs Review   NC=Not Covered    3=Comprehends Key Points  N/A=Not Applicable  Topics/Learning Objectives Pre-session Assess Date:  Instructor initials/date  2/1/23 KMS Instr. Date    Instructor initials/date  2/1/23 KMS Follow-up Post- session Eval Comments   Diabetes disease process & Treatment process:   -Define type of diabetes in simple terms.  - Describe the ABCs of  diabetes management  -Identify own type of diabetes  -Identify lifestyle changes/treatment options  -other:  1 [x] All     []  []  []  []  []  3/4 2/1/23 KMS  Type 2 DM since 2015  A1C 8.5%   Developing strategies for Healthy coping/psychosocial issues:    -Describe feelings about living with diabetes  -Identify coping strategies and sources of stress  -Identify support needed & support network available  -Complete PAID-5 Diabetes questionnaire 1 [x] All     []  []  []    []  4 Date: 2/1/23 KMS  PAID-5 Score: 16  Confidence Score: 5           Prevention, detection & treatment of Chronic complications:    -Identify the prevention, detection and treatment for complications including immunizations, preventive eye, foot, dental and renal exams as indicated per the participant's duration of diabetes and health status.  -Define the natural course of diabetes and the relationship of blood glucose levels to long term complications of diabetes.   1 [x] All     []            []  4    Prevention, detection & treatment of acute complications:    -State the causes,signs & symptoms of hyper & hypoglycemia, and prevention & treatment strategies.   -Describe sick day guidelines  DKA /indications for ketone testing &  when to call physician  1 [x] All     []      []    4            -Identify severe weather/situation crisis  & diabetes supplies management 1 []      Using medications safely:   -State effects of diabetes medicines on blood glucose levels;  -List diabetes medication taken, action & side effects 1 [x] All     []  []  4 2/1/23 KMS     Insulin/Injectables/glucagon  -Name appropriate injection sites; proper storage; supplies needed;  1   [x]  4 2/1/23 KMS  Lantus 34 units in a.m./10 units in p.m. Trulicity 6.32 mg weekly     Demonstrate proper technique 1 []      Monitoring blood glucose, interpreting and using results:   -Identify the purpose of testing   -Identify recommended & personal blood glucose targets & HgbA1C target levels  -State the Importance of logging blood glucose levels for pattern recognition;   -State benefits of reading/using pt generated health data  -Verbalize safe lancet disposal 1 [x] All     []  []    []  []  []  4 2/1/23 KMS  Monitors 4 times daily    -Demonstrate proper testing technique 1 []      Incorporating physical activity into lifestyle:   -State effect of exercise on blood glucose levels;   -State benefits of regular exercise;   -Define safety considerations/food choices if needed.  -Describe contraindications/maintenance of activity.  1 [] All     []  []    []  []   2/1/23 KMS  Not physically active, has physical limitations    Incorporating nutritional management into lifestyle:   -Describe effect of type, amount & timing of food on blood glucose  -Describe methods for preparing and planning   healthy meals  -Correctly read food labels for nutritional values  -Name 3 foods high in Carbohydrate  -Plan a sample 4 carbohydrate-controlled meal using Diabetes Plate Method  -Verbalized ability to measure and count carbohydrate gram servings using food labels and carbohydrate food list.    -Plan a carbohydrate-controlled meal based on individual needs/preferences from a Registered Dietitian.   1 [] All       [x]    [x]    [x]  [x]      [x]        []  3 2/2/23 Reviewed nutritional recommendations for salt, added sugar, fats, and fiber. Reviewed plate method and portion control. Reviewed which foods contain carbohydrates, how carbs impact blood glucose, how to count carbs, and how to spread carbs evenly throughout the day. Label reading reviewed. Patient very attentive to education and demonstrated understanding. Asked questions and took notes.              Developing strategies for problem solving to promote health/change behavior.  -Identify 7 self-care behaviors; Personal health risk factors; Benefits, challenges & strategies for behavioral change and set an individualized goal selection. 1       [x]  3 2/2/23  [x]Nutrition  []Monitoring  []Exercise  []Medication  []Other     Identified Barriers to learning/adherence to self management plan:    Physical and Cognitive  []  other    Instruction Method:  Lecture/Discussion, Power Point Presentation , and Handouts    Supporting Education Materials/Equipment Provided: Self-management manual and Nutritional Packet   [][place]izen of [place] materials       [] services     []Other:      Encounter Type Date Attended Start Time End Time Comments No Show Dates   Assessment          Session 1 2/1/23 KMS 0900 1130      Session 2 2/2/23 JA 0900 1130     1:1 DSMES          In person Follow-up         Gestational Diabetes         DSMES #3        Meter Instrx        Insulin Instrx           Additional Comments:  Pt scheduled for 1:1 DSMES 3, on Feb 10th @10 AM        Date:   Follow-up goal attainment based on patients initial DSMES goal     Notified by [] EMR []Fax        []Post class Hgb A1C  []Medication compliance   []Plate method/meal plan compliance   []Able to state the number of Carbohydrate servings eaten at B,L,D   []Testing blood glucose as prescribed by PCP   []Exercise Routine   []Other:   []Other:     []Patient lost to follow-up   Notified by []EMR []Fax

## 2023-02-02 NOTE — PROGRESS NOTES
Diabetes Self-Management Education Record    Participant Name: Tamie Toro  Referring Provider: Pascual Almaraz MD  Assessment/Evaluation Ratings:  1=Needs Instruction   4=Demonstrates Understanding/Competency  2=Needs Review   NC=Not Covered    3=Comprehends Key Points  N/A=Not Applicable  Topics/Learning Objectives Pre-session Assess Date:  Instructor initials/date  2/1/23 KMS Instr. Date    Instructor initials/date  2/1/23 KMS Follow-up Post- session Eval Comments   Diabetes disease process & Treatment process:   -Define type of diabetes in simple terms.  - Describe the ABCs of  diabetes management  -Identify own type of diabetes  -Identify lifestyle changes/treatment options  -other:  1 [x] All     []  []  []  []  []  3/4 2/1/23 KMS  Type 2 DM since 2015  A1C 8.5%   Developing strategies for Healthy coping/psychosocial issues:    -Describe feelings about living with diabetes  -Identify coping strategies and sources of stress  -Identify support needed & support network available  -Complete PAID-5 Diabetes questionnaire 1 [x] All     []  []  []    []  4 Date: 2/1/23 KMS  PAID-5 Score: 16  Confidence Score: 5           Prevention, detection & treatment of Chronic complications:    -Identify the prevention, detection and treatment for complications including immunizations, preventive eye, foot, dental and renal exams as indicated per the participant's duration of diabetes and health status.  -Define the natural course of diabetes and the relationship of blood glucose levels to long term complications of diabetes.   1 [x] All     []            []  4    Prevention, detection & treatment of acute complications:    -State the causes,signs & symptoms of hyper & hypoglycemia, and prevention & treatment strategies.   -Describe sick day guidelines  DKA /indications for ketone testing &  when to call physician  1 [x] All     []      []    4            -Identify severe weather/situation crisis  & diabetes supplies management 1 []      Using medications safely:   -State effects of diabetes medicines on blood glucose levels;  -List diabetes medication taken, action & side effects 1 [x] All     []  []  4 2/1/23 KMS     Insulin/Injectables/glucagon  -Name appropriate injection sites; proper storage; supplies needed;  1   [x]  4 2/1/23 KMS  Lantus 34 units in a.m./10 units in p.m. Trulicity 7.90 mg weekly     Demonstrate proper technique 1 []      Monitoring blood glucose, interpreting and using results:   -Identify the purpose of testing   -Identify recommended & personal blood glucose targets & HgbA1C target levels  -State the Importance of logging blood glucose levels for pattern recognition;   -State benefits of reading/using pt generated health data  -Verbalize safe lancet disposal 1 [x] All     []  []    []  []  []  4 2/1/23 KMS  Monitors 4 times daily    -Demonstrate proper testing technique 1 []      Incorporating physical activity into lifestyle:   -State effect of exercise on blood glucose levels;   -State benefits of regular exercise;   -Define safety considerations/food choices if needed.  -Describe contraindications/maintenance of activity.  1 [] All     []  []    []  []   2/1/23 KMS  Not physically active, has physical limitations    Incorporating nutritional management into lifestyle:   -Describe effect of type, amount & timing of food on blood glucose  -Describe methods for preparing and planning   healthy meals  -Correctly read food labels for nutritional values  -Name 3 foods high in Carbohydrate  -Plan a sample 4 carbohydrate-controlled meal using Diabetes Plate Method  -Verbalized ability to measure and count carbohydrate gram servings using food labels and carbohydrate food list.    -Plan a carbohydrate-controlled meal based on individual needs/preferences from a Registered Dietitian.   1 [] All       []    []    []  []      []        []                Developing strategies for problem solving to promote health/change behavior. -Identify 7 self-care behaviors; Personal health risk factors; Benefits, challenges & strategies for behavioral change and set an individualized goal selection.  1       []     []Nutrition  []Monitoring  []Exercise  []Medication  []Other     Identified Barriers to learning/adherence to self management plan:    Physical and Cognitive  []  other    Instruction Method:  Lecture/Discussion, Power Point Presentation , and Handouts    Supporting Education Materials/Equipment Provided: Self-management manual and Nutritional Packet   [][place] materials       [] services     []Other:      Encounter Type Date Attended Start Time End Time Comments No Show Dates   Assessment          Session 1 2/1/23 KMS 0900 1130      Session 2        1:1 DSMES          In person Follow-up         Gestational Diabetes         DSMES #3        Meter Instrx        Insulin Instrx           Additional Comments:         Date:   Follow-up goal attainment based on patients initial DSMES goal    Dr Notified by [] EMR []Fax        []Post class Hgb A1C  []Medication compliance   []Plate method/meal plan compliance   []Able to state the number of Carbohydrate servings eaten at B,L,D   []Testing blood glucose as prescribed by PCP   []Exercise Routine   []Other:   []Other:     []Patient lost to follow-up  Dr Notified by []EMR []Fax

## 2023-02-02 NOTE — LETTER
[facility] Diabetes Education Department Diabetes Education Letter    2023       Re:     Micki Whitehead         :  1955  Dear Dr Feroz Horton                    Thank you for referring your patient, Micki Whitehead, to [facility]) diabetes education. Micki Whitehead has completed their personalized comprehensive education plan. The education plan included the following topics:          Diabetes disease process & treatment   Healthy Eating  Being Active  Taking Medication  Monitoring Glucose  Acute and Chronic Complications  Lifestyle and Healthy coping  Diabetes Distress and Support       In addition, the PAID -5 (Problem Areas in Diabetes) Survey was completed. The results From 2023 were 16  A total score of 8 or greater indicates possible diabetes related emotional distress which warrants further assessment. [x] [address]  and Crisis Resource information provided. Micki Whitehead selected behavioral goal is:    [x]Healthy Eating  []Monitoring  []Problem Solving  []Being Active  []Taking Medication  []Other:     Pt scheduled an 1:1 DSMES meal plan instruction on Feb 10, 2023  We will send a follow-up letter in 3 months to notify you of their progress. Thank you for referring this patient to our program.  Please do not hesitate to call if you have any questions at [phone] [facility] or [facility]) or [phone] ([address] [place] [address]).         Sincerely,    [facility] Diabetes Education Department  American [employer] Association Recognized DSMES Program rev. 10/19/22

## 2023-02-07 ENCOUNTER — OFFICE VISIT (OUTPATIENT)
Dept: PRIMARY CARE CLINIC | Age: 68
End: 2023-02-07

## 2023-02-07 VITALS
HEART RATE: 94 BPM | WEIGHT: 217 LBS | TEMPERATURE: 97 F | RESPIRATION RATE: 17 BRPM | HEIGHT: 64 IN | SYSTOLIC BLOOD PRESSURE: 104 MMHG | BODY MASS INDEX: 37.05 KG/M2 | OXYGEN SATURATION: 94 % | DIASTOLIC BLOOD PRESSURE: 76 MMHG

## 2023-02-07 DIAGNOSIS — E11.42 DM TYPE 2 WITH DIABETIC PERIPHERAL NEUROPATHY (HCC): ICD-10-CM

## 2023-02-07 DIAGNOSIS — E04.2 MULTINODULAR GOITER: ICD-10-CM

## 2023-02-07 DIAGNOSIS — R41.841 COGNITIVE COMMUNICATION DEFICIT: ICD-10-CM

## 2023-02-07 DIAGNOSIS — E03.9 HYPOTHYROIDISM, UNSPECIFIED TYPE: ICD-10-CM

## 2023-02-07 DIAGNOSIS — D64.9 ANEMIA, UNSPECIFIED TYPE: ICD-10-CM

## 2023-02-07 DIAGNOSIS — J44.9 CHRONIC OBSTRUCTIVE PULMONARY DISEASE, UNSPECIFIED COPD TYPE (HCC): Primary | ICD-10-CM

## 2023-02-07 DIAGNOSIS — M79.7 FIBROMYALGIA: ICD-10-CM

## 2023-02-07 DIAGNOSIS — E11.65 UNCONTROLLED TYPE 2 DIABETES MELLITUS WITH HYPERGLYCEMIA (HCC): ICD-10-CM

## 2023-02-07 LAB — HBA1C MFR BLD: 7.9 %

## 2023-02-07 RX ORDER — INSULIN GLARGINE 100 [IU]/ML
INJECTION, SOLUTION SUBCUTANEOUS
Qty: 9 ADJUSTABLE DOSE PRE-FILLED PEN SYRINGE | Refills: 3 | Status: SHIPPED | OUTPATIENT
Start: 2023-02-07

## 2023-02-07 RX ORDER — DULAGLUTIDE 0.75 MG/.5ML
0.75 INJECTION, SOLUTION SUBCUTANEOUS WEEKLY
Qty: 1 ADJUSTABLE DOSE PRE-FILLED PEN SYRINGE | Refills: 3 | Status: SHIPPED | OUTPATIENT
Start: 2023-02-07

## 2023-02-07 RX ORDER — TIOTROPIUM BROMIDE 18 UG/1
18 CAPSULE ORAL; RESPIRATORY (INHALATION) DAILY
Qty: 30 CAPSULE | Refills: 5 | Status: SHIPPED | OUTPATIENT
Start: 2023-02-07

## 2023-02-07 RX ORDER — PREGABALIN 75 MG/1
75 CAPSULE ORAL 3 TIMES DAILY
Qty: 90 CAPSULE | Refills: 0 | Status: SHIPPED | OUTPATIENT
Start: 2023-02-07 | End: 2023-03-09

## 2023-02-07 NOTE — TELEPHONE ENCOUNTER
Patient stated she has been off work since being in the hospital in December due to her diabetes and hypertension. And she would like to be off until the 20th.

## 2023-02-10 ENCOUNTER — HOSPITAL ENCOUNTER (OUTPATIENT)
Dept: DIABETES SERVICES | Age: 68
Setting detail: THERAPIES SERIES
Discharge: HOME | End: 2023-02-10
Payer: COMMERCIAL

## 2023-02-10 ENCOUNTER — TELEPHONE (OUTPATIENT)
Dept: PRIMARY CARE CLINIC | Age: 68
End: 2023-02-10

## 2023-02-10 ENCOUNTER — HOSPITAL ENCOUNTER (OUTPATIENT)
Dept: INFUSION THERAPY | Age: 68
Discharge: HOME | End: 2023-02-10
Payer: COMMERCIAL

## 2023-02-10 ENCOUNTER — OFFICE VISIT (OUTPATIENT)
Dept: ONCOLOGY | Age: 68
End: 2023-02-10
Payer: COMMERCIAL

## 2023-02-10 VITALS
DIASTOLIC BLOOD PRESSURE: 72 MMHG | BODY MASS INDEX: 38.41 KG/M2 | SYSTOLIC BLOOD PRESSURE: 160 MMHG | OXYGEN SATURATION: 95 % | HEART RATE: 84 BPM | WEIGHT: 225 LBS | TEMPERATURE: 97.7 F | HEIGHT: 64 IN

## 2023-02-10 DIAGNOSIS — Z12.39 BREAST SCREENING: ICD-10-CM

## 2023-02-10 DIAGNOSIS — C90.00 MULTIPLE MYELOMA, REMISSION STATUS UNSPECIFIED (HCC): ICD-10-CM

## 2023-02-10 DIAGNOSIS — R91.8 MULTIPLE LUNG NODULES: Primary | ICD-10-CM

## 2023-02-10 LAB
ALBUMIN SERPL-MCNC: 3.7 G/DL (ref 3.5–5.2)
ALP BLD-CCNC: 88 U/L (ref 35–104)
ALT SERPL-CCNC: 14 U/L (ref 0–32)
ANION GAP SERPL CALCULATED.3IONS-SCNC: 13 MMOL/L (ref 7–16)
AST SERPL-CCNC: 21 U/L (ref 0–31)
BASOPHILS ABSOLUTE: 0.04 E9/L (ref 0–0.2)
BASOPHILS RELATIVE PERCENT: 0.7 % (ref 0–2)
BILIRUB SERPL-MCNC: 0.3 MG/DL (ref 0–1.2)
BUN BLDV-MCNC: 11 MG/DL (ref 6–23)
CALCIUM SERPL-MCNC: 9.1 MG/DL (ref 8.6–10.2)
CHLORIDE BLD-SCNC: 102 MMOL/L (ref 98–107)
CO2: 25 MMOL/L (ref 22–29)
CREAT SERPL-MCNC: 0.8 MG/DL (ref 0.5–1)
EOSINOPHILS ABSOLUTE: 0.27 E9/L (ref 0.05–0.5)
EOSINOPHILS RELATIVE PERCENT: 4.6 % (ref 0–6)
GFR SERPL CREATININE-BSD FRML MDRD: >60 ML/MIN/1.73
GLUCOSE BLD-MCNC: 88 MG/DL (ref 74–99)
HCT VFR BLD CALC: 31.9 % (ref 34–48)
HEMOGLOBIN: 9.5 G/DL (ref 11.5–15.5)
IMMATURE GRANULOCYTES #: 0.01 E9/L
IMMATURE GRANULOCYTES %: 0.2 % (ref 0–5)
IMMATURE RETIC FRACT: 15.1 % (ref 3–15.9)
LACTATE DEHYDROGENASE: 178 U/L (ref 135–214)
LYMPHOCYTES ABSOLUTE: 3.25 E9/L (ref 1.5–4)
LYMPHOCYTES RELATIVE PERCENT: 55.1 % (ref 20–42)
MCH RBC QN AUTO: 27.4 PG (ref 26–35)
MCHC RBC AUTO-ENTMCNC: 29.8 % (ref 32–34.5)
MCV RBC AUTO: 91.9 FL (ref 80–99.9)
MONOCYTES ABSOLUTE: 0.53 E9/L (ref 0.1–0.95)
MONOCYTES RELATIVE PERCENT: 9 % (ref 2–12)
NEUTROPHILS ABSOLUTE: 1.8 E9/L (ref 1.8–7.3)
NEUTROPHILS RELATIVE PERCENT: 30.4 % (ref 43–80)
PDW BLD-RTO: 15 FL (ref 11.5–15)
PLATELET # BLD: 435 E9/L (ref 130–450)
PMV BLD AUTO: 10.4 FL (ref 7–12)
POTASSIUM SERPL-SCNC: 3.9 MMOL/L (ref 3.5–5)
RBC # BLD: 3.47 E12/L (ref 3.5–5.5)
RETIC HGB EQUIVALENT: 30.8 PG (ref 28.2–36.6)
RETICULOCYTE ABSOLUTE COUNT: 0.08 E12/L
RETICULOCYTE COUNT PCT: 2.4 % (ref 0.4–1.9)
SODIUM BLD-SCNC: 140 MMOL/L (ref 132–146)
TOTAL PROTEIN: 7.9 G/DL (ref 6.4–8.3)
WBC # BLD: 5.9 E9/L (ref 4.5–11.5)

## 2023-02-10 PROCEDURE — 99214 OFFICE O/P EST MOD 30 MIN: CPT | Performed by: INTERNAL MEDICINE

## 2023-02-10 PROCEDURE — G8417 CALC BMI ABV UP PARAM F/U: HCPCS | Performed by: INTERNAL MEDICINE

## 2023-02-10 PROCEDURE — 3074F SYST BP LT 130 MM HG: CPT | Performed by: INTERNAL MEDICINE

## 2023-02-10 PROCEDURE — 1090F PRES/ABSN URINE INCON ASSESS: CPT | Performed by: INTERNAL MEDICINE

## 2023-02-10 PROCEDURE — 3017F COLORECTAL CA SCREEN DOC REV: CPT | Performed by: INTERNAL MEDICINE

## 2023-02-10 PROCEDURE — G8484 FLU IMMUNIZE NO ADMIN: HCPCS | Performed by: INTERNAL MEDICINE

## 2023-02-10 PROCEDURE — 1036F TOBACCO NON-USER: CPT | Performed by: INTERNAL MEDICINE

## 2023-02-10 PROCEDURE — G0109 DIAB MANAGE TRN IND/GROUP: HCPCS | Performed by: DIETITIAN, REGISTERED

## 2023-02-10 PROCEDURE — 3078F DIAST BP <80 MM HG: CPT | Performed by: INTERNAL MEDICINE

## 2023-02-10 PROCEDURE — 36415 COLL VENOUS BLD VENIPUNCTURE: CPT

## 2023-02-10 PROCEDURE — G8399 PT W/DXA RESULTS DOCUMENT: HCPCS | Performed by: INTERNAL MEDICINE

## 2023-02-10 PROCEDURE — G8427 DOCREV CUR MEDS BY ELIG CLIN: HCPCS | Performed by: INTERNAL MEDICINE

## 2023-02-10 PROCEDURE — 1123F ACP DISCUSS/DSCN MKR DOCD: CPT | Performed by: INTERNAL MEDICINE

## 2023-02-10 NOTE — PROGRESS NOTES
Diabetes Self-Management Education Record    Participant Name: David Humphries  Referring Provider: Rose Tijerina MD  Assessment/Evaluation Ratings:  1=Needs Instruction   4=Demonstrates Understanding/Competency  2=Needs Review   NC=Not Covered    3=Comprehends Key Points  N/A=Not Applicable  Topics/Learning Objectives Pre-session Assess Date:  Instructor initials/date  2/1/23 KMS Instr. Date    Instructor initials/date  2/1/23 KMS Follow-up Post- session Eval Comments   Diabetes disease process & Treatment process:   -Define type of diabetes in simple terms.  - Describe the ABCs of  diabetes management  -Identify own type of diabetes  -Identify lifestyle changes/treatment options  -other:  1 [x] All     []  []  []  []  []  3/4 2/1/23 KMS  Type 2 DM since 2015  A1C 8.5%   Developing strategies for Healthy coping/psychosocial issues:    -Describe feelings about living with diabetes  -Identify coping strategies and sources of stress  -Identify support needed & support network available  -Complete PAID-5 Diabetes questionnaire 1 [x] All     []  []  []    []  4 Date: 2/1/23 KMS  PAID-5 Score: 16  Confidence Score: 5           Prevention, detection & treatment of Chronic complications:    -Identify the prevention, detection and treatment for complications including immunizations, preventive eye, foot, dental and renal exams as indicated per the participant's duration of diabetes and health status.  -Define the natural course of diabetes and the relationship of blood glucose levels to long term complications of diabetes.   1 [x] All     []            []  4    Prevention, detection & treatment of acute complications:    -State the causes,signs & symptoms of hyper & hypoglycemia, and prevention & treatment strategies.   -Describe sick day guidelines  DKA /indications for ketone testing &  when to call physician  1 [x] All     []      []    4            -Identify severe weather/situation crisis  & diabetes supplies management 1 []      Using medications safely:   -State effects of diabetes medicines on blood glucose levels;  -List diabetes medication taken, action & side effects 1 [x] All     []  []  4 2/1/23 KMS     Insulin/Injectables/glucagon  -Name appropriate injection sites; proper storage; supplies needed;  1   [x]  4 2/1/23 KMS  Lantus 34 units in a.m./10 units in p.m. Trulicity 9.92 mg weekly     Demonstrate proper technique 1 []      Monitoring blood glucose, interpreting and using results:   -Identify the purpose of testing   -Identify recommended & personal blood glucose targets & HgbA1C target levels  -State the Importance of logging blood glucose levels for pattern recognition;   -State benefits of reading/using pt generated health data  -Verbalize safe lancet disposal 1 [x] All     []  []    []  []  []  4 2/1/23 KMS  Monitors 4 times daily    -Demonstrate proper testing technique 1 []      Incorporating physical activity into lifestyle:   -State effect of exercise on blood glucose levels;   -State benefits of regular exercise;   -Define safety considerations/food choices if needed.  -Describe contraindications/maintenance of activity.  1 [] All     []  []    []  []   2/1/23 KMS  Not physically active, has physical limitations    Incorporating nutritional management into lifestyle:   -Describe effect of type, amount & timing of food on blood glucose  -Describe methods for preparing and planning   healthy meals  -Correctly read food labels for nutritional values  -Name 3 foods high in Carbohydrate  -Plan a sample 4 carbohydrate-controlled meal using Diabetes Plate Method  -Verbalized ability to measure and count carbohydrate gram servings using food labels and carbohydrate food list.    -Plan a carbohydrate-controlled meal based on individual needs/preferences from a Registered Dietitian.   1 [] All       [x]    [x]    [x]  [x]      [x]        [x]  3                3 2/2/23 Reviewed nutritional recommendations for salt, added sugar, fats, and fiber. Reviewed plate method and portion control. Reviewed which foods contain carbohydrates, how carbs impact blood glucose, how to count carbs, and how to spread carbs evenly throughout the day. Label reading reviewed. Patient very attentive to education and demonstrated understanding. Asked questions and took notes. 2/10/23  Pt states she eats three times daily, likes snacks, eats larger portions than meal plan we discussed today. Instructed on 1300 calorie meal plan, 2 carbohydrate choices at breakfast, 2 at lunch and 3 at dinner. 1 after dinner. 6 oz protein, 5 fats daily. Provided sample meals and snacks. Also reviewed label reading. Reviewed S/S of hypoglycemia and proper treatment             Developing strategies for problem solving to promote health/change behavior. -Identify 7 self-care behaviors; Personal health risk factors; Benefits, challenges & strategies for behavioral change and set an individualized goal selection.  1       [x]  3 2/2/23  [x]Nutrition  []Monitoring  []Exercise  []Medication  []Other     Identified Barriers to learning/adherence to self management plan:    Physical and Cognitive  []  other    Instruction Method:  Lecture/Discussion, Power Point Presentation , and Handouts    Supporting Education Materials/Equipment Provided: Self-management manual and Nutritional Packet   [][place] materials       [] services     []Other:      Encounter Type Date Attended Start Time End Time Comments No Show Dates   Assessment          Session 1 2/1/23 KMS 0900 1130      Session 2 2/2/23 JA 0900 1130     1:1 DSMES          In person Follow-up         Gestational Diabetes         DSMES #3 2/10/23 JA 1000 80 Attended with sister    Meter Instrx        Insulin Instrx           Additional Comments:  Pt scheduled for 1:1 DSMES 3, on Feb 10th @10 AM        Date:   Follow-up goal attainment based on patients initial DSMES goal    Dr Notified by [] EMR []Fax []Post class Hgb A1C  []Medication compliance   []Plate method/meal plan compliance   []Able to state the number of Carbohydrate servings eaten at B,L,D   []Testing blood glucose as prescribed by PCP   []Exercise Routine   []Other:   []Other:     []Patient lost to follow-up  Dr Notified by []EMR []Fax

## 2023-02-10 NOTE — PROGRESS NOTES
[address]  [address]  MED ONCOLOGY  [address] [facility] 29. [phone]  Dept: [address]: [phone]  Attending Progress Note      Reason for The Referral:  Smoldering Multiple Myeloma. Referring Physician:  Rachelle Phipps MD    PCP:  Elicia Cornejo MD    History of Present Illness: The patient is a 59-year-old lady with a PMH significant for HTN, hyperlipidemia, DM, COPD, OA, depression, and fibromyalgia, who was diagnosed with IgG lambda MGUS in 2008, used to follow up with Dr. Manfred Atkinson at [facility], last f/up with him was in 2012. Her most recent SPEP with immunofixation ha revealed monoclonal IgG lambda, M-spike 0.7 gm/dl  from 9/9/2016. The patient has been having pain in the low back radiating to the right lower extremity, she had an MRI of the L-spine done on 8/24/2016, revealed disc bulging L4-5, L5-S1, with mild narrowing of the neural foramina. She had a bone marrow Biopsy and aspirate done by IR on 11/28/2016. Bone marrow, left iliac, aspirate and core biopsy  Suboptimal specimen showing 15% plasma cells by immunohistochemistry,  consistent with plasma cell neoplasm, see comment. Comment:    The aspirate smear and clot section specimens are hemodilute  and aspicular. Plasmacytosis is seen by immunohistochemistry for   on the core biopsy specimen only. Flow cytometric analysis performed by  [facility] confirmed a lambda-restricted plasma cell neoplasm. See separate report for complete details ([id number]). Intradepartmental consultation is obtained. The patient returns for a follow-up visit, I saw the patient when she was admitted to the hospital with pneumonia, she did not see pulmonary in follow-up yet, she is complaining of peripheral neuropathy, she has not been taking the Lyrica. She recently had blood work done revealing persistent anemia, hemoglobin 8.9, MCV 92.3, normal white count and platelet count.   No bleeding. Review of Systems;  CONSTITUTIONAL: No fever, chills. Good appetite, feels tired. ENMT: Eyes: No diplopia; Nose: Positive for epistaxis. Mouth: No sore throat. RESPIRATORY: No hemoptysis, chronic shortness of breath, cough. CARDIOVASCULAR: No chest pain, palpitations. GASTROINTESTINAL: No nausea/vomiting, abdominal pain, diarrhea/constipation. GENITOURINARY: No dysuria, urinary frequency, hematuria. MUSCULOSKELETAL: she has chronic back and joints pain. NEURO: No syncope, presyncope, headache.   Remainder:  ROS NEGATIVE    Past Medical History:      Diagnosis Date    Adrenal incidentaloma ([address]     Anxiety     Arthritis     Asthma     Bladder incontinence     Cancer ([facility] [address] [address] Dx 2009    multiple Myeloma, in remission currently     Chronic back pain     COPD (chronic obstructive pulmonary disease) (HCC)     on O2 2 liters  (uses with activity and at night to sleep)    Depression     Fibromyalgia     GERD (gastroesophageal reflux disease)     Hyperlipidemia     Hypertension     Hypothyroidism     MGUS (monoclonal gammopathy of unknown significance)     Neuropathy     Pneumonia 02/2020    Prolonged emergence from general anesthesia     Sleep apnea     doesnt use her cpap    Type II or unspecified type diabetes mellitus without mention of complication, not stated as uncontrolled     Vaginal bleeding      Patient Active Problem List   Diagnosis    Adrenal incidentaloma ([facility] [address]    Hyperlipidemia    Hypothyroidism    Fibromyalgia    Obesity    Chronic right-sided low back pain with right-sided sciatica    Tobacco abuse    Myofascial pain    Lumbar radiculitis    Smoldering multiple myeloma    Chronic obstructive pulmonary disease (HCC)    Cervical facet joint syndrome    Cervical spondylosis    Lumbar radiculopathy    Pain in right hip    Lumbar disc disorder    Lumbar spondylosis    Diverticulosis of colon without diverticulitis    Alternating constipation and diarrhea    History of colon polyps Heartburn    Indigestion    Current mild episode of major depressive disorder, unspecified whether recurrent (HCC)    DM type 2 with diabetic peripheral neuropathy (HCC)    Knee pain    Primary hypertension    Obstructive sleep apnea syndrome, severe    Acute respiratory failure with hypoxia (HCC)    Pneumonia due to infectious organism    Elevated platelet count    COPD exacerbation (HCC)    Normocytic anemia    Smoldering myeloma    Multiple lung nodules    Hyperglycemia    Vaginal candidiasis    Chronic respiratory failure with hypoxia and hypercapnia (HCC)    Type 2 diabetes mellitus with hyperosmolarity without coma, without long-term current use of insulin (HCC)        Past Surgical History:      Procedure Laterality Date    ANESTHESIA NERVE BLOCK Bilateral 8/10/2020    BILATERAL L3 L4 MEDIAL BRANCH L5 DORSSAL RAMUS NERVE BLOCK (CPT 33816) SEDATION performed by Trey Odell MD at [facility]  07/20/2016    removed 3 polyps (tubular adenomas), diverticulosis, Dr. Walton Setting  2008    approximately 2008, no report available, per patient some polyps removed,  [facility]    COLONOSCOPY N/A 11/9/2020    Small sessile polyp distal ascending colon removed with bx/cauterized (path Tubular Adenoma), right and left diverticulosis without diverticulitis, Dr. Fatmata Thacker, [facility]    COLONOSCOPY  11/9/2020    Small sessile polyp distal ascending colon removed with bx/cauterized (path Tubular Adenoma), right and left diverticulosis without diverticulitis, Dr. Fatmata Thacker,  [address] OF [facility]TERUS N/A 5/11/2021    DILATATION AND CURETTAGE HYSTEROSCOPY POSSIBLE REMOVAL OF MASS performed by Elroy Serrano MD at [address] , left knee, arthroscopic    NERVE BLOCK Bilateral 09/22/2016    lumbar transforaminal nerve block #1    NERVE BLOCK  07/06/2020    lumbar epidural steroid injectio L4-5    NERVE BLOCK Bilateral 08/10/2020    L3, L4, L5 OTHER SURGICAL HISTORY  12/13/2016    Excision cyst right face    PAIN MANAGEMENT PROCEDURE N/A 7/6/2020    LUMBAR EPIDURAL STEROID INJECTION L4-5 performed by Lesley Zarate MD at [address]  2008 2008    UPPER GASTROINTESTINAL ENDOSCOPY  07/20/2016    GERD and gastric ulcers, Bx H pylori neg, Dr. Enrico Rasmussen  2008    approximately 2008, no report, patient does not know the findings, Dr Chiara Shin, [address] N/A 11/9/2020    Severe gastritis with superficial ulcerations with bx neg H pylori, Dr. Tatiana Casillas, [facility]       Family History:  Family History   Problem Relation Age of Onset    Heart Disease Mother 79    Diabetes Mother     Cancer Mother         Breast    Hypertension Father     Cancer Father         Pancreas    Diabetes Father     High Blood Pressure Father     Arthritis Brother     Diabetes Brother     Cancer Maternal Uncle     Cancer Paternal Aunt         breast    High Blood Pressure Paternal Aunt     High Blood Pressure Paternal Uncle     Arthritis Maternal Grandmother     Diabetes Maternal Grandmother     High Blood Pressure Maternal Grandmother     Arthritis Maternal Grandfather     Stroke Maternal Grandfather     High Cholesterol Paternal Grandmother     Kidney Disease Paternal Grandmother     Heart Disease Paternal Grandfather        Medications:  Reviewed and reconciled.     Social History:  Social History     Socioeconomic History    Marital status: Single     Spouse name: Not on file    Number of children: Not on file    Years of education: Not on file    Highest education level: Not on file   Occupational History    Not on file   Tobacco Use    Smoking status: Former     Packs/day: 2.00     Years: 50.00     Pack years: 100.00     Types: Cigarettes     Start date: 7/15/1971     Quit date: 2/10/2020     Years since quitting: 3.0    Smokeless tobacco: Never   Vaping Use    Vaping Use: Never used   Substance and Sexual Activity    Alcohol use: No     Alcohol/week: 0.0 standard drinks    Drug use: No    Sexual activity: Not on file   Other Topics Concern    Not on file   Social History Narrative    Not on file     Social Determinants of Health     Financial Resource Strain: Medium Risk    Difficulty of Paying Living Expenses: Somewhat hard   Food Insecurity: No Food Insecurity    Worried About Running Out of Food in the Last Year: Never true    Ran Out of Food in the Last Year: Never true   Transportation Needs: No Transportation Needs    Lack of Transportation (Medical): No    Lack of Transportation (Non-Medical): No   Physical Activity: Inactive    Days of Exercise per Week: 0 days    Minutes of Exercise per Session: 0 min   Stress: Not on file   Social Connections: Not on file   Intimate Partner Violence: Not on file   Housing Stability: Low Risk     Unable to Pay for Housing in the Last Year: No    Number of Places Lived in the Last Year: 1    Unstable Housing in the Last Year: No       Allergies: Allergies   Allergen Reactions    Aceon [Perindopril Erbumine] Anaphylaxis     Tongue swells up    Nsaids Shortness Of Breath and Swelling     tongue       Physical Exam:  BP (!) 160/72   Pulse 84   Temp 97.7 °F (36.5 °C)   Ht 5' 4\" (1.626 m)   Wt 225 lb (102.1 kg)   SpO2 95%   BMI 38.62 kg/m²   GENERAL: Alert, oriented x 3, not in acute distress. HEENT: PERRLA; EOMI. no oropharyngeal lesions. NECK: Supple. No palpable cervical or supraclavicular lymphadenopathy. LUNGS: Good air entry bilaterally. No wheezing, crackles or rhonchi. CARDIOVASCULAR: Regular rate. No murmurs, rubs or gallops. ABDOMEN: Soft. Non-tender, non-distended. Positive bowel sounds. EXTREMITIES: No lower leg edema.     Bone Marrow biopsy and aspirate:  Bone marrow, left iliac, aspirate and core biopsy (Parts A and B):  Suboptimal specimen showing 15% plasma cells by immunohistochemistry,  consistent with plasma cell neoplasm, see comment. Comment:    The aspirate smear and clot section specimens are hemodilute  and aspicular. Plasmacytosis is seen by immunohistochemistry for   on the core biopsy specimen only. Flow cytometric analysis performed by  [facility] confirmed a lambda-restricted plasma cell neoplasm. See separate report for complete details ([id number]). Intradepartmental consultation is obtained. Impression/Plan:      The patient is a 20-year-old lady with a PMH significant for HTN, hyperlipidemia, DM, COPD, OA, depression, and fibromyalgia, who was diagnosed with IgG lambda MGUS in 2008, used to follow up with Dr. Danna Rodriguez at [facility], last f/up with him was in 2012. She did not have a bone marrow biopsy and aspirate done when she was diagnosed with MGUS. Her most recent SPEP with immunofixation ha revealed monoclonal IgG lambda, M-spike 0.7 gm/dl  from 9/9/2016, stable compared with the previous M-spike level. The patient does not have anemia, renal failure, hypercalcemia or lytic lesions on the lumbar spine MRI. IgG had increased sine 2014, skeletal survey was ordered, and is negative for lytic lesions, she had a BM bx and aspirate done by IR on 11/28/2016, Clot and aspirate suboptimal, biopsy showing 15% plasma cells, Flow cytometry positive for a  lambda restricted plasma cell neoplasm, Cytogenetics revealing a normal female karyotype, MM FISH negative. The patient has 15% plasma cells in the bone marrow, no evidence of end organ damage, she has a smoldering multiple myeloma,risk of progression to MM, at a rate of 10 percent per year for the first five years, 3 percent per year for the next five years, and 1 to 2 percent per year for the following 10 years. The patient returns for a follow-up visit.   She was lost for follow-up, then was seen as inpatient when she was admitted to the hospital with pneumonia, skeletal survey was done on 12/18/2020, was negative for lytic lesions, myeloma labs, including SPEP immunofixation, and serum light chain assay were negative for progression to multiple myeloma. The patient has normocytic anemia, hemoglobin 8.9G/DL, labs reviewed, she does not have vitamin B12/folate or iron deficiency, she has a fit test ordered, the patient will have it done, the anemia could be secondary to recent infection, antibiotics, inflammation, will have the myeloma labs repeated, along with zinc, copper, erythropoietin, and reticulocytes. We will monitor her counts. Lung nodules, the patient had follow-up with pulmonary, referral was placed Dr. Nhan Martin. RTC in 1 month. Thank you for allowing us to participate in the care of Mrs Raudel Hanna.     Long Peter MD   HEMATOLOGY/MEDICAL [address] [facility]  [address] MED ONCOLOGY  [address] [place] [address]  Hussein Miller [id number]  Dept: [address]: [phone]

## 2023-02-10 NOTE — TELEPHONE ENCOUNTER
Patient called she needs a order for a mammogram and a monitor for blood sugar to put on her arm or stomach

## 2023-02-12 LAB — ERYTHROPOIETIN: 18 MU/ML (ref 4–27)

## 2023-02-13 ENCOUNTER — TELEPHONE (OUTPATIENT)
Dept: PRIMARY CARE CLINIC | Age: 68
End: 2023-02-13

## 2023-02-13 DIAGNOSIS — J44.9 CHRONIC OBSTRUCTIVE PULMONARY DISEASE, UNSPECIFIED COPD TYPE (HCC): Primary | ICD-10-CM

## 2023-02-13 LAB
BETA-2 MICROGLOBULIN: 1.9 MG/L (ref 0.6–2.4)
COPPER: 155 UG/DL (ref 80–155)
KAPPA FREE LIGHT CHAINS QNT: 49.41 MG/L (ref 3.3–19.4)
KAPPA/LAMBDA FREE LIGHT CHAIN RATIO: 2.06 (ref 0.26–1.65)
LAMBDA FREE LIGHT CHAINS QNT: 24.04 MG/L (ref 5.71–26.3)
PATHOLOGIST REVIEW: NORMAL
ZINC: 78.8 UG/DL (ref 60–120)

## 2023-02-13 NOTE — TELEPHONE ENCOUNTER
Pt requesting new pulmonology referral because the only appointments available to for Dr Malgorzata Romero are mornings which patient cannot do

## 2023-02-14 LAB
ALBUMIN SERPL-MCNC: 2.5 G/DL (ref 3.5–4.7)
ALPHA-1-GLOBULIN: 0.3 G/DL (ref 0.2–0.4)
ALPHA-2-GLOBULIN: 1.1 G/DL (ref 0.5–1)
BETA GLOBULIN: 1.2 G/DL (ref 0.8–1.3)
ELECTROPHORESIS: ABNORMAL
GAMMA GLOBULIN: 2.6 G/DL (ref 0.7–1.6)
IGA: 104 MG/DL (ref 70–400)
IGG: 2423 MG/DL (ref 700–1600)
IGM: 41 MG/DL (ref 40–230)
IMMUNOFIXATION RESULT, SERUM: NORMAL

## 2023-02-16 ENCOUNTER — HOSPITAL ENCOUNTER (OUTPATIENT)
Dept: SPEECH THERAPY | Age: 68
Setting detail: THERAPIES SERIES
Discharge: HOME | End: 2023-02-16
Payer: MEDICARE

## 2023-02-16 DIAGNOSIS — C90.00 MULTIPLE MYELOMA, REMISSION STATUS UNSPECIFIED (HCC): Primary | ICD-10-CM

## 2023-02-16 DIAGNOSIS — Z12.31 SCREENING MAMMOGRAM FOR HIGH-RISK PATIENT: ICD-10-CM

## 2023-02-16 PROCEDURE — 96125 COGNITIVE TEST BY HC PRO: CPT

## 2023-02-16 NOTE — PROGRESS NOTES
[address] [place] [address]  Outpatient Speech Therapy  Phone: [phone] Fax: [phone]     SPEECH/LANGUAGE PATHOLOGY  ROSS INFORMATION PROCESSING ASSESSMENT-G (RIPA-G)   EVALUATION RESULTS and PLAN OF CARE  PATIENT NAME:  Alicia Richter  (female)     MRN:  [id number]    :  1955  (79 y.o.)  STATUS:  Outpatient clinic   TODAY'S DATE:  2023  REFERRING PROVIDER:    Dr. Mohsen Ugalde NPI:  [id number]  SPECIFIC PROVIDER ORDER: SLP eval and treat  Date of order:  2023  EVALUATING THERAPIST: MILKA Mathew    CERTIFICATION/RECERTIFICATION PERIOD: 2023 to 23  INSURANCE PROVIDER:  Payor: Trever Thomas / Plan: Elidia Eugene HMO / Product Type: Medicare /    INSURANCE ID:  [id number] - (Commercial)   SECONDARY INSURANCE (if applicable): MEDICAL MUTUAL    CPT Codes  EVALUATION: 23861  standardized cognitive performance testing (per hour)    TREATMENT:  Requesting treatment authorization for  4 visits over 4 weeks focusing on the following CPT codes:      64301  therapeutic interventions that focus on cognitive function , initial  15 min  14335  therapeutic interventions that focus on cognitive function, each additional 15 min  51614  speech/language tx     REFERRING/TREATMENT DIAGNOSIS: Cognitive communication deficit [R41.841]          SPEECH THERAPY  PLAN OF CARE   The speech therapy  POC is established based on physician order, speech pathology diagnosis and results of clinical assessment     SPEECH PATHOLOGY DIAGNOSIS:    Mild cognitive deficit    Outpatient Speech Pathology intervention is recommended 1 time  per week for the above certification period. Conditions Requiring Skilled Therapeutic Intervention for speech, language and/or cognition    Decreased short term memory    Specific Speech Therapy Interventions to Include:    Therapeutic tasks for Cognition    Specific instructions for next treatment: To initiate POC    SHORT/LONG TERM GOALS  Pt will improve immediate, short term, recent memory during structured and unstructured tasks with 80% accuracy     Patient goals: Patient/family involved in developing goals and treatment plan:   Treatment goals discussed with Patient    The Patient understand(s) the diagnosis, prognosis and plan of care   The patient/family Agreed with above,     This plan may be re-evaluated and revised as warranted. Rehabilitation Potential/Prognosis: good                               SPEECH/LANGUAGE PATHOLOGY   COGNITIVE EVALUATION       Stimulus questions were obtained from the RIPA-G. There are 30 possible points for each subtest. Severity ratings for each subtest were determined as follows:       Ross Information Processing Assessment-Geriatric:    Subtest Raw Score Percentile Standard Score Severity          Immediate Memory 27 75 12 MILD   Recent Memory 30 98 16 WNL   Temporal Orientation 30 95 15 WNL   Spatial Orientation 30 95 15 WNL   Orientation to Environment 30 95 15 WNL   Recall of General Information 30 95 15 WNL   Problem Solving & Abstract Reasoning 30 95 15 WNL   Organization of Information 30 98 17 WNL   Auditory Processing & Comprehesion 30 95 15 WNL   Problem Solving and [place] Reasoning 30 95 15 WNL   Naming Common Objects 30 91 14 WNL   Functional Oral Reading 30 91 14 WNL       Composites Sum of Standard Scores Quotients Percentile Rank Severity Rating   Information Processing 150 136 >99 WNL   Memory 43 139  97 WNL   Orientation 45 132  99 WNL   Problem Solving 47 136 >99 WNL           VARIANT OBSERVATIONS PRESENT DURING THE EVALATION:   Partially correct  Self corrected                               EDUCATION:   The Speech Language Pathologist (SLP) completed education regarding results of evaluation and that intervention is warranted at this time.   Learner: Patient  Education: Reviewed results and recommendations of this evaluation  Evaluation of Education:  Verbalizes understanding    This plan may be re-evaluated and revised as warranted. Treatment goals discussed with Patient   The Patient understand(s) the diagnosis, prognosis and plan of care     Evaluation Time includes thorough review of current medical information, gathering information on past medical history/social history and prior level of function, completion of standardized testing/informal observation of tasks, assessment of data and education on plan of care and goals. The admitting diagnosis and active problem list, as listed below have been reviewed prior to initiation of this evaluation.         ACTIVE PROBLEM LIST:   Patient Active Problem List   Diagnosis    Adrenal incidentaloma ([facility] [address]    Hyperlipidemia    Hypothyroidism    Fibromyalgia    Obesity    Chronic right-sided low back pain with right-sided sciatica    Tobacco abuse    Myofascial pain    Lumbar radiculitis    Smoldering multiple myeloma    Chronic obstructive pulmonary disease (HCC)    Cervical facet joint syndrome    Cervical spondylosis    Lumbar radiculopathy    Pain in right hip    Lumbar disc disorder    Lumbar spondylosis    Diverticulosis of colon without diverticulitis    Alternating constipation and diarrhea    History of colon polyps    Heartburn    Indigestion    Current mild episode of major depressive disorder, unspecified whether recurrent ([facility] [address]    DM type 2 with diabetic peripheral neuropathy (HCC)    Knee pain    Primary hypertension    Obstructive sleep apnea syndrome, severe    Acute respiratory failure with hypoxia (HCC)    Pneumonia due to infectious organism    Elevated platelet count    COPD exacerbation (HCC)    Normocytic anemia    Smoldering myeloma    Multiple lung nodules    Hyperglycemia    Vaginal candidiasis    Chronic respiratory failure with hypoxia and hypercapnia (HCC)    Type 2 diabetes mellitus with hyperosmolarity without coma, without long-term current use of insulin [facility])           [facility] [address]  2/16/2023           [address]     Phone: [phone]     If you have any questions or concerns, please don't hesitate to call. Thank you for your referral.      Physician/Provider Signature:________________________________Date:__________________      By signing above, the therapists plan is approved by the physician/provider. All patients under Jovani Mendoza must be signed by physician/provider.

## 2023-02-21 ASSESSMENT — ENCOUNTER SYMPTOMS
VOMITING: 0
ABDOMINAL PAIN: 0
SORE THROAT: 0
NAUSEA: 0
SHORTNESS OF BREATH: 1
COUGH: 1
CHEST TIGHTNESS: 0
WHEEZING: 0
VOICE CHANGE: 0

## 2023-02-21 NOTE — PROGRESS NOTES
HPI:  Patient comes in today for FU on SOB,thyoid ,HTN.    Review of Systems   Constitutional:  Negative for chills, fever and unexpected weight change.   HENT:  Negative for postnasal drip, sore throat and voice change.    Respiratory:  Positive for cough and shortness of breath. Negative for chest tightness and wheezing.    Cardiovascular:  Negative for chest pain and leg swelling.   Gastrointestinal:  Negative for abdominal pain, nausea and vomiting.   Musculoskeletal:  Negative for gait problem and joint swelling.   Skin:  Negative for rash and wound.   Neurological: Negative.    Psychiatric/Behavioral:  Negative for suicidal ideas. The patient is nervous/anxious.         Depression      Past Medical History:   Diagnosis Date    Adrenal incidentaloma (HCC)     Anxiety     Arthritis     Asthma     Bladder incontinence     Cancer (HCC) Dx 2009    multiple Myeloma, in remission currently     Chronic back pain     COPD (chronic obstructive pulmonary disease) (HCC)     on O2 2 liters  (uses with activity and at night to sleep)    Depression     Fibromyalgia     GERD (gastroesophageal reflux disease)     Hyperlipidemia     Hypertension     Hypothyroidism     MGUS (monoclonal gammopathy of unknown significance)     Neuropathy     Pneumonia 02/2020    Prolonged emergence from general anesthesia     Sleep apnea     doesnt use her cpap    Type II or unspecified type diabetes mellitus without mention of complication, not stated as uncontrolled     Vaginal bleeding      Past Surgical History:   Procedure Laterality Date    ANESTHESIA NERVE BLOCK Bilateral 8/10/2020    BILATERAL L3 L4 MEDIAL BRANCH L5 DORSSAL RAMUS NERVE BLOCK (CPT 79901) SEDATION performed by Campos Benton MD at [facility] OR    COLONOSCOPY  07/20/2016    removed 3 polyps (tubular adenomas), diverticulosis, Dr. Dwyer    COLONOSCOPY  2008    approximately 2008, no report available, per patient some polyps removed, Dr Myers, [facility]     COLONOSCOPY N/A 11/9/2020    Small sessile polyp distal ascending colon removed with bx/cauterized (path Tubular Adenoma), right and left diverticulosis without diverticulitis, Dr. Basia Pyle, [facility]    COLONOSCOPY  11/9/2020    Small sessile polyp distal ascending colon removed with bx/cauterized (path Tubular Adenoma), right and left diverticulosis without diverticulitis, Dr. Basia Pyle, [address] N/A 5/11/2021    DILATATION AND CURETTAGE HYSTEROSCOPY POSSIBLE REMOVAL OF MASS performed by Kari Marino MD at [address], left knee, arthroscopic    NERVE BLOCK Bilateral 09/22/2016    lumbar transforaminal nerve block #1    NERVE BLOCK  07/06/2020    lumbar epidural steroid injectio L4-5    NERVE BLOCK Bilateral 08/10/2020    L3, L4, L5     OTHER SURGICAL HISTORY  12/13/2016    Excision cyst right face    PAIN MANAGEMENT PROCEDURE N/A 7/6/2020    LUMBAR EPIDURAL STEROID INJECTION L4-5 performed by Milla Lopez MD at [address]  2008 2008    UPPER GASTROINTESTINAL ENDOSCOPY  07/20/2016    GERD and gastric ulcers, Bx H pylori neg, Dr. Cunha Records  2008    approximately 2008, no report, patient does not know the findings, Dr Rachel Granados, [address] N/A 11/9/2020    Severe gastritis with superficial ulcerations with bx neg H pylori, Dr. Basia Pyle, [facility]     Family History   Problem Relation Age of Onset    Heart Disease Mother 79    Diabetes Mother     Cancer Mother         Breast    Hypertension Father     Cancer Father         Pancreas    Diabetes Father     High Blood Pressure Father     Arthritis Brother     Diabetes Brother     Cancer Maternal Uncle     Cancer Paternal Aunt         breast    High Blood Pressure Paternal Aunt     High Blood Pressure Paternal Uncle     Arthritis Maternal Grandmother     Diabetes Maternal Grandmother     High Blood Pressure Maternal Grandmother     Arthritis Maternal Grandfather     Stroke Maternal Grandfather     High Cholesterol Paternal Grandmother     Kidney Disease Paternal Grandmother     Heart Disease Paternal Grandfather       Social History     Tobacco Use    Smoking status: Former     Packs/day: 2.00     Years: 50.00     Pack years: 100.00     Types: Cigarettes     Start date: 7/15/1971     Quit date: 2/10/2020     Years since quitting: 3.0    Smokeless tobacco: Never   Vaping Use    Vaping Use: Never used   Substance Use Topics    Alcohol use: No     Alcohol/week: 0.0 standard drinks    Drug use: No        Current Outpatient Medications on File Prior to Visit   Medication Sig Dispense Refill    acetaminophen (TYLENOL) 500 MG tablet Take 500 mg by mouth every 6 hours as needed for Pain      amLODIPine (NORVASC) 2.5 MG tablet Take 1 tablet by mouth daily 30 tablet 3    losartan (COZAAR) 50 MG tablet Take 1 tablet by mouth daily 30 tablet 3    albuterol sulfate HFA (PROVENTIL;VENTOLIN;PROAIR) 108 (90 Base) MCG/ACT inhaler Inhale 3 puffs into the lungs 4 times daily as needed for Wheezing 3 each 1    amitriptyline (ELAVIL) 50 MG tablet TAKE 1 TABLET BY MOUTH EVERY EVENING 90 tablet 1    levothyroxine (SYNTHROID) 112 MCG tablet Take 1 tablet by mouth Daily 90 tablet 1    rosuvastatin (CRESTOR) 20 MG tablet Take 1 tablet by mouth daily 90 tablet 1    pantoprazole (PROTONIX) 40 MG tablet Take 1 tablet by mouth every morning (before breakfast) 30 tablet 5    citalopram (CELEXA) 40 MG tablet Take 1 tablet by mouth daily 90 tablet 1    montelukast (SINGULAIR) 10 MG tablet TAKE 1 TABLET BY MOUTH EVERY NIGHT AT BEDTIME 90 tablet 1    OXYGEN Inhale 2 L into the lungs daily Uses 2 liters at night 1 each 0    Insulin Pen Needle 31G X 5 MM MISC 1 each by Does not apply route daily 100 each 0    Calcium Polycarbophil (FIBER) 625 MG TABS Take 1 tablet by mouth 2 times daily 180 tablet 3    blood glucose monitor strips Check blood sugar fasting before breakfast and as needed for symptoms of irregular blood glucose. Dispense sufficient amount for indicated testing frequency plus additional to accommodate PRN testing needs. 100 strip 0    glucose monitoring (FREESTYLE FREEDOM) kit 1 kit by Does not apply route daily Ok to change brand that insurance will cover. 1 kit 0    Lancets MISC Check blood sugar daily and prn as needed 100 each 0    Respiratory Therapy Supplies (NEBULIZER/TUBING/MOUTHPIECE) KIT 1 kit by Does not apply route daily as needed (for shortness of breath/wheezing) 1 kit 0    albuterol (PROVENTIL) (2.5 MG/3ML) 0.083% nebulizer solution Take 3 mLs by nebulization every 6 hours as needed for Wheezing or Shortness of Breath 120 each 1    baclofen (LIORESAL) 10 MG tablet Take 1 tablet by mouth 3 times daily as needed (back muscle spasms) 30 tablet 0    SYMBICORT 160-4.5 MCG/ACT AERO INHALE 2 PUFFS INTO THE LUNGS TWICE DAILY 10.2 g 3    celecoxib (CELEBREX) 100 MG capsule Take 1 capsule by mouth daily 90 capsule 1    docusate sodium (COLACE) 100 MG capsule Take 1 capsule by mouth 2 times daily 180 capsule 1    ammonium lactate (LAC-HYDRIN) 12 % lotion Apply topically twice daily 400 g 2    calcium-cholecalciferol (CALCIUM 500/D) 500-200 MG-UNIT per tablet TAKE 1 TABLET BY MOUTH DAILY 180 tablet 1    mineral oil-hydrophilic petrolatum (HYDROPHOR) ointment Apply topically as needed. 3 each 2     No current facility-administered medications on file prior to visit. PE:  VS:  /76   Pulse 94   Temp 97 °F (36.1 °C)   Resp 17   Ht 5' 4\" (1.626 m)   Wt 217 lb (98.4 kg)   SpO2 94%   BMI 37.25 kg/m²   General:  Alert and oriented, NAD  HEENT: Ear auricles are normal, EOMI, Conjunctivae clear  NECK:  Supple without adenopathy or thyromegaly, no carotid bruits. LUNGS:  CTA all fields  HEART:  RRR without M, R, or G  ABDOMEN:  Soft and nontender without palpable abnormalities, No renal or aortic bruits.   EXTREMITIES: No ankle edema bilaterally. Neuro: No focal deficit. Lab Results   Component Value Date    WBC 5.9 02/10/2023    HGB 9.5 (L) 02/10/2023    HCT 31.9 (L) 02/10/2023     02/10/2023    CHOL 122 01/05/2023    TRIG 112 01/05/2023    HDL 43 01/05/2023    LDLCALC 57 01/05/2023    ALT 14 02/10/2023    AST 21 02/10/2023     02/10/2023    K 3.9 02/10/2023     02/10/2023    CREATININE 0.8 02/10/2023    BUN 11 02/10/2023    LABGLOM >60 02/10/2023    GFRAA >60 06/23/2022    CO2 25 02/10/2023    TSH 0.766 01/05/2023    INR 1.0 04/17/2022    GLUCOSE 88 02/10/2023    LABA1C 7.9 (H) 02/07/2023    LABMICR 30.4 (H) 01/25/2023    LABCREA 191 01/25/2023    MALBCR 15.9 01/25/2023         Impression/Plan:    1. Chronic obstructive pulmonary disease, unspecified COPD type ([facility]  Comments:  Referral pulmonary,continue Symbocort,nebulizer and spiriva  Orders:  -     Dulaglutide (TRULICITY) 8.80 DV/1.1JH SOPN; Inject 0.75 mg into the skin once a week, Disp-1 Adjustable Dose Pre-filled Pen Syringe, R-3Normal  -     tiotropium (SPIRIVA HANDIHALER) 18 MCG inhalation capsule; Inhale 1 capsule into the lungs daily, Disp-30 capsule, R-5Print  2. DM type 2 with diabetic peripheral neuropathy (HCC)  Comments:  A1c 8.2,RLYT add trulicity  Cont. amitreptyline & Lyrica  Orders:  -     insulin glargine (LANTUS SOLOSTAR) 100 UNIT/ML injection pen; 34 units every morning and 10 units every night, Disp-9 Adjustable Dose Pre-filled Pen Syringe, R-3Normal  -     Dulaglutide (TRULICITY) 7.57 SN/4.6EC SOPN; Inject 0.75 mg into the skin once a week, Disp-1 Adjustable Dose Pre-filled Pen Syringe, R-3Normal  -     pregabalin (LYRICA) 75 MG capsule; Take 1 capsule by mouth 3 times daily for 30 days. Max Daily Amount: 225 mg, Disp-90 capsule, R-0Normal  3. Fibromyalgia  Comments:  Lyrica   Orders:  -     pregabalin (LYRICA) 75 MG capsule; Take 1 capsule by mouth 3 times daily for 30 days. Max Daily Amount: 225 mg, Disp-90 capsule, R-0Normal  4. Uncontrolled type 2 diabetes mellitus with hyperglycemia ([facility]  Comments: Add trulicity once weekly  Orders:  -     POCT glycosylated hemoglobin (Hb A1C)  5. Cognitive communication deficit  Comments:  Speech for cog. evaluation  Orders:  -     SLP cognitive language evaluation; Future  6. Multinodular goiter  Comments:  Check US thyroid  Orders:  -     US THYROID; Future  7. Anemia, unspecified type  Comments:  Oncology consult & FIT test  8. Hypothyroidism, unspecified type  Comments:  Cont.  synth 112 UG/day

## 2023-02-22 ENCOUNTER — OFFICE VISIT (OUTPATIENT)
Dept: PODIATRY | Age: 68
End: 2023-02-22
Payer: COMMERCIAL

## 2023-02-22 VITALS
TEMPERATURE: 97.4 F | BODY MASS INDEX: 38.62 KG/M2 | DIASTOLIC BLOOD PRESSURE: 74 MMHG | WEIGHT: 225 LBS | SYSTOLIC BLOOD PRESSURE: 130 MMHG

## 2023-02-22 DIAGNOSIS — E11.00 TYPE 2 DIABETES MELLITUS WITH HYPEROSMOLARITY WITHOUT COMA, WITHOUT LONG-TERM CURRENT USE OF INSULIN (HCC): ICD-10-CM

## 2023-02-22 DIAGNOSIS — E11.9 ENCOUNTER FOR DIABETIC FOOT EXAM (HCC): ICD-10-CM

## 2023-02-22 DIAGNOSIS — I73.9 PERIPHERAL VASCULAR DISEASE, UNSPECIFIED (HCC): ICD-10-CM

## 2023-02-22 DIAGNOSIS — B35.1 TINEA UNGUIUM: Primary | ICD-10-CM

## 2023-02-22 DIAGNOSIS — M79.674 PAIN IN TOE OF RIGHT FOOT: ICD-10-CM

## 2023-02-22 DIAGNOSIS — M79.675 PAIN IN LEFT TOE(S): ICD-10-CM

## 2023-02-22 PROCEDURE — 3078F DIAST BP <80 MM HG: CPT | Performed by: PODIATRIST

## 2023-02-22 PROCEDURE — G8417 CALC BMI ABV UP PARAM F/U: HCPCS | Performed by: PODIATRIST

## 2023-02-22 PROCEDURE — 11721 DEBRIDE NAIL 6 OR MORE: CPT | Performed by: PODIATRIST

## 2023-02-22 PROCEDURE — 3051F HG A1C>EQUAL 7.0%<8.0%: CPT | Performed by: PODIATRIST

## 2023-02-22 PROCEDURE — 3017F COLORECTAL CA SCREEN DOC REV: CPT | Performed by: PODIATRIST

## 2023-02-22 PROCEDURE — G8427 DOCREV CUR MEDS BY ELIG CLIN: HCPCS | Performed by: PODIATRIST

## 2023-02-22 PROCEDURE — 3075F SYST BP GE 130 - 139MM HG: CPT | Performed by: PODIATRIST

## 2023-02-22 PROCEDURE — G8484 FLU IMMUNIZE NO ADMIN: HCPCS | Performed by: PODIATRIST

## 2023-02-22 PROCEDURE — 1090F PRES/ABSN URINE INCON ASSESS: CPT | Performed by: PODIATRIST

## 2023-02-22 PROCEDURE — 99203 OFFICE O/P NEW LOW 30 MIN: CPT | Performed by: PODIATRIST

## 2023-02-22 PROCEDURE — 2022F DILAT RTA XM EVC RTNOPTHY: CPT | Performed by: PODIATRIST

## 2023-02-22 PROCEDURE — 1036F TOBACCO NON-USER: CPT | Performed by: PODIATRIST

## 2023-02-22 PROCEDURE — G8399 PT W/DXA RESULTS DOCUMENT: HCPCS | Performed by: PODIATRIST

## 2023-02-22 PROCEDURE — 1123F ACP DISCUSS/DSCN MKR DOCD: CPT | Performed by: PODIATRIST

## 2023-02-22 NOTE — PROGRESS NOTES
1 [facility] PODIATRY  [address] [place], [place] [address] [address]  Dept: [phone]  Dept Fax: [phone]  Loc: [phone]    DIABETIC NAIL PROGRESS NOTE  Date of patient's visit: 2/22/2023  Patient's Name:  Jocelyn Gandhi YOB: 1955            Patient Care Team:  Mario Tovar MD as PCP - General (Internal Medicine)  Mario Tovar MD as PCP - Empaneled Provider  Yrn Dixon MD as Consulting Physician (Pulmonology)  Valencia Olivo as Imaging Navigator          Chief Complaint   Patient presents with    Diabetes    Referral - General    Toe Pain     Nail care dm foot ck saw pcp Dr. Etelvina Benitez 2/10/2023       Subjective:   Jocelyn Gandhi comes to clinic for Diabetes, Referral - General, and Toe Pain (Nail care dm foot ck saw pcp Dr. Etelvina Benitez 2/10/2023)    she is a diabetic and states that . Pt currently has complaint of thickened, elongated nails that they cannot manage by themselves. Pt's primary care physician is Mario Tovar MD    .     Lab Results   Component Value Date    LABA1C 7.9 (H) 02/07/2023      Complains of numbness in the feet bilat.   Past Medical History:   Diagnosis Date    Adrenal incidentaloma ([address]     Anxiety     Arthritis     Asthma     Bladder incontinence     Cancer [facility]) Dx 2009    multiple Myeloma, in remission currently     Chronic back pain     COPD (chronic obstructive pulmonary disease) (HCC)     on O2 2 liters  (uses with activity and at night to sleep)    Depression     Fibromyalgia     GERD (gastroesophageal reflux disease)     Hyperlipidemia     Hypertension     Hypothyroidism     MGUS (monoclonal gammopathy of unknown significance)     Neuropathy     Pneumonia 02/2020    Prolonged emergence from general anesthesia     Sleep apnea     doesnt use her cpap    Type II or unspecified type diabetes mellitus without mention of complication, not stated as uncontrolled     Vaginal bleeding        Allergies   Allergen Reactions    Aceon [Perindopril Erbumine] Anaphylaxis     Tongue swells up    Nsaids Shortness Of Breath and Swelling     tongue     Current Outpatient Medications on File Prior to Visit   Medication Sig Dispense Refill    insulin glargine (LANTUS SOLOSTAR) 100 UNIT/ML injection pen 34 units every morning and 10 units every night 9 Adjustable Dose Pre-filled Pen Syringe 3    Dulaglutide (TRULICITY) 6.32 ZY/0.3YW SOPN Inject 0.75 mg into the skin once a week 1 Adjustable Dose Pre-filled Pen Syringe 3    pregabalin (LYRICA) 75 MG capsule Take 1 capsule by mouth 3 times daily for 30 days.  Max Daily Amount: 225 mg 90 capsule 0    tiotropium (SPIRIVA HANDIHALER) 18 MCG inhalation capsule Inhale 1 capsule into the lungs daily 30 capsule 5    acetaminophen (TYLENOL) 500 MG tablet Take 500 mg by mouth every 6 hours as needed for Pain      amLODIPine (NORVASC) 2.5 MG tablet Take 1 tablet by mouth daily 30 tablet 3    losartan (COZAAR) 50 MG tablet Take 1 tablet by mouth daily 30 tablet 3    albuterol sulfate HFA (PROVENTIL;VENTOLIN;PROAIR) 108 (90 Base) MCG/ACT inhaler Inhale 3 puffs into the lungs 4 times daily as needed for Wheezing 3 each 1    amitriptyline (ELAVIL) 50 MG tablet TAKE 1 TABLET BY MOUTH EVERY EVENING 90 tablet 1    levothyroxine (SYNTHROID) 112 MCG tablet Take 1 tablet by mouth Daily 90 tablet 1    rosuvastatin (CRESTOR) 20 MG tablet Take 1 tablet by mouth daily 90 tablet 1    pantoprazole (PROTONIX) 40 MG tablet Take 1 tablet by mouth every morning (before breakfast) 30 tablet 5    citalopram (CELEXA) 40 MG tablet Take 1 tablet by mouth daily 90 tablet 1    montelukast (SINGULAIR) 10 MG tablet TAKE 1 TABLET BY MOUTH EVERY NIGHT AT BEDTIME 90 tablet 1    OXYGEN Inhale 2 L into the lungs daily Uses 2 liters at night 1 each 0    Insulin Pen Needle 31G X 5 MM MISC 1 each by Does not apply route daily 100 each 0    Calcium Polycarbophil (FIBER) 625 MG TABS Take 1 tablet by mouth 2 times daily 180 tablet 3    blood glucose monitor strips Check blood sugar fasting before breakfast and as needed for symptoms of irregular blood glucose. Dispense sufficient amount for indicated testing frequency plus additional to accommodate PRN testing needs. 100 strip 0    glucose monitoring (FREESTYLE FREEDOM) kit 1 kit by Does not apply route daily Ok to change brand that insurance will cover. 1 kit 0    Lancets MISC Check blood sugar daily and prn as needed 100 each 0    Respiratory Therapy Supplies (NEBULIZER/TUBING/MOUTHPIECE) KIT 1 kit by Does not apply route daily as needed (for shortness of breath/wheezing) 1 kit 0    albuterol (PROVENTIL) (2.5 MG/3ML) 0.083% nebulizer solution Take 3 mLs by nebulization every 6 hours as needed for Wheezing or Shortness of Breath 120 each 1    baclofen (LIORESAL) 10 MG tablet Take 1 tablet by mouth 3 times daily as needed (back muscle spasms) 30 tablet 0    SYMBICORT 160-4.5 MCG/ACT AERO INHALE 2 PUFFS INTO THE LUNGS TWICE DAILY 10.2 g 3    celecoxib (CELEBREX) 100 MG capsule Take 1 capsule by mouth daily 90 capsule 1    docusate sodium (COLACE) 100 MG capsule Take 1 capsule by mouth 2 times daily 180 capsule 1    ammonium lactate (LAC-HYDRIN) 12 % lotion Apply topically twice daily 400 g 2    calcium-cholecalciferol (CALCIUM 500/D) 500-200 MG-UNIT per tablet TAKE 1 TABLET BY MOUTH DAILY 180 tablet 1    mineral oil-hydrophilic petrolatum (HYDROPHOR) ointment Apply topically as needed. 3 each 2     No current facility-administered medications on file prior to visit. Review of Systems    Review of Systems:   History obtained from chart review and the patient  General ROS: negative for - chills, fatigue, fever, night sweats or weight gain  Constitutional: Negative for chills, diaphoresis, fatigue, fever and unexpected weight change. Musculoskeletal: Positive for arthralgias, gait problem and joint swelling.   Neurological ROS: negative for - behavioral changes, confusion, headaches or seizures. Negative for weakness and numbness. Dermatological ROS: negative for - mole changes, rash  Cardiovascular: Negative for leg swelling. Gastrointestinal: Negative for constipation, diarrhea, nausea and vomiting. Objective:  General: AAO x 3 in NAD. Derm  Toenail Description nails are thick and mycotic yellow incurvated causing pain with shoe gear  Sites of Onychomycosis Involvement (Check affected area)  [x] [x] [x] [x] [x] [x] [x] [x] [x] [x]  5 4 3 2 1 1 2 3 4 5                          Right                                        Left    Thickness  [x] [x] [x] [x] [x] [x] [x] [x] [x] [x]  5 4 3 2 1 1 2 3 4 5                         Right                                        Left    Dystrophic Changes   [x] [x] [x] [x] [x] [x] [x] [x] [x] [x]  5 4 3 2 1 1 2 3 4 5                         Right                                        Left    Color   [x] [x] [x] [x] [x] [x] [x] [x] [x] [x]  5 4 3 2 1 1 2 3 4 5                          Right                                        Left    Incurvation/Ingrowin   [x] [x] [x] [x] [x] [x] [x] [x] [x] [x]  5 4 3 2 1 1 2 3 4 5                         Right                                        Left    Inflammation/Pain   [x] [x] [x] [x] [x] [x] [x] [x] [x] [x]  5 4 3 2 1 1 2 3 4 5                         Right                                        Left      Dermatologic Exam:  Skin lesion/ulceration .    Skin   Loss of hair  lower extremity      Musculoskeletal:     1st MPJ ROM decreased, Bilateral.  Muscle Bilateral.  Pain present upon palpation of toenails 1-5, Bilateral. decreased medial longitudinal arch, Bilateral.  Ankle ROM decreased,Bilateral.    Dorsally contracted digits     Vascular: DP and PT pulses absent pulses CFT < seconds, Bilateral.  Hair growth absent to the level of the digits, Bilateral.  Edema  Bilateral.  Varicosities  Bilateral.     Neurological: Sensation diminshed to light touch to level of digits, Bilateral.  Protective sensation  sites via 5.07/10g [place] Monofilament, Bilateral.  negative Tinel's, Bilateral.  negative Valleix sign, Bilateral.      Integument: nails   thickened > 3.0 mm, dystrophic and crumbly, discolored with subungual debris. Toes cool to touch    Visual inspection:  Deformity: hammertoe deformity marina feet  amputation: absent    Edema:   Sensory exam:  Monofilament sensation: abnormal - 6/10 via SW 5.07/10g monofilament to the plantar foot bilateral feet    Pulses:     Pinprick: Impaired  Proprioception: Impaired  Vibration (128 Hz): Impaired       DM with PVD       [x]Yes    []no    Foot Exam     Ortho Exam      Assessment:  79 y.o. female with:  Carito Love was seen today for diabetes, referral - general and toe pain. Diagnoses and all orders for this visit:    Tinea unguium    Type 2 diabetes mellitus with hyperosmolarity without coma, without long-term current use of insulin (HCC)  -     Diabetic Foot Exam    Pain in left toe(s)    Pain in toe of right foot    Peripheral vascular disease, unspecified ([facility]    Encounter for diabetic foot exam ([facility]           Q7   []Yes    []No                Q8   [x]Yes    []No                     Q9   []Yes    []No    Plan:   Pt was evaluated and examined. Patient was given personalized discharge instructions. Nails 1-10 were debrided sharply in length and thickness with a nipper and , without incident. Pt will follow up in 3 months or sooner if any problems arise. Diagnosis was discussed with the pt and all of their questions were answered in detail. Proper foot hygiene and care was discussed with the pt. Informed patient on proper diabetic foot care and importance of tight glycemic control. Patient to check feet daily and contact the office with any questions/problems/concerns. Other comorbidity noted and will be managed by PCP. @ED   It was discussed in detail with the patient proper hygiene for the diabetic foot. They are to get into a habit of looking at their feet or have someone look at them. If they are unable to daily, they are to look for any signs of redness, blistering, cracking, swelling, drainage, open lesions, etc.  They are to dry in-between the toes after each bath or shower gently. The water should be tested with their elbow to prevent burns. The patient is to refrain from soaking their feet unless instructed by myself to do otherwise. They are to refrain from going barefoot. Shoe gear should be inspected for any foreign objects. Shoes should have a deep, wide toe box area. With any type of shoe, the feet should be inspected for any signs of pressure, i.e., redness, blistering, or open lesions. The patient was instructed to contact myself or other health care provider with any questions.    Electronically signed by Nicole Morrissey DPM on 2/22/2023 at 2:45 PM  2/22/2023

## 2023-02-27 ENCOUNTER — TELEPHONE (OUTPATIENT)
Dept: SURGERY | Age: 68
End: 2023-02-27

## 2023-02-27 NOTE — TELEPHONE ENCOUNTER
Pt was referred to Dr Jason Muse from Dr Lotus Carlson for epigastric issues and anemia. Has been scheduled 2 times and was a no show for both appointments.  Referral sent back to Dr Lotus Carlson

## 2023-03-01 ENCOUNTER — HOSPITAL ENCOUNTER (OUTPATIENT)
Dept: ULTRASOUND IMAGING | Age: 68
Discharge: HOME | End: 2023-03-03
Payer: COMMERCIAL

## 2023-03-01 DIAGNOSIS — E04.2 MULTINODULAR GOITER: ICD-10-CM

## 2023-03-01 PROCEDURE — 76536 US EXAM OF HEAD AND NECK: CPT

## 2023-03-05 ENCOUNTER — HOSPITAL ENCOUNTER (OUTPATIENT)
Dept: ULTRASOUND IMAGING | Age: 68
Discharge: HOME | End: 2023-03-07
Payer: COMMERCIAL

## 2023-03-05 ENCOUNTER — HOSPITAL ENCOUNTER (EMERGENCY)
Age: 68
Discharge: HOME | End: 2023-03-05

## 2023-03-05 VITALS
DIASTOLIC BLOOD PRESSURE: 78 MMHG | HEART RATE: 76 BPM | WEIGHT: 227 LBS | BODY MASS INDEX: 38.96 KG/M2 | OXYGEN SATURATION: 95 % | RESPIRATION RATE: 16 BRPM | TEMPERATURE: 98 F | SYSTOLIC BLOOD PRESSURE: 154 MMHG

## 2023-03-05 DIAGNOSIS — M79.662 PAIN OF LEFT CALF: ICD-10-CM

## 2023-03-05 DIAGNOSIS — M79.662 PAIN OF LEFT CALF: Primary | ICD-10-CM

## 2023-03-05 PROCEDURE — 93971 EXTREMITY STUDY: CPT

## 2023-03-05 ASSESSMENT — PAIN DESCRIPTION - LOCATION: LOCATION: BACK;LEG

## 2023-03-05 ASSESSMENT — PAIN - FUNCTIONAL ASSESSMENT: PAIN_FUNCTIONAL_ASSESSMENT: 0-10

## 2023-03-05 ASSESSMENT — LIFESTYLE VARIABLES
HOW MANY STANDARD DRINKS CONTAINING ALCOHOL DO YOU HAVE ON A TYPICAL DAY: PATIENT DOES NOT DRINK
HOW OFTEN DO YOU HAVE A DRINK CONTAINING ALCOHOL: NEVER

## 2023-03-05 ASSESSMENT — PAIN DESCRIPTION - PAIN TYPE: TYPE: ACUTE PAIN

## 2023-03-05 ASSESSMENT — PAIN DESCRIPTION - ORIENTATION: ORIENTATION: LEFT

## 2023-03-05 ASSESSMENT — PAIN SCALES - GENERAL: PAINLEVEL_OUTOF10: 10

## 2023-03-05 ASSESSMENT — PAIN DESCRIPTION - DESCRIPTORS: DESCRIPTORS: DISCOMFORT;SORE;SHOOTING

## 2023-03-05 ASSESSMENT — PAIN DESCRIPTION - ONSET: ONSET: ON-GOING

## 2023-03-05 ASSESSMENT — PAIN DESCRIPTION - FREQUENCY: FREQUENCY: CONTINUOUS

## 2023-03-05 NOTE — DISCHARGE INSTRUCTIONS
Go to [address] to the emergency room and be directed to ultrasound department. You will stay after the ultrasound until we have the results. If it is positive for DVT you will have to sign into [facility] ER for treatment and care.

## 2023-03-05 NOTE — ED PROVIDER NOTES
Independent MYRNA Visit. [address] [facility]  Department of Emergency Medicine   ED  Encounter Note  Admit Date/RoomTime: 3/5/2023  6:30 PM  ED Room: 15/15  NAME: Branden Mcallister  : 1955  MRN: [id number]     Chief Complaint:  Back Pain (Lower left back pain shooting pains down left leg, wants to make sure its no a blood clot)    HISTORY OF PRESENT ILLNESS        Branden Mcallister is a 79 y.o. female who presents to the ED with a complaint of left leg pain. Patient does admit to chronic back pain with left leg sciatica. She also has a bad left knee that is also chronic. She however comes in today with a complaint of left calf pain. States all night last night she kept getting crampy pain, shooting pains through her left calf. She is concerned for a blood clot. Patient denies fevers or chills. Does admit to shortness of breath that is chronic from her COPD, not any worse than normal.  Denies chest pain. Back pain down the left leg is not any worse than normal and does not feel the same as the pains that she is getting in her left calf. She rates the pain in her left calf a 10 out of 10. Has been applying a heating pad. ROS   Pertinent positives and negatives are stated within HPI, all other systems reviewed and are negative. Past Medical History:  has a past medical history of Adrenal incidentaloma ([address] Anxiety, Arthritis, Asthma, Bladder incontinence, Cancer ([address] Chronic back pain, COPD (chronic obstructive pulmonary disease) ([address] Depression, Fibromyalgia, GERD (gastroesophageal reflux disease), Hyperlipidemia, Hypertension, Hypothyroidism, MGUS (monoclonal gammopathy of unknown significance), Neuropathy, Pneumonia, Prolonged emergence from general anesthesia, Sleep apnea, Type II or unspecified type diabetes mellitus without mention of complication, not stated as uncontrolled, and Vaginal bleeding.     Surgical History:  has a past surgical history that includes knee surgery (1980); Upper gastrointestinal endoscopy (2008); Colonoscopy (07/20/2016); Upper gastrointestinal endoscopy (07/20/2016); Nerve Block (Bilateral, 09/22/2016); Nerve Block (07/06/2020); Pain management procedure (N/A, 7/6/2020); Nerve Block (Bilateral, 08/10/2020); Anesthesia Nerve Block (Bilateral, 8/10/2020); other surgical history (12/13/2016); Colonoscopy (2008); Upper gastrointestinal endoscopy (2008); Upper gastrointestinal endoscopy (N/A, 11/9/2020); Colonoscopy (N/A, 11/9/2020); Colonoscopy (11/9/2020); and Dilation and curettage of uterus (N/A, 5/11/2021). Social History:  reports that she quit smoking about 3 years ago. Her smoking use included cigarettes. She started smoking about 51 years ago. She has a 100.00 pack-year smoking history. She has never used smokeless tobacco. She reports that she does not drink alcohol and does not use drugs. Family History: family history includes Arthritis in her brother, maternal grandfather, and maternal grandmother; Cancer in her father, maternal uncle, mother, and paternal aunt; Diabetes in her brother, father, maternal grandmother, and mother; Heart Disease in her paternal grandfather; Heart Disease (age of onset: 79) in her mother; High Blood Pressure in her father, maternal grandmother, paternal aunt, and paternal uncle; High Cholesterol in her paternal grandmother; Hypertension in her father; Kidney Disease in her paternal grandmother; Stroke in her maternal grandfather. Allergies: Aceon [perindopril erbumine] and Nsaids    PHYSICAL EXAM   Oxygen Saturation Interpretation: Normal on room air analysis. ED Triage Vitals   BP Temp Temp src Heart Rate Resp SpO2 Height Weight   03/05/23 1826 03/05/23 1826 -- 03/05/23 1826 03/05/23 1826 03/05/23 1826 -- 03/05/23 1835   (!) 152/81 98 °F (36.7 °C)  76 16 95 %  227 lb (103 kg)         General:  NAD. Alert and Oriented. Well-appearing. Skin:  Warm, dry. No rashes. Head:  Normocephalic. Atraumatic. Eyes:  EOMI. Conjunctiva normal.  ENT:  Oral mucosa moist.  Airway patent. Neck:  Supple. Normal ROM. Respiratory:  No respiratory distress. No labored breathing. Lungs slightly diminished without rales, rhonchi or wheezing. Cardiovascular:  Regular rate. No Murmur. No peripheral edema. Extremities warm and good color. Extremities:    Comparing the legs they are equal in size. Left calf is nonswollen. She is tender to palpation to the posterior left calf. But calf squeeze is nontender and Vijay Pall' sign is negative. Left knee does have an effusion and limited range of motion that she states is chronic. Back:  Normal ROM. Left low back is tender to palpation around her SI joint. Neuro:  Alert and Oriented to person, place, time and situation. Normal LOC. Moves all extremities. Speech fluent. Psych:  Calm and Cooperative. Normal thought process. Normal judgement. Lab / Imaging Results   (All laboratory and radiology results have been personally reviewed by myself)  Labs:  No results found for this visit on 03/05/23. Imaging: All Radiology results interpreted by Radiologist unless otherwise noted. No orders to display       ED Course / Medical Decision Making   Medications - No data to display     Re-examination:  3/5/23       Time: 1700 patient is concerned for blood clot to the left leg. Patient will go to [facility] to the ultrasound department for an ultrasound left lower leg to rule out DVT. She does understand that if ultrasound is positive for DVT she will have to sign into ' [facility] ER for treatment and disposition. Patients condition . Consult(s):   None    Procedure(s):   none    MDM:   ED COURSE / MEDICAL DECISION MAKING    Recap: 68-year-old female with a complaint of left leg pain  History was provided by patient and there are no social determinants affecting patient care.   Records Reviewed: None  Results/Interventions: Ultrasound left leg is negative for DVT    Differential Diagnoses considered but not fully inclusive: Muscle cramp. Chronic knee pain. Chronic back pain with sciatica. DVT. I am Primary Clinician of Record and case was not discussed with ED Physician. Diagnosis, Disposition and any Prescriptions as follows  Patient will continue her at home treatment that she already does for her chronic back pain with sciatica and her chronic knee pain. Plan of Care/Counseling:  Trinity Vasquez reviewed today's visit with the patient in addition to providing specific details for the plan of care and counseling regarding the diagnosis and prognosis. Questions are answered at this time and are agreeable with the plan. ASSESSMENT     1. Pain of left calf      PLAN   Discharged home. Patient condition is good    New Medications     New Prescriptions    No medications on file     Electronically signed by AJ Vasquez   DD: 3/5/23  **This report was transcribed using voice recognition software. Every effort was made to ensure accuracy; however, inadvertent computerized transcription errors may be present.   END OF ED PROVIDER NOTE       Trinity Vasquez  03/05/23 5330

## 2023-03-05 NOTE — ED NOTES
Patient seated comfortably and talking with friend. No resp distress appreciated.   95% on RA     Tian Martinez RN  03/05/23 1345

## 2023-03-08 ENCOUNTER — HOSPITAL ENCOUNTER (OUTPATIENT)
Dept: GENERAL RADIOLOGY | Age: 68
Discharge: HOME | End: 2023-03-10
Payer: COMMERCIAL

## 2023-03-08 VITALS — HEIGHT: 66 IN | BODY MASS INDEX: 36.48 KG/M2 | WEIGHT: 227 LBS

## 2023-03-08 DIAGNOSIS — Z12.39 BREAST SCREENING: ICD-10-CM

## 2023-03-08 PROCEDURE — 77067 SCR MAMMO BI INCL CAD: CPT

## 2023-03-10 NOTE — PROGRESS NOTES
Blood pressure is 124/72 mm Hg today in the office.  Patient advised to continue amlodipine 5 mg p.o. daily, isosorbide dinitrate 10 mg p.o. b.i.d., metoprolol 25 mg p.o. b.i.d.   Medicare Annual Wellness Visit    Julio Douglas is here for Medicare AWV, Foot Swelling (Patient complains of left foot swelling and pain  start on 9/2), Foot Pain (Left foot pain  9/2), and Neck Pain (Left sided neck pain  )    Assessment & Plan     Acute left ankle pain  Started 2 days ago, associated with mild swelling  Pain reproduced on inversion and eversion  No swelling or discoloration of the left calf  Left ankle x-ray, 2 views  Continue NSAID oral and topical  Follow-up in 1 week    HTN -- controlled   Currently on Maxzide    COPD with chronic hypoxia on nc 2L nocturnally, stable    Hx of Lung nodule   New 8 mm nodule in LLL on CT scan 4/14/22  Repeat lung CT in 2 months    Hypothyroidism   On Synthroid 112 mcg daily  Last TSH: 5.3     Hx of thyroid nodule  Stable on thyroid US 07/01/2022  Underwent FNA in 2018, which was inconclusive  Referral made for FNA    DM 2, controlled   Hb A1C: 5.8  On Metformin, 1000 mg daily  Not tolerating statin    Hx of MGUS  Following up with Dr. Mervat Ervin (IgG lambda monoclonal protein is present)  Last bone scan on 09/21: No identifiable lytic lesions to suggest recurrent disease. Fibromyalgia  On amitriptyline   Tolerating the medications    Hx of neuropathy   On gabapentin    Vitamin D deficiency  On supplementation    Chronic neck pain  Tenderness on paraspinal muscle mostly on the left side  Referral made to physical therapy    [facility]  Shingles vaccine  Flu vaccine  Low-dose CT Lung    Recommendations for Preventive Services Due: see orders and patient instructions/AVS.  Recommended screening schedule for the next 5-10 years is provided to the patient in written form: see Patient Instructions/AVS.     No follow-ups on file. Subjective     Patient presented to the office for an annual visit. She is complaining about ankle pain. Ankle pain acute onset and associated with mild edema. She denies any trauma or injury of the ankle.   She mentions that the pain is moderate to severe. She also complains of neck pain which is chronic and has remained the same since last visit. It is not associated with any visual findings or neurological deficits. It does not radiate to the fingers or arms. It does not wake her up at night. Patient's complete Health Risk Assessment and screening values have been reviewed and are found in Flowsheets. The following problems were reviewed today and where indicated follow up appointments were made and/or referrals ordered. Positive Risk Factor Screenings with Interventions:              Health Habits/Nutrition:  Physical Activity: Not on file              Health Habits/Nutrition Interventions:  Dental exam overdue:     Patient mentions she has not worked out however she is active at work. Objective   Vitals:    09/06/22 1530   BP: 139/78   Site: Left Upper Arm   Position: Sitting   Cuff Size: Medium Adult   Pulse: 88   Resp: 20   Temp: 96.8 °F (36 °C)   TempSrc: Temporal   SpO2: 95%   Weight: 226 lb (102.5 kg)   Height: 5' 6\" (1.676 m)      Body mass index is 36.48 kg/m².       General Appearance: alert and oriented to person, place and time, well-developed and well-nourished, in no acute distress  Skin: warm and dry, no rash or erythema  Head: normocephalic and atraumatic  Eyes: pupils equal, round, and reactive to light, extraocular eye movements intact, conjunctivae normal  ENT: tympanic membrane, external ear and ear canal normal bilaterally, oropharynx clear and moist with normal mucous membranes  Neck: neck supple and non tender without mass, no thyromegaly or thyroid nodules, no cervical lymphadenopathy   Pulmonary/Chest: clear to auscultation bilaterally- no wheezes, rales or rhonchi, normal air movement, no respiratory distress  Cardiovascular: normal rate, normal S1 and S2, no gallops, intact distal pulses, and no carotid bruits  Abdomen: soft, non-tender, non-distended, normal bowel sounds, no masses or organomegaly  Extremities: no cyanosis and no clubbing  Musculoskeletal:   Left ankle is painful due to active and passive motion, there is a mild edema in the left ankle, no deformity noted. The joint is not warm or red. Paraspinal muscle tenderness noted on the left cervical area. There was no local tenderness on the spinous process of the cervical area. Spurling test did not reproduce radiculopathy  Neurologic: gait and coordination normal and speech normal       Allergies   Allergen Reactions    Aceon [Perindopril Erbumine] Anaphylaxis     Tongue swells up    Nsaids Shortness Of Breath and Swelling     tongue     Prior to Visit Medications    Medication Sig Taking? Authorizing Provider   ibuprofen (ADVIL;MOTRIN) 800 MG tablet TAKE 1 TABLET BY MOUTH THREE TIMES DAILY WITH MEALS Yes Brodie Malin., DO   ammonium lactate (LAC-HYDRIN) 12 % lotion Apply topically twice daily Yes Marquis Balaji DPM   levothyroxine (SYNTHROID) 112 MCG tablet Take 1 tablet by mouth Daily Yes Tania Meeks MD   amitriptyline (ELAVIL) 50 MG tablet TAKE 1 TABLET BY MOUTH EVERY EVENING Yes Tania Meeks MD   calcium-cholecalciferol (CALCIUM 500/D) 500-200 MG-UNIT per tablet TAKE 1 TABLET BY MOUTH DAILY Yes Tania Meeks MD   triamterene-hydroCHLOROthiazide (MAXZIDE-25) 37.5-25 MG per tablet Take 1 tablet by mouth daily TAKE 1 TABLET BY MOUTH EVERY DAY Yes Tania Meeks MD   montelukast (SINGULAIR) 10 MG tablet TAKE 1 TABLET BY MOUTH EVERY NIGHT AT BEDTIME Yes Tania Meeks MD   docusate sodium (COLACE) 100 mg capsule Take 1 capsule by mouth 2 times daily Yes Tania Meeks MD   albuterol sulfate  (90 Base) MCG/ACT inhaler Inhale 3 puffs into the lungs 4 times daily as needed for Wheezing Yes Tania Meeks MD   mineral oil-hydrophilic petrolatum (HYDROPHOR) ointment Apply topically as needed.  Yes Tania Meeks MD   citalopram (CELEXA) 40 MG tablet Take 1 tablet by mouth daily Yes Tania Meeks MD   celecoxib (CELEBREX) 100 MG capsule Take 1 capsule by mouth daily Yes Tania Meeks MD   pantoprazole (PROTONIX) 40 MG tablet Take 1 tablet by mouth every morning (before breakfast) Yes Tania Meeks MD   metFORMIN (GLUCOPHAGE) 1000 MG tablet Take 1 tablet by mouth daily (with breakfast) Yes Leanna Bey MD   Calcium Polycarbophil (FIBER) 625 MG TABS Take 1 tablet by mouth 2 times daily Yes Leanna Bey MD   baclofen (LIORESAL) 10 MG tablet TAKE 1/2 TABLET THREE TIMES DAILY AS NEEDED(PAIN, SPASMS) Yes Leanna Bey MD   OXYGEN Inhale into the lungs Uses 2 liters at night Yes Historical Provider, MD   aspirin 81 MG tablet Take 1 tablet by mouth daily Yes Yris Hernandez MD   Misc. Devices MISC Oxygen concentrator  j44.9 Yes Hair Lopez DO   cetirizine (ZYRTEC) 10 MG tablet TAKE 1 TABLET BY MOUTH DAILY  Patient not taking: Reported on 9/6/2022  Ami Ly MD   rosuvastatin (CRESTOR) 20 MG tablet Take 1 tablet by mouth daily  Patient not taking: Reported on 9/6/2022  Tania Meeks MD   pregabalin (LYRICA) 25 MG capsule Take 3 capsules by mouth 3 times daily for 30 days.   Patient not taking: Reported on 6/21/2022  Tania Meeks MD   omeprazole (PRILOSEC) 20 MG delayed release capsule Take 1 capsule by mouth 2 times daily As directed  Patient not taking: Reported on 9/6/2022  Saima Briggs MD       [facility] (Including outside providers/suppliers regularly involved in providing care):   Patient Care Team:  Leanna Bey MD as PCP - General  Brandon Paez MD as Consulting Physician (Pulmonology)  Sara Mcneil as Imaging Navigator     Reviewed and updated this visit:  Tobacco  Allergies  Meds  Med Hx  Surg Hx  Soc Hx  Fam Hx

## 2023-03-13 ENCOUNTER — TELEPHONE (OUTPATIENT)
Dept: PHARMACY | Facility: CLINIC | Age: 68
End: 2023-03-13

## 2023-03-13 NOTE — LETTER
[place]  [address], [address]        Pete Bhatt   [address] [address]           03/21/23     Dear Pete Bhatt,    We tried to reach you recently regarding your rosuvastatin 20mg daily, but were unable to reach you on the telephone. It appears that this medication has not been refilled - are you still taking this medication, or having any troubles with it? If you are no longer taking or taking differently, please call us at the number below so that we can discuss this and update your medication profile. Also note, per [employer] information, [facility] is a \"non-preferred\" pharmacy - you may save on copay costs by switching to a \"preferred\" [employer], such as [facility], [facility] or others. Please contact us if you would like assistance in switching your prescriptions to another pharmacy.       Sincerely,   Tesha Aguilar, PharmD, [address] [place] [address], toll free: [phone], option 1

## 2023-03-16 DIAGNOSIS — M79.10 MYALGIA: ICD-10-CM

## 2023-03-16 DIAGNOSIS — E55.9 VITAMIN D DEFICIENCY: ICD-10-CM

## 2023-03-16 RX ORDER — BACLOFEN 10 MG/1
TABLET ORAL
Qty: 90 TABLET | OUTPATIENT
Start: 2023-03-16

## 2023-03-16 RX ORDER — CALCIUM CARBONATE/VITAMIN D3 500MG-5MCG
TABLET ORAL
Qty: 180 TABLET | Refills: 1 | OUTPATIENT
Start: 2023-03-16

## 2023-03-20 ENCOUNTER — OFFICE VISIT (OUTPATIENT)
Dept: PRIMARY CARE CLINIC | Age: 68
End: 2023-03-20

## 2023-03-20 VITALS
BODY MASS INDEX: 35.42 KG/M2 | DIASTOLIC BLOOD PRESSURE: 64 MMHG | TEMPERATURE: 98 F | SYSTOLIC BLOOD PRESSURE: 134 MMHG | WEIGHT: 220.4 LBS | OXYGEN SATURATION: 94 % | HEIGHT: 66 IN | RESPIRATION RATE: 16 BRPM | HEART RATE: 94 BPM

## 2023-03-20 DIAGNOSIS — E27.8 ADRENAL INCIDENTALOMA (HCC): ICD-10-CM

## 2023-03-20 DIAGNOSIS — K29.51 CHRONIC GASTRITIS WITH BLEEDING, UNSPECIFIED GASTRITIS TYPE: ICD-10-CM

## 2023-03-20 DIAGNOSIS — J44.9 CHRONIC OBSTRUCTIVE PULMONARY DISEASE, UNSPECIFIED COPD TYPE (HCC): Primary | ICD-10-CM

## 2023-03-20 DIAGNOSIS — I50.21 ACUTE SYSTOLIC (CONGESTIVE) HEART FAILURE (HCC): ICD-10-CM

## 2023-03-20 DIAGNOSIS — E11.42 DM TYPE 2 WITH DIABETIC PERIPHERAL NEUROPATHY (HCC): ICD-10-CM

## 2023-03-20 DIAGNOSIS — M79.7 FIBROMYALGIA: ICD-10-CM

## 2023-03-20 DIAGNOSIS — K59.04 CHRONIC IDIOPATHIC CONSTIPATION: ICD-10-CM

## 2023-03-20 DIAGNOSIS — F32.0 CURRENT MILD EPISODE OF MAJOR DEPRESSIVE DISORDER, UNSPECIFIED WHETHER RECURRENT (HCC): ICD-10-CM

## 2023-03-20 DIAGNOSIS — M25.559 HIP PAIN: ICD-10-CM

## 2023-03-20 DIAGNOSIS — I10 PRIMARY HYPERTENSION: ICD-10-CM

## 2023-03-20 DIAGNOSIS — M16.0 PRIMARY OSTEOARTHRITIS OF BOTH HIPS: ICD-10-CM

## 2023-03-20 DIAGNOSIS — M54.42 ACUTE LEFT-SIDED LOW BACK PAIN WITH LEFT-SIDED SCIATICA: ICD-10-CM

## 2023-03-20 DIAGNOSIS — R19.5 POSITIVE FIT (FECAL IMMUNOCHEMICAL TEST): ICD-10-CM

## 2023-03-20 DIAGNOSIS — E66.01 SEVERE OBESITY (BMI 35.0-39.9) WITH COMORBIDITY (HCC): ICD-10-CM

## 2023-03-20 DIAGNOSIS — M79.10 MYALGIA: ICD-10-CM

## 2023-03-20 RX ORDER — BACLOFEN 10 MG/1
10 TABLET ORAL 3 TIMES DAILY PRN
Qty: 30 TABLET | Refills: 0 | Status: SHIPPED | OUTPATIENT
Start: 2023-03-20

## 2023-03-20 RX ORDER — FAMOTIDINE 40 MG/1
40 TABLET, FILM COATED ORAL EVERY EVENING
Qty: 30 TABLET | Refills: 3 | Status: SHIPPED | OUTPATIENT
Start: 2023-03-20

## 2023-03-20 RX ORDER — CELECOXIB 100 MG/1
100 CAPSULE ORAL DAILY
Qty: 90 CAPSULE | Refills: 1 | Status: SHIPPED
Start: 2023-03-20 | End: 2023-03-20 | Stop reason: ALTCHOICE

## 2023-03-20 RX ORDER — AMITRIPTYLINE HYDROCHLORIDE 50 MG/1
TABLET, FILM COATED ORAL
Qty: 90 TABLET | Refills: 1 | Status: SHIPPED | OUTPATIENT
Start: 2023-03-20

## 2023-03-20 RX ORDER — PREGABALIN 75 MG/1
75 CAPSULE ORAL 3 TIMES DAILY
Qty: 90 CAPSULE | Refills: 0 | Status: SHIPPED | OUTPATIENT
Start: 2023-03-20 | End: 2023-04-19

## 2023-03-20 ASSESSMENT — ENCOUNTER SYMPTOMS
VOMITING: 0
NAUSEA: 0
BACK PAIN: 1
VOICE CHANGE: 0
ABDOMINAL PAIN: 1
CONSTIPATION: 1
DIARRHEA: 1
CHEST TIGHTNESS: 0
SHORTNESS OF BREATH: 0
WHEEZING: 0
SORE THROAT: 0

## 2023-03-20 NOTE — PROGRESS NOTES
Future  -     Comprehensive Metabolic Panel; Future  -     Lipid Panel; Future  -     TSH; Future  -     Hemoglobin A1C; Future  -     Ambulatory Referral to Care Management with Device (Remote Patient Monitoring)  3. Fibromyalgia  Comments:  Lyrica   Orders:  -     pregabalin (LYRICA) 75 MG capsule; Take 1 capsule by mouth 3 times daily for 30 days. Max Daily Amount: 225 mg, Disp-90 capsule, R-0Normal  -     amitriptyline (ELAVIL) 50 MG tablet; TAKE 1 TABLET BY MOUTH EVERY EVENING, Disp-90 tablet, R-1Normal  4. Primary osteoarthritis of both hips  5. Myalgia  -     baclofen (LIORESAL) 10 MG tablet; Take 1 tablet by mouth 3 times daily as needed (back muscle spasms), Disp-30 tablet, R-0Normal  6. Fibromyalgia  -     pregabalin (LYRICA) 75 MG capsule; Take 1 capsule by mouth 3 times daily for 30 days. Max Daily Amount: 225 mg, Disp-90 capsule, R-0Normal  -     amitriptyline (ELAVIL) 50 MG tablet; TAKE 1 TABLET BY MOUTH EVERY EVENING, Disp-90 tablet, R-1Normal  7. Primary hypertension  -     Ambulatory Referral to Care Management with Device (Remote Patient Monitoring)  8. Acute systolic (congestive) heart failure ([address]  9. Severe obesity (BMI 35.0-39. 9) with comorbidity ([address]  10. Current mild episode of major depressive disorder, unspecified whether recurrent ([address]  11. Adrenal incidentaloma ([address]  12. Chronic idiopathic constipation  -     [facility] Ellen Ely MD, General Surgery, [place]  13. Positive FIT (fecal immunochemical test)  -     Juliet Celestin MD, General Surgery, [place]  14. Acute left-sided low back pain with left-sided sciatica  -     XR LUMBAR SPINE (2-3 VIEWS); Future  -     Janki Mercado MD, Sports Medicine, [address] [facility]  15. Hip pain  -     XR HIP LEFT (1 VIEW); Future  -     Janki Mercado MD, Sports Medicine, [address] [facility]  16.  Chronic gastritis with bleeding, unspecified gastritis type  Comments:  Prior thickening of the

## 2023-03-21 ENCOUNTER — TELEPHONE (OUTPATIENT)
Dept: PRIMARY CARE CLINIC | Age: 68
End: 2023-03-21

## 2023-03-21 NOTE — TELEPHONE ENCOUNTER
Attempting again to reach patient. Left another message. Per Geovany - confirm 90 day supply of rosuvastatin last filled in September and rx from 1/16/23 remains on patient's profile (fillable) since not picked up.
Attempting to reach patient's Son, Regine Newton, listed in contacts as per PCP. Left message asking for return call to review:    Statin Gap (Diabetes): rosuvastatin on med list, claim reversed in January?  If not taking, why?
No return call from patient or son. Appears rosuvastatin still marked as \"taking\" with yesterday's PCP OV.   Will send letter.    =======================================================   For Pharmacy Admin Tracking Only    Program: [address] in place:  No  Gap Closed?: No   Time Spent (min): 30
insulin    Lab Results   Component Value Date    LABA1C 7.9 (H) 02/07/2023    LABA1C 8.5 (H) 12/20/2022    LABA1C 6.0 (H) 06/23/2022     PLAN  The following are interventions that have been identified:  Adherence: Patient filling at a non-preferred pharmacy ([facility])  Statin Gap (Diabetes): rosuvastatin on med list, claim reversed in January? If not taking, why? Attempting to reach patient to review. Left message asking for return call.     Sonia Shirley, PharmD, [facility]  Department, toll free: [phone], option 1
BMI: BMI (kg/m2): 25.2 (11-06-21 @ 15:58)  HbA1c: A1C with Estimated Average Glucose Result: 5.6 % (10-15-21 @ 11:00)    Glucose: POCT Blood Glucose.: 61 mg/dL (10-19-21 @ 11:59)    BP: --  Lipid Panel: Date/Time: 10-14-21 @ 09:38  Cholesterol, Serum: 159  Direct LDL: --  HDL Cholesterol, Serum: 66  Total Cholesterol/HDL Ration Measurement: --  Triglycerides, Serum: 100

## 2023-03-21 NOTE — TELEPHONE ENCOUNTER
Dr Brian Musa office called, wanted to let Dr Milan Cornejo know they scheduled patient twice in January and patient no show'd both times. They will schedule patient one more time , if she no show's they will not schedule patient again, will need to send referral elsewhere.

## 2023-03-22 ENCOUNTER — CARE COORDINATION (OUTPATIENT)
Dept: CARE COORDINATION | Age: 68
End: 2023-03-22

## 2023-03-22 NOTE — CARE COORDINATION
ACMEENU contacted Angelita to offer enrollment in care coordination. Care coordination services were explained to [facility] and she is agreeable to enroll. LASHELL and Angelita set scheduled appointment time to complete enrollment. ACM to contact Angelita at agreed upon time.

## 2023-03-23 ENCOUNTER — CARE COORDINATION (OUTPATIENT)
Dept: CARE COORDINATION | Age: 68
End: 2023-03-23

## 2023-03-28 ENCOUNTER — HOSPITAL ENCOUNTER (OUTPATIENT)
Dept: GENERAL RADIOLOGY | Age: 68
Discharge: HOME | End: 2023-03-30
Payer: COMMERCIAL

## 2023-03-28 ENCOUNTER — HOSPITAL ENCOUNTER (OUTPATIENT)
Age: 68
Discharge: HOME | End: 2023-03-30
Payer: COMMERCIAL

## 2023-03-28 ENCOUNTER — HOSPITAL ENCOUNTER (OUTPATIENT)
Age: 68
Discharge: HOME | End: 2023-03-28
Payer: COMMERCIAL

## 2023-03-28 DIAGNOSIS — M25.559 HIP PAIN: ICD-10-CM

## 2023-03-28 DIAGNOSIS — E11.42 DM TYPE 2 WITH DIABETIC PERIPHERAL NEUROPATHY (HCC): ICD-10-CM

## 2023-03-28 DIAGNOSIS — M54.42 ACUTE LEFT-SIDED LOW BACK PAIN WITH LEFT-SIDED SCIATICA: ICD-10-CM

## 2023-03-28 LAB
ALBUMIN SERPL-MCNC: 3.8 G/DL (ref 3.5–5.2)
ALP SERPL-CCNC: 89 U/L (ref 35–104)
ALT SERPL-CCNC: 14 U/L (ref 0–32)
ANION GAP SERPL CALCULATED.3IONS-SCNC: 10 MMOL/L (ref 7–16)
AST SERPL-CCNC: 24 U/L (ref 0–31)
BASOPHILS # BLD: 0.04 E9/L (ref 0–0.2)
BASOPHILS NFR BLD: 0.7 % (ref 0–2)
BILIRUB SERPL-MCNC: 0.2 MG/DL (ref 0–1.2)
BUN SERPL-MCNC: 12 MG/DL (ref 6–23)
CALCIUM SERPL-MCNC: 9.5 MG/DL (ref 8.6–10.2)
CHLORIDE SERPL-SCNC: 104 MMOL/L (ref 98–107)
CHOLESTEROL, TOTAL: 136 MG/DL (ref 0–199)
CO2 SERPL-SCNC: 26 MMOL/L (ref 22–29)
CREAT SERPL-MCNC: 0.8 MG/DL (ref 0.5–1)
EOSINOPHIL # BLD: 0.3 E9/L (ref 0.05–0.5)
EOSINOPHIL NFR BLD: 5.3 % (ref 0–6)
ERYTHROCYTE [DISTWIDTH] IN BLOOD BY AUTOMATED COUNT: 13.9 FL (ref 11.5–15)
GLUCOSE SERPL-MCNC: 73 MG/DL (ref 74–99)
HBA1C MFR BLD: 5.9 % (ref 4–5.6)
HCT VFR BLD AUTO: 36.4 % (ref 34–48)
HDLC SERPL-MCNC: 64 MG/DL
HGB BLD-MCNC: 10.6 G/DL (ref 11.5–15.5)
IMM GRANULOCYTES # BLD: 0.01 E9/L
IMM GRANULOCYTES NFR BLD: 0.2 % (ref 0–5)
LDLC SERPL CALC-MCNC: 62 MG/DL (ref 0–99)
LYMPHOCYTES # BLD: 2.76 E9/L (ref 1.5–4)
LYMPHOCYTES NFR BLD: 49.2 % (ref 20–42)
MCH RBC QN AUTO: 27.2 PG (ref 26–35)
MCHC RBC AUTO-ENTMCNC: 29.1 % (ref 32–34.5)
MCV RBC AUTO: 93.3 FL (ref 80–99.9)
MONOCYTES # BLD: 0.65 E9/L (ref 0.1–0.95)
MONOCYTES NFR BLD: 11.6 % (ref 2–12)
NEUTROPHILS # BLD: 1.85 E9/L (ref 1.8–7.3)
NEUTS SEG NFR BLD: 33 % (ref 43–80)
PLATELET # BLD AUTO: 367 E9/L (ref 130–450)
PMV BLD AUTO: 10 FL (ref 7–12)
POTASSIUM SERPL-SCNC: 3.6 MMOL/L (ref 3.5–5)
PROT SERPL-MCNC: 8.3 G/DL (ref 6.4–8.3)
RBC # BLD AUTO: 3.9 E12/L (ref 3.5–5.5)
SODIUM SERPL-SCNC: 140 MMOL/L (ref 132–146)
TRIGL SERPL-MCNC: 50 MG/DL (ref 0–149)
TSH SERPL-MCNC: 3.69 UIU/ML (ref 0.27–4.2)
VLDLC SERPL CALC-MCNC: 10 MG/DL
WBC # BLD: 5.6 E9/L (ref 4.5–11.5)

## 2023-03-28 PROCEDURE — 72100 X-RAY EXAM L-S SPINE 2/3 VWS: CPT

## 2023-03-28 PROCEDURE — 73501 X-RAY EXAM HIP UNI 1 VIEW: CPT

## 2023-03-28 PROCEDURE — 85025 COMPLETE CBC W/AUTO DIFF WBC: CPT

## 2023-03-28 PROCEDURE — 84443 ASSAY THYROID STIM HORMONE: CPT

## 2023-03-28 PROCEDURE — 36415 COLL VENOUS BLD VENIPUNCTURE: CPT

## 2023-03-28 PROCEDURE — 83036 HEMOGLOBIN GLYCOSYLATED A1C: CPT

## 2023-03-28 PROCEDURE — 80061 LIPID PANEL: CPT

## 2023-03-28 PROCEDURE — 80053 COMPREHEN METABOLIC PANEL: CPT

## 2023-03-29 ENCOUNTER — CARE COORDINATION (OUTPATIENT)
Dept: CARE COORDINATION | Age: 68
End: 2023-03-29

## 2023-03-29 DIAGNOSIS — I10 ESSENTIAL HYPERTENSION: ICD-10-CM

## 2023-03-29 NOTE — CARE COORDINATION
Attempted to reach patient by telephone. Left HIPAA compliant message requesting a return call. Will send letter via mail. Will attempt to reach patient again.

## 2023-03-29 NOTE — LETTER
3/29/2023    [address] [facility] [address] [place] [address] [facility]      Dear Frederic Enrique,    My name is Curt Bradford RN and I am a registered nurse who partners with Carlo Friedman MD to improve patients' health. Carlo Friedman MD believes you would benefit from working with me. As a member of your health care team, I would work with other providers involved in your care, offer education for your specific health conditions, and connect you with additional resources as needed. I will collaborate with Carlo Friedman MD to support you in following your treatment plan. The additional support I provide is no additional cost to you. My primary focus is to help you achieve specific goals and improve your health. We are committed to walk with you on this journey and look forward to working with you. Please call me to further discuss your healthcare needs. I am available by phone. You can reach me at [phone] .     In good health,     Curt Bradford RN

## 2023-03-30 RX ORDER — LOSARTAN POTASSIUM 50 MG/1
50 TABLET ORAL DAILY
Qty: 30 TABLET | Refills: 3 | OUTPATIENT
Start: 2023-03-30

## 2023-03-31 ENCOUNTER — APPOINTMENT (OUTPATIENT)
Dept: GENERAL RADIOLOGY | Age: 68
End: 2023-03-31
Payer: COMMERCIAL

## 2023-03-31 ENCOUNTER — TELEPHONE (OUTPATIENT)
Dept: INFUSION THERAPY | Age: 68
End: 2023-03-31

## 2023-03-31 ENCOUNTER — HOSPITAL ENCOUNTER (OUTPATIENT)
Dept: INFUSION THERAPY | Age: 68
Discharge: HOME | End: 2023-03-31
Payer: COMMERCIAL

## 2023-03-31 ENCOUNTER — HOSPITAL ENCOUNTER (EMERGENCY)
Age: 68
Discharge: HOME | End: 2023-03-31
Attending: EMERGENCY MEDICINE
Payer: COMMERCIAL

## 2023-03-31 ENCOUNTER — OFFICE VISIT (OUTPATIENT)
Dept: ONCOLOGY | Age: 68
End: 2023-03-31
Payer: COMMERCIAL

## 2023-03-31 VITALS
DIASTOLIC BLOOD PRESSURE: 70 MMHG | BODY MASS INDEX: 35.2 KG/M2 | WEIGHT: 219 LBS | TEMPERATURE: 97.5 F | HEART RATE: 89 BPM | OXYGEN SATURATION: 93 % | HEIGHT: 66 IN | SYSTOLIC BLOOD PRESSURE: 148 MMHG

## 2023-03-31 VITALS
OXYGEN SATURATION: 97 % | HEART RATE: 85 BPM | DIASTOLIC BLOOD PRESSURE: 72 MMHG | SYSTOLIC BLOOD PRESSURE: 158 MMHG | TEMPERATURE: 98.9 F | RESPIRATION RATE: 18 BRPM

## 2023-03-31 DIAGNOSIS — C90.00 MULTIPLE MYELOMA, REMISSION STATUS UNSPECIFIED (HCC): ICD-10-CM

## 2023-03-31 DIAGNOSIS — D47.2 SMOLDERING MYELOMA: ICD-10-CM

## 2023-03-31 DIAGNOSIS — J44.1 ACUTE EXACERBATION OF CHRONIC OBSTRUCTIVE PULMONARY DISEASE (COPD) (HCC): Primary | ICD-10-CM

## 2023-03-31 DIAGNOSIS — R91.8 MULTIPLE LUNG NODULES: Primary | ICD-10-CM

## 2023-03-31 DIAGNOSIS — D64.9 ANEMIA, UNSPECIFIED TYPE: ICD-10-CM

## 2023-03-31 LAB
ALBUMIN SERPL-MCNC: 3.4 G/DL (ref 3.5–5.2)
ALBUMIN SERPL-MCNC: 3.9 G/DL (ref 3.5–5.2)
ALP SERPL-CCNC: 88 U/L (ref 35–104)
ALP SERPL-CCNC: 94 U/L (ref 35–104)
ALT SERPL-CCNC: 14 U/L (ref 0–32)
ALT SERPL-CCNC: 15 U/L (ref 0–32)
ANION GAP SERPL CALCULATED.3IONS-SCNC: 10 MMOL/L (ref 7–16)
ANION GAP SERPL CALCULATED.3IONS-SCNC: 15 MMOL/L (ref 7–16)
AST SERPL-CCNC: 22 U/L (ref 0–31)
AST SERPL-CCNC: 30 U/L (ref 0–31)
B.E.: 3.8 MMOL/L (ref -3–3)
BASOPHILS # BLD: 0.03 E9/L (ref 0–0.2)
BASOPHILS # BLD: 0.04 E9/L (ref 0–0.2)
BASOPHILS NFR BLD: 0.4 % (ref 0–2)
BASOPHILS NFR BLD: 0.4 % (ref 0–2)
BILIRUB SERPL-MCNC: 0.3 MG/DL (ref 0–1.2)
BILIRUB SERPL-MCNC: 0.3 MG/DL (ref 0–1.2)
BNP BLD-MCNC: 40 PG/ML (ref 0–125)
BUN SERPL-MCNC: 28 MG/DL (ref 6–23)
BUN SERPL-MCNC: 28 MG/DL (ref 6–23)
CALCIUM SERPL-MCNC: 9 MG/DL (ref 8.6–10.2)
CALCIUM SERPL-MCNC: 9.2 MG/DL (ref 8.6–10.2)
CHLORIDE SERPL-SCNC: 100 MMOL/L (ref 98–107)
CHLORIDE SERPL-SCNC: 103 MMOL/L (ref 98–107)
CO2 SERPL-SCNC: 20 MMOL/L (ref 22–29)
CO2 SERPL-SCNC: 29 MMOL/L (ref 22–29)
COHB: 0.8 % (ref 0–1.5)
CREAT SERPL-MCNC: 0.9 MG/DL (ref 0.5–1)
CREAT SERPL-MCNC: 0.9 MG/DL (ref 0.5–1)
CRITICAL: ABNORMAL
DATE ANALYZED: ABNORMAL
DATE OF COLLECTION: ABNORMAL
EKG ATRIAL RATE: 75 BPM
EKG P AXIS: 59 DEGREES
EKG P-R INTERVAL: 180 MS
EKG Q-T INTERVAL: 436 MS
EKG QRS DURATION: 100 MS
EKG QTC CALCULATION (BAZETT): 486 MS
EKG R AXIS: -17 DEGREES
EKG T AXIS: 23 DEGREES
EKG VENTRICULAR RATE: 75 BPM
EOSINOPHIL # BLD: 0.14 E9/L (ref 0.05–0.5)
EOSINOPHIL # BLD: 0.19 E9/L (ref 0.05–0.5)
EOSINOPHIL NFR BLD: 1.9 % (ref 0–6)
EOSINOPHIL NFR BLD: 2.1 % (ref 0–6)
ERYTHROCYTE [DISTWIDTH] IN BLOOD BY AUTOMATED COUNT: 14.1 FL (ref 11.5–15)
ERYTHROCYTE [DISTWIDTH] IN BLOOD BY AUTOMATED COUNT: 14.2 FL (ref 11.5–15)
GLUCOSE SERPL-MCNC: 102 MG/DL (ref 74–99)
GLUCOSE SERPL-MCNC: 119 MG/DL (ref 74–99)
HCO3: 29 MMOL/L (ref 22–26)
HCT VFR BLD AUTO: 35 % (ref 34–48)
HCT VFR BLD AUTO: 36.5 % (ref 34–48)
HGB BLD-MCNC: 10.3 G/DL (ref 11.5–15.5)
HGB BLD-MCNC: 10.6 G/DL (ref 11.5–15.5)
HHB: 10.5 % (ref 0–5)
IMM GRANULOCYTES # BLD: 0.02 E9/L
IMM GRANULOCYTES # BLD: 0.02 E9/L
IMM GRANULOCYTES NFR BLD: 0.2 % (ref 0–5)
IMM GRANULOCYTES NFR BLD: 0.3 % (ref 0–5)
LAB: ABNORMAL
LDH SERPL-CCNC: 222 U/L (ref 135–214)
LIPASE: 51 U/L (ref 13–60)
LYMPHOCYTES # BLD: 2.74 E9/L (ref 1.5–4)
LYMPHOCYTES # BLD: 3.02 E9/L (ref 1.5–4)
LYMPHOCYTES NFR BLD: 33.3 % (ref 20–42)
LYMPHOCYTES NFR BLD: 36.5 % (ref 20–42)
Lab: ABNORMAL
MCH RBC QN AUTO: 27.4 PG (ref 26–35)
MCH RBC QN AUTO: 27.5 PG (ref 26–35)
MCHC RBC AUTO-ENTMCNC: 29 % (ref 32–34.5)
MCHC RBC AUTO-ENTMCNC: 29.4 % (ref 32–34.5)
MCV RBC AUTO: 93.6 FL (ref 80–99.9)
MCV RBC AUTO: 94.3 FL (ref 80–99.9)
METHB: 0.1 % (ref 0–1.5)
MODE: ABNORMAL
MONOCYTES # BLD: 0.66 E9/L (ref 0.1–0.95)
MONOCYTES # BLD: 0.72 E9/L (ref 0.1–0.95)
MONOCYTES NFR BLD: 7.9 % (ref 2–12)
MONOCYTES NFR BLD: 8.8 % (ref 2–12)
NEUTROPHILS # BLD: 3.92 E9/L (ref 1.8–7.3)
NEUTROPHILS # BLD: 5.07 E9/L (ref 1.8–7.3)
NEUTS SEG NFR BLD: 52.1 % (ref 43–80)
NEUTS SEG NFR BLD: 56.1 % (ref 43–80)
O2 CONTENT: 14 ML/DL
O2 SATURATION: 89.4 % (ref 92–98.5)
O2HB: 88.6 % (ref 94–97)
OPERATOR ID: 1868
PATIENT TEMP: 37 C
PCO2: 46.7 MMHG (ref 35–45)
PH BLOOD GAS: 7.41 (ref 7.35–7.45)
PLATELET # BLD AUTO: 293 E9/L (ref 130–450)
PLATELET # BLD AUTO: 379 E9/L (ref 130–450)
PMV BLD AUTO: 10.2 FL (ref 7–12)
PMV BLD AUTO: 10.3 FL (ref 7–12)
PO2: 57.3 MMHG (ref 75–100)
POTASSIUM SERPL-SCNC: 3.7 MMOL/L (ref 3.5–5)
POTASSIUM SERPL-SCNC: 3.8 MMOL/L (ref 3.5–5)
PROT SERPL-MCNC: 8.4 G/DL (ref 6.4–8.3)
PROT SERPL-MCNC: 8.5 G/DL (ref 6.4–8.3)
RBC # BLD AUTO: 3.74 E12/L (ref 3.5–5.5)
RBC # BLD AUTO: 3.87 E12/L (ref 3.5–5.5)
SODIUM SERPL-SCNC: 135 MMOL/L (ref 132–146)
SODIUM SERPL-SCNC: 142 MMOL/L (ref 132–146)
SOURCE, BLOOD GAS: ABNORMAL
THB: 11.2 G/DL (ref 11.5–16.5)
TIME ANALYZED: 1821
TROPONIN, HIGH SENSITIVITY: 7 NG/L (ref 0–9)
WBC # BLD: 7.5 E9/L (ref 4.5–11.5)
WBC # BLD: 9.1 E9/L (ref 4.5–11.5)

## 2023-03-31 PROCEDURE — 3074F SYST BP LT 130 MM HG: CPT | Performed by: INTERNAL MEDICINE

## 2023-03-31 PROCEDURE — G8484 FLU IMMUNIZE NO ADMIN: HCPCS | Performed by: INTERNAL MEDICINE

## 2023-03-31 PROCEDURE — 94640 AIRWAY INHALATION TREATMENT: CPT

## 2023-03-31 PROCEDURE — 94664 DEMO&/EVAL PT USE INHALER: CPT

## 2023-03-31 PROCEDURE — 82784 ASSAY IGA/IGD/IGG/IGM EACH: CPT

## 2023-03-31 PROCEDURE — G8417 CALC BMI ABV UP PARAM F/U: HCPCS | Performed by: INTERNAL MEDICINE

## 2023-03-31 PROCEDURE — 6370000000 HC RX 637 (ALT 250 FOR IP): Performed by: EMERGENCY MEDICINE

## 2023-03-31 PROCEDURE — 93005 ELECTROCARDIOGRAM TRACING: CPT | Performed by: PHYSICIAN ASSISTANT

## 2023-03-31 PROCEDURE — 84165 PROTEIN E-PHORESIS SERUM: CPT

## 2023-03-31 PROCEDURE — 36415 COLL VENOUS BLD VENIPUNCTURE: CPT

## 2023-03-31 PROCEDURE — G8427 DOCREV CUR MEDS BY ELIG CLIN: HCPCS | Performed by: INTERNAL MEDICINE

## 2023-03-31 PROCEDURE — G8399 PT W/DXA RESULTS DOCUMENT: HCPCS | Performed by: INTERNAL MEDICINE

## 2023-03-31 PROCEDURE — 83615 LACTATE (LD) (LDH) ENZYME: CPT

## 2023-03-31 PROCEDURE — 84484 ASSAY OF TROPONIN QUANT: CPT

## 2023-03-31 PROCEDURE — 71046 X-RAY EXAM CHEST 2 VIEWS: CPT

## 2023-03-31 PROCEDURE — 1123F ACP DISCUSS/DSCN MKR DOCD: CPT | Performed by: INTERNAL MEDICINE

## 2023-03-31 PROCEDURE — 85025 COMPLETE CBC W/AUTO DIFF WBC: CPT

## 2023-03-31 PROCEDURE — 80053 COMPREHEN METABOLIC PANEL: CPT

## 2023-03-31 PROCEDURE — 99285 EMERGENCY DEPT VISIT HI MDM: CPT

## 2023-03-31 PROCEDURE — 86334 IMMUNOFIX E-PHORESIS SERUM: CPT

## 2023-03-31 PROCEDURE — 1090F PRES/ABSN URINE INCON ASSESS: CPT | Performed by: INTERNAL MEDICINE

## 2023-03-31 PROCEDURE — 83690 ASSAY OF LIPASE: CPT

## 2023-03-31 PROCEDURE — 83880 ASSAY OF NATRIURETIC PEPTIDE: CPT

## 2023-03-31 PROCEDURE — 3017F COLORECTAL CA SCREEN DOC REV: CPT | Performed by: INTERNAL MEDICINE

## 2023-03-31 PROCEDURE — 1036F TOBACCO NON-USER: CPT | Performed by: INTERNAL MEDICINE

## 2023-03-31 PROCEDURE — 99214 OFFICE O/P EST MOD 30 MIN: CPT | Performed by: INTERNAL MEDICINE

## 2023-03-31 PROCEDURE — 83883 ASSAY NEPHELOMETRY NOT SPEC: CPT

## 2023-03-31 PROCEDURE — 3078F DIAST BP <80 MM HG: CPT | Performed by: INTERNAL MEDICINE

## 2023-03-31 PROCEDURE — 82805 BLOOD GASES W/O2 SATURATION: CPT

## 2023-03-31 PROCEDURE — 82232 ASSAY OF BETA-2 PROTEIN: CPT

## 2023-03-31 RX ORDER — NEBULIZER ACCESSORIES
1 KIT MISCELLANEOUS DAILY PRN
Qty: 1 KIT | Refills: 0 | Status: SHIPPED | OUTPATIENT
Start: 2023-03-31

## 2023-03-31 RX ORDER — DOXYCYCLINE HYCLATE 100 MG/1
100 CAPSULE ORAL ONCE
Status: COMPLETED | OUTPATIENT
Start: 2023-03-31 | End: 2023-03-31

## 2023-03-31 RX ORDER — IPRATROPIUM BROMIDE AND ALBUTEROL SULFATE 2.5; .5 MG/3ML; MG/3ML
1 SOLUTION RESPIRATORY (INHALATION) EVERY 4 HOURS
Qty: 360 ML | Refills: 0 | Status: SHIPPED | OUTPATIENT
Start: 2023-03-31

## 2023-03-31 RX ORDER — DOXYCYCLINE HYCLATE 100 MG
100 TABLET ORAL 2 TIMES DAILY
Qty: 14 TABLET | Refills: 0 | Status: SHIPPED | OUTPATIENT
Start: 2023-03-31 | End: 2023-04-06

## 2023-03-31 RX ORDER — PREDNISONE 20 MG/1
40 TABLET ORAL DAILY
Qty: 10 TABLET | Refills: 0 | Status: SHIPPED | OUTPATIENT
Start: 2023-03-31 | End: 2023-04-05

## 2023-03-31 RX ORDER — PREDNISONE 20 MG/1
40 TABLET ORAL ONCE
Status: COMPLETED | OUTPATIENT
Start: 2023-03-31 | End: 2023-03-31

## 2023-03-31 RX ORDER — IPRATROPIUM BROMIDE AND ALBUTEROL SULFATE 2.5; .5 MG/3ML; MG/3ML
3 SOLUTION RESPIRATORY (INHALATION) ONCE
Status: COMPLETED | OUTPATIENT
Start: 2023-03-31 | End: 2023-03-31

## 2023-03-31 RX ADMIN — PREDNISONE 40 MG: 20 TABLET ORAL at 18:15

## 2023-03-31 RX ADMIN — DOXYCYCLINE HYCLATE 100 MG: 100 CAPSULE ORAL at 18:15

## 2023-03-31 RX ADMIN — IPRATROPIUM BROMIDE AND ALBUTEROL SULFATE 3 AMPULE: 2.5; .5 SOLUTION RESPIRATORY (INHALATION) at 18:17

## 2023-03-31 NOTE — TELEPHONE ENCOUNTER
Patient wasseen in clinic today. Complaining of SOB, sat 93% on RA and coughing up blood . Per Dr. Danielle Cortez, patient to ER.  Report called to [facility]

## 2023-03-31 NOTE — PROGRESS NOTES
Patient did NOT stop at check out window does NOT have My Chart. We will call patient with appt. time and day.
severe    Acute respiratory failure with hypoxia (HCC)    Pneumonia due to infectious organism    Elevated platelet count    COPD exacerbation (HCC)    Normocytic anemia    Smoldering myeloma    Multiple lung nodules    Hyperglycemia    Vaginal candidiasis    Chronic respiratory failure with hypoxia and hypercapnia (HCC)    Type 2 diabetes mellitus with hyperosmolarity without coma, without long-term current use of insulin (HCC)    Acute systolic (congestive) heart failure (HCC)        Past Surgical History:      Procedure Laterality Date    ANESTHESIA NERVE BLOCK Bilateral 8/10/2020    BILATERAL L3 L4 MEDIAL BRANCH L5 DORSSAL RAMUS NERVE BLOCK (CPT 99313) SEDATION performed by Eric Callahan MD at [address] [place] [address]  07/20/2016    removed 3 polyps (tubular adenomas), diverticulosis, Dr. Claudia Langston  2008    approximately 2008, no report available, per patient some polyps removed, Dr Shima Moreno, [facility]    COLONOSCOPY N/A 11/9/2020    Small sessile polyp distal ascending colon removed with bx/cauterized (path Tubular Adenoma), right and left diverticulosis without diverticulitis, Dr. Shantelle Simpson, [facility]    COLONOSCOPY  11/9/2020    Small sessile polyp distal ascending colon removed with bx/cauterized (path Tubular Adenoma), right and left diverticulosis without diverticulitis, Dr. Shantelle Simpson, [address] OF UTERUS N/A 5/11/2021    DILATATION AND CURETTAGE HYSTEROSCOPY POSSIBLE REMOVAL OF MASS performed by Carmen Rome MD at [address] [place] [address], left knee, arthroscopic    NERVE BLOCK Bilateral 09/22/2016    lumbar transforaminal nerve block #1    NERVE BLOCK  07/06/2020    lumbar epidural steroid injectio L4-5    NERVE BLOCK Bilateral 08/10/2020    L3, L4, L5     OTHER SURGICAL HISTORY  12/13/2016    Excision cyst right face    PAIN MANAGEMENT PROCEDURE N/A 7/6/2020    LUMBAR EPIDURAL STEROID INJECTION L4-5 performed by Eric Callahan MD at [facility]

## 2023-03-31 NOTE — ED NOTES
Pt ambulated per Dr. Mani Poole orders on 2L NC. Pt stayed at 95% O2 while ambulating on 2L of NC while using a cane. Dr. Sweetie Johnson notified.      Chuck Dela Cruz RN  03/31/23 7521

## 2023-03-31 NOTE — ED PROVIDER NOTES
ED PROVIDER NOTE    Chief Complaint   Patient presents with    Shortness of Breath     Started this AM +cough with pink specks mixed in. -CP hx COPD       HPI:  3/31/23,   Time: 6:02 PM EDT       Sheela Sheppard is a 76 y.o. female presenting to the ED for shortness of breath. Gradual onset and progressively worsening over the last 1 week, worse with exertion. Associated cough productive of green sputum. History of COPD. Associated subjective fever, chills, midsternal chest pain worse with coughing. No associated neck stiffness, abdominal pain, nausea, vomiting, diarrhea. Normal p.o. intake and urine output. Wears 2L home O2 as needed.     Chart review: hx of COPD, asthma, anxiety, depression, fibromyalgia, GERD, HTN, HLD, MGUS, DM    Reviewed outpatient progress note by Dr. Dasha Dumas from oncology from 3/31/23:    Review of Systems:     Review of Systems  Pertinent positives and negatives as stated in HPI     --------------------------------------------- PAST HISTORY ---------------------------------------------  Past Medical History:   Past Medical History:   Diagnosis Date    Adrenal incidentaloma ([facility] [address]     Anxiety     Arthritis     Asthma     Bladder incontinence     Cancer ([facility] Dx 2009    multiple Myeloma, in remission currently     Chronic back pain     COPD (chronic obstructive pulmonary disease) ([facility] [address]     on O2 2 liters  (uses with activity and at night to sleep)    Depression     Fibromyalgia     GERD (gastroesophageal reflux disease)     Hyperlipidemia     Hypertension     Hypothyroidism     MGUS (monoclonal gammopathy of unknown significance)     Neuropathy     Pneumonia 02/2020    Prolonged emergence from general anesthesia     Sleep apnea     doesnt use her cpap    Type II or unspecified type diabetes mellitus without mention of complication, not stated as uncontrolled     Vaginal bleeding        Past Surgical History:   Past Surgical History:   Procedure Laterality Date    ANESTHESIA NERVE BLOCK Bilateral 8/10/2020    BILATERAL L3 L4 MEDIAL BRANCH L5 DORSSAL RAMUS NERVE BLOCK (CPT 64814) SEDATION performed by Lalo Otero MD at [facility]  07/20/2016    removed 3 polyps (tubular adenomas), diverticulosis, Dr. Riri Soares  2008    approximately 2008, no report available, per patient some polyps removed, Dr Poornima Hawkins, [facility]    COLONOSCOPY N/A 11/9/2020    Small sessile polyp distal ascending colon removed with bx/cauterized (path Tubular Adenoma), right and left diverticulosis without diverticulitis, Dr. Chris Blanchard, [facility]    COLONOSCOPY  11/9/2020    Small sessile polyp distal ascending colon removed with bx/cauterized (path Tubular Adenoma), right and left diverticulosis without diverticulitis, Dr. Chris Blanchard, [facility] N/A 5/11/2021    DILATATION AND CURETTAGE HYSTEROSCOPY POSSIBLE REMOVAL OF MASS performed by Jessica Maher MD at [facility], left knee, arthroscopic    NERVE BLOCK Bilateral 09/22/2016    lumbar transforaminal nerve block #1    NERVE BLOCK  07/06/2020    lumbar epidural steroid injectio L4-5    NERVE BLOCK Bilateral 08/10/2020    L3, L4, L5     OTHER SURGICAL HISTORY  12/13/2016    Excision cyst right face    PAIN MANAGEMENT PROCEDURE N/A 7/6/2020    LUMBAR EPIDURAL STEROID INJECTION L4-5 performed by Lalo Otero MD at [address]  2008 2008    UPPER GASTROINTESTINAL ENDOSCOPY  07/20/2016    GERD and gastric ulcers, Bx H pylori neg, Dr. Sarabjit Park  2008    approximately 2008, no report, patient does not know the findings, Dr Poornima Hawkins, [address] [facility] [address] N/A 11/9/2020    Severe gastritis with superficial ulcerations with bx neg H pylori, Dr. Chris Blanchard, [facility]       Social History:   Social History     Socioeconomic History    Marital status: Single     Spouse name: None    Number of

## 2023-03-31 NOTE — ED NOTES
Pt placed on 2L of NC for RA O2% of 88-89% on RA. Pt states she uses 2L of NC at home at night.  Pt's O2% now 95% on 2L     Rashid Tyson RN  03/31/23 1956

## 2023-04-02 LAB
DEPRECATED KAPPA LC FREE/LAMBDA SER: 2.47 {RATIO} (ref 0.26–1.65)
KAPPA LC FREE SER-MCNC: 66.93 MG/L (ref 3.3–19.4)
LAMBDA LC FREE SERPL-MCNC: 27.05 MG/L (ref 5.71–26.3)

## 2023-04-03 LAB
ALBUMIN SERPL-MCNC: 2.2 G/DL (ref 3.5–4.7)
ALPHA1 GLOB SERPL ELPH-MCNC: 0.4 G/DL (ref 0.2–0.4)
ALPHA2 GLOB SERPL ELPH-MCNC: 1.1 G/DL (ref 0.5–1)
B-GLOBULIN SERPL ELPH-MCNC: 1.3 G/DL (ref 0.8–1.3)
EKG ATRIAL RATE: 75 BPM
EKG P AXIS: 59 DEGREES
EKG P-R INTERVAL: 180 MS
EKG Q-T INTERVAL: 436 MS
EKG QRS DURATION: 100 MS
EKG QTC CALCULATION (BAZETT): 486 MS
EKG R AXIS: -17 DEGREES
EKG T AXIS: 23 DEGREES
EKG VENTRICULAR RATE: 75 BPM
ELECTROPHORESIS: ABNORMAL
GAMMA GLOB SERPL ELPH-MCNC: 3.1 G/DL (ref 0.7–1.6)
IGA SERPL-MCNC: 101 MG/DL (ref 70–400)
IGG SERPL-MCNC: 2805 MG/DL (ref 700–1600)
IGM SERPL-MCNC: 31 MG/DL (ref 40–230)
IMMUNOFIXATION RESULT, SERUM: NORMAL
PROT SERPL-MCNC: 8.5 G/DL (ref 6.4–8.3)

## 2023-04-03 PROCEDURE — 93010 ELECTROCARDIOGRAM REPORT: CPT | Performed by: INTERNAL MEDICINE

## 2023-04-05 LAB — B2 MICROGLOB SERPL IA-MCNC: 2.8 MG/L (ref 0.6–2.4)

## 2023-04-06 ENCOUNTER — OFFICE VISIT (OUTPATIENT)
Dept: PRIMARY CARE CLINIC | Age: 68
End: 2023-04-06

## 2023-04-06 VITALS
RESPIRATION RATE: 18 BRPM | BODY MASS INDEX: 36.16 KG/M2 | OXYGEN SATURATION: 95 % | HEIGHT: 66 IN | SYSTOLIC BLOOD PRESSURE: 138 MMHG | TEMPERATURE: 97 F | DIASTOLIC BLOOD PRESSURE: 76 MMHG | HEART RATE: 80 BPM | WEIGHT: 225 LBS

## 2023-04-06 DIAGNOSIS — R04.2 HEMOPTYSIS: ICD-10-CM

## 2023-04-06 DIAGNOSIS — J96.02 ACUTE RESPIRATORY FAILURE WITH HYPOXIA AND HYPERCARBIA (HCC): ICD-10-CM

## 2023-04-06 DIAGNOSIS — D64.9 ANEMIA, UNSPECIFIED TYPE: ICD-10-CM

## 2023-04-06 DIAGNOSIS — J44.1 COPD WITH ACUTE EXACERBATION (HCC): Primary | ICD-10-CM

## 2023-04-06 DIAGNOSIS — E11.42 DM TYPE 2 WITH DIABETIC PERIPHERAL NEUROPATHY (HCC): ICD-10-CM

## 2023-04-06 DIAGNOSIS — I10 PRIMARY HYPERTENSION: ICD-10-CM

## 2023-04-06 DIAGNOSIS — M16.0 PRIMARY OSTEOARTHRITIS OF BOTH HIPS: ICD-10-CM

## 2023-04-06 DIAGNOSIS — J96.01 ACUTE RESPIRATORY FAILURE WITH HYPOXIA AND HYPERCARBIA (HCC): ICD-10-CM

## 2023-04-06 RX ORDER — PREDNISONE 20 MG/1
TABLET ORAL
Qty: 10 TABLET | Refills: 0 | Status: SHIPPED
Start: 2023-04-06 | End: 2023-04-17 | Stop reason: ALTCHOICE

## 2023-04-06 RX ORDER — DOXYCYCLINE HYCLATE 100 MG
100 TABLET ORAL 2 TIMES DAILY
Qty: 10 TABLET | Refills: 0 | Status: SHIPPED | OUTPATIENT
Start: 2023-04-06 | End: 2023-04-11

## 2023-04-06 RX ORDER — CELECOXIB 100 MG/1
100 CAPSULE ORAL DAILY
COMMUNITY
Start: 2023-03-20

## 2023-04-07 ENCOUNTER — CARE COORDINATION (OUTPATIENT)
Dept: CARE COORDINATION | Age: 68
End: 2023-04-07

## 2023-04-07 ENCOUNTER — HOSPITAL ENCOUNTER (OUTPATIENT)
Age: 68
Discharge: HOME | End: 2023-04-07
Payer: COMMERCIAL

## 2023-04-07 DIAGNOSIS — J44.1 COPD WITH ACUTE EXACERBATION (HCC): ICD-10-CM

## 2023-04-07 DIAGNOSIS — R04.2 HEMOPTYSIS: ICD-10-CM

## 2023-04-07 DIAGNOSIS — D64.9 ANEMIA, UNSPECIFIED TYPE: ICD-10-CM

## 2023-04-07 LAB
BASOPHILS # BLD: 0.03 E9/L (ref 0–0.2)
BASOPHILS NFR BLD: 0.3 % (ref 0–2)
EOSINOPHIL # BLD: 0.12 E9/L (ref 0.05–0.5)
EOSINOPHIL NFR BLD: 1.3 % (ref 0–6)
ERYTHROCYTE [DISTWIDTH] IN BLOOD BY AUTOMATED COUNT: 14.1 FL (ref 11.5–15)
HCT VFR BLD AUTO: 36.3 % (ref 34–48)
HGB BLD-MCNC: 10.9 G/DL (ref 11.5–15.5)
IMM GRANULOCYTES # BLD: 0.03 E9/L
IMM GRANULOCYTES NFR BLD: 0.3 % (ref 0–5)
LYMPHOCYTES # BLD: 1.89 E9/L (ref 1.5–4)
LYMPHOCYTES NFR BLD: 20.7 % (ref 20–42)
MCH RBC QN AUTO: 27.5 PG (ref 26–35)
MCHC RBC AUTO-ENTMCNC: 30 % (ref 32–34.5)
MCV RBC AUTO: 91.7 FL (ref 80–99.9)
MONOCYTES # BLD: 0.38 E9/L (ref 0.1–0.95)
MONOCYTES NFR BLD: 4.2 % (ref 2–12)
NEUTROPHILS # BLD: 6.67 E9/L (ref 1.8–7.3)
NEUTS SEG NFR BLD: 73.2 % (ref 43–80)
PLATELET # BLD AUTO: 409 E9/L (ref 130–450)
PMV BLD AUTO: 9.8 FL (ref 7–12)
PROCALCITONIN: 0.1 NG/ML (ref 0–0.08)
RBC # BLD AUTO: 3.96 E12/L (ref 3.5–5.5)
WBC # BLD: 9.1 E9/L (ref 4.5–11.5)

## 2023-04-07 PROCEDURE — 84145 PROCALCITONIN (PCT): CPT

## 2023-04-07 PROCEDURE — 36415 COLL VENOUS BLD VENIPUNCTURE: CPT

## 2023-04-07 PROCEDURE — 85025 COMPLETE CBC W/AUTO DIFF WBC: CPT

## 2023-04-13 ENCOUNTER — CARE COORDINATION (OUTPATIENT)
Dept: CARE COORDINATION | Age: 68
End: 2023-04-13

## 2023-04-13 SDOH — ECONOMIC STABILITY: INCOME INSECURITY: IN THE LAST 12 MONTHS, WAS THERE A TIME WHEN YOU WERE NOT ABLE TO PAY THE MORTGAGE OR RENT ON TIME?: YES

## 2023-04-13 SDOH — ECONOMIC STABILITY: HOUSING INSECURITY: IN THE LAST 12 MONTHS, HOW MANY PLACES HAVE YOU LIVED?: 1

## 2023-04-13 SDOH — ECONOMIC STABILITY: FOOD INSECURITY: WITHIN THE PAST 12 MONTHS, YOU WORRIED THAT YOUR FOOD WOULD RUN OUT BEFORE YOU GOT MONEY TO BUY MORE.: SOMETIMES TRUE

## 2023-04-13 SDOH — ECONOMIC STABILITY: FOOD INSECURITY: WITHIN THE PAST 12 MONTHS, THE FOOD YOU BOUGHT JUST DIDN'T LAST AND YOU DIDN'T HAVE MONEY TO GET MORE.: NEVER TRUE

## 2023-04-13 ASSESSMENT — SOCIAL DETERMINANTS OF HEALTH (SDOH)
HOW OFTEN DO YOU GET TOGETHER WITH FRIENDS OR RELATIVES?: ONCE A WEEK
DO YOU BELONG TO ANY CLUBS OR ORGANIZATIONS SUCH AS CHURCH GROUPS UNIONS, FRATERNAL OR ATHLETIC GROUPS, OR SCHOOL GROUPS?: NO
HOW OFTEN DO YOU ATTEND CHURCH OR RELIGIOUS SERVICES?: NEVER
ARE YOU MARRIED, WIDOWED, DIVORCED, SEPARATED, NEVER MARRIED, OR LIVING WITH A PARTNER?: NEVER MARRIED
IN A TYPICAL WEEK, HOW MANY TIMES DO YOU TALK ON THE PHONE WITH FAMILY, FRIENDS, OR NEIGHBORS?: TWICE A WEEK
HOW OFTEN DO YOU ATTENT MEETINGS OF THE CLUB OR ORGANIZATION YOU BELONG TO?: NEVER

## 2023-04-15 NOTE — PLAN OF CARE
Problem: Discharge Planning  Goal: Discharge to home or other facility with appropriate resources  12/18/2022 2047 by Demian Torres RN  Outcome: Progressing     Problem: Safety - Adult  Goal: Free from fall injury  12/18/2022 2047 by Demian Torres RN  Outcome: Progressing 15-Apr-2023 15:30

## 2023-04-17 ENCOUNTER — OFFICE VISIT (OUTPATIENT)
Dept: SURGERY | Age: 68
End: 2023-04-17
Payer: COMMERCIAL

## 2023-04-17 VITALS
DIASTOLIC BLOOD PRESSURE: 85 MMHG | SYSTOLIC BLOOD PRESSURE: 164 MMHG | HEIGHT: 66 IN | RESPIRATION RATE: 16 BRPM | OXYGEN SATURATION: 95 % | WEIGHT: 222 LBS | HEART RATE: 91 BPM | BODY MASS INDEX: 35.68 KG/M2

## 2023-04-17 DIAGNOSIS — R10.13 EPIGASTRIC PAIN: ICD-10-CM

## 2023-04-17 DIAGNOSIS — R12 HEARTBURN: ICD-10-CM

## 2023-04-17 DIAGNOSIS — R19.4 CHANGE IN BOWEL HABITS: Primary | ICD-10-CM

## 2023-04-17 PROCEDURE — G8427 DOCREV CUR MEDS BY ELIG CLIN: HCPCS | Performed by: SURGERY

## 2023-04-17 PROCEDURE — 99204 OFFICE O/P NEW MOD 45 MIN: CPT | Performed by: SURGERY

## 2023-04-17 PROCEDURE — 3079F DIAST BP 80-89 MM HG: CPT | Performed by: SURGERY

## 2023-04-17 PROCEDURE — 3077F SYST BP >= 140 MM HG: CPT | Performed by: SURGERY

## 2023-04-17 PROCEDURE — G8399 PT W/DXA RESULTS DOCUMENT: HCPCS | Performed by: SURGERY

## 2023-04-17 PROCEDURE — G8417 CALC BMI ABV UP PARAM F/U: HCPCS | Performed by: SURGERY

## 2023-04-17 PROCEDURE — 1036F TOBACCO NON-USER: CPT | Performed by: SURGERY

## 2023-04-17 PROCEDURE — 1123F ACP DISCUSS/DSCN MKR DOCD: CPT | Performed by: SURGERY

## 2023-04-17 PROCEDURE — 3017F COLORECTAL CA SCREEN DOC REV: CPT | Performed by: SURGERY

## 2023-04-17 PROCEDURE — 1090F PRES/ABSN URINE INCON ASSESS: CPT | Performed by: SURGERY

## 2023-04-17 NOTE — PROGRESS NOTES
Diabetes Mother     Cancer Mother         Breast    Hypertension Father     Cancer Father         Pancreas    Diabetes Father     High Blood Pressure Father     Arthritis Brother     Diabetes Brother     Cancer Maternal Uncle     Cancer Paternal Aunt         breast    High Blood Pressure Paternal Aunt     High Blood Pressure Paternal Uncle     Arthritis Maternal Grandmother     Diabetes Maternal Grandmother     High Blood Pressure Maternal Grandmother     Arthritis Maternal Grandfather     Stroke Maternal Grandfather     High Cholesterol Paternal Grandmother     Kidney Disease Paternal Grandmother     Heart Disease Paternal Grandfather        REVIEW OF SYSTEMS:    Constitutional: negative  Eyes: negative  Ears, nose, mouth, throat, and face: negative  Respiratory: negative  Cardiovascular: negative  Gastrointestinal: as in HPI  Genitourinary:negative  Integument/breast: negative  Hematologic/lymphatic: negative  Musculoskeletal:negative  Neurological: negative  Allergic/Immunologic: negative    PHYSICAL EXAM   BP (!) 164/85   Pulse 91   Resp 16   Ht 5' 6\" (1.676 m)   Wt 222 lb (100.7 kg)   SpO2 95%   BMI 35.83 kg/m²     General appearance: alert, cooperative and in no acute distress. Eyes: Grossly normal   Lungs: normal work of breathing  Heart: regular rate  Abdomen:  soft, RUQ and epigastric tenderness, non-distended  Skin: No skin abnormalities  Neurologic: Alert and oriented x 3. Grossly normal  Musculoskeletal: No edema. ASSESSMENT AND PLAN:     Phil Ruth is an 76 y.o. female who presents for a colonoscopy and EGD with early satiety, epigastric pain, change in bowel habits, blood in stool     I will set the patient up for an EGD and colonoscopy, possible biopsy, possible polypectomy. I explained the risks including but not limited to bleeding, perforation leading to possible surgery, or infection.  The benefits, alternatives, and potential complications associated with the above procedure

## 2023-04-17 NOTE — PATIENT INSTRUCTIONS
understand how to prepare for your procedure. You are having trouble with the bowel prep. You become ill before the procedure (such as fever, flu, or a cold). You need to reschedule or have changed your mind about having the procedure. Where can you learn more? Go to [email or web address] and sign in to your [employer] account. Enter C315 in the [employer] box to learn more about Colonoscopy: Before Your Procedure.     If you do not have an account, please click on the Sign Up Now link. © 0577-4286 Healthwise, Incorporated. Care instructions adapted under license by [facility] [place] ([facility]). This care instruction is for use with your licensed healthcare professional. If you have questions about a medical condition or this instruction, always ask your healthcare professional. [address] any warranty or liability for your use of this information.   Content Version: 92.6.736009; Current as of: November 20, 2015

## 2023-04-18 ENCOUNTER — TELEPHONE (OUTPATIENT)
Dept: SURGERY | Age: 68
End: 2023-04-18

## 2023-04-18 PROBLEM — R10.13 ABDOMINAL PAIN, EPIGASTRIC: Status: ACTIVE | Noted: 2023-04-18

## 2023-04-18 PROBLEM — K92.1 BLOOD IN STOOL: Status: ACTIVE | Noted: 2023-04-18

## 2023-04-18 NOTE — TELEPHONE ENCOUNTER
Prior Authorization Form:      DEMOGRAPHICS:                     Patient Name:  Lyndon Vargas  Patient :  1955            Insurance:  Payor: [employer] / Plan: [employer] [address] / Product Type: *No Product type* /   Insurance ID Number:    Payer/Plan Subscr  Sex Relation Sub. Ins. ID Effective Group Num   1. [address] Tan Morales* 1955 Female Self [id number] 10/1/22 [id number]                                   [address]   2.  Fernando RadhaChaitanya Reji* 1955 Female Self [id number] 1/1/15 [id number]                                   [address]         DIAGNOSIS & PROCEDURE:                       Procedure/Operation: EGD COLONOSCOPY           CPT Code: 69974  07510    Diagnosis:  HEARTBURN  EPIGASTRIC PAIN  BLOOD IN STOOL    ICD10 Code: R12  789.06  K92.1    Location:  [place]    Surgeon:  Ciro Chapa INFORMATION:                          Date: 2023    Time: 11:30              Anesthesia:  MAC/TIVA                                                       Status:  Outpatient        Special Comments:         Electronically signed by Sonia Rg MA on 2023 at 8:51 AM

## 2023-04-19 ENCOUNTER — TELEPHONE (OUTPATIENT)
Dept: PRIMARY CARE CLINIC | Age: 68
End: 2023-04-19

## 2023-04-21 ENCOUNTER — TELEPHONE (OUTPATIENT)
Dept: SURGERY | Age: 68
End: 2023-04-21

## 2023-04-21 ASSESSMENT — ENCOUNTER SYMPTOMS
VOMITING: 0
NAUSEA: 0
SORE THROAT: 0
SHORTNESS OF BREATH: 0
COUGH: 1
ABDOMINAL PAIN: 0
VOICE CHANGE: 0
CHEST TIGHTNESS: 0
WHEEZING: 0

## 2023-04-21 NOTE — TELEPHONE ENCOUNTER
Left message for patient to call office. US gallbladder not yet scheduled. Radiology number given to schedule.

## 2023-04-25 ENCOUNTER — CARE COORDINATION (OUTPATIENT)
Dept: CARE COORDINATION | Age: 68
End: 2023-04-25

## 2023-04-25 NOTE — TELEPHONE ENCOUNTER
Called and LVM for pt to rtc, im unsure what the pt needs because FMLA forms were already sent to pts employer.  Unsure why the pt would need a written excuse due to pt in the hospital and FMLA dates cover the pt from 11/5/22 - 2/20/23

## 2023-05-01 ENCOUNTER — TELEPHONE (OUTPATIENT)
Dept: SLEEP CENTER | Age: 68
End: 2023-05-01

## 2023-05-04 ENCOUNTER — PROCEDURE VISIT (OUTPATIENT)
Dept: PODIATRY | Age: 68
End: 2023-05-04
Payer: COMMERCIAL

## 2023-05-04 ENCOUNTER — CARE COORDINATION (OUTPATIENT)
Dept: CARE COORDINATION | Age: 68
End: 2023-05-04

## 2023-05-04 VITALS — TEMPERATURE: 97.8 F | BODY MASS INDEX: 35.68 KG/M2 | HEIGHT: 66 IN | WEIGHT: 222 LBS

## 2023-05-04 DIAGNOSIS — B35.1 TINEA UNGUIUM: ICD-10-CM

## 2023-05-04 DIAGNOSIS — M79.675 PAIN IN LEFT TOE(S): ICD-10-CM

## 2023-05-04 DIAGNOSIS — I73.9 PERIPHERAL VASCULAR DISEASE, UNSPECIFIED (HCC): ICD-10-CM

## 2023-05-04 DIAGNOSIS — E11.9 TYPE 2 DIABETES MELLITUS WITHOUT COMPLICATION, UNSPECIFIED WHETHER LONG TERM INSULIN USE (HCC): ICD-10-CM

## 2023-05-04 DIAGNOSIS — M79.674 PAIN IN TOE OF RIGHT FOOT: ICD-10-CM

## 2023-05-04 DIAGNOSIS — E11.9 ENCOUNTER FOR DIABETIC FOOT EXAM (HCC): ICD-10-CM

## 2023-05-04 DIAGNOSIS — D36.10 NEUROMA: Primary | ICD-10-CM

## 2023-05-04 DIAGNOSIS — E11.00 TYPE 2 DIABETES MELLITUS WITH HYPEROSMOLARITY WITHOUT COMA, WITHOUT LONG-TERM CURRENT USE OF INSULIN (HCC): ICD-10-CM

## 2023-05-04 PROCEDURE — 11721 DEBRIDE NAIL 6 OR MORE: CPT | Performed by: PODIATRIST

## 2023-05-04 PROCEDURE — 99213 OFFICE O/P EST LOW 20 MIN: CPT | Performed by: PODIATRIST

## 2023-05-04 PROCEDURE — 3017F COLORECTAL CA SCREEN DOC REV: CPT | Performed by: PODIATRIST

## 2023-05-04 PROCEDURE — G8399 PT W/DXA RESULTS DOCUMENT: HCPCS | Performed by: PODIATRIST

## 2023-05-04 PROCEDURE — G8427 DOCREV CUR MEDS BY ELIG CLIN: HCPCS | Performed by: PODIATRIST

## 2023-05-04 PROCEDURE — 2022F DILAT RTA XM EVC RTNOPTHY: CPT | Performed by: PODIATRIST

## 2023-05-04 PROCEDURE — G8417 CALC BMI ABV UP PARAM F/U: HCPCS | Performed by: PODIATRIST

## 2023-05-04 PROCEDURE — 1090F PRES/ABSN URINE INCON ASSESS: CPT | Performed by: PODIATRIST

## 2023-05-04 PROCEDURE — 64455 NJX AA&/STRD PLTR COM DG NRV: CPT | Performed by: PODIATRIST

## 2023-05-04 PROCEDURE — 1123F ACP DISCUSS/DSCN MKR DOCD: CPT | Performed by: PODIATRIST

## 2023-05-04 PROCEDURE — 3044F HG A1C LEVEL LT 7.0%: CPT | Performed by: PODIATRIST

## 2023-05-04 PROCEDURE — 1036F TOBACCO NON-USER: CPT | Performed by: PODIATRIST

## 2023-05-04 RX ORDER — BETAMETHASONE SODIUM PHOSPHATE AND BETAMETHASONE ACETATE 3; 3 MG/ML; MG/ML
4 INJECTION, SUSPENSION INTRA-ARTICULAR; INTRALESIONAL; INTRAMUSCULAR; SOFT TISSUE ONCE
Status: COMPLETED | OUTPATIENT
Start: 2023-05-04 | End: 2023-05-04

## 2023-05-04 RX ADMIN — BETAMETHASONE SODIUM PHOSPHATE AND BETAMETHASONE ACETATE 4 MG: 3; 3 INJECTION, SUSPENSION INTRA-ARTICULAR; INTRALESIONAL; INTRAMUSCULAR; SOFT TISSUE at 15:53

## 2023-05-04 NOTE — PROGRESS NOTES
[facility] PODIATRY  [address] [place], [place] [address] [address]  Dept: [phone]  Dept Fax: [phone]  Loc: [phone]    DIABETIC NAIL PROGRESS NOTE  Date of patient's visit: 5/4/2023  Patient's Name:  Anai Corrales YOB: 1955            Patient Care Team:  Raulito Chase MD as PCP - General (Internal Medicine)  Raulito Chase MD as PCP - Empaneled Provider  Barbera Jeans, MD as Consulting Physician (Pulmonology)  Mariela Patton as Imaging Navigator  Kassie Hightower RN as Ambulatory Care Manager          Chief Complaint   Patient presents with    Nail Problem     PCP Dr. Kurtis Orosco, last seen 04/19/2023. Subjective:   Anai Corrales comes to clinic for Nail Problem (PCP Dr. Kurtis Orosco, last seen 04/19/2023. )    she is a diabetic and states that . Pt currently has complaint of thickened, elongated nails that they cannot manage by themselves. Pt's primary care physician is Raulito Chase MD    .  This patient is seen for new issue left foot pain several months no trauma noted feels like a rolled up sock pain in the ball of the foot     Lab Results   Component Value Date    LABA1C 5.9 (H) 03/28/2023      Complains of numbness in the feet bilat.   Past Medical History:   Diagnosis Date    Adrenal incidentaloma ([address]     Anxiety     Arthritis     Asthma     Bladder incontinence     Cancer [facility]) Dx 2009    multiple Myeloma, in remission currently     Chronic back pain     COPD (chronic obstructive pulmonary disease) (HCC)     on O2 2 liters  (uses with activity and at night to sleep)    Depression     Fibromyalgia     GERD (gastroesophageal reflux disease)     Hyperlipidemia     Hypertension     Hypothyroidism     MGUS (monoclonal gammopathy of unknown significance)     Neuropathy     Pneumonia 02/2020    Prolonged emergence from general anesthesia     Sleep apnea     doesnt use her cpap    Type II or unspecified

## 2023-05-05 ENCOUNTER — CARE COORDINATION (OUTPATIENT)
Dept: CARE COORDINATION | Age: 68
End: 2023-05-05

## 2023-05-05 NOTE — TELEPHONE ENCOUNTER
Freestyle lite    BD short pen needles    [facility] #[id number] Maxwell Madden, [place] [address]

## 2023-05-05 NOTE — CARE COORDINATION
HRS attempted to contact patient 3 times with no succsess. I called patient and LM with RPM activation line.
138

## 2023-05-05 NOTE — TELEPHONE ENCOUNTER
Last Appointment:  4/6/2023  Future Appointments   Date Time Provider Jennifer Stylesi   5/15/2023  2:00 PM Kelly Damon MD [facility]   5/25/2023  2:00 PM Tawny Hdez MD [facility]   6/2/2023  3:00 PM [facility] ONC FAST TRACK 3 [facility] Med Onc [facility]   6/2/2023  3:15 PM Ortega Espitia MD MED ONC [facility]

## 2023-05-08 ENCOUNTER — CARE COORDINATION (OUTPATIENT)
Dept: CARE COORDINATION | Age: 68
End: 2023-05-08

## 2023-05-08 NOTE — CARE COORDINATION
Remote Patient Monitoring Note      Date/Time:  5/8/2023 3:50 PM    LPN attempted to contact patient by telephone regarding red alert received for pulse ox reading (90%). Background: High Blood-Pressure, COPD, CHF    Clinical Interventions:  HIPAA compliant message left on  requesting a return call. Plan/Follow Up: Will continue to review, monitor and address alerts with follow up based on severity of symptoms and risk factors.        Zoltan Donovan LPN  [facility]/ CTN/ Remote Patient Monitoring  [phone]

## 2023-05-09 ENCOUNTER — CARE COORDINATION (OUTPATIENT)
Dept: CARE COORDINATION | Age: 68
End: 2023-05-09

## 2023-05-09 NOTE — CARE COORDINATION
Remote Patient Monitoring Note          Date/Time:  5/9/2023 1:27 PM    Update: No return call received from patient, but she did update /79, wnl. Welcome Call Attempt #1  Date/Time:  5/9/2023 8:23 AM    LPN attempted to contact patient by telephone regarding red alert received for blood pressure reading (188/93) . Background: High Blood-Pressure, COPD, CHF    Clinical Interventions:  HIPAA compliant message left on  requesting a return call. Plan/Follow Up: Will continue to review, monitor and address alerts with follow up based on severity of symptoms and risk factors.        Mauro Zayas, SHARON  [facility]/ CTN/ Remote Patient Monitoring  [phone]

## 2023-05-09 NOTE — CARE COORDINATION
Ambulatory Care Coordination Note  2023    Patient Current Location:  Home: [address] [place] [address] [address] [address]     ACM contacted the patient by telephone. Verified name and  with patient as identifiers. Provided introduction to self, and explanation of the ACM role. Challenges to be reviewed by the provider   Additional needs identified to be addressed with provider: No  none               Method of communication with provider: none. ACM: Dimple Centeno RN    ACM contacted Angelita. She has received her remote patient monitoring equipment but has not activated. She is agreeable to AC placing a three way call to HRS activation line. Patient is successfully activated. Patient states she has had a cough with yellowish sputum. She states it started yesterday. ACM encouraged patient to call PCP if her symptoms persist or become worse. ACM reviewed the COPD zone tool. Angelita is in the yellow zone. Heart Failure Education outreach Date/Time: 2023 9:53 AM    Ambulatory Care Manager (ACM) contacted the patient by telephone to perform Ambulatory Care Coordination. Verified name and  with patient as identifiers. Provided introduction to self, and explanation of the Ambulatory Care Manager's role. ACM reviewed that a Health Healthy tips for the Spring packet has been sent to [employer]. ACM reviewed CHF zones, daily weights, fluid restriction, the importance of low sodium diet, and healthy tips packet with the patient. Instructed patient to call their PCP if they have a weight gain of 3 lbs in 2 days or 5 lbs in a week. Patient reminded that there is a physician on call 24 hours a day / 7 days a week should the patient have questions or concerns. The patient verbalized understanding. Offered patient enrollment in the Remote Patient Monitoring (RPM) program for in-home monitoring: Yes, patient already enrolled.     Plan  Check status of COPD  Check status of CHF  Review RPM

## 2023-05-10 ENCOUNTER — CARE COORDINATION (OUTPATIENT)
Dept: CARE COORDINATION | Age: 68
End: 2023-05-10

## 2023-05-10 NOTE — CARE COORDINATION
Welcome Call Attempt #2  Date/Time:  5/10/2023 11:29 AM    LPN attempted to reach patient by telephone. Left HIPPA compliant message requesting a return call.         Salty Isbell LPN  [facility]/ Care Transition Nurse/[facility]  [phone]

## 2023-05-12 ENCOUNTER — CARE COORDINATION (OUTPATIENT)
Dept: CARE COORDINATION | Age: 68
End: 2023-05-12

## 2023-05-12 NOTE — CARE COORDINATION
Remote Patient Monitoring Note    Welcome Call Attempt #3  Date/Time:  5/12/2023 9:49 AM    LPN attempted to contact patient by telephone regarding yellow alert received for blood pressure reading (174/80). Background: High Blood-Pressure, COPD, CHF    Clinical Interventions:  HIPAA compliant message left on sisters VM requesting a return call. Plan/Follow Up: Will continue to review, monitor and address alerts with follow up based on severity of symptoms and risk factors. Will update ACM.       Dina Betancourt LPN  [facility]/ CTN/ Remote Patient Monitoring  [phone]

## 2023-05-12 NOTE — CARE COORDINATION
Remote Alert Monitoring Note      Date/Time:  2023 2:50 PM  Patient Current Location: Home: [address] [place] [address]  [address] [address]      LPN spoke with patient by telephone regarding yellow alert received for blood pressure reading (174/80). Verified patients name and  as identifiers. Background: High Blood-Pressure, COPD, CHF    Refer to 911 immediately if:  Patient unresponsive or unable to provide history  Change in cognition or sudden confusion  Patient unable to respond in complete sentences  Intense chest pain/tightness  Any concern for any clinical emergency  Red Alert: Provider response time of 1 hr required for any red alert requiring intervention  Yellow Alert: Provider response time of 3hr required for any escalated yellow alert    BP Triage  Are you having any Chest Pain? no   Are you having any Shortness of Breath? no   Do you have a headache or have any vision changes? no   Are you having any numbness or tingling? no   Are you having any other health concerns or issues? no       Clinical Interventions: Reviewed and followed up on alerts and treatments-.  Pt is asymptomatic and in no apparent distress, speaking in full sentences. She was able to recheck while on the phone and got a normal reading of 147/74. We discussed waiting an hour after she takes her Bp medications to check her BP in the mornings. Will continue to monitor. Plan/Follow Up: Will continue to review, monitor and address alerts with follow up based on severity of symptoms and risk factors. Saira Garza LPN  [facility]/ CTN/ Remote Patient Monitoring  [phone]        Remote Patient Monitoring Welcome Note    Verified patients name and  as identifiers.   Completed and confirmed the following:   Emergency Contact: David Fischer (sister) [phone]    [x] Patient received all RPM equipment (tablet, scale, blood pressure device and cuff, and pulse oximeter)  Cuff Size: large (13.8\"-19.68\")    Weight

## 2023-05-15 ENCOUNTER — OFFICE VISIT (OUTPATIENT)
Dept: PRIMARY CARE CLINIC | Age: 68
End: 2023-05-15
Payer: COMMERCIAL

## 2023-05-15 VITALS
OXYGEN SATURATION: 98 % | TEMPERATURE: 96.9 F | WEIGHT: 233 LBS | SYSTOLIC BLOOD PRESSURE: 138 MMHG | DIASTOLIC BLOOD PRESSURE: 76 MMHG | HEART RATE: 89 BPM | BODY MASS INDEX: 37.45 KG/M2 | HEIGHT: 66 IN

## 2023-05-15 DIAGNOSIS — J44.9 CHRONIC OBSTRUCTIVE PULMONARY DISEASE, UNSPECIFIED COPD TYPE (HCC): ICD-10-CM

## 2023-05-15 DIAGNOSIS — J20.9 ACUTE BRONCHITIS, UNSPECIFIED ORGANISM: Primary | ICD-10-CM

## 2023-05-15 DIAGNOSIS — M79.7 FIBROMYALGIA: ICD-10-CM

## 2023-05-15 DIAGNOSIS — C90.00 MULTIPLE MYELOMA, REMISSION STATUS UNSPECIFIED (HCC): ICD-10-CM

## 2023-05-15 DIAGNOSIS — I10 PRIMARY HYPERTENSION: ICD-10-CM

## 2023-05-15 DIAGNOSIS — M79.10 MYALGIA: ICD-10-CM

## 2023-05-15 DIAGNOSIS — E11.42 DM TYPE 2 WITH DIABETIC PERIPHERAL NEUROPATHY (HCC): ICD-10-CM

## 2023-05-15 LAB
INFLUENZA A ANTIBODY: NEGATIVE
INFLUENZA B ANTIBODY: NEGATIVE
Lab: NORMAL
PERFORMING INSTRUMENT: NORMAL
QC PASS/FAIL: NORMAL
SARS-COV-2, POC: NORMAL

## 2023-05-15 PROCEDURE — 2022F DILAT RTA XM EVC RTNOPTHY: CPT | Performed by: INTERNAL MEDICINE

## 2023-05-15 PROCEDURE — G8417 CALC BMI ABV UP PARAM F/U: HCPCS | Performed by: INTERNAL MEDICINE

## 2023-05-15 PROCEDURE — G8399 PT W/DXA RESULTS DOCUMENT: HCPCS | Performed by: INTERNAL MEDICINE

## 2023-05-15 PROCEDURE — 1036F TOBACCO NON-USER: CPT | Performed by: INTERNAL MEDICINE

## 2023-05-15 PROCEDURE — 3078F DIAST BP <80 MM HG: CPT | Performed by: INTERNAL MEDICINE

## 2023-05-15 PROCEDURE — G8427 DOCREV CUR MEDS BY ELIG CLIN: HCPCS | Performed by: INTERNAL MEDICINE

## 2023-05-15 PROCEDURE — 87804 INFLUENZA ASSAY W/OPTIC: CPT | Performed by: INTERNAL MEDICINE

## 2023-05-15 PROCEDURE — 3075F SYST BP GE 130 - 139MM HG: CPT | Performed by: INTERNAL MEDICINE

## 2023-05-15 PROCEDURE — 3023F SPIROM DOC REV: CPT | Performed by: INTERNAL MEDICINE

## 2023-05-15 PROCEDURE — 1123F ACP DISCUSS/DSCN MKR DOCD: CPT | Performed by: INTERNAL MEDICINE

## 2023-05-15 PROCEDURE — 87426 SARSCOV CORONAVIRUS AG IA: CPT | Performed by: INTERNAL MEDICINE

## 2023-05-15 PROCEDURE — 1090F PRES/ABSN URINE INCON ASSESS: CPT | Performed by: INTERNAL MEDICINE

## 2023-05-15 PROCEDURE — 3017F COLORECTAL CA SCREEN DOC REV: CPT | Performed by: INTERNAL MEDICINE

## 2023-05-15 PROCEDURE — 99214 OFFICE O/P EST MOD 30 MIN: CPT | Performed by: INTERNAL MEDICINE

## 2023-05-15 PROCEDURE — 3044F HG A1C LEVEL LT 7.0%: CPT | Performed by: INTERNAL MEDICINE

## 2023-05-15 RX ORDER — INSULIN GLARGINE 100 [IU]/ML
INJECTION, SOLUTION SUBCUTANEOUS
Qty: 9 ADJUSTABLE DOSE PRE-FILLED PEN SYRINGE | Refills: 3 | Status: SHIPPED | OUTPATIENT
Start: 2023-05-15

## 2023-05-15 RX ORDER — AMOXICILLIN AND CLAVULANATE POTASSIUM 875; 125 MG/1; MG/1
1 TABLET, FILM COATED ORAL 2 TIMES DAILY
Qty: 14 TABLET | Refills: 0 | Status: SHIPPED | OUTPATIENT
Start: 2023-05-15 | End: 2023-05-22

## 2023-05-15 RX ORDER — PREGABALIN 75 MG/1
75 CAPSULE ORAL 3 TIMES DAILY
Qty: 90 CAPSULE | Refills: 0 | Status: SHIPPED | OUTPATIENT
Start: 2023-05-15 | End: 2023-06-14

## 2023-05-15 RX ORDER — AMLODIPINE BESYLATE 2.5 MG/1
2.5 TABLET ORAL DAILY
Qty: 30 TABLET | Refills: 3 | Status: SHIPPED | OUTPATIENT
Start: 2023-05-15 | End: 2023-09-12

## 2023-05-15 RX ORDER — PREDNISONE 20 MG/1
20 TABLET ORAL 2 TIMES DAILY
Qty: 10 TABLET | Refills: 0 | Status: SHIPPED | OUTPATIENT
Start: 2023-05-15 | End: 2023-05-20

## 2023-05-15 RX ORDER — DULAGLUTIDE 0.75 MG/.5ML
0.75 INJECTION, SOLUTION SUBCUTANEOUS WEEKLY
Qty: 1 ADJUSTABLE DOSE PRE-FILLED PEN SYRINGE | Refills: 3 | Status: SHIPPED | OUTPATIENT
Start: 2023-05-15

## 2023-05-15 RX ORDER — BACLOFEN 10 MG/1
10 TABLET ORAL 3 TIMES DAILY PRN
Qty: 30 TABLET | Refills: 0 | Status: SHIPPED | OUTPATIENT
Start: 2023-05-15

## 2023-05-15 RX ORDER — GLUCOSAMINE HCL/CHONDROITIN SU 500-400 MG
CAPSULE ORAL
Qty: 100 STRIP | Refills: 0 | Status: SHIPPED | OUTPATIENT
Start: 2023-05-15

## 2023-05-15 ASSESSMENT — ENCOUNTER SYMPTOMS
ABDOMINAL PAIN: 0
COUGH: 1
WHEEZING: 1
SORE THROAT: 0
NAUSEA: 0
BACK PAIN: 1
SHORTNESS OF BREATH: 1
CHEST TIGHTNESS: 0
VOICE CHANGE: 0
VOMITING: 0

## 2023-05-15 NOTE — PROGRESS NOTES
HPI:  Patient comes in today for cough  wheezing & body achs. Review of Systems   Constitutional:  Negative for chills, fever and unexpected weight change. HENT:  Positive for congestion. Negative for postnasal drip, sore throat and voice change. Respiratory:  Positive for cough, shortness of breath and wheezing. Negative for chest tightness. Cardiovascular:  Negative for chest pain and leg swelling. Gastrointestinal:  Negative for abdominal pain, nausea and vomiting. Musculoskeletal:  Positive for back pain and myalgias. Negative for gait problem and joint swelling. Skin:  Negative for rash and wound. Neurological: Negative. Psychiatric/Behavioral: Negative. Negative for agitation, confusion and suicidal ideas. The patient is not nervous/anxious.        Past Medical History:   Diagnosis Date    Adrenal incidentaloma ([facility] [address]     Anxiety     Arthritis     Asthma     Bladder incontinence     Cancer [facility]) Dx 2009    multiple Myeloma, in remission currently     Chronic back pain     COPD (chronic obstructive pulmonary disease) (HCC)     on O2 2 liters  (uses with activity and at night to sleep)    Depression     Fibromyalgia     GERD (gastroesophageal reflux disease)     Hyperlipidemia     Hypertension     Hypothyroidism     MGUS (monoclonal gammopathy of unknown significance)     Neuropathy     Pneumonia 02/2020    Prolonged emergence from general anesthesia     Sleep apnea     doesnt use her cpap    Type II or unspecified type diabetes mellitus without mention of complication, not stated as uncontrolled     Vaginal bleeding      Past Surgical History:   Procedure Laterality Date    ANESTHESIA NERVE BLOCK Bilateral 8/10/2020    BILATERAL L3 L4 MEDIAL BRANCH L5 DORSSAL RAMUS NERVE BLOCK (CPT [id number]) SEDATION performed by Cori Malagon MD at [address] [place] [address]  07/20/2016    removed 3 polyps (tubular adenomas), diverticulosis, Dr. Alberta Clarke  2008    approximately 2008,

## 2023-05-17 ENCOUNTER — CARE COORDINATION (OUTPATIENT)
Dept: CARE COORDINATION | Age: 68
End: 2023-05-17

## 2023-05-17 ENCOUNTER — TELEPHONE (OUTPATIENT)
Dept: PRIMARY CARE CLINIC | Age: 68
End: 2023-05-17

## 2023-05-17 NOTE — TELEPHONE ENCOUNTER
Per fax for the patient. She will need a prescription signed and faxed to [id number] for signature.

## 2023-05-17 NOTE — CARE COORDINATION
Remote Patient Monitoring Note      Date/Time:  2023 4:02 PM  Patient Current Location: Home: [address] [facility] [address]      LPN contacted patient by telephone regarding yellow alert received for no metrics for >2 days. Verified patients name and  as identifiers. Background: : High Blood-Pressure, COPD, CHF    Clinical Interventions: Spoke with patient she states she has slept in or forgotten to take her metrics. She is agreeable to restart tomorrow. Plan/Follow Up: Will continue to review, monitor and address alerts with follow up based on severity of symptoms and risk factors.        Blade Salas LPN  [facility]/ CTN/ Remote Patient Monitoring  [phone]

## 2023-05-18 ENCOUNTER — CARE COORDINATION (OUTPATIENT)
Dept: CARE COORDINATION | Age: 68
End: 2023-05-18

## 2023-05-18 NOTE — CARE COORDINATION
Remote Patient Monitoring Note      Date/Time:  5/18/2023 9:21 AM    LPN attempted to contact patient by telephone regarding red alert received for blood pressure reading (191/74) and weight increase (230.3lb) 6lb weight gain in the past 7 days. Background: High Blood-Pressure, COPD, CHF    Clinical Interventions:  HIPAA compliant message left on  requesting a return call. Plan/Follow Up: Will continue to review, monitor and address alerts with follow up based on severity of symptoms and risk factors.       Thony Cavazos LPN  [facility]/ CTN/ Remote Patient Monitoring  [phone]

## 2023-05-19 ENCOUNTER — CARE COORDINATION (OUTPATIENT)
Dept: CARE COORDINATION | Age: 68
End: 2023-05-19

## 2023-05-19 NOTE — CARE COORDINATION
Remote Patient Monitoring Note      Date/Time:  5/19/2023 8:44 AM    LPN attempted to contact patient by telephone regarding yellow alert received for blood pressure reading (177/89). Background: High Blood-Pressure, COPD, CHF    Clinical Interventions:   HIPAA compliant message left on  requesting a return call. Plan/Follow Up: Will continue to review, monitor and address alerts with follow up based on severity of symptoms and risk factors.        Lori Membreno LPN  [address]/ CTN/ Remote Patient Monitoring  [phone]

## 2023-05-22 DIAGNOSIS — I10 ESSENTIAL HYPERTENSION: ICD-10-CM

## 2023-05-22 RX ORDER — LOSARTAN POTASSIUM 50 MG/1
50 TABLET ORAL DAILY
Qty: 90 TABLET | Refills: 1 | Status: SHIPPED | OUTPATIENT
Start: 2023-05-22

## 2023-05-22 NOTE — TELEPHONE ENCOUNTER
Losartan needs a refill per pharmacy     Asking for 90 day supply    [facility] [address], [place] [address] [facility]

## 2023-05-23 ENCOUNTER — CARE COORDINATION (OUTPATIENT)
Dept: CARE COORDINATION | Age: 68
End: 2023-05-23

## 2023-05-23 NOTE — CARE COORDINATION
Remote Patient Monitoring Note      Date/Time:  5/23/2023 9:12 AM    LPN attempted to contact patient by telephone regarding red alert and yellow alert received for blood pressure reading (178/84), pulse ox reading (91%), and weight increase (230.1lb). Background: High Blood-Pressure, COPD, CHF    Clinical Interventions:  HIPAA compliant message left on VM requesting a return call. 2nd attempt made to contact Emergency Contact. Krystal Burr states the patient is at work and cannot take calls while she is there. Writer requested patient contact at current number when she returns from work. Krystal Burr states she will pass along the message. Plan/Follow Up: Will continue to review, monitor and address alerts with follow up based on severity of symptoms and risk factors.        Yanelis Perez LPN  [facility]/ CTN/ Remote Patient Monitoring  [phone]

## 2023-05-24 ENCOUNTER — CARE COORDINATION (OUTPATIENT)
Dept: CARE COORDINATION | Age: 68
End: 2023-05-24

## 2023-05-24 NOTE — CARE COORDINATION
Remote Patient Monitoring Note      Date/Time:  5/24/2023 8:01 AM    LPN attempted to contact patient by telephone regarding red alert received for blood pressure reading (187/90) and weight increase (231.7lb). Background: High Blood-Pressure, COPD, CHF    Clinical Interventions:  Unable to LM, VM full. Spoke with Sister yesterday who stated patient works during the day and cannot take calls. Plan/Follow Up: Will continue to review, monitor and address alerts with follow up based on severity of symptoms and risk factors.  [facility] updated      Dennis Coffman [address]/ CTN/ Remote Patient Monitoring  [phone]

## 2023-05-24 NOTE — CARE COORDINATION
Attempted to reach patient by telephone. Unable to leave message, voice mail is full. Will attempt to reach patient again.

## 2023-05-25 RX ORDER — PEAK FLOW METER
EACH MISCELLANEOUS
COMMUNITY
Start: 2023-04-03

## 2023-05-25 RX ORDER — M-VIT,TX,IRON,MINS/CALC/FOLIC 27MG-0.4MG
1 TABLET ORAL DAILY
COMMUNITY

## 2023-05-25 NOTE — PROGRESS NOTES
[facility] PRE-ADMISSION TESTING INSTRUCTIONS    The Preadmission Testing patient is instructed accordingly using the following criteria (check applicable):    ARRIVAL INSTRUCTIONS:  [x] Parking the day of Surgery is located in the Main Entrance lot. Upon entering the door, make an immediate right to the surgery reception desk    [x] Bring photo ID and insurance card    [] Bring in a copy of Living will or Durable Power of  papers. [x] Please be sure to arrange transportation to and from the hospital    [x] Please arrange for someone to be with you the remainder of the day due to having anesthesia      GENERAL INSTRUCTIONS:    [x] Nothing by mouth after midnight, including gum, candy, mints or water    [x] You may brush your teeth, but do not swallow any water    [x] Take medications as instructed with 1-2 oz of water    [x] Stop herbal supplements and vitamins 5 days prior to procedure    [x] Follow preop dosing of blood thinners per physician instructions    [x] Do not take insulin or oral diabetic medications    [x] If diabetic and have low blood sugar or feel symptomatic, take 1-2oz apple juice or glucose tablets    [] Bring inhalers day of surgery    [] Bring C-PAP/ Bi-Pap day of surgery    [] Bring urine specimen day of surgery    [x] Antibacterial Soap shower or bath AM of Surgery, no lotion, powders or creams to surgical site    [x] Follow bowel prep as instructed per surgeon    [x] No tobacco products within 24 hours of surgery     [x] No alcohol or illegal drug use within 24 hours of surgery.     [x] Jewelry, body piercing's, eyeglasses, contact lenses and dentures are not permitted into surgery (bring cases)      [] Please do not wear any nail polish or make up on the day of surgery    [] If not already done, you can expect a call from registration    [x] If surgeon requests a time change you will be notified the day prior to surgery    [] If you receive a survey

## 2023-05-26 ENCOUNTER — HOSPITAL ENCOUNTER (OUTPATIENT)
Age: 68
Setting detail: OUTPATIENT SURGERY
Discharge: HOME | End: 2023-05-26
Attending: SURGERY | Admitting: SURGERY
Payer: COMMERCIAL

## 2023-05-26 ENCOUNTER — ANESTHESIA (OUTPATIENT)
Dept: ENDOSCOPY | Age: 68
End: 2023-05-26
Payer: COMMERCIAL

## 2023-05-26 ENCOUNTER — ANESTHESIA EVENT (OUTPATIENT)
Dept: ENDOSCOPY | Age: 68
End: 2023-05-26
Payer: COMMERCIAL

## 2023-05-26 VITALS
OXYGEN SATURATION: 96 % | BODY MASS INDEX: 36.16 KG/M2 | RESPIRATION RATE: 17 BRPM | HEIGHT: 66 IN | TEMPERATURE: 97.2 F | WEIGHT: 225 LBS | SYSTOLIC BLOOD PRESSURE: 167 MMHG | HEART RATE: 81 BPM | DIASTOLIC BLOOD PRESSURE: 96 MMHG

## 2023-05-26 DIAGNOSIS — R10.13 ABDOMINAL PAIN, EPIGASTRIC: ICD-10-CM

## 2023-05-26 DIAGNOSIS — K92.1 BLOOD IN STOOL: ICD-10-CM

## 2023-05-26 DIAGNOSIS — R12 HEART BURN: ICD-10-CM

## 2023-05-26 LAB — METER GLUCOSE: 135 MG/DL (ref 74–99)

## 2023-05-26 PROCEDURE — 3700000000 HC ANESTHESIA ATTENDED CARE: Performed by: SURGERY

## 2023-05-26 PROCEDURE — 2709999900 HC NON-CHARGEABLE SUPPLY: Performed by: SURGERY

## 2023-05-26 PROCEDURE — 6360000002 HC RX W HCPCS: Performed by: NURSE ANESTHETIST, CERTIFIED REGISTERED

## 2023-05-26 PROCEDURE — 7100000011 HC PHASE II RECOVERY - ADDTL 15 MIN: Performed by: SURGERY

## 2023-05-26 PROCEDURE — 88342 IMHCHEM/IMCYTCHM 1ST ANTB: CPT

## 2023-05-26 PROCEDURE — 3700000001 HC ADD 15 MINUTES (ANESTHESIA): Performed by: SURGERY

## 2023-05-26 PROCEDURE — 82962 GLUCOSE BLOOD TEST: CPT

## 2023-05-26 PROCEDURE — 2580000003 HC RX 258: Performed by: NURSE ANESTHETIST, CERTIFIED REGISTERED

## 2023-05-26 PROCEDURE — 88305 TISSUE EXAM BY PATHOLOGIST: CPT

## 2023-05-26 PROCEDURE — 7100000010 HC PHASE II RECOVERY - FIRST 15 MIN: Performed by: SURGERY

## 2023-05-26 PROCEDURE — 3609008400 HC SIGMOIDOSCOPY DIAGNOSTIC: Performed by: SURGERY

## 2023-05-26 PROCEDURE — 3609012400 HC EGD TRANSORAL BIOPSY SINGLE/MULTIPLE: Performed by: SURGERY

## 2023-05-26 RX ORDER — ONDANSETRON 4 MG/1
4 TABLET, FILM COATED ORAL 3 TIMES DAILY PRN
Qty: 10 TABLET | Refills: 0 | Status: SHIPPED | OUTPATIENT
Start: 2023-05-26

## 2023-05-26 RX ORDER — PROPOFOL 10 MG/ML
INJECTION, EMULSION INTRAVENOUS PRN
Status: DISCONTINUED | OUTPATIENT
Start: 2023-05-26 | End: 2023-05-26 | Stop reason: SDUPTHER

## 2023-05-26 RX ORDER — SODIUM CHLORIDE 9 MG/ML
INJECTION, SOLUTION INTRAVENOUS CONTINUOUS PRN
Status: DISCONTINUED | OUTPATIENT
Start: 2023-05-26 | End: 2023-05-26 | Stop reason: SDUPTHER

## 2023-05-26 RX ADMIN — PROPOFOL 50 MG: 10 INJECTION, EMULSION INTRAVENOUS at 10:14

## 2023-05-26 RX ADMIN — PROPOFOL 80 MG: 10 INJECTION, EMULSION INTRAVENOUS at 10:08

## 2023-05-26 RX ADMIN — SODIUM CHLORIDE: 9 INJECTION, SOLUTION INTRAVENOUS at 09:30

## 2023-05-26 RX ADMIN — PROPOFOL 20 MG: 10 INJECTION, EMULSION INTRAVENOUS at 10:11

## 2023-05-26 RX ADMIN — PROPOFOL 50 MG: 10 INJECTION, EMULSION INTRAVENOUS at 10:17

## 2023-05-26 RX ADMIN — PROPOFOL 50 MG: 10 INJECTION, EMULSION INTRAVENOUS at 10:12

## 2023-05-26 ASSESSMENT — PAIN - FUNCTIONAL ASSESSMENT: PAIN_FUNCTIONAL_ASSESSMENT: 0-10

## 2023-05-26 ASSESSMENT — ENCOUNTER SYMPTOMS
DYSPNEA ACTIVITY LEVEL: AFTER AMBULATING 1 FLIGHT OF STAIRS
SHORTNESS OF BREATH: 1

## 2023-05-26 ASSESSMENT — COPD QUESTIONNAIRES: CAT_SEVERITY: MODERATE

## 2023-05-26 ASSESSMENT — LIFESTYLE VARIABLES: SMOKING_STATUS: 0

## 2023-05-26 ASSESSMENT — PAIN DESCRIPTION - DESCRIPTORS: DESCRIPTORS: ACHING

## 2023-05-26 NOTE — ANESTHESIA PRE PROCEDURE
Department of Anesthesiology  Preprocedure Note       Name:  Anai Corrales   Age:  76 y.o.  :  1955                                          MRN:  [id number]         Date:  2023      Surgeon: Sheryl Watts):  Alexei Morales MD    Procedure: Procedure(s):  EGD ESOPHAGOGASTRODUODENOSCOPY  COLONOSCOPY DIAGNOSTIC    Medications prior to admission:   Prior to Admission medications    Medication Sig Start Date End Date Taking? Authorizing Provider   losartan (COZAAR) 50 MG tablet Take 1 tablet by mouth daily 23   Raulito Chase MD   Nebulizers (VIOS AEROSOL DELIVERY SYSTEM) MISC USE AS DIRECTED AS NEEDED FOR WHEEZING 4/3/23   Historical Provider, MD   Multiple Vitamins-Minerals (THERAPEUTIC MULTIVITAMIN-MINERALS) tablet Take 1 tablet by mouth daily    Historical Provider, MD   Meals on Wheels MISC by Does not apply route Renal Diet 23   Raulito Chase, MD   insulin glargine (LANTUS SOLOSTAR) 100 UNIT/ML injection pen 34 units every morning and 10 units every night 5/15/23   Raulito Chase MD   amLODIPine (NORVASC) 2.5 MG tablet Take 1 tablet by mouth daily 5/15/23 9/12/23  Raulito Chase, MD   Dulaglutide (TRULICITY) 0.62 BE/0.1IE SOPN Inject 0.75 mg into the skin once a week  Patient taking differently: Inject 0.75 mg into the skin once a week wed 5/15/23   Raulito Chase MD   blood glucose monitor strips Check blood sugar fasting before breakfast and as needed for symptoms of irregular blood glucose. Dispense sufficient amount for indicated testing frequency plus additional to accommodate PRN testing needs. 5/15/23   Raulito Chase MD   baclofen (LIORESAL) 10 MG tablet Take 1 tablet by mouth 3 times daily as needed (back muscle spasms) 5/15/23   Raulito Chase MD   pregabalin (LYRICA) 75 MG capsule Take 1 capsule by mouth 3 times daily for 30 days.  Max Daily Amount: 225 mg 5/15/23 6/14/23  Raulito Chase MD   Insulin Pen Needle 31G X 8 MM MISC 1 each by Does not apply route daily 23

## 2023-05-26 NOTE — PROGRESS NOTES
Patient was in bathroom, discharge instructions given to patient, she denies any questions, patient said she needed to go, her son is waiting in car.

## 2023-05-26 NOTE — OP NOTE
Operative Note: EGD and Colonoscopy    Angelita Slater     DATE OF PROCEDURE: 5/26/2023  SURGEON: Dr. Nomi Galindo MD, M.D. PREOPERATIVE DIAGNOSES:   Epigastric pain  Diarrhea and constipation  Heartburn     History of colon polyps    POSTOPERATIVE DIAGNOSES:   Mild gastritis    Poor prep    OPERATION:    EGD esophagogastroduodenoscopy With antral, duodenal, distal esophageal biopsies                 Colonoscopy to 50 cm    SPECIMENS:  ID Type Source Tests Collected by Time Destination   A : DUODENAL BX Tissue Duodenum SURGICAL PATHOLOGY Boaz Pereyra MD 5/26/2023 1011    B : ANTRAL BX Tissue Stomach SURGICAL PATHOLOGY Boaz Pereyra MD 5/26/2023 1012    C : Kirk Shanks MD 5/26/2023 1013        BLOOD LOSS: Minimal    ANESTHESIA: LMAC    CONSENT AND INDICATIONS:  This is a 76y.o. year old female who is having the above. I have discussed with the patient and/or the patient representative the indication, alternatives, and the possible risks and/or complications of the planned procedure and the anesthesia methods. The patient and/or patient representative appear to understand and agree to proceed. OPERATIONS: The patient was placed on the table and sedated via LMAC. Bite block was placed. A lubricated scope was easily passed into the upper esophagus which looked normal. The distal esophagus looked normal. The gastroesophageal junction was at 40 cm. The scope was passed into the stomach and retroflexed. There was no hiatal hernia. The scope was passed down toward the pylorus. The antral mucosa all looked abnormal: mild gastritis. Biopsy was taken. The scope was then passed through the pylorus into the duodenal bulb which looked normal, then around to the distal duodenum which looked normal and biopsies were taken, and the scope was then withdrawn.      The patient was then placed in left lateral decubitus

## 2023-05-26 NOTE — DISCHARGE INSTRUCTIONS
stool   Coughing, shortness of breath, chest pain, severe nausea or vomiting     In case of an emergency, CALL 911 . FOLLOW-UP CARE:  [x]Call the office at [phone] to reschedule the colonoscopy    MIRALAX-GATORADE COLONOSCOPY PREP    In order for the doctor to perform a colonoscopy, a bowel \"cleanout\" must be completed at home prior to the procedure. A bowel cleanout is a combination of a clear liquid diet and oral laxatives. All of the items can be obtained at your local pharmacy without a prescription. You will need to purchase:    Two bottles of Magnesium citrate  A small box of Dulcolax tablets. NOT suppositories. You will use four. 3 DAYS BEFORE YOUR COLONOSCOPY:    Discontinue any fiber supplements (Metamucil, Citrucel, Fibercon, etc.)  Do not take any iron pills or vitamins containing more than 15 mg of iron. It is best to eat lightly for a few days before exam.  It makes the cleanout easier and more effective. Try to avoid overeating. Avoid whole wheat products and fibrous foods with skins, seeds, etc.    DAY BEFORE YOUR COLONOSCOPY:    Follow the instructions below the day prior to your procedure; Take 2 Dulcolax tablets at noon with a clear liquid lunch. About 4 hours after taking the Dulcolax tablets, drink both bottles of Magnesium citrate over 2 hours. Drink every 10-15 minutes until the solution is entirely gone. Remain near bathroom facilities for the remainder of the evening. (If you feel nauseated, stop for one hour - but you need to drink all within 4 hours). At 8:00 PM, take the last 2 Dulcolax tablets. Continue drinking clear liquids until bedtime. Diabetics will an adjustment of their medication this day. Ask your doctor.     DAY OF COLONOSCOPY:    No breakfast.  You will remain fasting for 6 hours before your exam.  No food, drink, candy, gum, etc.  You may take any important prescription medications, (Heart, Breathing, Blood Pressure and Seizure Medications)

## 2023-05-26 NOTE — ANESTHESIA POSTPROCEDURE EVALUATION
Department of Anesthesiology  Postprocedure Note    Patient: Christen Man  MRN: [id number]  [place]: 1955  Date of evaluation: 5/26/2023      Procedure Summary     Date: 05/26/23 Room / Location: [facility] / [facility]    Anesthesia Start: 1002 Anesthesia Stop:     Procedures:       EGD BIOPSY      SIGMOIDOSCOPY DIAGNOSTIC FLEXIBLE Diagnosis:       Heart burn      Abdominal pain, epigastric      Blood in stool      (Heart burn [R12])      (Abdominal pain, epigastric [R10.13])      (Blood in stool [K92.1])    Surgeons: Terese Sargent MD Responsible Provider: Kaur Salvador MD    Anesthesia Type: MAC ASA Status: 3          Anesthesia Type: No value filed.     Vandana Phase I: Vandana Score: 10    Vandana Phase II:        Anesthesia Post Evaluation    Patient location during evaluation: PACU  Patient participation: complete - patient participated  Level of consciousness: awake  Airway patency: patent  Nausea & Vomiting: no nausea and no vomiting  Complications: no  Cardiovascular status: hemodynamically stable  Respiratory status: acceptable  Hydration status: euvolemic

## 2023-05-26 NOTE — H&P
(before breakfast) 30 tablet 5    citalopram (CELEXA) 40 MG tablet Take 1 tablet by mouth daily 90 tablet 1    montelukast (SINGULAIR) 10 MG tablet TAKE 1 TABLET BY MOUTH EVERY NIGHT AT BEDTIME 90 tablet 1    Calcium Polycarbophil (FIBER) 625 MG TABS Take 1 tablet by mouth 2 times daily 180 tablet 3    albuterol (PROVENTIL) (2.5 MG/3ML) 0.083% nebulizer solution Take 3 mLs by nebulization every 6 hours as needed for Wheezing or Shortness of Breath 120 each 1    SYMBICORT 160-4.5 MCG/ACT AERO INHALE 2 PUFFS INTO THE LUNGS TWICE DAILY 10.2 g 3    docusate sodium (COLACE) 100 MG capsule Take 1 capsule by mouth 2 times daily 180 capsule 1    ammonium lactate (LAC-HYDRIN) 12 % lotion Apply topically twice daily 400 g 2    calcium-cholecalciferol (CALCIUM 500/D) 500-200 MG-UNIT per tablet TAKE 1 TABLET BY MOUTH DAILY 180 tablet 1    Respiratory Therapy Supplies (NEBULIZER/TUBING/MOUTHPIECE) KIT 1 kit by Does not apply route daily as needed (wheezing) 1 kit 0    OXYGEN Inhale 2 L into the lungs daily Uses 2 liters at night 1 each 0    Insulin Pen Needle 31G X 5 MM MISC 1 each by Does not apply route daily 100 each 0    blood glucose monitor strips Check blood sugar fasting before breakfast and as needed for symptoms of irregular blood glucose. Dispense sufficient amount for indicated testing frequency plus additional to accommodate PRN testing needs. 100 strip 0    glucose monitoring (FREESTYLE FREEDOM) kit 1 kit by Does not apply route daily Ok to change brand that insurance will cover. 1 kit 0    Lancets MISC Check blood sugar daily and prn as needed 100 each 0    Respiratory Therapy Supplies (NEBULIZER/TUBING/MOUTHPIECE) KIT 1 kit by Does not apply route daily as needed (for shortness of breath/wheezing) 1 kit 0    mineral oil-hydrophilic petrolatum (HYDROPHOR) ointment Apply topically as needed. 3 each 2      No current facility-administered medications for this visit.              Social History:   Social History

## 2023-05-30 ENCOUNTER — OFFICE VISIT (OUTPATIENT)
Dept: PRIMARY CARE CLINIC | Age: 68
End: 2023-05-30
Payer: COMMERCIAL

## 2023-05-30 ENCOUNTER — HOSPITAL ENCOUNTER (OUTPATIENT)
Dept: GENERAL RADIOLOGY | Age: 68
Discharge: HOME | End: 2023-06-01
Payer: COMMERCIAL

## 2023-05-30 ENCOUNTER — HOSPITAL ENCOUNTER (OUTPATIENT)
Age: 68
Discharge: HOME | End: 2023-06-01
Payer: COMMERCIAL

## 2023-05-30 ENCOUNTER — CARE COORDINATION (OUTPATIENT)
Dept: CARE COORDINATION | Age: 68
End: 2023-05-30

## 2023-05-30 VITALS
BODY MASS INDEX: 36.64 KG/M2 | HEART RATE: 98 BPM | HEIGHT: 66 IN | DIASTOLIC BLOOD PRESSURE: 70 MMHG | OXYGEN SATURATION: 94 % | TEMPERATURE: 97.1 F | WEIGHT: 228 LBS | SYSTOLIC BLOOD PRESSURE: 124 MMHG

## 2023-05-30 DIAGNOSIS — M79.10 MYALGIA: ICD-10-CM

## 2023-05-30 DIAGNOSIS — J20.9 ACUTE BRONCHITIS, UNSPECIFIED ORGANISM: ICD-10-CM

## 2023-05-30 DIAGNOSIS — M79.7 FIBROMYALGIA: ICD-10-CM

## 2023-05-30 DIAGNOSIS — J01.01 ACUTE RECURRENT MAXILLARY SINUSITIS: ICD-10-CM

## 2023-05-30 DIAGNOSIS — J45.41 MODERATE PERSISTENT ASTHMA WITH ACUTE EXACERBATION: Primary | ICD-10-CM

## 2023-05-30 DIAGNOSIS — E11.42 DM TYPE 2 WITH DIABETIC PERIPHERAL NEUROPATHY (HCC): ICD-10-CM

## 2023-05-30 DIAGNOSIS — I10 PRIMARY HYPERTENSION: ICD-10-CM

## 2023-05-30 PROCEDURE — 1090F PRES/ABSN URINE INCON ASSESS: CPT | Performed by: INTERNAL MEDICINE

## 2023-05-30 PROCEDURE — G8399 PT W/DXA RESULTS DOCUMENT: HCPCS | Performed by: INTERNAL MEDICINE

## 2023-05-30 PROCEDURE — 71046 X-RAY EXAM CHEST 2 VIEWS: CPT

## 2023-05-30 PROCEDURE — 3044F HG A1C LEVEL LT 7.0%: CPT | Performed by: INTERNAL MEDICINE

## 2023-05-30 PROCEDURE — 3078F DIAST BP <80 MM HG: CPT | Performed by: INTERNAL MEDICINE

## 2023-05-30 PROCEDURE — 3017F COLORECTAL CA SCREEN DOC REV: CPT | Performed by: INTERNAL MEDICINE

## 2023-05-30 PROCEDURE — 99214 OFFICE O/P EST MOD 30 MIN: CPT | Performed by: INTERNAL MEDICINE

## 2023-05-30 PROCEDURE — G8417 CALC BMI ABV UP PARAM F/U: HCPCS | Performed by: INTERNAL MEDICINE

## 2023-05-30 PROCEDURE — G8427 DOCREV CUR MEDS BY ELIG CLIN: HCPCS | Performed by: INTERNAL MEDICINE

## 2023-05-30 PROCEDURE — 1123F ACP DISCUSS/DSCN MKR DOCD: CPT | Performed by: INTERNAL MEDICINE

## 2023-05-30 PROCEDURE — 2022F DILAT RTA XM EVC RTNOPTHY: CPT | Performed by: INTERNAL MEDICINE

## 2023-05-30 PROCEDURE — 3074F SYST BP LT 130 MM HG: CPT | Performed by: INTERNAL MEDICINE

## 2023-05-30 PROCEDURE — 1036F TOBACCO NON-USER: CPT | Performed by: INTERNAL MEDICINE

## 2023-05-30 RX ORDER — GUAIFENESIN 600 MG/1
600 TABLET, EXTENDED RELEASE ORAL 2 TIMES DAILY
Qty: 60 TABLET | Refills: 0 | Status: SHIPPED | OUTPATIENT
Start: 2023-05-30 | End: 2023-06-29

## 2023-05-30 RX ORDER — PREDNISONE 10 MG/1
10 TABLET ORAL 2 TIMES DAILY
Qty: 10 TABLET | Refills: 0 | Status: SHIPPED | OUTPATIENT
Start: 2023-05-30 | End: 2023-06-04

## 2023-05-30 ASSESSMENT — ENCOUNTER SYMPTOMS
CHEST TIGHTNESS: 1
COUGH: 1
WHEEZING: 1
VOICE CHANGE: 0
SORE THROAT: 0
SHORTNESS OF BREATH: 1
VOMITING: 0
ABDOMINAL PAIN: 0
NAUSEA: 0

## 2023-05-30 NOTE — PROGRESS NOTES
sinusitis  Comments:  Tenderness over the maxillary and frontal sinuses bilaterally patient finished a course of doxycycline and Augmentin, check CT  Orders:  -     CT SINUS WO CONTRAST;  Future

## 2023-05-30 NOTE — CARE COORDINATION
Remote Patient Monitoring Note      Date/Time:  5/30/2023 3:20 PM    LPN attempted to contact patient by telephone regarding yellow alert received for no metrics for >5 days  . Background: High Blood-Pressure, COPD, CHF    Clinical Interventions: HIPAA compliant message left with sister Lakshmi Soni. Plan/Follow Up: Will continue to review, monitor and address alerts with follow up based on severity of symptoms and risk factors.        Raina Tony LPN  [facility]/ CTN/ Remote Patient Monitoring  [phone]

## 2023-05-31 ENCOUNTER — CARE COORDINATION (OUTPATIENT)
Dept: CARE COORDINATION | Age: 68
End: 2023-05-31

## 2023-05-31 NOTE — CARE COORDINATION
Remote Patient Monitoring Note      Date/Time:  5/31/2023 3:00 PM    LPN attempted to contact patient by telephone regarding yellow alert received for no metrics for >6 days  . Background: High Blood-Pressure, COPD, CHF    Clinical Interventions: No answer    Plan/Follow Up: Will continue to review, monitor and address alerts with follow up based on severity of symptoms and risk factors.  [facility] updated      [facility]/ CTN/ Remote Patient Monitoring  [phone]

## 2023-06-01 ENCOUNTER — CARE COORDINATION (OUTPATIENT)
Dept: CARE COORDINATION | Age: 68
End: 2023-06-01

## 2023-06-01 NOTE — CARE COORDINATION
Remote Patient Monitoring Note      Date/Time:  6/1/2023 10:23 AM    LPN attempted to contact patient by telephone regarding red alert received for pulse ox reading (90%). Background: High Blood-Pressure, COPD, CHF    Clinical Interventions:  Unable to LM, no VM    Plan/Follow Up: Will continue to review, monitor and address alerts with follow up based on severity of symptoms and risk factors.        Raina Tony LPN  [facility]/ CTN/ Remote Patient Monitoring  [phone]

## 2023-06-02 ENCOUNTER — TELEPHONE (OUTPATIENT)
Dept: SURGERY | Age: 68
End: 2023-06-02

## 2023-06-02 NOTE — TELEPHONE ENCOUNTER
----- Message from Isaac Sharpe MD sent at 5/26/2023 10:34 AM EDT -----  She needs to be rescheduled with a Mag citrate prep. I gave it to her already.

## 2023-06-05 ENCOUNTER — CARE COORDINATION (OUTPATIENT)
Dept: CARE COORDINATION | Age: 68
End: 2023-06-05

## 2023-06-05 NOTE — CARE COORDINATION
Remote Patient Monitoring Note      Date/Time:  2023 2:32 PM  Patient Current Location: Home: [place] [facility] [address]    LPN contacted patient by telephone regarding yellow alert received for no metrics for >2 days. Verified patients name and  as identifiers. Background: High Blood-Pressure, COPD, CHF    Clinical Interventions:  Patient states she will resume tomorrow. Plan/Follow Up: Will continue to review, monitor and address alerts with follow up based on severity of symptoms and risk factors.        Jaye Valenzuela LPN  [address]/ CTN/ Remote Patient Monitoring  [phone]

## 2023-06-06 ENCOUNTER — CARE COORDINATION (OUTPATIENT)
Dept: CARE COORDINATION | Age: 68
End: 2023-06-06

## 2023-06-06 NOTE — CARE COORDINATION
LASHELL contacted Angelita. She states her remote patient monitoring equipment is not working. She has not called for technical support but states she will call tomorrow. Kale Romo is unable to continue conversation as she is not home. LASHELL requested Angelita call AC tomorrow and LASHELL could assist with placing call to tech support.

## 2023-06-07 ENCOUNTER — TELEPHONE (OUTPATIENT)
Dept: SURGERY | Age: 68
End: 2023-06-07

## 2023-06-07 NOTE — TELEPHONE ENCOUNTER
Prior Authorization Form:      DEMOGRAPHICS:                     Patient Name:  Tamie Toro  Patient :  1955            Insurance:  Payor: [employer] / Plan: [employer] [address] / Product Type: *No Product type* /   Insurance ID Number:    Payer/Plan Subscr  Sex Relation Sub. Ins. ID Effective Group Num   1. [facility]* 1955 Female Self [id number] 10/1/22 [id number]                                   [address]   2.  Harshad Hernandezdennisharmony [place]* 1955 Female Self [id number] 1/1/15 [id number]                                   [address]         DIAGNOSIS & PROCEDURE:                       Procedure/Operation: Colonoscopy           CPT Code: 89869    Diagnosis:  diarrhea and constipation/h/o colon polyps    ICD10 Code: r19.8/z86.010    Location[facility]  [facility]    Surgeon:  Taylor Garcia    SCHEDULING INFORMATION:                          Date: 23    Time: 11AM              Anesthesia:  MAC/TIVA                                                       Status:  Outpatient        Special Comments:         Electronically signed by Gwen Tejada MA on 2023 at 1:49 PM

## 2023-06-08 ENCOUNTER — CARE COORDINATION (OUTPATIENT)
Dept: CARE COORDINATION | Age: 68
End: 2023-06-08

## 2023-06-08 NOTE — CARE COORDINATION
Remote Patient Monitoring Note      Date/Time:  6/8/2023 11:00 AM    LPN attempted to contact patient by telephone regarding red alert received for blood pressure reading (181/97). Background: High Blood-Pressure, COPD, CHF    Clinical Interventions:   HIPAA compliant message left on  requesting a return call. Plan/Follow Up: Will continue to review, monitor and address alerts with follow up based on severity of symptoms and risk factors.        Sandeep Hamlin LPN  [facility]/ CTN/ Remote Patient Monitoring  [phone]

## 2023-06-09 ENCOUNTER — CARE COORDINATION (OUTPATIENT)
Dept: CARE COORDINATION | Age: 68
End: 2023-06-09

## 2023-06-09 NOTE — CARE COORDINATION
Remote Patient Monitoring Note      Date/Time:  6/9/2023 1:39 PM    LPN attempted to contact patient by telephone regarding yellow alert received for blood pressure reading (179/87). Background:  High Blood-Pressure, COPD, CHF    Clinical Interventions:  HIPAA compliant message left on  requesting a return call. Plan/Follow Up: Will continue to review, monitor and address alerts with follow up based on severity of symptoms and risk factors.        Gregg Madrigal LPN  [facility]/ CTN/ Remote Patient Monitoring  [phone]

## 2023-06-22 ENCOUNTER — CARE COORDINATION (OUTPATIENT)
Dept: CARE COORDINATION | Age: 68
End: 2023-06-22

## 2023-06-22 NOTE — CARE COORDINATION
Remote Alert Monitoring Note      Date/Time:  2023 8:56 AM  Patient Current Location: Home: [place] [address] [facility] [address]    LPN contacted patient by telephone regarding red alert received for pulse ox reading (88%). Verified patients name and  as identifiers. Challenges to be reviewed by the provider   Additional needs identified to be addressed with provider No                  Background: High Blood-Pressure, COPD, CHF    Refer to 911 immediately if:  Patient unresponsive or unable to provide history  Change in cognition or sudden confusion  Patient unable to respond in complete sentences  Intense chest pain/tightness  Any concern for any clinical emergency  Red Alert: Provider response time of 1 hr required for any red alert requiring intervention  Yellow Alert: Provider response time of 3hr required for any escalated yellow alert    O2 Triage  Are you having any Chest Pain? no   Are you having any Shortness of Breath? no   Swelling in your hands or feet? no     Are you having any other health concerns or issues? no     Have you taken your medications as instructed by your doctor today? Yes       Clinical Interventions: Reviewed and followed up on alerts and treatments-. Pt is asymptomatic and in no apparent distress, speaking in full sentences. Patient states she did recheck it and got a reading of 98%. She states she was having a little wheezing the other day, but that subsided after using her inhalers. Will update ACM and continue to monitor. Plan/Follow Up: Will continue to review, monitor and address alerts with follow up based on severity of symptoms and risk factors.     Jose Ramirez LPN  [facility]/ CTN/ Remote Patient monitoring  [phone]

## 2023-06-23 ENCOUNTER — CARE COORDINATION (OUTPATIENT)
Dept: CARE COORDINATION | Age: 68
End: 2023-06-23

## 2023-06-23 NOTE — CARE COORDINATION
Remote Patient Monitoring Note      Date/Time:  6/23/2023 4:05 PM    LPN attempted to contact patient by telephone regarding yellow alert received for no weight metrics for >2 days  . Background: High Blood-Pressure, COPD, CHF    Clinical Interventions: HIPAA compliant message left on  requesting a return call. Plan/Follow Up: Will continue to review, monitor and address alerts with follow up based on severity of symptoms and risk factors.        Luzma Fitzgerald LPN  [facility]/ CTN/ Remote Patient Monitoring  [phone]

## 2023-06-26 ENCOUNTER — CARE COORDINATION (OUTPATIENT)
Dept: CARE COORDINATION | Age: 68
End: 2023-06-26

## 2023-06-29 ENCOUNTER — CARE COORDINATION (OUTPATIENT)
Dept: CARE COORDINATION | Age: 68
End: 2023-06-29

## 2023-06-30 ENCOUNTER — CARE COORDINATION (OUTPATIENT)
Dept: CARE COORDINATION | Age: 68
End: 2023-06-30

## 2023-07-03 ENCOUNTER — TELEPHONE (OUTPATIENT)
Dept: PRIMARY CARE CLINIC | Age: 68
End: 2023-07-03

## 2023-07-03 ENCOUNTER — CARE COORDINATION (OUTPATIENT)
Dept: CARE COORDINATION | Facility: CLINIC | Age: 68
End: 2023-07-03

## 2023-07-03 ENCOUNTER — CARE COORDINATION (OUTPATIENT)
Dept: CARE COORDINATION | Age: 68
End: 2023-07-03

## 2023-07-03 ASSESSMENT — ENCOUNTER SYMPTOMS: DYSPNEA ASSOCIATED WITH: MINIMAL EXERTION

## 2023-07-03 NOTE — CARE COORDINATION
Remote Patient Monitoring Note      Date/Time:  7/3/2023 11:00 AM  Patient Current Location: [place]  LPN attempted to contact patient by telephone regarding red alert received for pulse ox reading (decrease below 92%SpO2). Unable to reach pt. Left HIPAA compliant message requesting a return call. Background: Pt enrolled in RPM r/t HTN, COPD, and CHF  Clinical Interventions:  Left HIPAA compliant message requesting a return call      Plan/Follow Up: Will continue to review, monitor and address alerts with follow up based on severity of symptoms and risk factors.

## 2023-07-03 NOTE — TELEPHONE ENCOUNTER
Pt requested to schedule an appt to see Dr Americo Terrazas for SOB but advised pt to go to ED. Pt stated if it gets any worst, she will go to ED.  Care coordinator was also on the call and advised pt to take inhaler in the meantime

## 2023-07-03 NOTE — CARE COORDINATION
my appointment or reschedule if I have to cancel. I will notify my provider of any barriers to my plan of care. I will follow my Zone Management tool to seek urgent or emergent care. I will notify my provider of any symptoms that indicate a worsening of my condition.     Barriers: lack of education  Plan for overcoming my barriers: COPD, CHF, and DM zone tools and care coordination  Confidence: 8/10  Anticipated Goal Completion Date: 2023                Future Appointments   Date Time Provider [address]  [address]   2023 11:00 AM [address], MD BDM [facility]   2023  2:00 PM Carine Corona MD [facility]   ,   Congestive Heart Failure Assessment    Are you currently restricting fluids?: No Restriction  Do you understand a low sodium diet?: Yes  Do you understand how to read food labels?: Yes  How many restaurant meals do you eat per week?: 1-2  Do you salt your food before tasting it?: No     No patient-reported symptoms      Symptoms:  None: Yes      Symptom course: stable  Patient-reported weight (lb): 220     , and   COPD Assessment    Does the patient understand envrionmental exposure?: Yes  Is the patient able to verbalize Rescue vs. Long Acting medications?: Yes  Does the patient have a nebulizer?: Yes  Does the patient use a space with inhaled medications?: No     Shortness of breath (worse than baseline)         Symptoms:  None: Yes   COPD associated increased fatigue: Pos      Symptom course: worsening  Breathlessness: minimal exertion  Increase use of rapid acting/rescue inhaled medications?: Yes  Change in chronic cough?: No/At Baseline  Baseline SaO2 Readin

## 2023-07-05 ENCOUNTER — CARE COORDINATION (OUTPATIENT)
Dept: CARE COORDINATION | Facility: CLINIC | Age: 68
End: 2023-07-05

## 2023-07-05 NOTE — CARE COORDINATION
Remote Patient Monitoring Note      Date/Time:  7/5/2023 3:52 PM  Patient Current Location: [address] [place] [address] 2nd attempt to contact patient by telephone regarding red alert and yellow alert received for pulse ox reading (decrease below 92%SpO2 as of last reading on Monday 07/03/2023) and no BP or weight taken for 2 days. Left another HIPAA compliant message requesting a return call. LPN attempted to contact patients Ann GOFF regarding red and yellow alert; Left HIPAA compliant message requesting a return call. Background: Pt enrolled in RPM r/t HTN, COPD, and CHF  Clinical Interventions: Escalated alert to RN-update provided      Plan/Follow Up: Will continue to review, monitor and address alerts with follow up based on severity of symptoms and risk factors.

## 2023-07-05 NOTE — CARE COORDINATION
Remote Patient Monitoring Note      Date/Time:  7/5/2023 2:55 PM  Patient Current Location: [place]  LPN attempted to contact patient by telephone regarding red alert and yellow alert received for pulse ox reading (decrease below 92%SpO2 as of last reading on Monday 07/03/2023 ) and no BP or weight taken for 2 days. Background: Pt enrolled in RPM r/t HTN, COPD, and CHF  Clinical Interventions:  Left HIPAA compliant message requesting a return call      Plan/Follow Up: Will continue to review, monitor and address alerts with follow up based on severity of symptoms and risk factors.

## 2023-07-06 ENCOUNTER — CARE COORDINATION (OUTPATIENT)
Dept: CARE COORDINATION | Facility: CLINIC | Age: 68
End: 2023-07-06

## 2023-07-06 NOTE — CARE COORDINATION
Remote Patient Monitoring Note      Date/Time:  7/6/2023 2:01 PM  Patient Current Location: [place]  LPN attempted to contact patient by telephone regarding red alert and yellow alert received for pulse ox reading (decrease below 92%SpO2 as of last reading on Monday 07/03/2023) and no BP or weight taken for 2 days. Background: Pt enrolled in RPM r/t HTN, COPD, and CHF  Clinical Interventions:  Left HIPAA compliant message requesting a return call      Plan/Follow Up: Will continue to review, monitor and address alerts with follow up based on severity of symptoms and risk factors.

## 2023-07-07 ENCOUNTER — CARE COORDINATION (OUTPATIENT)
Dept: CARE COORDINATION | Facility: CLINIC | Age: 68
End: 2023-07-07

## 2023-07-07 VITALS
SYSTOLIC BLOOD PRESSURE: 131 MMHG | WEIGHT: 230 LBS | DIASTOLIC BLOOD PRESSURE: 70 MMHG | HEART RATE: 89 BPM | BODY MASS INDEX: 37.12 KG/M2 | OXYGEN SATURATION: 91 %

## 2023-07-07 NOTE — CARE COORDINATION
Remote Patient Monitoring Note      Date/Time:  7/7/2023 2:38 PM  Patient Current Location: [address] [place] [address] 2nd attempt to contact patient by telephone regarding red alert and yellow alert received for pulse ox reading (decrease below 92%SpO2 as of last reading on Monday 07/03/2023) and no BP or weight taken for 2 days. Left another HIPAA compliant message requesting a return call. LPN attempted to reach patients EC, Marry Schirmer; left HIPAA compliant message requesting a return call. Background: Pt enrolled in RPM r/t HTN, COPD, and CHF  Clinical Interventions: Escalated alert to RN-update provided      Plan/Follow Up: Will continue to review, monitor and address alerts with follow up based on severity of symptoms and risk factors.

## 2023-07-07 NOTE — CARE COORDINATION
Remote Patient Monitoring Note      Date/Time:  7/7/2023 12:29 PM  [facility] regarding red alert and yellow alert received for pulse ox reading (decrease below 92%SpO2 as of last reading on Monday 07/03/2023) and no BP or weight taken for 2 days. Background: Pt enrolled in RPM r/t HTN, COPD, and CHF  Clinical Interventions:  Left HIPAA compliant message requesting a return call. Plan/Follow Up: Will continue to review, monitor and address alerts with follow up based on severity of symptoms and risk factors.

## 2023-07-10 ENCOUNTER — CARE COORDINATION (OUTPATIENT)
Dept: CARE COORDINATION | Age: 68
End: 2023-07-10

## 2023-07-10 NOTE — CARE COORDINATION
Remote Patient Monitoring Note      Date/Time:  7/10/2023 3:09 PM    LPN contacted patient by telephone regarding yellow alert received for no metrics for 3 days. Verified patients name and  as identifiers. Background: High Blood-Pressure, COPD, CHF    Clinical Interventions:  Patient states she will resume tomorrow morning. Plan/Follow Up: Will continue to review, monitor and address alerts with follow up based on severity of symptoms and risk factors.        Kami Faust LPN  [facility]/ CTN/ Remote Patient Monitoring  [phone]

## 2023-07-12 ENCOUNTER — CARE COORDINATION (OUTPATIENT)
Dept: CARE COORDINATION | Age: 68
End: 2023-07-12

## 2023-07-12 NOTE — CARE COORDINATION
Remote Patient Monitoring Note      Date/Time:  7/12/2023 3:52 PM    LPN attempted to contact patient by telephone regarding yellow alert received for no metrics for >2 days  . Background: High Blood-Pressure, COPD, CHF    Clinical Interventions: HIPAA compliant message left on  requesting a return call. Plan/Follow Up: Will continue to review, monitor and address alerts with follow up based on severity of symptoms and risk factors.        Matilda Goodman LPN  [facility]/ CTN/ Remote Patient Monitoring  [phone]

## 2023-07-21 DIAGNOSIS — E11.42 DM TYPE 2 WITH DIABETIC PERIPHERAL NEUROPATHY (HCC): ICD-10-CM

## 2023-07-21 RX ORDER — ROSUVASTATIN CALCIUM 20 MG/1
20 TABLET, COATED ORAL DAILY
Qty: 90 TABLET | Refills: 1 | OUTPATIENT
Start: 2023-07-21

## 2023-08-04 ENCOUNTER — TELEPHONE (OUTPATIENT)
Dept: SURGERY | Age: 68
End: 2023-08-04

## 2023-08-04 ENCOUNTER — HOSPITAL ENCOUNTER (OUTPATIENT)
Dept: ULTRASOUND IMAGING | Age: 68
End: 2023-08-04
Attending: SURGERY
Payer: MEDICARE

## 2023-08-04 ENCOUNTER — HOSPITAL ENCOUNTER (OUTPATIENT)
Dept: CT IMAGING | Age: 68
End: 2023-08-04
Attending: INTERNAL MEDICINE
Payer: MEDICARE

## 2023-08-04 DIAGNOSIS — R10.13 EPIGASTRIC PAIN: ICD-10-CM

## 2023-08-04 DIAGNOSIS — J01.01 ACUTE RECURRENT MAXILLARY SINUSITIS: ICD-10-CM

## 2023-08-04 PROCEDURE — 70486 CT MAXILLOFACIAL W/O DYE: CPT

## 2023-08-04 PROCEDURE — 76705 ECHO EXAM OF ABDOMEN: CPT

## 2023-08-04 NOTE — TELEPHONE ENCOUNTER
Prior Authorization Form:      DEMOGRAPHICS:                     Patient Name:  Susan Holliday  Patient :  1955            Insurance:  Payor: [employer] / Plan: [employer] [address] / Product Type: *No Product type* /   Insurance ID Number:    Payer/Plan Subscr  Sex Relation Sub. Ins. ID Effective Group Num   1. [facility]* 1955 Female Self [id number] 10/1/22 [id number]                                   [address]   2.  [facility]* 1955 Female Self [id number] 1/1/15 [id number]                                   [address]         DIAGNOSIS & PROCEDURE:                       Procedure/Operation: COLONOSCOPY          CPT Code: 94862    Diagnosis:  DIARRHEA  H/O COLON POLYPS    ICD10 Code: R19.8   Z86.010    Location:  [facility]    Surgeon:  Casandra Blandon    SCHEDULING INFORMATION:                          Date: 2023    Time: 10:00              Anesthesia:  MAC/TIVA                                                       Status:  Outpatient        Special Comments:  RESCHEDULED FROM 2023       Electronically signed by Ovidio Jimenez MA on 2023 at 2:05 PM

## 2023-08-07 ENCOUNTER — CARE COORDINATION (OUTPATIENT)
Dept: CARE COORDINATION | Age: 68
End: 2023-08-07

## 2023-08-09 DIAGNOSIS — F32.A DEPRESSION, UNSPECIFIED DEPRESSION TYPE: ICD-10-CM

## 2023-08-09 DIAGNOSIS — R10.11 RUQ ABDOMINAL PAIN: Primary | ICD-10-CM

## 2023-08-09 RX ORDER — CITALOPRAM 40 MG/1
40 TABLET ORAL DAILY
Qty: 90 TABLET | Refills: 1 | Status: SHIPPED | OUTPATIENT
Start: 2023-08-09

## 2023-08-09 NOTE — TELEPHONE ENCOUNTER
Pt called needs refill       citalopram (CELEXA) 40 MG tablet           [id number], [address] [place] [address] Amaya Donato

## 2023-08-09 NOTE — TELEPHONE ENCOUNTER
Last Appointment:  6/14/2023  Future Appointments   Date Time Provider [address]   9/5/2023  2:45 PM [facility] MED ONC FAST TRACK 3 [facility] Med Onc [facility]   9/5/2023  3:00 PM Emy Goins MD MED ONC [facility]   9/14/2023  2:00 PM Elvira Butterfield MD [address] [place] [address]

## 2023-08-10 ENCOUNTER — CARE COORDINATION (OUTPATIENT)
Dept: CARE COORDINATION | Age: 68
End: 2023-08-10

## 2023-08-10 NOTE — CARE COORDINATION
LASHELL received email from [address] [place] [address] that patient has returned from vacation and RPM equipment needs to be taken off pause.   Will update RPM team.

## 2023-08-15 ENCOUNTER — CARE COORDINATION (OUTPATIENT)
Dept: CARE COORDINATION | Facility: CLINIC | Age: 68
End: 2023-08-15

## 2023-08-15 NOTE — CARE COORDINATION
Date/Time:  8/15/2023 3:47 PM    LPN attempted to reach patient. Left HIPPA compliant message requesting a return call.      Aye Day LPN  [id number] [facility]/ Care Transition Nurse/[facility]  [phone]

## 2023-08-15 NOTE — CARE COORDINATION
Remote Alert Monitoring Note  Rpm alert to be reviewed by the provider   red alert   blood pressure reading (166/105), pulse ox reading (88%), and weight (5.7 lb increase since 23)   Additional needs to be addressed by PCP: FYI Pt speaking in full sentences, denies CP, new or worsening edema, and vision changes. Pt c/o of intermittent SOB with exertion, headache, and neuropathy related numbness to feet. Pt denies increase in Na intake / change in diet. BP recheck 162/97 and 87 BPM. Pulse ox recheck 93%SpO2 and 84 BPM. Please advise. Date/Time:  8/15/2023 10:07 AM  Patient Current Location: Home: [address] [place] [address] Drive [address]  LPN contacted patient by telephone. Verified patients name and  as identifiers. Background: Pt enrolled in RPM r/t HTN, COPD, and CHF  Refer to 911 immediately if:  Patient unresponsive or unable to provide history  Change in cognition or sudden confusion  Patient unable to respond in complete sentences  Intense chest pain/tightness  Any concern for any clinical emergency  Red Alert: Provider response time of 1 hr required for any red alert requiring intervention  Yellow Alert: Provider response time of 3hr required for any escalated yellow alert    BP Triage  Are you having any Chest Pain? no   Are you having any Shortness of Breath? Yes, intermittent with exertion   Do you have a headache or have any vision changes? Yes, headache   Are you having any numbness or tingling? Yes, feet. Pt stated, \"it's my neuropathy\"    Are you having any other health concerns or issues? no        O2 Triage  Are you having any Chest Pain? no   Are you having any Shortness of Breath? Yes, intermittent with exertion   Swelling in your hands or feet? no     Are you having any other health concerns or issues? no      Weight Scale Triage  Was your weight obtained upon rising/waking today? yes   Was your weight obtained after voiding and/or use of the bathroom today?  yes   Did

## 2023-08-16 ENCOUNTER — CARE COORDINATION (OUTPATIENT)
Dept: CARE COORDINATION | Age: 68
End: 2023-08-16

## 2023-08-16 NOTE — CARE COORDINATION
Attempted to reach patient by telephone. Left HIPAA compliant message requesting a return call. Will attempt to reach patient again. Dr. Jennie Baker would like to see patient at an earlier appointment if possible. She has also recommended 1.5 tabs of amlodipine for a total of 7.5mg daily.

## 2023-08-18 ENCOUNTER — CARE COORDINATION (OUTPATIENT)
Dept: CARE COORDINATION | Age: 68
End: 2023-08-18

## 2023-08-18 NOTE — CARE COORDINATION
Remote Alert Monitoring Note      Date/Time:  2023 1:23 PM  Patient Current Location: Home: [address] [facility] [address]    LPN contacted patient by telephone regarding red alert received for pulse ox reading (89%). Verified patients name and  as identifiers. Rpm alert to be reviewed by the provider                         Background: High Blood-Pressure, COPD, CHF    Refer to 911 immediately if:  Patient unresponsive or unable to provide history  Change in cognition or sudden confusion  Patient unable to respond in complete sentences  Intense chest pain/tightness  Any concern for any clinical emergency  Red Alert: Provider response time of 1 hr required for any red alert requiring intervention  Yellow Alert: Provider response time of 3hr required for any escalated yellow alert    O2 Triage  Are you having any Chest Pain? no   Are you having any Shortness of Breath? no   Swelling in your hands or feet? no     Are you having any other health concerns or issues? no             Clinical Interventions: Reviewed and followed up on alerts and treatments-. Pt is  in no apparent distress, speaking in full sentences. Patient states she is feeling fine. She was able to recheck while on the call after resting a few minutes and got a normal reading of 95%. Patient encouraged to use her 02 when needed. ACM updated. Plan/Follow Up: Will continue to review, monitor and address alerts with follow up based on severity of symptoms and risk factors.     Lisset Hunter LPN  [facility] [facility]/ CTN/ Remote Patient monitoring  [phone]

## 2023-08-21 ENCOUNTER — TELEPHONE (OUTPATIENT)
Dept: PHARMACY | Facility: CLINIC | Age: 68
End: 2023-08-21

## 2023-08-21 NOTE — TELEPHONE ENCOUNTER
[facility] CLINICAL PHARMACY: ADHERENCE REVIEW  Identified care gap per [employer]: fills at Non-preferred pharmacy: [facility]: ACE/ARB and Statin adherence      ASSESSMENT    ACE/ARB ADHERENCE    Insurance Records claims through 23 (Prior Year [address] = not reported; YTD [address] [place] [address] Street = 97%; Potential Fail Date: 10/23/23):   LOSARTAN POT TAB 50MG last filled on 23 for 90 day supply. Next refill due: 23    Prescribed si tablet/capsule daily    Per [facility]:  will process and will be ready for     BP Readings from Last 3 Encounters:   23 (!) 150/74   23 124/70   23 (!) 167/96     Estimated Creatinine Clearance: 72 mL/min (based on SCr of 0.9 mg/dL). Lab Results   Component Value Date    CREATININE 0.9 2023     Lab Results   Component Value Date    K 3.8 2023     STATIN ADHERENCE    Insurance Records claims through 23 (Prior Year [address] = not reported; YTD [address] [place] [address] Street = 73%; Potential Fail Date: 9/3/23):   ROSUVASTATIN TAB 20MG last filled on 23 for 90 day supply. Next refill due: 23    Prescribed si tablet/capsule daily    Per [facility]:  will process and will be ready for     Lab Results   Component Value Date    CHOL 136 2023    TRIG 50 2023    HDL 64 2023    LDLCALC 62 2023     ALT   Date Value Ref Range Status   2023 15 0 - 32 U/L Final     AST   Date Value Ref Range Status   2023 30 0 - 31 U/L Final     Comment:     Specimen is slightly Hemolyzed. Result may be artificially increased.      The 10-year ASCVD risk score (Cee AGUILAR, et al., 2019) is: 23.4%    Values used to calculate the score:      Age: 76 years      Sex: Female      Is Non- : Yes      Diabetic: Yes      Tobacco smoker: No      Systolic Blood Pressure: 192 mmHg      Is BP treated: Yes      HDL Cholesterol: 64 mg/dL      Total Cholesterol: 136 mg/dL     PLAN  Per insurer report, LIS-0 - co-pays are based on tiers

## 2023-08-22 ENCOUNTER — CARE COORDINATION (OUTPATIENT)
Dept: CARE COORDINATION | Age: 68
End: 2023-08-22

## 2023-08-22 NOTE — CARE COORDINATION
Remote Patient Monitoring Note      Date/Time:  8/22/2023 2:51 PM    LPN attempted to contact patient by telephone regarding yellow alert received for no metrics for >2 days  . Background:  High Blood-Pressure, COPD, CHF    Clinical Interventions: Unable to LM, no answer    Plan/Follow Up: Will continue to review, monitor and address alerts with follow up based on severity of symptoms and risk factors.        Perla Larson LPN  [facility]/ CTN/ Remote Patient Monitoring  [phone]

## 2023-08-25 ENCOUNTER — CARE COORDINATION (OUTPATIENT)
Facility: CLINIC | Age: 68
End: 2023-08-25

## 2023-08-25 NOTE — CARE COORDINATION
Remote Patient Monitoring Note      Date/Time:  2023 8:21 AM  Patient Current Location: Home: [address] [facility] [address]  LPN contacted patient by telephone regarding red alert received for pulse ox reading (90%). Verified patients name and  as identifiers. Background: HTN, COPD, CHF  Clinical Interventions: Reviewed and followed up on alerts and treatments-Spoke with patient. Patient states she is doing good. Patient is out at this time. Patient will recheck pulse ox when she return home. Plan/Follow Up: Will continue to review, monitor and address alerts with follow up based on severity of symptoms and risk factors. Patient rechecked pulse ox. Green alert noted in HRS.

## 2023-08-29 DIAGNOSIS — C90.00 MULTIPLE MYELOMA, REMISSION STATUS UNSPECIFIED (HCC): Primary | ICD-10-CM

## 2023-08-30 ENCOUNTER — CARE COORDINATION (OUTPATIENT)
Dept: CARE COORDINATION | Age: 68
End: 2023-08-30

## 2023-08-30 NOTE — CARE COORDINATION
Remote Patient Monitoring Note      Date/Time:  8/30/2023 3:42 PM    LPN  contacted patient by telephone regarding yellow alert received for no metrics for >2 days  . Background: High Blood-Pressure, COPD, CHF    Clinical Interventions:Patient is agreeable to resume metrics. Plan/Follow Up: Will continue to review, monitor and address alerts with follow up based on severity of symptoms and risk factors.        Yoli Best LPN  [facility]/ CTN/ Remote Patient Monitoring  [phone]

## 2023-09-05 ENCOUNTER — CARE COORDINATION (OUTPATIENT)
Dept: CARE COORDINATION | Age: 68
End: 2023-09-05

## 2023-09-05 NOTE — CARE COORDINATION
Remote Patient Monitoring Note      Date/Time:  9/5/2023 3:54 PM    LPN attempted to contact patient by telephone regarding yellow alert received for no metrics for >2 days  . Background:  High Blood-Pressure, COPD, CHF    Clinical Interventions: HIPAA compliant message left on  requesting a return call. Plan/Follow Up: Will continue to review, monitor and address alerts with follow up based on severity of symptoms and risk factors.        Ronnie England LPN  [facility]/ CTN/ Remote Patient Monitoring  [phone]

## 2023-09-06 ENCOUNTER — CARE COORDINATION (OUTPATIENT)
Dept: CARE COORDINATION | Age: 68
End: 2023-09-06

## 2023-09-06 NOTE — CARE COORDINATION
Remote Patient Monitoring Note  Date/Time:  9/6/2023 2:56 PM  Patient Current Location: [place]  RN  reviewed chart - No RPM VS x 11 days. Background: Pt enrolled in RPM r/t HTN, COPD, CHF  Clinical Interventions: Reviewed and followed up on alerts and treatments-Pt entered metrics prior to being contacted by RN. VS WNL. Plan/Follow Up: Will continue to review, monitor and address alerts with follow up based on severity of symptoms and risk factors.  Current Patient Metrics ---- Blood Pressure: 139/79, 96bpm Pulseox: 92%, 98bpm Survey: - Weight: 224.0lbs Note Created at: 09/06/2023 02:57 PM ET ---- Time-Spent: 2 minutes 0 seconds

## 2023-09-11 ENCOUNTER — CARE COORDINATION (OUTPATIENT)
Dept: CARE COORDINATION | Age: 68
End: 2023-09-11

## 2023-09-12 ENCOUNTER — OFFICE VISIT (OUTPATIENT)
Dept: PRIMARY CARE CLINIC | Age: 68
End: 2023-09-12
Payer: COMMERCIAL

## 2023-09-12 ENCOUNTER — CARE COORDINATION (OUTPATIENT)
Dept: CARE COORDINATION | Age: 68
End: 2023-09-12

## 2023-09-12 VITALS
OXYGEN SATURATION: 93 % | WEIGHT: 229 LBS | SYSTOLIC BLOOD PRESSURE: 136 MMHG | HEART RATE: 86 BPM | TEMPERATURE: 97.7 F | RESPIRATION RATE: 18 BRPM | DIASTOLIC BLOOD PRESSURE: 72 MMHG | HEIGHT: 66 IN | BODY MASS INDEX: 36.8 KG/M2

## 2023-09-12 DIAGNOSIS — M25.511 PAIN OF RIGHT SHOULDER JOINT ON MOVEMENT: ICD-10-CM

## 2023-09-12 DIAGNOSIS — E11.42 DM TYPE 2 WITH DIABETIC PERIPHERAL NEUROPATHY (HCC): ICD-10-CM

## 2023-09-12 DIAGNOSIS — F32.A DEPRESSION, UNSPECIFIED DEPRESSION TYPE: ICD-10-CM

## 2023-09-12 DIAGNOSIS — I10 ESSENTIAL HYPERTENSION: ICD-10-CM

## 2023-09-12 DIAGNOSIS — F41.1 ANXIETY NEUROSIS: Primary | ICD-10-CM

## 2023-09-12 DIAGNOSIS — R10.13 EPIGASTRIC PAIN: ICD-10-CM

## 2023-09-12 DIAGNOSIS — E66.01 SEVERE OBESITY (BMI 35.0-39.9) WITH COMORBIDITY (HCC): ICD-10-CM

## 2023-09-12 DIAGNOSIS — M79.7 FIBROMYALGIA: ICD-10-CM

## 2023-09-12 DIAGNOSIS — E03.9 HYPOTHYROIDISM, UNSPECIFIED TYPE: ICD-10-CM

## 2023-09-12 DIAGNOSIS — M79.10 MYALGIA: ICD-10-CM

## 2023-09-12 DIAGNOSIS — Z87.891 HISTORY OF TOBACCO USE: ICD-10-CM

## 2023-09-12 DIAGNOSIS — J44.9 CHRONIC OBSTRUCTIVE PULMONARY DISEASE, UNSPECIFIED COPD TYPE (HCC): ICD-10-CM

## 2023-09-12 DIAGNOSIS — Z12.2 SCREENING FOR LUNG CANCER: ICD-10-CM

## 2023-09-12 LAB — HBA1C MFR BLD: 6.9 %

## 2023-09-12 PROCEDURE — G8427 DOCREV CUR MEDS BY ELIG CLIN: HCPCS | Performed by: INTERNAL MEDICINE

## 2023-09-12 PROCEDURE — 3023F SPIROM DOC REV: CPT | Performed by: INTERNAL MEDICINE

## 2023-09-12 PROCEDURE — 99214 OFFICE O/P EST MOD 30 MIN: CPT | Performed by: INTERNAL MEDICINE

## 2023-09-12 PROCEDURE — 3078F DIAST BP <80 MM HG: CPT | Performed by: INTERNAL MEDICINE

## 2023-09-12 PROCEDURE — 3075F SYST BP GE 130 - 139MM HG: CPT | Performed by: INTERNAL MEDICINE

## 2023-09-12 PROCEDURE — 1123F ACP DISCUSS/DSCN MKR DOCD: CPT | Performed by: INTERNAL MEDICINE

## 2023-09-12 PROCEDURE — 2022F DILAT RTA XM EVC RTNOPTHY: CPT | Performed by: INTERNAL MEDICINE

## 2023-09-12 PROCEDURE — 83036 HEMOGLOBIN GLYCOSYLATED A1C: CPT | Performed by: INTERNAL MEDICINE

## 2023-09-12 PROCEDURE — 1090F PRES/ABSN URINE INCON ASSESS: CPT | Performed by: INTERNAL MEDICINE

## 2023-09-12 PROCEDURE — 1036F TOBACCO NON-USER: CPT | Performed by: INTERNAL MEDICINE

## 2023-09-12 PROCEDURE — G8399 PT W/DXA RESULTS DOCUMENT: HCPCS | Performed by: INTERNAL MEDICINE

## 2023-09-12 PROCEDURE — 3044F HG A1C LEVEL LT 7.0%: CPT | Performed by: INTERNAL MEDICINE

## 2023-09-12 PROCEDURE — 3017F COLORECTAL CA SCREEN DOC REV: CPT | Performed by: INTERNAL MEDICINE

## 2023-09-12 PROCEDURE — G8417 CALC BMI ABV UP PARAM F/U: HCPCS | Performed by: INTERNAL MEDICINE

## 2023-09-12 RX ORDER — GABAPENTIN 300 MG/1
300 CAPSULE ORAL 2 TIMES DAILY
COMMUNITY
Start: 2023-08-04

## 2023-09-12 RX ORDER — MONTELUKAST SODIUM 10 MG/1
TABLET ORAL
Qty: 90 TABLET | Refills: 1 | Status: SHIPPED | OUTPATIENT
Start: 2023-09-12

## 2023-09-12 RX ORDER — BACLOFEN 10 MG/1
10 TABLET ORAL 3 TIMES DAILY PRN
Qty: 30 TABLET | Refills: 1 | Status: SHIPPED | OUTPATIENT
Start: 2023-09-12

## 2023-09-12 RX ORDER — AMMONIUM LACTATE 12 G/100G
CREAM TOPICAL
COMMUNITY
Start: 2023-08-31

## 2023-09-12 RX ORDER — LOSARTAN POTASSIUM 50 MG/1
50 TABLET ORAL DAILY
Qty: 90 TABLET | Refills: 1 | Status: SHIPPED | OUTPATIENT
Start: 2023-09-12

## 2023-09-12 RX ORDER — ROSUVASTATIN CALCIUM 20 MG/1
20 TABLET, COATED ORAL NIGHTLY
Qty: 90 TABLET | Refills: 0 | Status: SHIPPED | OUTPATIENT
Start: 2023-09-12

## 2023-09-12 RX ORDER — LEVOTHYROXINE SODIUM 112 UG/1
112 TABLET ORAL DAILY
Qty: 90 TABLET | Refills: 1 | Status: SHIPPED | OUTPATIENT
Start: 2023-09-12

## 2023-09-12 RX ORDER — LANCETS 30 GAUGE
EACH MISCELLANEOUS
Qty: 200 EACH | Refills: 1 | Status: SHIPPED | OUTPATIENT
Start: 2023-09-12

## 2023-09-12 RX ORDER — PREGABALIN 75 MG/1
75 CAPSULE ORAL 3 TIMES DAILY
Qty: 90 CAPSULE | Refills: 0 | Status: SHIPPED | OUTPATIENT
Start: 2023-09-12 | End: 2023-10-12

## 2023-09-12 RX ORDER — AMLODIPINE BESYLATE 2.5 MG/1
2.5 TABLET ORAL DAILY
COMMUNITY
Start: 2023-08-09

## 2023-09-12 RX ORDER — AMITRIPTYLINE HYDROCHLORIDE 50 MG/1
TABLET, FILM COATED ORAL
Qty: 90 TABLET | Refills: 1 | Status: SHIPPED | OUTPATIENT
Start: 2023-09-12

## 2023-09-12 RX ORDER — DIAPER,BRIEF,INFANT-TODD,DISP
EACH MISCELLANEOUS
COMMUNITY

## 2023-09-12 SDOH — ECONOMIC STABILITY: FOOD INSECURITY: WITHIN THE PAST 12 MONTHS, THE FOOD YOU BOUGHT JUST DIDN'T LAST AND YOU DIDN'T HAVE MONEY TO GET MORE.: PATIENT DECLINED

## 2023-09-12 SDOH — ECONOMIC STABILITY: INCOME INSECURITY: HOW HARD IS IT FOR YOU TO PAY FOR THE VERY BASICS LIKE FOOD, HOUSING, MEDICAL CARE, AND HEATING?: PATIENT DECLINED

## 2023-09-12 SDOH — ECONOMIC STABILITY: HOUSING INSECURITY
IN THE LAST 12 MONTHS, WAS THERE A TIME WHEN YOU DID NOT HAVE A STEADY PLACE TO SLEEP OR SLEPT IN A SHELTER (INCLUDING NOW)?: PATIENT REFUSED

## 2023-09-12 SDOH — ECONOMIC STABILITY: FOOD INSECURITY: WITHIN THE PAST 12 MONTHS, YOU WORRIED THAT YOUR FOOD WOULD RUN OUT BEFORE YOU GOT MONEY TO BUY MORE.: PATIENT DECLINED

## 2023-09-12 ASSESSMENT — ENCOUNTER SYMPTOMS
WHEEZING: 0
VOMITING: 0
NAUSEA: 0
VOICE CHANGE: 0
ABDOMINAL PAIN: 0
CHEST TIGHTNESS: 0
SORE THROAT: 0
SHORTNESS OF BREATH: 0

## 2023-09-12 NOTE — CARE COORDINATION
Remote Patient Monitoring Note      Date/Time:  9/12/2023 3:53 PM    LPN attempted to contact patient by telephone regarding yellow alert received for no metrics for >2 days  . Background: High Blood-Pressure, COPD, CHF    Clinical Interventions: HIPAA compliant message left on  requesting a return call. Plan/Follow Up: Will continue to review, monitor and address alerts with follow up based on severity of symptoms and risk factors.        Sil Chi LPN  [facility]/ CTN/ Remote Patient Monitoring  [phone]

## 2023-09-12 NOTE — PROGRESS NOTES
HPI:  Patient comes in today for follow-up, patient continues to have some wheezing. Patient feels tired patient also is under stress and is requesting to see psychiatry because of the anxiety and stress. Patient is taking her medication regularly  Patient states that she is having pain in her right arm from the shoulder down to the hand sometimes it feels like electric shock or like a tooth ache. Patient also is having trouble with her vision but she is seeing the ophthalmology  Patient also is having stomach pain and is already scheduled to see Dr. Shakeel Whitley in November endoscopy is scheduled for November 20, 2023  Review of Systems   Constitutional:  Negative for chills, fever and unexpected weight change. HENT:  Negative for postnasal drip, sore throat and voice change. Respiratory:  Negative for chest tightness, shortness of breath and wheezing. Cardiovascular:  Negative for chest pain and leg swelling. Gastrointestinal:  Negative for abdominal pain, nausea and vomiting. Musculoskeletal:  Negative for gait problem and joint swelling. Skin:  Negative for rash and wound. Neurological: Negative. Psychiatric/Behavioral: Negative.           Past Medical History:   Diagnosis Date    Adrenal incidentaloma ([address])     Anxiety     Arthritis     Asthma     Bladder incontinence     Cancer [facility]) Dx 2009    multiple Myeloma, in remission currently     Chronic back pain     COPD (chronic obstructive pulmonary disease) ([address])     on O2 2 liters  (uses with activity and at night to sleep)    Depression     Encounter for screening colonoscopy     Fibromyalgia     GERD (gastroesophageal reflux disease)     Hyperlipidemia     Hypertension     Hypothyroidism     MGUS (monoclonal gammopathy of unknown significance)     Neuropathy     Prolonged emergence from general anesthesia     Sleep apnea     Type II or unspecified type diabetes mellitus without mention of complication, not stated as uncontrolled

## 2023-09-14 ENCOUNTER — CARE COORDINATION (OUTPATIENT)
Dept: CARE COORDINATION | Age: 68
End: 2023-09-14

## 2023-09-14 NOTE — CARE COORDINATION
Remote Patient Monitoring Note      Date/Time:  9/14/2023 2:50 PM    LPN attempted to contact patient by telephone regarding yellow alert received for no metrics for 5 days  . Background: High Blood-Pressure, COPD, CHF    Clinical Interventions: HIPAA compliant message left on  requesting a return call. ACM updated. Plan/Follow Up: Will continue to review, monitor and address alerts with follow up based on severity of symptoms and risk factors.        Lisset Hunter LPN  [facility]/ CTN/ Remote Patient Monitoring  [phone]

## 2023-09-18 ENCOUNTER — CARE COORDINATION (OUTPATIENT)
Dept: CARE COORDINATION | Age: 68
End: 2023-09-18

## 2023-09-18 NOTE — CARE COORDINATION
Remote Patient Monitoring Note      Date/Time:  2023 2:00 PM  Patient Current Location: Home: [address] [place] [address]    LPN contacted patient by telephone regarding yellow alert received for no metrics for 7 days. Verified patients name and  as identifiers. Background:  High Blood-Pressure, COPD, CHF    Clinical Interventions:  Patient states she hadnt been feeling well, but will resume metrics today or tomorrow. Plan/Follow Up: Will continue to review, monitor and address alerts with follow up based on severity of symptoms and risk factors.        Mani Damon LPN  [facility]/ CTN/ Remote Patient Monitoring  [phone]

## 2023-09-19 ENCOUNTER — TELEPHONE (OUTPATIENT)
Dept: INFUSION THERAPY | Age: 68
End: 2023-09-19

## 2023-09-19 ENCOUNTER — HOSPITAL ENCOUNTER (OUTPATIENT)
Age: 68
Discharge: HOME | End: 2023-09-19
Payer: COMMERCIAL

## 2023-09-19 DIAGNOSIS — C90.00 MULTIPLE MYELOMA, REMISSION STATUS UNSPECIFIED (HCC): ICD-10-CM

## 2023-09-19 DIAGNOSIS — E03.9 HYPOTHYROIDISM, UNSPECIFIED TYPE: ICD-10-CM

## 2023-09-19 DIAGNOSIS — E11.42 DM TYPE 2 WITH DIABETIC PERIPHERAL NEUROPATHY (HCC): ICD-10-CM

## 2023-09-19 LAB
ALBUMIN SERPL-MCNC: 4.2 G/DL (ref 3.5–5.2)
ALP SERPL-CCNC: 116 U/L (ref 35–104)
ALT SERPL-CCNC: 25 U/L (ref 0–32)
ANION GAP SERPL CALCULATED.3IONS-SCNC: 9 MMOL/L (ref 7–16)
AST SERPL-CCNC: 34 U/L (ref 0–31)
BASOPHILS # BLD: 0.04 K/UL (ref 0–0.2)
BASOPHILS NFR BLD: 1 % (ref 0–2)
BILIRUB SERPL-MCNC: 0.5 MG/DL (ref 0–1.2)
BUN SERPL-MCNC: 26 MG/DL (ref 6–23)
CALCIUM SERPL-MCNC: 9.1 MG/DL (ref 8.6–10.2)
CHLORIDE SERPL-SCNC: 100 MMOL/L (ref 98–107)
CO2 SERPL-SCNC: 28 MMOL/L (ref 22–29)
CREAT SERPL-MCNC: 0.7 MG/DL (ref 0.5–1)
CREAT UR-MCNC: 19.9 MG/DL (ref 29–226)
EOSINOPHIL # BLD: 0.34 K/UL (ref 0.05–0.5)
EOSINOPHILS RELATIVE PERCENT: 6 % (ref 0–6)
ERYTHROCYTE [DISTWIDTH] IN BLOOD BY AUTOMATED COUNT: 13.4 % (ref 11.5–15)
GFR SERPL CREATININE-BSD FRML MDRD: >60 ML/MIN/1.73M2
GLUCOSE SERPL-MCNC: 96 MG/DL (ref 74–99)
HCT VFR BLD AUTO: 37.1 % (ref 34–48)
HGB BLD-MCNC: 11.3 G/DL (ref 11.5–15.5)
IGA SERPL-MCNC: 127 MG/DL (ref 70–400)
IGG SERPL-MCNC: 2442 MG/DL (ref 700–1600)
IGM SERPL-MCNC: 42 MG/DL (ref 40–230)
IMM GRANULOCYTES # BLD AUTO: <0.03 K/UL (ref 0–0.58)
IMM GRANULOCYTES NFR BLD: 0 % (ref 0–5)
LYMPHOCYTES NFR BLD: 2.59 K/UL (ref 1.5–4)
LYMPHOCYTES RELATIVE PERCENT: 43 % (ref 20–42)
MCH RBC QN AUTO: 28.3 PG (ref 26–35)
MCHC RBC AUTO-ENTMCNC: 30.5 G/DL (ref 32–34.5)
MCV RBC AUTO: 93 FL (ref 80–99.9)
MICROALBUMIN UR-MCNC: 86 MG/L (ref 0–19)
MICROALBUMIN/CREAT UR-RTO: 430 MCG/MG CREAT (ref 0–30)
MONOCYTES NFR BLD: 0.44 K/UL (ref 0.1–0.95)
MONOCYTES NFR BLD: 7 % (ref 2–12)
NEUTROPHILS NFR BLD: 43 % (ref 43–80)
NEUTS SEG NFR BLD: 2.55 K/UL (ref 1.8–7.3)
PLATELET # BLD AUTO: 316 K/UL (ref 130–450)
PMV BLD AUTO: 10 FL (ref 7–12)
POTASSIUM SERPL-SCNC: 3.4 MMOL/L (ref 3.5–5)
PROT SERPL-MCNC: 8.3 G/DL (ref 6.4–8.3)
RBC # BLD AUTO: 3.99 M/UL (ref 3.5–5.5)
SODIUM SERPL-SCNC: 137 MMOL/L (ref 132–146)
TSH SERPL DL<=0.05 MIU/L-ACNC: 0.39 UIU/ML (ref 0.27–4.2)
WBC OTHER # BLD: 6 K/UL (ref 4.5–11.5)

## 2023-09-19 PROCEDURE — 36415 COLL VENOUS BLD VENIPUNCTURE: CPT

## 2023-09-19 PROCEDURE — 82043 UR ALBUMIN QUANTITATIVE: CPT

## 2023-09-19 PROCEDURE — 86334 IMMUNOFIX E-PHORESIS SERUM: CPT

## 2023-09-19 PROCEDURE — 82232 ASSAY OF BETA-2 PROTEIN: CPT

## 2023-09-19 PROCEDURE — 83521 IG LIGHT CHAINS FREE EACH: CPT

## 2023-09-19 PROCEDURE — 80053 COMPREHEN METABOLIC PANEL: CPT

## 2023-09-19 PROCEDURE — 84443 ASSAY THYROID STIM HORMONE: CPT

## 2023-09-19 PROCEDURE — 82784 ASSAY IGA/IGD/IGG/IGM EACH: CPT

## 2023-09-19 PROCEDURE — 84155 ASSAY OF PROTEIN SERUM: CPT

## 2023-09-19 PROCEDURE — 85025 COMPLETE CBC W/AUTO DIFF WBC: CPT

## 2023-09-19 PROCEDURE — 82570 ASSAY OF URINE CREATININE: CPT

## 2023-09-19 PROCEDURE — 84165 PROTEIN E-PHORESIS SERUM: CPT

## 2023-09-19 PROCEDURE — 80061 LIPID PANEL: CPT

## 2023-09-19 RX ORDER — POTASSIUM CHLORIDE 20 MEQ/1
20 TABLET, EXTENDED RELEASE ORAL DAILY
Qty: 14 TABLET | Refills: 0 | Status: ON HOLD | OUTPATIENT
Start: 2023-09-19 | End: 2023-10-03

## 2023-09-19 NOTE — TELEPHONE ENCOUNTER
Per Dr Anastacio More request, pt was called and notified that her K+ level from her labs drawn on 9/19/23 was low and that a RX for Potassium Chloride 20 meq po daily for 14 days was called to [place] on [address]-Pt also was unaware that she missed her appointment with Dr Diego Farris on 9/5/23 and was notified that Dr Diego Farris would like that appointment rescheduled-Pt notified that the  schedulers will be reaching out to her to have this appointment rescheduled-pt voiced understanding of all instructions

## 2023-09-20 LAB
ALBUMIN SERPL-MCNC: 3.5 G/DL (ref 3.5–4.7)
ALPHA1 GLOB SERPL ELPH-MCNC: 0.3 G/DL (ref 0.2–0.4)
ALPHA2 GLOB SERPL ELPH-MCNC: 0.8 G/DL (ref 0.5–1)
B-GLOBULIN SERPL ELPH-MCNC: 1.1 G/DL (ref 0.8–1.3)
CHOLEST SERPL-MCNC: 138 MG/DL
GAMMA GLOB SERPL ELPH-MCNC: 2.4 G/DL (ref 0.7–1.6)
HDLC SERPL-MCNC: 67 MG/DL
INTERPRETATION SERPL IFE-IMP: NORMAL
LDLC SERPL CALC-MCNC: 62 MG/DL
PATH REV: NORMAL
PATHOLOGIST: ABNORMAL
PROT PATTERN SERPL ELPH-IMP: ABNORMAL
PROT SERPL-MCNC: 8.1 G/DL (ref 6.4–8.3)
TRIGL SERPL-MCNC: 44 MG/DL
VLDLC SERPL CALC-MCNC: 9 MG/DL

## 2023-09-21 ENCOUNTER — CARE COORDINATION (OUTPATIENT)
Dept: CARE COORDINATION | Age: 68
End: 2023-09-21

## 2023-09-22 LAB
FREE KAPPA/LAMBDA RATIO: 1.43 (ref 0.26–1.65)
KAPPA LC FREE SER-MCNC: 31.8 MG/L (ref 3.7–19.4)
LAMBDA LC FREE SERPL-MCNC: 22.3 MG/L (ref 5.7–26.3)

## 2023-09-23 ENCOUNTER — APPOINTMENT (OUTPATIENT)
Dept: GENERAL RADIOLOGY | Age: 68
DRG: 916 | End: 2023-09-23
Payer: COMMERCIAL

## 2023-09-23 ENCOUNTER — HOSPITAL ENCOUNTER (INPATIENT)
Age: 68
LOS: 4 days | Discharge: HOME | DRG: 916 | End: 2023-09-27
Attending: EMERGENCY MEDICINE | Admitting: INTERNAL MEDICINE
Payer: COMMERCIAL

## 2023-09-23 ENCOUNTER — APPOINTMENT (OUTPATIENT)
Dept: CT IMAGING | Age: 68
DRG: 916 | End: 2023-09-23
Attending: EMERGENCY MEDICINE
Payer: COMMERCIAL

## 2023-09-23 DIAGNOSIS — T78.3XXA ANGIOEDEMA, INITIAL ENCOUNTER: Primary | ICD-10-CM

## 2023-09-23 PROBLEM — T46.4X5A ANGIOEDEMA DUE TO ANGIOTENSIN CONVERTING ENZYME INHIBITOR (ACE-I): Status: ACTIVE | Noted: 2023-09-23

## 2023-09-23 LAB
ALBUMIN SERPL-MCNC: 3.5 G/DL (ref 3.5–5.2)
ALP SERPL-CCNC: 118 U/L (ref 35–104)
ALT SERPL-CCNC: 24 U/L (ref 0–32)
ANION GAP SERPL CALCULATED.3IONS-SCNC: 10 MMOL/L (ref 7–16)
AST SERPL-CCNC: 39 U/L (ref 0–31)
BASOPHILS # BLD: 0.04 K/UL (ref 0–0.2)
BASOPHILS NFR BLD: 0 % (ref 0–2)
BILIRUB SERPL-MCNC: 0.5 MG/DL (ref 0–1.2)
BUN SERPL-MCNC: 10 MG/DL (ref 6–23)
CALCIUM SERPL-MCNC: 9.2 MG/DL (ref 8.6–10.2)
CHLORIDE SERPL-SCNC: 102 MMOL/L (ref 98–107)
CHP ED QC CHECK: YES
CO2 SERPL-SCNC: 25 MMOL/L (ref 22–29)
CREAT SERPL-MCNC: 0.6 MG/DL (ref 0.5–1)
EOSINOPHIL # BLD: 0.24 K/UL (ref 0.05–0.5)
EOSINOPHILS RELATIVE PERCENT: 2 % (ref 0–6)
ERYTHROCYTE [DISTWIDTH] IN BLOOD BY AUTOMATED COUNT: 13.9 % (ref 11.5–15)
GFR SERPL CREATININE-BSD FRML MDRD: >60 ML/MIN/1.73M2
GLUCOSE BLD-MCNC: 143 MG/DL (ref 74–99)
GLUCOSE SERPL-MCNC: 66 MG/DL (ref 74–99)
HBA1C MFR BLD: 6.4 % (ref 4–5.6)
HCT VFR BLD AUTO: 43.2 % (ref 34–48)
HGB BLD-MCNC: 13.1 G/DL (ref 11.5–15.5)
IMM GRANULOCYTES # BLD AUTO: 0.07 K/UL (ref 0–0.58)
IMM GRANULOCYTES NFR BLD: 1 % (ref 0–5)
LYMPHOCYTES NFR BLD: 2.28 K/UL (ref 1.5–4)
LYMPHOCYTES RELATIVE PERCENT: 20 % (ref 20–42)
MCH RBC QN AUTO: 28.2 PG (ref 26–35)
MCHC RBC AUTO-ENTMCNC: 30.3 G/DL (ref 32–34.5)
MCV RBC AUTO: 93.1 FL (ref 80–99.9)
MONOCYTES NFR BLD: 0.83 K/UL (ref 0.1–0.95)
MONOCYTES NFR BLD: 7 % (ref 2–12)
NEUTROPHILS NFR BLD: 70 % (ref 43–80)
NEUTS SEG NFR BLD: 8.05 K/UL (ref 1.8–7.3)
PLATELET # BLD AUTO: 182 K/UL (ref 130–450)
PLATELET CONFIRMATION: NORMAL
PMV BLD AUTO: 11.6 FL (ref 7–12)
POTASSIUM SERPL-SCNC: 5.1 MMOL/L (ref 3.5–5)
PROT SERPL-MCNC: 8.6 G/DL (ref 6.4–8.3)
RBC # BLD AUTO: 4.64 M/UL (ref 3.5–5.5)
SODIUM SERPL-SCNC: 137 MMOL/L (ref 132–146)
TROPONIN I SERPL HS-MCNC: 10 NG/L (ref 0–9)
TROPONIN I SERPL HS-MCNC: 14 NG/L (ref 0–9)
WBC OTHER # BLD: 11.5 K/UL (ref 4.5–11.5)

## 2023-09-23 PROCEDURE — 6360000002 HC RX W HCPCS: Performed by: INTERNAL MEDICINE

## 2023-09-23 PROCEDURE — 99285 EMERGENCY DEPT VISIT HI MDM: CPT

## 2023-09-23 PROCEDURE — 70491 CT SOFT TISSUE NECK W/DYE: CPT

## 2023-09-23 PROCEDURE — 83036 HEMOGLOBIN GLYCOSYLATED A1C: CPT

## 2023-09-23 PROCEDURE — 2580000003 HC RX 258: Performed by: EMERGENCY MEDICINE

## 2023-09-23 PROCEDURE — 6360000002 HC RX W HCPCS: Performed by: EMERGENCY MEDICINE

## 2023-09-23 PROCEDURE — 84484 ASSAY OF TROPONIN QUANT: CPT

## 2023-09-23 PROCEDURE — 2500000003 HC RX 250 WO HCPCS: Performed by: EMERGENCY MEDICINE

## 2023-09-23 PROCEDURE — 2580000003 HC RX 258: Performed by: INTERNAL MEDICINE

## 2023-09-23 PROCEDURE — 85025 COMPLETE CBC W/AUTO DIFF WBC: CPT

## 2023-09-23 PROCEDURE — 6360000004 HC RX CONTRAST MEDICATION: Performed by: RADIOLOGY

## 2023-09-23 PROCEDURE — G0378 HOSPITAL OBSERVATION PER HR: HCPCS

## 2023-09-23 PROCEDURE — 2700000000 HC OXYGEN THERAPY PER DAY

## 2023-09-23 PROCEDURE — 93005 ELECTROCARDIOGRAM TRACING: CPT

## 2023-09-23 PROCEDURE — 82962 GLUCOSE BLOOD TEST: CPT

## 2023-09-23 PROCEDURE — 94640 AIRWAY INHALATION TREATMENT: CPT

## 2023-09-23 PROCEDURE — 80053 COMPREHEN METABOLIC PANEL: CPT

## 2023-09-23 PROCEDURE — 96365 THER/PROPH/DIAG IV INF INIT: CPT

## 2023-09-23 PROCEDURE — 71045 X-RAY EXAM CHEST 1 VIEW: CPT

## 2023-09-23 PROCEDURE — 2140000000 HC CCU INTERMEDIATE R&B

## 2023-09-23 PROCEDURE — 96375 TX/PRO/DX INJ NEW DRUG ADDON: CPT

## 2023-09-23 PROCEDURE — 6370000000 HC RX 637 (ALT 250 FOR IP): Performed by: INTERNAL MEDICINE

## 2023-09-23 RX ORDER — DEXTROSE MONOHYDRATE 100 MG/ML
INJECTION, SOLUTION INTRAVENOUS CONTINUOUS PRN
Status: DISCONTINUED | OUTPATIENT
Start: 2023-09-23 | End: 2023-09-27 | Stop reason: HOSPADM

## 2023-09-23 RX ORDER — MONTELUKAST SODIUM 10 MG/1
10 TABLET ORAL NIGHTLY
Status: DISCONTINUED | OUTPATIENT
Start: 2023-09-23 | End: 2023-09-27 | Stop reason: HOSPADM

## 2023-09-23 RX ORDER — ONDANSETRON 2 MG/ML
4 INJECTION INTRAMUSCULAR; INTRAVENOUS ONCE
Status: COMPLETED | OUTPATIENT
Start: 2023-09-23 | End: 2023-09-23

## 2023-09-23 RX ORDER — ONDANSETRON 4 MG/1
4 TABLET, ORALLY DISINTEGRATING ORAL EVERY 8 HOURS PRN
Status: DISCONTINUED | OUTPATIENT
Start: 2023-09-23 | End: 2023-09-27 | Stop reason: HOSPADM

## 2023-09-23 RX ORDER — BACLOFEN 10 MG/1
10 TABLET ORAL 3 TIMES DAILY PRN
Status: DISCONTINUED | OUTPATIENT
Start: 2023-09-23 | End: 2023-09-27 | Stop reason: HOSPADM

## 2023-09-23 RX ORDER — MAGNESIUM HYDROXIDE/ALUMINUM HYDROXICE/SIMETHICONE 120; 1200; 1200 MG/30ML; MG/30ML; MG/30ML
30 SUSPENSION ORAL EVERY 6 HOURS PRN
Status: DISCONTINUED | OUTPATIENT
Start: 2023-09-23 | End: 2023-09-27 | Stop reason: HOSPADM

## 2023-09-23 RX ORDER — SODIUM CHLORIDE 0.9 % (FLUSH) 0.9 %
5-40 SYRINGE (ML) INJECTION EVERY 12 HOURS SCHEDULED
Status: DISCONTINUED | OUTPATIENT
Start: 2023-09-23 | End: 2023-09-27 | Stop reason: HOSPADM

## 2023-09-23 RX ORDER — SENNOSIDES A AND B 8.6 MG/1
1 TABLET, FILM COATED ORAL DAILY PRN
Status: DISCONTINUED | OUTPATIENT
Start: 2023-09-23 | End: 2023-09-27 | Stop reason: HOSPADM

## 2023-09-23 RX ORDER — SODIUM CHLORIDE 9 MG/ML
INJECTION, SOLUTION INTRAVENOUS PRN
Status: DISCONTINUED | OUTPATIENT
Start: 2023-09-23 | End: 2023-09-27 | Stop reason: HOSPADM

## 2023-09-23 RX ORDER — GABAPENTIN 300 MG/1
300 CAPSULE ORAL 2 TIMES DAILY
Status: DISCONTINUED | OUTPATIENT
Start: 2023-09-23 | End: 2023-09-27 | Stop reason: HOSPADM

## 2023-09-23 RX ORDER — FENTANYL CITRATE 50 UG/ML
50 INJECTION, SOLUTION INTRAMUSCULAR; INTRAVENOUS ONCE
Status: COMPLETED | OUTPATIENT
Start: 2023-09-23 | End: 2023-09-23

## 2023-09-23 RX ORDER — ACETAMINOPHEN 325 MG/1
650 TABLET ORAL EVERY 6 HOURS PRN
Status: DISCONTINUED | OUTPATIENT
Start: 2023-09-23 | End: 2023-09-27 | Stop reason: HOSPADM

## 2023-09-23 RX ORDER — SODIUM CHLORIDE 0.9 % (FLUSH) 0.9 %
5-40 SYRINGE (ML) INJECTION PRN
Status: DISCONTINUED | OUTPATIENT
Start: 2023-09-23 | End: 2023-09-27 | Stop reason: HOSPADM

## 2023-09-23 RX ORDER — ENOXAPARIN SODIUM 100 MG/ML
30 INJECTION SUBCUTANEOUS 2 TIMES DAILY
Status: DISCONTINUED | OUTPATIENT
Start: 2023-09-23 | End: 2023-09-27 | Stop reason: HOSPADM

## 2023-09-23 RX ORDER — BUDESONIDE 0.5 MG/2ML
0.5 INHALANT ORAL
Status: DISCONTINUED | OUTPATIENT
Start: 2023-09-23 | End: 2023-09-26 | Stop reason: ALTCHOICE

## 2023-09-23 RX ORDER — ARFORMOTEROL TARTRATE 15 UG/2ML
15 SOLUTION RESPIRATORY (INHALATION)
Status: DISCONTINUED | OUTPATIENT
Start: 2023-09-23 | End: 2023-09-26 | Stop reason: ALTCHOICE

## 2023-09-23 RX ORDER — IPRATROPIUM BROMIDE AND ALBUTEROL SULFATE 2.5; .5 MG/3ML; MG/3ML
1 SOLUTION RESPIRATORY (INHALATION)
Status: DISCONTINUED | OUTPATIENT
Start: 2023-09-24 | End: 2023-09-27 | Stop reason: HOSPADM

## 2023-09-23 RX ORDER — ACETAMINOPHEN 650 MG/1
650 SUPPOSITORY RECTAL EVERY 6 HOURS PRN
Status: DISCONTINUED | OUTPATIENT
Start: 2023-09-23 | End: 2023-09-27 | Stop reason: HOSPADM

## 2023-09-23 RX ORDER — INSULIN LISPRO 100 [IU]/ML
0-4 INJECTION, SOLUTION INTRAVENOUS; SUBCUTANEOUS NIGHTLY
Status: DISCONTINUED | OUTPATIENT
Start: 2023-09-23 | End: 2023-09-27 | Stop reason: HOSPADM

## 2023-09-23 RX ORDER — LEVOTHYROXINE SODIUM 112 UG/1
112 TABLET ORAL DAILY
Status: DISCONTINUED | OUTPATIENT
Start: 2023-09-24 | End: 2023-09-27 | Stop reason: HOSPADM

## 2023-09-23 RX ORDER — ROSUVASTATIN CALCIUM 20 MG/1
20 TABLET, COATED ORAL NIGHTLY
Status: DISCONTINUED | OUTPATIENT
Start: 2023-09-23 | End: 2023-09-27 | Stop reason: HOSPADM

## 2023-09-23 RX ORDER — ONDANSETRON 2 MG/ML
4 INJECTION INTRAMUSCULAR; INTRAVENOUS EVERY 6 HOURS PRN
Status: DISCONTINUED | OUTPATIENT
Start: 2023-09-23 | End: 2023-09-27 | Stop reason: HOSPADM

## 2023-09-23 RX ORDER — INSULIN LISPRO 100 [IU]/ML
0-4 INJECTION, SOLUTION INTRAVENOUS; SUBCUTANEOUS
Status: DISCONTINUED | OUTPATIENT
Start: 2023-09-24 | End: 2023-09-27 | Stop reason: HOSPADM

## 2023-09-23 RX ORDER — AMLODIPINE BESYLATE 5 MG/1
5 TABLET ORAL DAILY
Status: DISCONTINUED | OUTPATIENT
Start: 2023-09-24 | End: 2023-09-24

## 2023-09-23 RX ORDER — DIPHENHYDRAMINE HYDROCHLORIDE 50 MG/ML
25 INJECTION INTRAMUSCULAR; INTRAVENOUS EVERY 6 HOURS
Status: DISCONTINUED | OUTPATIENT
Start: 2023-09-23 | End: 2023-09-27 | Stop reason: HOSPADM

## 2023-09-23 RX ADMIN — BUDESONIDE INHALATION 500 MCG: 0.5 SUSPENSION RESPIRATORY (INHALATION) at 22:40

## 2023-09-23 RX ADMIN — Medication 10 ML: at 22:00

## 2023-09-23 RX ADMIN — IOPAMIDOL 75 ML: 755 INJECTION, SOLUTION INTRAVENOUS at 15:16

## 2023-09-23 RX ADMIN — ENOXAPARIN SODIUM 30 MG: 100 INJECTION SUBCUTANEOUS at 22:13

## 2023-09-23 RX ADMIN — GABAPENTIN 300 MG: 300 CAPSULE ORAL at 22:14

## 2023-09-23 RX ADMIN — TRANEXAMIC ACID 1000 MG: 100 INJECTION, SOLUTION INTRAVENOUS at 16:48

## 2023-09-23 RX ADMIN — FENTANYL CITRATE 50 MCG: 50 INJECTION INTRAMUSCULAR; INTRAVENOUS at 14:47

## 2023-09-23 RX ADMIN — ROSUVASTATIN 20 MG: 20 TABLET, FILM COATED ORAL at 22:14

## 2023-09-23 RX ADMIN — ARFORMOTEROL TARTRATE 15 MCG: 15 SOLUTION RESPIRATORY (INHALATION) at 22:39

## 2023-09-23 RX ADMIN — SODIUM CHLORIDE, PRESERVATIVE FREE 20 MG: 5 INJECTION INTRAVENOUS at 18:02

## 2023-09-23 RX ADMIN — DIPHENHYDRAMINE HYDROCHLORIDE 25 MG: 50 INJECTION, SOLUTION INTRAMUSCULAR; INTRAVENOUS at 22:14

## 2023-09-23 RX ADMIN — MONTELUKAST 10 MG: 10 TABLET, FILM COATED ORAL at 22:14

## 2023-09-23 RX ADMIN — WATER 125 MG: 1 INJECTION INTRAMUSCULAR; INTRAVENOUS; SUBCUTANEOUS at 13:33

## 2023-09-23 RX ADMIN — METHYLPREDNISOLONE SODIUM SUCCINATE 40 MG: 40 INJECTION, POWDER, FOR SOLUTION INTRAMUSCULAR; INTRAVENOUS at 22:14

## 2023-09-23 RX ADMIN — ONDANSETRON 4 MG: 2 INJECTION INTRAMUSCULAR; INTRAVENOUS at 14:48

## 2023-09-23 NOTE — H&P
Collection Time: 09/23/23  1:36 PM   Result Value Ref Range    Troponin, High Sensitivity 10 (H) 0 - 9 ng/L   Troponin    Collection Time: 09/23/23  2:38 PM   Result Value Ref Range    Troponin, High Sensitivity 14 (H) 0 - 9 ng/L     Oupatient labs:  Lab Results   Component Value Date    CHOL 138 09/19/2023    TRIG 44 09/19/2023    HDL 67 09/19/2023    LDLCALC 62 03/28/2023    TSH 0.39 09/19/2023    INR 1.0 04/17/2022    LABA1C 6.9 (H) 09/12/2023       Urinalysis:    Lab Results   Component Value Date/Time    NITRU Negative 01/04/2023 05:36 PM    WBCUA 0-1 01/19/2020 07:48 PM    WBCUA NONE 01/06/2011 10:45 AM    BACTERIA RARE 01/19/2020 07:48 PM    RBCUA 0-1 01/19/2020 07:48 PM    RBCUA NONE 01/06/2011 10:45 AM    BLOODU Negative 01/04/2023 05:36 PM    SPECGRAV 1.010 01/04/2023 05:36 PM    GLUCOSEU >=1000 01/04/2023 05:36 PM    GLUCOSEU NEGATIVE 01/06/2011 10:45 AM       Imaging:  CT SOFT TISSUE NECK W CONTRAST    Result Date: 9/23/2023  EXAMINATION: CT OF THE NECK SOFT TISSUE WITH CONTRAST  9/23/2023 TECHNIQUE: CT of the neck was performed with the administration of intravenous contrast. Multiplanar reformatted images are provided for review. Automated exposure control, iterative reconstruction, and/or weight based adjustment of the mA/kV was utilized to reduce the radiation dose to as low as reasonably achievable. COMPARISON: None. HISTORY: ORDERING SYSTEM PROVIDED HISTORY: facial swelling/? abscess TECHNOLOGIST PROVIDED HISTORY: Reason for exam:->facial swelling/? abscess Decision Support Exception - unselect if not a suspected or confirmed emergency medical condition->Emergency Medical Condition (MA) What reading provider will be dictating this exam?->CRC FINDINGS: No obvious mass or edema involving the tongue. Epiglottis is unremarkable. Few prominent lymph nodes are present in right and left submandibular space measuring up to 12 mm in long axis on the right and 13 mm in long axis on the left.   Submandibular glands are unremarkable. Parotid glands are unremarkable. Nasopharynx, oropharynx, and hypopharynx are unremarkable. Vocal folds are unremarkable. Mild generalized enlarged appearance of the thyroid gland. No pneumonia in the visualized upper lung fields. 1. Grossly normal appearance of the tongue. 2. Few prominent right and left submandibular space lymph nodes. 3. No evidence of neck soft tissue mass or abscess. XR CHEST PORTABLE    Result Date: 9/23/2023  EXAMINATION: ONE XRAY VIEW OF THE CHEST 9/23/2023 11:52 am COMPARISON: /30/23 HISTORY: ORDERING SYSTEM PROVIDED HISTORY: chest pain TECHNOLOGIST PROVIDED HISTORY: Reason for exam:->chest pain What reading provider will be dictating this exam?->CRC FINDINGS: The lungs are without acute focal process. There is no effusion or pneumothorax. The cardiomediastinal silhouette is without acute process. The osseous structures are without acute process. No acute process. CT SOFT TISSUE NECK W CONTRAST   Final Result   1. Grossly normal appearance of the tongue. 2. Few prominent right and left submandibular space lymph nodes. 3. No evidence of neck soft tissue mass or abscess. XR CHEST PORTABLE   Final Result   No acute process. ASSESSMENT AND PLAN  Active Problems:    * No active hospital problems. *  Resolved Problems:    * No resolved hospital problems. *    - Angioedema  - HTN  - HLP  - Hypothyroidism  - T2DM  - GERD    Plan:  - admit to tele monitoring   - stop losartan  - given famotidine, solumedrol and TXA in ER, will continue with benadryl, famotidine and solumedrol, consult pulmonary/allergy   - accuchecks and iss  - trend troponin   - airway watch and pulse oximetry     Further management and plan adjustment will be taken over by primary hospitalist provider.    VTE Prophylaxis: low molecular weight heparin -    DVT Prophylaxis: []Lovenox []Heparin []PCD [] [address] []Encouraged ambulation    Diet: No diet orders

## 2023-09-24 LAB
ALBUMIN SERPL-MCNC: 3.5 G/DL (ref 3.5–5.2)
ALP SERPL-CCNC: 112 U/L (ref 35–104)
ALT SERPL-CCNC: 19 U/L (ref 0–32)
ANION GAP SERPL CALCULATED.3IONS-SCNC: 9 MMOL/L (ref 7–16)
AST SERPL-CCNC: 23 U/L (ref 0–31)
BASOPHILS # BLD: 0.01 K/UL (ref 0–0.2)
BASOPHILS NFR BLD: 0 % (ref 0–2)
BILIRUB SERPL-MCNC: 0.4 MG/DL (ref 0–1.2)
BUN SERPL-MCNC: 19 MG/DL (ref 6–23)
CALCIUM SERPL-MCNC: 9.1 MG/DL (ref 8.6–10.2)
CHLORIDE SERPL-SCNC: 96 MMOL/L (ref 98–107)
CHP ED QC CHECK: YES
CHP ED QC CHECK: YES
CO2 SERPL-SCNC: 26 MMOL/L (ref 22–29)
CREAT SERPL-MCNC: 0.7 MG/DL (ref 0.5–1)
CRP SERPL HS-MCNC: 151 MG/L (ref 0–5)
EOSINOPHIL # BLD: 0 K/UL (ref 0.05–0.5)
EOSINOPHILS RELATIVE PERCENT: 0 % (ref 0–6)
ERYTHROCYTE [DISTWIDTH] IN BLOOD BY AUTOMATED COUNT: 13.6 % (ref 11.5–15)
GFR SERPL CREATININE-BSD FRML MDRD: >60 ML/MIN/1.73M2
GLUCOSE BLD-MCNC: 195 MG/DL
GLUCOSE BLD-MCNC: 195 MG/DL (ref 74–99)
GLUCOSE BLD-MCNC: 200 MG/DL
GLUCOSE BLD-MCNC: 203 MG/DL (ref 74–99)
GLUCOSE BLD-MCNC: 252 MG/DL (ref 74–99)
GLUCOSE BLD-MCNC: 272 MG/DL (ref 74–99)
GLUCOSE SERPL-MCNC: 200 MG/DL (ref 74–99)
HCT VFR BLD AUTO: 39.4 % (ref 34–48)
HGB BLD-MCNC: 12.1 G/DL (ref 11.5–15.5)
IMM GRANULOCYTES # BLD AUTO: <0.03 K/UL (ref 0–0.58)
IMM GRANULOCYTES NFR BLD: 0 % (ref 0–5)
LYMPHOCYTES NFR BLD: 1.06 K/UL (ref 1.5–4)
LYMPHOCYTES RELATIVE PERCENT: 11 % (ref 20–42)
MCH RBC QN AUTO: 28.2 PG (ref 26–35)
MCHC RBC AUTO-ENTMCNC: 30.7 G/DL (ref 32–34.5)
MCV RBC AUTO: 91.8 FL (ref 80–99.9)
MONOCYTES NFR BLD: 0.11 K/UL (ref 0.1–0.95)
MONOCYTES NFR BLD: 1 % (ref 2–12)
NEUTROPHILS NFR BLD: 87 % (ref 43–80)
NEUTS SEG NFR BLD: 8.13 K/UL (ref 1.8–7.3)
PLATELET # BLD AUTO: 370 K/UL (ref 130–450)
PMV BLD AUTO: 10 FL (ref 7–12)
POTASSIUM SERPL-SCNC: 3.9 MMOL/L (ref 3.5–5)
PROT SERPL-MCNC: 8.5 G/DL (ref 6.4–8.3)
RBC # BLD AUTO: 4.29 M/UL (ref 3.5–5.5)
SODIUM SERPL-SCNC: 131 MMOL/L (ref 132–146)
WBC OTHER # BLD: 9.3 K/UL (ref 4.5–11.5)

## 2023-09-24 PROCEDURE — 2140000000 HC CCU INTERMEDIATE R&B

## 2023-09-24 PROCEDURE — 6370000000 HC RX 637 (ALT 250 FOR IP): Performed by: INTERNAL MEDICINE

## 2023-09-24 PROCEDURE — 82962 GLUCOSE BLOOD TEST: CPT

## 2023-09-24 PROCEDURE — A4216 STERILE WATER/SALINE, 10 ML: HCPCS | Performed by: INTERNAL MEDICINE

## 2023-09-24 PROCEDURE — 2500000003 HC RX 250 WO HCPCS: Performed by: INTERNAL MEDICINE

## 2023-09-24 PROCEDURE — 6360000002 HC RX W HCPCS: Performed by: INTERNAL MEDICINE

## 2023-09-24 PROCEDURE — 2700000000 HC OXYGEN THERAPY PER DAY

## 2023-09-24 PROCEDURE — 80053 COMPREHEN METABOLIC PANEL: CPT

## 2023-09-24 PROCEDURE — 2580000003 HC RX 258: Performed by: INTERNAL MEDICINE

## 2023-09-24 PROCEDURE — 86140 C-REACTIVE PROTEIN: CPT

## 2023-09-24 PROCEDURE — 94640 AIRWAY INHALATION TREATMENT: CPT

## 2023-09-24 PROCEDURE — 6370000000 HC RX 637 (ALT 250 FOR IP): Performed by: STUDENT IN AN ORGANIZED HEALTH CARE EDUCATION/TRAINING PROGRAM

## 2023-09-24 PROCEDURE — 85025 COMPLETE CBC W/AUTO DIFF WBC: CPT

## 2023-09-24 RX ORDER — AMLODIPINE BESYLATE 10 MG/1
10 TABLET ORAL DAILY
Status: DISCONTINUED | OUTPATIENT
Start: 2023-09-24 | End: 2023-09-27 | Stop reason: HOSPADM

## 2023-09-24 RX ORDER — CHLORHEXIDINE GLUCONATE ORAL RINSE 1.2 MG/ML
15 SOLUTION DENTAL 2 TIMES DAILY
Status: DISCONTINUED | OUTPATIENT
Start: 2023-09-24 | End: 2023-09-27 | Stop reason: HOSPADM

## 2023-09-24 RX ORDER — AMOXICILLIN AND CLAVULANATE POTASSIUM 875; 125 MG/1; MG/1
1 TABLET, FILM COATED ORAL EVERY 12 HOURS SCHEDULED
Status: DISCONTINUED | OUTPATIENT
Start: 2023-09-24 | End: 2023-09-27 | Stop reason: HOSPADM

## 2023-09-24 RX ADMIN — IPRATROPIUM BROMIDE AND ALBUTEROL SULFATE 1 DOSE: .5; 2.5 SOLUTION RESPIRATORY (INHALATION) at 12:09

## 2023-09-24 RX ADMIN — 0.12% CHLORHEXIDINE GLUCONATE 15 ML: 1.2 RINSE ORAL at 22:49

## 2023-09-24 RX ADMIN — INSULIN LISPRO 2 UNITS: 100 INJECTION, SOLUTION INTRAVENOUS; SUBCUTANEOUS at 11:19

## 2023-09-24 RX ADMIN — MONTELUKAST 10 MG: 10 TABLET, FILM COATED ORAL at 22:48

## 2023-09-24 RX ADMIN — DIPHENHYDRAMINE HYDROCHLORIDE 25 MG: 50 INJECTION, SOLUTION INTRAMUSCULAR; INTRAVENOUS at 22:49

## 2023-09-24 RX ADMIN — SODIUM CHLORIDE, PRESERVATIVE FREE 20 MG: 5 INJECTION INTRAVENOUS at 08:54

## 2023-09-24 RX ADMIN — ARFORMOTEROL TARTRATE 15 MCG: 15 SOLUTION RESPIRATORY (INHALATION) at 21:01

## 2023-09-24 RX ADMIN — AMOXICILLIN AND CLAVULANATE POTASSIUM 1 TABLET: 875; 125 TABLET, FILM COATED ORAL at 22:48

## 2023-09-24 RX ADMIN — DIPHENHYDRAMINE HYDROCHLORIDE 25 MG: 50 INJECTION, SOLUTION INTRAMUSCULAR; INTRAVENOUS at 15:12

## 2023-09-24 RX ADMIN — DIPHENHYDRAMINE HYDROCHLORIDE 25 MG: 50 INJECTION, SOLUTION INTRAMUSCULAR; INTRAVENOUS at 08:53

## 2023-09-24 RX ADMIN — ENOXAPARIN SODIUM 30 MG: 100 INJECTION SUBCUTANEOUS at 22:48

## 2023-09-24 RX ADMIN — IPRATROPIUM BROMIDE AND ALBUTEROL SULFATE 1 DOSE: .5; 2.5 SOLUTION RESPIRATORY (INHALATION) at 21:00

## 2023-09-24 RX ADMIN — ACETAMINOPHEN 650 MG: 325 TABLET ORAL at 06:12

## 2023-09-24 RX ADMIN — SODIUM CHLORIDE, PRESERVATIVE FREE 20 MG: 5 INJECTION INTRAVENOUS at 22:49

## 2023-09-24 RX ADMIN — GABAPENTIN 300 MG: 300 CAPSULE ORAL at 22:49

## 2023-09-24 RX ADMIN — BUDESONIDE INHALATION 500 MCG: 0.5 SUSPENSION RESPIRATORY (INHALATION) at 08:05

## 2023-09-24 RX ADMIN — IPRATROPIUM BROMIDE AND ALBUTEROL SULFATE 1 DOSE: .5; 2.5 SOLUTION RESPIRATORY (INHALATION) at 16:14

## 2023-09-24 RX ADMIN — ARFORMOTEROL TARTRATE 15 MCG: 15 SOLUTION RESPIRATORY (INHALATION) at 08:05

## 2023-09-24 RX ADMIN — METHYLPREDNISOLONE SODIUM SUCCINATE 40 MG: 40 INJECTION, POWDER, FOR SOLUTION INTRAMUSCULAR; INTRAVENOUS at 22:49

## 2023-09-24 RX ADMIN — BUDESONIDE INHALATION 500 MCG: 0.5 SUSPENSION RESPIRATORY (INHALATION) at 21:01

## 2023-09-24 RX ADMIN — INSULIN LISPRO 2 UNITS: 100 INJECTION, SOLUTION INTRAVENOUS; SUBCUTANEOUS at 17:50

## 2023-09-24 RX ADMIN — Medication 10 ML: at 09:04

## 2023-09-24 RX ADMIN — Medication 10 ML: at 22:50

## 2023-09-24 RX ADMIN — 0.12% CHLORHEXIDINE GLUCONATE 15 ML: 1.2 RINSE ORAL at 14:05

## 2023-09-24 RX ADMIN — IPRATROPIUM BROMIDE AND ALBUTEROL SULFATE 1 DOSE: .5; 2.5 SOLUTION RESPIRATORY (INHALATION) at 08:05

## 2023-09-24 RX ADMIN — AMOXICILLIN AND CLAVULANATE POTASSIUM 1 TABLET: 875; 125 TABLET, FILM COATED ORAL at 14:05

## 2023-09-24 RX ADMIN — LEVOTHYROXINE SODIUM 112 MCG: 0.11 TABLET ORAL at 06:12

## 2023-09-24 RX ADMIN — AMLODIPINE BESYLATE 10 MG: 10 TABLET ORAL at 08:52

## 2023-09-24 RX ADMIN — GABAPENTIN 300 MG: 300 CAPSULE ORAL at 08:52

## 2023-09-24 RX ADMIN — ROSUVASTATIN 20 MG: 20 TABLET, FILM COATED ORAL at 22:49

## 2023-09-24 RX ADMIN — ENOXAPARIN SODIUM 30 MG: 100 INJECTION SUBCUTANEOUS at 08:56

## 2023-09-24 RX ADMIN — DIPHENHYDRAMINE HYDROCHLORIDE 25 MG: 50 INJECTION, SOLUTION INTRAMUSCULAR; INTRAVENOUS at 01:35

## 2023-09-24 RX ADMIN — METHYLPREDNISOLONE SODIUM SUCCINATE 40 MG: 40 INJECTION, POWDER, FOR SOLUTION INTRAMUSCULAR; INTRAVENOUS at 08:54

## 2023-09-24 ASSESSMENT — PAIN SCALES - GENERAL: PAINLEVEL_OUTOF10: 8

## 2023-09-24 NOTE — PROGRESS NOTES
BID    levothyroxine  112 mcg Oral Daily    montelukast  10 mg Oral Nightly    rosuvastatin  20 mg Oral Nightly    mometasone-formoterol  2 puff Inhalation BID RT    sodium chloride flush  5-40 mL IntraVENous 2 times per day    enoxaparin  30 mg SubCUTAneous BID    insulin lispro  0-4 Units SubCUTAneous TID WC    insulin lispro  0-4 Units SubCUTAneous Nightly    famotidine (PEPCID) injection  20 mg IntraVENous BID    arformoterol tartrate  15 mcg Nebulization BID RT    budesonide  0.5 mg Nebulization BID RT     PRN Meds: baclofen, glucose, dextrose bolus **OR** dextrose bolus, glucagon (rDNA), dextrose, sodium chloride flush, sodium chloride, ondansetron **OR** ondansetron, senna, aluminum & magnesium hydroxide-simethicone, acetaminophen **OR** acetaminophen    Labs:     Recent Labs     09/23/23  1130 09/24/23  0402   WBC 11.5 9.3   HGB 13.1 12.1   HCT 43.2 39.4    370       Recent Labs     09/23/23  1130 09/24/23  0402    131*   K 5.1* 3.9    96*   CO2 25 26   BUN 10 19   CREATININE 0.6 0.7   CALCIUM 9.2 9.1       Recent Labs     09/23/23  1130 09/24/23  0402   PROT 8.6* 8.5*   ALKPHOS 118* 112*   ALT 24 19   AST 39* 23   BILITOT 0.5 0.4       No results for input(s): \"INR\" in the last 72 hours. No results for input(s): \"CKTOTAL\", \"TROPONINI\" in the last 72 hours. Chronic labs:    Lab Results   Component Value Date    CHOL 138 09/19/2023    TRIG 44 09/19/2023    HDL 67 09/19/2023    LDLCALC 62 03/28/2023    TSH 0.39 09/19/2023    INR 1.0 04/17/2022    LABA1C 6.4 (H) 09/23/2023       Radiology: REVIEWED DAILY    +++++++++++++++++++++++++++++++++++++++++++++++++  Jonathan Escobedo MD   [facility] - [address] [facility],[place] [address]  +++++++++++++++++++++++++++++++++++++++++++++++++  NOTE: This report was transcribed using voice recognition software.  Every effort was made to ensure accuracy; however, inadvertent computerized transcription errors may be

## 2023-09-24 NOTE — PROGRESS NOTES
4 Eyes Skin Assessment     NAME:  Maksim Arroyo OF BIRTH:  1955  MEDICAL RECORD NUMBER:  [id number]    The patient is being assessed for  Admission    I agree that at least one RN has performed a thorough Head to Toe Skin Assessment on the patient. ALL assessment sites listed below have been assessed. Areas assessed by both nurses:    Head, Face, Ears, Shoulders, Back, Chest, Arms, Elbows, Hands, Sacrum. Buttock, Coccyx, Ischium, Legs. Feet and Heels, and Under Medical Devices         Does the Patient have a Wound?  No noted wound(s)       Mario Prevention initiated by RN: Yes  Wound Care Orders initiated by RN: No    Pressure Injury (Stage 3,4, Unstageable, DTI, NWPT, and Complex wounds) if present, place Wound referral order by RN under : No    New Ostomies, if present place, Ostomy referral order under : No     Nurse 1 eSignature: Electronically signed by Silvia Levy RN on 9/24/23 at 4:49 PM EDT    **SHARE this note so that the co-signing nurse can place an eSignature**    Nurse 2 eSignature: Electronically signed by William Larkin RN on 9/24/23 at 4:50 PM EDT

## 2023-09-24 NOTE — PROGRESS NOTES
09/23/23 2240   Treatment   Treatment Type HHN   $Treatment Type $Inhaled Therapy/Meds   Medications (S)  Budesonide  (rinsed after use)   Pre-Tx Pulse 88   Pre-Tx Resps 18   Breath Sounds Pre-Tx JUANITA Diminished; Expiratory wheezes   Breath Sounds Pre-Tx LLL Diminished; Expiratory wheezes   Breath Sounds Pre-Tx RUL Diminished; Expiratory wheezes   Breath Sounds Pre-Tx RML Diminished; Expiratory wheezes   Breath Sounds Pre-Tx RLL Diminished; Expiratory wheezes   Breath Sounds Post-Tx JUANITA Diminished; Expiratory wheezes   Breath Sounds Post-Tx LLL Diminished; Expiratory wheezes   Breath Sounds Post-Tx RUL Diminished; Expiratory wheezes   Breath Sounds Post-Tx RML Diminished; Expiratory wheezes   Breath Sounds Post-Tx RLL Diminished; Expiratory wheezes   Post-Tx Pulse 88   Post-Tx Resps 18   Delivery Source Oxygen   Position Sitting   Treatment Tolerance Well   Duration 10   Is patient on O2? Y   Breath Sounds   Breath Sounds Bilateral Diminished; Expiratory wheezing   Right Upper Lobe Diminished; Expiratory wheezes   Right Middle Lobe Diminished; Expiratory wheezes   Right Lower Lobe Diminished; Expiratory wheezes   Left Upper Lobe Diminished; Expiratory wheezes   Left Lower Lobe Diminished; Expiratory wheezes   Oxygen Therapy/Pulse Ox   O2 Therapy Oxygen   $Oxygen $Daily Charge   O2 Device Nasal cannula   Pulse 90   Respirations 17   SpO2 95 %   Skin Assessment Clean, dry, & intact     Rinsed after use

## 2023-09-25 LAB
ANION GAP SERPL CALCULATED.3IONS-SCNC: 9 MMOL/L (ref 7–16)
B2 MICROGLOB SERPL IA-MCNC: 1.9 MG/L (ref 0.6–2.4)
BASOPHILS # BLD: 0.01 K/UL (ref 0–0.2)
BASOPHILS NFR BLD: 0 % (ref 0–2)
BUN SERPL-MCNC: 20 MG/DL (ref 6–23)
CALCIUM SERPL-MCNC: 9.4 MG/DL (ref 8.6–10.2)
CHLORIDE SERPL-SCNC: 104 MMOL/L (ref 98–107)
CO2 SERPL-SCNC: 26 MMOL/L (ref 22–29)
CREAT SERPL-MCNC: 0.7 MG/DL (ref 0.5–1)
EKG ATRIAL RATE: 84 BPM
EKG P AXIS: 69 DEGREES
EKG P-R INTERVAL: 174 MS
EKG Q-T INTERVAL: 406 MS
EKG QRS DURATION: 96 MS
EKG QTC CALCULATION (BAZETT): 479 MS
EKG R AXIS: -10 DEGREES
EKG T AXIS: 53 DEGREES
EKG VENTRICULAR RATE: 84 BPM
EOSINOPHIL # BLD: 0 K/UL (ref 0.05–0.5)
EOSINOPHILS RELATIVE PERCENT: 0 % (ref 0–6)
ERYTHROCYTE [DISTWIDTH] IN BLOOD BY AUTOMATED COUNT: 13.5 % (ref 11.5–15)
GFR SERPL CREATININE-BSD FRML MDRD: >60 ML/MIN/1.73M2
GLUCOSE BLD-MCNC: 150 MG/DL (ref 74–99)
GLUCOSE BLD-MCNC: 210 MG/DL (ref 74–99)
GLUCOSE BLD-MCNC: 298 MG/DL (ref 74–99)
GLUCOSE BLD-MCNC: 319 MG/DL (ref 74–99)
GLUCOSE SERPL-MCNC: 198 MG/DL (ref 74–99)
HCT VFR BLD AUTO: 38.2 % (ref 34–48)
HGB BLD-MCNC: 11.5 G/DL (ref 11.5–15.5)
IMM GRANULOCYTES # BLD AUTO: <0.03 K/UL (ref 0–0.58)
IMM GRANULOCYTES NFR BLD: 0 % (ref 0–5)
LYMPHOCYTES NFR BLD: 0.76 K/UL (ref 1.5–4)
LYMPHOCYTES RELATIVE PERCENT: 10 % (ref 20–42)
MCH RBC QN AUTO: 28 PG (ref 26–35)
MCHC RBC AUTO-ENTMCNC: 30.1 G/DL (ref 32–34.5)
MCV RBC AUTO: 93.2 FL (ref 80–99.9)
MONOCYTES NFR BLD: 0.26 K/UL (ref 0.1–0.95)
MONOCYTES NFR BLD: 3 % (ref 2–12)
NEUTROPHILS NFR BLD: 86 % (ref 43–80)
NEUTS SEG NFR BLD: 6.49 K/UL (ref 1.8–7.3)
PLATELET # BLD AUTO: 364 K/UL (ref 130–450)
PMV BLD AUTO: 10.8 FL (ref 7–12)
POTASSIUM SERPL-SCNC: 4.5 MMOL/L (ref 3.5–5)
RBC # BLD AUTO: 4.1 M/UL (ref 3.5–5.5)
SODIUM SERPL-SCNC: 139 MMOL/L (ref 132–146)
WBC OTHER # BLD: 7.5 K/UL (ref 4.5–11.5)

## 2023-09-25 PROCEDURE — 93010 ELECTROCARDIOGRAM REPORT: CPT | Performed by: INTERNAL MEDICINE

## 2023-09-25 PROCEDURE — 2580000003 HC RX 258: Performed by: INTERNAL MEDICINE

## 2023-09-25 PROCEDURE — 6370000000 HC RX 637 (ALT 250 FOR IP): Performed by: INTERNAL MEDICINE

## 2023-09-25 PROCEDURE — 36415 COLL VENOUS BLD VENIPUNCTURE: CPT

## 2023-09-25 PROCEDURE — 2140000000 HC CCU INTERMEDIATE R&B

## 2023-09-25 PROCEDURE — 6370000000 HC RX 637 (ALT 250 FOR IP): Performed by: STUDENT IN AN ORGANIZED HEALTH CARE EDUCATION/TRAINING PROGRAM

## 2023-09-25 PROCEDURE — 80048 BASIC METABOLIC PNL TOTAL CA: CPT

## 2023-09-25 PROCEDURE — 2500000003 HC RX 250 WO HCPCS: Performed by: INTERNAL MEDICINE

## 2023-09-25 PROCEDURE — 6360000002 HC RX W HCPCS: Performed by: INTERNAL MEDICINE

## 2023-09-25 PROCEDURE — A4216 STERILE WATER/SALINE, 10 ML: HCPCS | Performed by: INTERNAL MEDICINE

## 2023-09-25 PROCEDURE — 85025 COMPLETE CBC W/AUTO DIFF WBC: CPT

## 2023-09-25 PROCEDURE — 82962 GLUCOSE BLOOD TEST: CPT

## 2023-09-25 PROCEDURE — 2700000000 HC OXYGEN THERAPY PER DAY

## 2023-09-25 PROCEDURE — 94640 AIRWAY INHALATION TREATMENT: CPT

## 2023-09-25 RX ORDER — PREDNISONE 20 MG/1
40 TABLET ORAL DAILY
Status: DISCONTINUED | OUTPATIENT
Start: 2023-09-25 | End: 2023-09-27 | Stop reason: HOSPADM

## 2023-09-25 RX ADMIN — ENOXAPARIN SODIUM 30 MG: 100 INJECTION SUBCUTANEOUS at 22:35

## 2023-09-25 RX ADMIN — MONTELUKAST 10 MG: 10 TABLET, FILM COATED ORAL at 22:36

## 2023-09-25 RX ADMIN — LEVOTHYROXINE SODIUM 112 MCG: 0.11 TABLET ORAL at 09:17

## 2023-09-25 RX ADMIN — INSULIN LISPRO 1 UNITS: 100 INJECTION, SOLUTION INTRAVENOUS; SUBCUTANEOUS at 09:18

## 2023-09-25 RX ADMIN — SODIUM CHLORIDE, PRESERVATIVE FREE 20 MG: 5 INJECTION INTRAVENOUS at 22:38

## 2023-09-25 RX ADMIN — DIPHENHYDRAMINE HYDROCHLORIDE 25 MG: 50 INJECTION, SOLUTION INTRAMUSCULAR; INTRAVENOUS at 22:37

## 2023-09-25 RX ADMIN — AMOXICILLIN AND CLAVULANATE POTASSIUM 1 TABLET: 875; 125 TABLET, FILM COATED ORAL at 22:49

## 2023-09-25 RX ADMIN — ACETAMINOPHEN 650 MG: 325 TABLET ORAL at 09:22

## 2023-09-25 RX ADMIN — MOMETASONE FUROATE AND FORMOTEROL FUMARATE DIHYDRATE 2 PUFF: 100; 5 AEROSOL RESPIRATORY (INHALATION) at 22:49

## 2023-09-25 RX ADMIN — ENOXAPARIN SODIUM 30 MG: 100 INJECTION SUBCUTANEOUS at 09:17

## 2023-09-25 RX ADMIN — GABAPENTIN 300 MG: 300 CAPSULE ORAL at 09:17

## 2023-09-25 RX ADMIN — IPRATROPIUM BROMIDE AND ALBUTEROL SULFATE 1 DOSE: .5; 2.5 SOLUTION RESPIRATORY (INHALATION) at 21:56

## 2023-09-25 RX ADMIN — GABAPENTIN 300 MG: 300 CAPSULE ORAL at 22:36

## 2023-09-25 RX ADMIN — 0.12% CHLORHEXIDINE GLUCONATE 15 ML: 1.2 RINSE ORAL at 09:18

## 2023-09-25 RX ADMIN — AMLODIPINE BESYLATE 10 MG: 10 TABLET ORAL at 09:17

## 2023-09-25 RX ADMIN — IPRATROPIUM BROMIDE AND ALBUTEROL SULFATE 1 DOSE: .5; 2.5 SOLUTION RESPIRATORY (INHALATION) at 09:38

## 2023-09-25 RX ADMIN — MOMETASONE FUROATE AND FORMOTEROL FUMARATE DIHYDRATE 2 PUFF: 100; 5 AEROSOL RESPIRATORY (INHALATION) at 09:21

## 2023-09-25 RX ADMIN — PREDNISONE 40 MG: 20 TABLET ORAL at 12:03

## 2023-09-25 RX ADMIN — BUDESONIDE INHALATION 500 MCG: 0.5 SUSPENSION RESPIRATORY (INHALATION) at 21:56

## 2023-09-25 RX ADMIN — ROSUVASTATIN 20 MG: 20 TABLET, FILM COATED ORAL at 22:36

## 2023-09-25 RX ADMIN — IPRATROPIUM BROMIDE AND ALBUTEROL SULFATE 1 DOSE: .5; 2.5 SOLUTION RESPIRATORY (INHALATION) at 16:25

## 2023-09-25 RX ADMIN — IPRATROPIUM BROMIDE AND ALBUTEROL SULFATE 1 DOSE: .5; 2.5 SOLUTION RESPIRATORY (INHALATION) at 12:17

## 2023-09-25 RX ADMIN — Medication 10 ML: at 22:39

## 2023-09-25 RX ADMIN — DIPHENHYDRAMINE HYDROCHLORIDE 25 MG: 50 INJECTION, SOLUTION INTRAMUSCULAR; INTRAVENOUS at 16:07

## 2023-09-25 RX ADMIN — 0.12% CHLORHEXIDINE GLUCONATE 15 ML: 1.2 RINSE ORAL at 22:35

## 2023-09-25 RX ADMIN — ACETAMINOPHEN 650 MG: 325 TABLET ORAL at 16:08

## 2023-09-25 RX ADMIN — ARFORMOTEROL TARTRATE 15 MCG: 15 SOLUTION RESPIRATORY (INHALATION) at 21:56

## 2023-09-25 RX ADMIN — BUDESONIDE INHALATION 500 MCG: 0.5 SUSPENSION RESPIRATORY (INHALATION) at 09:38

## 2023-09-25 RX ADMIN — INSULIN LISPRO 4 UNITS: 100 INJECTION, SOLUTION INTRAVENOUS; SUBCUTANEOUS at 22:35

## 2023-09-25 RX ADMIN — INSULIN LISPRO 2 UNITS: 100 INJECTION, SOLUTION INTRAVENOUS; SUBCUTANEOUS at 18:19

## 2023-09-25 RX ADMIN — AMOXICILLIN AND CLAVULANATE POTASSIUM 1 TABLET: 875; 125 TABLET, FILM COATED ORAL at 09:17

## 2023-09-25 RX ADMIN — ARFORMOTEROL TARTRATE 15 MCG: 15 SOLUTION RESPIRATORY (INHALATION) at 09:38

## 2023-09-25 ASSESSMENT — PAIN DESCRIPTION - LOCATION
LOCATION: FACE
LOCATION: BACK

## 2023-09-25 ASSESSMENT — PAIN SCALES - GENERAL
PAINLEVEL_OUTOF10: 9
PAINLEVEL_OUTOF10: 8

## 2023-09-25 ASSESSMENT — PAIN DESCRIPTION - ORIENTATION: ORIENTATION: RIGHT

## 2023-09-25 ASSESSMENT — PAIN DESCRIPTION - DESCRIPTORS
DESCRIPTORS: SHARP
DESCRIPTORS: ACHING

## 2023-09-25 NOTE — ACP (ADVANCE CARE PLANNING)
Advance Care Planning   Healthcare Decision Maker:    Primary Decision Maker: Annette Beyer - Other Relative - [phone]    Secondary Decision Maker: Susana Ovalles - Child - [phone]    Supplemental (Other) Decision Maker: Chema Ruiz Child - [phone]    Patient has 2500 [employer] documents in [address] [place] [address]

## 2023-09-25 NOTE — CARE COORDINATION
Patient presented to ED for angioedema, dysphagia, and mild headache. Started on Iv solumedrol and now on prednisone and benadryl. CT scan negative. Met with patient at bedside to discuss transition of care. Currently on 2 l of oxygen. Patient lives with her sister and son in a 1 story home. She has oxygen at 2 l from [employer]. She also owns a Rolator that recently broke and a cane. Her son will transport home when medically cleared.  CM/SW will continue to follow

## 2023-09-26 ENCOUNTER — CARE COORDINATION (OUTPATIENT)
Dept: CARE COORDINATION | Age: 68
End: 2023-09-26

## 2023-09-26 LAB
GLUCOSE BLD-MCNC: 140 MG/DL (ref 74–99)
GLUCOSE BLD-MCNC: 153 MG/DL (ref 74–99)
GLUCOSE BLD-MCNC: 201 MG/DL (ref 74–99)
GLUCOSE BLD-MCNC: 278 MG/DL (ref 74–99)

## 2023-09-26 PROCEDURE — 82962 GLUCOSE BLOOD TEST: CPT

## 2023-09-26 PROCEDURE — 92610 EVALUATE SWALLOWING FUNCTION: CPT

## 2023-09-26 PROCEDURE — 6370000000 HC RX 637 (ALT 250 FOR IP): Performed by: INTERNAL MEDICINE

## 2023-09-26 PROCEDURE — 94640 AIRWAY INHALATION TREATMENT: CPT

## 2023-09-26 PROCEDURE — 2500000003 HC RX 250 WO HCPCS: Performed by: INTERNAL MEDICINE

## 2023-09-26 PROCEDURE — 92526 ORAL FUNCTION THERAPY: CPT

## 2023-09-26 PROCEDURE — 0CJS8ZZ INSPECTION OF LARYNX, VIA NATURAL OR ARTIFICIAL OPENING ENDOSCOPIC: ICD-10-PCS | Performed by: OTOLARYNGOLOGY

## 2023-09-26 PROCEDURE — 6370000000 HC RX 637 (ALT 250 FOR IP): Performed by: STUDENT IN AN ORGANIZED HEALTH CARE EDUCATION/TRAINING PROGRAM

## 2023-09-26 PROCEDURE — 2580000003 HC RX 258: Performed by: INTERNAL MEDICINE

## 2023-09-26 PROCEDURE — 2140000000 HC CCU INTERMEDIATE R&B

## 2023-09-26 PROCEDURE — 2700000000 HC OXYGEN THERAPY PER DAY

## 2023-09-26 PROCEDURE — 99255 IP/OBS CONSLTJ NEW/EST HI 80: CPT | Performed by: INTERNAL MEDICINE

## 2023-09-26 PROCEDURE — A4216 STERILE WATER/SALINE, 10 ML: HCPCS | Performed by: INTERNAL MEDICINE

## 2023-09-26 PROCEDURE — 6360000002 HC RX W HCPCS: Performed by: INTERNAL MEDICINE

## 2023-09-26 RX ADMIN — AMOXICILLIN AND CLAVULANATE POTASSIUM 1 TABLET: 875; 125 TABLET, FILM COATED ORAL at 23:05

## 2023-09-26 RX ADMIN — DIPHENHYDRAMINE HYDROCHLORIDE 25 MG: 50 INJECTION, SOLUTION INTRAMUSCULAR; INTRAVENOUS at 04:39

## 2023-09-26 RX ADMIN — 0.12% CHLORHEXIDINE GLUCONATE 15 ML: 1.2 RINSE ORAL at 09:46

## 2023-09-26 RX ADMIN — SENNOSIDES 8.6 MG: 8.6 TABLET, FILM COATED ORAL at 09:47

## 2023-09-26 RX ADMIN — AMLODIPINE BESYLATE 10 MG: 10 TABLET ORAL at 09:48

## 2023-09-26 RX ADMIN — PREDNISONE 40 MG: 20 TABLET ORAL at 09:48

## 2023-09-26 RX ADMIN — IPRATROPIUM BROMIDE AND ALBUTEROL SULFATE 1 DOSE: .5; 2.5 SOLUTION RESPIRATORY (INHALATION) at 16:23

## 2023-09-26 RX ADMIN — INSULIN LISPRO 2 UNITS: 100 INJECTION, SOLUTION INTRAVENOUS; SUBCUTANEOUS at 17:22

## 2023-09-26 RX ADMIN — DIPHENHYDRAMINE HYDROCHLORIDE 25 MG: 50 INJECTION, SOLUTION INTRAMUSCULAR; INTRAVENOUS at 09:48

## 2023-09-26 RX ADMIN — IPRATROPIUM BROMIDE AND ALBUTEROL SULFATE 1 DOSE: .5; 2.5 SOLUTION RESPIRATORY (INHALATION) at 21:10

## 2023-09-26 RX ADMIN — ROSUVASTATIN 20 MG: 20 TABLET, FILM COATED ORAL at 23:05

## 2023-09-26 RX ADMIN — ACETAMINOPHEN 650 MG: 325 TABLET ORAL at 09:47

## 2023-09-26 RX ADMIN — AMOXICILLIN AND CLAVULANATE POTASSIUM 1 TABLET: 875; 125 TABLET, FILM COATED ORAL at 10:07

## 2023-09-26 RX ADMIN — MOMETASONE FUROATE AND FORMOTEROL FUMARATE DIHYDRATE 2 PUFF: 100; 5 AEROSOL RESPIRATORY (INHALATION) at 09:50

## 2023-09-26 RX ADMIN — DIPHENHYDRAMINE HYDROCHLORIDE 25 MG: 50 INJECTION, SOLUTION INTRAMUSCULAR; INTRAVENOUS at 23:06

## 2023-09-26 RX ADMIN — GABAPENTIN 300 MG: 300 CAPSULE ORAL at 09:47

## 2023-09-26 RX ADMIN — IPRATROPIUM BROMIDE AND ALBUTEROL SULFATE 1 DOSE: .5; 2.5 SOLUTION RESPIRATORY (INHALATION) at 09:02

## 2023-09-26 RX ADMIN — GABAPENTIN 300 MG: 300 CAPSULE ORAL at 23:05

## 2023-09-26 RX ADMIN — DIPHENHYDRAMINE HYDROCHLORIDE 25 MG: 50 INJECTION, SOLUTION INTRAMUSCULAR; INTRAVENOUS at 15:29

## 2023-09-26 RX ADMIN — ENOXAPARIN SODIUM 30 MG: 100 INJECTION SUBCUTANEOUS at 09:47

## 2023-09-26 RX ADMIN — SODIUM CHLORIDE, PRESERVATIVE FREE 20 MG: 5 INJECTION INTRAVENOUS at 09:48

## 2023-09-26 RX ADMIN — 0.12% CHLORHEXIDINE GLUCONATE 15 ML: 1.2 RINSE ORAL at 23:04

## 2023-09-26 RX ADMIN — ENOXAPARIN SODIUM 30 MG: 100 INJECTION SUBCUTANEOUS at 23:05

## 2023-09-26 RX ADMIN — SODIUM CHLORIDE, PRESERVATIVE FREE 20 MG: 5 INJECTION INTRAVENOUS at 23:06

## 2023-09-26 RX ADMIN — MOMETASONE FUROATE AND FORMOTEROL FUMARATE DIHYDRATE 2 PUFF: 100; 5 AEROSOL RESPIRATORY (INHALATION) at 23:10

## 2023-09-26 RX ADMIN — ACETAMINOPHEN 650 MG: 325 TABLET ORAL at 17:24

## 2023-09-26 RX ADMIN — Medication 10 ML: at 09:50

## 2023-09-26 RX ADMIN — LEVOTHYROXINE SODIUM 112 MCG: 0.11 TABLET ORAL at 06:18

## 2023-09-26 RX ADMIN — Medication 10 ML: at 23:09

## 2023-09-26 RX ADMIN — MONTELUKAST 10 MG: 10 TABLET, FILM COATED ORAL at 23:05

## 2023-09-26 RX ADMIN — IPRATROPIUM BROMIDE AND ALBUTEROL SULFATE 1 DOSE: .5; 2.5 SOLUTION RESPIRATORY (INHALATION) at 12:24

## 2023-09-26 ASSESSMENT — PAIN DESCRIPTION - DESCRIPTORS: DESCRIPTORS: ACHING

## 2023-09-26 ASSESSMENT — ENCOUNTER SYMPTOMS
GASTROINTESTINAL NEGATIVE: 1
COLOR CHANGE: 0
RHINORRHEA: 0
TROUBLE SWALLOWING: 1
STRIDOR: 0
SORE THROAT: 0
CHOKING: 0
VOICE CHANGE: 0
SINUS PAIN: 0
WHEEZING: 0
COUGH: 0
SINUS PRESSURE: 0

## 2023-09-26 ASSESSMENT — PAIN SCALES - GENERAL
PAINLEVEL_OUTOF10: 8
PAINLEVEL_OUTOF10: 4

## 2023-09-26 ASSESSMENT — PAIN DESCRIPTION - LOCATION
LOCATION: HEAD

## 2023-09-26 NOTE — CONSULTS
[facility]  Department of Internal Medicine  Division of Pulmonary, Critical Care and Sleep Medicine  Consult Note    Francisca Doran DO, MPH, Eda Whitney MD, MS  Praveena Ammon, APRN-CNP    Patient: Kenna Cesar  MRN: [id number]  : 1955    Encounter Time: 6:04 PM     Date of Admission: 2023 10:39 AM    Primary Care Physician: Sofi Malagon MD    Reason for Consultation: ? angioedema     HISTORY OF PRESENT ILLNESS : Kenna Cesar 76 y.o. female was seen in consultation regarding the above chief compliant. Fritz Urban is a 51-year-old woman with previous pulmonary function test to confirm airflow obstruction. Patient was admitted 4 days ago with tongue swelling reported after she went for a denture feeling and she says she felt as though her throat started to swell and she had some trouble swallowing. She also admits to odynophagia and dysphagia for a few days as well. In the emergency room she had a CT scan of the neck that showed no obvious mass or edema the epiglottis was unremarkable with some prominent lymph nodes appreciated the parotid glands were negative with no pneumonia in the upper lobe fields were also noted. Chest x-ray was clear and pulmonary was asked to see her for her angioedema? Grey Corrente     PAST MEDICAL HISTORY:  has a past medical history of Adrenal incidentaloma ([address]), Anxiety, Arthritis, Asthma, Bladder incontinence, Cancer ([address]), Chronic back pain, COPD (chronic obstructive pulmonary disease) ([address]), Depression, Encounter for screening colonoscopy, Fibromyalgia, GERD (gastroesophageal reflux disease), Hyperlipidemia, Hypertension, Hypothyroidism, MGUS (monoclonal gammopathy of unknown significance), Neuropathy, Prolonged emergence from general anesthesia, Sleep apnea, and Type II or unspecified type diabetes mellitus without mention of complication, not stated as uncontrolled. SURGICAL HISTORY:  has a past surgical history that includes knee surgery (Left, 1980); Upper gastrointestinal endoscopy (2008); Colonoscopy (07/20/2016); Upper gastrointestinal endoscopy (07/20/2016); Nerve Block (Bilateral, 09/22/2016); Nerve Block (07/06/2020); Pain management procedure (N/A, 07/06/2020); Nerve Block (Bilateral, 08/10/2020); Anesthesia Nerve Block (Bilateral, 08/10/2020); other surgical history (12/13/2016); Colonoscopy (2008); Upper gastrointestinal endoscopy (2008); Upper gastrointestinal endoscopy (N/A, 11/09/2020); Colonoscopy (N/A, 11/09/2020); Colonoscopy (11/09/2020); Dilation and curettage of uterus (N/A, 05/11/2021); Upper gastrointestinal endoscopy (N/A, 05/26/2023); sigmoidoscopy (N/A, 05/26/2023); and Tonsillectomy. SOCIAL HISTORY:  reports that she quit smoking about 3 years ago. Her smoking use included cigarettes. She started smoking about 52 years ago. She has a 100.00 pack-year smoking history. She has never used smokeless tobacco. She reports that she does not drink alcohol and does not use drugs. FAMILY  HISTORY: family history includes Arthritis in her brother, maternal grandfather, and maternal grandmother; Cancer in her father, maternal uncle, mother, and paternal aunt; Diabetes in her brother, father, maternal grandmother, and mother; Heart Disease in her paternal grandfather; Heart Disease (age of onset: 79) in her mother; High Blood Pressure in her father, maternal grandmother, paternal aunt, and paternal uncle; High Cholesterol in her paternal grandmother; Hypertension in her father; Kidney Disease in her paternal grandmother; Stroke in her maternal grandfather. MEDICATIONS:    Prior to Admission medications    Medication Sig Start Date End Date Taking?  Authorizing Provider   potassium chloride (KLOR-CON M) 20 MEQ extended release tablet Take 1 tablet by mouth daily for 14 days 9/19/23 10/3/23  Jenise Walsh MD   gabapentin (NEURONTIN) 300

## 2023-09-26 NOTE — CONSULTS
Onset a couple hours ago with a fast heart beat and palpatations.  Did take a diazepam this morning thinks it helped.  A couple years ago wore a heart monitor was told has pvc's.    Hypothyroidism     MGUS (monoclonal gammopathy of unknown significance)     Neuropathy     Prolonged emergence from general anesthesia     Sleep apnea     Type II or unspecified type diabetes mellitus without mention of complication, not stated as uncontrolled        Past Surgical History:   Procedure Laterality Date    ANESTHESIA NERVE BLOCK Bilateral 08/10/2020    BILATERAL L3 L4 MEDIAL BRANCH L5 DORSSAL RAMUS NERVE BLOCK (CPT 64636) SEDATION performed by Amy Ni MD at [facility]  07/20/2016    removed 3 polyps (tubular adenomas), diverticulosis, Dr. Lin More  2008    approximately 2008, no report available, per patient some polyps removed, Dr Adalberto Ortega, [facility]    COLONOSCOPY N/A 11/09/2020    Small sessile polyp distal ascending colon removed with bx/cauterized (path Tubular Adenoma), right and left diverticulosis without diverticulitis, Dr. Dorene Yu, [facility]    COLONOSCOPY  11/09/2020    Small sessile polyp distal ascending colon removed with bx/cauterized (path Tubular Adenoma), right and left diverticulosis without diverticulitis, Dr. Dorene Yu, [facility] N/A 05/11/2021    DILATATION AND CURETTAGE HYSTEROSCOPY POSSIBLE REMOVAL OF MASS performed by Jason Moise MD at [address] [place] [address] St, left knee, arthroscopic    NERVE BLOCK Bilateral 09/22/2016    lumbar transforaminal nerve block #1    NERVE BLOCK  07/06/2020    lumbar epidural steroid injectio L4-5    NERVE BLOCK Bilateral 08/10/2020    L3, L4, L5     OTHER SURGICAL HISTORY  12/13/2016    Excision cyst right face    PAIN MANAGEMENT PROCEDURE N/A 07/06/2020    LUMBAR EPIDURAL STEROID INJECTION L4-5 performed by Amy Ni MD at [address] 05/26/2023 2001 Ian Pham performed by Amber Piedra MD at [address] [facility] [address] [place]  2008 2008    UPPER GASTROINTESTINAL ENDOSCOPY  07/20/2016    GERD and gastric ulcers, Bx H pylori neg, Dr. Karthik Abbasi  2008    approximately 2008, no report, patient does not know the findings, Dr Candelario Rodriguez, [address] N/A 11/09/2020    Severe gastritis with superficial ulcerations with bx neg H pylori, Dr. Sharon Burris, [address] ENDOSCOPY N/A 05/26/2023    EGD BIOPSY performed by Bakari Velasquez MD at [facility] ENDOSCOPY       Medications Prior to Admission:    Prior to Admission medications    Medication Sig Start Date End Date Taking? Authorizing Provider   potassium chloride (KLOR-CON M) 20 MEQ extended release tablet Take 1 tablet by mouth daily for 14 days 9/19/23 10/3/23  Arcelia Mckeon MD   gabapentin (NEURONTIN) 300 MG capsule Take 1 capsule by mouth 2 times daily.  8/4/23   Historical Provider, MD   amLODIPine (NORVASC) 2.5 MG tablet Take 1 tablet by mouth daily 8/9/23   Historical Provider, MD   ammonium lactate (AMLACTIN) 12 % cream APPLY TOPICALLY TO THE AFFECTED AREA TWICE DAILY  Patient not taking: Reported on 9/24/2023 8/31/23   Historical Provider, MD   hydrocortisone 1 % cream     Historical Provider, MD   nystatin-triamcinolone (Heather Rued II) 580177-4.9 UNIT/GM-% cream APPLY TOPICALLY TO THE AFFECTED AREA TWICE DAILY  Patient not taking: Reported on 9/24/2023    Historical Provider, MD   rosuvastatin (CRESTOR) 20 MG tablet Take 1 tablet by mouth at bedtime 9/12/23   Yolanda Whelan MD   levothyroxine (SYNTHROID) 112 MCG tablet Take 1 tablet by mouth Daily 9/12/23   Yolanda Whelan MD   baclofen (LIORESAL) 10 MG tablet Take 1 tablet by mouth 3 times daily as needed (back muscle spasms) 9/12/23   Yolanda Whelan MD   Insulin Pen Needle 31G X 8 MM MISC 1 each by Does not apply route daily 9/12/23   Yolanda Whelan MD   Lancets MISC Check blood sugar daily and prn as needed 9/12/23   Yolanda Whelan MD   montelukast (SINGULAIR)

## 2023-09-26 NOTE — PROGRESS NOTES
CT just called and said they would not be able to get the pt's CTA tonight and requested that I keep the pt NPO @ midnight.

## 2023-09-26 NOTE — PROGRESS NOTES
SPEECH/LANGUAGE PATHOLOGY  CLINICAL ASSESSMENT OF SWALLOWING FUNCTION   and PLAN OF CARE    PATIENT NAME:  Ramses Lindsay  (female)     MRN:  [id number]    :  1955  (76 y.o.)  STATUS:  Inpatient: Room 6419/6419-B    TODAY'S DATE:  2023  REFERRING PROVIDER:    SPECIFIC PROVIDER ORDER: SLP swallowing-dysphagia evaluation and treatment Date of order:  23  REASON FOR REFERRAL: dysphagia   EVALUATING THERAPIST: MILKA Cheng                 RESULTS:    DYSPHAGIA DIAGNOSIS:   Clinical indicators of moderate-severe pharyngeal phase dysphagia       DIET RECOMMENDATIONS:  NPO until MBSS can be completed        FEEDING RECOMMENDATIONS:     Assistance level:  Not applicable      Compensatory strategies recommended: Not applicable      Discussed recommendations with nursing and/or faxed report to referring provider: Yes (MIRIAM Ojeda))    SPEECH THERAPY  PLAN OF CARE   The dysphagia POC is established based on physician order, dysphagia diagnosis and results of clinical assessment     Will establish POC once MBSS is completed. Conditions Requiring Skilled Therapeutic Intervention for dysphagia:    Not applicable    Specific dysphagia interventions to include:     not applicable    Specific instructions for next treatment:  MBSS to be completed  Patient Treatment Goals:    Short Term Goals:  Pt will participate in MBSS to fully assess oropharyngeal swallow function and to assist in determining the least restrictive PO diet to maintain adequate nutrition/hydration     Long Term Goals: A Video Swallow Study (MBSS) is recommended and requires a physician order      Patient/family Goal:    Did not state. Will further assess during treatment. Plan of care discussed with Patient   The Patient understand(s) the diagnosis, prognosis and plan of care     Rehabilitation Potential/Prognosis: good                    ADMITTING DIAGNOSIS: Angioedema, initial encounter [T78. 3XXA]  Angioedema due to

## 2023-09-26 NOTE — PATIENT CARE CONFERENCE
P Quality Flow/Interdisciplinary Rounds Progress Note        Quality Flow Rounds held on September 26, 2023    Disciplines Attending:  Bedside Nurse, , , and Nursing Unit Leadership    Shantel Daly was admitted on 9/23/2023 10:39 AM    Anticipated Discharge Date:  Expected Discharge Date: 09/27/23    Disposition:    Mario Score:  Mario Scale Score: 20    Readmission Risk              Risk of Unplanned Readmission:  22           Discussed patient goal for the day, patient clinical progression, and barriers to discharge. The following Goal(s) of the Day/Commitment(s) have been identified:  Discharge - Obtain Order and Labs - Report Results.       Jennie Madrid RN  September 26, 2023

## 2023-09-26 NOTE — PROGRESS NOTES
Attending notified verbally on rounds of pulm consult and if patient can d/c from POV. Per attending will assess patient and see if pulm consult is needed and for possible d/c. Per attending d/t patient still experiencing difficulty swallowing and raspy voice, pulm consult needed. Per Dr. Denia Fernandes when notified of consult, new consult needs to be placed to [facility] pulmonary. Dr. Rosina Moscoso notified of new consult via perfect serve. Attending notified via perfect serve of consult placed and recommendations from previous pulmonologist is an ENT consult.

## 2023-09-26 NOTE — CONSULTS
[address] [place] [address]  Department of Internal Medicine  Division of Pulmonary, Critical Care and Sleep Medicine  Consult Note    Jonas Gonzalez DO, MPH, Kirill Arroyo MD, MS  Sona Sethi, APRN-CNP    Patient: Kezia Beckwith  MRN: [id number]  : 1955    Encounter Time: 1:06 PM     Date of Admission: 2023 10:39 AM    Primary Care Physician: Deni Hammonds MD    Reason for Consultation: Angioedema     HISTORY OF PRESENT ILLNESS : Kezia Beckwith 76 y.o. female was seen in consultation regarding the above chief compliant. PAST MEDICAL HISTORY:  has a past medical history of Adrenal incidentaloma ([address]), Anxiety, Arthritis, Asthma, Bladder incontinence, Cancer ([address]), Chronic back pain, COPD (chronic obstructive pulmonary disease) ([address]), Depression, Encounter for screening colonoscopy, Fibromyalgia, GERD (gastroesophageal reflux disease), Hyperlipidemia, Hypertension, Hypothyroidism, MGUS (monoclonal gammopathy of unknown significance), Neuropathy, Prolonged emergence from general anesthesia, Sleep apnea, and Type II or unspecified type diabetes mellitus without mention of complication, not stated as uncontrolled. SURGICAL HISTORY:  has a past surgical history that includes knee surgery (Left, ); Upper gastrointestinal endoscopy (); Colonoscopy (2016); Upper gastrointestinal endoscopy (2016); Nerve Block (Bilateral, 2016); Nerve Block (2020); Pain management procedure (N/A, 2020); Nerve Block (Bilateral, 08/10/2020); Anesthesia Nerve Block (Bilateral, 08/10/2020); other surgical history (2016); Colonoscopy (); Upper gastrointestinal endoscopy (); Upper gastrointestinal endoscopy (N/A, 2020); Colonoscopy (N/A, 2020); Colonoscopy (2020); Dilation and curettage of uterus (N/A, 2021);  Upper gastrointestinal endoscopy (N/A, 2023);

## 2023-09-26 NOTE — PROGRESS NOTES
Dr. Marcie Holm notified via perfect serve if service is following patient or if consult needs placed, also asked if patient can d/c from [place]. Per service original consult from 9/23 was for ICU, consult order replaced and sent via perfect serve to Dr. Adeola Ayers.

## 2023-09-26 NOTE — PROGRESS NOTES
Ct called regarding esophogram order, per ENT note patient may possibly need esophogram, this testing is done in [facility]. [facility] called regarding testing order, per department paitent would need speech to eval first, speech saw patient and recommendation for a barium swallow were made but patient is ok for diet. Attending notified of this via perfect serve and orders placed. Bedside RN notified.

## 2023-09-26 NOTE — CARE COORDINATION
Remote Patient Monitoring Note      Date/Time:  9/26/2023 3:47 PM    LPN attempted to contact patient by telephone regarding yellow alert received for no metrics for >2 days  . Background: High Blood-Pressure, COPD, CHF    Clinical Interventions: HIPAA compliant message left on  requesting a return call. Plan/Follow Up: Will continue to review, monitor and address alerts with follow up based on severity of symptoms and risk factors.        Samson Jack LPN  [facility]/ CTN/ Remote Patient Monitoring  [phone]

## 2023-09-26 NOTE — PROGRESS NOTES
Patient known previously to [address] [place] [address] pulmonary.       Electronically signed by Brian Ba MD on 9/26/2023 at 10:33 AM

## 2023-09-26 NOTE — PROGRESS NOTES
Seen and examined. Discussed with Dr. Shawanda Hassan and Scopre  and ct reviewed. No current angioedema. May follow up with me in office after discharge.

## 2023-09-27 ENCOUNTER — APPOINTMENT (OUTPATIENT)
Dept: GENERAL RADIOLOGY | Age: 68
DRG: 916 | End: 2023-09-27
Payer: COMMERCIAL

## 2023-09-27 ENCOUNTER — APPOINTMENT (OUTPATIENT)
Dept: CT IMAGING | Age: 68
DRG: 916 | End: 2023-09-27
Attending: INTERNAL MEDICINE
Payer: COMMERCIAL

## 2023-09-27 VITALS
BODY MASS INDEX: 36.98 KG/M2 | SYSTOLIC BLOOD PRESSURE: 162 MMHG | DIASTOLIC BLOOD PRESSURE: 77 MMHG | OXYGEN SATURATION: 99 % | RESPIRATION RATE: 18 BRPM | WEIGHT: 230.1 LBS | HEART RATE: 75 BPM | TEMPERATURE: 97.9 F | HEIGHT: 66 IN

## 2023-09-27 LAB
GLUCOSE BLD-MCNC: 140 MG/DL (ref 74–99)
GLUCOSE BLD-MCNC: 152 MG/DL (ref 74–99)

## 2023-09-27 PROCEDURE — 74220 X-RAY XM ESOPHAGUS 1CNTRST: CPT

## 2023-09-27 PROCEDURE — 6360000002 HC RX W HCPCS: Performed by: INTERNAL MEDICINE

## 2023-09-27 PROCEDURE — 74230 X-RAY XM SWLNG FUNCJ C+: CPT

## 2023-09-27 PROCEDURE — 2580000003 HC RX 258: Performed by: INTERNAL MEDICINE

## 2023-09-27 PROCEDURE — 92526 ORAL FUNCTION THERAPY: CPT

## 2023-09-27 PROCEDURE — 6360000004 HC RX CONTRAST MEDICATION: Performed by: RADIOLOGY

## 2023-09-27 PROCEDURE — 6370000000 HC RX 637 (ALT 250 FOR IP): Performed by: INTERNAL MEDICINE

## 2023-09-27 PROCEDURE — 82962 GLUCOSE BLOOD TEST: CPT

## 2023-09-27 PROCEDURE — 2500000003 HC RX 250 WO HCPCS: Performed by: INTERNAL MEDICINE

## 2023-09-27 PROCEDURE — 92611 MOTION FLUOROSCOPY/SWALLOW: CPT

## 2023-09-27 PROCEDURE — 6370000000 HC RX 637 (ALT 250 FOR IP): Performed by: STUDENT IN AN ORGANIZED HEALTH CARE EDUCATION/TRAINING PROGRAM

## 2023-09-27 PROCEDURE — 71275 CT ANGIOGRAPHY CHEST: CPT

## 2023-09-27 PROCEDURE — 2700000000 HC OXYGEN THERAPY PER DAY

## 2023-09-27 PROCEDURE — 2500000003 HC RX 250 WO HCPCS: Performed by: RADIOLOGY

## 2023-09-27 RX ORDER — MAGNESIUM HYDROXIDE/ALUMINUM HYDROXICE/SIMETHICONE 120; 1200; 1200 MG/30ML; MG/30ML; MG/30ML
30 SUSPENSION ORAL EVERY 6 HOURS PRN
Qty: 769 ML | Refills: 0 | Status: SHIPPED | OUTPATIENT
Start: 2023-09-27

## 2023-09-27 RX ORDER — FAMOTIDINE 20 MG/1
20 TABLET, FILM COATED ORAL 2 TIMES DAILY
Status: DISCONTINUED | OUTPATIENT
Start: 2023-09-27 | End: 2023-09-27 | Stop reason: HOSPADM

## 2023-09-27 RX ORDER — AMLODIPINE BESYLATE 10 MG/1
10 TABLET ORAL DAILY
Qty: 30 TABLET | Refills: 3 | Status: SHIPPED | OUTPATIENT
Start: 2023-09-28

## 2023-09-27 RX ORDER — AMOXICILLIN AND CLAVULANATE POTASSIUM 875; 125 MG/1; MG/1
1 TABLET, FILM COATED ORAL EVERY 12 HOURS SCHEDULED
Qty: 3 TABLET | Refills: 0 | Status: SHIPPED | OUTPATIENT
Start: 2023-09-27 | End: 2023-09-29

## 2023-09-27 RX ORDER — METHYLPREDNISOLONE 4 MG/1
TABLET ORAL
Qty: 21 TABLET | Refills: 0 | Status: SHIPPED | OUTPATIENT
Start: 2023-09-27 | End: 2023-10-03

## 2023-09-27 RX ORDER — CHLORHEXIDINE GLUCONATE ORAL RINSE 1.2 MG/ML
15 SOLUTION DENTAL 2 TIMES DAILY
Qty: 420 ML | Refills: 0 | Status: SHIPPED | OUTPATIENT
Start: 2023-09-27 | End: 2023-10-11

## 2023-09-27 RX ADMIN — DIPHENHYDRAMINE HYDROCHLORIDE 25 MG: 50 INJECTION, SOLUTION INTRAMUSCULAR; INTRAVENOUS at 04:37

## 2023-09-27 RX ADMIN — MOMETASONE FUROATE AND FORMOTEROL FUMARATE DIHYDRATE 2 PUFF: 100; 5 AEROSOL RESPIRATORY (INHALATION) at 12:00

## 2023-09-27 RX ADMIN — DIPHENHYDRAMINE HYDROCHLORIDE 25 MG: 50 INJECTION, SOLUTION INTRAMUSCULAR; INTRAVENOUS at 11:53

## 2023-09-27 RX ADMIN — ENOXAPARIN SODIUM 30 MG: 100 INJECTION SUBCUTANEOUS at 11:53

## 2023-09-27 RX ADMIN — GABAPENTIN 300 MG: 300 CAPSULE ORAL at 11:45

## 2023-09-27 RX ADMIN — AMLODIPINE BESYLATE 10 MG: 10 TABLET ORAL at 11:46

## 2023-09-27 RX ADMIN — BARIUM SULFATE 176 G: 960 POWDER, FOR SUSPENSION ORAL at 11:10

## 2023-09-27 RX ADMIN — IOPAMIDOL 75 ML: 755 INJECTION, SOLUTION INTRAVENOUS at 07:05

## 2023-09-27 RX ADMIN — LEVOTHYROXINE SODIUM 112 MCG: 0.11 TABLET ORAL at 06:25

## 2023-09-27 RX ADMIN — Medication 10 ML: at 12:00

## 2023-09-27 RX ADMIN — ACETAMINOPHEN 650 MG: 325 TABLET ORAL at 00:54

## 2023-09-27 RX ADMIN — PREDNISONE 40 MG: 20 TABLET ORAL at 11:45

## 2023-09-27 RX ADMIN — SODIUM CHLORIDE, PRESERVATIVE FREE 20 MG: 5 INJECTION INTRAVENOUS at 11:46

## 2023-09-27 RX ADMIN — 0.12% CHLORHEXIDINE GLUCONATE 15 ML: 1.2 RINSE ORAL at 11:46

## 2023-09-27 RX ADMIN — AMOXICILLIN AND CLAVULANATE POTASSIUM 1 TABLET: 875; 125 TABLET, FILM COATED ORAL at 11:46

## 2023-09-27 RX ADMIN — BARIUM SULFATE 140 ML: 980 POWDER, FOR SUSPENSION ORAL at 11:10

## 2023-09-27 ASSESSMENT — PAIN DESCRIPTION - ORIENTATION: ORIENTATION: RIGHT

## 2023-09-27 ASSESSMENT — PAIN DESCRIPTION - LOCATION: LOCATION: FACE

## 2023-09-27 ASSESSMENT — PAIN SCALES - GENERAL
PAINLEVEL_OUTOF10: 8
PAINLEVEL_OUTOF10: 8

## 2023-09-27 ASSESSMENT — PAIN DESCRIPTION - DESCRIPTORS: DESCRIPTORS: THROBBING

## 2023-09-27 NOTE — DISCHARGE SUMMARY
Hospitalist Discharge Summary    Patient ID: Mike Zarate   Patient : 1955  Patient's PCP: Rere Dao MD    Admit Date: 2023   Admitting Physician: Diana Delatorre MD    Discharge Date:  2023   Discharge Physician: Germain Brown MD   Discharge Condition: Stable  Discharge Disposition: [facility] course in brief:  (Please refer to daily progress notes for a comprehensive review of the hospitalization by requesting medical records)    This is a 80-year-old lady with PMH COPD on home oxygen 2 L by nasal cannula, obesity, HTN, anxiety, asthma, chronic back pain, depression, fibromyalgia, GERD, HLD, hypothyroidism, MGUS, neuropathy, sleep apnea, type 2 diabetes mellitus who presented to [facility] for tongue swelling reported after receiving for dentures filling 4 days prior to admission. She felt as though her throat started to swell and had some trouble swallowing. She also admitted to odynophagia and dysphagia for few days as well. CT scan of the neck and soft tissue was negative for any obvious mass or edema or abscess. Chest x-ray was clear. She was started on prednisone, Benadryl. Her ARB was stopped. Her symptoms progressively improved. She was evaluated by pulmonary service, ENT. No sign of active angioedema or respiratory compromise. She underwent barium swallow as well as esophagram which both came back normal.  She already has dental surgery follow-up as outpatient for denture work-up. She is clinically and hemodynamically stable at the time of discharge. She has been instructed not to take ARB's or ACE inhibitor given history of anaphylaxis.       Consults:   IP CONSULT TO HOSPITALIST  IP CONSULT TO PULMONOLOGY  IP CONSULT TO PULMONOLOGY  IP CONSULT TO OTOLARYNGOLOGY  IP CONSULT TO ORAL SURGERY    Discharge Diagnoses:  ARB induced angioedema  Chronic hypoxic respiratory failure  COPD      Discharge Instructions / Follow tablet  amoxicillin-clavulanate 875-125 MG per tablet  chlorhexidine 0.12 % solution  methylPREDNISolone 4 MG tablet         Time Spent on discharge is more than 45 minutes in the examination, evaluation, counseling and review of medications and discharge plan.    +++++++++++++++++++++++++++++++++++++++++++++++++  Niharika Valle MD  [place] [address]  +++++++++++++++++++++++++++++++++++++++++++++++++  NOTE: This report was transcribed using voice recognition software. Every effort was made to ensure accuracy; however, inadvertent computerized transcription errors may be present.

## 2023-09-27 NOTE — PROGRESS NOTES
Message sent to Dr Keyla Vines to check discharge. Pt ok for discharge. Will need to provide return to work paperwork. viable infant, cephalic presentation, weight 3530g, APGARS 8/9  grossly normal uterus, b/l tubes and ovaries  hysterotomy closed in 1 layer with PDS  Surgicel powder and Interceed placed over hysterotomy    280/2000/150    Dictation #: viable infant, cephalic presentation, weight 3530g, APGARS 8/9  grossly normal uterus, b/l tubes and ovaries  hysterotomy closed in 1 layer with PDS  20u pitocin administered for uterine atony  Surgicel powder and Interceed placed over hysterotomy    280/2000/150    Dictation #: [id number]

## 2023-09-27 NOTE — PATIENT CARE CONFERENCE
[facility] Quality Flow/Interdisciplinary Rounds Progress Note        Quality Flow Rounds held on September 27, 2023    Disciplines Attending:  Bedside Nurse, , , and Nursing Unit Leadership    Isabel Nava was admitted on 9/23/2023 10:39 AM    Anticipated Discharge Date:  Expected Discharge Date: 09/29/23    Disposition:    Mario Score:  Mario Scale Score: 20    Readmission Risk              Risk of Unplanned Readmission:  22           Discussed patient goal for the day, patient clinical progression, and barriers to discharge.   The following Goal(s) of the Day/Commitment(s) have been identified:  Labs - Report Results and consults to see      Belia Burton RN  September 27, 2023

## 2023-09-27 NOTE — PROGRESS NOTES
CLINICAL PHARMACY NOTE: MEDS TO BEDS    Total # of Prescriptions Filled: 4   The following medications were delivered to the patient:  AMOX-POT CLAV 875-125MG  METHYLPREDNISOLONE 4MG  CHLORHEXIDINE 0.12% SOLN  AMLODIPINE 10MG    Additional Documentation:    DELIVERED TO PT

## 2023-09-27 NOTE — PROGRESS NOTES
Attending notified via perfect serve of patient refusing to be seen by dental clinic. Patient to be sent back to floor.

## 2023-09-27 NOTE — PROGRESS NOTES
SPEECH/LANGUAGE PATHOLOGY  VIDEOFLUOROSCOPIC STUDY OF SWALLOWING (MBS)   and PLAN OF CARE    PATIENT NAME:  Yael Brennan  (female)     MRN:  [id number]    :  1955  (76 y.o.)  STATUS:  Inpatient: Room 0581/4675-S    TODAY'S DATE:  2023  REFERRING PROVIDER:     SPECIFIC PROVIDER ORDER: FL modified barium swallow with video  Date of order:  23   REASON FOR REFERRAL: dysphagia   EVALUATING THERAPIST: Kadeem Agee, SLP      RESULTS:      DYSPHAGIA DIAGNOSIS:  functional swallow for age/premorbid functioning    DIET RECOMMENDATIONS:  Regular consistency solids (IDDSI level 7) with  thin liquids (IDDSI level 0)    FEEDING RECOMMENDATIONS:    Assistance level:  No assistance needed     Compensatory strategies recommended: No strategies are recommended at this time     Discussed recommendations with nursing and/or faxed report to referring provider: Yes (RN Sarah Perez)    SPEECH THERAPY  PLAN OF CARE   The dysphagia POC is established based on physician order and dysphagia diagnosis    Dysphagia therapy is not recommended       Conditions Requiring Skilled Therapeutic Intervention for dysphagia:    Not applicable    SPECIFIC DYSPHAGIA INTERVENTIONS TO INCLUDE:     not applicable    Specific instructions for next treatment:  not applicable   Treatment Goals:    Short Term Goals:  Not applicable no therapy warranted     Long Term Goals:   Not applicable no therapy warranted      Patient/family Goal:    not applicable                  ADMITTING DIAGNOSIS: Angioedema, initial encounter [T78. 3XXA]  Angioedema due to angiotensin converting enzyme inhibitor (ACE-I) [T78. 3XXA, T46.4X5A]     VISIT DIAGNOSIS:         PATIENT REPORT/COMPLAINT: patient failed clinical swallow evaluation    PRIOR LEVEL OF SWALLOW FUNCTION:    Current Diet Order: Diet NPO Exceptions are: Sips of Water with Meds    PROCEDURE:  Consistencies Administered During the Evaluation   Liquids: thin liquid and nectar thick liquid   Solids: epigastric    Blood in stool    Angioedema, initial encounter    Angioedema due to angiotensin converting enzyme inhibitor (ACE-I)

## 2023-09-27 NOTE — CARE COORDINATION
9/27:  Update CM Note:  Pt is dc today. CM spoke with pt bedside & her dc plan is home & she plans on driving herself home. CM requested a 02 tank from [address] [place] [address] to be delivered. Selwyn/NEL will continue to follow.   Electronically signed by Tavia Pathak RN on 9/27/2023 at 2:17 PM

## 2023-09-28 ENCOUNTER — TELEPHONE (OUTPATIENT)
Dept: PRIMARY CARE CLINIC | Age: 68
End: 2023-09-28

## 2023-09-28 ENCOUNTER — CARE COORDINATION (OUTPATIENT)
Dept: CARE COORDINATION | Age: 68
End: 2023-09-28

## 2023-09-28 NOTE — CARE COORDINATION
[address] [address] [facility] 250 Care Transitions Initial Follow Up Call    Call within 2 business days of discharge: Yes    Care Transition Nurse attempted initial CT outreach and no VM opportunity. MB full. Patient: Ramses Lindsay Patient : 1955   MRN: <[id number]>  Reason for Admission: 2023 - 2023 [address] [facility] IP. Angioedema post denture dental work, Odynophagia & Dysphagia   Discharge Date: 23 RARS: Readmission Risk Score: 16.7  RPM Program  ACM: Crescencio Barney RN  NR  CT    [facility]  CLINICAL/ STAFF routed high priority to schedule 7 day hosp fu PCP. START taking:  aluminum & magnesium hydroxide-simethicone (MA  ALOX)  amoxicillin-clavulanate (AUGMENTIN)  chlorhexidine (PERIDEX)  methylPREDNISolone (MEDROL DOSEPACK)  CHANGE how you take:  amLODIPine (NORVASC)  STOP taking:  ammonium lactate 12 % cream (AMLACTIN)  hydrocortisone 1 % cream  losartan 50 MG tablet (COZAAR)  magnesium citrate solution  nystatin-triamcinolone 316817-1.1 UNIT/GM-%  cream (MYCOLOG II)  pregabalin 75 MG capsule (Lyrica)  Ask your medical team for guidance about these  existing prescriptions. docusate sodium 100 MG capsule (Colace)    PDM: Chanda Ferreira [phone]   PMH: EDWARD, COPD, HTN, Smoldering Myeloma, Lung nodules, DM, CHF, HLD, GIB, Angioedema, Covid. Last Discharge [address] [facility],[address] [facility]       Date Complaint Diagnosis Description Type Department Provider    23 Oral Swelling Angioedema, initial encounter ED to Hosp-Admission (Discharged) (ADMITTED) [facility] 6WE 1013 Trever Copeland MD; Indigo Ruiz,. ..             Barbara Crum, MIRIAM

## 2023-09-29 ENCOUNTER — CARE COORDINATION (OUTPATIENT)
Dept: CARE COORDINATION | Age: 68
End: 2023-09-29

## 2023-09-29 NOTE — CARE COORDINATION
[facility] Care Transitions Initial Follow Up Call    Call within 2 business days of discharge: Yes    Care Transition Nurse attempted second initial CT outreach and MB full. Noting Hosp FU w/ PCP made. ACM routed to follow. RPM advised of pt dc to home and to unpause. CTN s/o. Patient: Kristie Galeano Patient : 1955   MRN: [id number]  Reason for Admission: 2023 - 2023 [facility] IP. Angioedema post denture dental work, Odynophagia & Dysphagia   Discharge Date: 23 RARS: Readmission Risk Score: 16.7  RPM Program  ACM: Brian Flanagan RN  NR  CT     Hosp FU PCP 10/2 10:30  Onc/El Rodger 10/6 11:45     START taking:  aluminum & magnesium hydroxide-simethicone (MA  ALOX)  amoxicillin-clavulanate (AUGMENTIN)  chlorhexidine (PERIDEX)  methylPREDNISolone (MEDROL DOSEPACK)  CHANGE how you take:  amLODIPine (NORVASC)  STOP taking:  ammonium lactate 12 % cream (AMLACTIN)  hydrocortisone 1 % cream  losartan 50 MG tablet (COZAAR)  magnesium citrate solution  nystatin-triamcinolone 845447-0.1 UNIT/GM-%  cream (MYCOLOG II)  pregabalin 75 MG capsule (Lyrica)  Ask your medical team for guidance about these  existing prescriptions. docusate sodium 100 MG capsule (Colace)     PDM: Raudel Cobb [phone]   PMH: EDWARD, COPD, HTN, Smoldering Myeloma, Lung nodules, DM, CHF, HLD, GIB, Angioedema, Covid. Last Discharge [address] [facility],[address] [facility]       Date Complaint Diagnosis Description Type Department Provider    23 Oral Swelling Angioedema, initial encounter ED to Hosp-Admission (Discharged) (ADMITTED) [facility] 6WE 1013 Trever Copeland MD; Marietta Pleitez. ..             Linn Wang RN

## 2023-10-02 ENCOUNTER — OFFICE VISIT (OUTPATIENT)
Dept: PRIMARY CARE CLINIC | Age: 68
End: 2023-10-02

## 2023-10-02 VITALS
HEIGHT: 66 IN | BODY MASS INDEX: 35.68 KG/M2 | WEIGHT: 222 LBS | DIASTOLIC BLOOD PRESSURE: 78 MMHG | HEART RATE: 93 BPM | TEMPERATURE: 96.9 F | RESPIRATION RATE: 18 BRPM | OXYGEN SATURATION: 90 % | SYSTOLIC BLOOD PRESSURE: 136 MMHG

## 2023-10-02 DIAGNOSIS — I10 ESSENTIAL HYPERTENSION: ICD-10-CM

## 2023-10-02 DIAGNOSIS — E66.01 CLASS 2 SEVERE OBESITY DUE TO EXCESS CALORIES WITH SERIOUS COMORBIDITY AND BODY MASS INDEX (BMI) OF 35.0 TO 35.9 IN ADULT (HCC): ICD-10-CM

## 2023-10-02 DIAGNOSIS — E78.2 MIXED HYPERLIPIDEMIA: ICD-10-CM

## 2023-10-02 DIAGNOSIS — E03.9 HYPOTHYROIDISM, UNSPECIFIED TYPE: ICD-10-CM

## 2023-10-02 DIAGNOSIS — Z09 HOSPITAL DISCHARGE FOLLOW-UP: Primary | ICD-10-CM

## 2023-10-02 DIAGNOSIS — T78.2XXD ANAPHYLAXIS, SUBSEQUENT ENCOUNTER: ICD-10-CM

## 2023-10-02 DIAGNOSIS — J98.11 ATELECTASIS: ICD-10-CM

## 2023-10-02 DIAGNOSIS — Z23 NEEDS FLU SHOT: ICD-10-CM

## 2023-10-02 DIAGNOSIS — M47.22 OSTEOARTHRITIS OF SPINE WITH RADICULOPATHY, CERVICAL REGION: ICD-10-CM

## 2023-10-02 DIAGNOSIS — E11.42 DM TYPE 2 WITH DIABETIC PERIPHERAL NEUROPATHY (HCC): ICD-10-CM

## 2023-10-02 DIAGNOSIS — R20.2 PARESTHESIA OF RIGHT ARM: ICD-10-CM

## 2023-10-02 DIAGNOSIS — R47.81 SLURRED SPEECH: ICD-10-CM

## 2023-10-02 RX ORDER — METHYLPREDNISOLONE 4 MG/1
TABLET ORAL
Qty: 1 KIT | Refills: 0 | Status: SHIPPED | OUTPATIENT
Start: 2023-10-02 | End: 2023-10-08

## 2023-10-02 ASSESSMENT — ENCOUNTER SYMPTOMS
VOMITING: 0
VOICE CHANGE: 0
WHEEZING: 0
CHEST TIGHTNESS: 0
ABDOMINAL PAIN: 0
SHORTNESS OF BREATH: 0
SORE THROAT: 0
NAUSEA: 0
BACK PAIN: 1

## 2023-10-02 NOTE — PROGRESS NOTES
Post-Discharge Transitional Care Follow Up      Ant Hinojosa   YOB: 1955    Date of Office Visit:  10/2/2023  Date of Hospital Admission: 9/23/23  Date of Hospital Discharge: 9/27/23  Readmission Risk Score (high >=14%. Medium >=10%):Readmission Risk Score: 16.7      Care management risk score Rising risk (score 2-5) and Complex Care (Scores >=6): No Risk Score On File     Non face to face  following discharge, date last encounter closed (first attempt may have been earlier): 09/29/2023     Call initiated 2 business days of discharge: Yes     Hospital discharge follow-up  -     CO DISCHARGE MEDS RECONCILED W/ CURRENT OUTPATIENT MED LIST  Needs flu shot  -     Influenza, FLUAD, (age 72 y+), IM, PF, 0.5 mL  Atelectasis  Comments:  Reviewed x-rays at the hospital patient advised to continue nebulizer regularly and increase walking. Anaphylaxis, subsequent encounter  Comments:  Minimal swelling residual in the tongue patient is finishing the Medrol pack in 1 to 2 days I will give her another pack to keep at hand in case she needs it  Orders:  -     methylPREDNISolone (MEDROL DOSEPACK) 4 MG tablet; Take by mouth only if needed if tongue swells after finishing the current Medrol pack, Disp-1 kit, R-0Normal  Hypothyroidism, unspecified type  Comments:  TSH at goal patient will continue levothyroxine 112 mcg daily. Essential hypertension  Comments:  Blood pressure at goal patient to continue amlodipine 10 mg daily, off losartan due to history of anaphylaxis, monitor BP bring readings and machine  DM type 2 with diabetic peripheral neuropathy (HCC)  Comments:  A1c at goal improved. id not start semaglutide due to cost, advised to avoid starting any new medication until complete resolution of the swelling in her tongue.   Paresthesia of right arm  Comments:  History of degenerative disease of the cervical spine and spondylolisthesis referral to physiatry, may require EMG nerve conduction study and

## 2023-10-03 ENCOUNTER — CARE COORDINATION (OUTPATIENT)
Dept: CARE COORDINATION | Age: 68
End: 2023-10-03

## 2023-10-03 NOTE — CARE COORDINATION
Remote Patient Monitoring Note      Date/Time:  10/3/2023 3:06 PM    LPN attempted to contact patient by telephone regarding yellow alert received for no metrics for >2 days since returning home from hospital.     Background: High Blood-Pressure, COPD, CHF    Clinical Interventions:  Unable to LM for patient, VM full. EC contacted and will pass message to patient. Plan/Follow Up: Will continue to review, monitor and address alerts with follow up based on severity of symptoms and risk factors.        Jamie Cornejo LPN  [address] [facility]/ CTN/ Remote Patient Monitoring  [phone]

## 2023-10-04 ENCOUNTER — OFFICE VISIT (OUTPATIENT)
Dept: ORTHOPEDIC SURGERY | Age: 68
End: 2023-10-04

## 2023-10-04 VITALS — HEIGHT: 66 IN | BODY MASS INDEX: 35.68 KG/M2 | WEIGHT: 222 LBS

## 2023-10-04 DIAGNOSIS — G89.29 CHRONIC RIGHT SHOULDER PAIN: ICD-10-CM

## 2023-10-04 DIAGNOSIS — M25.511 CHRONIC RIGHT SHOULDER PAIN: ICD-10-CM

## 2023-10-04 DIAGNOSIS — M54.12 CERVICAL RADICULOPATHY: Primary | ICD-10-CM

## 2023-10-04 RX ORDER — BETAMETHASONE SODIUM PHOSPHATE AND BETAMETHASONE ACETATE 3; 3 MG/ML; MG/ML
6 INJECTION, SUSPENSION INTRA-ARTICULAR; INTRALESIONAL; INTRAMUSCULAR; SOFT TISSUE ONCE
Status: COMPLETED | OUTPATIENT
Start: 2023-10-04 | End: 2023-10-04

## 2023-10-04 RX ORDER — LIDOCAINE HYDROCHLORIDE 10 MG/ML
3 INJECTION, SOLUTION INFILTRATION; PERINEURAL ONCE
Status: COMPLETED | OUTPATIENT
Start: 2023-10-04 | End: 2023-10-04

## 2023-10-04 RX ORDER — TRIAMCINOLONE ACETONIDE 40 MG/ML
40 INJECTION, SUSPENSION INTRA-ARTICULAR; INTRAMUSCULAR ONCE
Status: CANCELLED | OUTPATIENT
Start: 2023-10-04 | End: 2023-10-04

## 2023-10-04 RX ADMIN — LIDOCAINE HYDROCHLORIDE 3 ML: 10 INJECTION, SOLUTION INFILTRATION; PERINEURAL at 16:56

## 2023-10-04 RX ADMIN — BETAMETHASONE SODIUM PHOSPHATE AND BETAMETHASONE ACETATE 6 MG: 3; 3 INJECTION, SUSPENSION INTRA-ARTICULAR; INTRALESIONAL; INTRAMUSCULAR; SOFT TISSUE at 16:55

## 2023-10-05 ENCOUNTER — CARE COORDINATION (OUTPATIENT)
Dept: CARE COORDINATION | Age: 68
End: 2023-10-05

## 2023-10-05 NOTE — CARE COORDINATION
Remote Patient Monitoring Note      Date/Time:  10/5/2023 1:35 PM  Patient Current Location: [place]    LPN contacted patient by telephone regarding yellow alert received for no metrics since discharge from hosp on . Verified patients name and  as identifiers. Background: High Blood-Pressure, COPD, CHF    Clinical Interventions:  Unable to leave VM on phone. Did contact EC and patient was at her home. Spoke with patient and she stated that she tried to get her metrics this am and the tablet would not come on. Patient advised to make sure tablet is plugged in and if it still will not turn on she can call writer or [email or web address]. She voiced understanding and states she will try it when she goes back home. ACM updated. Plan/Follow Up: Will continue to review, monitor and address alerts with follow up based on severity of symptoms and risk factors.        Mendy Dobbs LPN  [facility]/ CTN/ Remote Patient Monitoring  [phone]

## 2023-10-05 NOTE — CARE COORDINATION
Attempted to reach patient by telephone. Unable to leave message, voice mail is full. Will attempt to reach patient again.       Plan  Will discuss with patient willingness to continue RPM for approximately 2 weeks d/t recent hospitalization

## 2023-10-06 ENCOUNTER — HOSPITAL ENCOUNTER (OUTPATIENT)
Dept: INFUSION THERAPY | Age: 68
Discharge: HOME | End: 2023-10-06

## 2023-10-06 NOTE — PROGRESS NOTES
PROCEDURE NOTE:    DIAGNOSIS      RIGHT shoulder pain. PROCEDURE     Ultrasound-guided RIGHT subacromial/subdeltoid bursa corticosteroid injection. PROCEDURAL PAUSE     Procedural pause conducted to verify: ?correct patient identity, procedure to be performed, and as applicable, correct side and site, correct patient position, and availability of implants, special equipment, or special requirements. PROCEDURE DETAILS     The procedure was carried out under sterile technique. Patient Position: ? Supine. Localization Process: ? The subacromial/subdeltoid bursa was evaluated under ultrasound prior to starting the procedure. ? The skin was prepped with Betadine and Alcohol. Approach: ? In-plane. Local Anesthesia: ?Local anesthesia was obtained with vapocoolant cold spray and 1 cc of 1% lidocaine using a 30-gauge 1-1/4-inch needle to create a skin wheal. ?     Injection/Aspiration: ? A 25-gauge, 2-inch needle was advanced from an in-plane approach into the subacromial/subdeltoid bursa. ? After visualization of the needle tip in the target area and negative aspiration for blood, a mixture of 3 cc of 1% lidocaine and 1 cc of betamethasone (6 mg/cc) was injected into the subacromial bursa with excellent sonographic flow. ?Images of procedure were permanently recorded. Postprocedure Care: ? The patient will avoid heavy exertion with the shoulder and avoid soaking the shoulder under water for two days. ?The patient will contact me with any problems related to the injection. PATIENT EDUCATION     Ready to learn, no apparent learning barriers were identified; learning preferences include listening. ? Explained diagnosis and treatment plan; patient expressed understanding of the content. INFORMED CONSENT     Discussed the risks, benefits, alternatives, and the necessity of other members of the healthcare team participating in the procedure. ?All questions answered and consent given.      Following
conservative treatment in the form of a diagnostic ultrasound-guided corticosteroid injection which was performed in the office today, please see procedure note for further details. Patient will follow up in 6 weeks for reevaluation of symptoms and consider escalation therapy should symptoms persist.  Patient is agreeable with above plan all questions and concerns were addressed in the office today. - XR CERVICAL SPINE (2-3 VIEWS); Future  - US ARTHR/ASP/INJ MAJOR JNT/BURSA RIGHT; Future  - lidocaine 1 % injection 3 mL  - betamethasone acetate-betamethasone sodium phosphate (CELESTONE) injection 6 mg    Return to Office: Return in about 6 weeks (around 11/15/2023) for injection follow up.     Edwina Oliva MD

## 2023-10-09 ENCOUNTER — CARE COORDINATION (OUTPATIENT)
Dept: CASE MANAGEMENT | Age: 68
End: 2023-10-09

## 2023-10-09 NOTE — CARE COORDINATION
Remote Patient Monitoring Note  Date/Time:  10/9/2023 9:51 AM  Patient Current Location: Home: [address] [place] [id number]  LPN contacted patient by telephone regarding  no metrics entered    Verified patients name and  as identifiers. Background: enrolled in [facility] for HTN COPD CHF  Clinical Interventions: Reviewed and followed up on alerts and treatments-spoke to [facility] regarding no metrics since equipment was unpaused. She reports equipment is not working. Call to [address] [place] [address]. Spoke to [place] to assist.    Plan/Follow Up: Will continue to review, monitor and address alerts with follow up based on severity of symptoms and risk factors.

## 2023-10-09 NOTE — CARE COORDINATION
Remote Alert Monitoring Note  Rpm alert to be reviewed by the provider   yellow alert   blood pressure reading (175/81. )   Additional needs to be addressed by N/A: No                    Date/Time:  10/9/2023 10:32 AM  Patient Current Location: Home: [address] [facility] [address]  LPN contacted patient by telephone. Verified patients name and  as identifiers. Background: enrolled in RPM for HTN COPD and CHF  Refer to 911 immediately if:  Patient unresponsive or unable to provide history  Change in cognition or sudden confusion  Patient unable to respond in complete sentences  Intense chest pain/tightness  Any concern for any clinical emergency  Red Alert: Provider response time of 1 hr required for any red alert requiring intervention  Yellow Alert: Provider response time of 3hr required for any escalated yellow alert    BP Triage  Are you having any Chest Pain? no   Are you having any Shortness of Breath? no   Do you have a headache or have any vision changes? no   Are you having any numbness or tingling? no   Are you having any other health concerns or issues? no        Clinical Interventions: Reviewed and followed up on alerts and treatments-spoke to [facility] regarding RPM yellow alert for High blood pressure reading. Pt states she has no chest pain, dyspnea. Numbness or tingling. No headache. She has not taken her medications yet today. Educated on taking medications and then checking metrics 1-2 hours after. Verbalized understanding. Pt will recheck today in 1-2 hours. Plan/Follow Up: Will continue to review, monitor and address alerts with follow up based on severity of symptoms and risk factors. 1430 pt updated metrics. All WNL.  now

## 2023-10-10 ENCOUNTER — CARE COORDINATION (OUTPATIENT)
Dept: CASE MANAGEMENT | Age: 68
End: 2023-10-10

## 2023-10-10 NOTE — CARE COORDINATION
Date/Time:  10/10/2023 1:48 PM  LPN attempted to reach patient by telephone regarding  NO METRICS  in remote patient monitoring program. Mailbox is full. Will attempt to reach patient again. Call to emergency contact Matias Wilson   No answer. Unable to leave message. Enrolled in RPM for HTN COPD and CHF. Metrics entered once in 10 days.    Message sent to [employer]

## 2023-10-10 NOTE — CARE COORDINATION
Remote Alert Monitoring Note  Rpm alert to be reviewed by the provider   red alert   weight (weight gain of over 5 pounds in 7 days)   Additional needs to be addressed by N/A: No                    Date/Time:  10/10/2023 3:34 PM  Patient Current Location: Home: [address] [place] [address] Drive [address]  LPN contacted patient by telephone. Verified patients name and  as identifiers. Background: enrolled in RPM for HTN COPD and CHF  Refer to 911 immediately if:  Patient unresponsive or unable to provide history  Change in cognition or sudden confusion  Patient unable to respond in complete sentences  Intense chest pain/tightness  Any concern for any clinical emergency  Red Alert: Provider response time of 1 hr required for any red alert requiring intervention  Yellow Alert: Provider response time of 3hr required for any escalated yellow alert    Weight Scale Triage  Was your weight obtained upon rising/waking today? NO   Was your weight obtained after voiding and/or use of the bathroom today? yes   Did you weigh yourself in the same amount of clothing today, compared to how you typically do? no   Was the scale bumped or moved prior to today's weight? no   Is your scale on a flat/hard surface? yes   Did you obtain your weight with shoes on? no   If yes, is this something you normally do during your daily weights? yes   Were you standing up straight on the scale today? yes   Were you leaning on anything while obtaining your weight today? no      Clinical Interventions: Reviewed and followed up on alerts and treatments-spoke to Angelita regarding RPM RED ALERT for weight gain. Pt weight recorded at 213 yesterday. Her weight at her Dr Stevie Martini on   10/4/23 was 222#. Pt states she has no chest pain, or dyspnea. She has no edema in BLE. Reports facial edema. Recently hospitalized for angio edema discharged on 23. Pt is taking all medications as directed. Pt did not weigh herself this morning before dressing.  She weighed

## 2023-10-11 ENCOUNTER — CARE COORDINATION (OUTPATIENT)
Dept: CASE MANAGEMENT | Age: 68
End: 2023-10-11

## 2023-10-11 NOTE — CARE COORDINATION
Remote Alert Monitoring Note  Rpm alert to be reviewed by the provider   red alert   weight (WEIGHT INCREASE OF 5 POUNDS IN 7 DAYS)   Additional needs to be addressed by N/A: No                    Date/Time:  10/11/2023 8:28 AM  Patient Current Location: Home: [address] [facility] [address]  LPN contacted patient by telephone. Verified patients name and  as identifiers. Background: ENROLLED IN RPM for HTN COPD AND CHF  Refer to 911 immediately if:  Patient unresponsive or unable to provide history  Change in cognition or sudden confusion  Patient unable to respond in complete sentences  Intense chest pain/tightness  Any concern for any clinical emergency  Red Alert: Provider response time of 1 hr required for any red alert requiring intervention  Yellow Alert: Provider response time of 3hr required for any escalated yellow alert    Weight Scale Triage  Was your weight obtained upon rising/waking today? YES   Was your weight obtained after voiding and/or use of the bathroom today? yes   Did you weigh yourself in the same amount of clothing today, compared to how you typically do? yes   Was the scale bumped or moved prior to today's weight? no   Is your scale on a flat/hard surface? yes   Did you obtain your weight with shoes on? no   If yes, is this something you normally do during your daily weights? yes   Were you standing up straight on the scale today? yes   Were you leaning on anything while obtaining your weight today? no      Clinical Interventions: Reviewed and followed up on alerts and treatments-spoke to Angelita regarding RPM red alert for weight gain. Pt   weight recorded at 225.5 today. 3 pounds less than yesterday. Pt has to enter her weights manually. Recorded inaccurate weight of 213# on 10/9/23. Removed that weight in HRS. She has no chest pain, dyspnea or increased edema. Taking all medications as directed. Next appt with PCP is on 10/16./23  Plan/Follow Up:  Will continue to review,

## 2023-10-16 ENCOUNTER — OFFICE VISIT (OUTPATIENT)
Dept: PRIMARY CARE CLINIC | Age: 68
End: 2023-10-16
Payer: COMMERCIAL

## 2023-10-16 VITALS
SYSTOLIC BLOOD PRESSURE: 154 MMHG | HEIGHT: 66 IN | DIASTOLIC BLOOD PRESSURE: 72 MMHG | TEMPERATURE: 97.1 F | OXYGEN SATURATION: 91 % | RESPIRATION RATE: 18 BRPM | BODY MASS INDEX: 36 KG/M2 | HEART RATE: 88 BPM | WEIGHT: 224 LBS

## 2023-10-16 DIAGNOSIS — R61 DIAPHORESIS: ICD-10-CM

## 2023-10-16 DIAGNOSIS — M79.10 MYALGIA: ICD-10-CM

## 2023-10-16 DIAGNOSIS — G47.10 HYPERSOMNIA: ICD-10-CM

## 2023-10-16 DIAGNOSIS — E11.42 DM TYPE 2 WITH DIABETIC PERIPHERAL NEUROPATHY (HCC): ICD-10-CM

## 2023-10-16 DIAGNOSIS — F32.A DEPRESSION, UNSPECIFIED DEPRESSION TYPE: ICD-10-CM

## 2023-10-16 DIAGNOSIS — E03.9 HYPOTHYROIDISM, UNSPECIFIED TYPE: ICD-10-CM

## 2023-10-16 DIAGNOSIS — J02.9 PHARYNGITIS, UNSPECIFIED ETIOLOGY: Primary | ICD-10-CM

## 2023-10-16 LAB
INFLUENZA A ANTIGEN, POC: NOT DETECTED
INFLUENZA B ANTIGEN, POC: NOT DETECTED
Lab: NORMAL
PERFORMING INSTRUMENT: NORMAL
QC PASS/FAIL: NORMAL
SARS-COV-2, POC: NORMAL

## 2023-10-16 PROCEDURE — 87426 SARSCOV CORONAVIRUS AG IA: CPT | Performed by: INTERNAL MEDICINE

## 2023-10-16 PROCEDURE — G8427 DOCREV CUR MEDS BY ELIG CLIN: HCPCS | Performed by: INTERNAL MEDICINE

## 2023-10-16 PROCEDURE — 3017F COLORECTAL CA SCREEN DOC REV: CPT | Performed by: INTERNAL MEDICINE

## 2023-10-16 PROCEDURE — 3078F DIAST BP <80 MM HG: CPT | Performed by: INTERNAL MEDICINE

## 2023-10-16 PROCEDURE — 1111F DSCHRG MED/CURRENT MED MERGE: CPT | Performed by: INTERNAL MEDICINE

## 2023-10-16 PROCEDURE — 1036F TOBACCO NON-USER: CPT | Performed by: INTERNAL MEDICINE

## 2023-10-16 PROCEDURE — 99214 OFFICE O/P EST MOD 30 MIN: CPT | Performed by: INTERNAL MEDICINE

## 2023-10-16 PROCEDURE — 1090F PRES/ABSN URINE INCON ASSESS: CPT | Performed by: INTERNAL MEDICINE

## 2023-10-16 PROCEDURE — 3077F SYST BP >= 140 MM HG: CPT | Performed by: INTERNAL MEDICINE

## 2023-10-16 PROCEDURE — G8399 PT W/DXA RESULTS DOCUMENT: HCPCS | Performed by: INTERNAL MEDICINE

## 2023-10-16 PROCEDURE — 1123F ACP DISCUSS/DSCN MKR DOCD: CPT | Performed by: INTERNAL MEDICINE

## 2023-10-16 PROCEDURE — G8484 FLU IMMUNIZE NO ADMIN: HCPCS | Performed by: INTERNAL MEDICINE

## 2023-10-16 PROCEDURE — 2022F DILAT RTA XM EVC RTNOPTHY: CPT | Performed by: INTERNAL MEDICINE

## 2023-10-16 PROCEDURE — G8417 CALC BMI ABV UP PARAM F/U: HCPCS | Performed by: INTERNAL MEDICINE

## 2023-10-16 PROCEDURE — 87804 INFLUENZA ASSAY W/OPTIC: CPT | Performed by: INTERNAL MEDICINE

## 2023-10-16 PROCEDURE — 3044F HG A1C LEVEL LT 7.0%: CPT | Performed by: INTERNAL MEDICINE

## 2023-10-16 RX ORDER — LOSARTAN POTASSIUM 50 MG/1
50 TABLET ORAL DAILY
COMMUNITY

## 2023-10-16 RX ORDER — AZITHROMYCIN 250 MG/1
250 TABLET, FILM COATED ORAL SEE ADMIN INSTRUCTIONS
Qty: 6 TABLET | Refills: 0 | Status: SHIPPED | OUTPATIENT
Start: 2023-10-16 | End: 2023-10-21

## 2023-10-16 RX ORDER — VITAMIN B COMPLEX
1000 TABLET ORAL DAILY
COMMUNITY

## 2023-10-16 RX ORDER — CALCIUM CARBONATE 300MG(750)
1 TABLET,CHEWABLE ORAL NIGHTLY
COMMUNITY

## 2023-10-16 RX ORDER — MULTIVIT,IRON,MINERALS/LUTEIN
1 TABLET ORAL NIGHTLY
COMMUNITY

## 2023-10-16 RX ORDER — ASPIRIN 81 MG/1
81 TABLET ORAL DAILY
COMMUNITY

## 2023-10-16 RX ORDER — PREGABALIN 75 MG/1
75 CAPSULE ORAL 2 TIMES DAILY
COMMUNITY

## 2023-10-16 ASSESSMENT — ENCOUNTER SYMPTOMS
WHEEZING: 0
SORE THROAT: 1
SHORTNESS OF BREATH: 0
VOMITING: 0
CHEST TIGHTNESS: 0
BACK PAIN: 1
COUGH: 0
NAUSEA: 0
ABDOMINAL PAIN: 0
VOICE CHANGE: 0

## 2023-10-16 NOTE — PROGRESS NOTES
followed by 250mg on days 2 - 5, Disp-6 tablet, R-0Normal  -     POCT COVID-19, Antigen  -     POCT Influenza A/B Antigen (BD Veritor)  2. Myalgia  Comments: In the setting of viral infection  Orders:  -     POCT COVID-19, Antigen  -     POCT Influenza A/B Antigen (BD Veritor)  3. Hypersomnia  Comments:  Patient states she falls asleep in a traffic light. And is up at night several times to go to the bathroom  Orders:  -     Sleep Study with PAP Titration; Future  4. DM type 2 with diabetic peripheral neuropathy ([address])  -     BRYAN Fung-[facility], Endocrinology, [place]  5. Hypothyroidism, unspecified type  Comments:  TSH at goal patient will continue levothyroxine 112 mcg daily  6. Depression, unspecified depression type  Comments:  Patient to continue Celexa same dose, consider referral for psych  7. Diaphoresis  Comments:  Last A1c 6.4 we will discontinue insulin, diaphoresis could be related to hypoglycemia, continue metformin 500 mg twice daily and monitor BS.

## 2023-10-17 ENCOUNTER — CARE COORDINATION (OUTPATIENT)
Dept: CASE MANAGEMENT | Age: 68
End: 2023-10-17

## 2023-10-17 NOTE — CARE COORDINATION
Date/Time:  10/17/2023 11:10 AM  LPN attempted to reach patient by telephone regarding red alert in remote patient monitoring program.Will attempt to reach patient again. Unable to leave message.  Mailbox is full  RPM red alert for low pulse ox reading and weight gain of 5 pounds in 1 day  Enrolled in RPM for HTN COPD AND CHF

## 2023-10-17 NOTE — CARE COORDINATION
Date/Time:  10/17/2023 2:20 PM  LPN attempted to reach family by telephone regarding red alert in remote patient monitoring program. Left HIPPA compliant message requesting a return call. Will attempt to reach patient again. RED ALERT FOR WEIGHT INCREASE OF 3 POUNDS IN 1 DAY  AND FOR LOW PULSE OX 91%  Unable to leave message on second attempt to pt. Call to emergency contact rena Lauren. No answer.  Left message

## 2023-10-18 ENCOUNTER — CARE COORDINATION (OUTPATIENT)
Dept: CASE MANAGEMENT | Age: 68
End: 2023-10-18

## 2023-10-18 NOTE — CARE COORDINATION
Remote Alert Monitoring Note  Rpm alert to be reviewed by the provider   red alert   weight (weight increase of 5 pounds in 1 week)   Additional needs to be addressed by N/A: No                    Date/Time:  10/18/2023 8:31 AM  Patient Current Location: Home: [address] [place] [address] Drive [address]  LPN contacted patient by telephone. Verified patients name and  as identifiers. Background: enrolled in RPM for  HTN COPD and CHF  Refer to 911 immediately if:  Patient unresponsive or unable to provide history  Change in cognition or sudden confusion  Patient unable to respond in complete sentences  Intense chest pain/tightness  Any concern for any clinical emergency  Red Alert: Provider response time of 1 hr required for any red alert requiring intervention  Yellow Alert: Provider response time of 3hr required for any escalated yellow alert    Weight Scale Triage  Was your weight obtained upon rising/waking today? YES   Was your weight obtained after voiding and/or use of the bathroom today? yes   Did you weigh yourself in the same amount of clothing today, compared to how you typically do? no   Was the scale bumped or moved prior to today's weight? no   Is your scale on a flat/hard surface? yes   Did you obtain your weight with shoes on? no   If yes, is this something you normally do during your daily weights? yes   Were you standing up straight on the scale today? yes   Were you leaning on anything while obtaining your weight today? no      Clinical Interventions: Reviewed and followed up on alerts and treatments-spoke to Angelita regarding RPM RED alert for weight increase of 5 pounds in 7 days. Pt states she has no chest pain or dyspnea. No increased edema in BLE. Her weight fluctuates daily from 221#-228#  . Enters weight manually. Takes no diuretics. Pt states she does not have any issues standing securely on the scale. Seen by PCP on 10/16/23. Weight recorded at 224#. No need for further action at this time.

## 2023-10-20 ENCOUNTER — CARE COORDINATION (OUTPATIENT)
Dept: CARE COORDINATION | Age: 68
End: 2023-10-20

## 2023-10-24 NOTE — CARE COORDINATION
Attempted to reach patient by telephone. Voice mail is full, unable to leave message. Will attempt to reach patient again.

## 2023-10-27 ENCOUNTER — CARE COORDINATION (OUTPATIENT)
Dept: CASE MANAGEMENT | Age: 68
End: 2023-10-27

## 2023-10-27 NOTE — CARE COORDINATION
Remote Alert Monitoring Note  Rpm alert to be reviewed by the provider   red alert   weight (Gained 5 # in 7 days   220 to 225.7    Has CHF )   Additional needs to be addressed by PCP: No                    Date/Time:  10/27/2023 9:10 AM  Patient Current Location: [place]  LPN contacted patient by telephone. Verified patients name and  as identifiers. Background: HTN, COPD, CHF  Refer to 911 immediately if:  Patient unresponsive or unable to provide history  Change in cognition or sudden confusion  Patient unable to respond in complete sentences  Intense chest pain/tightness  Any concern for any clinical emergency  Red Alert: Provider response time of 1 hr required for any red alert requiring intervention  Yellow Alert: Provider response time of 3hr required for any escalated yellow alert       Clinical Interventions: Reviewed and followed up on alerts and treatments-Patient has weight gain of 5# in 7 days. Attempted to reach patient for RPM Red Alert Call. Unable to reach patient. Left HIPAA Compliant message on Voice Mail to call. Phone number left on Voice Mail to call back. Will continue to follow. Cierra Syed LPN    [phone]  [facility] Coordinator      Plan/Follow Up: Will continue to review, monitor and address alerts with follow up based on severity of symptoms and risk factors.

## 2023-10-27 NOTE — CARE COORDINATION
Remote Patient Monitoring Note      Date/Time:  10/27/2023 1:25 PM  Patient Current Location: [address] [place] [address]  LPN contacted family by telephone regarding red alert received for weight increase (5# in 7 days). Verified patients name and  as identifiers. Background: htn, COPD, CHF  Clinical Interventions: Reviewed and followed up on alerts and treatments-         Attempted to reach patient X 2 for RPM Red Alert Call. Unable to reach patient. Left HIPAA Compliant message on Voice Mail to call. Phone number left on Voice Mail to call back. Called and spoke to ER Contact sister Conan Sicard. She said patient is still working and will be home after 1:30 pm.  Will continue to follow. Flaco Chen LPN    [phone]  [address] [facility] Coordinator      Plan/Follow Up: Will continue to review, monitor and address alerts with follow up based on severity of symptoms and risk factors.

## 2023-10-27 NOTE — CARE COORDINATION
Remote Alert Monitoring Note  Rpm alert to be reviewed by the provider   red alert   weight (Gained 5 # in 7 days   220 to 225.7    Has CHF )   Additional needs to be addressed by PCP: No                   Attempted to reach patient X 3 and ER contact X 1 for RPM Red Alert Call. Unable to reach patient. Left HIPAA Compliant message on Voice Mail to call. Phone number left on Voice Mail to call back. ACM notified  Will continue to follow.      Meryl Mcdaniel LPN    [phone]  [facility] / [id number] [place] [address],[address] Coordinator

## 2023-10-30 ENCOUNTER — CARE COORDINATION (OUTPATIENT)
Dept: CASE MANAGEMENT | Age: 68
End: 2023-10-30

## 2023-10-30 NOTE — CARE COORDINATION
Remote Patient Monitoring Note      Date/Time:  10/30/2023 1:40 PM  Patient Current Location: [place]  LPN contacted family by telephone regarding yellow alert received for No VS for 3 Days. Verified patients name and  as identifiers. Background: HTN, COPD, CHF  Clinical Interventions: Reviewed and followed up on alerts and treatments-         Attempted to reach patient for RPM Reminder yellow Alert. Unable to reach patient. Mail Box Full - Unable to leave message at this time. Called and spoke with ER Contact, Sister Alfonso Weber. She said she will ask patient to put in VS tomorrow. Will continue to follow. Jerald Colindres LPN    [phone]  [facility] Coordinator      Plan/Follow Up: Will continue to review, monitor and address alerts with follow up based on severity of symptoms and risk factors.
No

## 2023-11-01 ENCOUNTER — CARE COORDINATION (OUTPATIENT)
Dept: CARE COORDINATION | Age: 68
End: 2023-11-01

## 2023-11-01 ENCOUNTER — CARE COORDINATION (OUTPATIENT)
Dept: PRIMARY CARE CLINIC | Age: 68
End: 2023-11-01

## 2023-11-01 NOTE — CARE COORDINATION
Date/Time:  11/1/2023 11:42 AM  LPN attempted to reach patient by telephone regarding yellow alert in remote patient monitoring program. Left HIPPA compliant message requesting a return call. Will attempt to reach patient again.     RPM yellow alert for no metrics x 2 days  Enrolled in RPM for HTN COPD CHF

## 2023-11-01 NOTE — CARE COORDINATION
Remote Patient Monitoring Graduation      Date/Time:  11/1/2023 4:09 PM  Patient Current Location: Home: [address] [place] [address]  Patient has graduated from the Remote Patient Monitoring program on 11/1/2023. RPM goals have been met at this time. Patient has been provided instruction on process to return RPM equipment and RPM has been deactivated. Patient has ACM's contact information for any further questions, concerns, or needs.

## 2023-11-02 ENCOUNTER — CARE COORDINATION (OUTPATIENT)
Dept: PRIMARY CARE CLINIC | Age: 68
End: 2023-11-02

## 2023-11-02 DIAGNOSIS — J44.9 CHRONIC OBSTRUCTIVE PULMONARY DISEASE, UNSPECIFIED COPD TYPE (HCC): ICD-10-CM

## 2023-11-02 DIAGNOSIS — I10 PRIMARY HYPERTENSION: Primary | ICD-10-CM

## 2023-11-02 DIAGNOSIS — E11.00 TYPE 2 DIABETES MELLITUS WITH HYPEROSMOLARITY WITHOUT COMA, WITHOUT LONG-TERM CURRENT USE OF INSULIN (HCC): ICD-10-CM

## 2023-11-02 DIAGNOSIS — I50.21 ACUTE SYSTOLIC (CONGESTIVE) HEART FAILURE (HCC): ICD-10-CM

## 2023-11-02 NOTE — CARE COORDINATION
CCSS placed call to patient to arrange RPM kit  through [facility]. Left detailed message. Reviewed with patient how to pack equipment in original packing. Verified patient's availability to schedule UPS  time. UPS  time requested. Anticipated  date range 2 to 4 business days.

## 2023-11-02 NOTE — PROGRESS NOTES
Remote Patient Order Discontinued    Received request from Boone Kimble RN  to discontinue order for remote patient monitoring of CHF, COPD, Diabetes, and HTN and order completed.

## 2023-11-07 RX ORDER — POTASSIUM CHLORIDE 20 MEQ/1
20 TABLET, EXTENDED RELEASE ORAL DAILY
Qty: 14 TABLET | Refills: 0 | OUTPATIENT
Start: 2023-11-07 | End: 2023-11-21

## 2023-11-10 ENCOUNTER — OFFICE VISIT (OUTPATIENT)
Dept: ONCOLOGY | Age: 68
End: 2023-11-10
Payer: COMMERCIAL

## 2023-11-10 ENCOUNTER — HOSPITAL ENCOUNTER (OUTPATIENT)
Dept: INFUSION THERAPY | Age: 68
Discharge: HOME | End: 2023-11-10
Payer: COMMERCIAL

## 2023-11-10 VITALS
TEMPERATURE: 98.8 F | WEIGHT: 229 LBS | BODY MASS INDEX: 36.8 KG/M2 | OXYGEN SATURATION: 91 % | SYSTOLIC BLOOD PRESSURE: 137 MMHG | DIASTOLIC BLOOD PRESSURE: 92 MMHG | HEART RATE: 86 BPM | HEIGHT: 66 IN

## 2023-11-10 DIAGNOSIS — D47.2 SMOLDERING MYELOMA: ICD-10-CM

## 2023-11-10 DIAGNOSIS — R91.8 MULTIPLE LUNG NODULES: Primary | ICD-10-CM

## 2023-11-10 DIAGNOSIS — C90.00 MULTIPLE MYELOMA, REMISSION STATUS UNSPECIFIED (HCC): ICD-10-CM

## 2023-11-10 DIAGNOSIS — D64.9 ANEMIA, UNSPECIFIED TYPE: ICD-10-CM

## 2023-11-10 DIAGNOSIS — G47.10 HYPERSOMNIA: ICD-10-CM

## 2023-11-10 LAB
ALBUMIN SERPL-MCNC: 4.2 G/DL (ref 3.5–5.2)
ALP SERPL-CCNC: 102 U/L (ref 35–104)
ALT SERPL-CCNC: 21 U/L (ref 0–32)
ANION GAP SERPL CALCULATED.3IONS-SCNC: 8 MMOL/L (ref 7–16)
AST SERPL-CCNC: 27 U/L (ref 0–31)
BASOPHILS # BLD: 0.03 K/UL (ref 0–0.2)
BASOPHILS NFR BLD: 1 % (ref 0–2)
BILIRUB SERPL-MCNC: 0.2 MG/DL (ref 0–1.2)
BUN SERPL-MCNC: 16 MG/DL (ref 6–23)
CALCIUM SERPL-MCNC: 9.3 MG/DL (ref 8.6–10.2)
CHLORIDE SERPL-SCNC: 104 MMOL/L (ref 98–107)
CO2 SERPL-SCNC: 28 MMOL/L (ref 22–29)
CREAT SERPL-MCNC: 0.8 MG/DL (ref 0.5–1)
EOSINOPHIL # BLD: 0.16 K/UL (ref 0.05–0.5)
EOSINOPHILS RELATIVE PERCENT: 3 % (ref 0–6)
ERYTHROCYTE [DISTWIDTH] IN BLOOD BY AUTOMATED COUNT: 14.2 % (ref 11.5–15)
GFR SERPL CREATININE-BSD FRML MDRD: >60 ML/MIN/1.73M2
GLUCOSE SERPL-MCNC: 101 MG/DL (ref 74–99)
HCT VFR BLD AUTO: 37.8 % (ref 34–48)
HGB BLD-MCNC: 11.2 G/DL (ref 11.5–15.5)
IMM GRANULOCYTES # BLD AUTO: <0.03 K/UL (ref 0–0.58)
IMM GRANULOCYTES NFR BLD: 0 % (ref 0–5)
LYMPHOCYTES NFR BLD: 2.4 K/UL (ref 1.5–4)
LYMPHOCYTES RELATIVE PERCENT: 40 % (ref 20–42)
MCH RBC QN AUTO: 28.2 PG (ref 26–35)
MCHC RBC AUTO-ENTMCNC: 29.6 G/DL (ref 32–34.5)
MCV RBC AUTO: 95.2 FL (ref 80–99.9)
MONOCYTES NFR BLD: 0.45 K/UL (ref 0.1–0.95)
MONOCYTES NFR BLD: 8 % (ref 2–12)
NEUTROPHILS NFR BLD: 49 % (ref 43–80)
NEUTS SEG NFR BLD: 2.93 K/UL (ref 1.8–7.3)
PLATELET # BLD AUTO: 376 K/UL (ref 130–450)
PMV BLD AUTO: 10 FL (ref 7–12)
POTASSIUM SERPL-SCNC: 3.4 MMOL/L (ref 3.5–5)
PROT SERPL-MCNC: 7.9 G/DL (ref 6.4–8.3)
RBC # BLD AUTO: 3.97 M/UL (ref 3.5–5.5)
SODIUM SERPL-SCNC: 140 MMOL/L (ref 132–146)
WBC OTHER # BLD: 6 K/UL (ref 4.5–11.5)

## 2023-11-10 PROCEDURE — 3017F COLORECTAL CA SCREEN DOC REV: CPT | Performed by: INTERNAL MEDICINE

## 2023-11-10 PROCEDURE — G8484 FLU IMMUNIZE NO ADMIN: HCPCS | Performed by: INTERNAL MEDICINE

## 2023-11-10 PROCEDURE — 36415 COLL VENOUS BLD VENIPUNCTURE: CPT

## 2023-11-10 PROCEDURE — 82232 ASSAY OF BETA-2 PROTEIN: CPT

## 2023-11-10 PROCEDURE — 99214 OFFICE O/P EST MOD 30 MIN: CPT | Performed by: INTERNAL MEDICINE

## 2023-11-10 PROCEDURE — G8427 DOCREV CUR MEDS BY ELIG CLIN: HCPCS | Performed by: INTERNAL MEDICINE

## 2023-11-10 PROCEDURE — 1036F TOBACCO NON-USER: CPT | Performed by: INTERNAL MEDICINE

## 2023-11-10 PROCEDURE — 1090F PRES/ABSN URINE INCON ASSESS: CPT | Performed by: INTERNAL MEDICINE

## 2023-11-10 PROCEDURE — 85025 COMPLETE CBC W/AUTO DIFF WBC: CPT

## 2023-11-10 PROCEDURE — 83521 IG LIGHT CHAINS FREE EACH: CPT

## 2023-11-10 PROCEDURE — 80053 COMPREHEN METABOLIC PANEL: CPT

## 2023-11-10 PROCEDURE — 3074F SYST BP LT 130 MM HG: CPT | Performed by: INTERNAL MEDICINE

## 2023-11-10 PROCEDURE — G8417 CALC BMI ABV UP PARAM F/U: HCPCS | Performed by: INTERNAL MEDICINE

## 2023-11-10 PROCEDURE — 82784 ASSAY IGA/IGD/IGG/IGM EACH: CPT

## 2023-11-10 PROCEDURE — 84155 ASSAY OF PROTEIN SERUM: CPT

## 2023-11-10 PROCEDURE — G8399 PT W/DXA RESULTS DOCUMENT: HCPCS | Performed by: INTERNAL MEDICINE

## 2023-11-10 PROCEDURE — 84165 PROTEIN E-PHORESIS SERUM: CPT

## 2023-11-10 PROCEDURE — 3078F DIAST BP <80 MM HG: CPT | Performed by: INTERNAL MEDICINE

## 2023-11-10 PROCEDURE — 1123F ACP DISCUSS/DSCN MKR DOCD: CPT | Performed by: INTERNAL MEDICINE

## 2023-11-10 PROCEDURE — 86334 IMMUNOFIX E-PHORESIS SERUM: CPT

## 2023-11-10 NOTE — PROGRESS NOTES
[address] [facility] ONCOLOGY  [address] [place] [id number]  Dept: [phone]  Loc: [phone]  Attending Progress Note      Reason for The visit:  Smoldering Multiple Myeloma. Referring Physician:  Maira Fan MD    PCP:  Charmaine Kraft MD    History of Present Illness: The patient is a 70-year-old lady with a PMH significant for HTN, hyperlipidemia, DM, COPD, OA, depression, and fibromyalgia, who was diagnosed with IgG lambda MGUS in 2008, used to follow up with Dr. Laron Miranda at [facility], last f/up with him was in 2012. Her most recent SPEP with immunofixation ha revealed monoclonal IgG lambda, M-spike 0.7 gm/dl  from 9/9/2016. The patient has been having pain in the low back radiating to the right lower extremity, she had an MRI of the L-spine done on 8/24/2016, revealed disc bulging L4-5, L5-S1, with mild narrowing of the neural foramina. She had a bone marrow Biopsy and aspirate done by IR on 11/28/2016. Bone marrow, left iliac, aspirate and core biopsy  Suboptimal specimen showing 15% plasma cells by immunohistochemistry,  consistent with plasma cell neoplasm, see comment. Comment:    The aspirate smear and clot section specimens are hemodilute  and aspicular. Plasmacytosis is seen by immunohistochemistry for   on the core biopsy specimen only. Flow cytometric analysis performed by  [facility] confirmed a lambda-restricted plasma cell neoplasm. See separate report for complete details ([id number]). Intradepartmental consultation is obtained. The patient returns after a follow-up visit, is complaining of knee pain. She is scheduled for colonoscopy on 11/20/2023. Review of Systems;  CONSTITUTIONAL: No fever, chills. Good appetite, feels tired. ENMT: Eyes: No diplopia; Nose: Positive for epistaxis. Mouth: No sore throat. RESPIRATORY: No hemoptysis, chronic shortness of breath, cough.    CARDIOVASCULAR: No

## 2023-11-11 LAB
IGA SERPL-MCNC: 116 MG/DL (ref 70–400)
IGG SERPL-MCNC: 1985 MG/DL (ref 700–1600)
IGM SERPL-MCNC: 44 MG/DL (ref 40–230)

## 2023-11-13 ENCOUNTER — TELEPHONE (OUTPATIENT)
Dept: INFUSION THERAPY | Age: 68
End: 2023-11-13

## 2023-11-13 DIAGNOSIS — C90.00 MULTIPLE MYELOMA, REMISSION STATUS UNSPECIFIED (HCC): Primary | ICD-10-CM

## 2023-11-13 LAB
ALBUMIN SERPL-MCNC: 3.5 G/DL (ref 3.5–4.7)
ALPHA1 GLOB SERPL ELPH-MCNC: 0.3 G/DL (ref 0.2–0.4)
ALPHA2 GLOB SERPL ELPH-MCNC: 0.9 G/DL (ref 0.5–1)
B-GLOBULIN SERPL ELPH-MCNC: 1.1 G/DL (ref 0.8–1.3)
GAMMA GLOB SERPL ELPH-MCNC: 2.1 G/DL (ref 0.7–1.6)
INTERPRETATION SERPL IFE-IMP: NORMAL
M PROTEIN SERPL ELPH-MCNC: 1 G/DL
PATH REV: NORMAL
PATHOLOGIST: ABNORMAL
PROT PATTERN SERPL ELPH-IMP: ABNORMAL
PROT SERPL-MCNC: 7.9 G/DL (ref 6.4–8.3)

## 2023-11-13 RX ORDER — POTASSIUM CHLORIDE 20 MEQ/1
20 TABLET, EXTENDED RELEASE ORAL DAILY
Qty: 14 TABLET | Refills: 0 | Status: SHIPPED | OUTPATIENT
Start: 2023-11-13 | End: 2023-11-27

## 2023-11-13 NOTE — TELEPHONE ENCOUNTER
This nurse has reached out to Dr Krystina Worthy office and spoke to JAIR. Dr. Eddie Willard will follow up  with patient's knee and will give injections. Gianfranco stated that he will reach out to patient to get scheduled.

## 2023-11-13 NOTE — TELEPHONE ENCOUNTER
Attempted to call again but received message person unable to take calls at this time. Second attempt to call to let patient know her potassium is low and a prescription was sent to her pharmacy.

## 2023-11-14 ENCOUNTER — TELEPHONE (OUTPATIENT)
Dept: PHYSICAL MEDICINE AND REHAB | Age: 68
End: 2023-11-14

## 2023-11-14 LAB
B2 MICROGLOB SERPL IA-MCNC: 2.1 MG/L (ref 0.6–2.4)
FREE KAPPA/LAMBDA RATIO: 1.7 (ref 0.26–1.65)
KAPPA LC FREE SER-MCNC: 27.3 MG/L (ref 3.7–19.4)
LAMBDA LC FREE SERPL-MCNC: 16.1 MG/L (ref 5.7–26.3)

## 2023-11-21 ENCOUNTER — CARE COORDINATION (OUTPATIENT)
Dept: CARE COORDINATION | Age: 68
End: 2023-11-21

## 2023-11-27 ENCOUNTER — TELEPHONE (OUTPATIENT)
Dept: PHARMACY | Facility: CLINIC | Age: 68
End: 2023-11-27

## 2023-11-27 NOTE — TELEPHONE ENCOUNTER
[facility] CLINICAL PHARMACY: ADHERENCE REVIEW  Identified care gap per [employer]: fills at Non-preferred pharmacy: [facility]: ACE/ARB and Statin adherence      ASSESSMENT    ACE/ARB ADHERENCE    Insurance Records claims through 23 (Prior Year [address] = not reported; YTD [address] = 97% - PASSED):   LOSARTAN POT TAB 50MG last filled on 23 for 90 day supply. Next refill due: 23    Prescribed si tablet/capsule daily    Per Reconcile Dispense History: last filled on 23 for 90 day supply. Per [facility]: last picked up on 23 for 90 day supply. will get 90 day supply ready to  since past due. Billed through [employer]. BP Readings from Last 3 Encounters:   11/10/23 (!) 137/92   10/16/23 (!) 154/72   10/02/23 136/78     Estimated Creatinine Clearance: 82 mL/min (based on SCr of 0.8 mg/dL). Lab Results   Component Value Date    CREATININE 0.8 11/10/2023     Lab Results   Component Value Date    K 3.4 (L) 11/10/2023     STATIN ADHERENCE    Insurance Records claims through 23 (Prior Year PDC = not reported; YTD [address] = 81%; Potential Fail Date: 23):   ROSUVASTATIN TAB 20MG last filled on 23 for 90 day supply. Next refill due: 23    Prescribed si tablet/capsule daily    Per Reconcile Dispense History: last filled on 23 for 90 day supply. Per [facility]: last picked up on 23 for 90 day supply. Billed through [employer].     Lab Results   Component Value Date    CHOL 138 2023    TRIG 44 2023    HDL 67 2023    LDLCHOLESTEROL 62 2023    LDLCALC 62 2023     ALT   Date Value Ref Range Status   11/10/2023 21 0 - 32 U/L Final     AST   Date Value Ref Range Status   11/10/2023 27 0 - 31 U/L Final     The 10-year ASCVD risk score (Cee AGUILAR, et al., 2019) is: 19.9%    Values used to calculate the score:      Age: 76 years      Sex: Female      Is Non- : Yes      Diabetic: Yes      Tobacco smoker: No

## 2023-12-26 ENCOUNTER — CARE COORDINATION (OUTPATIENT)
Dept: CARE COORDINATION | Age: 68
End: 2023-12-26

## 2023-12-29 DIAGNOSIS — Z86.010 PERSONAL HISTORY OF COLONIC POLYPS: ICD-10-CM

## 2023-12-29 PROBLEM — R19.7 DIARRHEA: Status: ACTIVE | Noted: 2023-12-29

## 2023-12-29 PROBLEM — Z86.0100 PERSONAL HISTORY OF COLONIC POLYPS: Status: ACTIVE | Noted: 2023-12-29

## 2024-01-02 ENCOUNTER — TELEPHONE (OUTPATIENT)
Dept: SURGERY | Age: 69
End: 2024-01-02

## 2024-01-02 NOTE — TELEPHONE ENCOUNTER
Prior Authorization Form:      DEMOGRAPHICS:                     Patient Name:  Angelita Robins  Patient :  1955            Insurance:  Payor: [employer] / Plan: [employer] [address] / Product Type: *No Product type* /   Insurance ID Number:    Payer/Plan Subscr  Sex Relation Sub. Ins. ID Effective Group Num   1. [employer]* ANGELITA ROBINS* 1955 Female Self [id number] 10/1/22 [id number]                                   [address]   2. [employer] MEDICAR* ANGELITA ROBINS* 1955 Female Self [id number]                                   [address]         DIAGNOSIS & PROCEDURE:                       Procedure/Operation: COLONOSCOPY           CPT Code: 5378    Diagnosis:  DIARRHEA  H/O COLON POLYPS    ICD10 Code: R19.7   Z86.010    Location:  [place]    Surgeon:  ERVIN HOOKER    SCHEDULING INFORMATION:                          Date: 2024    Time: 13:00              Anesthesia:  MAC/TIVA                                                       Status:  Outpatient        Special Comments:  RESCHEDULED FROM 2023       Electronically signed by Winnie Alaniz MA on 2024 at 3:12 PM

## 2024-01-05 ENCOUNTER — TELEPHONE (OUTPATIENT)
Dept: PRIMARY CARE CLINIC | Age: 69
End: 2024-01-05

## 2024-01-05 DIAGNOSIS — M25.562 LEFT KNEE PAIN, UNSPECIFIED CHRONICITY: Primary | ICD-10-CM

## 2024-01-05 NOTE — TELEPHONE ENCOUNTER
Pt called possibly twisted her lft knee a couple days ago , pain in knee and can hardly walk on it     Pt asking if dr can place an xray order and something or the pain or muscle relaxer     Or even a referral to an orthopedic       [id number] [place] [place] [facility] [address] [place] [phone] - F [phone] [[id number]]

## 2024-01-11 ENCOUNTER — CARE COORDINATION (OUTPATIENT)
Dept: CARE COORDINATION | Age: 69
End: 2024-01-11

## 2024-01-11 DIAGNOSIS — E11.42 DM TYPE 2 WITH DIABETIC PERIPHERAL NEUROPATHY (HCC): ICD-10-CM

## 2024-01-11 NOTE — CARE COORDINATION
Ambulatory Care Coordination Note  2024    Patient Current Location:  Home: [address] [place] [id number]     ACM contacted the patient by telephone. Verified name and  with patient as identifiers. Provided introduction to self, and explanation of the ACM role.     Challenges to be reviewed by the provider   Additional needs identified to be addressed with provider: No  none               Method of communication with provider: none.    ACM: Talat Boyer, RN    ACM contacted Angelita.  She states she is doing \"well\".  She has not checked her blood sugar in two days.  She can not remember her last reading but denies <70 or >300.  She denies s/s of hypo/hyperglycemia.  Angelita complains of long standing pain in her left knee and hip.  She is scheduled with Dr. Claros (ortho) on 1/15/2024.  She plans on attending the appointment.  Upcoming appointments were reviewed with Angelita.  She states she has her medications and takes them as directed daily.    Patient feels she is able to manage her health care needs without further assistance from ACM, and feels she can graduate from Care Coordination.   ACM educated patient that if she needs additional help in managing her health care in the future, she can call [facility] to re-enroll in Care Coordination.     Plan  Care coordination graduation          Lab Results       None            Care Coordination Interventions    Referral from Primary Care Provider: No  Suggested Interventions and Community Resources          Goals Addressed                   This Visit's Progress     COMPLETED: Conditions and Symptoms        I will schedule office visits, as directed by my provider.  I will keep my appointment or reschedule if I have to cancel.  I will notify my provider of any barriers to my plan of care.  I will follow my Zone Management tool to seek urgent or emergent care.  I will notify my provider of any symptoms that indicate a worsening of my condition.    Barriers:

## 2024-01-11 NOTE — TELEPHONE ENCOUNTER
Pt needs refill on the below       metFORMIN (GLUCOPHAGE) 1000 MG tablet         Semaglutide(0.25 or 0.5MG/DOS)  - pt wants to see if insurance will pay for it this year     [facility] #[id number] - [place], [place] - [address] [phone] - F [phone] [[id number]]

## 2024-01-31 ENCOUNTER — OFFICE VISIT (OUTPATIENT)
Dept: ORTHOPEDIC SURGERY | Age: 69
End: 2024-01-31
Payer: COMMERCIAL

## 2024-01-31 VITALS — HEIGHT: 66 IN | BODY MASS INDEX: 36.8 KG/M2 | WEIGHT: 229 LBS

## 2024-01-31 DIAGNOSIS — M25.551 PAIN OF RIGHT HIP: Primary | ICD-10-CM

## 2024-01-31 DIAGNOSIS — G89.29 CHRONIC PAIN OF LEFT KNEE: ICD-10-CM

## 2024-01-31 DIAGNOSIS — M17.12 PRIMARY OSTEOARTHRITIS OF LEFT KNEE: ICD-10-CM

## 2024-01-31 DIAGNOSIS — M16.11 PRIMARY OSTEOARTHRITIS OF RIGHT HIP: ICD-10-CM

## 2024-01-31 DIAGNOSIS — M25.562 CHRONIC PAIN OF LEFT KNEE: ICD-10-CM

## 2024-01-31 PROCEDURE — G8417 CALC BMI ABV UP PARAM F/U: HCPCS | Performed by: FAMILY MEDICINE

## 2024-01-31 PROCEDURE — 20610 DRAIN/INJ JOINT/BURSA W/O US: CPT | Performed by: FAMILY MEDICINE

## 2024-01-31 PROCEDURE — G8399 PT W/DXA RESULTS DOCUMENT: HCPCS | Performed by: FAMILY MEDICINE

## 2024-01-31 PROCEDURE — 99214 OFFICE O/P EST MOD 30 MIN: CPT | Performed by: FAMILY MEDICINE

## 2024-01-31 PROCEDURE — G8484 FLU IMMUNIZE NO ADMIN: HCPCS | Performed by: FAMILY MEDICINE

## 2024-01-31 PROCEDURE — 1123F ACP DISCUSS/DSCN MKR DOCD: CPT | Performed by: FAMILY MEDICINE

## 2024-01-31 PROCEDURE — 1036F TOBACCO NON-USER: CPT | Performed by: FAMILY MEDICINE

## 2024-01-31 PROCEDURE — G8427 DOCREV CUR MEDS BY ELIG CLIN: HCPCS | Performed by: FAMILY MEDICINE

## 2024-01-31 PROCEDURE — 1090F PRES/ABSN URINE INCON ASSESS: CPT | Performed by: FAMILY MEDICINE

## 2024-01-31 PROCEDURE — 3017F COLORECTAL CA SCREEN DOC REV: CPT | Performed by: FAMILY MEDICINE

## 2024-01-31 RX ORDER — LIDOCAINE HYDROCHLORIDE 10 MG/ML
3 INJECTION, SOLUTION INFILTRATION; PERINEURAL ONCE
Status: COMPLETED | OUTPATIENT
Start: 2024-01-31 | End: 2024-01-31

## 2024-01-31 RX ORDER — TRIAMCINOLONE ACETONIDE 40 MG/ML
40 INJECTION, SUSPENSION INTRA-ARTICULAR; INTRAMUSCULAR ONCE
Status: COMPLETED | OUTPATIENT
Start: 2024-01-31 | End: 2024-01-31

## 2024-01-31 RX ADMIN — LIDOCAINE HYDROCHLORIDE 3 ML: 10 INJECTION, SOLUTION INFILTRATION; PERINEURAL at 16:39

## 2024-01-31 RX ADMIN — TRIAMCINOLONE ACETONIDE 40 MG: 40 INJECTION, SUSPENSION INTRA-ARTICULAR; INTRAMUSCULAR at 16:40

## 2024-01-31 NOTE — PROGRESS NOTES
therapy, injection options, advanced imaging in the form of a MRI and referral to an orthopedic surgeon for discussion of surgical opinion.  Due to the severity of her hip arthritis on XR a referral to orthopedic surgery was placed for discussion of total hip arthroplasty.  Patient is agreeable with above plan all questions and concerns were addressed in the office today.     - Porter Watts DO, Orthopaedics, [facility]    3. Chronic pain of left knee  4. Primary osteoarthritis of left knee  Patient presents to the office today for evaluation of left knee pain.  History, referring provider note, physical exam and imaging (as interpreted by me) are consistent with left knee osteoarthritis.  Treatment options discussed with patient in the office today including activity modification, oral anti-inflammatories, physical therapy, injection options, advanced imaging the form of a MRI and referral to an orthopedic surgeon for discussion of surgical opinion. Patient wishes to proceed with conservative treatment in the form of an intra-articular corticosteroid injection which was performed in the office today, please see procedure note below for further details.  Patient will follow up in 6 weeks for reevaluation of symptoms and consider escalation therapy should symptoms persist.  Patient is agreeable with above plan all questions and concerns were addressed in the office today.     - lidocaine 1 % injection 3 mL  - triamcinolone acetonide (KENALOG-40) injection 40 mg    PROCEDURE NOTE: LEFT knee intra-articular corticosteroid injection  The procedure and its risks, benefits and alternatives were discussed with the patient, and informed consent was obtained.  The area was prepped and cleaned with Alcohol.  Ethyl Chloride was used for local anesthesia.  A 25G 1.5\" needle was inserted into the joint and 3.0 mL of 1% Lidocaine without Epinephrine mixed with 1mL of Kenalog 40mg/1mL was injected into the joint

## 2024-02-03 DIAGNOSIS — E03.9 HYPOTHYROIDISM, UNSPECIFIED TYPE: ICD-10-CM

## 2024-02-03 DIAGNOSIS — R19.8 ALTERNATING CONSTIPATION AND DIARRHEA: ICD-10-CM

## 2024-02-05 RX ORDER — DOCUSATE SODIUM 100 MG/1
100 CAPSULE, LIQUID FILLED ORAL 2 TIMES DAILY
Qty: 180 CAPSULE | Refills: 1 | OUTPATIENT
Start: 2024-02-05

## 2024-02-05 RX ORDER — LEVOTHYROXINE SODIUM 112 UG/1
112 TABLET ORAL DAILY
Qty: 90 TABLET | Refills: 1 | Status: SHIPPED | OUTPATIENT
Start: 2024-02-05

## 2024-02-08 ENCOUNTER — OFFICE VISIT (OUTPATIENT)
Dept: ENDOCRINOLOGY | Age: 69
End: 2024-02-08
Payer: COMMERCIAL

## 2024-02-08 VITALS
DIASTOLIC BLOOD PRESSURE: 83 MMHG | RESPIRATION RATE: 16 BRPM | WEIGHT: 225 LBS | HEART RATE: 85 BPM | OXYGEN SATURATION: 96 % | SYSTOLIC BLOOD PRESSURE: 136 MMHG | BODY MASS INDEX: 36.16 KG/M2 | HEIGHT: 66 IN

## 2024-02-08 DIAGNOSIS — E11.9 TYPE 2 DIABETES MELLITUS WITHOUT COMPLICATION, WITHOUT LONG-TERM CURRENT USE OF INSULIN (HCC): Primary | ICD-10-CM

## 2024-02-08 DIAGNOSIS — E11.40 TYPE 2 DIABETES MELLITUS WITH DIABETIC NEUROPATHY, WITHOUT LONG-TERM CURRENT USE OF INSULIN (HCC): ICD-10-CM

## 2024-02-08 DIAGNOSIS — E11.42 DM TYPE 2 WITH DIABETIC PERIPHERAL NEUROPATHY (HCC): ICD-10-CM

## 2024-02-08 DIAGNOSIS — E78.5 HYPERLIPIDEMIA, UNSPECIFIED HYPERLIPIDEMIA TYPE: ICD-10-CM

## 2024-02-08 DIAGNOSIS — E03.9 HYPOTHYROIDISM, UNSPECIFIED TYPE: ICD-10-CM

## 2024-02-08 DIAGNOSIS — E66.01 CLASS 2 SEVERE OBESITY DUE TO EXCESS CALORIES WITH SERIOUS COMORBIDITY AND BODY MASS INDEX (BMI) OF 36.0 TO 36.9 IN ADULT (HCC): ICD-10-CM

## 2024-02-08 LAB — HBA1C MFR BLD: 7.2 %

## 2024-02-08 PROCEDURE — 83036 HEMOGLOBIN GLYCOSYLATED A1C: CPT | Performed by: NURSE PRACTITIONER

## 2024-02-08 PROCEDURE — G8399 PT W/DXA RESULTS DOCUMENT: HCPCS | Performed by: NURSE PRACTITIONER

## 2024-02-08 PROCEDURE — 1123F ACP DISCUSS/DSCN MKR DOCD: CPT | Performed by: NURSE PRACTITIONER

## 2024-02-08 PROCEDURE — 99204 OFFICE O/P NEW MOD 45 MIN: CPT | Performed by: NURSE PRACTITIONER

## 2024-02-08 PROCEDURE — G8417 CALC BMI ABV UP PARAM F/U: HCPCS | Performed by: NURSE PRACTITIONER

## 2024-02-08 PROCEDURE — 3075F SYST BP GE 130 - 139MM HG: CPT | Performed by: NURSE PRACTITIONER

## 2024-02-08 PROCEDURE — 3079F DIAST BP 80-89 MM HG: CPT | Performed by: NURSE PRACTITIONER

## 2024-02-08 PROCEDURE — 3017F COLORECTAL CA SCREEN DOC REV: CPT | Performed by: NURSE PRACTITIONER

## 2024-02-08 PROCEDURE — 1036F TOBACCO NON-USER: CPT | Performed by: NURSE PRACTITIONER

## 2024-02-08 PROCEDURE — 2022F DILAT RTA XM EVC RTNOPTHY: CPT | Performed by: NURSE PRACTITIONER

## 2024-02-08 PROCEDURE — 1090F PRES/ABSN URINE INCON ASSESS: CPT | Performed by: NURSE PRACTITIONER

## 2024-02-08 PROCEDURE — 3051F HG A1C>EQUAL 7.0%<8.0%: CPT | Performed by: NURSE PRACTITIONER

## 2024-02-08 PROCEDURE — G8427 DOCREV CUR MEDS BY ELIG CLIN: HCPCS | Performed by: NURSE PRACTITIONER

## 2024-02-08 PROCEDURE — G8484 FLU IMMUNIZE NO ADMIN: HCPCS | Performed by: NURSE PRACTITIONER

## 2024-02-08 NOTE — PROGRESS NOTES
[facility] WAKU ????  [facility] Department of Endocrinology Diabetes and Metabolism   [address], [place] [address]  Phone: [phone]  Fax: [phone]    Date of Service: 2/14/2024    Primary Care Physician: Sarai Mckeon MD  Referring physician: Sarai Mckeon MD  Provider: BRYAN Wilkes NP     Reason for the visit:    Type 2 DM, New pt referral    History of Present Illness:  The history is provided by the patient. No  was used. Accuracy of the patient data is excellent.  Angelita Slater is a very pleasant 68 y.o. female seen today for diabetes management     Angelita Slater was diagnosed with diabetes approx late 50's  and currently on metformin 1000 mg BID, Ozempic 0.25 mg weekly ( started 2 weeks ago )    In review pt reports she doesn't know how to use the Ozempic pen    Previous use of insulin but has been able to be wean -pt can't recall name, but was long acting    The patient has not been checking blood sugar       Most recent A1c results summarized below  Lab Results   Component Value Date/Time    LABA1C 7.2 02/08/2024 03:05 PM    LABA1C 6.4 09/23/2023 09:44 PM    LABA1C 6.9 09/12/2023 03:05 PM       Patient has had no hypoglycemic episodes   The patient has been mindful of what has been eating and following diabetic diet as encouraged    I reviewed current medications and the patient has no issues with diabetes medications  Angelita Slater is up to date with eye exam and denied any history of diabetic retinopathy     The patient performs her own foot care  Microvascular complications:  No Retinopathy, Nephropathy, +Neuropathy on Lyrica  Macrovascular complications: no CAD, PVD, or Stroke      PAST MEDICAL HISTORY   Past Medical History:   Diagnosis Date    Adrenal incidentaloma (HCC)     Anxiety     Arthritis     Asthma     Bladder incontinence     Cancer (HCC) Dx 2009    multiple Myeloma, in remission currently     Chronic back

## 2024-02-09 ENCOUNTER — OFFICE VISIT (OUTPATIENT)
Dept: ONCOLOGY | Age: 69
End: 2024-02-09
Payer: COMMERCIAL

## 2024-02-09 ENCOUNTER — HOSPITAL ENCOUNTER (OUTPATIENT)
Dept: INFUSION THERAPY | Age: 69
Discharge: HOME | End: 2024-02-09
Payer: COMMERCIAL

## 2024-02-09 VITALS
OXYGEN SATURATION: 91 % | BODY MASS INDEX: 36.27 KG/M2 | DIASTOLIC BLOOD PRESSURE: 76 MMHG | HEIGHT: 66 IN | TEMPERATURE: 97.5 F | SYSTOLIC BLOOD PRESSURE: 151 MMHG | HEART RATE: 92 BPM | WEIGHT: 225.7 LBS

## 2024-02-09 DIAGNOSIS — R91.8 MULTIPLE LUNG NODULES: Primary | ICD-10-CM

## 2024-02-09 DIAGNOSIS — C90.00 MULTIPLE MYELOMA, REMISSION STATUS UNSPECIFIED (HCC): ICD-10-CM

## 2024-02-09 DIAGNOSIS — J44.9 CHRONIC OBSTRUCTIVE PULMONARY DISEASE, UNSPECIFIED COPD TYPE (HCC): ICD-10-CM

## 2024-02-09 DIAGNOSIS — Z87.891 HISTORY OF TOBACCO USE: ICD-10-CM

## 2024-02-09 DIAGNOSIS — Z12.2 SCREENING FOR LUNG CANCER: ICD-10-CM

## 2024-02-09 LAB
ALBUMIN SERPL-MCNC: 4.3 G/DL (ref 3.5–5.2)
ALP SERPL-CCNC: 96 U/L (ref 35–104)
ALT SERPL-CCNC: 18 U/L (ref 0–32)
ANION GAP SERPL CALCULATED.3IONS-SCNC: 12 MMOL/L (ref 7–16)
AST SERPL-CCNC: 23 U/L (ref 0–31)
BASOPHILS # BLD: 0.04 K/UL (ref 0–0.2)
BASOPHILS NFR BLD: 1 % (ref 0–2)
BILIRUB SERPL-MCNC: 0.4 MG/DL (ref 0–1.2)
BUN SERPL-MCNC: 10 MG/DL (ref 6–23)
CALCIUM SERPL-MCNC: 9.5 MG/DL (ref 8.6–10.2)
CHLORIDE SERPL-SCNC: 100 MMOL/L (ref 98–107)
CO2 SERPL-SCNC: 27 MMOL/L (ref 22–29)
CREAT SERPL-MCNC: 0.7 MG/DL (ref 0.5–1)
EOSINOPHIL # BLD: 0.2 K/UL (ref 0.05–0.5)
EOSINOPHILS RELATIVE PERCENT: 3 % (ref 0–6)
ERYTHROCYTE [DISTWIDTH] IN BLOOD BY AUTOMATED COUNT: 12.9 % (ref 11.5–15)
GFR SERPL CREATININE-BSD FRML MDRD: >60 ML/MIN/1.73M2
GLUCOSE SERPL-MCNC: 110 MG/DL (ref 74–99)
HCT VFR BLD AUTO: 36.4 % (ref 34–48)
HGB BLD-MCNC: 11.3 G/DL (ref 11.5–15.5)
IMM GRANULOCYTES # BLD AUTO: <0.03 K/UL (ref 0–0.58)
IMM GRANULOCYTES NFR BLD: 0 % (ref 0–5)
LYMPHOCYTES NFR BLD: 3.29 K/UL (ref 1.5–4)
LYMPHOCYTES RELATIVE PERCENT: 46 % (ref 20–42)
MCH RBC QN AUTO: 28.5 PG (ref 26–35)
MCHC RBC AUTO-ENTMCNC: 31 G/DL (ref 32–34.5)
MCV RBC AUTO: 91.9 FL (ref 80–99.9)
MONOCYTES NFR BLD: 0.53 K/UL (ref 0.1–0.95)
MONOCYTES NFR BLD: 7 % (ref 2–12)
NEUTROPHILS NFR BLD: 43 % (ref 43–80)
NEUTS SEG NFR BLD: 3.05 K/UL (ref 1.8–7.3)
PLATELET # BLD AUTO: 320 K/UL (ref 130–450)
PMV BLD AUTO: 9.9 FL (ref 7–12)
POTASSIUM SERPL-SCNC: 3.6 MMOL/L (ref 3.5–5)
PROT SERPL-MCNC: 7.8 G/DL (ref 6.4–8.3)
RBC # BLD AUTO: 3.96 M/UL (ref 3.5–5.5)
SODIUM SERPL-SCNC: 139 MMOL/L (ref 132–146)
WBC OTHER # BLD: 7.1 K/UL (ref 4.5–11.5)

## 2024-02-09 PROCEDURE — 84155 ASSAY OF PROTEIN SERUM: CPT

## 2024-02-09 PROCEDURE — 80053 COMPREHEN METABOLIC PANEL: CPT

## 2024-02-09 PROCEDURE — 36415 COLL VENOUS BLD VENIPUNCTURE: CPT

## 2024-02-09 PROCEDURE — 84165 PROTEIN E-PHORESIS SERUM: CPT

## 2024-02-09 PROCEDURE — G8417 CALC BMI ABV UP PARAM F/U: HCPCS | Performed by: INTERNAL MEDICINE

## 2024-02-09 PROCEDURE — 85025 COMPLETE CBC W/AUTO DIFF WBC: CPT

## 2024-02-09 PROCEDURE — 83521 IG LIGHT CHAINS FREE EACH: CPT

## 2024-02-09 PROCEDURE — 3077F SYST BP >= 140 MM HG: CPT | Performed by: INTERNAL MEDICINE

## 2024-02-09 PROCEDURE — G8484 FLU IMMUNIZE NO ADMIN: HCPCS | Performed by: INTERNAL MEDICINE

## 2024-02-09 PROCEDURE — 82784 ASSAY IGA/IGD/IGG/IGM EACH: CPT

## 2024-02-09 PROCEDURE — 3017F COLORECTAL CA SCREEN DOC REV: CPT | Performed by: INTERNAL MEDICINE

## 2024-02-09 PROCEDURE — 1036F TOBACCO NON-USER: CPT | Performed by: INTERNAL MEDICINE

## 2024-02-09 PROCEDURE — 1123F ACP DISCUSS/DSCN MKR DOCD: CPT | Performed by: INTERNAL MEDICINE

## 2024-02-09 PROCEDURE — 1090F PRES/ABSN URINE INCON ASSESS: CPT | Performed by: INTERNAL MEDICINE

## 2024-02-09 PROCEDURE — 3078F DIAST BP <80 MM HG: CPT | Performed by: INTERNAL MEDICINE

## 2024-02-09 PROCEDURE — G8427 DOCREV CUR MEDS BY ELIG CLIN: HCPCS | Performed by: INTERNAL MEDICINE

## 2024-02-09 PROCEDURE — G8399 PT W/DXA RESULTS DOCUMENT: HCPCS | Performed by: INTERNAL MEDICINE

## 2024-02-09 PROCEDURE — 86334 IMMUNOFIX E-PHORESIS SERUM: CPT

## 2024-02-09 PROCEDURE — 82232 ASSAY OF BETA-2 PROTEIN: CPT

## 2024-02-09 PROCEDURE — 99214 OFFICE O/P EST MOD 30 MIN: CPT | Performed by: INTERNAL MEDICINE

## 2024-02-10 LAB
IGA SERPL-MCNC: 93 MG/DL (ref 70–400)
IGG SERPL-MCNC: 1754 MG/DL (ref 700–1600)
IGM SERPL-MCNC: 30 MG/DL (ref 40–230)

## 2024-02-12 NOTE — PROGRESS NOTES
Patient provided with discharge instructions, received printed AVS.  All questions answered.  Patient understands follow up plan of care.     
Vaccine Information Statement(s) was given today. This has been reviewed, questions answered, and verbal consent given by Parent for injection(s) and administration of Multi Vaccines between birth and 6 months.      Patient tolerated without incident. See immunization grid for documentation.        
monoclonal IgG lambda, M-spike 0.7 gm/dl  from 9/9/2016, stable compared with the previous M-spike level. The patient does not have anemia, renal failure, hypercalcemia or lytic lesions on the lumbar spine MRI. IgG had increased sine 2014, skeletal survey was ordered, and is negative for lytic lesions, she had a BM bx and aspirate done by IR on 11/28/2016, Clot and aspirate suboptimal, biopsy showing 15% plasma cells, Flow cytometry positive for a  lambda restricted plasma cell neoplasm, Cytogenetics revealing a normal female karyotype, MM FISH negative.   The patient has 15% plasma cells in the bone marrow, no evidence of end organ damage, she has a smoldering multiple myeloma,risk of progression to MM, at a rate of 10 percent per year for the first five years, 3 percent per year for the next five years, and 1 to 2 percent per year for the following 10 years.     The patient returns for a follow-up visit.  She was lost for follow-up, then was seen as inpatient when she was admitted to the hospital with pneumonia, skeletal survey was done on 12/18/2022 was negative for lytic lesions, labs reviewed, SPEP with immunofixation had revealed IgG lambda monoclonal gammopathy, M spike 0.8, stable, IgG 1754, had improved, kappa 20.5, lambda normal at 40.6, normal kappa to lambda ratio, no evidence of progression to multiple myeloma, recommended observation.    The patient has normocytic anemia, hemoglobin was 8.9G/DL,  she does not have vitamin B12/folate or iron deficiency, anemia could have been secondary to the lung infection, antibiotics, inflammation, work-up was done, she does not have zinc or copper deficiency, reticulocyte 2.4%, PS negative for rouleaux formation, no circulating immature forms.  Erythropoietin 18.  Fit test is positive, the patient was referred by Dr. Mckeon to , she was scheduled for colonoscopy on 11/20/2023, but was no-show.  Hemoglobin is stable at 11.3 G/DL.    Lung nodules, the patient did not

## 2024-02-13 LAB
ALBUMIN SERPL-MCNC: 3.8 G/DL (ref 3.5–4.7)
ALPHA1 GLOB SERPL ELPH-MCNC: 0.2 G/DL (ref 0.2–0.4)
ALPHA2 GLOB SERPL ELPH-MCNC: 0.7 G/DL (ref 0.5–1)
B-GLOBULIN SERPL ELPH-MCNC: 0.9 G/DL (ref 0.8–1.3)
B2 MICROGLOB SERPL IA-MCNC: 1.9 MG/L
FREE KAPPA/LAMBDA RATIO: 1.47 (ref 0.26–1.65)
GAMMA GLOB SERPL ELPH-MCNC: 1.6 G/DL (ref 0.7–1.6)
INTERPRETATION SERPL IFE-IMP: NORMAL
KAPPA LC FREE SER-MCNC: 21.5 MG/L (ref 3.7–19.4)
LAMBDA LC FREE SERPL-MCNC: 14.6 MG/L (ref 5.7–26.3)
M PROTEIN SERPL ELPH-MCNC: 0.8 G/DL
PATH REV: NORMAL
PATHOLOGIST: NORMAL
PROT PATTERN SERPL ELPH-IMP: NORMAL
PROT SERPL-MCNC: 7.2 G/DL (ref 6.4–8.3)

## 2024-02-15 LAB — DIABETIC RETINOPATHY: NEGATIVE

## 2024-02-21 ENCOUNTER — OFFICE VISIT (OUTPATIENT)
Dept: ORTHOPEDIC SURGERY | Age: 69
End: 2024-02-21
Payer: COMMERCIAL

## 2024-02-21 DIAGNOSIS — M47.22 OSTEOARTHRITIS OF SPINE WITH RADICULOPATHY, CERVICAL REGION: ICD-10-CM

## 2024-02-21 DIAGNOSIS — M17.12 PRIMARY OSTEOARTHRITIS OF LEFT KNEE: ICD-10-CM

## 2024-02-21 DIAGNOSIS — G89.29 CHRONIC PAIN OF LEFT KNEE: Primary | ICD-10-CM

## 2024-02-21 DIAGNOSIS — M25.562 CHRONIC PAIN OF LEFT KNEE: Primary | ICD-10-CM

## 2024-02-21 PROCEDURE — 1123F ACP DISCUSS/DSCN MKR DOCD: CPT | Performed by: FAMILY MEDICINE

## 2024-02-21 PROCEDURE — G8399 PT W/DXA RESULTS DOCUMENT: HCPCS | Performed by: FAMILY MEDICINE

## 2024-02-21 PROCEDURE — 99213 OFFICE O/P EST LOW 20 MIN: CPT | Performed by: FAMILY MEDICINE

## 2024-02-21 PROCEDURE — 1090F PRES/ABSN URINE INCON ASSESS: CPT | Performed by: FAMILY MEDICINE

## 2024-02-21 PROCEDURE — 3017F COLORECTAL CA SCREEN DOC REV: CPT | Performed by: FAMILY MEDICINE

## 2024-02-21 PROCEDURE — G8427 DOCREV CUR MEDS BY ELIG CLIN: HCPCS | Performed by: FAMILY MEDICINE

## 2024-02-21 PROCEDURE — 1036F TOBACCO NON-USER: CPT | Performed by: FAMILY MEDICINE

## 2024-02-21 PROCEDURE — G8417 CALC BMI ABV UP PARAM F/U: HCPCS | Performed by: FAMILY MEDICINE

## 2024-02-21 PROCEDURE — G8484 FLU IMMUNIZE NO ADMIN: HCPCS | Performed by: FAMILY MEDICINE

## 2024-02-21 RX ORDER — PREGABALIN 75 MG/1
75 CAPSULE ORAL 3 TIMES DAILY
Qty: 90 CAPSULE | Refills: 2 | Status: SHIPPED | OUTPATIENT
Start: 2024-02-21 | End: 2024-05-21

## 2024-02-21 NOTE — PROGRESS NOTES
motor deficits detected.  Musculoskeletal: LEFT Knee:  ROM: flexion to 130, extension to 0.  Mild effusion.  No obvious bony abnormality or ecchymosis.  TTP: ( + ) MJL, ( + ) LJL, ( - ) patellar tendon, ( - ) quadriceps tendon, ( + ) patellar facets  Special Tests: ( - ) Patellar apprehension, ( - ) patellar grind  Stable and without pain to varus and valgus stress at 0 and 30 degrees of knee flexion.  ACL: ( - ) Lachman's, ( - ) anterior drawer  PCL: ( - ) posterior drawer, ( - ) sag sign  Meniscus: ( + ) Saray's   ______________________________________________________________________    Assessment & Plan :    1. Chronic pain of left knee  2. Primary osteoarthritis of left knee  Patient presents to the office today for follow-up of left knee pain s/p CSI on 1/31/2024.  Patient reports inadequate response to corticosteroid injections.  Treatment options reviewed with patient in the office today and patient wishes to continue with conservative treatment in the form of hyaluronic acid injections.  Prior authorization for hyaluronic acid injections will be obtained from the insurance company. Patient will follow up once prior authorization for injections is approved.  Patient is agreeable with the above plan and all questions and concerns were addressed in the office today.     Return to Office: Return for procedure once prior authorization is obtained.    Braeden Claros MD

## 2024-02-26 DIAGNOSIS — E11.42 DM TYPE 2 WITH DIABETIC PERIPHERAL NEUROPATHY (HCC): ICD-10-CM

## 2024-02-26 DIAGNOSIS — M79.10 MYALGIA: ICD-10-CM

## 2024-02-26 DIAGNOSIS — F32.A DEPRESSION, UNSPECIFIED DEPRESSION TYPE: ICD-10-CM

## 2024-02-27 RX ORDER — BACLOFEN 10 MG/1
TABLET ORAL
Qty: 30 TABLET | Refills: 1 | Status: SHIPPED | OUTPATIENT
Start: 2024-02-27

## 2024-02-27 RX ORDER — CITALOPRAM 40 MG/1
40 TABLET ORAL DAILY
Qty: 90 TABLET | Refills: 1 | Status: SHIPPED | OUTPATIENT
Start: 2024-02-27

## 2024-02-29 DIAGNOSIS — M79.7 FIBROMYALGIA: ICD-10-CM

## 2024-02-29 RX ORDER — AMITRIPTYLINE HYDROCHLORIDE 50 MG/1
TABLET, FILM COATED ORAL
Qty: 90 TABLET | Refills: 1 | OUTPATIENT
Start: 2024-02-29

## 2024-03-28 ENCOUNTER — OFFICE VISIT (OUTPATIENT)
Dept: ORTHOPEDIC SURGERY | Age: 69
End: 2024-03-28
Payer: COMMERCIAL

## 2024-03-28 DIAGNOSIS — M16.11 PRIMARY OSTEOARTHRITIS OF RIGHT HIP: Primary | ICD-10-CM

## 2024-03-28 DIAGNOSIS — M17.12 PRIMARY OSTEOARTHRITIS OF LEFT KNEE: ICD-10-CM

## 2024-03-28 PROCEDURE — G8484 FLU IMMUNIZE NO ADMIN: HCPCS | Performed by: STUDENT IN AN ORGANIZED HEALTH CARE EDUCATION/TRAINING PROGRAM

## 2024-03-28 PROCEDURE — 3017F COLORECTAL CA SCREEN DOC REV: CPT | Performed by: STUDENT IN AN ORGANIZED HEALTH CARE EDUCATION/TRAINING PROGRAM

## 2024-03-28 PROCEDURE — G8427 DOCREV CUR MEDS BY ELIG CLIN: HCPCS | Performed by: STUDENT IN AN ORGANIZED HEALTH CARE EDUCATION/TRAINING PROGRAM

## 2024-03-28 PROCEDURE — G8399 PT W/DXA RESULTS DOCUMENT: HCPCS | Performed by: STUDENT IN AN ORGANIZED HEALTH CARE EDUCATION/TRAINING PROGRAM

## 2024-03-28 PROCEDURE — 1090F PRES/ABSN URINE INCON ASSESS: CPT | Performed by: STUDENT IN AN ORGANIZED HEALTH CARE EDUCATION/TRAINING PROGRAM

## 2024-03-28 PROCEDURE — G8417 CALC BMI ABV UP PARAM F/U: HCPCS | Performed by: STUDENT IN AN ORGANIZED HEALTH CARE EDUCATION/TRAINING PROGRAM

## 2024-03-28 PROCEDURE — 1036F TOBACCO NON-USER: CPT | Performed by: STUDENT IN AN ORGANIZED HEALTH CARE EDUCATION/TRAINING PROGRAM

## 2024-03-28 PROCEDURE — 1123F ACP DISCUSS/DSCN MKR DOCD: CPT | Performed by: STUDENT IN AN ORGANIZED HEALTH CARE EDUCATION/TRAINING PROGRAM

## 2024-03-28 PROCEDURE — 99204 OFFICE O/P NEW MOD 45 MIN: CPT | Performed by: STUDENT IN AN ORGANIZED HEALTH CARE EDUCATION/TRAINING PROGRAM

## 2024-03-28 NOTE — PATIENT INSTRUCTIONS
following information:  Your name, spelling of last name, date of birth, name of medication, your pharmacy name and phone number.  Please do not wait until the last minute to call in your prescription requests.  There is a 24 - 72 hour turn-around time for all refills.  If you call on Friday, your request will not begin processing until the next business day.  You will be informed when your prescription has been printed and ready for pickup at the doctor's office.    Any questions, problems or concerns regarding your surgery should be addressed to our nurse by calling the office Monday through Friday 8:00 am - 4:00 pm.                      Alex Salas D.O.  [facility]  [address] [place] [address], [place] [address]  Phone:  [phone]    Fax:   [phone]

## 2024-03-28 NOTE — PROGRESS NOTES
New Hip Patient     Referring Provider:   No referring provider defined for this encounter.    CHIEF COMPLAINT:   Chief Complaint   Patient presents with    New Patient     Rt hip and left knee pain, denies injury, never had any injections        HPI:    Angelita Slater is a 69 y.o. year old female with right hip and left knee pain.  She has been treated for osteoarthritis of the joint for a number of years.  She has tried oral anti-inflammatories, Tylenol, activity modification, weight loss, supervised exercise program amongst other modalities including injections.  None of these have provided lasting relief.  She is here today to discuss surgery.  She does have chronic back pain on fibromyalgia.  She ambulates with a cane.  She has significant groin pain with ambulation that gets better with rest.  None of her medications seem to be helping.  She is diabetic that is somewhat well-controlled currently.  She is also a COPD patient.  Her left knee pain is also severe and disabling.  She has trouble walking long distances and has to stop frequently.    PAST MEDICAL HISTORY  Past Medical History:   Diagnosis Date    Adrenal incidentaloma (HCC)     Anxiety     Arthritis     Asthma     Bladder incontinence     Cancer (HCC) Dx 2009    multiple Myeloma, in remission currently     Chronic back pain     COPD (chronic obstructive pulmonary disease) (HCC)     on O2 2 liters  (uses with activity and at night to sleep)    Depression     Encounter for screening colonoscopy     Fibromyalgia     GERD (gastroesophageal reflux disease)     Hyperlipidemia     Hypertension     Hypothyroidism     MGUS (monoclonal gammopathy of unknown significance)     Neuropathy     Prolonged emergence from general anesthesia     Sleep apnea     Type II or unspecified type diabetes mellitus without mention of complication, not stated as uncontrolled        PAST SURGICAL HISTORY  Past Surgical History:   Procedure Laterality Date    ANESTHESIA NERVE

## 2024-04-01 ENCOUNTER — TELEPHONE (OUTPATIENT)
Dept: ORTHOPEDIC SURGERY | Age: 69
End: 2024-04-01

## 2024-04-01 ENCOUNTER — PREP FOR PROCEDURE (OUTPATIENT)
Dept: ORTHOPEDIC SURGERY | Age: 69
End: 2024-04-01

## 2024-04-01 DIAGNOSIS — M16.11 PRIMARY OSTEOARTHRITIS OF RIGHT HIP: ICD-10-CM

## 2024-04-01 NOTE — TELEPHONE ENCOUNTER
Scheduled for Right NEO 5/20/24    Medical clearance request sent to Dr Sarai Mckeon and Dr Tree Villatoro

## 2024-04-02 ENCOUNTER — OFFICE VISIT (OUTPATIENT)
Dept: PRIMARY CARE CLINIC | Age: 69
End: 2024-04-02

## 2024-04-02 VITALS
DIASTOLIC BLOOD PRESSURE: 72 MMHG | OXYGEN SATURATION: 93 % | RESPIRATION RATE: 18 BRPM | TEMPERATURE: 96.9 F | HEART RATE: 88 BPM | SYSTOLIC BLOOD PRESSURE: 112 MMHG | HEIGHT: 66 IN | BODY MASS INDEX: 33.75 KG/M2 | WEIGHT: 210 LBS

## 2024-04-02 VITALS
HEIGHT: 66 IN | WEIGHT: 210 LBS | TEMPERATURE: 96.9 F | SYSTOLIC BLOOD PRESSURE: 112 MMHG | HEART RATE: 88 BPM | BODY MASS INDEX: 33.75 KG/M2 | DIASTOLIC BLOOD PRESSURE: 72 MMHG | RESPIRATION RATE: 18 BRPM | OXYGEN SATURATION: 93 %

## 2024-04-02 DIAGNOSIS — R11.2 NAUSEA AND VOMITING, UNSPECIFIED VOMITING TYPE: ICD-10-CM

## 2024-04-02 DIAGNOSIS — Z87.891 PERSONAL HISTORY OF TOBACCO USE: ICD-10-CM

## 2024-04-02 DIAGNOSIS — Z01.818 PRE-OP EXAM: Primary | ICD-10-CM

## 2024-04-02 DIAGNOSIS — Z01.810 PREOP CARDIOVASCULAR EXAM: ICD-10-CM

## 2024-04-02 DIAGNOSIS — F33.41 RECURRENT MAJOR DEPRESSIVE DISORDER, IN PARTIAL REMISSION (HCC): ICD-10-CM

## 2024-04-02 DIAGNOSIS — J41.8 MIXED SIMPLE AND MUCOPURULENT CHRONIC BRONCHITIS (HCC): ICD-10-CM

## 2024-04-02 DIAGNOSIS — Z00.00 MEDICARE ANNUAL WELLNESS VISIT, SUBSEQUENT: Primary | ICD-10-CM

## 2024-04-02 DIAGNOSIS — E11.42 DM TYPE 2 WITH DIABETIC PERIPHERAL NEUROPATHY (HCC): ICD-10-CM

## 2024-04-02 DIAGNOSIS — E27.8 ADRENAL INCIDENTALOMA (HCC): ICD-10-CM

## 2024-04-02 DIAGNOSIS — E66.09 CLASS 1 OBESITY DUE TO EXCESS CALORIES WITH SERIOUS COMORBIDITY AND BODY MASS INDEX (BMI) OF 33.0 TO 33.9 IN ADULT: ICD-10-CM

## 2024-04-02 DIAGNOSIS — F32.0 CURRENT MILD EPISODE OF MAJOR DEPRESSIVE DISORDER, UNSPECIFIED WHETHER RECURRENT (HCC): ICD-10-CM

## 2024-04-02 DIAGNOSIS — R10.10 PAIN OF UPPER ABDOMEN: ICD-10-CM

## 2024-04-02 DIAGNOSIS — E78.5 HYPERLIPIDEMIA, UNSPECIFIED HYPERLIPIDEMIA TYPE: ICD-10-CM

## 2024-04-02 DIAGNOSIS — E03.9 HYPOTHYROIDISM, UNSPECIFIED TYPE: ICD-10-CM

## 2024-04-02 PROBLEM — I50.21 ACUTE SYSTOLIC (CONGESTIVE) HEART FAILURE (HCC): Status: RESOLVED | Noted: 2023-03-20 | Resolved: 2024-04-02

## 2024-04-02 PROBLEM — J96.11 CHRONIC RESPIRATORY FAILURE WITH HYPOXIA AND HYPERCAPNIA (HCC): Status: RESOLVED | Noted: 2023-01-07 | Resolved: 2024-04-02

## 2024-04-02 PROBLEM — J96.12 CHRONIC RESPIRATORY FAILURE WITH HYPOXIA AND HYPERCAPNIA (HCC): Status: RESOLVED | Noted: 2023-01-07 | Resolved: 2024-04-02

## 2024-04-02 PROBLEM — J96.01 ACUTE RESPIRATORY FAILURE WITH HYPOXIA (HCC): Status: RESOLVED | Noted: 2022-12-17 | Resolved: 2024-04-02

## 2024-04-02 LAB
CHP ED QC CHECK: ABNORMAL
GLUCOSE BLD-MCNC: 112 MG/DL
HBA1C MFR BLD: 6.2 %

## 2024-04-02 RX ORDER — FAMOTIDINE 40 MG/1
1 TABLET, FILM COATED ORAL NIGHTLY
COMMUNITY

## 2024-04-02 RX ORDER — SEMAGLUTIDE 0.68 MG/ML
INJECTION, SOLUTION SUBCUTANEOUS
COMMUNITY
Start: 2024-03-24 | End: 2024-04-02 | Stop reason: ALTCHOICE

## 2024-04-02 ASSESSMENT — ENCOUNTER SYMPTOMS
VOICE CHANGE: 0
CHEST TIGHTNESS: 0
ABDOMINAL PAIN: 0
VOMITING: 1
NAUSEA: 1
WHEEZING: 0
SHORTNESS OF BREATH: 1

## 2024-04-02 ASSESSMENT — PATIENT HEALTH QUESTIONNAIRE - PHQ9
1. LITTLE INTEREST OR PLEASURE IN DOING THINGS: MORE THAN HALF THE DAYS
7. TROUBLE CONCENTRATING ON THINGS, SUCH AS READING THE NEWSPAPER OR WATCHING TELEVISION: MORE THAN HALF THE DAYS
SUM OF ALL RESPONSES TO PHQ QUESTIONS 1-9: 14
6. FEELING BAD ABOUT YOURSELF - OR THAT YOU ARE A FAILURE OR HAVE LET YOURSELF OR YOUR FAMILY DOWN: NOT AT ALL
5. POOR APPETITE OR OVEREATING: NEARLY EVERY DAY
10. IF YOU CHECKED OFF ANY PROBLEMS, HOW DIFFICULT HAVE THESE PROBLEMS MADE IT FOR YOU TO DO YOUR WORK, TAKE CARE OF THINGS AT HOME, OR GET ALONG WITH OTHER PEOPLE: NOT DIFFICULT AT ALL
3. TROUBLE FALLING OR STAYING ASLEEP: MORE THAN HALF THE DAYS
SUM OF ALL RESPONSES TO PHQ QUESTIONS 1-9: 14
SUM OF ALL RESPONSES TO PHQ QUESTIONS 1-9: 14
2. FEELING DOWN, DEPRESSED OR HOPELESS: MORE THAN HALF THE DAYS
4. FEELING TIRED OR HAVING LITTLE ENERGY: MORE THAN HALF THE DAYS
SUM OF ALL RESPONSES TO PHQ9 QUESTIONS 1 & 2: 4
SUM OF ALL RESPONSES TO PHQ QUESTIONS 1-9: 14
8. MOVING OR SPEAKING SO SLOWLY THAT OTHER PEOPLE COULD HAVE NOTICED. OR THE OPPOSITE, BEING SO FIGETY OR RESTLESS THAT YOU HAVE BEEN MOVING AROUND A LOT MORE THAN USUAL: SEVERAL DAYS
9. THOUGHTS THAT YOU WOULD BE BETTER OFF DEAD, OR OF HURTING YOURSELF: NOT AT ALL

## 2024-04-02 ASSESSMENT — LIFESTYLE VARIABLES
HOW OFTEN DO YOU HAVE A DRINK CONTAINING ALCOHOL: MONTHLY OR LESS
HOW MANY STANDARD DRINKS CONTAINING ALCOHOL DO YOU HAVE ON A TYPICAL DAY: 1 OR 2

## 2024-04-02 NOTE — PROGRESS NOTES
HPI:  Patient comes in today for Fu on HTN & preop evaluation,pt. Is having emesis now in the office today .  The patient states she is scheduled to have hip surgery in May, she is following with pulmonary for the chronic bronchitis and reports from her pulmonologist are reviewed and discussed today  Patient states she has started with the nausea and vomiting only 15 minutes ago but admits that this happened in the past approximately 2 weeks ago patient is complaining of right hip pain and left knee pain she is following with orthopedic  Review of Systems   Constitutional:  Negative for chills, fever and unexpected weight change.   HENT:  Negative for postnasal drip, sore throat and voice change.    Respiratory:  Positive for shortness of breath. Negative for chest tightness and wheezing.    Cardiovascular:  Negative for chest pain and leg swelling.   Gastrointestinal:  Positive for nausea and vomiting. Negative for abdominal pain.   Musculoskeletal:  Positive for arthralgias and gait problem. Negative for joint swelling.   Skin:  Negative for rash and wound.   Psychiatric/Behavioral:  Negative for confusion, hallucinations, self-injury and suicidal ideas.         Past Medical History:   Diagnosis Date    Adrenal incidentaloma (HCC)     Anxiety     Arthritis     Asthma     Bladder incontinence     Cancer (HCC) Dx 2009    multiple Myeloma, in remission currently     Chronic back pain     COPD (chronic obstructive pulmonary disease) (HCC)     on O2 2 liters  (uses with activity and at night to sleep)    Depression     Encounter for screening colonoscopy     Fibromyalgia     GERD (gastroesophageal reflux disease)     Hyperlipidemia     Hypertension     Hypothyroidism     MGUS (monoclonal gammopathy of unknown significance)     Neuropathy     Prolonged emergence from general anesthesia     Sleep apnea     Type II or unspecified type diabetes mellitus without mention of complication, not stated as uncontrolled      Past

## 2024-04-02 NOTE — PROGRESS NOTES
Willie Garcia MD   albuterol (PROVENTIL) (2.5 MG/3ML) 0.083% nebulizer solution Take 3 mLs by nebulization every 6 hours as needed for Wheezing or Shortness of Breath Yes Phillip Daly MD   SYMBICORT 160-4.5 MCG/ACT AERO INHALE 2 PUFFS INTO THE LUNGS TWICE DAILY Yes Facundo Dobbs MD   docusate sodium (COLACE) 100 MG capsule Take 1 capsule by mouth 2 times daily Yes Warren Dorsey MD   mineral oil-hydrophilic petrolatum (HYDROPHOR) ointment Apply topically as needed. Yes Sarath Gooden MD       [facility] (Including outside providers/suppliers regularly involved in providing care):   Patient Care Team:  Sarai Mkceon MD as PCP - General (Internal Medicine)  Sarai Mckeon MD as PCP - Empaneled Provider  Tree Villatoro MD as Consulting Physician (Pulmonology)  Rafael Morataya as Imaging Navigator     Reviewed and updated this visit:  Tobacco  Allergies  Meds  Med Hx  Surg Hx  Soc Hx  Fam Hx

## 2024-04-02 NOTE — TELEPHONE ENCOUNTER
Patient seen by Pulmonology 3/29/24  Needs follow up in 4-6 weeks to be cleared for Right NEO  (4/2/24) - lvm for patient to call office to be sure follow up is scheduled

## 2024-04-07 DIAGNOSIS — R19.8 ALTERNATING CONSTIPATION AND DIARRHEA: ICD-10-CM

## 2024-04-08 RX ORDER — DOCUSATE SODIUM 100 MG/1
100 CAPSULE, LIQUID FILLED ORAL 2 TIMES DAILY
Qty: 180 CAPSULE | Refills: 1 | OUTPATIENT
Start: 2024-04-08

## 2024-05-07 ENCOUNTER — ENROLLMENT (OUTPATIENT)
Dept: PHARMACY | Facility: CLINIC | Age: 69
End: 2024-05-07

## 2024-05-07 NOTE — PROGRESS NOTES
PSYCHIATRIC INITIAL EVALUATION      CHIEF COMPLAINT:  \"I want to feel like my old self.\"    HISTORY OF PRESENT ILLNESS: Angelita Slater is a 69 y.o. female who presents for psychiatric evaluation at [facility]. Patient with a past medical history of hypertension, type 2 diabetes, hypothyroidism, EDWARD, CKD, anemia, hyperlipidemia, fibromyalgia, chronic pain, neuropathy, anxiety, and MDD. Home psychotropics include Elavil 50 mg and Celexa 40 mg daily. Patient reports being diagnosed with anxiety and depression sometime in the 80s or 90s. She also reports being prescribed her current psychotropic medication regimen since that time. Patient complaining of uncontrolled symptoms of anxiety like excessively worrying, racing thoughts, feeling easily overwhelmed, and shortness of breath. Patient also complains of feeling depressed. Current symptoms of depression include lack of interest, feeling unmotivated, difficulty focusing, fluctuating sleep pattern, isolating, irritability, and fatigue. Current stressors include patient's co-occurring medical issues (multiple myeloma and fibromyalgia), chronic pain, her cousin's son recently passing away, and her son being in rehab. Patient acknowledges that chronic pain has been negatively impacting her mood and day to day life. 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 She is inquiring about adjusting antidepressant therapy as she feels that Celexa is no longer effective. Patient is not currently suicidal, homicidal, psychotic, or manic. She is pleasant and cooperative throughout the interview.    Anxiety: See above   Depression: See above   Constanza: Denies   SI/HI: Denies   Hallucinations: Denies  Paranoia: Denies   Sleep: Fluctuating, broken sleep  Appetite: Adequate    OARRS: Reviewed.    PAST PSYCHIATRIC HISTORY: History of

## 2024-05-08 ENCOUNTER — OFFICE VISIT (OUTPATIENT)
Age: 69
End: 2024-05-08
Payer: MEDICARE

## 2024-05-08 ENCOUNTER — TELEPHONE (OUTPATIENT)
Dept: ORTHOPEDIC SURGERY | Age: 69
End: 2024-05-08

## 2024-05-08 VITALS
OXYGEN SATURATION: 97 % | DIASTOLIC BLOOD PRESSURE: 79 MMHG | HEART RATE: 94 BPM | TEMPERATURE: 96.4 F | SYSTOLIC BLOOD PRESSURE: 168 MMHG

## 2024-05-08 DIAGNOSIS — F41.1 GAD (GENERALIZED ANXIETY DISORDER): ICD-10-CM

## 2024-05-08 DIAGNOSIS — F33.1 MODERATE EPISODE OF RECURRENT MAJOR DEPRESSIVE DISORDER (HCC): Primary | ICD-10-CM

## 2024-05-08 PROCEDURE — 90792 PSYCH DIAG EVAL W/MED SRVCS: CPT

## 2024-05-08 RX ORDER — CITALOPRAM 20 MG/1
20 TABLET ORAL DAILY
Qty: 14 TABLET | Refills: 0 | Status: SHIPPED | OUTPATIENT
Start: 2024-05-08 | End: 2024-05-22

## 2024-05-08 RX ORDER — DULOXETIN HYDROCHLORIDE 30 MG/1
30 CAPSULE, DELAYED RELEASE ORAL DAILY
Qty: 30 CAPSULE | Refills: 0 | Status: SHIPPED | OUTPATIENT
Start: 2024-05-08 | End: 2024-06-07

## 2024-05-08 NOTE — TELEPHONE ENCOUNTER
Tried reaching out to patient regarding pulmonary clearance.  Mailbox is full.  Left [facility] message for call back.  If patient has not yet secured appt with pulmonary, will need to reschedule surgery once clearance has been received.

## 2024-05-09 DIAGNOSIS — C90.00 MULTIPLE MYELOMA, REMISSION STATUS UNSPECIFIED (HCC): Primary | ICD-10-CM

## 2024-05-10 ENCOUNTER — OFFICE VISIT (OUTPATIENT)
Dept: ONCOLOGY | Age: 69
End: 2024-05-10
Payer: COMMERCIAL

## 2024-05-10 ENCOUNTER — HOSPITAL ENCOUNTER (OUTPATIENT)
Dept: INFUSION THERAPY | Age: 69
Discharge: HOME | End: 2024-05-10
Payer: COMMERCIAL

## 2024-05-10 VITALS
SYSTOLIC BLOOD PRESSURE: 159 MMHG | WEIGHT: 216.2 LBS | BODY MASS INDEX: 34.75 KG/M2 | OXYGEN SATURATION: 95 % | TEMPERATURE: 97.8 F | DIASTOLIC BLOOD PRESSURE: 75 MMHG | HEART RATE: 80 BPM | HEIGHT: 66 IN

## 2024-05-10 DIAGNOSIS — C90.00 MULTIPLE MYELOMA, REMISSION STATUS UNSPECIFIED (HCC): ICD-10-CM

## 2024-05-10 DIAGNOSIS — D64.9 ANEMIA, UNSPECIFIED TYPE: Primary | ICD-10-CM

## 2024-05-10 DIAGNOSIS — D47.2 SMOLDERING MYELOMA: ICD-10-CM

## 2024-05-10 LAB
ALBUMIN SERPL-MCNC: 4.2 G/DL (ref 3.5–5.2)
ALP SERPL-CCNC: 85 U/L (ref 35–104)
ALT SERPL-CCNC: 17 U/L (ref 0–32)
ANION GAP SERPL CALCULATED.3IONS-SCNC: 10 MMOL/L (ref 7–16)
AST SERPL-CCNC: 26 U/L (ref 0–31)
BASOPHILS # BLD: 0.03 K/UL (ref 0–0.2)
BASOPHILS NFR BLD: 1 % (ref 0–2)
BILIRUB SERPL-MCNC: 0.3 MG/DL (ref 0–1.2)
BUN SERPL-MCNC: 12 MG/DL (ref 6–23)
CALCIUM SERPL-MCNC: 9.6 MG/DL (ref 8.6–10.2)
CHLORIDE SERPL-SCNC: 102 MMOL/L (ref 98–107)
CO2 SERPL-SCNC: 28 MMOL/L (ref 22–29)
CREAT SERPL-MCNC: 0.7 MG/DL (ref 0.5–1)
EOSINOPHIL # BLD: 0.23 K/UL (ref 0.05–0.5)
EOSINOPHILS RELATIVE PERCENT: 4 % (ref 0–6)
ERYTHROCYTE [DISTWIDTH] IN BLOOD BY AUTOMATED COUNT: 12.9 % (ref 11.5–15)
GFR, ESTIMATED: >90 ML/MIN/1.73M2
GLUCOSE SERPL-MCNC: 85 MG/DL (ref 74–99)
HCT VFR BLD AUTO: 37.1 % (ref 34–48)
HGB BLD-MCNC: 11.4 G/DL (ref 11.5–15.5)
IMM GRANULOCYTES # BLD AUTO: <0.03 K/UL (ref 0–0.58)
IMM GRANULOCYTES NFR BLD: 0 % (ref 0–5)
LYMPHOCYTES NFR BLD: 2.79 K/UL (ref 1.5–4)
LYMPHOCYTES RELATIVE PERCENT: 45 % (ref 20–42)
MCH RBC QN AUTO: 28.9 PG (ref 26–35)
MCHC RBC AUTO-ENTMCNC: 30.7 G/DL (ref 32–34.5)
MCV RBC AUTO: 94.2 FL (ref 80–99.9)
MONOCYTES NFR BLD: 0.52 K/UL (ref 0.1–0.95)
MONOCYTES NFR BLD: 8 % (ref 2–12)
NEUTROPHILS NFR BLD: 43 % (ref 43–80)
NEUTS SEG NFR BLD: 2.68 K/UL (ref 1.8–7.3)
PLATELET # BLD AUTO: 277 K/UL (ref 130–450)
PMV BLD AUTO: 10.6 FL (ref 7–12)
POTASSIUM SERPL-SCNC: 4.1 MMOL/L (ref 3.5–5)
PROT SERPL-MCNC: 8.1 G/DL (ref 6.4–8.3)
RBC # BLD AUTO: 3.94 M/UL (ref 3.5–5.5)
SODIUM SERPL-SCNC: 140 MMOL/L (ref 132–146)
WBC OTHER # BLD: 6.3 K/UL (ref 4.5–11.5)

## 2024-05-10 PROCEDURE — 3078F DIAST BP <80 MM HG: CPT | Performed by: INTERNAL MEDICINE

## 2024-05-10 PROCEDURE — 1090F PRES/ABSN URINE INCON ASSESS: CPT | Performed by: INTERNAL MEDICINE

## 2024-05-10 PROCEDURE — 86334 IMMUNOFIX E-PHORESIS SERUM: CPT

## 2024-05-10 PROCEDURE — 3017F COLORECTAL CA SCREEN DOC REV: CPT | Performed by: INTERNAL MEDICINE

## 2024-05-10 PROCEDURE — 82232 ASSAY OF BETA-2 PROTEIN: CPT

## 2024-05-10 PROCEDURE — 84165 PROTEIN E-PHORESIS SERUM: CPT

## 2024-05-10 PROCEDURE — 99214 OFFICE O/P EST MOD 30 MIN: CPT | Performed by: INTERNAL MEDICINE

## 2024-05-10 PROCEDURE — 1123F ACP DISCUSS/DSCN MKR DOCD: CPT | Performed by: INTERNAL MEDICINE

## 2024-05-10 PROCEDURE — 36415 COLL VENOUS BLD VENIPUNCTURE: CPT | Performed by: INTERNAL MEDICINE

## 2024-05-10 PROCEDURE — G8427 DOCREV CUR MEDS BY ELIG CLIN: HCPCS | Performed by: INTERNAL MEDICINE

## 2024-05-10 PROCEDURE — G8399 PT W/DXA RESULTS DOCUMENT: HCPCS | Performed by: INTERNAL MEDICINE

## 2024-05-10 PROCEDURE — 3077F SYST BP >= 140 MM HG: CPT | Performed by: INTERNAL MEDICINE

## 2024-05-10 PROCEDURE — 36415 COLL VENOUS BLD VENIPUNCTURE: CPT

## 2024-05-10 PROCEDURE — G8417 CALC BMI ABV UP PARAM F/U: HCPCS | Performed by: INTERNAL MEDICINE

## 2024-05-10 PROCEDURE — 83521 IG LIGHT CHAINS FREE EACH: CPT

## 2024-05-10 PROCEDURE — 82784 ASSAY IGA/IGD/IGG/IGM EACH: CPT

## 2024-05-10 PROCEDURE — 1036F TOBACCO NON-USER: CPT | Performed by: INTERNAL MEDICINE

## 2024-05-10 PROCEDURE — 84155 ASSAY OF PROTEIN SERUM: CPT

## 2024-05-10 PROCEDURE — 85025 COMPLETE CBC W/AUTO DIFF WBC: CPT

## 2024-05-10 PROCEDURE — 80053 COMPREHEN METABOLIC PANEL: CPT

## 2024-05-10 RX ORDER — DOXYCYCLINE HYCLATE 100 MG/1
100 CAPSULE ORAL 2 TIMES DAILY
Qty: 14 CAPSULE | Refills: 0 | Status: SHIPPED | OUTPATIENT
Start: 2024-05-10 | End: 2024-05-17

## 2024-05-10 NOTE — PROGRESS NOTES
Patient provided with discharge instructions, received printed AVS.  All questions answered.  Patient understands follow up plan of care.

## 2024-05-10 NOTE — PROGRESS NOTES
[facility] PHYSICIANS [facility] MED ONCOLOGY  [address]  [facility] [address]  Dept: [phone]  Loc: [phone]  Attending Progress Note      Reason for The visit:  Smoldering Multiple Myeloma.    Referring Physician:  Violeta Gibson MD    PCP:  Sarai Mckeon MD    History of Present Illness:     The patient is a 69-year-old lady with a PMH significant for HTN, hyperlipidemia, DM, COPD, OA, depression, and fibromyalgia, who was diagnosed with IgG lambda MGUS in 2008, used to follow up with Dr. Ronaldo Vickers at [facility], last f/up with him was in 2012. Her most recent SPEP with immunofixation ha revealed monoclonal IgG lambda, M-spike 0.7 gm/dl  from 9/9/2016. The patient has been having pain in the low back radiating to the right lower extremity, she had an MRI of the L-spine done on 8/24/2016, revealed disc bulging L4-5, L5-S1, with mild narrowing of the neural foramina.     She had a bone marrow Biopsy and aspirate done by IR on 11/28/2016.  Bone marrow, left iliac, aspirate and core biopsy  Suboptimal specimen showing 15% plasma cells by immunohistochemistry,  consistent with plasma cell neoplasm, see comment.  Comment:    The aspirate smear and clot section specimens are hemodilute  and aspicular.  Plasmacytosis is seen by immunohistochemistry for   on the core biopsy specimen only.  Flow cytometric analysis performed by  [facility] confirmed a lambda-restricted plasma cell neoplasm.  See separate report for complete details ([id number]).  Intradepartmental consultation is obtained.    The patient returns after a follow-up visit, is complaining of knee pain.  She has a lump on her left arm. She was scheduled for colonoscopy on 11/20/2023, but was no-show.      Review of Systems;  CONSTITUTIONAL: No fever, chills.  Good appetite, feels tired.   ENMT: Eyes: No diplopia; Nose: Positive for epistaxis. Mouth: No sore throat.   RESPIRATORY: No hemoptysis, chronic

## 2024-05-11 LAB
IGA SERPL-MCNC: 92 MG/DL (ref 70–400)
IGG SERPL-MCNC: 2100 MG/DL (ref 700–1600)
IGM SERPL-MCNC: 35 MG/DL (ref 40–230)

## 2024-05-13 LAB
ALBUMIN SERPL-MCNC: 3.5 G/DL (ref 3.5–4.7)
ALPHA1 GLOB SERPL ELPH-MCNC: 0.3 G/DL (ref 0.2–0.4)
ALPHA2 GLOB SERPL ELPH-MCNC: 0.8 G/DL (ref 0.5–1)
B-GLOBULIN SERPL ELPH-MCNC: 1 G/DL (ref 0.8–1.3)
GAMMA GLOB SERPL ELPH-MCNC: 2 G/DL (ref 0.7–1.6)
INTERPRETATION SERPL IFE-IMP: NORMAL
M PROTEIN SERPL ELPH-MCNC: 0.9 G/DL
PATH REV: NORMAL
PATHOLOGIST: ABNORMAL
PROT PATTERN SERPL ELPH-IMP: ABNORMAL
PROT SERPL-MCNC: 7.4 G/DL (ref 6.4–8.3)

## 2024-05-14 LAB
B2 MICROGLOB SERPL IA-MCNC: 2 MG/L
FREE KAPPA/LAMBDA RATIO: 2.15 (ref 0.22–1.74)
KAPPA LC FREE SER-MCNC: 39.3 MG/L
LAMBDA LC FREE SERPL-MCNC: 18.3 MG/L (ref 4.2–27.7)

## 2024-06-06 DIAGNOSIS — E11.42 DM TYPE 2 WITH DIABETIC PERIPHERAL NEUROPATHY (HCC): ICD-10-CM

## 2024-06-06 NOTE — TELEPHONE ENCOUNTER
Sarai Mckeon MD, patient out of refills and patient eligible for up to 100-day supply, if appropriate. Rx(s) pended for your signature/modification as appropriate    Last Visit: 4/2/24  Next Visit: to be scheduled     Thank you,  Falguni Aguilar, PharmD, [facility]  Population Health Pharmacist  [facility] Clinical Pharmacy  Department, toll free: [phone], option 1   
    The 10-year ASCVD risk score (Cee AGUILAR, et al., 2019) is: 27.4%    Values used to calculate the score:      Age: 69 years      Sex: Female      Is Non- : Yes      Diabetic: Yes      Tobacco smoker: No      Systolic Blood Pressure: 159 mmHg      Is BP treated: Yes      HDL Cholesterol: 67 mg/dL      Total Cholesterol: 138 mg/dL     PLAN  The following are interventions that have been identified:   Adherence: filling at a non-preferred pharmacy  Statin Gap (Diabetes): NEEDS REFILLS for rosuvastatin 20mg daily, eligible for 100-day supply    Reached patient to review. States she must have accidentally run out of - does report her rx copays seem to add up. Would like to try [facility] on [address]  Advised patient I would pend rosuvastatin refill request to PCP for [facility] - patient to check with [facility] this weekend, and be sure to provide them her [employer]/insurance card. Reviewed that [facility] can transfer any rxs with refills remaining from [facility], and added [facility] to preferred pharmacy list.    Last Visit: 4/2/24  Next Visit: to be scheduled     Falguni Aguilar, PharmD, BCACP  Population Health Pharmacist  [facility] Clinical Pharmacy  Department, toll free: [phone], option 1

## 2024-06-08 RX ORDER — ROSUVASTATIN CALCIUM 20 MG/1
20 TABLET, COATED ORAL DAILY
Qty: 100 TABLET | Refills: 1 | Status: SHIPPED | OUTPATIENT
Start: 2024-06-08

## 2024-06-12 ENCOUNTER — OFFICE VISIT (OUTPATIENT)
Dept: ENDOCRINOLOGY | Age: 69
End: 2024-06-12
Payer: COMMERCIAL

## 2024-06-12 VITALS — BODY MASS INDEX: 35.02 KG/M2 | WEIGHT: 217 LBS

## 2024-06-12 DIAGNOSIS — E78.5 HYPERLIPIDEMIA, UNSPECIFIED HYPERLIPIDEMIA TYPE: ICD-10-CM

## 2024-06-12 DIAGNOSIS — E11.9 TYPE 2 DIABETES MELLITUS WITHOUT COMPLICATION, WITHOUT LONG-TERM CURRENT USE OF INSULIN (HCC): Primary | ICD-10-CM

## 2024-06-12 DIAGNOSIS — E55.9 VITAMIN D DEFICIENCY: ICD-10-CM

## 2024-06-12 DIAGNOSIS — E03.9 HYPOTHYROIDISM, UNSPECIFIED TYPE: ICD-10-CM

## 2024-06-12 DIAGNOSIS — E11.40 TYPE 2 DIABETES MELLITUS WITH DIABETIC NEUROPATHY, WITHOUT LONG-TERM CURRENT USE OF INSULIN (HCC): ICD-10-CM

## 2024-06-12 DIAGNOSIS — E66.01 CLASS 2 SEVERE OBESITY DUE TO EXCESS CALORIES WITH SERIOUS COMORBIDITY AND BODY MASS INDEX (BMI) OF 36.0 TO 36.9 IN ADULT (HCC): ICD-10-CM

## 2024-06-12 PROCEDURE — 3017F COLORECTAL CA SCREEN DOC REV: CPT | Performed by: NURSE PRACTITIONER

## 2024-06-12 PROCEDURE — G8417 CALC BMI ABV UP PARAM F/U: HCPCS | Performed by: NURSE PRACTITIONER

## 2024-06-12 PROCEDURE — 99214 OFFICE O/P EST MOD 30 MIN: CPT | Performed by: NURSE PRACTITIONER

## 2024-06-12 PROCEDURE — 1123F ACP DISCUSS/DSCN MKR DOCD: CPT | Performed by: NURSE PRACTITIONER

## 2024-06-12 PROCEDURE — 3044F HG A1C LEVEL LT 7.0%: CPT | Performed by: NURSE PRACTITIONER

## 2024-06-12 PROCEDURE — 2022F DILAT RTA XM EVC RTNOPTHY: CPT | Performed by: NURSE PRACTITIONER

## 2024-06-12 PROCEDURE — 1090F PRES/ABSN URINE INCON ASSESS: CPT | Performed by: NURSE PRACTITIONER

## 2024-06-12 PROCEDURE — 1036F TOBACCO NON-USER: CPT | Performed by: NURSE PRACTITIONER

## 2024-06-12 PROCEDURE — G8399 PT W/DXA RESULTS DOCUMENT: HCPCS | Performed by: NURSE PRACTITIONER

## 2024-06-12 PROCEDURE — G8427 DOCREV CUR MEDS BY ELIG CLIN: HCPCS | Performed by: NURSE PRACTITIONER

## 2024-06-12 NOTE — PROGRESS NOTES
and body mass index (BMI) of 36.0 to 36.9 in adult ([facility])    3. Hypothyroidism, unspecified type    4. Hyperlipidemia, unspecified hyperlipidemia type    5. Type 2 diabetes mellitus with diabetic neuropathy, without long-term current use of insulin ([facility])    6. Vitamin D deficiency          Diabetes Mellitus Type   2  Patient's diabetes is 7.2%->6.2%  Will change DM regimen to:  Will trial Trulicity 0.75 mg weekly and evaluate GI response  Continue Metformin 1000 mg po BID for now  The patient was advised to check blood sugars 2 times a day before meals and at bedtime and send BS readings to our office in a week.  Discussed with patient A1c and blood sugar goals   Optimal blood sugars: 100-140 pre-prandial, < 180 peak post-prandial  The patient counseled about the complications of uncontrolled diabetes   Patient was counselled about the importance of self-blood glucose monitoring and eating consistent carb diet to avoid blood sugar fluctuations   Patient will need routine diabetes maintenance and prevention  Discussed lifestyle changes including diet and exercise with patient  Diabetes labs before next visit     Obesity  Discussed lifestyle changes including diet and exercise with patient in depth. Also discussed with patient cardiovascular risk associated with obesity  Lost 8 lbs since last OV  and on GLP-1    Hypothyroid  On synthroid 112 mcg daily  Patient taking on an empty stomach least 30 minutes before breakfast and without combination of other medications.   Per PCP      HDL  On crestor    Neuropathy  Lyrica BID per pcp      Vitamin D Deficiency  On replacement therapy    I personally reviewed external notes from PCP and other patient's care team providers, and personally interpreted labs associated with the above diagnosis. I also ordered labs to further assess and manage the above addressed medical conditions.     Return in about 4 months (around 10/12/2024) for Type 2 DM .    The above issues were reviewed

## 2024-06-17 ENCOUNTER — TELEPHONE (OUTPATIENT)
Dept: ENT CLINIC | Age: 69
End: 2024-06-17

## 2024-06-17 ENCOUNTER — OFFICE VISIT (OUTPATIENT)
Dept: ORTHOPEDIC SURGERY | Age: 69
End: 2024-06-17
Payer: COMMERCIAL

## 2024-06-17 DIAGNOSIS — M25.562 CHRONIC PAIN OF LEFT KNEE: ICD-10-CM

## 2024-06-17 DIAGNOSIS — G89.29 CHRONIC PAIN OF LEFT KNEE: ICD-10-CM

## 2024-06-17 DIAGNOSIS — M17.12 PRIMARY OSTEOARTHRITIS OF LEFT KNEE: Primary | ICD-10-CM

## 2024-06-17 PROCEDURE — 20610 DRAIN/INJ JOINT/BURSA W/O US: CPT | Performed by: FAMILY MEDICINE

## 2024-06-17 RX ORDER — TRIAMCINOLONE ACETONIDE 40 MG/ML
40 INJECTION, SUSPENSION INTRA-ARTICULAR; INTRAMUSCULAR ONCE
Status: COMPLETED | OUTPATIENT
Start: 2024-06-17 | End: 2024-06-17

## 2024-06-17 RX ORDER — LIDOCAINE HYDROCHLORIDE 10 MG/ML
3 INJECTION, SOLUTION INFILTRATION; PERINEURAL ONCE
Status: COMPLETED | OUTPATIENT
Start: 2024-06-17 | End: 2024-06-17

## 2024-06-17 RX ADMIN — TRIAMCINOLONE ACETONIDE 40 MG: 40 INJECTION, SUSPENSION INTRA-ARTICULAR; INTRAMUSCULAR at 11:41

## 2024-06-17 RX ADMIN — LIDOCAINE HYDROCHLORIDE 3 ML: 10 INJECTION, SOLUTION INFILTRATION; PERINEURAL at 11:41

## 2024-06-17 NOTE — TELEPHONE ENCOUNTER
Pt stopped into office requesting appt for her throat. Unable to schedule pt as she was dismissed from [facility] in 2019 due to non-compliance. Pt informed. Pt was advised to follow up with her family  Electronically signed by Mayela Amezcua on 6/17/2024 at 12:00 PM

## 2024-06-18 RX ORDER — INSULIN GLARGINE-YFGN 100 [IU]/ML
INJECTION, SOLUTION SUBCUTANEOUS
Qty: 6 ML | Refills: 0 | Status: SHIPPED | OUTPATIENT
Start: 2024-06-18

## 2024-06-27 ENCOUNTER — TELEPHONE (OUTPATIENT)
Dept: PRIMARY CARE CLINIC | Age: 69
End: 2024-06-27

## 2024-06-27 DIAGNOSIS — E11.42 DM TYPE 2 WITH DIABETIC PERIPHERAL NEUROPATHY (HCC): Primary | ICD-10-CM

## 2024-06-27 DIAGNOSIS — E27.8 ADRENAL INCIDENTALOMA (HCC): ICD-10-CM

## 2024-07-09 ENCOUNTER — TELEPHONE (OUTPATIENT)
Dept: PRIMARY CARE CLINIC | Age: 69
End: 2024-07-09

## 2024-07-09 NOTE — TELEPHONE ENCOUNTER
[facility] is calling because they have received conflicting orders about the Pts dietary restrictions.  Please call [facility] for clarification on what the orders should be. Please call [facility] at [phone]

## 2024-07-15 NOTE — PROGRESS NOTES
Alpha-2-Globulin 05/10/2024 0.8  0.5 - 1.0 g/dL Final    Beta Globulin 05/10/2024 1.0  0.8 - 1.3 g/dL Final    Gamma Globulin 05/10/2024 2.0 (H)  0.7 - 1.6 g/dL Final    M Arsenio 1, Electrophoresis Protein* 05/10/2024 0.9  g/dL Final    Protein Electrophoresis, Serum 05/10/2024 There is a prominent zone of restriction in the gamma region with a decrease in the remaining polyclonal immunoglobulins (i.e. immunoparesis is present).   Final    This patient is a known IgG lambda monoclonal protein.    Pathologist 05/10/2024 Reviewed by: Lucy Davis M.D.   Final    Serum IFX Interp 05/10/2024 IgG lambda monoclonal protein is present,as previously described.   Final    Pathologist Review 05/10/2024 Reviewed by: Lucy Davis M.D.   Final    IgG 05/10/2024 2100 (H)  700 - 1600 mg/dL Final    IgA 05/10/2024 92  70 - 400 mg/dL Final    IgM 05/10/2024 35 (L)  40 - 230 mg/dL Final    Kappa Free Light Chains QNT 05/10/2024 39.3 (H)  <20.8 mg/L Final    Comment: Performed using Diazyme reagent on Roche Guicho Pro.  Results obtained with different assay   methods cannot be used interchangeably.      Lambda Free Light Chains QNT 05/10/2024 18.3  4.2 - 27.7 mg/L Final    Comment: Performed using Diazyme reagent on Roche Guicho Pro.  Results obtained with different assay   methods cannot be used interchangeably.      Free Kappa/Lambda Ratio 05/10/2024 2.15 (H)  0.22 - 1.74 Final    Beta-2 Microglobulin 05/10/2024 2.0  <3.0 mg/L Final    Sodium 05/10/2024 140  132 - 146 mmol/L Final    Potassium 05/10/2024 4.1  3.5 - 5.0 mmol/L Final    Chloride 05/10/2024 102  98 - 107 mmol/L Final    CO2 05/10/2024 28  22 - 29 mmol/L Final    Anion Gap 05/10/2024 10  7 - 16 mmol/L Final    Glucose 05/10/2024 85  74 - 99 mg/dL Final    BUN 05/10/2024 12  6 - 23 mg/dL Final    Creatinine 05/10/2024 0.7  0.50 - 1.00 mg/dL Final    Est, Glom Filt Rate 05/10/2024 >90  >60 mL/min/1.73m2 Final    Comment:       These results are not intended for use

## 2024-07-16 ENCOUNTER — OFFICE VISIT (OUTPATIENT)
Age: 69
End: 2024-07-16
Payer: COMMERCIAL

## 2024-07-16 ENCOUNTER — TELEPHONE (OUTPATIENT)
Dept: PRIMARY CARE CLINIC | Age: 69
End: 2024-07-16

## 2024-07-16 DIAGNOSIS — F33.1 MODERATE EPISODE OF RECURRENT MAJOR DEPRESSIVE DISORDER (HCC): ICD-10-CM

## 2024-07-16 DIAGNOSIS — F41.1 GAD (GENERALIZED ANXIETY DISORDER): ICD-10-CM

## 2024-07-16 PROCEDURE — 1123F ACP DISCUSS/DSCN MKR DOCD: CPT

## 2024-07-16 PROCEDURE — G8428 CUR MEDS NOT DOCUMENT: HCPCS

## 2024-07-16 PROCEDURE — 1090F PRES/ABSN URINE INCON ASSESS: CPT

## 2024-07-16 PROCEDURE — G8399 PT W/DXA RESULTS DOCUMENT: HCPCS

## 2024-07-16 PROCEDURE — G8417 CALC BMI ABV UP PARAM F/U: HCPCS

## 2024-07-16 PROCEDURE — 1036F TOBACCO NON-USER: CPT

## 2024-07-16 PROCEDURE — 99213 OFFICE O/P EST LOW 20 MIN: CPT

## 2024-07-16 PROCEDURE — 3017F COLORECTAL CA SCREEN DOC REV: CPT

## 2024-07-16 RX ORDER — DULOXETIN HYDROCHLORIDE 30 MG/1
30 CAPSULE, DELAYED RELEASE ORAL DAILY
Qty: 36 CAPSULE | Refills: 0 | Status: SHIPPED | OUTPATIENT
Start: 2024-07-16 | End: 2024-08-21

## 2024-07-16 NOTE — TELEPHONE ENCOUNTER
PT wanted to see if Dr. Mckeon has filled out a form for her electric to get turned back on so she can have use of her oxygen. A fax should have been received per PT from [facility].

## 2024-08-05 DIAGNOSIS — C90.00 MULTIPLE MYELOMA, REMISSION STATUS UNSPECIFIED (HCC): Primary | ICD-10-CM

## 2024-08-08 DIAGNOSIS — J44.9 CHRONIC OBSTRUCTIVE PULMONARY DISEASE, UNSPECIFIED COPD TYPE (HCC): ICD-10-CM

## 2024-08-08 DIAGNOSIS — C90.00 MULTIPLE MYELOMA, REMISSION STATUS UNSPECIFIED (HCC): Primary | ICD-10-CM

## 2024-08-09 ENCOUNTER — HOSPITAL ENCOUNTER (OUTPATIENT)
Dept: INFUSION THERAPY | Age: 69
Discharge: HOME | End: 2024-08-09

## 2024-08-09 RX ORDER — MONTELUKAST SODIUM 10 MG/1
TABLET ORAL
Qty: 90 TABLET | Refills: 1 | Status: SHIPPED | OUTPATIENT
Start: 2024-08-09

## 2024-08-11 DIAGNOSIS — E11.42 DM TYPE 2 WITH DIABETIC PERIPHERAL NEUROPATHY (HCC): ICD-10-CM

## 2024-08-11 DIAGNOSIS — M79.10 MYALGIA: ICD-10-CM

## 2024-08-12 RX ORDER — BACLOFEN 10 MG/1
10 TABLET ORAL 3 TIMES DAILY PRN
Qty: 30 TABLET | Refills: 0 | Status: SHIPPED | OUTPATIENT
Start: 2024-08-12

## 2024-08-12 RX ORDER — LOSARTAN POTASSIUM 50 MG/1
50 TABLET ORAL DAILY
Qty: 90 TABLET | Refills: 0 | Status: SHIPPED | OUTPATIENT
Start: 2024-08-12

## 2024-08-20 NOTE — PROGRESS NOTES
PSYCHIATRY NOTE:    CC: \"Summer went by too quick, school starts next week.\"     Subjective: Patient being seen at [facility] in follow-up for MDD and ALYSE. Met with patient to discuss progress with treatment.     Tolerating current medication regimen without issue   Has been noticing some increased weakness and \"shakiness\" over the past two weeks   Feels that a lot of her symptoms of anxiety/depression stem from underlying medical issues   Current symptoms of anxiety include excessive worry, racing thoughts, and feeling easily overwhelmed   Denies experiencing any physical symptoms of anxiety   Goes back to work starting next week  Current symptoms of depression include decreased energy, lack of interest, feeling unmotivated, and isolating  Has been going to the chiropractor recently, getting some relief from chronic pain  Sleep fluctuates as chronic pain wakes her up  Appetite is somewhat diminished  Has an appointment with Salvatore today for counseling   Denies suicidal ideations, intent, or plan    Mental Status Examination:   female. Pleasant and cooperative. Normal psychomotor activity, no agitation, retardation, or involuntary movements noted. Eye contact appropriate. Gait weak, utilizes a cane. Mood anxious, sad. Affect flexible. Speech clear. Distracted. Thought content devoid of auditory or visual hallucinations. Patient does not appear overtly or covertly psychotic, paranoid, or manic. Patient denies suicidal or homicidal ideations, intent, or plan. Cognitive function appears at baseline and memory is intact. Fund of knowledge fair. Language use fair. Insight and judgment fair.     OARRS: Reviewed.    VITALS: There were no vitals filed for this visit.    LABS:   No visits with results within 2 Day(s) from this visit.   Latest known visit with results is:   Hospital Outpatient Visit on 05/10/2024   Component Date Value Ref Range Status    Total Protein 05/10/2024 7.4  6.4 - 8.3

## 2024-08-21 ENCOUNTER — OFFICE VISIT (OUTPATIENT)
Age: 69
End: 2024-08-21
Payer: COMMERCIAL

## 2024-08-21 DIAGNOSIS — F33.1 MODERATE EPISODE OF RECURRENT MAJOR DEPRESSIVE DISORDER (HCC): ICD-10-CM

## 2024-08-21 DIAGNOSIS — F41.1 GAD (GENERALIZED ANXIETY DISORDER): Primary | ICD-10-CM

## 2024-08-21 DIAGNOSIS — F41.1 GAD (GENERALIZED ANXIETY DISORDER): ICD-10-CM

## 2024-08-21 PROCEDURE — G8399 PT W/DXA RESULTS DOCUMENT: HCPCS

## 2024-08-21 PROCEDURE — 1123F ACP DISCUSS/DSCN MKR DOCD: CPT | Performed by: SOCIAL WORKER

## 2024-08-21 PROCEDURE — 1090F PRES/ABSN URINE INCON ASSESS: CPT

## 2024-08-21 PROCEDURE — 90834 PSYTX W PT 45 MINUTES: CPT | Performed by: SOCIAL WORKER

## 2024-08-21 PROCEDURE — G8417 CALC BMI ABV UP PARAM F/U: HCPCS

## 2024-08-21 PROCEDURE — 3017F COLORECTAL CA SCREEN DOC REV: CPT

## 2024-08-21 PROCEDURE — 1036F TOBACCO NON-USER: CPT

## 2024-08-21 PROCEDURE — 1123F ACP DISCUSS/DSCN MKR DOCD: CPT

## 2024-08-21 PROCEDURE — G8428 CUR MEDS NOT DOCUMENT: HCPCS

## 2024-08-21 PROCEDURE — 99213 OFFICE O/P EST LOW 20 MIN: CPT

## 2024-08-21 RX ORDER — DULOXETIN HYDROCHLORIDE 60 MG/1
60 CAPSULE, DELAYED RELEASE ORAL DAILY
Qty: 30 CAPSULE | Refills: 0 | Status: SHIPPED | OUTPATIENT
Start: 2024-08-21 | End: 2024-09-20

## 2024-08-21 NOTE — PROGRESS NOTES
ADULT BEHAVIORAL HEALTH ASSESSMENT  Salvatore Raman MSW, LISW-S  Visit Date: 8/21/2024   Time of appointment:  1:45 pm    Time spent with Patient: 45 minutes.   This is patient's first appointment.    Reason for Consult:  Angelita was seen today for anxiety and depression.    Diagnoses and all orders for this visit:    ALYSE (generalized anxiety disorder)    Moderate episode of recurrent major depressive disorder (HCC)      Referring Provider/PCP:    Sarai Mckeon MD      Pt provided informed consent for the behavioral health program. Discussed with patient model of service to include the limits of confidentiality (i.e. abuse reporting, suicide intervention, etc.) and short-term intervention focused approach.         PRESENTING PROBLEM AND HISTORY  Angelita is a 69 y.o. female who presents for new evaluation and treatment of  Anxiety and depression.  She has the following symptoms: depressed mood, isolating self, feeling nervous, anxious, or on edge, excessive worry about a number of events or activities , and history of trauma.  Onset of symptoms was approximately several year ago.  Symptoms have been continuous for anxiety, depression tends to worsen in late summer reportedly since that time.  She denies current suicidal and homicidal ideation.  Family history significant for alcoholism, anxiety, depression, and substance abuse.  Risk factors: positive family history in  aunt, father, uncle, and son .  Previous treatment includes  see medication list .  She complains of the following medication side effects: none.    MENTAL STATUS EXAM  Mood was within normal limits with congruent affect.   Suicidal ideation was denied.   Homicidal ideation was denied.   Hygiene was fair .  Dress was appropriate.   Behavior was Within Normal Limits with Yes observation or self-report of difficulties ambulating (cl used a cane to help walk).   Attitude was Cooperative, Engageable, and Friendly.  Eye-contact was fair.  Speech: rate -

## 2024-09-12 DIAGNOSIS — E11.42 DM TYPE 2 WITH DIABETIC PERIPHERAL NEUROPATHY (HCC): ICD-10-CM

## 2024-09-12 DIAGNOSIS — E03.9 HYPOTHYROIDISM, UNSPECIFIED TYPE: ICD-10-CM

## 2024-09-12 RX ORDER — LEVOTHYROXINE SODIUM 112 UG/1
112 TABLET ORAL DAILY
Qty: 90 TABLET | Refills: 0 | Status: SHIPPED | OUTPATIENT
Start: 2024-09-12

## 2024-09-12 RX ORDER — CITALOPRAM HYDROBROMIDE 40 MG/1
40 TABLET ORAL DAILY
Qty: 15 TABLET | Refills: 0 | Status: SHIPPED | OUTPATIENT
Start: 2024-09-12

## 2024-09-18 ENCOUNTER — SCHEDULED TELEPHONE ENCOUNTER (OUTPATIENT)
Age: 69
End: 2024-09-18
Payer: COMMERCIAL

## 2024-09-18 DIAGNOSIS — F41.1 GAD (GENERALIZED ANXIETY DISORDER): ICD-10-CM

## 2024-09-18 DIAGNOSIS — F33.1 MODERATE EPISODE OF RECURRENT MAJOR DEPRESSIVE DISORDER (HCC): ICD-10-CM

## 2024-09-18 PROCEDURE — 99442 PR PHYS/QHP TELEPHONE EVALUATION 11-20 MIN: CPT

## 2024-09-18 RX ORDER — DULOXETIN HYDROCHLORIDE 60 MG/1
60 CAPSULE, DELAYED RELEASE ORAL DAILY
Qty: 90 CAPSULE | Refills: 1 | Status: SHIPPED | OUTPATIENT
Start: 2024-09-18 | End: 2025-03-17

## 2024-09-20 ENCOUNTER — OFFICE VISIT (OUTPATIENT)
Dept: PRIMARY CARE CLINIC | Age: 69
End: 2024-09-20
Payer: COMMERCIAL

## 2024-09-20 VITALS
TEMPERATURE: 96.8 F | OXYGEN SATURATION: 93 % | HEART RATE: 75 BPM | DIASTOLIC BLOOD PRESSURE: 62 MMHG | HEIGHT: 66 IN | RESPIRATION RATE: 18 BRPM | WEIGHT: 212 LBS | SYSTOLIC BLOOD PRESSURE: 128 MMHG | BODY MASS INDEX: 34.07 KG/M2

## 2024-09-20 DIAGNOSIS — E11.42 DM TYPE 2 WITH DIABETIC PERIPHERAL NEUROPATHY (HCC): ICD-10-CM

## 2024-09-20 DIAGNOSIS — M25.551 RIGHT HIP PAIN: ICD-10-CM

## 2024-09-20 DIAGNOSIS — I10 PRIMARY HYPERTENSION: ICD-10-CM

## 2024-09-20 DIAGNOSIS — E03.9 HYPOTHYROIDISM, UNSPECIFIED TYPE: ICD-10-CM

## 2024-09-20 DIAGNOSIS — E78.5 HYPERLIPIDEMIA, UNSPECIFIED HYPERLIPIDEMIA TYPE: ICD-10-CM

## 2024-09-20 DIAGNOSIS — J44.9 CHRONIC OBSTRUCTIVE PULMONARY DISEASE, UNSPECIFIED COPD TYPE (HCC): ICD-10-CM

## 2024-09-20 DIAGNOSIS — Z01.811 PRE-OP CHEST EXAM: ICD-10-CM

## 2024-09-20 DIAGNOSIS — E66.09 CLASS 1 OBESITY DUE TO EXCESS CALORIES WITH SERIOUS COMORBIDITY AND BODY MASS INDEX (BMI) OF 34.0 TO 34.9 IN ADULT: ICD-10-CM

## 2024-09-20 DIAGNOSIS — Z01.818 PRE-OP EVALUATION: Primary | ICD-10-CM

## 2024-09-20 LAB — HBA1C MFR BLD: 5.7 %

## 2024-09-20 PROCEDURE — 3044F HG A1C LEVEL LT 7.0%: CPT | Performed by: INTERNAL MEDICINE

## 2024-09-20 PROCEDURE — 83036 HEMOGLOBIN GLYCOSYLATED A1C: CPT | Performed by: INTERNAL MEDICINE

## 2024-09-20 PROCEDURE — 93000 ELECTROCARDIOGRAM COMPLETE: CPT | Performed by: INTERNAL MEDICINE

## 2024-09-20 PROCEDURE — 3074F SYST BP LT 130 MM HG: CPT | Performed by: INTERNAL MEDICINE

## 2024-09-20 PROCEDURE — G8417 CALC BMI ABV UP PARAM F/U: HCPCS | Performed by: INTERNAL MEDICINE

## 2024-09-20 PROCEDURE — G8399 PT W/DXA RESULTS DOCUMENT: HCPCS | Performed by: INTERNAL MEDICINE

## 2024-09-20 PROCEDURE — 3023F SPIROM DOC REV: CPT | Performed by: INTERNAL MEDICINE

## 2024-09-20 PROCEDURE — G8427 DOCREV CUR MEDS BY ELIG CLIN: HCPCS | Performed by: INTERNAL MEDICINE

## 2024-09-20 PROCEDURE — 1123F ACP DISCUSS/DSCN MKR DOCD: CPT | Performed by: INTERNAL MEDICINE

## 2024-09-20 PROCEDURE — 3078F DIAST BP <80 MM HG: CPT | Performed by: INTERNAL MEDICINE

## 2024-09-20 PROCEDURE — 1036F TOBACCO NON-USER: CPT | Performed by: INTERNAL MEDICINE

## 2024-09-20 PROCEDURE — 1090F PRES/ABSN URINE INCON ASSESS: CPT | Performed by: INTERNAL MEDICINE

## 2024-09-20 PROCEDURE — 3017F COLORECTAL CA SCREEN DOC REV: CPT | Performed by: INTERNAL MEDICINE

## 2024-09-20 PROCEDURE — 2022F DILAT RTA XM EVC RTNOPTHY: CPT | Performed by: INTERNAL MEDICINE

## 2024-09-20 PROCEDURE — 99214 OFFICE O/P EST MOD 30 MIN: CPT | Performed by: INTERNAL MEDICINE

## 2024-09-20 SDOH — ECONOMIC STABILITY: FOOD INSECURITY: WITHIN THE PAST 12 MONTHS, THE FOOD YOU BOUGHT JUST DIDN'T LAST AND YOU DIDN'T HAVE MONEY TO GET MORE.: NEVER TRUE

## 2024-09-20 SDOH — ECONOMIC STABILITY: FOOD INSECURITY: WITHIN THE PAST 12 MONTHS, YOU WORRIED THAT YOUR FOOD WOULD RUN OUT BEFORE YOU GOT MONEY TO BUY MORE.: NEVER TRUE

## 2024-09-20 SDOH — ECONOMIC STABILITY: INCOME INSECURITY: HOW HARD IS IT FOR YOU TO PAY FOR THE VERY BASICS LIKE FOOD, HOUSING, MEDICAL CARE, AND HEATING?: NOT HARD AT ALL

## 2024-09-24 ENCOUNTER — HOSPITAL ENCOUNTER (OUTPATIENT)
Age: 69
Discharge: HOME | End: 2024-09-24
Payer: COMMERCIAL

## 2024-09-24 DIAGNOSIS — I10 PRIMARY HYPERTENSION: ICD-10-CM

## 2024-09-24 DIAGNOSIS — E11.42 DM TYPE 2 WITH DIABETIC PERIPHERAL NEUROPATHY (HCC): ICD-10-CM

## 2024-09-24 DIAGNOSIS — E03.9 HYPOTHYROIDISM, UNSPECIFIED TYPE: ICD-10-CM

## 2024-09-24 DIAGNOSIS — C90.00 MULTIPLE MYELOMA, REMISSION STATUS UNSPECIFIED (HCC): ICD-10-CM

## 2024-09-24 DIAGNOSIS — E78.5 HYPERLIPIDEMIA, UNSPECIFIED HYPERLIPIDEMIA TYPE: ICD-10-CM

## 2024-09-24 LAB
ALBUMIN SERPL-MCNC: 3.9 G/DL (ref 3.5–5.2)
ALP SERPL-CCNC: 80 U/L (ref 35–104)
ALT SERPL-CCNC: 14 U/L (ref 0–32)
ANION GAP SERPL CALCULATED.3IONS-SCNC: 10 MMOL/L (ref 7–16)
AST SERPL-CCNC: 25 U/L (ref 0–31)
BASOPHILS # BLD: 0.03 K/UL (ref 0–0.2)
BASOPHILS NFR BLD: 1 % (ref 0–2)
BILIRUB SERPL-MCNC: 0.6 MG/DL (ref 0–1.2)
BUN SERPL-MCNC: 11 MG/DL (ref 6–23)
CALCIUM SERPL-MCNC: 9.2 MG/DL (ref 8.6–10.2)
CHLORIDE SERPL-SCNC: 102 MMOL/L (ref 98–107)
CO2 SERPL-SCNC: 27 MMOL/L (ref 22–29)
CREAT SERPL-MCNC: 0.7 MG/DL (ref 0.5–1)
EOSINOPHIL # BLD: 0.2 K/UL (ref 0.05–0.5)
EOSINOPHILS RELATIVE PERCENT: 4 % (ref 0–6)
ERYTHROCYTE [DISTWIDTH] IN BLOOD BY AUTOMATED COUNT: 12.8 % (ref 11.5–15)
GFR, ESTIMATED: >90 ML/MIN/1.73M2
GLUCOSE SERPL-MCNC: 116 MG/DL (ref 74–99)
HCT VFR BLD AUTO: 35.4 % (ref 34–48)
HGB BLD-MCNC: 10.7 G/DL (ref 11.5–15.5)
IMM GRANULOCYTES # BLD AUTO: <0.03 K/UL (ref 0–0.58)
IMM GRANULOCYTES NFR BLD: 0 % (ref 0–5)
LYMPHOCYTES NFR BLD: 2.73 K/UL (ref 1.5–4)
LYMPHOCYTES RELATIVE PERCENT: 51 % (ref 20–42)
MCH RBC QN AUTO: 28.7 PG (ref 26–35)
MCHC RBC AUTO-ENTMCNC: 30.2 G/DL (ref 32–34.5)
MCV RBC AUTO: 94.9 FL (ref 80–99.9)
MONOCYTES NFR BLD: 0.37 K/UL (ref 0.1–0.95)
MONOCYTES NFR BLD: 7 % (ref 2–12)
NEUTROPHILS NFR BLD: 38 % (ref 43–80)
NEUTS SEG NFR BLD: 2.07 K/UL (ref 1.8–7.3)
PLATELET # BLD AUTO: 213 K/UL (ref 130–450)
PMV BLD AUTO: 11.7 FL (ref 7–12)
POTASSIUM SERPL-SCNC: 4 MMOL/L (ref 3.5–5)
PROT SERPL-MCNC: 7.3 G/DL (ref 6.4–8.3)
RBC # BLD AUTO: 3.73 M/UL (ref 3.5–5.5)
SODIUM SERPL-SCNC: 139 MMOL/L (ref 132–146)
TSH SERPL DL<=0.05 MIU/L-ACNC: 7.25 UIU/ML (ref 0.27–4.2)
WBC OTHER # BLD: 5.4 K/UL (ref 4.5–11.5)

## 2024-09-24 PROCEDURE — 36415 COLL VENOUS BLD VENIPUNCTURE: CPT

## 2024-09-24 PROCEDURE — 85025 COMPLETE CBC W/AUTO DIFF WBC: CPT

## 2024-09-24 PROCEDURE — 86334 IMMUNOFIX E-PHORESIS SERUM: CPT

## 2024-09-24 PROCEDURE — 80061 LIPID PANEL: CPT

## 2024-09-24 PROCEDURE — 80053 COMPREHEN METABOLIC PANEL: CPT

## 2024-09-24 PROCEDURE — 82784 ASSAY IGA/IGD/IGG/IGM EACH: CPT

## 2024-09-24 PROCEDURE — 84165 PROTEIN E-PHORESIS SERUM: CPT

## 2024-09-24 PROCEDURE — 83521 IG LIGHT CHAINS FREE EACH: CPT

## 2024-09-24 PROCEDURE — 82232 ASSAY OF BETA-2 PROTEIN: CPT

## 2024-09-24 PROCEDURE — 84443 ASSAY THYROID STIM HORMONE: CPT

## 2024-09-24 PROCEDURE — 84155 ASSAY OF PROTEIN SERUM: CPT

## 2024-09-25 LAB
ALBUMIN SERPL-MCNC: 3.4 G/DL (ref 3.5–4.7)
ALPHA1 GLOB SERPL ELPH-MCNC: 0.3 G/DL (ref 0.2–0.4)
ALPHA2 GLOB SERPL ELPH-MCNC: 0.7 G/DL (ref 0.5–1)
B-GLOBULIN SERPL ELPH-MCNC: 0.9 G/DL (ref 0.8–1.3)
CHOLEST SERPL-MCNC: 120 MG/DL
GAMMA GLOB SERPL ELPH-MCNC: 1.8 G/DL (ref 0.7–1.6)
HDLC SERPL-MCNC: 60 MG/DL
IGA SERPL-MCNC: 89 MG/DL (ref 70–400)
IGG SERPL-MCNC: 2000 MG/DL (ref 700–1600)
IGM SERPL-MCNC: 26 MG/DL (ref 40–230)
INTERPRETATION SERPL IFE-IMP: NORMAL
LDLC SERPL CALC-MCNC: 48 MG/DL
M PROTEIN SERPL ELPH-MCNC: 0.9 G/DL
PATH REV: NORMAL
PATHOLOGIST: ABNORMAL
PROT PATTERN SERPL ELPH-IMP: ABNORMAL
PROT SERPL-MCNC: 7.1 G/DL (ref 6.4–8.3)
TRIGL SERPL-MCNC: 59 MG/DL
VLDLC SERPL CALC-MCNC: 12 MG/DL

## 2024-09-26 LAB
B2 MICROGLOB SERPL IA-MCNC: 1.9 MG/L
FREE KAPPA/LAMBDA RATIO: 1.83 (ref 0.22–1.74)
KAPPA LC FREE SER-MCNC: 39.3 MG/L
LAMBDA LC FREE SERPL-MCNC: 21.5 MG/L (ref 4.2–27.7)

## 2024-09-30 NOTE — TELEPHONE ENCOUNTER
Last Appointment:  Visit date not found  Future Appointments   Date Time Provider Department Center   10/16/2024  2:00 PM Sarai Mckeon MD [facility]   10/17/2024  2:00 PM Natalia Tirado, APRN - NP [facility]   2/7/2025  1:30 PM [facility]

## 2024-10-01 NOTE — TELEPHONE ENCOUNTER
Can we clarify?  Patient is apparently also on Cymbalta?  Or was this to be a medication change?  Usually do not use SSRI and SNRI in combination

## 2024-10-02 DIAGNOSIS — E03.9 HYPOTHYROIDISM, UNSPECIFIED TYPE: ICD-10-CM

## 2024-10-02 DIAGNOSIS — E11.42 DM TYPE 2 WITH DIABETIC PERIPHERAL NEUROPATHY (HCC): ICD-10-CM

## 2024-10-02 DIAGNOSIS — R19.8 ALTERNATING CONSTIPATION AND DIARRHEA: ICD-10-CM

## 2024-10-02 DIAGNOSIS — J06.9 URI (UPPER RESPIRATORY INFECTION): ICD-10-CM

## 2024-10-02 DIAGNOSIS — M79.10 MYALGIA: ICD-10-CM

## 2024-10-02 DIAGNOSIS — J44.9 CHRONIC OBSTRUCTIVE PULMONARY DISEASE, UNSPECIFIED COPD TYPE (HCC): ICD-10-CM

## 2024-10-02 DIAGNOSIS — M79.7 FIBROMYALGIA: ICD-10-CM

## 2024-10-02 DIAGNOSIS — J44.9 COPD (CHRONIC OBSTRUCTIVE PULMONARY DISEASE) (HCC): ICD-10-CM

## 2024-10-02 DIAGNOSIS — M16.0 PRIMARY OSTEOARTHRITIS OF BOTH HIPS: ICD-10-CM

## 2024-10-02 RX ORDER — CITALOPRAM HYDROBROMIDE 40 MG/1
40 TABLET ORAL DAILY
Qty: 90 TABLET | Refills: 0 | OUTPATIENT
Start: 2024-10-02

## 2024-10-02 NOTE — TELEPHONE ENCOUNTER
Last Appointment:  9/20/2024  Future Appointments   Date Time Provider Department Center   10/16/2024  2:00 PM Sarai Mckeon MD [facility]   10/17/2024  2:00 PM [facility]   2/7/2025  1:30 PM Taylor Riddle, APRN - CNP [facility]

## 2024-10-03 ENCOUNTER — TELEPHONE (OUTPATIENT)
Dept: PRIMARY CARE CLINIC | Age: 69
End: 2024-10-03

## 2024-10-03 NOTE — TELEPHONE ENCOUNTER
[facility] insurance called to let the  Know that they reached out to Angelita regarding Medications she is inconsistently. They have reached out to the patient and have been unsuccessful.     Metformin  Rosuvastatin  Losartan   Ozempic

## 2024-10-04 RX ORDER — ROSUVASTATIN CALCIUM 20 MG/1
20 TABLET, COATED ORAL DAILY
Qty: 100 TABLET | Refills: 1 | Status: SHIPPED | OUTPATIENT
Start: 2024-10-04

## 2024-10-04 RX ORDER — MONTELUKAST SODIUM 10 MG/1
TABLET ORAL
Qty: 90 TABLET | Refills: 1 | Status: SHIPPED | OUTPATIENT
Start: 2024-10-04

## 2024-10-04 RX ORDER — FLUTICASONE PROPIONATE 50 MCG
1 SPRAY, SUSPENSION (ML) NASAL DAILY
Qty: 1 EACH | Refills: 2 | Status: SHIPPED | OUTPATIENT
Start: 2024-10-04

## 2024-10-04 RX ORDER — BACLOFEN 10 MG/1
10 TABLET ORAL 3 TIMES DAILY
Qty: 90 TABLET | Refills: 0 | Status: SHIPPED | OUTPATIENT
Start: 2024-10-04

## 2024-10-04 RX ORDER — TIOTROPIUM BROMIDE 18 UG/1
18 CAPSULE ORAL; RESPIRATORY (INHALATION) DAILY
Qty: 30 CAPSULE | Refills: 5 | Status: SHIPPED | OUTPATIENT
Start: 2024-10-04

## 2024-10-04 RX ORDER — AMLODIPINE BESYLATE 10 MG/1
10 TABLET ORAL DAILY
Qty: 30 TABLET | Refills: 3 | Status: SHIPPED | OUTPATIENT
Start: 2024-10-04

## 2024-10-04 RX ORDER — ALBUTEROL SULFATE 90 UG/1
3 INHALANT RESPIRATORY (INHALATION) 4 TIMES DAILY PRN
Qty: 3 EACH | Refills: 1 | Status: SHIPPED | OUTPATIENT
Start: 2024-10-04

## 2024-10-04 RX ORDER — LOSARTAN POTASSIUM 50 MG/1
50 TABLET ORAL DAILY
Qty: 90 TABLET | Refills: 0 | Status: SHIPPED | OUTPATIENT
Start: 2024-10-04

## 2024-10-04 RX ORDER — LEVOTHYROXINE SODIUM 112 UG/1
112 TABLET ORAL DAILY
Qty: 90 TABLET | Refills: 0 | Status: SHIPPED | OUTPATIENT
Start: 2024-10-04

## 2024-10-04 RX ORDER — AMITRIPTYLINE HYDROCHLORIDE 50 MG/1
TABLET ORAL
Qty: 90 TABLET | Refills: 1 | Status: SHIPPED | OUTPATIENT
Start: 2024-10-04

## 2024-10-04 RX ORDER — DOCUSATE SODIUM 100 MG/1
100 CAPSULE, LIQUID FILLED ORAL 2 TIMES DAILY
Qty: 180 CAPSULE | Refills: 1 | Status: SHIPPED | OUTPATIENT
Start: 2024-10-04

## 2024-10-04 NOTE — TELEPHONE ENCOUNTER
Spoke with the patient and she is bringing her medications to her appointment so we can go through them and we may have to send all prescriptions to [facility] for pre packaging. Just an FYI for Dr. Mckeon.

## 2024-10-08 ENCOUNTER — TELEPHONE (OUTPATIENT)
Dept: PRIMARY CARE CLINIC | Age: 69
End: 2024-10-08

## 2024-10-08 DIAGNOSIS — D64.9 ANEMIA, UNSPECIFIED TYPE: Primary | ICD-10-CM

## 2024-10-08 DIAGNOSIS — E03.9 HYPOTHYROIDISM, UNSPECIFIED TYPE: ICD-10-CM

## 2024-10-08 PROCEDURE — 82270 OCCULT BLOOD FECES: CPT | Performed by: INTERNAL MEDICINE

## 2024-10-08 NOTE — TELEPHONE ENCOUNTER
Patient is requesting a letter of absence too be off work for until her surgery starting tomorrow 10/10/2024. She says Dr. Enrique needs the okay for surgery from [facility] pre op appointment.

## 2024-10-09 ENCOUNTER — TELEPHONE (OUTPATIENT)
Dept: PRIMARY CARE CLINIC | Age: 69
End: 2024-10-09

## 2024-10-09 NOTE — TELEPHONE ENCOUNTER
Please notify the patient will address what she needs when she comes for an appointment October 16, we are awaiting the results of her blood work also her orthopedic should send this form to fill for clearance need to know if he is going to do spinal anesthesia or general etc.

## 2024-10-09 NOTE — TELEPHONE ENCOUNTER
Prior auth for Amitriptyline denied, spoke with [facility].  They stated they will call Angelita to see if she would like to pay out of pocket.

## 2024-10-15 ENCOUNTER — HOSPITAL ENCOUNTER (OUTPATIENT)
Age: 69
Discharge: HOME | End: 2024-10-15
Payer: COMMERCIAL

## 2024-10-15 DIAGNOSIS — D64.9 ANEMIA, UNSPECIFIED TYPE: ICD-10-CM

## 2024-10-15 DIAGNOSIS — E03.9 HYPOTHYROIDISM, UNSPECIFIED TYPE: ICD-10-CM

## 2024-10-15 LAB
BASOPHILS # BLD: 0.04 K/UL (ref 0–0.2)
BASOPHILS NFR BLD: 1 % (ref 0–2)
EOSINOPHIL # BLD: 0.24 K/UL (ref 0.05–0.5)
EOSINOPHILS RELATIVE PERCENT: 6 % (ref 0–6)
ERYTHROCYTE [DISTWIDTH] IN BLOOD BY AUTOMATED COUNT: 12.8 % (ref 11.5–15)
FERRITIN SERPL-MCNC: 70 NG/ML
FOLATE SERPL-MCNC: >20 NG/ML (ref 4.8–24.2)
HCT VFR BLD AUTO: 37.7 % (ref 34–48)
HGB BLD-MCNC: 11.3 G/DL (ref 11.5–15.5)
IMM GRANULOCYTES # BLD AUTO: <0.03 K/UL (ref 0–0.58)
IMM GRANULOCYTES NFR BLD: 0 % (ref 0–5)
IMM RETICS NFR: 3.4 % (ref 3–15.9)
IRON SATN MFR SERPL: 29 % (ref 15–50)
IRON SERPL-MCNC: 91 UG/DL (ref 37–145)
LYMPHOCYTES NFR BLD: 2.34 K/UL (ref 1.5–4)
LYMPHOCYTES RELATIVE PERCENT: 53 % (ref 20–42)
MCH RBC QN AUTO: 28.5 PG (ref 26–35)
MCHC RBC AUTO-ENTMCNC: 30 G/DL (ref 32–34.5)
MCV RBC AUTO: 95.2 FL (ref 80–99.9)
MONOCYTES NFR BLD: 0.3 K/UL (ref 0.1–0.95)
MONOCYTES NFR BLD: 7 % (ref 2–12)
NEUTROPHILS NFR BLD: 33 % (ref 43–80)
NEUTS SEG NFR BLD: 1.46 K/UL (ref 1.8–7.3)
PLATELET, FLUORESCENCE: 281 K/UL (ref 130–450)
PMV BLD AUTO: 10.8 FL (ref 7–12)
RBC # BLD AUTO: 3.96 M/UL (ref 3.5–5.5)
RETIC HEMOGLOBIN: 30.3 PG (ref 28.2–36.6)
RETICS # AUTO: 0.04 M/UL
RETICS/RBC NFR AUTO: 1.1 % (ref 0.4–1.9)
T4 FREE SERPL-MCNC: 0.8 NG/DL (ref 0.9–1.7)
TIBC SERPL-MCNC: 310 UG/DL (ref 250–450)
VIT B12 SERPL-MCNC: 1156 PG/ML (ref 211–946)
WBC OTHER # BLD: 4.4 K/UL (ref 4.5–11.5)

## 2024-10-15 PROCEDURE — 85045 AUTOMATED RETICULOCYTE COUNT: CPT

## 2024-10-15 PROCEDURE — 36415 COLL VENOUS BLD VENIPUNCTURE: CPT

## 2024-10-15 PROCEDURE — 83550 IRON BINDING TEST: CPT

## 2024-10-15 PROCEDURE — 83540 ASSAY OF IRON: CPT

## 2024-10-15 PROCEDURE — 84439 ASSAY OF FREE THYROXINE: CPT

## 2024-10-15 PROCEDURE — 82746 ASSAY OF FOLIC ACID SERUM: CPT

## 2024-10-15 PROCEDURE — 82728 ASSAY OF FERRITIN: CPT

## 2024-10-15 PROCEDURE — 85025 COMPLETE CBC W/AUTO DIFF WBC: CPT

## 2024-10-15 PROCEDURE — 82607 VITAMIN B-12: CPT

## 2024-10-17 ENCOUNTER — OFFICE VISIT (OUTPATIENT)
Dept: PRIMARY CARE CLINIC | Age: 69
End: 2024-10-17

## 2024-10-17 VITALS
OXYGEN SATURATION: 93 % | SYSTOLIC BLOOD PRESSURE: 126 MMHG | DIASTOLIC BLOOD PRESSURE: 62 MMHG | BODY MASS INDEX: 34.23 KG/M2 | RESPIRATION RATE: 18 BRPM | TEMPERATURE: 96.8 F | HEART RATE: 94 BPM | HEIGHT: 66 IN | WEIGHT: 213 LBS

## 2024-10-17 DIAGNOSIS — E78.5 HYPERLIPIDEMIA, UNSPECIFIED HYPERLIPIDEMIA TYPE: Primary | ICD-10-CM

## 2024-10-17 DIAGNOSIS — R19.8 ALTERNATING CONSTIPATION AND DIARRHEA: ICD-10-CM

## 2024-10-17 DIAGNOSIS — F33.1 MODERATE EPISODE OF RECURRENT MAJOR DEPRESSIVE DISORDER (HCC): ICD-10-CM

## 2024-10-17 DIAGNOSIS — J32.0 CHRONIC MAXILLARY SINUSITIS: ICD-10-CM

## 2024-10-17 DIAGNOSIS — Z01.818 PREOPERATIVE CLEARANCE: ICD-10-CM

## 2024-10-17 DIAGNOSIS — M54.6 DORSALGIA OF THORACIC REGION: ICD-10-CM

## 2024-10-17 DIAGNOSIS — F41.1 GAD (GENERALIZED ANXIETY DISORDER): ICD-10-CM

## 2024-10-17 DIAGNOSIS — E11.42 DM TYPE 2 WITH DIABETIC PERIPHERAL NEUROPATHY (HCC): ICD-10-CM

## 2024-10-17 DIAGNOSIS — M79.7 FIBROMYALGIA: ICD-10-CM

## 2024-10-17 DIAGNOSIS — R59.0 CERVICAL LYMPHADENOPATHY: ICD-10-CM

## 2024-10-17 DIAGNOSIS — N89.8 VAGINAL DISCHARGE: ICD-10-CM

## 2024-10-17 DIAGNOSIS — R10.30 LOWER ABDOMINAL PAIN: ICD-10-CM

## 2024-10-17 DIAGNOSIS — J44.9 CHRONIC OBSTRUCTIVE PULMONARY DISEASE, UNSPECIFIED COPD TYPE (HCC): ICD-10-CM

## 2024-10-17 DIAGNOSIS — R39.15 URGENCY OF URINATION: ICD-10-CM

## 2024-10-17 DIAGNOSIS — E03.9 HYPOTHYROIDISM, UNSPECIFIED TYPE: ICD-10-CM

## 2024-10-17 RX ORDER — DULOXETIN HYDROCHLORIDE 60 MG/1
60 CAPSULE, DELAYED RELEASE ORAL DAILY
Qty: 30 CAPSULE | Refills: 5 | Status: SHIPPED | OUTPATIENT
Start: 2024-10-17 | End: 2025-04-15

## 2024-10-17 RX ORDER — ROSUVASTATIN CALCIUM 20 MG/1
20 TABLET, COATED ORAL DAILY
Qty: 30 TABLET | Refills: 5 | Status: SHIPPED | OUTPATIENT
Start: 2024-10-17

## 2024-10-17 RX ORDER — LIDOCAINE 50 MG/G
1 PATCH TOPICAL DAILY
Qty: 10 PATCH | Refills: 0 | Status: SHIPPED | OUTPATIENT
Start: 2024-10-17 | End: 2024-10-27

## 2024-10-17 RX ORDER — LEVOTHYROXINE SODIUM 125 UG/1
125 TABLET ORAL DAILY
Qty: 90 TABLET | Refills: 1 | Status: SHIPPED | OUTPATIENT
Start: 2024-10-17

## 2024-10-17 RX ORDER — GUAIFENESIN 600 MG/1
600 TABLET, EXTENDED RELEASE ORAL 2 TIMES DAILY
Qty: 30 TABLET | Refills: 0 | Status: SHIPPED | OUTPATIENT
Start: 2024-10-17 | End: 2024-11-01

## 2024-10-17 RX ORDER — LEVOTHYROXINE SODIUM 112 UG/1
112 TABLET ORAL DAILY
Qty: 30 TABLET | Refills: 5 | Status: CANCELLED | OUTPATIENT
Start: 2024-10-17

## 2024-10-17 RX ORDER — DOCUSATE SODIUM 100 MG/1
100 CAPSULE, LIQUID FILLED ORAL 2 TIMES DAILY
Qty: 60 CAPSULE | Refills: 5 | Status: SHIPPED | OUTPATIENT
Start: 2024-10-17

## 2024-10-17 RX ORDER — OXYBUTYNIN CHLORIDE 5 MG/1
5 TABLET, EXTENDED RELEASE ORAL NIGHTLY
Qty: 30 TABLET | Refills: 3 | Status: SHIPPED | OUTPATIENT
Start: 2024-10-17

## 2024-10-17 RX ORDER — VITAMIN B COMPLEX
1000 TABLET ORAL DAILY
Qty: 30 TABLET | Refills: 5 | Status: SHIPPED | OUTPATIENT
Start: 2024-10-17

## 2024-10-17 RX ORDER — AMITRIPTYLINE HYDROCHLORIDE 50 MG/1
TABLET ORAL
Qty: 30 TABLET | Refills: 5 | Status: SHIPPED | OUTPATIENT
Start: 2024-10-17

## 2024-10-17 RX ORDER — AMLODIPINE BESYLATE 10 MG/1
10 TABLET ORAL DAILY
Qty: 30 TABLET | Refills: 5 | Status: SHIPPED | OUTPATIENT
Start: 2024-10-17

## 2024-10-17 RX ORDER — LOSARTAN POTASSIUM 50 MG/1
50 TABLET ORAL DAILY
Qty: 30 TABLET | Refills: 5 | Status: SHIPPED | OUTPATIENT
Start: 2024-10-17

## 2024-10-17 RX ORDER — MONTELUKAST SODIUM 10 MG/1
TABLET ORAL
Qty: 30 TABLET | Refills: 5 | Status: SHIPPED | OUTPATIENT
Start: 2024-10-17

## 2024-10-17 ASSESSMENT — ENCOUNTER SYMPTOMS
NAUSEA: 0
DIARRHEA: 0
SINUS PAIN: 1
WHEEZING: 0
SHORTNESS OF BREATH: 0
ABDOMINAL PAIN: 1
CHEST TIGHTNESS: 0
VOMITING: 0
VOICE CHANGE: 0
SORE THROAT: 0
CONSTIPATION: 0

## 2024-10-17 NOTE — PROGRESS NOTES
ulcers, Bx H pylori neg, Dr. Dwyer    UPPER GASTROINTESTINAL ENDOSCOPY      approximately , no report, patient does not know the findings, Dr Myers, [facility]    UPPER GASTROINTESTINAL ENDOSCOPY N/A 2020    Severe gastritis with superficial ulcerations with bx neg H pylori, Dr. Botello, [facility]    UPPER GASTROINTESTINAL ENDOSCOPY N/A 2023    EGD BIOPSY performed by Rut Dwyer MD at [facility] ENDOSCOPY     Family History   Problem Relation Age of Onset    Heart Disease Mother 70    Diabetes Mother     Cancer Mother         Breast    Hypertension Father     Cancer Father         Pancreas    Diabetes Father     High Blood Pressure Father     Arthritis Brother     Diabetes Brother     Cancer Maternal Uncle     Cancer Paternal Aunt         breast    High Blood Pressure Paternal Aunt     High Blood Pressure Paternal Uncle     Arthritis Maternal Grandmother     Diabetes Maternal Grandmother     High Blood Pressure Maternal Grandmother     Arthritis Maternal Grandfather     Stroke Maternal Grandfather     High Cholesterol Paternal Grandmother     Kidney Disease Paternal Grandmother     Heart Disease Paternal Grandfather       Social History     Tobacco Use    Smoking status: Former     Current packs/day: 0.00     Average packs/day: 2.0 packs/day for 50.0 years (99.9 ttl pk-yrs)     Types: Cigarettes     Start date: 7/15/1971     Quit date: 2/10/2020     Years since quittin.6    Smokeless tobacco: Never   Vaping Use    Vaping status: Never Used   Substance Use Topics    Alcohol use: No     Alcohol/week: 0.0 standard drinks of alcohol    Drug use: No        Current Outpatient Medications on File Prior to Visit   Medication Sig Dispense Refill    baclofen (LIORESAL) 10 MG tablet Take 1 tablet by mouth 3 times daily 90 tablet 0    albuterol sulfate HFA (PROVENTIL;VENTOLIN;PROAIR) 108 (90 Base) MCG/ACT inhaler Inhale 3 puffs into the lungs 4 times daily as needed for Wheezing 3 each 1

## 2024-10-30 ENCOUNTER — OFFICE VISIT (OUTPATIENT)
Dept: PRIMARY CARE CLINIC | Age: 69
End: 2024-10-30
Payer: COMMERCIAL

## 2024-10-30 VITALS
TEMPERATURE: 98 F | WEIGHT: 212 LBS | SYSTOLIC BLOOD PRESSURE: 136 MMHG | DIASTOLIC BLOOD PRESSURE: 78 MMHG | HEIGHT: 66 IN | OXYGEN SATURATION: 97 % | HEART RATE: 71 BPM | BODY MASS INDEX: 34.07 KG/M2

## 2024-10-30 DIAGNOSIS — I10 PRIMARY HYPERTENSION: ICD-10-CM

## 2024-10-30 DIAGNOSIS — M16.11 PRIMARY OSTEOARTHRITIS OF RIGHT HIP: ICD-10-CM

## 2024-10-30 DIAGNOSIS — R06.09 DYSPNEA ON EXERTION: Primary | ICD-10-CM

## 2024-10-30 DIAGNOSIS — M17.12 PRIMARY OSTEOARTHRITIS OF LEFT KNEE: ICD-10-CM

## 2024-10-30 DIAGNOSIS — R06.2 WHEEZING: ICD-10-CM

## 2024-10-30 DIAGNOSIS — Z01.818 PREOPERATIVE CLEARANCE: ICD-10-CM

## 2024-10-30 DIAGNOSIS — E03.9 HYPOTHYROIDISM, UNSPECIFIED TYPE: ICD-10-CM

## 2024-10-30 DIAGNOSIS — E78.5 HYPERLIPIDEMIA, UNSPECIFIED HYPERLIPIDEMIA TYPE: ICD-10-CM

## 2024-10-30 DIAGNOSIS — D64.9 ANEMIA, UNSPECIFIED TYPE: ICD-10-CM

## 2024-10-30 PROCEDURE — 3075F SYST BP GE 130 - 139MM HG: CPT | Performed by: INTERNAL MEDICINE

## 2024-10-30 PROCEDURE — 1123F ACP DISCUSS/DSCN MKR DOCD: CPT | Performed by: INTERNAL MEDICINE

## 2024-10-30 PROCEDURE — G8399 PT W/DXA RESULTS DOCUMENT: HCPCS | Performed by: INTERNAL MEDICINE

## 2024-10-30 PROCEDURE — G8427 DOCREV CUR MEDS BY ELIG CLIN: HCPCS | Performed by: INTERNAL MEDICINE

## 2024-10-30 PROCEDURE — 3017F COLORECTAL CA SCREEN DOC REV: CPT | Performed by: INTERNAL MEDICINE

## 2024-10-30 PROCEDURE — G8484 FLU IMMUNIZE NO ADMIN: HCPCS | Performed by: INTERNAL MEDICINE

## 2024-10-30 PROCEDURE — G8417 CALC BMI ABV UP PARAM F/U: HCPCS | Performed by: INTERNAL MEDICINE

## 2024-10-30 PROCEDURE — 1036F TOBACCO NON-USER: CPT | Performed by: INTERNAL MEDICINE

## 2024-10-30 PROCEDURE — 3078F DIAST BP <80 MM HG: CPT | Performed by: INTERNAL MEDICINE

## 2024-10-30 PROCEDURE — 1090F PRES/ABSN URINE INCON ASSESS: CPT | Performed by: INTERNAL MEDICINE

## 2024-10-30 PROCEDURE — 99214 OFFICE O/P EST MOD 30 MIN: CPT | Performed by: INTERNAL MEDICINE

## 2024-10-30 NOTE — PROGRESS NOTES
HPI:  Patient comes in today for follow-up, patient states abdominal pain is better no pain since she stopped the metformin, patient is short of breath denies any wheezing she did not get called by the lung specialist at this point does not have an appointment  Pt. Requesting referral to DR Enrique in [place] for her Knee surgery  Review of Systems   Constitutional:  Negative for chills, fever and unexpected weight change.   HENT:  Positive for congestion. Negative for postnasal drip, sinus pain, sore throat and voice change.    Respiratory:  Positive for shortness of breath and wheezing. Negative for chest tightness.    Cardiovascular:  Negative for chest pain and leg swelling.   Gastrointestinal:  Negative for abdominal pain, constipation, diarrhea, nausea and vomiting.        See HPI     Musculoskeletal:  Positive for arthralgias (Right hip pain and back pain.  And sciatic pain), back pain and gait problem.        See HPI     Skin:  Negative for rash and wound.   Psychiatric/Behavioral: Negative.          Past Medical History:   Diagnosis Date    Adrenal incidentaloma (HCC)     Anxiety     Arthritis     Asthma     Bladder incontinence     Cancer (HCC) Dx 2009    multiple Myeloma, in remission currently     Chronic back pain     COPD (chronic obstructive pulmonary disease) (HCC)     on O2 2 liters  (uses with activity and at night to sleep)    Depression     Encounter for screening colonoscopy     Fibromyalgia     GERD (gastroesophageal reflux disease)     Hyperlipidemia     Hypertension     Hypothyroidism     MGUS (monoclonal gammopathy of unknown significance)     Neuropathy     Prolonged emergence from general anesthesia     Sleep apnea     Type II or unspecified type diabetes mellitus without mention of complication, not stated as uncontrolled      Past Surgical History:   Procedure Laterality Date    ANESTHESIA NERVE BLOCK Bilateral 08/10/2020    BILATERAL L3 L4 MEDIAL BRANCH L5 DORSSAL RAMUS NERVE BLOCK (CPT

## 2024-10-31 ENCOUNTER — TELEPHONE (OUTPATIENT)
Dept: OBGYN | Age: 69
End: 2024-10-31

## 2024-10-31 NOTE — TELEPHONE ENCOUNTER
Patient calling to get seen right away as she is having a discolored discharge and she is having pain in her vaginal area. Please contact.

## 2024-10-31 NOTE — TELEPHONE ENCOUNTER
Called patient, advised to report to urgent/ready care. Patient has not been in this office since 5/2021. Would be considered a new patient.

## 2024-11-06 ENCOUNTER — OFFICE VISIT (OUTPATIENT)
Dept: OBGYN | Age: 69
End: 2024-11-06

## 2024-11-06 VITALS — WEIGHT: 213 LBS | BODY MASS INDEX: 34.23 KG/M2 | HEIGHT: 66 IN

## 2024-11-06 DIAGNOSIS — N76.1 SUBACUTE VAGINITIS: ICD-10-CM

## 2024-11-06 DIAGNOSIS — N81.10 BADEN-WALKER GRADE 2 CYSTOCELE: Primary | ICD-10-CM

## 2024-11-06 DIAGNOSIS — R32 URINARY INCONTINENCE, UNSPECIFIED TYPE: ICD-10-CM

## 2024-11-06 NOTE — PROGRESS NOTES
Assisted with pelvic exam, [email or web address] Rx specimen obtained, labeled and sent via UPS. Clean catch urine obtained, labeled and sent to the lab.  
New Patient alert and pleasant with concerns about vaginal discharge and irritation. Urinary incontinence   Health track RX specimen obtained, labeled and sent to lab via UPS delivery.    Urine for culture obtained labeled and sent to lab   Discharge instructions have been discussed with the patient. Patient advised to call our office with any questions or concerns.   Voiced understanding.     
Visits Requested:   1   Approximately 25 minutes spent with patient     Electronically signed by Chris Hernandez MD on 11/6/24

## 2024-11-07 DIAGNOSIS — N76.1 SUBACUTE VAGINITIS: ICD-10-CM

## 2024-11-07 LAB
CULTURE: NORMAL
SPECIMEN DESCRIPTION: NORMAL

## 2024-11-08 DIAGNOSIS — B37.31 YEAST INFECTION OF THE VAGINA: Primary | ICD-10-CM

## 2024-11-08 DIAGNOSIS — N76.0 BV (BACTERIAL VAGINOSIS): ICD-10-CM

## 2024-11-08 DIAGNOSIS — B96.89 BV (BACTERIAL VAGINOSIS): ICD-10-CM

## 2024-11-08 RX ORDER — METRONIDAZOLE 500 MG/1
500 TABLET ORAL
Qty: 14 TABLET | Refills: 0 | Status: SHIPPED | OUTPATIENT
Start: 2024-11-08 | End: 2024-11-15

## 2024-11-08 RX ORDER — FLUCONAZOLE 150 MG/1
150 TABLET ORAL DAILY
Qty: 1 TABLET | Refills: 0 | Status: SHIPPED | OUTPATIENT
Start: 2024-11-08 | End: 2024-11-09

## 2024-11-08 NOTE — RESULT ENCOUNTER NOTE
Her health track came back positive for BV and Yeast. Do you want to order anything for that before I call patient?

## 2024-11-08 NOTE — RESULT ENCOUNTER NOTE
Not sure, I just happened to see that she had a result in her chart. Wanted to make sure I had an answer before I called her.

## 2024-11-12 ENCOUNTER — HOSPITAL ENCOUNTER (OUTPATIENT)
Dept: INFUSION THERAPY | Age: 69
Discharge: HOME | End: 2024-11-12

## 2024-11-12 ENCOUNTER — OFFICE VISIT (OUTPATIENT)
Dept: ONCOLOGY | Age: 69
End: 2024-11-12
Payer: COMMERCIAL

## 2024-11-12 VITALS
DIASTOLIC BLOOD PRESSURE: 64 MMHG | HEART RATE: 96 BPM | OXYGEN SATURATION: 100 % | HEIGHT: 66 IN | SYSTOLIC BLOOD PRESSURE: 136 MMHG | TEMPERATURE: 96.6 F | WEIGHT: 213.4 LBS | BODY MASS INDEX: 34.3 KG/M2

## 2024-11-12 DIAGNOSIS — D64.9 ANEMIA, UNSPECIFIED TYPE: ICD-10-CM

## 2024-11-12 DIAGNOSIS — D47.2 SMOLDERING MYELOMA: ICD-10-CM

## 2024-11-12 DIAGNOSIS — C90.00 MULTIPLE MYELOMA, REMISSION STATUS UNSPECIFIED (HCC): Primary | ICD-10-CM

## 2024-11-12 PROCEDURE — 3075F SYST BP GE 130 - 139MM HG: CPT | Performed by: INTERNAL MEDICINE

## 2024-11-12 PROCEDURE — G8399 PT W/DXA RESULTS DOCUMENT: HCPCS | Performed by: INTERNAL MEDICINE

## 2024-11-12 PROCEDURE — 1123F ACP DISCUSS/DSCN MKR DOCD: CPT | Performed by: INTERNAL MEDICINE

## 2024-11-12 PROCEDURE — G8427 DOCREV CUR MEDS BY ELIG CLIN: HCPCS | Performed by: INTERNAL MEDICINE

## 2024-11-12 PROCEDURE — G8484 FLU IMMUNIZE NO ADMIN: HCPCS | Performed by: INTERNAL MEDICINE

## 2024-11-12 PROCEDURE — 3078F DIAST BP <80 MM HG: CPT | Performed by: INTERNAL MEDICINE

## 2024-11-12 PROCEDURE — 99214 OFFICE O/P EST MOD 30 MIN: CPT | Performed by: INTERNAL MEDICINE

## 2024-11-12 PROCEDURE — 1036F TOBACCO NON-USER: CPT | Performed by: INTERNAL MEDICINE

## 2024-11-12 PROCEDURE — G8417 CALC BMI ABV UP PARAM F/U: HCPCS | Performed by: INTERNAL MEDICINE

## 2024-11-12 PROCEDURE — 1090F PRES/ABSN URINE INCON ASSESS: CPT | Performed by: INTERNAL MEDICINE

## 2024-11-12 PROCEDURE — 3017F COLORECTAL CA SCREEN DOC REV: CPT | Performed by: INTERNAL MEDICINE

## 2024-11-12 NOTE — PROGRESS NOTES
Patient provided with discharge instructions, received printed AVS.  All questions answered.  Patient understands follow up plan of care.       
recent SPEP with immunofixation ha revealed monoclonal IgG lambda, M-spike 0.7 gm/dl  from 9/9/2016, stable compared with the previous M-spike level. The patient does not have anemia, renal failure, hypercalcemia or lytic lesions on the lumbar spine MRI. IgG had increased sine 2014, skeletal survey was ordered, and is negative for lytic lesions, she had a BM bx and aspirate done by IR on 11/28/2016, Clot and aspirate suboptimal, biopsy showing 15% plasma cells, Flow cytometry positive for a  lambda restricted plasma cell neoplasm, Cytogenetics revealing a normal female karyotype, MM FISH negative.   The patient has 15% plasma cells in the bone marrow, no evidence of end organ damage, she has a smoldering multiple myeloma,risk of progression to MM, at a rate of 10 percent per year for the first five years, 3 percent per year for the next five years, and 1 to 2 percent per year for the following 10 years.     Skeletal survey was done on 12/18/2022 was negative for lytic lesions, labs reviewed, SPEP with DARYA had revealed IgG lambda monoclonal protein, M spike is stable at 0.9, IgG is stable, 2000, lambda is normal, 21.5, kappa is 39.3, M ratio is 1.83, no hypercalcemia or kidney disease, she has mild anemia, no evidence of progression to multiple myeloma, risk of progression was discussed with the patient, recommended close follow-up.    The patient has normocytic anemia, hemoglobin was 8.9G/DL previously,  she does not have vitamin B12/folate or iron deficiency, anemia could have been secondary to chronic infection, work-up was done, she does not have zinc or copper deficiency, reticulocyte 2.4%, PS negative for rouleaux formation, no circulating immature forms.  Erythropoietin 18.  Fit test is positive, the patient was referred by Dr. Mckeon to , she was scheduled for colonoscopy on 11/20/2023, but was no-show.  Overall hemoglobin is stable, 11.8 G/DL, will continue to monitor, iron studies reviewed, no evidence of

## 2024-11-13 ENCOUNTER — TELEPHONE (OUTPATIENT)
Dept: PRIMARY CARE CLINIC | Age: 69
End: 2024-11-13

## 2024-11-13 ENCOUNTER — HOSPITAL ENCOUNTER (OUTPATIENT)
Dept: ULTRASOUND IMAGING | Age: 69
Discharge: HOME | End: 2024-11-15
Attending: INTERNAL MEDICINE
Payer: COMMERCIAL

## 2024-11-13 DIAGNOSIS — E03.9 HYPOTHYROIDISM, UNSPECIFIED TYPE: ICD-10-CM

## 2024-11-13 DIAGNOSIS — R59.0 CERVICAL LYMPHADENOPATHY: ICD-10-CM

## 2024-11-13 PROCEDURE — 76536 US EXAM OF HEAD AND NECK: CPT

## 2024-11-13 NOTE — TELEPHONE ENCOUNTER
Patient was getting scheduled for an US and also told [facility] Scheduling she was to get a CT done. The one that is in has , they were verifying rather patient needs a CT or not. They dont need a call back but asked to update the patient.

## 2024-11-14 ENCOUNTER — TELEPHONE (OUTPATIENT)
Dept: PRIMARY CARE CLINIC | Age: 69
End: 2024-11-14

## 2024-11-14 NOTE — TELEPHONE ENCOUNTER
Angelita states she still is waiting on her paperwork to be faxed to Dr. Enrique for her pre op clearance for 09/20/2024

## 2024-11-15 ENCOUNTER — TELEPHONE (OUTPATIENT)
Dept: PRIMARY CARE CLINIC | Age: 69
End: 2024-11-15

## 2024-11-15 NOTE — TELEPHONE ENCOUNTER
Angelita is needing a new letter for work extending her leave time since she is now not able to be sen until 12/02/2024 at 2:30 PM.  She is also waiting on paperwork to be filled out by Dr. Mckeon and has been waiting for several weeks now.

## 2024-11-17 NOTE — TELEPHONE ENCOUNTER
Patient is referred for pulmonary evaluation, patient to check with pulmonary regarding CT of the chest to be ordered per pulmonary if needed  Patient to keep her appointment in December  Need to be cleared by pulmonary and cardiology for her surgery.

## 2024-11-18 NOTE — TELEPHONE ENCOUNTER
Patient stated she was told her EKG was fine and that she just had to be cleared with pulmonary. Please advise if this is okay?

## 2024-11-21 ENCOUNTER — TELEPHONE (OUTPATIENT)
Dept: PULMONOLOGY | Age: 69
End: 2024-11-21

## 2024-11-21 ENCOUNTER — OFFICE VISIT (OUTPATIENT)
Dept: PULMONOLOGY | Age: 69
End: 2024-11-21
Payer: COMMERCIAL

## 2024-11-21 VITALS
TEMPERATURE: 97.5 F | DIASTOLIC BLOOD PRESSURE: 81 MMHG | HEART RATE: 85 BPM | SYSTOLIC BLOOD PRESSURE: 173 MMHG | OXYGEN SATURATION: 92 % | RESPIRATION RATE: 16 BRPM

## 2024-11-21 DIAGNOSIS — Z87.891 PERSONAL HISTORY OF TOBACCO USE: ICD-10-CM

## 2024-11-21 DIAGNOSIS — J45.909 UNCOMPLICATED ASTHMA, UNSPECIFIED ASTHMA SEVERITY, UNSPECIFIED WHETHER PERSISTENT: ICD-10-CM

## 2024-11-21 DIAGNOSIS — J96.11 CHRONIC HYPOXIC RESPIRATORY FAILURE: Primary | ICD-10-CM

## 2024-11-21 DIAGNOSIS — J44.9 CHRONIC OBSTRUCTIVE PULMONARY DISEASE, UNSPECIFIED COPD TYPE (HCC): ICD-10-CM

## 2024-11-21 PROCEDURE — G0296 VISIT TO DETERM LDCT ELIG: HCPCS | Performed by: NURSE PRACTITIONER

## 2024-11-21 RX ORDER — FLUTICASONE FUROATE, UMECLIDINIUM BROMIDE AND VILANTEROL TRIFENATATE 200; 62.5; 25 UG/1; UG/1; UG/1
1 POWDER RESPIRATORY (INHALATION) DAILY
Qty: 3 EACH | Refills: 3 | Status: SHIPPED | OUTPATIENT
Start: 2024-11-21

## 2024-11-21 NOTE — PROGRESS NOTES
[facility]  Department of Pulmonary, Critical Care and Sleep Medicine  Dr. Krishnamurthy, Dr. Leyva, Dr. Sue, Dr. Falcon, BRYAN Guzman    Pulmonary & Critical Care Office Note - Follow up      Assessment/Plan     No problem-specific Assessment & Plan notes found for this encounter.    Problem List Items Addressed This Visit    None             HPI: Angelita Slater is a 69 y.o. female with a PMH of       Chief Complaint:     Assessment:    Plan:     Continue ***.   Advised to rinse mouth after each use.  P.r.n. albuterol  Advised on proper inhaler technique, and adherence to prescribed inhalers    Nodule OR Screen - 50-80, smoked >= 20 pack years, smoke or quit within 15 yrs    Aspiration / GERD precautions  Head end of bed elevation.    Maintain active and healthy lifestyle with weight reduction.  COVID-19 precautions  Recommend yearly Influenza and appropriate pneumonia vaccinations.    *** I spent 5 minutes counseling patient regarding smoking and the risk of Lung cancer and COPD and Respiratory failure.    Family History   Problem Relation Age of Onset    Heart Disease Mother 70    Diabetes Mother     Cancer Mother         Breast    Hypertension Father     Cancer Father         Pancreas    Diabetes Father     High Blood Pressure Father     Arthritis Brother     Diabetes Brother     Cancer Maternal Uncle     Cancer Paternal Aunt         breast    High Blood Pressure Paternal Aunt     High Blood Pressure Paternal Uncle     Arthritis Maternal Grandmother     Diabetes Maternal Grandmother     High Blood Pressure Maternal Grandmother     Arthritis Maternal Grandfather     Stroke Maternal Grandfather     High Cholesterol Paternal Grandmother     Kidney Disease Paternal Grandmother     Heart Disease Paternal Grandfather         Follow up: No follow-ups on file.    BRYAN Guzman-CNP  Pulmonary & Critical Care Medicine  [facility]   
Patient to follow up with provider after testing is done.  Patient performed an ambulatory pulse ox testing during office visit per Annie Kumar.  Patient started out on room air at rest with a saturation of 92% during ambulation patient dropped to 87%on room air was started 1 liter but had to be increased to 2 liters which brought her up to 93%.  NP notified of results.  Patient also given a sample of Trelegy during office visit under the direction of Annie Kumar.  
are nonlabored, breath sounds are equal.  Cardiovascular:  S1, S2 normal, regular rate and rhythm, no murmur, no pedal edema  Gastrointestinal:  Soft, nontender, nondistended.  Normal bowel sounds.  No organomegaly  Neurologic:  Awake and alert, cranial nerves 2-12 grossly intact, no focal motor or sensory deficits.  Musculoskeletal: Walks with cane    Labs     CBC with Differential:    Lab Results   Component Value Date/Time    WBC 4.4 10/15/2024 10:25 AM    RBC 3.96 10/15/2024 10:25 AM    HGB 11.3 10/15/2024 10:25 AM    HCT 37.7 10/15/2024 10:25 AM     09/24/2024 03:23 PM    MCV 95.2 10/15/2024 10:25 AM    MCH 28.5 10/15/2024 10:25 AM    MCHC 30.0 10/15/2024 10:25 AM    RDW 12.8 10/15/2024 10:25 AM    METASPCT 0.9 01/26/2020 07:11 AM    LYMPHOPCT 53 10/15/2024 10:25 AM    PROMYELOPCT 3.5 01/23/2020 07:25 AM    MONOPCT 7 10/15/2024 10:25 AM    MYELOPCT 0.9 01/26/2020 07:11 AM    EOSPCT 6 10/15/2024 10:25 AM    BASOPCT 1 10/15/2024 10:25 AM    MONOSABS 0.30 10/15/2024 10:25 AM    LYMPHSABS 2.34 10/15/2024 10:25 AM    EOSABS 0.24 10/15/2024 10:25 AM    BASOSABS 0.04 10/15/2024 10:25 AM     CMP:    Lab Results   Component Value Date/Time     09/24/2024 03:23 PM    K 4.0 09/24/2024 03:23 PM    K 3.9 01/22/2023 06:54 AM     09/24/2024 03:23 PM    CO2 27 09/24/2024 03:23 PM    BUN 11 09/24/2024 03:23 PM    CREATININE 0.7 09/24/2024 03:23 PM    GFRAA >60 06/23/2022 02:12 PM    LABGLOM >90 09/24/2024 03:23 PM    LABGLOM >60 02/09/2024 04:22 PM    GLUCOSE 116 09/24/2024 03:23 PM    GLUCOSE 124 10/26/2011 04:50 AM    CALCIUM 9.2 09/24/2024 03:23 PM    BILITOT 0.6 09/24/2024 03:23 PM    ALKPHOS 80 09/24/2024 03:23 PM    AST 25 09/24/2024 03:23 PM    ALT 14 09/24/2024 03:23 PM     Magnesium:    Lab Results   Component Value Date/Time    MG 2.2 01/06/2023 05:54 AM     Phosphorus:    Lab Results   Component Value Date/Time    PHOS 2.4 01/05/2023 11:18 PM     ABG:    Lab Results   Component Value Date/Time

## 2024-11-21 NOTE — TELEPHONE ENCOUNTER
Mailed a letter to patient informing her that her PFT is scheduled for 12-19-24 at 12:00 pm at the [facility]. She must arrive by 11:30 am. She is to have no caffeine for 24 hours prior to test and no resp meds for at least 4 hours prior to test.    Her CT Lung Screening is scheduled for 12-19-24 at 1:00 pm directly after her PFT. There is no prep for this test

## 2024-12-02 ENCOUNTER — OFFICE VISIT (OUTPATIENT)
Dept: PRIMARY CARE CLINIC | Age: 69
End: 2024-12-02

## 2024-12-02 VITALS
HEIGHT: 66 IN | TEMPERATURE: 97.5 F | DIASTOLIC BLOOD PRESSURE: 66 MMHG | OXYGEN SATURATION: 94 % | BODY MASS INDEX: 35.2 KG/M2 | WEIGHT: 219 LBS | HEART RATE: 87 BPM | SYSTOLIC BLOOD PRESSURE: 132 MMHG

## 2024-12-02 DIAGNOSIS — N39.41 URGE INCONTINENCE OF URINE: ICD-10-CM

## 2024-12-02 DIAGNOSIS — F41.9 ANXIETY AND DEPRESSION: ICD-10-CM

## 2024-12-02 DIAGNOSIS — F32.A ANXIETY AND DEPRESSION: ICD-10-CM

## 2024-12-02 DIAGNOSIS — J44.9 CHRONIC OBSTRUCTIVE PULMONARY DISEASE, UNSPECIFIED COPD TYPE (HCC): Primary | ICD-10-CM

## 2024-12-02 DIAGNOSIS — R19.8 IRREGULAR BOWEL HABITS: ICD-10-CM

## 2024-12-02 DIAGNOSIS — R06.09 DYSPNEA ON EXERTION: ICD-10-CM

## 2024-12-02 DIAGNOSIS — M16.11 PRIMARY OSTEOARTHRITIS OF RIGHT HIP: ICD-10-CM

## 2024-12-02 DIAGNOSIS — J96.11 CHRONIC HYPOXIC RESPIRATORY FAILURE: ICD-10-CM

## 2024-12-02 DIAGNOSIS — I10 PRIMARY HYPERTENSION: ICD-10-CM

## 2024-12-02 DIAGNOSIS — R73.09 ABNORMAL GLUCOSE: ICD-10-CM

## 2024-12-02 DIAGNOSIS — E04.1 THYROID NODULE: ICD-10-CM

## 2024-12-02 DIAGNOSIS — E03.9 HYPOTHYROIDISM, UNSPECIFIED TYPE: ICD-10-CM

## 2024-12-02 LAB — HBA1C MFR BLD: 6.1 %

## 2024-12-02 NOTE — PROGRESS NOTES
HPI:  Patient comes in today for follow-up patient is complaining of discomfort in the lower chest area upper epigastric area that circulates around her back patient states the discomfort is steady, she is using a laxative and that is helping her constipation  Patient seen by the pulmonary specialist and is ordered a CT scan.    Review of Systems   Constitutional:  Negative for chills, fever and unexpected weight change.   HENT:  Negative for postnasal drip, sore throat and voice change.    Respiratory:  Negative for chest tightness, shortness of breath and wheezing.    Cardiovascular:  Negative for chest pain and leg swelling.   Gastrointestinal:  Positive for abdominal pain. Negative for constipation, nausea and vomiting.   Musculoskeletal:  Positive for arthralgias, back pain and gait problem. Negative for joint swelling.   Skin:  Negative for rash and wound.   Psychiatric/Behavioral: Negative.          Past Medical History:   Diagnosis Date    Adrenal incidentaloma (HCC)     Anxiety     Arthritis     Asthma     Bladder incontinence     Cancer (HCC) Dx 2009    multiple Myeloma, in remission currently     Chronic back pain     COPD (chronic obstructive pulmonary disease) (HCC)     on O2 2 liters  (uses with activity and at night to sleep)    Depression     Encounter for screening colonoscopy     Fibromyalgia     GERD (gastroesophageal reflux disease)     Hyperlipidemia     Hypertension     Hypothyroidism     MGUS (monoclonal gammopathy of unknown significance)     Neuropathy     Prolonged emergence from general anesthesia     Sleep apnea     Type II or unspecified type diabetes mellitus without mention of complication, not stated as uncontrolled      Past Surgical History:   Procedure Laterality Date    ANESTHESIA NERVE BLOCK Bilateral 08/10/2020    BILATERAL L3 L4 MEDIAL BRANCH L5 DORSSAL RAMUS NERVE BLOCK (CPT 71795) SEDATION performed by Campos [place] MD at [facility] OR    COLONOSCOPY  07/20/2016    removed

## 2024-12-03 LAB — TSH SERPL DL<=0.05 MIU/L-ACNC: 4.8 UIU/ML (ref 0.27–4.2)

## 2024-12-10 ENCOUNTER — TELEPHONE (OUTPATIENT)
Dept: PRIMARY CARE CLINIC | Age: 69
End: 2024-12-10

## 2024-12-10 DIAGNOSIS — M17.12 PRIMARY OSTEOARTHRITIS OF LEFT KNEE: ICD-10-CM

## 2024-12-10 DIAGNOSIS — M16.11 PRIMARY OSTEOARTHRITIS OF RIGHT HIP: Primary | ICD-10-CM

## 2024-12-18 ENCOUNTER — OFFICE VISIT (OUTPATIENT)
Dept: OBGYN | Age: 69
End: 2024-12-18
Payer: COMMERCIAL

## 2024-12-18 VITALS
OXYGEN SATURATION: 93 % | BODY MASS INDEX: 35.86 KG/M2 | DIASTOLIC BLOOD PRESSURE: 66 MMHG | SYSTOLIC BLOOD PRESSURE: 143 MMHG | HEART RATE: 82 BPM | WEIGHT: 222.2 LBS | TEMPERATURE: 98 F

## 2024-12-18 DIAGNOSIS — N76.0 BV (BACTERIAL VAGINOSIS): Primary | ICD-10-CM

## 2024-12-18 DIAGNOSIS — B96.89 BV (BACTERIAL VAGINOSIS): Primary | ICD-10-CM

## 2024-12-18 PROCEDURE — 3017F COLORECTAL CA SCREEN DOC REV: CPT | Performed by: STUDENT IN AN ORGANIZED HEALTH CARE EDUCATION/TRAINING PROGRAM

## 2024-12-18 PROCEDURE — G8417 CALC BMI ABV UP PARAM F/U: HCPCS | Performed by: STUDENT IN AN ORGANIZED HEALTH CARE EDUCATION/TRAINING PROGRAM

## 2024-12-18 PROCEDURE — G8399 PT W/DXA RESULTS DOCUMENT: HCPCS | Performed by: STUDENT IN AN ORGANIZED HEALTH CARE EDUCATION/TRAINING PROGRAM

## 2024-12-18 PROCEDURE — 99212 OFFICE O/P EST SF 10 MIN: CPT | Performed by: STUDENT IN AN ORGANIZED HEALTH CARE EDUCATION/TRAINING PROGRAM

## 2024-12-18 PROCEDURE — 1036F TOBACCO NON-USER: CPT | Performed by: STUDENT IN AN ORGANIZED HEALTH CARE EDUCATION/TRAINING PROGRAM

## 2024-12-18 PROCEDURE — 3077F SYST BP >= 140 MM HG: CPT | Performed by: STUDENT IN AN ORGANIZED HEALTH CARE EDUCATION/TRAINING PROGRAM

## 2024-12-18 PROCEDURE — 99459 PELVIC EXAMINATION: CPT | Performed by: STUDENT IN AN ORGANIZED HEALTH CARE EDUCATION/TRAINING PROGRAM

## 2024-12-18 PROCEDURE — G8427 DOCREV CUR MEDS BY ELIG CLIN: HCPCS | Performed by: STUDENT IN AN ORGANIZED HEALTH CARE EDUCATION/TRAINING PROGRAM

## 2024-12-18 PROCEDURE — 1090F PRES/ABSN URINE INCON ASSESS: CPT | Performed by: STUDENT IN AN ORGANIZED HEALTH CARE EDUCATION/TRAINING PROGRAM

## 2024-12-18 PROCEDURE — 3078F DIAST BP <80 MM HG: CPT | Performed by: STUDENT IN AN ORGANIZED HEALTH CARE EDUCATION/TRAINING PROGRAM

## 2024-12-18 PROCEDURE — 1123F ACP DISCUSS/DSCN MKR DOCD: CPT | Performed by: STUDENT IN AN ORGANIZED HEALTH CARE EDUCATION/TRAINING PROGRAM

## 2024-12-18 PROCEDURE — G8484 FLU IMMUNIZE NO ADMIN: HCPCS | Performed by: STUDENT IN AN ORGANIZED HEALTH CARE EDUCATION/TRAINING PROGRAM

## 2024-12-18 NOTE — PROGRESS NOTES
Patient alert and pleasant.  Here today with c/o vaginal discharge x 1 month.  Assisted with pelvic exam, HealthTrack specimen obtained, labeled and sent via UPS.  Discharge instructions have been discussed with the patient. Patient advised to call our office with any questions or concerns.   Voiced understanding.   
100.8 kg (222 lb 3.2 oz)   SpO2 93%   BMI 35.86 kg/m²   No LMP recorded. Patient is postmenopausal.      General appearance: alert, cooperative and in no acute distress.  Head: NCAT   Abdomen: soft, non-tender; bowel sounds normal; no masses,  no organomegaly  Psych: No acute distress, mood and affect full range  Neuro: Alert and oriented, no focal deficits     Pelvic Exam: With a female chaperone  EXTERNAL GENITALIA: normal external genitalia, no external lesions  VAGINA: normal rugae, some gray discharge noted and collected        ASSESSMENT :      Diagnosis Orders   1. BV (bacterial vaginosis)  MISCELLANEOUS SENDOUT health track           PLAN:    Return if symptoms worsen or fail to improve.    No orders of the defined types were placed in this encounter.  Told patient I would like to repeat health track, if it still positive we may have to repeat another course of antibiotics, she voiced understanding    Orders Placed This Encounter   Procedures    MISCELLANEOUS SENDOUT health track     Order Specific Question:   Specify Req. Test (1 Test/Order)     Answer:   health track   Approximately 10 minutes for patient     Electronically signed by Chris Hernandez MD on 12/18/24

## 2024-12-19 ENCOUNTER — HOSPITAL ENCOUNTER (OUTPATIENT)
Dept: CT IMAGING | Age: 69
Discharge: HOME | End: 2024-12-21
Payer: COMMERCIAL

## 2024-12-19 ENCOUNTER — HOSPITAL ENCOUNTER (OUTPATIENT)
Dept: PULMONOLOGY | Age: 69
Discharge: HOME | End: 2024-12-19
Payer: COMMERCIAL

## 2024-12-19 DIAGNOSIS — Z87.891 PERSONAL HISTORY OF TOBACCO USE: ICD-10-CM

## 2024-12-19 DIAGNOSIS — J45.909 UNCOMPLICATED ASTHMA, UNSPECIFIED ASTHMA SEVERITY, UNSPECIFIED WHETHER PERSISTENT: ICD-10-CM

## 2024-12-19 DIAGNOSIS — J44.9 CHRONIC OBSTRUCTIVE PULMONARY DISEASE, UNSPECIFIED COPD TYPE (HCC): ICD-10-CM

## 2024-12-19 DIAGNOSIS — N76.0 BV (BACTERIAL VAGINOSIS): ICD-10-CM

## 2024-12-19 DIAGNOSIS — B96.89 BV (BACTERIAL VAGINOSIS): ICD-10-CM

## 2024-12-19 PROCEDURE — 94729 DIFFUSING CAPACITY: CPT

## 2024-12-19 PROCEDURE — 71271 CT THORAX LUNG CANCER SCR C-: CPT

## 2024-12-19 PROCEDURE — 94060 EVALUATION OF WHEEZING: CPT

## 2024-12-19 PROCEDURE — 94726 PLETHYSMOGRAPHY LUNG VOLUMES: CPT

## 2024-12-20 ENCOUNTER — OFFICE VISIT (OUTPATIENT)
Dept: PULMONOLOGY | Age: 69
End: 2024-12-20
Payer: COMMERCIAL

## 2024-12-20 VITALS
TEMPERATURE: 97.2 F | SYSTOLIC BLOOD PRESSURE: 123 MMHG | OXYGEN SATURATION: 90 % | HEART RATE: 95 BPM | DIASTOLIC BLOOD PRESSURE: 67 MMHG | RESPIRATION RATE: 14 BRPM

## 2024-12-20 DIAGNOSIS — E66.812 CLASS 2 SEVERE OBESITY WITH SERIOUS COMORBIDITY AND BODY MASS INDEX (BMI) OF 35.0 TO 35.9 IN ADULT, UNSPECIFIED OBESITY TYPE: ICD-10-CM

## 2024-12-20 DIAGNOSIS — N76.0 BV (BACTERIAL VAGINOSIS): ICD-10-CM

## 2024-12-20 DIAGNOSIS — B96.89 BV (BACTERIAL VAGINOSIS): Primary | ICD-10-CM

## 2024-12-20 DIAGNOSIS — B96.89 BV (BACTERIAL VAGINOSIS): ICD-10-CM

## 2024-12-20 DIAGNOSIS — J44.89 ASTHMA-COPD OVERLAP SYNDROME (HCC): ICD-10-CM

## 2024-12-20 DIAGNOSIS — J96.11 CHRONIC HYPOXIC RESPIRATORY FAILURE: ICD-10-CM

## 2024-12-20 DIAGNOSIS — J45.909 UNCOMPLICATED ASTHMA, UNSPECIFIED ASTHMA SEVERITY, UNSPECIFIED WHETHER PERSISTENT: ICD-10-CM

## 2024-12-20 DIAGNOSIS — J44.9 CHRONIC OBSTRUCTIVE PULMONARY DISEASE, UNSPECIFIED COPD TYPE (HCC): Primary | ICD-10-CM

## 2024-12-20 DIAGNOSIS — E66.01 CLASS 2 SEVERE OBESITY WITH SERIOUS COMORBIDITY AND BODY MASS INDEX (BMI) OF 35.0 TO 35.9 IN ADULT, UNSPECIFIED OBESITY TYPE: ICD-10-CM

## 2024-12-20 DIAGNOSIS — N76.0 BV (BACTERIAL VAGINOSIS): Primary | ICD-10-CM

## 2024-12-20 PROCEDURE — 3078F DIAST BP <80 MM HG: CPT | Performed by: NURSE PRACTITIONER

## 2024-12-20 PROCEDURE — 3023F SPIROM DOC REV: CPT | Performed by: NURSE PRACTITIONER

## 2024-12-20 PROCEDURE — G8484 FLU IMMUNIZE NO ADMIN: HCPCS | Performed by: NURSE PRACTITIONER

## 2024-12-20 PROCEDURE — 3074F SYST BP LT 130 MM HG: CPT | Performed by: NURSE PRACTITIONER

## 2024-12-20 PROCEDURE — G8399 PT W/DXA RESULTS DOCUMENT: HCPCS | Performed by: NURSE PRACTITIONER

## 2024-12-20 PROCEDURE — 1123F ACP DISCUSS/DSCN MKR DOCD: CPT | Performed by: NURSE PRACTITIONER

## 2024-12-20 PROCEDURE — G8417 CALC BMI ABV UP PARAM F/U: HCPCS | Performed by: NURSE PRACTITIONER

## 2024-12-20 PROCEDURE — G8427 DOCREV CUR MEDS BY ELIG CLIN: HCPCS | Performed by: NURSE PRACTITIONER

## 2024-12-20 PROCEDURE — 3017F COLORECTAL CA SCREEN DOC REV: CPT | Performed by: NURSE PRACTITIONER

## 2024-12-20 PROCEDURE — 99214 OFFICE O/P EST MOD 30 MIN: CPT | Performed by: NURSE PRACTITIONER

## 2024-12-20 PROCEDURE — 1090F PRES/ABSN URINE INCON ASSESS: CPT | Performed by: NURSE PRACTITIONER

## 2024-12-20 PROCEDURE — 1036F TOBACCO NON-USER: CPT | Performed by: NURSE PRACTITIONER

## 2024-12-20 RX ORDER — METRONIDAZOLE 500 MG/1
500 TABLET ORAL
Qty: 14 TABLET | Refills: 0 | Status: CANCELLED | OUTPATIENT
Start: 2024-12-20 | End: 2024-12-27

## 2024-12-20 RX ORDER — METRONIDAZOLE 500 MG/1
500 TABLET ORAL
Qty: 14 TABLET | Refills: 0 | Status: SHIPPED
Start: 2024-12-20 | End: 2024-12-20

## 2024-12-20 RX ORDER — METRONIDAZOLE 500 MG/1
500 TABLET ORAL
Qty: 14 TABLET | Refills: 0 | Status: SHIPPED | OUTPATIENT
Start: 2024-12-20 | End: 2024-12-27

## 2024-12-20 RX ORDER — FLUTICASONE FUROATE, UMECLIDINIUM BROMIDE AND VILANTEROL TRIFENATATE 200; 62.5; 25 UG/1; UG/1; UG/1
1 POWDER RESPIRATORY (INHALATION) DAILY
Qty: 3 EACH | Refills: 3 | Status: SHIPPED | OUTPATIENT
Start: 2024-12-20

## 2024-12-20 NOTE — TELEPHONE ENCOUNTER
Requested Prescriptions     Pending Prescriptions Disp Refills    metroNIDAZOLE (FLAGYL) 500 MG tablet 14 tablet 0     Sig: Take 1 tablet by mouth in the morning and 1 tablet in the evening. Do all this for 7 days.    Pt would like medication to go to different pharmacy

## 2024-12-20 NOTE — PROGRESS NOTES
[facility]  Department of Pulmonary, Critical Care and Sleep Medicine  Dr. Krishnamurthy, Dr. Leyva, Dr. Sue, Dr. Falcon, BRYAN Guzman    Pulmonary & Critical Care Office Note - Follow up      Assessment/Plan     Problem List Items Addressed This Visit          Respiratory    Chronic obstructive pulmonary disease (HCC) - Primary       Other    Obesity     Other Visit Diagnoses       Uncomplicated asthma, unspecified asthma severity, unspecified whether persistent        Asthma-COPD overlap syndrome (HCC)        Chronic hypoxic respiratory failure                HPI: Angelita Slater is a 69 y.o. female with a PMH of EDWARD, asthma-COPD overlap, history or tobacco use, EDWARD and obesity. Angelita had previously followed with Dr. Willis and Dr. Villatoro but is now here as our office is closer to her home. She would like pulmonary clearance to have right hip replacement with Dr. Enrique in [place], [place]. Angelita's last PFT was 4 yrs ago. She reports shortness of breath with activity, she has supplemental O2 but did not bring it to the office with her. She states it's too heavy to carry. Ambulatory pulse ox conducted today in clinic reveals drop in Spo2 87%, Angelita needs supplemental O2. Will need updated PFT prior to surgical clearance.  RTC after testing.    December 20, 2024: Angelita is seen and examined today for 4 week follow up of initial examination of COPD and asthma. Since her last visit she has undergone updated PFT which reveals moderately severe obstruction, CT chest reveals 4 mm nodule of RML stable from previous imaging, these results were reviewed with Angelita and her son, Rosas at today's visit. Angelita endorses shortness of breath with activity, wheezing and cough. She has not started Trelegy inhaler and reports having misplaced the sample I gave her at her last visit. She is currently only using albuterol. We did have a discussion about inhaler use and

## 2024-12-21 NOTE — PROCEDURES
[facility]              [address] [place] [address]                           PULMONARY FUNCTION      PATIENT NAME: GARRETT ROBINS           : 1955  MED REC NO: [id number]                        ROOM:   ACCOUNT NO: [id number]                       ADMIT DATE: 2024  PROVIDER: Robert Krishnamurthy DO      DATE OF PROCEDURE: 2024    INDICATIONS:  A 69-year-old female, 66 inches, 222 pounds, 100-pack-year smoking history with a history of COPD, asthma, and tobacco abuse.  The patient's pulmonary function tests are done prior to preoperative assessment.    FINDINGS:  Pulmonary function test revealed airflow obstruction with a forced vital capacity post-bronchodilator 1.99 L, 76% of predicted, with an FEV1 of 1.33 L, 60% of predicted, and FEV1/FVC ratio of 62%.  Inflow rates are reduced to 31% of predicted with a maximum voluntary ventilation of 49 L/minute, which is 55% of predicted.    Static lung volumes with slow vital capacity of 2.43 L, 92% of predicted.  Inspiratory capacity 1.74 L, 99% of predicted.  Expiratory reserve volume 0.67 L, 78% of predicted.  Thoracic gas volume of 3.23 L, 105% predicted.  Residual volume of 2.54 L, 111% of predicted.  Total lung capacity of 4.97 L, 92% of predicted, and RV to TLC ratio 117% of predicted.  The diffusing capacity is reduced at 13.35 mL/minute per mmHg, which is 66% of predicted.    IMPRESSION:  There is moderate airflow obstruction, GOLD II chronic obstructive pulmonary disease with no bronchodilator response or evidence of hyperinflation or air trapping.  There is no restrictive process, but moderate to severe reductions in diffusion are noted indicating a discrepancy at the alveolar capillary membrane.  Clinical correlation needed.          ROBERT KRISHNAMURTHY DO      D:  2024 15:27:53     T:  2024 06:19:29     DYLAN/AUTUMN  Job #:  [id number]     Doc#:  [id number]

## 2024-12-30 ENCOUNTER — TELEPHONE (OUTPATIENT)
Dept: PRIMARY CARE CLINIC | Age: 69
End: 2024-12-30

## 2024-12-30 NOTE — TELEPHONE ENCOUNTER
Angelita is asking for an extension for her return to work.  She was originally has a letter advising that Dr. Mckeon wanted her to be off until the end of December.  She is needing to be extended beyond due to not having scheduled her surgery.      Looking for a letter and an extension for as long for her to be off work as necessary.

## 2025-01-09 ENCOUNTER — OFFICE VISIT (OUTPATIENT)
Dept: ORTHOPEDIC SURGERY | Age: 70
End: 2025-01-09
Payer: COMMERCIAL

## 2025-01-09 VITALS — DIASTOLIC BLOOD PRESSURE: 78 MMHG | OXYGEN SATURATION: 98 % | SYSTOLIC BLOOD PRESSURE: 148 MMHG | HEART RATE: 94 BPM

## 2025-01-09 DIAGNOSIS — M16.11 PRIMARY OSTEOARTHRITIS OF RIGHT HIP: ICD-10-CM

## 2025-01-09 DIAGNOSIS — M17.12 PRIMARY OSTEOARTHRITIS OF LEFT KNEE: Primary | ICD-10-CM

## 2025-01-09 DIAGNOSIS — M25.551 PAIN OF RIGHT HIP: ICD-10-CM

## 2025-01-09 PROCEDURE — 99214 OFFICE O/P EST MOD 30 MIN: CPT | Performed by: STUDENT IN AN ORGANIZED HEALTH CARE EDUCATION/TRAINING PROGRAM

## 2025-01-09 PROCEDURE — 3078F DIAST BP <80 MM HG: CPT | Performed by: STUDENT IN AN ORGANIZED HEALTH CARE EDUCATION/TRAINING PROGRAM

## 2025-01-09 PROCEDURE — 1036F TOBACCO NON-USER: CPT | Performed by: STUDENT IN AN ORGANIZED HEALTH CARE EDUCATION/TRAINING PROGRAM

## 2025-01-09 PROCEDURE — 1123F ACP DISCUSS/DSCN MKR DOCD: CPT | Performed by: STUDENT IN AN ORGANIZED HEALTH CARE EDUCATION/TRAINING PROGRAM

## 2025-01-09 PROCEDURE — 3077F SYST BP >= 140 MM HG: CPT | Performed by: STUDENT IN AN ORGANIZED HEALTH CARE EDUCATION/TRAINING PROGRAM

## 2025-01-09 PROCEDURE — G8417 CALC BMI ABV UP PARAM F/U: HCPCS | Performed by: STUDENT IN AN ORGANIZED HEALTH CARE EDUCATION/TRAINING PROGRAM

## 2025-01-09 PROCEDURE — G8399 PT W/DXA RESULTS DOCUMENT: HCPCS | Performed by: STUDENT IN AN ORGANIZED HEALTH CARE EDUCATION/TRAINING PROGRAM

## 2025-01-09 PROCEDURE — 1090F PRES/ABSN URINE INCON ASSESS: CPT | Performed by: STUDENT IN AN ORGANIZED HEALTH CARE EDUCATION/TRAINING PROGRAM

## 2025-01-09 PROCEDURE — 3017F COLORECTAL CA SCREEN DOC REV: CPT | Performed by: STUDENT IN AN ORGANIZED HEALTH CARE EDUCATION/TRAINING PROGRAM

## 2025-01-09 PROCEDURE — G8427 DOCREV CUR MEDS BY ELIG CLIN: HCPCS | Performed by: STUDENT IN AN ORGANIZED HEALTH CARE EDUCATION/TRAINING PROGRAM

## 2025-01-09 NOTE — PROGRESS NOTES
Referring Provider:   Sarai Mckeon MD  [address] [place] [place] [address]    CHIEF COMPLAINT:   Chief Complaint   Patient presents with    Knee Pain     Left knee pain, wants to discuss TKA    Hip Pain     Right hip pain      HPI update 1/9/2025: Angelita is here today to discuss her right hip and left knee.  She has end-stage osteoarthritis of both knees that is interfering with her activities of daily living.  She has failed extensive conservative treatment.  She is having significant issues with ambulation and using a cane and today she required a wheelchair.  Currently her right hip is hurting worse and causing the most significant dysfunction.  She is here today to discuss surgery on her right hip      HPI:    Angelita Slater is a 69 y.o. year old female with right hip and left knee pain.  She has been treated for osteoarthritis of the joint for a number of years.  She has tried oral anti-inflammatories, Tylenol, activity modification, weight loss, supervised exercise program amongst other modalities including injections.  None of these have provided lasting relief.  She is here today to discuss surgery.  She does have chronic back pain on fibromyalgia.  She ambulates with a cane.  She has significant groin pain with ambulation that gets better with rest.  None of her medications seem to be helping.  She is diabetic that is somewhat well-controlled currently.  She is also a COPD patient.  Her left knee pain is also severe and disabling.  She has trouble walking long distances and has to stop frequently.    PAST MEDICAL HISTORY  Past Medical History:   Diagnosis Date    Adrenal incidentaloma (HCC)     Anxiety     Arthritis     Asthma     Bladder incontinence     Cancer (HCC) Dx 2009    multiple Myeloma, in remission currently     Chronic back pain     COPD (chronic obstructive pulmonary disease) (HCC)     on O2 2 liters  (uses with activity and at night to sleep)    Depression     Encounter for

## 2025-01-09 NOTE — PATIENT INSTRUCTIONS
Your teeth and gums must be in good condition for surgery. Have cavities filled, broken teeth repaired and root canals and / or teeth extractions completed prior to surgery. If teeth extractions are needed prior to surgery, a note from your dentist is required stating, your mouth is healed and free of infection.  6.  Inform our office if you are ill, have an infection or if any doctor has prescribed an antibiotic for you.  You must be free of infection for surgery. If you become ill within 2 weeks of your surgery date (cold, congestion, flu), surgery will be postponed. A letter of medical clearance from your family doctor will then be required to re-schedule your surgery on the next available date.  7.  If you have private insurance, call customer service and check your benefits and any deductibles / co-pays. Most insurance companies approve only an overnight stay in the hospital. You will be discharged from the hospital the day after your surgery, again check with your insurance company.  8.  The goal is to discharge you home to your , family or responsible friend. Please have arrangements for your care made before your surgery. If you feel inpatient rehab will be needed, contact your insurance carrier prior to surgery for a list of inpatient rehab facilities. Visiting one or two facilities before surgery may help with your decision.  9.  After surgery, continue regular checkups with your dentist. Please inform your dentist of your joint replacement surgery upon scheduling appointments. Your dentist is to follow The American Dental Association Guidelines for Premedication / Antibiotics prior to dental procedures.   10.  All requests for pain medication refills from our office must be called in to [phone], Ext [phone].  Please speak clearly when leaving your message with the following information:  Your name, spelling of last name, date of birth, name of medication, your pharmacy name and phone number.

## 2025-01-21 ENCOUNTER — OFFICE VISIT (OUTPATIENT)
Dept: PRIMARY CARE CLINIC | Age: 70
End: 2025-01-21

## 2025-01-21 VITALS
DIASTOLIC BLOOD PRESSURE: 80 MMHG | SYSTOLIC BLOOD PRESSURE: 132 MMHG | TEMPERATURE: 98.6 F | WEIGHT: 223 LBS | OXYGEN SATURATION: 96 % | BODY MASS INDEX: 35.84 KG/M2 | HEIGHT: 66 IN | HEART RATE: 94 BPM

## 2025-01-21 DIAGNOSIS — C90.00 MULTIPLE MYELOMA, REMISSION STATUS UNSPECIFIED (HCC): ICD-10-CM

## 2025-01-21 DIAGNOSIS — Z01.818 PREOP EXAMINATION: Primary | ICD-10-CM

## 2025-01-21 DIAGNOSIS — F33.1 MODERATE EPISODE OF RECURRENT MAJOR DEPRESSIVE DISORDER (HCC): ICD-10-CM

## 2025-01-21 DIAGNOSIS — M47.816 OSTEOARTHRITIS OF LUMBAR SPINE, UNSPECIFIED SPINAL OSTEOARTHRITIS COMPLICATION STATUS: ICD-10-CM

## 2025-01-21 DIAGNOSIS — R22.42 LOCALIZED SWELLING OF LEFT FOOT: ICD-10-CM

## 2025-01-21 DIAGNOSIS — E27.8 ADRENAL INCIDENTALOMA (HCC): ICD-10-CM

## 2025-01-21 DIAGNOSIS — J44.9 CHRONIC OBSTRUCTIVE PULMONARY DISEASE, UNSPECIFIED COPD TYPE (HCC): ICD-10-CM

## 2025-01-21 DIAGNOSIS — E11.42 DM TYPE 2 WITH DIABETIC PERIPHERAL NEUROPATHY (HCC): ICD-10-CM

## 2025-01-21 DIAGNOSIS — N39.41 URGE INCONTINENCE OF URINE: ICD-10-CM

## 2025-01-21 DIAGNOSIS — E03.9 HYPOTHYROIDISM, UNSPECIFIED TYPE: ICD-10-CM

## 2025-01-21 SDOH — ECONOMIC STABILITY: FOOD INSECURITY: WITHIN THE PAST 12 MONTHS, YOU WORRIED THAT YOUR FOOD WOULD RUN OUT BEFORE YOU GOT MONEY TO BUY MORE.: SOMETIMES TRUE

## 2025-01-21 SDOH — ECONOMIC STABILITY: FOOD INSECURITY: WITHIN THE PAST 12 MONTHS, THE FOOD YOU BOUGHT JUST DIDN'T LAST AND YOU DIDN'T HAVE MONEY TO GET MORE.: NEVER TRUE

## 2025-01-21 ASSESSMENT — PATIENT HEALTH QUESTIONNAIRE - PHQ9
3. TROUBLE FALLING OR STAYING ASLEEP: NOT AT ALL
6. FEELING BAD ABOUT YOURSELF - OR THAT YOU ARE A FAILURE OR HAVE LET YOURSELF OR YOUR FAMILY DOWN: NOT AT ALL
2. FEELING DOWN, DEPRESSED OR HOPELESS: MORE THAN HALF THE DAYS
SUM OF ALL RESPONSES TO PHQ QUESTIONS 1-9: 8
8. MOVING OR SPEAKING SO SLOWLY THAT OTHER PEOPLE COULD HAVE NOTICED. OR THE OPPOSITE, BEING SO FIGETY OR RESTLESS THAT YOU HAVE BEEN MOVING AROUND A LOT MORE THAN USUAL: SEVERAL DAYS
1. LITTLE INTEREST OR PLEASURE IN DOING THINGS: SEVERAL DAYS
SUM OF ALL RESPONSES TO PHQ QUESTIONS 1-9: 8
4. FEELING TIRED OR HAVING LITTLE ENERGY: SEVERAL DAYS
7. TROUBLE CONCENTRATING ON THINGS, SUCH AS READING THE NEWSPAPER OR WATCHING TELEVISION: SEVERAL DAYS
10. IF YOU CHECKED OFF ANY PROBLEMS, HOW DIFFICULT HAVE THESE PROBLEMS MADE IT FOR YOU TO DO YOUR WORK, TAKE CARE OF THINGS AT HOME, OR GET ALONG WITH OTHER PEOPLE: SOMEWHAT DIFFICULT
SUM OF ALL RESPONSES TO PHQ QUESTIONS 1-9: 8
SUM OF ALL RESPONSES TO PHQ QUESTIONS 1-9: 8
SUM OF ALL RESPONSES TO PHQ9 QUESTIONS 1 & 2: 3
9. THOUGHTS THAT YOU WOULD BE BETTER OFF DEAD, OR OF HURTING YOURSELF: NOT AT ALL
5. POOR APPETITE OR OVEREATING: MORE THAN HALF THE DAYS

## 2025-01-21 ASSESSMENT — ENCOUNTER SYMPTOMS
ABDOMINAL PAIN: 0
NAUSEA: 0
BACK PAIN: 1
SORE THROAT: 0
CHEST TIGHTNESS: 0
WHEEZING: 0
VOMITING: 0
VOICE CHANGE: 0
COUGH: 0
SHORTNESS OF BREATH: 0

## 2025-01-21 NOTE — PATIENT INSTRUCTIONS
[facility] HEALTHY AGING PROGRAM  What they offer: Transportation to medical appointments for [facility] residents for doctor appointments only; requires 2 business day notice      No cost   May be limited due to funding  Phone:  [phone] ([facility])                             INDIVIDUALS WITH MEDICAID MANAGED CARE PLANS ([facility], [place], [facility], [employer], [id number], [employer], [facility] Plan): call the member services phone number on your medical card and request transportation at least 3 days in advance                           INDIVIDUALS WITH SOME MEDICARE ADVANTAGE PLANS OR MEDICARE AND MEDICAID PLANS ALSO HAVE TRANSPORTATION AVAILABLE: CALL THE MEMBER SERVICES NUMBER ON YOUR INSURANCE CARD AND INQUIRE IF HAVE TRANSPORTATION BENEFIT        [place] [employer] [facility]*  (Call United Way/211 if need more resources.)     [facility]  What they offer: Housing for elderly, disabled, handicapped, moderate and low-income families; rental assistance under [address] 8 program (Housing Choice Voucher Program). Call for application information.  [facility] Phone number: [phone]  Website: [employer]  [facility] Phone Number: [phone]  Website: [place]  [facility] Phone Number: [phone]  Website: [employer]                 [facility]:  What they offer: shelter for women and children as survivors of domestic violence in [facility]  Phone number: [phone]               [employer] [employer]:  What they offer: shelter for women and children as survivors of domestic violence in [facility]  Phone Number: [phone]  Website: [employer]    [facility]:  What they offer: shelter for women and children as survivors of domestic violence in [facility]  Phone Number:

## 2025-01-21 NOTE — PROGRESS NOTES
HPI:  Patient comes in today for preoperative evaluation patient is scheduled for hip surgery on the right side with Dr. Salas orthopedic  Patient was evaluated by pulmonary.  Pulmonary consult reviewed and discussed with the patient patient has moderate COPD placed on Trelegy she has been using the inhaler she is aware of the risks of having surgery with general anesthesia patient wants to proceed with the surgery  According to recommendation of pulmonary patient would need to have albuterol nebulizer before and after the surgery to decrease the risk of bronchospasm and to resume her pulmonary medication as soon as possible after the surgery patient voices understanding    Review of Systems   Constitutional:  Negative for chills, fever and unexpected weight change.   HENT:  Negative for postnasal drip, sore throat and voice change.    Respiratory:  Negative for cough, chest tightness, shortness of breath and wheezing.    Cardiovascular:  Negative for chest pain and palpitations.   Gastrointestinal:  Negative for abdominal pain, nausea and vomiting.   Genitourinary:  Positive for frequency and urgency.   Musculoskeletal:  Positive for arthralgias, back pain and gait problem. Negative for joint swelling and myalgias.   Skin:  Negative for rash and wound.   Psychiatric/Behavioral: Negative.  Negative for agitation, decreased concentration and suicidal ideas. The patient is not nervous/anxious.         Past Medical History:   Diagnosis Date    Adrenal incidentaloma (HCC)     Anxiety     Arthritis     Asthma     Bladder incontinence     Cancer (HCC) Dx 2009    multiple Myeloma, in remission currently     Chronic back pain     COPD (chronic obstructive pulmonary disease) (HCC)     on O2 2 liters  (uses with activity and at night to sleep)    Depression     Encounter for screening colonoscopy     Fibromyalgia     GERD (gastroesophageal reflux disease)     Hyperlipidemia     Hypertension     Hypothyroidism     MGUS

## 2025-01-21 NOTE — TELEPHONE ENCOUNTER
[facility] needs another script sent. Pended.  Please sign in office so the script will print here.

## 2025-01-27 ENCOUNTER — TELEPHONE (OUTPATIENT)
Dept: ORTHOPEDIC SURGERY | Age: 70
End: 2025-01-27

## 2025-01-27 RX ORDER — MORPHINE SULFATE 4 MG/ML
4 INJECTION, SOLUTION INTRAMUSCULAR; INTRAVENOUS
Status: CANCELLED | OUTPATIENT
Start: 2025-01-27

## 2025-01-27 RX ORDER — ACETAMINOPHEN 325 MG/1
650 TABLET ORAL EVERY 6 HOURS
Status: CANCELLED | OUTPATIENT
Start: 2025-01-27

## 2025-01-27 RX ORDER — SODIUM CHLORIDE 0.9 % (FLUSH) 0.9 %
5-40 SYRINGE (ML) INJECTION EVERY 12 HOURS SCHEDULED
Status: CANCELLED | OUTPATIENT
Start: 2025-01-27

## 2025-01-27 RX ORDER — TRANEXAMIC ACID 10 MG/ML
1000 INJECTION, SOLUTION INTRAVENOUS
Status: CANCELLED | OUTPATIENT
Start: 2025-01-27 | End: 2025-01-27

## 2025-01-27 RX ORDER — ONDANSETRON 4 MG/1
4 TABLET, ORALLY DISINTEGRATING ORAL EVERY 8 HOURS PRN
Status: CANCELLED | OUTPATIENT
Start: 2025-01-27

## 2025-01-27 RX ORDER — MORPHINE SULFATE 2 MG/ML
2 INJECTION, SOLUTION INTRAMUSCULAR; INTRAVENOUS
Status: CANCELLED | OUTPATIENT
Start: 2025-01-27

## 2025-01-27 RX ORDER — OXYCODONE HYDROCHLORIDE 5 MG/1
5 TABLET ORAL EVERY 4 HOURS PRN
Status: CANCELLED | OUTPATIENT
Start: 2025-01-27

## 2025-01-27 RX ORDER — ONDANSETRON 2 MG/ML
4 INJECTION INTRAMUSCULAR; INTRAVENOUS EVERY 6 HOURS PRN
Status: CANCELLED | OUTPATIENT
Start: 2025-01-27

## 2025-01-27 RX ORDER — OXYCODONE HYDROCHLORIDE 5 MG/1
10 TABLET ORAL EVERY 4 HOURS PRN
Status: CANCELLED | OUTPATIENT
Start: 2025-01-27

## 2025-01-27 RX ORDER — SODIUM CHLORIDE 0.9 % (FLUSH) 0.9 %
5-40 SYRINGE (ML) INJECTION PRN
Status: CANCELLED | OUTPATIENT
Start: 2025-01-27

## 2025-01-27 RX ORDER — SODIUM CHLORIDE 9 MG/ML
INJECTION, SOLUTION INTRAVENOUS PRN
Status: CANCELLED | OUTPATIENT
Start: 2025-01-27

## 2025-01-27 NOTE — DISCHARGE INSTRUCTIONS
[facility] Department of Orthopedic Surgery  [address] [address]    Dr. Alex Salas, DO    Orthopaedics Discharge Instructions   Weight bearing Status - Weight bearing as tolerated - on right lower Extremity    Anterolateral Hip Precautions    Do not step backwards with surgical leg. No hip extension.  Do not allow surgical leg to externally rotate (turn outwards).  Do not cross your legs. Use a pillow between legs when rolling.  Do not move your leg out to the side. No hip abduction.     It is important to take non-narcotic pain medications prior to prescribed narcotic:  Tylenol 1000 mg every 6 hours on a scheduled basis  Pain medication Per Prescriptions  Contact Office for Medication Refill- [phone]  Office can refill pain med every 7 days  If patient discharging to facility then pain control will be continued per facility physician  Ice to operative/injured site for 15-30 minutes of each hour for next 5 days    Recommend that you continue to ice the area 2-3 times per day after this   Elevate operative/injured limb on 2 pillows at home  Goal is to have limb above the heart if able  Continue DVT Prophylaxis (blood clot prevention) as Prescribed: Aspirin 81 mg twice daily for 28 days  Please do not take any NSAIDs (ibuprofen/Aleve/Motrin) while taking this medication as it can increase risk of bleeding.  If you are a same-day discharge, you will be ordered an antibiotic to be taken for 7 days post-operatively to prevent surgical site infection. Please take this in its entirety with food.  Wound Care: Remove dressing on day 7 after surgery. If drainage is present, please cover with a clean, dry dressing daily. Staples/sutures will be removed at your post-operative visit.  Do not apply any ointments or lotions to surgical site.  You may shower 24 hours after surgery. Your dressing is waterproof. Once your dressing is removed, you can let water run over incision and pat dry.

## 2025-01-31 ENCOUNTER — TELEPHONE (OUTPATIENT)
Dept: PRIMARY CARE CLINIC | Age: 70
End: 2025-01-31

## 2025-01-31 NOTE — TELEPHONE ENCOUNTER
Angelita is asking for a Leave of Absence sent to her job. Her surgery is Feb 10th. She states Dr. Mckeon already has given her a letter but it needs extended.  Angelita asked if we could fax it to [facility] [employer] [phone] attention tio.   She is also asking about a fax we should have received  from [facility], she wanted to make sure we received it.

## 2025-01-31 NOTE — TELEPHONE ENCOUNTER
[place] sheyla form is on pcp desk for signature.   Will route message to Dr. Mckeon regarding extension for work

## 2025-02-03 ENCOUNTER — HOSPITAL ENCOUNTER (OUTPATIENT)
Dept: GENERAL RADIOLOGY | Age: 70
Discharge: HOME | End: 2025-02-05
Payer: COMMERCIAL

## 2025-02-03 ENCOUNTER — HOSPITAL ENCOUNTER (OUTPATIENT)
Dept: PREADMISSION TESTING | Age: 70
Discharge: HOME | End: 2025-02-03
Payer: COMMERCIAL

## 2025-02-03 VITALS
SYSTOLIC BLOOD PRESSURE: 150 MMHG | DIASTOLIC BLOOD PRESSURE: 73 MMHG | RESPIRATION RATE: 18 BRPM | OXYGEN SATURATION: 94 % | WEIGHT: 224.4 LBS | BODY MASS INDEX: 36.07 KG/M2 | TEMPERATURE: 98.6 F | HEART RATE: 96 BPM | HEIGHT: 66 IN

## 2025-02-03 LAB
ANION GAP SERPL CALCULATED.3IONS-SCNC: 8 MMOL/L (ref 7–16)
BUN SERPL-MCNC: 13 MG/DL (ref 6–23)
CALCIUM SERPL-MCNC: 9.1 MG/DL (ref 8.6–10.2)
CHLORIDE SERPL-SCNC: 102 MMOL/L (ref 98–107)
CO2 SERPL-SCNC: 28 MMOL/L (ref 22–29)
CREAT SERPL-MCNC: 0.8 MG/DL (ref 0.5–1)
ERYTHROCYTE [DISTWIDTH] IN BLOOD BY AUTOMATED COUNT: 12.5 % (ref 11.5–15)
GFR, ESTIMATED: 81 ML/MIN/1.73M2
GLUCOSE SERPL-MCNC: 117 MG/DL (ref 74–99)
HBA1C MFR BLD: 6 % (ref 4–5.6)
HCT VFR BLD AUTO: 36.6 % (ref 34–48)
HGB BLD-MCNC: 11.2 G/DL (ref 11.5–15.5)
MCH RBC QN AUTO: 29.3 PG (ref 26–35)
MCHC RBC AUTO-ENTMCNC: 30.6 G/DL (ref 32–34.5)
MCV RBC AUTO: 95.8 FL (ref 80–99.9)
PLATELET # BLD AUTO: 292 K/UL (ref 130–450)
PMV BLD AUTO: 10.1 FL (ref 7–12)
POTASSIUM SERPL-SCNC: 4.1 MMOL/L (ref 3.5–5)
PREALB SERPL-MCNC: 21 MG/DL (ref 20–40)
RBC # BLD AUTO: 3.82 M/UL (ref 3.5–5.5)
SODIUM SERPL-SCNC: 138 MMOL/L (ref 132–146)
WBC OTHER # BLD: 6 K/UL (ref 4.5–11.5)

## 2025-02-03 PROCEDURE — 87081 CULTURE SCREEN ONLY: CPT

## 2025-02-03 PROCEDURE — 85027 COMPLETE CBC AUTOMATED: CPT

## 2025-02-03 PROCEDURE — 83036 HEMOGLOBIN GLYCOSYLATED A1C: CPT

## 2025-02-03 PROCEDURE — 80048 BASIC METABOLIC PNL TOTAL CA: CPT

## 2025-02-03 PROCEDURE — 71046 X-RAY EXAM CHEST 2 VIEWS: CPT

## 2025-02-03 PROCEDURE — 84134 ASSAY OF PREALBUMIN: CPT

## 2025-02-03 PROCEDURE — 36415 COLL VENOUS BLD VENIPUNCTURE: CPT

## 2025-02-03 ASSESSMENT — PROMIS GLOBAL HEALTH SCALE
IN GENERAL, PLEASE RATE HOW WELL YOU CARRY OUT YOUR USUAL SOCIAL ACTIVITIES (INCLUDES ACTIVITIES AT HOME, AT WORK, AND IN YOUR COMMUNITY, AND RESPONSIBILITIES AS A PARENT, CHILD, SPOUSE, EMPLOYEE, FRIEND, ETC) [ON A SCALE OF 1 (POOR) TO 5 (EXCELLENT)]?: POOR
IN GENERAL, WOULD YOU SAY YOUR HEALTH IS...[ON A SCALE OF 1 (POOR) TO 5 (EXCELLENT)]: FAIR
IN GENERAL, HOW WOULD YOU RATE YOUR MENTAL HEALTH, INCLUDING YOUR MOOD AND YOUR ABILITY TO THINK [ON A SCALE OF 1 (POOR) TO 5 (EXCELLENT)]?: FAIR
WHO IS THE PERSON COMPLETING THE PROMIS V1.1 SURVEY?: SELF
TO WHAT EXTENT ARE YOU ABLE TO CARRY OUT YOUR EVERYDAY PHYSICAL ACTIVITIES SUCH AS WALKING, CLIMBING STAIRS, CARRYING GROCERIES, OR MOVING A CHAIR [ON A SCALE OF 1 (NOT AT ALL) TO 5 (COMPLETELY)]?: A LITTLE
SUM OF RESPONSES TO QUESTIONS 2, 4, 5, & 10: 10
HOW IS THE PROMIS V1.1 BEING ADMINISTERED?: ELECTRONIC
IN GENERAL, HOW WOULD YOU RATE YOUR SATISFACTION WITH YOUR SOCIAL ACTIVITIES AND RELATIONSHIPS [ON A SCALE OF 1 (POOR) TO 5 (EXCELLENT)]?: FAIR
IN THE PAST 7 DAYS, HOW WOULD YOU RATE YOUR FATIGUE ON AVERAGE [ON A SCALE FROM 1 (NONE) TO 5 (VERY SEVERE)]?: MODERATE
IN GENERAL, HOW WOULD YOU RATE YOUR PHYSICAL HEALTH [ON A SCALE OF 1 (POOR) TO 5 (EXCELLENT)]?: POOR
SUM OF RESPONSES TO QUESTIONS 3, 6, 7, & 8: 14
IN GENERAL, WOULD YOU SAY YOUR QUALITY OF LIFE IS...[ON A SCALE OF 1 (POOR) TO 5 (EXCELLENT)]: FAIR
IN THE PAST 7 DAYS, HOW WOULD YOU RATE YOUR PAIN ON AVERAGE [ON A SCALE FROM 0 (NO PAIN) TO 10 (WORST IMAGINABLE PAIN)]?: 8
IN THE PAST 7 DAYS, HOW OFTEN HAVE YOU BEEN BOTHERED BY EMOTIONAL PROBLEMS, SUCH AS FEELING ANXIOUS, DEPRESSED, OR IRRITABLE [ON A SCALE FROM 1 (NEVER) TO 5 (ALWAYS)]?: RARELY

## 2025-02-03 ASSESSMENT — PAIN DESCRIPTION - PAIN TYPE: TYPE: CHRONIC PAIN

## 2025-02-03 ASSESSMENT — HOOS JR
GOING UP OR DOWN STAIRS: MODERATE
SITTING: MILD
HOOS JR RAW SCORE: 15
HOOS JR TOTAL INTERVAL SCORE: 43.335
WALKING ON UNEVEN SURFACE: EXTREME
HOOS JR RAW SCORE: 15
LYING IN BED (TURNING OVER, MAINTAINING HIP POSITION): SEVERE
RISING FROM SITTING: MODERATE
BENDING TO THE FLOOR TO PICK UP OBJECT: SEVERE

## 2025-02-03 ASSESSMENT — PAIN DESCRIPTION - DESCRIPTORS: DESCRIPTORS: DISCOMFORT

## 2025-02-03 ASSESSMENT — PAIN DESCRIPTION - ORIENTATION: ORIENTATION: RIGHT

## 2025-02-03 ASSESSMENT — PAIN SCALES - GENERAL: PAINLEVEL_OUTOF10: 10

## 2025-02-03 ASSESSMENT — PAIN DESCRIPTION - LOCATION: LOCATION: HIP

## 2025-02-03 ASSESSMENT — PAIN DESCRIPTION - FREQUENCY: FREQUENCY: CONTINUOUS

## 2025-02-03 NOTE — PROGRESS NOTES
[facility] PRE-ADMISSION TESTING INSTRUCTIONS    The Preadmission Testing patient is instructed accordingly using the following criteria (check applicable):    ARRIVAL INSTRUCTIONS:  [x] Parking the day of Surgery is located in the Main Entrance lot.  Upon entering the door, make an immediate right to the surgery reception desk    [x] Bring photo ID and insurance card    [] Bring in a copy of Living will or Durable Power of  papers.    [x] Please be sure to arrange for responsible adult to provide transportation to and from the hospital    [x] Please arrange for responsible adult to be with you for the 24 hour period post procedure due to having anesthesia    [x] If you awake am of surgery not feeling well or have temperature >100 please call [phone]    GENERAL INSTRUCTIONS:    [x] Follow instructions for hydration that have been provided to you at your Pre-Admission Visit. Solid food until midnight then clear liquids. No gum, candy or mints.    [x] You may brush your teeth    [x] Take medications as instructed    [x] Stop herbal supplements and vitamins 5 days prior to procedure    [x] Follow preop dosing of blood thinners per physician instructions    [] Take 1/2 dose of evening insulin, but no insulin after midnight    [] No oral diabetic medications after midnight    [] If diabetic and have low blood sugar or feel symptomatic, take 1-2oz apple juice only    [x] Bring inhalers day of surgery    [x] No tobacco products within 24 hours of surgery     [x] No alcohol or illegal drug use within 24 hours of surgery.    [x] Jewelry, body piercing's, eyeglasses, contact lenses and dentures are not permitted into surgery (bring cases)      [] Please do not wear any nail polish, make up or hair products on the day of surgery    [x] You can expect a call the business day prior to procedure to notify you if your arrival time changes    [] If you receive a survey after surgery we would

## 2025-02-03 NOTE — DISCHARGE INSTRUCTIONS
Signed       [facility] PRE-ADMISSION TESTING INSTRUCTIONS     The Preadmission Testing patient is instructed accordingly using the following criteria (check applicable):     ARRIVAL INSTRUCTIONS:  [x] Parking the day of Surgery is located in the Main Entrance lot.  Upon entering the door, make an immediate right to the surgery reception desk     [x] Bring photo ID and insurance card     [] Bring in a copy of Living will or Durable Power of  papers.     [x] Please be sure to arrange for responsible adult to provide transportation to and from the hospital     [x] Please arrange for responsible adult to be with you for the 24 hour period post procedure due to having anesthesia     [x] If you awake am of surgery not feeling well or have temperature >100 please call [phone]     GENERAL INSTRUCTIONS:     [x] Follow instructions for hydration that have been provided to you at your Pre-Admission Visit. Solid food until midnight then clear liquids. No gum, candy or mints.     [x] You may brush your teeth     [x] Take medications as instructed     [x] Stop herbal supplements and vitamins 5 days prior to procedure     [x] Follow preop dosing of blood thinners per physician instructions     [] Take 1/2 dose of evening insulin, but no insulin after midnight     [] No oral diabetic medications after midnight     [] If diabetic and have low blood sugar or feel symptomatic, take 1-2oz apple juice only     [x] Bring inhalers day of surgery     [x] No tobacco products within 24 hours of surgery      [x] No alcohol or illegal drug use within 24 hours of surgery.     [x] Jewelry, body piercing's, eyeglasses, contact lenses and dentures are not permitted into surgery (bring cases)                            [] Please do not wear any nail polish, make up or hair products on the day of surgery     [x] You can expect a call the business day prior to procedure to notify you if your arrival time

## 2025-02-04 LAB
MICROORGANISM SPEC CULT: NORMAL
SPECIMEN DESCRIPTION: NORMAL

## 2025-02-04 NOTE — TELEPHONE ENCOUNTER
Angelita is asking for a Leave of Absence sent to her job. Her surgery is Feb 10th. She states Dr. Mckeon already has given her a letter but it needs extended.  Angelita asked if we could fax it to [employer] [phone] salima kinsey.

## 2025-02-04 NOTE — TELEPHONE ENCOUNTER
Ok to extend to the surgery time if prior MICHAEL need extended  Off work after surgery is for the orthopedic to determine how long

## 2025-02-05 ENCOUNTER — TELEPHONE (OUTPATIENT)
Dept: ORTHOPEDIC SURGERY | Age: 70
End: 2025-02-05

## 2025-02-05 ENCOUNTER — TELEPHONE (OUTPATIENT)
Age: 70
End: 2025-02-05

## 2025-02-05 NOTE — TELEPHONE ENCOUNTER
Surgery scheduled for 2/10/2025 Rt NEO   Patient called LM on VM stating she has sore throat, low grade temp, weakness, sweats.  Also has area of redness, warmth and pain under right lower abdominal apron.  States she may have infection in that area.      Spoke with patient, instructed her to call her PCP Dr Mckeon to get appt to be seen.    Patient called back, seeing Dr Mckeon Tomorrow.  Informed patient we may have to cancel surgery until she is feeling better.  Angelita will call tomorrow after appt to let us know what Dr Mckeon found on exam.

## 2025-02-06 ENCOUNTER — HOSPITAL ENCOUNTER (EMERGENCY)
Age: 70
Discharge: HOME | End: 2025-02-06
Attending: EMERGENCY MEDICINE
Payer: MEDICARE

## 2025-02-06 ENCOUNTER — OFFICE VISIT (OUTPATIENT)
Dept: PRIMARY CARE CLINIC | Age: 70
End: 2025-02-06

## 2025-02-06 ENCOUNTER — APPOINTMENT (OUTPATIENT)
Dept: CT IMAGING | Age: 70
End: 2025-02-06
Payer: MEDICARE

## 2025-02-06 ENCOUNTER — TELEPHONE (OUTPATIENT)
Dept: ENDOCRINOLOGY | Age: 70
End: 2025-02-06

## 2025-02-06 VITALS
OXYGEN SATURATION: 96 % | BODY MASS INDEX: 35.84 KG/M2 | DIASTOLIC BLOOD PRESSURE: 76 MMHG | SYSTOLIC BLOOD PRESSURE: 136 MMHG | RESPIRATION RATE: 22 BRPM | HEART RATE: 88 BPM | TEMPERATURE: 96.9 F | WEIGHT: 223 LBS | HEIGHT: 66 IN

## 2025-02-06 VITALS
DIASTOLIC BLOOD PRESSURE: 78 MMHG | RESPIRATION RATE: 16 BRPM | SYSTOLIC BLOOD PRESSURE: 167 MMHG | HEART RATE: 98 BPM | TEMPERATURE: 98.4 F | OXYGEN SATURATION: 99 %

## 2025-02-06 DIAGNOSIS — R52 GENERALIZED BODY ACHES: ICD-10-CM

## 2025-02-06 DIAGNOSIS — R19.7 DIARRHEA OF PRESUMED INFECTIOUS ORIGIN: ICD-10-CM

## 2025-02-06 DIAGNOSIS — R11.2 NAUSEA VOMITING AND DIARRHEA: Primary | ICD-10-CM

## 2025-02-06 DIAGNOSIS — B35.9 TINEA: ICD-10-CM

## 2025-02-06 DIAGNOSIS — R10.84 GENERALIZED ABDOMINAL PAIN: ICD-10-CM

## 2025-02-06 DIAGNOSIS — R19.7 NAUSEA VOMITING AND DIARRHEA: Primary | ICD-10-CM

## 2025-02-06 DIAGNOSIS — B34.9 NONSPECIFIC SYNDROME SUGGESTIVE OF VIRAL ILLNESS: ICD-10-CM

## 2025-02-06 DIAGNOSIS — R09.81 NASAL CONGESTION: ICD-10-CM

## 2025-02-06 DIAGNOSIS — J02.9 PHARYNGITIS, UNSPECIFIED ETIOLOGY: Primary | ICD-10-CM

## 2025-02-06 DIAGNOSIS — L30.9 DERMATITIS: ICD-10-CM

## 2025-02-06 LAB
ALBUMIN SERPL-MCNC: 4 G/DL (ref 3.5–5.2)
ALP SERPL-CCNC: 95 U/L (ref 35–104)
ALT SERPL-CCNC: 16 U/L (ref 0–32)
ANION GAP SERPL CALCULATED.3IONS-SCNC: 8 MMOL/L (ref 7–16)
AST SERPL-CCNC: 30 U/L (ref 0–31)
BACTERIA URNS QL MICRO: ABNORMAL
BASOPHILS # BLD: 0.02 K/UL (ref 0–0.2)
BASOPHILS NFR BLD: 0 % (ref 0–2)
BILIRUB SERPL-MCNC: 0.4 MG/DL (ref 0–1.2)
BILIRUB UR QL STRIP: NEGATIVE
BUN SERPL-MCNC: 12 MG/DL (ref 6–23)
CALCIUM SERPL-MCNC: 9.6 MG/DL (ref 8.6–10.2)
CHLORIDE SERPL-SCNC: 99 MMOL/L (ref 98–107)
CLARITY UR: CLEAR
CO2 SERPL-SCNC: 28 MMOL/L (ref 22–29)
COLOR UR: YELLOW
CREAT SERPL-MCNC: 0.7 MG/DL (ref 0.5–1)
EKG ATRIAL RATE: 85 BPM
EKG P AXIS: 42 DEGREES
EKG P-R INTERVAL: 156 MS
EKG Q-T INTERVAL: 390 MS
EKG QRS DURATION: 96 MS
EKG QTC CALCULATION (BAZETT): 464 MS
EKG R AXIS: -28 DEGREES
EKG T AXIS: 34 DEGREES
EKG VENTRICULAR RATE: 85 BPM
EOSINOPHIL # BLD: 0.34 K/UL (ref 0.05–0.5)
EOSINOPHILS RELATIVE PERCENT: 6 % (ref 0–6)
EPI CELLS #/AREA URNS HPF: ABNORMAL /HPF
ERYTHROCYTE [DISTWIDTH] IN BLOOD BY AUTOMATED COUNT: 12.9 % (ref 11.5–15)
GFR, ESTIMATED: >90 ML/MIN/1.73M2
GLUCOSE SERPL-MCNC: 104 MG/DL (ref 74–99)
GLUCOSE UR STRIP-MCNC: NEGATIVE MG/DL
HCT VFR BLD AUTO: 37 % (ref 34–48)
HGB BLD-MCNC: 11.2 G/DL (ref 11.5–15.5)
HGB UR QL STRIP.AUTO: NEGATIVE
IMM GRANULOCYTES # BLD AUTO: <0.03 K/UL (ref 0–0.58)
IMM GRANULOCYTES NFR BLD: 0 % (ref 0–5)
INFLUENZA A ANTIBODY: NEGATIVE
INFLUENZA B ANTIBODY: NEGATIVE
INR PPP: 1.1
KETONES UR STRIP-MCNC: ABNORMAL MG/DL
LACTATE BLDV-SCNC: 1.8 MMOL/L (ref 0.5–2.2)
LEUKOCYTE ESTERASE UR QL STRIP: ABNORMAL
LIPASE SERPL-CCNC: 44 U/L (ref 13–60)
LYMPHOCYTES NFR BLD: 2.61 K/UL (ref 1.5–4)
LYMPHOCYTES RELATIVE PERCENT: 44 % (ref 20–42)
Lab: NORMAL
MAGNESIUM SERPL-MCNC: 2.2 MG/DL (ref 1.6–2.6)
MCH RBC QN AUTO: 28.6 PG (ref 26–35)
MCHC RBC AUTO-ENTMCNC: 30.3 G/DL (ref 32–34.5)
MCV RBC AUTO: 94.4 FL (ref 80–99.9)
MONOCYTES NFR BLD: 0.53 K/UL (ref 0.1–0.95)
MONOCYTES NFR BLD: 9 % (ref 2–12)
NEUTROPHILS NFR BLD: 41 % (ref 43–80)
NEUTS SEG NFR BLD: 2.45 K/UL (ref 1.8–7.3)
NITRITE UR QL STRIP: NEGATIVE
PERFORMING INSTRUMENT: NORMAL
PH UR STRIP: 6 [PH] (ref 5–8)
PLATELET # BLD AUTO: 327 K/UL (ref 130–450)
PMV BLD AUTO: 9.6 FL (ref 7–12)
POTASSIUM SERPL-SCNC: 4 MMOL/L (ref 3.5–5)
PROT SERPL-MCNC: 8.7 G/DL (ref 6.4–8.3)
PROT UR STRIP-MCNC: 100 MG/DL
PROTHROMBIN TIME: 11.3 SEC (ref 9.3–12.4)
QC PASS/FAIL: NORMAL
RBC # BLD AUTO: 3.92 M/UL (ref 3.5–5.5)
RBC #/AREA URNS HPF: ABNORMAL /HPF
S PYO AG THROAT QL: NORMAL
SARS-COV-2, POC: NORMAL
SODIUM SERPL-SCNC: 135 MMOL/L (ref 132–146)
SP GR UR STRIP: 1.02 (ref 1–1.03)
UROBILINOGEN UR STRIP-ACNC: 1 EU/DL (ref 0–1)
WBC #/AREA URNS HPF: ABNORMAL /HPF
WBC OTHER # BLD: 6 K/UL (ref 4.5–11.5)

## 2025-02-06 PROCEDURE — 83690 ASSAY OF LIPASE: CPT

## 2025-02-06 PROCEDURE — 87086 URINE CULTURE/COLONY COUNT: CPT

## 2025-02-06 PROCEDURE — 74177 CT ABD & PELVIS W/CONTRAST: CPT

## 2025-02-06 PROCEDURE — 80053 COMPREHEN METABOLIC PANEL: CPT

## 2025-02-06 PROCEDURE — 93005 ELECTROCARDIOGRAM TRACING: CPT | Performed by: EMERGENCY MEDICINE

## 2025-02-06 PROCEDURE — 85610 PROTHROMBIN TIME: CPT

## 2025-02-06 PROCEDURE — 6370000000 HC RX 637 (ALT 250 FOR IP): Performed by: EMERGENCY MEDICINE

## 2025-02-06 PROCEDURE — 93010 ELECTROCARDIOGRAM REPORT: CPT | Performed by: INTERNAL MEDICINE

## 2025-02-06 PROCEDURE — 83605 ASSAY OF LACTIC ACID: CPT

## 2025-02-06 PROCEDURE — 99285 EMERGENCY DEPT VISIT HI MDM: CPT

## 2025-02-06 PROCEDURE — 81001 URINALYSIS AUTO W/SCOPE: CPT

## 2025-02-06 PROCEDURE — 83735 ASSAY OF MAGNESIUM: CPT

## 2025-02-06 PROCEDURE — 85025 COMPLETE CBC W/AUTO DIFF WBC: CPT

## 2025-02-06 PROCEDURE — 6360000004 HC RX CONTRAST MEDICATION: Performed by: RADIOLOGY

## 2025-02-06 RX ORDER — ACETAMINOPHEN 325 MG/1
650 TABLET ORAL ONCE
Status: COMPLETED | OUTPATIENT
Start: 2025-02-06 | End: 2025-02-06

## 2025-02-06 RX ORDER — CLOTRIMAZOLE 1 %
CREAM (GRAM) TOPICAL
Qty: 12 G | Refills: 1 | Status: SHIPPED | OUTPATIENT
Start: 2025-02-06 | End: 2025-02-13

## 2025-02-06 RX ORDER — ONDANSETRON 4 MG/1
4 TABLET, ORALLY DISINTEGRATING ORAL 3 TIMES DAILY PRN
Qty: 21 TABLET | Refills: 0 | Status: SHIPPED | OUTPATIENT
Start: 2025-02-06

## 2025-02-06 RX ORDER — IOPAMIDOL 755 MG/ML
75 INJECTION, SOLUTION INTRAVASCULAR
Status: COMPLETED | OUTPATIENT
Start: 2025-02-06 | End: 2025-02-06

## 2025-02-06 RX ORDER — NYSTATIN 100000 U/G
CREAM TOPICAL
Qty: 30 G | Refills: 1 | Status: SHIPPED | OUTPATIENT
Start: 2025-02-06

## 2025-02-06 RX ADMIN — IOPAMIDOL 75 ML: 755 INJECTION, SOLUTION INTRAVENOUS at 14:15

## 2025-02-06 RX ADMIN — ACETAMINOPHEN 650 MG: 325 TABLET ORAL at 13:33

## 2025-02-06 ASSESSMENT — PAIN DESCRIPTION - LOCATION: LOCATION: HEAD

## 2025-02-06 ASSESSMENT — PAIN DESCRIPTION - DESCRIPTORS: DESCRIPTORS: ACHING

## 2025-02-06 ASSESSMENT — PAIN - FUNCTIONAL ASSESSMENT: PAIN_FUNCTIONAL_ASSESSMENT: ACTIVITIES ARE NOT PREVENTED

## 2025-02-06 ASSESSMENT — PAIN SCALES - GENERAL: PAINLEVEL_OUTOF10: 7

## 2025-02-06 NOTE — PROGRESS NOTES
HPI:  Patient comes in today for Complaint of sore throat,diarrhea,abdominal pain ,nausea and vomiting yesterday  Pain & diarrhea & weakness remains today but no vomiting    She has a rash in the Rt groin  Review of Systems   As per HPI  NO CP,SOB,Urinary symptoms  Past Medical History:   Diagnosis Date    Adrenal incidentaloma (HCC)     Anxiety     Arthritis     Asthma     Bladder incontinence     Cancer (HCC) Dx 2009    multiple Myeloma, in remission currently     Chronic back pain     COPD (chronic obstructive pulmonary disease) (HCC)     on O2 2 liters  (uses with activity and at night to sleep)    Depression     Encounter for screening colonoscopy     Fibromyalgia     GERD (gastroesophageal reflux disease)     Hyperlipidemia     Hypertension     Hypothyroidism     MGUS (monoclonal gammopathy of unknown significance)     Neuropathy     Prolonged emergence from general anesthesia     Sleep apnea     Type II or unspecified type diabetes mellitus without mention of complication, not stated as uncontrolled      Past Surgical History:   Procedure Laterality Date    ANESTHESIA NERVE BLOCK Bilateral 08/10/2020    BILATERAL L3 L4 MEDIAL BRANCH L5 DORSSAL RAMUS NERVE BLOCK (CPT 47387) SEDATION performed by Campos Benton MD at [facility] OR    COLONOSCOPY  07/20/2016    removed 3 polyps (tubular adenomas), diverticulosis, Dr. Dwyer    COLONOSCOPY  2008    approximately 2008, no report available, per patient some polyps removed, Dr Myers, [facility]    COLONOSCOPY N/A 11/09/2020    Small sessile polyp distal ascending colon removed with bx/cauterized (path Tubular Adenoma), right and left diverticulosis without diverticulitis, WALDO Parr    COLONOSCOPY  11/09/2020    Small sessile polyp distal ascending colon removed with bx/cauterized (path Tubular Adenoma), right and left diverticulosis without diverticulitis, WALDO Parr    DILATION AND CURETTAGE OF UTERUS N/A 05/11/2021    DILATATION AND

## 2025-02-06 NOTE — TELEPHONE ENCOUNTER
Tried to reach patient LM on VM x 2.  Patient scheduled for Rt NEO 2/10/2025.  Saw Dr Mckeon today due to illness and redness / rash right groin. Tests negative for Influenza A, Covid and Strep.  Patient has fungal rash right groin (given Nystatin)     Need to discuss rescheduling surgery to 3/10/2025.  Requested call back.

## 2025-02-06 NOTE — ED PROVIDER NOTES
HPI:  2/6/25,   Time: 11:32 AM JULES Slater is a 69 y.o. female presenting to the ED for n/v/d/abd pain , beginning days ago.  The complaint has been persistent, moderate in severity, and worsened by nothing.  Brought in by private vehicle.  Sent by PCP.  Had outpatient COVID and flu testing which were negative.  No fevers.  Dry cough.  PCP thinks has norovirus.  Has knee replacement in 5 days.  Feels dehydrated.  Some abdominal discomfort.  No chest pain or shortness of breath.  Also complains of rash to right groin.    Review of Systems:   Pertinent positives and negatives are stated within HPI, all other systems reviewed and are negative.          --------------------------------------------- PAST HISTORY ---------------------------------------------  Past Medical History:  has a past medical history of Adrenal incidentaloma (HCC), Anxiety, Arthritis, Asthma, Bladder incontinence, Cancer (HCC), Chronic back pain, COPD (chronic obstructive pulmonary disease) (HCC), Depression, Encounter for screening colonoscopy, Fibromyalgia, GERD (gastroesophageal reflux disease), Hyperlipidemia, Hypertension, Hypothyroidism, MGUS (monoclonal gammopathy of unknown significance), Neuropathy, Prolonged emergence from general anesthesia, Sleep apnea, and Type II or unspecified type diabetes mellitus without mention of complication, not stated as uncontrolled.    Past Surgical History:  has a past surgical history that includes knee surgery (Left, 1980); Upper gastrointestinal endoscopy (2008); Colonoscopy (07/20/2016); Upper gastrointestinal endoscopy (07/20/2016); Nerve Block (Bilateral, 09/22/2016); Nerve Block (07/06/2020); Pain management procedure (N/A, 07/06/2020); Nerve Block (Bilateral, 08/10/2020); Anesthesia Nerve Block (Bilateral, 08/10/2020); other surgical history (12/13/2016); Colonoscopy (2008); Upper gastrointestinal endoscopy (2008); Upper gastrointestinal endoscopy (N/A, 11/09/2020); Colonoscopy

## 2025-02-06 NOTE — ED NOTES
Updated patient on plan of care. Patient has no stated problems or concerns at this time. All questions answered at this time.   Pending labs and ct scan. Patient c/o headache, tylenol ordered and administered.

## 2025-02-07 LAB
MICROORGANISM SPEC CULT: ABNORMAL
SERVICE CMNT-IMP: ABNORMAL
SPECIMEN DESCRIPTION: ABNORMAL

## 2025-02-20 RX ORDER — METHOCARBAMOL 750 MG/1
750 TABLET, FILM COATED ORAL 3 TIMES DAILY
Qty: 126 TABLET | Refills: 0 | Status: CANCELLED | OUTPATIENT
Start: 2025-02-20 | End: 2025-04-03

## 2025-02-20 RX ORDER — KETOROLAC TROMETHAMINE 15 MG/ML
15 INJECTION, SOLUTION INTRAMUSCULAR; INTRAVENOUS EVERY 6 HOURS
Status: CANCELLED | OUTPATIENT
Start: 2025-02-20 | End: 2025-02-23

## 2025-02-20 RX ORDER — ASPIRIN 81 MG/1
81 TABLET ORAL 2 TIMES DAILY
Status: CANCELLED | OUTPATIENT
Start: 2025-02-20

## 2025-02-28 ENCOUNTER — HOSPITAL ENCOUNTER (OUTPATIENT)
Dept: INFUSION THERAPY | Age: 70
Discharge: HOME | End: 2025-02-28
Payer: COMMERCIAL

## 2025-02-28 ENCOUNTER — OFFICE VISIT (OUTPATIENT)
Dept: ONCOLOGY | Age: 70
End: 2025-02-28
Payer: COMMERCIAL

## 2025-02-28 VITALS
WEIGHT: 219.2 LBS | SYSTOLIC BLOOD PRESSURE: 165 MMHG | TEMPERATURE: 98.3 F | DIASTOLIC BLOOD PRESSURE: 81 MMHG | OXYGEN SATURATION: 93 % | BODY MASS INDEX: 35.23 KG/M2 | HEART RATE: 97 BPM | HEIGHT: 66 IN

## 2025-02-28 DIAGNOSIS — C90.00 MULTIPLE MYELOMA, REMISSION STATUS UNSPECIFIED (HCC): ICD-10-CM

## 2025-02-28 DIAGNOSIS — D64.9 ANEMIA, UNSPECIFIED TYPE: Primary | ICD-10-CM

## 2025-02-28 DIAGNOSIS — D47.2 SMOLDERING MYELOMA: ICD-10-CM

## 2025-02-28 LAB
ALBUMIN SERPL-MCNC: 4.1 G/DL (ref 3.5–5.2)
ALP SERPL-CCNC: 100 U/L (ref 35–104)
ALT SERPL-CCNC: 13 U/L (ref 0–32)
ANION GAP SERPL CALCULATED.3IONS-SCNC: 11 MMOL/L (ref 7–16)
AST SERPL-CCNC: 17 U/L (ref 0–31)
BASOPHILS # BLD: 0.04 K/UL (ref 0–0.2)
BASOPHILS NFR BLD: 1 % (ref 0–2)
BILIRUB SERPL-MCNC: 0.3 MG/DL (ref 0–1.2)
BUN SERPL-MCNC: 9 MG/DL (ref 6–23)
CALCIUM SERPL-MCNC: 9.1 MG/DL (ref 8.6–10.2)
CHLORIDE SERPL-SCNC: 102 MMOL/L (ref 98–107)
CO2 SERPL-SCNC: 27 MMOL/L (ref 22–29)
CREAT SERPL-MCNC: 0.6 MG/DL (ref 0.5–1)
EOSINOPHIL # BLD: 0.6 K/UL (ref 0.05–0.5)
EOSINOPHILS RELATIVE PERCENT: 9 % (ref 0–6)
ERYTHROCYTE [DISTWIDTH] IN BLOOD BY AUTOMATED COUNT: 12.5 % (ref 11.5–15)
FERRITIN SERPL-MCNC: 98 NG/ML
GFR, ESTIMATED: >90 ML/MIN/1.73M2
GLUCOSE SERPL-MCNC: 127 MG/DL (ref 74–99)
HCT VFR BLD AUTO: 36 % (ref 34–48)
HGB BLD-MCNC: 11.2 G/DL (ref 11.5–15.5)
IMM GRANULOCYTES # BLD AUTO: <0.03 K/UL (ref 0–0.58)
IMM GRANULOCYTES NFR BLD: 0 % (ref 0–5)
IRON SATN MFR SERPL: 18 % (ref 15–50)
IRON SERPL-MCNC: 54 UG/DL (ref 37–145)
LYMPHOCYTES NFR BLD: 2.69 K/UL (ref 1.5–4)
LYMPHOCYTES RELATIVE PERCENT: 41 % (ref 20–42)
MCH RBC QN AUTO: 29 PG (ref 26–35)
MCHC RBC AUTO-ENTMCNC: 31.1 G/DL (ref 32–34.5)
MCV RBC AUTO: 93.3 FL (ref 80–99.9)
MONOCYTES NFR BLD: 0.57 K/UL (ref 0.1–0.95)
MONOCYTES NFR BLD: 9 % (ref 2–12)
NEUTROPHILS NFR BLD: 40 % (ref 43–80)
NEUTS SEG NFR BLD: 2.6 K/UL (ref 1.8–7.3)
PLATELET # BLD AUTO: 421 K/UL (ref 130–450)
PMV BLD AUTO: 9.6 FL (ref 7–12)
POTASSIUM SERPL-SCNC: 3.3 MMOL/L (ref 3.5–5)
PROT SERPL-MCNC: 8.4 G/DL (ref 6.4–8.3)
RBC # BLD AUTO: 3.86 M/UL (ref 3.5–5.5)
SODIUM SERPL-SCNC: 140 MMOL/L (ref 132–146)
TIBC SERPL-MCNC: 294 UG/DL (ref 250–450)
WBC OTHER # BLD: 6.5 K/UL (ref 4.5–11.5)

## 2025-02-28 PROCEDURE — 83550 IRON BINDING TEST: CPT

## 2025-02-28 PROCEDURE — 1090F PRES/ABSN URINE INCON ASSESS: CPT | Performed by: INTERNAL MEDICINE

## 2025-02-28 PROCEDURE — 3077F SYST BP >= 140 MM HG: CPT | Performed by: INTERNAL MEDICINE

## 2025-02-28 PROCEDURE — 84155 ASSAY OF PROTEIN SERUM: CPT

## 2025-02-28 PROCEDURE — 3017F COLORECTAL CA SCREEN DOC REV: CPT | Performed by: INTERNAL MEDICINE

## 2025-02-28 PROCEDURE — 83540 ASSAY OF IRON: CPT

## 2025-02-28 PROCEDURE — 82728 ASSAY OF FERRITIN: CPT

## 2025-02-28 PROCEDURE — G8399 PT W/DXA RESULTS DOCUMENT: HCPCS | Performed by: INTERNAL MEDICINE

## 2025-02-28 PROCEDURE — 83521 IG LIGHT CHAINS FREE EACH: CPT

## 2025-02-28 PROCEDURE — 85025 COMPLETE CBC W/AUTO DIFF WBC: CPT

## 2025-02-28 PROCEDURE — 82784 ASSAY IGA/IGD/IGG/IGM EACH: CPT

## 2025-02-28 PROCEDURE — G8427 DOCREV CUR MEDS BY ELIG CLIN: HCPCS | Performed by: INTERNAL MEDICINE

## 2025-02-28 PROCEDURE — 80053 COMPREHEN METABOLIC PANEL: CPT

## 2025-02-28 PROCEDURE — 99214 OFFICE O/P EST MOD 30 MIN: CPT | Performed by: INTERNAL MEDICINE

## 2025-02-28 PROCEDURE — 36415 COLL VENOUS BLD VENIPUNCTURE: CPT

## 2025-02-28 PROCEDURE — 82232 ASSAY OF BETA-2 PROTEIN: CPT

## 2025-02-28 PROCEDURE — 86334 IMMUNOFIX E-PHORESIS SERUM: CPT

## 2025-02-28 PROCEDURE — 1123F ACP DISCUSS/DSCN MKR DOCD: CPT | Performed by: INTERNAL MEDICINE

## 2025-02-28 PROCEDURE — 84165 PROTEIN E-PHORESIS SERUM: CPT

## 2025-02-28 PROCEDURE — 3079F DIAST BP 80-89 MM HG: CPT | Performed by: INTERNAL MEDICINE

## 2025-02-28 PROCEDURE — 1036F TOBACCO NON-USER: CPT | Performed by: INTERNAL MEDICINE

## 2025-02-28 PROCEDURE — G8417 CALC BMI ABV UP PARAM F/U: HCPCS | Performed by: INTERNAL MEDICINE

## 2025-02-28 RX ORDER — AZITHROMYCIN 250 MG/1
TABLET, FILM COATED ORAL
Qty: 6 TABLET | Refills: 0 | Status: SHIPPED | OUTPATIENT
Start: 2025-02-28 | End: 2025-03-10

## 2025-02-28 RX ORDER — POTASSIUM CHLORIDE 1500 MG/1
20 TABLET, EXTENDED RELEASE ORAL DAILY
Qty: 30 TABLET | Refills: 0 | Status: SHIPPED | OUTPATIENT
Start: 2025-02-28

## 2025-02-28 NOTE — PROGRESS NOTES
Patient received AVS    
L4, L5     OTHER SURGICAL HISTORY  12/13/2016    Excision cyst right face    PAIN MANAGEMENT PROCEDURE N/A 07/06/2020    LUMBAR EPIDURAL STEROID INJECTION L4-5 performed by Campos Benton MD at [facility] OR    SIGMOIDOSCOPY N/A 05/26/2023    SIGMOIDOSCOPY DIAGNOSTIC FLEXIBLE performed by Rut Dwyer MD at [facility] ENDOSCOPY    TONSILLECTOMY      UPPER GASTROINTESTINAL ENDOSCOPY  2008 2008    UPPER GASTROINTESTINAL ENDOSCOPY  07/20/2016    GERD and gastric ulcers, Bx H pylori neg, Dr. Dwyer    UPPER GASTROINTESTINAL ENDOSCOPY  2008 approximately 2008, no report, patient does not know the findings, Dr Myers, [facility]    UPPER GASTROINTESTINAL ENDOSCOPY N/A 11/09/2020    Severe gastritis with superficial ulcerations [facility]    UPPER GASTROINTESTINAL ENDOSCOPY N/A 05/26/2023    EGD BIOPSY performed by Rut wDyer MD at [facility] ENDOSCOPY       Family History:  Family History   Problem Relation Age of Onset    Heart Disease Mother 70    Diabetes Mother     Cancer Mother         Breast    Hypertension Father     Cancer Father         Pancreas    Diabetes Father     High Blood Pressure Father     Arthritis Brother     Diabetes Brother     Cancer Maternal Uncle     Cancer Paternal Aunt         breast    High Blood Pressure Paternal Aunt     High Blood Pressure Paternal Uncle     Arthritis Maternal Grandmother     Diabetes Maternal Grandmother     High Blood Pressure Maternal Grandmother     Arthritis Maternal Grandfather     Stroke Maternal Grandfather     High Cholesterol Paternal Grandmother     Kidney Disease Paternal Grandmother     Heart Disease Paternal Grandfather        Medications:  Reviewed and reconciled.    Social History:  Social History     Socioeconomic History    Marital status: Single     Spouse name: Not on file    Number of children: Not on file    Years of education: Not on file    Highest education level: Not on file

## 2025-03-01 LAB
IGA SERPL-MCNC: 123 MG/DL (ref 70–400)
IGG SERPL-MCNC: 2507 MG/DL (ref 700–1600)
IGM SERPL-MCNC: 40 MG/DL (ref 40–230)

## 2025-03-02 LAB
B2 MICROGLOB SERPL IA-MCNC: 2.2 MG/L
FREE KAPPA/LAMBDA RATIO: 1.92 (ref 0.22–1.74)
KAPPA LC FREE SER-MCNC: 47.3 MG/L
LAMBDA LC FREE SERPL-MCNC: 24.6 MG/L (ref 4.2–27.7)

## 2025-03-03 ENCOUNTER — HOSPITAL ENCOUNTER (OUTPATIENT)
Dept: PREADMISSION TESTING | Age: 70
Discharge: HOME | End: 2025-03-03
Payer: COMMERCIAL

## 2025-03-03 VITALS
RESPIRATION RATE: 18 BRPM | WEIGHT: 220.5 LBS | BODY MASS INDEX: 35.44 KG/M2 | HEIGHT: 66 IN | HEART RATE: 86 BPM | TEMPERATURE: 97.9 F | DIASTOLIC BLOOD PRESSURE: 64 MMHG | SYSTOLIC BLOOD PRESSURE: 115 MMHG | OXYGEN SATURATION: 92 %

## 2025-03-03 LAB
ALBUMIN SERPL-MCNC: 3.2 G/DL (ref 3.5–4.7)
ALPHA1 GLOB SERPL ELPH-MCNC: 0.3 G/DL (ref 0.2–0.4)
ALPHA2 GLOB SERPL ELPH-MCNC: 1.1 G/DL (ref 0.5–1)
ANION GAP SERPL CALCULATED.3IONS-SCNC: 10 MMOL/L (ref 7–16)
B-GLOBULIN SERPL ELPH-MCNC: 1.1 G/DL (ref 0.8–1.3)
BUN SERPL-MCNC: 11 MG/DL (ref 6–23)
CALCIUM SERPL-MCNC: 9.9 MG/DL (ref 8.6–10.2)
CHLORIDE SERPL-SCNC: 100 MMOL/L (ref 98–107)
CO2 SERPL-SCNC: 29 MMOL/L (ref 22–29)
CREAT SERPL-MCNC: 0.8 MG/DL (ref 0.5–1)
ERYTHROCYTE [DISTWIDTH] IN BLOOD BY AUTOMATED COUNT: 12.4 % (ref 11.5–15)
GAMMA GLOB SERPL ELPH-MCNC: 2.3 G/DL (ref 0.7–1.6)
GFR, ESTIMATED: 81 ML/MIN/1.73M2
GLUCOSE SERPL-MCNC: 137 MG/DL (ref 74–99)
HCT VFR BLD AUTO: 34.6 % (ref 34–48)
HGB BLD-MCNC: 10.3 G/DL (ref 11.5–15.5)
INTERPRETATION SERPL IFE-IMP: NORMAL
M PROTEIN SERPL ELPH-MCNC: 1.2 G/DL
MCH RBC QN AUTO: 28.7 PG (ref 26–35)
MCHC RBC AUTO-ENTMCNC: 29.8 G/DL (ref 32–34.5)
MCV RBC AUTO: 96.4 FL (ref 80–99.9)
PATH REV: NORMAL
PATHOLOGIST: ABNORMAL
PLATELET # BLD AUTO: 411 K/UL (ref 130–450)
PMV BLD AUTO: 9.7 FL (ref 7–12)
POTASSIUM SERPL-SCNC: 4.4 MMOL/L (ref 3.5–5)
PREALB SERPL-MCNC: 15 MG/DL (ref 20–40)
PROT PATTERN SERPL ELPH-IMP: ABNORMAL
PROT SERPL-MCNC: 8.1 G/DL (ref 6.4–8.3)
RBC # BLD AUTO: 3.59 M/UL (ref 3.5–5.5)
SODIUM SERPL-SCNC: 139 MMOL/L (ref 132–146)
WBC OTHER # BLD: 6.2 K/UL (ref 4.5–11.5)

## 2025-03-03 PROCEDURE — 85027 COMPLETE CBC AUTOMATED: CPT

## 2025-03-03 PROCEDURE — 84134 ASSAY OF PREALBUMIN: CPT

## 2025-03-03 PROCEDURE — 36415 COLL VENOUS BLD VENIPUNCTURE: CPT

## 2025-03-03 PROCEDURE — 87081 CULTURE SCREEN ONLY: CPT

## 2025-03-03 PROCEDURE — 80048 BASIC METABOLIC PNL TOTAL CA: CPT

## 2025-03-03 ASSESSMENT — PAIN DESCRIPTION - PAIN TYPE: TYPE: CHRONIC PAIN

## 2025-03-03 ASSESSMENT — PAIN DESCRIPTION - ORIENTATION: ORIENTATION: RIGHT

## 2025-03-03 ASSESSMENT — PROMIS GLOBAL HEALTH SCALE
IN THE PAST 7 DAYS, HOW OFTEN HAVE YOU BEEN BOTHERED BY EMOTIONAL PROBLEMS, SUCH AS FEELING ANXIOUS, DEPRESSED, OR IRRITABLE [ON A SCALE FROM 1 (NEVER) TO 5 (ALWAYS)]?: NEVER
IN GENERAL, HOW WOULD YOU RATE YOUR MENTAL HEALTH, INCLUDING YOUR MOOD AND YOUR ABILITY TO THINK [ON A SCALE OF 1 (POOR) TO 5 (EXCELLENT)]?: FAIR
IN GENERAL, WOULD YOU SAY YOUR HEALTH IS...[ON A SCALE OF 1 (POOR) TO 5 (EXCELLENT)]: FAIR
IN THE PAST 7 DAYS, HOW WOULD YOU RATE YOUR FATIGUE ON AVERAGE [ON A SCALE FROM 1 (NONE) TO 5 (VERY SEVERE)]?: MILD
IN GENERAL, WOULD YOU SAY YOUR QUALITY OF LIFE IS...[ON A SCALE OF 1 (POOR) TO 5 (EXCELLENT)]: EXCELLENT
IN GENERAL, HOW WOULD YOU RATE YOUR SATISFACTION WITH YOUR SOCIAL ACTIVITIES AND RELATIONSHIPS [ON A SCALE OF 1 (POOR) TO 5 (EXCELLENT)]?: FAIR
TO WHAT EXTENT ARE YOU ABLE TO CARRY OUT YOUR EVERYDAY PHYSICAL ACTIVITIES SUCH AS WALKING, CLIMBING STAIRS, CARRYING GROCERIES, OR MOVING A CHAIR [ON A SCALE OF 1 (NOT AT ALL) TO 5 (COMPLETELY)]?: A LITTLE
IN GENERAL, PLEASE RATE HOW WELL YOU CARRY OUT YOUR USUAL SOCIAL ACTIVITIES (INCLUDES ACTIVITIES AT HOME, AT WORK, AND IN YOUR COMMUNITY, AND RESPONSIBILITIES AS A PARENT, CHILD, SPOUSE, EMPLOYEE, FRIEND, ETC) [ON A SCALE OF 1 (POOR) TO 5 (EXCELLENT)]?: POOR
SUM OF RESPONSES TO QUESTIONS 3, 6, 7, & 8: 17
WHO IS THE PERSON COMPLETING THE PROMIS V1.1 SURVEY?: SELF
IN GENERAL, HOW WOULD YOU RATE YOUR PHYSICAL HEALTH [ON A SCALE OF 1 (POOR) TO 5 (EXCELLENT)]?: FAIR
IN THE PAST 7 DAYS, HOW WOULD YOU RATE YOUR PAIN ON AVERAGE [ON A SCALE FROM 0 (NO PAIN) TO 10 (WORST IMAGINABLE PAIN)]?: 9
HOW IS THE PROMIS V1.1 BEING ADMINISTERED?: ELECTRONIC
SUM OF RESPONSES TO QUESTIONS 2, 4, 5, & 10: 14

## 2025-03-03 ASSESSMENT — PAIN DESCRIPTION - DESCRIPTORS: DESCRIPTORS: ACHING;BURNING;CRAMPING

## 2025-03-03 ASSESSMENT — PAIN SCALES - GENERAL: PAINLEVEL_OUTOF10: 9

## 2025-03-03 ASSESSMENT — HOOS JR
SITTING: MILD
HOOS JR TOTAL INTERVAL SCORE: 36.363
WALKING ON UNEVEN SURFACE: SEVERE
BENDING TO THE FLOOR TO PICK UP OBJECT: EXTREME
LYING IN BED (TURNING OVER, MAINTAINING HIP POSITION): SEVERE
RISING FROM SITTING: SEVERE
GOING UP OR DOWN STAIRS: SEVERE
HOOS JR RAW SCORE: 17
HOOS JR RAW SCORE: 17

## 2025-03-03 ASSESSMENT — PAIN DESCRIPTION - LOCATION: LOCATION: HIP

## 2025-03-03 ASSESSMENT — PAIN DESCRIPTION - FREQUENCY: FREQUENCY: CONTINUOUS

## 2025-03-03 NOTE — DISCHARGE INSTRUCTIONS
receive a survey after surgery we would greatly appreciate your comments    [] A caregiver or family member must remain with the patient during their stay if they are mentally handicapped, have dementia, disoriented or unable to use a call light or would be a safety concern if left unattended    [x] Please notify surgeon if you develop any illness between now and time of surgery (cold, cough, sore throat, fever, nausea, vomiting) or any signs of infections  including skin, wounds, and dental.    [x]  The Outpatient Pharmacy is available to fill your prescription here on your day of surgery, ask your preop nurse for details    [x] Other instructions Wear Comfortable clothing    EDUCATIONAL MATERIALS PROVIDED:    [x] PAT Preoperative Education Packet/Booklet     [x] Medication List    [x] Shower with soap, lather and rinse well, and use CHG wipes provided the evening before surgery as instructed    [x] Incentive spirometer with instructions

## 2025-03-03 NOTE — PROGRESS NOTES
[facility] PRE-ADMISSION TESTING INSTRUCTIONS    The Preadmission Testing patient is instructed accordingly using the following criteria (check applicable):    Surgery- 3/10/25  Arrival- 0700    ARRIVAL INSTRUCTIONS:  [x] Parking the day of Surgery is located in the Main Entrance lot.  Upon entering the door, make an immediate right to the surgery reception desk    [x] Bring photo ID and insurance card    [] Bring in a copy of Living will or Durable Power of  papers.    [x] Please be sure to arrange for responsible adult to provide transportation to and from the hospital    [] Please arrange for responsible adult to be with you for the 24 hour period post procedure due to having anesthesia    [x] If you awake am of surgery not feeling well or have temperature >100 please call [phone]    GENERAL INSTRUCTIONS:    [x] Follow instructions for hydration that have been provided to you at your Pre-Admission Visit. Solid food until midnight then clear liquids. No gum, candy or mints.    [x] You may brush your teeth    [x] Take medications as instructed    [x] Stop herbal supplements and vitamins 5 days prior to procedure    [x] Follow preop dosing of blood thinners per physician instructions    [] Take 1/2 dose of evening insulin, but no insulin after midnight    [] No oral diabetic medications after midnight    [] If diabetic and have low blood sugar or feel symptomatic, take 1-2oz apple juice only    [] Bring inhalers day of surgery    [x] No tobacco products within 24 hours of surgery     [x] No alcohol or illegal drug use within 24 hours of surgery.    [x] Jewelry, body piercing's, eyeglasses, contact lenses and dentures are not permitted into surgery (bring cases)      [x] Please do not wear any nail polish, make up or hair products on the day of surgery    [x] You can expect a call the business day prior to procedure to notify you if your arrival time changes    [x] If you

## 2025-03-03 NOTE — PROGRESS NOTES
Message left for Julianne at Dr. Salas's office re: cbc and prealbumin available for review. Also checking to see if Medical clearance needs updated.

## 2025-03-04 ENCOUNTER — TELEPHONE (OUTPATIENT)
Dept: ORTHOPEDIC SURGERY | Age: 70
End: 2025-03-04

## 2025-03-04 LAB
MICROORGANISM SPEC CULT: NORMAL
SPECIMEN DESCRIPTION: NORMAL

## 2025-03-04 NOTE — TELEPHONE ENCOUNTER
Spoke with [facility] surgery [facility]. added CSI Left Knee to surgical procedure. Patient informed.

## 2025-03-04 NOTE — TELEPHONE ENCOUNTER
Spoke with patient regarding need for medical clearance update for surgery 3/10/2025.  Dr Mckeon on leave.  Dr Padilla has agreed to see her tomorrow.    Patient also asked about left knee pain and rehabbing Rt hip.  Informed her I would ask Dr Salas about injection left knee at time of surgery.   Last injection given by Dr Claros 6/17/2024.  Will add to procedure if ok.

## 2025-03-04 NOTE — PROGRESS NOTES
sessile polyp distal ascending colon removed with bx/cauterized (path Tubular Adenoma), right and left diverticulosis without diverticulitis, LEEANNE Parr    COLONOSCOPY  11/09/2020    Small sessile polyp distal ascending colon removed with bx/cauterized (path Tubular Adenoma), right and left diverticulosis without diverticulitis, LEEANNE Parr    DILATION AND CURETTAGE OF UTERUS N/A 05/11/2021    DILATATION AND CURETTAGE HYSTEROSCOPY POSSIBLE REMOVAL OF MASS performed by Tonja Perez MD at [facility] OR    KNEE SURGERY Left 1980    1980s, left knee, arthroscopic    NERVE BLOCK Bilateral 09/22/2016    lumbar transforaminal nerve block #1    NERVE BLOCK  07/06/2020    lumbar epidural steroid injectio L4-5    NERVE BLOCK Bilateral 08/10/2020    L3, L4, L5     OTHER SURGICAL HISTORY  12/13/2016    Excision cyst right face    PAIN MANAGEMENT PROCEDURE N/A 07/06/2020    LUMBAR EPIDURAL STEROID INJECTION L4-5 performed by Campos Benton MD at [facility] OR    SIGMOIDOSCOPY N/A 05/26/2023    SIGMOIDOSCOPY DIAGNOSTIC FLEXIBLE performed by Rut Dwyer MD at [facility] ENDOSCOPY    TONSILLECTOMY      UPPER GASTROINTESTINAL ENDOSCOPY  2008 2008    UPPER GASTROINTESTINAL ENDOSCOPY  07/20/2016    GERD and gastric ulcers, Bx H pylori neg, Dr. Dwyer    UPPER GASTROINTESTINAL ENDOSCOPY  2008    approximately 2008, no report, patient does not know the findings, Dr Myers, [facility]    UPPER GASTROINTESTINAL ENDOSCOPY N/A 11/09/2020    Severe gastritis with superficial ulcerations with bx neg H pylori, Dr. Rosmery SE    UPPER GASTROINTESTINAL ENDOSCOPY N/A 05/26/2023    EGD BIOPSY performed by Rut Dwyer MD at [facility] ENDOSCOPY       Allergies:    Aceon [perindopril erbumine], Losartan, and Nsaids    Social History:   Social History     Socioeconomic History    Marital status: Single     Spouse name: Not on file    Number of children: Not on file    Years of education: Not on file

## 2025-03-05 ENCOUNTER — OFFICE VISIT (OUTPATIENT)
Dept: PRIMARY CARE CLINIC | Age: 70
End: 2025-03-05

## 2025-03-05 VITALS
HEIGHT: 66 IN | HEART RATE: 96 BPM | SYSTOLIC BLOOD PRESSURE: 122 MMHG | TEMPERATURE: 97.3 F | OXYGEN SATURATION: 94 % | WEIGHT: 221 LBS | DIASTOLIC BLOOD PRESSURE: 70 MMHG | RESPIRATION RATE: 16 BRPM | BODY MASS INDEX: 35.52 KG/M2

## 2025-03-05 DIAGNOSIS — M16.11 PRIMARY OSTEOARTHRITIS OF RIGHT HIP: Primary | ICD-10-CM

## 2025-03-05 DIAGNOSIS — M79.7 FIBROMYALGIA: ICD-10-CM

## 2025-03-05 DIAGNOSIS — E03.9 HYPOTHYROIDISM, UNSPECIFIED TYPE: ICD-10-CM

## 2025-03-05 DIAGNOSIS — I10 PRIMARY HYPERTENSION: ICD-10-CM

## 2025-03-05 DIAGNOSIS — Z01.818 PREOP EXAMINATION: ICD-10-CM

## 2025-03-05 LAB — TSH SERPL DL<=0.05 MIU/L-ACNC: 1.28 UIU/ML (ref 0.27–4.2)

## 2025-03-05 RX ORDER — AMITRIPTYLINE HYDROCHLORIDE 50 MG/1
TABLET ORAL
Qty: 30 TABLET | Refills: 5 | Status: SHIPPED | OUTPATIENT
Start: 2025-03-05

## 2025-03-05 RX ORDER — LEVOTHYROXINE SODIUM 125 UG/1
125 TABLET ORAL DAILY
Qty: 30 TABLET | Refills: 5 | Status: SHIPPED | OUTPATIENT
Start: 2025-03-05

## 2025-03-07 RX ORDER — OXYCODONE HYDROCHLORIDE 5 MG/1
5 TABLET ORAL EVERY 6 HOURS PRN
Qty: 12 TABLET | Refills: 0 | Status: CANCELLED | OUTPATIENT
Start: 2025-03-07 | End: 2025-03-10

## 2025-03-10 ENCOUNTER — ANESTHESIA EVENT (OUTPATIENT)
Dept: OPERATING ROOM | Age: 70
DRG: 470 | End: 2025-03-10
Payer: COMMERCIAL

## 2025-03-10 ENCOUNTER — ANESTHESIA (OUTPATIENT)
Dept: OPERATING ROOM | Age: 70
DRG: 470 | End: 2025-03-10
Payer: COMMERCIAL

## 2025-03-10 ENCOUNTER — HOSPITAL ENCOUNTER (INPATIENT)
Age: 70
LOS: 1 days | Discharge: NURSING, REHAB OR LONG-TERM CARE | DRG: 470 | End: 2025-03-13
Attending: STUDENT IN AN ORGANIZED HEALTH CARE EDUCATION/TRAINING PROGRAM | Admitting: STUDENT IN AN ORGANIZED HEALTH CARE EDUCATION/TRAINING PROGRAM
Payer: COMMERCIAL

## 2025-03-10 ENCOUNTER — APPOINTMENT (OUTPATIENT)
Dept: GENERAL RADIOLOGY | Age: 70
DRG: 470 | End: 2025-03-10
Attending: STUDENT IN AN ORGANIZED HEALTH CARE EDUCATION/TRAINING PROGRAM
Payer: COMMERCIAL

## 2025-03-10 DIAGNOSIS — J44.9 COPD (CHRONIC OBSTRUCTIVE PULMONARY DISEASE) (HCC): ICD-10-CM

## 2025-03-10 DIAGNOSIS — Z96.641 S/P TOTAL RIGHT HIP ARTHROPLASTY: ICD-10-CM

## 2025-03-10 DIAGNOSIS — G89.18 POST-OPERATIVE PAIN: ICD-10-CM

## 2025-03-10 DIAGNOSIS — J06.9 URI (UPPER RESPIRATORY INFECTION): ICD-10-CM

## 2025-03-10 DIAGNOSIS — M16.11 PRIMARY OSTEOARTHRITIS OF RIGHT HIP: Primary | ICD-10-CM

## 2025-03-10 LAB — GLUCOSE BLD-MCNC: 115 MG/DL (ref 74–99)

## 2025-03-10 PROCEDURE — 6360000002 HC RX W HCPCS: Performed by: STUDENT IN AN ORGANIZED HEALTH CARE EDUCATION/TRAINING PROGRAM

## 2025-03-10 PROCEDURE — 3600000014 HC SURGERY LEVEL 4 ADDTL 15MIN: Performed by: STUDENT IN AN ORGANIZED HEALTH CARE EDUCATION/TRAINING PROGRAM

## 2025-03-10 PROCEDURE — G0378 HOSPITAL OBSERVATION PER HR: HCPCS

## 2025-03-10 PROCEDURE — 6360000002 HC RX W HCPCS

## 2025-03-10 PROCEDURE — 88304 TISSUE EXAM BY PATHOLOGIST: CPT

## 2025-03-10 PROCEDURE — 94640 AIRWAY INHALATION TREATMENT: CPT

## 2025-03-10 PROCEDURE — 97530 THERAPEUTIC ACTIVITIES: CPT

## 2025-03-10 PROCEDURE — 2709999900 HC NON-CHARGEABLE SUPPLY: Performed by: STUDENT IN AN ORGANIZED HEALTH CARE EDUCATION/TRAINING PROGRAM

## 2025-03-10 PROCEDURE — 3600000004 HC SURGERY LEVEL 4 BASE: Performed by: STUDENT IN AN ORGANIZED HEALTH CARE EDUCATION/TRAINING PROGRAM

## 2025-03-10 PROCEDURE — 2700000000 HC OXYGEN THERAPY PER DAY

## 2025-03-10 PROCEDURE — 2500000003 HC RX 250 WO HCPCS

## 2025-03-10 PROCEDURE — 3700000000 HC ANESTHESIA ATTENDED CARE: Performed by: STUDENT IN AN ORGANIZED HEALTH CARE EDUCATION/TRAINING PROGRAM

## 2025-03-10 PROCEDURE — 88311 DECALCIFY TISSUE: CPT

## 2025-03-10 PROCEDURE — 2580000003 HC RX 258: Performed by: NURSE ANESTHETIST, CERTIFIED REGISTERED

## 2025-03-10 PROCEDURE — 82962 GLUCOSE BLOOD TEST: CPT

## 2025-03-10 PROCEDURE — 0SR903A REPLACEMENT OF RIGHT HIP JOINT WITH CERAMIC SYNTHETIC SUBSTITUTE, UNCEMENTED, OPEN APPROACH: ICD-10-PCS | Performed by: STUDENT IN AN ORGANIZED HEALTH CARE EDUCATION/TRAINING PROGRAM

## 2025-03-10 PROCEDURE — 3700000001 HC ADD 15 MINUTES (ANESTHESIA): Performed by: STUDENT IN AN ORGANIZED HEALTH CARE EDUCATION/TRAINING PROGRAM

## 2025-03-10 PROCEDURE — 3E0U3BZ INTRODUCTION OF ANESTHETIC AGENT INTO JOINTS, PERCUTANEOUS APPROACH: ICD-10-PCS | Performed by: STUDENT IN AN ORGANIZED HEALTH CARE EDUCATION/TRAINING PROGRAM

## 2025-03-10 PROCEDURE — 94664 DEMO&/EVAL PT USE INHALER: CPT

## 2025-03-10 PROCEDURE — 3E0U33Z INTRODUCTION OF ANTI-INFLAMMATORY INTO JOINTS, PERCUTANEOUS APPROACH: ICD-10-PCS | Performed by: STUDENT IN AN ORGANIZED HEALTH CARE EDUCATION/TRAINING PROGRAM

## 2025-03-10 PROCEDURE — 6370000000 HC RX 637 (ALT 250 FOR IP)

## 2025-03-10 PROCEDURE — 2500000003 HC RX 250 WO HCPCS: Performed by: STUDENT IN AN ORGANIZED HEALTH CARE EDUCATION/TRAINING PROGRAM

## 2025-03-10 PROCEDURE — 97161 PT EVAL LOW COMPLEX 20 MIN: CPT

## 2025-03-10 PROCEDURE — C1776 JOINT DEVICE (IMPLANTABLE): HCPCS | Performed by: STUDENT IN AN ORGANIZED HEALTH CARE EDUCATION/TRAINING PROGRAM

## 2025-03-10 PROCEDURE — 6360000002 HC RX W HCPCS: Performed by: ANESTHESIOLOGY

## 2025-03-10 PROCEDURE — 7100000001 HC PACU RECOVERY - ADDTL 15 MIN: Performed by: STUDENT IN AN ORGANIZED HEALTH CARE EDUCATION/TRAINING PROGRAM

## 2025-03-10 PROCEDURE — 73502 X-RAY EXAM HIP UNI 2-3 VIEWS: CPT

## 2025-03-10 PROCEDURE — 6360000002 HC RX W HCPCS: Performed by: NURSE ANESTHETIST, CERTIFIED REGISTERED

## 2025-03-10 PROCEDURE — 7100000000 HC PACU RECOVERY - FIRST 15 MIN: Performed by: STUDENT IN AN ORGANIZED HEALTH CARE EDUCATION/TRAINING PROGRAM

## 2025-03-10 DEVICE — 6.5MM LOW PROFILE HEX SCREW 30MM
Type: IMPLANTABLE DEVICE | Site: HIP | Status: FUNCTIONAL
Brand: TRIDENT II

## 2025-03-10 DEVICE — CERAMIC V40 FEMORAL HEAD
Type: IMPLANTABLE DEVICE | Site: HIP | Status: FUNCTIONAL
Brand: BIOLOX

## 2025-03-10 DEVICE — TRIDENT X3 0 DEGREE POLYETHYLENE INSERT
Type: IMPLANTABLE DEVICE | Site: HIP | Status: FUNCTIONAL
Brand: TRIDENT X3 INSERT

## 2025-03-10 DEVICE — TRIDENT II TRITANIUM CLUSTER 52E
Type: IMPLANTABLE DEVICE | Site: HIP | Status: FUNCTIONAL
Brand: TRIDENT II

## 2025-03-10 DEVICE — 127 DEGREE NECK ANGLE HIP STEM
Type: IMPLANTABLE DEVICE | Site: HIP | Status: FUNCTIONAL
Brand: ACCOLADE

## 2025-03-10 RX ORDER — VANCOMYCIN HYDROCHLORIDE 1 G/20ML
INJECTION, POWDER, LYOPHILIZED, FOR SOLUTION INTRAVENOUS PRN
Status: DISCONTINUED | OUTPATIENT
Start: 2025-03-10 | End: 2025-03-10 | Stop reason: ALTCHOICE

## 2025-03-10 RX ORDER — OXYCODONE HYDROCHLORIDE 5 MG/1
10 TABLET ORAL EVERY 4 HOURS PRN
Status: DISCONTINUED | OUTPATIENT
Start: 2025-03-10 | End: 2025-03-13 | Stop reason: HOSPADM

## 2025-03-10 RX ORDER — SODIUM CHLORIDE 9 MG/ML
INJECTION, SOLUTION INTRAVENOUS PRN
Status: DISCONTINUED | OUTPATIENT
Start: 2025-03-10 | End: 2025-03-10 | Stop reason: HOSPADM

## 2025-03-10 RX ORDER — SODIUM CHLORIDE 0.9 % (FLUSH) 0.9 %
5-40 SYRINGE (ML) INJECTION EVERY 12 HOURS SCHEDULED
Status: DISCONTINUED | OUTPATIENT
Start: 2025-03-10 | End: 2025-03-13 | Stop reason: HOSPADM

## 2025-03-10 RX ORDER — SODIUM CHLORIDE 9 MG/ML
INJECTION, SOLUTION INTRAVENOUS PRN
Status: DISCONTINUED | OUTPATIENT
Start: 2025-03-10 | End: 2025-03-13 | Stop reason: HOSPADM

## 2025-03-10 RX ORDER — DEXAMETHASONE SODIUM PHOSPHATE 10 MG/ML
8 INJECTION, SOLUTION INTRAMUSCULAR; INTRAVENOUS ONCE
Status: DISCONTINUED | OUTPATIENT
Start: 2025-03-10 | End: 2025-03-10 | Stop reason: HOSPADM

## 2025-03-10 RX ORDER — BUPIVACAINE HYDROCHLORIDE 7.5 MG/ML
INJECTION, SOLUTION INTRASPINAL
Status: DISCONTINUED | OUTPATIENT
Start: 2025-03-10 | End: 2025-03-10 | Stop reason: SDUPTHER

## 2025-03-10 RX ORDER — SODIUM CHLORIDE 9 MG/ML
INJECTION, SOLUTION INTRAVENOUS CONTINUOUS
Status: DISCONTINUED | OUTPATIENT
Start: 2025-03-10 | End: 2025-03-10 | Stop reason: HOSPADM

## 2025-03-10 RX ORDER — SODIUM CHLORIDE 0.9 % (FLUSH) 0.9 %
5-40 SYRINGE (ML) INJECTION EVERY 12 HOURS SCHEDULED
Status: DISCONTINUED | OUTPATIENT
Start: 2025-03-10 | End: 2025-03-10 | Stop reason: HOSPADM

## 2025-03-10 RX ORDER — ALBUTEROL SULFATE 0.83 MG/ML
2.5 SOLUTION RESPIRATORY (INHALATION) ONCE
Status: COMPLETED | OUTPATIENT
Start: 2025-03-10 | End: 2025-03-10

## 2025-03-10 RX ORDER — SODIUM CHLORIDE 9 MG/ML
INJECTION, SOLUTION INTRAVENOUS
Status: DISCONTINUED | OUTPATIENT
Start: 2025-03-10 | End: 2025-03-10 | Stop reason: SDUPTHER

## 2025-03-10 RX ORDER — ACETAMINOPHEN 325 MG/1
650 TABLET ORAL EVERY 6 HOURS
Status: DISCONTINUED | OUTPATIENT
Start: 2025-03-10 | End: 2025-03-13 | Stop reason: HOSPADM

## 2025-03-10 RX ORDER — TRANEXAMIC ACID 10 MG/ML
1000 INJECTION, SOLUTION INTRAVENOUS
Status: COMPLETED | OUTPATIENT
Start: 2025-03-10 | End: 2025-03-10

## 2025-03-10 RX ORDER — PROPOFOL 10 MG/ML
INJECTION, EMULSION INTRAVENOUS
Status: DISCONTINUED | OUTPATIENT
Start: 2025-03-10 | End: 2025-03-10 | Stop reason: SDUPTHER

## 2025-03-10 RX ORDER — ONDANSETRON 2 MG/ML
4 INJECTION INTRAMUSCULAR; INTRAVENOUS EVERY 6 HOURS PRN
Status: DISCONTINUED | OUTPATIENT
Start: 2025-03-10 | End: 2025-03-13 | Stop reason: HOSPADM

## 2025-03-10 RX ORDER — MEPERIDINE HYDROCHLORIDE 50 MG/ML
12.5 INJECTION INTRAMUSCULAR; INTRAVENOUS; SUBCUTANEOUS EVERY 5 MIN PRN
Status: DISCONTINUED | OUTPATIENT
Start: 2025-03-10 | End: 2025-03-10 | Stop reason: HOSPADM

## 2025-03-10 RX ORDER — DIPHENHYDRAMINE HYDROCHLORIDE 50 MG/ML
12.5 INJECTION INTRAMUSCULAR; INTRAVENOUS
Status: DISCONTINUED | OUTPATIENT
Start: 2025-03-10 | End: 2025-03-10 | Stop reason: HOSPADM

## 2025-03-10 RX ORDER — SODIUM CHLORIDE 0.9 % (FLUSH) 0.9 %
5-40 SYRINGE (ML) INJECTION PRN
Status: DISCONTINUED | OUTPATIENT
Start: 2025-03-10 | End: 2025-03-10 | Stop reason: HOSPADM

## 2025-03-10 RX ORDER — SODIUM CHLORIDE 0.9 % (FLUSH) 0.9 %
5-40 SYRINGE (ML) INJECTION PRN
Status: DISCONTINUED | OUTPATIENT
Start: 2025-03-10 | End: 2025-03-13 | Stop reason: HOSPADM

## 2025-03-10 RX ORDER — ACETAMINOPHEN 500 MG
1000 TABLET ORAL ONCE
Status: COMPLETED | OUTPATIENT
Start: 2025-03-10 | End: 2025-03-10

## 2025-03-10 RX ORDER — MORPHINE SULFATE 2 MG/ML
2 INJECTION, SOLUTION INTRAMUSCULAR; INTRAVENOUS
Status: DISCONTINUED | OUTPATIENT
Start: 2025-03-10 | End: 2025-03-13 | Stop reason: HOSPADM

## 2025-03-10 RX ORDER — LIDOCAINE HYDROCHLORIDE 10 MG/ML
INJECTION, SOLUTION EPIDURAL; INFILTRATION; INTRACAUDAL; PERINEURAL
Status: DISCONTINUED | OUTPATIENT
Start: 2025-03-10 | End: 2025-03-10 | Stop reason: SDUPTHER

## 2025-03-10 RX ORDER — ONDANSETRON 4 MG/1
4 TABLET, ORALLY DISINTEGRATING ORAL EVERY 8 HOURS PRN
Status: DISCONTINUED | OUTPATIENT
Start: 2025-03-10 | End: 2025-03-13 | Stop reason: HOSPADM

## 2025-03-10 RX ORDER — DEXAMETHASONE SODIUM PHOSPHATE 4 MG/ML
INJECTION, SOLUTION INTRA-ARTICULAR; INTRALESIONAL; INTRAMUSCULAR; INTRAVENOUS; SOFT TISSUE
Status: DISCONTINUED | OUTPATIENT
Start: 2025-03-10 | End: 2025-03-10 | Stop reason: SDUPTHER

## 2025-03-10 RX ORDER — FIBRINOGEN HUMAN AND THROMBIN HUMAN 1 ML
KIT TOPICAL PRN
Status: DISCONTINUED | OUTPATIENT
Start: 2025-03-10 | End: 2025-03-10 | Stop reason: ALTCHOICE

## 2025-03-10 RX ORDER — PHENYLEPHRINE HCL IN 0.9% NACL 1 MG/10 ML
SYRINGE (ML) INTRAVENOUS
Status: DISCONTINUED | OUTPATIENT
Start: 2025-03-10 | End: 2025-03-10 | Stop reason: SDUPTHER

## 2025-03-10 RX ORDER — ONDANSETRON 2 MG/ML
INJECTION INTRAMUSCULAR; INTRAVENOUS
Status: DISCONTINUED | OUTPATIENT
Start: 2025-03-10 | End: 2025-03-10 | Stop reason: SDUPTHER

## 2025-03-10 RX ORDER — NALOXONE HYDROCHLORIDE 0.4 MG/ML
INJECTION, SOLUTION INTRAMUSCULAR; INTRAVENOUS; SUBCUTANEOUS PRN
Status: DISCONTINUED | OUTPATIENT
Start: 2025-03-10 | End: 2025-03-10 | Stop reason: HOSPADM

## 2025-03-10 RX ORDER — OXYCODONE HYDROCHLORIDE 5 MG/1
5 TABLET ORAL EVERY 4 HOURS PRN
Status: DISCONTINUED | OUTPATIENT
Start: 2025-03-10 | End: 2025-03-13 | Stop reason: HOSPADM

## 2025-03-10 RX ORDER — MORPHINE SULFATE 4 MG/ML
4 INJECTION, SOLUTION INTRAMUSCULAR; INTRAVENOUS
Status: DISCONTINUED | OUTPATIENT
Start: 2025-03-10 | End: 2025-03-13 | Stop reason: HOSPADM

## 2025-03-10 RX ADMIN — APIXABAN 2.5 MG: 2.5 TABLET, FILM COATED ORAL at 20:37

## 2025-03-10 RX ADMIN — SODIUM CHLORIDE: 9 INJECTION, SOLUTION INTRAVENOUS at 08:37

## 2025-03-10 RX ADMIN — MORPHINE SULFATE 4 MG: 4 INJECTION, SOLUTION INTRAMUSCULAR; INTRAVENOUS at 14:32

## 2025-03-10 RX ADMIN — WATER 2000 MG: 1 INJECTION INTRAMUSCULAR; INTRAVENOUS; SUBCUTANEOUS at 18:18

## 2025-03-10 RX ADMIN — ACETAMINOPHEN 650 MG: 325 TABLET ORAL at 20:37

## 2025-03-10 RX ADMIN — LIDOCAINE HYDROCHLORIDE 20 MG: 10 INJECTION, SOLUTION EPIDURAL; INFILTRATION; INTRACAUDAL; PERINEURAL at 09:39

## 2025-03-10 RX ADMIN — ALBUTEROL SULFATE 2.5 MG: 0.83 SOLUTION RESPIRATORY (INHALATION) at 09:09

## 2025-03-10 RX ADMIN — ACETAMINOPHEN 650 MG: 325 TABLET ORAL at 14:32

## 2025-03-10 RX ADMIN — TRANEXAMIC ACID 1000 MG: 10 INJECTION, SOLUTION INTRAVENOUS at 09:35

## 2025-03-10 RX ADMIN — ONDANSETRON 4 MG: 2 INJECTION INTRAMUSCULAR; INTRAVENOUS at 09:45

## 2025-03-10 RX ADMIN — SODIUM CHLORIDE, PRESERVATIVE FREE 10 ML: 5 INJECTION INTRAVENOUS at 20:38

## 2025-03-10 RX ADMIN — MORPHINE SULFATE 4 MG: 4 INJECTION, SOLUTION INTRAMUSCULAR; INTRAVENOUS at 20:37

## 2025-03-10 RX ADMIN — TRANEXAMIC ACID 1000 MG: 10 INJECTION, SOLUTION INTRAVENOUS at 12:18

## 2025-03-10 RX ADMIN — DEXAMETHASONE SODIUM PHOSPHATE 8 MG: 4 INJECTION, SOLUTION INTRAMUSCULAR; INTRAVENOUS at 09:45

## 2025-03-10 RX ADMIN — OXYCODONE 10 MG: 5 TABLET ORAL at 18:17

## 2025-03-10 RX ADMIN — ACETAMINOPHEN 1000 MG: 500 TABLET ORAL at 08:30

## 2025-03-10 RX ADMIN — Medication 200 MCG: at 10:11

## 2025-03-10 RX ADMIN — PROPOFOL 100 MCG/KG/MIN: 10 INJECTION, EMULSION INTRAVENOUS at 09:39

## 2025-03-10 RX ADMIN — BUPIVACAINE HYDROCHLORIDE IN DEXTROSE 1.4 ML: 7.5 INJECTION, SOLUTION SUBARACHNOID at 09:57

## 2025-03-10 RX ADMIN — MORPHINE SULFATE 4 MG: 4 INJECTION, SOLUTION INTRAMUSCULAR; INTRAVENOUS at 16:53

## 2025-03-10 RX ADMIN — Medication 200 MCG: at 10:09

## 2025-03-10 RX ADMIN — WATER 2000 MG: 1 INJECTION INTRAMUSCULAR; INTRAVENOUS; SUBCUTANEOUS at 09:35

## 2025-03-10 RX ADMIN — OXYCODONE 10 MG: 5 TABLET ORAL at 13:29

## 2025-03-10 RX ADMIN — Medication 200 MCG: at 10:17

## 2025-03-10 ASSESSMENT — PAIN SCALES - GENERAL
PAINLEVEL_OUTOF10: 10
PAINLEVEL_OUTOF10: 9
PAINLEVEL_OUTOF10: 8
PAINLEVEL_OUTOF10: 8
PAINLEVEL_OUTOF10: 10
PAINLEVEL_OUTOF10: 10
PAINLEVEL_OUTOF10: 9
PAINLEVEL_OUTOF10: 10
PAINLEVEL_OUTOF10: 10

## 2025-03-10 ASSESSMENT — PAIN DESCRIPTION - DESCRIPTORS
DESCRIPTORS: DISCOMFORT
DESCRIPTORS: ACHING;DISCOMFORT
DESCRIPTORS: ACHING;DISCOMFORT;SORE
DESCRIPTORS: ACHING;DISCOMFORT
DESCRIPTORS: ACHING;DISCOMFORT
DESCRIPTORS: ACHING;DISCOMFORT;SPASM

## 2025-03-10 ASSESSMENT — COPD QUESTIONNAIRES: CAT_SEVERITY: SEVERE

## 2025-03-10 ASSESSMENT — PAIN DESCRIPTION - LOCATION
LOCATION: HIP
LOCATION: KNEE;HIP
LOCATION: HIP
LOCATION: HIP;KNEE
LOCATION: HIP

## 2025-03-10 ASSESSMENT — ENCOUNTER SYMPTOMS
DYSPNEA ACTIVITY LEVEL: AFTER AMBULATING 1 FLIGHT OF STAIRS
SHORTNESS OF BREATH: 1

## 2025-03-10 ASSESSMENT — PAIN SCALES - WONG BAKER
WONGBAKER_NUMERICALRESPONSE: HURTS LITTLE MORE
WONGBAKER_NUMERICALRESPONSE: HURTS A LITTLE BIT
WONGBAKER_NUMERICALRESPONSE: HURTS A LITTLE BIT

## 2025-03-10 ASSESSMENT — PAIN - FUNCTIONAL ASSESSMENT
PAIN_FUNCTIONAL_ASSESSMENT: 0-10
PAIN_FUNCTIONAL_ASSESSMENT: PREVENTS OR INTERFERES SOME ACTIVE ACTIVITIES AND ADLS
PAIN_FUNCTIONAL_ASSESSMENT: ACTIVITIES ARE NOT PREVENTED
PAIN_FUNCTIONAL_ASSESSMENT: PREVENTS OR INTERFERES SOME ACTIVE ACTIVITIES AND ADLS

## 2025-03-10 ASSESSMENT — PAIN DESCRIPTION - ORIENTATION
ORIENTATION: RIGHT
ORIENTATION: RIGHT
ORIENTATION: RIGHT;LEFT
ORIENTATION: RIGHT;LEFT
ORIENTATION: RIGHT

## 2025-03-10 ASSESSMENT — LIFESTYLE VARIABLES: SMOKING_STATUS: 0

## 2025-03-10 NOTE — ANESTHESIA PROCEDURE NOTES
Spinal Block    Patient location during procedure: OR  End time: 3/10/2025 9:34 AM  Reason for block: primary anesthetic  Staffing  Performed: anesthesiologist   Anesthesiologist: Rajesh Dinh MD  Performed by: Quita Frazier APRN - CRNA  Authorized by: Rajesh Dinh MD    Spinal Block  Patient position: sitting  Prep: ChloraPrep  Patient monitoring: cardiac monitor, continuous pulse ox, continuous capnometry, frequent blood pressure checks and oxygen  Approach: midline  Location: L3/L4  Provider prep: mask and sterile gloves  Local infiltration: lidocaine  Needle  Needle type: Pencan   Needle gauge: 25 G  Needle length: 3.5 in  Needle insertion depth: 3 cm  Assessment  Sensory level: T8  Events: cerebrospinal fluid  Swirl obtained: Yes  CSF: clear  Attempts: 2  Hemodynamics: stable  Preanesthetic Checklist  Completed: patient identified, IV checked, site marked, risks and benefits discussed, surgical/procedural consents, equipment checked, pre-op evaluation, timeout performed, anesthesia consent given, oxygen available, monitors applied/VS acknowledged, fire risk safety assessment completed and verbalized and blood product R/B/A discussed and consented

## 2025-03-10 NOTE — CARE COORDINATION
Met with patient about diagnosis and discharge plan of care. Family in room. Post op right NEO. Pt lives with son in ranch home. 2 steps/rails in. Has high commode and tub shower with extended tub bench. Will need wheeled walker-if home. [facility] is following. Pt/family want SNF-referral called to [facility]-await acceptance after therapies see pt. Did inform pt/family if she does well, plan will be home with [facility]. PCP is Dr thornton. Will follow-o

## 2025-03-10 NOTE — ANESTHESIA PRE PROCEDURE
Department of Anesthesiology  Preprocedure Note       Name:  Angelita Slater   Age:  69 y.o.  :  1955                                          MRN:  [id number]         Date:  3/10/2025      Surgeon: Surgeon(s):  Alex Salas DO    Procedure: Procedure(s):  RIGHT HIP TOTAL JOINT ARTHROPLASTY, LEFT KNEE INJECTION   +++FAYE+++    Medications prior to admission:   Prior to Admission medications    Medication Sig Start Date End Date Taking? Authorizing Provider   levothyroxine (SYNTHROID) 125 MCG tablet Take 1 tablet by mouth daily 3/5/25   Lopez Welsh,    amitriptyline (ELAVIL) 50 MG tablet TAKE 1 TABLET BY MOUTH EVERY EVENING 3/5/25   Lopez Welsh,    potassium chloride (KLOR-CON M) 20 MEQ extended release tablet Take 1 tablet by mouth daily 25   Keely Maradiaga MD   nystatin (MYCOSTATIN) 394750 UNIT/GM cream Apply topically 2 times daily. 25   Sarai Mckeon MD   ondansetron (ZOFRAN-ODT) 4 MG disintegrating tablet Take 1 tablet by mouth 3 times daily as needed for Nausea or Vomiting 25   Joel Awad MD   Meals on Wheels MISC by Does not apply route Renal Diet 25   Lopez Welsh,    fluticasone-umeclidin-vilant (TRELEGY ELLIPTA) 200-62.5-25 MCG/ACT AEPB inhaler Inhale 1 puff into the lungs daily 24   Annie Kumar, APRN - CNP   rosuvastatin (CRESTOR) 20 MG tablet Take 1 tablet by mouth daily 10/17/24   Sarai Mckeon MD   montelukast (SINGULAIR) 10 MG tablet TAKE 1 TABLET BY MOUTH EVERY NIGHT AT BEDTIME 10/17/24   Sarai Mckeon MD   losartan (COZAAR) 50 MG tablet Take 1 tablet by mouth daily 10/17/24   Sarai Mckeon MD   DULoxetine (CYMBALTA) 60 MG extended release capsule Take 1 capsule by mouth daily  Patient taking differently: Take 1 capsule by mouth nightly 10/17/24 4/15/25  Sarai Mckeon MD   docusate sodium (COLACE) 100 MG capsule Take 1 capsule by mouth 2 times daily 10/17/24   Sarai Mckeon MD   amLODIPine (NORVASC) 10 MG

## 2025-03-10 NOTE — ACP (ADVANCE CARE PLANNING)
Advance Care Planning   Healthcare Decision Maker:    Primary Decision Maker: Annette Beyer - Other Relative - [phone]    Secondary Decision Maker: EmirMarcial - Child - [phone]    Supplemental (Other) Decision Maker: Rosas Slater - Child - [phone]    Click here to complete Healthcare Decision Makers including selection of the Healthcare Decision Maker Relationship (ie \"Primary\").

## 2025-03-10 NOTE — H&P
Department of Orthopedic Surgery  Attending History and Physical      HISTORY OF PRESENT ILLNESS:                The patient is a 69 y.o. female who presents with right hip osteoarthritis and left knee osteoarthritis. They have failed extensive conservative treatment including supervised home exercise program, weight loss, activity modification, topical and oral medications. . They are here today for right hip total joint arthroplasty and left knee intra-articular steroid injection.     Past Medical History:        Diagnosis Date    Adrenal incidentaloma     Anxiety     Arthritis     Asthma     Bladder incontinence     Cancer (HCC) Dx 2009    multiple Myeloma, in remission currently     Chronic back pain     COPD (chronic obstructive pulmonary disease) (HCC)     on O2 2 liters  (uses with activity and at night to sleep)    Depression     Encounter for screening colonoscopy     Fibromyalgia     GERD (gastroesophageal reflux disease)     Hyperlipidemia     Hypertension     Hypothyroidism     MGUS (monoclonal gammopathy of unknown significance)     Neuropathy     Prolonged emergence from general anesthesia     Sleep apnea     Type II or unspecified type diabetes mellitus without mention of complication, not stated as uncontrolled        Past Surgical History:        Procedure Laterality Date    ANESTHESIA NERVE BLOCK Bilateral 08/10/2020    BILATERAL L3 L4 MEDIAL BRANCH L5 DORSSAL RAMUS NERVE BLOCK (CPT 12953) SEDATION performed by Campos Benton MD at [facility] OR    COLONOSCOPY  07/20/2016    removed 3 polyps (tubular adenomas), diverticulosis, Dr. Dwyer    COLONOSCOPY  2008    approximately 2008, no report available, per patient some polyps removed, Dr Myers, [facility]    COLONOSCOPY N/A 11/09/2020    Small sessile polyp distal ascending colon removed with bx/cauterized (path Tubular Adenoma), right and left diverticulosis without diverticulitis, Dr. Botello, [facility]    COLONOSCOPY  11/09/2020

## 2025-03-10 NOTE — OP NOTE
right hip osteoarthritis and left knee osteoarthritis. They have failed extensive conservative treatment including supervised home exercise program, weight loss, activity modification, topical and oral medications. . They are here today for right hip total joint arthroplasty and left knee intra-articular steroid injection.  Seen and examined in preop holding.Risks benefits and alternatives of total knee arthroplasty were discussed in detail. Patient understands the risk include but are not limited to blood loss, blood clot, infection, damage to surrounding neurovascular structures, tendons, ligaments, instability, arthrofibrosis, limb length inequality, periprosthetic fracture, need for reoperation amongst others. She wishes to proceed with surgery.  Informed consent was obtained and signed and placed in the chart.  My initials were placed on her right hip and left knee.  Patient was then rolled to the operating room spinal anesthesia induced.  After a brief timeout under aseptic technique her left knee was injected with 80 mg of Kenalog and 3 mL of 0.25% Marcaine.  Sterile dressing was applied.  Patient was then position right lateral decubitus and secured on a pegboard.  All bony prominences were well-padded.  Preoperative antibiotics along tranexamic acid were infused.  The right hip was then prepped and draped in standard sterile orthopedic fashion.  Appropriate timeout was performed confirming side and site of surgery.    A posterior lateral incision was created centered over greater trochanter.  Skin was incised along the incision and dissection was carried down to the IT band.  IT band was split in line with our incision and Charnley retractors were placed.  Gluteus medius insertion was identified and an anterolateral approach to the hip was undertaken.  Gluteus medius and minimus inferior 50% were elevated off the anterolateral hip capsule and 1 sleeve.  T-shaped capsulotomy was performed exposing her

## 2025-03-10 NOTE — BRIEF OP NOTE
Brief Postoperative Note      Patient: Angelita Slater  YOB: 1955  MRN: [id number]    Date of Procedure: 3/10/2025    Pre-Op Diagnosis Codes:      * Primary osteoarthritis of right hip [M16.11]    Post-Op Diagnosis: Same       Procedure(s):  RIGHT HIP TOTAL JOINT ARTHROPLASTY, LEFT KNEE INJECTION    Surgeon(s):  Alex Salas DO    Assistant:  Resident: Roberto Pulido DO; Kofi Pratt DO    Anesthesia: Spinal    Estimated Blood Loss (mL): less than 100     Complications: None    Specimens:   ID Type Source Tests Collected by Time Destination   A : RIGHT FEMORAL HEAD Bone Bone SURGICAL PATHOLOGY Alex Salas DO 3/10/2025 1021        Implants:  Implant Name Type Inv. Item Serial No.  Lot No. LRB No. Used Action   SCREW BNE L30MM DIA6.5MM STD CANC HIP TI HMSPHR THRD DRVR - [id number]  SCREW BNE L30MM DIA6.5MM STD CANC HIP TI HMSPHR THRD DRVR  [employer] [id number] Right 1 Implanted   INSERT ACET E 0 DEG 36 MM HIP X3 TRIDENT - [id number]  INSERT ACET E 0 DEG 36 MM HIP X3 TRIDENT  [employer] [id number] Right 1 Implanted   SHELL ACET SZ E [id number] CLUS H TRITANIUM PRESSFIT MAURICE - [id number]  SHELL ACET SZ E [id number] CLUS H TRITANIUM PRESSFIT MAURICE  [employer] [id number] Right 1 Implanted   HEAD FEM [id number] -2.5MM OFFSET HIP BIOLOX DELT CERAMIC TAPR - [id number]  HEAD FEM [id number] -2.5MM OFFSET HIP BIOLOX DELT CERAMIC TAPR  [employer] [id number] Right 1 Implanted   STEM FEM SZ 4 L105MM NK L35MM 42MM OFFSET 127DEG HIP TI - [id number]  STEM FEM SZ 4 L105MM NK L35MM 42MM OFFSET 127DEG HIP TI  [employer] [id number] Right 1 Implanted         Drains: * No LDAs found *    Findings:  Infection Present At Time Of Surgery (PATOS) (choose all levels that have infection present):  No infection present  Other Findings: Right total hip arthroplasty    Electronically signed by Alex Salas DO on 3/10/2025 at 10:38 AM

## 2025-03-10 NOTE — ANESTHESIA POSTPROCEDURE EVALUATION
Department of Anesthesiology  Postprocedure Note    Patient: Angelita Slater  MRN: [id number]  YOB: 1955  Date of evaluation: 3/10/2025    Procedure Summary       Date: 03/10/25 Room / Location: [address]    Anesthesia Start: 0837 Anesthesia Stop: 1112    Procedure: RIGHT HIP TOTAL JOINT ARTHROPLASTY, LEFT KNEE INJECTION (Right: Hip) Diagnosis:       Primary osteoarthritis of right hip      (Primary osteoarthritis of right hip [M16.11])    Surgeons: Alex Salas DO Responsible Provider: Rajesh Dinh MD    Anesthesia Type: Spinal ASA Status: 4            Anesthesia Type: Spinal    Vandana Phase I: Vandana Score: 10    Vandana Phase II:      Anesthesia Post Evaluation    Patient location during evaluation: PACU  Patient participation: complete - patient participated  Level of consciousness: awake  Pain score: 0  Airway patency: patent  Nausea & Vomiting: no vomiting and no nausea  Cardiovascular status: hemodynamically stable  Hydration status: stable  Pain management: adequate        No notable events documented.

## 2025-03-11 LAB
BASOPHILS # BLD: 0.01 K/UL (ref 0–0.2)
BASOPHILS NFR BLD: 0 % (ref 0–2)
EOSINOPHIL # BLD: 0 K/UL (ref 0.05–0.5)
EOSINOPHILS RELATIVE PERCENT: 0 % (ref 0–6)
ERYTHROCYTE [DISTWIDTH] IN BLOOD BY AUTOMATED COUNT: 12.5 % (ref 11.5–15)
HCT VFR BLD AUTO: 32.3 % (ref 34–48)
HGB BLD-MCNC: 9.8 G/DL (ref 11.5–15.5)
IMM GRANULOCYTES # BLD AUTO: <0.03 K/UL (ref 0–0.58)
IMM GRANULOCYTES NFR BLD: 0 % (ref 0–5)
LYMPHOCYTES NFR BLD: 1.35 K/UL (ref 1.5–4)
LYMPHOCYTES RELATIVE PERCENT: 18 % (ref 20–42)
MCH RBC QN AUTO: 29.2 PG (ref 26–35)
MCHC RBC AUTO-ENTMCNC: 30.3 G/DL (ref 32–34.5)
MCV RBC AUTO: 96.1 FL (ref 80–99.9)
MONOCYTES NFR BLD: 0.54 K/UL (ref 0.1–0.95)
MONOCYTES NFR BLD: 7 % (ref 2–12)
NEUTROPHILS NFR BLD: 75 % (ref 43–80)
NEUTS SEG NFR BLD: 5.71 K/UL (ref 1.8–7.3)
PLATELET # BLD AUTO: 377 K/UL (ref 130–450)
PMV BLD AUTO: 10.3 FL (ref 7–12)
RBC # BLD AUTO: 3.36 M/UL (ref 3.5–5.5)
WBC OTHER # BLD: 7.6 K/UL (ref 4.5–11.5)

## 2025-03-11 PROCEDURE — 6360000002 HC RX W HCPCS

## 2025-03-11 PROCEDURE — 2700000000 HC OXYGEN THERAPY PER DAY

## 2025-03-11 PROCEDURE — G0378 HOSPITAL OBSERVATION PER HR: HCPCS

## 2025-03-11 PROCEDURE — 97110 THERAPEUTIC EXERCISES: CPT

## 2025-03-11 PROCEDURE — 6370000000 HC RX 637 (ALT 250 FOR IP): Performed by: NURSE PRACTITIONER

## 2025-03-11 PROCEDURE — 97530 THERAPEUTIC ACTIVITIES: CPT

## 2025-03-11 PROCEDURE — 2500000003 HC RX 250 WO HCPCS

## 2025-03-11 PROCEDURE — 85025 COMPLETE CBC W/AUTO DIFF WBC: CPT

## 2025-03-11 PROCEDURE — 6370000000 HC RX 637 (ALT 250 FOR IP)

## 2025-03-11 PROCEDURE — 97165 OT EVAL LOW COMPLEX 30 MIN: CPT

## 2025-03-11 RX ORDER — DIPHENHYDRAMINE HCL 25 MG
25 TABLET ORAL ONCE
Status: COMPLETED | OUTPATIENT
Start: 2025-03-11 | End: 2025-03-11

## 2025-03-11 RX ORDER — ASPIRIN 81 MG/1
81 TABLET, CHEWABLE ORAL 2 TIMES DAILY
Status: DISCONTINUED | OUTPATIENT
Start: 2025-03-11 | End: 2025-03-13 | Stop reason: HOSPADM

## 2025-03-11 RX ORDER — OXYCODONE HYDROCHLORIDE 5 MG/1
5 TABLET ORAL EVERY 6 HOURS PRN
Qty: 28 TABLET | Refills: 0 | Status: SHIPPED | OUTPATIENT
Start: 2025-03-11 | End: 2025-03-18

## 2025-03-11 RX ORDER — ASPIRIN 81 MG/1
81 TABLET ORAL 2 TIMES DAILY
Qty: 56 TABLET | Refills: 0 | Status: SHIPPED | OUTPATIENT
Start: 2025-03-11

## 2025-03-11 RX ADMIN — SODIUM CHLORIDE, PRESERVATIVE FREE 10 ML: 5 INJECTION INTRAVENOUS at 08:10

## 2025-03-11 RX ADMIN — OXYCODONE 10 MG: 5 TABLET ORAL at 12:39

## 2025-03-11 RX ADMIN — ACETAMINOPHEN 650 MG: 325 TABLET ORAL at 20:20

## 2025-03-11 RX ADMIN — MORPHINE SULFATE 4 MG: 4 INJECTION, SOLUTION INTRAMUSCULAR; INTRAVENOUS at 01:34

## 2025-03-11 RX ADMIN — ASPIRIN 81 MG: 81 TABLET, CHEWABLE ORAL at 20:20

## 2025-03-11 RX ADMIN — ACETAMINOPHEN 650 MG: 325 TABLET ORAL at 08:09

## 2025-03-11 RX ADMIN — MORPHINE SULFATE 2 MG: 2 INJECTION, SOLUTION INTRAMUSCULAR; INTRAVENOUS at 20:20

## 2025-03-11 RX ADMIN — OXYCODONE 10 MG: 5 TABLET ORAL at 17:48

## 2025-03-11 RX ADMIN — MORPHINE SULFATE 4 MG: 4 INJECTION, SOLUTION INTRAMUSCULAR; INTRAVENOUS at 05:57

## 2025-03-11 RX ADMIN — WATER 2000 MG: 1 INJECTION INTRAMUSCULAR; INTRAVENOUS; SUBCUTANEOUS at 01:33

## 2025-03-11 RX ADMIN — MORPHINE SULFATE 4 MG: 4 INJECTION, SOLUTION INTRAMUSCULAR; INTRAVENOUS at 14:03

## 2025-03-11 RX ADMIN — MORPHINE SULFATE 4 MG: 4 INJECTION, SOLUTION INTRAMUSCULAR; INTRAVENOUS at 10:49

## 2025-03-11 RX ADMIN — OXYCODONE 10 MG: 5 TABLET ORAL at 00:24

## 2025-03-11 RX ADMIN — OXYCODONE 10 MG: 5 TABLET ORAL at 04:23

## 2025-03-11 RX ADMIN — ACETAMINOPHEN 650 MG: 325 TABLET ORAL at 12:39

## 2025-03-11 RX ADMIN — DIPHENHYDRAMINE HCL 25 MG: 25 TABLET ORAL at 22:14

## 2025-03-11 RX ADMIN — APIXABAN 2.5 MG: 2.5 TABLET, FILM COATED ORAL at 08:09

## 2025-03-11 RX ADMIN — OXYCODONE 10 MG: 5 TABLET ORAL at 22:01

## 2025-03-11 RX ADMIN — ACETAMINOPHEN 650 MG: 325 TABLET ORAL at 01:34

## 2025-03-11 RX ADMIN — OXYCODONE 10 MG: 5 TABLET ORAL at 08:38

## 2025-03-11 ASSESSMENT — PAIN DESCRIPTION - DESCRIPTORS
DESCRIPTORS: ACHING;SORE
DESCRIPTORS: ACHING
DESCRIPTORS: ACHING;DISCOMFORT
DESCRIPTORS: ACHING
DESCRIPTORS: ACHING;SORE;DISCOMFORT
DESCRIPTORS: ACHING;DISCOMFORT;SORE
DESCRIPTORS: SORE;ACHING
DESCRIPTORS: ITCHING;ACHING
DESCRIPTORS: ACHING;SORE
DESCRIPTORS: ACHING;DISCOMFORT;SORE
DESCRIPTORS: ACHING;DISCOMFORT;SORE

## 2025-03-11 ASSESSMENT — PAIN DESCRIPTION - ORIENTATION
ORIENTATION: RIGHT
ORIENTATION: RIGHT;LEFT
ORIENTATION: RIGHT;LOWER
ORIENTATION: RIGHT
ORIENTATION: RIGHT
ORIENTATION: RIGHT;LEFT
ORIENTATION: RIGHT
ORIENTATION: RIGHT;LEFT
ORIENTATION: RIGHT

## 2025-03-11 ASSESSMENT — PAIN SCALES - GENERAL
PAINLEVEL_OUTOF10: 6
PAINLEVEL_OUTOF10: 10
PAINLEVEL_OUTOF10: 9
PAINLEVEL_OUTOF10: 10
PAINLEVEL_OUTOF10: 8
PAINLEVEL_OUTOF10: 10
PAINLEVEL_OUTOF10: 9
PAINLEVEL_OUTOF10: 10
PAINLEVEL_OUTOF10: 5

## 2025-03-11 ASSESSMENT — PAIN DESCRIPTION - LOCATION
LOCATION: HIP;KNEE;BACK
LOCATION: HIP
LOCATION: BACK;HIP
LOCATION: HIP;BACK
LOCATION: HIP
LOCATION: HIP;BACK;KNEE
LOCATION: HIP;KNEE;BACK
LOCATION: HIP

## 2025-03-11 ASSESSMENT — PAIN - FUNCTIONAL ASSESSMENT
PAIN_FUNCTIONAL_ASSESSMENT: ACTIVITIES ARE NOT PREVENTED
PAIN_FUNCTIONAL_ASSESSMENT: ACTIVITIES ARE NOT PREVENTED

## 2025-03-11 ASSESSMENT — PAIN SCALES - WONG BAKER: WONGBAKER_NUMERICALRESPONSE: HURTS A LITTLE BIT

## 2025-03-11 NOTE — PLAN OF CARE
Problem: Discharge Planning  Goal: Discharge to home or other facility with appropriate resources  3/11/2025 1134 by Yas Wilson RN  Outcome: Progressing     Problem: Pain  Goal: Verbalizes/displays adequate comfort level or baseline comfort level  3/11/2025 1134 by Yas Wilson, RN  Outcome: Progressing     Problem: Chronic Conditions and Co-morbidities  Goal: Patient's chronic conditions and co-morbidity symptoms are monitored and maintained or improved  3/11/2025 1134 by Yas Wilson, RN  Outcome: Progressing     Problem: Safety - Adult  Goal: Free from fall injury  3/11/2025 1134 by Yas Wilson, RN  Outcome: Progressing     Problem: ABCDS Injury Assessment  Goal: Absence of physical injury  3/11/2025 1134 by Yas Wilson, RN  Outcome: Progressing

## 2025-03-11 NOTE — CARE COORDINATION
Updated plan of care. POD#1 right NEO. Awaiting acceptance from [facility]. Will need paperwork completed. Will follow-mjo    ADDEND: [facility] cannot accept. Referral called to [facility]-await acceptance-mjo

## 2025-03-11 NOTE — DISCHARGE SUMMARY
Physician Discharge Summary     Patient ID:  Angelita Slater  [id number]  69 y.o.  1955    Admit date: 3/10/2025    Discharge date and time: No discharge date for patient encounter.     Admitting Physician: Alex Salas DO     Discharge Physician: Alex Salas DO    Admission Diagnoses: Primary osteoarthritis of right hip [M16.11]    Discharge Diagnoses: s/p right total Hip arthroplasty    Admission Condition: good    Discharged Condition: good    Hospital Course: The patient was admitted from the pre-operative holding area and underwent an uneventful course of right Hip arthroplasty on 3/10/2025 by Alex Salas DO. The patient was subsequently taken to the PACU and to the floor in stable condition. The patient continued antibiotics 24 hours postoperatively, as they received a dose of antibiotics preoperatively for infection prophylaxis. The patient was to begin physical therapy, WBAT, the day of surgery. The patient was also started on DVT prophylaxis of Aspirin 81 mg twice daily, SCDs. Blood counts were followed daily. On POD #1, surgical dressing was assessed to be clean, dry and intact with no obvious signs of infection. The patient has met all goals with physical and occupational therapy and is to discharge today POD #1.     Treatments: s/p right total Hip arthroplasty    Disposition: The patient was provided instructions to follow up with Alex Salas DO in 2 weeks and to call the office for an appointment. Dressing can be removed 7 days post-operatively. staples (s) are to be removed in 2 weeks at follow-up visit. Patient was provided DVT prophylaxis, Aspirin 81 mg twice daily for at least 4 weeks and pain medication for adequate pain control up to 6 weeks post-operatively.       Signed:  BRYAN Peraza CNP  3/11/2025

## 2025-03-12 PROBLEM — Z96.641 STATUS POST TOTAL REPLACEMENT OF RIGHT HIP: Status: ACTIVE | Noted: 2025-03-12

## 2025-03-12 PROCEDURE — 97530 THERAPEUTIC ACTIVITIES: CPT

## 2025-03-12 PROCEDURE — 97110 THERAPEUTIC EXERCISES: CPT

## 2025-03-12 PROCEDURE — 6370000000 HC RX 637 (ALT 250 FOR IP)

## 2025-03-12 PROCEDURE — 1200000000 HC SEMI PRIVATE

## 2025-03-12 PROCEDURE — 6370000000 HC RX 637 (ALT 250 FOR IP): Performed by: STUDENT IN AN ORGANIZED HEALTH CARE EDUCATION/TRAINING PROGRAM

## 2025-03-12 PROCEDURE — 2700000000 HC OXYGEN THERAPY PER DAY

## 2025-03-12 PROCEDURE — 2500000003 HC RX 250 WO HCPCS

## 2025-03-12 RX ORDER — SENNOSIDES A AND B 8.6 MG/1
1 TABLET, FILM COATED ORAL NIGHTLY
Status: DISCONTINUED | OUTPATIENT
Start: 2025-03-12 | End: 2025-03-13 | Stop reason: HOSPADM

## 2025-03-12 RX ORDER — POLYETHYLENE GLYCOL 3350 17 G/17G
17 POWDER, FOR SOLUTION ORAL DAILY
Status: DISCONTINUED | OUTPATIENT
Start: 2025-03-12 | End: 2025-03-13 | Stop reason: HOSPADM

## 2025-03-12 RX ADMIN — POLYETHYLENE GLYCOL 3350 17 G: 17 POWDER, FOR SOLUTION ORAL at 10:28

## 2025-03-12 RX ADMIN — ACETAMINOPHEN 650 MG: 325 TABLET ORAL at 17:00

## 2025-03-12 RX ADMIN — OXYCODONE 10 MG: 5 TABLET ORAL at 08:16

## 2025-03-12 RX ADMIN — OXYCODONE 10 MG: 5 TABLET ORAL at 22:01

## 2025-03-12 RX ADMIN — OXYCODONE 10 MG: 5 TABLET ORAL at 17:55

## 2025-03-12 RX ADMIN — SODIUM CHLORIDE, PRESERVATIVE FREE 10 ML: 5 INJECTION INTRAVENOUS at 08:17

## 2025-03-12 RX ADMIN — ACETAMINOPHEN 650 MG: 325 TABLET ORAL at 03:58

## 2025-03-12 RX ADMIN — OXYCODONE 10 MG: 5 TABLET ORAL at 12:29

## 2025-03-12 RX ADMIN — ACETAMINOPHEN 650 MG: 325 TABLET ORAL at 22:00

## 2025-03-12 RX ADMIN — SODIUM CHLORIDE, PRESERVATIVE FREE 10 ML: 5 INJECTION INTRAVENOUS at 22:16

## 2025-03-12 RX ADMIN — ASPIRIN 81 MG: 81 TABLET, CHEWABLE ORAL at 08:16

## 2025-03-12 RX ADMIN — SENNOSIDES 8.6 MG: 8.6 TABLET, COATED ORAL at 20:18

## 2025-03-12 RX ADMIN — ACETAMINOPHEN 650 MG: 325 TABLET ORAL at 10:28

## 2025-03-12 RX ADMIN — OXYCODONE 10 MG: 5 TABLET ORAL at 03:59

## 2025-03-12 RX ADMIN — ASPIRIN 81 MG: 81 TABLET, CHEWABLE ORAL at 20:18

## 2025-03-12 ASSESSMENT — PAIN DESCRIPTION - LOCATION
LOCATION: HIP;BACK
LOCATION: HIP

## 2025-03-12 ASSESSMENT — PAIN DESCRIPTION - ORIENTATION
ORIENTATION: RIGHT
ORIENTATION: RIGHT;LOWER
ORIENTATION: RIGHT

## 2025-03-12 ASSESSMENT — PAIN SCALES - GENERAL
PAINLEVEL_OUTOF10: 8
PAINLEVEL_OUTOF10: 8
PAINLEVEL_OUTOF10: 10
PAINLEVEL_OUTOF10: 8
PAINLEVEL_OUTOF10: 9
PAINLEVEL_OUTOF10: 10
PAINLEVEL_OUTOF10: 5
PAINLEVEL_OUTOF10: 7

## 2025-03-12 ASSESSMENT — PAIN DESCRIPTION - DESCRIPTORS
DESCRIPTORS: ACHING
DESCRIPTORS: ACHING;DISCOMFORT;SORE
DESCRIPTORS: ACHING
DESCRIPTORS: ACHING;DISCOMFORT
DESCRIPTORS: ACHING

## 2025-03-12 NOTE — PLAN OF CARE
Problem: Discharge Planning  Goal: Discharge to home or other facility with appropriate resources  3/12/2025 1011 by Jaqueline Kumar RN  Outcome: Progressing  3/11/2025 2216 by Drea Salas RN  Outcome: Progressing     Problem: Pain  Goal: Verbalizes/displays adequate comfort level or baseline comfort level  3/12/2025 1011 by Jaqueline Kumar RN  Outcome: Progressing  3/11/2025 2216 by Drea Salas RN  Outcome: Progressing     Problem: Chronic Conditions and Co-morbidities  Goal: Patient's chronic conditions and co-morbidity symptoms are monitored and maintained or improved  3/12/2025 1011 by Jaqueline Kumar RN  Outcome: Progressing  3/11/2025 2216 by Drea Salas RN  Outcome: Progressing     Problem: Safety - Adult  Goal: Free from fall injury  3/11/2025 2216 by Drea Salas, RN  Outcome: Progressing     Problem: ABCDS Injury Assessment  Goal: Absence of physical injury  3/11/2025 2216 by Drea Salas, RN  Outcome: Progressing

## 2025-03-12 NOTE — CARE COORDINATION
Updated plan of care. [facility] rehab cannot accept. Referral called to [facility]. Accepted and pre-cert initiated. Wheelchair transport form done. Await for pre-cert. Will follow-o

## 2025-03-12 NOTE — PLAN OF CARE
Problem: Discharge Planning  Goal: Discharge to home or other facility with appropriate resources  3/11/2025 2216 by Drea Salas RN  Outcome: Progressing  3/11/2025 1134 by Yas Wilson RN  Outcome: Progressing     Problem: Pain  Goal: Verbalizes/displays adequate comfort level or baseline comfort level  3/11/2025 2216 by Drea Salas RN  Outcome: Progressing  3/11/2025 1134 by Yas Wilson RN  Outcome: Progressing     Problem: Chronic Conditions and Co-morbidities  Goal: Patient's chronic conditions and co-morbidity symptoms are monitored and maintained or improved  3/11/2025 2216 by Drea Salas RN  Outcome: Progressing  3/11/2025 1134 by Yas Wilson RN  Outcome: Progressing     Problem: Safety - Adult  Goal: Free from fall injury  3/11/2025 2216 by Drea Salas RN  Outcome: Progressing  3/11/2025 1134 by Yas Wilson RN  Outcome: Progressing     Problem: ABCDS Injury Assessment  Goal: Absence of physical injury  3/11/2025 2216 by Drea Salas RN  Outcome: Progressing  3/11/2025 1134 by Yas Wilson RN  Outcome: Progressing

## 2025-03-12 NOTE — DISCHARGE INSTR - COC
Angelita TRISTA Slater  is necessary for the continuing treatment of the diagnosis listed and that she requires {Admit to Appropriate Level of Care:95479} for {GREATER/LESS:714336808} 30 days.     Update Admission H&P: {CHP DME Changes in HandP:133282386}    PHYSICIAN SIGNATURE:  {Esignature:[id number]}

## 2025-03-13 VITALS
RESPIRATION RATE: 22 BRPM | HEART RATE: 87 BPM | SYSTOLIC BLOOD PRESSURE: 139 MMHG | DIASTOLIC BLOOD PRESSURE: 74 MMHG | WEIGHT: 221 LBS | TEMPERATURE: 98 F | HEIGHT: 66 IN | BODY MASS INDEX: 35.52 KG/M2 | OXYGEN SATURATION: 96 %

## 2025-03-13 PROCEDURE — 97530 THERAPEUTIC ACTIVITIES: CPT

## 2025-03-13 PROCEDURE — 6370000000 HC RX 637 (ALT 250 FOR IP): Performed by: STUDENT IN AN ORGANIZED HEALTH CARE EDUCATION/TRAINING PROGRAM

## 2025-03-13 PROCEDURE — 2500000003 HC RX 250 WO HCPCS

## 2025-03-13 PROCEDURE — 6370000000 HC RX 637 (ALT 250 FOR IP)

## 2025-03-13 PROCEDURE — 2700000000 HC OXYGEN THERAPY PER DAY

## 2025-03-13 RX ADMIN — POLYETHYLENE GLYCOL 3350 17 G: 17 POWDER, FOR SOLUTION ORAL at 08:18

## 2025-03-13 RX ADMIN — SODIUM CHLORIDE, PRESERVATIVE FREE 10 ML: 5 INJECTION INTRAVENOUS at 08:18

## 2025-03-13 RX ADMIN — OXYCODONE 10 MG: 5 TABLET ORAL at 08:25

## 2025-03-13 RX ADMIN — OXYCODONE 10 MG: 5 TABLET ORAL at 04:06

## 2025-03-13 RX ADMIN — ACETAMINOPHEN 650 MG: 325 TABLET ORAL at 04:06

## 2025-03-13 RX ADMIN — ACETAMINOPHEN 650 MG: 325 TABLET ORAL at 10:40

## 2025-03-13 RX ADMIN — ASPIRIN 81 MG: 81 TABLET, CHEWABLE ORAL at 08:18

## 2025-03-13 RX ADMIN — OXYCODONE 10 MG: 5 TABLET ORAL at 14:50

## 2025-03-13 ASSESSMENT — PAIN DESCRIPTION - ORIENTATION
ORIENTATION: RIGHT;LEFT
ORIENTATION: RIGHT
ORIENTATION: RIGHT

## 2025-03-13 ASSESSMENT — PAIN DESCRIPTION - DESCRIPTORS
DESCRIPTORS: ACHING
DESCRIPTORS: ACHING;DISCOMFORT;SORE
DESCRIPTORS: ACHING;SORE;STABBING
DESCRIPTORS: ACHING

## 2025-03-13 ASSESSMENT — PAIN SCALES - GENERAL
PAINLEVEL_OUTOF10: 8
PAINLEVEL_OUTOF10: 10
PAINLEVEL_OUTOF10: 10
PAINLEVEL_OUTOF10: 8
PAINLEVEL_OUTOF10: 6

## 2025-03-13 ASSESSMENT — PAIN DESCRIPTION - LOCATION
LOCATION: HIP
LOCATION: HIP
LOCATION: KNEE;HIP
LOCATION: HIP;LEG

## 2025-03-13 NOTE — PLAN OF CARE
Problem: Discharge Planning  Goal: Discharge to home or other facility with appropriate resources  3/12/2025 2136 by Barbara Beck, RN  Outcome: Progressing  3/12/2025 1011 by Jaqueline Kumar RN  Outcome: Progressing     Problem: Pain  Goal: Verbalizes/displays adequate comfort level or baseline comfort level  3/12/2025 2136 by Barbara Beck, RN  Outcome: Progressing  3/12/2025 1011 by Jaqueilne Kumar RN  Outcome: Progressing     Problem: Chronic Conditions and Co-morbidities  Goal: Patient's chronic conditions and co-morbidity symptoms are monitored and maintained or improved  3/12/2025 2136 by Barbara Beck, RN  Outcome: Progressing  3/12/2025 1011 by Jaqueline Kumar RN  Outcome: Progressing     Problem: Safety - Adult  Goal: Free from fall injury  Outcome: Progressing     Problem: ABCDS Injury Assessment  Goal: Absence of physical injury  Outcome: Progressing

## 2025-03-13 NOTE — PROGRESS NOTES
[facility] Quality Flow/Interdisciplinary Rounds Progress Note        Quality Flow Rounds held on March 12, 2025    Disciplines Attending:  Bedside Nurse, , , Nursing Unit Leadership, and      Angelita Slater was admitted on 3/10/2025  7:55 AM    Anticipated Discharge Date:   3/13/2025    Disposition: Skilled Nursing Facility     Mario Score:  Mario Scale Score: 20    [facility] RISK OF UNPLANNED READMISSION 2.0             0 Total Score        Discussed patient goal for the day, patient clinical progression, and barriers to discharge.  The following Goal(s) of the Day/Commitment(s) have been identified:  Other  RN message out to Dr. Salas re: relay need for medication to promote bowel activity. See new order      Antonio Bourgeois RN  March 12, 2025        
4 Eyes Skin Assessment     NAME:  Angelita Slater  YOB: 1955  MEDICAL RECORD NUMBER:  [id number]    The patient is being assessed for  Admission    I agree that at least one RN has performed a thorough Head to Toe Skin Assessment on the patient. ALL assessment sites listed below have been assessed.      Areas assessed by both nurses:    Head, Face, Ears, Shoulders, Back, Chest, Arms, Elbows, Hands, Sacrum. Buttock, Coccyx, Ischium, Legs. Feet and Heels, and Under Medical Devices         Does the Patient have a Wound? No noted wound(s)       R TOTAL HIP; L KNEE INJECTION    Mario Prevention initiated by RN: Yes  Wound Care Orders initiated by RN: No    Pressure Injury (Stage 3,4, Unstageable, DTI, NWPT, and Complex wounds) if present, place Wound referral order by RN under : No    New Ostomies, if present place, Ostomy referral order under : No     Nurse 1 eSignature: Electronically signed by Yang Corona RN on 3/10/25 at 2:02 PM EDT    **SHARE this note so that the co-signing nurse can place an eSignature**    Nurse 2 eSignature: Electronically signed by Hazel Berry RN on 3/10/25 at 2:03 PM EDT    
Department of Orthopedic Surgery   Progress Note    Patient seen and examined, Post Op Day # 1. Pain controlled. No new complaints.  Denies chest pain, shortness of breath, calf pain, dizziness/lightheadedness. Denies fevers or chills. Denies N/V. negative Void. positive Flatus, negative BM. AM-PAC: 13, Ambulated with physical therapy and nursing.    VITALS:  BP (!) 141/79   Pulse 69   Temp 98.6 °F (37 °C) (Oral)   Resp 16   Ht 1.676 m (5' 6\")   Wt 100.2 kg (221 lb)   SpO2 98%   BMI 35.67 kg/m²     GENERAL: alert, cooperative, and no distress  MUSCULOSKELETAL:   right lower extremity:  Aquacel clean, dry, and intact  Compartments soft and compressible, calf non-tender  Palpable dorsalis pedis and posterior tibialis pulse, brisk cap refill to toes, foot warm and perfused  Sensation intact to light touch in sural/deep peroneal/superficial peroneal/saphenous/posterior tibial nerve distributions to foot/ankle.  Demonstrates active ankle plantar/dorsiflexion/great toe extension    CBC:   Lab Results   Component Value Date/Time    WBC 7.6 03/11/2025 01:41 AM    HGB 9.8 03/11/2025 01:41 AM    HCT 32.3 03/11/2025 01:41 AM     03/11/2025 01:41 AM         ASSESSMENT  S/P Right hip total joint arthroplasty, left knee injection- Pod #1    PLAN    Continue physical therapy and protocol: WBAT - RLE, LLE  24 hour abx coverage, completed  Deep venous thrombosis prophylaxis - Aspirin 81 mg BID, early mobilization  Bowel regimen discussed  PT/OT, appreciate input  Pain Control: IV and PO, wean as tolerated  Monitor H&H, 9.8/32.3  D/C Plan:  Pending physical therapy recommendations. OK to discharge from orthopedic standpoint. She will be discharged with optimal pain control and DVT prophylaxis. She will follow up in our office in 2 weeks.   Discussed with Dr. Alex Salas DO     Electronically signed by BRYAN Peraza CNP on 3/11/2025 at 8:53 AM      
Department of Orthopedic Surgery  Progress Note    POD 2 status post right total hip arthroplasty.    Patient struggling to mobilize with therapy.  She is complaining of pain uncontrolled with medications.  She also complains of itching overnight    .  Denies chest pain, shortness of breath, dizziness/lightheadedness.     VITALS:  BP (!) 160/69   Pulse 94   Temp 98.1 °F (36.7 °C) (Oral)   Resp 18   Ht 1.676 m (5' 6\")   Wt 100.2 kg (221 lb)   SpO2 98%   BMI 35.67 kg/m²     General: alert and oriented to person, place and time, well-developed and well-nourished, in no acute distress    MUSCULOSKELETAL:   right lower extremity:  Dressing C/D/I  Compartments soft and compressible  Calf is soft and nontender  +PF/DF/EHL  +2/4 DP & PT pulses, Brisk Cap refill, Toes warm and perfused  Distal sensation grossly intact to Peroneals, Sural, Saphenous, and tibial nrs    CBC:   Lab Results   Component Value Date/Time    WBC 7.6 03/11/2025 01:41 AM    HGB 9.8 03/11/2025 01:41 AM    HCT 32.3 03/11/2025 01:41 AM     03/11/2025 01:41 AM     PT/INR:    Lab Results   Component Value Date/Time    PROTIME 11.3 02/06/2025 12:01 PM    PROTIME 11.6 10/26/2011 04:50 AM    INR 1.1 02/06/2025 12:01 PM       ASSESSMENT  S/P right total hip arthroplasty-date of surgery 3/10/2025    PLAN      Continue physical therapy and protocol: WBAT -right LE  Deep venous thrombosis prophylaxis -Eliquis, early mobilization  PT/OT  Pain Control: IV and PO  Monitor H&H  Patient needs to discharge today.  If she is unable to be placed she will need to discharge home.  Will continue to follow along closely    Electronically signed by Alex Salas DO on 3/12/2025 at 7:39 AM    
Department of Orthopedic Surgery  Progress Note    POD 3 status post right total hip arthroplasty.    Patient sitting up in chair at bedside; reports pain better controlled this morning. She reports she got up several times since yesterday with assist of staff. Currently awaiting placement in rehab facility.    Denies chest pain, shortness of breath, dizziness/lightheadedness.     VITALS:  /74   Pulse 87   Temp 98 °F (36.7 °C) (Oral)   Resp 22   Ht 1.676 m (5' 6\")   Wt 100.2 kg (221 lb)   SpO2 96%   BMI 35.67 kg/m²     General: alert and oriented to person, place and time, well-developed and well-nourished, in no acute distress    MUSCULOSKELETAL:   right lower extremity:  Dressing intact; small amount of shadowing present  Compartments soft and compressible  Calf is soft and nontender  +PF/DF/EHL  +2/4 DP & PT pulses, Brisk Cap refill, Toes warm and perfused  Distal sensation grossly intact to Peroneals, Sural, Saphenous, and tibial nrs    CBC:   Lab Results   Component Value Date/Time    WBC 7.6 03/11/2025 01:41 AM    HGB 9.8 03/11/2025 01:41 AM    HCT 32.3 03/11/2025 01:41 AM     03/11/2025 01:41 AM     PT/INR:    Lab Results   Component Value Date/Time    PROTIME 11.3 02/06/2025 12:01 PM    PROTIME 11.6 10/26/2011 04:50 AM    INR 1.1 02/06/2025 12:01 PM       ASSESSMENT  S/P right total hip arthroplasty-date of surgery 3/10/2025    PLAN      Continue physical therapy and protocol: WBAT -right LE  Deep venous thrombosis prophylaxis -Eliquis, early mobilization  PT/OT  Pain Control: IV and PO  Monitor H&H  Patient awaiting precert and discharge to rehab facility today.  Okay to discharge today    Electronically signed by BRYAN Garcia CNP on 3/13/2025 at 8:11 AM    
Family updated about bed hold in [place]  
Occupational Therapy  OT BEDSIDE TREATMENT NOTE      Date:3/12/2025  Patient Name: Angelita Slater  MRN: [id number]  : 1955  Room: [address] [place], [place] [address]     Evaluating OT: Patricia Sauceda OTR/L   [id number]       Referring Provider:Alex Salas DO     Specific Provider Orders/Date:OT eval and treat 3/11/2025       Diagnosis:  Primary osteoarthritis of right hip [M16.11]    Surgery: R NEO      Pertinent Medical History: anxiety, asthma, bladder incontinence, CA, fibromyalgia,  chronic back pain, neuropathy     Precautions:  Fall Risk, WBAT R LE. Anterolateral hip precautions      Assessment of current deficits    [x] Functional mobility            [x]ADLs           [x] Strength                  []Cognition    [x] Functional transfers          [x] IADLs         [x] Safety Awareness   [x]Endurance    [] Fine Coordination                         [x] Balance      [] Vision/perception   []Sensation      []Gross Motor Coordination             [] ROM           [] Delirium                   [] Motor Control      OT PLAN OF CARE   OT POC based on physician orders, patient diagnosis and results of clinical assessment     Frequency/Duration  2-4 days/wk for 2 - 4weeks PRN   Specific OT Treatment Interventions to include:   ADL retraining/adapted techniques and AE recommendations to increase functional independence within precautions                    Energy conservation techniques to improve tolerance for selfcare routine   Functional transfer/mobility training/DME recommendations for increased independence, safety and fall prevention         Patient/family education to increase safety and functional independence             Environmental modifications for safe mobility and completion of ADLs                             Therapeutic activity to improve functional performance during ADLs.                                         Therapeutic exercise to improve tolerance and functional strength for ADLs    Balance 
Physical Therapy  Facility/Department: [address] ORTHO SURGERY  Physical Therapy Treatment Note    Name: Angelita Slater  : 1955  MRN: [id number]  Date of Service: 3/11/2025        Patient Diagnosis(es): The primary encounter diagnosis was Primary osteoarthritis of right hip. Diagnoses of Post-operative pain, S/P total right hip arthroplasty, COPD (chronic obstructive pulmonary disease) (HCC), and URI (upper respiratory infection) were also pertinent to this visit.  Past Medical History:  has a past medical history of Adrenal incidentaloma, Anxiety, Arthritis, Asthma, Bladder incontinence, Cancer (HCC), Chronic back pain, COPD (chronic obstructive pulmonary disease) ([facility]), Depression, Encounter for screening colonoscopy, Fibromyalgia, GERD (gastroesophageal reflux disease), Hyperlipidemia, Hypertension, Hypothyroidism, MGUS (monoclonal gammopathy of unknown significance), Neuropathy, Prolonged emergence from general anesthesia, Sleep apnea, and Type II or unspecified type diabetes mellitus without mention of complication, not stated as uncontrolled.  Past Surgical History:  has a past surgical history that includes knee surgery (Left, ); Upper gastrointestinal endoscopy (); Colonoscopy (2016); Upper gastrointestinal endoscopy (2016); Nerve Block (Bilateral, 2016); Nerve Block (2020); Pain management procedure (N/A, 2020); Nerve Block (Bilateral, 08/10/2020); Anesthesia Nerve Block (Bilateral, 08/10/2020); other surgical history (2016); Colonoscopy (); Upper gastrointestinal endoscopy (); Upper gastrointestinal endoscopy (N/A, 2020); Colonoscopy (N/A, 2020); Colonoscopy (2020); Dilation and curettage of uterus (N/A, 2021); Upper gastrointestinal endoscopy (N/A, 2023); sigmoidoscopy (N/A, 2023); Tonsillectomy; and Total hip arthroplasty (Right, 3/10/2025).           Evaluating Therapist: Quita Chan, PT     Rec ww 
Physical Therapy  Facility/Department: [address] ORTHO SURGERY  Physical Therapy Treatment Note    Name: Angelita Slater  : 1955  MRN: [id number]  Date of Service: 3/11/2025        Patient Diagnosis(es): The primary encounter diagnosis was Primary osteoarthritis of right hip. Diagnoses of Post-operative pain, S/P total right hip arthroplasty, COPD (chronic obstructive pulmonary disease) (HCC), and URI (upper respiratory infection) were also pertinent to this visit.  Past Medical History:  has a past medical history of Adrenal incidentaloma, Anxiety, Arthritis, Asthma, Bladder incontinence, Cancer (HCC), Chronic back pain, COPD (chronic obstructive pulmonary disease) ([facility]), Depression, Encounter for screening colonoscopy, Fibromyalgia, GERD (gastroesophageal reflux disease), Hyperlipidemia, Hypertension, Hypothyroidism, MGUS (monoclonal gammopathy of unknown significance), Neuropathy, Prolonged emergence from general anesthesia, Sleep apnea, and Type II or unspecified type diabetes mellitus without mention of complication, not stated as uncontrolled.  Past Surgical History:  has a past surgical history that includes knee surgery (Left, ); Upper gastrointestinal endoscopy (); Colonoscopy (2016); Upper gastrointestinal endoscopy (2016); Nerve Block (Bilateral, 2016); Nerve Block (2020); Pain management procedure (N/A, 2020); Nerve Block (Bilateral, 08/10/2020); Anesthesia Nerve Block (Bilateral, 08/10/2020); other surgical history (2016); Colonoscopy (); Upper gastrointestinal endoscopy (); Upper gastrointestinal endoscopy (N/A, 2020); Colonoscopy (N/A, 2020); Colonoscopy (2020); Dilation and curettage of uterus (N/A, 2021); Upper gastrointestinal endoscopy (N/A, 2023); sigmoidoscopy (N/A, 2023); Tonsillectomy; and Total hip arthroplasty (Right, 3/10/2025).           Evaluating Therapist: Quita Chan, PT     Rec ww 
Physical Therapy  Facility/Department: [address] ORTHO SURGERY  Physical Therapy Treatment Note    Name: Angelita Slater  : 1955  MRN: [id number]  Date of Service: 3/12/2025        Patient Diagnosis(es): The primary encounter diagnosis was Primary osteoarthritis of right hip. Diagnoses of Post-operative pain, S/P total right hip arthroplasty, COPD (chronic obstructive pulmonary disease) (HCC), and URI (upper respiratory infection) were also pertinent to this visit.  Past Medical History:  has a past medical history of Adrenal incidentaloma, Anxiety, Arthritis, Asthma, Bladder incontinence, Cancer (HCC), Chronic back pain, COPD (chronic obstructive pulmonary disease) ([facility]), Depression, Encounter for screening colonoscopy, Fibromyalgia, GERD (gastroesophageal reflux disease), Hyperlipidemia, Hypertension, Hypothyroidism, MGUS (monoclonal gammopathy of unknown significance), Neuropathy, Prolonged emergence from general anesthesia, Sleep apnea, and Type II or unspecified type diabetes mellitus without mention of complication, not stated as uncontrolled.  Past Surgical History:  has a past surgical history that includes knee surgery (Left, ); Upper gastrointestinal endoscopy (); Colonoscopy (2016); Upper gastrointestinal endoscopy (2016); Nerve Block (Bilateral, 2016); Nerve Block (2020); Pain management procedure (N/A, 2020); Nerve Block (Bilateral, 08/10/2020); Anesthesia Nerve Block (Bilateral, 08/10/2020); other surgical history (2016); Colonoscopy (); Upper gastrointestinal endoscopy (); Upper gastrointestinal endoscopy (N/A, 2020); Colonoscopy (N/A, 2020); Colonoscopy (2020); Dilation and curettage of uterus (N/A, 2021); Upper gastrointestinal endoscopy (N/A, 2023); sigmoidoscopy (N/A, 2023); Tonsillectomy; and Total hip arthroplasty (Right, 3/10/2025).           Evaluating Therapist: Quita Chan, PT     Rec ww 
Physical Therapy  Facility/Department: [address] ORTHO SURGERY  Physical Therapy Treatment Note    Name: Angelita Slater  : 1955  MRN: [id number]  Date of Service: 3/13/2025        Patient Diagnosis(es): The primary encounter diagnosis was Primary osteoarthritis of right hip. Diagnoses of Post-operative pain, S/P total right hip arthroplasty, COPD (chronic obstructive pulmonary disease) (HCC), and URI (upper respiratory infection) were also pertinent to this visit.  Past Medical History:  has a past medical history of Adrenal incidentaloma, Anxiety, Arthritis, Asthma, Bladder incontinence, Cancer (HCC), Chronic back pain, COPD (chronic obstructive pulmonary disease) ([facility]), Depression, Encounter for screening colonoscopy, Fibromyalgia, GERD (gastroesophageal reflux disease), Hyperlipidemia, Hypertension, Hypothyroidism, MGUS (monoclonal gammopathy of unknown significance), Neuropathy, Prolonged emergence from general anesthesia, Sleep apnea, and Type II or unspecified type diabetes mellitus without mention of complication, not stated as uncontrolled.  Past Surgical History:  has a past surgical history that includes knee surgery (Left, ); Upper gastrointestinal endoscopy (); Colonoscopy (2016); Upper gastrointestinal endoscopy (2016); Nerve Block (Bilateral, 2016); Nerve Block (2020); Pain management procedure (N/A, 2020); Nerve Block (Bilateral, 08/10/2020); Anesthesia Nerve Block (Bilateral, 08/10/2020); other surgical history (2016); Colonoscopy (); Upper gastrointestinal endoscopy (); Upper gastrointestinal endoscopy (N/A, 2020); Colonoscopy (N/A, 2020); Colonoscopy (2020); Dilation and curettage of uterus (N/A, 2021); Upper gastrointestinal endoscopy (N/A, 2023); sigmoidoscopy (N/A, 2023); Tonsillectomy; and Total hip arthroplasty (Right, 3/10/2025).           Evaluating Therapist: Quita Chan, PT     Rec ww 
Physical Therapy  Facility/Department: [address] ORTHO SURGERY  Physical Therapy Treatment Note    Name: Angelita Slater  : 1955  MRN: [id number]  Date of Service: 3/12/2025        Patient Diagnosis(es): The primary encounter diagnosis was Primary osteoarthritis of right hip. Diagnoses of Post-operative pain, S/P total right hip arthroplasty, COPD (chronic obstructive pulmonary disease) (HCC), and URI (upper respiratory infection) were also pertinent to this visit.  Past Medical History:  has a past medical history of Adrenal incidentaloma, Anxiety, Arthritis, Asthma, Bladder incontinence, Cancer (HCC), Chronic back pain, COPD (chronic obstructive pulmonary disease) ([facility]), Depression, Encounter for screening colonoscopy, Fibromyalgia, GERD (gastroesophageal reflux disease), Hyperlipidemia, Hypertension, Hypothyroidism, MGUS (monoclonal gammopathy of unknown significance), Neuropathy, Prolonged emergence from general anesthesia, Sleep apnea, and Type II or unspecified type diabetes mellitus without mention of complication, not stated as uncontrolled.  Past Surgical History:  has a past surgical history that includes knee surgery (Left, ); Upper gastrointestinal endoscopy (); Colonoscopy (2016); Upper gastrointestinal endoscopy (2016); Nerve Block (Bilateral, 2016); Nerve Block (2020); Pain management procedure (N/A, 2020); Nerve Block (Bilateral, 08/10/2020); Anesthesia Nerve Block (Bilateral, 08/10/2020); other surgical history (2016); Colonoscopy (); Upper gastrointestinal endoscopy (); Upper gastrointestinal endoscopy (N/A, 2020); Colonoscopy (N/A, 2020); Colonoscopy (2020); Dilation and curettage of uterus (N/A, 2021); Upper gastrointestinal endoscopy (N/A, 2023); sigmoidoscopy (N/A, 2023); Tonsillectomy; and Total hip arthroplasty (Right, 3/10/2025).           Evaluating Therapist: Quita Chan, PT     Rec ww 
Pt complaining about itchiness to the hands and back and red. See new orders.   
Report/Handoff called to RN on 7W. All belongings sent with patient, and available family has been updated.      
    Therapeutic exercise to improve tolerance and functional strength for ADLs    Balance retraining/tolerance tasks for facilitation of postural control with dynamic challenges during ADLs .      Positioning to improve functional independence      Recommended Adaptive Equipment: extended tub bench     Home Living: Pt lives with son  1 story   Bathroom setup: tub/shower    Equipment owned: cane , home O2    Prior Level of Function: Independent with ADLs , assist  with IADLs; ambulated with cane    Pain Level: R hip ;   Cognition: A&O: pleasant, following commands, conversing    Memory:  fair+    Sequencing:  franc +    Problem solving:  fair +    Judgement/safety:  fair +      Functional Assessment:  AM-PAC Daily Activity Raw Score: 15/24   Initial Eval Status  Date: 3/11/2025 Treatment Status  Date: STGs = LTGs  Time frame: 10-14 days   Feeding Independent     Grooming SBA  Seated , decrease standing tolerance   Independent    UB Dressing Set-uo   Independent   LB Dressing Max A   Threading brief on and up over hips   Min A    Bathing Max A   Min A    Toileting Max A  After using BSC   SBA    Bed Mobility  Min A  Supine to sit   Supervision    Functional Transfers Min A  Sit-stand from bed, BSC   Cueing for hand placement  Supervision    Functional Mobility Mod A, w/walker   Steps next to bed   Supervision with good tolerance    Balance Sitting:     Static:  Independent    Dynamic:SBA   Standing: Min A   Independent/supervision    Activity Tolerance Limited   D/t pain   No SOB observed   Good  with ADL activity    Visual/  Perceptual Glasses: yes         UE ROM/strength  AROM present throughout   Tolerate UE therapeutic activity/exercises to increase strength/endurance for ADL/xfer activity       Hand Dominance right    Hearing: WFL   Sensation:  No c/o numbness or tingling   Tone: WFL   Edema: none observed    Comments: Upon arrival patient lying in bed .  At end of session, patient sitting in chair  with call 
retraining/tolerance tasks for facilitation of postural control with dynamic challenges during ADLs .      Positioning to improve functional independence        Recommended Adaptive Equipment: extended tub bench      Home Living: Pt lives with son  1 story   Bathroom setup: tub/shower    Equipment owned: cane , home O2     Prior Level of Function: Independent with ADLs , assist  with IADLs; ambulated with cane     Pain Level: R hip ; nursing aware  Cognition: A&O: pleasant, following commands, conversing. Decreased carry over of instruction.               Memory:  fair+               Sequencing:  fair -               Problem solving:  fair -              Judgement/safety:  fair -                Functional Assessment:  AM-PAC Daily Activity Raw Score: 15/24    Initial Eval Status  Date: 3/11/2025 Treatment Status  Date: 3/12/25 STGs = LTGs  Time frame: 10-14 days   Feeding Independent       Grooming SBA  Seated , decrease standing tolerance   SBA seated Independent    UB Dressing Set-uo  Min A  Independent   LB Dressing Max A   Threading brief on and up over hips  Mod A, assist required to thread over feet, min A for standing balance when pulling brief over hips  Min A    Bathing Max A    Min A    Toileting Max A  After using BSC   SBA    Bed Mobility  Min A  Supine to sit  Supine to sit min A  Sit to supine mod A  Supervision    Functional Transfers Min A  Sit-stand from bed, BSC   Cueing for hand placement Sit <> stand SBA/CGA  Supervision    Functional Mobility Mod A, w/walker   Steps next to bed  Min A/CGA with WW in room, improved carry over of instruction on technique today Supervision with good tolerance    Balance Sitting:     Static:  Independent    Dynamic:SBA   Standing: Min A  Standing balance min A unsupported  Independent/supervision    Activity Tolerance Limited   D/t pain   No SOB observed  Fair, improvement demonstrated Good  with ADL activity    Visual/  Perceptual Glasses: yes           UE 
and contractures  [x] Safety and Education Training   [x] Patient/Caregiver Education   [] HEP  [] Other     Frequency of treatments will be BID x 2 -3 days.    Time in:  1515   Time out:  1547       Evaluation Time includes thorough review of current medical information, gathering information on past medical history/social history and prior level of function, completion of standardized testing/informal observation of tasks, assessment of data and education on plan of care and goals.    CPT codes:  [x] Low Complexity PT evaluation 62975  [] Moderate Complexity PT evaluation 17281  [] High Complexity PT evaluation 86364  [] PT Re-evaluation 02946  [] Gait training 50462  minutes  [x] Therapeutic activities 19058 17 minutes  [] Therapeutic exercises 56412  minutes  [] Neuromuscular reeducation 10978  minutes       Quita Chan  License number:  [id number]

## 2025-03-13 NOTE — CARE COORDINATION
ENDOCRINOLOGY CLINIC NOTE    Date of Service: 5/13/2019    Medical Records Reviewed:   I personally reviewed and summarized previous records     Care Team:  Primary Care Physician: Cuba Velasquez DO. Provider: Anjel Johnson MD  Other provider(s):            Reason for the visit:  Type 1 DM on insulin Pump, VitD deficiency     History of Present Illness: The history is provided by the patient. No  was used. Accuracy of the patient data is excellent. Sandra Wells is a very pleasant 58 y.o. female seen in Endocrine clinic today for diabetes management     Sandra Wells was diagnosed with type 1 diabetes in 1975 and has been on insulin Pump since 2012   On Omnipod insulin pump with following settings: Basal rate 12a 0.35, 5p 0.6, CR 12a 13, 12p 12, ISF 58, Goal 140, active insulin time 4 hrs   The patient has been checking blood sugar 7 times/day and readings highly variable .  Pt checks BS very due to frequent hypoglycemia   A1c usually running around 8.9 was 9%  Patient reported frequent hypoglycemic episodes usually post prandial and after correcting for high BS   The patient has been mindful of what has been eating and following diabetic diet as encouraged  I reviewed current medications and the patient has no issues with diabetes medications  Sandra Wells is up to date with eye exam and reported + h/o diabetic retinopathy   The patient performs her own feet care and doesn't see podiatry service   Microvascular complications:  + Retinopathy, no Nephropathy or Neuropathy   Macrovascular complications: no CAD, PVD, or Stroke  The patient receives Flushot every year and up to date with the Pneumonia vaccine     PAST MEDICAL HISTORY   Past Medical History:   Diagnosis Date    Chronic back pain     Diabetes mellitus type 1, uncomplicated ([address]     Fracture of sacrum ([facility] [address] 6/2014    Hyperlipidemia     Shoulder pain     Thyroid nodule     Vitamin D deficiency Updated plan of care. [facility] received auth. PAS wheelchair to transport between 230-430 pm. Updated facility, staff and pt-mjo   PAST SURGICAL HISTORY   Past Surgical History:   Procedure Laterality Date    BREAST BIOPSY      BREAST LUMPECTOMY      BREAST SURGERY      CARPAL TUNNEL RELEASE       SECTION      COLONOSCOPY      RHINOPLASTY      TONSILLECTOMY      WISDOM TOOTH EXTRACTION       SOCIAL HISTORY   Social History     Socioeconomic History    Marital status:      Spouse name: Not on file    Number of children: Not on file    Years of education: Not on file    Highest education level: Not on file   Occupational History    Not on file   Social Needs    Financial resource strain: Not on file    Food insecurity:     Worry: Not on file     Inability: Not on file    Transportation needs:     Medical: Not on file     Non-medical: Not on file   Tobacco Use    Smoking status: Never Smoker    Smokeless tobacco: Never Used   Substance and Sexual Activity    Alcohol use: No    Drug use: No    Sexual activity: Not on file   Lifestyle    Physical activity:     Days per week: Not on file     Minutes per session: Not on file    Stress: Not on file   Relationships    Social connections:     Talks on phone: Not on file     Gets together: Not on file     Attends Spiritism service: Not on file     Active member of club or organization: Not on file     Attends meetings of clubs or organizations: Not on file     Relationship status: Not on file    Intimate partner violence:     Fear of current or ex partner: Not on file     Emotionally abused: Not on file     Physically abused: Not on file     Forced sexual activity: Not on file   Other Topics Concern    Not on file   Social History Narrative    Not on file     FAMILY HISTORY   Family History   Problem Relation Age of Onset    Cancer Sister     Cancer Brother     Cancer Maternal Uncle     Cancer Maternal Grandmother      ALLERGIES AND DRUG REACTIONS   No Known Allergies    CURRENT MEDICATIONS     Current Outpatient Medications   Medication Sig Dispense Refill    insulin aspart (NOVOLOG) 100 UNIT/ML injection vial Via insulin pump. MAX 50 units daily 5 vial 5    blood glucose monitor strips One-Touch Ultra strips. Check 7  times a day before meals and at bedtime and also for high and low blood sugar 250 strip 5    blood glucose test strips (ONE TOUCH ULTRA TEST) strip 1 each by In Vitro route 5 times daily As needed.  Multiple Vitamins-Minerals (THERAPEUTIC MULTIVITAMIN-MINERALS) tablet Take 1 tablet by mouth daily      Cholecalciferol (VITAMIN D3) 5000 units TABS Take by mouth      Ascorbic Acid (VITAMIN C) 500 MG tablet Take 500 mg by mouth daily. No current facility-administered medications for this visit. Review of Systems  Constitutional: No fever, no chills, no diaphoresis, no generalized weakness. HEENT: No blurred vision, No sore throat, no ear pain, no hair loss  Neck: denied any neck swelling, difficulty swallowing,   Cardio-pulmonary: No CP, SOB or palpitation, No orthopnea or PND. No cough or wheezing. GI: No N/V/D, no constipation, No abdominal pain, no melena or hematochezia   : Denied any dysuria, hematuria, flank pain, discharge, or incontinence. Skin: denied any rash, ulcer, Hirsute, or hyperpigmentation. MSK: denied any joint deformity, joint pain/swelling, muscle pain, or back pain.   Neuro: no numbness, no tingling, no weakness, _  OBJECTIVE    BP (!) 142/80 (Site: Left Upper Arm, Position: Sitting, Cuff Size: Medium Adult)   Pulse 84   Ht 5' 1\" (1.549 m)   Wt 103 lb 9.6 oz (47 kg)   LMP  (LMP Unknown)   SpO2 96%   BMI 19.58 kg/m²   BP Readings from Last 4 Encounters:   05/13/19 (!) 142/80   02/11/19 118/72   01/22/19 122/78   07/15/14 136/73     Wt Readings from Last 6 Encounters:   05/13/19 103 lb 9.6 oz (47 kg)   02/11/19 104 lb 6.4 oz (47.4 kg)   01/22/19 105 lb (47.6 kg)   07/15/14 101 lb (45.8 kg)   04/07/14 103 lb (46.7 kg)   01/06/14 120 lb (54.4 kg)       Physical examination:  General: awake alert, oriented x3, no abnormal position or movements. HEENT: normocephalic non-traumatic, no exophthalmos   Neck: supple, no LN enlargement, no thyromegaly, no thyroid tenderness, no JVD. Pulm: Clear equal air entry no added sounds, no wheezing or rhonchi    CVS: S1 + S2, no murmur, no heave. Dorsalis pedis pulse palpable   Abd: soft lax, no tenderness, no organomegaly, audible bowel sounds. Skin: warm, no lesions, no rash. No callus, no Ulcers, No acanthosis.  Pump insertion site looks normal  nigricans   Neuro: CN intact, Monofilament sensation present bilateral , muscle power normal  Psych: normal mood, and affect      Review of Laboratory Data:  I have reviewed the followin2019  AM cortisol 12, , HDL 96, TG 72, , Cr 0.7, GFR >60, Ca 9.4, Na 139, K 4.1, A1c 8.9, VitD 54, urine Alb/Cr ratio 7       Outside labs 10/22/2018  WBC 6.1, Hb 13, Plt 218, Na 139, K 4.4, Cr 0.83, GFR >60, Ca 9.1, ALT 27, AST 18, Tg 84, , HDL 90, TSH 1.22, total T4 9.6, urine Alb/Cr ratio 7.3, A1c 9%, vitD 52.2     No results found for: WBC, RBC, HGB, HCT, MCV, MCH, MCHC, RDW, PLT, MPV, GRANULOCYTES, BANDS   No results found for: NA, K, CO2, BUN, CREATININE, CALCIUM, LABGLOM, GFRAA   No results found for: TSH, T4FREE, R8MDBDD, FT3, B3ODJWT, TSI, TPOABS, THGAB  Lab Results   Component Value Date    LABA1C 9.0 2019    GLUCOSE 394 2019     No results found for: CHOL, TRIG, HDL, LDLDIRECT  No results found for: VITD25    Medical Records/Labs/Images review:   I personally reviewed and summarized previous records   All labs were reviewed independently     [address] [place] [address], a 58 y.o.-old female seen in for the following issues     Diabetes Mellitus Type 1    · Improving control but still above goal. A1c 8.9%  · Change pump settings to: Basal rate 12a 0.35, 5p 0.6, CR 12a 12, 12p 11, ISF 95, Goal 140, active insulin time 4 hrs   · Not interested in CGM   · The patient was advised to continue checking blood sugars 4 times a day before meals and at bedtime and fax the results to our office in a week. · Discussed with patient A1c and blood sugar goals   · Optimal blood sugars: 100-140 pre-prandial, < 180 peak post-prandial  · Patient was counselled about the importance of self-blood glucose monitoring and eating consistent carb diet to avoid blood sugar fluctuations   · Patient up to date with the routine diabetes maintenance and prevention  · Discussed lifestyle changes including diet and exercise with patient; recommended 150 minutes of moderate intensity exercise per week. · With type 1 DM and hypoglycemia I ordered AM cortisol which was normal   · Thyroid hormones were also normal     Hyperlipidemia   · LDL was high on last blood work  · Pt still refusing Statin     vitD deficiency   · Continue vitD supplements    Return in about 3 months (around 8/13/2019) for DM type 1 on insulin Pump . The above issues were reviewed with the patient who understood and agreed with the plan. 30 minutes were spent today in management of this patient. More than 50% of time spent on counseling of patient on above diagnosis. Thank you for allowing us to participate in the care of this patient. Please do not hesitate to contact us with any additional questions. Diagnosis Orders   1. Type 1 diabetes mellitus with complication (HCC)  insulin aspart (NOVOLOG) 100 UNIT/ML injection vial    blood glucose monitor strips   2. Hyperlipidemia, unspecified hyperlipidemia type     3.  Vitamin D deficiency         Kenia Pereyra MD  Endocrinologist, [facility])   [facility] [address]   Phone: [phone]  Fax: [phone]  --------------------------------------------  Electronically signed

## 2025-03-13 NOTE — PLAN OF CARE
Problem: Discharge Planning  Goal: Discharge to home or other facility with appropriate resources  3/13/2025 0958 by Shanti Gómez RN  Outcome: Progressing     Problem: Pain  Goal: Verbalizes/displays adequate comfort level or baseline comfort level  3/13/2025 0958 by Shanti Gómez RN  Outcome: Progressing     Problem: Chronic Conditions and Co-morbidities  Goal: Patient's chronic conditions and co-morbidity symptoms are monitored and maintained or improved  3/13/2025 0958 by Shanti óGmez, RN  Outcome: Progressing     Problem: Safety - Adult  Goal: Free from fall injury  3/13/2025 0958 by Shanti Gómez, RN  Outcome: Progressing     Problem: ABCDS Injury Assessment  Goal: Absence of physical injury  3/13/2025 0958 by Shanti Gómez, RN  Outcome: Progressing

## 2025-03-13 NOTE — PLAN OF CARE
Problem: Discharge Planning  Goal: Discharge to home or other facility with appropriate resources  3/13/2025 1332 by Shanti Gómez, RN  Outcome: Completed     Problem: Pain  Goal: Verbalizes/displays adequate comfort level or baseline comfort level  3/13/2025 1332 by Shanti Gómez, RN  Outcome: Completed     Problem: Chronic Conditions and Co-morbidities  Goal: Patient's chronic conditions and co-morbidity symptoms are monitored and maintained or improved  3/13/2025 1332 by Shanti Gómez, RN  Outcome: Completed     Problem: Safety - Adult  Goal: Free from fall injury  3/13/2025 1332 by Shanti Gómez, RN  Outcome: Completed     Problem: ABCDS Injury Assessment  Goal: Absence of physical injury  3/13/2025 1332 by Shanti Gómez, RN  Outcome: Completed

## 2025-03-14 LAB — SURGICAL PATHOLOGY REPORT: NORMAL

## 2025-03-20 ENCOUNTER — OFFICE VISIT (OUTPATIENT)
Dept: PULMONOLOGY | Age: 70
End: 2025-03-20
Payer: COMMERCIAL

## 2025-03-20 VITALS
OXYGEN SATURATION: 100 % | DIASTOLIC BLOOD PRESSURE: 65 MMHG | TEMPERATURE: 97.9 F | HEART RATE: 81 BPM | RESPIRATION RATE: 12 BRPM | SYSTOLIC BLOOD PRESSURE: 139 MMHG

## 2025-03-20 DIAGNOSIS — R91.1 LUNG NODULE SEEN ON IMAGING STUDY: ICD-10-CM

## 2025-03-20 DIAGNOSIS — Z87.891 PERSONAL HISTORY OF TOBACCO USE: Primary | ICD-10-CM

## 2025-03-20 DIAGNOSIS — J44.9 CHRONIC OBSTRUCTIVE PULMONARY DISEASE, UNSPECIFIED COPD TYPE (HCC): ICD-10-CM

## 2025-03-20 DIAGNOSIS — J96.11 CHRONIC RESPIRATORY FAILURE WITH HYPOXIA, ON HOME O2 THERAPY: ICD-10-CM

## 2025-03-20 DIAGNOSIS — Z99.81 CHRONIC RESPIRATORY FAILURE WITH HYPOXIA, ON HOME O2 THERAPY: ICD-10-CM

## 2025-03-20 PROCEDURE — 1123F ACP DISCUSS/DSCN MKR DOCD: CPT | Performed by: NURSE PRACTITIONER

## 2025-03-20 PROCEDURE — 3075F SYST BP GE 130 - 139MM HG: CPT | Performed by: NURSE PRACTITIONER

## 2025-03-20 PROCEDURE — 3017F COLORECTAL CA SCREEN DOC REV: CPT | Performed by: NURSE PRACTITIONER

## 2025-03-20 PROCEDURE — 99214 OFFICE O/P EST MOD 30 MIN: CPT | Performed by: NURSE PRACTITIONER

## 2025-03-20 PROCEDURE — 1090F PRES/ABSN URINE INCON ASSESS: CPT | Performed by: NURSE PRACTITIONER

## 2025-03-20 PROCEDURE — 1111F DSCHRG MED/CURRENT MED MERGE: CPT | Performed by: NURSE PRACTITIONER

## 2025-03-20 PROCEDURE — 3023F SPIROM DOC REV: CPT | Performed by: NURSE PRACTITIONER

## 2025-03-20 PROCEDURE — G0296 VISIT TO DETERM LDCT ELIG: HCPCS | Performed by: NURSE PRACTITIONER

## 2025-03-20 PROCEDURE — G8399 PT W/DXA RESULTS DOCUMENT: HCPCS | Performed by: NURSE PRACTITIONER

## 2025-03-20 PROCEDURE — 3078F DIAST BP <80 MM HG: CPT | Performed by: NURSE PRACTITIONER

## 2025-03-20 PROCEDURE — G8427 DOCREV CUR MEDS BY ELIG CLIN: HCPCS | Performed by: NURSE PRACTITIONER

## 2025-03-20 PROCEDURE — G8417 CALC BMI ABV UP PARAM F/U: HCPCS | Performed by: NURSE PRACTITIONER

## 2025-03-20 PROCEDURE — 1036F TOBACCO NON-USER: CPT | Performed by: NURSE PRACTITIONER

## 2025-03-20 RX ORDER — BUDESONIDE, GLYCOPYRROLATE, AND FORMOTEROL FUMARATE 160; 9; 4.8 UG/1; UG/1; UG/1
2 AEROSOL, METERED RESPIRATORY (INHALATION)
Qty: 1 EACH | Refills: 12 | Status: SHIPPED | OUTPATIENT
Start: 2025-03-20

## 2025-03-20 NOTE — PROGRESS NOTES
[place].      Sincerely,    Electronically signed by BRYAN Chavira CNP on 3/20/2025 at 2:21 PM  Discussed with the patient the current USPSTF guidelines released March 9, 2021 for screening for lung cancer.    For adults aged 50 to 80 years who have a 20 pack-year smoking history and currently smoke or have quit within the past 15 years the grade B recommendation is to:  Screen for lung cancer with low-dose computed tomography (LDCT) every year.  Stop screening once a person has not smoked for 15 years or has a health problem that limits life expectancy or the ability to have lung surgery.    The patient  reports that she quit smoking about 5 years ago. Her smoking use included cigarettes. She started smoking about 53 years ago. She has a 99.9 pack-year smoking history. She has never used smokeless tobacco.. Discussed with patient the risks and benefits of screening, including over-diagnosis, false positive rate, and total radiation exposure.  The patient currently exhibits no signs or symptoms suggestive of lung cancer.  Discussed with patient the importance of compliance with yearly annual lung cancer screenings and willingness to undergo diagnosis and treatment if screening scan is positive.  In addition, the patient was counseled regarding the importance of remaining smoke free and/or total smoking cessation.    Also reviewed the following if the patient has Medicare that as of February 10, 2022, Medicare only covers LDCT screening in patients aged 50-77 with at least a 20 pack-year smoking history who currently smoke or have quit in the last 15 years

## 2025-03-26 ENCOUNTER — OFFICE VISIT (OUTPATIENT)
Dept: ORTHOPEDIC SURGERY | Age: 70
End: 2025-03-26

## 2025-03-26 VITALS
TEMPERATURE: 98.7 F | SYSTOLIC BLOOD PRESSURE: 118 MMHG | HEART RATE: 94 BPM | OXYGEN SATURATION: 93 % | DIASTOLIC BLOOD PRESSURE: 73 MMHG | RESPIRATION RATE: 16 BRPM

## 2025-03-26 DIAGNOSIS — Z96.641 STATUS POST TOTAL REPLACEMENT OF RIGHT HIP: Primary | ICD-10-CM

## 2025-03-26 PROCEDURE — 99024 POSTOP FOLLOW-UP VISIT: CPT | Performed by: STUDENT IN AN ORGANIZED HEALTH CARE EDUCATION/TRAINING PROGRAM

## 2025-03-26 RX ORDER — CEPHALEXIN 500 MG/1
500 CAPSULE ORAL 2 TIMES DAILY
Qty: 14 CAPSULE | Refills: 0 | Status: SHIPPED | OUTPATIENT
Start: 2025-03-26 | End: 2025-04-02

## 2025-03-26 NOTE — PATIENT INSTRUCTIONS
INSTRUCTIONS FOR FACILITY      Weight Bearing: Weight bearing as tolerated right lower extremity. Use assistive devices when needed.    Therapy: Continue PT/OT  Please follow Total Hip- Anterolateral Dislocation Precautions protocol at the 2 week alverto  ROM, Strengthening, Flexibility, Mobilization (soft tissue/joint), Balance/Coordination, Functional Activity, Instrument assisted soft tissue mobilization, Modalities at therapist discretion, Manual therapy at therapist discretion, ADLs, Posture/Body Mechanics, Pelvic stabilization, Gait training/WB status, Ergonomic training, Scar mobilization, Edema control, Desensitization, and Home Exercise Program    Pain control: Pain medication called to pharmacy for facility, patient is allowed this medication for 4 more weeks. Please continued as needed.    Incisional Care: staples removed today in office. Steri strips placed. Steri strips can be removed in 7-10 days if they do not fall off sooner. Continue to monitor for signs or symptoms of infection until skin has completely healed. Recommend thin layer of Vaseline 1-2x daily over incision line to aid in healing. Okay to shower. No scrubbing near incision until skin has completely healed. Thoroughly pat dry with clean towel.     DVT Prophylaxis: Continue with Aspirin 81 mg twice daily for two more weeks    Follow up:     Future Appointments   Date Time Provider Department Center   4/8/2025  1:00 PM Sarai Mckeon MD [facility] DEP   6/13/2025  1:30 PM [facility] MED ONC FAST TRACK 2 [facility] Med Onc [facility]   6/13/2025  2:30 PM Keely Maradiaga MD MED ONC [facility]   9/18/2025  1:30 PM Annie Kumar, APRN - CNP [facility]       Call office with any questions or concerns.

## 2025-03-26 NOTE — PROGRESS NOTES
Follow Up Post Operative Visit     Surgery: Right hip total joint arthroplasty; left knee injection  Date: 3/10/2025    Subjective:    Angelita Slater is here for follow up visit s/p above procedure.  She is WBAT and doing well.  She arrived today in wheelchair however she is ambulating with a walker at facility.  States her pain is not controlled and has not been receiving her Oxycodone.  She continues to take aspirin 81 mg twice daily for DVT prophylaxis.  She denies any fevers chills.  Denies any numbness or tingling.     She states her knee pain is improved.     Controlled Substances Monitoring:        Physical Exam:    No data recorded    General: Alert and oriented x3, no acute distress  Cardiovascular/pulmonary: No labored breathing, peripheral perfusion intact  Musculoskeletal:    Right hip: Incision well-approximated staples are intact without sign of infection including erythema, edema, warmth or drainage.  Proximal incision delayed healing superficially. Scant sero-sanguineous drainage noted on bandage. Staples removed today.  Active range of motion at the hip and knee.  Negative groin pain.  Thigh and calf are soft and compressible.  Plantarflexion dorsiflexion intact.    Left knee: Skin circumferentially intact. No effusion noted. Active range of motion 0- 90 degrees with flexion and extension. Stable varus valgus stress testing at 0 and 30 degrees.     Imaging: Multiple views of the right hip independently interpreted by myself and discussed with patient shows stable total hip arthroplasty with no evidence of hardware failure or loosening.  Good anatomic alignment.  No acute fractures or dislocations noted    Assessment and Plan:  2 weeks s/p right hip total joint arthroplasty; left knee steroid injection    - Continue physical therapy to help with strengthening and mobility. Pain control per facility. Continue Aspirin 81 mg BID for 2 more weeks for DVT prophylaxis. Local wound care was discussed,

## 2025-03-27 ENCOUNTER — TELEPHONE (OUTPATIENT)
Dept: PRIMARY CARE CLINIC | Age: 70
End: 2025-03-27

## 2025-03-27 DIAGNOSIS — J44.9 CHRONIC OBSTRUCTIVE PULMONARY DISEASE, UNSPECIFIED COPD TYPE (HCC): ICD-10-CM

## 2025-03-27 RX ORDER — MONTELUKAST SODIUM 10 MG/1
TABLET ORAL
Qty: 30 TABLET | Refills: 5 | Status: SHIPPED | OUTPATIENT
Start: 2025-03-27

## 2025-03-27 NOTE — TELEPHONE ENCOUNTER
Name of Medication(s) Requested:  Requested Prescriptions     Pending Prescriptions Disp Refills    montelukast (SINGULAIR) 10 MG tablet 30 tablet 5     Sig: TAKE 1 TABLET BY MOUTH EVERY NIGHT AT BEDTIME       Medication is on current medication list Yes    Dosage and directions were verified? Yes    Quantity verified: 30 day supply     Pharmacy Verified?  Yes    Last Appointment:  2/6/2025    Future appts:  Future Appointments   Date Time Provider Department Center   4/8/2025  1:00 PM [facility]   4/10/2025  3:00 PM Alex Salas, DO [facility]   5/8/2025 10:20 AM Alex Salas DO [facility]   6/13/2025  1:30 PM [facility] MED ONC FAST TRACK 2 [facility] Med Onc [facility]   6/13/2025  2:30 PM Keely Maradiaga MD MED ONC [facility]   9/18/2025  1:30 PM Annie Kumar, APRN - CNP [facility]        (If no appt send self scheduling link. .REFILLAPPT)  Scheduling request sent?     [] Yes  [x] No    Does patient need updated?  [] Yes  [x] No

## 2025-04-03 ENCOUNTER — CARE COORDINATION (OUTPATIENT)
Dept: CASE MANAGEMENT | Age: 70
End: 2025-04-03

## 2025-04-03 NOTE — CARE COORDINATION
Care Transitions Note    Initial Call - Call within 2 business days of discharge: Yes    Attempted to reach patient for transitions of care follow up. Unable to reach patient.    Outreach Attempts:   HIPAA compliant voicemail left for patient.     Patient: Angelita Slater    Patient : 1955   MRN: [id number]    Reason for Admission: Total right hip by Dr Alex Salas  Discharge Date: 3/13/25  RURS: Readmission Risk Score: 11.9    Last Discharge Facility       Date Complaint Diagnosis Description Type Department Provider    3/10/25  Primary osteoarthritis of right hip ... Admission (Discharged) Alex Joy, DO            Was this an external facility discharge? Yes. Discharge Date: . Facility Name:   [facility] [place] [facility].     Follow Up Appointment:   Patient has hospital follow up appointment scheduled within 7 days of discharge.    Future Appointments         Provider Specialty Dept Phone    2025 1:00 PM Sarai Mckeon MD Primary Care [phone]    4/10/2025 3:00 PM Alex Salas, DO Orthopedic Surgery [phone]    2025 10:20 AM Alex Salas DO Orthopedic Surgery [phone]    2025 1:30 PM [facility] MED ONC FAST TRACK 2 Infusion Therapy [phone]    2025 2:30 PM Keely Maradiaga MD Oncology [phone]    2025 1:30 PM Annie Kuamr, APRN - CNP Pulmonology [phone]            Plan for follow-up call in 2-5 days     DIONTE Guidry, RN   Post Acute Care Transition Nurse  [facility]/ [facility]   [phone]

## 2025-04-04 ENCOUNTER — TELEPHONE (OUTPATIENT)
Dept: ORTHOPEDIC SURGERY | Age: 70
End: 2025-04-04

## 2025-04-04 DIAGNOSIS — G89.18 POST-OPERATIVE PAIN: Primary | ICD-10-CM

## 2025-04-04 DIAGNOSIS — E11.42 DM TYPE 2 WITH DIABETIC PERIPHERAL NEUROPATHY (HCC): ICD-10-CM

## 2025-04-04 DIAGNOSIS — M79.10 MYALGIA: ICD-10-CM

## 2025-04-04 DIAGNOSIS — R39.15 URGENCY OF URINATION: ICD-10-CM

## 2025-04-04 RX ORDER — OXYCODONE HYDROCHLORIDE 5 MG/1
5 TABLET ORAL EVERY 6 HOURS PRN
Qty: 28 TABLET | Refills: 0 | Status: SHIPPED | OUTPATIENT
Start: 2025-04-04 | End: 2025-04-11

## 2025-04-04 NOTE — TELEPHONE ENCOUNTER
Pam at [facility] calling with a question about a medication and a few refills. For patients rosuvastatin (CRESTOR) 20 MG tablet nursing home had her on atorvastatin 40mg they need to know if dr wants to switch her back to rosuvastatin or keep her on atorvastatin and will need a refill on whichever dr thornton wants her to be on.     She also needs refills on   baclofen (LIORESAL) 10 MG tablet   oxyBUTYnin (DITROPAN XL) 5 MG extended release tablet   potassium chloride (KLOR-CON M) 20 MEQ extended release tablet   [facility] [address] [place] [address] [address] - P [phone] - F [phone]

## 2025-04-04 NOTE — TELEPHONE ENCOUNTER
Oxycodone refilled today. OK to take Baclofen as well, this is from PCP. Steri-strips can come off. OK to shower over incision. No ointments or lotions to incision until completely healed.

## 2025-04-04 NOTE — TELEPHONE ENCOUNTER
Pam RN with [facility] called to verify wound care orders, patient discharged from facility and now under home care.    Advised of wound care orders as of last visit 3/26/25. Pam states patient was not sent home with anything for pain control and does seem to be in a good amount of pain.    If we can provide pain medication, [facility] is on file

## 2025-04-07 ENCOUNTER — CARE COORDINATION (OUTPATIENT)
Dept: CASE MANAGEMENT | Age: 70
End: 2025-04-07

## 2025-04-07 NOTE — CARE COORDINATION
Care Transitions Note    Initial Call - Call within 2 business days of discharge: Yes    Attempted to reach patient for transitions of care follow up. Unable to reach patient.    Outreach Attempts:   Multiple attempts to contact patient at phone numbers on file.     Patient: Angelita Slater    Patient : 1955   MRN: [id number]    Reason for Admission: Total right hip by Dr Alex Salas   Discharge Date: 3/13/25  RURS: Readmission Risk Score: 11.9    Last Discharge Facility       Date Complaint Diagnosis Description Type Department Provider    3/10/25  Primary osteoarthritis of right hip ... Admission (Discharged) Alex Joy, DO            Was this an external facility discharge?  Yes. Discharge Date: . Facility Name:   [facility] to home with [facility].        Follow Up Appointment:   Patient has hospital follow up appointment scheduled within 7 days of discharge.    Future Appointments         Provider Specialty Dept Phone    2025 1:00 PM Sarai Mckeon MD Primary Care [phone]    4/10/2025 3:00 PM Alex Salas, DO Orthopedic Surgery [phone]    2025 10:20 AM Alex Salas, DO Orthopedic Surgery [phone]    2025 1:30 PM [facility] MED ONC FAST TRACK 2 Infusion Therapy [phone]    2025 2:30 PM Keely Maradiaga MD Oncology [phone]    2025 1:30 PM Annie Kumar, APRN - CNP Pulmonology [phone]            Closed as unable to reach      DIONTE Guidry, RN   Post Acute Care Transition Nurse  [facility]/ [facility]   [phone]

## 2025-04-10 ENCOUNTER — OFFICE VISIT (OUTPATIENT)
Dept: ORTHOPEDIC SURGERY | Age: 70
End: 2025-04-10

## 2025-04-10 VITALS
HEART RATE: 92 BPM | DIASTOLIC BLOOD PRESSURE: 77 MMHG | TEMPERATURE: 98.6 F | SYSTOLIC BLOOD PRESSURE: 133 MMHG | OXYGEN SATURATION: 92 %

## 2025-04-10 DIAGNOSIS — Z96.641 STATUS POST TOTAL REPLACEMENT OF RIGHT HIP: Primary | ICD-10-CM

## 2025-04-10 PROCEDURE — 99024 POSTOP FOLLOW-UP VISIT: CPT | Performed by: STUDENT IN AN ORGANIZED HEALTH CARE EDUCATION/TRAINING PROGRAM

## 2025-04-10 NOTE — PROGRESS NOTES
Follow Up Post Operative Visit     Surgery: Right hip total joint arthroplasty; left knee injection  Date: 3/10/2025    Subjective:    Angelita Slater is here for follow up visit s/p above procedure.  She is here today 4 weeks out from a right total hip arthroplasty for an incision check.  She is actually doing much better than her last visit.  She states that her incision is healed and she is discharged from the nursing facility is working with therapy at home.  She states her right hip feels better than prior to operation.  Her biggest complaint is all of her other joint pain including her left hip and both of her knees.    She states her knee pain is improved.     Controlled Substances Monitoring:        Physical Exam:    BP: 133/77    General: Alert and oriented x3, no acute distress  Cardiovascular/pulmonary: No labored breathing, peripheral perfusion intact  Musculoskeletal:    Right hip: Well-approximated incision laterally.  No evidence of infection drainage or dehiscence.  Then scar tissue beneath her incision laterally with mild tenderness.  She tolerates hip range of motion seated in her wheelchair in flexion extension internal/external rotation along with abduction and abduction without pain.  Calf soft compressible.  Knee range of motion is intact with 5 out of 5 strength in ankle and plantar dorsiflexion      Imaging: No new imaging today    Assessment and Plan:  4 weeks s/p right hip total joint arthroplasty; left knee steroid injection    - She can discontinue her DVT prophylaxis.  Incisional care discussed.  She is going to let us know if she develops any signs of infection or drainage.  She is slower to progress with this surgery than expected due to her all of her other comorbidities.  We will see her back in 4 weeks to repeat x-rays to make sure she is continuing to improve with therapy.  She was happy with the result so far.            Alex Salas DO   Orthopaedic Surgery   4/10/25  3:29

## 2025-04-15 RX ORDER — OXYBUTYNIN CHLORIDE 5 MG/1
5 TABLET, EXTENDED RELEASE ORAL NIGHTLY
Qty: 90 TABLET | Refills: 1 | Status: SHIPPED | OUTPATIENT
Start: 2025-04-15

## 2025-04-15 RX ORDER — BACLOFEN 10 MG/1
10 TABLET ORAL 3 TIMES DAILY
Qty: 90 TABLET | Refills: 1 | Status: SHIPPED | OUTPATIENT
Start: 2025-04-15

## 2025-04-15 RX ORDER — POTASSIUM CHLORIDE 1500 MG/1
20 TABLET, EXTENDED RELEASE ORAL DAILY
Qty: 90 TABLET | Refills: 1 | Status: SHIPPED | OUTPATIENT
Start: 2025-04-15

## 2025-04-15 RX ORDER — ROSUVASTATIN CALCIUM 20 MG/1
20 TABLET, COATED ORAL DAILY
Qty: 90 TABLET | Refills: 1 | Status: SHIPPED | OUTPATIENT
Start: 2025-04-15

## 2025-04-18 DIAGNOSIS — Z96.641 STATUS POST TOTAL REPLACEMENT OF RIGHT HIP: Primary | ICD-10-CM

## 2025-05-08 ENCOUNTER — OFFICE VISIT (OUTPATIENT)
Dept: ORTHOPEDIC SURGERY | Age: 70
End: 2025-05-08

## 2025-05-08 VITALS
RESPIRATION RATE: 18 BRPM | HEART RATE: 84 BPM | SYSTOLIC BLOOD PRESSURE: 147 MMHG | OXYGEN SATURATION: 95 % | TEMPERATURE: 97.1 F | DIASTOLIC BLOOD PRESSURE: 82 MMHG

## 2025-05-08 DIAGNOSIS — Z96.641 STATUS POST TOTAL REPLACEMENT OF RIGHT HIP: Primary | ICD-10-CM

## 2025-05-08 PROCEDURE — 99024 POSTOP FOLLOW-UP VISIT: CPT | Performed by: STUDENT IN AN ORGANIZED HEALTH CARE EDUCATION/TRAINING PROGRAM

## 2025-05-08 NOTE — PATIENT INSTRUCTIONS
Procedure: Left Total Knee Replacement, MACIEJ     You will need medical clearance prior to surgery.     Please call your doctor to set up an appointment for medical clearance if necessary as soon as possible and have the office fax your medical clearance to : Jluianne Carbone MA  FAX: [phone], PHONE: [phone]    You need medical clearance from: Primary Care Provider, Pulmonology , and Other (Oncology)    Your surgery is scheduled for 7/21/2025 at 1:00 p.m.  with Dr. Alex Salas DO at the [facility] on [facility].  You will need to report to Preop area  that morning at 11:00 a.m.    All surgery start times are subject to change. You will be notified by office and/or PAT department if your surgery time changes. If you need to cancel/reschedule your surgery for any reason, please contact [facility] at [phone] ASAP.   You are having Outpatient surgery, but plan for 1-2 nights in the hospital for recovery if needed.  Preadmission Testing (PAT) department at [facility] will contact you with further details regarding pre-operative blood work, where to park and enter the hospital, your medication list, etc. If you have any surgery related questions please contact PAT at [facility] at [phone].  If you are having joint replacement surgery, you will be required to attend a mandatory joint class, normally scheduled the same days as your pre admission testing.  If you are scheduled for Robotic Assisted Total Joint Replacement, you will be ordered a specialized CT scan prior to surgery. Interventional Radiology will contact you to schedule CT scan.  Nothing to eat or drink after midnight the night before surgery or otherwise directed by PAT.  You may take a pain pill and any other medicine PAT instructs you to take with small sip of water if needed.  If you are taking blood thinners such as Aspirin, Lovenox, Eliquis, Xarelto, Plavix or Coumadin(Warfarin) they will be

## 2025-05-08 NOTE — PROGRESS NOTES
Follow Up Post Operative Visit     Surgery: Right hip total joint arthroplasty; left knee injection  Date: 3/10/2025    Subjective:    Angelita Slater is here for follow up visit s/p above procedure.  She is doing well and is happy with her results.  She states her pain is primarily in the posterior hip and goes down the back of her leg. Some groin pain after therapy. She continues to participate in physical therapy and states this is going well.  She is ambulating with a walker at home.  She denies any fevers and chills.  She denies any numbness and tingling. She states the injection to her left knee helped for a few weeks however it wore off prior to discharging home for facility. She would like to proceed forward with a knee replacement in the future.    Controlled Substances Monitoring:        Physical Exam:    No data recorded    General: Alert and oriented x3, no acute distress  Cardiovascular/pulmonary: No labored breathing, peripheral perfusion intact  Musculoskeletal:    Right hip: Well-healed surgical scar with no evidence of infection including erythema or edema.  She tolerates hip range of motion seated in her wheelchair in flexion extension internal/external rotation along with abduction and abduction without pain.  Calf soft compressible.  Knee range of motion is intact with 5 out of 5 strength in ankle and plantar dorsiflexion      Imaging: Multiple views of the right hip independently interpreted by myself and discussed with patient shows stable total arthroplasty with no evidence of hardware failure or loosening.  No acute fractures or dislocations noted.    Assessment and Plan:  8 weeks s/p right hip total joint arthroplasty; left knee steroid injection    - Continue weightbearing as tolerated.  Continue physical therapy.  She has completed her aspirin for DVT prophylaxis.  Referral to pain management provided today. OTC anti-inflammatories and pain relievers. She would like to proceed forward

## 2025-05-09 ENCOUNTER — PREP FOR PROCEDURE (OUTPATIENT)
Dept: ORTHOPEDIC SURGERY | Age: 70
End: 2025-05-09

## 2025-05-09 ENCOUNTER — TELEPHONE (OUTPATIENT)
Dept: ORTHOPEDIC SURGERY | Age: 70
End: 2025-05-09

## 2025-05-09 DIAGNOSIS — M17.12 PRIMARY OSTEOARTHRITIS OF ONE KNEE, LEFT: Primary | ICD-10-CM

## 2025-05-09 DIAGNOSIS — M17.12 PRIMARY OSTEOARTHRITIS OF ONE KNEE, LEFT: ICD-10-CM

## 2025-05-09 NOTE — TELEPHONE ENCOUNTER
Patient was informed that CELI CORBIN will be calling her to schedule a CT Scan prior to surgery date 7/21/25 for LEFT TKA (MACIEJ).  Order for CT Scan fax'd to [facility] for scheduling

## 2025-06-11 ENCOUNTER — OFFICE VISIT (OUTPATIENT)
Age: 70
End: 2025-06-11

## 2025-06-11 VITALS — HEIGHT: 66 IN | BODY MASS INDEX: 36 KG/M2 | WEIGHT: 224 LBS

## 2025-06-11 DIAGNOSIS — J96.11 CHRONIC RESPIRATORY FAILURE WITH HYPOXIA, ON HOME O2 THERAPY (HCC): ICD-10-CM

## 2025-06-11 DIAGNOSIS — J44.9 CHRONIC OBSTRUCTIVE PULMONARY DISEASE, UNSPECIFIED COPD TYPE (HCC): Primary | ICD-10-CM

## 2025-06-11 DIAGNOSIS — Z99.81 CHRONIC RESPIRATORY FAILURE WITH HYPOXIA, ON HOME O2 THERAPY (HCC): ICD-10-CM

## 2025-06-11 LAB
EXPIRATORY TIME: 7.1 SEC
FEF 25-75% %PRED-PRE: 38 L/SEC
FEF 25-75% PRED: 1.72 L/SEC
FEF 25-75-PRE: 0.66 L/SEC
FEV1 %PRED-PRE: 58 %
FEV1 PRED: 2.03 L
FEV1/FVC %PRED-PRE: 83 %
FEV1/FVC PRED: 78 %
FEV1/FVC: 65 %
FEV1: 1.18 L
FVC %PRED-PRE: 69 %
FVC PRED: 2.61 L
FVC: 1.82 L
PEF %PRED-PRE: NORMAL
PEF PRED: NORMAL
PEF-PRE: NORMAL

## 2025-06-11 ASSESSMENT — PULMONARY FUNCTION TESTS
FEV1_PERCENT_PREDICTED_PRE: 58
FVC_PERCENT_PREDICTED_PRE: 69
FEV1/FVC: 65
FEV1_PREDICTED: 2.03
FEV1/FVC_PREDICTED: 78
FEV1/FVC_PERCENT_PREDICTED_PRE: 83
FEV1: 1.18
FVC_PREDICTED: 2.61
FVC: 1.82

## 2025-06-11 NOTE — PROGRESS NOTES
[facility]  Department of Pulmonary, Critical Care and Sleep Medicine  Dr. Krishnamurthy, Dr. Leyva, Dr. Sue, Dr. Falcon, BRYAN Guzman    Pulmonary & Critical Care Office Note - Follow up      Assessment/Plan     Problem List Items Addressed This Visit          Respiratory    Chronic obstructive pulmonary disease (HCC) - Primary    Relevant Orders    Spirometry Without Bronchodilator (Completed)    DME Order for Home Oxygen as OP     Other Visit Diagnoses         Chronic respiratory failure with hypoxia, on home O2 therapy (HCC)        Relevant Orders    DME Order for Home Oxygen as OP            HPI: Angelita Slater is a 70 y.o. female with a PMH of EDWARD, asthma-COPD overlap, history or tobacco use, EDWARD and obesity. Angelita had previously followed with Dr. Willis and Dr. Villatoro but is now here as our office is closer to her home. She would like pulmonary clearance to have right hip replacement with Dr. Enrique in [place], [place]. Angelita's last PFT was 4 yrs ago. She reports shortness of breath with activity, she has supplemental O2 but did not bring it to the office with her. She states it's too heavy to carry. Ambulatory pulse ox conducted today in clinic reveals drop in Spo2 87%, Angelita needs supplemental O2. Will need updated PFT prior to surgical clearance.  RTC after testing.    December 20, 2024: Angelita is seen and examined today for 4 week follow up of initial examination of COPD and asthma. Since her last visit she has undergone updated PFT which reveals moderately severe obstruction, CT chest reveals 4 mm nodule of RML stable from previous imaging, these results were reviewed with Angelita and her son, Rosas at today's visit. Angelita endorses shortness of breath with activity, wheezing and cough. She has not started Trelegy inhaler and reports having misplaced the sample I gave her at her last visit. She is currently only using albuterol. We did have a

## 2025-06-11 NOTE — PROGRESS NOTES
Angelita is here for surgical clearance. No changes are being made and Annie is sending the letter of clearance to her surgeon. She already has a follow up appointment set for September.

## 2025-06-13 ENCOUNTER — OFFICE VISIT (OUTPATIENT)
Age: 70
End: 2025-06-13
Payer: MEDICARE

## 2025-06-13 ENCOUNTER — HOSPITAL ENCOUNTER (OUTPATIENT)
Dept: INFUSION THERAPY | Age: 70
Discharge: HOME | End: 2025-06-13
Payer: MEDICARE

## 2025-06-13 VITALS
SYSTOLIC BLOOD PRESSURE: 163 MMHG | DIASTOLIC BLOOD PRESSURE: 76 MMHG | WEIGHT: 229 LBS | TEMPERATURE: 98.3 F | HEIGHT: 66 IN | HEART RATE: 102 BPM | OXYGEN SATURATION: 97 % | BODY MASS INDEX: 36.8 KG/M2

## 2025-06-13 DIAGNOSIS — N95.0 POSTMENOPAUSAL BLEEDING: Primary | ICD-10-CM

## 2025-06-13 DIAGNOSIS — C90.00 MULTIPLE MYELOMA, REMISSION STATUS UNSPECIFIED (HCC): ICD-10-CM

## 2025-06-13 DIAGNOSIS — D64.9 ANEMIA, UNSPECIFIED TYPE: ICD-10-CM

## 2025-06-13 DIAGNOSIS — D47.2 SMOLDERING MYELOMA: ICD-10-CM

## 2025-06-13 LAB
ALBUMIN SERPL-MCNC: 3.8 G/DL (ref 3.5–5.2)
ALP SERPL-CCNC: 101 U/L (ref 35–104)
ALT SERPL-CCNC: 19 U/L (ref 0–35)
ANION GAP SERPL CALCULATED.3IONS-SCNC: 9 MMOL/L (ref 7–16)
AST SERPL-CCNC: 26 U/L (ref 0–35)
B2 MICROGLOB SERPL IA-MCNC: 2.2 MG/L (ref 0–3)
BASOPHILS # BLD: 0 K/UL (ref 0–0.2)
BASOPHILS NFR BLD: 0 % (ref 0–2)
BILIRUB SERPL-MCNC: 0.4 MG/DL (ref 0–1.2)
BUN SERPL-MCNC: 13 MG/DL (ref 8–23)
CALCIUM SERPL-MCNC: 9.5 MG/DL (ref 8.8–10.2)
CHLORIDE SERPL-SCNC: 103 MMOL/L (ref 98–107)
CO2 SERPL-SCNC: 28 MMOL/L (ref 22–29)
CREAT SERPL-MCNC: 0.8 MG/DL (ref 0.5–1)
EOSINOPHIL # BLD: 0.37 K/UL (ref 0.05–0.5)
EOSINOPHILS RELATIVE PERCENT: 7 % (ref 0–6)
ERYTHROCYTE [DISTWIDTH] IN BLOOD BY AUTOMATED COUNT: 13 % (ref 11.5–15)
FERRITIN SERPL-MCNC: 74 NG/ML
GFR, ESTIMATED: 82 ML/MIN/1.73M2
GLUCOSE SERPL-MCNC: 112 MG/DL (ref 74–99)
HCT VFR BLD AUTO: 34 % (ref 34–48)
HGB BLD-MCNC: 10.3 G/DL (ref 11.5–15.5)
IGA SERPL-MCNC: 131 MG/DL (ref 70–400)
IGG SERPL-MCNC: 2351 MG/DL (ref 700–1600)
IGM SERPL-MCNC: 43 MG/DL (ref 40–230)
IRON SATN MFR SERPL: 21 % (ref 15–50)
IRON SERPL-MCNC: 63 UG/DL (ref 37–145)
LYMPHOCYTES NFR BLD: 2.86 K/UL (ref 1.5–4)
LYMPHOCYTES RELATIVE PERCENT: 54 % (ref 20–42)
MCH RBC QN AUTO: 27.5 PG (ref 26–35)
MCHC RBC AUTO-ENTMCNC: 30.3 G/DL (ref 32–34.5)
MCV RBC AUTO: 90.9 FL (ref 80–99.9)
MONOCYTES NFR BLD: 0.32 K/UL (ref 0.1–0.95)
MONOCYTES NFR BLD: 6 % (ref 2–12)
MYELOCYTES ABSOLUTE COUNT: 0.09 K/UL
MYELOCYTES: 2 %
NEUTROPHILS NFR BLD: 31 % (ref 43–80)
NEUTS SEG NFR BLD: 1.66 K/UL (ref 1.8–7.3)
PLATELET # BLD AUTO: 304 K/UL (ref 130–450)
PMV BLD AUTO: 10.3 FL (ref 7–12)
POTASSIUM SERPL-SCNC: 3.6 MMOL/L (ref 3.5–5.1)
PROT SERPL-MCNC: 7.9 G/DL (ref 6.4–8.3)
RBC # BLD AUTO: 3.74 M/UL (ref 3.5–5.5)
RBC # BLD: ABNORMAL 10*6/UL
SODIUM SERPL-SCNC: 141 MMOL/L (ref 136–145)
TIBC SERPL-MCNC: 308 UG/DL (ref 250–450)
WBC OTHER # BLD: 5.3 K/UL (ref 4.5–11.5)

## 2025-06-13 PROCEDURE — 1036F TOBACCO NON-USER: CPT | Performed by: INTERNAL MEDICINE

## 2025-06-13 PROCEDURE — 86334 IMMUNOFIX E-PHORESIS SERUM: CPT

## 2025-06-13 PROCEDURE — 83540 ASSAY OF IRON: CPT

## 2025-06-13 PROCEDURE — 83550 IRON BINDING TEST: CPT

## 2025-06-13 PROCEDURE — 82232 ASSAY OF BETA-2 PROTEIN: CPT

## 2025-06-13 PROCEDURE — 1090F PRES/ABSN URINE INCON ASSESS: CPT | Performed by: INTERNAL MEDICINE

## 2025-06-13 PROCEDURE — 1123F ACP DISCUSS/DSCN MKR DOCD: CPT | Performed by: INTERNAL MEDICINE

## 2025-06-13 PROCEDURE — 99214 OFFICE O/P EST MOD 30 MIN: CPT | Performed by: INTERNAL MEDICINE

## 2025-06-13 PROCEDURE — 84165 PROTEIN E-PHORESIS SERUM: CPT

## 2025-06-13 PROCEDURE — G8427 DOCREV CUR MEDS BY ELIG CLIN: HCPCS | Performed by: INTERNAL MEDICINE

## 2025-06-13 PROCEDURE — 1159F MED LIST DOCD IN RCRD: CPT | Performed by: INTERNAL MEDICINE

## 2025-06-13 PROCEDURE — 85025 COMPLETE CBC W/AUTO DIFF WBC: CPT

## 2025-06-13 PROCEDURE — G8417 CALC BMI ABV UP PARAM F/U: HCPCS | Performed by: INTERNAL MEDICINE

## 2025-06-13 PROCEDURE — 84155 ASSAY OF PROTEIN SERUM: CPT

## 2025-06-13 PROCEDURE — G8399 PT W/DXA RESULTS DOCUMENT: HCPCS | Performed by: INTERNAL MEDICINE

## 2025-06-13 PROCEDURE — 82728 ASSAY OF FERRITIN: CPT

## 2025-06-13 PROCEDURE — 1160F RVW MEDS BY RX/DR IN RCRD: CPT | Performed by: INTERNAL MEDICINE

## 2025-06-13 PROCEDURE — 80053 COMPREHEN METABOLIC PANEL: CPT

## 2025-06-13 PROCEDURE — 1125F AMNT PAIN NOTED PAIN PRSNT: CPT | Performed by: INTERNAL MEDICINE

## 2025-06-13 PROCEDURE — 3077F SYST BP >= 140 MM HG: CPT | Performed by: INTERNAL MEDICINE

## 2025-06-13 PROCEDURE — 3017F COLORECTAL CA SCREEN DOC REV: CPT | Performed by: INTERNAL MEDICINE

## 2025-06-13 PROCEDURE — 36415 COLL VENOUS BLD VENIPUNCTURE: CPT

## 2025-06-13 PROCEDURE — 3078F DIAST BP <80 MM HG: CPT | Performed by: INTERNAL MEDICINE

## 2025-06-13 PROCEDURE — 82784 ASSAY IGA/IGD/IGG/IGM EACH: CPT

## 2025-06-13 NOTE — PROGRESS NOTES
[facility]  [address]  [facility] [address]  Dept: [phone]  Loc: [phone]  Attending Progress Note      Reason for The visit:  Smoldering Multiple Myeloma.    Referring Physician:  Violeta Gibson MD    PCP:  Sarai Mckeon MD    History of Present Illness:     The patient is a 70-year-old lady with a PMH significant for HTN, hyperlipidemia, DM, COPD, OA, depression, and fibromyalgia, who was diagnosed with IgG lambda MGUS in 2008, used to follow up with Dr. Ronaldo Vickers at [facility], last f/up with him was in 2012. Her most recent SPEP with immunofixation ha revealed monoclonal IgG lambda, M-spike 0.7 gm/dl  from 9/9/2016. The patient has been having pain in the low back radiating to the right lower extremity, she had an MRI of the L-spine done on 8/24/2016, revealed disc bulging L4-5, L5-S1, with mild narrowing of the neural foramina.     She had a bone marrow Biopsy and aspirate done by IR on 11/28/2016.  Bone marrow, left iliac, aspirate and core biopsy  Suboptimal specimen showing 15% plasma cells by immunohistochemistry,  consistent with plasma cell neoplasm, see comment.  Comment:    The aspirate smear and clot section specimens are hemodilute  and aspicular.  Plasmacytosis is seen by immunohistochemistry for   on the core biopsy specimen only.  Flow cytometric analysis performed by  [facility] confirmed a lambda-restricted plasma cell neoplasm.  See separate report for complete details ([id number]).  Intradepartmental consultation is obtained.    The patient returns after a follow-up visit, she did have a right hip arthroplasty in March.  Did well after the surgery.  She has been having for the last 2 days of vaginal spotting.    Review of Systems;  CONSTITUTIONAL: No fever, chills.  Good appetite, feels tired.   ENMT: Eyes: No diplopia; Nose: Positive for epistaxis. Mouth: No sore throat.   RESPIRATORY: No

## 2025-06-16 LAB
ALBUMIN SERPL-MCNC: 3.1 G/DL (ref 3.5–4.7)
ALPHA1 GLOB SERPL ELPH-MCNC: 0.3 G/DL (ref 0.2–0.4)
ALPHA2 GLOB SERPL ELPH-MCNC: 0.9 G/DL (ref 0.5–1)
B-GLOBULIN SERPL ELPH-MCNC: 1 G/DL (ref 0.8–1.3)
GAMMA GLOB SERPL ELPH-MCNC: 2.1 G/DL (ref 0.7–1.6)
INTERPRETATION SERPL IFE-IMP: NORMAL
M PROTEIN SERPL ELPH-MCNC: 1 G/DL
PATH REV: NORMAL
PATHOLOGIST: ABNORMAL
PROT PATTERN SERPL ELPH-IMP: ABNORMAL
PROT SERPL-MCNC: 7.4 G/DL (ref 6.4–8.3)

## 2025-06-24 ENCOUNTER — OFFICE VISIT (OUTPATIENT)
Dept: PRIMARY CARE CLINIC | Age: 70
End: 2025-06-24

## 2025-06-24 VITALS
BODY MASS INDEX: 36.32 KG/M2 | WEIGHT: 226 LBS | TEMPERATURE: 98.6 F | DIASTOLIC BLOOD PRESSURE: 98 MMHG | OXYGEN SATURATION: 93 % | SYSTOLIC BLOOD PRESSURE: 160 MMHG | HEIGHT: 66 IN | HEART RATE: 86 BPM

## 2025-06-24 DIAGNOSIS — M79.10 MYALGIA: ICD-10-CM

## 2025-06-24 DIAGNOSIS — M79.7 FIBROMYALGIA: ICD-10-CM

## 2025-06-24 DIAGNOSIS — F33.1 MODERATE EPISODE OF RECURRENT MAJOR DEPRESSIVE DISORDER (HCC): ICD-10-CM

## 2025-06-24 DIAGNOSIS — F41.1 GAD (GENERALIZED ANXIETY DISORDER): ICD-10-CM

## 2025-06-24 DIAGNOSIS — Z01.818 PREOP EXAMINATION: Primary | ICD-10-CM

## 2025-06-24 DIAGNOSIS — E11.42 DM TYPE 2 WITH DIABETIC PERIPHERAL NEUROPATHY (HCC): ICD-10-CM

## 2025-06-24 DIAGNOSIS — J44.9 CHRONIC OBSTRUCTIVE PULMONARY DISEASE, UNSPECIFIED COPD TYPE (HCC): ICD-10-CM

## 2025-06-24 DIAGNOSIS — I10 UNCONTROLLED HYPERTENSION: ICD-10-CM

## 2025-06-24 DIAGNOSIS — E03.9 HYPOTHYROIDISM, UNSPECIFIED TYPE: ICD-10-CM

## 2025-06-24 DIAGNOSIS — N95.0 POSTMENOPAUSAL BLEEDING: ICD-10-CM

## 2025-06-24 DIAGNOSIS — L85.3 DRY SKIN: ICD-10-CM

## 2025-06-24 DIAGNOSIS — R39.15 URGENCY OF URINATION: ICD-10-CM

## 2025-06-24 RX ORDER — BACLOFEN 10 MG/1
10 TABLET ORAL 3 TIMES DAILY
Qty: 90 TABLET | Refills: 1 | Status: SHIPPED | OUTPATIENT
Start: 2025-06-24 | End: 2025-06-24

## 2025-06-24 RX ORDER — ATORVASTATIN CALCIUM 40 MG/1
40 TABLET, FILM COATED ORAL NIGHTLY
COMMUNITY
Start: 2025-04-02

## 2025-06-24 RX ORDER — LOSARTAN POTASSIUM 50 MG/1
50 TABLET ORAL DAILY
Qty: 30 TABLET | Refills: 5 | Status: SHIPPED | OUTPATIENT
Start: 2025-06-24 | End: 2025-06-24 | Stop reason: ALTCHOICE

## 2025-06-24 RX ORDER — VITAMIN B COMPLEX
1000 TABLET ORAL DAILY
Qty: 30 TABLET | Refills: 5 | Status: SHIPPED | OUTPATIENT
Start: 2025-06-24

## 2025-06-24 RX ORDER — ROSUVASTATIN CALCIUM 20 MG/1
20 TABLET, COATED ORAL DAILY
Qty: 90 TABLET | Refills: 1 | Status: SHIPPED | OUTPATIENT
Start: 2025-06-24

## 2025-06-24 RX ORDER — AMITRIPTYLINE HYDROCHLORIDE 50 MG/1
TABLET ORAL
Qty: 30 TABLET | Refills: 5 | Status: SHIPPED | OUTPATIENT
Start: 2025-06-24

## 2025-06-24 RX ORDER — OXYBUTYNIN CHLORIDE 5 MG/1
5 TABLET, EXTENDED RELEASE ORAL NIGHTLY
Qty: 90 TABLET | Refills: 1 | Status: SHIPPED | OUTPATIENT
Start: 2025-06-24

## 2025-06-24 RX ORDER — LEVOTHYROXINE SODIUM 125 UG/1
125 TABLET ORAL DAILY
Qty: 30 TABLET | Refills: 5 | Status: SHIPPED | OUTPATIENT
Start: 2025-06-24

## 2025-06-24 RX ORDER — NYSTATIN 100000 U/G
CREAM TOPICAL
Qty: 30 G | Refills: 1 | Status: SHIPPED | OUTPATIENT
Start: 2025-06-24

## 2025-06-24 RX ORDER — LOSARTAN POTASSIUM AND HYDROCHLOROTHIAZIDE 12.5; 1 MG/1; MG/1
1 TABLET ORAL DAILY
Qty: 90 TABLET | Refills: 1 | Status: SHIPPED | OUTPATIENT
Start: 2025-06-24

## 2025-06-24 RX ORDER — AMLODIPINE BESYLATE 10 MG/1
10 TABLET ORAL DAILY
Qty: 30 TABLET | Refills: 5 | Status: SHIPPED | OUTPATIENT
Start: 2025-06-24

## 2025-06-24 RX ORDER — FLUTICASONE PROPIONATE 50 MCG
1 SPRAY, SUSPENSION (ML) NASAL DAILY
Qty: 1 EACH | Refills: 2 | Status: SHIPPED | OUTPATIENT
Start: 2025-06-24

## 2025-06-24 RX ORDER — DULOXETIN HYDROCHLORIDE 60 MG/1
60 CAPSULE, DELAYED RELEASE ORAL DAILY
Qty: 30 CAPSULE | Refills: 5 | Status: CANCELLED | OUTPATIENT
Start: 2025-06-24 | End: 2025-12-21

## 2025-06-24 RX ORDER — MONTELUKAST SODIUM 10 MG/1
TABLET ORAL
Qty: 30 TABLET | Refills: 5 | Status: SHIPPED | OUTPATIENT
Start: 2025-06-24

## 2025-06-24 ASSESSMENT — ENCOUNTER SYMPTOMS
NAUSEA: 0
BACK PAIN: 1
ABDOMINAL PAIN: 0
CHEST TIGHTNESS: 0
SHORTNESS OF BREATH: 0
WHEEZING: 0
VOICE CHANGE: 0
VOMITING: 0
SORE THROAT: 0
COUGH: 0

## 2025-06-24 NOTE — PROGRESS NOTES
HPI:  Patient comes in today for   History of Present Illness  The patient presents for evaluation of postmenopausal bleeding, uncontrolled hypertension, and medication management.    She reports experiencing vaginal bleeding during sexual intercourse, which she has not yet discussed with a gynecologist. Additionally, she mentions lower abdominal pain on both sides. Her last consultation with a gynecologist was in 02/2025 or 03/2025, prior to her hip surgery. She has attempted to schedule an appointment with the gynecologist but was informed that she had not previously visited their office. [facility] before her hip surgery. She has seen Dr. Tabares due to multiple myeloma.    She is scheduled for left knee surgery on 07/21/2025. She has previously undergone hip arthroplasty and reports persistent drainage from the incision site. She experiences difficulty in walking and relies on a walker for mobility. She has been prescribed amitriptyline, which she takes at night, and Cymbalta, which she is currently not taking. She is not taking Ditropan. She is currently on aspirin, amlodipine, and atorvastatin. She is uncertain about the names of her medications but confirms that she is taking them as prescribed. She recently switched from [facility] to [facility] due to issues with prescription fulfillment. She reports feeling nauseous last week and experiencing swelling.    PAST SURGICAL HISTORY:  - Right knee surgery: Date   - Hip arthroplasty: 03/2025  .    Review of Systems   Constitutional:  Negative for chills, fever and unexpected weight change.   HENT:  Negative for postnasal drip, sore throat and voice change.    Respiratory:  Negative for cough, chest tightness, shortness of breath and wheezing.    Cardiovascular:  Negative for chest pain and leg swelling.   Gastrointestinal:  Negative for abdominal pain, nausea and vomiting.   Genitourinary:  Positive for urgency.

## 2025-06-25 RX ORDER — DULOXETIN HYDROCHLORIDE 60 MG/1
60 CAPSULE, DELAYED RELEASE ORAL DAILY
Qty: 90 CAPSULE | Refills: 1 | OUTPATIENT
Start: 2025-06-25

## 2025-06-25 RX ORDER — HYDROPHOR 42 G/100G
OINTMENT TOPICAL
Qty: 3 EACH | Refills: 2 | OUTPATIENT
Start: 2025-06-25

## 2025-06-25 RX ORDER — BACLOFEN 10 MG/1
10 TABLET ORAL 3 TIMES DAILY
Qty: 90 TABLET | Refills: 3 | OUTPATIENT
Start: 2025-06-25

## 2025-06-25 RX ORDER — VIT C/B6/B5/MAGNESIUM/HERB 173 50-5-6-5MG
500 CAPSULE ORAL DAILY
COMMUNITY

## 2025-06-25 RX ORDER — POTASSIUM CHLORIDE 1500 MG/1
20 TABLET, EXTENDED RELEASE ORAL DAILY
Qty: 90 TABLET | Refills: 1 | OUTPATIENT
Start: 2025-06-25

## 2025-06-26 ENCOUNTER — OFFICE VISIT (OUTPATIENT)
Dept: ORTHOPEDIC SURGERY | Age: 70
End: 2025-06-26
Payer: MEDICARE

## 2025-06-26 VITALS — SYSTOLIC BLOOD PRESSURE: 175 MMHG | TEMPERATURE: 97.9 F | DIASTOLIC BLOOD PRESSURE: 89 MMHG

## 2025-06-26 DIAGNOSIS — Z96.641 STATUS POST TOTAL REPLACEMENT OF RIGHT HIP: Primary | ICD-10-CM

## 2025-06-26 PROCEDURE — 3017F COLORECTAL CA SCREEN DOC REV: CPT | Performed by: STUDENT IN AN ORGANIZED HEALTH CARE EDUCATION/TRAINING PROGRAM

## 2025-06-26 PROCEDURE — 1123F ACP DISCUSS/DSCN MKR DOCD: CPT

## 2025-06-26 PROCEDURE — 1159F MED LIST DOCD IN RCRD: CPT

## 2025-06-26 PROCEDURE — 1036F TOBACCO NON-USER: CPT | Performed by: STUDENT IN AN ORGANIZED HEALTH CARE EDUCATION/TRAINING PROGRAM

## 2025-06-26 PROCEDURE — G8417 CALC BMI ABV UP PARAM F/U: HCPCS

## 2025-06-26 PROCEDURE — 3077F SYST BP >= 140 MM HG: CPT

## 2025-06-26 PROCEDURE — 1090F PRES/ABSN URINE INCON ASSESS: CPT

## 2025-06-26 PROCEDURE — 3079F DIAST BP 80-89 MM HG: CPT

## 2025-06-26 PROCEDURE — G8427 DOCREV CUR MEDS BY ELIG CLIN: HCPCS

## 2025-06-26 PROCEDURE — 99214 OFFICE O/P EST MOD 30 MIN: CPT

## 2025-06-26 PROCEDURE — G8399 PT W/DXA RESULTS DOCUMENT: HCPCS

## 2025-06-26 RX ORDER — MUPIROCIN 2 %
OINTMENT (GRAM) TOPICAL
Qty: 15 G | Refills: 0 | Status: SHIPPED | OUTPATIENT
Start: 2025-06-26

## 2025-06-26 NOTE — PROGRESS NOTES
Follow Up Post Operative Visit     Surgery: Right hip total joint arthroplasty; left knee injection  Date: 3/10/2025    Subjective:    Angelita Slater is here for follow up visit s/p above procedure.  She is doing well and is happy with her results. She states some pain on the lateral hip with palpation. She is ambulating with a walker. She states she thinks there was drainage from her hip but was unsure. She noticed a scab to the distal incision but unsure when it appeared. She also states she had a fever but never checked with a thermometer.  She denies any numbness and tingling. She is scheduled for a left total knee arthroplasty next month. She is also complaining for right knee pain today as well.     Controlled Substances Monitoring:        Physical Exam:    No data recorded    General: Alert and oriented x3, no acute distress  Cardiovascular/pulmonary: No labored breathing, peripheral perfusion intact  Musculoskeletal:    Right hip: Small area of delayed healing to the distal incision. Well healed surgical scar otherwise. No drainage, warmth, redness or swelling noted. She tolerates hip range of motion with flexion internal/external rotation along with abduction and abduction without pain. She can straight leg raise. Calf soft compressible.  Knee range of motion is intact with 5 out of 5 strength in ankle and plantar dorsiflexion    Right knee: Skin circumferentially intact. No effusion noted. Active range of motion 0-90 degrees with flexion and extension. Tenderness to palpation in the medial and lateral joint lines. Patella tracking midline. Crepitus noted with ROM. negative Anterior/Posterior drawer. negative Lachman. Stable varus/valgus stress testing at 0 and 30 degrees. Calf is soft and compressible. +2 DP/PT. BCR.      Imaging: Multiple views of the right hip independently interpreted by myself and discussed with patient shows stable total arthroplasty with no evidence of hardware failure or

## 2025-07-07 NOTE — DISCHARGE INSTRUCTIONS
[facility] Department of Orthopedic Surgery  [address] [address]    Dr. Alex Salas, DO    Orthopaedics Discharge Instructions   Weight bearing Status - Weight bearing as tolerated - on left lower Extremity  It is important to take non-narcotic pain medications prior to prescribed narcotic:  Tylenol 1000 mg every 6 hours on a scheduled basis  Celebrex 200 mg or Mobic 15 mg daily (unless otherwise discussed)  Please stop all other NSAIDs (ibuprofen/naproxen/Aleve) while taking this medication  Methocarbamol (Robaxin) or Lioresal (Baclofen) as needed for pain. This is a muscle relaxer.  Pain medication Per Prescriptions  Contact Office for Medication Refill- [phone]  Office can refill pain med every 7 days  If patient discharging to facility then pain control will be continued per facility physician  Ice to operative/injured site for 15-30 minutes of each hour for next 5 days    Recommend that you continue to ice the area 2-3 times per day after this   Elevate operative/injured limb on 2 pillows at home  Goal is to have limb above the heart if able  Continue DVT Prophylaxis (blood clot prevention) as Prescribed: Aspirin 81 mg twice daily for 28 days  If you are a same-day discharge, you will be ordered an antibiotic to be taken for 7 days post-operatively to prevent surgical site infection. Please take this in its entirety with food.  Wound Care: Remove dressing on day 7 after surgery. If drainage is present, please cover with a clean, dry dressing daily. Staples/sutures will be removed at your post-operative visit.  Do not apply any ointments or lotions to surgical site.  You may shower 24 hours after surgery. Your dressing is waterproof. Once your dressing is removed, you can let water run over incision and pat dry. No submerging in a bath, pool or hot tub for 6 weeks.  You make take Colace, Miralax, or Dulcolax as needed for constipation. Be sure to increase water and fiber

## 2025-07-08 ENCOUNTER — HOSPITAL ENCOUNTER (OUTPATIENT)
Dept: CT IMAGING | Age: 70
Discharge: HOME | End: 2025-07-10
Payer: COMMERCIAL

## 2025-07-08 DIAGNOSIS — M17.12 PRIMARY OSTEOARTHRITIS OF ONE KNEE, LEFT: ICD-10-CM

## 2025-07-08 PROCEDURE — 73700 CT LOWER EXTREMITY W/O DYE: CPT

## 2025-07-14 ENCOUNTER — ANESTHESIA EVENT (OUTPATIENT)
Dept: OPERATING ROOM | Age: 70
End: 2025-07-14
Payer: MEDICARE

## 2025-07-14 ENCOUNTER — TELEPHONE (OUTPATIENT)
Dept: ORTHOPEDIC SURGERY | Age: 70
End: 2025-07-14

## 2025-07-14 ENCOUNTER — TELEPHONE (OUTPATIENT)
Dept: PRIMARY CARE CLINIC | Age: 70
End: 2025-07-14

## 2025-07-14 ENCOUNTER — HOSPITAL ENCOUNTER (OUTPATIENT)
Dept: PREADMISSION TESTING | Age: 70
Discharge: HOME | End: 2025-07-14
Payer: COMMERCIAL

## 2025-07-14 VITALS
HEIGHT: 66 IN | SYSTOLIC BLOOD PRESSURE: 143 MMHG | BODY MASS INDEX: 36.32 KG/M2 | TEMPERATURE: 98.1 F | OXYGEN SATURATION: 97 % | HEART RATE: 98 BPM | WEIGHT: 226 LBS | DIASTOLIC BLOOD PRESSURE: 72 MMHG

## 2025-07-14 DIAGNOSIS — M17.12 PRIMARY OSTEOARTHRITIS OF ONE KNEE, LEFT: ICD-10-CM

## 2025-07-14 LAB
ANION GAP SERPL CALCULATED.3IONS-SCNC: 11 MMOL/L (ref 7–16)
BASOPHILS # BLD: 0.04 K/UL (ref 0–0.2)
BASOPHILS NFR BLD: 1 % (ref 0–2)
BUN SERPL-MCNC: 10 MG/DL (ref 8–23)
CALCIUM SERPL-MCNC: 9.4 MG/DL (ref 8.8–10.2)
CHLORIDE SERPL-SCNC: 104 MMOL/L (ref 98–107)
CO2 SERPL-SCNC: 26 MMOL/L (ref 22–29)
CREAT SERPL-MCNC: 0.6 MG/DL (ref 0.5–1)
EOSINOPHIL # BLD: 0.26 K/UL (ref 0.05–0.5)
EOSINOPHILS RELATIVE PERCENT: 5 % (ref 0–6)
ERYTHROCYTE [DISTWIDTH] IN BLOOD BY AUTOMATED COUNT: 13 % (ref 11.5–15)
GFR, ESTIMATED: >90 ML/MIN/1.73M2
GLUCOSE SERPL-MCNC: 190 MG/DL (ref 74–99)
HBA1C MFR BLD: 6.7 % (ref 4–5.6)
HCT VFR BLD AUTO: 38.5 % (ref 34–48)
HGB BLD-MCNC: 11.9 G/DL (ref 11.5–15.5)
IMM GRANULOCYTES # BLD AUTO: <0.03 K/UL (ref 0–0.58)
IMM GRANULOCYTES NFR BLD: 0 % (ref 0–5)
LYMPHOCYTES NFR BLD: 2.56 K/UL (ref 1.5–4)
LYMPHOCYTES RELATIVE PERCENT: 49 % (ref 20–42)
MCH RBC QN AUTO: 27.6 PG (ref 26–35)
MCHC RBC AUTO-ENTMCNC: 30.9 G/DL (ref 32–34.5)
MCV RBC AUTO: 89.3 FL (ref 80–99.9)
MONOCYTES NFR BLD: 0.5 K/UL (ref 0.1–0.95)
MONOCYTES NFR BLD: 10 % (ref 2–12)
NEUTROPHILS NFR BLD: 36 % (ref 43–80)
NEUTS SEG NFR BLD: 1.91 K/UL (ref 1.8–7.3)
PLATELET # BLD AUTO: 252 K/UL (ref 130–450)
PMV BLD AUTO: 10.6 FL (ref 7–12)
POTASSIUM SERPL-SCNC: 3.8 MMOL/L (ref 3.5–5.1)
RBC # BLD AUTO: 4.31 M/UL (ref 3.5–5.5)
SODIUM SERPL-SCNC: 141 MMOL/L (ref 136–145)
WBC OTHER # BLD: 5.3 K/UL (ref 4.5–11.5)

## 2025-07-14 PROCEDURE — 87081 CULTURE SCREEN ONLY: CPT

## 2025-07-14 PROCEDURE — 85025 COMPLETE CBC W/AUTO DIFF WBC: CPT

## 2025-07-14 PROCEDURE — 83036 HEMOGLOBIN GLYCOSYLATED A1C: CPT

## 2025-07-14 PROCEDURE — 80048 BASIC METABOLIC PNL TOTAL CA: CPT

## 2025-07-14 PROCEDURE — 36415 COLL VENOUS BLD VENIPUNCTURE: CPT

## 2025-07-14 RX ORDER — ROPIVACAINE HYDROCHLORIDE 5 MG/ML
30 INJECTION, SOLUTION EPIDURAL; INFILTRATION; PERINEURAL ONCE
OUTPATIENT
Start: 2025-07-14 | End: 2025-07-14

## 2025-07-14 RX ORDER — FENTANYL CITRATE 50 UG/ML
25 INJECTION, SOLUTION INTRAMUSCULAR; INTRAVENOUS ONCE
OUTPATIENT
Start: 2025-07-14 | End: 2025-07-14

## 2025-07-14 RX ORDER — MIDAZOLAM HYDROCHLORIDE 2 MG/2ML
2 INJECTION, SOLUTION INTRAMUSCULAR; INTRAVENOUS ONCE
OUTPATIENT
Start: 2025-07-14 | End: 2025-07-14

## 2025-07-14 ASSESSMENT — PROMIS GLOBAL HEALTH SCALE
WHO IS THE PERSON COMPLETING THE PROMIS V1.1 SURVEY?: SELF
HOW IS THE PROMIS V1.1 BEING ADMINISTERED?: ELECTRONIC
SUM OF RESPONSES TO QUESTIONS 3, 6, 7, & 8: 16
IN THE PAST 7 DAYS, HOW WOULD YOU RATE YOUR PAIN ON AVERAGE [ON A SCALE FROM 0 (NO PAIN) TO 10 (WORST IMAGINABLE PAIN)]?: 9
SUM OF RESPONSES TO QUESTIONS 2, 4, 5, & 10: 10
IN THE PAST 7 DAYS, HOW WOULD YOU RATE YOUR FATIGUE ON AVERAGE [ON A SCALE FROM 1 (NONE) TO 5 (VERY SEVERE)]?: MODERATE
IN GENERAL, PLEASE RATE HOW WELL YOU CARRY OUT YOUR USUAL SOCIAL ACTIVITIES (INCLUDES ACTIVITIES AT HOME, AT WORK, AND IN YOUR COMMUNITY, AND RESPONSIBILITIES AS A PARENT, CHILD, SPOUSE, EMPLOYEE, FRIEND, ETC) [ON A SCALE OF 1 (POOR) TO 5 (EXCELLENT)]?: POOR
IN GENERAL, HOW WOULD YOU RATE YOUR MENTAL HEALTH, INCLUDING YOUR MOOD AND YOUR ABILITY TO THINK [ON A SCALE OF 1 (POOR) TO 5 (EXCELLENT)]?: FAIR
IN GENERAL, WOULD YOU SAY YOUR HEALTH IS...[ON A SCALE OF 1 (POOR) TO 5 (EXCELLENT)]: FAIR
IN GENERAL, HOW WOULD YOU RATE YOUR SATISFACTION WITH YOUR SOCIAL ACTIVITIES AND RELATIONSHIPS [ON A SCALE OF 1 (POOR) TO 5 (EXCELLENT)]?: POOR
TO WHAT EXTENT ARE YOU ABLE TO CARRY OUT YOUR EVERYDAY PHYSICAL ACTIVITIES SUCH AS WALKING, CLIMBING STAIRS, CARRYING GROCERIES, OR MOVING A CHAIR [ON A SCALE OF 1 (NOT AT ALL) TO 5 (COMPLETELY)]?: A LITTLE
IN GENERAL, HOW WOULD YOU RATE YOUR PHYSICAL HEALTH [ON A SCALE OF 1 (POOR) TO 5 (EXCELLENT)]?: FAIR
IN THE PAST 7 DAYS, HOW OFTEN HAVE YOU BEEN BOTHERED BY EMOTIONAL PROBLEMS, SUCH AS FEELING ANXIOUS, DEPRESSED, OR IRRITABLE [ON A SCALE FROM 1 (NEVER) TO 5 (ALWAYS)]?: RARELY
IN GENERAL, WOULD YOU SAY YOUR QUALITY OF LIFE IS...[ON A SCALE OF 1 (POOR) TO 5 (EXCELLENT)]: GOOD

## 2025-07-14 ASSESSMENT — KOOS JR
STANDING UPRIGHT: SEVERE
STRAIGHTENING KNEE FULLY: MODERATE
BENDING TO THE FLOOR TO PICK UP OBJECT: MODERATE
HOW SEVERE IS YOUR KNEE STIFFNESS AFTER FIRST WAKING IN MORNING: SEVERE
GOING UP OR DOWN STAIRS: SEVERE
KOOS JR TOTAL INTERVAL SCORE: 39.625
RISING FROM SITTING: SEVERE
TWISING OR PIVOTING ON KNEE: SEVERE

## 2025-07-14 NOTE — TELEPHONE ENCOUNTER
Spoke with Irma at Dr Mckeon's office.  Patient was seen by Dr Mckeon on 6/24/2025 for medical clearance prior to surgery 7/21/2025.  Do not see mention of patient being cleared for surgery in progress note.  Irma will check with Dr Mckeon and have her addend note if cleared

## 2025-07-14 NOTE — PROGRESS NOTES
Patient in PAT today for upcoming Knee surgery.  Patient has current diarrhea and abdominal discomfort.  Patient advised to notify Dr Mckeon's office and Dr Salas's office if it doesn't get better or gets worse before surgery.  Spoke with Julianne at Dr Salas's office. Also requested medical clearance.

## 2025-07-14 NOTE — DISCHARGE INSTRUCTIONS
Date of Service: 7/14/2025 12:30 PM     Signed       [facility] PRE-ADMISSION TESTING INSTRUCTIONS     The Preadmission Testing patient is instructed accordingly using the following criteria (check applicable):     ARRIVAL INSTRUCTIONS:     ARRIVAL: 0730     [x] Parking the day of Surgery is located in the Main Entrance lot.  Upon entering the door, make an immediate right to the surgery reception desk     [x] Bring photo ID and insurance card     [x] Bring in a copy of Living will or Durable Power of  papers.     [x] Please be sure to arrange for responsible adult to provide transportation to and from the hospital     [x] Please arrange for responsible adult to be with you for the 24 hour period post procedure due to having anesthesia     [x] If you awake am of surgery not feeling well or have temperature >100 please call [phone]     GENERAL INSTRUCTIONS:     [x] Follow instructions for hydration that have been provided to you at your Pre-Admission Visit. Solid food until midnight then clear liquids. No gum, candy or mints.     [x] You may brush your teeth     [x] Take medications as instructed     [x] Stop herbal supplements and vitamins 5 days prior to procedure     [x] Follow preop dosing of blood thinners per physician instructions     [x] Bring inhalers day of surgery     [x] No tobacco products within 24 hours of surgery      [x] No alcohol or illegal drug use within 24 hours of surgery.     [x] Jewelry, body piercing's, eyeglasses, contact lenses and dentures are not permitted into surgery (bring cases)                            [x] Please do not wear any nail polish, make up or hair products, no cologne or aftershave on the day of surgery     [x] You can expect a call the business day prior to procedure to notify you if your arrival time changes     [x] If you receive a survey after surgery we would greatly appreciate your comments     [x] Please notify surgeon

## 2025-07-14 NOTE — TELEPHONE ENCOUNTER
Dr. Rosenberg calling to see if the patient is cleared for her surgery. The notes to not specify if she has or has not been cleared.

## 2025-07-14 NOTE — PROGRESS NOTES
[facility] PRE-ADMISSION TESTING INSTRUCTIONS    The Preadmission Testing patient is instructed accordingly using the following criteria (check applicable):    ARRIVAL INSTRUCTIONS:    ARRIVAL: 0730    [x] Parking the day of Surgery is located in the Main Entrance lot.  Upon entering the door, make an immediate right to the surgery reception desk    [x] Bring photo ID and insurance card    [x] Bring in a copy of Living will or Durable Power of  papers.    [x] Please be sure to arrange for responsible adult to provide transportation to and from the hospital    [x] Please arrange for responsible adult to be with you for the 24 hour period post procedure due to having anesthesia    [x] If you awake am of surgery not feeling well or have temperature >100 please call [phone]    GENERAL INSTRUCTIONS:    [x] Follow instructions for hydration that have been provided to you at your Pre-Admission Visit. Solid food until midnight then clear liquids. No gum, candy or mints.    [x] You may brush your teeth    [x] Take medications as instructed    [x] Stop herbal supplements and vitamins 5 days prior to procedure    [x] Follow preop dosing of blood thinners per physician instructions    [x] Bring inhalers day of surgery    [x] No tobacco products within 24 hours of surgery     [x] No alcohol or illegal drug use within 24 hours of surgery.    [x] Jewelry, body piercing's, eyeglasses, contact lenses and dentures are not permitted into surgery (bring cases)      [x] Please do not wear any nail polish, make up or hair products, no cologne or aftershave on the day of surgery    [x] You can expect a call the business day prior to procedure to notify you if your arrival time changes    [x] If you receive a survey after surgery we would greatly appreciate your comments    [x] Please notify surgeon if you develop any illness between now and time of surgery (cold, cough, sore throat, fever, nausea,

## 2025-07-15 NOTE — TELEPHONE ENCOUNTER
Please notify Dr. Salas's office, patient canceled her appointment today and rescheduled for Thursday, July 17, will notify him in her visit today regarding the approval, patient had an uncontrolled blood pressure and I am awaiting also a response from her oncologist regarding the multiple myeloma, patient was supposed to have a bone marrow biopsy and she did not proceed with it.  Hopefully that will not hinder the surgery but I would like to hear from her oncologist, I sent her a note by Kirstin and expecting to hear from her soon.

## 2025-07-16 LAB
MICROORGANISM SPEC CULT: NORMAL
SPECIMEN DESCRIPTION: NORMAL

## 2025-07-17 ENCOUNTER — OFFICE VISIT (OUTPATIENT)
Dept: PRIMARY CARE CLINIC | Age: 70
End: 2025-07-17

## 2025-07-17 VITALS
HEART RATE: 99 BPM | HEIGHT: 66 IN | DIASTOLIC BLOOD PRESSURE: 68 MMHG | OXYGEN SATURATION: 94 % | SYSTOLIC BLOOD PRESSURE: 122 MMHG | WEIGHT: 222 LBS | BODY MASS INDEX: 35.68 KG/M2 | TEMPERATURE: 98.8 F

## 2025-07-17 DIAGNOSIS — I10 PRIMARY HYPERTENSION: ICD-10-CM

## 2025-07-17 DIAGNOSIS — R12 HEARTBURN: ICD-10-CM

## 2025-07-17 DIAGNOSIS — E03.9 HYPOTHYROIDISM, UNSPECIFIED TYPE: ICD-10-CM

## 2025-07-17 DIAGNOSIS — E66.01 MORBID (SEVERE) OBESITY DUE TO EXCESS CALORIES (HCC): ICD-10-CM

## 2025-07-17 DIAGNOSIS — Z01.818 PREOP EXAMINATION: ICD-10-CM

## 2025-07-17 DIAGNOSIS — E11.42 DM TYPE 2 WITH DIABETIC PERIPHERAL NEUROPATHY (HCC): Primary | ICD-10-CM

## 2025-07-17 RX ORDER — ONDANSETRON 4 MG/1
4 TABLET, ORALLY DISINTEGRATING ORAL 3 TIMES DAILY PRN
Qty: 21 TABLET | Refills: 0 | Status: CANCELLED | OUTPATIENT
Start: 2025-07-17

## 2025-07-17 NOTE — PROGRESS NOTES
HPI:  Patient comes in today for   History of Present Illness  The patient presents for a preoperative evaluation for left knee surgery.    She is scheduled for left knee surgery on 07/21/2025. She reports no chest pain or shortness of breath. She has not yet consulted with the anesthesiologist. She has been seeing a chiropractor for back issues but has not undergone any back surgeries. She underwent hip surgery under general anesthesia with intubation.    Her A1c level is currently 6.7, indicating diabetes. She was previously on Lantus insulin, which was discontinued due to well-controlled blood sugar levels. She has a history of pancreatitis and has previously consulted a diabetes specialist. She experienced nausea and diarrhea with metformin. She was also on Trulicity until 06/24/2025, after which it was discontinued. She has since regained all her weight.    She has been using Ditropan for urinary issues, which has been effective.    She has one pill of oxycodone left over from her rehab after hip surgery, which she is not taking. She has stopped taking potassium for the past few weeks. She is on rosuvastatin for high cholesterol and losartan HCT and amlodipine for blood pressure control. She is on thyroid medication at 125 mcg daily.    Tobacco: She does not smoke.    PAST SURGICAL HISTORY:  Hip surgery under general anesthesia with intubation.  No back surgeries.  .    Review of Systems     Past Medical History:   Diagnosis Date    Adrenal incidentaloma     Anxiety     Arthritis     Asthma     Bladder incontinence     Cancer (HCC) Dx 2009    multiple Myeloma, in remission currently     Chronic back pain     COPD (chronic obstructive pulmonary disease) (HCC)     on O2 2 liters  (uses with activity and at night to sleep)    Depression     Encounter for screening colonoscopy     Fibromyalgia     GERD (gastroesophageal reflux disease)     Hyperlipidemia     Hypertension     Hypothyroidism     MGUS (monoclonal

## 2025-07-21 ENCOUNTER — APPOINTMENT (OUTPATIENT)
Dept: GENERAL RADIOLOGY | Age: 70
End: 2025-07-21
Attending: STUDENT IN AN ORGANIZED HEALTH CARE EDUCATION/TRAINING PROGRAM
Payer: MEDICARE

## 2025-07-21 ENCOUNTER — HOSPITAL ENCOUNTER (OUTPATIENT)
Age: 70
Setting detail: OBSERVATION
Discharge: HOME HEALTH | End: 2025-07-23
Attending: STUDENT IN AN ORGANIZED HEALTH CARE EDUCATION/TRAINING PROGRAM | Admitting: STUDENT IN AN ORGANIZED HEALTH CARE EDUCATION/TRAINING PROGRAM
Payer: MEDICARE

## 2025-07-21 ENCOUNTER — ANESTHESIA (OUTPATIENT)
Dept: OPERATING ROOM | Age: 70
End: 2025-07-21
Payer: MEDICARE

## 2025-07-21 DIAGNOSIS — G89.18 POSTOPERATIVE PAIN: ICD-10-CM

## 2025-07-21 DIAGNOSIS — Z96.652 S/P TOTAL KNEE ARTHROPLASTY, LEFT: ICD-10-CM

## 2025-07-21 DIAGNOSIS — M17.12 PRIMARY OSTEOARTHRITIS OF ONE KNEE, LEFT: Primary | ICD-10-CM

## 2025-07-21 PROBLEM — M17.11 OSTEOARTHRITIS OF RIGHT KNEE: Status: ACTIVE | Noted: 2025-07-21

## 2025-07-21 LAB — GLUCOSE BLD-MCNC: 151 MG/DL (ref 74–99)

## 2025-07-21 PROCEDURE — 20610 DRAIN/INJ JOINT/BURSA W/O US: CPT | Performed by: STUDENT IN AN ORGANIZED HEALTH CARE EDUCATION/TRAINING PROGRAM

## 2025-07-21 PROCEDURE — 6360000002 HC RX W HCPCS: Performed by: NURSE ANESTHETIST, CERTIFIED REGISTERED

## 2025-07-21 PROCEDURE — 82962 GLUCOSE BLOOD TEST: CPT

## 2025-07-21 PROCEDURE — 6360000002 HC RX W HCPCS

## 2025-07-21 PROCEDURE — 64447 NJX AA&/STRD FEMORAL NRV IMG: CPT | Performed by: ANESTHESIOLOGY

## 2025-07-21 PROCEDURE — 3600000005 HC SURGERY LEVEL 5 BASE: Performed by: STUDENT IN AN ORGANIZED HEALTH CARE EDUCATION/TRAINING PROGRAM

## 2025-07-21 PROCEDURE — 2709999900 HC NON-CHARGEABLE SUPPLY: Performed by: STUDENT IN AN ORGANIZED HEALTH CARE EDUCATION/TRAINING PROGRAM

## 2025-07-21 PROCEDURE — 2500000003 HC RX 250 WO HCPCS: Performed by: STUDENT IN AN ORGANIZED HEALTH CARE EDUCATION/TRAINING PROGRAM

## 2025-07-21 PROCEDURE — 73560 X-RAY EXAM OF KNEE 1 OR 2: CPT

## 2025-07-21 PROCEDURE — 7100000000 HC PACU RECOVERY - FIRST 15 MIN: Performed by: STUDENT IN AN ORGANIZED HEALTH CARE EDUCATION/TRAINING PROGRAM

## 2025-07-21 PROCEDURE — 0055T BONE SRGRY CMPTR CT/MRI IMAG: CPT | Performed by: STUDENT IN AN ORGANIZED HEALTH CARE EDUCATION/TRAINING PROGRAM

## 2025-07-21 PROCEDURE — 6360000002 HC RX W HCPCS: Performed by: STUDENT IN AN ORGANIZED HEALTH CARE EDUCATION/TRAINING PROGRAM

## 2025-07-21 PROCEDURE — 2500000003 HC RX 250 WO HCPCS

## 2025-07-21 PROCEDURE — 27447 TOTAL KNEE ARTHROPLASTY: CPT | Performed by: STUDENT IN AN ORGANIZED HEALTH CARE EDUCATION/TRAINING PROGRAM

## 2025-07-21 PROCEDURE — 2580000003 HC RX 258: Performed by: NURSE ANESTHETIST, CERTIFIED REGISTERED

## 2025-07-21 PROCEDURE — 88305 TISSUE EXAM BY PATHOLOGIST: CPT

## 2025-07-21 PROCEDURE — 88311 DECALCIFY TISSUE: CPT

## 2025-07-21 PROCEDURE — C1776 JOINT DEVICE (IMPLANTABLE): HCPCS | Performed by: STUDENT IN AN ORGANIZED HEALTH CARE EDUCATION/TRAINING PROGRAM

## 2025-07-21 PROCEDURE — C1713 ANCHOR/SCREW BN/BN,TIS/BN: HCPCS | Performed by: STUDENT IN AN ORGANIZED HEALTH CARE EDUCATION/TRAINING PROGRAM

## 2025-07-21 PROCEDURE — G0378 HOSPITAL OBSERVATION PER HR: HCPCS

## 2025-07-21 PROCEDURE — 6360000002 HC RX W HCPCS: Performed by: ANESTHESIOLOGY

## 2025-07-21 PROCEDURE — 6370000000 HC RX 637 (ALT 250 FOR IP): Performed by: STUDENT IN AN ORGANIZED HEALTH CARE EDUCATION/TRAINING PROGRAM

## 2025-07-21 PROCEDURE — 3700000001 HC ADD 15 MINUTES (ANESTHESIA): Performed by: STUDENT IN AN ORGANIZED HEALTH CARE EDUCATION/TRAINING PROGRAM

## 2025-07-21 PROCEDURE — 3600000015 HC SURGERY LEVEL 5 ADDTL 15MIN: Performed by: STUDENT IN AN ORGANIZED HEALTH CARE EDUCATION/TRAINING PROGRAM

## 2025-07-21 PROCEDURE — 2720000010 HC SURG SUPPLY STERILE: Performed by: STUDENT IN AN ORGANIZED HEALTH CARE EDUCATION/TRAINING PROGRAM

## 2025-07-21 PROCEDURE — 6370000000 HC RX 637 (ALT 250 FOR IP)

## 2025-07-21 PROCEDURE — 3700000000 HC ANESTHESIA ATTENDED CARE: Performed by: STUDENT IN AN ORGANIZED HEALTH CARE EDUCATION/TRAINING PROGRAM

## 2025-07-21 PROCEDURE — 7100000001 HC PACU RECOVERY - ADDTL 15 MIN: Performed by: STUDENT IN AN ORGANIZED HEALTH CARE EDUCATION/TRAINING PROGRAM

## 2025-07-21 DEVICE — CRUCIATE RETAINING FEMORAL
Type: IMPLANTABLE DEVICE | Site: KNEE | Status: FUNCTIONAL
Brand: TRIATHLON

## 2025-07-21 DEVICE — TIBIAL BEARING INSERT - CS
Type: IMPLANTABLE DEVICE | Site: KNEE | Status: FUNCTIONAL
Brand: TRIATHLON

## 2025-07-21 DEVICE — TIBIAL COMPONENT
Type: IMPLANTABLE DEVICE | Site: KNEE | Status: FUNCTIONAL
Brand: TRIATHLON

## 2025-07-21 RX ORDER — PROCHLORPERAZINE EDISYLATE 5 MG/ML
5 INJECTION INTRAMUSCULAR; INTRAVENOUS
Status: DISCONTINUED | OUTPATIENT
Start: 2025-07-21 | End: 2025-07-21 | Stop reason: HOSPADM

## 2025-07-21 RX ORDER — METHOCARBAMOL 100 MG/ML
1000 INJECTION, SOLUTION INTRAMUSCULAR; INTRAVENOUS ONCE
Status: DISCONTINUED | OUTPATIENT
Start: 2025-07-21 | End: 2025-07-23 | Stop reason: HOSPADM

## 2025-07-21 RX ORDER — DIPHENHYDRAMINE HYDROCHLORIDE 50 MG/ML
12.5 INJECTION, SOLUTION INTRAMUSCULAR; INTRAVENOUS
Status: DISCONTINUED | OUTPATIENT
Start: 2025-07-21 | End: 2025-07-21 | Stop reason: HOSPADM

## 2025-07-21 RX ORDER — MORPHINE SULFATE 2 MG/ML
2 INJECTION, SOLUTION INTRAMUSCULAR; INTRAVENOUS
Status: DISCONTINUED | OUTPATIENT
Start: 2025-07-21 | End: 2025-07-23 | Stop reason: HOSPADM

## 2025-07-21 RX ORDER — PROPOFOL 10 MG/ML
INJECTION, EMULSION INTRAVENOUS
Status: DISCONTINUED | OUTPATIENT
Start: 2025-07-21 | End: 2025-07-21 | Stop reason: SDUPTHER

## 2025-07-21 RX ORDER — TRIAMCINOLONE ACETONIDE 40 MG/ML
INJECTION, SUSPENSION INTRA-ARTICULAR; INTRAMUSCULAR PRN
Status: DISCONTINUED | OUTPATIENT
Start: 2025-07-21 | End: 2025-07-21 | Stop reason: ALTCHOICE

## 2025-07-21 RX ORDER — SODIUM CHLORIDE 0.9 % (FLUSH) 0.9 %
5-40 SYRINGE (ML) INJECTION EVERY 12 HOURS SCHEDULED
Status: DISCONTINUED | OUTPATIENT
Start: 2025-07-21 | End: 2025-07-23 | Stop reason: HOSPADM

## 2025-07-21 RX ORDER — FENTANYL CITRATE 50 UG/ML
25 INJECTION, SOLUTION INTRAMUSCULAR; INTRAVENOUS EVERY 5 MIN PRN
Status: DISCONTINUED | OUTPATIENT
Start: 2025-07-21 | End: 2025-07-21 | Stop reason: HOSPADM

## 2025-07-21 RX ORDER — MORPHINE SULFATE 4 MG/ML
4 INJECTION, SOLUTION INTRAMUSCULAR; INTRAVENOUS
Status: DISCONTINUED | OUTPATIENT
Start: 2025-07-21 | End: 2025-07-23 | Stop reason: HOSPADM

## 2025-07-21 RX ORDER — BUPIVACAINE HYDROCHLORIDE 5 MG/ML
INJECTION, SOLUTION EPIDURAL; INTRACAUDAL; PERINEURAL PRN
Status: DISCONTINUED | OUTPATIENT
Start: 2025-07-21 | End: 2025-07-21 | Stop reason: ALTCHOICE

## 2025-07-21 RX ORDER — MIDAZOLAM HYDROCHLORIDE 2 MG/2ML
2 INJECTION, SOLUTION INTRAMUSCULAR; INTRAVENOUS ONCE
Status: COMPLETED | OUTPATIENT
Start: 2025-07-21 | End: 2025-07-21

## 2025-07-21 RX ORDER — DEXAMETHASONE SODIUM PHOSPHATE 4 MG/ML
INJECTION, SOLUTION INTRA-ARTICULAR; INTRALESIONAL; INTRAMUSCULAR; INTRAVENOUS; SOFT TISSUE
Status: DISCONTINUED | OUTPATIENT
Start: 2025-07-21 | End: 2025-07-21 | Stop reason: SDUPTHER

## 2025-07-21 RX ORDER — TRANEXAMIC ACID 10 MG/ML
1000 INJECTION, SOLUTION INTRAVENOUS
Status: COMPLETED | OUTPATIENT
Start: 2025-07-21 | End: 2025-07-21

## 2025-07-21 RX ORDER — OXYCODONE HYDROCHLORIDE 5 MG/1
10 TABLET ORAL EVERY 4 HOURS PRN
Status: DISCONTINUED | OUTPATIENT
Start: 2025-07-21 | End: 2025-07-23 | Stop reason: HOSPADM

## 2025-07-21 RX ORDER — LABETALOL HYDROCHLORIDE 5 MG/ML
5 INJECTION, SOLUTION INTRAVENOUS
Status: DISCONTINUED | OUTPATIENT
Start: 2025-07-21 | End: 2025-07-21 | Stop reason: HOSPADM

## 2025-07-21 RX ORDER — MELOXICAM 7.5 MG/1
3.75 TABLET ORAL ONCE
Status: DISCONTINUED | OUTPATIENT
Start: 2025-07-21 | End: 2025-07-21 | Stop reason: HOSPADM

## 2025-07-21 RX ORDER — ACETAMINOPHEN 500 MG
1000 TABLET ORAL ONCE
Status: COMPLETED | OUTPATIENT
Start: 2025-07-21 | End: 2025-07-21

## 2025-07-21 RX ORDER — ROPIVACAINE HYDROCHLORIDE 5 MG/ML
30 INJECTION, SOLUTION EPIDURAL; INFILTRATION; PERINEURAL ONCE
Status: DISCONTINUED | OUTPATIENT
Start: 2025-07-21 | End: 2025-07-21 | Stop reason: HOSPADM

## 2025-07-21 RX ORDER — MIDAZOLAM HYDROCHLORIDE 1 MG/ML
INJECTION, SOLUTION INTRAMUSCULAR; INTRAVENOUS
Status: DISCONTINUED | OUTPATIENT
Start: 2025-07-21 | End: 2025-07-21 | Stop reason: SDUPTHER

## 2025-07-21 RX ORDER — SODIUM CHLORIDE 0.9 % (FLUSH) 0.9 %
5-40 SYRINGE (ML) INJECTION PRN
Status: DISCONTINUED | OUTPATIENT
Start: 2025-07-21 | End: 2025-07-21 | Stop reason: HOSPADM

## 2025-07-21 RX ORDER — SODIUM CHLORIDE 0.9 % (FLUSH) 0.9 %
5-40 SYRINGE (ML) INJECTION EVERY 12 HOURS SCHEDULED
Status: DISCONTINUED | OUTPATIENT
Start: 2025-07-21 | End: 2025-07-21 | Stop reason: HOSPADM

## 2025-07-21 RX ORDER — OXYCODONE HYDROCHLORIDE 5 MG/1
5 TABLET ORAL EVERY 4 HOURS PRN
Status: DISCONTINUED | OUTPATIENT
Start: 2025-07-21 | End: 2025-07-23 | Stop reason: HOSPADM

## 2025-07-21 RX ORDER — ONDANSETRON 4 MG/1
4 TABLET, ORALLY DISINTEGRATING ORAL EVERY 8 HOURS PRN
Status: DISCONTINUED | OUTPATIENT
Start: 2025-07-21 | End: 2025-07-23 | Stop reason: HOSPADM

## 2025-07-21 RX ORDER — DEXAMETHASONE SODIUM PHOSPHATE 10 MG/ML
8 INJECTION, SOLUTION INTRAMUSCULAR; INTRAVENOUS ONCE
Status: DISCONTINUED | OUTPATIENT
Start: 2025-07-21 | End: 2025-07-21 | Stop reason: HOSPADM

## 2025-07-21 RX ORDER — SODIUM CHLORIDE 9 MG/ML
INJECTION, SOLUTION INTRAVENOUS PRN
Status: DISCONTINUED | OUTPATIENT
Start: 2025-07-21 | End: 2025-07-21 | Stop reason: HOSPADM

## 2025-07-21 RX ORDER — VANCOMYCIN HYDROCHLORIDE 1 G/20ML
INJECTION, POWDER, LYOPHILIZED, FOR SOLUTION INTRAVENOUS PRN
Status: DISCONTINUED | OUTPATIENT
Start: 2025-07-21 | End: 2025-07-21 | Stop reason: ALTCHOICE

## 2025-07-21 RX ORDER — HYDRALAZINE HYDROCHLORIDE 20 MG/ML
5 INJECTION INTRAMUSCULAR; INTRAVENOUS
Status: DISCONTINUED | OUTPATIENT
Start: 2025-07-21 | End: 2025-07-21 | Stop reason: HOSPADM

## 2025-07-21 RX ORDER — ALBUTEROL SULFATE 0.83 MG/ML
2.5 SOLUTION RESPIRATORY (INHALATION) ONCE
Status: COMPLETED | OUTPATIENT
Start: 2025-07-21 | End: 2025-07-21

## 2025-07-21 RX ORDER — MEPERIDINE HYDROCHLORIDE 50 MG/ML
12.5 INJECTION INTRAMUSCULAR; INTRAVENOUS; SUBCUTANEOUS ONCE
Status: DISCONTINUED | OUTPATIENT
Start: 2025-07-21 | End: 2025-07-21 | Stop reason: HOSPADM

## 2025-07-21 RX ORDER — SODIUM CHLORIDE 9 MG/ML
INJECTION, SOLUTION INTRAVENOUS CONTINUOUS
Status: DISCONTINUED | OUTPATIENT
Start: 2025-07-21 | End: 2025-07-21 | Stop reason: HOSPADM

## 2025-07-21 RX ORDER — SODIUM CHLORIDE 9 MG/ML
INJECTION, SOLUTION INTRAVENOUS
Status: DISCONTINUED | OUTPATIENT
Start: 2025-07-21 | End: 2025-07-21 | Stop reason: SDUPTHER

## 2025-07-21 RX ORDER — FENTANYL CITRATE 50 UG/ML
25 INJECTION, SOLUTION INTRAMUSCULAR; INTRAVENOUS ONCE
Refills: 0 | Status: COMPLETED | OUTPATIENT
Start: 2025-07-21 | End: 2025-07-21

## 2025-07-21 RX ORDER — ASPIRIN 81 MG/1
81 TABLET ORAL 2 TIMES DAILY
Status: DISCONTINUED | OUTPATIENT
Start: 2025-07-21 | End: 2025-07-23 | Stop reason: HOSPADM

## 2025-07-21 RX ORDER — SODIUM CHLORIDE 9 MG/ML
INJECTION, SOLUTION INTRAVENOUS PRN
Status: DISCONTINUED | OUTPATIENT
Start: 2025-07-21 | End: 2025-07-23 | Stop reason: HOSPADM

## 2025-07-21 RX ORDER — ONDANSETRON 2 MG/ML
4 INJECTION INTRAMUSCULAR; INTRAVENOUS EVERY 6 HOURS PRN
Status: DISCONTINUED | OUTPATIENT
Start: 2025-07-21 | End: 2025-07-23 | Stop reason: HOSPADM

## 2025-07-21 RX ORDER — BUPIVACAINE HYDROCHLORIDE 7.5 MG/ML
INJECTION, SOLUTION INTRASPINAL
Status: COMPLETED | OUTPATIENT
Start: 2025-07-21 | End: 2025-07-21

## 2025-07-21 RX ORDER — ONDANSETRON 2 MG/ML
INJECTION INTRAMUSCULAR; INTRAVENOUS
Status: DISCONTINUED | OUTPATIENT
Start: 2025-07-21 | End: 2025-07-21 | Stop reason: SDUPTHER

## 2025-07-21 RX ORDER — ROPIVACAINE HYDROCHLORIDE 5 MG/ML
INJECTION, SOLUTION EPIDURAL; INFILTRATION; PERINEURAL
Status: COMPLETED | OUTPATIENT
Start: 2025-07-21 | End: 2025-07-21

## 2025-07-21 RX ORDER — SODIUM CHLORIDE 0.9 % (FLUSH) 0.9 %
5-40 SYRINGE (ML) INJECTION PRN
Status: DISCONTINUED | OUTPATIENT
Start: 2025-07-21 | End: 2025-07-23 | Stop reason: HOSPADM

## 2025-07-21 RX ORDER — LIDOCAINE HYDROCHLORIDE 10 MG/ML
INJECTION, SOLUTION EPIDURAL; INFILTRATION; INTRACAUDAL; PERINEURAL
Status: COMPLETED | OUTPATIENT
Start: 2025-07-21 | End: 2025-07-21

## 2025-07-21 RX ORDER — ACETAMINOPHEN 325 MG/1
650 TABLET ORAL EVERY 6 HOURS
Status: DISCONTINUED | OUTPATIENT
Start: 2025-07-21 | End: 2025-07-23 | Stop reason: HOSPADM

## 2025-07-21 RX ORDER — PHENYLEPHRINE HCL IN 0.9% NACL 1 MG/10 ML
SYRINGE (ML) INTRAVENOUS
Status: DISCONTINUED | OUTPATIENT
Start: 2025-07-21 | End: 2025-07-21 | Stop reason: SDUPTHER

## 2025-07-21 RX ADMIN — ALBUTEROL SULFATE 2.5 MG: 2.5 SOLUTION RESPIRATORY (INHALATION) at 09:20

## 2025-07-21 RX ADMIN — ACETAMINOPHEN 650 MG: 325 TABLET ORAL at 16:15

## 2025-07-21 RX ADMIN — BUPIVACAINE HYDROCHLORIDE IN DEXTROSE 11.25 MG: 7.5 INJECTION, SOLUTION SUBARACHNOID at 09:44

## 2025-07-21 RX ADMIN — TRANEXAMIC ACID 1000 MG: 10 INJECTION, SOLUTION INTRAVENOUS at 09:46

## 2025-07-21 RX ADMIN — Medication 150 MCG: at 10:37

## 2025-07-21 RX ADMIN — FENTANYL CITRATE 25 MCG: 0.05 INJECTION, SOLUTION INTRAMUSCULAR; INTRAVENOUS at 09:08

## 2025-07-21 RX ADMIN — PROPOFOL 125 MCG/KG/MIN: 10 INJECTION, EMULSION INTRAVENOUS at 09:51

## 2025-07-21 RX ADMIN — MIDAZOLAM HYDROCHLORIDE 0.5 MG: 1 INJECTION, SOLUTION INTRAMUSCULAR; INTRAVENOUS at 09:08

## 2025-07-21 RX ADMIN — Medication 700 MCG: at 10:58

## 2025-07-21 RX ADMIN — MIDAZOLAM 1 MG: 1 INJECTION INTRAMUSCULAR; INTRAVENOUS at 09:45

## 2025-07-21 RX ADMIN — Medication 150 MCG: at 10:30

## 2025-07-21 RX ADMIN — Medication 100 MCG: at 10:34

## 2025-07-21 RX ADMIN — LIDOCAINE HYDROCHLORIDE 20 MG: 10 INJECTION, SOLUTION EPIDURAL; INFILTRATION; INTRACAUDAL; PERINEURAL at 09:41

## 2025-07-21 RX ADMIN — WATER 2000 MG: 1 INJECTION INTRAMUSCULAR; INTRAVENOUS; SUBCUTANEOUS at 18:25

## 2025-07-21 RX ADMIN — ACETAMINOPHEN 650 MG: 325 TABLET ORAL at 20:28

## 2025-07-21 RX ADMIN — SODIUM CHLORIDE: 9 INJECTION, SOLUTION INTRAVENOUS at 11:24

## 2025-07-21 RX ADMIN — OXYCODONE 10 MG: 5 TABLET ORAL at 16:15

## 2025-07-21 RX ADMIN — Medication 100 MCG: at 10:45

## 2025-07-21 RX ADMIN — ONDANSETRON 4 MG: 2 INJECTION, SOLUTION INTRAMUSCULAR; INTRAVENOUS at 09:45

## 2025-07-21 RX ADMIN — TRANEXAMIC ACID 1000 MG: 10 INJECTION, SOLUTION INTRAVENOUS at 13:10

## 2025-07-21 RX ADMIN — ROPIVACAINE HYDROCHLORIDE 30 ML: 5 INJECTION, SOLUTION EPIDURAL; INFILTRATION; PERINEURAL at 09:10

## 2025-07-21 RX ADMIN — SODIUM CHLORIDE: 9 INJECTION, SOLUTION INTRAVENOUS at 09:24

## 2025-07-21 RX ADMIN — ACETAMINOPHEN 1000 MG: 500 TABLET ORAL at 09:00

## 2025-07-21 RX ADMIN — DEXAMETHASONE SODIUM PHOSPHATE 10 MG: 4 INJECTION, SOLUTION INTRAMUSCULAR; INTRAVENOUS at 09:46

## 2025-07-21 RX ADMIN — Medication 100 MCG: at 11:08

## 2025-07-21 RX ADMIN — OXYCODONE 10 MG: 5 TABLET ORAL at 20:29

## 2025-07-21 RX ADMIN — WATER 2000 MG: 1 INJECTION INTRAMUSCULAR; INTRAVENOUS; SUBCUTANEOUS at 09:46

## 2025-07-21 RX ADMIN — Medication 100 MCG: at 10:54

## 2025-07-21 RX ADMIN — SODIUM CHLORIDE, PRESERVATIVE FREE 5 ML: 5 INJECTION INTRAVENOUS at 21:36

## 2025-07-21 RX ADMIN — ASPIRIN 81 MG: 81 TABLET, COATED ORAL at 20:28

## 2025-07-21 ASSESSMENT — PAIN - FUNCTIONAL ASSESSMENT
PAIN_FUNCTIONAL_ASSESSMENT: PREVENTS OR INTERFERES SOME ACTIVE ACTIVITIES AND ADLS
PAIN_FUNCTIONAL_ASSESSMENT: 0-10
PAIN_FUNCTIONAL_ASSESSMENT: 0-10
PAIN_FUNCTIONAL_ASSESSMENT: PREVENTS OR INTERFERES SOME ACTIVE ACTIVITIES AND ADLS

## 2025-07-21 ASSESSMENT — PAIN SCALES - GENERAL
PAINLEVEL_OUTOF10: 8
PAINLEVEL_OUTOF10: 7
PAINLEVEL_OUTOF10: 10
PAINLEVEL_OUTOF10: 7
PAINLEVEL_OUTOF10: 7

## 2025-07-21 ASSESSMENT — PAIN DESCRIPTION - DESCRIPTORS
DESCRIPTORS: ACHING;CRAMPING;DISCOMFORT;TENDER;SORE
DESCRIPTORS: DISCOMFORT
DESCRIPTORS: ACHING

## 2025-07-21 ASSESSMENT — PAIN DESCRIPTION - LOCATION
LOCATION: KNEE

## 2025-07-21 ASSESSMENT — PAIN DESCRIPTION - ORIENTATION
ORIENTATION: LEFT

## 2025-07-21 NOTE — OP NOTE
Operative Note      Patient: Angelita Slater  YOB: 1955  MRN: [id number]    Date of Procedure: 7/21/2025    Pre-Op Diagnosis Codes:      * Primary osteoarthritis of one knee, left [M17.12]    Post-Op Diagnosis: Same       Procedure(s):  LEFT KNEE MACIEJ ROBOTIC ASSISTED TOTAL ARTHROPLASTY  RIGHT KNEE CORTISONE INJECTION    Surgeon(s):  Alex Salas DO    Assistant:   Resident: Ben Elliott DO; Chinedu Ma DO; Devyn Wilder DO    Anesthesia: General    Estimated Blood Loss (mL): less than 50     Complications: None    Specimens:   ID Type Source Tests Collected by Time Destination   A : LEFT KNEE BONE Bone Joint, Knee SURGICAL PATHOLOGY Alex Salas DO 7/21/2025 1014        Implants:  Implant Name Type Inv. Item Serial No.  Lot No. LRB No. Used Action   CEMENT BNE RADIOPAQUE FAST SET ACRYL RESIN HI VISC SIMPLEXHV - [id number]  CEMENT BNE RADIOPAQUE FAST SET ACRYL RESIN HI VISC SIMPLEXHV  [employer] [id number] Left 2 Implanted   INSERT TIB CNDYL STBL 5 9 MM KNEE 5/PK X3 TRIATHLON - [id number]  INSERT TIB CNDYL STBL 5 9 MM KNEE 5/PK X3 TRIATHLON  [employer] [id number] Left 1 Implanted   COMPONENT FEM SZ 4 L KNEE CRUCE RET CEMENTLESS BEAD W/ JONATHAN - [id number]  COMPONENT FEM SZ 4 L KNEE CRUCE RET CEMENTLESS BEAD W/ JONATHAN  [employer] [id number] Left 1 Implanted   BASEPLATE TIB SZ 5 AP49MM ML74MM KNEE TRITANIUM 4 CRUCFRM - [id number]  BASEPLATE TIB SZ 5 AP49MM ML74MM KNEE TRITANIUM 4 CRUCFRM  [employer] [id number] Left 1 Implanted         Drains: * No LDAs found *    Findings:  Present At Time Of Surgery (PATOS) (choose all levels that have infection present):  No infection present  Other Findings: See operative report below    Detailed Description of Procedure:     OPERATIVE REPORT    PATIENT:  Angelita Slater  [id number]    DATE OF PROCEDURE:  7/21/25    SURGEON: Alex Salas DO        OPERATIVE INDICATIONS:  The  electrocautery.  Decision was made to not resurface the patella at this point.    We then placed our femoral and tibial pins and assembled the array for the femur and tibia respectively.  Femoral and tibial registration buttons were placed.  Hip centering and identification of medial and lateral malleoli was performed.  The knee was flexed.  Appropriate points were registered on the femur followed by the tibia.  All osteophytes were then removed.  The knee was brought into extension.  We noted baseline measurements were 9 degrees of flexion and 6 degrees of varus.  Appropriate tension was placed on the medial and lateral compartments with sizing spoons and/or Kwan elevators and we captured our measurements with correction of deformity.  This was repeated with the knee in 90 degrees of flexion.  Appropriate adjustments were then made utilizing the [employer] software to plan for 0 mm gaps in extension and flexion.  We then prepared to make our cuts.     The knee was flexed.  Medial and lateral retractors were placed.  Utilizing the [employer] robot arm, we made femoral and tibial cuts.  All bone fragments were removed.     A lamina  was placed in order to access the posterior aspect of the knee and remove osteophytes and remaining meniscal tissue.  The knee was then flexed, and the appropriate size tibial tray was placed in the correct amount of external rotation.  A size 5 proved to be best fit.  The tibial tray and 9mm poly trial were placed on the tibia and allowed to float.  The femoral trial was then placed, size 4, and the knee was reduced and taken through a range of motion.   Range of motion was found to be excellent including 0 degrees at extension, and 140 degrees of flexion without tibial tray lift off.  We noted that we were balanced nicely in extension and flexion based on the measurements on our CT image, 0 mm gaps in flexion and extension.  The knee was then flexed again, and the femur was drilled.  The

## 2025-07-21 NOTE — ANESTHESIA PROCEDURE NOTES
Peripheral Block    Patient location during procedure: procedure area  Reason for block: post-op pain management and at surgeon's request  Start time: 7/21/2025 9:09 AM  End time: 7/21/2025 9:11 AM  Staffing  Performed: anesthesiologist   Anesthesiologist: Espinoza Camp DO  Performed by: Espinoza Camp DO  Authorized by: Espinoza Camp DO    Preanesthetic Checklist  Completed: patient identified, IV checked, site marked, risks and benefits discussed, surgical/procedural consents, equipment checked, pre-op evaluation, timeout performed, anesthesia consent given, oxygen available and monitors applied/VS acknowledged  Peripheral Block   Patient position: supine  Prep: ChloraPrep  Provider prep: sterile gloves and mask  Patient monitoring: continuous pulse ox, cardiac monitor and IV access  Block type: Femoral  Adductor canal  Laterality: left  Injection technique: single-shot  Guidance: ultrasound guided    Needle   Needle type: combined needle/nerve stimulator   Needle gauge: 22 G  Needle localization: anatomical landmarks and ultrasound guidance  Needle length: 10 cm  Assessment   Injection assessment: negative aspiration for heme, no paresthesia on injection and local visualized surrounding nerve on ultrasound  Paresthesia pain: none  Slow fractionated injection: yes  Hemodynamics: stable  Outcomes: uncomplicated and patient tolerated procedure well    Additional Notes  Simple uncomplicated L ACB w/ US guidance.  Negative aspiration Q5cc. Needle completely visualized throughout.  Medications Administered  ropivacaine (NAROPIN) injection 0.5% - Perineural   30 mL - 7/21/2025 9:10:00 AM           Zakiya River(NP)

## 2025-07-21 NOTE — PROGRESS NOTES
4 Eyes Skin Assessment     NAME:  Angelita Slater  YOB: 1955  MEDICAL RECORD NUMBER:  [id number]    The patient is being assessed for  Admission    I agree that at least one RN has performed a thorough Head to Toe Skin Assessment on the patient. ALL assessment sites listed below have been assessed.      Areas assessed by both nurses:    Head, Face, Ears, Shoulders, Back, Chest, Arms, Elbows, Hands, Sacrum. Buttock, Coccyx, Ischium, Legs. Feet and Heels, and Under Medical Devices         Does the Patient have a Wound? No noted wound(s)    Ace wrap on left knee, status post total left knee arthroplasty.       Mario Prevention initiated by RN: No  Wound Care Orders initiated by RN: No    For hospital-acquired stage 1 & 2 and ALL Stage 3,4, Unstageable, DTI, NWPT, and Complex wounds: place order “IP Wound Care/Ostomy Nurse Eval and Treat” by RN under : No    New Ostomies, if present place, Ostomy referral order under : No     Nurse 1 eSignature: Electronically signed by Phyllis Beharry, RN on 7/21/25 at 2:35 PM EDT    **SHARE this note so that the co-signing nurse can place an eSignature**    Nurse 2 eSignature: Electronically signed by Blanca Rivas RN on 7/21/25 at 3:52 PM EDT

## 2025-07-21 NOTE — BRIEF OP NOTE
Brief Postoperative Note      Patient: Angelita Slater  YOB: 1955  MRN: [id number]    Date of Procedure: 7/21/2025    Pre-Op Diagnosis Codes:      * Primary osteoarthritis of one knee, left [M17.12]    Post-Op Diagnosis: Same       Procedure(s):  LEFT KNEE MACIEJ ROBOTIC ASSISTED TOTAL ARTHROPLASTY  RIGHT KNEE CORTISONE INJECTION    Surgeon(s):  Alex Salas DO    Assistant:  Resident: Ben Elliott DO; Chinedu Ma DO; Deyvn Wilder DO    Anesthesia: General    Estimated Blood Loss (mL): less than 50     Complications: None    Specimens:   ID Type Source Tests Collected by Time Destination   A : LEFT KNEE BONE Bone Joint, Knee SURGICAL PATHOLOGY Alex Salas DO 7/21/2025 1014        Implants:  Implant Name Type Inv. Item Serial No.  Lot No. LRB No. Used Action   CEMENT BNE RADIOPAQUE FAST SET ACRYL RESIN HI VISC SIMPLEXHV - [id number]  CEMENT BNE RADIOPAQUE FAST SET ACRYL RESIN HI VISC SIMPLEXHV  [employer] [id number] Left 2 Implanted   INSERT TIB CNDYL STBL 5 9 MM KNEE 5/PK X3 TRIATHLON - [id number]  INSERT TIB CNDYL STBL 5 9 MM KNEE 5/PK X3 TRIATHLON  [employer] [id number] Left 1 Implanted   COMPONENT FEM SZ 4 L KNEE CRUCE RET CEMENTLESS BEAD W/ JONATHAN - [id number]  COMPONENT FEM SZ 4 L KNEE CRUCE RET CEMENTLESS BEAD W/ JONATHAN  [employer] [id number] Left 1 Implanted   BASEPLATE TIB SZ 5 AP49MM ML74MM KNEE TRITANIUM 4 CRUCFRM - [id number]  BASEPLATE TIB SZ 5 AP49MM ML74MM KNEE TRITANIUM 4 CRUCFRM  [employer] [id number] Left 1 Implanted         Drains: * No LDAs found *    Findings:  Present At Time Of Surgery (PATOS) (choose all levels that have infection present):  No infection present  Other Findings: Robotic assisted left total knee arthroplasty, intra-articular cortisone injection right knee    Electronically signed by Alex Salas DO on 7/21/2025 at 10:57 AM

## 2025-07-21 NOTE — ANESTHESIA PROCEDURE NOTES
Spinal Block    Patient location during procedure: OR  End time: 7/21/2025 9:44 AM  Reason for block: post-op pain management and primary anesthetic  Staffing  Performed: resident/CRNA   Resident/CRNA: Esa Douglas APRN - CRNA  Performed by: Esa Douglas APRN - CRNA  Authorized by: Espinoza Camp DO    Spinal Block  Patient position: sitting  Prep: ChloraPrep and site prepped and draped  Patient monitoring: continuous pulse ox and frequent blood pressure checks  Approach: midline  Location: L3/L4  Provider prep: mask and sterile gloves  Local infiltration: lidocaine  Needle  Needle type: Pencan   Needle gauge: 25 G  Needle length: 3.5 in  Assessment  Sensory level: T4  Events: cerebrospinal fluid  Swirl obtained: Yes  CSF: clear  Attempts: 1  Hemodynamics: stable  Preanesthetic Checklist  Completed: patient identified, IV checked, site marked, risks and benefits discussed, surgical/procedural consents, equipment checked, pre-op evaluation, timeout performed, anesthesia consent given, oxygen available and monitors applied/VS acknowledged

## 2025-07-21 NOTE — ANESTHESIA POSTPROCEDURE EVALUATION
Department of Anesthesiology  Postprocedure Note    Patient: Angelita Slater  MRN: [id number]  YOB: 1955  Date of evaluation: 7/21/2025    Procedure Summary       Date: 07/21/25 Room / Location: [address]    Anesthesia Start: 0924 Anesthesia Stop: 1138    Procedure: LEFT KNEE MACIEJ ROBOTIC ASSISTED TOTAL ARTHROPLASTY  RIGHT KNEE STERIOD INJECTION (Left: Knee) Diagnosis:       Primary osteoarthritis of one knee, left      (Primary osteoarthritis of one knee, left [M17.12])    Surgeons: Alex Salas DO Responsible Provider: Espinoza Camp DO    Anesthesia Type: MAC, Spinal, Regional ASA Status: 4            Anesthesia Type: MAC, Spinal, Regional    Vandana Phase I: Vandana Score: 10    Vandana Phase II:      Anesthesia Post Evaluation    Patient location during evaluation: bedside  Patient participation: complete - patient participated  Level of consciousness: awake  Pain score: 1  Airway patency: patent  Nausea & Vomiting: no vomiting and no nausea  Cardiovascular status: hemodynamically stable  Respiratory status: acceptable  Hydration status: stable  Comments: Patient seen and examined.  Progressing as expected.  No anesthetic related questions or concerns at this time.  Residual spinal effect.  Multimodal analgesia pain management approach  Pain management: adequate    No notable events documented.

## 2025-07-21 NOTE — PROGRESS NOTES
Spiritual Health History and Assessment/Progress Note  [facility]    Initial Encounter, Spiritual/Emotional Needs,  ,  ,      Name: Angelita Slater MRN: [id number]    Age: 70 y.o.     Sex: female   Language: English   Congregational: Hindu   Primary osteoarthritis of one knee, left     Date: 7/21/2025                           [facility]        Referral/Consult From: Rounding   Encounter Overview/Reason: Initial Encounter, Spiritual/Emotional Needs  Service Provided For: Patient    Diane, Belief, Meaning:   Patient identifies as spiritual, is connected with a diane tradition or spiritual practice, and has beliefs or practices that help with coping during difficult times  Family/Friends No family/friends present      Importance and Influence:  Patient has spiritual/personal beliefs that influence decisions regarding their health  Family/Friends No family/friends present    Community:  Patient feels well-supported. Support system includes: Children and Extended family  Family/Friends No family/friends present    Assessment and Plan of Care:     Patient Interventions include: Facilitated expression of thoughts and feelings, Explored spiritual coping/struggle/distress, Affirmed coping skills/support systems, and Provided sacramental/Advent ritual  Family/Friends Interventions include: No family/friends present    Patient Plan of Care: Spiritual Care available upon further referral  Family/Friends Plan of Care: Spiritual Care available upon further referral    Electronically signed by MATEO Valderrama on 7/21/2025 at 3:19 PM

## 2025-07-21 NOTE — H&P
Department of Orthopedic Surgery  Attending History and Physical      HISTORY OF PRESENT ILLNESS:                The patient is a 70 y.o. female who presents with bilateral knee osteoarthritis. They have failed extensive conservative treatment including supervised home exercise program, weight loss, activity modification, topical and oral medications. . They are here today for left knee Gio robotic assisted total arthroplasty, right knee cortisone injection.     Past Medical History:        Diagnosis Date    Adrenal incidentaloma     Anxiety     Arthritis     Asthma     Bladder incontinence     Cancer (HCC) Dx 2009    multiple Myeloma, in remission currently     Chronic back pain     COPD (chronic obstructive pulmonary disease) (HCC)     on O2 2 liters  (uses with activity and at night to sleep)    Depression     Encounter for screening colonoscopy     Fibromyalgia     GERD (gastroesophageal reflux disease)     Hyperlipidemia     Hypertension     Hypothyroidism     MGUS (monoclonal gammopathy of unknown significance)     Neuropathy     Prolonged emergence from general anesthesia     Sleep apnea     Type II or unspecified type diabetes mellitus without mention of complication, not stated as uncontrolled        Past Surgical History:        Procedure Laterality Date    ANESTHESIA NERVE BLOCK Bilateral 08/10/2020    BILATERAL L3 L4 MEDIAL BRANCH L5 DORSSAL RAMUS NERVE BLOCK (CPT 89078) SEDATION performed by Campos Benton MD at [facility] OR    COLONOSCOPY  07/20/2016    removed 3 polyps (tubular adenomas), diverticulosis, Dr. Dwyer    COLONOSCOPY  2008    approximately 2008, no report available, per patient some polyps removed, Dr Myers, [facility]    COLONOSCOPY N/A 11/09/2020    Small sessile polyp distal ascending colon removed with bx/cauterized (path Tubular Adenoma), right and left diverticulosis without diverticulitis, Dr. Botello, [facility]    COLONOSCOPY  11/09/2020    Small sessile polyp distal

## 2025-07-22 ENCOUNTER — TELEPHONE (OUTPATIENT)
Dept: PRIMARY CARE CLINIC | Age: 70
End: 2025-07-22

## 2025-07-22 LAB
ERYTHROCYTE [DISTWIDTH] IN BLOOD BY AUTOMATED COUNT: 12.5 % (ref 11.5–15)
HCT VFR BLD AUTO: 33.3 % (ref 34–48)
HGB BLD-MCNC: 10.4 G/DL (ref 11.5–15.5)
MCH RBC QN AUTO: 27.9 PG (ref 26–35)
MCHC RBC AUTO-ENTMCNC: 31.2 G/DL (ref 32–34.5)
MCV RBC AUTO: 89.3 FL (ref 80–99.9)
PLATELET # BLD AUTO: 276 K/UL (ref 130–450)
PMV BLD AUTO: 10.7 FL (ref 7–12)
RBC # BLD AUTO: 3.73 M/UL (ref 3.5–5.5)
WBC OTHER # BLD: 7.9 K/UL (ref 4.5–11.5)

## 2025-07-22 PROCEDURE — 6370000000 HC RX 637 (ALT 250 FOR IP): Performed by: STUDENT IN AN ORGANIZED HEALTH CARE EDUCATION/TRAINING PROGRAM

## 2025-07-22 PROCEDURE — 96374 THER/PROPH/DIAG INJ IV PUSH: CPT

## 2025-07-22 PROCEDURE — 6360000002 HC RX W HCPCS

## 2025-07-22 PROCEDURE — G0378 HOSPITAL OBSERVATION PER HR: HCPCS

## 2025-07-22 PROCEDURE — 97110 THERAPEUTIC EXERCISES: CPT

## 2025-07-22 PROCEDURE — 2700000000 HC OXYGEN THERAPY PER DAY

## 2025-07-22 PROCEDURE — 85027 COMPLETE CBC AUTOMATED: CPT

## 2025-07-22 PROCEDURE — 2500000003 HC RX 250 WO HCPCS

## 2025-07-22 PROCEDURE — 6370000000 HC RX 637 (ALT 250 FOR IP)

## 2025-07-22 PROCEDURE — 97530 THERAPEUTIC ACTIVITIES: CPT

## 2025-07-22 PROCEDURE — 97161 PT EVAL LOW COMPLEX 20 MIN: CPT

## 2025-07-22 RX ORDER — DIPHENHYDRAMINE HCL 25 MG
25 TABLET ORAL EVERY 6 HOURS PRN
Status: DISCONTINUED | OUTPATIENT
Start: 2025-07-22 | End: 2025-07-23 | Stop reason: HOSPADM

## 2025-07-22 RX ORDER — METHOCARBAMOL 750 MG/1
750 TABLET, FILM COATED ORAL 3 TIMES DAILY
Qty: 90 TABLET | Refills: 0 | Status: SHIPPED | OUTPATIENT
Start: 2025-07-22 | End: 2025-08-21

## 2025-07-22 RX ORDER — ASPIRIN 81 MG/1
81 TABLET ORAL 2 TIMES DAILY
Qty: 56 TABLET | Refills: 0 | Status: SHIPPED | OUTPATIENT
Start: 2025-07-22

## 2025-07-22 RX ORDER — OXYCODONE HYDROCHLORIDE 5 MG/1
5 TABLET ORAL EVERY 6 HOURS PRN
Qty: 28 TABLET | Refills: 0 | Status: SHIPPED | OUTPATIENT
Start: 2025-07-22 | End: 2025-07-28 | Stop reason: ALTCHOICE

## 2025-07-22 RX ADMIN — WATER 2000 MG: 1 INJECTION INTRAMUSCULAR; INTRAVENOUS; SUBCUTANEOUS at 02:24

## 2025-07-22 RX ADMIN — ASPIRIN 81 MG: 81 TABLET, COATED ORAL at 10:34

## 2025-07-22 RX ADMIN — ACETAMINOPHEN 650 MG: 325 TABLET ORAL at 02:24

## 2025-07-22 RX ADMIN — ACETAMINOPHEN 650 MG: 325 TABLET ORAL at 10:34

## 2025-07-22 RX ADMIN — ACETAMINOPHEN 650 MG: 325 TABLET ORAL at 15:23

## 2025-07-22 RX ADMIN — OXYCODONE 10 MG: 5 TABLET ORAL at 00:38

## 2025-07-22 RX ADMIN — DIPHENHYDRAMINE HCL 25 MG: 25 TABLET ORAL at 11:32

## 2025-07-22 RX ADMIN — BENZOCAINE AND MENTHOL 1 LOZENGE: 15; 3.6 LOZENGE ORAL at 05:06

## 2025-07-22 RX ADMIN — BENZOCAINE AND MENTHOL 1 LOZENGE: 15; 3.6 LOZENGE ORAL at 10:35

## 2025-07-22 RX ADMIN — OXYCODONE 10 MG: 5 TABLET ORAL at 15:23

## 2025-07-22 RX ADMIN — ACETAMINOPHEN 650 MG: 325 TABLET ORAL at 19:49

## 2025-07-22 RX ADMIN — DIPHENHYDRAMINE HCL 25 MG: 25 TABLET ORAL at 19:52

## 2025-07-22 RX ADMIN — MORPHINE SULFATE 4 MG: 4 INJECTION, SOLUTION INTRAMUSCULAR; INTRAVENOUS at 02:10

## 2025-07-22 RX ADMIN — SODIUM CHLORIDE, PRESERVATIVE FREE 10 ML: 5 INJECTION INTRAVENOUS at 10:18

## 2025-07-22 RX ADMIN — OXYCODONE 10 MG: 5 TABLET ORAL at 05:05

## 2025-07-22 RX ADMIN — OXYCODONE 10 MG: 5 TABLET ORAL at 10:33

## 2025-07-22 RX ADMIN — OXYCODONE 10 MG: 5 TABLET ORAL at 19:49

## 2025-07-22 RX ADMIN — ASPIRIN 81 MG: 81 TABLET, COATED ORAL at 19:49

## 2025-07-22 ASSESSMENT — PAIN SCALES - GENERAL
PAINLEVEL_OUTOF10: 3
PAINLEVEL_OUTOF10: 6
PAINLEVEL_OUTOF10: 9
PAINLEVEL_OUTOF10: 8
PAINLEVEL_OUTOF10: 8
PAINLEVEL_OUTOF10: 9
PAINLEVEL_OUTOF10: 8
PAINLEVEL_OUTOF10: 9
PAINLEVEL_OUTOF10: 8
PAINLEVEL_OUTOF10: 8
PAINLEVEL_OUTOF10: 10
PAINLEVEL_OUTOF10: 8

## 2025-07-22 ASSESSMENT — PAIN DESCRIPTION - ORIENTATION
ORIENTATION: LEFT

## 2025-07-22 ASSESSMENT — PAIN - FUNCTIONAL ASSESSMENT
PAIN_FUNCTIONAL_ASSESSMENT: PREVENTS OR INTERFERES SOME ACTIVE ACTIVITIES AND ADLS

## 2025-07-22 ASSESSMENT — PAIN DESCRIPTION - LOCATION
LOCATION: KNEE
LOCATION: LEG
LOCATION: KNEE

## 2025-07-22 ASSESSMENT — PAIN DESCRIPTION - PAIN TYPE: TYPE: SURGICAL PAIN

## 2025-07-22 ASSESSMENT — PAIN DESCRIPTION - DESCRIPTORS
DESCRIPTORS: ACHING
DESCRIPTORS: ACHING;DISCOMFORT;SORE

## 2025-07-22 NOTE — CARE COORDINATION
Met with patient about diagnosis and discharge plan of care. POD#1 left TKA. Pt had previous ortho surgery in March 2025. Lives with son in ranch home. Has wheeled walker, high commode, tub shower, extended tub bench. Pt went to rehab last time, wants home. Plan is home with [facility]-orders completed. PCP is Dr Mckeon. Will follow-[facility]

## 2025-07-22 NOTE — PLAN OF CARE
Problem: Discharge Planning  Goal: Discharge to home or other facility with appropriate resources  7/21/2025 2129 by Bozena Bright RN  Outcome: Progressing     Problem: Pain  Goal: Verbalizes/displays adequate comfort level or baseline comfort level  7/21/2025 2129 by Bozena Bright RN  Outcome: Progressing     Problem: Safety - Adult  Goal: Free from fall injury  7/21/2025 2129 by Bozena Bright RN  Outcome: Progressing     Problem: Chronic Conditions and Co-morbidities  Goal: Patient's chronic conditions and co-morbidity symptoms are monitored and maintained or improved  7/21/2025 2129 by Bozena Bright RN  Outcome: Progressing

## 2025-07-22 NOTE — PROGRESS NOTES
Physical Therapy  Facility/Department: [address]  Physical Therapy Initial Assessment    Name: Angelita Slater  : 1955  MRN: [id number]  Date of Service: 2025    Discharge Recommendations:             Patient Diagnosis(es): The primary encounter diagnosis was Primary osteoarthritis of one knee, left. Diagnoses of Postoperative pain and S/P total knee arthroplasty, left were also pertinent to this visit.  Past Medical History:  has a past medical history of Adrenal incidentaloma, Anxiety, Arthritis, Asthma, Bladder incontinence, Cancer (HCC), Chronic back pain, COPD (chronic obstructive pulmonary disease) (HCC), Depression, Encounter for screening colonoscopy, Fibromyalgia, GERD (gastroesophageal reflux disease), Hyperlipidemia, Hypertension, Hypothyroidism, MGUS (monoclonal gammopathy of unknown significance), Neuropathy, Prolonged emergence from general anesthesia, Sleep apnea, and Type II or unspecified type diabetes mellitus without mention of complication, not stated as uncontrolled.  Past Surgical History:  has a past surgical history that includes knee surgery (Left, ); Upper gastrointestinal endoscopy (); Colonoscopy (2016); Upper gastrointestinal endoscopy (2016); Nerve Block (Bilateral, 2016); Nerve Block (2020); Pain management procedure (N/A, 2020); Nerve Block (Bilateral, 08/10/2020); Anesthesia Nerve Block (Bilateral, 08/10/2020); other surgical history (2016); Colonoscopy (); Upper gastrointestinal endoscopy (); Upper gastrointestinal endoscopy (N/A, 2020); Colonoscopy (N/A, 2020); Colonoscopy (2020); Dilation and curettage of uterus (N/A, 2021); Upper gastrointestinal endoscopy (N/A, 2023); sigmoidoscopy (N/A, 2023); Tonsillectomy; Total hip arthroplasty (Right, 3/10/2025); and Total knee arthroplasty (Left, 2025).       Requires PT Follow-Up: Yes    Evaluating Therapist: Quita  APs, QS, and GS. Mobility as above. Performed seated LAQS and knee flexion while sitting EOB. Slow gabby and decreased gait tolerance.     Treatment:  Pt was instructed on the following :   -Bed mobility : including sequencing and technique  -Transfers: hand and foot placement, controlled movement with stand to sit  - Gait: proper use of ww , sequencing, and posture            Pt educated on fall risk, safety with mobility        Patient response to education:   Pt verbalized understanding Pt demonstrated skill Pt requires further education in this area   x  With cues   x       Comments:  Pt left  in chair after session, with call light in reach.      Rehab potential is Good for reaching above PT goals.    Pt’s/ family goals   1.  Home, recover     Patient and or family understand(s) diagnosis, prognosis, and plan of care. -  yes     PLAN  PT care will be provided in accordance with the objectives noted above.  Whenever appropriate, clear delegation orders will be provided for nursing staff.  Exercises and functional mobility practice will be used as well as appropriate assistive devices or modalities to obtain goals. Patient and family education will also be administered as needed.        PLAN OF CARE:    Current Treatment Recommendations     [x] Strengthening to improve independence with functional mobility   [x] ROM to improve independence with functional mobility   [x] Balance Training to improve static/dynamic balance and to reduce fall risk  [x] Endurance Training to improve activity tolerance during functional mobility   [x] Transfer Training to improve safety and independence with all functional transfers   [x] Gait Training to improve gait mechanics, endurance and assess need for appropriate assistive device  [x] Stair Training in preparation for safe discharge home and/or into the community   [x] Positioning to prevent skin breakdown and contractures  [x] Safety and Education Training   [x]

## 2025-07-22 NOTE — PROGRESS NOTES
Department of Orthopedic Surgery   Progress Note    Patient seen and examined, Post Op Day # 1. Pain controlled. No new complaints.  Denies chest pain, shortness of breath, calf pain, dizziness/lightheadedness. Denies fevers or chills. Denies N/V. positive Void. positive Flatus, negative BM.  She has done ambulated to the bedside commode with nursing.  She has not yet worked with physical therapy.    VITALS:  /74   Pulse 71   Temp 97.9 °F (36.6 °C) (Oral)   Resp 18   Ht 1.676 m (5' 6\")   Wt 102.5 kg (226 lb)   SpO2 96%   BMI 36.48 kg/m²     GENERAL: alert, cooperative, and no distress  MUSCULOSKELETAL:   left lower extremity:  Aquacel to the left knee clean, dry, and intact  Band-Aid to the right knee clean dry and intact  Compartments soft and compressible, calf non-tender  Palpable dorsalis pedis and posterior tibialis pulse, brisk cap refill to toes, foot warm and perfused  Sensation intact to light touch in sural/deep peroneal/superficial peroneal/saphenous/posterior tibial nerve distributions to foot/ankle.  Demonstrates active ankle plantar/dorsiflexion/great toe extension    CBC:   Lab Results   Component Value Date/Time    WBC 7.9 07/22/2025 05:15 AM    HGB 10.4 07/22/2025 05:15 AM    HCT 33.3 07/22/2025 05:15 AM     07/22/2025 05:15 AM         ASSESSMENT  S/P left knee Gio robotic assisted total arthroplasty; right knee cortisone injection- POD #1    PLAN    Continue physical therapy and protocol: WBAT -left LE  24 hour abx coverage, completed  Deep venous thrombosis prophylaxis -aspirin 81 mg twice daily, early mobilization  PT/OT, appreciate input  Pain Control: IV and PO, wean as tolerated  Monitor H&H, 10.4/23.3  D/C Plan: Okay to discharge from orthopedic perspective pending physical therapy evaluation.  She will be discharged home with adequate pain control and DVT prophylaxis.  She will follow-up in office in 2 weeks.  Discussed with Dr. Alex Salas, DO     Electronically signed

## 2025-07-22 NOTE — TELEPHONE ENCOUNTER
[employer] requested a printed script to cont with Angelita's meals.  This is required every 6 mo.

## 2025-07-22 NOTE — PROGRESS NOTES
minutes  [] Neuromuscular reeducation 21847  minutes         Quiat Chan  License number:  [id number]

## 2025-07-22 NOTE — ACP (ADVANCE CARE PLANNING)
Advance Care Planning   Healthcare Decision Maker:    Primary Decision Maker: Annette Beyer - Other Relative - [phone]    Secondary Decision Maker: EmirMarcial - Child - [phone]    Supplemental (Other) Decision Maker: Rosas Slater - Child - [phone]    Click here to complete Healthcare Decision Makers including selection of the Healthcare Decision Maker Relationship (ie \"Primary\").

## 2025-07-22 NOTE — PROGRESS NOTES
Patient's IV came out of her hand and was kinked.  Removed IV as it would not work.  Patient did not want a new IV inserted and she is a hard stick. The only IV medication she had ordered was morphine.  She had only had one dose and I was told in report earlier that it did not help her pain.

## 2025-07-23 VITALS
SYSTOLIC BLOOD PRESSURE: 163 MMHG | OXYGEN SATURATION: 100 % | DIASTOLIC BLOOD PRESSURE: 83 MMHG | HEIGHT: 66 IN | WEIGHT: 226 LBS | HEART RATE: 64 BPM | BODY MASS INDEX: 36.32 KG/M2 | RESPIRATION RATE: 18 BRPM | TEMPERATURE: 97.9 F

## 2025-07-23 PROCEDURE — G0378 HOSPITAL OBSERVATION PER HR: HCPCS

## 2025-07-23 PROCEDURE — 97110 THERAPEUTIC EXERCISES: CPT

## 2025-07-23 PROCEDURE — 2700000000 HC OXYGEN THERAPY PER DAY

## 2025-07-23 PROCEDURE — 97530 THERAPEUTIC ACTIVITIES: CPT

## 2025-07-23 PROCEDURE — 6370000000 HC RX 637 (ALT 250 FOR IP)

## 2025-07-23 RX ORDER — DOCUSATE SODIUM 100 MG/1
100 CAPSULE, LIQUID FILLED ORAL 2 TIMES DAILY
Status: DISCONTINUED | OUTPATIENT
Start: 2025-07-23 | End: 2025-07-23 | Stop reason: HOSPADM

## 2025-07-23 RX ORDER — DOCUSATE SODIUM 100 MG/1
100 CAPSULE, LIQUID FILLED ORAL 2 TIMES DAILY
Qty: 60 CAPSULE | Refills: 0 | Status: SHIPPED | OUTPATIENT
Start: 2025-07-23 | End: 2025-08-22

## 2025-07-23 RX ADMIN — ACETAMINOPHEN 650 MG: 325 TABLET ORAL at 07:44

## 2025-07-23 RX ADMIN — DOCUSATE SODIUM 100 MG: 100 CAPSULE, LIQUID FILLED ORAL at 08:01

## 2025-07-23 RX ADMIN — ACETAMINOPHEN 650 MG: 325 TABLET ORAL at 03:58

## 2025-07-23 RX ADMIN — OXYCODONE 10 MG: 5 TABLET ORAL at 07:43

## 2025-07-23 RX ADMIN — BENZOCAINE AND MENTHOL 1 LOZENGE: 15; 3.6 LOZENGE ORAL at 08:01

## 2025-07-23 RX ADMIN — OXYCODONE 10 MG: 5 TABLET ORAL at 15:47

## 2025-07-23 RX ADMIN — ASPIRIN 81 MG: 81 TABLET, COATED ORAL at 07:44

## 2025-07-23 RX ADMIN — ACETAMINOPHEN 650 MG: 325 TABLET ORAL at 15:47

## 2025-07-23 ASSESSMENT — PAIN DESCRIPTION - LOCATION
LOCATION: KNEE

## 2025-07-23 ASSESSMENT — PAIN SCALES - GENERAL
PAINLEVEL_OUTOF10: 8
PAINLEVEL_OUTOF10: 10
PAINLEVEL_OUTOF10: 9

## 2025-07-23 ASSESSMENT — PAIN DESCRIPTION - PAIN TYPE: TYPE: SURGICAL PAIN

## 2025-07-23 ASSESSMENT — PAIN DESCRIPTION - ORIENTATION
ORIENTATION: RIGHT
ORIENTATION: LEFT
ORIENTATION: LEFT

## 2025-07-23 ASSESSMENT — PAIN DESCRIPTION - DESCRIPTORS
DESCRIPTORS: SHARP;ACHING
DESCRIPTORS: ACHING;SHARP
DESCRIPTORS: ACHING;DISCOMFORT;SORE

## 2025-07-23 NOTE — PROGRESS NOTES
Physical Therapy  Facility/Department: [address] [facility]  Daily Treatment Note  NAME: Angelita Slater  : 1955  MRN: [id number]    Date of Service: 2025              Patient Diagnosis(es): The primary encounter diagnosis was Primary osteoarthritis of one knee, left. Diagnoses of Postoperative pain and S/P total knee arthroplasty, left were also pertinent to this visit.  Past Medical History:  has a past medical history of Adrenal incidentaloma, Anxiety, Arthritis, Asthma, Bladder incontinence, Cancer (HCC), Chronic back pain, COPD (chronic obstructive pulmonary disease) (HCC), Depression, Encounter for screening colonoscopy, Fibromyalgia, GERD (gastroesophageal reflux disease), Hyperlipidemia, Hypertension, Hypothyroidism, MGUS (monoclonal gammopathy of unknown significance), Neuropathy, Prolonged emergence from general anesthesia, Sleep apnea, and Type II or unspecified type diabetes mellitus without mention of complication, not stated as uncontrolled.  Past Surgical History:  has a past surgical history that includes knee surgery (Left, ); Upper gastrointestinal endoscopy (); Colonoscopy (2016); Upper gastrointestinal endoscopy (2016); Nerve Block (Bilateral, 2016); Nerve Block (2020); Pain management procedure (N/A, 2020); Nerve Block (Bilateral, 08/10/2020); Anesthesia Nerve Block (Bilateral, 08/10/2020); other surgical history (2016); Colonoscopy (); Upper gastrointestinal endoscopy (); Upper gastrointestinal endoscopy (N/A, 2020); Colonoscopy (N/A, 2020); Colonoscopy (2020); Dilation and curettage of uterus (N/A, 2021); Upper gastrointestinal endoscopy (N/A, 2023); sigmoidoscopy (N/A, 2023); Tonsillectomy; Total hip arthroplasty (Right, 3/10/2025); and Total knee arthroplasty (Left, 2025).     Requires PT Follow-Up: Yes     Evaluating Therapist: Quita Chan PT      Referring Provider:

## 2025-07-23 NOTE — PROGRESS NOTES
Physical Therapy  Facility/Department: [address] [facility]  Daily Treatment Note  NAME: Angelita Slater  : 1955  MRN: [id number]    Date of Service: 2025              Patient Diagnosis(es): The primary encounter diagnosis was Primary osteoarthritis of one knee, left. Diagnoses of Postoperative pain and S/P total knee arthroplasty, left were also pertinent to this visit.  Past Medical History:  has a past medical history of Adrenal incidentaloma, Anxiety, Arthritis, Asthma, Bladder incontinence, Cancer (HCC), Chronic back pain, COPD (chronic obstructive pulmonary disease) (HCC), Depression, Encounter for screening colonoscopy, Fibromyalgia, GERD (gastroesophageal reflux disease), Hyperlipidemia, Hypertension, Hypothyroidism, MGUS (monoclonal gammopathy of unknown significance), Neuropathy, Prolonged emergence from general anesthesia, Sleep apnea, and Type II or unspecified type diabetes mellitus without mention of complication, not stated as uncontrolled.  Past Surgical History:  has a past surgical history that includes knee surgery (Left, ); Upper gastrointestinal endoscopy (); Colonoscopy (2016); Upper gastrointestinal endoscopy (2016); Nerve Block (Bilateral, 2016); Nerve Block (2020); Pain management procedure (N/A, 2020); Nerve Block (Bilateral, 08/10/2020); Anesthesia Nerve Block (Bilateral, 08/10/2020); other surgical history (2016); Colonoscopy (); Upper gastrointestinal endoscopy (); Upper gastrointestinal endoscopy (N/A, 2020); Colonoscopy (N/A, 2020); Colonoscopy (2020); Dilation and curettage of uterus (N/A, 2021); Upper gastrointestinal endoscopy (N/A, 2023); sigmoidoscopy (N/A, 2023); Tonsillectomy; Total hip arthroplasty (Right, 3/10/2025); and Total knee arthroplasty (Left, 2025).     Requires PT Follow-Up: Yes     Evaluating Therapist: Quita Chan PT      Referring Provider:

## 2025-07-23 NOTE — PLAN OF CARE
Problem: Discharge Planning  Goal: Discharge to home or other facility with appropriate resources  7/22/2025 2221 by Lyric Higgins RN  Outcome: Progressing  Flowsheets (Taken 7/22/2025 2000)  Discharge to home or other facility with appropriate resources: Identify barriers to discharge with patient and caregiver  7/22/2025 1950 by Beharry, Phyllis, RN  Outcome: Progressing     Problem: Pain  Goal: Verbalizes/displays adequate comfort level or baseline comfort level  7/22/2025 2221 by Lyric Higgins RN  Outcome: Progressing  7/22/2025 1950 by Beharry, Phyllis, RN  Outcome: Progressing     Problem: Safety - Adult  Goal: Free from fall injury  7/22/2025 2221 by Lyric Higgins RN  Outcome: Progressing  7/22/2025 1950 by Beharry, Phyllis, RN  Outcome: Progressing     Problem: Chronic Conditions and Co-morbidities  Goal: Patient's chronic conditions and co-morbidity symptoms are monitored and maintained or improved  7/22/2025 2221 by Lyric Higgins RN  Outcome: Progressing  7/22/2025 1950 by Beharry, Phyllis, RN  Outcome: Progressing

## 2025-07-23 NOTE — PROGRESS NOTES
Department of Orthopedic Surgery   Progress Note    Patient seen and examined, Post Op Day # 2.  She is sitting up in her chair and doing well.  Pain controlled.  No new complaints.  Denies chest pain, shortness of breath, calf pain, dizziness/lightheadedness. Denies fevers or chills. Denies N/V. positive Void. positive Flatus, negative BM.  She is ambulating with a walker with nursing and physical therapy.    VITALS:  BP (!) 163/83   Pulse 64   Temp 97.9 °F (36.6 °C) (Oral)   Resp 20   Ht 1.676 m (5' 6\")   Wt 102.5 kg (226 lb)   SpO2 100%   BMI 36.48 kg/m²     GENERAL: alert, cooperative, and no distress  MUSCULOSKELETAL:   left lower extremity:  Aquacel clean, dry, and intact  Compartments soft and compressible, calf non-tender  Palpable dorsalis pedis and posterior tibialis pulse, brisk cap refill to toes, foot warm and perfused  Sensation intact to light touch in sural/deep peroneal/superficial peroneal/saphenous/posterior tibial nerve distributions to foot/ankle.  Demonstrates active ankle plantar/dorsiflexion/great toe extension    CBC:   Lab Results   Component Value Date/Time    WBC 7.9 07/22/2025 05:15 AM    HGB 10.4 07/22/2025 05:15 AM    HCT 33.3 07/22/2025 05:15 AM     07/22/2025 05:15 AM         ASSESSMENT  S/P left knee Gio robotic assisted total knee arthroplasty- POD #2    PLAN    Continue physical therapy and protocol: WBAT -left LE  24 hour abx coverage, completed  Deep venous thrombosis prophylaxis -aspirin 81 mg twice daily, early mobilization  Colace BID daily ordered  PT/OT, appreciate input  Pain Control: IV and PO, wean as tolerated  D/C Plan: Okay to discharge home today.  She will be sent home with adequate pain control and DVT prophylaxis.  She will follow-up in office in 2 weeks.  Discussed with Dr. Alex Salas DO     Electronically signed by BRYAN Peraza CNP on 7/23/2025 at 8:01 AM

## 2025-07-23 NOTE — PLAN OF CARE
Problem: Discharge Planning  Goal: Discharge to home or other facility with appropriate resources  Outcome: Completed     Problem: Pain  Goal: Verbalizes/displays adequate comfort level or baseline comfort level  Outcome: Completed     Problem: Safety - Adult  Goal: Free from fall injury  Outcome: Completed     Problem: Chronic Conditions and Co-morbidities  Goal: Patient's chronic conditions and co-morbidity symptoms are monitored and maintained or improved  Outcome: Completed

## 2025-07-23 NOTE — DISCHARGE SUMMARY
Physician Discharge Summary     Patient ID:  Angelita Slater  [id number]  70 y.o.  1955    Admit date: 7/21/2025    Discharge date and time: No discharge date for patient encounter.     Admitting Physician: Alex Salas DO     Discharge Physician: Alex Salas DO    Admission Diagnoses: Primary osteoarthritis of one knee, left [M17.12]  S/P total knee arthroplasty, left [Z96.652]    Discharge Diagnoses: s/p left total Knee arthroplasty    Admission Condition: good    Discharged Condition: good    Hospital Course: The patient was admitted from the pre-operative holding area and underwent an uneventful course of left knee arthroplasty on 7/21/2025 by Alex Salas DO. The patient was subsequently taken to the PACU and to the floor in stable condition. The patient continued antibiotics 24 hours postoperatively, as they received a dose of antibiotics preoperatively for infection prophylaxis. The patient was to begin physical therapy, WBAT, the day of surgery. The patient was also started on DVT prophylaxis of aspirin twice daily, SCDs. Blood counts were followed daily. On POD #1 and #2, surgical dressing was assessed to be clean, dry and intact with no obvious signs of infection. The patient has met all goals with physical and occupational therapy and is to discharge home today POD #2.     Treatments: s/p left total Knee arthroplasty    Disposition: The patient was provided instructions to follow up with Alex Salas DO in 2 weeks and to call the office for an appointment. Dressing can be removed 7 days post-operatively. Patient was provided DVT prophylaxis, aspirin twice daily for at least 4 weeks and pain medication for adequate pain control up to 6 weeks post-operatively.       Signed:  BRYAN Peraza CNP  7/23/2025

## 2025-07-24 LAB — SURGICAL PATHOLOGY REPORT: NORMAL

## 2025-07-28 DIAGNOSIS — M17.12 PRIMARY OSTEOARTHRITIS OF ONE KNEE, LEFT: Primary | ICD-10-CM

## 2025-07-28 RX ORDER — OXYCODONE HYDROCHLORIDE 10 MG/1
10 TABLET ORAL EVERY 6 HOURS PRN
Qty: 28 TABLET | Refills: 0 | Status: SHIPPED | OUTPATIENT
Start: 2025-07-28 | End: 2025-08-04

## 2025-07-28 NOTE — PROGRESS NOTES
[facility] PT notified office that on visit today patient was complaining of 10 out of 10 pain.  Provided pain medication was not providing her any relief.  Increased pain medication to Oxy 10 every 6 hours for 1 week.  Resume normal dosing of 5 mg every 6 hours after this week for further refills.    Controlled Substance Monitoring:    Acute and Chronic Pain Monitoring:   RX Monitoring Periodic Controlled Substance Monitoring   7/28/2025  12:10 PM No signs of potential drug abuse or diversion identified.       Oxycodone sent to [facility] on [address]

## 2025-08-01 ENCOUNTER — TELEPHONE (OUTPATIENT)
Dept: PRIMARY CARE CLINIC | Age: 70
End: 2025-08-01

## 2025-08-01 ENCOUNTER — TELEPHONE (OUTPATIENT)
Dept: ORTHOPEDIC SURGERY | Age: 70
End: 2025-08-01

## 2025-08-01 NOTE — TELEPHONE ENCOUNTER
Spoke to Angelita and gave directions, she stated understanding.  I did suggest if over the weekend she has any worsening pain or does not resolve she should go to ED to be evaluated.

## 2025-08-01 NOTE — TELEPHONE ENCOUNTER
Procedure(s):  LEFT KNEE MACIEJ ROBOTIC ASSISTED TOTAL ARTHROPLASTY  RIGHT KNEE CORTISONE INJECTION  Date:  7/21/2025    Christine from [facility] called.  Patient has not had a bowel movement since surgery 7/21/2025.  She has been taking stool softeners. Now decreased appetite and nausea. Instructed Christine to contact or have patient contact her PCP Dr Mckeon for further instructions.

## 2025-08-01 NOTE — TELEPHONE ENCOUNTER
[facility] called to advise that Angelita's Surgeon is going to send over Milk of Magnesia.

## 2025-08-01 NOTE — TELEPHONE ENCOUNTER
Please send MOM Rx to pharmacy.  Patient and Areceli unable to get response from Dr Mckeon.  Thanks

## 2025-08-01 NOTE — TELEPHONE ENCOUNTER
[facility] calling about Angelita.  [facility] is looking to find out what Dr. Mckeon would like to do, because Angelita has not had a bowel movement since before 07/21/2025?      P# [phone] for [facility]

## 2025-08-06 ENCOUNTER — OFFICE VISIT (OUTPATIENT)
Dept: ORTHOPEDIC SURGERY | Age: 70
End: 2025-08-06

## 2025-08-06 VITALS
DIASTOLIC BLOOD PRESSURE: 84 MMHG | TEMPERATURE: 98.1 F | SYSTOLIC BLOOD PRESSURE: 165 MMHG | RESPIRATION RATE: 16 BRPM | HEART RATE: 105 BPM | OXYGEN SATURATION: 90 %

## 2025-08-06 DIAGNOSIS — Z96.652 S/P TOTAL KNEE ARTHROPLASTY, LEFT: Primary | ICD-10-CM

## 2025-08-06 PROCEDURE — 99024 POSTOP FOLLOW-UP VISIT: CPT

## 2025-08-08 ENCOUNTER — HOSPITAL ENCOUNTER (OUTPATIENT)
Dept: INFUSION THERAPY | Age: 70
Discharge: HOME | End: 2025-08-08

## 2025-08-08 ENCOUNTER — OFFICE VISIT (OUTPATIENT)
Age: 70
End: 2025-08-08
Payer: MEDICARE

## 2025-08-08 VITALS
HEIGHT: 66 IN | TEMPERATURE: 97 F | WEIGHT: 215.4 LBS | OXYGEN SATURATION: 93 % | HEART RATE: 106 BPM | DIASTOLIC BLOOD PRESSURE: 55 MMHG | BODY MASS INDEX: 34.62 KG/M2 | SYSTOLIC BLOOD PRESSURE: 112 MMHG

## 2025-08-08 DIAGNOSIS — D47.2 SMOLDERING MYELOMA: Primary | ICD-10-CM

## 2025-08-08 DIAGNOSIS — D64.9 ANEMIA, UNSPECIFIED TYPE: ICD-10-CM

## 2025-08-08 PROCEDURE — 99214 OFFICE O/P EST MOD 30 MIN: CPT | Performed by: INTERNAL MEDICINE

## 2025-08-08 PROCEDURE — 1123F ACP DISCUSS/DSCN MKR DOCD: CPT | Performed by: INTERNAL MEDICINE

## 2025-08-08 PROCEDURE — 1160F RVW MEDS BY RX/DR IN RCRD: CPT | Performed by: INTERNAL MEDICINE

## 2025-08-08 PROCEDURE — 3074F SYST BP LT 130 MM HG: CPT | Performed by: INTERNAL MEDICINE

## 2025-08-08 PROCEDURE — 1125F AMNT PAIN NOTED PAIN PRSNT: CPT | Performed by: INTERNAL MEDICINE

## 2025-08-08 PROCEDURE — 3078F DIAST BP <80 MM HG: CPT | Performed by: INTERNAL MEDICINE

## 2025-08-08 PROCEDURE — 1159F MED LIST DOCD IN RCRD: CPT | Performed by: INTERNAL MEDICINE

## 2025-08-14 ENCOUNTER — OFFICE VISIT (OUTPATIENT)
Dept: ORTHOPEDIC SURGERY | Age: 70
End: 2025-08-14

## 2025-08-14 VITALS
HEART RATE: 97 BPM | TEMPERATURE: 97.5 F | RESPIRATION RATE: 20 BRPM | SYSTOLIC BLOOD PRESSURE: 116 MMHG | OXYGEN SATURATION: 94 % | DIASTOLIC BLOOD PRESSURE: 65 MMHG

## 2025-08-14 DIAGNOSIS — Z96.652 S/P TOTAL KNEE ARTHROPLASTY, LEFT: Primary | ICD-10-CM

## 2025-08-14 PROCEDURE — 99024 POSTOP FOLLOW-UP VISIT: CPT | Performed by: STUDENT IN AN ORGANIZED HEALTH CARE EDUCATION/TRAINING PROGRAM

## 2025-08-14 RX ORDER — OXYCODONE HCL 10 MG/1
10 TABLET, FILM COATED, EXTENDED RELEASE ORAL EVERY 12 HOURS
COMMUNITY

## 2025-08-14 RX ORDER — OXYCODONE HYDROCHLORIDE 5 MG/1
5 TABLET ORAL EVERY 6 HOURS PRN
Qty: 28 TABLET | Refills: 0 | Status: SHIPPED | OUTPATIENT
Start: 2025-08-14 | End: 2025-08-21

## 2025-08-18 ENCOUNTER — TELEPHONE (OUTPATIENT)
Dept: INTERVENTIONAL RADIOLOGY/VASCULAR | Age: 70
End: 2025-08-18

## 2025-08-18 ENCOUNTER — OFFICE VISIT (OUTPATIENT)
Age: 70
End: 2025-08-18

## 2025-08-18 VITALS
BODY MASS INDEX: 34.58 KG/M2 | WEIGHT: 215.2 LBS | OXYGEN SATURATION: 92 % | HEART RATE: 92 BPM | SYSTOLIC BLOOD PRESSURE: 112 MMHG | TEMPERATURE: 98.8 F | DIASTOLIC BLOOD PRESSURE: 68 MMHG | RESPIRATION RATE: 18 BRPM | HEIGHT: 66 IN

## 2025-08-18 DIAGNOSIS — N95.0 PMB (POSTMENOPAUSAL BLEEDING): ICD-10-CM

## 2025-08-18 DIAGNOSIS — Z12.31 ENCOUNTER FOR SCREENING MAMMOGRAM FOR MALIGNANT NEOPLASM OF BREAST: ICD-10-CM

## 2025-08-18 DIAGNOSIS — Z01.411 ENCOUNTER FOR GYNECOLOGICAL EXAMINATION (GENERAL) (ROUTINE) WITH ABNORMAL FINDINGS: Primary | ICD-10-CM

## 2025-08-21 ENCOUNTER — APPOINTMENT (OUTPATIENT)
Dept: CT IMAGING | Age: 70
End: 2025-08-21
Payer: MEDICARE

## 2025-08-21 ENCOUNTER — HOSPITAL ENCOUNTER (EMERGENCY)
Age: 70
Discharge: HOME | End: 2025-08-21
Payer: MEDICARE

## 2025-08-21 VITALS
OXYGEN SATURATION: 95 % | DIASTOLIC BLOOD PRESSURE: 66 MMHG | TEMPERATURE: 98.1 F | HEART RATE: 87 BPM | SYSTOLIC BLOOD PRESSURE: 125 MMHG | RESPIRATION RATE: 16 BRPM

## 2025-08-21 DIAGNOSIS — R10.84 GENERALIZED ABDOMINAL PAIN: Primary | ICD-10-CM

## 2025-08-21 DIAGNOSIS — K59.00 CONSTIPATION, UNSPECIFIED CONSTIPATION TYPE: ICD-10-CM

## 2025-08-21 LAB
ALBUMIN SERPL-MCNC: 3.9 G/DL (ref 3.5–5.2)
ALP SERPL-CCNC: 129 U/L (ref 35–104)
ALT SERPL-CCNC: 15 U/L (ref 0–35)
ANION GAP SERPL CALCULATED.3IONS-SCNC: 15 MMOL/L (ref 7–16)
AST SERPL-CCNC: 30 U/L (ref 0–35)
BASOPHILS # BLD: 0.02 K/UL (ref 0–0.2)
BASOPHILS NFR BLD: 0 % (ref 0–2)
BILIRUB SERPL-MCNC: 0.6 MG/DL (ref 0–1.2)
BUN SERPL-MCNC: 9 MG/DL (ref 8–23)
CALCIUM SERPL-MCNC: 9.9 MG/DL (ref 8.8–10.2)
CHLORIDE SERPL-SCNC: 100 MMOL/L (ref 98–107)
CO2 SERPL-SCNC: 25 MMOL/L (ref 22–29)
CREAT SERPL-MCNC: 0.7 MG/DL (ref 0.5–1)
EOSINOPHIL # BLD: 0.09 K/UL (ref 0.05–0.5)
EOSINOPHILS RELATIVE PERCENT: 2 % (ref 0–6)
ERYTHROCYTE [DISTWIDTH] IN BLOOD BY AUTOMATED COUNT: 14.1 % (ref 11.5–15)
GFR, ESTIMATED: >90 ML/MIN/1.73M2
GLUCOSE SERPL-MCNC: 154 MG/DL (ref 74–99)
HCT VFR BLD AUTO: 36.4 % (ref 34–48)
HGB BLD-MCNC: 11.1 G/DL (ref 11.5–15.5)
IMM GRANULOCYTES # BLD AUTO: <0.03 K/UL (ref 0–0.58)
IMM GRANULOCYTES NFR BLD: 0 % (ref 0–5)
LACTATE BLDV-SCNC: 1.6 MMOL/L (ref 0.5–2.2)
LIPASE SERPL-CCNC: 21 U/L (ref 13–60)
LYMPHOCYTES NFR BLD: 1.91 K/UL (ref 1.5–4)
LYMPHOCYTES RELATIVE PERCENT: 33 % (ref 20–42)
MCH RBC QN AUTO: 28.2 PG (ref 26–35)
MCHC RBC AUTO-ENTMCNC: 30.5 G/DL (ref 32–34.5)
MCV RBC AUTO: 92.6 FL (ref 80–99.9)
MONOCYTES NFR BLD: 0.34 K/UL (ref 0.1–0.95)
MONOCYTES NFR BLD: 6 % (ref 2–12)
NEUTROPHILS NFR BLD: 59 % (ref 43–80)
NEUTS SEG NFR BLD: 3.35 K/UL (ref 1.8–7.3)
PLATELET # BLD AUTO: 417 K/UL (ref 130–450)
PMV BLD AUTO: 10.2 FL (ref 7–12)
POTASSIUM SERPL-SCNC: 3.8 MMOL/L (ref 3.5–5.1)
PROT SERPL-MCNC: 8.5 G/DL (ref 6.4–8.3)
RBC # BLD AUTO: 3.93 M/UL (ref 3.5–5.5)
SODIUM SERPL-SCNC: 139 MMOL/L (ref 136–145)
SURGICAL PATHOLOGY REPORT: NORMAL
WBC OTHER # BLD: 5.7 K/UL (ref 4.5–11.5)

## 2025-08-21 PROCEDURE — 6360000004 HC RX CONTRAST MEDICATION: Performed by: RADIOLOGY

## 2025-08-21 PROCEDURE — 83605 ASSAY OF LACTIC ACID: CPT

## 2025-08-21 PROCEDURE — 85025 COMPLETE CBC W/AUTO DIFF WBC: CPT

## 2025-08-21 PROCEDURE — 80053 COMPREHEN METABOLIC PANEL: CPT

## 2025-08-21 PROCEDURE — 83690 ASSAY OF LIPASE: CPT

## 2025-08-21 PROCEDURE — 6370000000 HC RX 637 (ALT 250 FOR IP)

## 2025-08-21 PROCEDURE — 99285 EMERGENCY DEPT VISIT HI MDM: CPT

## 2025-08-21 PROCEDURE — 74177 CT ABD & PELVIS W/CONTRAST: CPT

## 2025-08-21 RX ORDER — POLYETHYLENE GLYCOL 3350 17 G/17G
17 POWDER, FOR SOLUTION ORAL DAILY
Qty: 510 G | Refills: 0 | Status: SHIPPED | OUTPATIENT
Start: 2025-08-21 | End: 2025-09-20

## 2025-08-21 RX ORDER — SODIUM CHLORIDE 0.9 % (FLUSH) 0.9 %
10 SYRINGE (ML) INJECTION
Status: DISCONTINUED | OUTPATIENT
Start: 2025-08-21 | End: 2025-08-21 | Stop reason: HOSPADM

## 2025-08-21 RX ORDER — SENNA AND DOCUSATE SODIUM 50; 8.6 MG/1; MG/1
1 TABLET, FILM COATED ORAL 2 TIMES DAILY
Qty: 20 TABLET | Refills: 0 | Status: SHIPPED | OUTPATIENT
Start: 2025-08-21 | End: 2025-08-31

## 2025-08-21 RX ORDER — IOPAMIDOL 755 MG/ML
75 INJECTION, SOLUTION INTRAVASCULAR
Status: COMPLETED | OUTPATIENT
Start: 2025-08-21 | End: 2025-08-21

## 2025-08-21 RX ORDER — ACETAMINOPHEN 325 MG/1
TABLET ORAL
Status: DISCONTINUED
Start: 2025-08-21 | End: 2025-08-21 | Stop reason: HOSPADM

## 2025-08-21 RX ORDER — ACETAMINOPHEN 325 MG/1
650 TABLET ORAL ONCE
Status: COMPLETED | OUTPATIENT
Start: 2025-08-21 | End: 2025-08-21

## 2025-08-21 RX ADMIN — ACETAMINOPHEN 650 MG: 325 TABLET ORAL at 12:45

## 2025-08-21 RX ADMIN — IOPAMIDOL 75 ML: 755 INJECTION, SOLUTION INTRAVENOUS at 14:25

## 2025-08-21 ASSESSMENT — PAIN DESCRIPTION - ORIENTATION
ORIENTATION: LOWER
ORIENTATION: LOWER

## 2025-08-21 ASSESSMENT — PAIN SCALES - GENERAL
PAINLEVEL_OUTOF10: 5
PAINLEVEL_OUTOF10: 10

## 2025-08-21 ASSESSMENT — PAIN DESCRIPTION - LOCATION
LOCATION: ABDOMEN
LOCATION: ABDOMEN

## 2025-08-21 ASSESSMENT — PAIN DESCRIPTION - DESCRIPTORS: DESCRIPTORS: ACHING;SQUEEZING;THROBBING

## 2025-08-21 ASSESSMENT — PAIN - FUNCTIONAL ASSESSMENT: PAIN_FUNCTIONAL_ASSESSMENT: 0-10

## 2025-08-25 ENCOUNTER — HOSPITAL ENCOUNTER (OUTPATIENT)
Dept: CT IMAGING | Age: 70
Discharge: HOME | End: 2025-08-27
Payer: MEDICARE

## 2025-08-25 VITALS
RESPIRATION RATE: 18 BRPM | DIASTOLIC BLOOD PRESSURE: 91 MMHG | SYSTOLIC BLOOD PRESSURE: 162 MMHG | OXYGEN SATURATION: 91 % | TEMPERATURE: 98.4 F | HEART RATE: 79 BPM

## 2025-08-25 DIAGNOSIS — D64.9 ANEMIA, UNSPECIFIED TYPE: ICD-10-CM

## 2025-08-25 DIAGNOSIS — D47.2 SMOLDERING MYELOMA: ICD-10-CM

## 2025-08-25 LAB
BASOPHILS # BLD: 0.04 K/UL (ref 0–0.2)
BASOPHILS NFR BLD: 1 % (ref 0–2)
EOSINOPHIL # BLD: 0.2 K/UL (ref 0.05–0.5)
EOSINOPHILS RELATIVE PERCENT: 5 % (ref 0–6)
ERYTHROCYTE [DISTWIDTH] IN BLOOD BY AUTOMATED COUNT: 13.9 % (ref 11.5–15)
HCT VFR BLD AUTO: 36.3 % (ref 34–48)
HGB BLD-MCNC: 10.9 G/DL (ref 11.5–15.5)
IMM GRANULOCYTES # BLD AUTO: <0.03 K/UL (ref 0–0.58)
IMM GRANULOCYTES NFR BLD: 0 % (ref 0–5)
INR PPP: 1.1
LYMPHOCYTES NFR BLD: 2.02 K/UL (ref 1.5–4)
LYMPHOCYTES RELATIVE PERCENT: 47 % (ref 20–42)
MCH RBC QN AUTO: 28.5 PG (ref 26–35)
MCHC RBC AUTO-ENTMCNC: 30 G/DL (ref 32–34.5)
MCV RBC AUTO: 95 FL (ref 80–99.9)
MONOCYTES NFR BLD: 0.34 K/UL (ref 0.1–0.95)
MONOCYTES NFR BLD: 8 % (ref 2–12)
NEUTROPHILS NFR BLD: 40 % (ref 43–80)
NEUTS SEG NFR BLD: 1.71 K/UL (ref 1.8–7.3)
PLATELET # BLD AUTO: 405 K/UL (ref 130–450)
PMV BLD AUTO: 10 FL (ref 7–12)
PROTHROMBIN TIME: 11.6 SEC (ref 9.3–12.4)
RBC # BLD AUTO: 3.82 M/UL (ref 3.5–5.5)
WBC OTHER # BLD: 4.3 K/UL (ref 4.5–11.5)

## 2025-08-25 PROCEDURE — 2720000010 CT BONE MARROW BIOPSY AND ASPIRATION

## 2025-08-25 PROCEDURE — 38222 DX BONE MARROW BX & ASPIR: CPT

## 2025-08-25 PROCEDURE — 85025 COMPLETE CBC W/AUTO DIFF WBC: CPT

## 2025-08-25 PROCEDURE — 6360000002 HC RX W HCPCS: Performed by: RADIOLOGY

## 2025-08-25 PROCEDURE — 85610 PROTHROMBIN TIME: CPT

## 2025-08-25 PROCEDURE — 36415 COLL VENOUS BLD VENIPUNCTURE: CPT

## 2025-08-25 PROCEDURE — 7100000010 HC PHASE II RECOVERY - FIRST 15 MIN

## 2025-08-25 PROCEDURE — 7100000011 HC PHASE II RECOVERY - ADDTL 15 MIN

## 2025-08-25 RX ORDER — FENTANYL CITRATE 50 UG/ML
INJECTION, SOLUTION INTRAMUSCULAR; INTRAVENOUS PRN
Status: COMPLETED | OUTPATIENT
Start: 2025-08-25 | End: 2025-08-25

## 2025-08-25 RX ORDER — MIDAZOLAM HYDROCHLORIDE 2 MG/2ML
INJECTION, SOLUTION INTRAMUSCULAR; INTRAVENOUS PRN
Status: COMPLETED | OUTPATIENT
Start: 2025-08-25 | End: 2025-08-25

## 2025-08-25 RX ORDER — LIDOCAINE HYDROCHLORIDE 20 MG/ML
INJECTION, SOLUTION INFILTRATION; PERINEURAL PRN
Status: COMPLETED | OUTPATIENT
Start: 2025-08-25 | End: 2025-08-25

## 2025-08-25 RX ADMIN — FENTANYL CITRATE 25 MCG: 50 INJECTION, SOLUTION INTRAMUSCULAR; INTRAVENOUS at 15:16

## 2025-08-25 RX ADMIN — MIDAZOLAM HYDROCHLORIDE 0.5 MG: 1 INJECTION, SOLUTION INTRAMUSCULAR; INTRAVENOUS at 15:16

## 2025-08-25 RX ADMIN — FENTANYL CITRATE 25 MCG: 50 INJECTION, SOLUTION INTRAMUSCULAR; INTRAVENOUS at 15:19

## 2025-08-25 RX ADMIN — LIDOCAINE HYDROCHLORIDE 10 ML: 20 INJECTION, SOLUTION INFILTRATION; PERINEURAL at 15:17

## 2025-08-25 ASSESSMENT — PAIN - FUNCTIONAL ASSESSMENT: PAIN_FUNCTIONAL_ASSESSMENT: 0-10

## 2025-09-04 ENCOUNTER — OFFICE VISIT (OUTPATIENT)
Dept: ORTHOPEDIC SURGERY | Age: 70
End: 2025-09-04

## 2025-09-04 VITALS
TEMPERATURE: 98.7 F | OXYGEN SATURATION: 95 % | HEART RATE: 98 BPM | SYSTOLIC BLOOD PRESSURE: 146 MMHG | DIASTOLIC BLOOD PRESSURE: 81 MMHG

## 2025-09-04 DIAGNOSIS — Z96.652 S/P TOTAL KNEE ARTHROPLASTY, LEFT: Primary | ICD-10-CM

## 2025-09-04 PROCEDURE — 99024 POSTOP FOLLOW-UP VISIT: CPT | Performed by: STUDENT IN AN ORGANIZED HEALTH CARE EDUCATION/TRAINING PROGRAM

## 2025-09-05 LAB — BONE MARROW REPORT: NORMAL

## (undated) DEVICE — SOLUTION WND IRRIGATION 450 ML 0.5 PVP-I 0.9 NACL

## (undated) DEVICE — Device

## (undated) DEVICE — BANDAGE ADH W1XL3IN NAT FAB WVN FLX DURABLE N ADH PD SEAL

## (undated) DEVICE — KIT INT FIX FEM TIB CKPT MAKOPLASTY

## (undated) DEVICE — 12 ML SYRINGE,LUER-LOCK TIP: Brand: MONOJECT

## (undated) DEVICE — 3M™ RED DOT™ MONITORING ELECTRODE WITH FOAM TAPE AND STICKY GEL 2560, 50/BAG, 20/CASE, 72/PLT: Brand: RED DOT™

## (undated) DEVICE — BAG PRSS INFUS IV OR ART 3000 CC W STPCOCK NO THUMBWHEEL W

## (undated) DEVICE — SOL IRR SOD CHL 0.9% TITAN XL CNTNR 3000ML

## (undated) DEVICE — HANDPIECE ABLAT DISP FOR ENDOMET SYS

## (undated) DEVICE — 3M™ STERI-DRAPE™ U-DRAPE 1015: Brand: STERI-DRAPE™

## (undated) DEVICE — ZIMMER® STERILE DISPOSABLE TOURNIQUET CUFF WITH PLC, DUAL PORT, SINGLE BLADDER, 34 IN. (86 CM)

## (undated) DEVICE — LEGGINGS, PAIR, 31X48, STERILE: Brand: MEDLINE

## (undated) DEVICE — KIT POS LEG DISP

## (undated) DEVICE — DRAPE,REIN 53X77,STERILE: Brand: MEDLINE

## (undated) DEVICE — TOWEL,OR,DSP,ST,BLUE,STD,6/PK,12PK/CS: Brand: MEDLINE

## (undated) DEVICE — PIN BNE FIX TEMP L110MM DIA4MM MAKO

## (undated) DEVICE — DRESSING HYDROFIBER AQUACEL AG ADVANTAGE 3.5X10 IN

## (undated) DEVICE — GLOVE ORANGE PI 7 1/2   MSG9075

## (undated) DEVICE — APPLICATOR MEDICATED 26 CC SOLUTION HI LT ORNG CHLORAPREP

## (undated) DEVICE — PEEL-AWAY HOOD: Brand: FLYTE, SURGICOOL

## (undated) DEVICE — TAPE ADH W3INXL10YD WHT COT WVN BK POWERFUL RUB BASE HIGHLY

## (undated) DEVICE — NEEDLE HYPO 18GA L1.5IN PNK POLYPR HUB S STL THN WALL FILL

## (undated) DEVICE — GOWN,SIRUS,POLYRNF,BRTHSLV,XL,30/CS: Brand: MEDLINE

## (undated) DEVICE — TRAY,VAG PREP,2PR VNYL GLV,4 C: Brand: MEDLINE INDUSTRIES, INC.

## (undated) DEVICE — SOLUTION IRRIG 3000ML 0.9% SOD CHL USP UROMATIC PLAS CONT

## (undated) DEVICE — DOUBLE BASIN SET: Brand: MEDLINE INDUSTRIES, INC.

## (undated) DEVICE — SPONGE GZ W4XL4IN RAYON POLY CVR W/NONWOVEN FAB STRL 2/PK

## (undated) DEVICE — GLOVE ORANGE PI 7   MSG9070

## (undated) DEVICE — SOLUTION IRRIG 1000ML 0.9% SOD CHL USP POUR PLAS BTL

## (undated) DEVICE — CANNULA NSL ORAL AD FOR CAPNOFLEX CO2 O2 AIRLFE

## (undated) DEVICE — COVER HNDL LT DISP

## (undated) DEVICE — GAUZE,SPONGE,4"X4",16PLY,XRAY,STRL,LF: Brand: MEDLINE

## (undated) DEVICE — PAD,NON-ADHERENT,3X8,STERILE,LF,1/PK: Brand: MEDLINE

## (undated) DEVICE — GOWN,SIRUS,FABRNF,XL,20/CS: Brand: MEDLINE

## (undated) DEVICE — SIMPLEX® HV IS A FAST-SETTING ACRYLIC RESIN FOR USE IN BONE SURGERY. MIXING THE TWO SEPARATE STERILE COMPONENTS PRODUCES A DUCTILE BONE CEMENT WHICH, AFTER HARDENING, FIXES THE IMPLANT AND TRANSFERS STRESSES PRODUCED DURING MOVEMENT EVENLY TO THE BONE. SIMPLEX® HV CEMENT POWDER ALSO CONTAINS INSOLUBLE ZIRCONIUM DIOXIDE AS AN X-RAY CONTRAST MEDIUM. SIMPLEX® HV DOES NOT EMIT A SIGNAL AND DOES NOT POSE A SAFETY RISK IN A MAGNETIC RESONANCE ENVIRONMENT.
Type: IMPLANTABLE DEVICE | Site: KNEE | Status: NON-FUNCTIONAL
Brand: SIMPLEX HV

## (undated) DEVICE — HEWSON SUTURE RETRIEVER: Brand: HEWSON SUTURE RETRIEVER

## (undated) DEVICE — 3 ML SYRINGE LUER-LOCK TIP: Brand: MONOJECT

## (undated) DEVICE — KIT TRK KNEE PROC VIZADISC

## (undated) DEVICE — 3M™ IOBAN™ 2 ANTIMICROBIAL INCISE DRAPE 6651EZ: Brand: IOBAN™ 2

## (undated) DEVICE — SOCK SPEC L9IN WHT UNIV W/ STD PRT FOR FLD MGMT

## (undated) DEVICE — DEFENDO AIR WATER SUCTION AND BIOPSY VALVE KIT FOR  OLYMPUS: Brand: DEFENDO AIR/WATER/SUCTION AND BIOPSY VALVE

## (undated) DEVICE — DEVICE TISS REM IU CANSTR VAC TB FT PEDAL DISPOSABLE MYOSURE

## (undated) DEVICE — KIT SURG W7XL11IN 2 PKT UNTREATED NA

## (undated) DEVICE — PAD,SANITARY,11 IN,MAXI,N-STRL,IND WRAP: Brand: MEDLINE

## (undated) DEVICE — GLOVE SURG SZ 85 L12IN FNGR ORTHO 126MIL CRM LTX FREE

## (undated) DEVICE — SOLUTION IRRIG 1000ML STRL H2O USP PLAS POUR BTL

## (undated) DEVICE — SET FLD COLL CLR W/ BLT IN WARN SYS FOR HYSTSCP DOLPHIN

## (undated) DEVICE — Z DISCONTINUED APPLICATOR SURG PREP 0.35OZ 2% CHG 70% ISO ALC W/ HI LT

## (undated) DEVICE — STRYKER PERFORMANCE SERIES SAGITTAL BLADE: Brand: STRYKER PERFORMANCE SERIES

## (undated) DEVICE — 6 ML SYRINGE LUER-LOCK TIP: Brand: MONOJECT

## (undated) DEVICE — TRAY SET D

## (undated) DEVICE — BITEBLOCK 54FR W/ DENT RIM BLOX

## (undated) DEVICE — ELECTRODE PT RET AD L9FT HI MOIST COND ADH HYDRGEL CORDED

## (undated) DEVICE — SYRINGE MED 30ML STD CLR PLAS LUERLOCK TIP N CTRL DISP

## (undated) DEVICE — PATIENT RETURN ELECTRODE, SINGLE-USE, CONTACT QUALITY MONITORING, ADULT, WITH 9FT CORD, FOR PATIENTS WEIGING OVER 33LBS. (15KG): Brand: MEGADYNE

## (undated) DEVICE — CONTAINER SPEC 60ML PH 7NEUTRAL BUFF FRMLN RDY TO USE

## (undated) DEVICE — LIQUIBAND RAPID ADHESIVE 36/CS 0.8ML: Brand: MEDLINE

## (undated) DEVICE — MARKER,SKIN,WI/RULER AND LABELS: Brand: MEDLINE

## (undated) DEVICE — WIPES SKIN CLOTH READYPREP 9 X 10.5 IN 2% CHLORHEX GLUCONATE CHG PREOP

## (undated) DEVICE — GLOVE SURG SZ 65 L12IN FNGR THK94MIL STD WHT LTX FREE

## (undated) DEVICE — BLADE SURG SAW S STL NAR OSC W/ SERR EDGE DISP

## (undated) DEVICE — SPONGE LAP W18XL18IN WHT COT 4 PLY FLD STRUNG RADPQ DISP ST 2 PER PACK

## (undated) DEVICE — DEVICE TISS REM DIA3MM L25.25IN ENDOSCP F/ IU POLYPS

## (undated) DEVICE — 4-PORT MANIFOLD: Brand: NEPTUNE 2

## (undated) DEVICE — Z INACTIVE USE 2660664 SOLUTION IRRIG 3000ML 0.9% SOD CHL USP UROMATIC PLAS CONT

## (undated) DEVICE — 3M™ COBAN™ NL STERILE NON-LATEX SELF-ADHERENT WRAP, 2084S, 4 IN X 5 YD (10 CM X 4,5 M), 18 ROLLS/CASE: Brand: 3M™ COBAN™

## (undated) DEVICE — Y-TYPE TUR/BLADDER IRRIGATION SET, REGULATING CLAMP

## (undated) DEVICE — CORD RETRCT SIL - ORDER MULTIPLES OF 10 EACH

## (undated) DEVICE — GAUZE,SPONGE,POST-OP,4X3,STRL,LF: Brand: MEDLINE

## (undated) DEVICE — HANDPIECE SET WITH COAXIAL HIGH FLOW TIP AND SUCTION TUBE: Brand: INTERPULSE

## (undated) DEVICE — NEEDLE HYPO 25GA L1.5IN BLU POLYPR HUB S STL REG BVL STR

## (undated) DEVICE — GAUZE,SPONGE,4"X4",12PLY,STERILE,LF,2'S: Brand: MEDLINE

## (undated) DEVICE — GRADUATE TRIANG MEASURE 1000ML BLK PRNT

## (undated) DEVICE — MASTISOL ADHESIVE LIQ 2/3ML

## (undated) DEVICE — BLADE ES L6IN ELASTOMERIC COAT EXT DURABLE BEND UPTO 90DEG

## (undated) DEVICE — CONNECTOR IRRIGATION AUXILIARY H2O JET W/ PRT MTL THRD HYDR

## (undated) DEVICE — BIT DRL L5IN DIA2.8MM STD ST S STL TWST BUSA

## (undated) DEVICE — 450 ML BOTTLE OF 0.05% CHLORHEXIDINE GLUCONATE IN 99.95% STERILE WATER FOR IRRIGATION, USP AND APPLICATOR.: Brand: IRRISEPT ANTIMICROBIAL WOUND LAVAGE

## (undated) DEVICE — GLOVE ORANGE PI 8 1/2   MSG9085

## (undated) DEVICE — COVER,LIGHT HANDLE,FLX,2/PK: Brand: MEDLINE INDUSTRIES, INC.

## (undated) DEVICE — SNARE ENDOSCP POLYP SM 2.4 MM 195 CM 13 MM 2.8 MM CAPTIVATOR

## (undated) DEVICE — FORCEPS BX OVL CUP FEN DISPOSABLE CAP L 160CM RAD JAW 4

## (undated) DEVICE — STRIP,CLOSURE,WOUND,MEDI-STRIP,1/2X4: Brand: MEDLINE

## (undated) DEVICE — SOLUTION IV IRRIG POUR BRL 0.9% SODIUM CHL 2F7124

## (undated) DEVICE — 2108 SERIES SAGITTAL BLADE, OFFSET (20.0 X 0.89 X 80.0MM)

## (undated) DEVICE — SPONGE,LAP,18"X18",STD,XR,ST,5/PK,40PK/C: Brand: MEDLINE

## (undated) DEVICE — FORCEPS BX L160CM JAW DIA2.4MM YEL L CAP W/ NDL DISP RAD

## (undated) DEVICE — KIT DRP FOR RIO ROBOTIC ARM ASST SYS

## (undated) DEVICE — BLOCK BITE 60FR RUBBER ADLT DENTAL

## (undated) DEVICE — BANDAGE COMPR W6INXL12FT SMOOTH FOR LIMB EXSANG ESMARCH

## (undated) DEVICE — NEEDLE SPNL 22GA L5IN BLK HUB S STL W/ QNCKE PNT W/OUT

## (undated) DEVICE — NEEDLE HYPO 18GA L1.5IN PNK POLYPR HUB S STL REG BVL STR

## (undated) DEVICE — DRESSING HYDROFIBER AQUACEL AG ADVANTAGE 3.5X6 IN

## (undated) DEVICE — TRAY EPI SGL DOSE 18GA NDL CUST AULTMAN HOSP

## (undated) DEVICE — PIN BNE FIX TEMP L140MM DIA4MM MAKO

## (undated) DEVICE — SEALANT TISS 10 ML FIBRIN VISTASEAL

## (undated) DEVICE — FORCEPS BX L240CM JAW DIA2.4MM WRK CHN 2.8MM ORNG L CAP W/

## (undated) DEVICE — BNDG,ELSTC,MATRIX,STRL,6"X5YD,LF,HOOK&LP: Brand: MEDLINE

## (undated) DEVICE — 1000 S-DRAPE TOWEL DRAPE 10/BX: Brand: STERI-DRAPE™

## (undated) DEVICE — TUBING, SUCTION, 9/32" X 10', STRAIGHT: Brand: MEDLINE

## (undated) DEVICE — GLOVE ORANGE PI 8   MSG9080